# Patient Record
Sex: MALE | Race: BLACK OR AFRICAN AMERICAN | Employment: UNEMPLOYED | URBAN - METROPOLITAN AREA
[De-identification: names, ages, dates, MRNs, and addresses within clinical notes are randomized per-mention and may not be internally consistent; named-entity substitution may affect disease eponyms.]

---

## 2017-05-01 ENCOUNTER — APPOINTMENT (OUTPATIENT)
Dept: ULTRASOUND IMAGING | Age: 32
DRG: 683 | End: 2017-05-01
Attending: INTERNAL MEDICINE
Payer: MEDICARE

## 2017-05-01 ENCOUNTER — HOSPITAL ENCOUNTER (INPATIENT)
Age: 32
LOS: 4 days | Discharge: HOME | DRG: 683 | End: 2017-05-05
Attending: EMERGENCY MEDICINE | Admitting: INTERNAL MEDICINE
Payer: MEDICARE

## 2017-05-01 ENCOUNTER — APPOINTMENT (OUTPATIENT)
Dept: GENERAL RADIOLOGY | Age: 32
DRG: 683 | End: 2017-05-01
Attending: PHYSICIAN ASSISTANT
Payer: MEDICARE

## 2017-05-01 DIAGNOSIS — E66.01 MORBID OBESITY WITH BMI OF 40.0-44.9, ADULT (HCC): ICD-10-CM

## 2017-05-01 DIAGNOSIS — E10.22 TYPE 1 DIABETES MELLITUS WITH STAGE 3 CHRONIC KIDNEY DISEASE (HCC): ICD-10-CM

## 2017-05-01 DIAGNOSIS — R06.02 SOB (SHORTNESS OF BREATH): ICD-10-CM

## 2017-05-01 DIAGNOSIS — N18.30 TYPE 1 DIABETES MELLITUS WITH STAGE 3 CHRONIC KIDNEY DISEASE (HCC): ICD-10-CM

## 2017-05-01 DIAGNOSIS — N17.9 ACUTE KIDNEY INJURY (HCC): Primary | ICD-10-CM

## 2017-05-01 DIAGNOSIS — I10 ESSENTIAL HYPERTENSION: ICD-10-CM

## 2017-05-01 LAB
ALBUMIN SERPL BCP-MCNC: 1.7 G/DL (ref 3.5–5)
ALBUMIN/GLOB SERPL: 0.3 {RATIO} (ref 1.1–2.2)
ALP SERPL-CCNC: 50 U/L (ref 45–117)
ALT SERPL-CCNC: 14 U/L (ref 12–78)
AMORPH CRY URNS QL MICRO: ABNORMAL
ANION GAP BLD CALC-SCNC: 15 MMOL/L (ref 5–15)
APPEARANCE UR: ABNORMAL
AST SERPL W P-5'-P-CCNC: 18 U/L (ref 15–37)
BACTERIA URNS QL MICRO: NEGATIVE /HPF
BASOPHILS # BLD AUTO: 0 K/UL (ref 0–0.1)
BASOPHILS # BLD: 0 % (ref 0–1)
BILIRUB SERPL-MCNC: 0.2 MG/DL (ref 0.2–1)
BILIRUB UR QL: NEGATIVE
BNP SERPL-MCNC: ABNORMAL PG/ML (ref 0–125)
BUN SERPL-MCNC: 56 MG/DL (ref 6–20)
BUN/CREAT SERPL: 6 (ref 12–20)
CALCIUM SERPL-MCNC: 7.4 MG/DL (ref 8.5–10.1)
CHLORIDE SERPL-SCNC: 113 MMOL/L (ref 97–108)
CK MB CFR SERPL CALC: 0.6 % (ref 0–2.5)
CK MB SERPL-MCNC: 6.6 NG/ML (ref 5–25)
CK SERPL-CCNC: 1053 U/L (ref 39–308)
CO2 SERPL-SCNC: 19 MMOL/L (ref 21–32)
COLOR UR: ABNORMAL
CREAT SERPL-MCNC: 8.72 MG/DL (ref 0.7–1.3)
EOSINOPHIL # BLD: 0.3 K/UL (ref 0–0.4)
EOSINOPHIL NFR BLD: 2 % (ref 0–7)
EPITH CASTS URNS QL MICRO: ABNORMAL /LPF
ERYTHROCYTE [DISTWIDTH] IN BLOOD BY AUTOMATED COUNT: 14.4 % (ref 11.5–14.5)
GLOBULIN SER CALC-MCNC: 5.5 G/DL (ref 2–4)
GLUCOSE BLD STRIP.AUTO-MCNC: 120 MG/DL (ref 65–100)
GLUCOSE SERPL-MCNC: 121 MG/DL (ref 65–100)
GLUCOSE UR STRIP.AUTO-MCNC: 250 MG/DL
HCT VFR BLD AUTO: 22.7 % (ref 36.6–50.3)
HGB BLD-MCNC: 7.4 G/DL (ref 12.1–17)
HGB UR QL STRIP: ABNORMAL
KETONES UR QL STRIP.AUTO: NEGATIVE MG/DL
LEUKOCYTE ESTERASE UR QL STRIP.AUTO: NEGATIVE
LYMPHOCYTES # BLD AUTO: 13 % (ref 12–49)
LYMPHOCYTES # BLD: 1.6 K/UL (ref 0.8–3.5)
MCH RBC QN AUTO: 24.9 PG (ref 26–34)
MCHC RBC AUTO-ENTMCNC: 32.6 G/DL (ref 30–36.5)
MCV RBC AUTO: 76.4 FL (ref 80–99)
MONOCYTES # BLD: 0.8 K/UL (ref 0–1)
MONOCYTES NFR BLD AUTO: 7 % (ref 5–13)
NEUTS SEG # BLD: 9.8 K/UL (ref 1.8–8)
NEUTS SEG NFR BLD AUTO: 78 % (ref 32–75)
NITRITE UR QL STRIP.AUTO: NEGATIVE
PH UR STRIP: 5.5 [PH] (ref 5–8)
PLATELET # BLD AUTO: 292 K/UL (ref 150–400)
POTASSIUM SERPL-SCNC: 2.9 MMOL/L (ref 3.5–5.1)
PROT SERPL-MCNC: 7.2 G/DL (ref 6.4–8.2)
PROT UR STRIP-MCNC: 300 MG/DL
RBC # BLD AUTO: 2.97 M/UL (ref 4.1–5.7)
RBC #/AREA URNS HPF: ABNORMAL /HPF (ref 0–5)
SERVICE CMNT-IMP: ABNORMAL
SODIUM SERPL-SCNC: 147 MMOL/L (ref 136–145)
SP GR UR REFRACTOMETRY: 1.02 (ref 1–1.03)
TROPONIN I SERPL-MCNC: 0.13 NG/ML
TROPONIN I SERPL-MCNC: 0.14 NG/ML
UA: UC IF INDICATED,UAUC: ABNORMAL
UROBILINOGEN UR QL STRIP.AUTO: 0.2 EU/DL (ref 0.2–1)
WBC # BLD AUTO: 12.6 K/UL (ref 4.1–11.1)
WBC URNS QL MICRO: ABNORMAL /HPF (ref 0–4)

## 2017-05-01 PROCEDURE — 80053 COMPREHEN METABOLIC PANEL: CPT | Performed by: EMERGENCY MEDICINE

## 2017-05-01 PROCEDURE — 93005 ELECTROCARDIOGRAM TRACING: CPT

## 2017-05-01 PROCEDURE — 65270000029 HC RM PRIVATE

## 2017-05-01 PROCEDURE — 76770 US EXAM ABDO BACK WALL COMP: CPT

## 2017-05-01 PROCEDURE — 99284 EMERGENCY DEPT VISIT MOD MDM: CPT

## 2017-05-01 PROCEDURE — 93970 EXTREMITY STUDY: CPT

## 2017-05-01 PROCEDURE — 84484 ASSAY OF TROPONIN QUANT: CPT | Performed by: EMERGENCY MEDICINE

## 2017-05-01 PROCEDURE — 74011000250 HC RX REV CODE- 250: Performed by: INTERNAL MEDICINE

## 2017-05-01 PROCEDURE — 85025 COMPLETE CBC W/AUTO DIFF WBC: CPT | Performed by: EMERGENCY MEDICINE

## 2017-05-01 PROCEDURE — 74011250637 HC RX REV CODE- 250/637: Performed by: INTERNAL MEDICINE

## 2017-05-01 PROCEDURE — 71020 XR CHEST PA LAT: CPT

## 2017-05-01 PROCEDURE — 82962 GLUCOSE BLOOD TEST: CPT

## 2017-05-01 PROCEDURE — 82553 CREATINE MB FRACTION: CPT | Performed by: EMERGENCY MEDICINE

## 2017-05-01 PROCEDURE — 74011250636 HC RX REV CODE- 250/636: Performed by: INTERNAL MEDICINE

## 2017-05-01 PROCEDURE — 83880 ASSAY OF NATRIURETIC PEPTIDE: CPT

## 2017-05-01 PROCEDURE — 74011250637 HC RX REV CODE- 250/637: Performed by: PHYSICIAN ASSISTANT

## 2017-05-01 PROCEDURE — 81001 URINALYSIS AUTO W/SCOPE: CPT

## 2017-05-01 PROCEDURE — 36415 COLL VENOUS BLD VENIPUNCTURE: CPT | Performed by: EMERGENCY MEDICINE

## 2017-05-01 PROCEDURE — 65660000000 HC RM CCU STEPDOWN

## 2017-05-01 PROCEDURE — 82550 ASSAY OF CK (CPK): CPT | Performed by: EMERGENCY MEDICINE

## 2017-05-01 RX ORDER — SODIUM CHLORIDE 450 MG/100ML
50 INJECTION, SOLUTION INTRAVENOUS CONTINUOUS
Status: DISCONTINUED | OUTPATIENT
Start: 2017-05-01 | End: 2017-05-01

## 2017-05-01 RX ORDER — HEPARIN SODIUM 5000 [USP'U]/ML
5000 INJECTION, SOLUTION INTRAVENOUS; SUBCUTANEOUS EVERY 8 HOURS
Status: DISCONTINUED | OUTPATIENT
Start: 2017-05-01 | End: 2017-05-05 | Stop reason: HOSPADM

## 2017-05-01 RX ORDER — SODIUM BICARBONATE 650 MG/1
325 TABLET ORAL 2 TIMES DAILY
Status: DISCONTINUED | OUTPATIENT
Start: 2017-05-01 | End: 2017-05-02

## 2017-05-01 RX ORDER — DEXTROSE 50 % IN WATER (D50W) INTRAVENOUS SYRINGE
12.5-25 AS NEEDED
Status: DISCONTINUED | OUTPATIENT
Start: 2017-05-01 | End: 2017-05-05 | Stop reason: HOSPADM

## 2017-05-01 RX ORDER — LABETALOL HYDROCHLORIDE 5 MG/ML
10-20 INJECTION, SOLUTION INTRAVENOUS
Status: DISCONTINUED | OUTPATIENT
Start: 2017-05-01 | End: 2017-05-02

## 2017-05-01 RX ORDER — AMLODIPINE BESYLATE 5 MG/1
5 TABLET ORAL DAILY
Status: DISCONTINUED | OUTPATIENT
Start: 2017-05-01 | End: 2017-05-02

## 2017-05-01 RX ORDER — MAGNESIUM SULFATE 100 %
4 CRYSTALS MISCELLANEOUS AS NEEDED
Status: DISCONTINUED | OUTPATIENT
Start: 2017-05-01 | End: 2017-05-05 | Stop reason: HOSPADM

## 2017-05-01 RX ORDER — POTASSIUM CHLORIDE 1.5 G/1.77G
20 POWDER, FOR SOLUTION ORAL
Status: COMPLETED | OUTPATIENT
Start: 2017-05-01 | End: 2017-05-01

## 2017-05-01 RX ORDER — INSULIN LISPRO 100 [IU]/ML
INJECTION, SOLUTION INTRAVENOUS; SUBCUTANEOUS
Status: DISCONTINUED | OUTPATIENT
Start: 2017-05-01 | End: 2017-05-05 | Stop reason: HOSPADM

## 2017-05-01 RX ORDER — ACETAMINOPHEN 325 MG/1
650 TABLET ORAL
Status: DISCONTINUED | OUTPATIENT
Start: 2017-05-01 | End: 2017-05-05 | Stop reason: HOSPADM

## 2017-05-01 RX ORDER — INSULIN GLARGINE 100 [IU]/ML
30 INJECTION, SOLUTION SUBCUTANEOUS DAILY
Status: DISCONTINUED | OUTPATIENT
Start: 2017-05-02 | End: 2017-05-05

## 2017-05-01 RX ADMIN — SODIUM BICARBONATE 325 MG: 650 TABLET, ORALLY DISINTEGRATING ORAL at 21:11

## 2017-05-01 RX ADMIN — AMLODIPINE BESYLATE 5 MG: 5 TABLET ORAL at 20:11

## 2017-05-01 RX ADMIN — LABETALOL HYDROCHLORIDE 10 MG: 5 INJECTION, SOLUTION INTRAVENOUS at 20:11

## 2017-05-01 RX ADMIN — LABETALOL HYDROCHLORIDE 10 MG: 5 INJECTION, SOLUTION INTRAVENOUS at 21:12

## 2017-05-01 RX ADMIN — ACETAMINOPHEN 650 MG: 325 TABLET ORAL at 21:11

## 2017-05-01 RX ADMIN — HEPARIN SODIUM 5000 UNITS: 5000 INJECTION, SOLUTION INTRAVENOUS; SUBCUTANEOUS at 20:10

## 2017-05-01 RX ADMIN — POTASSIUM CHLORIDE 20 MEQ: 1.5 POWDER, FOR SOLUTION ORAL at 18:11

## 2017-05-01 NOTE — ED NOTES
Assumed care of patient from triage. Patient is A&Ox4, respirations even and unlabored. Pt. States he has had BLE edema x1 month and new onset SOB x1 week. Patient reports no chest pain, abdominal pain. Patient states he has had diarrhea intermittently for about 6 months and one episode of vomiting 3 days ago. Pt. Susy Lamb in low bed in POC, side rail x1, monitor x3, call light within reach.

## 2017-05-01 NOTE — ED PROVIDER NOTES
HPI Comments: Jeremiah Andres is a 27 y.o. male with a PMH of cataracts, DM, HLD, HTN, and obesity presenting ambulatory to the ED from home C/O bilateral lower extremity swelling for the past month and SOB x 1 day. Patient admits to PND, orthopnea and dyspnea on exertion. He admits to come CP with exertion but none at rest. He denies any recent wounds or rashes to his lower extremities. He denies history of heart failure, kidney problems, and taking Lasix/Bumex. Denies any change in urination, hematuria, dysuria, nausea, vomiting or diarrhea. No recent change in medications. Patient denies any other symptoms or complaints. PCP: Darian Strong III,     There are no other complaints, changes or physical findings at this time. The history is provided by the patient. No  was used. Past Medical History:   Diagnosis Date    Cataracts     Diabetes ([address]     Hypercholesterolemia     Hypertension     Obesity        Past Surgical History:   Procedure Laterality Date    HX AMPUTATION      Left great toe and L 5th toe         Family History:   Problem Relation Age of Onset    Diabetes Mother     Liver Disease Father     Kidney Disease Maternal Grandfather     Diabetes Maternal Grandfather     Hypertension Maternal Grandfather     Diabetes Paternal Uncle     Hypertension Paternal Uncle        Social History     Social History    Marital status: LEGALLY      Spouse name: N/A    Number of children: N/A    Years of education: N/A     Occupational History    Not on file. Social History Main Topics    Smoking status: Current Some Day Smoker     Packs/day: 0.25    Smokeless tobacco: Never Used    Alcohol use Yes      Comment: rarely    Drug use: No    Sexual activity: Not on file     Other Topics Concern    Not on file     Social History Narrative         ALLERGIES: Review of patient's allergies indicates no known allergies.     Review of Systems Constitutional: Negative for chills, diaphoresis and fever. HENT: Negative for rhinorrhea and sore throat. Eyes: Negative for pain. Respiratory: Positive for shortness of breath. Negative for cough, wheezing and stridor. Cardiovascular: Positive for chest pain (with exertion) and leg swelling (bilateral). Gastrointestinal: Negative for abdominal pain, diarrhea, nausea and vomiting. Genitourinary: Negative for difficulty urinating, dysuria, flank pain and hematuria. Musculoskeletal: Negative for back pain and neck stiffness. Skin: Negative for rash. Neurological: Negative for dizziness, seizures, syncope, weakness, light-headedness and headaches. Psychiatric/Behavioral: Negative for behavioral problems and confusion. Vitals:    05/01/17 1743 05/01/17 1800 05/01/17 2010 05/01/17 2101   BP: (!) 169/108 (!) 171/107 (!) 196/116 (!) 183/116   Pulse: 91 93 90 91   Resp: 18 22  20   Temp:    98.1 °F (36.7 °C)   SpO2: 97% 95%  97%   Weight:       Height:                Physical Exam   Constitutional: He is oriented to person, place, and time. He appears well-developed and well-nourished. No distress. 26 y/o AAM in no acute distress. Talking in full sentences, no accessory muscle use   HENT:   Head: Normocephalic and atraumatic. Right Ear: External ear normal.   Left Ear: External ear normal.   Nose: Nose normal.   Eyes: Conjunctivae and EOM are normal.   Neck: Normal range of motion. Neck supple. Cardiovascular: Normal rate, regular rhythm, normal heart sounds and intact distal pulses. No murmur heard. 3+ bilateral lower extremity edema below the knee. Right > Left. No appreciable erythema, or increased warmth. Left great toe and fifth toe amputation. Dry cracked skin. Pulmonary/Chest: Effort normal. No accessory muscle usage. No respiratory distress. He has decreased breath sounds in the left lower field. He has no wheezes. He has rales in the left lower field. Abdominal: Soft. Bowel sounds are normal. He exhibits no distension. There is no tenderness. There is no guarding and no CVA tenderness. Obese abdomen   Musculoskeletal: Normal range of motion. He exhibits no edema or tenderness. Neurological: He is alert and oriented to person, place, and time. Skin: Skin is warm and dry. No rash noted. He is not diaphoretic. Psychiatric: He has a normal mood and affect. His behavior is normal. Judgment normal.   Nursing note and vitals reviewed. MDM  Number of Diagnoses or Management Options  Acute kidney injury [facility]):   Essential hypertension:   Morbid obesity with BMI of 40.0-44.9, adult ([facility] [address]):   SOB (shortness of breath):   Type 1 diabetes mellitus with stage 3 chronic kidney disease [facility]):   Diagnosis management comments: DDx: congestive heart failure, acute kidney injury, electrolyte abnormality, malnutrition, dehydration, pleural effusion, pulmonary edema, ACS, pneumonia. Patient presents with bilateral lower extremity edema x 1 month and SOB with exertion x 1 week. Will assess with basic labs, xray, EKG. Plan for admission with DA. Amount and/or Complexity of Data Reviewed  Clinical lab tests: ordered and reviewed  Tests in the radiology section of CPT®: ordered and reviewed  Tests in the medicine section of CPT®: ordered and reviewed  Discuss the patient with other providers: yes (Hospitalist)    Critical Care  Total time providing critical care: 30-74 minutes    Patient Progress  Patient progress: stable    ED Course       Procedures    Chief Complaint   Patient presents with    Leg Swelling     x 1 month, progressively worse over the past several days       5:15 PM  The patients presenting problems have been discussed, and they are in agreement with the care plan formulated and outlined with them. I have encouraged them to ask questions as they arise throughout their visit.     CRITICAL CARE NOTE :    4:20 PM      IMPENDING DETERIORATION -Cardiovascular, Metabolic and Renal    ASSOCIATED RISK FACTORS - Hypoxia, Dysrhythmia, Metabolic changes and Dehydration    MANAGEMENT- Bedside Assessment and Supervision of Care    INTERPRETATION -  Xrays, ECG and Blood Pressure    INTERVENTIONS - hemodynamic mngmt and Metobolic interventions    CASE REVIEW - Hospitalist, Nursing and Family    TREATMENT RESPONSE -Stable    PERFORMED BY - Self and Mid-Level        NOTES   :      I have spent 50 minutes of critical care time involved in lab review, consultations with specialist, family decision- making, bedside attention and documentation. During this entire length of time I was immediately available to the patient .     Aung Thorpe MD                                                  MEDICATIONS GIVEN:  Medications   acetaminophen (TYLENOL) tablet 650 mg (650 mg Oral Given 5/1/17 2111)   heparin (porcine) injection 5,000 Units (5,000 Units SubCUTAneous Given 5/1/17 2010)   insulin lispro (HUMALOG) injection (0 Units SubCUTAneous Held 5/1/17 2200)   glucose chewable tablet 16 g (not administered)   dextrose (D50W) injection syrg 12.5-25 g (not administered)   glucagon (GLUCAGEN) injection 1 mg (not administered)   labetalol (NORMODYNE;TRANDATE) injection 10-20 mg (10 mg IntraVENous Given 5/1/17 2112)   prochlorperazine (COMPAZINE) with saline injection 5 mg (not administered)   insulin glargine (LANTUS) injection 30 Units (not administered)   amLODIPine (NORVASC) tablet 5 mg (5 mg Oral Given 5/1/17 2011)   sodium bicarbonate tablet 325 mg (325 mg Oral Given 5/1/17 2111)   potassium chloride (KLOR-CON) packet 20 mEq (20 mEq Oral Given 5/1/17 1811)       LABS REVIEWED:  Recent Results (from the past 24 hour(s))   CBC WITH AUTOMATED DIFF    Collection Time: 05/01/17  3:23 PM   Result Value Ref Range    WBC 12.6 (H) 4.1 - 11.1 K/uL    RBC 2.97 (L) 4.10 - 5.70 M/uL    HGB 7.4 (L) 12.1 - 17.0 g/dL    HCT 22.7 (L) 36.6 - 50.3 %    MCV 76.4 (L) 80.0 - 99.0 FL    MCH 24.9 (L) 26.0 - 34.0 PG    MCHC 32.6 30.0 - 36.5 g/dL    RDW 14.4 11.5 - 14.5 %    PLATELET 816 878 - 129 K/uL    NEUTROPHILS 78 (H) 32 - 75 %    LYMPHOCYTES 13 12 - 49 %    MONOCYTES 7 5 - 13 %    EOSINOPHILS 2 0 - 7 %    BASOPHILS 0 0 - 1 %    ABS. NEUTROPHILS 9.8 (H) 1.8 - 8.0 K/UL    ABS. LYMPHOCYTES 1.6 0.8 - 3.5 K/UL    ABS. MONOCYTES 0.8 0.0 - 1.0 K/UL    ABS. EOSINOPHILS 0.3 0.0 - 0.4 K/UL    ABS. BASOPHILS 0.0 0.0 - 0.1 K/UL   METABOLIC PANEL, COMPREHENSIVE    Collection Time: 05/01/17  3:23 PM   Result Value Ref Range    Sodium 147 (H) 136 - 145 mmol/L    Potassium 2.9 (L) 3.5 - 5.1 mmol/L    Chloride 113 (H) 97 - 108 mmol/L    CO2 19 (L) 21 - 32 mmol/L    Anion gap 15 5 - 15 mmol/L    Glucose 121 (H) 65 - 100 mg/dL    BUN 56 (H) 6 - 20 MG/DL    Creatinine 8.72 (H) 0.70 - 1.30 MG/DL    BUN/Creatinine ratio 6 (L) 12 - 20      GFR est AA 9 (L) >60 ml/min/1.73m2    GFR est non-AA 7 (L) >60 ml/min/1.73m2    Calcium 7.4 (L) 8.5 - 10.1 MG/DL    Bilirubin, total 0.2 0.2 - 1.0 MG/DL    ALT (SGPT) 14 12 - 78 U/L    AST (SGOT) 18 15 - 37 U/L    Alk. phosphatase 50 45 - 117 U/L    Protein, total 7.2 6.4 - 8.2 g/dL    Albumin 1.7 (L) 3.5 - 5.0 g/dL    Globulin 5.5 (H) 2.0 - 4.0 g/dL    A-G Ratio 0.3 (L) 1.1 - 2.2     CK W/ REFLX CKMB    Collection Time: 05/01/17  3:23 PM   Result Value Ref Range    CK 1053 (H) 39 - 308 U/L   TROPONIN I    Collection Time: 05/01/17  3:23 PM   Result Value Ref Range    Troponin-I, Qt. 0.14 (H) <0.05 ng/mL   PRO-BNP    Collection Time: 05/01/17  3:23 PM   Result Value Ref Range    NT pro-BNP >54228 (H) 0 - 125 PG/ML   CK-MB,QUANT.     Collection Time: 05/01/17  3:23 PM   Result Value Ref Range    CK - MB 6.6 (H) <3.6 NG/ML    CK-MB Index 0.6 0 - 2.5     EKG, 12 LEAD, INITIAL    Collection Time: 05/01/17  3:23 PM   Result Value Ref Range    Ventricular Rate 94 BPM    Atrial Rate 94 BPM    P-R Interval 192 ms    QRS Duration 108 ms    Q-T Interval 392 ms    QTC Calculation (Bezet) 490 ms    Calculated R Axis -177 degrees    Calculated T Axis 59 degrees    Diagnosis       Normal sinus rhythm  Right superior axis deviation  Prolonged QT  When compared with ECG of 10-DEC-2015 12:14,  Questionable change in QRS axis     TROPONIN I    Collection Time: 05/01/17  5:39 PM   Result Value Ref Range    Troponin-I, Qt. 0.13 (H) <0.05 ng/mL   URINALYSIS W/ REFLEX CULTURE    Collection Time: 05/01/17  5:39 PM   Result Value Ref Range    Color YELLOW/STRAW      Appearance CLOUDY (A) CLEAR      Specific gravity 1.018 1.003 - 1.030      pH (UA) 5.5 5.0 - 8.0      Protein 300 (A) NEG mg/dL    Glucose 250 (A) NEG mg/dL    Ketone NEGATIVE  NEG mg/dL    Bilirubin NEGATIVE  NEG      Blood LARGE (A) NEG      Urobilinogen 0.2 0.2 - 1.0 EU/dL    Nitrites NEGATIVE  NEG      Leukocyte Esterase NEGATIVE  NEG      WBC 0-4 0 - 4 /hpf    RBC 5-10 0 - 5 /hpf    Epithelial cells FEW FEW /lpf    Bacteria NEGATIVE  NEG /hpf    UA:UC IF INDICATED CULTURE NOT INDICATED BY UA RESULT CNI      Amorphous Crystals 1+ (A) NEG   GLUCOSE, POC    Collection Time: 05/01/17  9:05 PM   Result Value Ref Range    Glucose (POC) 120 (H) 65 - 100 mg/dL    Performed by Tangela Chong        VITAL SIGNS:  Patient Vitals for the past 12 hrs:   Temp Pulse Resp BP SpO2   05/01/17 2101 98.1 °F (36.7 °C) 91 20 (!) 183/116 97 %   05/01/17 2010 - 90 - (!) 196/116 -   05/01/17 1800 - 93 22 (!) 171/107 95 %   05/01/17 1743 - 91 18 (!) 169/108 97 %   05/01/17 1515 98.4 °F (36.9 °C) 97 18 (!) 178/94 96 %       RADIOLOGY RESULTS:  The following have been ordered and reviewed:  XR CHEST PA LAT   Final Result   EXAM: XR CHEST PA LAT     INDICATION: SOB. Leg swelling for one month, progressively worsening over the  past few days     COMPARISON: 12/10/2015.     FINDINGS: PA and lateral radiographs of the chest demonstrate clear lungs. The  cardiac and mediastinal contours and pulmonary vascularity are stable. There  are tiny bilateral pleural effusions.  There is central congestion without overt  CHF. The bones and soft tissues are within normal limits.      IMPRESSION  IMPRESSION: Central congestion without overt CHF. Small bilateral pleural  effusions. CONSULTATIONS:   CONSULT NOTE:   5:15 PM  Rosy Reed PA-C spoke with Dr. Jerome Medel,   Specialty: Hospitalist  Discussed pt's hx, disposition, and available diagnostic and imaging results. Reviewed care plans. Consultant agrees with plans as outlined. Consultant will come and evaluate patient with plans for admission. PROGRESS NOTES:  5:00 PM  Initial assessment of patient complete, and patient was given the opportunity to ask questions. Plan of care reviewed with patient and will update when results are available. ADMIT NOTE:  5:52 PM  The patient is being admitted to the hospital by Dr. Jerome Medel. The results of their tests and reasons for their admission have been discussed with the patient and/or available family. They convey agreement and understanding for the need to be admitted and for their admission diagnosis. DIAGNOSIS:    1. Acute kidney injury ([address]    2. SOB (shortness of breath)    3. Morbid obesity with BMI of 40.0-44.9, adult ([address]    4. Type 1 diabetes mellitus with stage 3 chronic kidney disease ([address]    5. Essential hypertension        PLAN:  1. Admit to Hospitalist    ED COURSE: The patients hospital course has been uncomplicated.

## 2017-05-01 NOTE — IP AVS SNAPSHOT
[address] [facility] 
[phone] Patient: Kaleb Underwood MRN: [id number] :1985 You are allergic to the following No active allergies Recent Documentation Height Weight BMI Smoking Status 1.88 m (!) 172 kg 48.69 kg/m2 Current Some Day Smoker Unresulted Labs Order Current Status CELIAC ANTIBODY PROFILE Preliminary result Emergency Contacts Name Discharge Info Relation Home Work Mobile Stacy Bello  Other Relative [6] [phone] Debra Jack  Other Relative [6] [phone] Eitan Polyak [5] [phone] Simon Marks N/A  AT THIS TIME [6] Spouse [3] [phone] About your hospitalization You were admitted on:  May 1, 2017 You last received care in the:  [facility] 3 NEUROSCIENCE TELEMETRY You were discharged on: May 5, 2017 Unit phone number:  [phone] Why you were hospitalized Your primary diagnosis was:  Not on File Your diagnoses also included:  Hussain (Acute Kidney Injury) (Hcc) Providers Seen During Your Hospitalizations Provider Role Specialty Primary office phone Arlene Ortez MD Attending Provider Emergency Medicine [phone] Enrico Zhou MD Attending Provider Internal Medicine [phone] Rex Jean Baptiste MD Attending Provider Internal Medicine [phone] Your Primary Care Physician (PCP) Primary Care Physician Office Phone Office Fax Orlandomian Hortonpalmira QUINTEROS [phone] Follow-up Information Follow up With Details Comments Contact Info Formerly named [facility] [address] [place] [address] Avenue 
[phone] Andry Rai MD In 2 weeks for your diarrhea when you have time [address] [place], [place] [address] [address] Gastrointestinal [address] [address] 
[phone] Joanna Sanford,  In 2 weeks for blood pressure recheck [place] [address] [facility] [address] [facility] 
[phone] Your Appointments Tuesday May 09, 2017 ARTERIO VENOUS FISTULA/GRAFT ARM with Yaniv Pacheco MD  
[facility] SURGERY (RI OR PRE ASSESSMENT) [address] [facility]  
[phone] Current Discharge Medication List  
  
CONTINUE these medications which have CHANGED Dose & Instructions Dispensing Information Comments Morning Noon Evening Bedtime  
 oxyCODONE-acetaminophen 5-325 mg per tablet Commonly known as:  PERCOCET What changed:  how much to take Your last dose was: Your next dose is:    
   
   
 Dose:  1 Tab Take 1 Tab by mouth every six (6) hours as needed. Max Daily Amount: 4 Tabs. Quantity:  20 Tab Refills:  0 CONTINUE these medications which have NOT CHANGED Dose & Instructions Dispensing Information Comments Morning Noon Evening Bedtime  
 amLODIPine 10 mg tablet Commonly known as:  Luis [place] Your last dose was: Your next dose is:    
   
   
 Dose:  10 mg Take 1 Tab by mouth daily. Indications: hypertension Quantity:  30 Tab Refills:  0  
     
   
   
   
  
 cloNIDine HCl 0.1 mg tablet Commonly known as:  CATAPRES Your last dose was: Your next dose is:    
   
   
 Dose:  0.1 mg Take 1 Tab by mouth three (3) times daily (with meals). Quantity:  90 Tab Refills:  0  
     
   
   
   
  
 hydrALAZINE 50 mg tablet Commonly known as:  APRESOLINE Your last dose was: Your next dose is:    
   
   
 Dose:  50 mg Take 1 Tab by mouth three (3) times daily. Indications: hypertension Quantity:  90 Tab Refills:  0  
     
   
   
   
  
 insulin lispro 100 unit/mL injection Commonly known as:  HUMALOG Your last dose was: Your next dose is:    
   
   
 Dose:  8 Units 8 Units by SubCUTAneous route Before breakfast, lunch, and dinner. Quantity:  1 Vial  
Refills:  1  
     
   
   
   
  
 insulin NPH/insulin regular 100 unit/mL (70-30) injection Commonly known as:  NOVOLIN 70/30, HUMULIN 70/30 Your last dose was: Your next dose is:    
   
   
 Dose:  44 Units 44 Units by SubCUTAneous route two (2) times a day. 70/25 is accepted Quantity:  10 mL Refills:  1  
     
   
   
   
  
 loperamide 2 mg capsule Commonly known as:  IMODIUM Your last dose was: Your next dose is:    
   
   
 Dose:  2 mg Take 2 mg by mouth as needed for Diarrhea. Refills:  0 STOP taking these medications   
 lisinopril 5 mg tablet Commonly known as:  PRINIVIL, ZESTRIL  
   
  
 omeprazole 40 mg capsule Commonly known as:  PRILOSEC  
   
  
 promethazine 25 mg tablet Commonly known as:  PHENERGAN Where to Get Your Medications Information on where to get these meds will be given to you by the nurse or doctor. ! Ask your nurse or doctor about these medications  
  amLODIPine 10 mg tablet  
 cloNIDine HCl 0.1 mg tablet  
 hydrALAZINE 50 mg tablet  
 oxyCODONE-acetaminophen 5-325 mg per tablet Discharge Instructions HOSPITALIST DISCHARGE INSTRUCTIONS 
 
NAME: Renae Ram :  1985 MRN:  [id number] Date/Time:  2017 9:50 AM 
 
ADMIT DATE: 2017 DISCHARGE DATE: 2017 Attending Physician: Ralph Palacios MD 
 
DISCHARGE DIAGNOSIS: 
End stage kidney disease Hypertension Insulin-dependent diabetes MEDICATIONS: 
See above · It is important that you take the medication exactly as they are prescribed. · Keep your medication in the bottles provided by the pharmacist and keep a list of the medication names, dosages, and times to be taken in your wallet. · Do not take other medications without consulting your doctor. Pain Management: per above medications What to do at [facility] Recommended diet:  Diabetic diet with kidney disease restrictions (see instructions below) Recommended activity: Activity as tolerated If you have questions regarding the hospital related prescriptions or hospital related issues please call [facility] at . You can always direct your questions to your primary care doctor if you are unable to reach your hospital physician; your PCP works as an extension of your hospital doctor just like your hospital doctor is an extension of your PCP for your time at [facility]. If you experience any of the following symptoms then please call your primary care physician or return to the emergency room if you cannot get hold of your doctor: 
Fever, chills, nausea, vomiting, diarrhea, change in mentation, falling, bleeding, shortness of breath Additional Instructions: 
 
 
Bring these papers with you to your follow up appointments. The papers will help your doctors be sure to continue the care plan from the hospital. 
 
 
 
 
 
 
Information obtained by : 
I understand that if any problems occur once I am at home I am to contact my physician. I understand and acknowledge receipt of the instructions indicated above. Physician's or R.N.'s Signature                                                                  Date/Time Patient or Representative Signature                                                          Date/Time Diet for End-Stage Renal Disease (Dialysis): Care Instructions Your Care Instructions You need to change your diet when you are on dialysis for end-stage renal disease (kidney failure). You will need more protein than you did before you started dialysis. You may need to limit salt and fluids. You also may need to limit minerals such as potassium and phosphorus. A diet for end-stage renal disease takes planning. A dietitian who specializes in kidney disease can help you plan meals that meet your needs. Your nutrition needs depend on the type of dialysis you get. Talk with your doctor or dietitian to make sure your diet is right for your condition. Do not change your diet without talking to your doctor or dietitian. Follow-up care is a key part of your treatment and safety. Be sure to make and go to all appointments, and call your doctor if you are having problems. It's also a good idea to know your test results and keep a list of the medicines you take. How can you care for yourself at home? · Work with your doctor or dietitian to create a food plan that guides your daily food choices. · Do not skip meals or go for many hours without eating. If you do not feel very hungry, try to eat 4 or 5 small meals instead of 1 or 2 big meals. · If you have a hard time eating enough, talk to your doctor or dietitian about ways you can add calories to your diet. · Do not take any vitamins or minerals, supplements, or herbal products without talking to your doctor first. 
· Check with your doctor about whether it is safe for you to drink alcohol. · Check with your doctor about how much fluid you can drink each day. Drinking a lot of fluid can cause you to gain too much water weight between dialysis sessions. To get the right amount of protein · Ask your doctor or dietitian how much protein you can have each day. You will probably need more protein while you are on dialysis than you did before you started dialysis. · Choose high-quality protein sources, such as lean meat, poultry, and fish. To limit salt · Read food labels on cans and food packages. The labels tell you how much sodium is in each serving. Make sure that you look at the serving size. If you eat more than the serving size, you will get more sodium than what is listed on the label. · Do not add salt to your food. Avoid foods that list salt, sodium, or monosodium glutamate (MSG) as an ingredient. · Buy foods that are labeled \"no salt added,\" \"sodium-free,\" or \"low-sodium. \" Foods labeled \"reduced-sodium\" and \"light sodium\" may still have too much sodium. · Limit processed foods, fast food, and restaurant foods. These types of food are very high in sodium. · Avoid salted pretzels, chips, popcorn, and other salted snacks. · Avoid smoked, cured, salted, and canned meat, fish, and poultry. This includes ham, ascencio, hot dogs, and luncheon meats. · You may use lemon, herbs, and spices to flavor your meals. To limit fluids · Know how much fluid you can drink. Every day fill a pitcher with that amount of water. If you drink another fluid (such as coffee) that day, pour an equal amount out of the pitcher. · Count foods that are liquid at room temperature, such as gelatin dessert and ice cream, as fluids. To limit potassium · Choose low-potassium fruits such as blueberries and raspberries. · Choose low-potassium vegetables such as cucumber and radishes. · Do not use a salt substitute or lite salt unless your doctor says it is okay. Most salt substitutes and lite salts are high in potassium. To limit phosphorus · Follow your food plan to know how much milk and milk products you can have. · Avoid nuts, peanut butter, seeds, lentils, beans, organ meats, and sardines. · Avoid cola drinks. · Avoid bran breads or bran cereals. · Take phosphate binders as directed, if prescribed by your doctor. Where can you learn more? Go to http://nakita.info/.  
Enter M796 in the search box to learn more about \"Diet for End-Stage Renal Disease (Dialysis): Care Instructions. \" Current as of: July 26, 2016 Content Version: 11.2 © 9481-2607 [employer]. Care instructions adapted under license by [employer] (which disclaims liability or warranty for this information). If you have questions about a medical condition or this instruction, always ask your healthcare professional. [address] any warranty or liability for your use of this information. Discharge Orders None [employer] Announcement We are excited to announce that we are making your provider's discharge notes available to you in [employer]. You will see these notes when they are completed and signed by the physician that discharged you from your recent hospital stay. If you have any questions or concerns about any information you see in [employer], please call the Health Information Department where you were seen or reach out to your Primary Care Provider for more information about your plan of care. Introducing [facility]! Sara Gonzalez introduces [employer] patient portal. Now you can access parts of your medical record, email your doctor's office, and request medication refills online. 1. In your internet browser, go to [email or web address] 2. Click on the First Time User? Click Here link in the Sign In box. You will see the New Member Sign Up page. 3. Enter your [employer] Access Code exactly as it appears below. You will not need to use this code after youve completed the sign-up process. If you do not sign up before the expiration date, you must request a new code. · [employer] Access Code: [email or web address] Expires: 7/30/2017  4:57 PM 
 
4. Enter the last four digits of your Social Security Number (xxxx) and Date of Birth (mm/dd/yyyy) as indicated and click Submit. You will be taken to the next sign-up page. 5. Create a [employer] ID.  This will be your [employer] login ID and cannot be changed, so think of one that is secure and easy to remember. 6. Create a [employer] password. You can change your password at any time. 7. Enter your Password Reset Question and Answer. This can be used at a later time if you forget your password. 8. Enter your e-mail address. You will receive e-mail notification when new information is available in [address] 9. Click Sign Up. You can now view and download portions of your medical record. 10. Click the Download Summary menu link to download a portable copy of your medical information. If you have questions, please visit the Frequently Asked Questions section of the [employer] website. Remember, [employer] is NOT to be used for urgent needs. For medical emergencies, dial 911. Now available from your iPhone and Android! General Information Please provide this summary of care documentation to your next provider. Patient Signature:  ____________________________________________________________ Date:  ____________________________________________________________  
  
Jovany Molina Provider Signature:  ____________________________________________________________ Date:  ____________________________________________________________

## 2017-05-01 NOTE — H&P
Hospitalist Admission Note    NAME: May Guerrero   :  1985   MRN:  [id number]     Date/Time:  2017 7:14 PM    Patient PCP: Carla Montoya III, DO  ________________________________________________________________________    My assessment of this patient's clinical condition and my plan of care is as follows. Assessment / Plan:    DA on CKD vs progression of underlying renal disease (HTN / DM)  -Check Renal US  -probably component of volume depletion (diarrhea) but will not hydrate because of his SOB/LITTLE and peripheral edema.   -consult Nephrology, likely will need dialysis    LITTLE without hypoxemia  -could just be from volume overload but will check leg dopplers for completeness sake. With no leg pain, symmetrical swelling and no CP, my suspicion for VTE disease is low. Not SOB at rest.  Not in distress.   -check Echocardiogram to assess LVEF and valves. Anemia  -due to renal disease but he is microcytic so will check iron studies    Rhabdomyolysis   -follow CK. Uncontrolled HTN  -start norvasc today. Labetalol IV prn. Troponin elevation  -mild elevation with flat trend. Likely just related to his DA  -telemetry monitoring    Intermittent diarrhea  -he claims that he has IBS and has been using imodium prn at home. No recent antibiotic exposure. Monitor and send stool culture if appropriate. DM2  -check A1c.    -decrease lantus to 30 units (half). Follow BS with SSI prn    Legally blind from complications of diabetes    Obesity    Code Status:  Full  Surrogate Decision Maker:   . DVT Prophylaxis:   Heparin  GI Prophylaxis: not indicated    Baseline:   . No children. Legally blind and disabled. Subjective:   CHIEF COMPLAINT:      HISTORY OF PRESENT ILLNESS:     Adell Homans is a 27 y.o.  male with a history of HTN, DM, and CKD who presents with worsening leg swelling and SOB.    He has had leg swelling for sometime now but worse this past month. He also endorses orthopnea, nocturnal dyspnea and worsening LITTLE. He denies CP, leg pain, fever, chills, or rash. He has had no follow up since 2015 and he has ran out of his BP medications due to lack of insurance. He is only on lantus and humalog but he does not check his BP or BS frequently. Today, he received notice that he was approved for medicaid so he decided to seek consultation today. ER evaluation is remarkable for Cr 8.7  Hg 7.4.  k 2.9 and elevated troponin. We were asked to admit for work up and evaluation of the above problems. Past Medical History:   Diagnosis Date    Cataracts     Diabetes ([facility]     Hypercholesterolemia     Hypertension     Obesity         Past Surgical History:   Procedure Laterality Date    HX AMPUTATION      Left great toe and L 5th toe       Social History   Substance Use Topics    Smoking status: Current Some Day Smoker     Packs/day: 0.25    Smokeless tobacco: Never Used    Alcohol use Yes      Comment: rarely        Family History   Problem Relation Age of Onset    Diabetes Mother     Liver Disease Father     Kidney Disease Maternal Grandfather     Diabetes Maternal Grandfather     Hypertension Maternal Grandfather     Diabetes Paternal Uncle     Hypertension Paternal Uncle      No Known Allergies     Prior to Admission medications    Medication Sig Start Date End Date Taking? Authorizing Provider   omeprazole (PRILOSEC) 40 mg capsule Take 1 Cap by mouth daily. 3/24/16   Munguia Dean III, DO   promethazine (PHENERGAN) 25 mg tablet Take 1 Tab by mouth every six (6) hours as needed for Nausea. 3/24/16   Juan J Burton III, DO   amLODIPine (NORVASC) 10 mg tablet Take 1 Tab by mouth daily. Indications: HYPERTENSION 12/29/15   Munguia Dean III, DO   lisinopril (PRINIVIL, ZESTRIL) 5 mg tablet Take 1 Tab by mouth daily.  Indications: HYPERTENSION 12/29/15   Too Zhou Josephine III, DO   hydrALAZINE (APRESOLINE) 50 mg tablet Take 1 Tab by mouth three (3) times daily. Indications: HYPERTENSION 12/29/15   Marquita Zamora III, DO   cloNIDine HCl (CATAPRES) 0.1 mg tablet Take 1 Tab by mouth three (3) times daily (with meals). 12/16/15   Jordan Al MD   oxyCODONE-acetaminophen (PERCOCET) 5-325 mg per tablet Take 2 Tabs by mouth every six (6) hours as needed. Max Daily Amount: 8 Tabs. 12/16/15   Jordan Al MD   insulin lispro (HUMALOG) 100 unit/mL injection 8 Units by SubCUTAneous route Before breakfast, lunch, and dinner. 12/16/15   Jordan Al MD   insulin NPH/insulin regular (NOVOLIN 70/30, HUMULIN 70/30) 100 unit/mL (70-30) injection 44 Units by SubCUTAneous route two (2) times a day. 70/25 is accepted 12/16/15   Jordan Al MD   loperamide (IMODIUM) 2 mg capsule Take 2 mg by mouth as needed for Diarrhea.     Historical Provider       REVIEW OF SYSTEMS:       Total of 12 systems reviewed as follows:       POSITIVE= underlined text  Negative = text not underlined  General:  fever, chills, sweats, generalized weakness, weight loss/gain,      loss of appetite   Eyes:    blurred vision, eye pain, loss of vision, double vision  ENT:    rhinorrhea, pharyngitis   Respiratory:   cough, sputum production, SOB, LITTLE, wheezing, pleuritic pain   Cardiology:   chest pain, palpitations, orthopnea, PND, edema, syncope   Gastrointestinal:  abdominal pain , N/V, diarrhea, dysphagia, constipation, bleeding   Genitourinary:  frequency, urgency, dysuria, hematuria, incontinence   Muskuloskeletal :  arthralgia, myalgia, back pain  Hematology:  easy bruising, nose or gum bleeding, lymphadenopathy   Dermatological: rash, ulceration, pruritis, color change / jaundice  Endocrine:   hot flashes or polydipsia   Neurological:  headache, dizziness, confusion, focal weakness, paresthesia,     Speech difficulties, memory loss, gait difficulty  Psychological: Feelings of anxiety, depression, agitation    Objective:   VITALS:    Visit Vitals    BP (!) 171/107 (BP 1 Location: Right arm, BP Patient Position: At rest)    Pulse 93    Temp 98.4 °F (36.9 °C)    Resp 22    Ht 6' 2\" (1.88 m)    Wt (!) 171.9 kg (378 lb 15.5 oz)    SpO2 95%    BMI 48.66 kg/m2       PHYSICAL EXAM:    General:    Alert, cooperative, no distress, appears stated age. HEENT: Atraumatic, anicteric sclerae, pink conjunctivae     No oral ulcers, mucosa moist, throat clear, dentition fair  Neck:  Supple, symmetrical,  thyroid: non tender  Lungs:   Clear to auscultation bilaterally. No Wheezing or Rhonchi. No rales. Chest wall:  No tenderness  No Accessory muscle use. Heart:   Regular  rhythm,  (+) bilateral LE edema  Abdomen:   Soft, non-tender. Not distended. Bowel sounds normal  Extremities: No cyanosis. No clubbing,      Skin turgor normal, Capillary refill normal, Radial dial pulse 2+  Skin:     Not pale. Not Jaundiced  No rashes   (+) left 1st and 2nd toe amputations   Psych:  Good insight. Not depressed. Not anxious or agitated. Neurologic: EOMs intact. No facial asymmetry. No aphasia or slurred speech. Symmetrical strength, Sensation grossly intact.  Alert and oriented X 4.     _______________________________________________________________________  Care Plan discussed with:    Comments   Patient x    Family  x  aunt   RN     Care Manager                    Consultant:      _______________________________________________________________________  Expected  Disposition:   Home with Family x   HH/PT/OT/RN    SNF/LTC    JAJA    ________________________________________________________________________  TOTAL TIME:  54   Minutes    Critical Care Provided     Minutes non procedure based      Comments    x Reviewed previous records   >50% of visit spent in counseling and coordination of care  Discussion with patient and/or family and questions answered       Given the patient's current clinical presentation, I have a high level of concern for decompensation if discharged from the ED. Complex decision making was performed which includes reviewing the patient's available past medical records, laboratory results, and Xray films. I have also directly communicated my plan and discussed this case with the involved ED physician.     ____________________________________________________________________  Zaki Plascencia MD    Procedures: see electronic medical records for all procedures/Xrays and details which were not copied into this note but were reviewed prior to creation of Plan. LAB DATA REVIEWED:    Recent Results (from the past 24 hour(s))   CBC WITH AUTOMATED DIFF    Collection Time: 05/01/17  3:23 PM   Result Value Ref Range    WBC 12.6 (H) 4.1 - 11.1 K/uL    RBC 2.97 (L) 4.10 - 5.70 M/uL    HGB 7.4 (L) 12.1 - 17.0 g/dL    HCT 22.7 (L) 36.6 - 50.3 %    MCV 76.4 (L) 80.0 - 99.0 FL    MCH 24.9 (L) 26.0 - 34.0 PG    MCHC 32.6 30.0 - 36.5 g/dL    RDW 14.4 11.5 - 14.5 %    PLATELET 839 634 - 529 K/uL    NEUTROPHILS 78 (H) 32 - 75 %    LYMPHOCYTES 13 12 - 49 %    MONOCYTES 7 5 - 13 %    EOSINOPHILS 2 0 - 7 %    BASOPHILS 0 0 - 1 %    ABS. NEUTROPHILS 9.8 (H) 1.8 - 8.0 K/UL    ABS. LYMPHOCYTES 1.6 0.8 - 3.5 K/UL    ABS. MONOCYTES 0.8 0.0 - 1.0 K/UL    ABS. EOSINOPHILS 0.3 0.0 - 0.4 K/UL    ABS. BASOPHILS 0.0 0.0 - 0.1 K/UL   METABOLIC PANEL, COMPREHENSIVE    Collection Time: 05/01/17  3:23 PM   Result Value Ref Range    Sodium 147 (H) 136 - 145 mmol/L    Potassium 2.9 (L) 3.5 - 5.1 mmol/L    Chloride 113 (H) 97 - 108 mmol/L    CO2 19 (L) 21 - 32 mmol/L    Anion gap 15 5 - 15 mmol/L    Glucose 121 (H) 65 - 100 mg/dL    BUN 56 (H) 6 - 20 MG/DL    Creatinine 8.72 (H) 0.70 - 1.30 MG/DL    BUN/Creatinine ratio 6 (L) 12 - 20      GFR est AA 9 (L) >60 ml/min/1.73m2    GFR est non-AA 7 (L) >60 ml/min/1.73m2    Calcium 7.4 (L) 8.5 - 10.1 MG/DL    Bilirubin, total 0.2 0.2 - 1.0 MG/DL    ALT (SGPT) 14 12 - 78 U/L    AST (SGOT) 18 15 - 37 U/L    Alk. phosphatase 50 45 - 117 U/L    Protein, total 7.2 6.4 - 8.2 g/dL    Albumin 1.7 (L) 3.5 - 5.0 g/dL    Globulin 5.5 (H) 2.0 - 4.0 g/dL    A-G Ratio 0.3 (L) 1.1 - 2.2     CK W/ REFLX CKMB    Collection Time: 05/01/17  3:23 PM   Result Value Ref Range    CK 1053 (H) 39 - 308 U/L   TROPONIN I    Collection Time: 05/01/17  3:23 PM   Result Value Ref Range    Troponin-I, Qt. 0.14 (H) <0.05 ng/mL   PRO-BNP    Collection Time: 05/01/17  3:23 PM   Result Value Ref Range    NT pro-BNP >81654 (H) 0 - 125 PG/ML   CK-MB,QUANT.     Collection Time: 05/01/17  3:23 PM   Result Value Ref Range    CK - MB 6.6 (H) <3.6 NG/ML    CK-MB Index 0.6 0 - 2.5     EKG, 12 LEAD, INITIAL    Collection Time: 05/01/17  3:23 PM   Result Value Ref Range    Ventricular Rate 94 BPM    Atrial Rate 94 BPM    P-R Interval 192 ms    QRS Duration 108 ms    Q-T Interval 392 ms    QTC Calculation (Bezet) 490 ms    Calculated R Axis -177 degrees    Calculated T Axis 59 degrees    Diagnosis       Normal sinus rhythm  Right superior axis deviation  Prolonged QT  When compared with ECG of 10-DEC-2015 12:14,  Questionable change in QRS axis     TROPONIN I    Collection Time: 05/01/17  5:39 PM   Result Value Ref Range    Troponin-I, Qt. 0.13 (H) <0.05 ng/mL   URINALYSIS W/ REFLEX CULTURE    Collection Time: 05/01/17  5:39 PM   Result Value Ref Range    Color YELLOW/STRAW      Appearance CLOUDY (A) CLEAR      Specific gravity 1.018 1.003 - 1.030      pH (UA) 5.5 5.0 - 8.0      Protein 300 (A) NEG mg/dL    Glucose 250 (A) NEG mg/dL    Ketone NEGATIVE  NEG mg/dL    Bilirubin NEGATIVE  NEG      Blood LARGE (A) NEG      Urobilinogen 0.2 0.2 - 1.0 EU/dL    Nitrites NEGATIVE  NEG      Leukocyte Esterase NEGATIVE  NEG      WBC 0-4 0 - 4 /hpf    RBC 5-10 0 - 5 /hpf    Epithelial cells FEW FEW /lpf    Bacteria NEGATIVE  NEG /hpf    UA:UC IF INDICATED CULTURE NOT INDICATED BY UA RESULT CNI      Amorphous Crystals 1+ (A) NEG

## 2017-05-01 NOTE — ED NOTES
Pt. Resting comfortably in bed, denies needs at this time. Female visitor at bedside. Bed locked and low, call bell in reach.

## 2017-05-02 ENCOUNTER — APPOINTMENT (OUTPATIENT)
Dept: INTERVENTIONAL RADIOLOGY/VASCULAR | Age: 32
DRG: 683 | End: 2017-05-02
Attending: INTERNAL MEDICINE
Payer: MEDICARE

## 2017-05-02 LAB
ANION GAP BLD CALC-SCNC: 14 MMOL/L (ref 5–15)
ATRIAL RATE: 94 BPM
BASOPHILS # BLD AUTO: 0 K/UL (ref 0–0.1)
BASOPHILS # BLD: 0 % (ref 0–1)
BUN SERPL-MCNC: 60 MG/DL (ref 6–20)
BUN/CREAT SERPL: 7 (ref 12–20)
CALCIUM SERPL-MCNC: 7.5 MG/DL (ref 8.5–10.1)
CALCULATED R AXIS, ECG10: -177 DEGREES
CALCULATED T AXIS, ECG11: 59 DEGREES
CHLORIDE SERPL-SCNC: 114 MMOL/L (ref 97–108)
CK SERPL-CCNC: 1027 U/L (ref 39–308)
CO2 SERPL-SCNC: 17 MMOL/L (ref 21–32)
CREAT SERPL-MCNC: 9.04 MG/DL (ref 0.7–1.3)
DIAGNOSIS, 93000: NORMAL
DIFFERENTIAL METHOD BLD: ABNORMAL
EOSINOPHIL # BLD: 0.2 K/UL (ref 0–0.4)
EOSINOPHIL NFR BLD: 2 % (ref 0–7)
ERYTHROCYTE [DISTWIDTH] IN BLOOD BY AUTOMATED COUNT: 14.9 % (ref 11.5–14.5)
EST. AVERAGE GLUCOSE BLD GHB EST-MCNC: 194 MG/DL
GLUCOSE BLD STRIP.AUTO-MCNC: 117 MG/DL (ref 65–100)
GLUCOSE BLD STRIP.AUTO-MCNC: 119 MG/DL (ref 65–100)
GLUCOSE BLD STRIP.AUTO-MCNC: 195 MG/DL (ref 65–100)
GLUCOSE SERPL-MCNC: 104 MG/DL (ref 65–100)
HBA1C MFR BLD: 8.4 % (ref 4.2–6.3)
HCT VFR BLD AUTO: 21.2 % (ref 36.6–50.3)
HGB BLD-MCNC: 6.8 G/DL (ref 12.1–17)
IRON SATN MFR SERPL: 24 % (ref 20–50)
IRON SERPL-MCNC: 33 UG/DL (ref 35–150)
LYMPHOCYTES # BLD AUTO: 22 % (ref 12–49)
LYMPHOCYTES # BLD: 2.7 K/UL (ref 0.8–3.5)
MAGNESIUM SERPL-MCNC: 1.9 MG/DL (ref 1.6–2.4)
MCH RBC QN AUTO: 25.1 PG (ref 26–34)
MCHC RBC AUTO-ENTMCNC: 32.1 G/DL (ref 30–36.5)
MCV RBC AUTO: 78.2 FL (ref 80–99)
MONOCYTES # BLD: 0.6 K/UL (ref 0–1)
MONOCYTES NFR BLD AUTO: 5 % (ref 5–13)
NEUTS SEG # BLD: 8.8 K/UL (ref 1.8–8)
NEUTS SEG NFR BLD AUTO: 71 % (ref 32–75)
P-R INTERVAL, ECG05: 192 MS
PHOSPHATE SERPL-MCNC: 7.2 MG/DL (ref 2.6–4.7)
PLATELET # BLD AUTO: 316 K/UL (ref 150–400)
POTASSIUM SERPL-SCNC: 3.1 MMOL/L (ref 3.5–5.1)
Q-T INTERVAL, ECG07: 392 MS
QRS DURATION, ECG06: 108 MS
QTC CALCULATION (BEZET), ECG08: 490 MS
RBC # BLD AUTO: 2.71 M/UL (ref 4.1–5.7)
RBC MORPH BLD: ABNORMAL
SERVICE CMNT-IMP: ABNORMAL
SODIUM SERPL-SCNC: 145 MMOL/L (ref 136–145)
TIBC SERPL-MCNC: 139 UG/DL (ref 250–450)
TROPONIN I SERPL-MCNC: 0.12 NG/ML
VENTRICULAR RATE, ECG03: 94 BPM
VIT B12 SERPL-MCNC: 477 PG/ML (ref 211–911)
WBC # BLD AUTO: 12.3 K/UL (ref 4.1–11.1)

## 2017-05-02 PROCEDURE — 87493 C DIFF AMPLIFIED PROBE: CPT | Performed by: INTERNAL MEDICINE

## 2017-05-02 PROCEDURE — 83540 ASSAY OF IRON: CPT | Performed by: INTERNAL MEDICINE

## 2017-05-02 PROCEDURE — 83735 ASSAY OF MAGNESIUM: CPT | Performed by: INTERNAL MEDICINE

## 2017-05-02 PROCEDURE — 86920 COMPATIBILITY TEST SPIN: CPT | Performed by: INTERNAL MEDICINE

## 2017-05-02 PROCEDURE — 82962 GLUCOSE BLOOD TEST: CPT

## 2017-05-02 PROCEDURE — 84100 ASSAY OF PHOSPHORUS: CPT | Performed by: INTERNAL MEDICINE

## 2017-05-02 PROCEDURE — 86900 BLOOD TYPING SEROLOGIC ABO: CPT | Performed by: INTERNAL MEDICINE

## 2017-05-02 PROCEDURE — P9016 RBC LEUKOCYTES REDUCED: HCPCS | Performed by: INTERNAL MEDICINE

## 2017-05-02 PROCEDURE — 82550 ASSAY OF CK (CPK): CPT | Performed by: INTERNAL MEDICINE

## 2017-05-02 PROCEDURE — 93306 TTE W/DOPPLER COMPLETE: CPT

## 2017-05-02 PROCEDURE — 84484 ASSAY OF TROPONIN QUANT: CPT | Performed by: INTERNAL MEDICINE

## 2017-05-02 PROCEDURE — 30233N1 TRANSFUSION OF NONAUTOLOGOUS RED BLOOD CELLS INTO PERIPHERAL VEIN, PERCUTANEOUS APPROACH: ICD-10-PCS | Performed by: INTERNAL MEDICINE

## 2017-05-02 PROCEDURE — 76937 US GUIDE VASCULAR ACCESS: CPT

## 2017-05-02 PROCEDURE — 74011000250 HC RX REV CODE- 250: Performed by: RADIOLOGY

## 2017-05-02 PROCEDURE — C1752 CATH,HEMODIALYSIS,SHORT-TERM: HCPCS

## 2017-05-02 PROCEDURE — 80048 BASIC METABOLIC PNL TOTAL CA: CPT | Performed by: INTERNAL MEDICINE

## 2017-05-02 PROCEDURE — 86803 HEPATITIS C AB TEST: CPT | Performed by: INTERNAL MEDICINE

## 2017-05-02 PROCEDURE — 74011250636 HC RX REV CODE- 250/636: Performed by: INTERNAL MEDICINE

## 2017-05-02 PROCEDURE — 02HV33Z INSERTION OF INFUSION DEVICE INTO SUPERIOR VENA CAVA, PERCUTANEOUS APPROACH: ICD-10-PCS | Performed by: RADIOLOGY

## 2017-05-02 PROCEDURE — 65270000029 HC RM PRIVATE

## 2017-05-02 PROCEDURE — 86038 ANTINUCLEAR ANTIBODIES: CPT | Performed by: INTERNAL MEDICINE

## 2017-05-02 PROCEDURE — 85025 COMPLETE CBC W/AUTO DIFF WBC: CPT | Performed by: INTERNAL MEDICINE

## 2017-05-02 PROCEDURE — C1892 INTRO/SHEATH,FIXED,PEEL-AWAY: HCPCS

## 2017-05-02 PROCEDURE — 74011636637 HC RX REV CODE- 636/637: Performed by: INTERNAL MEDICINE

## 2017-05-02 PROCEDURE — 83883 ASSAY NEPHELOMETRY NOT SPEC: CPT | Performed by: INTERNAL MEDICINE

## 2017-05-02 PROCEDURE — 74011250636 HC RX REV CODE- 250/636: Performed by: RADIOLOGY

## 2017-05-02 PROCEDURE — 77030018719 HC DRSG PTCH ANTIMIC J&J -A

## 2017-05-02 PROCEDURE — 90935 HEMODIALYSIS ONE EVALUATION: CPT

## 2017-05-02 PROCEDURE — 86706 HEP B SURFACE ANTIBODY: CPT | Performed by: INTERNAL MEDICINE

## 2017-05-02 PROCEDURE — 74011250637 HC RX REV CODE- 250/637: Performed by: INTERNAL MEDICINE

## 2017-05-02 PROCEDURE — 36415 COLL VENOUS BLD VENIPUNCTURE: CPT | Performed by: INTERNAL MEDICINE

## 2017-05-02 PROCEDURE — 87340 HEPATITIS B SURFACE AG IA: CPT | Performed by: INTERNAL MEDICINE

## 2017-05-02 PROCEDURE — 82607 VITAMIN B-12: CPT | Performed by: INTERNAL MEDICINE

## 2017-05-02 PROCEDURE — 65660000000 HC RM CCU STEPDOWN

## 2017-05-02 PROCEDURE — 5A1D60Z PERFORMANCE OF URINARY FILTRATION, MULTIPLE: ICD-10-PCS | Performed by: INTERNAL MEDICINE

## 2017-05-02 PROCEDURE — 84155 ASSAY OF PROTEIN SERUM: CPT | Performed by: INTERNAL MEDICINE

## 2017-05-02 PROCEDURE — 83036 HEMOGLOBIN GLYCOSYLATED A1C: CPT | Performed by: INTERNAL MEDICINE

## 2017-05-02 RX ORDER — OXYCODONE HYDROCHLORIDE 5 MG/1
5 TABLET ORAL
Status: DISCONTINUED | OUTPATIENT
Start: 2017-05-02 | End: 2017-05-05 | Stop reason: HOSPADM

## 2017-05-02 RX ORDER — SODIUM CHLORIDE 450 MG/100ML
50 INJECTION, SOLUTION INTRAVENOUS CONTINUOUS
Status: DISCONTINUED | OUTPATIENT
Start: 2017-05-02 | End: 2017-05-02

## 2017-05-02 RX ORDER — CLONIDINE HYDROCHLORIDE 0.1 MG/1
0.1 TABLET ORAL
Status: DISCONTINUED | OUTPATIENT
Start: 2017-05-02 | End: 2017-05-03

## 2017-05-02 RX ORDER — LIDOCAINE HYDROCHLORIDE 20 MG/ML
20 INJECTION, SOLUTION INFILTRATION; PERINEURAL ONCE
Status: COMPLETED | OUTPATIENT
Start: 2017-05-02 | End: 2017-05-02

## 2017-05-02 RX ORDER — AMLODIPINE BESYLATE 5 MG/1
10 TABLET ORAL DAILY
Status: DISCONTINUED | OUTPATIENT
Start: 2017-05-03 | End: 2017-05-05 | Stop reason: HOSPADM

## 2017-05-02 RX ORDER — HEPARIN 100 UNIT/ML
300 SYRINGE INTRAVENOUS ONCE
Status: COMPLETED | OUTPATIENT
Start: 2017-05-02 | End: 2017-05-02

## 2017-05-02 RX ORDER — POTASSIUM CHLORIDE 750 MG/1
30 TABLET, FILM COATED, EXTENDED RELEASE ORAL
Status: COMPLETED | OUTPATIENT
Start: 2017-05-02 | End: 2017-05-02

## 2017-05-02 RX ORDER — SODIUM CHLORIDE 0.9 % (FLUSH) 0.9 %
SYRINGE (ML) INJECTION
Status: COMPLETED
Start: 2017-05-02 | End: 2017-05-02

## 2017-05-02 RX ORDER — HYDRALAZINE HYDROCHLORIDE 50 MG/1
50 TABLET, FILM COATED ORAL 3 TIMES DAILY
Status: DISCONTINUED | OUTPATIENT
Start: 2017-05-02 | End: 2017-05-03

## 2017-05-02 RX ORDER — HEPARIN SODIUM 200 [USP'U]/100ML
400 INJECTION, SOLUTION INTRAVENOUS ONCE
Status: DISCONTINUED | OUTPATIENT
Start: 2017-05-02 | End: 2017-05-02

## 2017-05-02 RX ADMIN — SODIUM CHLORIDE, PRESERVATIVE FREE 300 UNITS: 5 INJECTION INTRAVENOUS at 11:59

## 2017-05-02 RX ADMIN — HEPARIN SODIUM 5000 UNITS: 5000 INJECTION, SOLUTION INTRAVENOUS; SUBCUTANEOUS at 04:42

## 2017-05-02 RX ADMIN — LABETALOL HYDROCHLORIDE 10 MG: 5 INJECTION, SOLUTION INTRAVENOUS at 14:57

## 2017-05-02 RX ADMIN — LIDOCAINE HYDROCHLORIDE 400 MG: 20 INJECTION, SOLUTION INFILTRATION; PERINEURAL at 11:59

## 2017-05-02 RX ADMIN — HYDRALAZINE HYDROCHLORIDE 50 MG: 50 TABLET, FILM COATED ORAL at 22:47

## 2017-05-02 RX ADMIN — CLONIDINE HYDROCHLORIDE 0.1 MG: 0.1 TABLET ORAL at 20:14

## 2017-05-02 RX ADMIN — AMLODIPINE BESYLATE 5 MG: 5 TABLET ORAL at 09:23

## 2017-05-02 RX ADMIN — OXYCODONE HYDROCHLORIDE 5 MG: 5 TABLET ORAL at 15:55

## 2017-05-02 RX ADMIN — Medication 10 ML: at 22:48

## 2017-05-02 RX ADMIN — POTASSIUM CHLORIDE 30 MEQ: 750 TABLET, FILM COATED, EXTENDED RELEASE ORAL at 09:23

## 2017-05-02 RX ADMIN — INSULIN GLARGINE 30 UNITS: 100 INJECTION, SOLUTION SUBCUTANEOUS at 09:23

## 2017-05-02 RX ADMIN — ACETAMINOPHEN 650 MG: 325 TABLET ORAL at 14:31

## 2017-05-02 RX ADMIN — OXYCODONE HYDROCHLORIDE 5 MG: 5 TABLET ORAL at 20:17

## 2017-05-02 NOTE — PROGRESS NOTES
TRANSFER - IN REPORT:    Verbal report received from [address] (name) on Tracey Mccann  being received from ER (unit) for routine progression of care      Report consisted of patients Situation, Background, Assessment and   Recommendations(SBAR). Information from the following report(s) SBAR, Kardex, Intake/Output and MAR was reviewed with the receiving nurse. Opportunity for questions and clarification was provided. Assessment completed upon patients arrival to unit and care assumed.

## 2017-05-02 NOTE — PROGRESS NOTES
* No surgery found *  Bedside and Verbal shift change report given to Sujey (oncoming nurse) by Luli Goldman (offgoing nurse). Report included the following information SBAR, Kardex, Recent Results and Cardiac Rhythm NSR. Zone Phone:   [phone]      Significant changes during shift:  BP elevated, PRN labetolol given per parameters. Tylenol given X1 for generalized aching pain mostly in legs. Patient Information    Lady Moe  27 y.o.  5/1/2017  4:41 PM by Matthieu Lombardi MD. Lady Moe was admitted from Home    Problem List    Patient Active Problem List    Diagnosis Date Noted    DA (acute kidney injury) ([facility] 05/01/2017    Morbid obesity with BMI of 40.0-44.9, adult ([facility] 12/29/2015    CKD (chronic kidney disease) stage 3, GFR 30-59 ml/min 12/29/2015    DM (diabetes mellitus) ([facility] 10/16/2009    HTN (hypertension) 10/16/2009    Hyperlipidemia 10/16/2009     Past Medical History:   Diagnosis Date    Cataracts     Diabetes ([facility]     Hypercholesterolemia     Hypertension     Obesity          Core Measures:    CVA: No No  CHF:No No  PNA:No No        Activity Status:    OOB to Chair Yes  Ambulated this shift Yes   Bed Rest No    Supplemental O2: (If Applicable)    NC No  NRB No  Venti-mask No  On  Liters/min      LINES AND DRAINS:    20G Right hand- SL    DVT prophylaxis:    DVT prophylaxis Med- Yes  DVT prophylaxis SCD or JUAN C- No     Wounds: (If Applicable)    Wounds- No    Location Left great and 5th toe amputated    Patient Safety:    Falls Score Total Score: 1  Safety Level_______  Bed Alarm On? No  Sitter? No    Plan for upcoming shift: nephrology consult.  Echocardiogram.         Discharge Plan: No     Active Consults:  IP CONSULT TO NEPHROLOGY

## 2017-05-02 NOTE — DIALYSIS
[facility]                         [phone]  Vitals Pre Post Assessment Pre Post   /106 201/125 LOC A&OX3 A&OX3   HR 87 88 Lungs CTA CTA   Temp 97.9 98.0 Cardiac S1S2 S1S2   Resp 18 18 Skin WARM/DRY WARM/DRY   Weight 171. 9KG 170.4KG Edema GENERALIZED GENERALIZED      Pain DENIES DENIES     Orders   Duration: Start: 8108 End: 1930 Total: 2HRS   Dialyzer: REVACLEAR   K Bath: 4   Ca Bath: 2.5   Na / Bicarb: 140/35   Target Fluid Removal: 1500 mL     Access   Type & Location: RIJ TEMP CVC   Comments:   +ASPIRATION/+FLUSH X 2 PORTS. DRESSING CHANGED DUE TO BEING LOOSE. BIOPATCH APPLIED. DRESSING DATED 5/2/17. ACCESS VISIBLE AND LINES SECURE. Labs   Hep B status / date: PENDING   Obtained/Reviewed  Critical Results Called REVIEWED  AWARE     Meds Given   Name Dose Route   NA                 Total Liters Process: 25.6 LITERS   Net Fluid Removed: 1500 mL      Comments   Time Out Done: 4890   Primary Nurse Rpt Pre: CULLEN BEGUM RN   Primary Nurse Rpt Post: CULLEN BEGUM RN   Pt Education: PROCEDURAL, BLOOD TRANSFUSION   Care Plan: 2ND HD SCHEDULED FOR TOMORROW   Tx Summary: PT TOLERATED TX WELL WITHOUT INCIDENT. PT HAD HIGH BLOOD PRESSURE DURING ENTIRE TREATMENT. PRIMARY NURSE WAS NOTIFIED AND CAME TO GIVE LABETALOL BUT IT WAS DISCONTINUED BY THE  PRIMARY NURSE AWARE OF BLOOD PRESSURE. PT DID RECEIVED ONE UNIT OF PRBC. ALL POSSIBLE BLOOD RINSED BACK TO PT. CATHETER FLUSHED WITH NS AND CAPPED. PT TRANSPORTED BACK TO ROOM IN STABLE CONDITION.

## 2017-05-02 NOTE — PROGRESS NOTES
Cm attempted to meet with pt to complete initial assessment. Pt is SERA receiving testing. CM contacted [phone] to schedule dialysis appointments. CM spoke with [facility] and provided pt information. CM was advised [facility] will pull information and submit for authorization. CM will receive return call with updates.      LEATHA Fagan, [address] [place] [address]   [phone]

## 2017-05-02 NOTE — INTERDISCIPLINARY ROUNDS
[facility] Tele[facility] Unit Interdisciplinary rounds were held today to discuss patient plan of care and outcomes. The interdisciplinary team collaborated to facilitate discharge planning needs. The following members were present; Nurse Manager, Job [phone], Pharmacist, Primary Nurse, , Physical Therapy,   Physician, and Case Management.      PLAN:  Jadon placement today

## 2017-05-02 NOTE — CONSULTS
Surgery Consult    Subjective:      Liudmila Lemons is a 27 y.o. male who presents for evaluation for dialysis access. The patient presenterd to the hospital in renal failure, likely progressive chronic disease. He had central catheter placed today and is to start dialysis today. I spoke with Dr Shady Ca regarding his situation. The patient is right handed. He has no history of pacemaker or defibrillator, no history of axillary node surgery. He has no arm symptoms. He has had some dyspnea. He denies anginal chest pain. There is no history of lung disease or heart disease. His diabetes has not been controlled.       Past Medical History:   Diagnosis Date    Cataracts     Diabetes ([address]     Hypercholesterolemia     Hypertension     Obesity      Past Surgical History:   Procedure Laterality Date    HX AMPUTATION      Left great toe and L 5th toe      Family History   Problem Relation Age of Onset    Diabetes Mother     Liver Disease Father     Kidney Disease Maternal Grandfather     Diabetes Maternal Grandfather     Hypertension Maternal Grandfather     Diabetes Paternal Uncle     Hypertension Paternal Uncle      Social History     Social History    Marital status: LEGALLY      Spouse name: N/A    Number of children: N/A    Years of education: N/A     Social History Main Topics    Smoking status: Current Some Day Smoker     Packs/day: 0.25    Smokeless tobacco: Never Used    Alcohol use Yes      Comment: rarely    Drug use: No    Sexual activity: Not Asked     Other Topics Concern    None     Social History Narrative      Current Facility-Administered Medications   Medication Dose Route Frequency Provider Last Rate Last Dose    acetaminophen (TYLENOL) tablet 650 mg  650 mg Oral Q4H PRN Kishan Fermin MD   650 mg at 05/01/17 2111    heparin (porcine) injection 5,000 Units  5,000 Units SubCUTAneous Q8H Kishan Fermin MD   Stopped at 05/02/17 1106    insulin lispro (HUMALOG) injection   SubCUTAneous AC&HS Chinyere Linda MD   Stopped at 17 2200    glucose chewable tablet 16 g  4 Tab Oral PRN Chinyere Linda MD        dextrose (D50W) injection syrg 12.5-25 g  12.5-25 g IntraVENous PRN Chinyere Linda MD        glucagon [facility]) injection 1 mg  1 mg IntraMUSCular PRN Chinyere Linda MD        labetalol (NORMODYNE;TRANDATE) injection 10-20 mg  10-20 mg IntraVENous Q4H PRN Chinyere Linda MD   10 mg at 17 2112    prochlorperazine (COMPAZINE) with saline injection 5 mg  5 mg IntraVENous Q6H PRN Chinyere Linda MD        insulin glargine (LANTUS) injection 30 Units  30 Units SubCUTAneous DAILY Chinyere Linda MD   30 Units at 17 8439    amLODIPine (NORVASC) tablet 5 mg  5 mg Oral DAILY Chinyere Linda MD   5 mg at 17 5491        No Known Allergies    Review of Systems:  Pertinent items are noted in the History of Present Illness. Objective:      Patient Vitals for the past 8 hrs:   BP Temp Pulse Resp SpO2   17 1148 - - 88 20 98 %   17 1132 (!) 168/99 97.9 °F (36.6 °C) 88 20 98 %   17 0815 (!) 175/109 97.8 °F (36.6 °C) 92 18 97 %       Temp (24hrs), Av °F (36.7 °C), Min:97.8 °F (36.6 °C), Max:98.4 °F (36.9 °C)      Physical Exam:  WD obese man,  NAD  HEAD/NECK: No jaundice, mass or thyromegaly. Right neck catheter in place. LUNGS: Clear bilaterally without wheeze, rhonchi or rales. Kecia Cordon HEART: Regular without murmur, gallop or rub. NEURO: Patient is alert and oriented x 3 and moves all extremities equally. Facial movement is symmetrical. Speech was normal.   EXT:  Palpable right and left radial and ulnar pulses. Arms are obese without palpable superficial veins.       Assessment:     Hospital Problems  Date Reviewed: 3/24/2016          Codes Class Noted POA    DA (acute kidney injury) [facility]) ICD-10-CM: N17.9  ICD-9-CM: 584.9  2017 Unknown              Plan:     I reviewed with the patient recommendation from Dr Frances Goodwin to make plans for long term hemodialysis access with creation of an arterio-venous fistula in one arm. Typical operative and post operative courses reviewed. He understands this will likely involve two staged operative procedures. I reviewed with the patient the purpose, alternatives, risks and benefits of creation of arterio-venous fistula. Risks include bleeding, infection, failure of fistula, injury to structures in arm such as vessels or nerves, ischemia of the hand, swelling of the arm, risks of anesthesia, etc.  The patient understands and wishes to proceed. I will arrange for US vein mapping in my office prior to surgery.     Signed By: Trice Ravi MD     May 2, 2017

## 2017-05-02 NOTE — ROUTINE PROCESS
Bedside and Verbal shift change report given to Judith Castaneda (oncoming nurse) by Trevor Ruby (offgoing nurse). Report included the following information SBAR, Kardex, Recent Results and Cardiac Rhythm NSR.     Zone Phone: [phone]        Significant changes during shift: Jadon placed, dialysis done, 1 unit blood transfused with dialysis            Patient Information     Cheko Calvert  31 y.o.  5/1/2017 4:41 PM by Nubia Galicia MD. Cheko Calvert was admitted from Home     Problem List          Patient Active Problem List     Diagnosis Date Noted    DA (acute kidney injury) ([facility] 05/01/2017    Morbid obesity with BMI of 40.0-44.9, adult ([facility] 12/29/2015    CKD (chronic kidney disease) stage 3, GFR 30-59 ml/min 12/29/2015    DM (diabetes mellitus) ([facility] 10/16/2009    HTN (hypertension) 10/16/2009    Hyperlipidemia 10/16/2009           Past Medical History:   Diagnosis Date    Cataracts      Diabetes ([facility]      Hypercholesterolemia      Hypertension      Obesity              Core Measures:     CVA: No No  CHF:No No  PNA:No No           Activity Status:     OOB to Chair Yes  Ambulated this shift Yes   Bed Rest No           LINES AND DRAINS:     Jadon R neck    DVT prophylaxis:     DVT prophylaxis Med- Yes  DVT prophylaxis SCD or JUAN C- No      Wounds: (If Applicable)     Wounds- No     Location Left great and 5th toe amputated     Patient Safety:     Falls Score Total Score: 1  Safety Level_______  Bed Alarm On? No  Sitter?  No     Plan for upcoming shift: send stool sample           Discharge Plan: No      Active Consults:  IP CONSULT TO NEPHROLOGY

## 2017-05-02 NOTE — ROUTINE PROCESS
TRANSFER - OUT REPORT:    Verbal report given to DIONICIO Norton(name) on Taya Quezada  being transferred to [facility](unit) for routine progression of care       Report consisted of patients Situation, Background, Assessment and   Recommendations(SBAR). Information from the following report(s) SBAR, ED Summary, Intake/Output, MAR and Recent Results was reviewed with the receiving nurse. Lines:   Peripheral IV 05/01/17 Right Hand (Active)   Site Assessment Clean, dry, & intact 5/1/2017  5:38 PM   Phlebitis Assessment 0 5/1/2017  5:38 PM   Infiltration Assessment 0 5/1/2017  5:38 PM   Dressing Status Clean, dry, & intact 5/1/2017  5:38 PM   Dressing Type Transparent 5/1/2017  5:38 PM   Hub Color/Line Status Flushed 5/1/2017  5:38 PM        Opportunity for questions and clarification was provided.       Patient transported with:   Monitor  Registered Nurse

## 2017-05-02 NOTE — PROGRESS NOTES
Hospitalist Progress Note    NAME: Kelly Morgan   :  1985   MRN:  [id number]         Assessment / Plan:  DA in setting of CKD3 with anemia of CKD: suspect this is progressive DM nephropathy and now CKD5. Pt with uremic sx, acidosis, and anasarca. - renal US with small cystic lesion in the right kidney and large kidneys bilaterally  - appreciate nephrology assistance, additional w/u sent  - [facility] catheter placement today for UF  - vascular surgery consult placed to consider long term access placement outpt  - transfusing 1u pRBCs with UF today. Iron level low at 33. Hypertension, benign/essential: uncontrolled  - restart home clonidine, hydralazine, norvasc  - holding home lisinopril for now  - prn nitrobid  Insulin-dependent DM2 with nephropathy/retinopathy:  - on 70/30 at home, con't lantus here  - lispro sliding scale  LITTLE without hypoxemia in setting of anasarca and elevated troponin:  Troponin flat, no e/o ischemia  - LE dopplers negative DVT  - echo today with EF 45-50%. There were no regional wall motion abnormalities. Wall thickness was mildly to moderately increased. Rhabdomyolysis: CK remains elevated >1000 this morning  - volume status control with UF  - serial CK  Intermittent diarrhea, ?claims that he has IBS and has been using imodium prn at home. Outpt GI referral for further w/u.   Morbid obesity     Code Status: Full  Surrogate Decision Maker:    DVT Prophylaxis: Heparin    Baseline: . No children. Legally blind and disabled. Subjective:     Chief Complaint / Reason for Physician Visit  Complains of pain at [facility] site. Discussed with RN events overnight.      Review of Systems:  Symptom Y/N Comments  Symptom Y/N Comments   Fever/Chills n   Chest Pain n    Poor Appetite n   Edema y    Cough n   Abdominal Pain n    Sputum n   Joint Pain     SOB/LITTLE y   Pruritis/Rash     Nausea/vomit    Tolerating PT/OT     Diarrhea    Tolerating Diet     Constipation Other       Could NOT obtain due to:      Objective:     VITALS:   Last 24hrs VS reviewed since prior progress note. Most recent are:  Patient Vitals for the past 24 hrs:   Temp Pulse Resp BP SpO2   05/02/17 1148 - 88 20 - 98 %   05/02/17 1132 97.9 °F (36.6 °C) 88 20 (!) 168/99 98 %   05/02/17 0815 97.8 °F (36.6 °C) 92 18 (!) 175/109 97 %   05/02/17 0441 98 °F (36.7 °C) 96 16 (!) 165/99 96 %   05/02/17 0040 - - - (!) 167/94 -   05/01/17 2330 98 °F (36.7 °C) 92 18 (!) 183/112 95 %   05/01/17 2101 98.1 °F (36.7 °C) 91 20 (!) 183/116 97 %   05/01/17 2010 - 90 - (!) 196/116 -   05/01/17 1800 - 93 22 (!) 171/107 95 %   05/01/17 1743 - 91 18 (!) 169/108 97 %   05/01/17 1515 98.4 °F (36.9 °C) 97 18 (!) 178/94 96 %       Intake/Output Summary (Last 24 hours) at 05/02/17 1321  Last data filed at 05/02/17 0755   Gross per 24 hour   Intake             1310 ml   Output              675 ml   Net              635 ml        PHYSICAL EXAM:  General: Obese. Alert, cooperative, no acute distress    EENT:  EOMI. Anicteric sclerae. MMM  Resp:  CTA bilaterally, no wheezing or rales. No accessory muscle use  CV:  Regular rhythm,  3+ edema  GI:  Soft, moderately distended, Non tender.  +Bowel sounds  Neurologic:  Alert and oriented X 3, normal speech,   Psych:   Fair insight. Not anxious nor agitated  Skin:  No rashes. No jaundice    Reviewed most current lab test results and cultures  YES  Reviewed most current radiology test results   YES  Review and summation of old records today    NO  Reviewed patient's current orders and MAR    YES  PMH/SH reviewed - no change compared to H&P  ________________________________________________________________________  Care Plan discussed with:    Comments   Patient x    Family      RN x    Care Manager x    Consultant  x nephrology                    x Multidiciplinary team rounds were held today with , nursing, pharmacist and clinical coordinator.   Patient's plan of care was discussed; medications were reviewed and discharge planning was addressed. ________________________________________________________________________  Total NON critical care TIME:  40 minutes    Total CRITICAL CARE TIME Spent:   Minutes non procedure based      Comments   >50% of visit spent in counseling and coordination of care x    ________________________________________________________________________  Francisco Espino MD     Procedures: see electronic medical records for all procedures/Xrays and details which were not copied into this note but were reviewed prior to creation of Plan. LABS:  I reviewed today's most current labs and imaging studies.   Pertinent labs include:  Recent Labs      05/02/17 0443  05/01/17   1523   WBC  12.3*  12.6*   HGB  6.8*  7.4*   HCT  21.2*  22.7*   PLT  316  292     Recent Labs      05/02/17 0443  05/01/17   1523   NA  145  147*   K  3.1*  2.9*   CL  114*  113*   CO2  17*  19*   GLU  104*  121*   BUN  60*  56*   CREA  9.04*  8.72*   CA  7.5*  7.4*   MG  1.9   --    PHOS  7.2*   --    ALB   --   1.7*   TBILI   --   0.2   SGOT   --   18   ALT   --   14       Signed: Francisco Espino MD

## 2017-05-02 NOTE — PROGRESS NOTES
Pt oriented to unit and room. Pt states he is in pain, will admin prn pain meds. Pt denies any n/v at this time. Instructed pt to stay in bed unless he calls for the aid of nursing staff prior to getting out of bed. Admission assessment completed, DIONICIO Terrell to complete admission database. Call bell within reach. Primary Nurse Luis Marie RN and DIONICIO Terrell performed a dual skin assessment on this patient No impairment noted  Ranjeet score is 20  L great and 2nd toe amput noted.

## 2017-05-02 NOTE — CONSULTS
Consultation Note    NAME: Taryn Rose   :  1985   MRN:  [id number]     Date/Time:  2017 8:51 AM    I have been asked to see this patient by Dr. Trinity Vick  for advice/opinion re: DA on CKD stage 3 vs progressive ckd. Assessment :    Plan:  DA on ckd stage 3 vs progressive ckd stage 5 now  Proteinuria since   Probable underlying DM Nephropathy  Anemia ([facility]gh 6.8)  Hypokalemia  Met acidosis  Volume overload  DM since   HTN  N/v  Dysgeusia   Creatinine up to 9 this am (uremic symptoms and volume overload). I suspect this is progressive DM nephropathy with CKD stage 5. Needs to initiate HD. Will rule out other potential etiology (JANEE, MM, Hep b/c). Large kidneys on GLO with no hydro     Iron sat ok. Check ferritin. 1 unit prbc's with hd today. Will initiate FARIDA once BP is better controlled. UF with HD today; 4 K bath with HD. Jadon line today. 2 hour \"gentle\" HD today - I suspect he has had this level of azotemia for quite a while and is at risk for dysequalibrium. HD # 2 tomorrow. Likely perm cath Thursday am.     He lives near [facility] (will start process to place there). Stop IVF's       Subjective:   CHIEF COMPLAINT:  Creatinine 9    HISTORY OF PRESENT ILLNESS:     Harvinder Minor is a 27 y.o.   male who has a history of DM (since ), Dm retinopathy and HTN. He has not been following with pcp recently b/c of no insurance. Now has medicare. He has never seen a nephrologist. He has had issues with n/v/d for a year. N/v worse lately. Some dysgeusia. Legs with progressive swelling for the past 2 months. Makes urine without difficulty. No hematuria or foamy urine. No nsaids. No orthostatic symptoms. Decreased appetite. His maternal GF was on HD. No kidney stones. No rash. We had a long talk about his poor kidney function (poor toxin removal and fluid removal). Likely etiology is DM - which means that there is nothing we can do at this point to improve function.  Will assess for other possible etiology. We had a long talk about the HD procedure itself. We discussed the need for a marvin and for a perm cath - pros and cons. We discussed the outpatient HD process. He is agreeable to proceeding. Past Medical History:   Diagnosis Date    Cataracts     Diabetes ([address]     Hypercholesterolemia     Hypertension     Obesity       Past Surgical History:   Procedure Laterality Date    HX AMPUTATION      Left great toe and L 5th toe     Social History   Substance Use Topics    Smoking status: Current Some Day Smoker     Packs/day: 0.25    Smokeless tobacco: Never Used    Alcohol use Yes      Comment: rarely      Family History   Problem Relation Age of Onset    Diabetes Mother     Liver Disease Father     Kidney Disease Maternal Grandfather     Diabetes Maternal Grandfather     Hypertension Maternal Grandfather     Diabetes Paternal Uncle     Hypertension Paternal Uncle       No Known Allergies   Prior to Admission medications    Medication Sig Start Date End Date Taking? Authorizing Provider   omeprazole (PRILOSEC) 40 mg capsule Take 1 Cap by mouth daily. 3/24/16   Gerardine Sammy III, DO   promethazine (PHENERGAN) 25 mg tablet Take 1 Tab by mouth every six (6) hours as needed for Nausea. 3/24/16   Juan J Burton III, DO   amLODIPine (NORVASC) 10 mg tablet Take 1 Tab by mouth daily. Indications: HYPERTENSION 12/29/15   Gerardine Sammy III, DO   lisinopril (PRINIVIL, ZESTRIL) 5 mg tablet Take 1 Tab by mouth daily. Indications: HYPERTENSION 12/29/15   Gerardine Sammy III, DO   hydrALAZINE (APRESOLINE) 50 mg tablet Take 1 Tab by mouth three (3) times daily. Indications: HYPERTENSION 12/29/15   Gerardine Sammy III, DO   cloNIDine HCl (CATAPRES) 0.1 mg tablet Take 1 Tab by mouth three (3) times daily (with meals). 12/16/15   Caden Humphrye MD   oxyCODONE-acetaminophen (PERCOCET) 5-325 mg per tablet Take 2 Tabs by mouth every six (6) hours as needed.  Max Daily Amount: 8 Tabs. 12/16/15   Steve Mcgarry MD   insulin lispro (HUMALOG) 100 unit/mL injection 8 Units by SubCUTAneous route Before breakfast, lunch, and dinner. 12/16/15   Steve Mcgarry MD   insulin NPH/insulin regular (NOVOLIN 70/30, HUMULIN 70/30) 100 unit/mL (70-30) injection 44 Units by SubCUTAneous route two (2) times a day. 70/25 is accepted 12/16/15   Steve Mcgarry MD   loperamide (IMODIUM) 2 mg capsule Take 2 mg by mouth as needed for Diarrhea.     Historical Provider     REVIEW OF SYSTEMS:     []  Unable to obtain reliable ROS due to  [] mental status  [] sedated   [] intubated   [x] Total of 12 systems reviewed as follows:  Constitutional: negative fever, negative chills, negative weight loss  Eyes:   negative visual changes  ENT:   negative sore throat, tongue or lip swelling  Respiratory:  negative cough, negative dyspnea  Cards:  negative for chest pain, palpitations,++ lower extremity edema  GI:   + nausea, vomiting, diarrhea,  No abdominal pain  :  negative for frequency, dysuria  Integument:  negative for rash and pruritus  Heme:  negative for easy bruising and gum/nose bleeding  Musculoskel: negative for myalgias,  back pain and muscle weakness  Neuro:  negative for headaches, dizziness, vertigo  Psych:  negative for feelings of anxiety, depression   Travel?: none    Objective:   VITALS:    Visit Vitals    BP (!) 175/109 (BP 1 Location: Right arm, BP Patient Position: Sitting)    Pulse 92    Temp 97.8 °F (36.6 °C)    Resp 18    Ht 6' 2\" (1.88 m)    Wt (!) 171.9 kg (378 lb 15.5 oz)    SpO2 97%    BMI 48.66 kg/m2     PHYSICAL EXAM:  Gen:  []  WD []  WN  [] cachectic []  thin [x]  obese []  disheveled             []  ill apearing  []   Critical  []   Chronic    [x]  No acute distress    HEENT:   [x] NC/AT/PERRLA/EOMI    [] pink conjunctivae      [] pale conjunctivae                  PERRL  [] yes  [] no      [] moist mucosa    [] dry mucosa    hearing intact to voice [x] yes  [] No                 NECK:   supple [x] yes  [] no        masses [] yes  [] No               thyroid  []  non tender  []  tender    RESP:   [x] CTA bilaterally/no wheezing/rhonchi/rales/crackles    [] rhonchi bilaterally - no dullness  [] wheezing   [] rhonchi   [] crackles     use of accessory muscles [] yes [x] no    CARD:   [x]  regular rate and rhythm/No murmurs/rubs/gallops    murmur  [] yes ()  [] no      Rubs  [] yes  [] no       Gallops [] yes  [] no    Rate []  regular  []  irregular        carotid bruits  [] Right  []  Left                 LE edema +++ [x] yes  [] no           JVP  []  yes   [x]  no    ABD:    [x] soft/non distended/non tender/+bowel sounds/no HSM    []  Rigid    tenderness [] yes [] no   Liver enlargement  []  yes []  no                Spleen enlargement  []  yes []  no     distended []  yes [] no     bowel sound  [] hypoactive   [] hyperactive    LYMPH:    Neck []  yes [x]  no       Axillae []  yes []  no    SKIN:   Rashes []  yes   [x]  no    Ulcers []  yes   []  no               [] tight to palpitation    skin turgor []  good  [] poor  [] decreased               Cyanosis/clubbing []  yes []  no    NEUR:   [x] cranial nerves II-XII grossly intact       [] Cranial nerves deficit                 []  facial droop    []  slurred speech   [] aphasic     [] Strength normal     []  weakness  []  LUE  []   RUE/ []  LLE  []   RLE    follows commands  [x]  yes []  no           PSYCH:   insight [] poor [x] good   Alert and Oriented to  [x] person  [x] place  [x]  time                    [] depressed [] anxious [] agitated  [] lethargic [] stuporous  [] sedated     LAB DATA REVIEWED:    Recent Labs      05/02/17   0443  05/01/17   1523   WBC  12.3*  12.6*   HGB  6.8*  7.4*   HCT  21.2*  22.7*   PLT  316  292     Recent Labs      05/02/17   0443  05/01/17   1523   NA  145  147*   K  3.1*  2.9*   CL  114*  113*   CO2  17*  19*   BUN  60*  56*   CREA  9.04*  8.72*   GLU  104*  121*   CA  7.5*  7.4*   MG 1.9   --    PHOS  7.2*   --      Recent Labs      05/01/17   1523   SGOT  18   ALT  14   AP  50   TBILI  0.2   ALB  1.7*   GLOB  5.5*     No results for input(s): INR, PTP, APTT in the last 72 hours. No lab exists for component: INREXT   Recent Labs      05/02/17   0443   FE  33*   TIBC  139*   PSAT  24      No results for input(s): PH, PCO2, PO2 in the last 72 hours. Recent Labs      05/02/17   0443  05/01/17   1523   CPK  1027*   --    CKMB   --   6.6*     Lab Results   Component Value Date/Time    Glucose (POC) 117 05/02/2017 06:30 AM    Glucose (POC) 120 05/01/2017 09:05 PM    Glucose (POC) 151 12/16/2015 11:04 AM    Glucose (POC) 170 12/16/2015 06:54 AM    Glucose (POC) 104 12/15/2015 09:20 PM       Procedures: see electronic medical records for all procedures/Xrays and details which were not copied into this note but were reviewed prior to creation of Plan.    ________________________________________________________________________       ___________________________________________________  Consulting Physician:  Yashira Quintero MD

## 2017-05-02 NOTE — CARDIO/PULMONARY
CP Rehab Note: chart review    Admit: leg swelling    Mhx: DM, HTN, hypercholesterolemia, LEGALLY BLIND,     Echo ordered. Current smoker. Patient having IR Jadon placed. CP Rehab will continue to follow.

## 2017-05-03 LAB
ALBUMIN SERPL ELPH-MCNC: 2.1 G/DL (ref 2.9–4.4)
ALBUMIN/GLOB SERPL: 0.5 {RATIO} (ref 0.7–1.7)
ALPHA1 GLOB SERPL ELPH-MCNC: 0.4 G/DL (ref 0–0.4)
ALPHA2 GLOB SERPL ELPH-MCNC: 1 G/DL (ref 0.4–1)
ANA SER QL: NEGATIVE
ANION GAP BLD CALC-SCNC: 12 MMOL/L (ref 5–15)
B-GLOBULIN SERPL ELPH-MCNC: 1.3 G/DL (ref 0.7–1.3)
BUN SERPL-MCNC: 51 MG/DL (ref 6–20)
BUN/CREAT SERPL: 7 (ref 12–20)
C DIFF TOX GENS STL QL NAA+PROBE: NEGATIVE
CALCIUM SERPL-MCNC: 7.3 MG/DL (ref 8.5–10.1)
CALCIUM SERPL-MCNC: 7.6 MG/DL (ref 8.5–10.1)
CHLORIDE SERPL-SCNC: 110 MMOL/L (ref 97–108)
CK SERPL-CCNC: 1306 U/L (ref 39–308)
CO2 SERPL-SCNC: 21 MMOL/L (ref 21–32)
CREAT SERPL-MCNC: 7.76 MG/DL (ref 0.7–1.3)
ERYTHROCYTE [DISTWIDTH] IN BLOOD BY AUTOMATED COUNT: 14.3 % (ref 11.5–14.5)
FERRITIN SERPL-MCNC: 512 NG/ML (ref 26–388)
GAMMA GLOB SERPL ELPH-MCNC: 1.5 G/DL (ref 0.4–1.8)
GLOBULIN SER CALC-MCNC: 4.1 G/DL (ref 2.2–3.9)
GLUCOSE BLD STRIP.AUTO-MCNC: 144 MG/DL (ref 65–100)
GLUCOSE BLD STRIP.AUTO-MCNC: 144 MG/DL (ref 65–100)
GLUCOSE BLD STRIP.AUTO-MCNC: 153 MG/DL (ref 65–100)
GLUCOSE BLD STRIP.AUTO-MCNC: 155 MG/DL (ref 65–100)
GLUCOSE SERPL-MCNC: 128 MG/DL (ref 65–100)
HBV SURFACE AB SER QL: NONREACTIVE
HBV SURFACE AB SER-ACNC: <3.1 MIU/ML
HBV SURFACE AG SER QL: 0.12 INDEX
HBV SURFACE AG SER QL: NEGATIVE
HCT VFR BLD AUTO: 23.6 % (ref 36.6–50.3)
HCV AB SERPL QL IA: NONREACTIVE
HCV COMMENT,HCGAC: NORMAL
HGB BLD-MCNC: 7.7 G/DL (ref 12.1–17)
KAPPA LC FREE SER-MCNC: 306.11 MG/L (ref 3.3–19.4)
KAPPA LC FREE/LAMBDA FREE SER: 2.01 {RATIO} (ref 0.26–1.65)
LAMBDA LC FREE SERPL-MCNC: 152.1 MG/L (ref 5.71–26.3)
M PROTEIN SERPL ELPH-MCNC: ABNORMAL G/DL
MCH RBC QN AUTO: 25 PG (ref 26–34)
MCHC RBC AUTO-ENTMCNC: 32.6 G/DL (ref 30–36.5)
MCV RBC AUTO: 76.6 FL (ref 80–99)
PHOSPHATE SERPL-MCNC: 5.6 MG/DL (ref 2.6–4.7)
PLATELET # BLD AUTO: 274 K/UL (ref 150–400)
POTASSIUM SERPL-SCNC: 3.1 MMOL/L (ref 3.5–5.1)
PROT SERPL-MCNC: 6.2 G/DL (ref 6–8.5)
PTH-INTACT SERPL-MCNC: 323 PG/ML (ref 14–72)
RBC # BLD AUTO: 3.08 M/UL (ref 4.1–5.7)
SEE BELOW:, 164879: NORMAL
SERVICE CMNT-IMP: ABNORMAL
SODIUM SERPL-SCNC: 143 MMOL/L (ref 136–145)
WBC # BLD AUTO: 12.4 K/UL (ref 4.1–11.1)

## 2017-05-03 PROCEDURE — 74011250636 HC RX REV CODE- 250/636: Performed by: INTERNAL MEDICINE

## 2017-05-03 PROCEDURE — 74011250637 HC RX REV CODE- 250/637: Performed by: INTERNAL MEDICINE

## 2017-05-03 PROCEDURE — 65270000029 HC RM PRIVATE

## 2017-05-03 PROCEDURE — 80048 BASIC METABOLIC PNL TOTAL CA: CPT | Performed by: INTERNAL MEDICINE

## 2017-05-03 PROCEDURE — 74011636637 HC RX REV CODE- 636/637: Performed by: INTERNAL MEDICINE

## 2017-05-03 PROCEDURE — 82728 ASSAY OF FERRITIN: CPT | Performed by: INTERNAL MEDICINE

## 2017-05-03 PROCEDURE — 90935 HEMODIALYSIS ONE EVALUATION: CPT

## 2017-05-03 PROCEDURE — 83970 ASSAY OF PARATHORMONE: CPT | Performed by: INTERNAL MEDICINE

## 2017-05-03 PROCEDURE — 82962 GLUCOSE BLOOD TEST: CPT

## 2017-05-03 PROCEDURE — 36415 COLL VENOUS BLD VENIPUNCTURE: CPT | Performed by: INTERNAL MEDICINE

## 2017-05-03 PROCEDURE — 82550 ASSAY OF CK (CPK): CPT | Performed by: INTERNAL MEDICINE

## 2017-05-03 PROCEDURE — 87045 FECES CULTURE AEROBIC BACT: CPT | Performed by: INTERNAL MEDICINE

## 2017-05-03 PROCEDURE — 85027 COMPLETE CBC AUTOMATED: CPT | Performed by: INTERNAL MEDICINE

## 2017-05-03 PROCEDURE — 84100 ASSAY OF PHOSPHORUS: CPT | Performed by: INTERNAL MEDICINE

## 2017-05-03 RX ORDER — SODIUM CHLORIDE 0.9 % (FLUSH) 0.9 %
5 SYRINGE (ML) INJECTION EVERY 8 HOURS
Status: DISCONTINUED | OUTPATIENT
Start: 2017-05-03 | End: 2017-05-05 | Stop reason: HOSPADM

## 2017-05-03 RX ORDER — METOPROLOL TARTRATE 25 MG/1
12.5 TABLET, FILM COATED ORAL 2 TIMES DAILY
Status: DISCONTINUED | OUTPATIENT
Start: 2017-05-03 | End: 2017-05-04

## 2017-05-03 RX ORDER — LOPERAMIDE HYDROCHLORIDE 2 MG/1
2 CAPSULE ORAL
Status: DISCONTINUED | OUTPATIENT
Start: 2017-05-03 | End: 2017-05-05 | Stop reason: HOSPADM

## 2017-05-03 RX ORDER — CLONIDINE HYDROCHLORIDE 0.1 MG/1
0.2 TABLET ORAL
Status: DISCONTINUED | OUTPATIENT
Start: 2017-05-03 | End: 2017-05-04

## 2017-05-03 RX ORDER — HYDRALAZINE HYDROCHLORIDE 50 MG/1
100 TABLET, FILM COATED ORAL 3 TIMES DAILY
Status: DISCONTINUED | OUTPATIENT
Start: 2017-05-03 | End: 2017-05-04

## 2017-05-03 RX ADMIN — OXYCODONE HYDROCHLORIDE 5 MG: 5 TABLET ORAL at 12:35

## 2017-05-03 RX ADMIN — HEPARIN SODIUM 5000 UNITS: 5000 INJECTION, SOLUTION INTRAVENOUS; SUBCUTANEOUS at 19:47

## 2017-05-03 RX ADMIN — LOPERAMIDE HYDROCHLORIDE 2 MG: 2 CAPSULE ORAL at 14:25

## 2017-05-03 RX ADMIN — HYDRALAZINE HYDROCHLORIDE 100 MG: 50 TABLET, FILM COATED ORAL at 16:02

## 2017-05-03 RX ADMIN — METOPROLOL TARTRATE 12.5 MG: 25 TABLET ORAL at 17:14

## 2017-05-03 RX ADMIN — CLONIDINE HYDROCHLORIDE 0.2 MG: 0.1 TABLET ORAL at 10:56

## 2017-05-03 RX ADMIN — CLONIDINE HYDROCHLORIDE 0.2 MG: 0.1 TABLET ORAL at 12:44

## 2017-05-03 RX ADMIN — OXYCODONE HYDROCHLORIDE 5 MG: 5 TABLET ORAL at 03:50

## 2017-05-03 RX ADMIN — HYDRALAZINE HYDROCHLORIDE 100 MG: 50 TABLET, FILM COATED ORAL at 10:56

## 2017-05-03 RX ADMIN — INSULIN GLARGINE 30 UNITS: 100 INJECTION, SOLUTION SUBCUTANEOUS at 08:53

## 2017-05-03 RX ADMIN — ERYTHROPOIETIN 10000 UNITS: 10000 INJECTION, SOLUTION INTRAVENOUS; SUBCUTANEOUS at 17:14

## 2017-05-03 RX ADMIN — OXYCODONE HYDROCHLORIDE 5 MG: 5 TABLET ORAL at 17:13

## 2017-05-03 RX ADMIN — Medication 5 ML: at 22:45

## 2017-05-03 RX ADMIN — AMLODIPINE BESYLATE 10 MG: 5 TABLET ORAL at 10:56

## 2017-05-03 RX ADMIN — NITROGLYCERIN 1 INCH: 20 OINTMENT TOPICAL at 03:51

## 2017-05-03 RX ADMIN — HYDRALAZINE HYDROCHLORIDE 100 MG: 50 TABLET, FILM COATED ORAL at 22:44

## 2017-05-03 RX ADMIN — METOPROLOL TARTRATE 12.5 MG: 25 TABLET ORAL at 10:57

## 2017-05-03 RX ADMIN — HEPARIN SODIUM 5000 UNITS: 5000 INJECTION, SOLUTION INTRAVENOUS; SUBCUTANEOUS at 03:41

## 2017-05-03 RX ADMIN — HEPARIN SODIUM 5000 UNITS: 5000 INJECTION, SOLUTION INTRAVENOUS; SUBCUTANEOUS at 12:20

## 2017-05-03 RX ADMIN — CLONIDINE HYDROCHLORIDE 0.2 MG: 0.1 TABLET ORAL at 16:02

## 2017-05-03 NOTE — PROGRESS NOTES
Hospitalist Progress Note    NAME: Josue Lopes   :  1985   MRN:  [id number]       Interim Hospital Summary: 27 y.o. male whom presented on 2017 with      Assessment / Plan:  DA in setting of CKD3 with anemia of CKD (now ESRD): suspect this is due to progressive DM nephropathy and now CKD5. Pt with uremic sx, acidosis, and anasarca. - renal US with small cystic lesion in the right kidney and large kidneys bilaterally  - appreciate nephrology assistance, additional w/u sent (including hep B/C) with results pending  - Jadon catheter placed , plan for permcath placement here  - vascular surgery consult appreciated, Pt will f/u outpt for long term access placement  - transfused 1u pRBCs with UF . Iron level low at 33. Hypertension, benign/essential: uncontrolled despite restarting meds. Aunt says he has been off medications for quite some time. - increase clonidine, hydralazine  - add metoprolol  - con't norvasc  - still holding home lisinopril for now  - prn nitrobid  Insulin-dependent DM2 with nephropathy/retinopathy:  - on 70/30 at home, con't lantus here  - lispro sliding scale  LITTLE without hypoxemia in setting of anasarca and elevated troponin: Troponin flat, no e/o ischemia  - LE dopplers negative DVT  - echo today with EF 45-50%. There were no regional wall motion abnormalities. Wall thickness was mildly to moderately increased. Rhabdomyolysis: CK remains elevated >1000 this morning  - volume status control with UF  - serial CK  Intermittent diarrhea, ?IBS, has never seen GI  - C Diff negative  - send stool culture, TTG, fecal WBC  - refer to GI for further w/u outpt on discharge  Morbid obesity      Code Status: Full  Surrogate Decision Maker:    DVT Prophylaxis: Heparin     Baseline: . No children. Legally blind and disabled. Subjective:     Chief Complaint / Reason for Physician Visit  \"I need another sandwich\". Discussed with RN events overnight. Review of Systems:  Symptom Y/N Comments  Symptom Y/N Comments   Fever/Chills n   Chest Pain n    Poor Appetite n   Edema y    Cough n   Abdominal Pain n    Sputum n   Joint Pain     SOB/LITTLE y   Pruritis/Rash     Nausea/vomit    Tolerating PT/OT     Diarrhea    Tolerating Diet     Constipation    Other       Could NOT obtain due to:      Objective:     VITALS:   Last 24hrs VS reviewed since prior progress note. Most recent are:  Patient Vitals for the past 24 hrs:   Temp Pulse Resp BP SpO2   05/03/17 0828 97.8 °F (36.6 °C) 92 18 (!) 168/101 95 %   05/03/17 0343 98.1 °F (36.7 °C) 93 16 (!) 184/103 -   05/03/17 0018 98.1 °F (36.7 °C) 91 18 (!) 190/110 95 %   05/02/17 2014 - - - 150/89 -   05/02/17 1930 - 87 18 (!) 189/116 100 %   05/02/17 1900 - 88 18 (!) 199/119 100 %   05/02/17 1830 97.9 °F (36.6 °C) 88 18 (!) 187/114 -   05/02/17 1815 - 87 18 (!) 183/113 -   05/02/17 1804 - 87 18 (!) 176/111 -   05/02/17 1745 - 87 18 (!) 197/116 -   05/02/17 1730 97.9 °F (36.6 °C) 86 18 (!) 177/102 100 %   05/02/17 1618 - - - (!) 155/98 -   05/02/17 1445 97.7 °F (36.5 °C) 92 18 (!) 180/110 94 %   05/02/17 1148 - 88 20 - 98 %   05/02/17 1132 97.9 °F (36.6 °C) 88 20 (!) 168/99 98 %       Intake/Output Summary (Last 24 hours) at 05/03/17 0939  Last data filed at 05/02/17 1930   Gross per 24 hour   Intake                0 ml   Output             1500 ml   Net            -1500 ml        PHYSICAL EXAM:  General: Obese. Alert, cooperative, no acute distress    EENT:  EOMI. Anicteric sclerae. MMM  Resp:  CTA bilaterally, no wheezing or rales. No accessory muscle use  CV:  Regular  rhythm,  3+ edema  GI:  Soft, moderately distended, Non tender.  +Bowel sounds  Neurologic:  Alert and oriented X 3, normal speech,   Psych:   Fair insight. Not anxious nor agitated  Skin:  No rashes.   No jaundice    Reviewed most current lab test results and cultures  YES  Reviewed most current radiology test results   YES  Review and summation of old records today    NO  Reviewed patient's current orders and MAR    YES  PMH/SH reviewed - no change compared to H&P  ________________________________________________________________________  Care Plan discussed with:    Comments   Patient x    Family  x Aunt, multiple   RN x    Care Manager x    Consultant  x nephrology                    x Multidiciplinary team rounds were held today with , nursing, pharmacist and clinical coordinator. Patient's plan of care was discussed; medications were reviewed and discharge planning was addressed. ________________________________________________________________________  Total NON critical care TIME:  30 Minutes    Total CRITICAL CARE TIME Spent:   Minutes non procedure based      Comments   >50% of visit spent in counseling and coordination of care x    ________________________________________________________________________  Yolanda Parker MD     Procedures: see electronic medical records for all procedures/Xrays and details which were not copied into this note but were reviewed prior to creation of Plan. LABS:  I reviewed today's most current labs and imaging studies.   Pertinent labs include:  Recent Labs      05/03/17   0356  05/02/17   0443  05/01/17   1523   WBC  12.4*  12.3*  12.6*   HGB  7.7*  6.8*  7.4*   HCT  23.6*  21.2*  22.7*   PLT  274  316  292     Recent Labs      05/03/17   0356  05/02/17   0443  05/01/17   1523   NA  143  145  147*   K  3.1*  3.1*  2.9*   CL  110*  114*  113*   CO2  21  17*  19*   GLU  128*  104*  121*   BUN  51*  60*  56*   CREA  7.76*  9.04*  8.72*   CA  7.3*  7.6*  7.5*  7.4*   MG   --   1.9   --    PHOS  5.6*  7.2*   --    ALB   --    --   1.7*   TBILI   --    --   0.2   SGOT   --    --   18   ALT   --    --   14       Signed: Yolanda Parker MD

## 2017-05-03 NOTE — ROUTINE PROCESS
Bedside and Verbal shift change report given to [address] (oncoming nurse) by William Sotelo (offgoing nurse). Report included the following information SBAR, Kardex, Recent Results and Cardiac Rhythm NSR.     Zone Phone:           Significant changes during shift: Dialysis today. Neck pain increased. Vomited immediately after dinner.           Patient Information      Kaleb Underwood  27 y.o.  5/1/2017 4:41 PM by Enrico Zhou MD. Kaleb Underwood was admitted from [facility]  Problem List      [address]   Patient Active Problem List      Diagnosis Date Noted    DA (acute kidney injury) ([facility] 05/01/2017    Morbid obesity with BMI of 40.0-44.9, adult ([facility] 12/29/2015    CKD (chronic kidney disease) stage 3, GFR 30-59 ml/min 12/29/2015    DM (diabetes mellitus) ([facility] 10/16/2009    HTN (hypertension) 10/16/2009    Hyperlipidemia 10/16/2009                  Past Medical History:   Diagnosis Date    Cataracts       Diabetes ([facility]       Hypercholesterolemia       Hypertension       Obesity                  Core Measures:      CVA: No No  CHF:No No  PNA:No No              Activity Status:      OOB to Chair Yes  Ambulated this shift Yes   Bed Rest No              LINES AND DRAINS:      Jadon CARDONA neck      DVT prophylaxis:      DVT prophylaxis Med- Yes  DVT prophylaxis SCD or JUAN C- No       Wounds: (If Applicable)      Wounds- No      Location Left great and 5th toe amputated      Patient Safety:      Falls Score Total Score: 1  Safety Level_______  Bed Alarm On? No  Sitter? No      Plan for upcoming shift: Monitor BP and pain.  Dialysis tomorrow.   Tay Mata          Discharge Plan: No       Active Consults:  IP CONSULT TO NEPHROLOGY                                                                       10

## 2017-05-03 NOTE — PROGRESS NOTES
Pt is a 28 y/o legally blind -American male admitted for DA. Pt lives alone in a one story home. Pt advises he resides on the third floor and takes the elevator to get to his apartment. Pt is independent with IADL's and ADL's. Pt has no previous HH, DME or SNF providers. Pt prefers [facility] and [facility] on [address]. Pt will be transported at discharge by aunt, Ms. Sharlene Lew ([phone]). CM met with pt to complete initial assessment. Pt is alert and oriented to person, place, time and situation. CM spoke with pt regarding application for Medicaid to assist with transportation to and from dialysis appointments. CM advised pt to apply at his local Department of . CM advised pt of locations for his identified county. CM will continue to follow and assist with discharge needs. Care Management Interventions  PCP Verified by CM: Yes (Dr. Morton Degree III )  Mode of Transport at Discharge:  Other (see comment) (private vehicle )  Transition of Care Consult (CM Consult): Discharge Planning (CM to assist as needed )  Discharge Durable Medical Equipment: No (pt advised he has no DME )  Health Maintenance Reviewed: Yes  Physical Therapy Consult: No  Occupational Therapy Consult: No  Speech Therapy Consult: No  Current Support Network: Lives Alone  Confirm Follow Up Transport: Family  Plan discussed with Pt/Family/Caregiver: Yes  Discharge Location  Discharge Placement: Home    LEATHA Browning, [address] [place] [address]   [phone]

## 2017-05-03 NOTE — PROGRESS NOTES
CM attempted to meet with pt to complete initial assessment. Pt is SERA at this time. CM will attempt later. CM contacted [facility] to receive update regarding scheduling for dialysis. Pt has received insurance approved and will need to schedule where a chair is available and need a discharge date to know when to start on pt. Note to attending: HEP panel needed on pt. Clarisa Ramesh [phone] with [facility] [address] [phone].      LEATHA Browning, [address] [place] [address]   [phone]

## 2017-05-03 NOTE — PROGRESS NOTES
CM was advised by Joaquin Farris that pt has been approved for dialysis and he needs to determine his chair time preferences. His options are MWF @ 12 pm or TRSa @ 6 am.     CM spoke with pt regarding his chair time preference. Pt chose MWF @12 pm.     ROXANE contacted Neelima with [facility] to advise of the preference.  ROXANE was advised pt will begin dialysis on Monday May 8, 2017 at 12:00 pm.     LEATHA Sears, [facility],[address]    [phone]

## 2017-05-03 NOTE — ROUTINE PROCESS
[]Hide copied text           Bedside and Verbal shift change report given to Camila Bee (oncoming nurse) by Shantal Vázquez (offgoing nurse). Report included the following information SBAR, Kardex, Recent Results and Cardiac Rhythm NSR.     Zone Phone: [phone]  Significant changes during shift:  /              Patient Information      Jacob Sibley  27 y.o.  5/1/2017 4:41 PM by Sweetie Espinosa MD. Jacob Sibley was admitted from [facility]  Problem List              Patient Active Problem List      Diagnosis Date Noted    DA (acute kidney injury) ([facility] 05/01/2017    Morbid obesity with BMI of 40.0-44.9, adult ([facility] 12/29/2015    CKD (chronic kidney disease) stage 3, GFR 30-59 ml/min 12/29/2015    DM (diabetes mellitus) ([facility] 10/16/2009    HTN (hypertension) 10/16/2009    Hyperlipidemia 10/16/2009               Past Medical History:   Diagnosis Date    Cataracts       Diabetes ([facility]       Hypercholesterolemia       Hypertension       Obesity                  Core Measures:      CVA: No No  CHF:No No  PNA:No No              Activity Status:      OOB to Chair Yes  Ambulated this shift Yes   Bed Rest No              LINES AND DRAINS:      Jadon R neck     DVT prophylaxis:      DVT prophylaxis Med- Yes  DVT prophylaxis SCD or JUAN C- No       Wounds: (If Applicable)      Wounds- No      Location Left great and 5th toe amputated      Patient Safety:      Falls Score Total Score: 1  Safety Level_______  Bed Alarm On? No  Sitter?  No      Plan for upcoming shift:  Monitor /103 given nitropaste 150/89              Discharge Plan: No       Active Consults:  IP CONSULT TO NEPHROLOGY                                                         10

## 2017-05-03 NOTE — DIALYSIS
[facility]                         [phone]  Vitals Pre Post Assessment Pre Post   /109 179/118 LOC A/OX3 A/OX3   HR 89 88 Lungs Clear  Clear    Temp 98 98.2 Cardiac SR SR   Resp 21 18 Skin Warm and dry Warm and dry   Weight 172kg -2.5kg Edema Generalized  Trace       Pain Denies  Denies      Orders   Duration: Start: 0915 End: 1145 Total: 2.5 hrs   Dialyzer: Revaclear   K Bath: 4   Ca Bath: 2.5   Na / Bicarb: 140/35   Target Fluid Removal: 2500 ml     Access   Type & Location: RIJ CVC   Comments:  + aspiration and flush, dressing dry and intact                               Labs   Hep B status / date: Pending on 5/2/17   Obtained/Reviewed  Critical Results Called Reviewed   NA     Meds Given   Name Dose Route   None                  Total Liters Process: 38.4 L   Net Fluid Removed: 2500 ml      Comments   Time Out Done: Yes    Primary Nurse Rpt Pre: Morgan Contreras RN   Primary Nurse Rpt Post: Morgan Contreras RN   Pt Education: Access care    Care Plan: Continue HD tx ordered by nephrologist   Tx Summary: Tolerated tx fair, BP kept high, MD and floor RN notified, BP meds given. All possible blood returned.

## 2017-05-03 NOTE — PROGRESS NOTES
NAME: Hang Prado        :  1985        MRN:  [id number]        Assessment :    Plan:  --Likely new ESRD  Proteinuria since   Probable underlying DM Nephropathy  Anemia (hgh 6.8)  Hypokalemia  Met acidosis  Volume overload  DM since   HTN  N/v  Dysgeusia --Initiated HD on 17; HD # 2 today. I suspect this is progressive DM nephropathy with CKD stage 5. Will rule out other potential etiology (pending JANEE, SPEP. Mildly elevated free light chain ratio of 2 - likely of no significance. Hep B/C neg     Large kidneys on GLO with no hydro      Iron adequate. S/p 1 unit prbc's. Will initiate FARIDA 10 k sc biw this afternoon. On 4 BP medications and UF with HD again today    4 K bath with HD.      Jadon line today. Perm cath Thursday am.      Arranging outpatient Hd at [facility] - he will get HD # 3 (with UF) tomorrow and will be ok to go until Monday from my standpoint.        Subjective:     Chief Complaint:  Denies current complaint. The patient was seen on dialysis at 11:38 AM .  BP is stable. Catheter is functioning well. We discussed all the above. Review of Systems:    Symptom Y/N Comments  Symptom Y/N Comments   Fever/Chills    Chest Pain     Poor Appetite    Edema     Cough    Abdominal Pain     Sputum    Joint Pain     SOB/LITTLE    Pruritis/Rash     Nausea/vomit    Tolerating PT/OT     Diarrhea    Tolerating Diet     Constipation    Other       Could not obtain due to:      Objective:     VITALS:   Last 24hrs VS reviewed since prior progress note.  Most recent are:  Visit Vitals    BP (!) 168/101    Pulse 92    Temp 97.8 °F (36.6 °C)    Resp 18    Ht 6' 2\" (1.88 m)    Wt (!) 172 kg (379 lb 3.1 oz)    SpO2 95%    BMI 48.69 kg/m2       Intake/Output Summary (Last 24 hours) at 17 0855  Last data filed at 17 1930   Gross per 24 hour   Intake                0 ml   Output 1500 ml   Net            -1500 ml      Telemetry Reviewed:     PHYSICAL EXAM:  General: WD, WN. Alert, cooperative, no acute distress  Resp:  CTA Bilaterally. No Wheezing/Rhonchi/Rales. No access. muscle use  CV:  Regular  rhythm,  No murmur (), No Rubs, No Gallops. ++ edema (a bit better)  GI:  Soft, Non distended, Non tender.  +Bowel sounds, no HSM      Lab Data Reviewed: (see below)    Medications Reviewed: (see below)    PMH/SH reviewed - no change compared to H&P  ________________________________________________________________________  Care Plan discussed with:  Patient y    Family      RN y    Care Manager                    Consultant:          Comments   >50% of visit spent in counseling and coordination of care       ________________________________________________________________________  Marek Lorenzo MD     Procedures: see electronic medical records for all procedures/Xrays and details which  were not copied into this note but were reviewed prior to creation of Plan. LABS:  Recent Labs      05/03/17   0356  05/02/17   0443   WBC  12.4*  12.3*   HGB  7.7*  6.8*   HCT  23.6*  21.2*   PLT  274  316     Recent Labs      05/03/17   0356  05/02/17   0443  05/01/17   1523   NA  143  145  147*   K  3.1*  3.1*  2.9*   CL  110*  114*  113*   CO2  21  17*  19*   BUN  51*  60*  56*   CREA  7.76*  9.04*  8.72*   GLU  128*  104*  121*   CA  7.3*  7.6*  7.5*  7.4*   MG   --   1.9   --    PHOS  5.6*  7.2*   --      Recent Labs      05/01/17   1523   SGOT  18   AP  50   TP  7.2   ALB  1.7*   GLOB  5.5*     No results for input(s): INR, PTP, APTT in the last 72 hours. No lab exists for component: INREXT   Recent Labs      05/03/17 0356  05/02/17   0443   FE   --   33*   TIBC   --   139*   PSAT   --   24   FERR  512*   --       No results found for: FOL, RBCF   No results for input(s): PH, PCO2, PO2 in the last 72 hours.   Recent Labs      05/03/17 0356  05/02/17 0443  05/01/17   1523   CPK  1306* 1027*   --    CKMB   --    --   6.6*     No components found for: [place]  Lab Results   Component Value Date/Time    Color YELLOW/STRAW 05/01/2017 05:39 PM    Appearance CLOUDY 05/01/2017 05:39 PM    Specific gravity 1.018 05/01/2017 05:39 PM    Specific gravity >1.030 10/03/2014 04:35 AM    pH (UA) 5.5 05/01/2017 05:39 PM    Protein 300 05/01/2017 05:39 PM    Glucose 250 05/01/2017 05:39 PM    Ketone NEGATIVE  05/01/2017 05:39 PM    Bilirubin NEGATIVE  05/01/2017 05:39 PM    Urobilinogen 0.2 05/01/2017 05:39 PM    Nitrites NEGATIVE  05/01/2017 05:39 PM    Leukocyte Esterase NEGATIVE  05/01/2017 05:39 PM    Epithelial cells FEW 05/01/2017 05:39 PM    Bacteria NEGATIVE  05/01/2017 05:39 PM    WBC 0-4 05/01/2017 05:39 PM    RBC 5-10 05/01/2017 05:39 PM       MEDICATIONS:  Current Facility-Administered Medications   Medication Dose Route Frequency    cloNIDine HCl (CATAPRES) tablet 0.2 mg  0.2 mg Oral TID WITH MEALS    hydrALAZINE (APRESOLINE) tablet 100 mg  100 mg Oral TID    metoprolol tartrate (LOPRESSOR) tablet 12.5 mg  12.5 mg Oral BID    oxyCODONE IR (ROXICODONE) tablet 5 mg  5 mg Oral Q4H PRN    amLODIPine (NORVASC) tablet 10 mg  10 mg Oral DAILY    nitroglycerin (NITROBID) 2 % ointment 1 Inch  1 Inch Topical Q6H PRN    acetaminophen (TYLENOL) tablet 650 mg  650 mg Oral Q4H PRN    heparin (porcine) injection 5,000 Units  5,000 Units SubCUTAneous Q8H    insulin lispro (HUMALOG) injection   SubCUTAneous AC&HS    glucose chewable tablet 16 g  4 Tab Oral PRN    dextrose (D50W) injection syrg 12.5-25 g  12.5-25 g IntraVENous PRN    glucagon (GLUCAGEN) injection 1 mg  1 mg IntraMUSCular PRN    prochlorperazine (COMPAZINE) with saline injection 5 mg  5 mg IntraVENous Q6H PRN    insulin glargine (LANTUS) injection 30 Units  30 Units SubCUTAneous DAILY

## 2017-05-04 ENCOUNTER — APPOINTMENT (OUTPATIENT)
Dept: INTERVENTIONAL RADIOLOGY/VASCULAR | Age: 32
DRG: 683 | End: 2017-05-04
Attending: INTERNAL MEDICINE
Payer: MEDICARE

## 2017-05-04 LAB
ANION GAP BLD CALC-SCNC: 9 MMOL/L (ref 5–15)
BUN SERPL-MCNC: 43 MG/DL (ref 6–20)
BUN/CREAT SERPL: 6 (ref 12–20)
CALCIUM SERPL-MCNC: 7.9 MG/DL (ref 8.5–10.1)
CHLORIDE SERPL-SCNC: 109 MMOL/L (ref 97–108)
CO2 SERPL-SCNC: 24 MMOL/L (ref 21–32)
CREAT SERPL-MCNC: 6.77 MG/DL (ref 0.7–1.3)
ERYTHROCYTE [DISTWIDTH] IN BLOOD BY AUTOMATED COUNT: 14.4 % (ref 11.5–14.5)
GLUCOSE BLD STRIP.AUTO-MCNC: 110 MG/DL (ref 65–100)
GLUCOSE BLD STRIP.AUTO-MCNC: 134 MG/DL (ref 65–100)
GLUCOSE BLD STRIP.AUTO-MCNC: 74 MG/DL (ref 65–100)
GLUCOSE BLD STRIP.AUTO-MCNC: 74 MG/DL (ref 65–100)
GLUCOSE BLD STRIP.AUTO-MCNC: 84 MG/DL (ref 65–100)
GLUCOSE SERPL-MCNC: 122 MG/DL (ref 65–100)
HCT VFR BLD AUTO: 21.7 % (ref 36.6–50.3)
HGB BLD-MCNC: 7 G/DL (ref 12.1–17)
MAGNESIUM SERPL-MCNC: 1.8 MG/DL (ref 1.6–2.4)
MCH RBC QN AUTO: 24.6 PG (ref 26–34)
MCHC RBC AUTO-ENTMCNC: 32.3 G/DL (ref 30–36.5)
MCV RBC AUTO: 76.4 FL (ref 80–99)
PHOSPHATE SERPL-MCNC: 5.1 MG/DL (ref 2.6–4.7)
PLATELET # BLD AUTO: 305 K/UL (ref 150–400)
POTASSIUM SERPL-SCNC: 3.1 MMOL/L (ref 3.5–5.1)
RBC # BLD AUTO: 2.84 M/UL (ref 4.1–5.7)
SERVICE CMNT-IMP: ABNORMAL
SERVICE CMNT-IMP: ABNORMAL
SERVICE CMNT-IMP: NORMAL
SODIUM SERPL-SCNC: 142 MMOL/L (ref 136–145)
WBC # BLD AUTO: 12.7 K/UL (ref 4.1–11.1)

## 2017-05-04 PROCEDURE — 77030031139 HC SUT VCRL2 J&J -A

## 2017-05-04 PROCEDURE — 02H633Z INSERTION OF INFUSION DEVICE INTO RIGHT ATRIUM, PERCUTANEOUS APPROACH: ICD-10-PCS | Performed by: RADIOLOGY

## 2017-05-04 PROCEDURE — 85027 COMPLETE CBC AUTOMATED: CPT | Performed by: INTERNAL MEDICINE

## 2017-05-04 PROCEDURE — 36415 COLL VENOUS BLD VENIPUNCTURE: CPT | Performed by: INTERNAL MEDICINE

## 2017-05-04 PROCEDURE — 74011250636 HC RX REV CODE- 250/636: Performed by: RADIOLOGY

## 2017-05-04 PROCEDURE — 90935 HEMODIALYSIS ONE EVALUATION: CPT

## 2017-05-04 PROCEDURE — 77002 NEEDLE LOCALIZATION BY XRAY: CPT

## 2017-05-04 PROCEDURE — 82962 GLUCOSE BLOOD TEST: CPT

## 2017-05-04 PROCEDURE — 74011250637 HC RX REV CODE- 250/637: Performed by: INTERNAL MEDICINE

## 2017-05-04 PROCEDURE — 74011000250 HC RX REV CODE- 250: Performed by: RADIOLOGY

## 2017-05-04 PROCEDURE — 74011636637 HC RX REV CODE- 636/637: Performed by: INTERNAL MEDICINE

## 2017-05-04 PROCEDURE — 77010033678 HC OXYGEN DAILY

## 2017-05-04 PROCEDURE — P9016 RBC LEUKOCYTES REDUCED: HCPCS | Performed by: INTERNAL MEDICINE

## 2017-05-04 PROCEDURE — 65270000029 HC RM PRIVATE

## 2017-05-04 PROCEDURE — 80048 BASIC METABOLIC PNL TOTAL CA: CPT | Performed by: INTERNAL MEDICINE

## 2017-05-04 PROCEDURE — C1750 CATH, HEMODIALYSIS,LONG-TERM: HCPCS

## 2017-05-04 PROCEDURE — 84100 ASSAY OF PHOSPHORUS: CPT | Performed by: INTERNAL MEDICINE

## 2017-05-04 PROCEDURE — 77030018719 HC DRSG PTCH ANTIMIC J&J -A

## 2017-05-04 PROCEDURE — 82784 ASSAY IGA/IGD/IGG/IGM EACH: CPT | Performed by: INTERNAL MEDICINE

## 2017-05-04 PROCEDURE — 74011250636 HC RX REV CODE- 250/636: Performed by: INTERNAL MEDICINE

## 2017-05-04 PROCEDURE — 83735 ASSAY OF MAGNESIUM: CPT | Performed by: INTERNAL MEDICINE

## 2017-05-04 RX ORDER — HEPARIN SODIUM 1000 [USP'U]/ML
10000 INJECTION, SOLUTION INTRAVENOUS; SUBCUTANEOUS
Status: COMPLETED | OUTPATIENT
Start: 2017-05-04 | End: 2017-05-04

## 2017-05-04 RX ORDER — LIDOCAINE HYDROCHLORIDE 20 MG/ML
10 INJECTION, SOLUTION INFILTRATION; PERINEURAL ONCE
Status: COMPLETED | OUTPATIENT
Start: 2017-05-04 | End: 2017-05-04

## 2017-05-04 RX ORDER — SODIUM CHLORIDE 0.9 % (FLUSH) 0.9 %
5 SYRINGE (ML) INJECTION EVERY 8 HOURS
Status: DISCONTINUED | OUTPATIENT
Start: 2017-05-04 | End: 2017-05-05 | Stop reason: HOSPADM

## 2017-05-04 RX ORDER — MIDAZOLAM HYDROCHLORIDE 1 MG/ML
5 INJECTION, SOLUTION INTRAMUSCULAR; INTRAVENOUS
Status: DISCONTINUED | OUTPATIENT
Start: 2017-05-04 | End: 2017-05-04

## 2017-05-04 RX ORDER — SODIUM CHLORIDE 9 MG/ML
25 INJECTION, SOLUTION INTRAVENOUS CONTINUOUS
Status: DISCONTINUED | OUTPATIENT
Start: 2017-05-04 | End: 2017-05-04

## 2017-05-04 RX ORDER — CLONIDINE HYDROCHLORIDE 0.1 MG/1
0.1 TABLET ORAL
Status: DISCONTINUED | OUTPATIENT
Start: 2017-05-04 | End: 2017-05-05 | Stop reason: HOSPADM

## 2017-05-04 RX ORDER — CEFAZOLIN SODIUM IN 0.9 % NACL 2 G/100 ML
2 PLASTIC BAG, INJECTION (ML) INTRAVENOUS ONCE
Status: COMPLETED | OUTPATIENT
Start: 2017-05-04 | End: 2017-05-04

## 2017-05-04 RX ORDER — FENTANYL CITRATE 50 UG/ML
100 INJECTION, SOLUTION INTRAMUSCULAR; INTRAVENOUS
Status: DISCONTINUED | OUTPATIENT
Start: 2017-05-04 | End: 2017-05-04

## 2017-05-04 RX ORDER — HEPARIN SODIUM 200 [USP'U]/100ML
600 INJECTION, SOLUTION INTRAVENOUS ONCE
Status: COMPLETED | OUTPATIENT
Start: 2017-05-04 | End: 2017-05-04

## 2017-05-04 RX ORDER — LISINOPRIL 5 MG/1
5 TABLET ORAL DAILY
Status: DISCONTINUED | OUTPATIENT
Start: 2017-05-05 | End: 2017-05-05

## 2017-05-04 RX ORDER — HYDRALAZINE HYDROCHLORIDE 50 MG/1
50 TABLET, FILM COATED ORAL 3 TIMES DAILY
Status: DISCONTINUED | OUTPATIENT
Start: 2017-05-04 | End: 2017-05-05 | Stop reason: HOSPADM

## 2017-05-04 RX ADMIN — LOPERAMIDE HYDROCHLORIDE 2 MG: 2 CAPSULE ORAL at 20:09

## 2017-05-04 RX ADMIN — LOPERAMIDE HYDROCHLORIDE 2 MG: 2 CAPSULE ORAL at 06:25

## 2017-05-04 RX ADMIN — AMLODIPINE BESYLATE 10 MG: 5 TABLET ORAL at 08:41

## 2017-05-04 RX ADMIN — INSULIN GLARGINE 30 UNITS: 100 INJECTION, SOLUTION SUBCUTANEOUS at 08:40

## 2017-05-04 RX ADMIN — Medication 5 ML: at 06:26

## 2017-05-04 RX ADMIN — CLONIDINE HYDROCHLORIDE 0.1 MG: 0.1 TABLET ORAL at 20:01

## 2017-05-04 RX ADMIN — FENTANYL CITRATE 25 MCG: 50 INJECTION, SOLUTION INTRAMUSCULAR; INTRAVENOUS at 15:19

## 2017-05-04 RX ADMIN — HEPARIN SODIUM 4000 UNITS: 1000 INJECTION, SOLUTION INTRAVENOUS; SUBCUTANEOUS at 15:15

## 2017-05-04 RX ADMIN — HEPARIN SODIUM 5000 UNITS: 5000 INJECTION, SOLUTION INTRAVENOUS; SUBCUTANEOUS at 02:43

## 2017-05-04 RX ADMIN — MIDAZOLAM HYDROCHLORIDE 1 MG: 1 INJECTION INTRAMUSCULAR; INTRAVENOUS at 15:15

## 2017-05-04 RX ADMIN — SODIUM CHLORIDE 25 ML/HR: 900 INJECTION, SOLUTION INTRAVENOUS at 14:52

## 2017-05-04 RX ADMIN — CLONIDINE HYDROCHLORIDE 0.2 MG: 0.1 TABLET ORAL at 08:40

## 2017-05-04 RX ADMIN — MIDAZOLAM HYDROCHLORIDE 2 MG: 1 INJECTION INTRAMUSCULAR; INTRAVENOUS at 15:08

## 2017-05-04 RX ADMIN — FENTANYL CITRATE 50 MCG: 50 INJECTION, SOLUTION INTRAMUSCULAR; INTRAVENOUS at 15:09

## 2017-05-04 RX ADMIN — HEPARIN SODIUM IN SODIUM CHLORIDE 200 UNITS: 200 INJECTION INTRAVENOUS at 15:15

## 2017-05-04 RX ADMIN — OXYCODONE HYDROCHLORIDE 5 MG: 5 TABLET ORAL at 08:40

## 2017-05-04 RX ADMIN — CEFAZOLIN 2 G: 10 INJECTION, POWDER, FOR SOLUTION INTRAVENOUS; PARENTERAL at 14:53

## 2017-05-04 RX ADMIN — METOPROLOL TARTRATE 12.5 MG: 25 TABLET ORAL at 08:41

## 2017-05-04 RX ADMIN — HEPARIN SODIUM 5000 UNITS: 5000 INJECTION, SOLUTION INTRAVENOUS; SUBCUTANEOUS at 20:01

## 2017-05-04 RX ADMIN — HYDRALAZINE HYDROCHLORIDE 50 MG: 50 TABLET, FILM COATED ORAL at 21:38

## 2017-05-04 RX ADMIN — LIDOCAINE HYDROCHLORIDE 200 MG: 20 INJECTION, SOLUTION INFILTRATION; PERINEURAL at 15:15

## 2017-05-04 RX ADMIN — Medication 5 ML: at 20:02

## 2017-05-04 RX ADMIN — OXYCODONE HYDROCHLORIDE 5 MG: 5 TABLET ORAL at 02:41

## 2017-05-04 RX ADMIN — HYDRALAZINE HYDROCHLORIDE 100 MG: 50 TABLET, FILM COATED ORAL at 08:40

## 2017-05-04 RX ADMIN — OXYCODONE HYDROCHLORIDE 5 MG: 5 TABLET ORAL at 20:09

## 2017-05-04 RX ADMIN — CLONIDINE HYDROCHLORIDE 0.1 MG: 0.1 TABLET ORAL at 11:29

## 2017-05-04 NOTE — PROGRESS NOTES
Problem: Falls - Risk of  Goal: *Absence of falls  Outcome: Progressing Towards Goal  Pt up to BR without assistance. Denies dizziness, lightheadedness. Encouraged pt to call prior to getting up. States he feels fine.

## 2017-05-04 NOTE — PROGRESS NOTES
Hospitalist Progress Note    NAME: Tyrel Carrillo   :  1985   MRN:  [id number]         Assessment / Plan:  Hypertension, benign/essential: Aunt says he has been off medications for quite some time. Improving control today. - con't clonidine, hydralazine and norvasc at current dose  - stop metoprolol and restart home lisinopril tomorrow AM  - prn nitrobid  Insulin-dependent DM2 with nephropathy/retinopathy:  - on 70/30 at home, con't lantus here  - lispro sliding scale  DA in setting of CKD3 with anemia of CKD (now ESRD): suspect this is due to progressive DM nephropathy and now CKD5. Pt with uremic sx, acidosis, and anasarca. - renal US with small cystic lesion in the right kidney and large kidneys bilaterally  - appreciate nephrology assistance, additional w/u sent (including hep B/C) with results pending  - Jadon catheter placed , plan for permcath placement today  - vascular surgery consult appreciated, Pt will f/u outpt for long term access placement  - Hg decreased again today, may need additional transfusion. Transfused 1u pRBCs with UF /. Iron level low at 33. LITTLE without hypoxemia in setting of anasarca and elevated troponin: Troponin flat, no e/o ischemia. Volume removal with HD ongoing.  - LE dopplers negative DVT  - echo  with EF 45-50%. There were no regional wall motion abnormalities. Wall thickness was mildly to moderately increased. Rhabdomyolysis: CK >1000   - volume status control with UF  - serial CK, have ordered for tomorrow AM  Intermittent diarrhea, ?IBS, has never seen GI  - C Diff and stool culture negative. TTG and fecal WBC pending.  - refer to GI for further w/u outpt on discharge  Morbid obesity      Code Status: Full  Surrogate Decision Maker:    DVT Prophylaxis: Heparin      Baseline: . No children. Legally blind and disabled. Subjective:     Chief Complaint / Reason for Physician Visit  \"Doing okay\".   Sleeping, denies pain currently but worried that Permcath placement will be painful. Discussed with RN events overnight. Review of Systems:  Symptom Y/N Comments  Symptom Y/N Comments   Fever/Chills n   Chest Pain n    Poor Appetite n   Edema n    Cough n   Abdominal Pain n    Sputum n   Joint Pain     SOB/LITTLE n   Pruritis/Rash     Nausea/vomit    Tolerating PT/OT     Diarrhea    Tolerating Diet     Constipation    Other       Could NOT obtain due to:      Objective:     VITALS:   Last 24hrs VS reviewed since prior progress note. Most recent are:  Patient Vitals for the past 24 hrs:   Temp Pulse Resp BP SpO2   05/04/17 0811 97.8 °F (36.6 °C) 76 18 113/75 98 %   05/04/17 0254 98 °F (36.7 °C) 78 18 (!) 152/95 97 %   05/03/17 2346 97.7 °F (36.5 °C) 77 18 147/89 98 %   05/03/17 1941 97.7 °F (36.5 °C) 72 18 139/86 96 %   05/03/17 1710 - - - 142/86 -   05/03/17 1538 97.9 °F (36.6 °C) 80 18 (!) 166/98 95 %   05/03/17 1226 98.5 °F (36.9 °C) 91 18 129/84 97 %   05/03/17 1145 98.2 °F (36.8 °C) 92 18 (!) 179/118 -   05/03/17 1115 - 92 - (!) 195/117 -   05/03/17 1045 - 88 - (!) 192/119 -       Intake/Output Summary (Last 24 hours) at 05/04/17 1023  Last data filed at 05/03/17 1145   Gross per 24 hour   Intake                0 ml   Output             2500 ml   Net            -2500 ml        PHYSICAL EXAM:  General: Obese. Alert, cooperative, no acute distress    EENT:  EOMI. Anicteric sclerae. MMM  Resp:  CTA bilaterally, no wheezing or rales. No accessory muscle use  CV:  Regular  rhythm,  2+ edema  GI:  Soft, moderately distended, Non tender.  +Bowel sounds  Neurologic:  Alert and oriented X 3, normal speech,   Psych:   Fair insight. Not anxious nor agitated  Skin:  No rashes.   No jaundice    Reviewed most current lab test results and cultures  YES  Reviewed most current radiology test results   YES  Review and summation of old records today    NO  Reviewed patient's current orders and MAR    YES  PMH/SH reviewed - no change compared to H&P  ________________________________________________________________________  Care Plan discussed with:    Comments   Patient x    Family      RN x    Care Manager x    Consultant  x nephrology                    x Multidiciplinary team rounds were held today with , nursing, pharmacist and clinical coordinator. Patient's plan of care was discussed; medications were reviewed and discharge planning was addressed. ________________________________________________________________________  Total NON critical care TIME:  25 Minutes    Total CRITICAL CARE TIME Spent:   Minutes non procedure based      Comments   >50% of visit spent in counseling and coordination of care x    ________________________________________________________________________  Gen Jack MD     Procedures: see electronic medical records for all procedures/Xrays and details which were not copied into this note but were reviewed prior to creation of Plan. LABS:  I reviewed today's most current labs and imaging studies.   Pertinent labs include:  Recent Labs      05/04/17 0252 05/03/17 0356  05/02/17   0443   WBC  12.7*  12.4*  12.3*   HGB  7.0*  7.7*  6.8*   HCT  21.7*  23.6*  21.2*   PLT  305  274  316     Recent Labs      05/04/17 0252  05/03/17   0356  05/02/17   0443  05/01/17   1523   NA  142  143  145  147*   K  3.1*  3.1*  3.1*  2.9*   CL  109*  110*  114*  113*   CO2  24  21  17*  19*   GLU  122*  128*  104*  121*   BUN  43*  51*  60*  56*   CREA  6.77*  7.76*  9.04*  8.72*   CA  7.9*  7.3*  7.6*  7.5*  7.4*   MG  1.8   --   1.9   --    PHOS  5.1*  5.6*  7.2*   --    ALB   --    --    --   1.7*   TBILI   --    --    --   0.2   SGOT   --    --    --   18   ALT   --    --    --   14       Signed: Gen Jack MD

## 2017-05-04 NOTE — ROUTINE PROCESS
1500- Pt in for permcath placement. Pt aware of risks and benefits and wishes to proceed. 1550- Permcath in.  Pt to dialysis per this nurse. Report at bedside to dialysis nurse Roann Kawasaki.

## 2017-05-04 NOTE — CARDIO/PULMONARY
CP Rehab Note: chart review     Admit: leg swelling     Mhx: DM, HTN, hypercholesterolemia, LEGALLY BLIND, EF 45-50% on 5/2/17.     Current smoker.     Permacath placement today.     CP Rehab will continue to follow.

## 2017-05-04 NOTE — PROGRESS NOTES
NAME: Liudmila Lemons        :  1985        MRN:  [id number]        Assessment :    Plan:  --new ESRD    Anemia   Hypokalemia  Met acidosis  Volume overload  DM since   HTN  N/v  Dysgeusia --Initiated HD on 17; HD # 3 today. Progressive DM nephropathy . neg JANEE, no mspike on SPEP. Mildly elevated free light chain ratio of 2 - likely of no significance. Hep B/C neg     Iron adequate. S/p 1 unit prbc's; another today. Started on FARIDA 10 k sc biw. On 4 BP medications and UF with HD again today; BP is much better - SBP now 113 (scale back on BP meds to facilitate UF) - decrease hydralazine to 50 and clonidine to 0.1     4 K bath with HD.      Perm cath today.      Outpatient Hd at [facility]; ok to go until Monday for next HD treatment.        Subjective:     Chief Complaint:  Denies current complaint. The patient was seen on dialysis at 3:56 PM .  BP is stable. Catheter is functioning well. We discussed all the above. Review of Systems:    Symptom Y/N Comments  Symptom Y/N Comments   Fever/Chills    Chest Pain     Poor Appetite    Edema     Cough    Abdominal Pain     Sputum    Joint Pain     SOB/LITTLE    Pruritis/Rash     Nausea/vomit    Tolerating PT/OT     Diarrhea    Tolerating Diet     Constipation    Other       Could not obtain due to:      Objective:     VITALS:   Last 24hrs VS reviewed since prior progress note.  Most recent are:  Visit Vitals    /75 (BP 1 Location: Right arm, BP Patient Position: Sitting)    Pulse 76    Temp 97.8 °F (36.6 °C)    Resp 18    Ht 6' 2\" (1.88 m)    Wt (!) 172 kg (379 lb 3.1 oz)    SpO2 98%    BMI 48.69 kg/m2       Intake/Output Summary (Last 24 hours) at 17 0915  Last data filed at 17 1145   Gross per 24 hour   Intake                0 ml   Output             2500 ml   Net            -2500 ml      Telemetry Reviewed:     PHYSICAL EXAM:  General: WD, WN. Alert, cooperative, no acute distress  Resp:  CTA Bilaterally. No Wheezing/Rhonchi/Rales. No access. muscle use  CV:  Regular  rhythm,  No murmur (), No Rubs, No Gallops. ++ edema (a bit better)  GI:  Soft, Non distended, Non tender.  +Bowel sounds, no HSM      Lab Data Reviewed: (see below)    Medications Reviewed: (see below)    PMH/SH reviewed - no change compared to H&P  ________________________________________________________________________  Care Plan discussed with:  Patient y    Family      RN y    Care Manager                    Consultant:          Comments   >50% of visit spent in counseling and coordination of care       ________________________________________________________________________  Odell Dorsey MD     Procedures: see electronic medical records for all procedures/Xrays and details which  were not copied into this note but were reviewed prior to creation of Plan. LABS:  Recent Labs      05/04/17   0252 05/03/17   0356   WBC  12.7*  12.4*   HGB  7.0*  7.7*   HCT  21.7*  23.6*   PLT  305  274     Recent Labs      05/04/17   0252  05/03/17   0356  05/02/17   0443   NA  142  143  145   K  3.1*  3.1*  3.1*   CL  109*  110*  114*   CO2  24  21  17*   BUN  43*  51*  60*   CREA  6.77*  7.76*  9.04*   GLU  122*  128*  104*   CA  7.9*  7.3*  7.6*  7.5*   MG  1.8   --   1.9   PHOS  5.1*  5.6*  7.2*     Recent Labs      05/02/17   0919  05/01/17   1523   SGOT   --   18   AP   --   50   TP  6.2  7.2   ALB   --   1.7*   GLOB   --   5.5*     No results for input(s): INR, PTP, APTT in the last 72 hours. No lab exists for component: Farrah Mckay   Recent Labs      05/03/17 0356 05/02/17 0443   FE   --   33*   TIBC   --   139*   PSAT   --   24   FERR  512*   --       No results found for: FOL, RBCF   No results for input(s): PH, PCO2, PO2 in the last 72 hours.   Recent Labs      05/03/17 0356 05/02/17 0443 05/01/17   1523   CPK  1306*  1027*   --    CKMB   --    -- 6.6*     No components found for: [place]  Lab Results   Component Value Date/Time    Color YELLOW/STRAW 05/01/2017 05:39 PM    Appearance CLOUDY 05/01/2017 05:39 PM    Specific gravity 1.018 05/01/2017 05:39 PM    Specific gravity >1.030 10/03/2014 04:35 AM    pH (UA) 5.5 05/01/2017 05:39 PM    Protein 300 05/01/2017 05:39 PM    Glucose 250 05/01/2017 05:39 PM    Ketone NEGATIVE  05/01/2017 05:39 PM    Bilirubin NEGATIVE  05/01/2017 05:39 PM    Urobilinogen 0.2 05/01/2017 05:39 PM    Nitrites NEGATIVE  05/01/2017 05:39 PM    Leukocyte Esterase NEGATIVE  05/01/2017 05:39 PM    Epithelial cells FEW 05/01/2017 05:39 PM    Bacteria NEGATIVE  05/01/2017 05:39 PM    WBC 0-4 05/01/2017 05:39 PM    RBC 5-10 05/01/2017 05:39 PM       MEDICATIONS:  Current Facility-Administered Medications   Medication Dose Route Frequency    cloNIDine HCl (CATAPRES) tablet 0.2 mg  0.2 mg Oral TID WITH MEALS    hydrALAZINE (APRESOLINE) tablet 100 mg  100 mg Oral TID    metoprolol tartrate (LOPRESSOR) tablet 12.5 mg  12.5 mg Oral BID    epoetin nata (EPOGEN;PROCRIT) injection 10,000 Units  10,000 Units SubCUTAneous EVERY WED & SAT    loperamide (IMODIUM) capsule 2 mg  2 mg Oral Q4H PRN    SALINE PERIPHERAL FLUSH Q8H soln 5 mL  5 mL InterCATHeter Q8H    oxyCODONE IR (ROXICODONE) tablet 5 mg  5 mg Oral Q4H PRN    amLODIPine (NORVASC) tablet 10 mg  10 mg Oral DAILY    nitroglycerin (NITROBID) 2 % ointment 1 Inch  1 Inch Topical Q6H PRN    acetaminophen (TYLENOL) tablet 650 mg  650 mg Oral Q4H PRN    heparin (porcine) injection 5,000 Units  5,000 Units SubCUTAneous Q8H    insulin lispro (HUMALOG) injection   SubCUTAneous AC&HS    glucose chewable tablet 16 g  4 Tab Oral PRN    dextrose (D50W) injection syrg 12.5-25 g  12.5-25 g IntraVENous PRN    glucagon (GLUCAGEN) injection 1 mg  1 mg IntraMUSCular PRN    prochlorperazine (COMPAZINE) with saline injection 5 mg  5 mg IntraVENous Q6H PRN    insulin glargine (LANTUS) injection 30 Units  30 Units SubCUTAneous DAILY

## 2017-05-04 NOTE — ROUTINE PROCESS
Bedside and Verbal shift change report given to Janet (oncoming nurse) by [address] Rodríguez (offgoing nurse). Report included the following information SBAR, Kardex, Recent Results and Cardiac Rhythm NSR.     Zone Phone: [phone]          Significant changes during shift: none              Patient Information      Anastacia Antony  27 y.o.  5/1/2017 4:41 PM by Suzanne Wells MD. Anastacia Antony was admitted from [facility]  Problem List      [address]   Patient Active Problem List      Diagnosis Date Noted    DA (acute kidney injury) ([facility] 05/01/2017    Morbid obesity with BMI of 40.0-44.9, adult ([facility] 12/29/2015    CKD (chronic kidney disease) stage 3, GFR 30-59 ml/min 12/29/2015    DM (diabetes mellitus) ([facility] 10/16/2009    HTN (hypertension) 10/16/2009    Hyperlipidemia 10/16/2009                  Past Medical History:   Diagnosis Date    Cataracts       Diabetes ([facility]       Hypercholesterolemia       Hypertension       Obesity                  Core Measures:      CVA: No No  CHF:No No  PNA:No No              Activity Status:      OOB to Chair Yes  Ambulated this shift Yes   Bed Rest No              LINES AND DRAINS:      Jadon R neck      DVT prophylaxis:      DVT prophylaxis Med- Yes  DVT prophylaxis SCD or JUAN C- No       Wounds: (If Applicable)      Wounds- No      Location Left great and 5th toe amputated      Patient Safety:      Falls Score Total Score: 1  Safety Level_______  Bed Alarm On? No  Sitter?  No      Plan for upcoming shift: Monitor BP, dialysis for 3rd day in a row              Discharge Plan: Yes Home when appropriate    Active Consults:  IP CONSULT TO NEPHROLOGY

## 2017-05-04 NOTE — PROCEDURES
[facility]  [phone]    Vitals   Pre   Post   Assessment   Pre   Post     Temp  Temp: 97.8 °F (36.6 °C) (05/04/17 1553)  98.3, oral LOC  A&Ox4, drowsy post catheter placement A&Ox4   HR   Pulse (Heart Rate): 74 (05/04/17 1553) 77 Lungs   Dim bases same   B/P   BP: (!) 187/101 (05/04/17 1553) 161/103 Cardiac   Regular S1 S2 same   Resp   Resp Rate: 18 (05/04/17 1553) 18 Skin   Flaky, some scabbing same   Pain level  Pain Intensity 1: 0 (05/04/17 1525) 0 Edema  3+ pitting BLE; generalized     same   Orders:    Duration:   Start:   7793 End:   1900 Total:   3hr   Dialyzer:   Dialyzer/Set Up Inspection: Revaclear (05/04/17 1553)   K Bath:   Dialysate K (mEq/L): 4 (05/04/17 1553)   Ca Bath:   Dialysate CA (mEq/L): 2.5 (05/04/17 1553)   Na/Bicarb:   Dialysate NA (mEq/L): 140 (05/04/17 1553)   Target Fluid Removal:   Goal/Amount of Fluid to Remove (mL): 3500 mL (05/04/17 1553)   Access     Type & Location:   [facility] permcath: ok to use order in at 1530. Dressing CDI. No s/s of infection. Aspiration/ flush well. Running at  well.     Labs     Obtained/Reviewed   Critical Results Called   Date when labs were drawn-  Hgb-    HGB   Date Value Ref Range Status   05/04/2017 7.0 (L) 12.1 - 17.0 g/dL Final     K-    Potassium   Date Value Ref Range Status   05/04/2017 3.1 (L) 3.5 - 5.1 mmol/L Final     Ca-   Calcium   Date Value Ref Range Status   05/04/2017 7.9 (L) 8.5 - 10.1 MG/DL Final     Bun-   BUN   Date Value Ref Range Status   05/04/2017 43 (H) 6 - 20 MG/DL Final     Creat-   Creatinine   Date Value Ref Range Status   05/04/2017 6.77 (H) 0.70 - 1.30 MG/DL Final        Medications/ Blood Products Given     Name   Dose   Route and Time     PRBC 1 unit IV at 1645             Blood Volume Processed (BVP):    66.2 Net Fluid   Removed:  3500ml   Comments   Time Out Done: 9240  Primary Nurse Rpt Pre: Rachell Hartman RN/ Holly Phillip RN  Primary Nurse Rpt Post: Rachell Hartman, RN  Pt Education: new permcath/ access infection control  Care Plan: possible D/C after tx this evening  Tx Summary:  Pt arrived to [place] unit after 2hr 15min wait for new permcath. Permcath ok to use & angio staff transported to HD suite to avoid further delay. Pt A&Ox4, but drowsy due to sedation for procedure. Pt signed consent for transfusion & gave verbal consent as well. 1553: Both lumens of permcath disinfected with Alcavis per policy. Each lumen aspirated for blood return and flushed with Normal Saline per policy. Dialysis initiated. VSS. Pt hypertensive (187/101). Dr Lauren Segal at bedside. Gave ok to run 3hr tx this evening. Orders changed in Machine. [id number]: Primary RN arrived with 1 unit PRBC for orders transfusion. Started PRBCs. 1700: 100ml NS flush for clot prevention. VSS with HTN. 1715: End PRBCs. No S/S of adverse reaction. 1730: 100ml NS flush for clot prevention. VSS with HTN.   1853: Tx ended. VSS. Pt still HTN. Central line catheter flushed with normal saline per policy. Ports disinfected with Alcavis per policy and lines disconnected and capped using aseptic technique. See LDA docflowsheet for dressing information. SBAR given to Caroline Turner RN including VS & procedure summary. Admiting Diagnosis: DA  Pt's previous clinic- [facility]  Consent signed - Informed Consent Verified: Yes (05/04/17 2893)  [facility] Consent - yes  Hepatitis Status- HBsAg Neg 5/2/17  Machine #- Machine Number: [id number] (05/04/17 1553)  Telemetry status- none  Pre-dialysis wt. - Pre-Dialysis Weight: 172 kg (379 lb 3.1 oz) (05/03/17 1606)

## 2017-05-05 ENCOUNTER — TELEPHONE (OUTPATIENT)
Dept: SURGERY | Age: 32
End: 2017-05-05

## 2017-05-05 VITALS
HEART RATE: 74 BPM | OXYGEN SATURATION: 99 % | DIASTOLIC BLOOD PRESSURE: 82 MMHG | TEMPERATURE: 98.9 F | WEIGHT: 315 LBS | SYSTOLIC BLOOD PRESSURE: 141 MMHG | RESPIRATION RATE: 18 BRPM | HEIGHT: 74 IN | BODY MASS INDEX: 40.43 KG/M2

## 2017-05-05 LAB
ABO + RH BLD: NORMAL
ANION GAP BLD CALC-SCNC: 10 MMOL/L (ref 5–15)
BACTERIA SPEC CULT: NORMAL
BLD PROD TYP BPU: NORMAL
BLD PROD TYP BPU: NORMAL
BLOOD GROUP ANTIBODIES SERPL: NORMAL
BPU ID: NORMAL
BPU ID: NORMAL
BUN SERPL-MCNC: 29 MG/DL (ref 6–20)
BUN/CREAT SERPL: 5 (ref 12–20)
C JEJUNI+C COLI AG STL QL: NEGATIVE
CALCIUM SERPL-MCNC: 7.8 MG/DL (ref 8.5–10.1)
CHLORIDE SERPL-SCNC: 106 MMOL/L (ref 97–108)
CK SERPL-CCNC: 809 U/L (ref 39–308)
CO2 SERPL-SCNC: 25 MMOL/L (ref 21–32)
CREAT SERPL-MCNC: 5.4 MG/DL (ref 0.7–1.3)
CROSSMATCH RESULT,%XM: NORMAL
CROSSMATCH RESULT,%XM: NORMAL
E COLI SXT1+2 STL IA: NEGATIVE
ERYTHROCYTE [DISTWIDTH] IN BLOOD BY AUTOMATED COUNT: 14.4 % (ref 11.5–14.5)
GLUCOSE BLD STRIP.AUTO-MCNC: 211 MG/DL (ref 65–100)
GLUCOSE SERPL-MCNC: 161 MG/DL (ref 65–100)
HCT VFR BLD AUTO: 25.1 % (ref 36.6–50.3)
HGB BLD-MCNC: 8.4 G/DL (ref 12.1–17)
MCH RBC QN AUTO: 26.2 PG (ref 26–34)
MCHC RBC AUTO-ENTMCNC: 33.5 G/DL (ref 30–36.5)
MCV RBC AUTO: 78.2 FL (ref 80–99)
PLATELET # BLD AUTO: 265 K/UL (ref 150–400)
POTASSIUM SERPL-SCNC: 3 MMOL/L (ref 3.5–5.1)
RBC # BLD AUTO: 3.21 M/UL (ref 4.1–5.7)
SERVICE CMNT-IMP: ABNORMAL
SERVICE CMNT-IMP: NORMAL
SODIUM SERPL-SCNC: 141 MMOL/L (ref 136–145)
SPECIMEN EXP DATE BLD: NORMAL
STATUS OF UNIT,%ST: NORMAL
STATUS OF UNIT,%ST: NORMAL
UNIT DIVISION, %UDIV: 0
UNIT DIVISION, %UDIV: 0
WBC # BLD AUTO: 10.3 K/UL (ref 4.1–11.1)

## 2017-05-05 PROCEDURE — 82962 GLUCOSE BLOOD TEST: CPT

## 2017-05-05 PROCEDURE — 80048 BASIC METABOLIC PNL TOTAL CA: CPT | Performed by: INTERNAL MEDICINE

## 2017-05-05 PROCEDURE — 36415 COLL VENOUS BLD VENIPUNCTURE: CPT | Performed by: INTERNAL MEDICINE

## 2017-05-05 PROCEDURE — 74011636637 HC RX REV CODE- 636/637: Performed by: INTERNAL MEDICINE

## 2017-05-05 PROCEDURE — 74011250637 HC RX REV CODE- 250/637: Performed by: INTERNAL MEDICINE

## 2017-05-05 PROCEDURE — 74011250636 HC RX REV CODE- 250/636: Performed by: INTERNAL MEDICINE

## 2017-05-05 PROCEDURE — 85027 COMPLETE CBC AUTOMATED: CPT | Performed by: INTERNAL MEDICINE

## 2017-05-05 PROCEDURE — 82550 ASSAY OF CK (CPK): CPT | Performed by: INTERNAL MEDICINE

## 2017-05-05 RX ORDER — HYDRALAZINE HYDROCHLORIDE 50 MG/1
50 TABLET, FILM COATED ORAL 3 TIMES DAILY
Qty: 90 TAB | Refills: 0 | Status: SHIPPED | OUTPATIENT
Start: 2017-05-05 | End: 2018-04-03

## 2017-05-05 RX ORDER — CLONIDINE HYDROCHLORIDE 0.1 MG/1
0.1 TABLET ORAL
Qty: 90 TAB | Refills: 0 | Status: SHIPPED | OUTPATIENT
Start: 2017-05-05 | End: 2018-04-03

## 2017-05-05 RX ORDER — OXYCODONE AND ACETAMINOPHEN 5; 325 MG/1; MG/1
1 TABLET ORAL
Qty: 20 TAB | Refills: 0 | Status: ON HOLD | OUTPATIENT
Start: 2017-05-05 | End: 2017-05-09

## 2017-05-05 RX ORDER — AMLODIPINE BESYLATE 10 MG/1
10 TABLET ORAL DAILY
Qty: 30 TAB | Refills: 0 | Status: SHIPPED | OUTPATIENT
Start: 2017-05-05 | End: 2018-04-03

## 2017-05-05 RX ORDER — POTASSIUM CHLORIDE 750 MG/1
20 TABLET, FILM COATED, EXTENDED RELEASE ORAL
Status: COMPLETED | OUTPATIENT
Start: 2017-05-05 | End: 2017-05-05

## 2017-05-05 RX ADMIN — AMLODIPINE BESYLATE 10 MG: 5 TABLET ORAL at 09:14

## 2017-05-05 RX ADMIN — OXYCODONE HYDROCHLORIDE 5 MG: 5 TABLET ORAL at 02:58

## 2017-05-05 RX ADMIN — OXYCODONE HYDROCHLORIDE 5 MG: 5 TABLET ORAL at 07:50

## 2017-05-05 RX ADMIN — POTASSIUM CHLORIDE 20 MEQ: 750 TABLET, FILM COATED, EXTENDED RELEASE ORAL at 09:14

## 2017-05-05 RX ADMIN — CLONIDINE HYDROCHLORIDE 0.1 MG: 0.1 TABLET ORAL at 09:15

## 2017-05-05 RX ADMIN — INSULIN HUMAN 30 UNITS: 100 INJECTION, SUSPENSION SUBCUTANEOUS at 09:14

## 2017-05-05 RX ADMIN — Medication 5 ML: at 05:23

## 2017-05-05 RX ADMIN — HEPARIN SODIUM 5000 UNITS: 5000 INJECTION, SOLUTION INTRAVENOUS; SUBCUTANEOUS at 02:58

## 2017-05-05 RX ADMIN — INSULIN LISPRO 2 UNITS: 100 INJECTION, SOLUTION INTRAVENOUS; SUBCUTANEOUS at 09:14

## 2017-05-05 RX ADMIN — HYDRALAZINE HYDROCHLORIDE 50 MG: 50 TABLET, FILM COATED ORAL at 09:14

## 2017-05-05 NOTE — PROGRESS NOTES
NAME: Diego Tavarez        :  1985        MRN:  [id number]        Assessment :    Plan:  --new ESRD    Anemia   Hypokalemia  Met acidosis  Volume overload  DM since   HTN  N/v  Dysgeusia --Initiated HD on 17; s/p HD # 3 yesterday. Progressive DM nephropathy . neg JANEE, no mspike on SPEP. Mildly elevated free light chain ratio of 2 - likely of no significance. Hep B/C neg     Iron adequate. S/p 2 unit prbc's. Started on FARIDA 10 k sc biw. On 4 BP medications and s/p UF with HD; BP is much better. We will continue to titrate BP medications as volume is improved. 4 K bath with HD.      Perm cath on .      Outpatient Hd at [facility]; ok to go until Monday for next HD treatment.        Subjective:     Chief Complaint:  Denies current complaint, not very talkative. Family in the room and much more talkative. Answered some questions about transplant, the chronic HD procedure/routine. Review of Systems:    Symptom Y/N Comments  Symptom Y/N Comments   Fever/Chills    Chest Pain     Poor Appetite    Edema     Cough    Abdominal Pain     Sputum    Joint Pain     SOB/LITTLE    Pruritis/Rash     Nausea/vomit    Tolerating PT/OT     Diarrhea    Tolerating Diet     Constipation    Other       Could not obtain due to:      Objective:     VITALS:   Last 24hrs VS reviewed since prior progress note. Most recent are:  Visit Vitals    /82 (BP 1 Location: Right arm, BP Patient Position: At rest)    Pulse 74    Temp 98.9 °F (37.2 °C)    Resp 18    Ht 6' 2\" (1.88 m)    Wt (!) 172 kg (379 lb 3.1 oz)    SpO2 99%    BMI 48.69 kg/m2       Intake/Output Summary (Last 24 hours) at 17 0658  Last data filed at 17 1900   Gross per 24 hour   Intake                0 ml   Output             3500 ml   Net            -3500 ml      Telemetry Reviewed:     PHYSICAL EXAM:  General: WD, WN.  Alert, cooperative, no acute distress  Resp:  CTA Bilaterally. No Wheezing/Rhonchi/Rales. No access. muscle use  CV:  Regular  rhythm,  No murmur (), No Rubs, No Gallops. ++ edema (a bit better)  GI:  Soft, Non distended, Non tender.  +Bowel sounds, no HSM      Lab Data Reviewed: (see below)    Medications Reviewed: (see below)    PMH/SH reviewed - no change compared to H&P  ________________________________________________________________________  Care Plan discussed with:  Patient y    Family      RN y    Care Manager                    Consultant:          Comments   >50% of visit spent in counseling and coordination of care       ________________________________________________________________________  Perez Butler MD     Procedures: see electronic medical records for all procedures/Xrays and details which  were not copied into this note but were reviewed prior to creation of Plan. LABS:  Recent Labs      05/05/17   0307 05/04/17   0252   WBC  10.3  12.7*   HGB  8.4*  7.0*   HCT  25.1*  21.7*   PLT  265  305     Recent Labs      05/05/17 0307 05/04/17   0252  05/03/17   0356   NA  141  142  143   K  3.0*  3.1*  3.1*   CL  106  109*  110*   CO2  25  24  21   BUN  29*  43*  51*   CREA  5.40*  6.77*  7.76*   GLU  161*  122*  128*   CA  7.8*  7.9*  7.3*  7.6*   MG   --   1.8   --    PHOS   --   5.1*  5.6*     Recent Labs      05/02/17   0919   TP  6.2     No results for input(s): INR, PTP, APTT in the last 72 hours. No lab exists for component: INREXT, INREXT   Recent Labs      05/03/17   0356   FERR  512*      No results found for: FOL, RBCF   No results for input(s): PH, PCO2, PO2 in the last 72 hours.   Recent Labs      05/05/17   0307  05/03/17   0356   CPK  809*  1306*     No components found for: [place]  Lab Results   Component Value Date/Time    Color YELLOW/STRAW 05/01/2017 05:39 PM    Appearance CLOUDY 05/01/2017 05:39 PM    Specific gravity 1.018 05/01/2017 05:39 PM    Specific gravity >1.030 10/03/2014 04:35 AM    pH (UA) 5.5 05/01/2017 05:39 PM    Protein 300 05/01/2017 05:39 PM    Glucose 250 05/01/2017 05:39 PM    Ketone NEGATIVE  05/01/2017 05:39 PM    Bilirubin NEGATIVE  05/01/2017 05:39 PM    Urobilinogen 0.2 05/01/2017 05:39 PM    Nitrites NEGATIVE  05/01/2017 05:39 PM    Leukocyte Esterase NEGATIVE  05/01/2017 05:39 PM    Epithelial cells FEW 05/01/2017 05:39 PM    Bacteria NEGATIVE  05/01/2017 05:39 PM    WBC 0-4 05/01/2017 05:39 PM    RBC 5-10 05/01/2017 05:39 PM       MEDICATIONS:  Current Facility-Administered Medications   Medication Dose Route Frequency    hydrALAZINE (APRESOLINE) tablet 50 mg  50 mg Oral TID    cloNIDine HCl (CATAPRES) tablet 0.1 mg  0.1 mg Oral TID WITH MEALS    lisinopril (PRINIVIL, ZESTRIL) tablet 5 mg  5 mg Oral DAILY    sodium chloride (NS) flush 5 mL  5 mL InterCATHeter Q8H    epoetin nata (EPOGEN;PROCRIT) injection 10,000 Units  10,000 Units SubCUTAneous EVERY WED & SAT    loperamide (IMODIUM) capsule 2 mg  2 mg Oral Q4H PRN    SALINE PERIPHERAL FLUSH Q8H soln 5 mL  5 mL InterCATHeter Q8H    oxyCODONE IR (ROXICODONE) tablet 5 mg  5 mg Oral Q4H PRN    amLODIPine (NORVASC) tablet 10 mg  10 mg Oral DAILY    nitroglycerin (NITROBID) 2 % ointment 1 Inch  1 Inch Topical Q6H PRN    acetaminophen (TYLENOL) tablet 650 mg  650 mg Oral Q4H PRN    heparin (porcine) injection 5,000 Units  5,000 Units SubCUTAneous Q8H    insulin lispro (HUMALOG) injection   SubCUTAneous AC&HS    glucose chewable tablet 16 g  4 Tab Oral PRN    dextrose (D50W) injection syrg 12.5-25 g  12.5-25 g IntraVENous PRN    glucagon (GLUCAGEN) injection 1 mg  1 mg IntraMUSCular PRN    prochlorperazine (COMPAZINE) with saline injection 5 mg  5 mg IntraVENous Q6H PRN    insulin glargine (LANTUS) injection 30 Units  30 Units SubCUTAneous DAILY

## 2017-05-05 NOTE — DISCHARGE SUMMARY
Hospitalist Discharge Summary     Patient ID:  Jeremiah Andres  [id number]  22 y.o.  1985    PCP on record: Ezra Moran III, DO    Admit date: 5/1/2017  Discharge date and time: 5/5/2017      DISCHARGE DIAGNOSIS:  Hypertension, benign/essential  Insulin-dependent DM2 with nephropathy/retinopathy  DA in setting of CKD3 with anemia of CKD (now ESRD)  LITTLE without hypoxemia in setting of anasarca and elevated troponin  Rhabdomyolysis  Intermittent diarrhea, possible IBS  Morbid obesity      CONSULTATIONS:  IP CONSULT TO NEPHROLOGY  IP CONSULT TO GENERAL SURGERY    Excerpted HPI from H&P of Ramonita Doll MD:  Renetta Del Rosario is a 27 y.o.  male with a history of HTN, DM, and CKD who presents with worsening leg swelling and SOB. He has had leg swelling for sometime now but worse this past month. He also endorses orthopnea, nocturnal dyspnea and worsening LITTLE. He denies CP, leg pain, fever, chills, or rash. He has had no follow up since 2015 and he has ran out of his BP medications due to lack of insurance. He is only on lantus and humalog but he does not check his BP or BS frequently. Today, he received notice that he was approved for medicaid so he decided to seek consultation today. ER evaluation is remarkable for Cr 8.7 Hg 7.4. k 2.9 and elevated troponin. We were asked to admit for work up and evaluation of the above problems. ______________________________________________________________________  DISCHARGE SUMMARY/HOSPITAL COURSE:  for full details see H&P, daily progress notes, labs, consult notes. Hospital course:  Hypertension, benign/essential: Aunt says he has been off medications for quite some time. Pt was well controlled her on the clonidine, hydralazine and norvasc that he was supposed to be taking. He was taken off home lisinopril. Insulin-dependent DM2 with nephropathy/retinopathy:  Resume home 70/30 at home.   DA in setting of CKD3 with anemia of CKD (now ESRD): suspect this is due to progressive DM nephropathy and now CKD5. Pt with uremic sx, acidosis, and anasarca on presentation. Pt had renal US with small cystic lesion in the right kidney and large kidneys bilaterally. Pt was seen in consultation by nephrology hep B/C were negative. Pt had permcath placed 5/4 and had dialysis without difficulty on that date. Pt was seen in consultation by vascular surgery while here and will f/u next week for long-term vascular access. Pt was transfused 2u pRBCs total while here for low Hg. LITTLE without hypoxemia in setting of anasarca and elevated troponin: Troponin flat, no e/o ischemia. Volume removal was accomplished with HD but will take some time (weeks) before he is euvolemic. This was discussed with Pt. He had LE dopplers negative DVT. He had an echo 5/2 with EF 45-50%. There were no regional wall motion abnormalities. Wall thickness was mildly to moderately increased. Rhabdomyolysis: CK >1000 on admission, declined to 800 on discharge. Itermittent diarrhea, ?IBS, has never seen GI. Pt had C Diff and stool culture negative. TTG IgA was negative. Morbid obesity    _______________________________________________________________________  Patient seen and examined by me on discharge day. Pertinent Findings:  Gen: obese, awake, appropriate, NAD  HEENT: cl magalis, no lesions  Chest: CTA bilaterally, no crackles or wheezes  Cv: RRR, no murmur, 2+ edema  Abd: soft, NT, moderately distended, BS+, no mass  Neuro: CN intact  _______________________________________________________________________  DISCHARGE MEDICATIONS:   Current Discharge Medication List      CONTINUE these medications which have CHANGED    Details   amLODIPine (NORVASC) 10 mg tablet Take 1 Tab by mouth daily. Indications: hypertension  Qty: 30 Tab, Refills: 0      cloNIDine HCl (CATAPRES) 0.1 mg tablet Take 1 Tab by mouth three (3) times daily (with meals).   Qty: 90 Tab, Refills: 0 hydrALAZINE (APRESOLINE) 50 mg tablet Take 1 Tab by mouth three (3) times daily. Indications: hypertension  Qty: 90 Tab, Refills: 0      oxyCODONE-acetaminophen (PERCOCET) 5-325 mg per tablet Take 1 Tab by mouth every six (6) hours as needed. Max Daily Amount: 4 Tabs. Qty: 20 Tab, Refills: 0         CONTINUE these medications which have NOT CHANGED    Details   insulin lispro (HUMALOG) 100 unit/mL injection 8 Units by SubCUTAneous route Before breakfast, lunch, and dinner. Qty: 1 Vial, Refills: 1      insulin NPH/insulin regular (NOVOLIN 70/30, HUMULIN 70/30) 100 unit/mL (70-30) injection 44 Units by SubCUTAneous route two (2) times a day. 70/25 is accepted  Qty: 10 mL, Refills: 1      loperamide (IMODIUM) 2 mg capsule Take 2 mg by mouth as needed for Diarrhea. STOP taking these medications       omeprazole (PRILOSEC) 40 mg capsule Comments:   Reason for Stopping:         promethazine (PHENERGAN) 25 mg tablet Comments:   Reason for Stopping:         lisinopril (PRINIVIL, ZESTRIL) 5 mg tablet Comments:   Reason for Stopping:               My Recommended Diet, Activity, Wound Care, and follow-up labs are listed in the patient's Discharge Insturctions which I have personally completed and reviewed.     _______________________________________________________________________  DISPOSITION:    Home with Family: x   Home with HH/PT/OT/RN:    SNF/LTC:    JAJA:    OTHER:        Condition at Discharge:  Stable  _______________________________________________________________________  Follow up with:   PCP : Venus Brenner III, DO  Follow-up Information     Follow up With Details Comments Contact Info    [address]    [place] [address] [address]    Braydon Adams MD In 2 weeks for your diarrhea when you have time [address] [facility], DO In 2 weeks for blood pressure recheck and routine hospital follow up [address]  [address] [facility]  [phone]                Total time in minutes spent coordinating this discharge (includes going over instructions, follow-up, prescriptions, and preparing report for sign off to her PCP) :  35 minutes    Signed:  Arturo Dick MD

## 2017-05-05 NOTE — PROGRESS NOTES
Discharge Note:     Pt will discharge home today with [facility] services for dialysis on MWF at noon. CM spoke with pt and referred to his local  department to apply for Medicaid benefits. CM advised pt to arrive to dialysis at 11:30 am for first chair seating. Pt will be transported by family at discharge. There were no additional discharge needs at this time. Care Management Interventions  PCP Verified by CM: Yes (Dr. Severiano Mariscal III )  Mode of Transport at Discharge:  Other (see comment) (private vehicle )  Transition of Care Consult (CM Consult): Discharge Planning (CM to assist as needed )  Discharge Durable Medical Equipment: No (pt advised he has no DME )  Health Maintenance Reviewed: Yes  Physical Therapy Consult: No  Occupational Therapy Consult: No  Speech Therapy Consult: No  Current Support Network: Lives Alone  Confirm Follow Up Transport: Family  Plan discussed with Pt/Family/Caregiver: Yes  Discharge Location  Discharge Placement: Home     LEATHA Browning, [address] [facility]   [phone]

## 2017-05-05 NOTE — PROGRESS NOTES
Surgery    Tentatively plan for surgery Tuesday for creation of AV fistula right or left arm. This can be done as outpatient if he is discharged before then.     Kyle Priest MD

## 2017-05-05 NOTE — TELEPHONE ENCOUNTER
Mr Brody Linker scheduled for surgery on Tuesday, 5/9/17. Instructions & directions given. Also verified pt's social security number. The correct social is on his Medicare card.

## 2017-05-05 NOTE — ROUTINE PROCESS
Bedside and Verbal shift change report given to Pancho Prieto RN (oncoming nurse) by Tara Valdez RN (offgoing nurse). Report included the following information SBAR, Kardex, Recent Results and Cardiac Rhythm NSR.     Zone Phone: [phone]          Significant changes during shift: none              Patient Information      Estefany Mitchell  27 y.o.  5/1/2017 4:41 PM by Rome Valencia MD. Estefany Mitchell was admitted from [facility]  Problem List      [address]   Patient Active Problem List      Diagnosis Date Noted    DA (acute kidney injury) ([facility] 05/01/2017    Morbid obesity with BMI of 40.0-44.9, adult ([facility] 12/29/2015    CKD (chronic kidney disease) stage 3, GFR 30-59 ml/min 12/29/2015    DM (diabetes mellitus) ([facility] 10/16/2009    HTN (hypertension) 10/16/2009    Hyperlipidemia 10/16/2009                  Past Medical History:   Diagnosis Date    Cataracts       Diabetes ([facility]       Hypercholesterolemia       Hypertension       Obesity                  Core Measures:      CVA: No No  CHF:No No  PNA:No No              Activity Status:      OOB to Chair Yes  Ambulated this shift Yes   Bed Rest No              LINES AND DRAINS:      Jadon R neck      DVT prophylaxis:      DVT prophylaxis Med- Yes  DVT prophylaxis SCD or JUAN C- No       Wounds: (If Applicable)      Wounds- No      Location Left great and 5th toe amputated      Patient Safety:      Falls Score Total Score: 1  Safety Level_______  Bed Alarm On? No  Sitter?  No      Plan for upcoming shift: Monitor BP, dialysis for 3rd day in a row              Discharge Plan: Yes Home when appropriate    Active Consults:  IP CONSULT TO NEPHROLOGY

## 2017-05-05 NOTE — DISCHARGE INSTRUCTIONS
HOSPITALIST DISCHARGE INSTRUCTIONS    NAME: Jonel Lobo   :  1985   MRN:  [id number]     Date/Time:  2017 9:50 AM    ADMIT DATE: 2017   DISCHARGE DATE: 2017     Attending Physician: Braulio Pereira MD    DISCHARGE DIAGNOSIS:  End stage kidney disease  Hypertension  Insulin-dependent diabetes      MEDICATIONS:  See above    · It is important that you take the medication exactly as they are prescribed. · Keep your medication in the bottles provided by the pharmacist and keep a list of the medication names, dosages, and times to be taken in your wallet. · Do not take other medications without consulting your doctor. Pain Management: per above medications    What to do at Home    Recommended diet:  Diabetic diet with kidney disease restrictions (see instructions below)    Recommended activity: Activity as tolerated    If you have questions regarding the hospital related prescriptions or hospital related issues please call [facility] at . You can always direct your questions to your primary care doctor if you are unable to reach your hospital physician; your PCP works as an extension of your hospital doctor just like your hospital doctor is an extension of your PCP for your time at [facility]. If you experience any of the following symptoms then please call your primary care physician or return to the emergency room if you cannot get hold of your doctor:  Fever, chills, nausea, vomiting, diarrhea, change in mentation, falling, bleeding, shortness of breath    Additional Instructions:      Bring these papers with you to your follow up appointments. The papers will help your doctors be sure to continue the care plan from the hospital.              Information obtained by :  I understand that if any problems occur once I am at home I am to contact my physician. I understand and acknowledge receipt of the instructions indicated above. [de-identified] or R.N.'s Signature                                                                  Date/Time                                                                                                                                              Patient or Representative Signature                                                          Date/Time       Diet for End-Stage Renal Disease (Dialysis): Care Instructions  Your Care Instructions  You need to change your diet when you are on dialysis for end-stage renal disease (kidney failure). You will need more protein than you did before you started dialysis. You may need to limit salt and fluids. You also may need to limit minerals such as potassium and phosphorus. A diet for end-stage renal disease takes planning. A dietitian who specializes in kidney disease can help you plan meals that meet your needs. Your nutrition needs depend on the type of dialysis you get. Talk with your doctor or dietitian to make sure your diet is right for your condition. Do not change your diet without talking to your doctor or dietitian. Follow-up care is a key part of your treatment and safety. Be sure to make and go to all appointments, and call your doctor if you are having problems. It's also a good idea to know your test results and keep a list of the medicines you take. How can you care for yourself at home? · Work with your doctor or dietitian to create a food plan that guides your daily food choices. · Do not skip meals or go for many hours without eating. If you do not feel very hungry, try to eat 4 or 5 small meals instead of 1 or 2 big meals. · If you have a hard time eating enough, talk to your doctor or dietitian about ways you can add calories to your diet.   · Do not take any vitamins or minerals, supplements, or herbal products without talking to your doctor first.  · Check with your doctor about whether it is safe for you to drink alcohol. · Check with your doctor about how much fluid you can drink each day. Drinking a lot of fluid can cause you to gain too much water weight between dialysis sessions. To get the right amount of protein  · Ask your doctor or dietitian how much protein you can have each day. You will probably need more protein while you are on dialysis than you did before you started dialysis. · Choose high-quality protein sources, such as lean meat, poultry, and fish. To limit salt  · Read food labels on cans and food packages. The labels tell you how much sodium is in each serving. Make sure that you look at the serving size. If you eat more than the serving size, you will get more sodium than what is listed on the label. · Do not add salt to your food. Avoid foods that list salt, sodium, or monosodium glutamate (MSG) as an ingredient. · Buy foods that are labeled \"no salt added,\" \"sodium-free,\" or \"low-sodium. \" Foods labeled \"reduced-sodium\" and \"light sodium\" may still have too much sodium. · Limit processed foods, fast food, and restaurant foods. These types of food are very high in sodium. · Avoid salted pretzels, chips, popcorn, and other salted snacks. · Avoid smoked, cured, salted, and canned meat, fish, and poultry. This includes ham, ascencio, hot dogs, and luncheon meats. · You may use lemon, herbs, and spices to flavor your meals. To limit fluids  · Know how much fluid you can drink. Every day fill a pitcher with that amount of water. If you drink another fluid (such as coffee) that day, pour an equal amount out of the pitcher. · Count foods that are liquid at room temperature, such as gelatin dessert and ice cream, as fluids. To limit potassium  · Choose low-potassium fruits such as blueberries and raspberries. · Choose low-potassium vegetables such as cucumber and radishes.   · Do not use a salt substitute or lite salt unless your doctor says it is okay. Most salt substitutes and lite salts are high in potassium. To limit phosphorus  · Follow your food plan to know how much milk and milk products you can have. · Avoid nuts, peanut butter, seeds, lentils, beans, organ meats, and sardines. · Avoid cola drinks. · Avoid bran breads or bran cereals. · Take phosphate binders as directed, if prescribed by your doctor. Where can you learn more? Go to http://braydon-chaitanya.info/. Enter A994 in the search box to learn more about \"Diet for End-Stage Renal Disease (Dialysis): Care Instructions. \"  Current as of: July 26, 2016  Content Version: 11.2  © 1342-9057 [employer]. Care instructions adapted under license by [employer] (which disclaims liability or warranty for this information). If you have questions about a medical condition or this instruction, always ask your healthcare professional. [place] [address] any warranty or liability for your use of this information.

## 2017-05-07 LAB
GLIADIN PEPTIDE IGA SER-ACNC: 5 UNITS (ref 0–19)
GLIADIN PEPTIDE IGG SER-ACNC: 2 UNITS (ref 0–19)
IGA SERPL-MCNC: 290 MG/DL (ref 90–386)
TTG IGA SER-ACNC: <2 U/ML (ref 0–3)
TTG IGG SER-ACNC: <2 U/ML (ref 0–5)

## 2017-05-08 NOTE — PERIOP NOTES
[facility]  Preoperative Instructions        Surgery Date 5/9/17          Time of Arrival 5:45am    1. On the day of your surgery, please report to the Surgical Services Registration Desk and sign in at your designated time. The Surgery Center is located to the right of the Emergency Room. 2. You must have someone with you to drive you home. You should not drive a car for 24 hours following surgery. Please make arrangements for a friend or family member to stay with you for the first 24 hours after your surgery. 3. Do not have anything to eat or drink (including water, gum, mints, coffee, juice) after midnight 5/8/17              . This may not apply to medications prescribed by your physician. Please note special instructions, if applicable. If you are currently taking Plavix, Coumadin, or other blood-thinning agents, contact your surgeon for instructions. 4. We recommend you do not drink any alcoholic beverages for 24 hours before and after your surgery. 5. Have a list of all current medications, including vitamins, herbal supplements and any other over the counter medications. Stop all Aspirin and non-steroidal anti-inflammatory drugs (I.e. Advil, Aleve), as directed by your surgeon's office. Stop all vitamins and herbal supplements seven days prior to your surgery. 6. Wear comfortable clothes. Wear glasses instead of contacts. Do not bring any money or jewelry. Please bring picture ID, insurance card, and any prearranged co-payment or hospital payment. Do not wear make-up, particularly mascara the morning of your surgery. Do not wear nail polish, particularly if you are having foot /hand surgery. Wear your hair loose or down, no ponytails, buns, jasper pins or clips. All body piercings must be removed.   Please shower with antibacterial soap for three consecutive days before and on the morning of surgery, but do not apply any lotions, powders or deodorants after the shower on the day of surgery. Please use a fresh towels after each shower. Please sleep in clean clothes and change bed linens the night before surgery. Please do not shave for 48 hours prior to surgery. Shaving of the face is acceptable. 7. You should understand that if you do not follow these instructions your surgery may be cancelled. If your physical condition changes (I.e. fever, cold or flu) please contact your surgeon as soon as possible. 8. It is important that you be on time. If a situation occurs where you may be late, please call [phone] (OR Holding Area). 9. If you have any questions and or problems, please call [phone] (Pre-admission Testing). 10. Your surgery time may be subject to change. You will receive a phone call the evening prior if your time changes. 11.  If having outpatient surgery, you must have someone to drive you here, stay with you during the duration of your stay, and to drive you home at time of discharge. Special Instructions:Holding phone # provided for any questions related to blood sugar. Pt advised to hold insulin am DOS. Pt blood sugars normally run \"low/normal\"    MEDICATIONS TO TAKE THE MORNING OF SURGERY WITH A SIP OF WATER:Amlodipine, Clonidine, Hydralazine, and may have Percocet if needed      I understand a pre-operative phone call will be made to verify my surgery time. In the event that I am not available, I give permission for a message to be left on my answering service and/or with another person?   Yes          ___________________      __________   _________    (Signature of Patient)             (Witness)                (Date and Time)

## 2017-05-09 ENCOUNTER — ANESTHESIA (OUTPATIENT)
Dept: SURGERY | Age: 32
End: 2017-05-09
Payer: MEDICARE

## 2017-05-09 ENCOUNTER — HOSPITAL ENCOUNTER (OUTPATIENT)
Age: 32
Setting detail: OBSERVATION
Discharge: HOME | End: 2017-05-10
Attending: SURGERY | Admitting: SURGERY
Payer: MEDICARE

## 2017-05-09 ENCOUNTER — PATIENT OUTREACH (OUTPATIENT)
Dept: INTERNAL MEDICINE CLINIC | Age: 32
End: 2017-05-09

## 2017-05-09 ENCOUNTER — ANESTHESIA EVENT (OUTPATIENT)
Dept: SURGERY | Age: 32
End: 2017-05-09
Payer: MEDICARE

## 2017-05-09 LAB
ANION GAP BLD CALC-SCNC: 20 MMOL/L (ref 5–15)
BUN BLD-MCNC: 31 MG/DL (ref 9–20)
CA-I BLD-MCNC: 1.08 MMOL/L (ref 1.12–1.32)
CHLORIDE BLD-SCNC: 103 MMOL/L (ref 98–107)
CO2 BLD-SCNC: 24 MMOL/L (ref 21–32)
CREAT BLD-MCNC: 6.1 MG/DL (ref 0.6–1.3)
GLUCOSE BLD STRIP.AUTO-MCNC: 82 MG/DL (ref 65–100)
GLUCOSE BLD STRIP.AUTO-MCNC: 83 MG/DL (ref 65–100)
GLUCOSE BLD STRIP.AUTO-MCNC: 98 MG/DL (ref 65–100)
GLUCOSE BLD-MCNC: 86 MG/DL (ref 65–100)
HCT VFR BLD CALC: 28 % (ref 36.6–50.3)
HGB BLD-MCNC: 9.5 GM/DL (ref 12.1–17)
POTASSIUM BLD-SCNC: 3.2 MMOL/L (ref 3.5–5.1)
SERVICE CMNT-IMP: ABNORMAL
SERVICE CMNT-IMP: NORMAL
SODIUM BLD-SCNC: 143 MMOL/L (ref 136–145)

## 2017-05-09 PROCEDURE — 77030002706 HC INSRT CLMP EDWD -A: Performed by: SURGERY

## 2017-05-09 PROCEDURE — 77030027138 HC INCENT SPIROMETER -A

## 2017-05-09 PROCEDURE — 82962 GLUCOSE BLOOD TEST: CPT

## 2017-05-09 PROCEDURE — 77030020255 HC SOL INJ LR 1000ML BG: Performed by: SURGERY

## 2017-05-09 PROCEDURE — 76210000002 HC OR PH I REC 3 TO 3.5 HR: Performed by: SURGERY

## 2017-05-09 PROCEDURE — 74011250637 HC RX REV CODE- 250/637: Performed by: SURGERY

## 2017-05-09 PROCEDURE — 77030011640 HC PAD GRND REM COVD -A: Performed by: SURGERY

## 2017-05-09 PROCEDURE — 74011250636 HC RX REV CODE- 250/636

## 2017-05-09 PROCEDURE — 77030013140 HC MSK NEB VYRM -A

## 2017-05-09 PROCEDURE — 74011000250 HC RX REV CODE- 250

## 2017-05-09 PROCEDURE — 77030002916 HC SUT ETHLN J&J -A: Performed by: SURGERY

## 2017-05-09 PROCEDURE — 77030032490 HC SLV COMPR SCD KNE COVD -B: Performed by: SURGERY

## 2017-05-09 PROCEDURE — 99218 HC RM OBSERVATION: CPT

## 2017-05-09 PROCEDURE — 77030002986 HC SUT PROL J&J -A: Performed by: SURGERY

## 2017-05-09 PROCEDURE — 74011250636 HC RX REV CODE- 250/636: Performed by: ANESTHESIOLOGY

## 2017-05-09 PROCEDURE — 77030013567 HC DRN WND RESERV BARD -A: Performed by: SURGERY

## 2017-05-09 PROCEDURE — 77030018836 HC SOL IRR NACL ICUM -A: Performed by: SURGERY

## 2017-05-09 PROCEDURE — 77030008467 HC STPLR SKN COVD -B: Performed by: SURGERY

## 2017-05-09 PROCEDURE — 77030020863 HC CLMP VASC BULDG SCAN -A: Performed by: SURGERY

## 2017-05-09 PROCEDURE — 77030026438 HC STYL ET INTUB CARD -A: Performed by: NURSE ANESTHETIST, CERTIFIED REGISTERED

## 2017-05-09 PROCEDURE — 77030002987 HC SUT PROL J&J -B: Performed by: SURGERY

## 2017-05-09 PROCEDURE — 77030020153 HC PRB DOPLR DISP MIZU -C: Performed by: SURGERY

## 2017-05-09 PROCEDURE — 77030008684 HC TU ET CUF COVD -B: Performed by: NURSE ANESTHETIST, CERTIFIED REGISTERED

## 2017-05-09 PROCEDURE — 76060000035 HC ANESTHESIA 2 TO 2.5 HR: Performed by: SURGERY

## 2017-05-09 PROCEDURE — 74011250636 HC RX REV CODE- 250/636: Performed by: SURGERY

## 2017-05-09 PROCEDURE — 80047 BASIC METABLC PNL IONIZED CA: CPT

## 2017-05-09 PROCEDURE — 74011000250 HC RX REV CODE- 250: Performed by: SURGERY

## 2017-05-09 PROCEDURE — 76010000131 HC OR TIME 2 TO 2.5 HR: Performed by: SURGERY

## 2017-05-09 PROCEDURE — 77030021668 HC NEB PREFIL KT VYRM -A

## 2017-05-09 PROCEDURE — 77030020782 HC GWN BAIR PAWS FLX 3M -B

## 2017-05-09 PROCEDURE — 77030002888 HC SUT CHRMC J&J -A: Performed by: SURGERY

## 2017-05-09 PROCEDURE — 77030002996 HC SUT SLK J&J -A: Performed by: SURGERY

## 2017-05-09 RX ORDER — PHENYLEPHRINE HCL IN 0.9% NACL 0.4MG/10ML
SYRINGE (ML) INTRAVENOUS AS NEEDED
Status: DISCONTINUED | OUTPATIENT
Start: 2017-05-09 | End: 2017-05-09 | Stop reason: HOSPADM

## 2017-05-09 RX ORDER — INSULIN LISPRO 100 [IU]/ML
INJECTION, SOLUTION INTRAVENOUS; SUBCUTANEOUS
Status: DISCONTINUED | OUTPATIENT
Start: 2017-05-09 | End: 2017-05-10 | Stop reason: HOSPADM

## 2017-05-09 RX ORDER — HYDROMORPHONE HYDROCHLORIDE 2 MG/ML
0.5 INJECTION, SOLUTION INTRAMUSCULAR; INTRAVENOUS; SUBCUTANEOUS
Status: DISCONTINUED | OUTPATIENT
Start: 2017-05-09 | End: 2017-05-10 | Stop reason: HOSPADM

## 2017-05-09 RX ORDER — OXYCODONE AND ACETAMINOPHEN 5; 325 MG/1; MG/1
1-2 TABLET ORAL
Status: DISCONTINUED | OUTPATIENT
Start: 2017-05-09 | End: 2017-05-10 | Stop reason: HOSPADM

## 2017-05-09 RX ORDER — SODIUM CHLORIDE 0.9 % (FLUSH) 0.9 %
5-10 SYRINGE (ML) INJECTION AS NEEDED
Status: DISCONTINUED | OUTPATIENT
Start: 2017-05-09 | End: 2017-05-10 | Stop reason: HOSPADM

## 2017-05-09 RX ORDER — FENTANYL CITRATE 50 UG/ML
INJECTION, SOLUTION INTRAMUSCULAR; INTRAVENOUS AS NEEDED
Status: DISCONTINUED | OUTPATIENT
Start: 2017-05-09 | End: 2017-05-09 | Stop reason: HOSPADM

## 2017-05-09 RX ORDER — HYDROMORPHONE HYDROCHLORIDE 1 MG/ML
.2-.5 INJECTION, SOLUTION INTRAMUSCULAR; INTRAVENOUS; SUBCUTANEOUS
Status: DISCONTINUED | OUTPATIENT
Start: 2017-05-09 | End: 2017-05-09 | Stop reason: HOSPADM

## 2017-05-09 RX ORDER — FENTANYL CITRATE 50 UG/ML
25 INJECTION, SOLUTION INTRAMUSCULAR; INTRAVENOUS
Status: DISCONTINUED | OUTPATIENT
Start: 2017-05-09 | End: 2017-05-09 | Stop reason: HOSPADM

## 2017-05-09 RX ORDER — FENTANYL CITRATE 50 UG/ML
50 INJECTION, SOLUTION INTRAMUSCULAR; INTRAVENOUS AS NEEDED
Status: DISCONTINUED | OUTPATIENT
Start: 2017-05-09 | End: 2017-05-09 | Stop reason: HOSPADM

## 2017-05-09 RX ORDER — GLYCOPYRROLATE 0.2 MG/ML
INJECTION INTRAMUSCULAR; INTRAVENOUS AS NEEDED
Status: DISCONTINUED | OUTPATIENT
Start: 2017-05-09 | End: 2017-05-09 | Stop reason: HOSPADM

## 2017-05-09 RX ORDER — LOPERAMIDE HYDROCHLORIDE 2 MG/1
2 CAPSULE ORAL AS NEEDED
Status: DISCONTINUED | OUTPATIENT
Start: 2017-05-09 | End: 2017-05-10 | Stop reason: HOSPADM

## 2017-05-09 RX ORDER — FLUMAZENIL 0.1 MG/ML
INJECTION INTRAVENOUS
Status: COMPLETED
Start: 2017-05-09 | End: 2017-05-09

## 2017-05-09 RX ORDER — AMLODIPINE BESYLATE 5 MG/1
10 TABLET ORAL DAILY
Status: DISCONTINUED | OUTPATIENT
Start: 2017-05-10 | End: 2017-05-10 | Stop reason: HOSPADM

## 2017-05-09 RX ORDER — ONDANSETRON 2 MG/ML
4 INJECTION INTRAMUSCULAR; INTRAVENOUS
Status: DISCONTINUED | OUTPATIENT
Start: 2017-05-09 | End: 2017-05-10 | Stop reason: HOSPADM

## 2017-05-09 RX ORDER — INSULIN LISPRO 100 [IU]/ML
8 INJECTION, SOLUTION INTRAVENOUS; SUBCUTANEOUS
Status: DISCONTINUED | OUTPATIENT
Start: 2017-05-10 | End: 2017-05-09

## 2017-05-09 RX ORDER — ONDANSETRON 2 MG/ML
INJECTION INTRAMUSCULAR; INTRAVENOUS AS NEEDED
Status: DISCONTINUED | OUTPATIENT
Start: 2017-05-09 | End: 2017-05-09 | Stop reason: HOSPADM

## 2017-05-09 RX ORDER — SUCCINYLCHOLINE CHLORIDE 20 MG/ML
INJECTION INTRAMUSCULAR; INTRAVENOUS AS NEEDED
Status: DISCONTINUED | OUTPATIENT
Start: 2017-05-09 | End: 2017-05-09 | Stop reason: HOSPADM

## 2017-05-09 RX ORDER — HYDROMORPHONE HYDROCHLORIDE 2 MG/ML
INJECTION, SOLUTION INTRAMUSCULAR; INTRAVENOUS; SUBCUTANEOUS AS NEEDED
Status: DISCONTINUED | OUTPATIENT
Start: 2017-05-09 | End: 2017-05-09 | Stop reason: HOSPADM

## 2017-05-09 RX ORDER — LIDOCAINE HYDROCHLORIDE 10 MG/ML
0.1 INJECTION, SOLUTION EPIDURAL; INFILTRATION; INTRACAUDAL; PERINEURAL AS NEEDED
Status: DISCONTINUED | OUTPATIENT
Start: 2017-05-09 | End: 2017-05-09 | Stop reason: HOSPADM

## 2017-05-09 RX ORDER — NALOXONE HYDROCHLORIDE 0.4 MG/ML
0.4 INJECTION, SOLUTION INTRAMUSCULAR; INTRAVENOUS; SUBCUTANEOUS
Status: DISCONTINUED | OUTPATIENT
Start: 2017-05-09 | End: 2017-05-10 | Stop reason: HOSPADM

## 2017-05-09 RX ORDER — IPRATROPIUM BROMIDE AND ALBUTEROL SULFATE 2.5; .5 MG/3ML; MG/3ML
SOLUTION RESPIRATORY (INHALATION)
Status: DISCONTINUED
Start: 2017-05-09 | End: 2017-05-09

## 2017-05-09 RX ORDER — HYDRALAZINE HYDROCHLORIDE 50 MG/1
50 TABLET, FILM COATED ORAL 3 TIMES DAILY
Status: DISCONTINUED | OUTPATIENT
Start: 2017-05-09 | End: 2017-05-10 | Stop reason: HOSPADM

## 2017-05-09 RX ORDER — SODIUM CHLORIDE 9 MG/ML
25 INJECTION, SOLUTION INTRAVENOUS CONTINUOUS
Status: DISCONTINUED | OUTPATIENT
Start: 2017-05-09 | End: 2017-05-09 | Stop reason: HOSPADM

## 2017-05-09 RX ORDER — MIDAZOLAM HYDROCHLORIDE 1 MG/ML
0.5 INJECTION, SOLUTION INTRAMUSCULAR; INTRAVENOUS
Status: DISCONTINUED | OUTPATIENT
Start: 2017-05-09 | End: 2017-05-09 | Stop reason: HOSPADM

## 2017-05-09 RX ORDER — INSULIN LISPRO 100 [IU]/ML
INJECTION, SOLUTION INTRAVENOUS; SUBCUTANEOUS
Status: ON HOLD | COMMUNITY
End: 2019-07-09

## 2017-05-09 RX ORDER — SODIUM CHLORIDE 9 MG/ML
50 INJECTION, SOLUTION INTRAVENOUS CONTINUOUS
Status: DISCONTINUED | OUTPATIENT
Start: 2017-05-09 | End: 2017-05-09

## 2017-05-09 RX ORDER — OXYCODONE AND ACETAMINOPHEN 5; 325 MG/1; MG/1
1-2 TABLET ORAL
Qty: 30 TAB | Refills: 0 | Status: SHIPPED | OUTPATIENT
Start: 2017-05-09 | End: 2017-12-06

## 2017-05-09 RX ORDER — SODIUM CHLORIDE 9 MG/ML
25 INJECTION, SOLUTION INTRAVENOUS CONTINUOUS
Status: DISCONTINUED | OUTPATIENT
Start: 2017-05-09 | End: 2017-05-09

## 2017-05-09 RX ORDER — ONDANSETRON 2 MG/ML
4 INJECTION INTRAMUSCULAR; INTRAVENOUS AS NEEDED
Status: DISCONTINUED | OUTPATIENT
Start: 2017-05-09 | End: 2017-05-09 | Stop reason: HOSPADM

## 2017-05-09 RX ORDER — SODIUM CHLORIDE 0.9 % (FLUSH) 0.9 %
5-10 SYRINGE (ML) INJECTION AS NEEDED
Status: DISCONTINUED | OUTPATIENT
Start: 2017-05-09 | End: 2017-05-09 | Stop reason: HOSPADM

## 2017-05-09 RX ORDER — PROPOFOL 10 MG/ML
INJECTION, EMULSION INTRAVENOUS AS NEEDED
Status: DISCONTINUED | OUTPATIENT
Start: 2017-05-09 | End: 2017-05-09 | Stop reason: HOSPADM

## 2017-05-09 RX ORDER — CEFAZOLIN SODIUM 1 G/3ML
3 INJECTION, POWDER, FOR SOLUTION INTRAMUSCULAR; INTRAVENOUS ONCE
Status: DISCONTINUED | OUTPATIENT
Start: 2017-05-09 | End: 2017-05-09 | Stop reason: SDUPTHER

## 2017-05-09 RX ORDER — CLONIDINE HYDROCHLORIDE 0.1 MG/1
0.1 TABLET ORAL
Status: DISCONTINUED | OUTPATIENT
Start: 2017-05-09 | End: 2017-05-10 | Stop reason: HOSPADM

## 2017-05-09 RX ORDER — ROCURONIUM BROMIDE 10 MG/ML
INJECTION, SOLUTION INTRAVENOUS AS NEEDED
Status: DISCONTINUED | OUTPATIENT
Start: 2017-05-09 | End: 2017-05-09 | Stop reason: HOSPADM

## 2017-05-09 RX ORDER — MIDAZOLAM HYDROCHLORIDE 1 MG/ML
INJECTION, SOLUTION INTRAMUSCULAR; INTRAVENOUS AS NEEDED
Status: DISCONTINUED | OUTPATIENT
Start: 2017-05-09 | End: 2017-05-09 | Stop reason: HOSPADM

## 2017-05-09 RX ORDER — NEOSTIGMINE METHYLSULFATE 1 MG/ML
INJECTION INTRAVENOUS AS NEEDED
Status: DISCONTINUED | OUTPATIENT
Start: 2017-05-09 | End: 2017-05-09 | Stop reason: HOSPADM

## 2017-05-09 RX ORDER — DEXTROSE 50 % IN WATER (D50W) INTRAVENOUS SYRINGE
12.5-25 AS NEEDED
Status: DISCONTINUED | OUTPATIENT
Start: 2017-05-09 | End: 2017-05-10 | Stop reason: HOSPADM

## 2017-05-09 RX ORDER — SODIUM CHLORIDE 0.9 % (FLUSH) 0.9 %
5-10 SYRINGE (ML) INJECTION EVERY 8 HOURS
Status: DISCONTINUED | OUTPATIENT
Start: 2017-05-09 | End: 2017-05-10 | Stop reason: HOSPADM

## 2017-05-09 RX ORDER — DIPHENHYDRAMINE HYDROCHLORIDE 50 MG/ML
12.5 INJECTION, SOLUTION INTRAMUSCULAR; INTRAVENOUS AS NEEDED
Status: DISCONTINUED | OUTPATIENT
Start: 2017-05-09 | End: 2017-05-09 | Stop reason: HOSPADM

## 2017-05-09 RX ORDER — MAGNESIUM SULFATE 100 %
4 CRYSTALS MISCELLANEOUS AS NEEDED
Status: DISCONTINUED | OUTPATIENT
Start: 2017-05-09 | End: 2017-05-10 | Stop reason: HOSPADM

## 2017-05-09 RX ORDER — HYDROMORPHONE HYDROCHLORIDE 1 MG/ML
0.5 INJECTION, SOLUTION INTRAMUSCULAR; INTRAVENOUS; SUBCUTANEOUS
Status: DISCONTINUED | OUTPATIENT
Start: 2017-05-09 | End: 2017-05-09 | Stop reason: SDUPTHER

## 2017-05-09 RX ORDER — DIPHENHYDRAMINE HYDROCHLORIDE 50 MG/ML
12.5 INJECTION, SOLUTION INTRAMUSCULAR; INTRAVENOUS
Status: DISCONTINUED | OUTPATIENT
Start: 2017-05-09 | End: 2017-05-10 | Stop reason: HOSPADM

## 2017-05-09 RX ADMIN — Medication 80 MCG: at 08:24

## 2017-05-09 RX ADMIN — MIDAZOLAM HYDROCHLORIDE 2 MG: 1 INJECTION, SOLUTION INTRAMUSCULAR; INTRAVENOUS at 07:42

## 2017-05-09 RX ADMIN — ONDANSETRON 4 MG: 2 INJECTION INTRAMUSCULAR; INTRAVENOUS at 09:27

## 2017-05-09 RX ADMIN — PROPOFOL 150 MG: 10 INJECTION, EMULSION INTRAVENOUS at 07:45

## 2017-05-09 RX ADMIN — HYDRALAZINE HYDROCHLORIDE 50 MG: 50 TABLET, FILM COATED ORAL at 22:57

## 2017-05-09 RX ADMIN — SUCCINYLCHOLINE CHLORIDE 120 MG: 20 INJECTION INTRAMUSCULAR; INTRAVENOUS at 07:45

## 2017-05-09 RX ADMIN — ROCURONIUM BROMIDE 5 MG: 10 INJECTION, SOLUTION INTRAVENOUS at 07:45

## 2017-05-09 RX ADMIN — OXYCODONE HYDROCHLORIDE AND ACETAMINOPHEN 1 TABLET: 5; 325 TABLET ORAL at 19:57

## 2017-05-09 RX ADMIN — ROCURONIUM BROMIDE 45 MG: 10 INJECTION, SOLUTION INTRAVENOUS at 08:00

## 2017-05-09 RX ADMIN — NEOSTIGMINE METHYLSULFATE 3 MG: 1 INJECTION INTRAVENOUS at 09:15

## 2017-05-09 RX ADMIN — OXYCODONE HYDROCHLORIDE AND ACETAMINOPHEN 1 TABLET: 5; 325 TABLET ORAL at 23:19

## 2017-05-09 RX ADMIN — GLYCOPYRROLATE 0.6 MG: 0.2 INJECTION INTRAMUSCULAR; INTRAVENOUS at 09:27

## 2017-05-09 RX ADMIN — MIDAZOLAM HYDROCHLORIDE 2 MG: 1 INJECTION, SOLUTION INTRAMUSCULAR; INTRAVENOUS at 07:44

## 2017-05-09 RX ADMIN — HYDROMORPHONE HYDROCHLORIDE 1 MG: 2 INJECTION, SOLUTION INTRAMUSCULAR; INTRAVENOUS; SUBCUTANEOUS at 08:17

## 2017-05-09 RX ADMIN — CEFAZOLIN 3 G: 1 INJECTION, POWDER, FOR SOLUTION INTRAMUSCULAR; INTRAVENOUS; PARENTERAL at 08:02

## 2017-05-09 RX ADMIN — SODIUM CHLORIDE 25 ML/HR: 900 INJECTION, SOLUTION INTRAVENOUS at 07:21

## 2017-05-09 RX ADMIN — FLUMAZENIL 0.5 MG: 0.1 INJECTION, SOLUTION INTRAVENOUS at 12:55

## 2017-05-09 RX ADMIN — CLONIDINE HYDROCHLORIDE 0.1 MG: 0.1 TABLET ORAL at 19:57

## 2017-05-09 RX ADMIN — Medication 80 MCG: at 08:28

## 2017-05-09 RX ADMIN — FENTANYL CITRATE 100 MCG: 50 INJECTION, SOLUTION INTRAMUSCULAR; INTRAVENOUS at 07:45

## 2017-05-09 NOTE — H&P
Surgery History and Physical    Subjective:      Tyrel Carrillo is a 27 y.o. male with ESRD recently hospitalized for poorly controlled DM, HTN and CKD 5. He stared dialysis last week with a central catheter. He now presents for longer term dialysis access. He presently denies anginal chest pain or dyspnea. Past Medical History:   Diagnosis Date    Cataracts     Chronic kidney disease     Diabetes ([address]     Hypercholesterolemia     Hypertension     Obesity      Past Surgical History:   Procedure Laterality Date    HX AMPUTATION      Left great toe and L 5th toe    VASCULAR SURGERY PROCEDURE UNLIST      R side chest      Family History   Problem Relation Age of Onset    Diabetes Mother     Liver Disease Father     Kidney Disease Maternal Grandfather     Diabetes Maternal Grandfather     Hypertension Maternal Grandfather     Diabetes Paternal Uncle     Hypertension Paternal Uncle      Social History   Substance Use Topics    Smoking status: Current Some Day Smoker     Packs/day: 0.25    Smokeless tobacco: Never Used    Alcohol use Yes      Comment: rarely      Prior to Admission medications    Medication Sig Start Date End Date Taking? Authorizing Provider   amLODIPine (NORVASC) 10 mg tablet Take 1 Tab by mouth daily. Indications: hypertension 5/5/17  Yes Billy Vines MD   cloNIDine HCl (CATAPRES) 0.1 mg tablet Take 1 Tab by mouth three (3) times daily (with meals). 5/5/17  Yes Billy Vines MD   hydrALAZINE (APRESOLINE) 50 mg tablet Take 1 Tab by mouth three (3) times daily. Indications: hypertension 5/5/17  Yes Billy Vines MD   oxyCODONE-acetaminophen (PERCOCET) 5-325 mg per tablet Take 1 Tab by mouth every six (6) hours as needed. Max Daily Amount: 4 Tabs. 5/5/17  Yes Billy Vines MD   insulin lispro (HUMALOG) 100 unit/mL injection 8 Units by SubCUTAneous route Before breakfast, lunch, and dinner.  12/16/15  Yes Jordan Al MD   insulin NPH/insulin regular (NOVOLIN 70/30, HUMULIN 70/30) 100 unit/mL (70-30) injection 44 Units by SubCUTAneous route two (2) times a day. 70/25 is accepted 12/16/15  Yes Evelin Reeves MD   loperamide (IMODIUM) 2 mg capsule Take 2 mg by mouth as needed for Diarrhea. Yes Historical Provider      No Known Allergies    Review of Systems:  Pertinent items are noted in the History of Present Illness. Objective:      Patient Vitals for the past 8 hrs:   BP Temp Pulse Resp SpO2 Height Weight   17 0633 134/70 98.8 °F (37.1 °C) 86 20 96 % - -   17 0630 - - - - - 6' 2\" (1.88 m) (!) 164.5 kg (362 lb 10.5 oz)       Temp (24hrs), Av.8 °F (37.1 °C), Min:98.8 °F (37.1 °C), Max:98.8 °F (37.1 °C)      Physical Exam:  WD obese man,  NAD  HEAD/NECK: No jaundice, mass or thyromegaly. Right neck central catheter. LUNGS: Clear bilaterally without wheeze, rhonchi or rales. Kecia Cordon HEART: Regular without murmur, gallop or rub. Abdomen:  Soft, non tender, no mass or hernia noted. NEURO: Patient is alert and oriented x 3 and moves all extremities equally. Facial movement is symmetrical. Speech was normal.  EXT:  Palpable left radial pulse. Ultrasound today reveal in left arm median antebrachial vein feedign into deep system only. Patent brachial vein and slightly smaller basilic vein. Assessment:     ESRD, DM, HTN. Plan:     Plan for creation of AV fistula left arm. I reviewed with the patient the purpose, alternatives, risks and benefits of creation of arterio-venous fistula. Risks include bleeding, infection, failure of fistula, injury to structures in arm such as vessels or nerves, ischemia of the hand, swelling of the arm, risks of anesthesia, etc.  The patient understands and wishes to proceed.     Signed By: Romana Schanz, MD     May 9, 2017

## 2017-05-09 NOTE — ANESTHESIA POSTPROCEDURE EVALUATION
Post-Anesthesia Evaluation and Assessment    Patient: Tracey Mccann MRN: [id number]  SSN: [id number]    YOB: 1985  Age: 27 y.o. Sex: male       Cardiovascular Function/Vital Signs  Visit Vitals    /69    Pulse 79    Temp 36.8 °C (98.2 °F)    Resp 16    Ht 6' 2\" (1.88 m)    Wt (!) 164.5 kg (362 lb 10.5 oz)    SpO2 90%    BMI 46.56 kg/m2       Patient is status post general anesthesia for Procedure(s):  CREATION ARTERIO VENOUS FISTULA  LEFT ARM . Nausea/Vomiting: None    Postoperative hydration reviewed and adequate. Pain:  Pain Scale 1: Numeric (0 - 10) (05/09/17 0955)  Pain Intensity 1: 0 (denies pain) (05/09/17 0955)   Managed    Neurological Status:   Neuro (WDL): Exceptions to [facility] (05/09/17 0735)  Neuro  Neurologic State: Drowsy; Eyes open to stimulus (05/09/17 0955)  Orientation Level: Oriented to person;Oriented to place;Oriented to situation (05/09/17 0955)  Cognition: Follows commands (05/09/17 0955)  Speech: Clear (05/09/17 0955)  LUE Motor Response: Purposeful (05/09/17 0955)  LLE Motor Response: Purposeful (05/09/17 0955)  RUE Motor Response: Purposeful (05/09/17 0955)  RLE Motor Response: Purposeful (05/09/17 0955)   At baseline    Mental Status and Level of Consciousness: Arousable    Pulmonary Status:   O2 Device: Nasal cannula (05/09/17 1040)   Adequate oxygenation and airway patent    Complications related to anesthesia: None    Post-anesthesia assessment completed.  No concerns    Signed By: Saad Caballero MD     May 9, 2017

## 2017-05-09 NOTE — ANESTHESIA PREPROCEDURE EVALUATION
Anesthetic History   No history of anesthetic complications            Review of Systems / Medical History  Patient summary reviewed, nursing notes reviewed and pertinent labs reviewed    Pulmonary          Smoker      Comments: Current Some Day Smoker   Neuro/Psych   Within defined limits           Cardiovascular    Hypertension          Hyperlipidemia    Exercise tolerance: <4 METS     GI/Hepatic/Renal         Renal disease: ESRD       Endo/Other    Diabetes    Morbid obesity and anemia     Other Findings            Physical Exam    Airway  Mallampati: II  TM Distance: > 6 cm  Neck ROM: normal range of motion   Mouth opening: Normal     Cardiovascular  Regular rate and rhythm,  S1 and S2 normal,  no murmur, click, rub, or gallop             Dental      Comments: Some broken teeth   Pulmonary  Breath sounds clear to auscultation               Abdominal  GI exam deferred       Other Findings            Anesthetic Plan    ASA: 3  Anesthesia type: general          Induction: Intravenous  Anesthetic plan and risks discussed with: Patient

## 2017-05-09 NOTE — DISCHARGE INSTRUCTIONS
Discharge Instructions Following AV Fistula Surgery        Keep dressing in place for two days. Keep incision dry for two days. No lifting over ten pounds with operated arm for two weeks. See Dr. Radha Reyes in the office in two weeks - call for appointment - [phone]. Take pain medications as prescribed as needed. Do not drive while taking narcotic pain medication. For any problems, call Dr. Radha Reyes at [phone] or [phone] (after hours)      CHRIS Welch MD

## 2017-05-09 NOTE — PERIOP NOTES
Updated aunt in [facility] waiting room. 36 Again updated aunt waiting room, aware pt needing extra time to wake up.

## 2017-05-09 NOTE — PERIOP NOTES
Verbal report rec'd from [address] [place] [address] RN Phase 2, waiting for  to finish present OR case & receive admission orders. Patient wearing Tshirt & jeans.

## 2017-05-09 NOTE — PERIOP NOTES
notified of variable sats on RA (80-95%), clear breathsounds,IS use 1500 x 5 without dyspnea. Duo Neb ordered.

## 2017-05-09 NOTE — BRIEF OP NOTE
BRIEF OPERATIVE NOTE    Date of Procedure: 5/9/2017   Preoperative Diagnosis: ESRD  Postoperative Diagnosis: ESRD    Procedure(s):  CREATION ARTERIO VENOUS FISTULA  LEFT ARM (proximal radial to median antebrachial)  Surgeon(s) and Role:     * Matthew Watts MD - Primary         Assistant Staff:       Surgical Staff:  Circ-1: Vimal Jacob RN  Scrub Tech-1: Art Panchal  Scrub Tech-2: Particia Greenville  Surg Asst-1: [facility] Staff: Carolyn Khanna RN  Event Time In   Incision Start 5251   Incision Close 0932     Anesthesia: General   Estimated Blood Loss: minimal  Specimens: * No specimens in log *   Findings: Palpable thrill in AV fistula and palpable pulse in radial artery at the wrist post op.    Complications: none  Implants: * No implants in log *

## 2017-05-09 NOTE — OP NOTES
Operative Report    CPT 09432      Creation of AV Fistula Left Arm  (proximal radial artery to median antebrachial vein with deep outflow)      Patient:  Pam Josee    Nephrologist - Dr Rekha Urena    Date of Surgery:  5/9/2017    Preoperative Diagnosis - ESRD    Postoperative Diagnosis - Same    Anesthesia - General    Surgeon - Sharmin Persaud    Assistant - staff      Operative Summary -     The patient was taken to the operating room and placed on the operating table in the supine position. The patient's left arm was  prepared and steriley draped. The location of the veins had been reconfirmed by ultrasound prior to surgery. An incision was made below the antecubital.  The median antebrachial vein was exposed and freed for about 4 cm. It had deep outflow predominantly. It was around 3.5 mm in diameter. The proximal radial artery was exposed  and surrounded by vessel loops. The vein was marked for orientation. The vein was ligated distally and divided and was gently distended with heparinized lactated Ringer's solution. The  artery was controlled proximally and distally with Adolm Fitch bulldog clamps. A small longitudinal arteriotomy was made. The end of the vein was spatulated and brought over to the artery. Using 6-O prolene an end vein to side artery anastomosis was made. Prior to completion, the artery was flushed proximally and distally. After completion, with initiation of flow, there was a thrill at the AV anastomosis and a palpable pulse in the radial artery at the wrist.  The wound was irrigated with antibiotic solution and closed with 3-0 chromic in the subcutaneous layer and with 4-0 nylon at the skin. A gauze dressing was applied. The patient tolerated the procedure well and was taken to the PACU in stable condition.      Specimen - none     Drain - none    Complications - none    Estimated Blood Loss - minimal    G Kyleigh Murrieta MD

## 2017-05-09 NOTE — PERIOP NOTES
Patient received to Phase 2 via stretcher @ 1320. Assisted to recliner with standby assistance & slightly unsteady gait. Aunt called to Phase 2 room & patient dressed with moderate assistance. Reconnected to monitors for routine vital signs to reveal O2 saturations @ 86%. Lung sounds clear to auscultation. Patient demonstrates deep breaths on command with saturations rising to 92% briefly. Also demonstrates effective Incentive spirometer use. Patient states that he feels that he is not safe to ambulate when he gets home. He does not feel SOB at rest but remains very drowsy & tired w/ sats dropping when he falls asleep. Dr.M. Pepper Champion called to room to evaluate patient. Dr. Sheridan Mosquera recommends that the patient be observed overnight with continuous pulse oximeter & to resume O2 via NC. Called place to Dr. Chen Yates, who is in the OR. He states that we should hold patient until he finishes his current case & he will reevaluate Mr. Tamiko Carias for overnight observation. Patient moved back to main PACU with report to primary nurse, Jaxon Sullivan RN @ 1440.

## 2017-05-09 NOTE — PROGRESS NOTES
Decision made to keep patient overnight as his O2 saturations are dropping to the low 80's when he is not constantly prompted to deep breathe.

## 2017-05-09 NOTE — PERIOP NOTES
notified of no change in O2 sat amounts after resp tx. Also informed of use of Versed 4mg during OR. IV Ramazicon ordered .

## 2017-05-09 NOTE — PROGRESS NOTES
[facility] Hospitalization           Referral from [place]. Patient admitted to [facility] on 5/1/17 and discharged on 5/5/17 with diagnosis of DA in setting of CKD3 with anemia of CKD (now ESRD), LITTLE without hypoxemia in setting of anasarca and elevated troponin, Rhabdomyolysis. - HD initiated on 5/2/17.       Significant Lab/Diagnostic Findings:  Lab Results  Component Value Date/Time   WBC 10.3 05/05/2017 03:07 AM   Hemoglobin (POC) 9.5 05/09/2017 07:22 AM   HGB 8.4 05/05/2017 03:07 AM   Hematocrit (POC) 28 05/09/2017 07:22 AM   HCT 25.1 05/05/2017 03:07 AM   PLATELET 507 55/56/4412 03:07 AM   MCV 78.2 05/05/2017 03:07 AM       Lab Results  Component Value Date/Time   GFR est AA 15 05/05/2017 03:07 AM   GFR est non-AA 12 05/05/2017 03:07 AM   Creatinine (POC) 6.1 05/09/2017 07:22 AM   Creatinine 5.40 05/05/2017 03:07 AM   BUN 29 05/05/2017 03:07 AM   BUN (POC) 31 05/09/2017 07:22 AM   Sodium (POC) 143 05/09/2017 07:22 AM   Sodium 141 05/05/2017 03:07 AM   Potassium 3.0 05/05/2017 03:07 AM   Potassium (POC) 3.2 05/09/2017 07:22 AM   Chloride (POC) 103 05/09/2017 07:22 AM   Chloride 106 05/05/2017 03:07 AM   CO2 25 05/05/2017 03:07 AM      Lab Results   Component Value Date/Time     05/05/2017 03:07 AM    CK - MB 6.6 05/01/2017 03:23 PM    CK-MB Index 0.6 05/01/2017 03:23 PM    Troponin-I, Qt. 0.12 05/02/2017 04:43 AM      Lab Results   Component Value Date/Time    NT pro-BNP >30573 05/01/2017 03:23 PM      Lab Results   Component Value Date/Time    Sodium 141 05/05/2017 03:07 AM    Potassium 3.0 05/05/2017 03:07 AM    Chloride 106 05/05/2017 03:07 AM    CO2 25 05/05/2017 03:07 AM    Anion gap 10 05/05/2017 03:07 AM    Glucose 161 05/05/2017 03:07 AM    BUN 29 05/05/2017 03:07 AM    Creatinine 5.40 05/05/2017 03:07 AM    BUN/Creatinine ratio 5 05/05/2017 03:07 AM    GFR est AA 15 05/05/2017 03:07 AM    GFR est non-AA 12 05/05/2017 03:07 AM    Calcium 7.8 05/05/2017 03:07 AM      Lab Results Component Value Date/Time    Sodium 141 05/05/2017 03:07 AM    Potassium 3.0 05/05/2017 03:07 AM    Chloride 106 05/05/2017 03:07 AM    CO2 25 05/05/2017 03:07 AM    Anion gap 10 05/05/2017 03:07 AM    Glucose 161 05/05/2017 03:07 AM    BUN 29 05/05/2017 03:07 AM    Creatinine 5.40 05/05/2017 03:07 AM    BUN/Creatinine ratio 5 05/05/2017 03:07 AM    GFR est AA 15 05/05/2017 03:07 AM    GFR est non-AA 12 05/05/2017 03:07 AM    Calcium 7.8 05/05/2017 03:07 AM    Bilirubin, total 0.2 05/01/2017 03:23 PM    ALT (SGPT) 14 05/01/2017 03:23 PM    AST (SGOT) 18 05/01/2017 03:23 PM    Alk. phosphatase 50 05/01/2017 03:23 PM    Protein, total 6.2 05/02/2017 09:19 AM    Albumin 1.7 05/01/2017 03:23 PM    Globulin 5.5 05/01/2017 03:23 PM    A-G Ratio 0.3 05/01/2017 03:23 PM      Lab Results   Component Value Date/Time    Hemoglobin A1c 8.4 05/02/2017 04:43 AM        Stool Culture/ C. Diff. Component      Latest Ref Rng & Units 5/3/2017           1:30 PM   Special Requests:       NO SPECIAL REQUESTS   Campylobacter antigen       NEGATIVE   Shiga toxin-producing E. coli Ag       NEGATIVE   Culture result:       NO ROUTINE ENTERIC PATHOGENS ISOLATED INCLUDING SALMONELLA, SHIGELLA, YERSINIA, VIBRIO OR SHIGA TOXIN PRODUCING E. COLI   C. difficile (DNA)      NEG        Component      Latest Ref Rng & Units 5/2/2017           8:33 PM   Special Requests:          Campylobacter antigen          Shiga toxin-producing E. coli Ag          Culture result:          C. difficile (DNA)      NEG   NEGATIVE                 RRAT score:   Low Risk            7       Total Score        3 Relationship with PCP    4 More than 1 Admission in calendar year        Criteria that do not apply:    Patient Living Status    Patient Length of Stay > 5    Patient Insurance is Medicare, Medicaid or Self Pay    Charlson Comorbidity Score                  Advance Medical Directive on file in EMR?  No.  Patient was evaluated by care management during hospitalization. Per notes, Discharge Disposition from [facility]: Home; HD at [facility] ([facility]) and referred to local DSS by Inpatient CM to apply for Medicaid Benefits. Recommended Post-Hospital Discharge follow-up (see below as listed on Hospital Discharge AVS/Instructions). Follow-up Information     Follow up With Details Comments Contact Info     [address]      [place] [address] [id number]  [phone]     Hardeep Nicolas MD In 2 weeks for your diarrhea when you have time [place] [address] [place] [address],  In 2 weeks for blood pressure recheck and routine hospital follow up [address] [place] [address]  [phone]                 Most recent HIPAA form in the patient's chart (dated 12/29/15) was reviewed; authorized individual(s) listed on HIPAA form include Richar Certain (may  meds, make or cancel appointments). NN follow-up   Patient with scheduled readmission to [facility] as of today, 5/9/17, for Creation of AV Fistula Left Arm (Dr. Brittany Jara); currently admitted. New medications at discharge include: N/A  Medication(s) changed at hospital discharge include: Percocet  Medication(s) discontinued at hospital discharge include: lisinopril, prilosec, phenergan         This note will not be viewable in [employer].

## 2017-05-09 NOTE — IP AVS SNAPSHOT
[address] [facility] 
[phone] Patient: May Guerrero MRN: [id number] :1985 You are allergic to the following No active allergies Recent Documentation Height Weight BMI Smoking Status 1.88 m (!) 164.5 kg 46.56 kg/m2 Current Some Day Smoker Emergency Contacts Name Discharge Info Relation Home Work Mobile Stacy Bello  Other Relative [6] [phone] Nitin Krishnan  Other Relative [6] [phone] About your hospitalization You were admitted on:  May 9, 2017 You last received care in the:  [facility] 2 GENERAL SURGERY You were discharged on:  May 10, 2017 Unit phone number:  [phone] Why you were hospitalized Your primary diagnosis was:  Not on File Your diagnoses also included:  Ckd (Chronic Kidney Disease) Stage V Requiring Chronic Dialysis (Hcc), Postoperative Hypoxia Providers Seen During Your Hospitalizations Provider Role Specialty Primary office phone Jerrica Auguste MD Attending Provider General Surgery [phone] Your Primary Care Physician (PCP) Primary Care Physician Office Phone Office Fax Ayad QUINTEROS [phone] Follow-up Information Follow up With Details Comments Contact Info [address] [place] [address] [address] [facility] 
[phone] Jerrica Auguste MD On 2017 APPOINTMENT TIME: 11:40AM: MEDICAL OFFICE [facility] [address] [facility] 3 [address] [facility] 
[phone] Your Appointments Thursday May 25, 2017 11:40 AM EDT  
POST OP with Jerrica Auguste MD  
[facility] Dr. Rito Winters [address], [address] [facility]  
[phone] Current Discharge Medication List  
  
CONTINUE these medications which have CHANGED Dose & Instructions Dispensing Information Comments Morning Noon Evening Bedtime  
 oxyCODONE-acetaminophen 5-325 mg per tablet Commonly known as:  PERCOCET What changed:  how much to take Your last dose was: Your next dose is:    
   
   
 Dose:  1-2 Tab Take 1-2 Tabs by mouth every six (6) hours as needed. Max Daily Amount: 8 Tabs. Quantity:  30 Tab Refills:  0 CONTINUE these medications which have NOT CHANGED Dose & Instructions Dispensing Information Comments Morning Noon Evening Bedtime  
 amLODIPine 10 mg tablet Commonly known as:  Mar Punter Your last dose was: Your next dose is:    
   
   
 Dose:  10 mg Take 1 Tab by mouth daily. Indications: hypertension Quantity:  30 Tab Refills:  0  
     
   
   
   
  
 cloNIDine HCl 0.1 mg tablet Commonly known as:  CATAPRES Your last dose was: Your next dose is:    
   
   
 Dose:  0.1 mg Take 1 Tab by mouth three (3) times daily (with meals). Quantity:  90 Tab Refills:  0 HumaLOG 100 unit/mL injection Generic drug:  insulin lispro Your last dose was: Your next dose is:    
   
   
 by SubCUTAneous route Before breakfast, lunch, and dinner. Per sliding scale Refills:  0  
     
   
   
   
  
 hydrALAZINE 50 mg tablet Commonly known as:  APRESOLINE Your last dose was: Your next dose is:    
   
   
 Dose:  50 mg Take 1 Tab by mouth three (3) times daily. Indications: hypertension Quantity:  90 Tab Refills:  0  
     
   
   
   
  
 insulin NPH/insulin regular 100 unit/mL (70-30) injection Commonly known as:  NOVOLIN 70/30, HUMULIN 70/30 Your last dose was: Your next dose is:    
   
   
 Dose:  44 Units 44 Units by SubCUTAneous route two (2) times a day. 70/25 is accepted Quantity:  10 mL Refills:  1  
     
   
   
   
  
 loperamide 2 mg capsule Commonly known as:  IMODIUM Your last dose was: Your next dose is:    
   
   
 Dose:  2 mg Take 2 mg by mouth as needed for Diarrhea. Refills:  0 Where to Get Your Medications Information on where to get these meds will be given to you by the nurse or doctor. ! Ask your nurse or doctor about these medications  
  oxyCODONE-acetaminophen 5-325 mg per tablet Discharge Instructions Discharge Instructions Following AV Fistula Surgery Keep dressing in place for two days. Keep incision dry for two days. No lifting over ten pounds with operated arm for two weeks. See Dr. Preston Diehl in the office in two weeks - call for appointment - [phone]. Take pain medications as prescribed as needed. Do not drive while taking narcotic pain medication. For any problems, call Dr. Preston Diehl at [phone] or [phone] (after hours) CHRIS Floyd MD 
 
 
 
 
 
Discharge Orders None Introducing [facility] SERVICES! Bharathi Angulo introduces [employer] patient portal. Now you can access parts of your medical record, email your doctor's office, and request medication refills online. 1. In your internet browser, go to [email or web address] 2. Click on the First Time User? Click Here link in the Sign In box. You will see the New Member Sign Up page. 3. Enter your [employer] Access Code exactly as it appears below. You will not need to use this code after youve completed the sign-up process. If you do not sign up before the expiration date, you must request a new code. · [employer] Access Code: [email or web address] Expires: 7/30/2017  4:57 PM 
 
4. Enter the last four digits of your Social Security Number (xxxx) and Date of Birth (mm/dd/yyyy) as indicated and click Submit. You will be taken to the next sign-up page. 5. Create a [employer] ID. This will be your [employer] login ID and cannot be changed, so think of one that is secure and easy to remember. 6. Create a [employer] password. You can change your password at any time. 7. Enter your Password Reset Question and Answer. This can be used at a later time if you forget your password. 8. Enter your e-mail address. You will receive e-mail notification when new information is available in [address] 9. Click Sign Up. You can now view and download portions of your medical record. 10. Click the Download Summary menu link to download a portable copy of your medical information. If you have questions, please visit the Frequently Asked Questions section of the [employer] website. Remember, [employer] is NOT to be used for urgent needs. For medical emergencies, dial 911. Now available from your iPhone and Android! General Information Please provide this summary of care documentation to your next provider. Patient Signature:  ____________________________________________________________ Date:  ____________________________________________________________  
  
Jairon Breath Provider Signature:  ____________________________________________________________ Date:  ____________________________________________________________

## 2017-05-09 NOTE — PROGRESS NOTES
Surgery    Patient with O2 saturations dropping below 90% on RA following surgery. Patient will be kept on O2, admitted for observation. For HD tomorrow.     Yeimi Kingsley MD

## 2017-05-10 VITALS
WEIGHT: 315 LBS | BODY MASS INDEX: 40.43 KG/M2 | OXYGEN SATURATION: 97 % | HEIGHT: 74 IN | RESPIRATION RATE: 16 BRPM | DIASTOLIC BLOOD PRESSURE: 84 MMHG | SYSTOLIC BLOOD PRESSURE: 148 MMHG | TEMPERATURE: 98 F | HEART RATE: 86 BPM

## 2017-05-10 PROBLEM — Z98.890 POSTOPERATIVE HYPOXIA: Status: ACTIVE | Noted: 2017-05-10

## 2017-05-10 PROBLEM — N18.6 CKD (CHRONIC KIDNEY DISEASE) STAGE V REQUIRING CHRONIC DIALYSIS (HCC): Status: ACTIVE | Noted: 2017-05-10

## 2017-05-10 PROBLEM — Z99.2 CKD (CHRONIC KIDNEY DISEASE) STAGE V REQUIRING CHRONIC DIALYSIS (HCC): Status: ACTIVE | Noted: 2017-05-10

## 2017-05-10 PROBLEM — R09.02 POSTOPERATIVE HYPOXIA: Status: ACTIVE | Noted: 2017-05-10

## 2017-05-10 LAB
ANION GAP BLD CALC-SCNC: 13 MMOL/L (ref 5–15)
BASOPHILS # BLD AUTO: 0 K/UL (ref 0–0.1)
BASOPHILS # BLD: 0 % (ref 0–1)
BUN SERPL-MCNC: 39 MG/DL (ref 6–20)
BUN/CREAT SERPL: 5 (ref 12–20)
CALCIUM SERPL-MCNC: 8.1 MG/DL (ref 8.5–10.1)
CHLORIDE SERPL-SCNC: 105 MMOL/L (ref 97–108)
CO2 SERPL-SCNC: 23 MMOL/L (ref 21–32)
CREAT SERPL-MCNC: 7.64 MG/DL (ref 0.7–1.3)
EOSINOPHIL # BLD: 0.4 K/UL (ref 0–0.4)
EOSINOPHIL NFR BLD: 3 % (ref 0–7)
ERYTHROCYTE [DISTWIDTH] IN BLOOD BY AUTOMATED COUNT: 14 % (ref 11.5–14.5)
GLUCOSE BLD STRIP.AUTO-MCNC: 116 MG/DL (ref 65–100)
GLUCOSE BLD STRIP.AUTO-MCNC: 88 MG/DL (ref 65–100)
GLUCOSE BLD STRIP.AUTO-MCNC: 92 MG/DL (ref 65–100)
GLUCOSE SERPL-MCNC: 110 MG/DL (ref 65–100)
HCT VFR BLD AUTO: 25.5 % (ref 36.6–50.3)
HGB BLD-MCNC: 8.3 G/DL (ref 12.1–17)
LYMPHOCYTES # BLD AUTO: 15 % (ref 12–49)
LYMPHOCYTES # BLD: 1.8 K/UL (ref 0.8–3.5)
MCH RBC QN AUTO: 25.8 PG (ref 26–34)
MCHC RBC AUTO-ENTMCNC: 32.5 G/DL (ref 30–36.5)
MCV RBC AUTO: 79.2 FL (ref 80–99)
MONOCYTES # BLD: 1.1 K/UL (ref 0–1)
MONOCYTES NFR BLD AUTO: 9 % (ref 5–13)
NEUTS SEG # BLD: 8.7 K/UL (ref 1.8–8)
NEUTS SEG NFR BLD AUTO: 73 % (ref 32–75)
PLATELET # BLD AUTO: 287 K/UL (ref 150–400)
POTASSIUM SERPL-SCNC: 3.3 MMOL/L (ref 3.5–5.1)
RBC # BLD AUTO: 3.22 M/UL (ref 4.1–5.7)
SERVICE CMNT-IMP: ABNORMAL
SERVICE CMNT-IMP: NORMAL
SERVICE CMNT-IMP: NORMAL
SODIUM SERPL-SCNC: 141 MMOL/L (ref 136–145)
WBC # BLD AUTO: 12 K/UL (ref 4.1–11.1)

## 2017-05-10 PROCEDURE — 74011250637 HC RX REV CODE- 250/637: Performed by: SURGERY

## 2017-05-10 PROCEDURE — 36415 COLL VENOUS BLD VENIPUNCTURE: CPT | Performed by: SURGERY

## 2017-05-10 PROCEDURE — 99218 HC RM OBSERVATION: CPT

## 2017-05-10 PROCEDURE — 85025 COMPLETE CBC W/AUTO DIFF WBC: CPT | Performed by: SURGERY

## 2017-05-10 PROCEDURE — 74011250636 HC RX REV CODE- 250/636: Performed by: INTERNAL MEDICINE

## 2017-05-10 PROCEDURE — 74011636637 HC RX REV CODE- 636/637: Performed by: SURGERY

## 2017-05-10 PROCEDURE — 82962 GLUCOSE BLOOD TEST: CPT

## 2017-05-10 PROCEDURE — 90935 HEMODIALYSIS ONE EVALUATION: CPT

## 2017-05-10 PROCEDURE — 80048 BASIC METABOLIC PNL TOTAL CA: CPT | Performed by: SURGERY

## 2017-05-10 RX ORDER — SODIUM CHLORIDE 9 MG/ML
100 INJECTION, SOLUTION INTRAVENOUS
Status: DISCONTINUED | OUTPATIENT
Start: 2017-05-10 | End: 2017-05-10 | Stop reason: HOSPADM

## 2017-05-10 RX ORDER — HEPARIN SODIUM 1000 [USP'U]/ML
1000 INJECTION, SOLUTION INTRAVENOUS; SUBCUTANEOUS
Status: DISCONTINUED | OUTPATIENT
Start: 2017-05-10 | End: 2017-05-10 | Stop reason: HOSPADM

## 2017-05-10 RX ORDER — ALBUMIN HUMAN 250 G/1000ML
12.5 SOLUTION INTRAVENOUS
Status: DISCONTINUED | OUTPATIENT
Start: 2017-05-10 | End: 2017-05-10 | Stop reason: HOSPADM

## 2017-05-10 RX ADMIN — HYDRALAZINE HYDROCHLORIDE 50 MG: 50 TABLET, FILM COATED ORAL at 09:14

## 2017-05-10 RX ADMIN — INSULIN LISPRO 25 UNITS: 100 INJECTION, SUSPENSION SUBCUTANEOUS at 09:15

## 2017-05-10 RX ADMIN — CLONIDINE HYDROCHLORIDE 0.1 MG: 0.1 TABLET ORAL at 11:48

## 2017-05-10 RX ADMIN — ERYTHROPOIETIN 10000 UNITS: 10000 INJECTION, SOLUTION INTRAVENOUS; SUBCUTANEOUS at 18:40

## 2017-05-10 RX ADMIN — OXYCODONE HYDROCHLORIDE AND ACETAMINOPHEN 1 TABLET: 5; 325 TABLET ORAL at 04:54

## 2017-05-10 RX ADMIN — HYDRALAZINE HYDROCHLORIDE 50 MG: 50 TABLET, FILM COATED ORAL at 18:29

## 2017-05-10 RX ADMIN — CLONIDINE HYDROCHLORIDE 0.1 MG: 0.1 TABLET ORAL at 18:29

## 2017-05-10 RX ADMIN — AMLODIPINE BESYLATE 10 MG: 5 TABLET ORAL at 09:14

## 2017-05-10 RX ADMIN — CLONIDINE HYDROCHLORIDE 0.1 MG: 0.1 TABLET ORAL at 09:14

## 2017-05-10 NOTE — PROGRESS NOTES
Received from Dialysis then reviewed discharge papers with the patient. Removed R hand IV, gauze saturated with copious amount of blood, put pressure until bleeding stopped. Discharged patient.

## 2017-05-10 NOTE — PROCEDURES
[facility]  [phone]    Vitals   Pre   Post   Assessment   Pre   Post     Temp  Temp: 98 °F (36.7 °C) (05/10/17 1438)  98.0, oral LOC  A&Ox4 A&Ox4   HR   Pulse (Heart Rate): 86 (05/10/17 1438) 86 Lungs   Dim bases clear   B/P   BP: (!) 144/96 (05/10/17 1438) 148/84 Cardiac   Regular S1 S2 same   Resp   Resp Rate: 16 (05/10/17 1438) 16 Skin   L 2 toes amputated; L arm dressing over new AVF creation is CDI same   Pain level  Pain Intensity 1: 0 (05/10/17 0037) 0 Edema  Generalized 2+   same   Orders:    Duration:   Start:    1438 End:    1745 Total:   3hr   Dialyzer:   Dialyzer/Set Up Inspection: Revaclear (05/10/17 1438)   K Bath:   Dialysate K (mEq/L): 4 (05/10/17 1438)   Ca Bath:   Dialysate CA (mEq/L): 2.5 (05/10/17 1438)   Na/Bicarb:   Dialysate NA (mEq/L): 140 (05/10/17 1438)   Target Fluid Removal:   Goal/Amount of Fluid to Remove (mL): 3000 mL (05/10/17 1438)   Access     Type & Location:   RIJ permcath: No S/s of infection. Dressing CDI. Both luemns flush/ aspirate well. Running well at . Occasional arterial pressure spikes.     Labs     Obtained/Reviewed   Critical Results Called   Date when labs were drawn-  Hgb-    HGB   Date Value Ref Range Status   05/10/2017 8.3 (L) 12.1 - 17.0 g/dL Final     K-    Potassium   Date Value Ref Range Status   05/10/2017 3.3 (L) 3.5 - 5.1 mmol/L Final     Ca-   Calcium   Date Value Ref Range Status   05/10/2017 8.1 (L) 8.5 - 10.1 MG/DL Final     Bun-   BUN   Date Value Ref Range Status   05/10/2017 39 (H) 6 - 20 MG/DL Final     Creat-   Creatinine   Date Value Ref Range Status   05/10/2017 7.64 (H) 0.70 - 1.30 MG/DL Final     Comment:     INVESTIGATED PER DELTA CHECK PROTOCOL        Medications/ Blood Products Given     Name   Dose   Route and Time     none                Blood Volume Processed (BVP):    60.4 Net Fluid   Removed:  3200ml   Comments   Time Out Done: 1430  Primary Nurse Rpt Pre: Shama Ascencio RN  Primary Nurse Rpt Post: Juan Manuel Pitts, RN  Pt Education: access care  Care Plan: D/c after tx  Tx Summary:  Pt arrived to HD unit A&Ox4. Pt was connected to BP & SpO2 monitoring. Pt signed both consent forms. 1438: Both lumens of permcath disinfected with Alcavis per policy. Each lumen aspirated for blood return and flushed with Normal Saline per policy. Dialysis initiated. VSS. Pt resting quietly. 1500: 100ml NS flush given for clot prevention. VSS.  1600: 100ml NS flush given for clot prevention. VSS. Pt resting quietly. 1630: 100ml NS flush given for clot prevention. VSS. 1745: Tx ended. VSS. Central line catheter flushed with normal saline per policy. Ports disinfected with Alcavis per policy and lines disconnected and capped using aseptic technique. See [facility] docflowsheet for dressing information. SBAR given to [facility], RN including VS & Procedure summary. Admiting Diagnosis: LUE AVF creation  Pt's previous clinic- [facility]  Consent signed - Informed Consent Verified: Yes (05/10/17 1438)  [facility] Consent - yes  Hepatitis Status- HBsAg Neg 5/2/17  Machine #- Machine Number: [id number] (05/10/17 1438)  Telemetry status- none  Pre-dialysis wt. -

## 2017-05-10 NOTE — PROGRESS NOTES
End of Shift Nursing Note    Bedside shift change report given to jason (oncoming nurse) by Felix Soliz (offgoing nurse). Report included the following information SBAR. Significant changes during shift:    none   Non-emergent issues for physician to address:   none     Vital Signs:    Temp: 98.5 °F (36.9 °C)     Pulse (Heart Rate): 92     BP: 133/81     Resp Rate: 16     O2 Sat (%): 92 %    Lines & Drains:     Urinary Catheter? No       NG tube [] in [] removed [x] not applicable   Drains [] in [] removed [x] not applicable     Skin Integrity:      Wounds: no   Dressings Present: yes    Wound Concerns: no      GI:    Current diet:  DIET RENAL WITH OPTIONS 80-80-80; Regular; Consistent Carb 1800kcal    Nausea: NO  Vomiting: NO  Bowel Sounds: YES  Flatus: YES  Respiratory:  Supplemental O2: No          Falls Score: 2  Mobility Score: 3  Bed Alarm On? No  Sitter?  No      Kate Herrera RN

## 2017-05-10 NOTE — CONSULTS
Patient name: Tyrel Carrillo MRN: [id number]      [facility]  Initial Consult Note  DOA:5/9/2017  DOS: 5/10/2017  Requested by: Dr. Germaine Chacko  Reason: Evaluation and management of ESRD  Source: pt/chart review    Assessment:  ESRD ([facility]; MWF)  HTN  DM-2  Hypokalemia  Edema  Anemia of ESRD  SHPT  S/p new L AV access on 5/9/17    Plan/Recommendations:  HD today per schedule. 3 hr Rx, use R TDC, 4K  Orders written and Erven Stands notified  UF of 3 Kg as tolerated  Dose of EPO with HD  D/C later today after HD    HPI: Tyrel Carrillo is a 27 y.o. male with PMH significant for new ESRD on HD, HTN, DM-2 admitted and underwent new L AVF by Dr. Germaine Chacko. Post op had some low glucose. Kept overnight. Nephrology consulted for ESRD. He is due for HD today. Denies n/v/cp or sob. PMH:    Past Medical History:   Diagnosis Date    Cataracts     Chronic kidney disease     Diabetes ([address]     Hypercholesterolemia     Hypertension     Obesity        PSH:  Past Surgical History:   Procedure Laterality Date    HX AMPUTATION      Left great toe and L 5th toe    VASCULAR SURGERY PROCEDURE UNLIST      R side chest       No Known Allergies    Prescriptions Prior to Admission   Medication Sig    insulin lispro (HUMALOG) 100 unit/mL injection by SubCUTAneous route Before breakfast, lunch, and dinner. Per sliding scale    amLODIPine (NORVASC) 10 mg tablet Take 1 Tab by mouth daily.  Indications: hypertension    cloNIDine HCl (CATAPRES) 0.1 mg tablet Take 1 Tab by mouth three (3) times daily (with meals).  hydrALAZINE (APRESOLINE) 50 mg tablet Take 1 Tab by mouth three (3) times daily. Indications: hypertension    insulin NPH/insulin regular (NOVOLIN 70/30, HUMULIN 70/30) 100 unit/mL (70-30) injection 44 Units by SubCUTAneous route two (2) times a day. 70/25 is accepted    loperamide (IMODIUM) 2 mg capsule Take 2 mg by mouth as needed for Diarrhea. Social history:  Social History     Social History    Marital status: LEGALLY      Spouse name: N/A    Number of children: N/A    Years of education: N/A     Occupational History    Not on file.      Social History Main Topics    Smoking status: Current Some Day Smoker     Packs/day: 0.25    Smokeless tobacco: Never Used    Alcohol use Yes      Comment: rarely    Drug use: No    Sexual activity: Not on file     Other Topics Concern    Not on file     Social History Narrative         Review of Systems: as noted in HPI;    Physical Exam:  Visit Vitals    /88 (BP 1 Location: Right arm, BP Patient Position: At rest)    Pulse 91    Temp 98.5 °F (36.9 °C)    Resp 18    Ht 6' 2\" (1.88 m)    Wt (!) 164.5 kg (362 lb 10.5 oz)    SpO2 91%    BMI 46.56 kg/m2       Young male, WB/WN in NAD  No icterus, pallor+  Clear  RRR, distant, no rub  R TDC intact  L AVF with bruit+  +edema  AOx3      Labs/Data:   Lab Results   Component Value Date/Time    WBC 12.0 05/10/2017 05:20 AM    Hemoglobin (POC) 9.5 05/09/2017 07:22 AM    HGB 8.3 05/10/2017 05:20 AM    Hematocrit (POC) 28 05/09/2017 07:22 AM    HCT 25.5 05/10/2017 05:20 AM    PLATELET 688 03/18/5809 05:20 AM    MCV 79.2 05/10/2017 05:20 AM       Lab Results   Component Value Date/Time    Sodium 141 05/10/2017 05:20 AM    Potassium 3.3 05/10/2017 05:20 AM    Chloride 105 05/10/2017 05:20 AM    CO2 23 05/10/2017 05:20 AM    Anion gap 13 05/10/2017 05:20 AM    Glucose 110 05/10/2017 05:20 AM    BUN 39 05/10/2017 05:20 AM    Creatinine 7.64 05/10/2017 05:20 AM    BUN/Creatinine ratio 5 05/10/2017 05:20 AM    GFR est AA 10 05/10/2017 05:20 AM    GFR est non-AA 8 05/10/2017 05:20 AM    Calcium 8.1 05/10/2017 05:20 AM         Intake/Output Summary (Last 24 hours) at 05/10/17 1014  Last data filed at 05/10/17 0439   Gross per 24 hour   Intake              740 ml   Output               75 ml   Net              665 ml       Wt Readings from Last 3 Encounters:   05/09/17 (!) 164.5 kg (362 lb 10.5 oz)   05/03/17 (!) 172 kg (379 lb 3.1 oz)   03/24/16 152.3 kg (335 lb 12.8 oz)       Patient seen and examined. Chart reviewed. Labs, data and other pertinent notes reviewed in last 24 hrs.     Discussed with pt/RN and HD RN    Signed by:  Liseth Ackerman MD  Nephrology and HTN

## 2017-05-10 NOTE — PROGRESS NOTES
CM completed d/c room assessment with pt. Pt was alert and oriented, upon CM room visit. Pt reported that he resides alone in his one story home. Pt reported that he is independent with ADLs/IADLs, and he does not drive. Pt reported that he is active with his PCP: last seen a year ago, and he uses the [facility]. Pt reported no DME, HH/SNF. Pt reported that he receives dialysis treatment provided by [facility]. Pt reported that his aunt provides transportation to and from treatment. Pt reported that he will have transportation home on today. Pt will receive dialysis at [facility] today, and will resume services at [facility] on Friday. CM contacted dialysis treatment center ([phone]) and was informed that pt will need to provide d/c summary to resume services on Friday. Pt has scheduled appointment for May 25, 2017 at 11:40AM in Med. Office Building [address] in [address] [place] [address]. Care Management Interventions  PCP Verified by CM: Yes  Mode of Transport at Discharge:  Other (see comment)  Transition of Care Consult (CM Consult): Discharge Planning  Discharge Durable Medical Equipment: No  Physical Therapy Consult: No  Occupational Therapy Consult: No  Speech Therapy Consult: No  Current Support Network: Lives Alone, Own Home  Confirm Follow Up Transport: Family  Plan discussed with Pt/Family/Caregiver: Yes  Discharge Location  Discharge Placement: [facility] [address], MSW   [phone]

## 2017-05-11 ENCOUNTER — PATIENT OUTREACH (OUTPATIENT)
Dept: INTERNAL MEDICINE CLINIC | Age: 32
End: 2017-05-11

## 2017-05-11 NOTE — PROGRESS NOTES
[facility] [facility] Hospitalization           Referral from [facility]. Patient with scheduled readmission to [facility] (Outpatient Surgery/ Observation) on 5/9/17 and discharged on 5/10/17 s/p CREATION ARTERIO VENOUS FISTULA LEFT ARM (Dr. Hernandez Michael). - Patient s/p permcath placed 5/4      Significant Lab/Diagnostic Findings:  Lab Results  Component Value Date/Time   WBC 12.0 05/10/2017 05:20 AM   Hemoglobin (POC) 9.5 05/09/2017 07:22 AM   HGB 8.3 05/10/2017 05:20 AM   Hematocrit (POC) 28 05/09/2017 07:22 AM   HCT 25.5 05/10/2017 05:20 AM   PLATELET 367 67/76/4614 05:20 AM   MCV 79.2 05/10/2017 05:20 AM       Lab Results   Component Value Date/Time    Sodium 141 05/10/2017 05:20 AM    Potassium 3.3 05/10/2017 05:20 AM    Chloride 105 05/10/2017 05:20 AM    CO2 23 05/10/2017 05:20 AM    Anion gap 13 05/10/2017 05:20 AM    Glucose 110 05/10/2017 05:20 AM    BUN 39 05/10/2017 05:20 AM    Creatinine 7.64 05/10/2017 05:20 AM    BUN/Creatinine ratio 5 05/10/2017 05:20 AM    GFR est AA 10 05/10/2017 05:20 AM    GFR est non-AA 8 05/10/2017 05:20 AM    Calcium 8.1 05/10/2017 05:20 AM                RRAT score:   Low Risk            4       Total Score        4 More than 1 Admission in calendar year        Criteria that do not apply:    Relationship with PCP    Patient Living Status    Patient Length of Stay > 5    Patient Insurance is Medicare, Medicaid or Self Pay    Charlson Comorbidity Score          Patient was evaluated by care management during hospitalization. Per notes, Discharge Disposition from [facility]: Home. Recommended Post-Hospital Discharge follow-up (see below as listed on Hospital Discharge AVS/Instructions).    Follow-up Information     Follow up With Details Comments [address] III, DO     [facility]  [address] [facility]  [address]     Augie Homans, MD On 5/25/2017 APPOINTMENT TIME: 11:40AM: MEDICAL OFFICE Mariama Roche [address] [facility]  [phone]             NN follow-up phone call  NN contacted the patient today to complete NN post-hospital discharge assessment. Two patient identifiers verified. NN introduced self to the patient, including NN role and purpose of NN follow-up phone call; patient verbalizes understanding. NN inquired about the patient's current condition and how the patient is feeling. Patient denies fever, chills, chest pain post-hospital discharge. Patient reports that he continues to experience dyspnea on exertion, however reports that this has improved since hospital admission/discharge; denies shortness of breath at rest.  Patient reports that he continues to have bilateral lower extremity swelling; denies increased swelling since hospital discharge. See details of Functional Assessment below as reported by the Patient. Living Situation/Support System: Lives Alone; is legally blind. Patient reports a good support system that consists of friends/family. ADLs: Independent with ADLs. Mobility: Raymond Amabile; reports history of three falls within the past 6 months. Transportation: Aunt provides patient with transportation; denies any concerns regarding transportation. Aunt provides patient with transportation to/from HD as well; reports that SW at HD center is in the process of arranging patient transportation to/from HD via medical van. Medication Management: Aunt assists patient with medication management; patient self administers insulin and states, \"I use the pen and I can count the units by the tics. \"  Financial Status: Patient has Medicare Part B; does not have prescription coverage/Medicare Part D. Patient states, \"I afford my medications, but then I'll be broke for the rest of the month because I'm on a fixed income. \" NN inquired about which prescriptions patient is unable to afford; patient reports that he is able to afford all of his medications and has all of his current medications; patient states, \"I know these blood pressure pills came up to $167.\" Patient fills his medications at [facility] pharmacy. NN provided patient with contact information for [address][phone]) and advised patient to contact to inquire about cost of medications; clonidine and hydralazine are on prescription program drug list.   Patient reports that he is in the process of applying for Medicaid. Barriers to care? Yes - Financial; prescription coverage.        - Patient inquiring about home health services for nursing, however reports that he is independent with ADLs. Patient reports that he is fearful of Permcath as he was informed that it could become infected. NN explained indication for home health services as well as patient need to qualify for home health with home bound status; patient verbalizes understanding. Advised patient to assess site for redness, swelling, drainage, pain as these are signs of infection and encouraged patient to avoid tub baths and ensure site dressing is clean, dry, intact; patient verbalizes understanding. Allergies Reviewed with patient. NN attempted to complete/update the patient's medication reconciliation, however he states that his Aunt manages his medications for him and he is unsure of medication name(s). New medications at discharge include: percocet  Medication(s) changed at hospital discharge include: n/a  Medication(s) discontinued at hospital discharge include: n/a  Patient able to fill/pickup new medications without difficulty: Yes. Any Questions/Concerns regarding new Medication(s) and/or Medication Change(s): verbalizes understanding of new medications at hospital discharge; reports medication compliance and denies any questions/concerns. - Self-Management of DM.  Patient has Accu-Chek talking glucometer; reports that he checks his blood glucose twice daily prior to breakfast and prior to dinner. NN encouraged the patient to check his blood glucose three times daily prior to insulin administration and explained risk(s) associated with insulin administration without blood glucose monitoring; patient states, \"I can't afford to check my blood sugar three times a day because those strips are like $95 (30-day supply). \" Patient reports that he has enough test strips to last through the remainder of this week. Patient verbalizes understanding of discharge instructions that were provided to him upon discharge from [facility]. Patient states that he has a follow-up appointment scheduled with Dr. Basilia Cleveland on 5/25/17. Patient has been scheduled for a TEJA appointment with Dr. Gonzalez Nixon; patient verbalizes understanding of appointment information. Opportunity for patient to ask NN questions was provided. NN contact information provided to patient and patient advised to contact NN as needed. PLAN    -Patient reports compliance with HD on MWF. -Patient to attend Transitions Of Care Appointment with Dr. Gonzalez Nixon on 5/18/17.  -Patient to attend follow-up appointment(s) with Surgeon(if applicable) and/or Speciality Provider(s): Dr. Lelia Todd for f/u on diarrhea - to be scheduled by the patient; Dr. Basilia Cleveland on 5/25/17.    -Patient to contact this NN and/or PCP office with any questions/concerns.  -Notified of availability of PCP on-call physician after-hours and on the weekends for non-emergent/non-life threatening medical questions/concerns; patient verbalizes understanding.  -Goals:  Goals        Post Hospitalization     Prevent complications post hospitalization. 5/11/17: NN initial TEJA call completed; TEJA [phone] scheduled; patient declined appt with PCP on today.  Supportive resources in place to maintain patient in the community (ie.  Home Health, DME equipment, refer to, medication assistant plan, etc.)            5/11/17: Patient reports financial hardship as he does not have prescription coverage/Medicare Part D. Patient states that he is able to afford his medications, however reports limited funds for the remainder of the month due to cost of medications; currently fills prescriptions at [facility] and NN provided patient with contact information for Jesus Strong so that patient can is able to do a cost comparison. Patient reports that he is in the process of applying for Medicaid. NN will route to Ambulatory  NN for additional patient assistance. Patient's currently scheduled future appointments are listed below. Future Appointments      Provider Nigel Rose   5/18/2017 10:30 AM [facility]   5/25/2017 11:40 AM Alejandro Conrad MD [facility] [address] [place] [address]                 Patient expressed no additional questions, concerns or needs for this NN at this time. Patient verbalized understanding of all information discussed. NN contact information provided and patient advised to contact NN as needed. NN will route this encounter to Dr. Rocco Santos for notification/review. NN will route this encounter to Ambulatory  NN for CCM due to patient need for financial assistance. This note will not be viewable in [address]

## 2017-05-22 ENCOUNTER — PATIENT OUTREACH (OUTPATIENT)
Dept: INTERNAL MEDICINE CLINIC | Age: 32
End: 2017-05-22

## 2017-05-22 NOTE — PROGRESS NOTES
Chart reviewed. Reached out to pt regarding - Was asked by RHEA Jeffers, to call pt about needs financial assistance/meds/dm testing supplies. Med rec done. Pt stated he does need help affording his meds. Per permission from pt, will refer pt to [employer]. Advised pt that Shekhar Kee at [phone] will be calling him. Left message with pt's name and number for Laura Hernandez who will be on vacation until May 30th. Shekhar Kee   Patient [address] [facility], [address] [place] [address]   [phone]  [phone] - fax   [email or web address] (link sends e-mail)     Pt stated his blood sugar was 128 this morning and that he takes 70/30 insulin 30 units in the AM and PM. Pt stated he takes Humalog 10 units with his meals. Pt takes imodium as needed for diarrhea and the last time he took it was two days ago. [facility] Hospitalization - Patient admitted to [address] [place] [address] on 5/1/17 and discharged on 5/5/17 with diagnosis of DA in setting of CKD3 with anemia of CKD (now ESRD), LITTLE without hypoxemia in setting of anasarca and elevated troponin, Rhabdomyolysis. Patient with scheduled readmission to [address] [place] [address] (Outpatient Surgery/ Observation) on 5/9/17 and discharged on 5/10/17 s/p CREATION ARTERIO VENOUS FISTULA LEFT ARM (Dr. Hernandez Micahel). - Patient has TEJA appointment with Dr. Paul De Paz on 6/5/17     Future appointments:    Pt has dialysis on MWF at [address] transports pt. Pt has applied or been approved for Medicaid. Advised pt that his transportation should be provided by Medicaid. 5/25/2017 11:40 AM Augie Homans, MD [facility]   6/5/2017 10:30 AM Carrie Barr        Will wait to hear back from Shekhar Kee. This note will not be viewable in [address]

## 2017-06-07 ENCOUNTER — OFFICE VISIT (OUTPATIENT)
Dept: SURGERY | Age: 32
End: 2017-06-07

## 2017-06-07 VITALS
DIASTOLIC BLOOD PRESSURE: 84 MMHG | HEART RATE: 100 BPM | BODY MASS INDEX: 40.43 KG/M2 | HEIGHT: 74 IN | OXYGEN SATURATION: 97 % | TEMPERATURE: 97.7 F | WEIGHT: 315 LBS | RESPIRATION RATE: 18 BRPM | SYSTOLIC BLOOD PRESSURE: 134 MMHG

## 2017-06-07 DIAGNOSIS — Z09 POSTOPERATIVE EXAMINATION: Primary | ICD-10-CM

## 2017-06-07 RX ORDER — LISINOPRIL 5 MG/1
TABLET ORAL DAILY
Status: ON HOLD | COMMUNITY
End: 2019-07-09 | Stop reason: SDUPTHER

## 2017-07-03 ENCOUNTER — PATIENT OUTREACH (OUTPATIENT)
Dept: INTERNAL MEDICINE CLINIC | Age: 32
End: 2017-07-03

## 2017-07-03 NOTE — PROGRESS NOTES
Chart reviewed. Reached out to pt regarding follow up on medication assistance. Left message for Angela Torres with [employer]. Spoke to pt who stated he is on the dialysis machine and is tied up. Pt stated he has not heard from Ms. Barrera. Pt stated that he \"kind of \" has his meds and asked that he be called back today after 4 PM.     NNTOCIP Post Hospitalization-    Patient with scheduled readmission to [address] [facility] (Outpatient Surgery/ Observation) on 5/9/17 and discharged on 5/10/17 s/p CREATION ARTERIO VENOUS FISTULA LEFT ARM (Dr. Noma Fleischer).       Patient admitted to [id number] [place] [address] on 5/1/17 and discharged on 5/5/17 with diagnosis of DA in setting of CKD3 with anemia of CKD (now ESRD), LITTLE without hypoxemia in setting of anasarca and elevated troponin, Rhabdomyolysis.       - HD initiated on 5/2/17.      - Patient attended [facility] appointment with Dr. Noma Fleischer on 6/7/17; was advised to follow-up in 4 weeks. To the best of this NN's knowledge, this patient had no additional ED visits or Hospital Admissions during 30 day TEJA period following admission to [id number] [place] [address]. TEJA period has ended. Post-Hospitalization Episode was transitioned to Health Promotion and Risk Prevention. Patient has NN contact information if any questions/concerns arise in the future. Future Appointments:  Future Appointments  Date Time Provider Nigel Rose   7/5/2017 9:00 AM Kimberly Zhu MD [address] Supportive resources in place to maintain patient in the community (ie. Home Health, DME equipment, refer to, medication assistant plan, etc.)            5/11/17: Patient reports financial hardship as he does not have prescription coverage/Medicare Part D.  Patient states that he is able to afford his medications, however reports limited funds for the remainder of the month due to cost of medications; currently fills prescriptions at [facility] and NN provided patient with contact information for Cristofer pablo that patient can is able to do a cost comparison. Patient reports that he is in the process of applying for Medicaid. NN will route to Ambulatory  NN for additional patient assistance. 7/3/17- left pt Chris Right with [employer] and pt message to call back regarding medication assistance. DW          This note will not be viewable in [address]

## 2017-07-03 NOTE — LETTER
7/5/2017 4:55 PM 
 
Mr. Fransisca Hand [address] [place] [id number] Dear Mr. Melgozasonido Velazco, 
 
I hope this letter finds you doing well. My name is Jim Mcgill. I am the registered nurse and  that works with your primary care doctor, Dr. Robinson Lebron. We appreciate the confidence you've shown by selecting us to provide your healthcare needs. I was asked to contact you about whether or not you may need assistance affording your medications. I have talked to you twice on the phone and you indicate you may need assistance. You gave me permission to contact Tita Olivas, a patient advocate with the [employer] program sponsored by the [employer] [address] [place] [address]. They have a program for medication assistance. She talked to you on the phone and mailed you an application last month. She has not received the application back. You may contact Shelton Jade with any questions at [phone]. If you prefer, you may contact me at [phone]. Sincerely, Myranda White RN, CCM, CDE

## 2017-07-05 ENCOUNTER — OFFICE VISIT (OUTPATIENT)
Dept: SURGERY | Age: 32
End: 2017-07-05

## 2017-07-05 VITALS
BODY MASS INDEX: 40.43 KG/M2 | DIASTOLIC BLOOD PRESSURE: 104 MMHG | TEMPERATURE: 98.5 F | HEART RATE: 99 BPM | OXYGEN SATURATION: 95 % | WEIGHT: 315 LBS | HEIGHT: 74 IN | SYSTOLIC BLOOD PRESSURE: 173 MMHG | RESPIRATION RATE: 20 BRPM

## 2017-07-05 DIAGNOSIS — Z09 POSTOPERATIVE EXAMINATION: Primary | ICD-10-CM

## 2017-07-05 NOTE — PROGRESS NOTES
The patient is status post creation AV fistula on left arm on 5/9/17. He has no complaints. He was presently dialyzed by way of a right sided central catheter. On examination there is a good thrill at the AV anastomosis. There is a palpable radial pulse distally. There is no arm edema. Duplex ultrasound shows outflow into cephalic vein is minimal.  The main outflow is by way of the brachial vein. Brachial vein just above the anastomosis is 5.8 mm diameter. Above that level, it is 6 mm diameter or larger. I would plan for creation of transposed arteriovenous fistula in the left upper arm which will be done under general anesthesia. Risks versus benefits were reviewed with the patient. Risks of surgery include bleeding, infection, failure of the fistula, ischemia of the hand, swelling of the arm, injury to vessels or nerves, risk of anesthesia, etc.  The patient understands and wishes to proceed. Final Diagnosis: End-stage renal disease, status post AV fistula.     MedDATA/soto     cc: Nena Jacobsen MD

## 2017-07-05 NOTE — MR AVS SNAPSHOT
Visit Information Date & Time Provider Department Dept. Phone Encounter #  
 7/5/2017  9:00 AM Tali Linda MD [facility]  [address] [phone] [id number] Upcoming Health Maintenance Date Due  
 EYE EXAM RETINAL OR DILATED Q1 7/22/1994 DTaP/Tdap/Td series (1 - Tdap) 7/22/2005 FOOT EXAM Q1 12/10/2016 LIPID PANEL Q1 12/11/2016 Pneumococcal 19-64 Highest Risk (2 of 3 - PCV13) 12/12/2016 MICROALBUMIN Q1 12/29/2016 INFLUENZA AGE 9 TO ADULT 8/1/2017 HEMOGLOBIN A1C Q6M 11/2/2017 Allergies as of 7/5/2017  Review Complete On: 7/5/2017 By: Jaime Ibarra LPN No Known Allergies Current Immunizations  Never Reviewed Name Date Influenza Vaccine (Quad) PF 12/14/2015 11:50 AM  
 Pneumococcal Polysaccharide (PPSV-23) 12/12/2015 10:01 AM  
  
 Not reviewed this visit Vitals BP Pulse Temp Resp Height(growth percentile) Weight(growth percentile) (!) 173/104 (BP 1 Location: Right arm, BP Patient Position: Sitting) 99 98.5 °F (36.9 °C) 20 6' 2\" (1.88 m) 348 lb (157.9 kg) SpO2 BMI Smoking Status 95% 44.68 kg/m2 Former Smoker BMI and BSA Data Body Mass Index Body Surface Area 44.68 kg/m 2 2.87 m 2 Preferred Pharm[facility]  [place] [phone] Your Updated Medication List  
  
   
This list is accurate as of: 7/5/17 11:01 AM.  Always use your most recent med list. amLODIPine 10 mg tablet Commonly known as:  Ravin Lizeth Take 1 Tab by mouth daily. Indications: hypertension  
  
 cloNIDine HCl 0.1 mg tablet Commonly known as:  CATAPRES Take 1 Tab by mouth three (3) times daily (with meals). HumaLOG 100 unit/mL injection Generic drug:  insulin lispro  
by SubCUTAneous route Before breakfast, lunch, and dinner. Per sliding scale hydrALAZINE 50 mg tablet Commonly known as:  APRESOLINE Take 1 Tab by mouth three (3) times daily. Indications: hypertension  
  
 insulin NPH/insulin regular 100 unit/mL (70-30) injection Commonly known as:  NOVOLIN 70/30, HUMULIN 70/30  
44 Units by SubCUTAneous route two (2) times a day. 70/25 is accepted  
  
 lisinopril 5 mg tablet Commonly known as:  Hodan Golder Take  by mouth daily. loperamide 2 mg capsule Commonly known as:  IMODIUM Take 2 mg by mouth as needed for Diarrhea. oxyCODONE-acetaminophen 5-325 mg per tablet Commonly known as:  PERCOCET Take 1-2 Tabs by mouth every six (6) hours as needed. Max Daily Amount: 8 Tabs. Introducing hospitals & HEALTH SERVICES! Tegan Sharma introduces [employer] patient portal. Now you can access parts of your medical record, email your doctor's office, and request medication refills online. 1. In your internet browser, go to [email or web address] 2. Click on the First Time User? Click Here link in the Sign In box. You will see the New Member Sign Up page. 3. Enter your [employer] Access Code exactly as it appears below. You will not need to use this code after youve completed the sign-up process. If you do not sign up before the expiration date, you must request a new code. · [employer] Access Code: [email or web address] Expires: 7/30/2017  4:57 PM 
 
4. Enter the last four digits of your Social Security Number (xxxx) and Date of Birth (mm/dd/yyyy) as indicated and click Submit. You will be taken to the next sign-up page. 5. Create a Stream [employer] ID. This will be your [employer] login ID and cannot be changed, so think of one that is secure and easy to remember. 6. Create a [employer] password. You can change your password at any time. 7. Enter your Password Reset Question and Answer. This can be used at a later time if you forget your password. 8. Enter your e-mail address.  You will receive e-mail notification when new information is available in [employer]. 9. Click Sign Up. You can now view and download portions of your medical record. 10. Click the Download Summary menu link to download a portable copy of your medical information. If you have questions, please visit the Frequently Asked Questions section of the [employer] website. Remember, [employer] is NOT to be used for urgent needs. For medical emergencies, dial 911. Now available from your iPhone and Android! Please provide this summary of care documentation to your next provider. Your primary care clinician is listed as Kenya Gambino If you have any questions after today's visit, please call [phone].

## 2017-07-05 NOTE — PROGRESS NOTES
Received phone call from An Freire with [employer] regarding medication assistance for pt. Madeline Ace stated she called pt about 3-4 weeks ago and pt seemed confused whether or not he needed medication assistance. Madeline Aec mailed pt the application and has not received it back. Unable to contact pt today. See contact section. Letter sent. Future Appointments:  No future appointments.      Last Appointment My Department:  Visit date not found

## 2017-07-19 NOTE — PERIOP NOTES
[facility]  Preoperative Instructions        Surgery Date 7/25/17          Time of Arrival 0900 contact # [phone]    1. On the day of your surgery, please report to the Surgical Services Registration Desk and sign in at your designated time. The Surgery Center is located to the right of the Emergency Room. 2. You must have someone with you to drive you home. You should not drive a car for 24 hours following surgery. Please make arrangements for a friend or family member to stay with you for the first 24 hours after your surgery. 3. Do not have anything to eat or drink (including water, gum, mints, coffee, juice) after midnight 7/24/17   . ? This may not apply to medications prescribed by your physician. ?(Please note below the special instructions with medications to take the morning of your procedure.)    4. We recommend you do not drink any alcoholic beverages for 24 hours before and after your surgery. 5. Stop all Aspirin, non-steroidal anti-inflammatory drugs (i.e. Advil, Aleve), vitamins, and supplements?as directed by your surgeon's office. ? **If you are currently taking Plavix, Coumadin, or other blood-thinning agents, contact your surgeon for instructions. **    6. Wear comfortable clothes. Wear glasses instead of contacts. Do not bring any money or jewelry. Please bring picture ID, insurance card, and any prearranged co-payment or hospital payment. Do not wear make-up, particularly mascara the morning of your surgery. Do not wear nail polish, particularly if you are having foot /hand surgery. Wear your hair loose or down, no ponytails, buns, ajsper pins or clips. All body piercings must be removed. Please shower with antibacterial soap for three consecutive days before and on the morning of surgery, but do not apply any lotions, powders or deodorants after the shower on the day of surgery. Please use a fresh towels after each shower.  Please sleep in clean clothes and change bed linens the night before surgery. Please do not shave for 48 hours prior to surgery. Shaving of the face is acceptable. 7. You should understand that if you do not follow these instructions your surgery may be cancelled. If your physical condition changes (I.e. fever, cold or flu) please contact your surgeon as soon as possible. 8. It is important that you be on time. If a situation occurs where you may be late, please call [phone] (OR Holding Area). 9. If you have any questions and or problems, please call [phone] (Pre-admission Testing). 10. Your surgery time may be subject to change. You will receive a phone call the evening prior if your time changes. 11.  If having outpatient surgery, you must have someone to drive you here, stay with you during the duration of your stay, and to drive you home at time of discharge. Special Instructions: none     MEDICATIONS TO TAKE THE MORNING OF SURGERY WITH A SIP OF WATER:amlodipine, hydralazine, clonidine      I understand a pre-operative phone call will be made to verify my surgery time. In the event that I am not available, I give permission for a message to be left on my answering service and/or with another person?   yes   ___________________      __________   _________    (Signature of Patient)             (Witness)                (Date and Time)

## 2017-07-25 ENCOUNTER — HOSPITAL ENCOUNTER (OUTPATIENT)
Age: 32
Setting detail: OBSERVATION
Discharge: HOME | End: 2017-07-27
Attending: SURGERY | Admitting: SURGERY
Payer: MEDICARE

## 2017-07-25 ENCOUNTER — ANESTHESIA (OUTPATIENT)
Dept: SURGERY | Age: 32
End: 2017-07-25
Payer: MEDICARE

## 2017-07-25 ENCOUNTER — ANESTHESIA EVENT (OUTPATIENT)
Dept: SURGERY | Age: 32
End: 2017-07-25
Payer: MEDICARE

## 2017-07-25 PROBLEM — N18.6 ESRD (END STAGE RENAL DISEASE) (HCC): Status: ACTIVE | Noted: 2017-07-25

## 2017-07-25 LAB
ANION GAP BLD CALC-SCNC: 15 MMOL/L (ref 5–15)
BUN BLD-MCNC: 38 MG/DL (ref 9–20)
CA-I BLD-MCNC: 1.13 MMOL/L (ref 1.12–1.32)
CHLORIDE BLD-SCNC: 101 MMOL/L (ref 98–107)
CO2 BLD-SCNC: 29 MMOL/L (ref 21–32)
CREAT BLD-MCNC: 7.3 MG/DL (ref 0.6–1.3)
GLUCOSE BLD STRIP.AUTO-MCNC: 194 MG/DL (ref 65–100)
GLUCOSE BLD STRIP.AUTO-MCNC: 243 MG/DL (ref 65–100)
GLUCOSE BLD-MCNC: 332 MG/DL (ref 65–100)
HCT VFR BLD CALC: 35 % (ref 36.6–50.3)
HGB BLD-MCNC: 11.9 GM/DL (ref 12.1–17)
POTASSIUM BLD-SCNC: 4.5 MMOL/L (ref 3.5–5.1)
SERVICE CMNT-IMP: ABNORMAL
SODIUM BLD-SCNC: 139 MMOL/L (ref 136–145)

## 2017-07-25 PROCEDURE — 74011250636 HC RX REV CODE- 250/636

## 2017-07-25 PROCEDURE — 77030013567 HC DRN WND RESERV BARD -A: Performed by: SURGERY

## 2017-07-25 PROCEDURE — 77030008467 HC STPLR SKN COVD -B: Performed by: SURGERY

## 2017-07-25 PROCEDURE — 76010000137 HC OR TIME 5 TO 5.5 HR: Performed by: SURGERY

## 2017-07-25 PROCEDURE — 77030010938 HC CLP LIG TELE -A: Performed by: SURGERY

## 2017-07-25 PROCEDURE — 74011000250 HC RX REV CODE- 250: Performed by: SURGERY

## 2017-07-25 PROCEDURE — 82962 GLUCOSE BLOOD TEST: CPT

## 2017-07-25 PROCEDURE — 76060000041 HC ANESTHESIA 5 TO 5.5 HR: Performed by: SURGERY

## 2017-07-25 PROCEDURE — 77030002986 HC SUT PROL J&J -A: Performed by: SURGERY

## 2017-07-25 PROCEDURE — 74011000258 HC RX REV CODE- 258: Performed by: SURGERY

## 2017-07-25 PROCEDURE — 99218 HC RM OBSERVATION: CPT

## 2017-07-25 PROCEDURE — 77030011640 HC PAD GRND REM COVD -A: Performed by: SURGERY

## 2017-07-25 PROCEDURE — 77030020153 HC PRB DOPLR DISP MIZU -C: Performed by: SURGERY

## 2017-07-25 PROCEDURE — 77030026438 HC STYL ET INTUB CARD -A: Performed by: ANESTHESIOLOGY

## 2017-07-25 PROCEDURE — 76210000016 HC OR PH I REC 1 TO 1.5 HR: Performed by: SURGERY

## 2017-07-25 PROCEDURE — 74011636637 HC RX REV CODE- 636/637: Performed by: SURGERY

## 2017-07-25 PROCEDURE — 74011000250 HC RX REV CODE- 250

## 2017-07-25 PROCEDURE — 77030002916 HC SUT ETHLN J&J -A: Performed by: SURGERY

## 2017-07-25 PROCEDURE — 74011250637 HC RX REV CODE- 250/637: Performed by: SURGERY

## 2017-07-25 PROCEDURE — 77030002888 HC SUT CHRMC J&J -A: Performed by: SURGERY

## 2017-07-25 PROCEDURE — 77030020255 HC SOL INJ LR 1000ML BG: Performed by: SURGERY

## 2017-07-25 PROCEDURE — 80047 BASIC METABLC PNL IONIZED CA: CPT

## 2017-07-25 PROCEDURE — 77030027138 HC INCENT SPIROMETER -A

## 2017-07-25 PROCEDURE — 77030002996 HC SUT SLK J&J -A: Performed by: SURGERY

## 2017-07-25 PROCEDURE — 77030032490 HC SLV COMPR SCD KNE COVD -B: Performed by: SURGERY

## 2017-07-25 PROCEDURE — 77030002987 HC SUT PROL J&J -B: Performed by: SURGERY

## 2017-07-25 PROCEDURE — 77030032435

## 2017-07-25 PROCEDURE — 74011250636 HC RX REV CODE- 250/636: Performed by: SURGERY

## 2017-07-25 PROCEDURE — 74011250636 HC RX REV CODE- 250/636: Performed by: ANESTHESIOLOGY

## 2017-07-25 PROCEDURE — 77030008684 HC TU ET CUF COVD -B: Performed by: ANESTHESIOLOGY

## 2017-07-25 PROCEDURE — 74011636637 HC RX REV CODE- 636/637: Performed by: ANESTHESIOLOGY

## 2017-07-25 PROCEDURE — 77030020782 HC GWN BAIR PAWS FLX 3M -B

## 2017-07-25 PROCEDURE — 77030005537 HC CATH URETH BARD -A: Performed by: SURGERY

## 2017-07-25 PROCEDURE — 77030011241 HC DRN WND FLL CARD -B: Performed by: SURGERY

## 2017-07-25 PROCEDURE — 77030019908 HC STETH ESOPH SIMS -A: Performed by: ANESTHESIOLOGY

## 2017-07-25 RX ORDER — SODIUM CHLORIDE 9 MG/ML
25 INJECTION, SOLUTION INTRAVENOUS CONTINUOUS
Status: DISCONTINUED | OUTPATIENT
Start: 2017-07-25 | End: 2017-07-25 | Stop reason: HOSPADM

## 2017-07-25 RX ORDER — LOPERAMIDE HYDROCHLORIDE 2 MG/1
2 CAPSULE ORAL AS NEEDED
Status: DISCONTINUED | OUTPATIENT
Start: 2017-07-25 | End: 2017-07-27 | Stop reason: HOSPADM

## 2017-07-25 RX ORDER — SODIUM CHLORIDE 0.9 % (FLUSH) 0.9 %
5-10 SYRINGE (ML) INJECTION EVERY 8 HOURS
Status: DISCONTINUED | OUTPATIENT
Start: 2017-07-25 | End: 2017-07-25 | Stop reason: HOSPADM

## 2017-07-25 RX ORDER — DEXTROSE MONOHYDRATE 100 MG/ML
125-250 INJECTION, SOLUTION INTRAVENOUS AS NEEDED
Status: DISCONTINUED | OUTPATIENT
Start: 2017-07-25 | End: 2017-07-27 | Stop reason: HOSPADM

## 2017-07-25 RX ORDER — OXYCODONE AND ACETAMINOPHEN 5; 325 MG/1; MG/1
1-2 TABLET ORAL
Status: DISCONTINUED | OUTPATIENT
Start: 2017-07-25 | End: 2017-07-27 | Stop reason: HOSPADM

## 2017-07-25 RX ORDER — LABETALOL HYDROCHLORIDE 5 MG/ML
INJECTION, SOLUTION INTRAVENOUS AS NEEDED
Status: DISCONTINUED | OUTPATIENT
Start: 2017-07-25 | End: 2017-07-25 | Stop reason: HOSPADM

## 2017-07-25 RX ORDER — INSULIN GLARGINE 100 [IU]/ML
44 INJECTION, SOLUTION SUBCUTANEOUS 2 TIMES DAILY
Status: DISCONTINUED | OUTPATIENT
Start: 2017-07-26 | End: 2017-07-27 | Stop reason: HOSPADM

## 2017-07-25 RX ORDER — SODIUM CHLORIDE 0.9 % (FLUSH) 0.9 %
5-10 SYRINGE (ML) INJECTION AS NEEDED
Status: DISCONTINUED | OUTPATIENT
Start: 2017-07-25 | End: 2017-07-27 | Stop reason: HOSPADM

## 2017-07-25 RX ORDER — ONDANSETRON 2 MG/ML
INJECTION INTRAMUSCULAR; INTRAVENOUS AS NEEDED
Status: DISCONTINUED | OUTPATIENT
Start: 2017-07-25 | End: 2017-07-25 | Stop reason: HOSPADM

## 2017-07-25 RX ORDER — MIDAZOLAM HYDROCHLORIDE 1 MG/ML
1 INJECTION, SOLUTION INTRAMUSCULAR; INTRAVENOUS AS NEEDED
Status: DISCONTINUED | OUTPATIENT
Start: 2017-07-25 | End: 2017-07-25 | Stop reason: HOSPADM

## 2017-07-25 RX ORDER — INSULIN LISPRO 100 [IU]/ML
INJECTION, SOLUTION INTRAVENOUS; SUBCUTANEOUS
Status: DISCONTINUED | OUTPATIENT
Start: 2017-07-25 | End: 2017-07-27 | Stop reason: HOSPADM

## 2017-07-25 RX ORDER — ACETAMINOPHEN 325 MG/1
650 TABLET ORAL ONCE
Status: DISCONTINUED | OUTPATIENT
Start: 2017-07-25 | End: 2017-07-25 | Stop reason: HOSPADM

## 2017-07-25 RX ORDER — DIPHENHYDRAMINE HYDROCHLORIDE 50 MG/ML
12.5 INJECTION, SOLUTION INTRAMUSCULAR; INTRAVENOUS AS NEEDED
Status: DISCONTINUED | OUTPATIENT
Start: 2017-07-25 | End: 2017-07-25

## 2017-07-25 RX ORDER — HYDROMORPHONE HYDROCHLORIDE 1 MG/ML
0.5 INJECTION, SOLUTION INTRAMUSCULAR; INTRAVENOUS; SUBCUTANEOUS
Status: DISCONTINUED | OUTPATIENT
Start: 2017-07-25 | End: 2017-07-27 | Stop reason: HOSPADM

## 2017-07-25 RX ORDER — DEXAMETHASONE SODIUM PHOSPHATE 4 MG/ML
INJECTION, SOLUTION INTRA-ARTICULAR; INTRALESIONAL; INTRAMUSCULAR; INTRAVENOUS; SOFT TISSUE AS NEEDED
Status: DISCONTINUED | OUTPATIENT
Start: 2017-07-25 | End: 2017-07-25 | Stop reason: HOSPADM

## 2017-07-25 RX ORDER — SODIUM CHLORIDE, SODIUM LACTATE, POTASSIUM CHLORIDE, CALCIUM CHLORIDE 600; 310; 30; 20 MG/100ML; MG/100ML; MG/100ML; MG/100ML
25 INJECTION, SOLUTION INTRAVENOUS CONTINUOUS
Status: DISCONTINUED | OUTPATIENT
Start: 2017-07-25 | End: 2017-07-25 | Stop reason: SDUPTHER

## 2017-07-25 RX ORDER — SODIUM CHLORIDE 0.9 % (FLUSH) 0.9 %
5-10 SYRINGE (ML) INJECTION EVERY 8 HOURS
Status: DISCONTINUED | OUTPATIENT
Start: 2017-07-25 | End: 2017-07-27 | Stop reason: HOSPADM

## 2017-07-25 RX ORDER — OXYCODONE HYDROCHLORIDE 5 MG/1
5 TABLET ORAL AS NEEDED
Status: DISCONTINUED | OUTPATIENT
Start: 2017-07-25 | End: 2017-07-25

## 2017-07-25 RX ORDER — ROCURONIUM BROMIDE 10 MG/ML
INJECTION, SOLUTION INTRAVENOUS AS NEEDED
Status: DISCONTINUED | OUTPATIENT
Start: 2017-07-25 | End: 2017-07-25 | Stop reason: HOSPADM

## 2017-07-25 RX ORDER — MIDAZOLAM HYDROCHLORIDE 1 MG/ML
INJECTION, SOLUTION INTRAMUSCULAR; INTRAVENOUS AS NEEDED
Status: DISCONTINUED | OUTPATIENT
Start: 2017-07-25 | End: 2017-07-25 | Stop reason: HOSPADM

## 2017-07-25 RX ORDER — MIDAZOLAM HYDROCHLORIDE 1 MG/ML
1 INJECTION, SOLUTION INTRAMUSCULAR; INTRAVENOUS AS NEEDED
Status: DISCONTINUED | OUTPATIENT
Start: 2017-07-25 | End: 2017-07-25 | Stop reason: SDUPTHER

## 2017-07-25 RX ORDER — MIDAZOLAM HYDROCHLORIDE 1 MG/ML
0.5 INJECTION, SOLUTION INTRAMUSCULAR; INTRAVENOUS
Status: DISCONTINUED | OUTPATIENT
Start: 2017-07-25 | End: 2017-07-25

## 2017-07-25 RX ORDER — HYDROMORPHONE HYDROCHLORIDE 2 MG/ML
INJECTION, SOLUTION INTRAMUSCULAR; INTRAVENOUS; SUBCUTANEOUS AS NEEDED
Status: DISCONTINUED | OUTPATIENT
Start: 2017-07-25 | End: 2017-07-25 | Stop reason: HOSPADM

## 2017-07-25 RX ORDER — FENTANYL CITRATE 50 UG/ML
25 INJECTION, SOLUTION INTRAMUSCULAR; INTRAVENOUS
Status: DISCONTINUED | OUTPATIENT
Start: 2017-07-25 | End: 2017-07-25

## 2017-07-25 RX ORDER — HYDROMORPHONE HYDROCHLORIDE 1 MG/ML
0.2 INJECTION, SOLUTION INTRAMUSCULAR; INTRAVENOUS; SUBCUTANEOUS
Status: DISCONTINUED | OUTPATIENT
Start: 2017-07-25 | End: 2017-07-25

## 2017-07-25 RX ORDER — DIPHENHYDRAMINE HYDROCHLORIDE 50 MG/ML
12.5 INJECTION, SOLUTION INTRAMUSCULAR; INTRAVENOUS
Status: DISCONTINUED | OUTPATIENT
Start: 2017-07-25 | End: 2017-07-27 | Stop reason: HOSPADM

## 2017-07-25 RX ORDER — SODIUM CHLORIDE, SODIUM LACTATE, POTASSIUM CHLORIDE, CALCIUM CHLORIDE 600; 310; 30; 20 MG/100ML; MG/100ML; MG/100ML; MG/100ML
125 INJECTION, SOLUTION INTRAVENOUS CONTINUOUS
Status: DISCONTINUED | OUTPATIENT
Start: 2017-07-25 | End: 2017-07-25

## 2017-07-25 RX ORDER — LIDOCAINE HYDROCHLORIDE 10 MG/ML
0.1 INJECTION, SOLUTION EPIDURAL; INFILTRATION; INTRACAUDAL; PERINEURAL AS NEEDED
Status: DISCONTINUED | OUTPATIENT
Start: 2017-07-25 | End: 2017-07-25 | Stop reason: HOSPADM

## 2017-07-25 RX ORDER — PROPOFOL 10 MG/ML
INJECTION, EMULSION INTRAVENOUS AS NEEDED
Status: DISCONTINUED | OUTPATIENT
Start: 2017-07-25 | End: 2017-07-25 | Stop reason: HOSPADM

## 2017-07-25 RX ORDER — INSULIN GLARGINE 100 [IU]/ML
40 INJECTION, SOLUTION SUBCUTANEOUS 2 TIMES DAILY
Status: ON HOLD | COMMUNITY
End: 2019-07-09 | Stop reason: SDUPTHER

## 2017-07-25 RX ORDER — MORPHINE SULFATE 10 MG/ML
2 INJECTION, SOLUTION INTRAMUSCULAR; INTRAVENOUS
Status: DISCONTINUED | OUTPATIENT
Start: 2017-07-25 | End: 2017-07-25

## 2017-07-25 RX ORDER — SODIUM CHLORIDE 9 MG/ML
25 INJECTION, SOLUTION INTRAVENOUS CONTINUOUS
Status: DISCONTINUED | OUTPATIENT
Start: 2017-07-25 | End: 2017-07-25

## 2017-07-25 RX ORDER — FENTANYL CITRATE 50 UG/ML
INJECTION, SOLUTION INTRAMUSCULAR; INTRAVENOUS AS NEEDED
Status: DISCONTINUED | OUTPATIENT
Start: 2017-07-25 | End: 2017-07-25 | Stop reason: HOSPADM

## 2017-07-25 RX ORDER — PHENYLEPHRINE HCL IN 0.9% NACL 0.4MG/10ML
SYRINGE (ML) INTRAVENOUS AS NEEDED
Status: DISCONTINUED | OUTPATIENT
Start: 2017-07-25 | End: 2017-07-25 | Stop reason: HOSPADM

## 2017-07-25 RX ORDER — SODIUM CHLORIDE 0.9 % (FLUSH) 0.9 %
5-10 SYRINGE (ML) INJECTION AS NEEDED
Status: DISCONTINUED | OUTPATIENT
Start: 2017-07-25 | End: 2017-07-25 | Stop reason: HOSPADM

## 2017-07-25 RX ORDER — SODIUM CHLORIDE 0.9 % (FLUSH) 0.9 %
5-10 SYRINGE (ML) INJECTION AS NEEDED
Status: DISCONTINUED | OUTPATIENT
Start: 2017-07-25 | End: 2017-07-25

## 2017-07-25 RX ORDER — LIDOCAINE HYDROCHLORIDE 20 MG/ML
INJECTION, SOLUTION EPIDURAL; INFILTRATION; INTRACAUDAL; PERINEURAL AS NEEDED
Status: DISCONTINUED | OUTPATIENT
Start: 2017-07-25 | End: 2017-07-25 | Stop reason: SDUPTHER

## 2017-07-25 RX ORDER — MAGNESIUM SULFATE 100 %
4 CRYSTALS MISCELLANEOUS AS NEEDED
Status: DISCONTINUED | OUTPATIENT
Start: 2017-07-25 | End: 2017-07-27 | Stop reason: HOSPADM

## 2017-07-25 RX ORDER — HYDRALAZINE HYDROCHLORIDE 20 MG/ML
8 INJECTION INTRAMUSCULAR; INTRAVENOUS ONCE
Status: COMPLETED | OUTPATIENT
Start: 2017-07-25 | End: 2017-07-25

## 2017-07-25 RX ORDER — INSULIN GLARGINE 100 [IU]/ML
25 INJECTION, SOLUTION SUBCUTANEOUS
Status: COMPLETED | OUTPATIENT
Start: 2017-07-25 | End: 2017-07-25

## 2017-07-25 RX ORDER — NALOXONE HYDROCHLORIDE 0.4 MG/ML
0.4 INJECTION, SOLUTION INTRAMUSCULAR; INTRAVENOUS; SUBCUTANEOUS
Status: DISCONTINUED | OUTPATIENT
Start: 2017-07-25 | End: 2017-07-27 | Stop reason: HOSPADM

## 2017-07-25 RX ORDER — ONDANSETRON 2 MG/ML
4 INJECTION INTRAMUSCULAR; INTRAVENOUS AS NEEDED
Status: DISCONTINUED | OUTPATIENT
Start: 2017-07-25 | End: 2017-07-25 | Stop reason: SDUPTHER

## 2017-07-25 RX ORDER — ONDANSETRON 2 MG/ML
4 INJECTION INTRAMUSCULAR; INTRAVENOUS
Status: DISCONTINUED | OUTPATIENT
Start: 2017-07-25 | End: 2017-07-27 | Stop reason: HOSPADM

## 2017-07-25 RX ORDER — FENTANYL CITRATE 50 UG/ML
50 INJECTION, SOLUTION INTRAMUSCULAR; INTRAVENOUS AS NEEDED
Status: DISCONTINUED | OUTPATIENT
Start: 2017-07-25 | End: 2017-07-25 | Stop reason: HOSPADM

## 2017-07-25 RX ORDER — HYDRALAZINE HYDROCHLORIDE 20 MG/ML
INJECTION INTRAMUSCULAR; INTRAVENOUS
Status: COMPLETED
Start: 2017-07-25 | End: 2017-07-25

## 2017-07-25 RX ADMIN — Medication 10 ML: at 17:46

## 2017-07-25 RX ADMIN — INSULIN HUMAN 10 UNITS: 100 INJECTION, SOLUTION PARENTERAL at 10:37

## 2017-07-25 RX ADMIN — HYDRALAZINE HYDROCHLORIDE 8 MG: 20 INJECTION INTRAMUSCULAR; INTRAVENOUS at 16:51

## 2017-07-25 RX ADMIN — HYDROMORPHONE HYDROCHLORIDE 0.5 MG: 1 INJECTION, SOLUTION INTRAMUSCULAR; INTRAVENOUS; SUBCUTANEOUS at 18:13

## 2017-07-25 RX ADMIN — ROCURONIUM BROMIDE 50 MG: 10 INJECTION, SOLUTION INTRAVENOUS at 11:16

## 2017-07-25 RX ADMIN — Medication 80 MCG: at 15:44

## 2017-07-25 RX ADMIN — OXYCODONE HYDROCHLORIDE AND ACETAMINOPHEN 2 TABLET: 5; 325 TABLET ORAL at 23:03

## 2017-07-25 RX ADMIN — LABETALOL HYDROCHLORIDE 10 MG: 5 INJECTION, SOLUTION INTRAVENOUS at 15:59

## 2017-07-25 RX ADMIN — PROPOFOL 200 MG: 10 INJECTION, EMULSION INTRAVENOUS at 11:16

## 2017-07-25 RX ADMIN — SODIUM CHLORIDE 25 ML/HR: 900 INJECTION, SOLUTION INTRAVENOUS at 10:28

## 2017-07-25 RX ADMIN — HYDROMORPHONE HYDROCHLORIDE 0.4 MG: 2 INJECTION, SOLUTION INTRAMUSCULAR; INTRAVENOUS; SUBCUTANEOUS at 16:03

## 2017-07-25 RX ADMIN — INSULIN GLARGINE 25 UNITS: 100 INJECTION, SOLUTION SUBCUTANEOUS at 21:11

## 2017-07-25 RX ADMIN — LABETALOL HYDROCHLORIDE 5 MG: 5 INJECTION, SOLUTION INTRAVENOUS at 16:11

## 2017-07-25 RX ADMIN — CEFAZOLIN 3 G: 1 INJECTION, POWDER, FOR SOLUTION INTRAMUSCULAR; INTRAVENOUS; PARENTERAL at 11:27

## 2017-07-25 RX ADMIN — DEXAMETHASONE SODIUM PHOSPHATE 8 MG: 4 INJECTION, SOLUTION INTRA-ARTICULAR; INTRALESIONAL; INTRAMUSCULAR; INTRAVENOUS; SOFT TISSUE at 11:52

## 2017-07-25 RX ADMIN — FENTANYL CITRATE 100 MCG: 50 INJECTION, SOLUTION INTRAMUSCULAR; INTRAVENOUS at 11:16

## 2017-07-25 RX ADMIN — LIDOCAINE HYDROCHLORIDE 100 MG: 20 INJECTION, SOLUTION EPIDURAL; INFILTRATION; INTRACAUDAL; PERINEURAL at 11:16

## 2017-07-25 RX ADMIN — MIDAZOLAM HYDROCHLORIDE 2 MG: 1 INJECTION, SOLUTION INTRAMUSCULAR; INTRAVENOUS at 11:03

## 2017-07-25 RX ADMIN — ONDANSETRON 4 MG: 2 INJECTION INTRAMUSCULAR; INTRAVENOUS at 12:31

## 2017-07-25 RX ADMIN — FENTANYL CITRATE 50 MCG: 50 INJECTION, SOLUTION INTRAMUSCULAR; INTRAVENOUS at 15:57

## 2017-07-25 RX ADMIN — Medication 40 MCG: at 11:31

## 2017-07-25 RX ADMIN — FENTANYL CITRATE 50 MCG: 50 INJECTION, SOLUTION INTRAMUSCULAR; INTRAVENOUS at 14:43

## 2017-07-25 RX ADMIN — INSULIN LISPRO 1 UNITS: 100 INJECTION, SOLUTION INTRAVENOUS; SUBCUTANEOUS at 21:16

## 2017-07-25 RX ADMIN — Medication 10 ML: at 21:11

## 2017-07-25 RX ADMIN — Medication 80 MCG: at 15:28

## 2017-07-25 NOTE — IP AVS SNAPSHOT
[address] [place] [facility]. 
[phone] Patient: Claudine Brooks MRN: [id number] :1985 Current Discharge Medication List  
  
CONTINUE these medications which have CHANGED Dose & Instructions Dispensing Information Comments Morning Noon Evening Bedtime  
 hydrALAZINE 50 mg tablet Commonly known as:  APRESOLINE What changed:  when to take this Your last dose was: Your next dose is:    
   
   
 Dose:  50 mg Take 1 Tab by mouth three (3) times daily. Indications: hypertension Quantity:  90 Tab Refills:  0  
     
   
   
   
  
 * oxyCODONE-acetaminophen 5-325 mg per tablet Commonly known as:  PERCOCET What changed:  Another medication with the same name was added. Make sure you understand how and when to take each. Your last dose was: Your next dose is:    
   
   
 Dose:  1-2 Tab Take 1-2 Tabs by mouth every six (6) hours as needed. Max Daily Amount: 8 Tabs. Quantity:  30 Tab Refills:  0  
     
   
   
   
  
 * oxyCODONE-acetaminophen 5-325 mg per tablet Commonly known as:  PERCOCET What changed: You were already taking a medication with the same name, and this prescription was added. Make sure you understand how and when to take each. Your last dose was: Your next dose is:    
   
   
 Dose:  1-2 Tab Take 1-2 Tabs by mouth every four (4) hours as needed for Pain. Max Daily Amount: 12 Tabs. Quantity:  40 Tab Refills:  0  
     
   
   
   
  
 * Notice: This list has 2 medication(s) that are the same as other medications prescribed for you. Read the directions carefully, and ask your doctor or other care provider to review them with you. CONTINUE these medications which have NOT CHANGED Dose & Instructions Dispensing Information Comments Morning Noon Evening Bedtime  
 amLODIPine 10 mg tablet Commonly known as:  [place] Cordial Your last dose was: Your next dose is:    
   
   
 Dose:  10 mg Take 1 Tab by mouth daily. Indications: hypertension Quantity:  30 Tab Refills:  0  
     
   
   
   
  
 cloNIDine HCl 0.1 mg tablet Commonly known as:  CATAPRES Your last dose was: Your next dose is:    
   
   
 Dose:  0.1 mg Take 1 Tab by mouth three (3) times daily (with meals). Quantity:  90 Tab Refills:  0 HumaLOG 100 unit/mL injection Generic drug:  insulin lispro Your last dose was: Your next dose is:    
   
   
 by SubCUTAneous route Before breakfast, lunch, and dinner. Per sliding scale Refills:  0  
     
   
   
   
  
 insulin NPH/insulin regular 100 unit/mL (70-30) injection Commonly known as:  NOVOLIN 70/30, HUMULIN 70/30 Your last dose was: Your next dose is:    
   
   
 Dose:  44 Units 44 Units by SubCUTAneous route two (2) times a day. 70/25 is accepted Quantity:  10 mL Refills:  1 LANTUS 100 unit/mL injection Generic drug:  insulin glargine Your last dose was: Your next dose is:    
   
   
 Dose:  44 Units 44 Units by SubCUTAneous route two (2) times a day. Indications: type 2 diabetes mellitus Refills:  0  
     
   
   
   
  
 lisinopril 5 mg tablet Commonly known as:  Lily Marianna Your last dose was: Your next dose is: Take  by mouth daily. Refills:  0  
     
   
   
   
  
 loperamide 2 mg capsule Commonly known as:  IMODIUM Your last dose was: Your next dose is:    
   
   
 Dose:  2 mg Take 2 mg by mouth as needed for Diarrhea. Refills:  0 Where to Get Your Medications Information on where to get these meds will be given to you by the nurse or doctor. ! Ask your nurse or doctor about these medications  
  oxyCODONE-acetaminophen 5-325 mg per tablet

## 2017-07-25 NOTE — PROGRESS NOTES
Bedside shift change report given to [address] [place] [address] (oncoming nurse) by [place] (offgoing nurse). Report included the following information SBAR, Kardex, Intake/Output, MAR and Recent Results. Zone Phone for oncoming shift:   [phone]    Shift Summary: Pt given PRN pain medicine. Pt admitted today. Dual skin assessment and admission database completed. LDAs             Venous Access Device palindrime dual lumen catheter LOT [id number] 05/04/17 Upper chest (subclavicular area, right (Active)   Central Line Being Utilized No 7/25/2017  5:44 PM   Criteria for Appropriate Use Dialysis/apheresis 7/25/2017  5:44 PM   Site Assessment Clean, dry, & intact 7/25/2017  5:44 PM   Date of Last Dressing Change 07/24/17 7/25/2017  5:44 PM   Dressing Status Clean, dry, & intact 7/25/2017  5:44 PM   Dressing Type 4 X 4;Tape 7/25/2017  5:44 PM      Peripheral IV 07/25/17 Right; Outer Wrist (Active)   Site Assessment Clean, dry, & intact 7/25/2017  5:44 PM   Phlebitis Assessment 0 7/25/2017  5:44 PM   Infiltration Assessment 0 7/25/2017  5:44 PM   Dressing Status Clean, dry, & intact 7/25/2017  5:44 PM   Dressing Type Transparent;Tape 7/25/2017  5:44 PM   Hub Color/Line Status Blue;Flushed;Capped 7/25/2017  5:44 PM   Alcohol Cap Used Yes 7/25/2017  9:54 AM        Hemodialysis Catheter 05/02/17 (Active)      Hubert-Sanchez Drain 07/25/17 Left Arm (Active)   Site Assessment Clean, dry, & intact 7/25/2017  4:17 PM   Dressing Status Clean, dry, & intact 7/25/2017  4:17 PM   Status Patent; Charged 7/25/2017  4:17 PM   Drainage Color Serosanguinous 7/25/2017  4:17 PM                  Intake & Output   Date 07/24/17 1900 - 07/25/17 0659(Not Admitted) 07/25/17 0700 - 07/26/17 0659   Shift 4145-8972 24 Hour Total 8266-7823 8771-7640 24 Hour Total   I  N  T  A  K  E   I.V.  (mL/kg/hr)   300  (0.2)  300      Volume (0.9% sodium chloride infusion)   300  300    Shift Total  (mL/kg)   300  (2)  300  (2)   O  U  T  P  U  T   Blood   200  200      Estimated Blood Loss   200  200    Shift Total  (mL/kg)   200  (1.3)  200  (1.3)   NET   100  100   Weight (kg) 154 154 152.7 152.7 152.7      Last Bowel Movement Last Bowel Movement Date: 07/24/17   Glucose Checks [] N/A  [] AC/HS  [] Q6  Concerns:   Nutrition Active Orders   Diet    DIET DIABETIC WITH OPTIONS Consistent Carb 1800kcal; Regular; 80-80-80       Consults []PT  []OT  []Speech  []Case Management   Cardiac Monitoring []N/A []Yes Expires:

## 2017-07-25 NOTE — IP AVS SNAPSHOT
[address] [place] 
[phone] Patient: Sherolyn Collet MRN: [id number] :1985 You are allergic to the following No active allergies Recent Documentation Height Weight BMI Smoking Status 1.88 m 151.5 kg 42.88 kg/m2 Former Smoker Emergency Contacts Name Discharge Info Relation Home Work Mobile Ace,(Aunt)Stacy  Other Relative [6] [phone] Roxana Mccracken  Other Relative [6] [phone] About your hospitalization You were admitted on:  2017 You last received care in the:  [facility] [address] RENAL MEDICINE You were discharged on:  2017 Unit phone number:  [phone] Why you were hospitalized Your primary diagnosis was:  Not on File Your diagnoses also included:  Esrd (End Stage Renal Disease) (Hcc) Providers Seen During Your Hospitalizations Provider Role Specialty Primary office phone Felipa Mcgill MD Attending Provider General Surgery [phone] Your Primary Care Physician (PCP) Primary Care Physician Office Phone Office Fax [facility] [phone] Follow-up Information Follow up With Details Comments Contact Info Felipa Mcgill MD Go on 2017 at 9:40am for hospital follow up, then go for dialysis after. [address] [facility] [address] [place] 
[phone] Narda Urbina III, DO On 2017 10:30am  hospital follow-up Tiffany Santos [address] [facility] [address] [place] 
[phone] Your Appointments 2017  9:40 AM EDT  
POST OP with Felipa Mcgill MD  
[facility] Dr. Rito Mcgraw ([facility]) [address] [place] [address], [address] [facility]  
[phone]  2017 10:30 AM EDT  
 HOSPITAL FOLLOW-UP with DO [facility] [address] [address] [place]  
[phone] Current Discharge Medication List  
  
CONTINUE these medications which have CHANGED Dose & Instructions Dispensing Information Comments Morning Noon Evening Bedtime  
 hydrALAZINE 50 mg tablet Commonly known as:  APRESOLINE What changed:  when to take this Your last dose was: Your next dose is:    
   
   
 Dose:  50 mg Take 1 Tab by mouth three (3) times daily. Indications: hypertension Quantity:  90 Tab Refills:  0  
     
   
   
   
  
 * oxyCODONE-acetaminophen 5-325 mg per tablet Commonly known as:  PERCOCET What changed:  Another medication with the same name was added. Make sure you understand how and when to take each. Your last dose was: Your next dose is:    
   
   
 Dose:  1-2 Tab Take 1-2 Tabs by mouth every six (6) hours as needed. Max Daily Amount: 8 Tabs. Quantity:  30 Tab Refills:  0  
     
   
   
   
  
 * oxyCODONE-acetaminophen 5-325 mg per tablet Commonly known as:  PERCOCET What changed: You were already taking a medication with the same name, and this prescription was added. Make sure you understand how and when to take each. Your last dose was: Your next dose is:    
   
   
 Dose:  1-2 Tab Take 1-2 Tabs by mouth every four (4) hours as needed for Pain. Max Daily Amount: 12 Tabs. Quantity:  40 Tab Refills:  0  
     
   
   
   
  
 * Notice: This list has 2 medication(s) that are the same as other medications prescribed for you. Read the directions carefully, and ask your doctor or other care provider to review them with you. CONTINUE these medications which have NOT CHANGED Dose & Instructions Dispensing Information Comments Morning Noon Evening Bedtime  
 amLODIPine 10 mg tablet Commonly known as:  Ravin Stanley Your last dose was: Your next dose is:    
   
   
 Dose:  10 mg Take 1 Tab by mouth daily. Indications: hypertension Quantity:  30 Tab Refills:  0  
     
   
   
   
  
 cloNIDine HCl 0.1 mg tablet Commonly known as:  CATAPRES Your last dose was: Your next dose is:    
   
   
 Dose:  0.1 mg Take 1 Tab by mouth three (3) times daily (with meals). Quantity:  90 Tab Refills:  0 HumaLOG 100 unit/mL injection Generic drug:  insulin lispro Your last dose was: Your next dose is:    
   
   
 by SubCUTAneous route Before breakfast, lunch, and dinner. Per sliding scale Refills:  0  
     
   
   
   
  
 insulin NPH/insulin regular 100 unit/mL (70-30) injection Commonly known as:  NOVOLIN 70/30, HUMULIN 70/30 Your last dose was: Your next dose is:    
   
   
 Dose:  44 Units 44 Units by SubCUTAneous route two (2) times a day. 70/25 is accepted Quantity:  10 mL Refills:  1 LANTUS 100 unit/mL injection Generic drug:  insulin glargine Your last dose was: Your next dose is:    
   
   
 Dose:  44 Units 44 Units by SubCUTAneous route two (2) times a day. Indications: type 2 diabetes mellitus Refills:  0  
     
   
   
   
  
 lisinopril 5 mg tablet Commonly known as:  Velton Ricks Your last dose was: Your next dose is: Take  by mouth daily. Refills:  0  
     
   
   
   
  
 loperamide 2 mg capsule Commonly known as:  IMODIUM Your last dose was: Your next dose is:    
   
   
 Dose:  2 mg Take 2 mg by mouth as needed for Diarrhea. Refills:  0 Where to Get Your Medications Information on where to get these meds will be given to you by the nurse or doctor. ! Ask your nurse or doctor about these medications oxyCODONE-acetaminophen 5-325 mg per tablet Discharge Instructions Discharge Instructions Following Surgery for Transposed AV Fistula Keep incision and dressing dry until first office visit. Keep present dressing in place if possible. Replace dressing if needed. No lifting over ten pounds with operated arm for two weeks. Empty EMILY drain 1-2 times per day and record output volume on paper (date, time, and amount). -Bring this record to office visit.   
         -After emptying drain, squeeze bulb and replace cap so that drain bulb has  suction. 
              -Bulb should look \"collapsed\" to indicate that it has suction. See Dr Deangelo Barrow in the office on Monday morning 7/31/2017  -  Call for appointment  -  [phone] Use pain medicines as needed as prescribed. Do not drive while taking narcotic pain medication Follow your doctor's instructions regarding when and if to take blood pressure medications. If you have any problems, call Dr. Deangelo Barrow at [phone] or [phone] (after hours) CHRIS Ruiz MD 
 
 
 
 
 
 
Discharge Orders None Introducing [facility]! Rosanne Kauffman introduces [employer] patient portal. Now you can access parts of your medical record, email your doctor's office, and request medication refills online. 1. In your internet browser, go to [email or web address] 2. Click on the First Time User? Click Here link in the Sign In box. You will see the New Member Sign Up page. 3. Enter your [employer] Access Code exactly as it appears below. You will not need to use this code after youve completed the sign-up process. If you do not sign up before the expiration date, you must request a new code. · [employer] Access Code: [email or web address] Expires: 7/30/2017  4:57 PM 
 
4.  Enter the last four digits of your Social Security Number (xxxx) and Date of Birth (mm/dd/yyyy) as indicated and click Submit. You will be taken to the next sign-up page. 5. Create a [employer] ID. This will be your [employer] login ID and cannot be changed, so think of one that is secure and easy to remember. 6. Create a [employer] password. You can change your password at any time. 7. Enter your Password Reset Question and Answer. This can be used at a later time if you forget your password. 8. Enter your e-mail address. You will receive e-mail notification when new information is available in [address] 9. Click Sign Up. You can now view and download portions of your medical record. 10. Click the Download Summary menu link to download a portable copy of your medical information. If you have questions, please visit the Frequently Asked Questions section of the [employer] website. Remember, [employer] is NOT to be used for urgent needs. For medical emergencies, dial 911. Now available from your iPhone and Android! General Information Please provide this summary of care documentation to your next provider. Patient Signature:  ____________________________________________________________ Date:  ____________________________________________________________  
  
[facility] Provider Signature:  ____________________________________________________________ Date:  ____________________________________________________________

## 2017-07-25 NOTE — PROGRESS NOTES
TRANSFER - IN REPORT:    Verbal report received from [employer] (name) on Sylviahill Worthy  being received from PACU (unit) for routine progression of care      Report consisted of patients Situation, Background, Assessment and   Recommendations(SBAR). Information from the following report(s) SBAR, Kardex, Intake/Output, MAR, Recent Results and Cardiac Rhythm . was reviewed with the receiving nurse. Opportunity for questions and clarification was provided. Assessment completed upon patients arrival to unit and care assumed. Primary Nurse Eric Garcia RN and Herve Marie RN performed a dual skin assessment on this patient Impairment noted- incision site to LUE related to AV fistula, L great and 5th toes amputated. Ranjeet score is 20.

## 2017-07-25 NOTE — PERIOP NOTES
TRANSFER - OUT REPORT:    Verbal report given to Fabricio Stevens RN(name) on Jorge Luis [facility]  being transferred to Renal 3255(unit) for routine post - op       Report consisted of patients Situation, Background, Assessment and   Recommendations(SBAR). Information from the following report(s) SBAR, Kardex, OR Summary, Intake/Output, MAR and Recent Results was reviewed with the receiving nurse. Opportunity for questions and clarification was provided.       Patient transported with:   O2 @ 3 liters  Tech

## 2017-07-25 NOTE — PERIOP NOTES
Spoke with Dr. Anisa Berg via telephone, patient hypertensive BP 200s/100s. Order received for Hydralazine 8 mg IV once.

## 2017-07-25 NOTE — OP NOTES
Operative Report    CPT       37692        Creation of Transposed AV Fistula Left Arm  (Brachio - Brachial)          Patient Name:  Esperanza Walker    7/25/2017     Nephrologist - Dr Nathanael Bernheim    Preoperative Diagnosis - ESRD    Postoperative Diagnosis - Same    Anesthesia - General    Surgeon - CHRIS Owens    Procedure - Creation of transposed AV fistula left arm    Operative Summary -     The patient was taken to the operating room and placed on the operating table in the supine position. The patient's left arm was  prepared and steriley draped. The location of the veins had been reconfirmed by ultrasound prior to surgery. There was an existing AV fistula in the arm between the proximal radial artery and the median antebrachial vein with outflow to th brachial vein. An incision was made exposing the vein just above the arterial anastomosis. The brachial vein was exposed with a long incision from the antecubital up to the level of the pectoralis muscle. The branches were ligated and divided. The vein was then ligated at the antecubital level. The median antebrachial vein was ligated just above the arterial anastomosis. The brachial vein was then gently distended with heparinized lactated Ringer's solution and marked for orientation. The brachial artery was exposed in the lower upper arm. The vein was brought down through a very superficial tunnel in the upper arm with a curved [place] tunneler. The tunneler sheath was withdrawn. The artery was controlled proximally and distally with Arthuro Call bulldog clamps. A short longitudinal arteriotomy was made. Using 6 - 0 prolene an anastomosis was made between the end of the vein and the side of the artery. Prior to completion, the artery was flushed proximally and distally.   After completion, with initiation of flow, there was a thrill at the AV anastomosis and a palpable pulse in the radial artery at the wrist.      The wound was irrigated with antibiotic solution. In the bed of the mobilized basilic vein, a 7 mm EMILY drain was placed and was brought out through a separate stab wound. The subcutaneous tissue was  closed with 3-0 chromic and the skin with surgical staples. A gauze dressing was applied with kerlix wrap. The patient tolerated the procedure well and was taken to the PACU in stable condition.      Specimen - none     Drain - Hubert - Sanchez 7 mm    Estimated Blood Loss - 50 ml    Complications - none        G Ferdie Buerger, MD

## 2017-07-25 NOTE — PERIOP NOTES
Handoff Report from Operating Room to PACU    Report received from Benito Castillo RN and BARB Miles CRNA regarding Marcela Kiser. Surgeon(s):  Xavier Peña MD  And Procedure(s) (LRB):  CREATION OF TRANSPOSED ARTERIO VENOUS FISTULA LEFT ARM (Left)  confirmed   with drains and dressings discussed. Anesthesia type, drugs, patient history, complications, estimated blood loss, vital signs, intake and output, and last pain medication, lines and temperature were reviewed.

## 2017-07-25 NOTE — H&P
Surgery History and Physical    Subjective:      Kimberly Lockhart is a 34 y.o. male with ESRD dialyzed via right central catheter. He had AV  Fistula created left arm and now presents for transposition. Nephrologist - Dr Adalberto Calderón    The patient denies angina or dyspnea. Past Medical History:   Diagnosis Date    Cataracts     Chronic kidney disease     Diabetes ([address]     Hypercholesterolemia     Hypertension     Obesity      Past Surgical History:   Procedure Laterality Date    HX AMPUTATION      Left great toe and L 5th toe    VASCULAR SURGERY PROCEDURE UNLIST      R side chest      Family History   Problem Relation Age of Onset    Diabetes Mother     Liver Disease Father     Kidney Disease Maternal Grandfather     Diabetes Maternal Grandfather     Hypertension Maternal Grandfather     Diabetes Paternal Uncle     Hypertension Paternal Uncle      Social History   Substance Use Topics    Smoking status: Former Smoker     Packs/day: 0.25    Smokeless tobacco: Never Used      Comment: quit 6 months ago    Alcohol use No      Comment: rarely      Prior to Admission medications    Medication Sig Start Date End Date Taking? Authorizing Provider   insulin glargine (LANTUS) 100 unit/mL injection 44 Units by SubCUTAneous route two (2) times a day. Indications: type 2 diabetes mellitus   Yes Historical Provider   insulin lispro (HUMALOG) 100 unit/mL injection by SubCUTAneous route Before breakfast, lunch, and dinner. Per sliding scale   Yes Historical Provider   loperamide (IMODIUM) 2 mg capsule Take 2 mg by mouth as needed for Diarrhea. Yes Historical Provider   lisinopril (PRINIVIL, ZESTRIL) 5 mg tablet Take  by mouth daily. Historical Provider   oxyCODONE-acetaminophen (PERCOCET) 5-325 mg per tablet Take 1-2 Tabs by mouth every six (6) hours as needed. Max Daily Amount: 8 Tabs. 5/9/17   Rubén Valente MD   amLODIPine (NORVASC) 10 mg tablet Take 1 Tab by mouth daily.  Indications: hypertension 17   Bhumi Stuart MD   cloNIDine HCl (CATAPRES) 0.1 mg tablet Take 1 Tab by mouth three (3) times daily (with meals). 17   Bhumi Stuart MD   hydrALAZINE (APRESOLINE) 50 mg tablet Take 1 Tab by mouth three (3) times daily. Indications: hypertension  Patient taking differently: Take 50 mg by mouth daily. Indications: hypertension 17   Bhumi Stuart MD   insulin NPH/insulin regular (NOVOLIN 70/30, HUMULIN 70/30) 100 unit/mL (70-30) injection 44 Units by SubCUTAneous route two (2) times a day. 70/25 is accepted 12/16/15   Raven Cadet MD      No Known Allergies    Review of Systems:  Pertinent items are noted in the History of Present Illness. Objective:        Temp (24hrs), Av.1 °F (36.7 °C), Min:98.1 °F (36.7 °C), Max:98.1 °F (36.7 °C)      Physical Exam:  WD man,  NAD  HEAD/NECK: No jaundice, mass or thyromegaly. Right centrla catheter. LUNGS: Clear bilaterally without wheeze, rhonchi or rales. Michaelyn Daily HEART: Regular without murmur, gallop or rub. Abdomen:  Soft, non tender, no mass or hernia noted. NEURO: Patient is alert and oriented x 3 and moves all extremities equally. Facial movement is symmetrical. Speech was normal.  EXT:   Palpable left radial pulse. Palpable thrill in AV fistula right arm. No arm edema. Assessment:     ESRD    Plan:     Creation of transposed AV fistula left arm.     Signed By: Matt Gibbons MD     2017

## 2017-07-25 NOTE — ANESTHESIA POSTPROCEDURE EVALUATION
Post-Anesthesia Evaluation and Assessment    Patient: Flores Gates MRN: [id number]  SSN: [id number]    YOB: 1985  Age: 34 y.o. Sex: male       Cardiovascular Function/Vital Signs  Visit Vitals    /74    Pulse 86    Temp 36.9 °C (98.5 °F)    Resp 19    Ht 6' 2\" (1.88 m)    Wt 152.7 kg (336 lb 10.3 oz)    SpO2 97%    BMI 43.22 kg/m2       Patient is status post general anesthesia for Procedure(s):  CREATION OF TRANSPOSED ARTERIO VENOUS FISTULA LEFT ARM. Nausea/Vomiting: None    Postoperative hydration reviewed and adequate. Pain:  Pain Scale 1: Numeric (0 - 10) (07/25/17 1715)  Pain Intensity 1: 0 (07/25/17 1715)   Managed    Neurological Status:   Neuro (WDL): Exceptions to WDL (07/25/17 1617)  Neuro  Neurologic State: Drowsy (07/25/17 1617)  Orientation Level: Oriented to person (07/25/17 1617)  Cognition: Decreased attention/concentration; Follows commands (07/25/17 1617)  Speech: Clear (07/25/17 1617)  LUE Motor Response: Weak (07/25/17 1617)  LLE Motor Response: Purposeful (07/25/17 1617)  RUE Motor Response: Purposeful (07/25/17 1617)  RLE Motor Response: Purposeful (07/25/17 1617)   At baseline    Mental Status and Level of Consciousness: Arousable    Pulmonary Status:   O2 Device: Nasal cannula (07/25/17 1715)   Adequate oxygenation and airway patent    Complications related to anesthesia: None    Post-anesthesia assessment completed.  No concerns    Signed By: Daphne Nolen MD     July 25, 2017

## 2017-07-25 NOTE — BRIEF OP NOTE
BRIEF OPERATIVE NOTE    Date of Procedure: 7/25/2017   Preoperative Diagnosis: ESRD  Postoperative Diagnosis: ESRD    Procedure(s):  CREATION OF TRANSPOSED ARTERIO VENOUS FISTULA LEFT ARM (brachio-brachial)  Surgeon(s) and Role:     * Augustus Williamson MD - Primary         Assistant Staff:       Surgical Staff:  Circ-1: Verta [place] Gardiner Passut  Circ-Relief: Lizzie Moncada RN  Scrub Tech-1: Alfonso Valentin  Scrub RN-Relief: Dylon Velasquez RN  Surg Asst-1: Jeanmarie William  Surg Asst-Relief: Stone Deng  Event Time In   Incision Start 1141   Incision Close 1600     Anesthesia: General   Estimated Blood Loss: 50 ml  Specimens: * No specimens in log *   Findings: Palpable thrill in AV fistula and palpable pulse in radial artery at the wrist post op.    Complications: none  Implants: * No implants in log *  Drain - EMILY

## 2017-07-26 LAB
ANION GAP BLD CALC-SCNC: 9 MMOL/L (ref 5–15)
BASOPHILS # BLD AUTO: 0 K/UL (ref 0–0.1)
BASOPHILS # BLD: 0 % (ref 0–1)
BUN SERPL-MCNC: 49 MG/DL (ref 6–20)
BUN/CREAT SERPL: 6 (ref 12–20)
CALCIUM SERPL-MCNC: 7.9 MG/DL (ref 8.5–10.1)
CHLORIDE SERPL-SCNC: 99 MMOL/L (ref 97–108)
CO2 SERPL-SCNC: 27 MMOL/L (ref 21–32)
CREAT SERPL-MCNC: 8.75 MG/DL (ref 0.7–1.3)
EOSINOPHIL # BLD: 0 K/UL (ref 0–0.4)
EOSINOPHIL NFR BLD: 0 % (ref 0–7)
ERYTHROCYTE [DISTWIDTH] IN BLOOD BY AUTOMATED COUNT: 15 % (ref 11.5–14.5)
GLUCOSE BLD STRIP.AUTO-MCNC: 140 MG/DL (ref 65–100)
GLUCOSE BLD STRIP.AUTO-MCNC: 189 MG/DL (ref 65–100)
GLUCOSE BLD STRIP.AUTO-MCNC: 245 MG/DL (ref 65–100)
GLUCOSE BLD STRIP.AUTO-MCNC: 268 MG/DL (ref 65–100)
GLUCOSE SERPL-MCNC: 238 MG/DL (ref 65–100)
HCT VFR BLD AUTO: 30.8 % (ref 36.6–50.3)
HGB BLD-MCNC: 9.8 G/DL (ref 12.1–17)
LYMPHOCYTES # BLD AUTO: 17 % (ref 12–49)
LYMPHOCYTES # BLD: 2.4 K/UL (ref 0.8–3.5)
MCH RBC QN AUTO: 24.6 PG (ref 26–34)
MCHC RBC AUTO-ENTMCNC: 31.8 G/DL (ref 30–36.5)
MCV RBC AUTO: 77.2 FL (ref 80–99)
MONOCYTES # BLD: 0.4 K/UL (ref 0–1)
MONOCYTES NFR BLD AUTO: 3 % (ref 5–13)
NEUTS SEG # BLD: 11.1 K/UL (ref 1.8–8)
NEUTS SEG NFR BLD AUTO: 80 % (ref 32–75)
PLATELET # BLD AUTO: 308 K/UL (ref 150–400)
POTASSIUM SERPL-SCNC: 4.5 MMOL/L (ref 3.5–5.1)
RBC # BLD AUTO: 3.99 M/UL (ref 4.1–5.7)
SERVICE CMNT-IMP: ABNORMAL
SODIUM SERPL-SCNC: 135 MMOL/L (ref 136–145)
WBC # BLD AUTO: 13.9 K/UL (ref 4.1–11.1)

## 2017-07-26 PROCEDURE — 80048 BASIC METABOLIC PNL TOTAL CA: CPT | Performed by: SURGERY

## 2017-07-26 PROCEDURE — 36415 COLL VENOUS BLD VENIPUNCTURE: CPT | Performed by: SURGERY

## 2017-07-26 PROCEDURE — 74011250637 HC RX REV CODE- 250/637: Performed by: SURGERY

## 2017-07-26 PROCEDURE — 90935 HEMODIALYSIS ONE EVALUATION: CPT

## 2017-07-26 PROCEDURE — 99218 HC RM OBSERVATION: CPT

## 2017-07-26 PROCEDURE — 85025 COMPLETE CBC W/AUTO DIFF WBC: CPT | Performed by: SURGERY

## 2017-07-26 PROCEDURE — 74011636637 HC RX REV CODE- 636/637: Performed by: SURGERY

## 2017-07-26 PROCEDURE — 82962 GLUCOSE BLOOD TEST: CPT

## 2017-07-26 RX ORDER — OXYCODONE AND ACETAMINOPHEN 5; 325 MG/1; MG/1
1-2 TABLET ORAL
Qty: 40 TAB | Refills: 0 | Status: SHIPPED | OUTPATIENT
Start: 2017-07-26 | End: 2017-07-31 | Stop reason: SDUPTHER

## 2017-07-26 RX ADMIN — LOPERAMIDE HYDROCHLORIDE 2 MG: 2 CAPSULE ORAL at 17:45

## 2017-07-26 RX ADMIN — INSULIN GLARGINE 44 UNITS: 100 INJECTION, SOLUTION SUBCUTANEOUS at 09:06

## 2017-07-26 RX ADMIN — Medication 10 ML: at 15:08

## 2017-07-26 RX ADMIN — Medication 10 ML: at 05:12

## 2017-07-26 RX ADMIN — INSULIN GLARGINE 44 UNITS: 100 INJECTION, SOLUTION SUBCUTANEOUS at 23:21

## 2017-07-26 RX ADMIN — OXYCODONE HYDROCHLORIDE AND ACETAMINOPHEN 2 TABLET: 5; 325 TABLET ORAL at 19:21

## 2017-07-26 RX ADMIN — OXYCODONE HYDROCHLORIDE AND ACETAMINOPHEN 2 TABLET: 5; 325 TABLET ORAL at 07:17

## 2017-07-26 RX ADMIN — INSULIN LISPRO 3 UNITS: 100 INJECTION, SOLUTION INTRAVENOUS; SUBCUTANEOUS at 12:31

## 2017-07-26 RX ADMIN — Medication 10 ML: at 23:22

## 2017-07-26 RX ADMIN — INSULIN LISPRO 2 UNITS: 100 INJECTION, SOLUTION INTRAVENOUS; SUBCUTANEOUS at 17:05

## 2017-07-26 NOTE — PROGRESS NOTES
Bedside and Verbal shift change report given to Мария Edmond (oncoming nurse) by Meagan Wilson RN (offgoing nurse). Report included the following information Kardex, MAR and Recent Results. Zone Phone for oncoming shift:       Shift Summary: Resting quietly at this time    LDAs             Venous Access Device palindrime dual lumen catheter LOT [id number] 05/04/17 Upper chest (subclavicular area, right (Active)   Central Line Being Utilized No 7/25/2017  5:44 PM   Criteria for Appropriate Use Dialysis/apheresis 7/25/2017  5:44 PM   Site Assessment Clean, dry, & intact 7/25/2017  5:44 PM   Date of Last Dressing Change 07/24/17 7/25/2017  5:44 PM   Dressing Status Clean, dry, & intact 7/25/2017  5:44 PM   Dressing Type 4 X 4;Tape 7/25/2017  5:44 PM      Peripheral IV 07/25/17 Right; Outer Wrist (Active)   Site Assessment Clean, dry, & intact 7/25/2017  8:26 PM   Phlebitis Assessment 0 7/25/2017  8:26 PM   Infiltration Assessment 0 7/25/2017  8:26 PM   Dressing Status Clean, dry, & intact 7/25/2017  8:26 PM   Dressing Type Transparent 7/25/2017  8:26 PM   Hub Color/Line Status Blue;Capped 7/25/2017  8:26 PM   Alcohol Cap Used Yes 7/25/2017  8:26 PM        Hemodialysis Catheter 05/02/17 (Active)      Hubert-Sanchez Drain 07/25/17 Left Arm (Active)   Site Assessment Clean, dry, & intact 7/25/2017  4:17 PM   Dressing Status Clean, dry, & intact 7/25/2017  4:17 PM   Status Patent; Charged 7/25/2017  4:17 PM   Drainage Color Serosanguinous 7/25/2017  4:17 PM                  Intake & Output   Date 07/24/17 1900 - 07/25/17 0659(Not Admitted) 07/25/17 0700 - 07/26/17 0659   Shift 1900-0659 24 Hour Total 2676-6178 0495-3954 24 Hour Total   I  N  T  A  K  E   I.V.  (mL/kg/hr)   300  (0.2)  300      Volume (0.9% sodium chloride infusion)   300  300    Shift Total  (mL/kg)   300  (2)  300  (2)   O  U  T  P  U  T   Blood   200  200      Estimated Blood Loss   200  200    Shift Total  (mL/kg)   200  (1.3)  200  (1.3)   NET   100  100   Weight (kg) 154 154 152.7 152.7 152.7      Last Bowel Movement Last Bowel Movement Date: 07/24/17   Glucose Checks [] N/A  [x] AC/HS  [] Q6  Concerns:   Nutrition Active Orders   Diet    DIET DIABETIC WITH OPTIONS Consistent Carb 1800kcal; Regular; 80-80-80       Consults []PT  []OT  []Speech  []Case Management   Cardiac Monitoring [x]N/A []Yes Expires:

## 2017-07-26 NOTE — CONSULTS
Renal -    ESRD MWF HD at [facility]  S/p access procedure    Plan: HD today ([facility] aware) and then home after.     An Boyd

## 2017-07-26 NOTE — PROGRESS NOTES
Surgery    Mild arm pain. Afebrile, VSS. Alert. Good bruit in AV fistula left arm, palpable radial pulse. For dialysis today, then discharge. Follow up in 4 days in the office. Keep EMILY in place.     Meri Fagan MD

## 2017-07-26 NOTE — DISCHARGE INSTRUCTIONS
Discharge Instructions Following Surgery for Transposed AV Fistula          Keep incision and dressing dry until first office visit. Keep present dressing in place if possible. Replace dressing if needed. No lifting over ten pounds with operated arm for two weeks. Empty EMILY drain 1-2 times per day and record output volume on paper (date, time, and amount). -Bring this record to office visit.             -After emptying drain, squeeze bulb and replace cap so that drain bulb has  suction.                -Bulb should look \"collapsed\" to indicate that it has suction. See Dr Danyell Hatch in the office on Monday morning 7/31/2017  -  Call for appointment  - [phone]    Use pain medicines as needed as prescribed. Do not drive while taking narcotic pain medication    Follow your doctor's instructions regarding when and if to take blood pressure medications. If you have any problems, call Dr. Danyell Hatch at [phone] or [phone] (after hours)      CHRIS Mon MD

## 2017-07-26 NOTE — PROGRESS NOTES
Per May 2017 CRM note:  Pt is a 26 y/o legally blind -American male. Pt lives alone in a one story home. Pt advises he resides on the third floor and takes the elevator to get to his apartment. Pt is independent with IADL's and ADL's. Pt has no previous HH, DME or SNF providers. [facility] and [facility] on [address].    Pt is observation s/p Left AVF. Pt lives [facility] in [place], has Medicare//[employer], independent with adls, morbid obesity, has a glucometer, PCP Dr Nasim Huynh, and is an [facility] pt. Will clarify NOK, code status, and d/c needs. Pt started HD 5/8/17 at [facility] on a MWF 12n schedule. Care Management Interventions  PCP Verified by CM:  Yes  Mode of Transport at Discharge: Self  MyChart Signup: No  Discharge Durable Medical Equipment: Yes  Health Maintenance Reviewed: Yes  Physical Therapy Consult: No  Occupational Therapy Consult: No  Speech Therapy Consult: No  Current Support Network: Lives Alone  Confirm Follow Up Transport: Family  Plan discussed with Pt/Family/Caregiver: Yes  Freedom of Choice Offered: Yes  Discharge Location  Discharge Placement: Home

## 2017-07-26 NOTE — PROGRESS NOTES
Bedside shift change report given to Hugh Pruitt RN (oncoming nurse) by Gen Casillas RN (offgoing nurse). Report included the following information SBAR and Kardex. Shift Summary: Patient alert and verbal. Up ad lianne. Medicated as needed for c/o left arm pain with relief. LDAs             Venous Access Device palindrime dual lumen catheter LOT [id number] 05/04/17 Upper chest (subclavicular area, right (Active)   Central Line Being Utilized No 7/26/2017  3:00 PM   Criteria for Appropriate Use Dialysis/apheresis 7/26/2017  3:00 PM   Site Assessment Clean, dry, & intact 7/26/2017  3:00 PM   Date of Last Dressing Change 07/24/17 7/26/2017 10:00 AM   Dressing Status Clean, dry, & intact 7/26/2017  3:00 PM   Dressing Type 4 X 4;Tape 7/26/2017  3:00 PM      Peripheral IV 07/25/17 Right; Outer Wrist (Active)   Site Assessment Clean, dry, & intact 7/26/2017  3:00 PM   Phlebitis Assessment 0 7/26/2017  3:00 PM   Infiltration Assessment 0 7/26/2017  3:00 PM   Dressing Status Clean, dry, & intact 7/26/2017  3:00 PM   Dressing Type Tape;Transparent 7/26/2017  3:00 PM   Hub Color/Line Status Blue;Capped 7/26/2017  3:00 PM   Alcohol Cap Used Yes 7/26/2017  3:00 PM        Hemodialysis Catheter 05/02/17 (Active)      Hubert-Sanchez Drain 07/25/17 Left Arm (Active)   Site Assessment Clean, dry, & intact 7/26/2017  3:00 PM   Dressing Status Clean, dry, & intact 7/26/2017  3:00 PM   Status Patent; Charged 7/26/2017  3:00 PM   Drainage Color Serosanguinous 7/26/2017  3:00 PM   Output (ml) 15 ml 7/26/2017  3:00 PM                  Intake & Output   Date 07/25/17 1900 - 07/26/17 0659 07/26/17 0700 - 07/27/17 0659   Shift 7511-7196 24 Hour Total 2733-1343 8597-6674 24 Hour Total   I  N  T  A  K  E   I.V.  (mL/kg/hr)  300         Volume (0.9% sodium chloride infusion)  300       Shift Total  (mL/kg)  300  (2)      O  U  T  P  U  T   Urine  (mL/kg/hr)   525  (0.3)  525      Urine Voided   525  525    Drains   40  40      Output (ml) (Hubert-Sanchez Drain 07/25/17 Left Arm)   40  40    Blood  200         Estimated Blood Loss  200       Shift Total  (mL/kg)  200  (1.3) 565  (3.7)  565  (3.7)   NET  100 -565  -565   Weight (kg) 152.7 152.7 151.5 151.5 151.5      Last Bowel Movement Last Bowel Movement Date: 07/24/17   Glucose Checks [] N/A  [x] AC/HS  [] Q6  Concerns:   Nutrition Active Orders   Diet    DIET DIABETIC WITH OPTIONS Consistent Carb 1800kcal; Regular; 80-80-80       Consults []PT  []OT  []Speech  [x]Case Management   Cardiac Monitoring [x]N/A []Yes Expires:

## 2017-07-27 VITALS
SYSTOLIC BLOOD PRESSURE: 138 MMHG | HEIGHT: 74 IN | HEART RATE: 89 BPM | TEMPERATURE: 98.5 F | WEIGHT: 315 LBS | OXYGEN SATURATION: 94 % | DIASTOLIC BLOOD PRESSURE: 83 MMHG | BODY MASS INDEX: 40.43 KG/M2 | RESPIRATION RATE: 20 BRPM

## 2017-07-27 LAB
GLUCOSE BLD STRIP.AUTO-MCNC: 111 MG/DL (ref 65–100)
GLUCOSE BLD STRIP.AUTO-MCNC: 79 MG/DL (ref 65–100)
SERVICE CMNT-IMP: ABNORMAL
SERVICE CMNT-IMP: NORMAL

## 2017-07-27 PROCEDURE — 74011250637 HC RX REV CODE- 250/637: Performed by: SURGERY

## 2017-07-27 PROCEDURE — 74011636637 HC RX REV CODE- 636/637: Performed by: SURGERY

## 2017-07-27 PROCEDURE — 82962 GLUCOSE BLOOD TEST: CPT

## 2017-07-27 PROCEDURE — 99218 HC RM OBSERVATION: CPT

## 2017-07-27 RX ADMIN — Medication 10 ML: at 05:51

## 2017-07-27 RX ADMIN — INSULIN GLARGINE 44 UNITS: 100 INJECTION, SOLUTION SUBCUTANEOUS at 08:18

## 2017-07-27 RX ADMIN — OXYCODONE HYDROCHLORIDE AND ACETAMINOPHEN 2 TABLET: 5; 325 TABLET ORAL at 07:27

## 2017-07-27 NOTE — DIABETES MGMT
DTC Progress Note    Recommendations/ Comments: If appropriate, please consider decreasing Lantus dose to 35 units two times a day. Pt had fasting BG of 79mg/dL this morning. Will continue to follow as needed. Chart reviewed on [facility] [place] for hypoglycemia. Patient is a 34 y.o. male with known history of Type 2 Diabetes on Lantus 44 units bid, Novolin 70/30 - 44 units bid, and Humalog ac tid at home. A1c:   Lab Results   Component Value Date/Time    Hemoglobin A1c 8.4 05/02/2017 04:43 AM    Hemoglobin A1c 15.6 12/11/2015 03:25 AM       Recent Glucose Results:   Lab Results   Component Value Date/Time    GLUCPOC 111 (H) 07/27/2017 09:19 AM    GLUCPOC 79 07/27/2017 08:08 AM    GLUCPOC 140 (H) 07/26/2017 11:20 PM        Lab Results   Component Value Date/Time    Creatinine 8.75 07/26/2017 04:45 AM     Estimated Creatinine Clearance: 18.7 mL/min (based on Cr of 8.75). Active Orders   Diet    DIET DIABETIC WITH OPTIONS Consistent Carb 1800kcal; Regular; 80-80-80        PO intake: No data found. Current hospital DM medication:   -Humalog high sensitivity correction  -Lantus 44 units bid    Will continue to follow as needed.     Thank you    Pantera Salcedo, [facility] [place][note 2][facility]  Office: [phone]

## 2017-07-27 NOTE — PROGRESS NOTES
Discharge instructions and prescriptions reviewed with patient and his aunt. They both verbalized an understanding. Patient's aunt was instructed on how to drain EMILY drain and record amount. Patient's aunt was able to return demonstration successfully. IV removed without incidence. Personal belongings packed and sent with patient. Patient will be transported to personal vehicle by volunteer.

## 2017-07-27 NOTE — PROCEDURES
[facility]  [phone]    Vitals   Pre   Post   Assessment   Pre   Post     Temp  Temp: 97.8 °F (36.6 °C) (07/26/17 1900)  98.4   LOC  Alert, oriented, legally blind No change   HR   Pulse (Heart Rate): 94 (07/26/17 2000) 88 Lungs   Diminished in bases  no change   B/P   BP: (!) 165/91 (07/26/17 2000) 127/52 Cardiac   HRR, 80-90  b/p trendind down during treatment systolic 40'W. Resp   Resp Rate: 20 (07/26/17 2000) 20 Skin   Intact, left arm drsg intact, small amt.  Sanguinous drainage in bulb.   no change   Pain level  Pain Intensity 1: 10 (07/26/17 1922) 5 Edema  generalized     decreased   Orders:    Duration:   Start:    1852 End:    2250 Total:   4 hrs   Dialyzer:   Dialyzer/Set Up Inspection: Revaclear (07/26/17 1900)   K Bath:   Dialysate K (mEq/L): 3 (07/26/17 1900)   Ca Bath:   Dialysate CA (mEq/L): 2.5 (07/26/17 1900)   Na/Bicarb:   Dialysate NA (mEq/L): 140 (07/26/17 1900)   Target Fluid Removal:   Goal/Amount of Fluid to Remove (mL): 2000 mL (07/26/17 1900)   Access     Type & Location:   r sub perm cath   Labs     Obtained/Reviewed   Critical Results Called   Date when labs were drawn-  Hgb-    HGB   Date Value Ref Range Status   07/26/2017 9.8 (L) 12.1 - 17.0 g/dL Final     K-    Potassium   Date Value Ref Range Status   07/26/2017 4.5 3.5 - 5.1 mmol/L Final     Ca-   Calcium   Date Value Ref Range Status   07/26/2017 7.9 (L) 8.5 - 10.1 MG/DL Final     Bun-   BUN   Date Value Ref Range Status   07/26/2017 49 (H) 6 - 20 MG/DL Final     Creat-   Creatinine   Date Value Ref Range Status   07/26/2017 8.75 (H) 0.70 - 1.30 MG/DL Final        Medications/ Blood Products Given     Name   Dose   Route and Time                     Blood Volume Processed (BVP):    80.7 Net Fluid   Removed:  880ml   Comments   Time Out Done: 0844  Primary Nurse Rpt Pre:Radha GREENBERG  Primary Nurse Rpt Post:Koffi RN  Pt Education:  Care Plan:  Tx Summary:1800: art and venous ports Jadon/pablo disinfected with Alcavis per policy. Each lumen aspirated for blood return and flushed with Normal Saline per policy. Dialysis initiated. 1830:c/o pain in l arm. S/p avf creation. States he has had pain since yesterday. When asked if pt. Asked his nurse for pain med he stated no. Primary  RN called and made aware.  1900: pain med given by primary RN. 2000: pt. Resting comfortably tolerating treatment. 2100: b/p trending down. Goal adjusted. No bolus given. 2200: measures effective. Pt. Remained asymptomatic. con't stable  2300: Central line catheter flushed with normal saline per policy. Ports disinfected with Alcavis per policy and lines disconnected and capped using aseptic technique. See LDA docflowsheet for dressing information. Admiting Diagnosis:fistula creation  Pt's previous clinic-[facility]   Consent signed - Informed Consent Verified: Yes (07/26/17 1900)  [place] Consent - yes  Hepatitis Status- 7/12/17 hep ag neg  Machine #- Machine Number: [id number] (07/26/17 1900)  Telemetry status-  Pre-dialysis wt. - Pre-Dialysis Weight: 151.5 kg (334 lb) (07/26/17 1900)

## 2017-07-27 NOTE — PROGRESS NOTES
Bedside and Verbal shift change report given to Ladan Pope (oncoming nurse) by Richar Weller RN (offgoing nurse). Report included the following information Kardex, MAR and Recent Results. Zone Phone for oncoming shift:       Shift Summary: Resting quietly at this time    LDAs             Venous Access Device palindrime dual lumen catheter LOT [id number] 05/04/17 Upper chest (subclavicular area, right (Active)   Central Line Being Utilized No 7/26/2017  3:00 PM   Criteria for Appropriate Use Dialysis/apheresis 7/26/2017  3:00 PM   Site Assessment Clean, dry, & intact 7/26/2017  3:00 PM   Date of Last Dressing Change 07/24/17 7/26/2017 10:00 AM   Dressing Status Clean, dry, & intact 7/26/2017  3:00 PM   Dressing Type 4 X 4;Tape 7/26/2017  3:00 PM      Peripheral IV 07/25/17 Right; Outer Wrist (Active)   Site Assessment Clean, dry, & intact 7/26/2017  3:00 PM   Phlebitis Assessment 0 7/26/2017  3:00 PM   Infiltration Assessment 0 7/26/2017  3:00 PM   Dressing Status Clean, dry, & intact 7/26/2017  3:00 PM   Dressing Type Tape;Transparent 7/26/2017  3:00 PM   Hub Color/Line Status Blue;Capped 7/26/2017  3:00 PM   Alcohol Cap Used Yes 7/26/2017  3:00 PM        Hemodialysis Catheter 05/02/17 (Active)      Hubert-Sanchez Drain 07/25/17 Left Arm (Active)   Site Assessment Clean, dry, & intact 7/26/2017  3:00 PM   Dressing Status Clean, dry, & intact 7/26/2017  3:00 PM   Status Patent; Charged 7/26/2017  3:00 PM   Drainage Color Serosanguinous 7/26/2017  3:00 PM   Output (ml) 15 ml 7/26/2017  3:00 PM                  Intake & Output   Date 07/25/17 1900 - 07/26/17 0659 07/26/17 0700 - 07/27/17 0659   Shift 5600-7638 24 Hour Total 4060-9229 7199-9365 24 Hour Total   I  N  T  A  K  E   I.V.  (mL/kg/hr)  300         Volume (0.9% sodium chloride infusion)  300       Shift Total  (mL/kg)  300  (2)      O  U  T  P  U  T   Urine  (mL/kg/hr)   525  (0.3)  525      Urine Voided   525  525    Drains   40  40      Output (ml) (Hubert-Sanchez Drain 07/25/17 Left Arm)   40  40    Blood  200         Estimated Blood Loss  200       Shift Total  (mL/kg)  200  (1.3) 565  (3.7)  565  (3.7)   NET  100 -565  -565   Weight (kg) 152.7 152.7 151.5 151.5 151.5      Last Bowel Movement Last Bowel Movement Date: 07/24/17   Glucose Checks [] N/A  [x] AC/HS  [] Q6  Concerns:   Nutrition Active Orders   Diet    DIET DIABETIC WITH OPTIONS Consistent Carb 1800kcal; Regular; 80-80-80       Consults []PT  []OT  []Speech  []Case Management   Cardiac Monitoring [x]N/A []Yes Expires:

## 2017-07-27 NOTE — PROGRESS NOTES
HYPOGLYCEMIC EPISODE DOCUMENTATION    Patient with hypoglycemic episode(s) at  0808 on 7-27-17. BG value(s) pre-treatment 79 mg/dl    Was patient symptomatic?  [] yes, [x] no  Patient was treated with the following rescue medications/treatments: [] D50                [] Glucose tablets                [] Glucagon                [x] 4oz juice                [] 6oz reg soda                [] 8oz low fat milk  BG value post-treatment:  111 mg/dl  Once BG treated and value greater than 80mg/dl, pt was provided with the following:  [] snack  [x] meal  Name of MD notified: Quin Anderson  The following orders were received: none

## 2017-07-31 ENCOUNTER — TELEPHONE (OUTPATIENT)
Dept: SURGERY | Age: 32
End: 2017-07-31

## 2017-07-31 ENCOUNTER — OFFICE VISIT (OUTPATIENT)
Dept: SURGERY | Age: 32
End: 2017-07-31

## 2017-07-31 VITALS
BODY MASS INDEX: 40.43 KG/M2 | WEIGHT: 315 LBS | SYSTOLIC BLOOD PRESSURE: 134 MMHG | HEIGHT: 74 IN | OXYGEN SATURATION: 96 % | DIASTOLIC BLOOD PRESSURE: 80 MMHG | HEART RATE: 98 BPM

## 2017-07-31 DIAGNOSIS — Z09 POSTOPERATIVE EXAMINATION: Primary | ICD-10-CM

## 2017-07-31 RX ORDER — OXYCODONE AND ACETAMINOPHEN 5; 325 MG/1; MG/1
1 TABLET ORAL
Qty: 30 TAB | Refills: 0 | Status: SHIPPED | OUTPATIENT
Start: 2017-07-31 | End: 2017-08-16 | Stop reason: SDUPTHER

## 2017-07-31 NOTE — MR AVS SNAPSHOT
Visit Information Date & Time Provider Department Dept. Phone Encounter #  
 7/31/2017  9:40 AM Gabe Ramsey MD [facility] [address] [phone] [id number] Follow-up Instructions Return in about 2 weeks (around 8/14/2017). Follow-up and Disposition History Your Appointments 8/7/2017 10:30 AM  
HOSPITAL FOLLOW-UP with Fior Freeman III, DO [facility] [address] [facility]) Appt Note: Hospital follow-up [facility] [address] [facility]  
[phone]  
  
   
 [facility] [address] [facility] [address] [facility]  
  
    
 8/16/2017  9:00 AM  
POST OP with Gabe Ramsey MD  
[facility] [address] ([address]) Appt Note: â¢PO TAVF LF ARM 7/25/17  
 [address] [place] [address] Drive, [address], [address] [address]  
[address], [address], [address] [facility] [address] [address] Upcoming Health Maintenance Date Due  
 EYE EXAM RETINAL OR DILATED Q1 7/22/1994 DTaP/Tdap/Td series (1 - Tdap) 7/22/2005 FOOT EXAM Q1 12/10/2016 LIPID PANEL Q1 12/11/2016 Pneumococcal 19-64 Highest Risk (2 of 3 - PCV13) 12/12/2016 MICROALBUMIN Q1 12/29/2016 INFLUENZA AGE 9 TO ADULT 8/1/2017 HEMOGLOBIN A1C Q6M 11/2/2017 Allergies as of 7/31/2017  Review Complete On: 7/31/2017 By: Gabe Ramsey MD  
 No Known Allergies Current Immunizations  Never Reviewed Name Date Influenza Vaccine (Quad) PF 12/14/2015 11:50 AM  
 Pneumococcal Polysaccharide (PPSV-23) 12/12/2015 10:01 AM  
  
 Not reviewed this visit You Were Diagnosed With   
  
 Codes Comments Postoperative examination    -  Primary ICD-10-CM: B57 ICD-9-CM: V67.00 Vitals  BP Pulse Height(growth percentile) Weight(growth percentile) SpO2 BMI  
 134/80 (BP 1 Location: Right arm, BP Patient Position: Sitting) 98 6' 2\" (1.88 m) 343 lb (155.6 kg) 96% 44.04 kg/m2 Smoking Status Former Smoker BMI and BSA Data Body Mass Index Body Surface Area 44.04 kg/m 2 2.85 m 2 Preferred Pharmacy Pharmacy Name Phone [facility] [address]  [place] [phone] Your Updated Medication List  
  
   
This list is accurate as of: 7/31/17 11:24 AM.  Always use your most recent med list. amLODIPine 10 mg tablet Commonly known as:  Delmy Pall Take 1 Tab by mouth daily. Indications: hypertension  
  
 cloNIDine HCl 0.1 mg tablet Commonly known as:  CATAPRES Take 1 Tab by mouth three (3) times daily (with meals). HumaLOG 100 unit/mL injection Generic drug:  insulin lispro  
by SubCUTAneous route Before breakfast, lunch, and dinner. Per sliding scale  
  
 hydrALAZINE 50 mg tablet Commonly known as:  APRESOLINE Take 1 Tab by mouth three (3) times daily. Indications: hypertension  
  
 insulin NPH/insulin regular 100 unit/mL (70-30) injection Commonly known as:  NOVOLIN 70/30, HUMULIN 70/30  
44 Units by SubCUTAneous route two (2) times a day. 70/25 is accepted LANTUS 100 unit/mL injection Generic drug:  insulin glargine 44 Units by SubCUTAneous route two (2) times a day. Indications: type 2 diabetes mellitus  
  
 lisinopril 5 mg tablet Commonly known as:  Helon Estrin Take  by mouth daily. loperamide 2 mg capsule Commonly known as:  IMODIUM Take 2 mg by mouth as needed for Diarrhea. * oxyCODONE-acetaminophen 5-325 mg per tablet Commonly known as:  PERCOCET Take 1-2 Tabs by mouth every six (6) hours as needed. Max Daily Amount: 8 Tabs. * oxyCODONE-acetaminophen 5-325 mg per tablet Commonly known as:  PERCOCET Take 1 Tab by mouth every four (4) hours as needed for Pain. Max Daily Amount: 6 Tabs. * Notice: This list has 2 medication(s) that are the same as other medications prescribed for you. Read the directions carefully, and ask your doctor or other care provider to review them with you. Prescriptions Printed Refills  
 oxyCODONE-acetaminophen (PERCOCET) 5-325 mg per tablet 0 Sig: Take 1 Tab by mouth every four (4) hours as needed for Pain. Max Daily Amount: 6 Tabs. Class: Print Route: Oral  
  
Follow-up Instructions Return in about 2 weeks (around 8/14/2017). Introducing [facility] SERVICES! Meagan Montejo introduces [employer] patient portal. Now you can access parts of your medical record, email your doctor's office, and request medication refills online. 1. In your internet browser, go to [email or web address] 2. Click on the First Time User? Click Here link in the Sign In box. You will see the New Member Sign Up page. 3. Enter your [employer] Access Code exactly as it appears below. You will not need to use this code after youve completed the sign-up process. If you do not sign up before the expiration date, you must request a new code. · [employer] Access Code: [email or web address] Expires: 10/29/2017  9:22 AM 
 
4. Enter the last four digits of your Social Security Number (xxxx) and Date of Birth (mm/dd/yyyy) as indicated and click Submit. You will be taken to the next sign-up page. 5. Create a [employer] ID. This will be your [employer] login ID and cannot be changed, so think of one that is secure and easy to remember. 6. Create a [employer] password. You can change your password at any time. 7. Enter your Password Reset Question and Answer. This can be used at a later time if you forget your password. 8. Enter your e-mail address. You will receive e-mail notification when new information is available in [address] 9. Click Sign Up. You can now view and download portions of your medical record. 10. Click the Download Summary menu link to download a portable copy of your medical information. If you have questions, please visit the Frequently Asked Questions section of the bCommunities website. Remember, [employer] is NOT to be used for urgent needs. For medical emergencies, dial 911. Now available from your iPhone and Android! Please provide this summary of care documentation to your next provider. Your primary care clinician is listed as Misbah Charles If you have any questions after today's visit, please call [phone].

## 2017-07-31 NOTE — PROGRESS NOTES
Surgery    The patient is s/p creation of transposed AV fistula left arm. He reports some pain at the site. On examination, there is a palpable thrill in the AV fistula. There is a palpable  radial pulse at the wrist.  Arm edema is minimal.    EMILY drain output has been small and EMILY drain was removed. Dry dressing applied. The patient is to remove the dressing in 24 hours. The patient will follow up in 2 weeks.     Олег Ramirez MD      CC: Dr Raina Smith

## 2017-08-16 ENCOUNTER — OFFICE VISIT (OUTPATIENT)
Dept: SURGERY | Age: 32
End: 2017-08-16

## 2017-08-16 VITALS
WEIGHT: 315 LBS | OXYGEN SATURATION: 95 % | SYSTOLIC BLOOD PRESSURE: 164 MMHG | BODY MASS INDEX: 40.43 KG/M2 | HEIGHT: 74 IN | DIASTOLIC BLOOD PRESSURE: 106 MMHG | HEART RATE: 95 BPM | RESPIRATION RATE: 24 BRPM

## 2017-08-16 DIAGNOSIS — Z09 POSTOPERATIVE EXAMINATION: Primary | ICD-10-CM

## 2017-08-16 RX ORDER — OXYCODONE AND ACETAMINOPHEN 5; 325 MG/1; MG/1
1 TABLET ORAL
Qty: 30 TAB | Refills: 0 | Status: SHIPPED | OUTPATIENT
Start: 2017-08-16 | End: 2017-08-23 | Stop reason: SDUPTHER

## 2017-08-16 NOTE — PROGRESS NOTES
Surgery    The patient is s/p creation of transposed AV fistula left arm. The patient reports pain at the operative site. On examination, there is a palpable thrill in the AV fistula. There is a palpable radial pulse at the wrist.  Arm edema is minimal.    Every other staple was removed. The patient will follow up in 1 week.     Melita Worley MD

## 2017-08-16 NOTE — MR AVS SNAPSHOT
Visit Information Date & Time Provider Department Dept. Phone Encounter #  
 8/16/2017  9:00 AM Klaudia Flores MD [facility]  [address] [phone] [id number] Your Appointments 10/6/2017  8:30 AM  
New Patient with MD Al[facility]ado [facility] [address] [facility]) Appt Note: Fran Shelton 07/31/2017  
 [address] [facility] [address] [address] [address] [facility] Upcoming Health Maintenance Date Due  
 EYE EXAM RETINAL OR DILATED Q1 7/22/1994 DTaP/Tdap/Td series (1 - Tdap) 7/22/2005 FOOT EXAM Q1 12/10/2016 LIPID PANEL Q1 12/11/2016 Pneumococcal 19-64 Highest Risk (2 of 3 - PCV13) 12/12/2016 MICROALBUMIN Q1 12/29/2016 INFLUENZA AGE 9 TO ADULT 8/1/2017 HEMOGLOBIN A1C Q6M 11/2/2017 Allergies as of 8/16/2017  Review Complete On: 8/16/2017 By: Nori Carbajal No Known Allergies Current Immunizations  Never Reviewed Name Date Influenza Vaccine (Quad) PF 12/14/2015 11:50 AM  
 Pneumococcal Polysaccharide (PPSV-23) 12/12/2015 10:01 AM  
  
 Not reviewed this visit Vitals BP Pulse Resp Height(growth percentile) Weight(growth percentile) SpO2  
 (!) 164/106 (BP 1 Location: Right arm, BP Patient Position: Sitting) 95 24 6' 2\" (1.88 m) 345 lb (156.5 kg) 95% BMI Smoking Status 44.3 kg/m2 Former Smoker BMI and BSA Data Body Mass Index Body Surface Area 44.3 kg/m 2 2.86 m 2 Preferred Pharmacy Pharmacy Name Phone [facility]ang [address]  [place] [phone] Your Updated Medication List  
  
   
This list is accurate as of: 8/16/17  9:40 AM.  Always use your most recent med list. amLODIPine 10 mg tablet Commonly known as:  Emmett Cordial Take 1 Tab by mouth daily. Indications: hypertension  
  
 cloNIDine HCl 0.1 mg tablet Commonly known as:  CATAPRES Take 1 Tab by mouth three (3) times daily (with meals). HumaLOG 100 unit/mL injection Generic drug:  insulin lispro  
by SubCUTAneous route Before breakfast, lunch, and dinner. Per sliding scale  
  
 hydrALAZINE 50 mg tablet Commonly known as:  APRESOLINE Take 1 Tab by mouth three (3) times daily. Indications: hypertension  
  
 insulin NPH/insulin regular 100 unit/mL (70-30) injection Commonly known as:  NOVOLIN 70/30, HUMULIN 70/30  
44 Units by SubCUTAneous route two (2) times a day. 70/25 is accepted LANTUS 100 unit/mL injection Generic drug:  insulin glargine 44 Units by SubCUTAneous route two (2) times a day. Indications: type 2 diabetes mellitus  
  
 lisinopril 5 mg tablet Commonly known as:  Vincenzo Handing Take  by mouth daily. loperamide 2 mg capsule Commonly known as:  IMODIUM Take 2 mg by mouth as needed for Diarrhea. * oxyCODONE-acetaminophen 5-325 mg per tablet Commonly known as:  PERCOCET Take 1-2 Tabs by mouth every six (6) hours as needed. Max Daily Amount: 8 Tabs. * oxyCODONE-acetaminophen 5-325 mg per tablet Commonly known as:  PERCOCET Take 1 Tab by mouth every four (4) hours as needed for Pain. Max Daily Amount: 6 Tabs. * Notice: This list has 2 medication(s) that are the same as other medications prescribed for you. Read the directions carefully, and ask your doctor or other care provider to review them with you. Prescriptions Printed Refills  
 oxyCODONE-acetaminophen (PERCOCET) 5-325 mg per tablet 0 Sig: Take 1 Tab by mouth every four (4) hours as needed for Pain. Max Daily Amount: 6 Tabs. Class: Print Route: Oral  
  
Introducing [facility] & HEALTH SERVICES!    
 Sarah Bangura introduces [employer] patient portal. Now you can access parts of your medical record, email your doctor's office, and request medication refills online. 1. In your internet browser, go to [email or web address] 2. Click on the First Time User? Click Here link in the Sign In box. You will see the New Member Sign Up page. 3. Enter your [employer] Access Code exactly as it appears below. You will not need to use this code after youve completed the sign-up process. If you do not sign up before the expiration date, you must request a new code. · [employer] Access Code: [email or web address] Expires: 10/29/2017  9:22 AM 
 
4. Enter the last four digits of your Social Security Number (xxxx) and Date of Birth (mm/dd/yyyy) as indicated and click Submit. You will be taken to the next sign-up page. 5. Create a [employer] ID. This will be your [employer] login ID and cannot be changed, so think of one that is secure and easy to remember. 6. Create a [employer] password. You can change your password at any time. 7. Enter your Password Reset Question and Answer. This can be used at a later time if you forget your password. 8. Enter your e-mail address. You will receive e-mail notification when new information is available in [address] 9. Click Sign Up. You can now view and download portions of your medical record. 10. Click the Download Summary menu link to download a portable copy of your medical information. If you have questions, please visit the Frequently Asked Questions section of the P[employer] website. Remember, [employer] is NOT to be used for urgent needs. For medical emergencies, dial 911. Now available from your iPhone and Android! Please provide this summary of care documentation to your next provider. Your primary care clinician is listed as Christiana Au If you have any questions after today's visit, please call [phone].

## 2017-08-23 ENCOUNTER — OFFICE VISIT (OUTPATIENT)
Dept: SURGERY | Age: 32
End: 2017-08-23

## 2017-08-23 VITALS
DIASTOLIC BLOOD PRESSURE: 66 MMHG | RESPIRATION RATE: 16 BRPM | OXYGEN SATURATION: 96 % | HEIGHT: 74 IN | HEART RATE: 99 BPM | WEIGHT: 315 LBS | BODY MASS INDEX: 40.43 KG/M2 | SYSTOLIC BLOOD PRESSURE: 94 MMHG

## 2017-08-23 DIAGNOSIS — Z09 POSTOPERATIVE EXAMINATION: Primary | ICD-10-CM

## 2017-08-23 RX ORDER — OXYCODONE AND ACETAMINOPHEN 5; 325 MG/1; MG/1
1 TABLET ORAL
Qty: 30 TAB | Refills: 0 | Status: SHIPPED | OUTPATIENT
Start: 2017-08-23 | End: 2017-12-06

## 2017-08-23 NOTE — MR AVS SNAPSHOT
Visit Information Date & Time Provider Department Dept. Phone Encounter #  
 8/23/2017 10:00 AM Ab Feliciano MD [facility] [place] [address] [id number] Your Appointments 10/6/2017  8:30 AM  
New Patient with MD Alvarado [facility] [address] [facility]) Appt Note: Hunter Mojica 07/31/2017  
 [address] [facility] [address] [address] [address] [facility] Upcoming Health Maintenance Date Due  
 EYE EXAM RETINAL OR DILATED Q1 7/22/1994 DTaP/Tdap/Td series (1 - Tdap) 7/22/2005 FOOT EXAM Q1 12/10/2016 LIPID PANEL Q1 12/11/2016 Pneumococcal 19-64 Highest Risk (2 of 3 - PCV13) 12/12/2016 MICROALBUMIN Q1 12/29/2016 INFLUENZA AGE 9 TO ADULT 8/1/2017 HEMOGLOBIN A1C Q6M 11/2/2017 Allergies as of 8/23/2017  Review Complete On: 8/23/2017 By: Clifford Saba No Known Allergies Current Immunizations  Never Reviewed Name Date Influenza Vaccine (Quad) PF 12/14/2015 11:50 AM  
 Pneumococcal Polysaccharide (PPSV-23) 12/12/2015 10:01 AM  
  
 Not reviewed this visit Vitals BP Pulse Resp Height(growth percentile) Weight(growth percentile) SpO2  
 94/66 (BP 1 Location: Right arm, BP Patient Position: Sitting) 99 16 6' 2\" (1.88 m) (!) 350 lb 6.4 oz (158.9 kg) 96% BMI Smoking Status 44.99 kg/m2 Former Smoker BMI and BSA Data Body Mass Index Body Surface Area 44.99 kg/m 2 2.88 m 2 Preferred Pharmacy Pharmacy Name Phone [facility] [address]  [place] [phone] Your Updated Medication List  
  
   
This list is accurate as of: 8/23/17 10:54 AM.  Always use your most recent med list. amLODIPine 10 mg tablet Commonly known as:  Pavan Reinoso Take 1 Tab by mouth daily. Indications: hypertension  
  
 cloNIDine HCl 0.1 mg tablet Commonly known as:  CATAPRES Take 1 Tab by mouth three (3) times daily (with meals). HumaLOG 100 unit/mL injection Generic drug:  insulin lispro  
by SubCUTAneous route Before breakfast, lunch, and dinner. Per sliding scale  
  
 hydrALAZINE 50 mg tablet Commonly known as:  APRESOLINE Take 1 Tab by mouth three (3) times daily. Indications: hypertension  
  
 insulin NPH/insulin regular 100 unit/mL (70-30) injection Commonly known as:  NOVOLIN 70/30, HUMULIN 70/30  
44 Units by SubCUTAneous route two (2) times a day. 70/25 is accepted LANTUS 100 unit/mL injection Generic drug:  insulin glargine 44 Units by SubCUTAneous route two (2) times a day. Indications: type 2 diabetes mellitus  
  
 lisinopril 5 mg tablet Commonly known as:  Vincenzo Handing Take  by mouth daily. loperamide 2 mg capsule Commonly known as:  IMODIUM Take 2 mg by mouth as needed for Diarrhea. * oxyCODONE-acetaminophen 5-325 mg per tablet Commonly known as:  PERCOCET Take 1-2 Tabs by mouth every six (6) hours as needed. Max Daily Amount: 8 Tabs. * oxyCODONE-acetaminophen 5-325 mg per tablet Commonly known as:  PERCOCET Take 1 Tab by mouth every four (4) hours as needed for Pain. Max Daily Amount: 6 Tabs. * Notice: This list has 2 medication(s) that are the same as other medications prescribed for you. Read the directions carefully, and ask your doctor or other care provider to review them with you. Prescriptions Printed Refills  
 oxyCODONE-acetaminophen (PERCOCET) 5-325 mg per tablet 0 Sig: Take 1 Tab by mouth every four (4) hours as needed for Pain. Max Daily Amount: 6 Tabs. Class: Print Route: Oral  
  
Introducing [facility] & HEALTH SERVICES!    
 Sarah Bangura introduces [employer] patient portal. Now you can access parts of your medical record, email your doctor's office, and request medication refills online. 1. In your internet browser, go to [email or web address] 2. Click on the First Time User? Click Here link in the Sign In box. You will see the New Member Sign Up page. 3. Enter your Pikimal Access Code exactly as it appears below. You will not need to use this code after youve completed the sign-up process. If you do not sign up before the expiration date, you must request a new code. · [employer] Access Code: [email or web address] Expires: 10/29/2017  9:22 AM 
 
4. Enter the last four digits of your Social Security Number (xxxx) and Date of Birth (mm/dd/yyyy) as indicated and click Submit. You will be taken to the next sign-up page. 5. Create a P[employer]imal ID. This will be your P[employer] login ID and cannot be changed, so think of one that is secure and easy to remember. 6. Create a [employer] password. You can change your password at any time. 7. Enter your Password Reset Question and Answer. This can be used at a later time if you forget your password. 8. Enter your e-mail address. You will receive e-mail notification when new information is available in [address] 9. Click Sign Up. You can now view and download portions of your medical record. 10. Click the Download Summary menu link to download a portable copy of your medical information. If you have questions, please visit the Frequently Asked Questions section of the Pikimal website. Remember, P[employer] is NOT to be used for urgent needs. For medical emergencies, dial 911. Now available from your iPhone and Android! Please provide this summary of care documentation to your next provider. Your primary care clinician is listed as Kadi Gramajo If you have any questions after today's visit, please call [phone].

## 2017-08-23 NOTE — PROGRESS NOTES
The patient is status post creation of transposed AV fistula left arm on 7/25/2017. The patient reports pain at the surgical site    On examination there is a good thrill present. The outflow vein is readily palpable. The hand is warm. The incision has some crusting and mild edema.     The last of the staples were removed. .    The operative site needs to heal a bit more before use. The patient will see me in follow up in two weeks.     Rx for Percocet 5 mg # 30.     Final Diagnosis: Status post creation transposed AV fistula, post-operative visit.

## 2017-09-07 ENCOUNTER — OFFICE VISIT (OUTPATIENT)
Dept: SURGERY | Age: 32
End: 2017-09-07

## 2017-09-07 VITALS
DIASTOLIC BLOOD PRESSURE: 98 MMHG | HEIGHT: 74 IN | BODY MASS INDEX: 40.43 KG/M2 | RESPIRATION RATE: 24 BRPM | HEART RATE: 95 BPM | WEIGHT: 315 LBS | OXYGEN SATURATION: 96 % | SYSTOLIC BLOOD PRESSURE: 153 MMHG

## 2017-09-07 DIAGNOSIS — Z09 POSTOPERATIVE EXAMINATION: Primary | ICD-10-CM

## 2017-09-07 NOTE — MR AVS SNAPSHOT
Visit Information Date & Time Provider Department Dept. Phone Encounter #  
 9/7/2017 11:40 AM Carleen Davila MD [facility]  [address] [phone] [id number] Your Appointments 10/6/2017  8:30 AM  
New Patient with MD Alvarado [facility]ount [facility] [address] [facility]) Appt Note: Kristen Retana 07/31/2017  
 [address] [facility] [address] [address] [address] [facility] Upcoming Health Maintenance Date Due  
 EYE EXAM RETINAL OR DILATED Q1 7/22/1994 DTaP/Tdap/Td series (1 - Tdap) 7/22/2005 FOOT EXAM Q1 12/10/2016 LIPID PANEL Q1 12/11/2016 Pneumococcal 19-64 Highest Risk (2 of 3 - PCV13) 12/12/2016 MICROALBUMIN Q1 12/29/2016 INFLUENZA AGE 9 TO ADULT 8/1/2017 HEMOGLOBIN A1C Q6M 11/2/2017 Allergies as of 9/7/2017  Review Complete On: 9/7/2017 By: Carleen Davila MD  
 No Known Allergies Current Immunizations  Never Reviewed Name Date Influenza Vaccine (Quad) PF 12/14/2015 11:50 AM  
 Pneumococcal Polysaccharide (PPSV-23) 12/12/2015 10:01 AM  
  
 Not reviewed this visit You Were Diagnosed With   
  
 Codes Comments Postoperative examination    -  Primary ICD-10-CM: J40 ICD-9-CM: V67.00 Vitals BP Pulse Resp Height(growth percentile) Weight(growth percentile) SpO2  
 (!) 153/98 (BP 1 Location: Right arm, BP Patient Position: Sitting) 95 24 6' 2\" (1.88 m) 345 lb 12.8 oz (156.9 kg) 96% BMI Smoking Status 44.4 kg/m2 Former Smoker BMI and BSA Data Body Mass Index Body Surface Area 44.4 kg/m 2 2.86 m 2 Preferred Pharmacy Pharmacy Name Phone Venessa Jang [address]  [place] [phone] Your Updated Medication List  
  
   
 This list is accurate as of: 9/7/17  2:16 PM.  Always use your most recent med list. amLODIPine 10 mg tablet Commonly known as:  Salazar Aba Take 1 Tab by mouth daily. Indications: hypertension  
  
 cloNIDine HCl 0.1 mg tablet Commonly known as:  CATAPRES Take 1 Tab by mouth three (3) times daily (with meals). HumaLOG 100 unit/mL injection Generic drug:  insulin lispro  
by SubCUTAneous route Before breakfast, lunch, and dinner. Per sliding scale  
  
 hydrALAZINE 50 mg tablet Commonly known as:  APRESOLINE Take 1 Tab by mouth three (3) times daily. Indications: hypertension  
  
 insulin NPH/insulin regular 100 unit/mL (70-30) injection Commonly known as:  NOVOLIN 70/30, HUMULIN 70/30  
44 Units by SubCUTAneous route two (2) times a day. 70/25 is accepted LANTUS 100 unit/mL injection Generic drug:  insulin glargine 44 Units by SubCUTAneous route two (2) times a day. Indications: type 2 diabetes mellitus  
  
 lisinopril 5 mg tablet Commonly known as:  Tildon Lynette Take  by mouth daily. loperamide 2 mg capsule Commonly known as:  IMODIUM Take 2 mg by mouth as needed for Diarrhea. * oxyCODONE-acetaminophen 5-325 mg per tablet Commonly known as:  PERCOCET Take 1-2 Tabs by mouth every six (6) hours as needed. Max Daily Amount: 8 Tabs. * oxyCODONE-acetaminophen 5-325 mg per tablet Commonly known as:  PERCOCET Take 1 Tab by mouth every four (4) hours as needed for Pain. Max Daily Amount: 6 Tabs. * Notice: This list has 2 medication(s) that are the same as other medications prescribed for you. Read the directions carefully, and ask your doctor or other care provider to review them with you. Introducing [facility] & HEALTH SERVICES! [facility] introduces [employer] patient portal. Now you can access parts of your medical record, email your doctor's office, and request medication refills online.    
 
1. In your internet browser, go to [email or web address] 2. Click on the First Time User? Click Here link in the Sign In box. You will see the New Member Sign Up page. 3. Enter your Context Relevant Access Code exactly as it appears below. You will not need to use this code after youve completed the sign-up process. If you do not sign up before the expiration date, you must request a new code. · Context [employer]vant Access Code: [email or web address] Expires: 10/29/2017  9:22 AM 
 
4. Enter the last four digits of your Social Security Number (xxxx) and Date of Birth (mm/dd/yyyy) as indicated and click Submit. You will be taken to the next sign-up page. 5. Create a Context Relevant ID. This will be your Context [employer]vant login ID and cannot be changed, so think of one that is secure and easy to remember. 6. Create a [employer]vant password. You can change your password at any time. 7. Enter your Password Reset Question and Answer. This can be used at a later time if you forget your password. 8. Enter your e-mail address. You will receive e-mail notification when new information is available in [address] 9. Click Sign Up. You can now view and download portions of your medical record. 10. Click the Download Summary menu link to download a portable copy of your medical information. If you have questions, please visit the Frequently Asked Questions section of the Context Relevant website. Remember, [employer]vant is NOT to be used for urgent needs. For medical emergencies, dial 911. Now available from your iPhone and Android! Please provide this summary of care documentation to your next provider. Your primary care clinician is listed as Cong March If you have any questions after today's visit, please call [phone].

## 2017-09-07 NOTE — PROGRESS NOTES
The patient is status post creation of transposed AV fistula left arm.     On examination there is a good thrill present. The outflow vein is readily palpable.  The hand is warm.     The last of the staples were removed.      The AV fistula can be used for dialysis starting on 9/11/2017.     The patient will see me in follow up prn     Final Diagnosis: Status post creation transposed AV fistula, post-operative visit.     CC:  Dr Ruthann Jay

## 2017-09-08 ENCOUNTER — TELEPHONE (OUTPATIENT)
Dept: SURGERY | Age: 32
End: 2017-09-08

## 2017-09-08 NOTE — TELEPHONE ENCOUNTER
Spoke with dialysis nurse. Per shimon Landis to use AV fistula left arm starting Monday, 9/11/17. Faxed 9/7/17 office note to [phone], confirmation received.

## 2017-11-29 ENCOUNTER — HOSPITAL ENCOUNTER (EMERGENCY)
Age: 32
Discharge: HOME | End: 2017-11-29
Attending: EMERGENCY MEDICINE | Admitting: EMERGENCY MEDICINE
Payer: MEDICARE

## 2017-11-29 VITALS
WEIGHT: 315 LBS | DIASTOLIC BLOOD PRESSURE: 93 MMHG | HEIGHT: 74 IN | SYSTOLIC BLOOD PRESSURE: 137 MMHG | RESPIRATION RATE: 18 BRPM | BODY MASS INDEX: 40.43 KG/M2 | OXYGEN SATURATION: 97 % | TEMPERATURE: 97.9 F

## 2017-11-29 DIAGNOSIS — N18.6 ESRD (END STAGE RENAL DISEASE) (HCC): Primary | ICD-10-CM

## 2017-11-29 DIAGNOSIS — R19.7 DIARRHEA, UNSPECIFIED TYPE: ICD-10-CM

## 2017-11-29 LAB
ALBUMIN SERPL-MCNC: 2.7 G/DL (ref 3.5–5)
ALBUMIN/GLOB SERPL: 0.4 {RATIO} (ref 1.1–2.2)
ALP SERPL-CCNC: 66 U/L (ref 45–117)
ALT SERPL-CCNC: 14 U/L (ref 12–78)
ANION GAP SERPL CALC-SCNC: 10 MMOL/L (ref 5–15)
AST SERPL-CCNC: 9 U/L (ref 15–37)
BASOPHILS # BLD: 0 K/UL (ref 0–0.1)
BASOPHILS NFR BLD: 0 % (ref 0–1)
BILIRUB SERPL-MCNC: 0.3 MG/DL (ref 0.2–1)
BUN SERPL-MCNC: 38 MG/DL (ref 6–20)
BUN/CREAT SERPL: 4 (ref 12–20)
C DIFF TOX GENS STL QL NAA+PROBE: NEGATIVE
CALCIUM SERPL-MCNC: 8.7 MG/DL (ref 8.5–10.1)
CHLORIDE SERPL-SCNC: 100 MMOL/L (ref 97–108)
CO2 SERPL-SCNC: 26 MMOL/L (ref 21–32)
CREAT SERPL-MCNC: 10.3 MG/DL (ref 0.7–1.3)
EOSINOPHIL # BLD: 0.3 K/UL (ref 0–0.4)
EOSINOPHIL NFR BLD: 3 % (ref 0–7)
ERYTHROCYTE [DISTWIDTH] IN BLOOD BY AUTOMATED COUNT: 13.6 % (ref 11.5–14.5)
GLOBULIN SER CALC-MCNC: 6.2 G/DL (ref 2–4)
GLUCOSE SERPL-MCNC: 178 MG/DL (ref 65–100)
HCT VFR BLD AUTO: 34.4 % (ref 36.6–50.3)
HGB BLD-MCNC: 11.4 G/DL (ref 12.1–17)
LIPASE SERPL-CCNC: 98 U/L (ref 73–393)
LYMPHOCYTES # BLD: 2.1 K/UL (ref 0.8–3.5)
LYMPHOCYTES NFR BLD: 20 % (ref 12–49)
MCH RBC QN AUTO: 26.9 PG (ref 26–34)
MCHC RBC AUTO-ENTMCNC: 33.1 G/DL (ref 30–36.5)
MCV RBC AUTO: 81.1 FL (ref 80–99)
MONOCYTES # BLD: 0.7 K/UL (ref 0–1)
MONOCYTES NFR BLD: 7 % (ref 5–13)
NEUTS SEG # BLD: 7.1 K/UL (ref 1.8–8)
NEUTS SEG NFR BLD: 70 % (ref 32–75)
PLATELET # BLD AUTO: 317 K/UL (ref 150–400)
POTASSIUM SERPL-SCNC: 3.8 MMOL/L (ref 3.5–5.1)
PROT SERPL-MCNC: 8.9 G/DL (ref 6.4–8.2)
RBC # BLD AUTO: 4.24 M/UL (ref 4.1–5.7)
SODIUM SERPL-SCNC: 136 MMOL/L (ref 136–145)
WBC # BLD AUTO: 10.2 K/UL (ref 4.1–11.1)

## 2017-11-29 PROCEDURE — 80053 COMPREHEN METABOLIC PANEL: CPT | Performed by: EMERGENCY MEDICINE

## 2017-11-29 PROCEDURE — 87493 C DIFF AMPLIFIED PROBE: CPT | Performed by: EMERGENCY MEDICINE

## 2017-11-29 PROCEDURE — 85025 COMPLETE CBC W/AUTO DIFF WBC: CPT | Performed by: EMERGENCY MEDICINE

## 2017-11-29 PROCEDURE — 36415 COLL VENOUS BLD VENIPUNCTURE: CPT | Performed by: EMERGENCY MEDICINE

## 2017-11-29 PROCEDURE — 83690 ASSAY OF LIPASE: CPT | Performed by: EMERGENCY MEDICINE

## 2017-11-29 PROCEDURE — 87045 FECES CULTURE AEROBIC BACT: CPT | Performed by: EMERGENCY MEDICINE

## 2017-11-29 PROCEDURE — 99284 EMERGENCY DEPT VISIT MOD MDM: CPT

## 2017-11-29 PROCEDURE — 74011250637 HC RX REV CODE- 250/637: Performed by: EMERGENCY MEDICINE

## 2017-11-29 PROCEDURE — 87177 OVA AND PARASITES SMEARS: CPT | Performed by: EMERGENCY MEDICINE

## 2017-11-29 RX ORDER — DICYCLOMINE HYDROCHLORIDE 10 MG/1
10 CAPSULE ORAL
Status: COMPLETED | OUTPATIENT
Start: 2017-11-29 | End: 2017-11-29

## 2017-11-29 RX ORDER — METRONIDAZOLE 500 MG/1
500 TABLET ORAL 2 TIMES DAILY
Qty: 20 TAB | Refills: 0 | Status: SHIPPED | OUTPATIENT
Start: 2017-11-29 | End: 2017-12-06

## 2017-11-29 RX ORDER — SODIUM CHLORIDE 0.9 % (FLUSH) 0.9 %
5-10 SYRINGE (ML) INJECTION AS NEEDED
Status: DISCONTINUED | OUTPATIENT
Start: 2017-11-29 | End: 2017-11-29 | Stop reason: HOSPADM

## 2017-11-29 RX ORDER — SODIUM CHLORIDE 0.9 % (FLUSH) 0.9 %
5-10 SYRINGE (ML) INJECTION EVERY 8 HOURS
Status: DISCONTINUED | OUTPATIENT
Start: 2017-11-29 | End: 2017-11-29 | Stop reason: HOSPADM

## 2017-11-29 RX ORDER — LOPERAMIDE HYDROCHLORIDE 2 MG/1
2 CAPSULE ORAL
Qty: 20 CAP | Refills: 0 | Status: ON HOLD | OUTPATIENT
Start: 2017-11-29 | End: 2017-12-09

## 2017-11-29 RX ADMIN — DICYCLOMINE HYDROCHLORIDE 10 MG: 10 CAPSULE ORAL at 12:23

## 2017-11-29 NOTE — CONSULTS
Renal -    ESRD on MWF HD at [address] to er with diarrhea and missed HD today. He looks clinically stable. Room air oxygen sat is 97%. Electrolytes and acid base are ok. He is stable for discharge from ER without HD today.  He can go to his outpatient unit tomorrow at 6:45 am.    Brittani Garcia

## 2017-11-29 NOTE — DISCHARGE INSTRUCTIONS
Diarrhea: Care Instructions  Your Care Instructions    Diarrhea is loose, watery stools (bowel movements). The exact cause is often hard to find. Sometimes diarrhea is your body's way of getting rid of what caused an upset stomach. Viruses, food poisoning, and many medicines can cause diarrhea. Some people get diarrhea in response to emotional stress, anxiety, or certain foods. Almost everyone has diarrhea now and then. It usually isn't serious, and your stools will return to normal soon. The important thing to do is replace the fluids you have lost, so you can prevent dehydration. The doctor has checked you carefully, but problems can develop later. If you notice any problems or new symptoms, get medical treatment right away. Follow-up care is a key part of your treatment and safety. Be sure to make and go to all appointments, and call your doctor if you are having problems. It's also a good idea to know your test results and keep a list of the medicines you take. How can you care for yourself at home? · Watch for signs of dehydration, which means your body has lost too much water. Dehydration is a serious condition and should be treated right away. Signs of dehydration are:  ¨ Increasing thirst and dry eyes and mouth. ¨ Feeling faint or lightheaded. ¨ Darker urine, and a smaller amount of urine than normal.  · To prevent dehydration, drink plenty of fluids, enough so that your urine is light yellow or clear like water. Choose water and other caffeine-free clear liquids until you feel better. If you have kidney, heart, or liver disease and have to limit fluids, talk with your doctor before you increase the amount of fluids you drink. · Begin eating small amounts of mild foods the next day, if you feel like it. ¨ Try yogurt that has live cultures of Lactobacillus. (Check the label.)  ¨ Avoid spicy foods, fruits, alcohol, and caffeine until 48 hours after all symptoms are gone.   ¨ Avoid chewing gum that contains sorbitol. ¨ Avoid dairy products (except for yogurt with Lactobacillus) while you have diarrhea and for 3 days after symptoms are gone. · The doctor may recommend that you take over-the-counter medicine, such as loperamide (Imodium), if you still have diarrhea after 6 hours. Read and follow all instructions on the label. Do not use this medicine if you have bloody diarrhea, a high fever, or other signs of serious illness. Call your doctor if you think you are having a problem with your medicine. When should you call for help? Call 911 anytime you think you may need emergency care. For example, call if:  ? · You passed out (lost consciousness). ? · Your stools are maroon or very bloody. ?Call your doctor now or seek immediate medical care if:  ? · You are dizzy or lightheaded, or you feel like you may faint. ? · Your stools are black and look like tar, or they have streaks of blood. ? · You have new or worse belly pain. ? · You have symptoms of dehydration, such as:  ¨ Dry eyes and a dry mouth. ¨ Passing only a little dark urine. ¨ Feeling thirstier than usual.   ? · You have a new or higher fever. ? Watch closely for changes in your health, and be sure to contact your doctor if:  ? · Your diarrhea is getting worse. ? · You see pus in the diarrhea. ? · You are not getting better after 2 days (48 hours). Where can you learn more? Go to http://braydon-chaitanya.info/. Enter O685 in the search box to learn more about \"Diarrhea: Care Instructions. \"  Current as of: March 20, 2017  Content Version: 11.4  © 7616-9113 [employer]. Care instructions adapted under license by [employer] (which disclaims liability or warranty for this information).  If you have questions about a medical condition or this instruction, always ask your healthcare professional. [address] any warranty or liability for your use of this information. MyChart Activation    Thank you for requesting access to [employer]. Please follow the instructions below to securely access and download your online medical record. [employer] allows you to send messages to your doctor, view your test results, renew your prescriptions, schedule appointments, and more. How Do I Sign Up? 1. In your internet browser, go to [email or web address]  2. Click on the First Time User? Click Here link in the Sign In box. You will be redirect to the New Member Sign Up page. 3. Enter your [employer] Access Code exactly as it appears below. You will not need to use this code after youve completed the sign-up process. If you do not sign up before the expiration date, you must request a new code. [employer] Access Code: [email or web address]  Expires: 2018  2:40 PM (This is the date your [employer] access code will )    4. Enter the last four digits of your Social Security Number (xxxx) and Date of Birth (mm/dd/yyyy) as indicated and click Submit. You will be taken to the next sign-up page. 5. Create a [employer] ID. This will be your [employer] login ID and cannot be changed, so think of one that is secure and easy to remember. 6. Create a [employer] password. You can change your password at any time. 7. Enter your Password Reset Question and Answer. This can be used at a later time if you forget your password. 8. Enter your e-mail address. You will receive e-mail notification when new information is available in [address]. 9. Click Sign Up. You can now view and download portions of your medical record. 10. Click the Download Summary menu link to download a portable copy of your medical information. Additional Information    If you have questions, please visit the Frequently Asked Questions section of the [employer] website at [email or web address] Remember, [employer] is NOT to be used for urgent needs. For medical emergencies, dial 911.

## 2017-11-29 NOTE — ED PROVIDER NOTES
EMERGENCY DEPARTMENT HISTORY AND PHYSICAL EXAM      Date: 11/29/2017  Patient Name: Yue Bellamy    History of Presenting Illness     Chief Complaint   Patient presents with    Diarrhea     Intermittent x last week Pt new dialysis pt (April 2017) Pt due for dialysis today        History Provided By: Patient    HPI: Yue Bellamy, 34 y.o. male with PMHx significant for IDDM, HTN, and CKD, presents via EMS to the ED with cc of intermittent episodes of diarrhea x 6 days. Pt also presents with intermittent episodes of vomiting and mild ABD pain. He states the last episode of vomiting was 07:00 AM. He notes his sugars have been normal. He states he gets dialysis M/W/F and notes he missed his appointment today. He denies any history of similar symptoms. He denies any alcohol or drug use. He denies any other complaints at this time. PCP: Bhaskar Walker DO   Specialist: Dr. Mario Hanson, Nephrologist     There are no other complaints, changes, or physical findings at this time. Current Facility-Administered Medications   Medication Dose Route Frequency Provider Last Rate Last Dose    sodium chloride (NS) flush 5-10 mL  5-10 mL IntraVENous Q8H Carloz Casillas MD        sodium chloride (NS) flush 5-10 mL  5-10 mL IntraVENous PRN Jeremias Pierre MD         Current Outpatient Prescriptions   Medication Sig Dispense Refill    loperamide (IMODIUM) 2 mg capsule Take 1 Cap by mouth four (4) times daily as needed for Diarrhea for up to 10 days. 20 Cap 0    metroNIDAZOLE (FLAGYL) 500 mg tablet Take 1 Tab by mouth two (2) times a day for 10 days. 20 Tab 0    oxyCODONE-acetaminophen (PERCOCET) 5-325 mg per tablet Take 1 Tab by mouth every four (4) hours as needed for Pain. Max Daily Amount: 6 Tabs. 30 Tab 0    insulin glargine (LANTUS) 100 unit/mL injection 44 Units by SubCUTAneous route two (2) times a day.  Indications: type 2 diabetes mellitus      lisinopril (PRINIVIL, ZESTRIL) 5 mg tablet Take  by mouth daily.  oxyCODONE-acetaminophen (PERCOCET) 5-325 mg per tablet Take 1-2 Tabs by mouth every six (6) hours as needed. Max Daily Amount: 8 Tabs. 30 Tab 0    insulin lispro (HUMALOG) 100 unit/mL injection by SubCUTAneous route Before breakfast, lunch, and dinner. Per sliding scale      amLODIPine (NORVASC) 10 mg tablet Take 1 Tab by mouth daily. Indications: hypertension 30 Tab 0    cloNIDine HCl (CATAPRES) 0.1 mg tablet Take 1 Tab by mouth three (3) times daily (with meals). 90 Tab 0    hydrALAZINE (APRESOLINE) 50 mg tablet Take 1 Tab by mouth three (3) times daily. Indications: hypertension (Patient taking differently: Take 50 mg by mouth daily. Indications: hypertension) 90 Tab 0    insulin NPH/insulin regular (NOVOLIN 70/30, HUMULIN 70/30) 100 unit/mL (70-30) injection 44 Units by SubCUTAneous route two (2) times a day.  70/25 is accepted 10 mL 1       Past History     Past Medical History:  Past Medical History:   Diagnosis Date    Cataracts     Chronic kidney disease     Diabetes ([address]     Hypercholesterolemia     Hypertension     Obesity        Past Surgical History:  Past Surgical History:   Procedure Laterality Date    HX AMPUTATION      Left great toe and L 5th toe    VASCULAR SURGERY PROCEDURE UNLIST      R side chest    VASCULAR SURGERY PROCEDURE UNLIST  07/25/2017    Creation transposed AV fistula right arm       Family History:  Family History   Problem Relation Age of Onset    Diabetes Mother     Liver Disease Father     Kidney Disease Maternal Grandfather     Diabetes Maternal Grandfather     Hypertension Maternal Grandfather     Diabetes Paternal Uncle     Hypertension Paternal Uncle        Social History:  Social History   Substance Use Topics    Smoking status: Former Smoker     Packs/day: 0.25    Smokeless tobacco: Never Used      Comment: quit 6 months ago    Alcohol use No      Comment: rarely       Allergies:  No Known Allergies      Review of Systems   Review of Systems   Constitutional: Negative. Negative for chills and fever. HENT: Negative. Negative for congestion, rhinorrhea and sinus pressure. Eyes: Negative. Negative for discharge and redness. Respiratory: Negative. Negative for chest tightness and shortness of breath. Cardiovascular: Negative. Negative for chest pain. Gastrointestinal: Positive for abdominal pain, diarrhea and vomiting. Negative for blood in stool. Endocrine: Negative. Genitourinary: Negative. Negative for flank pain and hematuria. Musculoskeletal: Negative. Negative for back pain. Skin: Negative. Negative for rash. Neurological: Negative. Negative for dizziness, seizures, weakness, numbness and headaches. Hematological: Negative. All other systems reviewed and are negative. Physical Exam   Physical Exam   Constitutional: He is oriented to person, place, and time. He appears well-developed and well-nourished. No distress. HENT:   Head: Normocephalic and atraumatic. Nose: Nose normal.   Mouth/Throat: No oropharyngeal exudate. Eyes: Conjunctivae and EOM are normal. Pupils are equal, round, and reactive to light. No scleral icterus. Neck: Normal range of motion. Neck supple. No JVD present. No thyromegaly present. Cardiovascular: Normal rate, regular rhythm, normal heart sounds, intact distal pulses and normal pulses. PMI is not displaced. Exam reveals no gallop and no friction rub. No murmur heard. Pulmonary/Chest: Effort normal and breath sounds normal. No stridor. No respiratory distress. He has no decreased breath sounds. He has no wheezes. He has no rhonchi. He has no rales. He exhibits no tenderness. Abdominal: Soft. Normal aorta and bowel sounds are normal. He exhibits no distension, no abdominal bruit and no mass. There is no hepatosplenomegaly. There is no tenderness.  There is no rigidity, no rebound, no guarding, no CVA tenderness, no tenderness at McBurney's point and negative Coffman's sign. No hernia. Neurological: He is alert and oriented to person, place, and time. He has normal reflexes. He displays no atrophy and no tremor. No cranial nerve deficit or sensory deficit. He exhibits normal muscle tone. He displays no seizure activity. Coordination normal. GCS eye subscore is 4. GCS verbal subscore is 5. GCS motor subscore is 6. Reflex Scores:       Patellar reflexes are 2+ on the right side and 2+ on the left side. Skin: Skin is warm. No rash noted. He is not diaphoretic. No erythema. Nursing note and vitals reviewed. Diagnostic Study Results     Labs -     Recent Results (from the past 12 hour(s))   CBC WITH AUTOMATED DIFF    Collection Time: 11/29/17 10:01 AM   Result Value Ref Range    WBC 10.2 4.1 - 11.1 K/uL    RBC 4.24 4.10 - 5.70 M/uL    HGB 11.4 (L) 12.1 - 17.0 g/dL    HCT 34.4 (L) 36.6 - 50.3 %    MCV 81.1 80.0 - 99.0 FL    MCH 26.9 26.0 - 34.0 PG    MCHC 33.1 30.0 - 36.5 g/dL    RDW 13.6 11.5 - 14.5 %    PLATELET 592 474 - 653 K/uL    NEUTROPHILS 70 32 - 75 %    LYMPHOCYTES 20 12 - 49 %    MONOCYTES 7 5 - 13 %    EOSINOPHILS 3 0 - 7 %    BASOPHILS 0 0 - 1 %    ABS. NEUTROPHILS 7.1 1.8 - 8.0 K/UL    ABS. LYMPHOCYTES 2.1 0.8 - 3.5 K/UL    ABS. MONOCYTES 0.7 0.0 - 1.0 K/UL    ABS. EOSINOPHILS 0.3 0.0 - 0.4 K/UL    ABS. BASOPHILS 0.0 0.0 - 0.1 K/UL   METABOLIC PANEL, COMPREHENSIVE    Collection Time: 11/29/17 10:01 AM   Result Value Ref Range    Sodium 136 136 - 145 mmol/L    Potassium 3.8 3.5 - 5.1 mmol/L    Chloride 100 97 - 108 mmol/L    CO2 26 21 - 32 mmol/L    Anion gap 10 5 - 15 mmol/L    Glucose 178 (H) 65 - 100 mg/dL    BUN 38 (H) 6 - 20 MG/DL    Creatinine 10.30 (H) 0.70 - 1.30 MG/DL    BUN/Creatinine ratio 4 (L) 12 - 20      GFR est AA 7 (L) >60 ml/min/1.73m2    GFR est non-AA 6 (L) >60 ml/min/1.73m2    Calcium 8.7 8.5 - 10.1 MG/DL    Bilirubin, total 0.3 0.2 - 1.0 MG/DL    ALT (SGPT) 14 12 - 78 U/L    AST (SGOT) 9 (L) 15 - 37 U/L    Alk.  phosphatase 66 45 - 117 U/L    Protein, total 8.9 (H) 6.4 - 8.2 g/dL    Albumin 2.7 (L) 3.5 - 5.0 g/dL    Globulin 6.2 (H) 2.0 - 4.0 g/dL    A-G Ratio 0.4 (L) 1.1 - 2.2     LIPASE    Collection Time: 11/29/17 10:01 AM   Result Value Ref Range    Lipase 98 73 - 393 U/L         Medical Decision Making   I am the first provider for this patient. I reviewed the vital signs, available nursing notes, past medical history, past surgical history, family history and social history. Vital Signs-Reviewed the patient's vital signs. Patient Vitals for the past 12 hrs:   Temp Resp BP SpO2   11/29/17 0941 97.9 °F (36.6 °C) 18 (!) 137/93 97 %       Records Reviewed: Nursing Notes and Old Medical Records    Provider Notes (Medical Decision Making):   DDx: Gastroenteritis, electrolyte abnormality, uremia, colitis, adverse drug reaction. Impression/Plan: Pt with IDDM on dialysis to ED with 6 days of intermittent watery loose stool. No recent Abx use. Has vague ABD pain. Will check labs and try to obtain stool for culture and C diff. ED Course:   Initial assessment performed. The patients presenting problems have been discussed, and they are in agreement with the care plan formulated and outlined with them. I have encouraged them to ask questions as they arise throughout their visit. Consult Note:  3:10 PM  Ramón Garcia MD spoke with Dr. Kenny Gomez  Specialty: Nephrology   Discussed pts hx, disposition, and available diagnostic and imaging results. Reviewed care plans. Consultant agrees with plans as outlined. States pt is stable for discharge. Disposition:  DISCHARGE NOTE  3:26 PM  The patient has been re-evaluated and is ready for discharge. Reviewed available results with patient. Counseled pt on diagnosis and care plan. Pt has expressed understanding, and all questions have been answered. Pt agrees with plan and agrees to follow up as recommended, or return to the ED if their symptoms worsen.  Discharge instructions have been provided and explained to the pt, along with reasons to return to the ED. PLAN:  1. Current Discharge Medication List      START taking these medications    Details   metroNIDAZOLE (FLAGYL) 500 mg tablet Take 1 Tab by mouth two (2) times a day for 10 days. Qty: 20 Tab, Refills: 0         CONTINUE these medications which have CHANGED    Details   loperamide (IMODIUM) 2 mg capsule Take 1 Cap by mouth four (4) times daily as needed for Diarrhea for up to 10 days. Qty: 20 Cap, Refills: 0           2. Follow-up Information     Follow up With Details Comments [address], [place] Call in 1 day  [address] MD [address]  [address] [facility] [address]  [facility]  [phone]      [facility] EMERGENCY DEPT  If symptoms worsen [address] [place] [address]  [phone]        Return to ED if worse     Diagnosis     Clinical Impression:   1. ESRD (end stage renal disease) ([facility] [address]    2. Diarrhea, unspecified type      Attestations: This note is prepared by Tariq Ugalde, acting as Scribe for Jeremias Pierre MD.    Jeremias Pierre MD: The scribe's documentation has been prepared under my direction and personally reviewed by me in its entirety. I confirm that the note above accurately reflects all work, treatment, procedures, and medical decision making performed by me.

## 2017-11-29 NOTE — PROGRESS NOTES
Pt is a 35 y/o [facility] American male who presented to the ED via EMS with c/o intermittent diarrhea x last week. CM consulted re: transportation. CM introduced self, role. Pt verbalized understanding. Pt verified demographic information on chart. Pt stated he does not have a ride home. Pt called his emergency contacts stating they are not available to pick him up. Pt denies having other friends or family to contact. This CM called both pt's emergency contacts re: transportation and was unable to leave voicemails for contacts due to voicemail boxes being full. CM called [employer] for pt to return to verified address. [employer] to arrive within 30 minutes. Cab voucher on bedside chart. Nursing staff aware. CM to continue to offer support, discharge planning as needed.      Chris Ruiz MSW  [facility]  [phone]

## 2017-11-29 NOTE — ED NOTES
Discharge instructions given to patient by Audrey Castaneda MD. Patient verbalized understanding of discharge instructions. Pt discharged without difficulty. Pt discharged in stable condition via ambulaion. Patient is obtaining a ride via [employer].

## 2017-11-29 NOTE — ED NOTES
Pt arrives from EMS with c/o intermittent diarrhea x last week. Pt new dialysis pt (April 2017). Pt due for dialysis today.

## 2017-12-01 LAB
BACTERIA SPEC CULT: NORMAL
C JEJUNI+C COLI AG STL QL: NEGATIVE
E COLI SXT1+2 STL IA: NEGATIVE
O+P SPEC MICRO: NORMAL
O+P STL CONC: NORMAL
SERVICE CMNT-IMP: NORMAL
SPECIMEN SOURCE: NORMAL

## 2017-12-06 ENCOUNTER — HOSPITAL ENCOUNTER (EMERGENCY)
Age: 32
Discharge: OTHER INSTITUTION | End: 2017-12-06
Attending: EMERGENCY MEDICINE
Payer: MEDICARE

## 2017-12-06 ENCOUNTER — APPOINTMENT (OUTPATIENT)
Dept: CT IMAGING | Age: 32
DRG: 391 | End: 2017-12-06
Attending: EMERGENCY MEDICINE
Payer: MEDICARE

## 2017-12-06 ENCOUNTER — HOSPITAL ENCOUNTER (INPATIENT)
Age: 32
LOS: 3 days | Discharge: HOME | DRG: 391 | End: 2017-12-09
Attending: EMERGENCY MEDICINE | Admitting: INTERNAL MEDICINE
Payer: MEDICARE

## 2017-12-06 VITALS
HEIGHT: 75 IN | OXYGEN SATURATION: 98 % | HEART RATE: 88 BPM | RESPIRATION RATE: 18 BRPM | BODY MASS INDEX: 39.17 KG/M2 | WEIGHT: 315 LBS | SYSTOLIC BLOOD PRESSURE: 178 MMHG | TEMPERATURE: 98.1 F | DIASTOLIC BLOOD PRESSURE: 114 MMHG

## 2017-12-06 DIAGNOSIS — R19.7 DIARRHEA, UNSPECIFIED TYPE: Primary | ICD-10-CM

## 2017-12-06 DIAGNOSIS — N18.6 ESRD (END STAGE RENAL DISEASE) (HCC): Primary | ICD-10-CM

## 2017-12-06 DIAGNOSIS — R19.7 DIARRHEA WITH DEHYDRATION: ICD-10-CM

## 2017-12-06 DIAGNOSIS — N18.6 ESRD (END STAGE RENAL DISEASE) (HCC): ICD-10-CM

## 2017-12-06 LAB
ALBUMIN SERPL-MCNC: 2.7 G/DL (ref 3.5–5)
ALBUMIN/GLOB SERPL: 0.4 {RATIO} (ref 1.1–2.2)
ALP SERPL-CCNC: 60 U/L (ref 45–117)
ALT SERPL-CCNC: 10 U/L (ref 12–78)
ANION GAP SERPL CALC-SCNC: 13 MMOL/L (ref 5–15)
AST SERPL-CCNC: 14 U/L (ref 15–37)
BASOPHILS # BLD: 0 K/UL (ref 0–0.1)
BASOPHILS NFR BLD: 0 % (ref 0–1)
BILIRUB SERPL-MCNC: 0.3 MG/DL (ref 0.2–1)
BUN SERPL-MCNC: 48 MG/DL (ref 6–20)
BUN/CREAT SERPL: 5 (ref 12–20)
C DIFF TOX GENS STL QL NAA+PROBE: NEGATIVE
CALCIUM SERPL-MCNC: 9.3 MG/DL (ref 8.5–10.1)
CHLORIDE SERPL-SCNC: 101 MMOL/L (ref 97–108)
CO2 SERPL-SCNC: 25 MMOL/L (ref 21–32)
CREAT SERPL-MCNC: 10.25 MG/DL (ref 0.7–1.3)
EOSINOPHIL # BLD: 0.2 K/UL (ref 0–0.4)
EOSINOPHIL NFR BLD: 2 % (ref 0–7)
ERYTHROCYTE [DISTWIDTH] IN BLOOD BY AUTOMATED COUNT: 13.3 % (ref 11.5–14.5)
GLOBULIN SER CALC-MCNC: 6.4 G/DL (ref 2–4)
GLUCOSE BLD STRIP.AUTO-MCNC: 100 MG/DL (ref 65–100)
GLUCOSE BLD STRIP.AUTO-MCNC: 115 MG/DL (ref 65–100)
GLUCOSE BLD STRIP.AUTO-MCNC: 74 MG/DL (ref 65–100)
GLUCOSE BLD STRIP.AUTO-MCNC: 76 MG/DL (ref 65–100)
GLUCOSE BLD STRIP.AUTO-MCNC: 79 MG/DL (ref 65–100)
GLUCOSE SERPL-MCNC: 112 MG/DL (ref 65–100)
HCT VFR BLD AUTO: 35.6 % (ref 36.6–50.3)
HEMOCCULT STL QL: NEGATIVE
HGB BLD-MCNC: 11.5 G/DL (ref 12.1–17)
LIPASE SERPL-CCNC: 152 U/L (ref 73–393)
LYMPHOCYTES # BLD: 1.9 K/UL (ref 0.8–3.5)
LYMPHOCYTES NFR BLD: 18 % (ref 12–49)
MCH RBC QN AUTO: 26.1 PG (ref 26–34)
MCHC RBC AUTO-ENTMCNC: 32.3 G/DL (ref 30–36.5)
MCV RBC AUTO: 80.7 FL (ref 80–99)
MONOCYTES # BLD: 0.9 K/UL (ref 0–1)
MONOCYTES NFR BLD: 9 % (ref 5–13)
NEUTS SEG # BLD: 7.3 K/UL (ref 1.8–8)
NEUTS SEG NFR BLD: 71 % (ref 32–75)
PLATELET # BLD AUTO: 346 K/UL (ref 150–400)
POTASSIUM SERPL-SCNC: 3.9 MMOL/L (ref 3.5–5.1)
PROT SERPL-MCNC: 9.1 G/DL (ref 6.4–8.2)
RBC # BLD AUTO: 4.41 M/UL (ref 4.1–5.7)
SERVICE CMNT-IMP: ABNORMAL
SERVICE CMNT-IMP: NORMAL
SODIUM SERPL-SCNC: 139 MMOL/L (ref 136–145)
WBC # BLD AUTO: 10.4 K/UL (ref 4.1–11.1)

## 2017-12-06 PROCEDURE — 74011250637 HC RX REV CODE- 250/637: Performed by: EMERGENCY MEDICINE

## 2017-12-06 PROCEDURE — 74011250637 HC RX REV CODE- 250/637: Performed by: INTERNAL MEDICINE

## 2017-12-06 PROCEDURE — 85025 COMPLETE CBC W/AUTO DIFF WBC: CPT | Performed by: PHYSICIAN ASSISTANT

## 2017-12-06 PROCEDURE — 87177 OVA AND PARASITES SMEARS: CPT | Performed by: PHYSICIAN ASSISTANT

## 2017-12-06 PROCEDURE — 74176 CT ABD & PELVIS W/O CONTRAST: CPT

## 2017-12-06 PROCEDURE — 74011250637 HC RX REV CODE- 250/637: Performed by: PHYSICIAN ASSISTANT

## 2017-12-06 PROCEDURE — 65270000015 HC RM PRIVATE ONCOLOGY

## 2017-12-06 PROCEDURE — 82962 GLUCOSE BLOOD TEST: CPT

## 2017-12-06 PROCEDURE — 82272 OCCULT BLD FECES 1-3 TESTS: CPT | Performed by: PHYSICIAN ASSISTANT

## 2017-12-06 PROCEDURE — 96361 HYDRATE IV INFUSION ADD-ON: CPT

## 2017-12-06 PROCEDURE — 99284 EMERGENCY DEPT VISIT MOD MDM: CPT

## 2017-12-06 PROCEDURE — 96374 THER/PROPH/DIAG INJ IV PUSH: CPT

## 2017-12-06 PROCEDURE — 80053 COMPREHEN METABOLIC PANEL: CPT | Performed by: PHYSICIAN ASSISTANT

## 2017-12-06 PROCEDURE — 74177 CT ABD & PELVIS W/CONTRAST: CPT

## 2017-12-06 PROCEDURE — 74011636320 HC RX REV CODE- 636/320: Performed by: EMERGENCY MEDICINE

## 2017-12-06 PROCEDURE — 87493 C DIFF AMPLIFIED PROBE: CPT | Performed by: PHYSICIAN ASSISTANT

## 2017-12-06 PROCEDURE — 74011250636 HC RX REV CODE- 250/636: Performed by: PHYSICIAN ASSISTANT

## 2017-12-06 PROCEDURE — 74011250636 HC RX REV CODE- 250/636: Performed by: INTERNAL MEDICINE

## 2017-12-06 PROCEDURE — 83690 ASSAY OF LIPASE: CPT | Performed by: PHYSICIAN ASSISTANT

## 2017-12-06 PROCEDURE — 74011636637 HC RX REV CODE- 636/637: Performed by: EMERGENCY MEDICINE

## 2017-12-06 PROCEDURE — 87045 FECES CULTURE AEROBIC BACT: CPT | Performed by: PHYSICIAN ASSISTANT

## 2017-12-06 PROCEDURE — 36415 COLL VENOUS BLD VENIPUNCTURE: CPT | Performed by: PHYSICIAN ASSISTANT

## 2017-12-06 PROCEDURE — 74011250636 HC RX REV CODE- 250/636: Performed by: EMERGENCY MEDICINE

## 2017-12-06 RX ORDER — HYDRALAZINE HYDROCHLORIDE 20 MG/ML
10 INJECTION INTRAMUSCULAR; INTRAVENOUS
Status: DISCONTINUED | OUTPATIENT
Start: 2017-12-06 | End: 2017-12-09 | Stop reason: HOSPADM

## 2017-12-06 RX ORDER — INSULIN GLARGINE 100 [IU]/ML
44 INJECTION, SOLUTION SUBCUTANEOUS 2 TIMES DAILY
Status: DISCONTINUED | OUTPATIENT
Start: 2017-12-06 | End: 2017-12-07

## 2017-12-06 RX ORDER — LISINOPRIL 5 MG/1
5 TABLET ORAL DAILY
Status: DISCONTINUED | OUTPATIENT
Start: 2017-12-07 | End: 2017-12-09 | Stop reason: HOSPADM

## 2017-12-06 RX ORDER — DEXTROSE 50 % IN WATER (D50W) INTRAVENOUS SYRINGE
12.5-25 AS NEEDED
Status: DISCONTINUED | OUTPATIENT
Start: 2017-12-06 | End: 2017-12-09 | Stop reason: HOSPADM

## 2017-12-06 RX ORDER — OXYCODONE AND ACETAMINOPHEN 5; 325 MG/1; MG/1
1 TABLET ORAL
Status: DISCONTINUED | OUTPATIENT
Start: 2017-12-06 | End: 2017-12-09 | Stop reason: HOSPADM

## 2017-12-06 RX ORDER — LOPERAMIDE HYDROCHLORIDE 2 MG/1
2 CAPSULE ORAL
Status: COMPLETED | OUTPATIENT
Start: 2017-12-06 | End: 2017-12-06

## 2017-12-06 RX ORDER — AMLODIPINE BESYLATE 5 MG/1
10 TABLET ORAL DAILY
Status: DISCONTINUED | OUTPATIENT
Start: 2017-12-07 | End: 2017-12-09 | Stop reason: HOSPADM

## 2017-12-06 RX ORDER — SODIUM CHLORIDE 9 MG/ML
50 INJECTION, SOLUTION INTRAVENOUS CONTINUOUS
Status: DISCONTINUED | OUTPATIENT
Start: 2017-12-06 | End: 2017-12-07

## 2017-12-06 RX ORDER — SODIUM CHLORIDE 9 MG/ML
50 INJECTION, SOLUTION INTRAVENOUS
Status: COMPLETED | OUTPATIENT
Start: 2017-12-06 | End: 2017-12-06

## 2017-12-06 RX ORDER — MAGNESIUM SULFATE 100 %
4 CRYSTALS MISCELLANEOUS AS NEEDED
Status: DISCONTINUED | OUTPATIENT
Start: 2017-12-06 | End: 2017-12-09 | Stop reason: HOSPADM

## 2017-12-06 RX ORDER — CLONIDINE HYDROCHLORIDE 0.1 MG/1
0.1 TABLET ORAL
Status: DISCONTINUED | OUTPATIENT
Start: 2017-12-07 | End: 2017-12-09 | Stop reason: HOSPADM

## 2017-12-06 RX ORDER — SODIUM CHLORIDE 0.9 % (FLUSH) 0.9 %
5-10 SYRINGE (ML) INJECTION AS NEEDED
Status: DISCONTINUED | OUTPATIENT
Start: 2017-12-06 | End: 2017-12-08

## 2017-12-06 RX ORDER — ONDANSETRON 2 MG/ML
4 INJECTION INTRAMUSCULAR; INTRAVENOUS
Status: COMPLETED | OUTPATIENT
Start: 2017-12-06 | End: 2017-12-06

## 2017-12-06 RX ORDER — SODIUM CHLORIDE 0.9 % (FLUSH) 0.9 %
5-10 SYRINGE (ML) INJECTION AS NEEDED
Status: DISCONTINUED | OUTPATIENT
Start: 2017-12-06 | End: 2017-12-06 | Stop reason: HOSPADM

## 2017-12-06 RX ORDER — SODIUM CHLORIDE 0.9 % (FLUSH) 0.9 %
10 SYRINGE (ML) INJECTION
Status: COMPLETED | OUTPATIENT
Start: 2017-12-06 | End: 2017-12-06

## 2017-12-06 RX ORDER — BISMUTH SUBSALICYLATE 262 MG/15ML
30 LIQUID ORAL
COMMUNITY
End: 2017-12-09

## 2017-12-06 RX ORDER — SODIUM CHLORIDE 0.9 % (FLUSH) 0.9 %
5-10 SYRINGE (ML) INJECTION EVERY 8 HOURS
Status: DISCONTINUED | OUTPATIENT
Start: 2017-12-06 | End: 2017-12-08

## 2017-12-06 RX ORDER — SODIUM CHLORIDE 0.9 % (FLUSH) 0.9 %
5-10 SYRINGE (ML) INJECTION EVERY 8 HOURS
Status: DISCONTINUED | OUTPATIENT
Start: 2017-12-06 | End: 2017-12-06 | Stop reason: HOSPADM

## 2017-12-06 RX ORDER — INSULIN GLARGINE 100 [IU]/ML
20 INJECTION, SOLUTION SUBCUTANEOUS ONCE
Status: COMPLETED | OUTPATIENT
Start: 2017-12-06 | End: 2017-12-06

## 2017-12-06 RX ORDER — ENOXAPARIN SODIUM 100 MG/ML
40 INJECTION SUBCUTANEOUS EVERY 24 HOURS
Status: DISCONTINUED | OUTPATIENT
Start: 2017-12-06 | End: 2017-12-07

## 2017-12-06 RX ORDER — INSULIN LISPRO 100 [IU]/ML
INJECTION, SOLUTION INTRAVENOUS; SUBCUTANEOUS
Status: DISCONTINUED | OUTPATIENT
Start: 2017-12-06 | End: 2017-12-09 | Stop reason: HOSPADM

## 2017-12-06 RX ORDER — HYDRALAZINE HYDROCHLORIDE 50 MG/1
50 TABLET, FILM COATED ORAL 3 TIMES DAILY
Status: DISCONTINUED | OUTPATIENT
Start: 2017-12-06 | End: 2017-12-09 | Stop reason: HOSPADM

## 2017-12-06 RX ADMIN — ONDANSETRON 4 MG: 2 INJECTION, SOLUTION INTRAMUSCULAR; INTRAVENOUS at 10:42

## 2017-12-06 RX ADMIN — ENOXAPARIN SODIUM 40 MG: 40 INJECTION SUBCUTANEOUS at 21:26

## 2017-12-06 RX ADMIN — INSULIN GLARGINE 20 UNITS: 100 INJECTION, SOLUTION SUBCUTANEOUS at 22:57

## 2017-12-06 RX ADMIN — LOPERAMIDE HYDROCHLORIDE 2 MG: 2 CAPSULE ORAL at 13:35

## 2017-12-06 RX ADMIN — SODIUM CHLORIDE 50 ML/HR: 900 INJECTION, SOLUTION INTRAVENOUS at 16:58

## 2017-12-06 RX ADMIN — IOPAMIDOL 100 ML: 755 INJECTION, SOLUTION INTRAVENOUS at 16:58

## 2017-12-06 RX ADMIN — OXYCODONE HYDROCHLORIDE AND ACETAMINOPHEN 1 TABLET: 5; 325 TABLET ORAL at 22:57

## 2017-12-06 RX ADMIN — HYDRALAZINE HYDROCHLORIDE 50 MG: 50 TABLET, FILM COATED ORAL at 21:26

## 2017-12-06 RX ADMIN — Medication 10 ML: at 16:58

## 2017-12-06 RX ADMIN — SODIUM CHLORIDE 1000 ML: 900 INJECTION, SOLUTION INTRAVENOUS at 10:42

## 2017-12-06 NOTE — ED NOTES
Spoke with CT concerning patient's IV access. CT tech states nurses at [facility] reported having issues with IV with possible infiltration. Patient taken to CT and CT tech reports no dye visible on scan, indicating possible infiltration with contrast. Primary nurse aware.

## 2017-12-06 NOTE — IP AVS SNAPSHOT
[address] [facility] 
[phone] Patient: Diana Albright MRN: [id number] :1985 You are allergic to the following No active allergies Recent Documentation Height Weight BMI Smoking Status 1.88 m 149.7 kg 42.37 kg/m2 Former Smoker Emergency Contacts  (Rel.) Home Phone Work Phone Mobile Phone Ace,(Aunt)Stacy (Other Relative) [phone] -- [phone] Akhil Fry (Other Relative) [phone] -- -- About your hospitalization You were admitted on:  2017 You last received care in the:  [address] You were discharged on:  2017 Why you were hospitalized Your primary diagnosis was:  Not on File Your diagnoses also included:  Esrd (End Stage Renal Disease) (Hcc), Dm (Diabetes Mellitus) (Hcc), Htn (Hypertension), Diarrhea Providers Seen During Your Hospitalization Provider Specialty Primary office phone Zhang Reno MD Emergency Medicine [phone] Chris Blount MD Internal Medicine [phone] Your Primary Care Physician (PCP) Primary Care Physician Office Phone Office Fax Bobie [address] [phone] Follow-up Information Follow up With Details Comments Contact Info Donna Sharpe MD Schedule an appointment as soon as possible for a visit in 1 week  [address] [place] [address] [address] [address] 
[phone] Yamilex Benedict DO   [address] [address] [facility] 
[phone] Appointments for Next 14 days 2017 11:10 AM  NEW PATIENT [facility] My Medications STOP taking these medications   
 bismuth subsalicylate 779 EV/86 mL suspension Commonly known as:  PEPTO-BISMOL TAKE these medications as instructed Instructions Each Dose to Equal  
 Morning Noon Evening Bedtime  
 amLODIPine 10 mg tablet Commonly known as:  Francis Oliver Your last dose was: Your next dose is: Take 1 Tab by mouth daily. Indications: hypertension 10 mg  
    
   
   
   
  
 cloNIDine HCl 0.1 mg tablet Commonly known as:  CATAPRES Your last dose was: Your next dose is: Take 1 Tab by mouth three (3) times daily (with meals). 0.1 mg  
    
   
   
   
  
 HumaLOG 100 unit/mL injection Generic drug:  insulin lispro Your last dose was: Your next dose is:    
   
   
 by SubCUTAneous route Before breakfast, lunch, and dinner. Per sliding scale  
     
   
   
   
  
 hydrALAZINE 50 mg tablet Commonly known as:  APRESOLINE Your last dose was: Your next dose is: Take 1 Tab by mouth three (3) times daily. Indications: hypertension 50 mg  
    
   
   
   
  
 LANTUS 100 unit/mL injection Generic drug:  insulin glargine Your last dose was: Your next dose is:    
   
   
 44 Units by SubCUTAneous route two (2) times a day. Indications: type 2 diabetes mellitus 44 Units  
    
   
   
   
  
 lisinopril 5 mg tablet Commonly known as:  Hailee Jorgensen Your last dose was: Your next dose is: Take  by mouth daily. loperamide 2 mg capsule Commonly known as:  IMODIUM Your last dose was: Your next dose is: Take 1 Cap by mouth four (4) times daily as needed for Diarrhea.  
 2 mg  
    
   
   
   
  
 omeprazole 40 mg capsule Commonly known as:  PriLOSEC Your last dose was: Your next dose is: Take 1 Cap by mouth daily. 40 mg Where to Get Your Medications Information on where to get these meds will be given to you by the nurse or doctor. ! Ask your nurse or doctor about these medications  
  loperamide 2 mg capsule  
 omeprazole 40 mg capsule Discharge Instructions HOSPITALIST DISCHARGE INSTRUCTIONS 
 
NAME: Gonzalo Marks :  1985 MRN:  [id number] Date/Time:  2017 12:10 PM 
 
ADMIT DATE: 2017 DISCHARGE DATE: 2017 DISCHARGE DIAGNOSIS: 
Persistent SubAcute Diarrhea POA (3 weeks PTA) ESRD 
HTN 
DM Legally Blind MEDICATIONS: 
· It is important that you take the medication exactly as they are prescribed. · Keep your medication in the bottles provided by the pharmacist and keep a list of the medication names, dosages, and times to be taken in your wallet. · Do not take other medications without consulting your doctor. Pain Management: per above medications What to do at [facility] Recommended diet:  Resume previous diet Recommended activity: Activity as tolerated If you have questions regarding the hospital related prescriptions or hospital related issues please call Cheyenne Anderson at . If you experience any of the following symptoms then please call your primary care physician or return to the emergency room if you cannot get hold of your doctor: 
Fever, chills, nausea, vomiting, diarrhea, change in mentation, falling, bleeding, shortness of breath Information obtained by : 
I understand that if any problems occur once I am at home I am to contact my physician. I understand and acknowledge receipt of the instructions indicated above. Physician's or R.N.'s Signature                                                                  Date/Time Patient or Representative Signature                                                          Date/Time Discharge Orders None [employer] Announcement We are excited to announce that we are making your provider's discharge notes available to you in [employer]. You will see these notes when they are completed and signed by the physician that discharged you from your recent hospital stay. If you have any questions or concerns about any information you see in [employer], please call the Health Information Department where you were seen or reach out to your Primary Care Provider for more information about your plan of care. Introducing [facility]! [facility] introduces [employer]iles patient portal. Now you can access parts of your medical record, email your doctor's office, and request medication refills online. 1. In your internet browser, go to [email or web address] 2. Click on the First Time User? Click Here link in the Sign In box. You will see the New Member Sign Up page. 3. Enter your [employer]iles Access Code exactly as it appears below. You will not need to use this code after youve completed the sign-up process. If you do not sign up before the expiration date, you must request a new code. · [employer] Access Code: [email or web address] Expires: 2/27/2018  2:40 PM 
 
4. Enter the last four digits of your Social Security Number (xxxx) and Date of Birth (mm/dd/yyyy) as indicated and click Submit. You will be taken to the next sign-up page. 5. Create a [employer]iles ID. This will be your Quintiles login ID and cannot be changed, so think of one that is secure and easy to remember. 6. Create a [employer] password. You can change your password at any time. 7. Enter your Password Reset Question and Answer. This can be used at a later time if you forget your password. 8. Enter your e-mail address. You will receive e-mail notification when new information is available in [address] 9. Click Sign Up. You can now view and download portions of your medical record. 10. Click the Download Summary menu link to download a portable copy of your medical information. If you have questions, please visit the Frequently Asked Questions section of the [employer] website. Remember, [employer] is NOT to be used for urgent needs. For medical emergencies, dial 911. Now available from your iPhone and Android! General Information Please provide this summary of care documentation to your next provider. Patient Signature:  ____________________________________________________________ Date:  ____________________________________________________________  
  
Michi Sport Provider Signature:  ____________________________________________________________ Date:  ____________________________________________________________

## 2017-12-06 NOTE — ED NOTES
Patient here with c/o nausea vomiting and diarrhea for a couple of weeks. Patient and mother state that he has been having ongoing problems, states he was seen in the hospital a week ago and tests were performed, however states that \"they didn't find nothing and didn't put me on any medicines. \"  Patient denies fevers. Patient reports hx of dialysis and diabetes. Patient reports stomach pains. Patient reports headaches likely due to dehydration. Patient produced very watery stool sample immediately on arrival.  Patient and family estimate \"gone to the bathroom about 40 times\". Emergency Department Nursing Plan of Care       The Nursing Plan of Care is developed from the Nursing assessment and Emergency Department Attending provider initial evaluation. The plan of care may be reviewed in the ED Provider note.     The Plan of Care was developed with the following considerations:   Patient / Family readiness to learn indicated by:verbalized understanding  Persons(s) to be included in education: patient  Barriers to Learning/Limitations:No    Signed     Xuan Post RN    12/6/2017   10:20 AM

## 2017-12-06 NOTE — CONSULTS
Renal-consult received. Dialyzes 4 hours MWF at [address] reviewed. He is esrd. OK for ct with contrast if needed. Planned HD. Gentle IVF.  Pt left 6 kg below TW at last outpatient Uche Dick MD

## 2017-12-06 NOTE — H&P
Hospitalist Admission Note    NAME: Sommer Mcdaniels   :  1985   MRN:  [id number]     Date/Time:  2017 5:25 PM    Patient PCP: Jose Luis Merlos III, DO  ________________________________________________________________________    My assessment of this patient's clinical condition and my plan of care is as follows. Assessment / Plan:    1) Diarrhea: No fever, no chills, no elevated WCC, going for 3 weeks, no sick contacts or travel hx, no change in meds or new meds. results from CT Abdomen  IMPRESSION: No Acute Disease. , will consult GI for further recs    2) ESRD: Dr Brooke Miles will follow, he is on MWF, no dialysis today, will be tomorrow AM    3) HTN: Continue current meds and adjust as needed, PRN hydralazine    4) DM: Continue lantus and RISS    5) Legally Blind      Code Status: full  Surrogate Decision Maker:    DVT Prophylaxis: lovenox  GI Prophylaxis: not indicated    Baseline:         Subjective:   CHIEF COMPLAINT: diarrhea    HISTORY OF PRESENT ILLNESS:     Jeniffer Dubon is a 34 y.o. PMH ESRD (MWF),DM,HTN, legally blind , who presents to the ED because a 2-3 week hx of diarrhea and nausea. He denies fevers, chills, no travel hx, no sick contacts, no new meds, no change in meds. We were asked to admit for work up and evaluation of the above problems.      Past Medical History:   Diagnosis Date    Cataracts     Chronic kidney disease     Diabetes ([address]     Hypercholesterolemia     Hypertension     Obesity         Past Surgical History:   Procedure Laterality Date    HX AMPUTATION      Left great toe and L 5th toe    VASCULAR SURGERY PROCEDURE UNLIST      R side chest    VASCULAR SURGERY PROCEDURE UNLIST  2017    Creation transposed AV fistula right arm       Social History   Substance Use Topics    Smoking status: Former Smoker     Packs/day: 0.25    Smokeless tobacco: Never Used      Comment: quit 6 months ago    Alcohol use No      Comment: rarely Family History   Problem Relation Age of Onset    Diabetes Mother     Liver Disease Father     Kidney Disease Maternal Grandfather     Diabetes Maternal Grandfather     Hypertension Maternal Grandfather     Diabetes Paternal Uncle     Hypertension Paternal Uncle      No Known Allergies     Prior to Admission medications    Medication Sig Start Date End Date Taking? Authorizing Provider   bismuth subsalicylate (PEPTO-BISMOL) 262 mg/15 mL suspension Take 30 mL by mouth every six (6) hours as needed for Indigestion. Yes Caesar Hammond MD   loperamide (IMODIUM) 2 mg capsule Take 1 Cap by mouth four (4) times daily as needed for Diarrhea for up to 10 days. 11/29/17 12/9/17 Yes Chantal Miranda MD   insulin glargine (LANTUS) 100 unit/mL injection 44 Units by SubCUTAneous route two (2) times a day. Indications: type 2 diabetes mellitus   Yes Historical Provider   insulin lispro (HUMALOG) 100 unit/mL injection by SubCUTAneous route Before breakfast, lunch, and dinner. Per sliding scale   Yes Historical Provider   lisinopril (PRINIVIL, ZESTRIL) 5 mg tablet Take  by mouth daily. Historical Provider   amLODIPine (NORVASC) 10 mg tablet Take 1 Tab by mouth daily. Indications: hypertension 5/5/17   Manoj Hatch MD   cloNIDine HCl (CATAPRES) 0.1 mg tablet Take 1 Tab by mouth three (3) times daily (with meals). 5/5/17   Manoj Hatch MD   hydrALAZINE (APRESOLINE) 50 mg tablet Take 1 Tab by mouth three (3) times daily. Indications: hypertension  Patient taking differently: Take 50 mg by mouth daily. Indications: hypertension 5/5/17   Manoj Hatch MD       REVIEW OF SYSTEMS:     I am not able to complete the review of systems because:    The patient is intubated and sedated    The patient has altered mental status due to his acute medical problems    The patient has baseline aphasia from prior stroke(s)    The patient has baseline dementia and is not reliable historian    The patient is in acute medical distress and unable to provide information           Total of 12 systems reviewed as follows:       POSITIVE= underlined text  Negative = text not underlined  General:  fever, chills, sweats, generalized weakness, weight loss/gain,      loss of appetite   Eyes:    blurred vision, eye pain, loss of vision, double vision  ENT:    rhinorrhea, pharyngitis   Respiratory:   cough, sputum production, SOB, LITTLE, wheezing, pleuritic pain   Cardiology:   chest pain, palpitations, orthopnea, PND, edema, syncope   Gastrointestinal:  abdominal pain , N/V, diarrhea, dysphagia, constipation, bleeding   Genitourinary:  frequency, urgency, dysuria, hematuria, incontinence   Muskuloskeletal :  arthralgia, myalgia, back pain  Hematology:  easy bruising, nose or gum bleeding, lymphadenopathy   Dermatological: rash, ulceration, pruritis, color change / jaundice  Endocrine:   hot flashes or polydipsia   Neurological:  headache, dizziness, confusion, focal weakness, paresthesia,     Speech difficulties, memory loss, gait difficulty  Psychological: Feelings of anxiety, depression, agitation    Objective:   VITALS:    Visit Vitals    BP (!) 207/136    Pulse 96    Temp 98.4 °F (36.9 °C)    Resp 16    Ht 6' 2\" (1.88 m)    Wt 149.7 kg (330 lb)    SpO2 99%    BMI 42.37 kg/m2       PHYSICAL EXAM:    General:    Alert, cooperative, no distress, appears stated age. HEENT: Atraumatic, anicteric sclerae, pink conjunctivae     No oral ulcers, mucosa moist, throat clear, dentition fair  Neck:  Supple, symmetrical,  thyroid: non tender  Lungs:   Clear to auscultation bilaterally. No Wheezing or Rhonchi. No rales. Chest wall:  No tenderness  No Accessory muscle use. Heart:   Regular  rhythm,  No  murmur   No edema  Abdomen:   Soft, non-tender. distended. Bowel sounds hyperactive   Extremities: No cyanosis. No clubbing,      Skin turgor normal, Capillary refill normal, Radial dial pulse 2+  Skin:     Not pale.   Not Jaundiced  No rashes   Psych:  Good insight. Not depressed. Not anxious or agitated. Neurologic: EOMs intact. No facial asymmetry. No aphasia or slurred speech. Symmetrical strength, Sensation grossly intact. Alert and oriented X 4.     _______________________________________________________________________  Care Plan discussed with:    Comments   Patient x    Family  x    RN x    Care Manager                    Consultant:      _______________________________________________________________________  Expected  Disposition:   Home with Family    HH/PT/OT/RN    SNF/LTC x   JAJA    ________________________________________________________________________  TOTAL TIME:  48 Minutes    Critical Care Provided     Minutes non procedure based      Comments     Reviewed previous records   >50% of visit spent in counseling and coordination of care  Discussion with patient and/or family and questions answered       ________________________________________________________________________  Signed: Ryan Sorenson MD    Procedures: see electronic medical records for all procedures/Xrays and details which were not copied into this note but were reviewed prior to creation of Plan.     LAB DATA REVIEWED:    Recent Results (from the past 24 hour(s))   GLUCOSE, POC    Collection Time: 12/06/17 10:23 AM   Result Value Ref Range    Glucose (POC) 100 65 - 100 mg/dL    [facility]    Collection Time: 12/06/17 10:35 AM   Result Value Ref Range    Occult blood, stool NEGATIVE  NEG     CULTURE, STOOL    Collection Time: 12/06/17 10:35 AM   Result Value Ref Range    Special Requests: NO SPECIAL REQUESTS      Campylobacter antigen NEGATIVE      Culture result: PENDING    METABOLIC PANEL, COMPREHENSIVE    Collection Time: 12/06/17 10:40 AM   Result Value Ref Range    Sodium 139 136 - 145 mmol/L    Potassium 3.9 3.5 - 5.1 mmol/L    Chloride 101 97 - 108 mmol/L    CO2 25 21 - 32 mmol/L    Anion gap 13 5 - 15 mmol/L    Glucose 112 (H) 65 - 100 mg/dL    BUN 48 (H) 6 - 20 MG/DL    Creatinine 10.25 (H) 0.70 - 1.30 MG/DL    BUN/Creatinine ratio 5 (L) 12 - 20      GFR est AA 7 (L) >60 ml/min/1.73m2    GFR est non-AA 6 (L) >60 ml/min/1.73m2    Calcium 9.3 8.5 - 10.1 MG/DL    Bilirubin, total 0.3 0.2 - 1.0 MG/DL    ALT (SGPT) 10 (L) 12 - 78 U/L    AST (SGOT) 14 (L) 15 - 37 U/L    Alk. phosphatase 60 45 - 117 U/L    Protein, total 9.1 (H) 6.4 - 8.2 g/dL    Albumin 2.7 (L) 3.5 - 5.0 g/dL    Globulin 6.4 (H) 2.0 - 4.0 g/dL    A-G Ratio 0.4 (L) 1.1 - 2.2     LIPASE    Collection Time: 12/06/17 10:40 AM   Result Value Ref Range    Lipase 152 73 - 393 U/L   CBC WITH AUTOMATED DIFF    Collection Time: 12/06/17 10:40 AM   Result Value Ref Range    WBC 10.4 4.1 - 11.1 K/uL    RBC 4.41 4.10 - 5.70 M/uL    HGB 11.5 (L) 12.1 - 17.0 g/dL    HCT 35.6 (L) 36.6 - 50.3 %    MCV 80.7 80.0 - 99.0 FL    MCH 26.1 26.0 - 34.0 PG    MCHC 32.3 30.0 - 36.5 g/dL    RDW 13.3 11.5 - 14.5 %    PLATELET 907 392 - 764 K/uL    NEUTROPHILS 71 32 - 75 %    LYMPHOCYTES 18 12 - 49 %    MONOCYTES 9 5 - 13 %    EOSINOPHILS 2 0 - 7 %    BASOPHILS 0 0 - 1 %    ABS. NEUTROPHILS 7.3 1.8 - 8.0 K/UL    ABS. LYMPHOCYTES 1.9 0.8 - 3.5 K/UL    ABS. MONOCYTES 0.9 0.0 - 1.0 K/UL    ABS. EOSINOPHILS 0.2 0.0 - 0.4 K/UL    ABS.  BASOPHILS 0.0 0.0 - 0.1 K/UL   C. DIFFICILE (DNA)    Collection Time: 12/06/17 10:59 AM   Result Value Ref Range    C. difficile (DNA) NEGATIVE  NEG

## 2017-12-06 NOTE — ED NOTES
TRANSFER - IN REPORT:    Verbal report received from [address] [place] [address],[address] (name) on Unruly Higgins  being received from (unit) for routine progression of care      Report consisted of patients Situation, Background, Assessment and   Recommendations(SBAR). Information from the following report(s) SBAR, Kardex, ED Summary and MAR was reviewed with the receiving nurse. Opportunity for questions and clarification was provided. Assessment completed upon patients arrival to unit and care assumed. Pt able to transport  Via stretcher with pt belongings.

## 2017-12-06 NOTE — ED NOTES
Pt is alert and oriented and ambulatory. Denies pain or SOB. Pt reports he cannot get urine specimen for us due to each time he goes to the restroom he has diarrhea. Pt reports he has not had much fluids in so he cannot pee. Pt vomited at 0800, has not ate anything today.     Pt reports nephrology MD is Von Fonseca or Miguel Hogan

## 2017-12-06 NOTE — ED PROVIDER NOTES
EMERGENCY DEPARTMENT HISTORY AND PHYSICAL EXAM      Date: 12/6/2017  Patient Name: Cristela Do    History of Presenting Illness     Diarrhea    History Provided By: Patient and chart review    HPI: Cristela Do, 34 y.o. male with PMHx significant for ESRD on HD MWF, CKD, HTN, DM, presents via EMS as a transfer to the ED with cc of diarrhea. Patient has had diarrhea ongoing for 1 week. C.diff testing last week in ED [facility] ED was negative. There was a conversation with patients Nephrologist Dr Shai Jade who wanted him transferred for admission for dialysis and GI consult. Patient reports diarrhea since 11/25/17. He states it is multiple times a day since onset and has become even more frequent in last few days. He also c/o occ. Vomiting twice today that looks like food and smells \"like rotten eggs\". He denies abd pain, blood in stool. States the diarrhea is sometimes water and sometimes loose brown stool, he denies pain with defecation. He is still tolerating fluids and food without difficulty. He denies medication changes or antibiotic use recently. He received the flu shot 9/2017. Denies sick contacts, recent travel, drinking well water or river water. He has been taking immodium with mild relief. He states this happened 2 years ago when he started HD but they never determined a cause. PCP: Adelaida Kent III, DO    There are no other complaints, changes, or physical findings at this time. Current Outpatient Prescriptions   Medication Sig Dispense Refill    bismuth subsalicylate (PEPTO-BISMOL) 262 mg/15 mL suspension Take 30 mL by mouth every six (6) hours as needed for Indigestion.  loperamide (IMODIUM) 2 mg capsule Take 1 Cap by mouth four (4) times daily as needed for Diarrhea for up to 10 days. 20 Cap 0    insulin glargine (LANTUS) 100 unit/mL injection 44 Units by SubCUTAneous route two (2) times a day.  Indications: type 2 diabetes mellitus      insulin lispro (HUMALOG) 100 unit/mL injection by SubCUTAneous route Before breakfast, lunch, and dinner. Per sliding scale      lisinopril (PRINIVIL, ZESTRIL) 5 mg tablet Take  by mouth daily.  amLODIPine (NORVASC) 10 mg tablet Take 1 Tab by mouth daily. Indications: hypertension 30 Tab 0    cloNIDine HCl (CATAPRES) 0.1 mg tablet Take 1 Tab by mouth three (3) times daily (with meals). 90 Tab 0    hydrALAZINE (APRESOLINE) 50 mg tablet Take 1 Tab by mouth three (3) times daily. Indications: hypertension (Patient taking differently: Take 50 mg by mouth daily. Indications: hypertension) 90 Tab 0       Past History     Past Medical History:  Past Medical History:   Diagnosis Date    Cataracts     Chronic kidney disease     Diabetes ([facility] [address]     Hypercholesterolemia     Hypertension     Obesity        Past Surgical History:  Past Surgical History:   Procedure Laterality Date    HX AMPUTATION      Left great toe and L 5th toe    VASCULAR SURGERY PROCEDURE UNLIST      R side chest    VASCULAR SURGERY PROCEDURE UNLIST  07/25/2017    Creation transposed AV fistula right arm       Family History:  Family History   Problem Relation Age of Onset    Diabetes Mother     Liver Disease Father     Kidney Disease Maternal Grandfather     Diabetes Maternal Grandfather     Hypertension Maternal Grandfather     Diabetes Paternal Uncle     Hypertension Paternal Uncle        Social History:  Social History   Substance Use Topics    Smoking status: Former Smoker     Packs/day: 0.25    Smokeless tobacco: Never Used      Comment: quit 6 months ago    Alcohol use No      Comment: rarely       Allergies:  No Known Allergies      Review of Systems   Review of Systems   Constitutional: Negative for chills and fever. HENT: Negative for congestion and rhinorrhea. Eyes: Negative for pain and visual disturbance. Respiratory: Negative for chest tightness, shortness of breath and wheezing. Cardiovascular: Negative for chest pain and leg swelling. Gastrointestinal: Positive for diarrhea and vomiting. Negative for abdominal pain, constipation and nausea. Endocrine: Negative for polydipsia and polyphagia. Genitourinary: Negative for difficulty urinating, dysuria, frequency and hematuria. Musculoskeletal: Negative for arthralgias and joint swelling. Skin: Negative for rash and wound. Neurological: Negative for dizziness, syncope, weakness and numbness. Psychiatric/Behavioral: Negative. Physical Exam   Physical Exam   Constitutional: He is oriented to person, place, and time. He appears well-developed and well-nourished. obese   HENT:   Head: Normocephalic and atraumatic. Mouth/Throat: Oropharynx is clear and moist.   Eyes: Conjunctivae are normal. Pupils are equal, round, and reactive to light. Neck: Normal range of motion. Neck supple. Cardiovascular: Normal rate, regular rhythm, normal heart sounds and intact distal pulses. Exam reveals no gallop and no friction rub. No murmur heard. Left arm AV fistula with audible bruit   Pulmonary/Chest: Effort normal and breath sounds normal. No respiratory distress. He has no wheezes. He has no rales. Abdominal: Soft. Bowel sounds are normal. He exhibits no distension. There is tenderness (generalized). There is no rebound. Musculoskeletal: Normal range of motion. He exhibits no edema or deformity. Neurological: He is alert and oriented to person, place, and time. Skin: Skin is warm and dry. He is not diaphoretic. Psychiatric: He has a normal mood and affect. His behavior is normal.   Nursing note and vitals reviewed.         Diagnostic Study Results     Labs -     Recent Results (from the past 12 hour(s))   GLUCOSE, POC    Collection Time: 12/06/17 10:23 AM   Result Value Ref Range    Glucose (POC) 100 65 - 100 mg/dL    Performed by [address] [place] [address], STOOL    Collection Time: 12/06/17 10:35 AM   Result Value Ref Range    Occult blood, stool NEGATIVE  NEG METABOLIC PANEL, COMPREHENSIVE    Collection Time: 12/06/17 10:40 AM   Result Value Ref Range    Sodium 139 136 - 145 mmol/L    Potassium 3.9 3.5 - 5.1 mmol/L    Chloride 101 97 - 108 mmol/L    CO2 25 21 - 32 mmol/L    Anion gap 13 5 - 15 mmol/L    Glucose 112 (H) 65 - 100 mg/dL    BUN 48 (H) 6 - 20 MG/DL    Creatinine 10.25 (H) 0.70 - 1.30 MG/DL    BUN/Creatinine ratio 5 (L) 12 - 20      GFR est AA 7 (L) >60 ml/min/1.73m2    GFR est non-AA 6 (L) >60 ml/min/1.73m2    Calcium 9.3 8.5 - 10.1 MG/DL    Bilirubin, total 0.3 0.2 - 1.0 MG/DL    ALT (SGPT) 10 (L) 12 - 78 U/L    AST (SGOT) 14 (L) 15 - 37 U/L    Alk. phosphatase 60 45 - 117 U/L    Protein, total 9.1 (H) 6.4 - 8.2 g/dL    Albumin 2.7 (L) 3.5 - 5.0 g/dL    Globulin 6.4 (H) 2.0 - 4.0 g/dL    A-G Ratio 0.4 (L) 1.1 - 2.2     LIPASE    Collection Time: 12/06/17 10:40 AM   Result Value Ref Range    Lipase 152 73 - 393 U/L   CBC WITH AUTOMATED DIFF    Collection Time: 12/06/17 10:40 AM   Result Value Ref Range    WBC 10.4 4.1 - 11.1 K/uL    RBC 4.41 4.10 - 5.70 M/uL    HGB 11.5 (L) 12.1 - 17.0 g/dL    HCT 35.6 (L) 36.6 - 50.3 %    MCV 80.7 80.0 - 99.0 FL    MCH 26.1 26.0 - 34.0 PG    MCHC 32.3 30.0 - 36.5 g/dL    RDW 13.3 11.5 - 14.5 %    PLATELET 756 610 - 008 K/uL    NEUTROPHILS 71 32 - 75 %    LYMPHOCYTES 18 12 - 49 %    MONOCYTES 9 5 - 13 %    EOSINOPHILS 2 0 - 7 %    BASOPHILS 0 0 - 1 %    ABS. NEUTROPHILS 7.3 1.8 - 8.0 K/UL    ABS. LYMPHOCYTES 1.9 0.8 - 3.5 K/UL    ABS. MONOCYTES 0.9 0.0 - 1.0 K/UL    ABS. EOSINOPHILS 0.2 0.0 - 0.4 K/UL    ABS. BASOPHILS 0.0 0.0 - 0.1 K/UL   C. DIFFICILE (DNA)    Collection Time: 12/06/17 10:59 AM   Result Value Ref Range    C. difficile (DNA) NEGATIVE  NEG         Radiologic Studies -   CT ABD PELV W CONT    (Results Pending)     CT Results  (Last 48 hours)    None        CXR Results  (Last 48 hours)    None            Medical Decision Making   I am the first provider for this patient.     I reviewed the vital signs, available nursing notes, past medical history, past surgical history, family history and social history. Vital Signs-Reviewed the patient's vital signs. Records Reviewed: Old Medical Records, Previous Radiology Studies and Previous Laboratory Studies    Provider Notes (Medical Decision Making):   Diarrhea, colitis, c.diff, gastroenteritis, IBD, IBS    Patient transferred from [facility] for further w/u of diarrhea. Stool studies and c.diff sent at OSH facility. He has generalized abd tenderness on exam no rigidity or guarding, will scan abdomen as well and admit for dialysis and GI w/u. Will also add UA as this was not obtained and patient still makes urine. He is currently HD stable, nontoxic labs stable no indication for urgent HD at this time    ED Course:   Initial assessment performed. The patients presenting problems have been discussed, and they are in agreement with the care plan formulated and outlined with them. I have encouraged them to ask questions as they arise throughout their visit. 4:13 PM  Spoke with Dr Jim Grossman who will admit patient and advised to f/u on CT abdomen and UA    4:13 PM  Spoke with office of Dr Maribel Williamson to have them notify that patient is here now and will be admitted and needs routine HD, Dr Tara Goldman to receive consult. 4:17 PM  I spoke with Dr Tara Goldman who is aware of consult  Disposition:  Admit        Diagnosis     Clinical Impression:   1. Diarrhea, unspecified type    2.  ESRD (end stage renal disease) ([facility] [address]

## 2017-12-06 NOTE — ED TRIAGE NOTES
Pt walked to bathroom escorted by RN. Pt reports liquid stool. Back to bed, placed on enteric precauttions.

## 2017-12-06 NOTE — ED PROVIDER NOTES
HPI Comments: Cait Peres is a 34 y.o. male w/ hx significant for DM, obeisty, ESRD w/ dialysis MWF (last dialysis 12/4/2017), HTN, hypercholesterolemia who presents ambulatory to the ED c/o n/v, generalized abd pain/cramping, and watery diarrhea x 2 weeks. Pt seen on 11/29/2017 for same complaints at [facility]. Basic labs, stool culture, C diff, and ova parasites negative. Pt endorses continued symptoms with 3-4 episodes of loose diarrhea every hour and 3-4 episodes of emesis every day. Last diarrhea episode in ED today; last emesis this AM. Pt endorses mild lightheadedness. Pt denies taking Flagyl Rx from 1 week ago ED visit (denies knowledge of Rx). Endorses taking Pepto bismol and imodium at 0600 today. Pt endorses similar symptoms 1 year ago with negative workup by GI specialist.    Pt specifically denies fever, chills, CP, sob, dizziness, headache, vision changes, melena, hematochezia, urinary symptoms, cough, congestion, leg swelling. PCP: Luis Bustamante III, DO    There are no other complaints, changes or physical findings at this time. Patient is a 34 y.o. male presenting with diarrhea. The history is provided by the patient. Diarrhea    This is a new problem. The current episode started more than 1 week ago. The problem occurs constantly. The problem has not changed since onset. The pain is associated with an unknown (no new food/ diet, medications, antibiotics or hospital stays, stress, abd surgeries, trauma, sick contacts.) factor. The pain is located in the generalized abdominal region. The quality of the pain is cramping. The pain is at a severity of 7/10. The pain is moderate. Associated symptoms include diarrhea, nausea and vomiting.  Pertinent negatives include no anorexia, no fever, no belching, no flatus, no hematochezia, no melena, no constipation, no dysuria, no frequency, no hematuria, no headaches, no arthralgias, no myalgias, no trauma, no chest pain, no testicular pain and no back pain. Nothing worsens the pain. The pain is relieved by nothing. His past medical history is significant for DM. His past medical history does not include PUD, GERD, pancreatitis or diverticulitis. Past Medical History:   Diagnosis Date    Cataracts     Chronic kidney disease     Diabetes ([address]     Hypercholesterolemia     Hypertension     Obesity        Past Surgical History:   Procedure Laterality Date    HX AMPUTATION      Left great toe and L 5th toe    VASCULAR SURGERY PROCEDURE UNLIST      R side chest    VASCULAR SURGERY PROCEDURE UNLIST  07/25/2017    Creation transposed AV fistula right arm         Family History:   Problem Relation Age of Onset    Diabetes Mother     Liver Disease Father     Kidney Disease Maternal Grandfather     Diabetes Maternal Grandfather     Hypertension Maternal Grandfather     Diabetes Paternal Uncle     Hypertension Paternal Uncle        Social History     Social History    Marital status: LEGALLY      Spouse name: N/A    Number of children: N/A    Years of education: N/A     Occupational History    Not on file. Social History Main Topics    Smoking status: Former Smoker     Packs/day: 0.25    Smokeless tobacco: Never Used      Comment: quit 6 months ago    Alcohol use No      Comment: rarely    Drug use: No    Sexual activity: Not on file     Other Topics Concern    Not on file     Social History Narrative         ALLERGIES: Review of patient's allergies indicates no known allergies. Review of Systems   Constitutional: Positive for fatigue. Negative for activity change, appetite change, chills, diaphoresis and fever. HENT: Negative. Negative for congestion, sinus pain and sore throat. Eyes: Negative. Negative for redness. Respiratory: Negative. Negative for cough, chest tightness and shortness of breath. Cardiovascular: Negative for chest pain, palpitations and leg swelling.    Gastrointestinal: Positive for abdominal pain, diarrhea, nausea and vomiting. Negative for abdominal distention, anorexia, blood in stool, constipation, flatus, hematochezia and melena. Endocrine: Negative. Genitourinary: Negative. Negative for dysuria, flank pain, frequency, hematuria and testicular pain. Musculoskeletal: Negative for arthralgias, back pain, joint swelling and myalgias. Skin: Negative. Negative for rash. Neurological: Positive for light-headedness. Negative for dizziness, syncope, weakness and headaches. Psychiatric/Behavioral: Negative. Vitals:    12/06/17 0958   BP: (!) 132/91   Pulse: 83   Resp: 18   Temp: 98.1 °F (36.7 °C)   SpO2: 96%   Weight: 151 kg (332 lb 14.3 oz)   Height: 6' 3\" (1.905 m)            Physical Exam   Constitutional: He is oriented to person, place, and time. He appears well-developed and well-nourished. No distress. Pleasant obese  male in NAD. HENT:   Head: Normocephalic and atraumatic. Right Ear: Hearing and external ear normal.   Left Ear: Hearing and external ear normal.   Nose: Nose normal.   Mouth/Throat: Uvula is midline. Mucous membranes are not pale and dry. No oropharyngeal exudate. Eyes: Conjunctivae and EOM are normal. Pupils are equal, round, and reactive to light. Neck: Normal range of motion. Cardiovascular: Normal rate, regular rhythm, normal heart sounds and intact distal pulses. Exam reveals no gallop and no friction rub. No murmur heard. Pulmonary/Chest: Effort normal and breath sounds normal. No accessory muscle usage. No respiratory distress. He has no wheezes. He has no rales. He exhibits no tenderness. Abdominal: Soft. Bowel sounds are normal. He exhibits no distension and no mass. There is no tenderness. There is no rebound and no guarding. No TTP, guarding, rebound, mass. Obese abd. Musculoskeletal: Normal range of motion. Neurological: He is alert and oriented to person, place, and time. No cranial nerve deficit. Skin: Skin is warm, dry and intact. He is not diaphoretic. No pallor. Psychiatric: He has a normal mood and affect. His speech is normal and behavior is normal. Judgment and thought content normal.   Nursing note and vitals reviewed.        MDM  Number of Diagnoses or Management Options  Diarrhea with dehydration:   ESRD (end stage renal disease) [facility]):   Diagnosis management comments: DDx: infectious diarrhea, acute viral illness/ gastroenteritis, chronic diarrhea, dehydration, electrolyte abnormality, kidney failure, hyperglycemia/ hypoglycemia, c diff (negative test 1 wk prior), IBS, IBD    LABORATORY TESTS:  Recent Results (from the past 12 hour(s))  -GLUCOSE, POC  Collection Time: 12/06/17 10:23 AM       Result                                            Value                         Ref Range                       Glucose (POC)                                     100                           65 - 100 mg/dL                  Performed by                                      [address] [place] [address], STOOL  Collection Time: 12/06/17 10:35 AM       Result                                            Value                         Ref Range                       Occult blood, stool                               NEGATIVE                      NEG                        -METABOLIC PANEL, COMPREHENSIVE  Collection Time: 12/06/17 10:40 AM       Result                                            Value                         Ref Range                       Sodium                                            139                           136 - 145 mmol/L                Potassium                                         3.9                           3.5 - 5.1 mmol/L                Chloride                                          101                           97 - 108 mmol/L                 CO2                                               25                            21 - 32 mmol/L                  Anion gap                                         13                            5 - 15 mmol/L                   Glucose                                           112 (H)                       65 - 100 mg/dL                  BUN                                               48 (H)                        6 - 20 MG/DL                    Creatinine                                        10.25 (H)                     0.70 - 1.30 MG/DL               BUN/Creatinine ratio                              5 (L)                         12 - 20                         GFR est AA                                        7 (L)                         >60 ml/min/1.73m2               GFR est non-AA                                    6 (L)                         >60 ml/min/1.73m2               Calcium                                           9.3                           8.5 - 10.1 MG/DL                Bilirubin, total                                  0.3                           0.2 - 1.0 MG/DL                 ALT (SGPT)                                        10 (L)                        12 - 78 U/L                     AST (SGOT)                                        14 (L)                        15 - 37 U/L                     Alk. phosphatase                                  60                            45 - 117 U/L                    Protein, total                                    9.1 (H)                       6.4 - 8.2 g/dL                  Albumin                                           2.7 (L)                       3.5 - 5.0 g/dL                  Globulin                                          6.4 (H)                       2.0 - 4.0 g/dL                  A-G Ratio                                         0.4 (L)                       1.1 - 2.2                  -LIPASE  Collection Time: 12/06/17 10:40 AM       Result                                            Value                         Ref Range                       Lipase                                            152                           73 - 393 U/L               -CBC WITH AUTOMATED DIFF  Collection Time: 12/06/17 10:40 AM       Result                                            Value                         Ref Range                       WBC                                               10.4                          4.1 - 11.1 K/uL                 RBC                                               4.41                          4.10 - 5.70 M/uL                HGB                                               11.5 (L)                      12.1 - 17.0 g/dL                HCT                                               35.6 (L)                      36.6 - 50.3 %                   MCV                                               80.7                          80.0 - 99.0 FL                  MCH                                               26.1                          26.0 - 34.0 PG                  MCHC                                              32.3                          30.0 - 36.5 g/dL                RDW                                               13.3                          11.5 - 14.5 %                   PLATELET                                          346                           150 - 400 K/uL                  NEUTROPHILS                                       71                            32 - 75 %                       LYMPHOCYTES                                       18                            12 - 49 %                       MONOCYTES                                         9                             5 - 13 %                        EOSINOPHILS                                       2                             0 - 7 %                         BASOPHILS                                         0                             0 - 1 %                         ABS.  NEUTROPHILS                                  7.3 1.8 - 8.0 K/UL                  ABS. LYMPHOCYTES                                  1.9                           0.8 - 3.5 K/UL                  ABS. MONOCYTES                                    0.9                           0.0 - 1.0 K/UL                  ABS. EOSINOPHILS                                  0.2                           0.0 - 0.4 K/UL                  ABS. BASOPHILS                                    0.0                           0.0 - 0.1 K/UL               IMAGING RESULTS:  No orders to display    MEDICATIONS GIVEN:  Medications  sodium chloride 0.9 % bolus infusion 1,000 mL (0 mL IntraVENous IV Completed 12/6/17 1205)  sodium chloride (NS) flush 5-10 mL (not administered)  sodium chloride (NS) flush 5-10 mL (not administered)  ondansetron (ZOFRAN) injection 4 mg (4 mg IntraVENous Given 12/6/17 1042)  loperamide (IMODIUM) capsule 2 mg (2 mg Oral Given 12/6/17 1335)    IMPRESSION:  ESRD (end stage renal disease) ([facility] [address]  (primary encounter diagnosis)  Diarrhea with dehydration    PLAN:  1. Current Discharge Medication List      2. Follow-up Information     None      Return to ED if worse                  Amount and/or Complexity of Data Reviewed  Clinical lab tests: ordered and reviewed  Tests in the medicine section of CPT®: ordered and reviewed  Review and summarize past medical records: yes  Discuss the patient with other providers: yes (I reviewed pt's hx, sxs, vitals, and PE w/ attending, Dr. Gavin Amaya. He is in agreement with the care plan.  )    Risk of Complications, Morbidity, and/or Mortality  Presenting problems: moderate  Diagnostic procedures: moderate  Management options: moderate    Patient Progress  Patient progress: stable    ED Course       Procedures    12:08 PM  D/c IV fluids after pt received approx 400ml. Pt resting comfortably in NAD w/ family at bedside.  Informed pt that he requires dialysis today and after consulting with attending, Dr. Gavin Amaya, pt should be transferred to another facility for dialysis and monitoring of dehydration d/t profuse diarrhea. 12:30 PM    I spoke with Dr. Orlin Thompson for [facility]. Discussed available diagnostic tests and clinical findings. He is in agreement with care plans as outlined. He recommends consulting pt's nephrologist/ on call for dialysis at pt's practice to see if based on his labs they believe pt can be managed outpatient or if the patient needs emergent dialysis, in which case the pt can be admitted to [facility]. Dr. Nava Diaz remembers seeing this pt for same complaints 1 week ago and is familiar with the pt. Dr. You Bourgeois, Nephrology, has spoken with pt in ED at [facility] 1 week ago. Adams Lew PA-C      12:54 PM    I spoke with Dr. Mario Hanson, Consult for Nephrology. Discussed available diagnostic tests and clinical findings. He is in agreement with care plans as outlined. He recommends that the pt be transferred to [facility] for dialysis as well as GI consultation. Adams Lew PA-C    TRANSFER NOTE:  1:19 PM  Pt is being transferred to Emergency Department at [facility], transfer accepted by Dr. Nava Diaz. The reasons for pt's transfer have been discussed with the pt and available family. They convey agreement and understanding for the need to be transferred as explained to them by myself.    2:31 PM  EMS Transport here for pt.

## 2017-12-06 NOTE — ED NOTES
Case discussed with Dr Guicho Whittington. Pt may need an abdominal CT. Will give water at present and hold on food. Pt updated. Water and warm blanket provided.

## 2017-12-06 NOTE — ED NOTES
TRANSFER - OUT REPORT:    Verbal report given to 613 Naya Fontaine RN(name) on Jose Fung  being transferred to [id number] [facility] ER (unit) for routine progression of care       Report consisted of patients Situation, Background, Assessment and   Recommendations(SBAR). Information from the following report(s) SBAR, Kardex, ED Summary, Intake/Output, MAR and Recent Results was reviewed with the receiving nurse. Lines:   Venous Access Device palindrime dual lumen catheter LOT [id number] 05/04/17 Upper chest (subclavicular area, right (Active)       Peripheral IV 12/06/17 Right Arm (Active)   Site Assessment Clean, dry, & intact 12/6/2017 10:49 AM   Phlebitis Assessment 0 12/6/2017 10:49 AM   Infiltration Assessment 0 12/6/2017 10:49 AM   Dressing Status Clean, dry, & intact 12/6/2017 10:49 AM   Hub Color/Line Status Blue 12/6/2017 10:49 AM   Alcohol Cap Used No 12/6/2017 10:49 AM        Opportunity for questions and clarification was provided.       Patient transported with:   EMS

## 2017-12-06 NOTE — ED NOTES
Provider at bedside discussing transfer to outside facility. Patient IV fluids stopped at this time. Patient given small cup of ice chips.

## 2017-12-07 LAB
ALBUMIN SERPL-MCNC: 2.5 G/DL (ref 3.5–5)
ALBUMIN/GLOB SERPL: 0.4 {RATIO} (ref 1.1–2.2)
ALP SERPL-CCNC: 54 U/L (ref 45–117)
ALT SERPL-CCNC: 10 U/L (ref 12–78)
ANION GAP SERPL CALC-SCNC: 12 MMOL/L (ref 5–15)
AST SERPL-CCNC: 10 U/L (ref 15–37)
BILIRUB SERPL-MCNC: 0.3 MG/DL (ref 0.2–1)
BUN SERPL-MCNC: 55 MG/DL (ref 6–20)
BUN/CREAT SERPL: 5 (ref 12–20)
CALCIUM SERPL-MCNC: 8.6 MG/DL (ref 8.5–10.1)
CHLORIDE SERPL-SCNC: 102 MMOL/L (ref 97–108)
CO2 SERPL-SCNC: 23 MMOL/L (ref 21–32)
CREAT SERPL-MCNC: 11.6 MG/DL (ref 0.7–1.3)
ERYTHROCYTE [DISTWIDTH] IN BLOOD BY AUTOMATED COUNT: 13.3 % (ref 11.5–14.5)
GLOBULIN SER CALC-MCNC: 5.6 G/DL (ref 2–4)
GLUCOSE BLD STRIP.AUTO-MCNC: 104 MG/DL (ref 65–100)
GLUCOSE BLD STRIP.AUTO-MCNC: 153 MG/DL (ref 65–100)
GLUCOSE BLD STRIP.AUTO-MCNC: 159 MG/DL (ref 65–100)
GLUCOSE BLD STRIP.AUTO-MCNC: 98 MG/DL (ref 65–100)
GLUCOSE SERPL-MCNC: 101 MG/DL (ref 65–100)
HCT VFR BLD AUTO: 30.4 % (ref 36.6–50.3)
HGB BLD-MCNC: 10.1 G/DL (ref 12.1–17)
MAGNESIUM SERPL-MCNC: 2.3 MG/DL (ref 1.6–2.4)
MCH RBC QN AUTO: 26.6 PG (ref 26–34)
MCHC RBC AUTO-ENTMCNC: 33.2 G/DL (ref 30–36.5)
MCV RBC AUTO: 80.2 FL (ref 80–99)
PHOSPHATE SERPL-MCNC: 7 MG/DL (ref 2.6–4.7)
PLATELET # BLD AUTO: 309 K/UL (ref 150–400)
POTASSIUM SERPL-SCNC: 4.2 MMOL/L (ref 3.5–5.1)
PROT SERPL-MCNC: 8.1 G/DL (ref 6.4–8.2)
RBC # BLD AUTO: 3.79 M/UL (ref 4.1–5.7)
SERVICE CMNT-IMP: ABNORMAL
SERVICE CMNT-IMP: NORMAL
SODIUM SERPL-SCNC: 137 MMOL/L (ref 136–145)
WBC # BLD AUTO: 9.3 K/UL (ref 4.1–11.1)

## 2017-12-07 PROCEDURE — 65270000015 HC RM PRIVATE ONCOLOGY

## 2017-12-07 PROCEDURE — 83735 ASSAY OF MAGNESIUM: CPT | Performed by: INTERNAL MEDICINE

## 2017-12-07 PROCEDURE — 74011636637 HC RX REV CODE- 636/637: Performed by: INTERNAL MEDICINE

## 2017-12-07 PROCEDURE — 84100 ASSAY OF PHOSPHORUS: CPT | Performed by: INTERNAL MEDICINE

## 2017-12-07 PROCEDURE — 80053 COMPREHEN METABOLIC PANEL: CPT | Performed by: INTERNAL MEDICINE

## 2017-12-07 PROCEDURE — 74011000250 HC RX REV CODE- 250: Performed by: INTERNAL MEDICINE

## 2017-12-07 PROCEDURE — 85027 COMPLETE CBC AUTOMATED: CPT | Performed by: INTERNAL MEDICINE

## 2017-12-07 PROCEDURE — 74011250637 HC RX REV CODE- 250/637: Performed by: INTERNAL MEDICINE

## 2017-12-07 PROCEDURE — 36415 COLL VENOUS BLD VENIPUNCTURE: CPT | Performed by: INTERNAL MEDICINE

## 2017-12-07 PROCEDURE — 82962 GLUCOSE BLOOD TEST: CPT

## 2017-12-07 RX ORDER — INSULIN GLARGINE 100 [IU]/ML
30 INJECTION, SOLUTION SUBCUTANEOUS 2 TIMES DAILY
Status: DISCONTINUED | OUTPATIENT
Start: 2017-12-07 | End: 2017-12-09 | Stop reason: HOSPADM

## 2017-12-07 RX ORDER — ONDANSETRON 2 MG/ML
4 INJECTION INTRAMUSCULAR; INTRAVENOUS
Status: DISCONTINUED | OUTPATIENT
Start: 2017-12-07 | End: 2017-12-09 | Stop reason: HOSPADM

## 2017-12-07 RX ORDER — CHOLESTYRAMINE 4 G/4.8G
4 POWDER, FOR SUSPENSION ORAL
Status: DISCONTINUED | OUTPATIENT
Start: 2017-12-07 | End: 2017-12-07

## 2017-12-07 RX ORDER — HEPARIN SODIUM 5000 [USP'U]/ML
5000 INJECTION, SOLUTION INTRAVENOUS; SUBCUTANEOUS EVERY 8 HOURS
Status: DISCONTINUED | OUTPATIENT
Start: 2017-12-08 | End: 2017-12-09 | Stop reason: HOSPADM

## 2017-12-07 RX ORDER — POLYETHYLENE GLYCOL 3350, SODIUM SULFATE ANHYDROUS, SODIUM BICARBONATE, SODIUM CHLORIDE, POTASSIUM CHLORIDE 236; 22.74; 6.74; 5.86; 2.97 G/4L; G/4L; G/4L; G/4L; G/4L
4000 POWDER, FOR SOLUTION ORAL ONCE
Status: COMPLETED | OUTPATIENT
Start: 2017-12-07 | End: 2017-12-07

## 2017-12-07 RX ADMIN — AMLODIPINE BESYLATE 10 MG: 5 TABLET ORAL at 14:35

## 2017-12-07 RX ADMIN — POLYETHYLENE GLYCOL-3350 AND ELECTROLYTES 4000 ML: 236; 6.74; 5.86; 2.97; 22.74 POWDER, FOR SOLUTION ORAL at 20:53

## 2017-12-07 RX ADMIN — CLONIDINE HYDROCHLORIDE 0.1 MG: 0.1 TABLET ORAL at 18:02

## 2017-12-07 RX ADMIN — INSULIN LISPRO 2 UNITS: 100 INJECTION, SOLUTION INTRAVENOUS; SUBCUTANEOUS at 18:00

## 2017-12-07 RX ADMIN — HYDRALAZINE HYDROCHLORIDE 50 MG: 50 TABLET, FILM COATED ORAL at 18:02

## 2017-12-07 RX ADMIN — INSULIN GLARGINE 30 UNITS: 100 INJECTION, SOLUTION SUBCUTANEOUS at 18:01

## 2017-12-07 RX ADMIN — Medication 10 ML: at 21:40

## 2017-12-07 RX ADMIN — HYDRALAZINE HYDROCHLORIDE 50 MG: 50 TABLET, FILM COATED ORAL at 21:39

## 2017-12-07 RX ADMIN — LISINOPRIL 5 MG: 5 TABLET ORAL at 14:35

## 2017-12-07 NOTE — CONSULTS
[facility] Consult Note        NAME: Mateo Parnell       :  1985       MRN:  [id number]     Date/Time: 2017    Risk of deterioration: low       Assessment:    Plan:  ESRD  Acute on chronic diarrhea  Obesity AVF  HTN  DM Seen on Dialysis, bp stable--HD MWF-will do dialysis today and sat and then get back on schedule next week. AVF working well  Pt has lost weight -spoke to dialysis unit ([facility]) and he is several KGs below his tw. He has missed treatments due to the diarrhea over the last few weeks. Work up underway  CT (-)                                                                                                                                                                                                                                                                                                                                                                                                                                                                                                                                                                                        Asked to see for esrd needs    Subjective:     Chief Complaint:  My diarrhea is a little better     Review of Systems: diarrhea x 2-3 weeks, no f/c/cp/sob subjective fever-intermittent abdominal pain-compliant with hd-but diarrhea has been preventing him from coming to all treatments--    Objective:     VITALS:   Last 24hrs VS reviewed since prior progress note.  Most recent are:  Visit Vitals    BP (!) 148/92    Pulse 90    Temp 98.9 °F (37.2 °C)    Resp 16    Ht 6' 2\" (1.88 m)    Wt 149.7 kg (330 lb)    SpO2 96%    BMI 42.37 kg/m2     SpO2 Readings from Last 6 Encounters:   17 96%   17 98%   17 97%   17 96%   17 96%   17 95%          Intake/Output Summary (Last 24 hours) at 17 1122  Last data filed at 17 0850   Gross per 24 hour   Intake                0 ml   Output 0 ml   Net                0 ml        PHYSICAL EXAM:    General   well developed, well nourished, appears stated age, in no acute distress  EENT  NCAT, EOMI  Respiratory   Clear anteriorly  Cardiology  RRR  Abdominal  Soft, nt  Extremities  (L) AVF with t/b              Lab Data Reviewed: (see below)    Medications Reviewed: (see below)    PMH/SH reviewed - no change compared to H&P  _______________________________________________________    Attending Physician: Erwin Quintanilla MD     ____________________________________________________  MEDICATIONS:  Current Facility-Administered Medications   Medication Dose Route Frequency    cholestyramine-aspartame (QUESTRAN LIGHT) packet 4 g  4 g Oral TID WITH MEALS    ondansetron (ZOFRAN) injection 4 mg  4 mg IntraVENous Q4H PRN    insulin glargine (LANTUS) injection 30 Units  30 Units SubCUTAneous BID    [START ON 12/8/2017] heparin (porcine) injection 5,000 Units  5,000 Units SubCUTAneous Q8H    amLODIPine (NORVASC) tablet 10 mg  10 mg Oral DAILY    cloNIDine HCl (CATAPRES) tablet 0.1 mg  0.1 mg Oral TID WITH MEALS    hydrALAZINE (APRESOLINE) tablet 50 mg  50 mg Oral TID    lisinopril (PRINIVIL, ZESTRIL) tablet 5 mg  5 mg Oral DAILY    sodium chloride (NS) flush 5-10 mL  5-10 mL IntraVENous Q8H    sodium chloride (NS) flush 5-10 mL  5-10 mL IntraVENous PRN    insulin lispro (HUMALOG) injection   SubCUTAneous AC&HS    glucose chewable tablet 16 g  4 Tab Oral PRN    dextrose (D50W) injection syrg 12.5-25 g  12.5-25 g IntraVENous PRN    glucagon (GLUCAGEN) injection 1 mg  1 mg IntraMUSCular PRN    hydrALAZINE (APRESOLINE) 20 mg/mL injection 10 mg  10 mg IntraVENous Q6H PRN    oxyCODONE-acetaminophen (PERCOCET) 5-325 mg per tablet 1 Tab  1 Tab Oral Q4H PRN        LABS:  Recent Labs      12/07/17   0851  12/06/17   1040   WBC  9.3  10.4   HGB  10.1*  11.5*   HCT  30.4*  35.6*   PLT  309  346     Recent Labs      12/07/17   0851  12/06/17   1040   NA 137  139   K  4.2  3.9   CL  102  101   CO2  23  25   BUN  55*  48*   CREA  11.60*  10.25*   GLU  101*  112*   CA  8.6  9.3   MG  2.3   --    PHOS  7.0*   --      Recent Labs      12/07/17   0851  12/06/17   1040   SGOT  10*  14*   ALT  10*  10*   AP  54  60   TBILI  0.3  0.3   TP  8.1  9.1*   ALB  2.5*  2.7*   GLOB  5.6*  6.4*   LPSE   --   152     No results for input(s): INR, PTP, APTT in the last 72 hours. No lab exists for component: INREXT   No results for input(s): FE, TIBC, PSAT, FERR in the last 72 hours. No results for input(s): PH, PCO2, PO2 in the last 72 hours. No results for input(s): CPK, CKNDX, TROIQ in the last 72 hours.     No lab exists for component: CPKMB  Lab Results   Component Value Date/Time    Glucose (POC) 98 12/07/2017 08:17 AM    Glucose (POC) 115 12/06/2017 10:09 PM    Glucose (POC) 79 12/06/2017 09:51 PM    Glucose (POC) 76 12/06/2017 09:35 PM    Glucose (POC) 74 12/06/2017 09:21 PM

## 2017-12-07 NOTE — PROGRESS NOTES
Oncology Nursing Communication Tool  3:49 PM  12/7/2017     Bedside shift change report given to Bg Blackmon RN (incoming nurse) by Claudia Holbrook RN (outgoing nurse) on Luisana Montanez. Report included the following information SBAR and Kardex. Significant changes during shift: dialysis      Issues for physician to address: none         Oncology Shift Note   Admission Date 12/6/2017   Admission Diagnosis Diarrhea  ESRD (end stage renal disease) ([facility] [address]  DM (diabetes mellitus) ([facility] [address]  HTN (hypertension)   Code Status Full Code   Consults IP CONSULT TO HOSPITALIST  IP CONSULT TO NEPHROLOGY  IP CONSULT TO NEPHROLOGY  IP CONSULT TO GASTROENTEROLOGY      Cardiac Monitoring [] Yes [] No      Purposeful Hourly Rounding [] Yes    Aida Score Total Score: 2   Aida score 3 or > [] Bed Alarm [] Avasys [] 1:1 sitter [] Patient refused (Place signed refusal form in chart)      Pain Managed [] Yes [] No    Key Pain Meds     The patient is on no pain meds. Influenza Vaccine Received Flu Vaccine for Current Season (usually Sept-March): Yes           Oxygen needs? [] Room air Oxygen @  []1L    []2L    []3L   []4L    []5L   []6L     Use home O2? [] Yes [] No  Perform O2 challenge test using  smartphrase (.Homeoxygen)      Last bowel movement Last Bowel Movement Date: 12/06/17      Urinary Catheter             LDAs             Venous Access Device palindrime dual lumen catheter LOT [id number] 05/04/17 Upper chest (subclavicular area, right (Active)                           Readmission Risk Assessment Tool Score Low Risk            12       Total Score        3 Has Seen PCP in Last 6 Months (Yes=3, No=0)    4 IP Visits Last 12 Months (1-3=4, 4=9, >4=11)    5 Pt. Coverage (Medicare=5 , Medicaid, or Self-Pay=4)        Criteria that do not apply:    . Living with Significant Other. Assisted Living. LTAC. SNF.  or   Rehab    Patient Length of Stay (>5 days = 3)    Charlson Comorbidity Score (Age + Comorbid Conditions)       Expected Length of Stay 3d 19h   Actual Length of Stay 1          Opportunity for questions and clarifications were given to the incoming nurse. Patient's bed is in low position, side rails x2, door open PRN, call bell within reach and patient not in distress.       Xochitl Domingo RN

## 2017-12-07 NOTE — PROCEDURES
[facility] Dialysis Team [facility] Acutes  [phone]    Vitals   Pre   Post   Assessment   Pre   Post     Temp  Temp: 98 °F (36.7 °C) (12/06/17 2302)  98.9 LOC  A&OX4 A&OX4   HR   Pulse (Heart Rate): 98 (12/06/17 2302) 93 Lungs   diminished  diminished   B/P   BP: 145/88 (12/06/17 2302) 183/103 Cardiac   HRR  HRR   Resp   Resp Rate: 18 (12/06/17 2302) 16 Skin   intact  intact   Pain level  Pain Intensity 1: 0 (12/06/17 2256) 0 Edema    none   none   Orders:    Duration:   Start:    6810 End:    1250 Total:   4 hours   Dialyzer:    Revaclear   K Bath:    3   Ca Bath:    2.5   Na/Bicarb:    140/35   Target Fluid Removal:    2000   Access     Type & Location:   PELON AVF, 15 gauge needles used. Post tx: Sites held 10 minutes, bleeding stopped. Labs     Obtained/Reviewed   Critical Results Called   Date when labs were drawn-  Hgb-    HGB   Date Value Ref Range Status   12/06/2017 11.5 (L) 12.1 - 17.0 g/dL Final     K-    Potassium   Date Value Ref Range Status   12/06/2017 3.9 3.5 - 5.1 mmol/L Final     Ca-   Calcium   Date Value Ref Range Status   12/06/2017 9.3 8.5 - 10.1 MG/DL Final     Bun-   BUN   Date Value Ref Range Status   12/06/2017 48 (H) 6 - 20 MG/DL Final     Creat-   Creatinine   Date Value Ref Range Status   12/06/2017 10.25 (H) 0.70 - 1.30 MG/DL Final        Medications/ Blood Products Given     Name   Dose   Route and Time     NONE                Blood Volume Processed (BVP):    97.7 liters Net Fluid   Removed:  2000 ml   Comments Patient tolerated tx well, no complaints during or after. Report called to Oncology unit nurse, Doreen GREENBERG, using SBAR.   Time Out Done: yes, 0850  Primary Nurse Rpt Pre: Mira Cavanaugh RN  Primary Nurse Rpt Post: Mira Cavanaugh RN  Pt Education: Renal diet  Care Plan: HD per MDs orders  Tx Summary: 4 hours on 3 K 2.5 Ca 2000 ml removed  Admiting Diagnosis:  Pt's previous [facility] end   Consent signed -  ys  Sylvain Consent - yes  Hepatitis Status- Antigen negative 12-8-17  Machine #-  [id number]  Telemetry status-no  Pre-dialysis wt. -

## 2017-12-07 NOTE — PROGRESS NOTES
Problem: Falls - Risk of  Goal: *Absence of Falls  Document Aida Fall Risk and appropriate interventions in the flowsheet.    Outcome: Progressing Towards Goal  Fall Risk Interventions:            Medication Interventions: Patient to call before getting OOB    Elimination Interventions: Call light in reach

## 2017-12-07 NOTE — PROGRESS NOTES
Pt arrived to the unit with infiltrated IV to the right hand. IV removed. Unit manager and two nursing supervisors tried to regain IV access without success. MD on call paged and made aware of the situation. Okay to leave IV out for the night. PICC team needs to be called in the morning to gain IV access on the patient.

## 2017-12-07 NOTE — PROGRESS NOTES
Oncology Nursing Communication Tool  7:40 AM  12/7/2017     Bedside shift change report given to Jelena Miranda RN (incoming nurse) by Pawan Villar RN (outgoing nurse) on Gema Connelly. Report included the following information SBAR, Kardex, Intake/Output, MAR and Accordion. Significant changes during shift: No IV access, PICC team to see pt, Dialysis today      Issues for physician to address: Oncology Shift Note   Admission Date 12/6/2017   Admission Diagnosis Diarrhea  ESRD (end stage renal disease) ([facility]  DM (diabetes mellitus) ([facility]  HTN (hypertension)   Code Status Full Code   Consults IP CONSULT TO HOSPITALIST  IP CONSULT TO NEPHROLOGY  IP CONSULT TO NEPHROLOGY  IP CONSULT TO GASTROENTEROLOGY      Cardiac Monitoring [] Yes [x] No      Purposeful Hourly Rounding [x] Yes    Aida Score Total Score: 2   Aida score 3 or > [x] Bed Alarm [] Avasys [] 1:1 sitter [] Patient refused (Place signed refusal form in chart)      Pain Managed [x] Yes [] No    Key Pain Meds     The patient is on no pain meds. Influenza Vaccine Received Flu Vaccine for Current Season (usually Sept-March): Yes           Oxygen needs? [x] Room air Oxygen @  []1L    []2L    []3L   []4L    []5L   []6L     Use home O2? [] Yes [] No  Perform O2 challenge test using  smartphrase (.Homeoxygen)      Last bowel movement Last Bowel Movement Date: 12/06/17      Urinary Catheter             LDAs             Venous Access Device palindrime dual lumen catheter LOT [id number] 05/04/17 Upper chest (subclavicular area, right (Active)                           Readmission Risk Assessment Tool Score Low Risk            12       Total Score        3 Has Seen PCP in Last 6 Months (Yes=3, No=0)    4 IP Visits Last 12 Months (1-3=4, 4=9, >4=11)    5 Pt. Coverage (Medicare=5 , Medicaid, or Self-Pay=4)        Criteria that do not apply:    . Living with Significant Other. Assisted Living. LTAC. SNF.  or   Rehab    Patient Length of Stay (>5 days = 3)    Charlson Comorbidity Score (Age + Comorbid Conditions)       Expected Length of Stay - - -   Actual Length of Stay 1          Opportunity for questions and clarifications were given to the incoming nurse. Patient's bed is in low position, side rails x2, door open PRN, call bell within reach and patient not in distress.       Yvonne Gordon RN

## 2017-12-07 NOTE — DIABETES MGMT
DTC Progress Note    Recommendations/ Comments: please consider the followin) consider titrating basal towards 80% home dose - 36 units bid. 2) change correctional insulin scale to high sensitivity with ESRD. 3) add DM restrictions to diet order. Chart reviewed on Meghann Herrera for hypoglycemia 17 POC - 2121hr 74 mg/dl. Patient is a 34 y.o. male with known DM complicated by ESRD w/ HD (on M,W,F) on Lantus 44 units bid and Lispro ac tid at home. A1c:   Lab Results   Component Value Date/Time    Hemoglobin A1c 8.4 2017 04:43 AM    Hemoglobin A1c 15.6 2015 03:25 AM       Recent Glucose Results:   Lab Results   Component Value Date/Time     (H) 2017 08:51 AM    GLUCPOC 104 (H) 2017 02:13 PM    GLUCPOC 98 2017 08:17 AM    GLUCPOC 115 (H) 2017 10:09 PM        Lab Results   Component Value Date/Time    Creatinine 11.60 2017 08:51 AM     Estimated Creatinine Clearance: 14 mL/min (based on Cr of 11.6). Active Orders   Diet    DIET RENAL Regular        PO intake: No data found. Current hospital DM medication: Lantus 30 units bid, Lispro correctional insulin - normal sensitivity. Will continue to follow as needed.     Thank you  Mumtaz Wlaler RD CDE

## 2017-12-07 NOTE — PROGRESS NOTES
Primary Nurse Belia Nielsen RN and Ld Kelly RN performed a dual skin assessment on this patient No impairment noted  Ranjeet score is 22

## 2017-12-07 NOTE — CONSULTS
GI Consultation Note Vioeltta Mobley)    NAME: Adam Spencer : 1985 MRN: [id number]   ATTG: Dr. Ulisses Fletcher PCP: Hitesh Skaggs DO  Date/Time:  2017 4:14 PM  Subjective:   REASON FOR CONSULT:        Rashida Sherman is a 34 y.o.  AA male who I was asked to see for diarrhea, worsening from chronic. He's been seen here several times with diarrhea, and all stool studies have been negative as well as celiac testing. He says this bout of diarrhea has been going on for 3 weeks or so. Continuous, innumerable BMs daily. Says he can't keep track of it all. Not seeing any blood. He does have abd pain, nausea, and vomiting, and prior gastric emptying study normal. PMH is notable for DM, poorly controlled historically and resultant toe amputation, CKD. No prior GI, no prior colonoscopy for EGD. Past Medical History:   Diagnosis Date    Cataracts     Chronic kidney disease     Diabetes ([facility]     Hypercholesterolemia     Hypertension     Obesity       Past Surgical History:   Procedure Laterality Date    HX AMPUTATION      Left great toe and L 5th toe    VASCULAR SURGERY PROCEDURE UNLIST      R side chest    VASCULAR SURGERY PROCEDURE UNLIST  2017    Creation transposed AV fistula right arm     Social History   Substance Use Topics    Smoking status: Former Smoker     Packs/day: 0.25    Smokeless tobacco: Never Used      Comment: quit 6 months ago    Alcohol use No      Comment: rarely      Family History   Problem Relation Age of Onset    Diabetes Mother     Liver Disease Father     Kidney Disease Maternal Grandfather     Diabetes Maternal Grandfather     Hypertension Maternal Grandfather     Diabetes Paternal Uncle     Hypertension Paternal Uncle       No Known Allergies   Home Medications:  Prior to Admission Medications   Prescriptions Last Dose Informant Patient Reported? Taking? amLODIPine (NORVASC) 10 mg tablet Not Taking at Unknown time  No No   Sig: Take 1 Tab by mouth daily. Indications: hypertension   bismuth subsalicylate (PEPTO-BISMOL) 262 mg/15 mL suspension 2017 at Unknown time  Yes Yes   Sig: Take 30 mL by mouth every six (6) hours as needed for Indigestion. cloNIDine HCl (CATAPRES) 0.1 mg tablet Not Taking at Unknown time  No No   Sig: Take 1 Tab by mouth three (3) times daily (with meals). hydrALAZINE (APRESOLINE) 50 mg tablet Not Taking at Unknown time  No No   Sig: Take 1 Tab by mouth three (3) times daily. Indications: hypertension   Patient taking differently: Take 50 mg by mouth daily. Indications: hypertension   insulin glargine (LANTUS) 100 unit/mL injection 2017 at Unknown time  Yes Yes   Si Units by SubCUTAneous route two (2) times a day. Indications: type 2 diabetes mellitus   insulin lispro (HUMALOG) 100 unit/mL injection 2017 at Unknown time  Yes Yes   Sig: by SubCUTAneous route Before breakfast, lunch, and dinner. Per sliding scale   lisinopril (PRINIVIL, ZESTRIL) 5 mg tablet Not Taking at Unknown time  Yes No   Sig: Take  by mouth daily. loperamide (IMODIUM) 2 mg capsule 2017 at Unknown time  No Yes   Sig: Take 1 Cap by mouth four (4) times daily as needed for Diarrhea for up to 10 days.       Facility-Administered Medications: None     Hospital medications:  Current Facility-Administered Medications   Medication Dose Route Frequency    cholestyramine-aspartame (QUESTRAN LIGHT) packet 4 g  4 g Oral TID WITH MEALS    ondansetron (ZOFRAN) injection 4 mg  4 mg IntraVENous Q4H PRN    insulin glargine (LANTUS) injection 30 Units  30 Units SubCUTAneous BID    [START ON 2017] heparin (porcine) injection 5,000 Units  5,000 Units SubCUTAneous Q8H    amLODIPine (NORVASC) tablet 10 mg  10 mg Oral DAILY    cloNIDine HCl (CATAPRES) tablet 0.1 mg  0.1 mg Oral TID WITH MEALS    hydrALAZINE (APRESOLINE) tablet 50 mg  50 mg Oral TID    lisinopril (PRINIVIL, ZESTRIL) tablet 5 mg  5 mg Oral DAILY    sodium chloride (NS) flush 5-10 mL  5-10 mL IntraVENous Q8H    sodium chloride (NS) flush 5-10 mL  5-10 mL IntraVENous PRN    insulin lispro (HUMALOG) injection   SubCUTAneous AC&HS    glucose chewable tablet 16 g  4 Tab Oral PRN    dextrose (D50W) injection syrg 12.5-25 g  12.5-25 g IntraVENous PRN    glucagon (GLUCAGEN) injection 1 mg  1 mg IntraMUSCular PRN    hydrALAZINE (APRESOLINE) 20 mg/mL injection 10 mg  10 mg IntraVENous Q6H PRN    oxyCODONE-acetaminophen (PERCOCET) 5-325 mg per tablet 1 Tab  1 Tab Oral Q4H PRN     REVIEW OF SYSTEMS:     []     Unable to obtain  ROS due to  []    mental status change  []    sedated   []    intubated   [x]    Total of 11 systems reviewed as follows:  Const:  negative fever, negative chills, negative weight loss  Eyes:   negative diplopia or visual changes, negative eye pain  ENT:   negative coryza, negative sore throat  Resp:   negative cough, hemoptysis, dyspnea  Cards:  negative for chest pain, palpitations, lower extremity edema  :  negative for frequency, dysuria and hematuria  Skin:   negative for rash and pruritus  Heme:  negative for easy bruising and gum/nose bleeding  MS:  negative for myalgias, arthralgias, back pain and muscle weakness  Neurolo:  negative for headaches, dizziness, vertigo, memory problems   Psych:  negative for feelings of anxiety, depression     Pertinent Positives include :    Objective:   VITALS:    Visit Vitals    BP (!) 163/107 (BP 1 Location: Right arm, BP Patient Position: At rest)    Pulse 96    Temp 99.2 °F (37.3 °C)    Resp 16    Ht 6' 2\" (1.88 m)    Wt 149.7 kg (330 lb)    SpO2 95%    BMI 42.37 kg/m2     Temp (24hrs), Av.7 °F (37.1 °C), Min:98 °F (36.7 °C), Max:99.2 °F (37.3 °C)    PHYSICAL EXAM:   General:    Alert, cooperative, no distress, appears stated age. Head:   Normocephalic, without obvious abnormality, atraumatic. Eyes:   Conjunctivae clear, anicteric sclerae.   Pupils are equal  Nose:  Nares normal. No drainage or sinus tenderness. Throat:    Lips, mucosa, and tongue normal.  No Thrush  Neck:  Supple, symmetrical,  no adenopathy, thyroid: non tender  Back:    Symmetric,  No CVA tenderness. Lungs:   CTA bilaterally. No wheezing/rhonchi/rales. Chest wall:  No tenderness or deformity. No Accessory muscle use. Heart:   Regular rate and rhythm,  no murmur, rub or gallop. Abdomen:   Soft, non-tender. Not distended. Bowel sounds normal. No masses  Extremities: Atraumatic, No cyanosis. No edema. No clubbing  Skin:     Texture, turgor normal. No rashes/lesions/jaundice  Lymph: Cervical, supraclavicular normal.  Psych:  Good insight. Not depressed. Not anxious or agitated. Neurologic: EOMs intact. No facial asymmetry. No aphasia or slurred speech. Normal   strength, A/O X 3.      LAB DATA REVIEWED:    Recent Results (from the past 48 hour(s))   GLUCOSE, POC    Collection Time: 12/06/17 10:23 AM   Result Value Ref Range    Glucose (POC) 100 65 - 100 mg/dL    Performed by [address] [place] [address], STOOL    Collection Time: 12/06/17 10:35 AM   Result Value Ref Range    Occult blood, stool NEGATIVE  NEG     CULTURE, STOOL    Collection Time: 12/06/17 10:35 AM   Result Value Ref Range    Special Requests: NO SPECIAL REQUESTS      Campylobacter antigen NEGATIVE      Culture result:        NO ROUTINE ENTERIC PATHOGENS ISOLATED INCLUDING SALMONELLA, SHIGELLA, YERSINIA, VIBRIO OR SHIGA TOXIN PRODUCING E. COLI SO FAR   METABOLIC PANEL, COMPREHENSIVE    Collection Time: 12/06/17 10:40 AM   Result Value Ref Range    Sodium 139 136 - 145 mmol/L    Potassium 3.9 3.5 - 5.1 mmol/L    Chloride 101 97 - 108 mmol/L    CO2 25 21 - 32 mmol/L    Anion gap 13 5 - 15 mmol/L    Glucose 112 (H) 65 - 100 mg/dL    BUN 48 (H) 6 - 20 MG/DL    Creatinine 10.25 (H) 0.70 - 1.30 MG/DL    BUN/Creatinine ratio 5 (L) 12 - 20      GFR est AA 7 (L) >60 ml/min/1.73m2    GFR est non-AA 6 (L) >60 ml/min/1.73m2    Calcium 9.3 8.5 - 10.1 MG/DL    Bilirubin, total 0.3 0.2 - 1.0 MG/DL    ALT (SGPT) 10 (L) 12 - 78 U/L    AST (SGOT) 14 (L) 15 - 37 U/L    Alk. phosphatase 60 45 - 117 U/L    Protein, total 9.1 (H) 6.4 - 8.2 g/dL    Albumin 2.7 (L) 3.5 - 5.0 g/dL    Globulin 6.4 (H) 2.0 - 4.0 g/dL    A-G Ratio 0.4 (L) 1.1 - 2.2     LIPASE    Collection Time: 12/06/17 10:40 AM   Result Value Ref Range    Lipase 152 73 - 393 U/L   CBC WITH AUTOMATED DIFF    Collection Time: 12/06/17 10:40 AM   Result Value Ref Range    WBC 10.4 4.1 - 11.1 K/uL    RBC 4.41 4.10 - 5.70 M/uL    HGB 11.5 (L) 12.1 - 17.0 g/dL    HCT 35.6 (L) 36.6 - 50.3 %    MCV 80.7 80.0 - 99.0 FL    MCH 26.1 26.0 - 34.0 PG    MCHC 32.3 30.0 - 36.5 g/dL    RDW 13.3 11.5 - 14.5 %    PLATELET 664 656 - 579 K/uL    NEUTROPHILS 71 32 - 75 %    LYMPHOCYTES 18 12 - 49 %    MONOCYTES 9 5 - 13 %    EOSINOPHILS 2 0 - 7 %    BASOPHILS 0 0 - 1 %    ABS. NEUTROPHILS 7.3 1.8 - 8.0 K/UL    ABS. LYMPHOCYTES 1.9 0.8 - 3.5 K/UL    ABS. MONOCYTES 0.9 0.0 - 1.0 K/UL    ABS. EOSINOPHILS 0.2 0.0 - 0.4 K/UL    ABS.  BASOPHILS 0.0 0.0 - 0.1 K/UL   C. DIFFICILE (DNA)    Collection Time: 12/06/17 10:59 AM   Result Value Ref Range    C. difficile (DNA) NEGATIVE  NEG     GLUCOSE, POC    Collection Time: 12/06/17  9:21 PM   Result Value Ref Range    Glucose (POC) 74 65 - 100 mg/dL    Performed by Natasha Ibrahim    GLUCOSE, POC    Collection Time: 12/06/17  9:35 PM   Result Value Ref Range    Glucose (POC) 76 65 - 100 mg/dL    Performed by Natasha Ibrahim    GLUCOSE, POC    Collection Time: 12/06/17  9:51 PM   Result Value Ref Range    Glucose (POC) 79 65 - 100 mg/dL    Performed by [address] Cast[address], POC    Collection Time: 12/06/17 10:09 PM   Result Value Ref Range    Glucose (POC) 115 (H) 65 - 100 mg/dL    Performed by Natasha NEWTON, POC    Collection Time: 12/07/17  8:17 AM   Result Value Ref Range    Glucose (POC) 98 65 - 100 mg/dL    Performed by Shaw Gastelum, COMPREHENSIVE    Collection Time: 12/07/17 8:51 AM   Result Value Ref Range    Sodium 137 136 - 145 mmol/L    Potassium 4.2 3.5 - 5.1 mmol/L    Chloride 102 97 - 108 mmol/L    CO2 23 21 - 32 mmol/L    Anion gap 12 5 - 15 mmol/L    Glucose 101 (H) 65 - 100 mg/dL    BUN 55 (H) 6 - 20 MG/DL    Creatinine 11.60 (H) 0.70 - 1.30 MG/DL    BUN/Creatinine ratio 5 (L) 12 - 20      GFR est AA 6 (L) >60 ml/min/1.73m2    GFR est non-AA 5 (L) >60 ml/min/1.73m2    Calcium 8.6 8.5 - 10.1 MG/DL    Bilirubin, total 0.3 0.2 - 1.0 MG/DL    ALT (SGPT) 10 (L) 12 - 78 U/L    AST (SGOT) 10 (L) 15 - 37 U/L    Alk. phosphatase 54 45 - 117 U/L    Protein, total 8.1 6.4 - 8.2 g/dL    Albumin 2.5 (L) 3.5 - 5.0 g/dL    Globulin 5.6 (H) 2.0 - 4.0 g/dL    A-G Ratio 0.4 (L) 1.1 - 2.2     MAGNESIUM    Collection Time: 12/07/17  8:51 AM   Result Value Ref Range    Magnesium 2.3 1.6 - 2.4 mg/dL   PHOSPHORUS    Collection Time: 12/07/17  8:51 AM   Result Value Ref Range    Phosphorus 7.0 (H) 2.6 - 4.7 MG/DL   CBC W/O DIFF    Collection Time: 12/07/17  8:51 AM   Result Value Ref Range    WBC 9.3 4.1 - 11.1 K/uL    RBC 3.79 (L) 4.10 - 5.70 M/uL    HGB 10.1 (L) 12.1 - 17.0 g/dL    HCT 30.4 (L) 36.6 - 50.3 %    MCV 80.2 80.0 - 99.0 FL    MCH 26.6 26.0 - 34.0 PG    MCHC 33.2 30.0 - 36.5 g/dL    RDW 13.3 11.5 - 14.5 %    PLATELET 352 614 - 970 K/uL   GLUCOSE, POC    Collection Time: 12/07/17  2:13 PM   Result Value Ref Range    Glucose (POC) 104 (H) 65 - 100 mg/dL    Performed by Laurel Wallace      IMAGING RESULTS:  CT A/P:  \"FINDINGS:   No urinary tract stones are seen. There is no hydroureteronephrosis. The kidneys  are normal in size. There is no perirenal fluid or ascites.      Liver shows no apparent significant finding without contrast. Pancreas, adrenal  glands, spleen and aorta show no significant enlargement. There is calcification  of medium-sized arteries. No focal inflammation is seen.  There is no free air or  substantial adenopathy.     The bladder is midline and the distal ureters are not dilated. There is no  apparent pelvic mass. The appendix is normal.     IMPRESSION  IMPRESSION: No Acute Disease\"    Assessment/Plan:      Active Problems:    DM (diabetes mellitus) ([facility] (10/16/2009)      HTN (hypertension) (10/16/2009)      ESRD (end stage renal disease) ([facility] (7/25/2017)      Diarrhea (12/6/2017)      Never seen GI, but he's had a previous work up with [facility] and his various inpatient stays. Diarrhea present for months (at least since May when inpatient), but feels its worse over the past few weeks. Stool testing has always been negative, celiac testing negative. Will need colonoscopy with biopsies.  Will plan for EGD with n/v/abd pain also.  ___________________________________________________  RECOMMENDATIONS:      -- clear liquid diet  -- bowel prep   -- NPO p MN  -- hold questran for now (D/C'd)  -- EGD and colonoscopy tomorrow    Discussed Code Status:    [x]    Full Code      []    DNR    ___________________________________________________  Care Plan discussed with:    [x]    Patient   []    Family   []    Nursing   []    Attending  Total Time :  45   minutes   ___________________________________________________  GI: Heather Spaulding MD

## 2017-12-07 NOTE — PROGRESS NOTES
Hospitalist Progress Note    NAME: Kamaljit Shepherd   :  1985   MRN:  [id number]       Assessment / Plan:  Persistent SubAcute Diarrhea POA (3 weeks PTA)  Associated Nausea and intermittent abdominal pain  Recent Stool for C.diff, Cultures and Ova/Parasites negative  Awaiting Gi consult  Start Questran for now  D/c IVF as ESRD and BP currently elevated  CT A/P negative for acute etiology    ESRD  Nephrology consult for ongoing HD    HTN  BP stable  Continue Clonidine, Norvasc, Hydralazine, Lisinopril  PRN IV Hydralazine     DM  Reduce lantus to 30 units BID  Continue SSI     Legally Blind      Code Status: Full     DVT Prophylaxis: SQ Heparin  GI Prophylaxis: not indicated          Subjective: Pt seen and examined at bedside. Continue to have diarrhea. NAD. Overnight events d/w RN   CHIEF COMPLAINT: f/u \"diarrhea\"       Review of Systems:  Symptom Y/N Comments  Symptom Y/N Comments   Fever/Chills n   Chest Pain n    Poor Appetite    Edema     Cough n   Abdominal Pain n    Sputum    Joint Pain     SOB/LITTLE n   Pruritis/Rash     Nausea/vomit y nausea  Tolerating PT/OT     Diarrhea    Tolerating Diet y    Constipation    Other       Could NOT obtain due to:      Objective:     VITALS:   Last 24hrs VS reviewed since prior progress note. Most recent are:  Patient Vitals for the past 24 hrs:   Temp Pulse Resp BP SpO2   17 2302 98 °F (36.7 °C) 98 18 145/88 96 %   17 2003 98.7 °F (37.1 °C) 96 18 (!) 144/97 95 %   17 1600 - - - (!) 209/135 98 %   17 1545 - - - (!) 207/136 99 %   17 1530 - - - (!) 157/117 98 %   17 1526 98.4 °F (36.9 °C) 96 16 (!) 138/91 -     No intake or output data in the 24 hours ending 17 0755     PHYSICAL EXAM:  General: WD, WN. Alert, cooperative, no acute distress    EENT:  EOMI. Anicteric sclerae. MMM  Resp:  CTA bilaterally, no wheezing or rales. No accessory muscle use  CV:  Regular  rhythm,  No edema  GI:  Soft, Non distended, Non tender.  +Bowel sounds  Neurologic:  Alert and oriented X 3, normal speech,   Psych:   Fair insight. Not anxious nor agitated  Skin:  No rashes. No jaundice    Reviewed most current lab test results and cultures  YES  Reviewed most current radiology test results   YES  Review and summation of old records today    NO  Reviewed patient's current orders and MAR    YES  PMH/SH reviewed - no change compared to H&P  ________________________________________________________________________  Care Plan discussed with:    Comments   Patient y    Family      RN y    Care Manager     Consultant                        Multidiciplinary team rounds were held today with , nursing, pharmacist and clinical coordinator. Patient's plan of care was discussed; medications were reviewed and discharge planning was addressed. ________________________________________________________________________  Total NON critical care TIME:  35  Minutes    Total CRITICAL CARE TIME Spent:   Minutes non procedure based      Comments   >50% of visit spent in counseling and coordination of care     ________________________________________________________________________  Sarthak Sotelo MD     Procedures: see electronic medical records for all procedures/Xrays and details which were not copied into this note but were reviewed prior to creation of Plan. LABS:  I reviewed today's most current labs and imaging studies.   Pertinent labs include:  Recent Labs      12/06/17   1040   WBC  10.4   HGB  11.5*   HCT  35.6*   PLT  346     Recent Labs      12/06/17   1040   NA  139   K  3.9   CL  101   CO2  25   GLU  112*   BUN  48*   CREA  10.25*   CA  9.3   ALB  2.7*   TBILI  0.3   SGOT  14*   ALT  10*       Signed: Sarthak Sotelo MD

## 2017-12-08 ENCOUNTER — ANESTHESIA (OUTPATIENT)
Dept: ENDOSCOPY | Age: 32
DRG: 391 | End: 2017-12-08
Payer: MEDICARE

## 2017-12-08 ENCOUNTER — ANESTHESIA EVENT (OUTPATIENT)
Dept: ENDOSCOPY | Age: 32
DRG: 391 | End: 2017-12-08
Payer: MEDICARE

## 2017-12-08 LAB
ALBUMIN SERPL-MCNC: 2.4 G/DL (ref 3.5–5)
ALBUMIN/GLOB SERPL: 0.4 {RATIO} (ref 1.1–2.2)
ALP SERPL-CCNC: 53 U/L (ref 45–117)
ALT SERPL-CCNC: 9 U/L (ref 12–78)
ANION GAP SERPL CALC-SCNC: 8 MMOL/L (ref 5–15)
AST SERPL-CCNC: 9 U/L (ref 15–37)
BACTERIA SPEC CULT: NORMAL
BILIRUB SERPL-MCNC: 0.3 MG/DL (ref 0.2–1)
BUN SERPL-MCNC: 31 MG/DL (ref 6–20)
BUN/CREAT SERPL: 4 (ref 12–20)
C JEJUNI+C COLI AG STL QL: NEGATIVE
CALCIUM SERPL-MCNC: 8.7 MG/DL (ref 8.5–10.1)
CHLORIDE SERPL-SCNC: 100 MMOL/L (ref 97–108)
CO2 SERPL-SCNC: 28 MMOL/L (ref 21–32)
CREAT SERPL-MCNC: 7.92 MG/DL (ref 0.7–1.3)
E COLI SXT1+2 STL IA: NEGATIVE
ERYTHROCYTE [DISTWIDTH] IN BLOOD BY AUTOMATED COUNT: 13.4 % (ref 11.5–14.5)
GLOBULIN SER CALC-MCNC: 5.8 G/DL (ref 2–4)
GLUCOSE BLD STRIP.AUTO-MCNC: 118 MG/DL (ref 65–100)
GLUCOSE BLD STRIP.AUTO-MCNC: 154 MG/DL (ref 65–100)
GLUCOSE BLD STRIP.AUTO-MCNC: 164 MG/DL (ref 65–100)
GLUCOSE BLD STRIP.AUTO-MCNC: 83 MG/DL (ref 65–100)
GLUCOSE SERPL-MCNC: 107 MG/DL (ref 65–100)
HBV SURFACE AG SER QL: <0.1 INDEX
HBV SURFACE AG SER QL: NEGATIVE
HCT VFR BLD AUTO: 30.7 % (ref 36.6–50.3)
HGB BLD-MCNC: 10.1 G/DL (ref 12.1–17)
MAGNESIUM SERPL-MCNC: 2.2 MG/DL (ref 1.6–2.4)
MCH RBC QN AUTO: 26.7 PG (ref 26–34)
MCHC RBC AUTO-ENTMCNC: 32.9 G/DL (ref 30–36.5)
MCV RBC AUTO: 81.2 FL (ref 80–99)
O+P SPEC MICRO: NORMAL
O+P STL CONC: NORMAL
PHOSPHATE SERPL-MCNC: 4.1 MG/DL (ref 2.6–4.7)
PLATELET # BLD AUTO: 295 K/UL (ref 150–400)
POTASSIUM SERPL-SCNC: 4.1 MMOL/L (ref 3.5–5.1)
PROT SERPL-MCNC: 8.2 G/DL (ref 6.4–8.2)
RBC # BLD AUTO: 3.78 M/UL (ref 4.1–5.7)
SERVICE CMNT-IMP: ABNORMAL
SERVICE CMNT-IMP: NORMAL
SERVICE CMNT-IMP: NORMAL
SODIUM SERPL-SCNC: 136 MMOL/L (ref 136–145)
SPECIMEN SOURCE: NORMAL
WBC # BLD AUTO: 8.8 K/UL (ref 4.1–11.1)

## 2017-12-08 PROCEDURE — 74011250637 HC RX REV CODE- 250/637: Performed by: INTERNAL MEDICINE

## 2017-12-08 PROCEDURE — 76060000031 HC ANESTHESIA FIRST 0.5 HR: Performed by: INTERNAL MEDICINE

## 2017-12-08 PROCEDURE — 84100 ASSAY OF PHOSPHORUS: CPT | Performed by: INTERNAL MEDICINE

## 2017-12-08 PROCEDURE — 87340 HEPATITIS B SURFACE AG IA: CPT | Performed by: INTERNAL MEDICINE

## 2017-12-08 PROCEDURE — 74011250636 HC RX REV CODE- 250/636

## 2017-12-08 PROCEDURE — 74011636637 HC RX REV CODE- 636/637: Performed by: INTERNAL MEDICINE

## 2017-12-08 PROCEDURE — 36415 COLL VENOUS BLD VENIPUNCTURE: CPT | Performed by: INTERNAL MEDICINE

## 2017-12-08 PROCEDURE — 0DB68ZX EXCISION OF STOMACH, VIA NATURAL OR ARTIFICIAL OPENING ENDOSCOPIC, DIAGNOSTIC: ICD-10-PCS | Performed by: INTERNAL MEDICINE

## 2017-12-08 PROCEDURE — 0DBE8ZX EXCISION OF LARGE INTESTINE, VIA NATURAL OR ARTIFICIAL OPENING ENDOSCOPIC, DIAGNOSTIC: ICD-10-PCS | Performed by: INTERNAL MEDICINE

## 2017-12-08 PROCEDURE — 82962 GLUCOSE BLOOD TEST: CPT

## 2017-12-08 PROCEDURE — 88305 TISSUE EXAM BY PATHOLOGIST: CPT | Performed by: INTERNAL MEDICINE

## 2017-12-08 PROCEDURE — 76040000019: Performed by: INTERNAL MEDICINE

## 2017-12-08 PROCEDURE — 85027 COMPLETE CBC AUTOMATED: CPT | Performed by: INTERNAL MEDICINE

## 2017-12-08 PROCEDURE — 0DB98ZX EXCISION OF DUODENUM, VIA NATURAL OR ARTIFICIAL OPENING ENDOSCOPIC, DIAGNOSTIC: ICD-10-PCS | Performed by: INTERNAL MEDICINE

## 2017-12-08 PROCEDURE — 74011250636 HC RX REV CODE- 250/636: Performed by: INTERNAL MEDICINE

## 2017-12-08 PROCEDURE — 0DBB8ZX EXCISION OF ILEUM, VIA NATURAL OR ARTIFICIAL OPENING ENDOSCOPIC, DIAGNOSTIC: ICD-10-PCS | Performed by: INTERNAL MEDICINE

## 2017-12-08 PROCEDURE — 0DB78ZX EXCISION OF STOMACH, PYLORUS, VIA NATURAL OR ARTIFICIAL OPENING ENDOSCOPIC, DIAGNOSTIC: ICD-10-PCS | Performed by: INTERNAL MEDICINE

## 2017-12-08 PROCEDURE — 83735 ASSAY OF MAGNESIUM: CPT | Performed by: INTERNAL MEDICINE

## 2017-12-08 PROCEDURE — 74011000250 HC RX REV CODE- 250

## 2017-12-08 PROCEDURE — 77030009426 HC FCPS BIOP ENDOSC BSC -B: Performed by: INTERNAL MEDICINE

## 2017-12-08 PROCEDURE — 65270000015 HC RM PRIVATE ONCOLOGY

## 2017-12-08 PROCEDURE — 77030019988 HC FCPS ENDOSC DISP BSC -B: Performed by: INTERNAL MEDICINE

## 2017-12-08 PROCEDURE — 80053 COMPREHEN METABOLIC PANEL: CPT | Performed by: INTERNAL MEDICINE

## 2017-12-08 RX ORDER — LIDOCAINE HYDROCHLORIDE 20 MG/ML
INJECTION, SOLUTION EPIDURAL; INFILTRATION; INTRACAUDAL; PERINEURAL AS NEEDED
Status: DISCONTINUED | OUTPATIENT
Start: 2017-12-08 | End: 2017-12-08 | Stop reason: HOSPADM

## 2017-12-08 RX ORDER — FLUMAZENIL 0.1 MG/ML
0.2 INJECTION INTRAVENOUS
Status: DISCONTINUED | OUTPATIENT
Start: 2017-12-08 | End: 2017-12-08 | Stop reason: HOSPADM

## 2017-12-08 RX ORDER — SODIUM CHLORIDE 0.9 % (FLUSH) 0.9 %
5-10 SYRINGE (ML) INJECTION AS NEEDED
Status: ACTIVE | OUTPATIENT
Start: 2017-12-08 | End: 2017-12-08

## 2017-12-08 RX ORDER — NALOXONE HYDROCHLORIDE 0.4 MG/ML
0.4 INJECTION, SOLUTION INTRAMUSCULAR; INTRAVENOUS; SUBCUTANEOUS
Status: DISCONTINUED | OUTPATIENT
Start: 2017-12-08 | End: 2017-12-08 | Stop reason: HOSPADM

## 2017-12-08 RX ORDER — PROPOFOL 10 MG/ML
INJECTION, EMULSION INTRAVENOUS AS NEEDED
Status: DISCONTINUED | OUTPATIENT
Start: 2017-12-08 | End: 2017-12-08 | Stop reason: HOSPADM

## 2017-12-08 RX ORDER — SODIUM CHLORIDE 0.9 % (FLUSH) 0.9 %
5-10 SYRINGE (ML) INJECTION EVERY 8 HOURS
Status: COMPLETED | OUTPATIENT
Start: 2017-12-08 | End: 2017-12-08

## 2017-12-08 RX ORDER — ATROPINE SULFATE 0.1 MG/ML
0.5 INJECTION INTRAVENOUS
Status: DISCONTINUED | OUTPATIENT
Start: 2017-12-08 | End: 2017-12-08 | Stop reason: HOSPADM

## 2017-12-08 RX ORDER — EPINEPHRINE 0.1 MG/ML
1 INJECTION INTRACARDIAC; INTRAVENOUS
Status: DISCONTINUED | OUTPATIENT
Start: 2017-12-08 | End: 2017-12-08 | Stop reason: HOSPADM

## 2017-12-08 RX ORDER — SODIUM CHLORIDE 0.9 % (FLUSH) 0.9 %
5-10 SYRINGE (ML) INJECTION EVERY 8 HOURS
Status: DISCONTINUED | OUTPATIENT
Start: 2017-12-08 | End: 2017-12-08

## 2017-12-08 RX ORDER — DEXTROMETHORPHAN/PSEUDOEPHED 2.5-7.5/.8
1.2 DROPS ORAL
Status: DISCONTINUED | OUTPATIENT
Start: 2017-12-08 | End: 2017-12-08 | Stop reason: HOSPADM

## 2017-12-08 RX ORDER — SODIUM CHLORIDE 9 MG/ML
25 INJECTION, SOLUTION INTRAVENOUS CONTINUOUS
Status: DISPENSED | OUTPATIENT
Start: 2017-12-08 | End: 2017-12-08

## 2017-12-08 RX ADMIN — PROPOFOL 100 MG: 10 INJECTION, EMULSION INTRAVENOUS at 12:12

## 2017-12-08 RX ADMIN — HEPARIN SODIUM 5000 UNITS: 5000 INJECTION, SOLUTION INTRAVENOUS; SUBCUTANEOUS at 21:22

## 2017-12-08 RX ADMIN — PROPOFOL 50 MG: 10 INJECTION, EMULSION INTRAVENOUS at 12:19

## 2017-12-08 RX ADMIN — PROPOFOL 50 MG: 10 INJECTION, EMULSION INTRAVENOUS at 12:24

## 2017-12-08 RX ADMIN — CLONIDINE HYDROCHLORIDE 0.1 MG: 0.1 TABLET ORAL at 18:10

## 2017-12-08 RX ADMIN — Medication 10 ML: at 15:30

## 2017-12-08 RX ADMIN — PROPOFOL 100 MG: 10 INJECTION, EMULSION INTRAVENOUS at 12:15

## 2017-12-08 RX ADMIN — INSULIN LISPRO 2 UNITS: 100 INJECTION, SOLUTION INTRAVENOUS; SUBCUTANEOUS at 18:09

## 2017-12-08 RX ADMIN — INSULIN GLARGINE 30 UNITS: 100 INJECTION, SOLUTION SUBCUTANEOUS at 18:09

## 2017-12-08 RX ADMIN — HYDRALAZINE HYDROCHLORIDE 50 MG: 50 TABLET, FILM COATED ORAL at 15:26

## 2017-12-08 RX ADMIN — LIDOCAINE HYDROCHLORIDE 100 MG: 20 INJECTION, SOLUTION EPIDURAL; INFILTRATION; INTRACAUDAL; PERINEURAL at 12:06

## 2017-12-08 RX ADMIN — HYDRALAZINE HYDROCHLORIDE 50 MG: 50 TABLET, FILM COATED ORAL at 21:23

## 2017-12-08 RX ADMIN — HEPARIN SODIUM 5000 UNITS: 5000 INJECTION, SOLUTION INTRAVENOUS; SUBCUTANEOUS at 15:26

## 2017-12-08 RX ADMIN — INSULIN GLARGINE 30 UNITS: 100 INJECTION, SOLUTION SUBCUTANEOUS at 09:23

## 2017-12-08 RX ADMIN — PROPOFOL 150 MG: 10 INJECTION, EMULSION INTRAVENOUS at 12:07

## 2017-12-08 RX ADMIN — SODIUM CHLORIDE: 900 INJECTION, SOLUTION INTRAVENOUS at 11:54

## 2017-12-08 NOTE — ANESTHESIA PREPROCEDURE EVALUATION
Anesthetic History   No history of anesthetic complications            Review of Systems / Medical History  Patient summary reviewed, nursing notes reviewed and pertinent labs reviewed    Pulmonary  Within defined limits                 Neuro/Psych   Within defined limits           Cardiovascular    Hypertension              Exercise tolerance: >4 METS     GI/Hepatic/Renal         Renal disease: ESRD and dialysis       Endo/Other    Diabetes    Morbid obesity     Other Findings            Physical Exam    Airway  Mallampati: II  TM Distance: 4 - 6 cm  Neck ROM: normal range of motion   Mouth opening: Normal     Cardiovascular  Regular rate and rhythm,  S1 and S2 normal,  no murmur, click, rub, or gallop             Dental  No notable dental hx       Pulmonary  Breath sounds clear to auscultation               Abdominal  GI exam deferred       Other Findings            Anesthetic Plan    ASA: 3  Anesthesia type: MAC            Anesthetic plan and risks discussed with: Patient      K and glu are WNL today

## 2017-12-08 NOTE — PROGRESS NOTES
Problem: Falls - Risk of  Goal: *Absence of Falls  Document Aida Fall Risk and appropriate interventions in the flowsheet.    Outcome: Progressing Towards Goal  Fall Risk Interventions:            Medication Interventions: Patient to call before getting OOB    Elimination Interventions: Call light in reach, Patient to call for help with toileting needs, Toileting schedule/hourly rounds

## 2017-12-08 NOTE — PROGRESS NOTES
.. TRANSFER - IN REPORT:    Verbal report received from DIONICIO Bowling on Juanis Rice  being received from Oncology for ordered procedure      Report consisted of patients Situation, Background, Assessment and   Recommendations(SBAR). Information from the following report(s) SBAR was reviewed with the receiving nurse. Opportunity for questions and clarification was provided. Assessment completed upon patients arrival to unit and care assumed.

## 2017-12-08 NOTE — PERIOP NOTES
TRANSFER - OUT REPORT:    Verbal report given to Mary GREENBERG(name) on Yue Mini  being transferred to  1140(unit) for ordered procedure       Report consisted of patients Situation, Background, Assessment and   Recommendations(SBAR). Information from the following report(s) SBAR was reviewed with the receiving nurse. Lines:   Venous Access Device palindrime dual lumen catheter LOT [id number] 05/04/17 Upper chest (subclavicular area, right (Active)       Peripheral IV 12/07/17 Right Forearm (Active)   Site Assessment Clean, dry, & intact 12/8/2017 12:11 AM   Phlebitis Assessment 0 12/8/2017 12:11 AM   Infiltration Assessment 0 12/8/2017 12:11 AM   Dressing Status Clean, dry, & intact 12/8/2017 12:11 AM   Dressing Type Transparent;Tape 12/8/2017 12:11 AM   Hub Color/Line Status Pink;Capped;Flushed;Patent 12/8/2017 12:11 AM   Action Taken Open ports on tubing capped 12/8/2017 12:11 AM   Alcohol Cap Used Yes 12/8/2017 12:11 AM        Opportunity for questions and clarification was provided.       Patient transported with:

## 2017-12-08 NOTE — PROGRESS NOTES
Oncology Nursing Communication Tool  6:53 PM  12/8/2017     Bedside shift change report given to [facility], RN (incoming nurse) by Dilia Erwin RN (outgoing nurse) on [facility]. Report included the following information SBAR and Kardex. Significant changes during shift: colonoscopy and EGD today. Issues for physician to address: discharge? Oncology Shift Note   Admission Date 12/6/2017   Admission Diagnosis Diarrhea, unspecified type [R19.7]  Non-intractable vomiting with nausea, unspecified vomiting type [R11.2]   Code Status Full Code   Consults IP CONSULT TO HOSPITALIST  IP CONSULT TO NEPHROLOGY  IP CONSULT TO NEPHROLOGY  IP CONSULT TO GASTROENTEROLOGY      Cardiac Monitoring [] Yes [] No      Purposeful Hourly Rounding [] Yes    Aida Score Total Score: 1   Aida score 3 or > [] Bed Alarm [] Avasys [] 1:1 sitter [] Patient refused (Place signed refusal form in chart)      Pain Managed [] Yes [] No    Key Pain Meds     The patient is on no pain meds. Influenza Vaccine Received Flu Vaccine for Current Season (usually Sept-March): Yes           Oxygen needs?  [] Room air Oxygen @  []1L    []2L    []3L   []4L    []5L   []6L     Use home O2? [] Yes [] No  Perform O2 challenge test using  smartphrase (.Homeoxygen)      Last bowel movement Last Bowel Movement Date: 12/08/17      Urinary Catheter             LDAs             Venous Access Device palindrime dual lumen catheter LOT [id number] 05/04/17 Upper chest (subclavicular area, right (Active)      Peripheral IV 12/07/17 Right Forearm (Active)   Site Assessment Clean, dry, & intact 12/8/2017  8:00 AM   Phlebitis Assessment 0 12/8/2017  8:00 AM   Infiltration Assessment 0 12/8/2017  8:00 AM   Dressing Status Clean, dry, & intact 12/8/2017  8:00 AM   Dressing Type Transparent 12/8/2017  8:00 AM   Hub Color/Line Status Pink;Capped 12/8/2017  8:00 AM   Action Taken Open ports on tubing capped 12/8/2017 12:11 AM Alcohol Cap Used Yes 12/8/2017 12:11 AM                         Readmission Risk Assessment Tool Score Low Risk            12       Total Score        3 Has Seen PCP in Last 6 Months (Yes=3, No=0)    4 IP Visits Last 12 Months (1-3=4, 4=9, >4=11)    5 Pt. Coverage (Medicare=5 , Medicaid, or Self-Pay=4)        Criteria that do not apply:    . Living with Significant Other. Assisted Living. LTAC. SNF. or   Rehab    Patient Length of Stay (>5 days = 3)    Charlson Comorbidity Score (Age + Comorbid Conditions)       Expected Length of Stay 3d 19h   Actual Length of Stay 2          Opportunity for questions and clarifications were given to the incoming nurse. Patient's bed is in low position, side rails x2, door open PRN, call bell within reach and patient not in distress.       Dilia Erwin RN

## 2017-12-08 NOTE — PROGRESS NOTES
Problem: Falls - Risk of  Goal: *Absence of Falls  Document Aida Fall Risk and appropriate interventions in the flowsheet.    Outcome: Progressing Towards Goal  Fall Risk Interventions:            Medication Interventions: Patient to call before getting OOB    Elimination Interventions: Call light in reach, Patient to call for help with toileting needs

## 2017-12-08 NOTE — ANESTHESIA POSTPROCEDURE EVALUATION
Post-Anesthesia Evaluation and Assessment    Patient: Ryley Maldonado MRN: [id number]  SSN: [id number]    YOB: 1985  Age: 34 y.o. Sex: male       Cardiovascular Function/Vital Signs  Visit Vitals    /68    Pulse 80    Temp 36.9 °C (98.4 °F)    Resp 17    Ht 6' 2\" (1.88 m)    Wt 149.7 kg (330 lb)    SpO2 97%    BMI 42.37 kg/m2       Patient is status post MAC anesthesia for Procedure(s):  ESOPHAGOGASTRODUODENOSCOPY (EGD)  COLONOSCOPY  ESOPHAGOGASTRODUODENAL (EGD) BIOPSY  COLON BIOPSY. Nausea/Vomiting: None    Postoperative hydration reviewed and adequate. Pain:  Pain Scale 1: Visual (12/08/17 1230)  Pain Intensity 1: 0 (12/08/17 1230)   Managed    Neurological Status: At baseline    Mental Status and Level of Consciousness: Arousable    Pulmonary Status:   O2 Device: Room air (12/08/17 1230)   Adequate oxygenation and airway patent    Complications related to anesthesia: None    Post-anesthesia assessment completed.  No concerns    Signed By: Irvin Brewer MD     December 8, 2017

## 2017-12-08 NOTE — INTERDISCIPLINARY ROUNDS
Oncology Interdisciplinary rounds were held today to discuss patient plan of care and outcomes. The following members were present: Nursing and Case Management.     ALOS: 2  DRG GLOS: 3.8    Plan of Care Discharge Disposition   EGD  Colonoscopy   Dialysis MWF, received Dialysis yesterday Home   Need Aid

## 2017-12-08 NOTE — PERIOP NOTES
Anesthesia reports 450mg Propofol, 100mg Lidocaine and 100mL NS given during procedure. Received report from anesthesia staff on vital signs and status of patient.

## 2017-12-08 NOTE — PROGRESS NOTES
35 yo male admitted to [facility] w/ symptoms of diarrhea x 3 wks. GI consulted for evaluation. Patient also has Hx of ESRD followed by Jose Luis Ramirez. He is on a MWF dialysis schedule at [facility] - [phone]. Patient also has Hx of HTN, DM and is Legally Blind. Full Code    Patient states he is able to move about his apartment, but needs assistance with meal prep and \"cleaning\". CM met with him and his aunt who is assisting with his care as needed. There is also a \"cousin\" that fixes meals and cleans as much as possible. His mother (wheelchair bound) lives in his same apartment building, but she cannot be relied on to help patient. Per patient and family, SW at [facility] is also assisting with his Medicaid application and home services. He states he needs \"training\" due to his visual disability and may need personal care if his cousin is not available to assist him. CM spoke to  at dialysis center who is planning to resubmit his Medicaid as he was only approved for assistance with Medicare Part B payment. He does not qualify to receive transportation or personal care. NORMA at dialysis will continue to investigate. Patient's Aunt Cinthia Wang - [phone] - acts as patient's representative and assists with his Medicaid applications. She will contact SW assigned through [address] [place] [address] to see if she can get more services. Family will also investigate Meals on Wheels and private pay personal care. Resources provided to aunt. Care Management Interventions  PCP Verified by CM: Yes Belia August III, )  Transition of Care Consult (CM Consult):  Other (Initial CM Assessment)  MyChart Signup: No  Discharge Durable Medical Equipment: No (Patient denies any DME, HH or SNF)  Physical Therapy Consult: No  Occupational Therapy Consult: No  Speech Therapy Consult: No  Current Support Network: Lives Alone, Family Lives [place] (Lives alone in a senior living apartment due to his  disability (vision loss and kidney failure))  Confirm Follow Up Transport: Other (see comment) (Patient uses [facility] for MD appt transport - his Medicaid does not cover [employer] transport)  Plan discussed with Pt/Family/Caregiver: Yes  Discharge Location  Discharge Placement: Home    Amairani Moreno RN, BSN, [address] [place] [address] Oncology  [phone]

## 2017-12-08 NOTE — PROCEDURES
NAME:  Sommer Mcdaniels   :   1985   MRN:   [id number]     Date/Time:  2017 12:03 PM    Colonoscopy Operative Report    Procedure Type:  Colonoscopy with biopsy     Indications: diarrhea  Pre-operative Diagnosis: see indication above  Post-operative Diagnosis:  See findings below  :  Rusty Redmond MD  Referring Provider: -Jose Luis Merlos III, DO    Exam:  Airway: clear, no airway problems anticipated  Heart: RRR, without gallops or rubs  Lungs: clear bilaterally without wheezes, crackles, or rhonchi  Abdomen: soft, nontender, nondistended, bowel sounds present  Mental Status: awake, alert and oriented to person, place and time    Sedation:  MAC anesthesia Propofol  Procedure Details:  After informed consent was obtained with all risks and benefits of procedure explained and preoperative exam completed, the patient was taken to the endoscopy suite and placed in the left lateral decubitus position. Upon sequential sedation as per above, a digital rectal exam was performed demonstrating internal and external hemorrhoids. The Olympus videocolonoscope  was inserted in the rectum and carefully advanced to the terminal ileum. The quality of preparation was fair. The colonoscope was slowly withdrawn with careful evaluation between folds. Retroflexion in the rectum was completed demonstrating internal and external hemorrhoids. Findings:   ANUS: Anal exam reveals no masses but hemorrhoids, sphincter tone is normal.   RECTUM: Rectal exam reveals no masses but hemorrhoids. SIGMOID COLON: The mucosa is normal with good vascular pattern and without ulcers, diverticula, and polyps. DESCENDING COLON: The mucosa is normal with good vascular pattern and without ulcers, diverticula, and polyps. TRANSVERSE COLON: The mucosa is normal with good vascular pattern and without ulcers, diverticula, and polyps.    ASCENDING COLON: The mucosa is normal with good vascular pattern and without ulcers, diverticula, and polyps. CECUM: The appendiceal orifice appears normal. The ileocecal valve appears normal.   TERMINAL ILEUM: The terminal ileum was entered and normal, biopsied. Specimen Removed:  TI, random colon  Complications:  None. EBL:     None.     Impression:    -- internal and external hemorrhoids  -- poor bowel prep, with opaque liquid coating mucosa; this is interesting given his reported history of intractable diarrhea  -- normal appearing TI and colonic mucosa, biopsied    Recommendations:   -- await pathology  -- resume diet  -- okay for D/C home from GI perspective, with outpatient follow up for results    Discharge Disposition: back to wards      Gunnar Davila MD

## 2017-12-08 NOTE — PROGRESS NOTES
Marcia Paddy  1985  [id number]    Situation:  Verbal report received from: Blanca Jack rn  Procedure: Procedure(s):  ESOPHAGOGASTRODUODENOSCOPY (EGD)  COLONOSCOPY  ESOPHAGOGASTRODUODENAL (EGD) BIOPSY  COLON BIOPSY    Background:    Preoperative diagnosis: Diarrhea, unspecified type [R19.7]  Non-intractable vomiting with nausea, unspecified vomiting type [R11.2]  Postoperative diagnosis: EGD_Gastritis  Colon-Diarrhea, hemorrhoids    :  Dr. Willy Gomez  Assistant(s): Endoscopy Technician-1: Magda Cope  Endoscopy RN-1: Amada Barone RN    Specimens:   ID Type Source Tests Collected by Time Destination   1 : Duodenal esophagus biopsy Preservative   Lanice Halsted, MD 12/8/2017 1208 Pathology   2 : Gastric biopsy Preservative   Lanice Halsted, MD 12/8/2017 1208 Pathology   3 : Terminal ileaum biopsy Preservative   Lanice Halsted, MD 12/8/2017 1224 Pathology   4 : Random colon biopsy Preservative   Lanice Halsted, MD 12/8/2017 1225 Pathology     H. Pylori  no    Assessment:  Intra-procedure medications     Anesthesia gave intra-procedure sedation and medications, see anesthesia flow sheet yes    Intravenous fluids: NS@ KVO     Vital signs stable  yes    Abdominal assessment: round and soft  yes    Recommendation:  Discharge patient per MD order yes.   Return to floor yes  Permission to share finding with family or friend yes and n/a

## 2017-12-08 NOTE — PROGRESS NOTES
Hospitalist Progress Note    NAME: Cait Peres   :  1985   MRN:  [id number]       Assessment / Plan:  Persistent SubAcute Diarrhea POA (3 weeks PTA)  Associated Nausea and intermittent abdominal pain  Recent Stool for C.diff, Cultures and Ova/Parasites negative  Plan for EGD and Colonoscopy today  Questran seemed to be held for colonoscopy and EGD  CT A/P negative for acute etiology    ESRD  Nephrology consult for ongoing HD    HTN  BP stable  Continue Clonidine, Norvasc, Hydralazine, Lisinopril  PRN IV Hydralazine     DM  Reduce lantus to 30 units BID  Continue SSI     Legally Blind      Code Status: Full     DVT Prophylaxis: SQ Heparin  GI Prophylaxis: not indicated          Subjective: Pt seen and examined at bedside. Continue to have diarrhea. NAD. Overnight events d/w RN   CHIEF COMPLAINT: f/u \"diarrhea\"       Review of Systems:  Symptom Y/N Comments  Symptom Y/N Comments   Fever/Chills n   Chest Pain n    Poor Appetite    Edema     Cough n   Abdominal Pain n    Sputum    Joint Pain     SOB/LITTLE n   Pruritis/Rash     Nausea/vomit y nausea  Tolerating PT/OT     Diarrhea    Tolerating Diet y    Constipation    Other       Could NOT obtain due to:      Objective:     VITALS:   Last 24hrs VS reviewed since prior progress note.  Most recent are:  Patient Vitals for the past 24 hrs:   Temp Pulse Resp BP SpO2   17 1053 - 91 16 126/80 95 %   17 0810 98.4 °F (36.9 °C) 88 16 116/68 90 %   17 0011 98.6 °F (37 °C) 93 16 101/62 95 %   17 2017 98.5 °F (36.9 °C) 91 16 136/85 95 %   17 1718 97.9 °F (36.6 °C) 99 18 148/82 91 %   17 1430 99.2 °F (37.3 °C) 96 16 (!) 163/107 95 %   17 1249 98.9 °F (37.2 °C) 93 16 (!) 183/103 -   17 1216 - 96 16 (!) 162/95 -   17 1145 - 94 16 (!) 177/98 -       Intake/Output Summary (Last 24 hours) at 17 1116  Last data filed at 17 0626   Gross per 24 hour   Intake                0 ml   Output             2000 ml   Net -2000 ml        PHYSICAL EXAM:  General: WD, WN. Alert, cooperative, no acute distress    EENT:  EOMI. Anicteric sclerae. MMM  Resp:  CTA bilaterally, no wheezing or rales. No accessory muscle use  CV:  Regular  rhythm,  No edema  GI:  Soft, Non distended, Non tender.  +Bowel sounds  Neurologic:  Alert and oriented X 3, normal speech,   Psych:   Fair insight. Not anxious nor agitated  Skin:  No rashes. No jaundice    Reviewed most current lab test results and cultures  YES  Reviewed most current radiology test results   YES  Review and summation of old records today    NO  Reviewed patient's current orders and MAR    YES  PMH/SH reviewed - no change compared to H&P  ________________________________________________________________________  Care Plan discussed with:    Comments   Patient y    Family      RN y    Care Manager     Consultant                        Multidiciplinary team rounds were held today with , nursing, pharmacist and clinical coordinator. Patient's plan of care was discussed; medications were reviewed and discharge planning was addressed. ________________________________________________________________________  Total NON critical care TIME:  25  Minutes    Total CRITICAL CARE TIME Spent:   Minutes non procedure based      Comments   >50% of visit spent in counseling and coordination of care     ________________________________________________________________________  Page Marinelli MD     Procedures: see electronic medical records for all procedures/Xrays and details which were not copied into this note but were reviewed prior to creation of Plan. LABS:  I reviewed today's most current labs and imaging studies.   Pertinent labs include:  Recent Labs      12/08/17   0519  12/07/17   0851  12/06/17   1040   WBC  8.8  9.3  10.4   HGB  10.1*  10.1*  11.5*   HCT  30.7*  30.4*  35.6*   PLT  295  309  346     Recent Labs      12/08/17   0519  12/07/17   0851  12/06/17 1040   NA  136  137  139   K  4.1  4.2  3.9   CL  100  102  101   CO2  28  23  25   GLU  107*  101*  112*   BUN  31*  55*  48*   CREA  7.92*  11.60*  10.25*   CA  8.7  8.6  9.3   MG  2.2  2.3   --    PHOS  4.1  7.0*   --    ALB  2.4*  2.5*  2.7*   TBILI  0.3  0.3  0.3   SGOT  9*  10*  14*   ALT  9*  10*  10*       Signed: Arcelia Rodrigez MD

## 2017-12-08 NOTE — PROCEDURES
NAME:  Juanis Rice   :   1985   MRN:   [id number]     Date/Time:  2017 12:02 PM    Esophagogastroduodenoscopy (EGD) Procedure Note    Procedure: Esophagogastroduodenoscopy with biopsy    Indication: nausea, vomiting  Pre-operative Diagnosis: see indication above  Post-operative Diagnosis: see findings below  :  Mc Mcmillan MD  Referring Provider:   -Caryn Farrow III,     Exam:  Airway: clear, no airway problems anticipated  Heart: RRR, without gallops or rubs  Lungs: clear bilaterally without wheezes, crackles, or rhonchi  Abdomen: soft, nontender, nondistended, bowel sounds present  Mental Status: awake, alert and oriented to person, place and time     Anethesia/Sedation:  MAC anesthesia Propofol  Procedure Details   After informed consent was obtained for the procedure, with all risks and benefits of procedure explained the patient was taken to the endoscopy suite and placed in the left lateral decubitus position. Following sequential administration of sedation as per above, the [id number] gastroscope was inserted into the mouth and advanced under direct vision to second portion of the duodenum. A careful inspection was made as the gastroscope was withdrawn, including a retroflexed view of the proximal stomach; findings and interventions are described below. Findings:   OROPHARYNX: Cords and pyriform recesses normal.   ESOPHAGUS: The esophagus is normal. The proximal, mid, and distal portions are normal. The Z-Line is intact. STOMACH: The fundus on antegrade and retroflex views is normal. The body, antrum, and pylorus with patchy erythema, biopsied. Some yellow fluid retained in stomach, suctioned. DUODENUM: The bulb and second portions are normal. Biopsied given history of diarrhea. Therapies:   biopsy of stomach antrum, body; biopsy duodenum    Specimens: gastric, duodenum    EBL:  None.          Complications:   None; patient tolerated the procedure well.            Impression:    -- gastritis, mild, biopsied  -- some bilious fluid present in stomach, suctioned  -- normal duodenum, biopsied for possible etiology for diarrhea    Recommendations:  -- await pathology  -- proceed to colonoscopy    Discharge disposition:  Back to wards    Gunnar Davila MD

## 2017-12-08 NOTE — H&P
Date of Surgery Update:  Kamaljit Shepherd was seen and examined. History and physical has been reviewed. The patient has been examined.  There have been no significant clinical changes since the completion of the originally dated History and Physical.    Signed By: Ja Yeung MD     December 8, 2017 12:02 PM

## 2017-12-09 VITALS
OXYGEN SATURATION: 95 % | HEIGHT: 74 IN | RESPIRATION RATE: 16 BRPM | HEART RATE: 89 BPM | WEIGHT: 315 LBS | DIASTOLIC BLOOD PRESSURE: 84 MMHG | BODY MASS INDEX: 40.43 KG/M2 | TEMPERATURE: 98.8 F | SYSTOLIC BLOOD PRESSURE: 156 MMHG

## 2017-12-09 LAB
ANION GAP SERPL CALC-SCNC: 8 MMOL/L (ref 5–15)
BUN SERPL-MCNC: 39 MG/DL (ref 6–20)
BUN/CREAT SERPL: 4 (ref 12–20)
CALCIUM SERPL-MCNC: 8.2 MG/DL (ref 8.5–10.1)
CHLORIDE SERPL-SCNC: 100 MMOL/L (ref 97–108)
CO2 SERPL-SCNC: 28 MMOL/L (ref 21–32)
CREAT SERPL-MCNC: 10 MG/DL (ref 0.7–1.3)
ERYTHROCYTE [DISTWIDTH] IN BLOOD BY AUTOMATED COUNT: 13.4 % (ref 11.5–14.5)
GLUCOSE BLD STRIP.AUTO-MCNC: 143 MG/DL (ref 65–100)
GLUCOSE SERPL-MCNC: 174 MG/DL (ref 65–100)
HCT VFR BLD AUTO: 29.5 % (ref 36.6–50.3)
HGB BLD-MCNC: 9.5 G/DL (ref 12.1–17)
MAGNESIUM SERPL-MCNC: 2.4 MG/DL (ref 1.6–2.4)
MCH RBC QN AUTO: 25.6 PG (ref 26–34)
MCHC RBC AUTO-ENTMCNC: 32.2 G/DL (ref 30–36.5)
MCV RBC AUTO: 79.5 FL (ref 80–99)
PHOSPHATE SERPL-MCNC: 5.6 MG/DL (ref 2.6–4.7)
PLATELET # BLD AUTO: 291 K/UL (ref 150–400)
POTASSIUM SERPL-SCNC: 4 MMOL/L (ref 3.5–5.1)
RBC # BLD AUTO: 3.71 M/UL (ref 4.1–5.7)
SERVICE CMNT-IMP: ABNORMAL
SODIUM SERPL-SCNC: 136 MMOL/L (ref 136–145)
WBC # BLD AUTO: 9 K/UL (ref 4.1–11.1)

## 2017-12-09 PROCEDURE — 90935 HEMODIALYSIS ONE EVALUATION: CPT

## 2017-12-09 PROCEDURE — 74011250637 HC RX REV CODE- 250/637: Performed by: INTERNAL MEDICINE

## 2017-12-09 PROCEDURE — 74011250636 HC RX REV CODE- 250/636: Performed by: INTERNAL MEDICINE

## 2017-12-09 PROCEDURE — 84100 ASSAY OF PHOSPHORUS: CPT | Performed by: INTERNAL MEDICINE

## 2017-12-09 PROCEDURE — 36415 COLL VENOUS BLD VENIPUNCTURE: CPT | Performed by: INTERNAL MEDICINE

## 2017-12-09 PROCEDURE — 83735 ASSAY OF MAGNESIUM: CPT | Performed by: INTERNAL MEDICINE

## 2017-12-09 PROCEDURE — 80048 BASIC METABOLIC PNL TOTAL CA: CPT | Performed by: INTERNAL MEDICINE

## 2017-12-09 PROCEDURE — 5A1D70Z PERFORMANCE OF URINARY FILTRATION, INTERMITTENT, LESS THAN 6 HOURS PER DAY: ICD-10-PCS | Performed by: INTERNAL MEDICINE

## 2017-12-09 PROCEDURE — 85027 COMPLETE CBC AUTOMATED: CPT | Performed by: INTERNAL MEDICINE

## 2017-12-09 PROCEDURE — 82962 GLUCOSE BLOOD TEST: CPT

## 2017-12-09 RX ORDER — OMEPRAZOLE 40 MG/1
40 CAPSULE, DELAYED RELEASE ORAL DAILY
Qty: 30 CAP | Refills: 0 | Status: SHIPPED | OUTPATIENT
Start: 2017-12-09 | End: 2018-04-03

## 2017-12-09 RX ORDER — LOPERAMIDE HYDROCHLORIDE 2 MG/1
2 CAPSULE ORAL
Qty: 60 CAP | Refills: 0 | Status: SHIPPED | OUTPATIENT
Start: 2017-12-09 | End: 2020-01-01

## 2017-12-09 RX ADMIN — CLONIDINE HYDROCHLORIDE 0.1 MG: 0.1 TABLET ORAL at 13:51

## 2017-12-09 RX ADMIN — HEPARIN SODIUM 5000 UNITS: 5000 INJECTION, SOLUTION INTRAVENOUS; SUBCUTANEOUS at 13:51

## 2017-12-09 RX ADMIN — HEPARIN SODIUM 5000 UNITS: 5000 INJECTION, SOLUTION INTRAVENOUS; SUBCUTANEOUS at 06:08

## 2017-12-09 NOTE — PROCEDURES
[facility] Dialysis Team [facility]  [phone]    Vitals   Pre   Post   Assessment   Pre   Post     Temp  Temp: 98.8 °F (37.1 °C) (12/09/17 0805)  98.8 LOC  A&OX4 A&OX4   HR   90 89 Lungs   clear  clear   B/P   180/97 156/84 Cardiac   HRR  HRR   Resp   `16 16 Skin   intact  intact   Pain level  0                             0 Edema      none none   Orders:    Duration:   Start:    0805 End:    1205 Total:   4 hours   Dialyzer:   Dialyzer/Set Up Inspection: Revaclear (12/09/17 0805)   K Bath:   Dialysate K (mEq/L): 4 (12/09/17 0805)   Ca Bath:   Dialysate CA (mEq/L): 2.5 (12/09/17 0805)   Na/Bicarb:   Dialysate NA (mEq/L): 140 (12/09/17 0805)   Target Fluid Removal:   Goal/Amount of Fluid to Remove (mL): 3000 mL (12/09/17 0805)   Access     Type & Location:   PELON AVF, 15 gauge needles used. Post tx: sites held 10 minutes, bleeding stopped. Labs     Obtained/Reviewed   Critical Results Called   Date when labs were drawn-  Hgb-    HGB   Date Value Ref Range Status   12/09/2017 9.5 (L) 12.1 - 17.0 g/dL Final     K-    Potassium   Date Value Ref Range Status   12/09/2017 4.0 3.5 - 5.1 mmol/L Final     Ca-   Calcium   Date Value Ref Range Status   12/09/2017 8.2 (L) 8.5 - 10.1 MG/DL Final     Bun-   BUN   Date Value Ref Range Status   12/09/2017 39 (H) 6 - 20 MG/DL Final     Creat-   Creatinine   Date Value Ref Range Status   12/09/2017 10.00 (H) 0.70 - 1.30 MG/DL Final        Medications/ Blood Products Given     Name   Dose   Route and Time     none                Blood Volume Processed (BVP):    92.8 liters Net Fluid   Removed:  3000 ml   Comments Patient tolerated tx well, no complaints during or after. Report called to Oncology unit nurse, Jm Henry, using SBAR.   Time Out Done: yes, 0805  Primary Nurse Rpt Pre: Sharonda Stovall RN  Primary Nurse Rpt Damian Ramsey RN  Pt Education: Renal Diet  Care Plan: HD as ordered  Tx Summary: 4 hours on 4 K 2.5 Ca removed 3000 ml  Admiting Diagnosis:  Pt's previous clinic-[facility]  Consent signed -  yes  [facility] Consent - yes  Hepatitis Status- antigen negative 12-8-17  Machine #- Machine Number: [id number] (12/09/17 0805)  Telemetry status-no  Pre-dialysis wt. -

## 2017-12-09 NOTE — PROGRESS NOTES
NAME: Francisco Javier Fong        :  1985        MRN:  [id number]        Assessment :    [facility]  [facility]  Acute on chronic diarrhea  Obesity AVF  HTN  DM  Anemia --Seen on HD today. Pull fluid as tolerated. Start EPO. Will see again Monday. Please call if any questions. Subjective:     Chief Complaint:  \" I feel OK. \"  No N/V. No dyspnea. No pain. Review of Systems:    Symptom Y/N Comments  Symptom Y/N Comments   Fever/Chills    Chest Pain     Poor Appetite    Edema     Cough    Abdominal Pain     Sputum    Joint Pain     SOB/LITTLE    Pruritis/Rash     Nausea/vomit    Tolerating PT/OT     Diarrhea    Tolerating Diet     Constipation    Other       Could not obtain due to:      Objective:     VITALS:   Last 24hrs VS reviewed since prior progress note.  Most recent are:  Visit Vitals    BP (!) 163/94    Pulse 91    Temp 98.8 °F (37.1 °C)    Resp 16    Ht 6' 2\" (1.88 m)    Wt 149.7 kg (330 lb)    SpO2 95%    BMI 42.37 kg/m2       Intake/Output Summary (Last 24 hours) at 17 7409  Last data filed at 17 0805   Gross per 24 hour   Intake               10 ml   Output                0 ml   Net               10 ml      Telemetry Reviewed:     PHYSICAL EXAM:  General: NAD  No edema      Lab Data Reviewed: (see below)    Medications Reviewed: (see below)    PMH/SH reviewed - no change compared to H&P  ________________________________________________________________________  Care Plan discussed with:  Patient     Family      RN     Care Manager                    Consultant:          Comments   >50% of visit spent in counseling and coordination of care       ________________________________________________________________________  Kristan Sam MD     Procedures: see electronic medical records for all procedures/Xrays and details which  were not copied into this note but were reviewed prior to creation of Plan. LABS:  Recent Labs      12/09/17   0243  12/08/17   0519   WBC  9.0  8.8   HGB  9.5*  10.1*   HCT  29.5*  30.7*   PLT  291  295     Recent Labs      12/09/17   0243  12/08/17   0519  12/07/17   0851   NA  136  136  137   K  4.0  4.1  4.2   CL  100  100  102   CO2  28  28  23   BUN  39*  31*  55*   CREA  10.00*  7.92*  11.60*   GLU  174*  107*  101*   CA  8.2*  8.7  8.6   MG  2.4  2.2  2.3   PHOS  5.6*  4.1  7.0*     Recent Labs      12/08/17   0519  12/07/17   0851  12/06/17   1040   SGOT  9*  10*  14*   AP  53  54  60   TP  8.2  8.1  9.1*   ALB  2.4*  2.5*  2.7*   GLOB  5.8*  5.6*  6.4*   LPSE   --    --   152     No results for input(s): INR, PTP, APTT in the last 72 hours. No lab exists for component: INREXT   No results for input(s): FE, TIBC, PSAT, FERR in the last 72 hours. No results found for: FOL, RBCF   No results for input(s): PH, PCO2, PO2 in the last 72 hours. No results for input(s): CPK, CKMB in the last 72 hours.     No lab exists for component: TROPONINI  No components found for: [place]  Lab Results   Component Value Date/Time    Color YELLOW/STRAW 05/01/2017 05:39 PM    Appearance CLOUDY 05/01/2017 05:39 PM    Specific gravity 1.018 05/01/2017 05:39 PM    Specific gravity >1.030 10/03/2014 04:35 AM    pH (UA) 5.5 05/01/2017 05:39 PM    Protein 300 05/01/2017 05:39 PM    Glucose 250 05/01/2017 05:39 PM    Ketone NEGATIVE  05/01/2017 05:39 PM    Bilirubin NEGATIVE  05/01/2017 05:39 PM    Urobilinogen 0.2 05/01/2017 05:39 PM    Nitrites NEGATIVE  05/01/2017 05:39 PM    Leukocyte Esterase NEGATIVE  05/01/2017 05:39 PM    Epithelial cells FEW 05/01/2017 05:39 PM    Bacteria NEGATIVE  05/01/2017 05:39 PM    WBC 0-4 05/01/2017 05:39 PM    RBC 5-10 05/01/2017 05:39 PM       MEDICATIONS:  Current Facility-Administered Medications   Medication Dose Route Frequency    ondansetron (ZOFRAN) injection 4 mg  4 mg IntraVENous Q4H PRN    insulin glargine (LANTUS) injection 30 Units  30 Units SubCUTAneous BID    heparin (porcine) injection 5,000 Units  5,000 Units SubCUTAneous Q8H    amLODIPine (NORVASC) tablet 10 mg  10 mg Oral DAILY    cloNIDine HCl (CATAPRES) tablet 0.1 mg  0.1 mg Oral TID WITH MEALS    hydrALAZINE (APRESOLINE) tablet 50 mg  50 mg Oral TID    lisinopril (PRINIVIL, ZESTRIL) tablet 5 mg  5 mg Oral DAILY    insulin lispro (HUMALOG) injection   SubCUTAneous AC&HS    glucose chewable tablet 16 g  4 Tab Oral PRN    dextrose (D50W) injection syrg 12.5-25 g  12.5-25 g IntraVENous PRN    glucagon (GLUCAGEN) injection 1 mg  1 mg IntraMUSCular PRN    hydrALAZINE (APRESOLINE) 20 mg/mL injection 10 mg  10 mg IntraVENous Q6H PRN    oxyCODONE-acetaminophen (PERCOCET) 5-325 mg per tablet 1 Tab  1 Tab Oral Q4H PRN

## 2017-12-09 NOTE — DISCHARGE SUMMARY
Hospitalist Discharge Summary     Patient ID:  Sukh Miranda  [id number]  75 y.o.  1985    PCP on record: Jenifer Ram III, DO    Admit date: 12/6/2017  Discharge date and time: 12/9/2017      DISCHARGE DIAGNOSIS:  Persistent SubAcute Diarrhea POA (3 weeks PTA)  ESRD  HTN  DM  Legally Blind    CONSULTATIONS:  IP CONSULT TO HOSPITALIST  IP CONSULT TO NEPHROLOGY  IP CONSULT TO NEPHROLOGY  IP CONSULT TO GASTROENTEROLOGY    Excerpted HPI from H&P of Talia De Leon MD:  Unruly Duque is a 34 y.o. PMH ESRD (MWF),DM,HTN, legally blind , who presents to the ED because a 2-3 week hx of diarrhea and nausea. He denies fevers, chills, no travel hx, no sick contacts, no new meds, no change in meds.      We were asked to admit for work up and evaluation of the above problems. ______________________________________________________________________  DISCHARGE SUMMARY/HOSPITAL COURSE:  for full details see H&P, daily progress notes, labs, consult notes. Persistent SubAcute Diarrhea POA (3 weeks PTA)  Associated Nausea and intermittent abdominal pain  Symptoms currently resolved but did receive golitely so could be temporary  Colonoscopy negative  EGD with gastritis  PPIs  Recommend PRN imodium for diarrhea  Ok from GI perspective to be discharge  Biopsies pending, GI will follow as an OP     ESRD  Nephrology was involved in patient's care     HTN  BP stable  Continue Clonidine, Norvasc, Hydralazine, Lisinopril  PRN IV Hydralazine      DM  Continue lantus      Legally Blind      _______________________________________________________________________  Patient seen and examined by me on discharge day. Pertinent Findings:  Gen:    Not in distress  Chest: Clear lungs  CVS:   Regular rhythm.   No edema  Abd:  Soft, not distended, not tender  Neuro:  Alert  _______________________________________________________________________  DISCHARGE MEDICATIONS:   Current Discharge Medication List START taking these medications    Details   omeprazole (PRILOSEC) 40 mg capsule Take 1 Cap by mouth daily. Qty: 30 Cap, Refills: 0         CONTINUE these medications which have CHANGED    Details   loperamide (IMODIUM) 2 mg capsule Take 1 Cap by mouth four (4) times daily as needed for Diarrhea. Qty: 60 Cap, Refills: 0         CONTINUE these medications which have NOT CHANGED    Details   insulin glargine (LANTUS) 100 unit/mL injection 44 Units by SubCUTAneous route two (2) times a day. Indications: type 2 diabetes mellitus      insulin lispro (HUMALOG) 100 unit/mL injection by SubCUTAneous route Before breakfast, lunch, and dinner. Per sliding scale      lisinopril (PRINIVIL, ZESTRIL) 5 mg tablet Take  by mouth daily. amLODIPine (NORVASC) 10 mg tablet Take 1 Tab by mouth daily. Indications: hypertension  Qty: 30 Tab, Refills: 0      cloNIDine HCl (CATAPRES) 0.1 mg tablet Take 1 Tab by mouth three (3) times daily (with meals). Qty: 90 Tab, Refills: 0      hydrALAZINE (APRESOLINE) 50 mg tablet Take 1 Tab by mouth three (3) times daily. Indications: hypertension  Qty: 90 Tab, Refills: 0         STOP taking these medications       bismuth subsalicylate (PEPTO-BISMOL) 262 mg/15 mL suspension Comments:   Reason for Stopping:               My Recommended Diet, Activity, Wound Care, and follow-up labs are listed in the patient's Discharge Insturctions which I have personally completed and reviewed.     ______________________________________________________________________    Risk of deterioration: Low    Condition at Discharge:  Stable  ______________________________________________________________________    Disposition  Home with family, no needs  ______________________________________________________________________    Care Plan discussed with:   Patient, RN, Consultant    _____________________________________________________________________    Code Status: Full Code  ______________________________________________________________________      Follow up with:   PCP : Macarena Hanson III, DO  Follow-up Information     Follow up With Details Comments Contact Info    Neal Miles MD Schedule an appointment as soon as possible for a visit in 1 week  [address]  [phone]                Total time in minutes spent coordinating this discharge (includes going over instructions, follow-up, prescriptions, and preparing report for sign off to her PCP) :  35 minutes    Signed:  Rc Irwin MD

## 2017-12-09 NOTE — PROGRESS NOTES
I have reviewed discharge instructions with the patient and aunt. The patient and Aunt verbalized understanding. Discharge medications reviewed with patient and Aunt and appropriate educational materials and side effects teaching were provided. Follow-up appointments reviewed with patient. Opportunity for questions or concerns given. Venous access removed without difficulty, pt tolerated well. Patients belongings gathered and sent with patient. Patient is discharged home with Aunt via wheelchair.

## 2018-04-03 ENCOUNTER — OFFICE VISIT (OUTPATIENT)
Dept: ENDOCRINOLOGY | Age: 33
End: 2018-04-03

## 2018-04-03 VITALS
BODY MASS INDEX: 40.43 KG/M2 | HEIGHT: 74 IN | WEIGHT: 315 LBS | DIASTOLIC BLOOD PRESSURE: 89 MMHG | SYSTOLIC BLOOD PRESSURE: 136 MMHG | HEART RATE: 92 BPM

## 2018-04-03 DIAGNOSIS — Z79.4 TYPE 2 DIABETES MELLITUS WITH CHRONIC KIDNEY DISEASE ON CHRONIC DIALYSIS, WITH LONG-TERM CURRENT USE OF INSULIN (HCC): Primary | ICD-10-CM

## 2018-04-03 DIAGNOSIS — N18.6 TYPE 2 DIABETES MELLITUS WITH CHRONIC KIDNEY DISEASE ON CHRONIC DIALYSIS, WITH LONG-TERM CURRENT USE OF INSULIN (HCC): Primary | ICD-10-CM

## 2018-04-03 DIAGNOSIS — I10 ESSENTIAL HYPERTENSION WITH GOAL BLOOD PRESSURE LESS THAN 130/80: ICD-10-CM

## 2018-04-03 DIAGNOSIS — E11.22 TYPE 2 DIABETES MELLITUS WITH CHRONIC KIDNEY DISEASE ON CHRONIC DIALYSIS, WITH LONG-TERM CURRENT USE OF INSULIN (HCC): Primary | ICD-10-CM

## 2018-04-03 DIAGNOSIS — Z99.2 TYPE 2 DIABETES MELLITUS WITH CHRONIC KIDNEY DISEASE ON CHRONIC DIALYSIS, WITH LONG-TERM CURRENT USE OF INSULIN (HCC): Primary | ICD-10-CM

## 2018-04-03 LAB — HBA1C MFR BLD HPLC: 9.3 %

## 2018-04-03 NOTE — MR AVS SNAPSHOT
[address] [facility] [address] [id number] [phone] Patient: Fransisca Hand MRN: [id number] :1985 Visit Information Date & Time Provider Department Dept. Phone Encounter #  
 4/3/2018  1:30 PM Bebe Hussein, [address] [place] [address] Diabetes and Endocrinology [id number] Follow-up Instructions Return in about 3 months (around 7/3/2018). Upcoming Health Maintenance Date Due  
 EYE EXAM RETINAL OR DILATED Q1 1994 DTaP/Tdap/Td series (1 - Tdap) 2005 FOOT EXAM Q1 12/10/2016 LIPID PANEL Q1 2016 Pneumococcal 19-64 Highest Risk (2 of 3 - PCV13) 2016 MICROALBUMIN Q1 2016 Influenza Age 5 to Adult 2017 HEMOGLOBIN A1C Q6M 2017 MEDICARE YEARLY EXAM 3/14/2018 Allergies as of 4/3/2018  Review Complete On: 4/3/2018 By: Bebe Hussein MD  
 No Known Allergies Current Immunizations  Reviewed on 2017 Name Date Influenza Vaccine (Quad) PF 2015 11:50 AM  
 Pneumococcal Polysaccharide (PPSV-23) 2015 10:01 AM  
  
 Not reviewed this visit You Were Diagnosed With   
  
 Codes Comments Type 1 diabetes mellitus with chronic kidney disease [facility]    -  Primary ICD-10-CM: E10.22, N18.6, Z99.2 ICD-9-CM: 250.41, 585.9, V45.11 Essential hypertension with goal blood pressure less than 130/80     ICD-10-CM: I10 
ICD-9-CM: 401.9 Vitals BP Pulse Height(growth percentile) Weight(growth percentile) BMI Smoking Status 136/89 (BP 1 Location: Right arm, BP Patient Position: Sitting) 92 6' 2\" (1.88 m) 342 lb 6.4 oz (155.3 kg) 43.96 kg/m2 Former Smoker Vitals History BMI and BSA Data Body Mass Index Body Surface Area 43.96 kg/m 2 2.85 m 2 Preferred Pharmacy Pharmacy Name Phone  [phone] [facility] [address] [phone] Your Updated Medication List  
  
   
This list is accurate as of 4/3/18  2:17 PM.  Always use your most recent med list. amLODIPine 10 mg tablet Commonly known as:  Orval Mar Take 1 Tab by mouth daily. Indications: hypertension  
  
 cloNIDine HCl 0.1 mg tablet Commonly known as:  CATAPRES Take 1 Tab by mouth three (3) times daily (with meals). dulaglutide 0.75 mg/0.5 mL sub-q pen Commonly known as:  TRULICITY  
0.5 mL by SubCUTAneous route every seven (7) days. HumaLOG U-100 Insulin 100 unit/mL injection Generic drug:  insulin lispro  
by SubCUTAneous route Before breakfast, lunch, and dinner. Per sliding scale  
  
 hydrALAZINE 50 mg tablet Commonly known as:  APRESOLINE Take 1 Tab by mouth three (3) times daily. Indications: hypertension LANTUS U-100 INSULIN 100 unit/mL injection Generic drug:  insulin glargine 40 Units by SubCUTAneous route two (2) times a day. Indications: type 2 diabetes mellitus  
  
 lisinopril 5 mg tablet Commonly known as:  Vincenzo Handing Take  by mouth daily. loperamide 2 mg capsule Commonly known as:  IMODIUM Take 1 Cap by mouth four (4) times daily as needed for Diarrhea. omeprazole 40 mg capsule Commonly known as:  PriLOSEC Take 1 Cap by mouth daily. Prescriptions Sent to Pharmacy Refills  
 dulaglutide (TRULICITY) 5.96 GM/8.0 mL sub-q pen 3 Si.5 mL by SubCUTAneous route every seven (7) days. Class: Normal  
 Pharmacy: [facility] [address] Celine Rubinstein [address] [place] [address] #: [phone] Route: SubCUTAneous We Performed the Following AMB POC HEMOGLOBIN A1C [57866 CPT(R)] HM DIABETES FOOT EXAM [HM7 Custom] LIPID PANEL [87405 CPT(R)] METABOLIC PANEL, COMPREHENSIVE [79458 CPT(R)] MICROALBUMIN, UR, RAND W/ MICROALB/CREAT RATIO [id number] CPT(R)] Follow-up Instructions Return in about 3 months (around 7/3/2018). Patient Instructions - Lantus 40 units twice daily 
- Start Trulicity 7.85 once each week, See included instructions with the medicine - Reduce Humalog, even stop humalog if able to get sugars under control without it. Please note our new policy, you must arrive to the clinic 15 minutes before your appointment time to allow enough time for proper check-in, adequate time to spend with your doctor, and also to respect the appointment time of the next patient. Not arriving 15 minutes in advance may result in having your appointment rescheduled for the next available day/time. 
---------------------------------------------------------------------------------------------------------------------- Below you will find a glucose log sheet which you can use to record your blood sugars. Without checking and recording what your home glucose levels are, it will be difficult to make any changes to your medication dose, even when significant changes may be needed. Please feel free to use the log below to record your home glucose levels. At the very least, I would like for you to login the entire 2-3 weeks just before your visit so we can make your visit much more productive and beneficial to you. GLUCOSE LOG SHEET: 
 
Date Breakfast Lunch Dinner Bedtime Comments ? GLUCOSE LOG SHEET: 
 
Date Breakfast Lunch Dinner Bedtime Comments ? GLUCOSE LOG SHEET: 
 
 Date Breakfast Lunch Dinner Bedtime Comments ? Introducing hospitals & HEALTH SERVICES! Steph Kumar introduces [employer] patient portal. Now you can access parts of your medical record, email your doctor's office, and request medication refills online. 1. In your internet browser, go to [email or web address] 2. Click on the First Time User? Click Here link in the Sign In box. You will see the New Member Sign Up page. 3. Enter your [employer] Access Code exactly as it appears below. You will not need to use this code after youve completed the sign-up process. If you do not sign up before the expiration date, you must request a new code. · [employer] Access Code: [email or web address] Expires: 5/30/2018  3:41 PM 
 
4. Enter the last four digits of your Social Security Number (xxxx) and Date of Birth (mm/dd/yyyy) as indicated and click Submit. You will be taken to the next sign-up page. 5. Create a [employer] ID. This will be your [employer] login ID and cannot be changed, so think of one that is secure and easy to remember. 6. Create a [employer] password. You can change your password at any time. 7. Enter your Password Reset Question and Answer. This can be used at a later time if you forget your password. 8. Enter your e-mail address. You will receive e-mail notification when new information is available in [address] 9. Click Sign Up. You can now view and download portions of your medical record. 10. Click the Download Summary menu link to download a portable copy of your medical information. If you have questions, please visit the Frequently Asked Questions section of the [employer] website.  Remember, [employer] is NOT to be used for urgent needs. For medical emergencies, dial 911. Now available from your iPhone and Android! Please provide this summary of care documentation to your next provider. Your primary care clinician is listed as Virginie Braun If you have any questions after today's visit, please call [phone].

## 2018-04-03 NOTE — PATIENT INSTRUCTIONS
- Lantus 40 units twice daily  - Start Trulicity 9.89 once each week, See included instructions with the medicine       - Reduce Humalog, even stop humalog if able to get sugars under control without it. Please note our new policy, you must arrive to the clinic 15 minutes before your appointment time to allow enough time for proper check-in, adequate time to spend with your doctor, and also to respect the appointment time of the next patient. Not arriving 15 minutes in advance may result in having your appointment rescheduled for the next available day/time.  ----------------------------------------------------------------------------------------------------------------------    Below you will find a glucose log sheet which you can use to record your blood sugars. Without checking and recording what your home glucose levels are, it will be difficult to make any changes to your medication dose, even when significant changes may be needed. Please feel free to use the log below to record your home glucose levels. At the very least, I would like for you to login the entire 2-3 weeks just before your visit so we can make your visit much more productive and beneficial to you. GLUCOSE LOG SHEET:    Date Breakfast Lunch Dinner Bedtime Comments ? GLUCOSE LOG SHEET:    Date Breakfast Lunch Dinner Bedtime Comments ? GLUCOSE LOG SHEET:    Date Breakfast Lunch Dinner Bedtime Comments ?

## 2018-04-03 NOTE — PROGRESS NOTES
CONSULTATION REQUESTED BY: Amanda Sutherland III, DO     REASON FOR CONSULT:  Uncontrolled type 2 diabetes    CHIEF COMPLAINT: Blood glucose is high    HISTORY OF PRESENT ILLNESS:   Tita Melendez is a 34 y.o. male with a PMHx as noted below who was referred to our endocrinology clinic for evaluation of uncontrolled type 2 diabetes.       Diabetes History:  Diabetes was diagnosed in [address] History of diabetes is positive in \"everybody\"  Last A1c prior to initial visit was > 8%, today is 9.3% in the clinic      Complications: ESRD on HD x1 year now, amputation of 1st 2 digits L foot, mostly blind both eyes    Current Home Regimen:  - Lantus 40 units twice daily  - Humalog sliding scale, 9-15 units, scale not present    Review of home glucose:       Not checking his sugars regularly, last checked once last week,    Goes by \"the way I feel\"    Review of most recent diabetes-related labs:  Lab Results   Component Value Date    HBA1C 8.4 (H) 05/02/2017    HBA1C 15.6 (H) 12/11/2015    CHOL 206 (H) 12/11/2015    LDLC 151.2 (H) 12/11/2015    GFRAA 7 (L) 12/09/2017    GFRNA 6 (L) 12/09/2017    MCACR 2940.1 (H) 12/29/2015     Lab Key:  [id number] = IA-2 pancreatic islet cell autoantibody  GADLT = BARBRA-65 autoantibody   MCACR = Urine Microalbumin  INSUL = Insulin level  CPEPL = C-peptide level    PAST MEDICAL/SURGICAL HISTORY:   Past Medical History:   Diagnosis Date    Cataracts     Chronic kidney disease     Diabetes ([facility]     Hypercholesterolemia     Hypertension     Obesity      Past Surgical History:   Procedure Laterality Date    COLONOSCOPY N/A 12/8/2017    COL[facility] performed [facility] at [facility]  12/8/2017         HX AMPUTATION      Left great toe and L 5th toe    UPPER GI ENDOSCOPY,BIOPSY  12/8/2017         VASCULAR SURGERY PROCEDURE UNLIST      R side chest    VASCULAR SURGERY PROCEDURE UNLIST  07/25/2017    Creation transposed AV fistula right arm ALLERGIES:   No Known Allergies    MEDICATIONS ON ADMISSION:     Current Outpatient Prescriptions:     insulin glargine (LANTUS) 100 unit/mL injection, 40 Units by SubCUTAneous route two (2) times a day. Indications: type 2 diabetes mellitus, Disp: , Rfl:     lisinopril (PRINIVIL, ZESTRIL) 5 mg tablet, Take  by mouth daily. , Disp: , Rfl:     insulin lispro (HUMALOG) 100 unit/mL injection, by SubCUTAneous route Before breakfast, lunch, and dinner. Per sliding scale, Disp: , Rfl:     loperamide (IMODIUM) 2 mg capsule, Take 1 Cap by mouth four (4) times daily as needed for Diarrhea., Disp: 60 Cap, Rfl: 0    omeprazole (PRILOSEC) 40 mg capsule, Take 1 Cap by mouth daily. , Disp: 30 Cap, Rfl: 0    amLODIPine (NORVASC) 10 mg tablet, Take 1 Tab by mouth daily. Indications: hypertension, Disp: 30 Tab, Rfl: 0    cloNIDine HCl (CATAPRES) 0.1 mg tablet, Take 1 Tab by mouth three (3) times daily (with meals). , Disp: 90 Tab, Rfl: 0    hydrALAZINE (APRESOLINE) 50 mg tablet, Take 1 Tab by mouth three (3) times daily. Indications: hypertension (Patient taking differently: Take 50 mg by mouth daily. Indications: hypertension), Disp: 80 Tab, Rfl: 0    SOCIAL HISTORY:   Social History     Social History    Marital status: LEGALLY      Spouse name: N/A    Number of children: N/A    Years of education: N/A     Occupational History    Not on file.      Social History Main Topics    Smoking status: Former Smoker     Packs/day: 0.25    Smokeless tobacco: Never Used      Comment: quit 6 months ago    Alcohol use No      Comment: rarely    Drug use: No    Sexual activity: Not on file     Other Topics Concern    Not on file     Social History Narrative       FAMILY HISTORY:  Family History   Problem Relation Age of Onset    Diabetes Mother     Liver Disease Father     Kidney Disease Maternal Grandfather     Diabetes Maternal Grandfather     Hypertension Maternal Grandfather     Diabetes Paternal Inell Shaver Hypertension Paternal Uncle        REVIEW OF SYSTEMS: Complete ROS assessed and noted for that which is described above, all else are negative. Eyes: blind  ENT: normal  CVS: normal  Resp: normal  GI: normal  : normal  GYN: normal  Endocrine: normal  Integument: normal  Musculoskeletal: amputations of 1st and 2nd digits of L foot  Neuro: normal  Psych: normal      PHYSICAL EXAMINATION:    VITAL SIGNS:  Visit Vitals    /89 (BP 1 Location: Right arm, BP Patient Position: Sitting)    Pulse 92    Ht 6' 2\" (1.88 m)    Wt 342 lb 6.4 oz (155.3 kg)    BMI 43.96 kg/m2       GENERAL: NCAT, Sitting comfortably, NAD  EYES: EOMI, non-icteric, no proptosis  Ear/Nose/Throat: NCAT, no inflammation, no masses  LYMPH NODES: No LAD  CARDIOVASCULAR: S1 S2, RRR, No murmur, 2+ radial pulses  RESPIRATORY: CTA b/l, no wheeze/rales  GASTROINTESTINAL: NT, ND  MUSCULOSKELETAL: Normal ROM, no atrophy  SKIN: warm, no edema/rash/ or other skin changes  NEUROLOGIC: 5/5 power all extremities, no tremor, AAOx3  PSYCHIATRIC: Normal affect, Normal insight and judgement         Diabetic foot exam performed by Demetrius Arita MD     Measurement  Response Nurse Comment Physician Comment   Monofilament  R - Absent  L - Absent     Pulse DP R - 1+ (normal)  L - 1+ (normal)     Vibration R - Absent    L - Absent     Structural deformity R - None  L - Yes - Amputated 1st and 2nd digits     Skin Integrity / Deformity R - Yes - Dry, thick nails, callus x2  L - Yes - Dry, thick nails        Reviewed by:             REVIEW OF LABORATORY AND RADIOLOGY DATA:   Labs and documentation have been reviewed as described above. ASSESSMENT AND PLAN:   Marty France is a 34 y.o. male with a PMHx as noted above who was referred to our endocrinology clinic for evaluation of uncontrolled type 2 diabetes.      Problems:  Type 2 diabetes Uncontrolled  Hypertension    We had the pleasure of reviewing together the basics of diabetes including basic pathophysiology and diabetes care. We further discussed the importance of checking home glucose regularly and taking all of their scheduled medications in order to have the best possible outcome. I was able to answer any questions they had in clinic today and they are invited to reach me if they have any further questions. Based upon our discussion together today we have decided to make the following changes:    *Patient with complication of ESRD on HD, amputations, significant blindness b/l, and with ongoing poor DM control (a1c today is 9.3%). In his particular case we noted that he is not checking his sugars but maybe once per week (not safe on TID short acting insulin) and is not always using the humalog in general. In this case, a trial of a GLP-1 agonist may be a good consideration. He denies hx of pancreatitis or thyroid issues. He would like to give it a try. Today we discussed the diabetes risk curve and the relationship between the HbA1c level and risks of sudden cardiovascular events. Additionally we discussed the importance of adherence to medication regimens to avoid the complications that result from uncontrolled diabetes. Patient is advised to contact us if they have any barriers to achieving good diabetes self management, so that we can assist them the best that we can. PLAN  Type 2 Diabetes  Medications:  - Lantus 40 units twice daily, advised how to reduce based on AM sugars (needs to check)  - Start Trulicity 2.08 once each week       - Reduce Humalog, even stop humalog if able to get sugars under control without it  Advised to check glucose at least q AM  Provided with glucose log sheets for later review. Labs: HbA1c        ,GFR           ,FLP         ,Urine Microalbumin  Feet: Examination performed today. Encouraged regular \"self-checks\" at home. Eyes: Advised updated eye exam report faxed to our office for review.   Kidney: ESRD on HD    HTN: BP stable on lisinopril 5mg, (prev on aml/clonidine/hydralazine but off since starting HD)  HLD: Fasting lipids to be obtained/reviewed. Not on meds now. RTC: I would like to see them back in 3 months.   >60 minutes spent together with patient today of which >50% of this time was spent in counseling and coordination of care. Aaliyah Kaye.  [facility] [facility]

## 2018-04-04 LAB
ALBUMIN SERPL-MCNC: 3.6 G/DL (ref 3.5–5.5)
ALBUMIN/CREAT UR: 8465.6 MG/G CREAT (ref 0–30)
ALBUMIN/GLOB SERPL: 0.9 {RATIO} (ref 1.2–2.2)
ALP SERPL-CCNC: 55 IU/L (ref 39–117)
ALT SERPL-CCNC: 11 IU/L (ref 0–44)
AST SERPL-CCNC: 15 IU/L (ref 0–40)
BILIRUB SERPL-MCNC: 0.2 MG/DL (ref 0–1.2)
BUN SERPL-MCNC: 30 MG/DL (ref 6–20)
BUN/CREAT SERPL: 4 (ref 9–20)
CALCIUM SERPL-MCNC: 9.1 MG/DL (ref 8.7–10.2)
CHLORIDE SERPL-SCNC: 96 MMOL/L (ref 96–106)
CHOLEST SERPL-MCNC: 224 MG/DL (ref 100–199)
CO2 SERPL-SCNC: 24 MMOL/L (ref 18–29)
CREAT SERPL-MCNC: 7.49 MG/DL (ref 0.76–1.27)
CREAT UR-MCNC: 88.9 MG/DL
GFR SERPLBLD CREATININE-BSD FMLA CKD-EPI: 10 ML/MIN/1.73
GFR SERPLBLD CREATININE-BSD FMLA CKD-EPI: 9 ML/MIN/1.73
GLOBULIN SER CALC-MCNC: 4 G/DL (ref 1.5–4.5)
GLUCOSE SERPL-MCNC: 196 MG/DL (ref 65–99)
HDLC SERPL-MCNC: 30 MG/DL
INTERPRETATION, 910389: NORMAL
INTERPRETATION: NORMAL
LDLC SERPL CALC-MCNC: 175 MG/DL (ref 0–99)
MICROALBUMIN UR-MCNC: 7525.9 UG/ML
PDF IMAGE, 910387: NORMAL
POTASSIUM SERPL-SCNC: 4.7 MMOL/L (ref 3.5–5.2)
PROT SERPL-MCNC: 7.6 G/DL (ref 6–8.5)
SODIUM SERPL-SCNC: 140 MMOL/L (ref 134–144)
TRIGL SERPL-MCNC: 94 MG/DL (ref 0–149)
VLDLC SERPL CALC-MCNC: 19 MG/DL (ref 5–40)

## 2018-04-06 ENCOUNTER — TELEPHONE (OUTPATIENT)
Dept: ENDOCRINOLOGY | Age: 33
End: 2018-04-06

## 2018-04-06 NOTE — TELEPHONE ENCOUNTER
I called Mr. Pachecohele Saidaconsuelo and relayed the message from Dr. Alfreda Kelly. He seemed to understand the information and said he would talk to his doctor.   Teetee Victoria

## 2018-04-06 NOTE — PROGRESS NOTES
Patients cholesterol is very high. Total cholesterol 224,   LDL (bad) cholesterol is 175, ideally less than 100. He had been off statin. If he would like to discuss restarting the statin with his doctor who discontinued it or his nephrologist, that would be ok. But he will need to consider getting back on a treatment potentially. Andra López.  [address] Endocrinology  [address] [place] [address]

## 2018-04-06 NOTE — TELEPHONE ENCOUNTER
----- Message from Ernesto Rosales MD sent at 4/5/2018 11:34 PM EDT -----  Patients cholesterol is very high. Total cholesterol 224,   LDL (bad) cholesterol is 175, ideally less than 100. He had been off statin. If he would like to discuss restarting the statin with his doctor who discontinued it or his nephrologist, that would be ok. But he will need to consider getting back on a treatment potentially. Ruth Neumann.  [address] [facility] Endocrinology  [address] [place] [address]

## 2018-05-01 ENCOUNTER — PATIENT OUTREACH (OUTPATIENT)
Dept: INTERNAL MEDICINE CLINIC | Age: 33
End: 2018-05-01

## 2018-05-01 NOTE — PROGRESS NOTES
Chart reviewed. Reached out to pt regarding medication assistance. Pt stated he has all his meds and is taking them. Pt stated he sees Dr. Natalie Miranda for diabetes. Med rec done. Pt stated he only takes Trulicity and Lantus, imodium if has diarrhea. Pt stopped Humalog because he was advised to do so. Pt stated all his blood sugars are below 200. Advised pt to check with Dr. Natalie Miranda about taking Humalog as his goal is blood sugars below 150. Pt also stated he stopped the lisinopril because his blood pressure wouldn't come back up after HD on MWF. Advised pt to discuss with Dr. Grant Hartley at his appt on 5/22/18 so it can be taken off of his med list. Understanding and agreement was verbalized of all above discussion/education. Patient has graduated from the Disease Specific Care Management  program with this diabetes educator on 5/2/18 for medication assistance. Patient's symptoms are stable at this time. Patient/family has the ability to self-manage. Care management goals have been completed at this time. No further nurse navigator follow up scheduled. Goals Addressed             Most Recent     COMPLETED: Supportive resources in place to maintain patient in the community (ie. Home Health, DME equipment, refer to, medication assistant plan, etc.)   On track (5/1/2018)             5/11/17: Patient reports financial hardship as he does not have prescription coverage/Medicare Part D. Patient states that he is able to afford his medications, however reports limited funds for the remainder of the month due to cost of medications; currently fills prescriptions at [facility] and NN provided patient with contact information for [address] [place] [address] so that patient can is able to do a cost comparison. Patient reports that he is in the process of applying for Medicaid. NN will route to Ambulatory  NN for additional patient assistance.     7/3/17- left pt Hossein Eric with [employer] and pt message to call back regarding medication assistance. KATHLEEN    5/2/18-pt stated he has all his meds and is taking them; pt has f/u appts already made for Dr. Josiah Mora and Dr. Benjaman Kocher. KATHLEEN            Pt has nurse navigator's contact information for any further questions, concerns, or needs. Patients upcoming visits:  Future Appointments  Date Time Provider Nigel Rose   5/22/2018 11:45 AM [facility]   7/17/2018 12:10 PM Dorene Lo MD RDE JENNA [facility]      Will reopen episode on an as needed basis.

## 2018-09-04 ENCOUNTER — TELEPHONE (OUTPATIENT)
Dept: INTERNAL MEDICINE CLINIC | Age: 33
End: 2018-09-04

## 2018-09-04 NOTE — TELEPHONE ENCOUNTER
Pt is requesting a sooner appt for a physical for his Cataract Surgery that's scheduled on 09/18/18. Best contact [phone].              Copy/paste Diony Almonte         Ms Jeffery Finnegan states pt's surgery is scheduled for 9-18-18 and he has dialysis on M-W-F.  U would like this done on a Tuesday please. Call pt to schedule and then call Ms. Enrike Benedict at #[phone] and give her the time and date.   You may leave a vm

## 2018-09-04 NOTE — TELEPHONE ENCOUNTER
Spoke with patient after 2 patient identifiers being note and advised of appt on Friday, September 14, 2018 01:30 PM, patient accepted. Mrs. Colby Chen has been left a VM detailing appt date and time. Patient expressed understanding and has no further questions at this time.

## 2018-09-14 ENCOUNTER — HOSPITAL ENCOUNTER (OUTPATIENT)
Dept: LAB | Age: 33
Discharge: HOME | End: 2018-09-14
Payer: MEDICARE

## 2018-09-14 ENCOUNTER — OFFICE VISIT (OUTPATIENT)
Dept: INTERNAL MEDICINE CLINIC | Age: 33
End: 2018-09-14

## 2018-09-14 VITALS
DIASTOLIC BLOOD PRESSURE: 127 MMHG | SYSTOLIC BLOOD PRESSURE: 194 MMHG | RESPIRATION RATE: 16 BRPM | TEMPERATURE: 98.1 F | OXYGEN SATURATION: 97 % | BODY MASS INDEX: 40.43 KG/M2 | HEART RATE: 92 BPM | WEIGHT: 315 LBS | HEIGHT: 74 IN

## 2018-09-14 DIAGNOSIS — E11.65 UNCONTROLLED TYPE 2 DIABETES MELLITUS WITH HYPERGLYCEMIA, WITH LONG-TERM CURRENT USE OF INSULIN (HCC): Chronic | ICD-10-CM

## 2018-09-14 DIAGNOSIS — E78.5 HYPERLIPIDEMIA ASSOCIATED WITH TYPE 2 DIABETES MELLITUS (HCC): Chronic | ICD-10-CM

## 2018-09-14 DIAGNOSIS — I10 ESSENTIAL HYPERTENSION: ICD-10-CM

## 2018-09-14 DIAGNOSIS — Z23 ENCOUNTER FOR IMMUNIZATION: ICD-10-CM

## 2018-09-14 DIAGNOSIS — N18.6 ESRD (END STAGE RENAL DISEASE) ON DIALYSIS (HCC): Chronic | ICD-10-CM

## 2018-09-14 DIAGNOSIS — E11.69 HYPERLIPIDEMIA ASSOCIATED WITH TYPE 2 DIABETES MELLITUS (HCC): Chronic | ICD-10-CM

## 2018-09-14 DIAGNOSIS — Z01.818 PREOP TESTING: Primary | ICD-10-CM

## 2018-09-14 DIAGNOSIS — E66.01 MORBID OBESITY WITH BODY MASS INDEX (BMI) OF 40.0 TO 49.9 (HCC): Chronic | ICD-10-CM

## 2018-09-14 DIAGNOSIS — Z79.4 UNCONTROLLED TYPE 2 DIABETES MELLITUS WITH PROLIFERATIVE RETINOPATHY OF BOTH EYES, WITH LONG-TERM CURRENT USE OF INSULIN, MACULAR EDEMA PRESENCE UNSPECIFIED, UNSPECIFIED PROLIFERATIVE RETINOPATHY TYPE: Chronic | ICD-10-CM

## 2018-09-14 DIAGNOSIS — Z99.2 ESRD (END STAGE RENAL DISEASE) ON DIALYSIS (HCC): Chronic | ICD-10-CM

## 2018-09-14 DIAGNOSIS — E11.65 UNCONTROLLED TYPE 2 DIABETES MELLITUS WITH PROLIFERATIVE RETINOPATHY OF BOTH EYES, WITH LONG-TERM CURRENT USE OF INSULIN, MACULAR EDEMA PRESENCE UNSPECIFIED, UNSPECIFIED PROLIFERATIVE RETINOPATHY TYPE: Chronic | ICD-10-CM

## 2018-09-14 DIAGNOSIS — Z00.00 INITIAL MEDICARE ANNUAL WELLNESS VISIT: ICD-10-CM

## 2018-09-14 DIAGNOSIS — Z79.4 UNCONTROLLED TYPE 2 DIABETES MELLITUS WITH HYPERGLYCEMIA, WITH LONG-TERM CURRENT USE OF INSULIN (HCC): Chronic | ICD-10-CM

## 2018-09-14 DIAGNOSIS — E11.3593 UNCONTROLLED TYPE 2 DIABETES MELLITUS WITH PROLIFERATIVE RETINOPATHY OF BOTH EYES, WITH LONG-TERM CURRENT USE OF INSULIN, MACULAR EDEMA PRESENCE UNSPECIFIED, UNSPECIFIED PROLIFERATIVE RETINOPATHY TYPE: Chronic | ICD-10-CM

## 2018-09-14 PROBLEM — N17.9 AKI (ACUTE KIDNEY INJURY) (HCC): Status: RESOLVED | Noted: 2017-05-01 | Resolved: 2018-09-14

## 2018-09-14 PROCEDURE — 85025 COMPLETE CBC W/AUTO DIFF WBC: CPT

## 2018-09-14 PROCEDURE — 80053 COMPREHEN METABOLIC PANEL: CPT

## 2018-09-14 PROCEDURE — 36415 COLL VENOUS BLD VENIPUNCTURE: CPT

## 2018-09-14 RX ORDER — ATORVASTATIN CALCIUM 40 MG/1
40 TABLET, FILM COATED ORAL DAILY
Qty: 90 TAB | Refills: 3 | Status: SHIPPED | OUTPATIENT
Start: 2018-09-14 | End: 2020-01-06

## 2018-09-14 NOTE — PROGRESS NOTES
Sukh Miranda is a 28 y.o. male who presents for evaluation of preop evaluation for eye surgery for cataracts. Aunt with him today. Pt last seen by me march 24, 2016. He moved to [place] for a while. He has no complaints. States stopped taking bp meds b/c his pressure would always drop during HD. No hx of cad, denies any symptoms of chest pain, short of breath. ROS: 
Constitutional: negative for fevers, chills, anorexia and weight loss Eyes:   negative for visual disturbance and irritation ENT:   negative for tinnitus,sore throat,nasal congestion,ear pain,hoarseness Respiratory:  negative for cough, hemoptysis, dyspnea,wheezing CV:   negative for chest pain, palpitations, lower extremity edema GI:   negative for nausea, vomiting, diarrhea, abdominal pain,melena Genitourinary: negative for frequency, dysuria and hematuria Musculoskel: negative for myalgias, arthralgias, back pain, muscle weakness, joint pain Neurological:  negative for headaches, dizziness, focal weakness, numbness Psychiatric:     Negative for depression or anxiety Past Medical History:  
Diagnosis Date  Cataracts  Chronic kidney disease  Diabetes ([facility] [address]  Hypercholesterolemia  Hypertension  Kidney failure  Obesity Past Surgical History:  
Procedure Laterality Date  COLONOSCOPY N/A 12/8/2017 COLONOSCOPY performed by Hannah Casanova MD at [facility] ENDOSCOPY  COLONOSCOPY,DIAGNOSTIC  12/8/2017  HX AMPUTATION Left great toe and L 5th toe  UPPER GI ENDOSCOPY,BIOPSY  12/8/2017  VASCULAR SURGERY PROCEDURE UNLIST    
 R side chest  
 VASCULAR SURGERY PROCEDURE UNLIST  07/25/2017 Creation transposed AV fistula right arm Family History Problem Relation Age of Onset  Diabetes Mother  Liver Disease Father  Kidney Disease Maternal Grandfather  Diabetes Maternal Grandfather  Hypertension Maternal Grandfather  Diabetes Paternal Uncle  Hypertension Paternal Uncle Social History Social History  Marital status: LEGALLY  Spouse name: N/A  
 Number of children: N/A  
 Years of education: N/A Occupational History  Not on file. Social History Main Topics  Smoking status: Former Smoker Packs/day: 0.25  Smokeless tobacco: Never Used Comment: quit 6 months ago  Alcohol use No  
   Comment: rarely  Drug use: No  
 Sexual activity: Yes  
  Partners: Female Other Topics Concern  Not on file Social History Narrative Visit Vitals  BP (!) 194/127 (BP 1 Location: Right arm, BP Patient Position: Sitting)  Pulse 92  Temp 98.1 °F (36.7 °C) (Oral)  Resp 16  
 Ht 6' 2\" (1.88 m)  Wt (!) 354 lb (160.6 kg)  SpO2 97%  BMI 45.45 kg/m2 Physical Examination:  
General - Well appearing male HEENT - PERRL, TM no erythema/opacification, normal nasal turbinates, no oropharyngeal erythema or exudate, MMM Neck - supple, no bruits, no thyroidomegaly, no lymphadenopathy Pulm - clear to auscultation bilaterally Cardio - RRR, normal S1 S2, no murmur Abd - soft, nontender, no masses, no HSM Extrem - no edema, +2 distal pulses Neuro-  No focal deficits, CN intact Assessment/Plan: 1.  preop evaluation for bilateral cataract sc--check cbc, cmp.  ecg looked ok, nsr with [facility]. May proceed with sx as planned. 2.  Labile htn--typically drops considerably when he gets HD. Had been on lisinopril before 3. Uncontrolled dm, type 2--on lantus and trulicity now. Follows with dr Jay fontana 4.   hyperlipids--last , rx sent in for lipitor 5. Anemia of chronic kidney ds--check cbc 6.  esrd--does HD on mwf, but did not go today so he could come to this appt, he will have HD tomorrow 7. Morbid obesity 
 
prevnar 13 given today. He will get flu shot at HD in a few weeks. rtc 6 months Octavia Gains III, DO 
 
 
 
 
 
 
 
 This is an Initial Medicare Annual Wellness Exam (AWV) (Performed 12 months after IPPE or effective date of Medicare Part B enrollment, Once in a lifetime) I have reviewed the patient's medical history in detail and updated the computerized patient record. History Past Medical History:  
Diagnosis Date  Cataracts  Chronic kidney disease  Diabetes ([address]  Hypercholesterolemia  Hypertension  Kidney failure  Obesity Past Surgical History:  
Procedure Laterality Date  COLONOSCOPY N/A 12/8/2017 COLONOSCOPY performed by [facility]ilva [facility] at [facility] ENDOSCOPY  COLONOSCOPY,DIAGNOSTIC  12/8/2017  HX AMPUTATION Left great toe and L 5th toe  UPPER GI ENDOSCOPY,BIOPSY  12/8/2017  VASCULAR SURGERY PROCEDURE UNLIST    
 R side chest  
 VASCULAR SURGERY PROCEDURE UNLIST  07/25/2017 Creation transposed AV fistula right arm Current Outpatient Prescriptions Medication Sig Dispense Refill  dulaglutide (TRULICITY) 5.60 JI/7.0 mL sub-q pen 0.5 mL by SubCUTAneous route every seven (7) days. 12 Pen 3  
 loperamide (IMODIUM) 2 mg capsule Take 1 Cap by mouth four (4) times daily as needed for Diarrhea. 60 Cap 0  
 insulin glargine (LANTUS) 100 unit/mL injection 40 Units by SubCUTAneous route two (2) times a day. Indications: type 2 diabetes mellitus  lisinopril (PRINIVIL, ZESTRIL) 5 mg tablet Take  by mouth daily.  insulin lispro (HUMALOG) 100 unit/mL injection by SubCUTAneous route Before breakfast, lunch, and dinner. Per sliding scale No Known Allergies Family History Problem Relation Age of Onset  Diabetes Mother  Liver Disease Father  Kidney Disease Maternal Grandfather  Diabetes Maternal Grandfather  Hypertension Maternal Grandfather  Diabetes Paternal Uncle  Hypertension Paternal Uncle Social History Substance Use Topics  Smoking status: Former Smoker   Packs/day: 0.25  
  Smokeless tobacco: Never Used Comment: quit 6 months ago  Alcohol use No  
   Comment: rarely Patient Active Problem List  
Diagnosis Code  DM (diabetes mellitus) ([facility]) E11.9  
 HTN (hypertension) I10  
 Hyperlipidemia E78.5  Morbid obesity with BMI of 40.0-44.9, adult ([facility]) E66.01, Z68.41  
 CKD (chronic kidney disease) stage V requiring chronic dialysis ([facility]) N18.6, Z99.2  Postoperative hypoxia R09.02, Z98.890  
 ESRD (end stage renal disease) ([facility]) N18.6  Diarrhea R19.7 Depression Risk Factor Screening: PHQ over the last two weeks 9/14/2018 Little interest or pleasure in doing things Not at all Feeling down, depressed, irritable, or hopeless Not at all Total Score PHQ 2 0 Alcohol Risk Factor Screening: You do not drink alcohol or very rarely. Functional Ability and Level of Safety:  
 
Hearing Loss Hearing is good. Activities of Daily Living The home contains: no safety equipment. Patient needs help with:  transportation, managing medications and managing money Fall Risk No flowsheet data found. Abuse Screen Patient is not abused. Lives alone Cognitive Screening Evaluation of Cognitive Function: 
Has your family/caregiver stated any concerns about your memory: no 
Normal 
 
Patient Care Team  
Patient Care Team: 
Priyank Cadet III, DO as PCP - General (Internal Medicine) Shereen Koo RN as Ambulatory Care Navigator Nishi Nicole MD as Surgeon (General Surgery) Assessment/Plan Education and counseling provided: 
Are appropriate based on today's review and evaluation Pneumococcal Vaccine Diagnoses and all orders for this visit: 1. Preop testing -     AMB POC EKG ROUTINE W/ 12 LEADS, INTER & REP 2. Initial Medicare annual wellness visit Health Maintenance Due Topic Date Due  
 EYE EXAM RETINAL OR DILATED Q1  07/22/1994  
 DTaP/Tdap/Td series (1 - Tdap) 07/22/2005  Pneumococcal 19-64 Highest Risk (2 of 3 - PCV13) 12/12/2016  MEDICARE YEARLY EXAM  03/14/2018  Influenza Age 5 to Adult  08/01/2018  HEMOGLOBIN A1C Q6M  10/03/2018

## 2018-09-14 NOTE — PROGRESS NOTES
Reviewed record in preparation for visit and have obtained necessary documentation. Identified pt with two pt identifiers(name and ). Chief Complaint Patient presents with  Pre-op Exam  
  left eye surgery on 18 Health Maintenance Due Topic Date Due  Eye Exam  1994  
 DTaP/Tdap/Td  (1 - Tdap) 2005  Pneumococcal Vaccine (2 of 3 - PCV13) 2016 [address] [place] [address] Annual Well Visit  2018  Flu Vaccine  2018  Hemoglobin A1C    10/03/2018 Mr. Ankita Ospina has a reminder for a \"due or due soon\" health maintenance. I have asked that he discuss this further with his primary care provider for follow-up on this health maintenance. Coordination of Care Questionnaire: 
:  
 
1) Have you been to an emergency room, urgent care clinic since your last visit? no  
Hospitalized since your last visit? no          
 
2) Have you seen or consulted any other health care providers outside of [address] [place] [address] since your last visit? no  (Include any pap smears or colon screenings in this section.) 3) In the event something were to happen to you and you were unable to speak on your behalf, do you have an Advance Directive/ Living Will in place stating your wishes? NO Do you have an Advance Directive on file? no 
 
4) Are you interested in receiving information on Advance Directives? NO Patient is accompanied by self I have received verbal consent from Cait Peres to discuss any/all medical information while they are present in the room.

## 2018-09-14 NOTE — MR AVS SNAPSHOT
[address] [facility] 
[phone] Patient: Alexander Conner MRN: [id number] :1985 Visit Information Date & Time Provider Department Dept. Phone Encounter #  
 2018  1:30 PM Kimber Greene, [address] [place] [address] [facility] [phone] Follow-up Instructions Return in about 6 months (around 3/14/2019). Upcoming Health Maintenance Date Due  
 EYE EXAM RETINAL OR DILATED Q1 1994 DTaP/Tdap/Td series (1 - Tdap) 2005 Pneumococcal 19-64 Highest Risk (2 of 3 - PCV13) 2016 MEDICARE YEARLY EXAM 3/14/2018 Influenza Age 5 to Adult 2018 HEMOGLOBIN A1C Q6M 10/3/2018 FOOT EXAM Q1 4/3/2019 MICROALBUMIN Q1 4/3/2019 LIPID PANEL Q1 4/3/2019 Allergies as of 2018  Review Complete On: 2018 By: Eyal Larson III, DO No Known Allergies Current Immunizations  Reviewed on 2018 Name Date Influenza Vaccine (Quad) PF 2015 11:50 AM  
 Pneumococcal Polysaccharide (PPSV-23) 2015 10:01 AM  
  
 Reviewed by Eyal Larson III, DO on 2018 at  2:03 PM  
You Were Diagnosed With   
  
 Codes Comments Preop testing    -  Primary ICD-10-CM: C64.142 ICD-9-CM: V72.84 Initial Medicare annual wellness visit     ICD-10-CM: Z00.00 ICD-9-CM: V70.0 ESRD (end stage renal disease) [facility] dialysis ([facility]     ICD-10-CM: N18.6, Z99.2 ICD-9-CM: 585.6, V45.11 Essential hypertension     ICD-10-CM: I10 
ICD-9-CM: 401.9 Hyperlipidemia associated with type 2 diabetes mellitus ([address]     ICD-10-CM: E11.69, E78.5 ICD-9-CM: 250.80, 272.4 Morbid obesity with body mass index (BMI) of 40.0 to 49.9 (HCC)     ICD-10-CM: E66.01 
ICD-9-CM: 278.01  Uncontrolled type 2 diabetes mellitus with proliferative retinopathy of both eyes, with long-term current use of insulin, macular edema presence unspecified, unspecified proliferative retinopat* [facility])     ICD-10-CM: O03.3340, Z79.4, E11.65 ICD-9-CM: 250.52, 362.02, V58.67 Uncontrolled type 2 diabetes mellitus with hyperglycemia, with long-term current use of insulin ([facility])     ICD-10-CM: E11.65, Z79.4 ICD-9-CM: 250.02, V58.67 Vitals BP Pulse Temp Resp Height(growth percentile) Weight(growth percentile) (!) 194/127 (BP 1 Location: Right arm, BP Patient Position: Sitting) 92 98.1 °F (36.7 °C) (Oral) 16 6' 2\" (1.88 m) (!) 354 lb (160.6 kg) SpO2 BMI Smoking Status 97% 45.45 kg/m2 Former Smoker Vitals History BMI and BSA Data Body Mass Index Body Surface Area 45.45 kg/m 2 2.9 m 2 Preferred Pharmacy Pharmacy Name Phone [facility] [address] [facility] [address] [place] [address]  [facility] [phone] Your Updated Medication List  
  
   
This list is accurate as of 9/14/18  2:14 PM.  Always use your most recent med list.  
  
  
  
  
 atorvastatin 40 mg tablet Commonly known as:  LIPITOR Take 1 Tab by mouth daily. dulaglutide 0.75 mg/0.5 mL sub-q pen Commonly known as:  TRULICITY  
0.5 mL by SubCUTAneous route every seven (7) days. HumaLOG U-100 Insulin 100 unit/mL injection Generic drug:  insulin lispro  
by SubCUTAneous route Before breakfast, lunch, and dinner. Per sliding scale LANTUS U-100 INSULIN 100 unit/mL injection Generic drug:  insulin glargine 40 Units by SubCUTAneous route two (2) times a day. Indications: type 2 diabetes mellitus  
  
 lisinopril 5 mg tablet Commonly known as:  Donnald Code Take  by mouth daily. loperamide 2 mg capsule Commonly known as:  IMODIUM Take 1 Cap by mouth four (4) times daily as needed for Diarrhea. Prescriptions Sent to Pharmacy Refills  
 atorvastatin (LIPITOR) 40 mg tablet 3 Sig: Take 1 Tab by mouth daily.   
 Class: Normal  
 Pharmacy: [facility] [place] [address] #: [phone] Route: Oral  
  
We Performed the Following AMB POC EKG ROUTINE W/ 12 LEADS, INTER & REP [29459 CPT(R)] CBC WITH AUTOMATED DIFF [40833 CPT(R)] METABOLIC PANEL, COMPREHENSIVE [63330 CPT(R)] Follow-up Instructions Return in about 6 months (around 3/14/2019). Patient Instructions Medicare Wellness Visit, Male The best way to live healthy is to have a lifestyle where you eat a well-balanced diet, exercise regularly, limit alcohol use, and quit all forms of tobacco/nicotine, if applicable. Regular preventive services are another way to keep healthy. Preventive services (vaccines, screening tests, monitoring & exams) can help personalize your care plan, which helps you manage your own care. Screening tests can find health problems at the earliest stages, when they are easiest to treat. [facility]  follows the current, evidence-based guidelines published by the [facility] Sergio Charles ([facility]) when recommending preventive services for our patients. Because we follow these guidelines, sometimes recommendations change over time as research supports it. (For example, a prostate screening blood test is no longer routinely recommended for men with no symptoms.) Of course, you and your doctor may decide to screen more often for some diseases, based on your risk and co-morbidities (chronic disease you are already diagnosed with). Preventive services for you include: - Medicare offers their members a free annual wellness visit, which is time for you and your primary care provider to discuss and plan for your preventive service needs. Take advantage of this benefit every year! 
-All adults over age 72 should receive the recommended pneumonia vaccines. Current USPSTF guidelines recommend a series of two vaccines for the best pneumonia protection.  
-All adults should have a flu vaccine yearly and an ECG. All adults age 61 and older should receive a shingles vaccine once in their lifetime.   
-All adults age 38-68 who are overweight should have a diabetes screening test once every three years.  
-Other screening tests & preventive services for persons with diabetes include: an eye exam to screen for diabetic retinopathy, a kidney function test, a foot exam, and stricter control over your cholesterol.  
-Cardiovascular screening for adults with routine risk involves an electrocardiogram (ECG) at intervals determined by the provider.  
-Colorectal cancer screening should be done for adults age 54-65 with no increased risk factors for colorectal cancer. There are a number of acceptable methods of screening for this type of cancer. Each test has its own benefits and drawbacks. Discuss with your provider what is most appropriate for you during your annual wellness visit. The different tests include: colonoscopy (considered the best screening method), a fecal occult blood test, a fecal DNA test, and sigmoidoscopy. 
-All adults born between [facility] should be screened once for Hepatitis C. 
-An Abdominal Aortic Aneurysm (AAA) Screening is recommended for men age 73-68 who has ever smoked in their lifetime. Here is a list of your current Health Maintenance items (your personalized list of preventive services) with a due date: 
Health Maintenance Due Topic Date Due  Eye Exam  07/22/1994  
 DTaP/Tdap/Td  (1 - Tdap) 07/22/2005  Pneumococcal Vaccine (2 of 3 - PCV13) 12/12/2016 [facility] Annual Well Visit  03/14/2018  Flu Vaccine  08/01/2018  Hemoglobin A1C    10/03/2018 Let us know who your eye doctor is so we can get your records from them. Keep working on stopping smoking. Introducing [facility]! [employer] introduces [employer] patient portal. Now you can access parts of your medical record, email your doctor's office, and request medication refills online. 1. In your internet browser, go to [email or web address] 2. Click on the First Time User? Click Here link in the Sign In box. You will see the New Member Sign Up page. 3. Enter your [employer] Access Code exactly as it appears below. You will not need to use this code after youve completed the sign-up process. If you do not sign up before the expiration date, you must request a new code. · [employer] Access Code: [email or web address] Expires: 12/13/2018  2:14 PM 
 
4. Enter the last four digits of your Social Security Number (xxxx) and Date of Birth (mm/dd/yyyy) as indicated and click Submit. You will be taken to the next sign-up page. 5. Create a [employer] ID. This will be your [employer] login ID and cannot be changed, so think of one that is secure and easy to remember. 6. Create a [employer] password. You can change your password at any time. 7. Enter your Password Reset Question and Answer. This can be used at a later time if you forget your password. 8. Enter your e-mail address. You will receive e-mail notification when new information is available in [address] 9. Click Sign Up. You can now view and download portions of your medical record. 10. Click the Download Summary menu link to download a portable copy of your medical information. If you have questions, please visit the Frequently Asked Questions section of the [employer] website. Remember, [employer] is NOT to be used for urgent needs. For medical emergencies, dial 911. Now available from your iPhone and Android! Please provide this summary of care documentation to your next provider. Your primary care clinician is listed as Zain Mae If you have any questions after today's visit, please call [phone].

## 2018-09-14 NOTE — PATIENT INSTRUCTIONS
Medicare Wellness Visit, Male The best way to live healthy is to have a lifestyle where you eat a well-balanced diet, exercise regularly, limit alcohol use, and quit all forms of tobacco/nicotine, if applicable. Regular preventive services are another way to keep healthy. Preventive services (vaccines, screening tests, monitoring & exams) can help personalize your care plan, which helps you manage your own care. Screening tests can find health problems at the earliest stages, when they are easiest to treat. [address] [facility] follows the current, evidence-based guidelines published by the [facility] Sergio Charles ([facility]) when recommending preventive services for our patients. Because we follow these guidelines, sometimes recommendations change over time as research supports it. (For example, a prostate screening blood test is no longer routinely recommended for men with no symptoms.) Of course, you and your doctor may decide to screen more often for some diseases, based on your risk and co-morbidities (chronic disease you are already diagnosed with). Preventive services for you include: - Medicare offers their members a free annual wellness visit, which is time for you and your primary care provider to discuss and plan for your preventive service needs. Take advantage of this benefit every year! 
-All adults over age 72 should receive the recommended pneumonia vaccines. Current USPSTF guidelines recommend a series of two vaccines for the best pneumonia protection.  
-All adults should have a flu vaccine yearly and an ECG.  All adults age 61 and older should receive a shingles vaccine once in their lifetime.   
-All adults age 38-68 who are overweight should have a diabetes screening test once every three years.  
-Other screening tests & preventive services for persons with diabetes include: an eye exam to screen for diabetic retinopathy, a kidney function test, a foot exam, and stricter control over your cholesterol.  
-Cardiovascular screening for adults with routine risk involves an electrocardiogram (ECG) at intervals determined by the provider.  
-Colorectal cancer screening should be done for adults age 54-65 with no increased risk factors for colorectal cancer. There are a number of acceptable methods of screening for this type of cancer. Each test has its own benefits and drawbacks. Discuss with your provider what is most appropriate for you during your annual wellness visit. The different tests include: colonoscopy (considered the best screening method), a fecal occult blood test, a fecal DNA test, and sigmoidoscopy. 
-All adults born between [facility] should be screened once for Hepatitis C. 
-An Abdominal Aortic Aneurysm (AAA) Screening is recommended for men age 73-68 who has ever smoked in their lifetime. Here is a list of your current Health Maintenance items (your personalized list of preventive services) with a due date: 
Health Maintenance Due Topic Date Due  Eye Exam  07/22/1994  
 DTaP/Tdap/Td  (1 - Tdap) 07/22/2005  Pneumococcal Vaccine (2 of 3 - PCV13) 12/12/2016 [address] [place] [address] Annual Well Visit  03/14/2018  Flu Vaccine  08/01/2018  Hemoglobin A1C    10/03/2018 Let us know who your eye doctor is so we can get your records from them. Keep working on stopping smoking.

## 2018-09-15 LAB
ALBUMIN SERPL-MCNC: 3.7 G/DL (ref 3.5–5.5)
ALBUMIN/GLOB SERPL: 0.9 {RATIO} (ref 1.2–2.2)
ALP SERPL-CCNC: 58 IU/L (ref 39–117)
ALT SERPL-CCNC: 10 IU/L (ref 0–44)
AST SERPL-CCNC: 13 IU/L (ref 0–40)
BASOPHILS # BLD AUTO: 0 X10E3/UL (ref 0–0.2)
BASOPHILS NFR BLD AUTO: 0 %
BILIRUB SERPL-MCNC: 0.3 MG/DL (ref 0–1.2)
BUN SERPL-MCNC: 40 MG/DL (ref 6–20)
BUN/CREAT SERPL: 4 (ref 9–20)
CALCIUM SERPL-MCNC: 8.6 MG/DL (ref 8.7–10.2)
CHLORIDE SERPL-SCNC: 98 MMOL/L (ref 96–106)
CO2 SERPL-SCNC: 24 MMOL/L (ref 20–29)
CREAT SERPL-MCNC: 10.23 MG/DL (ref 0.76–1.27)
EOSINOPHIL # BLD AUTO: 0.2 X10E3/UL (ref 0–0.4)
EOSINOPHIL NFR BLD AUTO: 2 %
ERYTHROCYTE [DISTWIDTH] IN BLOOD BY AUTOMATED COUNT: 13.8 % (ref 12.3–15.4)
GLOBULIN SER CALC-MCNC: 4.1 G/DL (ref 1.5–4.5)
GLUCOSE SERPL-MCNC: 114 MG/DL (ref 65–99)
HCT VFR BLD AUTO: 33.1 % (ref 37.5–51)
HGB BLD-MCNC: 11 G/DL (ref 13–17.7)
IMM GRANULOCYTES # BLD: 0 X10E3/UL (ref 0–0.1)
IMM GRANULOCYTES NFR BLD: 0 %
LYMPHOCYTES # BLD AUTO: 2.4 X10E3/UL (ref 0.7–3.1)
LYMPHOCYTES NFR BLD AUTO: 24 %
MCH RBC QN AUTO: 26 PG (ref 26.6–33)
MCHC RBC AUTO-ENTMCNC: 33.2 G/DL (ref 31.5–35.7)
MCV RBC AUTO: 78 FL (ref 79–97)
MONOCYTES # BLD AUTO: 0.7 X10E3/UL (ref 0.1–0.9)
MONOCYTES NFR BLD AUTO: 7 %
NEUTROPHILS # BLD AUTO: 6.6 X10E3/UL (ref 1.4–7)
NEUTROPHILS NFR BLD AUTO: 67 %
PLATELET # BLD AUTO: 252 X10E3/UL (ref 150–379)
POTASSIUM SERPL-SCNC: 3.9 MMOL/L (ref 3.5–5.2)
PROT SERPL-MCNC: 7.8 G/DL (ref 6–8.5)
RBC # BLD AUTO: 4.23 X10E6/UL (ref 4.14–5.8)
SODIUM SERPL-SCNC: 139 MMOL/L (ref 134–144)
WBC # BLD AUTO: 9.9 X10E3/UL (ref 3.4–10.8)

## 2019-06-28 ENCOUNTER — APPOINTMENT (OUTPATIENT)
Dept: GENERAL RADIOLOGY | Age: 34
DRG: 617 | End: 2019-06-28
Attending: PHYSICIAN ASSISTANT
Payer: MEDICARE

## 2019-06-28 ENCOUNTER — HOSPITAL ENCOUNTER (INPATIENT)
Age: 34
LOS: 10 days | Discharge: HOME | DRG: 617 | End: 2019-07-09
Attending: EMERGENCY MEDICINE | Admitting: FAMILY MEDICINE
Payer: MEDICARE

## 2019-06-28 DIAGNOSIS — M86.171 ACUTE OSTEOMYELITIS OF TOE OF RIGHT FOOT (HCC): ICD-10-CM

## 2019-06-28 DIAGNOSIS — M86.9 OSTEOMYELITIS OF FIFTH TOE OF RIGHT FOOT (HCC): Primary | ICD-10-CM

## 2019-06-28 LAB
BASOPHILS # BLD: 0 K/UL (ref 0–0.1)
BASOPHILS NFR BLD: 0 % (ref 0–1)
COMMENT, HOLDF: NORMAL
DIFFERENTIAL METHOD BLD: NORMAL
EOSINOPHIL # BLD: 0.2 K/UL (ref 0–0.4)
EOSINOPHIL NFR BLD: 2 % (ref 0–7)
ERYTHROCYTE [DISTWIDTH] IN BLOOD BY AUTOMATED COUNT: 14.1 % (ref 11.5–14.5)
HCT VFR BLD AUTO: 39.7 % (ref 36.6–50.3)
HGB BLD-MCNC: 12.6 G/DL (ref 12.1–17)
IMM GRANULOCYTES # BLD AUTO: 0 K/UL (ref 0–0.04)
IMM GRANULOCYTES NFR BLD AUTO: 0 % (ref 0–0.5)
LYMPHOCYTES # BLD: 2 K/UL (ref 0.8–3.5)
LYMPHOCYTES NFR BLD: 25 % (ref 12–49)
MCH RBC QN AUTO: 26.4 PG (ref 26–34)
MCHC RBC AUTO-ENTMCNC: 31.7 G/DL (ref 30–36.5)
MCV RBC AUTO: 83.2 FL (ref 80–99)
MONOCYTES # BLD: 0.6 K/UL (ref 0–1)
MONOCYTES NFR BLD: 8 % (ref 5–13)
NEUTS SEG # BLD: 5.3 K/UL (ref 1.8–8)
NEUTS SEG NFR BLD: 65 % (ref 32–75)
NRBC # BLD: 0 K/UL (ref 0–0.01)
NRBC BLD-RTO: 0 PER 100 WBC
PLATELET # BLD AUTO: 243 K/UL (ref 150–400)
PMV BLD AUTO: 9.9 FL (ref 8.9–12.9)
RBC # BLD AUTO: 4.77 M/UL (ref 4.1–5.7)
SAMPLES BEING HELD,HOLD: NORMAL
WBC # BLD AUTO: 8.1 K/UL (ref 4.1–11.1)

## 2019-06-28 PROCEDURE — 87040 BLOOD CULTURE FOR BACTERIA: CPT

## 2019-06-28 PROCEDURE — 86140 C-REACTIVE PROTEIN: CPT

## 2019-06-28 PROCEDURE — 83605 ASSAY OF LACTIC ACID: CPT

## 2019-06-28 PROCEDURE — 80053 COMPREHEN METABOLIC PANEL: CPT

## 2019-06-28 PROCEDURE — 36415 COLL VENOUS BLD VENIPUNCTURE: CPT

## 2019-06-28 PROCEDURE — 99283 EMERGENCY DEPT VISIT LOW MDM: CPT

## 2019-06-28 PROCEDURE — 85652 RBC SED RATE AUTOMATED: CPT

## 2019-06-28 PROCEDURE — 73630 X-RAY EXAM OF FOOT: CPT

## 2019-06-28 PROCEDURE — 85025 COMPLETE CBC W/AUTO DIFF WBC: CPT

## 2019-06-28 RX ORDER — SODIUM CHLORIDE 0.9 % (FLUSH) 0.9 %
5-10 SYRINGE (ML) INJECTION AS NEEDED
Status: DISCONTINUED | OUTPATIENT
Start: 2019-06-28 | End: 2019-07-09 | Stop reason: HOSPADM

## 2019-06-29 ENCOUNTER — APPOINTMENT (OUTPATIENT)
Dept: MRI IMAGING | Age: 34
DRG: 617 | End: 2019-06-29
Attending: PODIATRIST
Payer: MEDICARE

## 2019-06-29 ENCOUNTER — APPOINTMENT (OUTPATIENT)
Dept: GENERAL RADIOLOGY | Age: 34
DRG: 617 | End: 2019-06-29
Attending: FAMILY MEDICINE
Payer: MEDICARE

## 2019-06-29 PROBLEM — M86.171 ACUTE OSTEOMYELITIS OF TOE OF RIGHT FOOT (HCC): Status: ACTIVE | Noted: 2019-06-29

## 2019-06-29 PROBLEM — I10 UNCONTROLLED HYPERTENSION: Status: ACTIVE | Noted: 2019-06-29

## 2019-06-29 LAB
ALBUMIN SERPL-MCNC: 3.3 G/DL (ref 3.5–5)
ALBUMIN/GLOB SERPL: 0.6 {RATIO} (ref 1.1–2.2)
ALP SERPL-CCNC: 65 U/L (ref 45–117)
ALT SERPL-CCNC: 14 U/L (ref 12–78)
ANION GAP SERPL CALC-SCNC: 11 MMOL/L (ref 5–15)
ANION GAP SERPL CALC-SCNC: 7 MMOL/L (ref 5–15)
AST SERPL-CCNC: 11 U/L (ref 15–37)
BASOPHILS # BLD: 0 K/UL (ref 0–0.1)
BASOPHILS NFR BLD: 0 % (ref 0–1)
BILIRUB SERPL-MCNC: 0.3 MG/DL (ref 0.2–1)
BUN SERPL-MCNC: 19 MG/DL (ref 6–20)
BUN SERPL-MCNC: 20 MG/DL (ref 6–20)
BUN/CREAT SERPL: 3 (ref 12–20)
BUN/CREAT SERPL: 3 (ref 12–20)
CALCIUM SERPL-MCNC: 8.4 MG/DL (ref 8.5–10.1)
CALCIUM SERPL-MCNC: 9 MG/DL (ref 8.5–10.1)
CHLORIDE SERPL-SCNC: 94 MMOL/L (ref 97–108)
CHLORIDE SERPL-SCNC: 97 MMOL/L (ref 97–108)
CO2 SERPL-SCNC: 29 MMOL/L (ref 21–32)
CO2 SERPL-SCNC: 33 MMOL/L (ref 21–32)
CREAT SERPL-MCNC: 6.66 MG/DL (ref 0.7–1.3)
CREAT SERPL-MCNC: 7.28 MG/DL (ref 0.7–1.3)
CRP SERPL-MCNC: 4.18 MG/DL (ref 0–0.6)
DIFFERENTIAL METHOD BLD: ABNORMAL
EOSINOPHIL # BLD: 0.2 K/UL (ref 0–0.4)
EOSINOPHIL NFR BLD: 2 % (ref 0–7)
ERYTHROCYTE [DISTWIDTH] IN BLOOD BY AUTOMATED COUNT: 14.5 % (ref 11.5–14.5)
ERYTHROCYTE [SEDIMENTATION RATE] IN BLOOD: 63 MM/HR (ref 0–15)
EST. AVERAGE GLUCOSE BLD GHB EST-MCNC: 278 MG/DL
GLOBULIN SER CALC-MCNC: 5.6 G/DL (ref 2–4)
GLUCOSE BLD STRIP.AUTO-MCNC: 147 MG/DL (ref 65–100)
GLUCOSE BLD STRIP.AUTO-MCNC: 196 MG/DL (ref 65–100)
GLUCOSE BLD STRIP.AUTO-MCNC: 218 MG/DL (ref 65–100)
GLUCOSE BLD STRIP.AUTO-MCNC: 241 MG/DL (ref 65–100)
GLUCOSE SERPL-MCNC: 260 MG/DL (ref 65–100)
GLUCOSE SERPL-MCNC: 383 MG/DL (ref 65–100)
HBA1C MFR BLD: 11.3 % (ref 4.2–6.3)
HCT VFR BLD AUTO: 36 % (ref 36.6–50.3)
HGB BLD-MCNC: 11.2 G/DL (ref 12.1–17)
IMM GRANULOCYTES # BLD AUTO: 0 K/UL (ref 0–0.04)
IMM GRANULOCYTES NFR BLD AUTO: 0 % (ref 0–0.5)
LACTATE SERPL-SCNC: 1.6 MMOL/L (ref 0.4–2)
LYMPHOCYTES # BLD: 2.2 K/UL (ref 0.8–3.5)
LYMPHOCYTES NFR BLD: 25 % (ref 12–49)
MCH RBC QN AUTO: 26.2 PG (ref 26–34)
MCHC RBC AUTO-ENTMCNC: 31.1 G/DL (ref 30–36.5)
MCV RBC AUTO: 84.1 FL (ref 80–99)
MONOCYTES # BLD: 0.8 K/UL (ref 0–1)
MONOCYTES NFR BLD: 9 % (ref 5–13)
NEUTS SEG # BLD: 5.5 K/UL (ref 1.8–8)
NEUTS SEG NFR BLD: 64 % (ref 32–75)
NRBC # BLD: 0 K/UL (ref 0–0.01)
NRBC BLD-RTO: 0 PER 100 WBC
PLATELET # BLD AUTO: 219 K/UL (ref 150–400)
PMV BLD AUTO: 10.1 FL (ref 8.9–12.9)
POTASSIUM SERPL-SCNC: 3.2 MMOL/L (ref 3.5–5.1)
POTASSIUM SERPL-SCNC: 3.4 MMOL/L (ref 3.5–5.1)
PROT SERPL-MCNC: 8.9 G/DL (ref 6.4–8.2)
RBC # BLD AUTO: 4.28 M/UL (ref 4.1–5.7)
SERVICE CMNT-IMP: ABNORMAL
SODIUM SERPL-SCNC: 134 MMOL/L (ref 136–145)
SODIUM SERPL-SCNC: 137 MMOL/L (ref 136–145)
WBC # BLD AUTO: 8.6 K/UL (ref 4.1–11.1)

## 2019-06-29 PROCEDURE — 74011250636 HC RX REV CODE- 250/636: Performed by: PHYSICIAN ASSISTANT

## 2019-06-29 PROCEDURE — 96365 THER/PROPH/DIAG IV INF INIT: CPT

## 2019-06-29 PROCEDURE — 74011250636 HC RX REV CODE- 250/636: Performed by: NURSE PRACTITIONER

## 2019-06-29 PROCEDURE — 74011636637 HC RX REV CODE- 636/637: Performed by: FAMILY MEDICINE

## 2019-06-29 PROCEDURE — 74011250637 HC RX REV CODE- 250/637: Performed by: FAMILY MEDICINE

## 2019-06-29 PROCEDURE — 74011250636 HC RX REV CODE- 250/636: Performed by: INTERNAL MEDICINE

## 2019-06-29 PROCEDURE — 83036 HEMOGLOBIN GLYCOSYLATED A1C: CPT

## 2019-06-29 PROCEDURE — 74011250636 HC RX REV CODE- 250/636: Performed by: FAMILY MEDICINE

## 2019-06-29 PROCEDURE — 71045 X-RAY EXAM CHEST 1 VIEW: CPT

## 2019-06-29 PROCEDURE — 36415 COLL VENOUS BLD VENIPUNCTURE: CPT

## 2019-06-29 PROCEDURE — 65270000029 HC RM PRIVATE

## 2019-06-29 PROCEDURE — 82962 GLUCOSE BLOOD TEST: CPT

## 2019-06-29 PROCEDURE — 85025 COMPLETE CBC W/AUTO DIFF WBC: CPT

## 2019-06-29 PROCEDURE — 80048 BASIC METABOLIC PNL TOTAL CA: CPT

## 2019-06-29 PROCEDURE — 73718 MRI LOWER EXTREMITY W/O DYE: CPT

## 2019-06-29 PROCEDURE — 74011000258 HC RX REV CODE- 258: Performed by: INTERNAL MEDICINE

## 2019-06-29 PROCEDURE — 74011250637 HC RX REV CODE- 250/637: Performed by: PHYSICIAN ASSISTANT

## 2019-06-29 RX ORDER — LABETALOL HCL 20 MG/4 ML
20 SYRINGE (ML) INTRAVENOUS ONCE
Status: COMPLETED | OUTPATIENT
Start: 2019-06-29 | End: 2019-06-29

## 2019-06-29 RX ORDER — CLONIDINE HYDROCHLORIDE 0.1 MG/1
TABLET ORAL
Status: DISPENSED
Start: 2019-06-29 | End: 2019-06-29

## 2019-06-29 RX ORDER — SODIUM CHLORIDE 0.9 % (FLUSH) 0.9 %
5-40 SYRINGE (ML) INJECTION AS NEEDED
Status: DISCONTINUED | OUTPATIENT
Start: 2019-06-29 | End: 2019-07-09 | Stop reason: HOSPADM

## 2019-06-29 RX ORDER — MAGNESIUM SULFATE 100 %
4 CRYSTALS MISCELLANEOUS AS NEEDED
Status: DISCONTINUED | OUTPATIENT
Start: 2019-06-29 | End: 2019-07-09 | Stop reason: HOSPADM

## 2019-06-29 RX ORDER — ATORVASTATIN CALCIUM 40 MG/1
40 TABLET, FILM COATED ORAL DAILY
Status: DISCONTINUED | OUTPATIENT
Start: 2019-06-29 | End: 2019-07-09 | Stop reason: HOSPADM

## 2019-06-29 RX ORDER — VANCOMYCIN 2 GRAM/500 ML IN 0.9 % SODIUM CHLORIDE INTRAVENOUS
2000 ONCE
Status: COMPLETED | OUTPATIENT
Start: 2019-06-29 | End: 2019-06-29

## 2019-06-29 RX ORDER — LISINOPRIL 5 MG/1
5 TABLET ORAL DAILY
Status: DISCONTINUED | OUTPATIENT
Start: 2019-06-29 | End: 2019-07-06

## 2019-06-29 RX ORDER — ACETAMINOPHEN 325 MG/1
650 TABLET ORAL
Status: DISCONTINUED | OUTPATIENT
Start: 2019-06-29 | End: 2019-07-09 | Stop reason: HOSPADM

## 2019-06-29 RX ORDER — INSULIN LISPRO 100 [IU]/ML
INJECTION, SOLUTION INTRAVENOUS; SUBCUTANEOUS
Status: DISCONTINUED | OUTPATIENT
Start: 2019-06-29 | End: 2019-07-09 | Stop reason: HOSPADM

## 2019-06-29 RX ORDER — HEPARIN SODIUM 5000 [USP'U]/ML
5000 INJECTION, SOLUTION INTRAVENOUS; SUBCUTANEOUS EVERY 8 HOURS
Status: DISCONTINUED | OUTPATIENT
Start: 2019-06-29 | End: 2019-07-09 | Stop reason: HOSPADM

## 2019-06-29 RX ORDER — DEXTROSE 50 % IN WATER (D50W) INTRAVENOUS SYRINGE
25-50 AS NEEDED
Status: DISCONTINUED | OUTPATIENT
Start: 2019-06-29 | End: 2019-07-09 | Stop reason: HOSPADM

## 2019-06-29 RX ORDER — SODIUM CHLORIDE 0.9 % (FLUSH) 0.9 %
5-40 SYRINGE (ML) INJECTION EVERY 8 HOURS
Status: DISCONTINUED | OUTPATIENT
Start: 2019-06-29 | End: 2019-07-09 | Stop reason: HOSPADM

## 2019-06-29 RX ORDER — CLONIDINE HYDROCHLORIDE 0.1 MG/1
0.1 TABLET ORAL
Status: COMPLETED | OUTPATIENT
Start: 2019-06-29 | End: 2019-06-29

## 2019-06-29 RX ORDER — HYDRALAZINE HYDROCHLORIDE 20 MG/ML
10 INJECTION INTRAMUSCULAR; INTRAVENOUS
Status: DISCONTINUED | OUTPATIENT
Start: 2019-06-29 | End: 2019-07-09 | Stop reason: HOSPADM

## 2019-06-29 RX ORDER — INSULIN GLARGINE 100 [IU]/ML
40 INJECTION, SOLUTION SUBCUTANEOUS 2 TIMES DAILY
Status: DISCONTINUED | OUTPATIENT
Start: 2019-06-29 | End: 2019-06-30

## 2019-06-29 RX ADMIN — HEPARIN SODIUM 5000 UNITS: 5000 INJECTION INTRAVENOUS; SUBCUTANEOUS at 11:37

## 2019-06-29 RX ADMIN — VANCOMYCIN HYDROCHLORIDE 2000 MG: 10 INJECTION, POWDER, LYOPHILIZED, FOR SOLUTION INTRAVENOUS at 01:00

## 2019-06-29 RX ADMIN — LISINOPRIL 5 MG: 5 TABLET ORAL at 10:00

## 2019-06-29 RX ADMIN — HYDRALAZINE HYDROCHLORIDE 10 MG: 20 INJECTION INTRAMUSCULAR; INTRAVENOUS at 22:55

## 2019-06-29 RX ADMIN — CLONIDINE HYDROCHLORIDE 0.1 MG: 0.1 TABLET ORAL at 02:15

## 2019-06-29 RX ADMIN — Medication 10 ML: at 22:55

## 2019-06-29 RX ADMIN — ACETAMINOPHEN 650 MG: 325 TABLET ORAL at 19:34

## 2019-06-29 RX ADMIN — INSULIN LISPRO 4 UNITS: 100 INJECTION, SOLUTION INTRAVENOUS; SUBCUTANEOUS at 07:39

## 2019-06-29 RX ADMIN — PIPERACILLIN SODIUM,TAZOBACTAM SODIUM 3.38 G: 3; .375 INJECTION, POWDER, FOR SOLUTION INTRAVENOUS at 14:54

## 2019-06-29 RX ADMIN — ATORVASTATIN CALCIUM 40 MG: 40 TABLET, FILM COATED ORAL at 10:00

## 2019-06-29 RX ADMIN — LABETALOL 20 MG/4 ML (5 MG/ML) INTRAVENOUS SYRINGE 20 MG: at 04:04

## 2019-06-29 RX ADMIN — INSULIN GLARGINE 40 UNITS: 100 INJECTION, SOLUTION SUBCUTANEOUS at 16:53

## 2019-06-29 RX ADMIN — Medication 10 ML: at 04:05

## 2019-06-29 RX ADMIN — ACETAMINOPHEN 650 MG: 325 TABLET ORAL at 10:04

## 2019-06-29 RX ADMIN — INSULIN LISPRO 4 UNITS: 100 INJECTION, SOLUTION INTRAVENOUS; SUBCUTANEOUS at 11:36

## 2019-06-29 RX ADMIN — INSULIN GLARGINE 40 UNITS: 100 INJECTION, SOLUTION SUBCUTANEOUS at 10:00

## 2019-06-29 RX ADMIN — ACETAMINOPHEN 650 MG: 325 TABLET ORAL at 03:11

## 2019-06-29 RX ADMIN — Medication 10 ML: at 13:46

## 2019-06-29 RX ADMIN — HEPARIN SODIUM 5000 UNITS: 5000 INJECTION INTRAVENOUS; SUBCUTANEOUS at 19:30

## 2019-06-29 RX ADMIN — INSULIN LISPRO 3 UNITS: 100 INJECTION, SOLUTION INTRAVENOUS; SUBCUTANEOUS at 16:51

## 2019-06-29 NOTE — CONSULTS
[facility]  Note NAME: Janine Cassette :  1985 MRN:  [id number] Date/Time: 2019 Risk of deterioration: low Assessment:    Plan: 
ESRD-[place] MWF Osteo HTN Pt had full hd treatment on Friday Dialyzes thru a LUE avf Melody Benavidez) Podiatry/ID evals ongoing No acute need for HD today. Will see again on Monday, call if problems arise on  Asked to see for ESRD needs. Pt admitted with likely osteo (MRI P)-has a hx of such. Podiatry to see as well. Dialyzes mwf at [facility] thru an avf Subjective: Chief Complaint:  I'm ok Review of Systems: no n/v/cps/ob Objective: VITALS:  
Last 24hrs VS reviewed since prior progress note. Most recent are: 
Visit Vitals BP (!) 167/105 (BP 1 Location: Right arm, BP Patient Position: At rest) Pulse 83 Temp 98 °F (36.7 °C) Resp 17 Ht 6' 2\" (1.88 m) Wt (!) 161.4 kg (355 lb 13.2 oz) SpO2 92% BMI 45.68 kg/m² SpO2 Readings from Last 6 Encounters:  
19 92% 18 97% 17 95% 17 98% 17 97% 17 96% No intake or output data in the 24 hours ending 19 1538 PHYSICAL EXAM: 
 
General   well developed, well nourished, appears stated age, in no acute distress Respiratory   Clear To Auscultation bilaterally Cardiology  Regular Rate and Rythmn Abdominal  Soft, non-tender, non-distended Extremities  No clubbing, cyanosis, (RLE) bandaged, (LUE) avf with t/b Lab Data Reviewed: (see below) Medications Reviewed: (see below) PMH/SH reviewed - no change compared to H&P 
___________________________________________________ 
 
___________________________________________________ Attending Physician: Izabella Walker MD  
 
____________________________________________________ MEDICATIONS: 
Current Facility-Administered Medications Medication Dose Route Frequency  atorvastatin (LIPITOR) tablet 40 mg  40 mg Oral DAILY  insulin glargine (LANTUS) injection 40 Units  40 Units SubCUTAneous BID  lisinopril (PRINIVIL, ZESTRIL) tablet 5 mg  5 mg Oral DAILY  sodium chloride (NS) flush 5-40 mL  5-40 mL IntraVENous Q8H  
 sodium chloride (NS) flush 5-40 mL  5-40 mL IntraVENous PRN  
 heparin (porcine) injection 5,000 Units  5,000 Units SubCUTAneous Q8H  Vancomycin Pharmacy Dosing   Other Rx Dosing/Monitoring  acetaminophen (TYLENOL) tablet 650 mg  650 mg Oral Q6H PRN  
 glucose chewable tablet 16 g  4 Tab Oral PRN  
 dextrose (D50W) injection syrg 12.5-25 g  25-50 mL IntraVENous PRN  
 glucagon (GLUCAGEN) injection 1 mg  1 mg IntraMUSCular PRN  
 insulin lispro (HUMALOG) injection   SubCUTAneous AC&HS  piperacillin-tazobactam (ZOSYN) 3.375 g in 0.9% sodium chloride (MBP/ADV) 100 mL  3.375 g IntraVENous Q12H  
 sodium chloride (NS) flush 5-10 mL  5-10 mL IntraVENous PRN  
  
 
LABS: 
Recent Labs  
  06/29/19 
0615 06/28/19 
2324 WBC 8.6 8.1 HGB 11.2* 12.6 HCT 36.0* 39.7  243 Recent Labs  
  06/29/19 
0615 06/28/19 
2324  134* K 3.2* 3.4*  
CL 97 94* CO2 29 33* BUN 20 19 CREA 7.28* 6.66* * 383* CA 8.4* 9.0 Recent Labs  
  06/28/19 
2324 SGOT 11* ALT 14 AP 65 TBILI 0.3 TP 8.9* ALB 3.3*  
GLOB 5.6* No results for input(s): INR, PTP, APTT in the last 72 hours. No lab exists for component: INREXT No results for input(s): FE, TIBC, PSAT, FERR in the last 72 hours. No results for input(s): PH, PCO2, PO2 in the last 72 hours. No results for input(s): CPK, CKNDX, TROIQ in the last 72 hours. No lab exists for component: CPKMB Lab Results Component Value Date/Time  Glucose (POC) 241 (H) 06/29/2019 11:12 AM  
 Glucose (POC) 218 (H) 06/29/2019 07:36 AM  
 Glucose (POC) 143 (H) 12/09/2017 07:17 AM  
 Glucose (POC) 164 (H) 12/08/2017 09:23 PM  
 Glucose (POC) 154 (H) 12/08/2017 04:59 PM

## 2019-06-29 NOTE — ED TRIAGE NOTES
Pt reports \"I have a spot on my toe, my pinky toe\" +diabetic. Pt reports drainage and odor to right pinky toe for 3-4 days. Dialysis MWF.

## 2019-06-29 NOTE — CONSULTS
Infectious Disease Consult Today's Date: 6/29/2019 Admit Date: 6/28/2019 Impression: · Diabetic foot infection 5th toe region right foot · Probable osteo · History of same a couple of years ago Plan: · IV antibiotic therapy · Follow up MRI 
· OR prn Anti-infectives: · Vancomycin · Add Merrem or similar Subjective:  
Date of Consultation:  June 29, 2019 Referring Physician: Dr Ivis Reeves 
 
Patient is a 28 y.o. male with longstanding diabetes--ESRD, retinopathy, etc. who has a history of surgery on the left foot for ulcer/osteo. He is admitted with complaints of right foot pain with a wound on the fifth toe that has been present for a number of weeks. He noted a foul odor from the foot over the last 4-5 days, also. He denies fevers, chills, etc. He states that his sugars have been 'good.' Patient Active Problem List  
Diagnosis Code  
 HTN (hypertension) I10  
 Postoperative hypoxia R09.02, Z98.890  
 Diarrhea R19.7  ESRD (end stage renal disease) on dialysis ([facility]) N18.6, Z99.2  Hyperlipidemia associated with type 2 diabetes mellitus ([facility]) E11.69, E78.5  Morbid obesity with body mass index (BMI) of 40.0 to 49.9 ([facility]) E66.01  
 Uncontrolled type 2 diabetes mellitus with proliferative retinopathy, with long-term current use of insulin ([address] E11.3599, E11.65, Z79.4  
 Uncontrolled type 2 diabetes mellitus with hyperglycemia, with long-term current use of insulin ([facility]) E11.65, Z79.4  
 Uncontrolled hypertension I10  
 Acute osteomyelitis of toe of right foot ([address] M86.171 Past Medical History:  
Diagnosis Date  Cataracts  Chronic kidney disease  Diabetes ([address]  Hypercholesterolemia  Hypertension  Kidney failure  Obesity Family History Problem Relation Age of Onset  Diabetes Mother  Liver Disease Father  Kidney Disease Maternal Grandfather  Diabetes Maternal Grandfather  Hypertension Maternal Grandfather  Diabetes Paternal Uncle  Hypertension Paternal Uncle Social History Tobacco Use  Smoking status: Former Smoker Packs/day: 0.25  Smokeless tobacco: Never Used  Tobacco comment: quit 6 months ago Substance Use Topics  Alcohol use: No  
  Comment: rarely Past Surgical History:  
Procedure Laterality Date  COLONOSCOPY N/A 12/8/2017 COLONOSCOPY performed by Laura Casanova MD at \[facility]\"" ENDOSCOPY  COLONOSCOPY,DIAGNOSTIC  12/8/2017  HX AMPUTATION Left great toe and L 5th toe  UPPER GI ENDOSCOPY,BIOPSY  12/8/2017  VASCULAR SURGERY PROCEDURE UNLIST    
 R side chest  
 VASCULAR SURGERY PROCEDURE UNLIST  07/25/2017 Creation transposed AV fistula right arm Prior to Admission medications Medication Sig Start Date End Date Taking? Authorizing Provider  
atorvastatin (LIPITOR) 40 mg tablet Take 1 Tab by mouth daily. 9/14/18   Juan J Burton III, DO  
dulaglutide (TRULICITY) 8.14 YP/3.4 mL sub-q pen 0.5 mL by SubCUTAneous route every seven (7) days. 4/3/18   Erven Mcburney, MD  
loperamide (IMODIUM) 2 mg capsule Take 1 Cap by mouth four (4) times daily as needed for Diarrhea. 12/9/17   Hal Strickland MD  
insulin glargine (LANTUS) 100 unit/mL injection 40 Units by SubCUTAneous route two (2) times a day. Indications: type 2 diabetes mellitus    Provider, Historical  
lisinopril (PRINIVIL, ZESTRIL) 5 mg tablet Take  by mouth daily. Provider, Historical  
insulin lispro (HUMALOG) 100 unit/mL injection by SubCUTAneous route Before breakfast, lunch, and dinner. Per sliding scale    Provider, Historical  
 
 
No Known Allergies Review of Systems:  A comprehensive review of systems was negative. Objective:  
 
Visit Vitals BP (!) 167/100 (BP 1 Location: Right arm, BP Patient Position: At rest) Pulse 83 Temp 98.3 °F (36.8 °C) Resp 17 Ht 6' 2\" (1.88 m) Wt (!) 161.4 kg (355 lb 13.2 oz) SpO2 92% BMI 45.68 kg/m² Temp (24hrs), Av.5 °F (36.9 °C), Min:98.3 °F (36.8 °C), Max:98.6 °F (37 °C) Lines:  Hemodialysis Catheter:    and Peripheral IV:    
 
Physical Exam:  Lungs:  clear to auscultation bilaterally Heart:  regular rate and rhythm Abdomen:  soft, non-tender. Bowel sounds normal. No masses,  no organomegaly Skin:  no rash or abnormalities Right 5th toe with wet gangrene. No cellulitis, but foul odor present. Data Review: CBC: 
Recent Labs  
  19 
0615 19 
2324 WBC 8.6 8.1 GRANS 64 65 MONOS 9 8  
EOS 2 2 ANEU 5.5 5.3 ABL 2.2 2.0 HGB 11.2* 12.6 HCT 36.0* 39.7  243 BMP: 
Recent Labs  
  19 
0615 19 
2324 CREA 7.28* 6.66* BUN 20 19  134* K 3.2* 3.4*  
CL 97 94* CO2 29 33* AGAP 11 7 * 383* LFTS: 
Recent Labs  
  19 
2324 TBILI 0.3 ALT 14 SGOT 11* AP 65  
TP 8.9* ALB 3.3* Microbiology:  
 
All Micro Results Procedure Component Value Units Date/Time CULTURE, MRSA [[id number]] Collected:  19 0408 Order Status:  Completed Specimen:  Nares Updated:  19 4082 CULTURE, BLOOD [[id number]] Collected:  19 2310 Order Status:  Completed Specimen:  Blood Updated:  19 CULTURE, BLOOD [[id number]] Collected:  19 Order Status:  Completed Specimen:  Blood Updated:  19 URINE CULTURE HOLD SAMPLE [[id number]] Order Status:  Sent Specimen:  Urine Imaging:  
Pending Signed By: Jelena Sterling MD   
 2019

## 2019-06-29 NOTE — ED PROVIDER NOTES
29 yo AAM with medical hx remarkable for DM, CKD Dialysis MWF, hypertension and obesity presenting with complaint of wound to the rt 5th toe noticed a few days ago. Noticed some malodorous drainage today. No injury. 8/10 pain. No fever, chills, HA, CP, SOB, abdominal pain, nausea, vomiting, diarrhea or urinary complaint. Past Medical History:  
Diagnosis Date  Cataracts  Chronic kidney disease  Diabetes ([address]  Hypercholesterolemia  Hypertension  Kidney failure  Obesity Past Surgical History:  
Procedure Laterality Date  COLONOSCOPY N/A 12/8/2017 COLONOSCOPY performed by Nakia Castro MD at [facility] ENDOSCOPY  COLONOSCOPY,DIAGNOSTIC  12/8/2017  HX AMPUTATION Left great toe and L 5th toe  UPPER GI ENDOSCOPY,BIOPSY  12/8/2017  VASCULAR SURGERY PROCEDURE UNLIST    
 R side chest  
 VASCULAR SURGERY PROCEDURE UNLIST  07/25/2017 Creation transposed AV fistula right arm Family History:  
Problem Relation Age of Onset  Diabetes Mother  Liver Disease Father  Kidney Disease Maternal Grandfather  Diabetes Maternal Grandfather  Hypertension Maternal Grandfather  Diabetes Paternal Uncle  Hypertension Paternal Uncle Social History Socioeconomic History  Marital status: LEGALLY  Spouse name: Not on file  Number of children: Not on file  Years of education: Not on file  Highest education level: Not on file Occupational History  Not on file Social Needs  Financial resource strain: Not on file  Food insecurity:  
  Worry: Not on file Inability: Not on file  Transportation needs:  
  Medical: Not on file Non-medical: Not on file Tobacco Use  Smoking status: Former Smoker Packs/day: 0.25  Smokeless tobacco: Never Used  Tobacco comment: quit 6 months ago Substance and Sexual Activity  Alcohol use: No  
  Comment: rarely  Drug use:  No  
  Sexual activity: Yes  
  Partners: Female Lifestyle  Physical activity:  
  Days per week: Not on file Minutes per session: Not on file  Stress: Not on file Relationships  Social connections:  
  Talks on phone: Not on file Gets together: Not on file Attends Religion service: Not on file Active member of club or organization: Not on file Attends meetings of clubs or organizations: Not on file Relationship status: Not on file  Intimate partner violence:  
  Fear of current or ex partner: Not on file Emotionally abused: Not on file Physically abused: Not on file Forced sexual activity: Not on file Other Topics Concern  Not on file Social History Narrative  Not on file ALLERGIES: Patient has no known allergies. Review of Systems Constitutional: Negative. Negative for chills and fever. HENT: Negative for congestion, rhinorrhea and sore throat. Eyes: Negative. Negative for itching. Respiratory: Negative for cough and shortness of breath. Cardiovascular: Negative for chest pain. Gastrointestinal: Negative for abdominal pain, constipation, diarrhea and vomiting. Genitourinary: Negative for difficulty urinating, dysuria, frequency and urgency. Musculoskeletal: Positive for arthralgias. Negative for joint swelling. Skin: Positive for wound. Neurological: Negative for headaches. Psychiatric/Behavioral: Negative for decreased concentration. All other systems reviewed and are negative. Vitals:  
 06/28/19 2151 06/28/19 2256 BP:  (!) 156/95 Pulse: 91 92 Resp:  16 Temp:  98.6 °F (37 °C) SpO2: 97% 93% Height:  6' 2\" (1.88 m) Physical Exam  
Constitutional: He is oriented to person, place, and time. He appears well-developed and well-nourished. No distress. Pleasant AAM in NAd HENT:  
Head: Normocephalic and atraumatic.   
Right Ear: External ear normal.  
Left Ear: External ear normal.  
 Nose: Nose normal.  
Mouth/Throat: Oropharynx is clear and moist. No oropharyngeal exudate. Eyes: Right eye exhibits no discharge. Left eye exhibits no discharge. Cardiovascular: Normal rate, regular rhythm and normal heart sounds. Pulmonary/Chest: Effort normal and breath sounds normal. He has no wheezes. He has no rales. Abdominal: Soft. Bowel sounds are normal. He exhibits no distension. There is no tenderness. There is no guarding. Musculoskeletal: Normal range of motion. Neurological: He is alert and oriented to person, place, and time. Skin: He is not diaphoretic. See attached images Nursing note and vitals reviewed. MDM Number of Diagnoses or Management Options Diagnosis management comments: 29 yo AAM with complaint of wound to rt 5th toe. Concern for osteo vs diabetic wound. Plan CBC 
CMP Blood culture Lactic ESR 
CRP 
xr rt 5th toe 
abx Admission. Frank Fan18 [facility] Procedures Progress note Conferred with Dr. August Amanda via [facility] Text, will eval patient for admission. Darinel Fan Discussed case with attending Physician Nava Aguilera with care in ED and plan for admission.  Darinel Fan

## 2019-06-29 NOTE — H&P
[address]  HISTORY AND PHYSICAL Name:  Carey Payan 
MR#:  [id number] :  1985 ACCOUNT #:  [de-identified] ADMIT DATE:  2019 CHIEF COMPLAINT:  The patient does not provide. HISTORY OF PRESENT ILLNESS:  A 27-year-old  male with past medical history of type 2 diabetes mellitus, hypertension, hyperlipidemia, end-stage renal disease on hemodialysis, obesity, cataracts, who presented to the emergency department from home with initial chief complaint of wound to the right fifth toe. The patient at this time is a very limited historian, providing very short answers to questions. Majority of history had been obtained from review of ED and electronic medical records. Per their collective reports, the patient had a wound to his right fifth toe for a few days. Reportedly had malodorous drainage from the wound. He reportedly initially complained of pain at 8/10, was severe and constant without specific alleviating factors. There are no reports of fever or chills. On arrival to the Emergency Department, initially recorded vital signs, blood pressure 156/95, heart rate of 91, respiratory rate 16, O2 saturation 97% on room air. Workup included right foot x-ray which showed no signs of acute osseous or articular abnormality with osteopenia, evidence of peripheral arterial disease and cortical defect at the distal aspect of the proximal phalanx of the first digit, erosive change at the base of the proximal phalanx of the fourth digit associated with soft tissue defect. There is concern for osteomyelitis. Per the ED, the patient had blood cultures drawn and was started on vancomycin for IV antibiotic therapy. On current bedside evaluation, the patient admits to some pain, but does not characterize the pain and providing us further details in regard to severity.   He does not complain of any other symptoms, such as lightheadedness, new-onset focal weakness, new-onset numbness, paresthesias, slurred speech, facial droop, headache, neck pain, back pain, chest pain, shortness of breath, cough, congestion, abdominal pain, nausea, vomiting, diarrhea, melena, dysuria, hematuria, calf pain, swelling, fever, chills, or rash. PAST MEDICAL HISTORY: 
1. Infection to the left first toe, status post amputation. 2.  Cataract. 3.  End-stage renal disease, hemodialysis Mondays, Wednesdays, and Fridays. 4.  Type 2 diabetes mellitus. 5.  Hypertension. 6.  Hypercholesterolemia. 7.  Obesity. PAST SURGICAL HISTORY: 
1. Colonoscopy, 12/08/2017. 
2.  Endoscopy. 3.  Amputation of left great toe and left fifth toe. 4.  Upper GI endoscopy and biopsy, 12/08/2017. 
5.  Creation of AV fistula right arm, 07/25/2017. CURRENT MEDICATIONS:  Medication list reviewed and noted on chart records. ALLERGIES:  NO KNOWN DRUG ALLERGIES. SOCIAL HISTORY:  Former smoker of cigarettes, reportedly quit. Positive for occasional alcohol in the past.  No reports of illicit drugs. FAMILY HISTORY:  Liver disease, father. Diabetes, maternal grandfather, mother, maternal uncle. Hypertension, maternal grandfather. Paget disease, maternal grandfather. REVIEW OF SYSTEMS:  Pertinent positives as noted in the HPI, otherwise negative. PHYSICAL EXAMINATION: 
VITAL SIGNS:  Temperature 98.6 degrees Fahrenheit, blood pressure 159/83, heart rate of 87, respiratory rate of 16, O2 saturation 92% on room air. Recorded weight 335 pounds (151.4 kg), recorded height of 6 feet 2 inches tall, BMI 45.6. GENERAL:  Morbidly obese male, in no acute respiratory distress. PSYCHIATRIC:  The patient is awake, alert, and oriented x3, but often distracted staring off into blank space. NEUROLOGIC:  GCS of 14 (E4, V4, M6), spontaneous opening, occasional inappropriate verbal response and follows some commands.   Moves extremities x4. Sensation grossly decreased on plantar surface of both feet. No slurred speech or facial droop. HEENT:  Normocephalic, atraumatic. PERRLA, EOMs intact. Sclerae are anicteric. Conjunctivae are clear. Nares are patent. Oropharynx is clear. Tongue is midline, nonedematous. NECK:  Supple without lymphadenopathy, JVD, carotid bruits, or thyromegaly. LYMPHATICS:  Negative for cervical or supraclavicular adenopathy. RESPIRATORY:  Lungs clear to auscultation bilaterally. CVS:  Heart, regular rate and rhythm. Normal S1, S2, without murmurs, rubs, or gallops. GI:  Abdomen is soft, nontender, nondistended. Normoactive bowel sounds. No rebound, guarding, or rigidity. No auscultated abdominal bruits. No palpable abdominal mass. BACK:  No CVA tenderness. No step-off deformity. MUSCULOSKELETAL:  Mild tenderness to palpation of the toes of the right foot. Negative for calf tenderness. VASCULAR:  2+ radial and 1+ dorsalis pedis pulse with cyanosis of the fifth toe of the right foot. There is open foul smelling wound on the fifth toe of the right foot. There is a callus with black eschar on the first toe of the right foot. SKIN:  Exam as noted above on the right. Warm and dry. LABORATORY DATA:  Reviewed as follows. Sodium 134, potassium 3.4, chloride 94, CO2 of 33, BUN of 19, creatinine of 6.66, glucose 383, anion gap of 7, calcium of 9.3, GFR of 12, total bilirubin 0.3, total protein 8.9, albumin 3.3, ALT of 14, AST of 11, alkaline phosphatase 55. Lactic acid 1.6. CRP of 4.18. WBC 8.6, hemoglobin 11.2, hematocrit 36.0, platelets of 697, neutrophils 54%. Right foot, 3-view x-ray was done. Results are reviewed. IMPRESSION AND PLAN: 
1. Osteomyelitis - involving toes of the right foot.   Of note, the radiologist report has not mentioned the fifth toe of the right foot, which is the most concerning at least on physical exam.  Continue the patient on vancomycin. Consider for MRI of the right foot to further evaluate. Follow up with podiatrist and infectious disease specialist to further evaluate. 2.  Uncontrolled type 2 diabetes mellitus and hyperglycemia. Placed on Humalog insulin correction coverage schedule, Accu-Chek, and check hemoglobin A1c level. 3.  Open wounds of the right foot. Consult with wound care team.  Order dressing changes. 4.  End-stage renal disease, on hemodialysis. Consult with Nephrology Service for further evaluation, hemodialysis treatments Mondays, Wednesdays, and Fridays. 5.  Right foot pain. Provide supportive care with pain management. 6.  Morbid obesity. We would advise eventual weight loss, heart healthy diet, and lifestyle modification. 7.  Hypertension, uncontrolled. The patient's blood pressure has been elevated as high as 190/112. Administered labetalol 20 mg IV stat dose prior to evaluation; it is better now with control of blood pressure. 8.  Venous thromboembolism prophylaxis. Order heparin 5000 units subcutaneous q.8 hours. Hyacinth Quiroga MD 
 
 
[id number] 
D:  06/29/2019 6:47 
T:  06/29/2019 8:31 
JOB #:  [id number]

## 2019-06-29 NOTE — ROUTINE PROCESS
TRANSFER - OUT REPORT: 
 
Verbal report given to DIONICIO Ramirez(name) on Mehta Cassette  being transferred to 6E(unit) for routine progression of care Report consisted of patients Situation, Background, Assessment and  
Recommendations(SBAR). Information from the following report(s) SBAR, ED Summary, [facility] and Recent Results was reviewed with the receiving nurse. Lines:  
Venous Access Device palindrime dual lumen catheter LOT [id number] 05/04/17 Upper chest (subclavicular area, right (Active) Peripheral IV 06/28/19 Right Forearm (Active) Site Assessment Clean, dry, & intact 6/28/2019 11:34 PM  
Phlebitis Assessment 0 6/28/2019 11:34 PM  
Infiltration Assessment 0 6/28/2019 11:34 PM  
Dressing Status Clean, dry, & intact 6/28/2019 11:34 PM  
Dressing Type Transparent 6/28/2019 11:34 PM  
  
 
Opportunity for questions and clarification was provided. Patient transported with: 
 Oohly Truong Ma RN

## 2019-06-29 NOTE — PROGRESS NOTES
Pharmacist Note - Vancomycin Dosing (Hemodialysis Patient) Consult provided for this 28 y.o. male for indication of osteo toes. This patient is also on the following antibiotic regimen(s):  none Lab Results Component Value Date/Time Creatinine 6.66 (H) 2019 11:24 PM  
 Creatinine (POC) 7.3 (H) 2017 10:12 AM  
 WBC 8.1 2019 11:24 PM  
 BUN 19 2019 11:24 PM  
 BUN (POC) 38 (H) 2017 10:12 AM  
Temp (24hrs), Av.6 °F (37 °C), Min:98.5 °F (36.9 °C), Max:98.6 °F (37 °C) Estimated CrCl:  ESRD on HD (Monday/Wednesday/Friday)-PTA Cultures: blood For this HD patient, will initiate therapy with a loading dose of Vancomycin 2000 mg, to be followed with a dose of 750 mg after each HD session. Dose will be adjusted to maintain a pre-HD trough of approximately 20 - 25 mcg/mL for therapeutic goal of 15 - 20 mcg/mL as approximately 35% of drug will be removed by HD filtration. Pharmacy to follow patient daily and order levels / make dose adjustments as appropriate.

## 2019-06-29 NOTE — PROGRESS NOTES
TRANSFER - IN REPORT: 
 
Verbal report received from Marielena Andino RN(name) on Derrell Yang  being received from ED(unit) for routine progression of care Report consisted of patients Situation, Background, Assessment and  
Recommendations(SBAR). Information from the following report(s) SBAR, Kardex, ED Summary, Intake/Output, MAR and Recent Results was reviewed with the receiving nurse. Opportunity for questions and clarification was provided. Assessment completed upon patients arrival to unit and care assumed.

## 2019-06-29 NOTE — CONSULTS
[address] Name:  Brenda Kwon 
MR#:  [id number] :  1985 ACCOUNT #:  [de-identified] DATE OF SERVICE:  2019 I was asked to consult this patient for diabetic infection, possible osteomyelitis of his foot. Upon coming to visit the patient, he has just been taken down to get an MRI. I will return later this evening or early morning to evaluate the patient. VALENCIA [id number] 
D:  2019 13:50 T:  2019 14:38 JOB #:  [id number]

## 2019-06-29 NOTE — ED NOTES
Pt states he only produces urine 2 times a day as a dialysis patient. Unable to obtain urine sample at this time.

## 2019-06-29 NOTE — PROGRESS NOTES
06/29/19 1947 Vital Signs BP (!) 187/116 MAP (Calculated) 140 Hospitalist Alla baker paged with elevated BP, awaiting call back

## 2019-06-30 LAB
BACTERIA SPEC CULT: NORMAL
BACTERIA SPEC CULT: NORMAL
GLUCOSE BLD STRIP.AUTO-MCNC: 106 MG/DL (ref 65–100)
GLUCOSE BLD STRIP.AUTO-MCNC: 137 MG/DL (ref 65–100)
GLUCOSE BLD STRIP.AUTO-MCNC: 139 MG/DL (ref 65–100)
GLUCOSE BLD STRIP.AUTO-MCNC: 147 MG/DL (ref 65–100)
GLUCOSE BLD STRIP.AUTO-MCNC: 155 MG/DL (ref 65–100)
GLUCOSE BLD STRIP.AUTO-MCNC: 207 MG/DL (ref 65–100)
GLUCOSE BLD STRIP.AUTO-MCNC: 74 MG/DL (ref 65–100)
SERVICE CMNT-IMP: ABNORMAL
SERVICE CMNT-IMP: NORMAL
SERVICE CMNT-IMP: NORMAL

## 2019-06-30 PROCEDURE — 74011000258 HC RX REV CODE- 258: Performed by: INTERNAL MEDICINE

## 2019-06-30 PROCEDURE — 74011250636 HC RX REV CODE- 250/636: Performed by: FAMILY MEDICINE

## 2019-06-30 PROCEDURE — 74011250636 HC RX REV CODE- 250/636: Performed by: INTERNAL MEDICINE

## 2019-06-30 PROCEDURE — 74011250636 HC RX REV CODE- 250/636: Performed by: NURSE PRACTITIONER

## 2019-06-30 PROCEDURE — 74011636637 HC RX REV CODE- 636/637: Performed by: FAMILY MEDICINE

## 2019-06-30 PROCEDURE — 74011250637 HC RX REV CODE- 250/637: Performed by: NURSE PRACTITIONER

## 2019-06-30 PROCEDURE — 94762 N-INVAS EAR/PLS OXIMTRY CONT: CPT

## 2019-06-30 PROCEDURE — 77010033678 HC OXYGEN DAILY

## 2019-06-30 PROCEDURE — 82962 GLUCOSE BLOOD TEST: CPT

## 2019-06-30 PROCEDURE — 74011250637 HC RX REV CODE- 250/637: Performed by: FAMILY MEDICINE

## 2019-06-30 PROCEDURE — 74011250637 HC RX REV CODE- 250/637: Performed by: INTERNAL MEDICINE

## 2019-06-30 PROCEDURE — 65270000029 HC RM PRIVATE

## 2019-06-30 RX ORDER — CLONIDINE HYDROCHLORIDE 0.1 MG/1
0.1 TABLET ORAL
Status: COMPLETED | OUTPATIENT
Start: 2019-06-30 | End: 2019-06-30

## 2019-06-30 RX ORDER — OXYCODONE AND ACETAMINOPHEN 5; 325 MG/1; MG/1
1-2 TABLET ORAL
Status: DISCONTINUED | OUTPATIENT
Start: 2019-06-30 | End: 2019-07-09 | Stop reason: HOSPADM

## 2019-06-30 RX ADMIN — Medication 16 G: at 02:52

## 2019-06-30 RX ADMIN — HEPARIN SODIUM 5000 UNITS: 5000 INJECTION INTRAVENOUS; SUBCUTANEOUS at 11:44

## 2019-06-30 RX ADMIN — OXYCODONE AND ACETAMINOPHEN 2 TABLET: 5; 325 TABLET ORAL at 21:57

## 2019-06-30 RX ADMIN — INSULIN LISPRO 3 UNITS: 100 INJECTION, SOLUTION INTRAVENOUS; SUBCUTANEOUS at 16:58

## 2019-06-30 RX ADMIN — CLONIDINE HYDROCHLORIDE 0.1 MG: 0.1 TABLET ORAL at 00:57

## 2019-06-30 RX ADMIN — Medication 10 ML: at 03:31

## 2019-06-30 RX ADMIN — INSULIN LISPRO 3 UNITS: 100 INJECTION, SOLUTION INTRAVENOUS; SUBCUTANEOUS at 07:11

## 2019-06-30 RX ADMIN — Medication 10 ML: at 21:21

## 2019-06-30 RX ADMIN — Medication 10 ML: at 13:23

## 2019-06-30 RX ADMIN — LISINOPRIL 5 MG: 5 TABLET ORAL at 08:36

## 2019-06-30 RX ADMIN — PIPERACILLIN SODIUM,TAZOBACTAM SODIUM 3.38 G: 3; .375 INJECTION, POWDER, FOR SOLUTION INTRAVENOUS at 15:12

## 2019-06-30 RX ADMIN — HEPARIN SODIUM 5000 UNITS: 5000 INJECTION INTRAVENOUS; SUBCUTANEOUS at 19:03

## 2019-06-30 RX ADMIN — PIPERACILLIN SODIUM,TAZOBACTAM SODIUM 3.38 G: 3; .375 INJECTION, POWDER, FOR SOLUTION INTRAVENOUS at 02:52

## 2019-06-30 RX ADMIN — OXYCODONE AND ACETAMINOPHEN 1 TABLET: 5; 325 TABLET ORAL at 15:12

## 2019-06-30 RX ADMIN — OXYCODONE AND ACETAMINOPHEN 1 TABLET: 5; 325 TABLET ORAL at 10:32

## 2019-06-30 RX ADMIN — HEPARIN SODIUM 5000 UNITS: 5000 INJECTION INTRAVENOUS; SUBCUTANEOUS at 03:30

## 2019-06-30 RX ADMIN — Medication 10 ML: at 09:49

## 2019-06-30 RX ADMIN — ATORVASTATIN CALCIUM 40 MG: 40 TABLET, FILM COATED ORAL at 08:36

## 2019-06-30 RX ADMIN — INSULIN LISPRO 2 UNITS: 100 INJECTION, SOLUTION INTRAVENOUS; SUBCUTANEOUS at 22:45

## 2019-06-30 RX ADMIN — HYDRALAZINE HYDROCHLORIDE 10 MG: 20 INJECTION INTRAMUSCULAR; INTRAVENOUS at 09:48

## 2019-06-30 NOTE — PROGRESS NOTES
Hospitalist Progress Note Sayda Newton MD 
Answering service: [phone] OR [phone] from in house phone Cell:   
  
Date of Service:  2019 NAME:  Lorie Leong :  1985 MRN:  [id number] Admission Summary:  
 60-year-old [facility] American male with past medical history of type 2 diabetes mellitus, hypertension, hyperlipidemia, end-stage renal disease on hemodialysis, obesity, cataracts, who presented to the emergency department from home with initial chief complaint of wound to the right fifth toe. The patient at this time is a very limited historian, providing very short answers to questions. Majority of history had been obtained from review of ED and electronic medical records. Per their collective reports, the patient had a wound to his right fifth toe for a few days. Reportedly had malodorous drainage from the wound. He reportedly initially complained of pain at 8/10, was severe and constant without specific alleviating factors. There are no reports of fever or chills. On arrival to the Emergency Department, initially recorded vital signs, blood pressure 156/95, heart rate of 91, respiratory rate 16, O2 saturation 97% on room air. Workup including right foot x-ray reviewed, which showed no signs of acute osseous or articular abnormality with  osteopenia, evidence of peripheral arterial disease and cortical defect at the distal aspect of the proximal phalanx of the first digit, erosive change at the base of the proximal phalanx of the fourth digit associated with soft tissue defect. There is concern for osteomyelitis. Per the ED, the patient had blood cultures drawn and was started on vancomycin for IV antibiotic therapy. On current bedside evaluation, the patient admits to some pain, but does not characterize the pain and providing us further details in regard to severity.   He does not complain of any other symptoms, such as lightheadedness, new-onset focal weakness, new-onset numbness, paresthesias, slurred speech, facial droop, headache, neck pain, back pain, chest pain, shortness of breath, cough, congestion, abdominal pain, nausea, vomiting, diarrhea, melena, dysuria, hematuria, calf pain, swelling, fever, chills, or rash. Interval history / Subjective:  
   
 
Patient seen and examined bedside He has high BP in 200s and also complaining of pain in the foot Waiting for podiatry to see Next HD in am  
 
 
BP fell after hydralazine iv Will avoid hydralazine and treat pain with percocet instead Assessment & Plan:  
 
. Osteomyelitis POA  
- involving toes of the right foot. MRI shows post amputation right fifth ray at the mid fifth metatarsal. 
There is edema in the proximal right fifth metatarsal may represent Osteomyelitis. Ryan Patel On iv antibiotics vanc and zosyn ID on board Podiatry to see today Uncontrolled type 2 diabetes mellitus and hyperglycemia. Placed on Humalog insulin correction coverage schedule, Accu-Chek, and check hemoglobin A1c level. I stopped lantus given hypoglycemia in the hospital on 6/29 Improving , restarted lantus for 7/1 Open wounds of the right foot. Consult with wound care team.  
 Order dressing changes. .  End-stage renal disease, on hemodialysis. hemodialysis treatments Mondays, Wednesdays, and Fridays. Nephrology on board Morbid obesity. We would advise eventual weight loss Body mass index is 45.31 kg/m². Hypertension, uncontrolled. prn hydralazine iv Treat pain with percocet as it could be reason for his HTN first !!! On lisinopril Hypokalemia Management by nephro Code status: full DVT prophylaxis: none Care Plan discussed with: Patient/Family Disposition: TBD Hospital Problems  Date Reviewed: 6/29/2019 Codes Class Noted POA  Uncontrolled hypertension ICD-10-CM: I10 
 ICD-9-CM: 401.9  6/29/2019 Unknown * (Principal) Acute osteomyelitis of toe of right foot ([facility] [address] ICD-10-CM: M86.171 ICD-9-CM: 730.07  6/29/2019 Unknown Review of Systems: A comprehensive review of systems was negative except for that written in the HPI. Vital Signs:  
 Last 24hrs VS reviewed since prior progress note. Most recent are: 
Visit Vitals /83 (BP 1 Location: Right arm, BP Patient Position: At rest) Pulse 84 Temp 97.9 °F (36.6 °C) Resp 18 Ht 6' 2\" (1.88 m) Wt (!) 160.1 kg (352 lb 14.4 oz) SpO2 93% BMI 45.31 kg/m² No intake or output data in the 24 hours ending 06/30/19 1200 Physical Examination:  
 
 
     
Constitutional:  No acute distress, cooperative, pleasant   
ENT:  Oral mucous moist, oropharynx benign. Neck supple, Resp:  CTA bilaterally. No wheezing/rhonchi/rales. No accessory muscle use CV:  Regular rhythm, normal rate, no murmurs, gallops, rubs GI:  Soft, non distended, non tender. normoactive bowel sounds, no hepatosplenomegaly Musculoskeletal:   painfull and swollen foot righht Neurologic:  Moves all extremities. AAOx3, CN II-XII reviewed Data Review:  
 Review and/or order of clinical lab test 
 
 
Labs:  
 
Recent Labs  
  06/29/19 
0615 06/28/19 
2324 WBC 8.6 8.1 HGB 11.2* 12.6 HCT 36.0* 39.7  243 Recent Labs  
  06/29/19 
0615 06/28/19 
2324  134* K 3.2* 3.4*  
CL 97 94* CO2 29 33* BUN 20 19 CREA 7.28* 6.66* * 383* CA 8.4* 9.0 Recent Labs  
  06/28/19 
2324 SGOT 11* ALT 14 AP 65 TBILI 0.3 TP 8.9* ALB 3.3*  
GLOB 5.6* No results for input(s): INR, PTP, APTT in the last 72 hours. No lab exists for component: INREXT No results for input(s): FE, TIBC, PSAT, FERR in the last 72 hours. No results found for: FOL, RBCF No results for input(s): PH, PCO2, PO2 in the last 72 hours. No results for input(s): CPK, CKNDX, TROIQ in the last 72 hours. No lab exists for component: CPKMB Lab Results Component Value Date/Time Cholesterol, total 224 (H) 04/03/2018 02:19 PM  
 HDL Cholesterol 30 (L) 04/03/2018 02:19 PM  
 LDL, calculated 175 (H) 04/03/2018 02:19 PM  
 Triglyceride 94 04/03/2018 02:19 PM  
 CHOL/HDL Ratio 5.9 (H) 12/11/2015 03:25 AM  
 
Lab Results Component Value Date/Time Glucose (POC) 139 (H) 06/30/2019 11:30 AM  
 Glucose (POC) 155 (H) 06/30/2019 07:05 AM  
 Glucose (POC) 137 (H) 06/30/2019 06:20 AM  
 Glucose (POC) 106 (H) 06/30/2019 03:10 AM  
 Glucose (POC) 74 06/30/2019 02:37 AM  
 
Lab Results Component Value Date/Time Color YELLOW/STRAW 05/01/2017 05:39 PM  
 Appearance CLOUDY (A) 05/01/2017 05:39 PM  
 Specific gravity 1.018 05/01/2017 05:39 PM  
 Specific gravity >1.030 (H) 10/03/2014 04:35 AM  
 pH (UA) 5.5 05/01/2017 05:39 PM  
 Protein 300 (A) 05/01/2017 05:39 PM  
 Glucose 250 (A) 05/01/2017 05:39 PM  
 Ketone NEGATIVE  05/01/2017 05:39 PM  
 Bilirubin NEGATIVE  05/01/2017 05:39 PM  
 Urobilinogen 0.2 05/01/2017 05:39 PM  
 Nitrites NEGATIVE  05/01/2017 05:39 PM  
 Leukocyte Esterase NEGATIVE  05/01/2017 05:39 PM  
 Epithelial cells FEW 05/01/2017 05:39 PM  
 Bacteria NEGATIVE  05/01/2017 05:39 PM  
 WBC 0-4 05/01/2017 05:39 PM  
 RBC 5-10 05/01/2017 05:39 PM  
 
 
 
Medications Reviewed:  
 
Current Facility-Administered Medications Medication Dose Route Frequency  oxyCODONE-acetaminophen (PERCOCET) 5-325 mg per tablet 1-2 Tab  1-2 Tab Oral Q4H PRN  
 atorvastatin (LIPITOR) tablet 40 mg  40 mg Oral DAILY  lisinopril (PRINIVIL, ZESTRIL) tablet 5 mg  5 mg Oral DAILY  sodium chloride (NS) flush 5-40 mL  5-40 mL IntraVENous Q8H  
 sodium chloride (NS) flush 5-40 mL  5-40 mL IntraVENous PRN  
 heparin (porcine) injection 5,000 Units  5,000 Units SubCUTAneous Q8H  Vancomycin Pharmacy Dosing   Other Rx Dosing/Monitoring  acetaminophen (TYLENOL) tablet 650 mg  650 mg Oral Q6H PRN  
 glucose chewable tablet 16 g  4 Tab Oral PRN  
 dextrose (D50W) injection syrg 12.5-25 g  25-50 mL IntraVENous PRN  
 glucagon (GLUCAGEN) injection 1 mg  1 mg IntraMUSCular PRN  
 insulin lispro (HUMALOG) injection   SubCUTAneous AC&HS  piperacillin-tazobactam (ZOSYN) 3.375 g in 0.9% sodium chloride (MBP/ADV) 100 mL  3.375 g IntraVENous Q12H  hydrALAZINE (APRESOLINE) 20 mg/mL injection 10 mg  10 mg IntraVENous Q6H PRN  
 sodium chloride (NS) flush 5-10 mL  5-10 mL IntraVENous PRN  
 
______________________________________________________________________ EXPECTED LENGTH OF STAY: - - - 
ACTUAL LENGTH OF STAY:          1 Asher Olmedo MD

## 2019-06-30 NOTE — PROGRESS NOTES
ID Progress Note 2019 Subjective: No complaints except foot pain Objective: Antibiotics: 1. Vancomycin 2. Zosyn Vitals:  
Visit Vitals /72 (BP 1 Location: Right arm, BP Patient Position: At rest) Pulse 82 Temp 97.5 °F (36.4 °C) Resp 18 Ht 6' 2\" (1.88 m) Wt (!) 160.1 kg (352 lb 14.4 oz) SpO2 93% BMI 45.31 kg/m² Tmax:  Temp (24hrs), Av °F (36.7 °C), Min:97.5 °F (36.4 °C), Max:98.8 °F (37.1 °C) Exam:  Lungs:  clear to auscultation bilaterally Heart:  Regular Abdomen:  soft, non-tender. Bowel sounds normal. No masses,  no organomegaly Foot about the same Labs:     
Recent Labs  
  19 
0615 19 
2324 WBC 8.6 8.1 HGB 11.2* 12.6  243 BUN 20 19 CREA 7.28* 6.66* SGOT  --  11* AP  --  65  
TBILI  --  0.3 Cultures: No results found for: SDES Lab Results Component Value Date/Time Culture result: MRSA NOT PRESENT 2019 04:08 AM  
 Culture result:  2019 04:08 AM  
      Screening of patient nares for MRSA is for surveillance purposes and, if positive, to facilitate isolation considerations in high risk settings. It is not intended for automatic decolonization interventions per se as regimens are not sufficiently effective to warrant routine use. Culture result: NO GROWTH 1 DAY 2019 11:24 PM  
 
 
Radiology:  
 
Line/Insert Date:        
 
Assessment: 1. Diabetic foot wound with underlying osteo 2. Neuropathy 3. ESRD Objective: 1. Continue current therapy 2. Follow up cultures 3. Podiatry following 4. OR prn Blanca Harmon MD

## 2019-06-30 NOTE — PROGRESS NOTES
Foot and Ankle specialist Progress Note Admit Date: 6/28/2019 Hospital day 2 Subjective:  
 
Patient was admitted one day ago with a foul smelling wound 5th right toe of unknown duration. Previous well haled amputations 1st and 5th left digits Current Facility-Administered Medications Medication Dose Route Frequency  oxyCODONE-acetaminophen (PERCOCET) 5-325 mg per tablet 1-2 Tab  1-2 Tab Oral Q4H PRN  
 atorvastatin (LIPITOR) tablet 40 mg  40 mg Oral DAILY  lisinopril (PRINIVIL, ZESTRIL) tablet 5 mg  5 mg Oral DAILY  sodium chloride (NS) flush 5-40 mL  5-40 mL IntraVENous Q8H  
 sodium chloride (NS) flush 5-40 mL  5-40 mL IntraVENous PRN  
 heparin (porcine) injection 5,000 Units  5,000 Units SubCUTAneous Q8H  Vancomycin Pharmacy Dosing   Other Rx Dosing/Monitoring  acetaminophen (TYLENOL) tablet 650 mg  650 mg Oral Q6H PRN  
 glucose chewable tablet 16 g  4 Tab Oral PRN  
 dextrose (D50W) injection syrg 12.5-25 g  25-50 mL IntraVENous PRN  
 glucagon (GLUCAGEN) injection 1 mg  1 mg IntraMUSCular PRN  
 insulin lispro (HUMALOG) injection   SubCUTAneous AC&HS  piperacillin-tazobactam (ZOSYN) 3.375 g in 0.9% sodium chloride (MBP/ADV) 100 mL  3.375 g IntraVENous Q12H  hydrALAZINE (APRESOLINE) 20 mg/mL injection 10 mg  10 mg IntraVENous Q6H PRN  
 sodium chloride (NS) flush 5-10 mL  5-10 mL IntraVENous PRN Objective:  
 
Patient Vitals for the past 8 hrs: 
 BP Temp Pulse Resp SpO2  
06/30/19 1428 125/72 97.5 °F (36.4 °C) 82 18 93 % No intake/output data recorded. 06/29 0701 - 06/30 1900 In: 480 [P.O.:480] Out: - Physical Exam:LOWER LEGS Nonpalpable pedal pulses Diminished epicritic sensation Good muscle strength Previous well healed amputation 5th left digit and 1st left toe 
malodorous 5th right toe that has an open necrotic wound PIPJ Dried scabbed wound plantar  1st right toe MRI: prelim report shows probable osteomyelitis 5th right toe and met head All labs reviewed Data Review Recent Results (from the past 24 hour(s)) GLUCOSE, POC Collection Time: 06/29/19  9:56 PM  
Result Value Ref Range Glucose (POC) 147 (H) 65 - 100 mg/dL Performed by [employer], POC Collection Time: 06/30/19  2:37 AM  
Result Value Ref Range Glucose (POC) 74 65 - 100 mg/dL Performed by [employer] GLUCOSE, POC Collection Time: 06/30/19  3:10 AM  
Result Value Ref Range Glucose (POC) 106 (H) 65 - 100 mg/dL Performed by [employer] GLUCOSE, POC Collection Time: 06/30/19  6:20 AM  
Result Value Ref Range Glucose (POC) 137 (H) 65 - 100 mg/dL Performed by [employer], POC Collection Time: 06/30/19  7:05 AM  
Result Value Ref Range Glucose (POC) 155 (H) 65 - 100 mg/dL Performed by [employer] GLUCOSE, POC Collection Time: 06/30/19 11:30 AM  
Result Value Ref Range Glucose (POC) 139 (H) 65 - 100 mg/dL Performed by Doug Bejarano GLUCOSE, POC Collection Time: 06/30/19  4:35 PM  
Result Value Ref Range Glucose (POC) 147 (H) 65 - 100 mg/dL Performed by Batsheva Mirza Assessment:  
 
Principal Problem: 
  Acute osteomyelitis of toe of right foot ([address] (6/29/2019) Active Problems: 
  Uncontrolled hypertension (6/29/2019) Plan: NOÉ's ordered Will further discuss MRI report with radiologist  
Barbie Mas on board to manage antibiotic therapy Discussed probable amputation of the 5th right toe and partial metatarsal pending NOÉ's with vascular intervention if needed. Discussed with patient and Discussed plan with patient and family member; all questions

## 2019-06-30 NOTE — PROGRESS NOTES
Writer In with pt for b/p recheck. B/p 170/102 P-119,manually 145/92 P-111. Pt also reporting \"I think my blood sugar low\". POC BS 74. PRN protocol initiated. @ 0313 BS now 106. Nothing further needed at this time. Pt resting quietly, no s/sy of distress noted, hob @ 40 degree, bed locked and lowered in lowest position, bed side table and call light in reach.

## 2019-06-30 NOTE — PROGRESS NOTES
06/30/19 4299 Vital Signs Temp 97.8 °F (36.6 °C) Temp Source Oral  
Pulse (Heart Rate) 85 Heart Rate Source Monitor Resp Rate 18  
O2 Sat (%) 93 % Level of Consciousness Alert  
BP (!) 213/109 
(tried to take again, pt refused) MAP (Calculated) 144 BP 1 Method Automatic  
BP 1 Location Right arm BP Patient Position At rest  
MEWS Score 3 Spoke with Dr Kevin Alejo about the patient's high blood pressure. Will give IV hydralazine PRN. Also informed Dr Kevin Alejo that the patient is having uncontrolled pain.

## 2019-06-30 NOTE — PROGRESS NOTES
06/29/19 2326 Vital Signs Pulse (Heart Rate) 89 Heart Rate Source Monitor BP (!) 163/103 MAP (Calculated) 123 BP 1 Method Automatic  
BP 1 Location Right arm BP Patient Position At rest  
Hospitalist made aware of BP post administration of Hydralazine. Will continue to monitor and recheck BP in 30 minutes.

## 2019-07-01 ENCOUNTER — APPOINTMENT (OUTPATIENT)
Dept: VASCULAR SURGERY | Age: 34
DRG: 617 | End: 2019-07-01
Attending: PODIATRIST
Payer: MEDICARE

## 2019-07-01 LAB
ALBUMIN SERPL-MCNC: 2.7 G/DL (ref 3.5–5)
ALBUMIN/GLOB SERPL: 0.5 {RATIO} (ref 1.1–2.2)
ALP SERPL-CCNC: 53 U/L (ref 45–117)
ALT SERPL-CCNC: 11 U/L (ref 12–78)
ANION GAP SERPL CALC-SCNC: 9 MMOL/L (ref 5–15)
AST SERPL-CCNC: 14 U/L (ref 15–37)
BILIRUB SERPL-MCNC: 0.3 MG/DL (ref 0.2–1)
BUN SERPL-MCNC: 43 MG/DL (ref 6–20)
BUN/CREAT SERPL: 4 (ref 12–20)
CALCIUM SERPL-MCNC: 8.7 MG/DL (ref 8.5–10.1)
CHLORIDE SERPL-SCNC: 94 MMOL/L (ref 97–108)
CO2 SERPL-SCNC: 30 MMOL/L (ref 21–32)
CREAT SERPL-MCNC: 11.5 MG/DL (ref 0.7–1.3)
ERYTHROCYTE [DISTWIDTH] IN BLOOD BY AUTOMATED COUNT: 14.6 % (ref 11.5–14.5)
GLOBULIN SER CALC-MCNC: 5.2 G/DL (ref 2–4)
GLUCOSE BLD STRIP.AUTO-MCNC: 101 MG/DL (ref 65–100)
GLUCOSE BLD STRIP.AUTO-MCNC: 131 MG/DL (ref 65–100)
GLUCOSE BLD STRIP.AUTO-MCNC: 133 MG/DL (ref 65–100)
GLUCOSE BLD STRIP.AUTO-MCNC: 146 MG/DL (ref 65–100)
GLUCOSE SERPL-MCNC: 152 MG/DL (ref 65–100)
HBV SURFACE AB SER QL: NONREACTIVE
HBV SURFACE AB SER-ACNC: 3.21 MIU/ML
HBV SURFACE AG SER QL: <0.1 INDEX
HBV SURFACE AG SER QL: NEGATIVE
HCT VFR BLD AUTO: 34.9 % (ref 36.6–50.3)
HGB BLD-MCNC: 11 G/DL (ref 12.1–17)
MCH RBC QN AUTO: 26.4 PG (ref 26–34)
MCHC RBC AUTO-ENTMCNC: 31.5 G/DL (ref 30–36.5)
MCV RBC AUTO: 83.9 FL (ref 80–99)
NRBC # BLD: 0 K/UL (ref 0–0.01)
NRBC BLD-RTO: 0 PER 100 WBC
PLATELET # BLD AUTO: 227 K/UL (ref 150–400)
PMV BLD AUTO: 9.7 FL (ref 8.9–12.9)
POTASSIUM SERPL-SCNC: 3.6 MMOL/L (ref 3.5–5.1)
PROT SERPL-MCNC: 7.9 G/DL (ref 6.4–8.2)
RBC # BLD AUTO: 4.16 M/UL (ref 4.1–5.7)
RIGHT ARM BP: 181 MMHG
RIGHT TBI: 0.73
RIGHT TOE PRESSURE: 133 MMHG
SERVICE CMNT-IMP: ABNORMAL
SODIUM SERPL-SCNC: 133 MMOL/L (ref 136–145)
WBC # BLD AUTO: 8.8 K/UL (ref 4.1–11.1)

## 2019-07-01 PROCEDURE — 36415 COLL VENOUS BLD VENIPUNCTURE: CPT

## 2019-07-01 PROCEDURE — 74011250637 HC RX REV CODE- 250/637: Performed by: INTERNAL MEDICINE

## 2019-07-01 PROCEDURE — 90935 HEMODIALYSIS ONE EVALUATION: CPT

## 2019-07-01 PROCEDURE — 85027 COMPLETE CBC AUTOMATED: CPT

## 2019-07-01 PROCEDURE — 74011250636 HC RX REV CODE- 250/636: Performed by: FAMILY MEDICINE

## 2019-07-01 PROCEDURE — 80053 COMPREHEN METABOLIC PANEL: CPT

## 2019-07-01 PROCEDURE — 93922 UPR/L XTREMITY ART 2 LEVELS: CPT

## 2019-07-01 PROCEDURE — 74011250637 HC RX REV CODE- 250/637: Performed by: FAMILY MEDICINE

## 2019-07-01 PROCEDURE — 94760 N-INVAS EAR/PLS OXIMETRY 1: CPT

## 2019-07-01 PROCEDURE — 86706 HEP B SURFACE ANTIBODY: CPT

## 2019-07-01 PROCEDURE — 74011000258 HC RX REV CODE- 258: Performed by: INTERNAL MEDICINE

## 2019-07-01 PROCEDURE — 74011250636 HC RX REV CODE- 250/636: Performed by: INTERNAL MEDICINE

## 2019-07-01 PROCEDURE — 65270000029 HC RM PRIVATE

## 2019-07-01 PROCEDURE — 5A1D70Z PERFORMANCE OF URINARY FILTRATION, INTERMITTENT, LESS THAN 6 HOURS PER DAY: ICD-10-PCS | Performed by: HOSPITALIST

## 2019-07-01 PROCEDURE — 82962 GLUCOSE BLOOD TEST: CPT

## 2019-07-01 PROCEDURE — 74011636637 HC RX REV CODE- 636/637: Performed by: INTERNAL MEDICINE

## 2019-07-01 PROCEDURE — 74011636637 HC RX REV CODE- 636/637: Performed by: FAMILY MEDICINE

## 2019-07-01 PROCEDURE — 87340 HEPATITIS B SURFACE AG IA: CPT

## 2019-07-01 RX ORDER — INSULIN GLARGINE 100 [IU]/ML
20 INJECTION, SOLUTION SUBCUTANEOUS DAILY
Status: DISCONTINUED | OUTPATIENT
Start: 2019-07-02 | End: 2019-07-04

## 2019-07-01 RX ORDER — INSULIN GLARGINE 100 [IU]/ML
40 INJECTION, SOLUTION SUBCUTANEOUS DAILY
Status: DISCONTINUED | OUTPATIENT
Start: 2019-07-01 | End: 2019-07-01

## 2019-07-01 RX ADMIN — OXYCODONE AND ACETAMINOPHEN 2 TABLET: 5; 325 TABLET ORAL at 23:14

## 2019-07-01 RX ADMIN — LISINOPRIL 5 MG: 5 TABLET ORAL at 09:13

## 2019-07-01 RX ADMIN — INSULIN GLARGINE 40 UNITS: 100 INJECTION, SOLUTION SUBCUTANEOUS at 09:14

## 2019-07-01 RX ADMIN — Medication 10 ML: at 22:23

## 2019-07-01 RX ADMIN — OXYCODONE AND ACETAMINOPHEN 2 TABLET: 5; 325 TABLET ORAL at 03:37

## 2019-07-01 RX ADMIN — PIPERACILLIN SODIUM,TAZOBACTAM SODIUM 3.38 G: 3; .375 INJECTION, POWDER, FOR SOLUTION INTRAVENOUS at 03:05

## 2019-07-01 RX ADMIN — Medication 10 ML: at 06:00

## 2019-07-01 RX ADMIN — HEPARIN SODIUM 5000 UNITS: 5000 INJECTION INTRAVENOUS; SUBCUTANEOUS at 03:26

## 2019-07-01 RX ADMIN — HEPARIN SODIUM 5000 UNITS: 5000 INJECTION INTRAVENOUS; SUBCUTANEOUS at 11:36

## 2019-07-01 RX ADMIN — HEPARIN SODIUM 5000 UNITS: 5000 INJECTION INTRAVENOUS; SUBCUTANEOUS at 22:16

## 2019-07-01 RX ADMIN — VANCOMYCIN HYDROCHLORIDE 1000 MG: 1 INJECTION, POWDER, LYOPHILIZED, FOR SOLUTION INTRAVENOUS at 22:16

## 2019-07-01 RX ADMIN — INSULIN LISPRO 3 UNITS: 100 INJECTION, SOLUTION INTRAVENOUS; SUBCUTANEOUS at 11:37

## 2019-07-01 RX ADMIN — ATORVASTATIN CALCIUM 40 MG: 40 TABLET, FILM COATED ORAL at 09:14

## 2019-07-01 NOTE — WOUND CARE
Wound Consult: new patient consult for probable osteomyelitis right 5th toe; podiatry has seen and managing - vascular studies ordered; infectious disease following; note may need amputation of digit. Patient denies any other wound/skin issues; he is up ad lianne; on Versacare bed with accumax mattress to accommodate height and weight appropriately. Please re-consult if any WOC needs arise.  
Raheem Bernal RN,CWCN

## 2019-07-01 NOTE — PROGRESS NOTES
Hospitalist Progress Note James Winter NP Answering service: [phone] OR [phone] from in house phone Cell: [phone] Date of Service:  2019 NAME:  Derrell Yang :  1985 MRN:  [id number] Admission Summary:  
 58-year-old [facility] American male with past medical history of type 2 diabetes mellitus, hypertension, hyperlipidemia, end-stage renal disease on hemodialysis, obesity, cataracts, who presented to the emergency department from home with initial chief complaint of wound to the right fifth toe. The patient at this time is a very limited historian, providing very short answers to questions. Majority of history had been obtained from review of ED and electronic medical records. Per their collective reports, the patient had a wound to his right fifth toe for a few days. Reportedly had malodorous drainage from the wound. He reportedly initially complained of pain at 8/10, was severe and constant without specific alleviating factors. There are no reports of fever or chills. On arrival to the Emergency Department, initially recorded vital signs, blood pressure 156/95, heart rate of 91, respiratory rate 16, O2 saturation 97% on room air. Workup including right foot x-ray reviewed, which showed no signs of acute osseous or articular abnormality with  osteopenia, evidence of peripheral arterial disease and cortical defect at the distal aspect of the proximal phalanx of the first digit, erosive change at the base of the proximal phalanx of the fourth digit associated with soft tissue defect. There is concern for osteomyelitis. Per the ED, the patient had blood cultures drawn and was started on vancomycin for IV antibiotic therapy.   On current bedside evaluation, the patient admits to some pain, but does not characterize the pain and providing us further details in regard to severity. He does not complain of any other symptoms, such as lightheadedness, new-onset focal weakness, new-onset numbness, paresthesias, slurred speech, facial droop, headache, neck pain, back pain, chest pain, shortness of breath, cough, congestion, abdominal pain, nausea, vomiting, diarrhea, melena, dysuria, hematuria, calf pain, swelling, fever, chills, or rash. Interval history / Subjective:  
   
 
Patient seen and examined bedside , he continues to have elevated BPs 180's - 200's. Awaiting dialysis. Plan for surgery tomorrow with 5th toe and partial metatarsal amputation. Patient says toe hurts at times. No other acute complaints. Assessment & Plan:  
 
Osteomyelitis of right foot : POA  
- involving toes of the right foot. MRI shows post amputation right fifth ray at the mid fifth metatarsal. 
There is edema in the proximal right fifth metatarsal may represent Osteomyelitis. Dalia Trinidad On iv antibiotics vanc and zosyn ID on board Podiatry plans for surgical amputation tomorrow Uncontrolled type 2 diabetes mellitus and hyperglycemia. Placed on Humalog insulin correction coverage schedule, Accu-Chek, and check hemoglobin A1c level. Lantus stopped given hypoglycemia in the hospital on 6/29 Improving , restarted lantus for 7/1 but only daily, at home takes BID Continue to monitor, DTC following, thankyou Open wounds of the right foot. Consult with wound care team.  
 Order dressing changes. End-stage renal disease, on hemodialysis. hemodialysis treatments Mondays, Wednesdays, and Fridays. Nephrology on board HD today Morbid obesity. We would advise eventual weight loss when appropriate Body mass index is 45.76 kg/m². Hypertension, uncontrolled. Treat pain with percocet as it could be reason for his HTN On lisinopril HD Hypokalemia Management by nephro Code status: full DVT prophylaxis: none Care Plan discussed with: Patient/Family Disposition: [facility] Hospital Problems  Date Reviewed: 6/29/2019 Codes Class Noted POA Uncontrolled hypertension ICD-10-CM: I10 
ICD-9-CM: 401.9  6/29/2019 Unknown * (Principal) Acute osteomyelitis of toe of right foot ([address] ICD-10-CM: M86.171 ICD-9-CM: 730.07  6/29/2019 Unknown Review of Systems: A comprehensive review of systems was negative except for that written in the HPI. Vital Signs:  
 Last 24hrs VS reviewed since prior progress note. Most recent are: 
Visit Vitals BP (!) 183/111 Pulse 84 Temp 97.7 °F (36.5 °C) (Oral) Resp 20 Ht 6' 2\" (1.88 m) Wt (!) 356 lb 6.4 oz (161.7 kg) SpO2 96% BMI 45.76 kg/m² Intake/Output Summary (Last 24 hours) at 7/1/2019 1734 Last data filed at 7/1/2019 1545 Gross per 24 hour Intake  Output 0 ml Net 0 ml Physical Examination:  
 
 
     
Constitutional:  No acute distress, cooperative, pleasant   
ENT:  Oral mucous moist, oropharynx benign. Neck supple, Resp:  CTA bilaterally. No wheezing/rhonchi/rales. No accessory muscle use CV:  Regular rhythm, normal rate, no murmurs, gallops, rubs GI:  Soft, non distended, non tender. normoactive bowel sounds, no hepatosplenomegaly Musculoskeletal:   painfull and swollen foot righht Neurologic:  Moves all extremities. AAOx3 Data Review:  
 Review and/or order of clinical lab test 
 
 
Labs:  
 
Recent Labs  
  07/01/19 0341 06/29/19 0615 WBC 8.8 8.6 HGB 11.0* 11.2* HCT 34.9* 36.0*  
 219 Recent Labs  
  07/01/19 0341 06/29/19 
0615 06/28/19 
2324 * 137 134* K 3.6 3.2* 3.4*  
CL 94* 97 94* CO2 30 29 33* BUN 43* 20 19 CREA 11.50* 7.28* 6.66* * 260* 383* CA 8.7 8.4* 9.0 Recent Labs  
  07/01/19 0341 06/28/19 
2324 SGOT 14* 11* ALT 11* 14  
AP 53 65 TBILI 0.3 0.3 TP 7.9 8.9* ALB 2.7* 3.3*  
GLOB 5.2* 5.6*  
 
 No results for input(s): INR, PTP, APTT in the last 72 hours. No lab exists for component: INREXT, INREXT No results for input(s): FE, TIBC, PSAT, FERR in the last 72 hours. No results found for: FOL, RBCF No results for input(s): PH, PCO2, PO2 in the last 72 hours. No results for input(s): CPK, CKNDX, TROIQ in the last 72 hours. No lab exists for component: CPKMB Lab Results Component Value Date/Time Cholesterol, total 224 (H) 04/03/2018 02:19 PM  
 HDL Cholesterol 30 (L) 04/03/2018 02:19 PM  
 LDL, calculated 175 (H) 04/03/2018 02:19 PM  
 Triglyceride 94 04/03/2018 02:19 PM  
 CHOL/HDL Ratio 5.9 (H) 12/11/2015 03:25 AM  
 
Lab Results Component Value Date/Time Glucose (POC) 131 (H) 07/01/2019 04:22 PM  
 Glucose (POC) 146 (H) 07/01/2019 11:07 AM  
 Glucose (POC) 133 (H) 07/01/2019 06:11 AM  
 Glucose (POC) 207 (H) 06/30/2019 09:20 PM  
 Glucose (POC) 147 (H) 06/30/2019 04:35 PM  
 
Lab Results Component Value Date/Time Color YELLOW/STRAW 05/01/2017 05:39 PM  
 Appearance CLOUDY (A) 05/01/2017 05:39 PM  
 Specific gravity 1.018 05/01/2017 05:39 PM  
 Specific gravity >1.030 (H) 10/03/2014 04:35 AM  
 pH (UA) 5.5 05/01/2017 05:39 PM  
 Protein 300 (A) 05/01/2017 05:39 PM  
 Glucose 250 (A) 05/01/2017 05:39 PM  
 Ketone NEGATIVE  05/01/2017 05:39 PM  
 Bilirubin NEGATIVE  05/01/2017 05:39 PM  
 Urobilinogen 0.2 05/01/2017 05:39 PM  
 Nitrites NEGATIVE  05/01/2017 05:39 PM  
 Leukocyte Esterase NEGATIVE  05/01/2017 05:39 PM  
 Epithelial cells FEW 05/01/2017 05:39 PM  
 Bacteria NEGATIVE  05/01/2017 05:39 PM  
 WBC 0-4 05/01/2017 05:39 PM  
 RBC 5-10 05/01/2017 05:39 PM  
 
 
 
Medications Reviewed:  
 
Current Facility-Administered Medications Medication Dose Route Frequency  insulin glargine (LANTUS) injection 40 Units  40 Units SubCUTAneous DAILY  oxyCODONE-acetaminophen (PERCOCET) 5-325 mg per tablet 1-2 Tab  1-2 Tab Oral Q4H PRN  
  atorvastatin (LIPITOR) tablet 40 mg  40 mg Oral DAILY  lisinopril (PRINIVIL, ZESTRIL) tablet 5 mg  5 mg Oral DAILY  sodium chloride (NS) flush 5-40 mL  5-40 mL IntraVENous Q8H  
 sodium chloride (NS) flush 5-40 mL  5-40 mL IntraVENous PRN  
 heparin (porcine) injection 5,000 Units  5,000 Units SubCUTAneous Q8H  Vancomycin Pharmacy Dosing   Other Rx Dosing/Monitoring  acetaminophen (TYLENOL) tablet 650 mg  650 mg Oral Q6H PRN  
 glucose chewable tablet 16 g  4 Tab Oral PRN  
 dextrose (D50W) injection syrg 12.5-25 g  25-50 mL IntraVENous PRN  
 glucagon (GLUCAGEN) injection 1 mg  1 mg IntraMUSCular PRN  
 insulin lispro (HUMALOG) injection   SubCUTAneous AC&HS  piperacillin-tazobactam (ZOSYN) 3.375 g in 0.9% sodium chloride (MBP/ADV) 100 mL  3.375 g IntraVENous Q12H  hydrALAZINE (APRESOLINE) 20 mg/mL injection 10 mg  10 mg IntraVENous Q6H PRN  
 sodium chloride (NS) flush 5-10 mL  5-10 mL IntraVENous PRN  
 
______________________________________________________________________ EXPECTED LENGTH OF STAY: 6d 2h 
ACTUAL LENGTH OF STAY:          2 Early Abigail, NP

## 2019-07-01 NOTE — ADVANCED PRACTICE NURSE
Noted patient will have surgery tomorrow with Dr. Tonie Rodríguez at 5 pm. He is ordered to be NPO after midnight tonight. ON account of being NPO all day tomorrow, will decrease Lantus dose to 20 units for the AM from 40 units. Denette Burkitt, ACNP Adult Hospitalist Department

## 2019-07-01 NOTE — PROGRESS NOTES
ID Progress Note 2019 Subjective: No complaints except foot pain--for OR tomorrow Objective: Antibiotics: 1. Vancomycin 2. Zosyn Vitals:  
Visit Vitals BP (!) 201/108 (BP 1 Location: Right arm, BP Patient Position: At rest) Comment: RN aware Pulse 84 Temp 98.4 °F (36.9 °C) Resp 20 Ht 6' 2\" (1.88 m) Wt (!) 161.7 kg (356 lb 6.4 oz) SpO2 94% BMI 45.76 kg/m² Tmax:  Temp (24hrs), Av.2 °F (36.8 °C), Min:97.8 °F (36.6 °C), Max:98.5 °F (36.9 °C) Exam:  Lungs:  clear to auscultation bilaterally Heart:  Regular Abdomen:  soft, non-tender. Bowel sounds normal. No masses,  no organomegaly Foot about the same Labs:     
Recent Labs  
  19 
0341 19 
0615 19 
2324 WBC 8.8 8.6 8.1 HGB 11.0* 11.2* 12.6  219 243 BUN 43* 20 19 CREA 11.50* 7.28* 6.66* SGOT 14*  --  11* AP 53  --  65  
TBILI 0.3  --  0.3 Cultures: No results found for: [facility] Lab Results Component Value Date/Time Culture result: MRSA NOT PRESENT 2019 04:08 AM  
 Culture result:  2019 04:08 AM  
      Screening of patient nares for MRSA is for surveillance purposes and, if positive, to facilitate isolation considerations in high risk settings. It is not intended for automatic decolonization interventions per se as regimens are not sufficiently effective to warrant routine use. Culture result: NO GROWTH 2 DAYS 2019 11:24 PM  
 
 
Radiology:  
 
Line/Insert Date:        
 
Assessment: 1. Diabetic foot wound with underlying osteo 2. Neuropathy 3. ESRD Objective: 1. Continue current therapy 2. Follow up cultures 3. Podiatry following 4. OR prn Evelin Paniagua MD

## 2019-07-01 NOTE — PROGRESS NOTES
NAME: Rodger Diaz :  1985 MRN:  [id number] Assessment :    Plan: 
--ESRD-[place] MWF Osteo HTN Anemia 
 --Plan for HD today. Holding FARIDA Subjective: Chief Complaint:  \" When is my dialysis today? \"  No pain. No N/V. No dyspnea. Review of Systems: 
 
Symptom Y/N Comments  Symptom Y/N Comments Fever/Chills    Chest Pain Poor Appetite    Edema Cough    Abdominal Pain Sputum    Joint Pain SOB/LITTLE    Pruritis/Rash Nausea/vomit    Tolerating PT/OT Diarrhea    Tolerating Diet Constipation    Other Could not obtain due to:   
 
Objective: VITALS:  
Last 24hrs VS reviewed since prior progress note. Most recent are: 
Visit Vitals BP (!) 168/99 (BP 1 Location: Right arm, BP Patient Position: At rest) Pulse 85 Temp 98.5 °F (36.9 °C) Resp 20 Ht 6' 2\" (1.88 m) Wt (!) 161.7 kg (356 lb 6.4 oz) SpO2 94% BMI 45.76 kg/m² Intake/Output Summary (Last 24 hours) at 2019 1117 Last data filed at 2019 1235 Gross per 24 hour Intake 240 ml Output  Net 240 ml Telemetry Reviewed: PHYSICAL EXAM: 
General: NAd No edema Lab Data Reviewed: (see below) Medications Reviewed: (see below) PMH/SH reviewed - no change compared to H&P 
________________________________________________________________________ Care Plan discussed with: 
Patient Family RN Care Manager Consultant:     
 
  Comments >50% of visit spent in counseling and coordination of care    
 
________________________________________________________________________ Anne Parra MD  
 
Procedures: see electronic medical records for all procedures/Xrays and details which 
were not copied into this note but were reviewed prior to creation of Plan. LABS: 
Recent Labs  
  19 
0341 19 
0696 WBC 8.8 8.6 HGB 11.0* 11.2* HCT 34.9* 36.0*  
 219 Recent Labs  
  07/01/19 
0341 06/29/19 0615 06/28/19 
2324 * 137 134* K 3.6 3.2* 3.4*  
CL 94* 97 94* CO2 30 29 33* BUN 43* 20 19 CREA 11.50* 7.28* 6.66* * 260* 383* CA 8.7 8.4* 9.0 Recent Labs  
  07/01/19 0341 06/28/19 
2324 SGOT 14* 11* AP 53 65  
TP 7.9 8.9* ALB 2.7* 3.3*  
GLOB 5.2* 5.6* No results for input(s): INR, PTP, APTT in the last 72 hours. No lab exists for component: INREXT No results for input(s): FE, TIBC, PSAT, FERR in the last 72 hours. No results found for: FOL, RBCF No results for input(s): PH, PCO2, PO2 in the last 72 hours. No results for input(s): CPK, CKMB in the last 72 hours. No lab exists for component: TROPONINI No components found for: [place] Lab Results Component Value Date/Time Color YELLOW/STRAW 05/01/2017 05:39 PM  
 Appearance CLOUDY (A) 05/01/2017 05:39 PM  
 Specific gravity 1.018 05/01/2017 05:39 PM  
 Specific gravity >1.030 (H) 10/03/2014 04:35 AM  
 pH (UA) 5.5 05/01/2017 05:39 PM  
 Protein 300 (A) 05/01/2017 05:39 PM  
 Glucose 250 (A) 05/01/2017 05:39 PM  
 Ketone NEGATIVE  05/01/2017 05:39 PM  
 Bilirubin NEGATIVE  05/01/2017 05:39 PM  
 Urobilinogen 0.2 05/01/2017 05:39 PM  
 Nitrites NEGATIVE  05/01/2017 05:39 PM  
 Leukocyte Esterase NEGATIVE  05/01/2017 05:39 PM  
 Epithelial cells FEW 05/01/2017 05:39 PM  
 Bacteria NEGATIVE  05/01/2017 05:39 PM  
 WBC 0-4 05/01/2017 05:39 PM  
 RBC 5-10 05/01/2017 05:39 PM  
 
 
MEDICATIONS: 
Current Facility-Administered Medications Medication Dose Route Frequency  insulin glargine (LANTUS) injection 40 Units  40 Units SubCUTAneous DAILY  oxyCODONE-acetaminophen (PERCOCET) 5-325 mg per tablet 1-2 Tab  1-2 Tab Oral Q4H PRN  
 atorvastatin (LIPITOR) tablet 40 mg  40 mg Oral DAILY  lisinopril (PRINIVIL, ZESTRIL) tablet 5 mg  5 mg Oral DAILY  sodium chloride (NS) flush 5-40 mL  5-40 mL IntraVENous Q8H  
 sodium chloride (NS) flush 5-40 mL  5-40 mL IntraVENous PRN  
 heparin (porcine) injection 5,000 Units  5,000 Units SubCUTAneous Q8H  Vancomycin Pharmacy Dosing   Other Rx Dosing/Monitoring  acetaminophen (TYLENOL) tablet 650 mg  650 mg Oral Q6H PRN  
 glucose chewable tablet 16 g  4 Tab Oral PRN  
 dextrose (D50W) injection syrg 12.5-25 g  25-50 mL IntraVENous PRN  
 glucagon (GLUCAGEN) injection 1 mg  1 mg IntraMUSCular PRN  
 insulin lispro (HUMALOG) injection   SubCUTAneous AC&HS  piperacillin-tazobactam (ZOSYN) 3.375 g in 0.9% sodium chloride (MBP/ADV) 100 mL  3.375 g IntraVENous Q12H  hydrALAZINE (APRESOLINE) 20 mg/mL injection 10 mg  10 mg IntraVENous Q6H PRN  
 sodium chloride (NS) flush 5-10 mL  5-10 mL IntraVENous PRN

## 2019-07-01 NOTE — PROCEDURES
[facility] Dialysis Team [place]  [phone] Vitals   Pre   Post   Assessment   Pre   Post    
Temp  Temp: 97.7 °F (36.5 °C) (07/01/19 1545)  98.3 LOC  A&Ox4 A&Ox4 HR   Pulse (Heart Rate): 84 (07/01/19 1545) 90 Lungs   Clear   Clear B/P   BP: (!) 183/111 (07/01/19 1545) 141/90 Cardiac   S1 S2 Auscultated. RRR. No Ectopy. S1 S2 Auscultated. RRR. No Ectopy. Resp   Resp Rate: 20 (07/01/19 1545) 16 Skin   R Diabetic foot ulcer ASHISH. R Diabetic foot ulcer ASHISH. Pain level  Pain Intensity 1: 10 (06/30/19 2127) 0 Edema  None None Orders: Duration:   Start:    1545 End:    1945 Total:   4hr  
Dialyzer:   Dialyzer/Set Up Inspection: Chris Johnson (07/01/19 1545) K Bath:   Dialysate K (mEq/L): 3 (07/01/19 1545) Ca Bath:   Dialysate CA (mEq/L): 2.5 (07/01/19 1545) Na/Bicarb:   Dialysate NA (mEq/L): 140 (07/01/19 1545) Target Fluid Removal:   Goal/Amount of Fluid to Remove (mL): 3000 mL (07/01/19 1545) Access Type & Location:   PELON AVF: skin CDI. No s/s of infection. +B/T. +Flush/Asp. No issues with cannulation or hemostasis. Running well at . Labs Obtained/Reviewed Critical Results Called   Date when labs were drawn- 
Hgb-   
HGB Date Value Ref Range Status 07/01/2019 11.0 (L) 12.1 - 17.0 g/dL Final  
 
K-   
Potassium Date Value Ref Range Status 07/01/2019 3.6 3.5 - 5.1 mmol/L Final  
 
Ca-  
Calcium Date Value Ref Range Status 07/01/2019 8.7 8.5 - 10.1 MG/DL Final  
 
Bun-  
BUN Date Value Ref Range Status 07/01/2019 43 (H) 6 - 20 MG/DL Final  
 
Creat-  
Creatinine Date Value Ref Range Status 07/01/2019 11.50 (H) 0.70 - 1.30 MG/DL Final  
  Comment:  
  INVESTIGATED PER DELTA CHECK PROTOCOL Medications/ Blood Products Given Name   Dose   Route and Time Blood Volume Processed (BVP):    84.4L Net Fluid Removed:  3000mL Comments Time Out Done: 4910 Primary Nurse Rpt Pre: Silvia Bullock, RN 
 Primary Nurse Rpt Post: Satinder Kwan RN  
Pt Education:  Procedural, S/s to report during tx, treatment rational, treatment risks. Care Plan: OR Tomorrow Tx Summary:   
1500: Arrived to patient room and assessed per protocol. Consent signed & on file. SBAR received from Primary RN. 1545: Pt cannulated with 82V needles per policy & without issue. Labs drawn per order. VSS. Dialysis Tx initiated. 1700: Pt resting quietly. 1945: Tx ended. VSS. All possible blood returned to patient. Hemostasis achieved without issue. Bed locked and in the lowest position, call bell and belongings in reach. SBAR given to Primary, RN. Patient is stable at time of my departure. All Dialysis related medications have been reviewed. HD lines, pt access and face visible at all times throughout treatment. Admiting Diagnosis: Osteomyelitis Pt's previous clinic- [facility]  
Consent signed - Informed Consent Verified: Yes (07/01/19 1545) Sylvain Consent - Signed/ Filed Hepatitis Status- Negative Ag Susceptible Ab 7/1/19 Machine #- Machine Number: [id number] (07/01/19 1542) Telemetry status- Not monitored Pre-dialysis wt. -

## 2019-07-01 NOTE — PROGRESS NOTES
BP is 201/108. Patient refused hydralazine. Patient stated that \"after dialysis my BP will drop. I do not want to drop my BP\". Patient is about to receive dialysis.

## 2019-07-01 NOTE — PROGRESS NOTES
Foot and Ankle specialist Progress Note Admit Date: 6/28/2019 Hospital day 3 Subjective:  
 
Patient was admitted thru the ER  with a foul smelling wound 5th right toe of unknown duration. Previous well haled amputations 1st and 5th left digits Current Facility-Administered Medications Medication Dose Route Frequency  insulin glargine (LANTUS) injection 40 Units  40 Units SubCUTAneous DAILY  oxyCODONE-acetaminophen (PERCOCET) 5-325 mg per tablet 1-2 Tab  1-2 Tab Oral Q4H PRN  
 atorvastatin (LIPITOR) tablet 40 mg  40 mg Oral DAILY  lisinopril (PRINIVIL, ZESTRIL) tablet 5 mg  5 mg Oral DAILY  sodium chloride (NS) flush 5-40 mL  5-40 mL IntraVENous Q8H  
 sodium chloride (NS) flush 5-40 mL  5-40 mL IntraVENous PRN  
 heparin (porcine) injection 5,000 Units  5,000 Units SubCUTAneous Q8H  Vancomycin Pharmacy Dosing   Other Rx Dosing/Monitoring  acetaminophen (TYLENOL) tablet 650 mg  650 mg Oral Q6H PRN  
 glucose chewable tablet 16 g  4 Tab Oral PRN  
 dextrose (D50W) injection syrg 12.5-25 g  25-50 mL IntraVENous PRN  
 glucagon (GLUCAGEN) injection 1 mg  1 mg IntraMUSCular PRN  
 insulin lispro (HUMALOG) injection   SubCUTAneous AC&HS  piperacillin-tazobactam (ZOSYN) 3.375 g in 0.9% sodium chloride (MBP/ADV) 100 mL  3.375 g IntraVENous Q12H  hydrALAZINE (APRESOLINE) 20 mg/mL injection 10 mg  10 mg IntraVENous Q6H PRN  
 sodium chloride (NS) flush 5-10 mL  5-10 mL IntraVENous PRN Objective:  
 
Patient Vitals for the past 8 hrs: 
 BP Temp Pulse Resp SpO2  
07/01/19 1410 (!) 201/108 98.4 °F (36.9 °C) 84 20 94 % 07/01/19 0902 (!) 168/99 98.5 °F (36.9 °C) 85 20 94 % No intake/output data recorded. 06/29 1901 - 07/01 0700 In: 480 [P.O.:480] Out: - Physical Exam:LOWER LEGS Nonpalpable pedal pulses Diminished epicritic sensation Good muscle strength Previous well healed amputation 5th left digit and 1st left toe malodorous 5th right toe that has an open necrotic wound PIPJ Dried scabbed wound plantar  1st right toe MRI:  shows probable osteomyelitis 5th right toe and met head NOÉ's : adequate arterial flow lower legs WBC's 8.8 
 
afebrile All labs reviewed Data Review Recent Results (from the past 24 hour(s)) GLUCOSE, POC Collection Time: 06/30/19  4:35 PM  
Result Value Ref Range Glucose (POC) 147 (H) 65 - 100 mg/dL Performed by Nolan Bosworth GLUCOSE, POC Collection Time: 06/30/19  9:20 PM  
Result Value Ref Range Glucose (POC) 207 (H) 65 - 100 mg/dL Performed by Blas Dunne CBC W/O DIFF Collection Time: 07/01/19  3:41 AM  
Result Value Ref Range WBC 8.8 4.1 - 11.1 K/uL  
 RBC 4.16 4.10 - 5.70 M/uL  
 HGB 11.0 (L) 12.1 - 17.0 g/dL HCT 34.9 (L) 36.6 - 50.3 % MCV 83.9 80.0 - 99.0 FL  
 MCH 26.4 26.0 - 34.0 PG  
 MCHC 31.5 30.0 - 36.5 g/dL  
 RDW 14.6 (H) 11.5 - 14.5 % PLATELET 790 271 - 191 K/uL MPV 9.7 8.9 - 12.9 FL  
 NRBC 0.0 0  WBC ABSOLUTE NRBC 0.00 0.00 - 0.01 K/uL METABOLIC PANEL, COMPREHENSIVE Collection Time: 07/01/19  3:41 AM  
Result Value Ref Range Sodium 133 (L) 136 - 145 mmol/L Potassium 3.6 3.5 - 5.1 mmol/L Chloride 94 (L) 97 - 108 mmol/L  
 CO2 30 21 - 32 mmol/L Anion gap 9 5 - 15 mmol/L Glucose 152 (H) 65 - 100 mg/dL BUN 43 (H) 6 - 20 MG/DL Creatinine 11.50 (H) 0.70 - 1.30 MG/DL  
 BUN/Creatinine ratio 4 (L) 12 - 20 GFR est AA 6 (L) >60 ml/min/1.73m2 GFR est non-AA 5 (L) >60 ml/min/1.73m2 Calcium 8.7 8.5 - 10.1 MG/DL Bilirubin, total 0.3 0.2 - 1.0 MG/DL  
 ALT (SGPT) 11 (L) 12 - 78 U/L  
 AST (SGOT) 14 (L) 15 - 37 U/L Alk. phosphatase 53 45 - 117 U/L Protein, total 7.9 6.4 - 8.2 g/dL Albumin 2.7 (L) 3.5 - 5.0 g/dL Globulin 5.2 (H) 2.0 - 4.0 g/dL A-G Ratio 0.5 (L) 1.1 - 2.2 GLUCOSE, POC Collection Time: 07/01/19  6:11 AM  
Result Value Ref Range Glucose (POC) 133 (H) 65 - 100 mg/dL Performed by Roya Saunders GLUCOSE, POC Collection Time: 07/01/19 11:07 AM  
Result Value Ref Range Glucose (POC) 146 (H) 65 - 100 mg/dL Performed by Stewart Pelayo ANKLE BRACHIAL INDEX Collection Time: 07/01/19 12:05 PM  
Result Value Ref Range Right toe pressure 133 mmHg Right arm  mmHg Right TBI 0.73 Assessment:  
 
Principal Problem: 
  Acute osteomyelitis of toe of right foot ([address] (6/29/2019) Active Problems: 
  Uncontrolled hypertension (6/29/2019) Plan:  
 
Infectious Diease on board to manage antibiotic therapy Discussed  amputation of the 5th right toe and partial metatarsal. 
Discussed risks, benefits, expected outcome with the patient. All questions answered.  
Surgery scheduled for tomorrow, July 2, 2019 at [facility]

## 2019-07-02 ENCOUNTER — ANESTHESIA EVENT (OUTPATIENT)
Dept: SURGERY | Age: 34
DRG: 617 | End: 2019-07-02
Payer: MEDICARE

## 2019-07-02 ENCOUNTER — ANESTHESIA (OUTPATIENT)
Dept: SURGERY | Age: 34
DRG: 617 | End: 2019-07-02
Payer: MEDICARE

## 2019-07-02 LAB
ANION GAP SERPL CALC-SCNC: 6 MMOL/L (ref 5–15)
BASOPHILS # BLD: 0 K/UL (ref 0–0.1)
BASOPHILS NFR BLD: 0 % (ref 0–1)
BUN SERPL-MCNC: 27 MG/DL (ref 6–20)
BUN/CREAT SERPL: 3 (ref 12–20)
CALCIUM SERPL-MCNC: 8.9 MG/DL (ref 8.5–10.1)
CHLORIDE SERPL-SCNC: 99 MMOL/L (ref 97–108)
CO2 SERPL-SCNC: 29 MMOL/L (ref 21–32)
CREAT SERPL-MCNC: 8.67 MG/DL (ref 0.7–1.3)
DIFFERENTIAL METHOD BLD: ABNORMAL
EOSINOPHIL # BLD: 0.2 K/UL (ref 0–0.4)
EOSINOPHIL NFR BLD: 3 % (ref 0–7)
ERYTHROCYTE [DISTWIDTH] IN BLOOD BY AUTOMATED COUNT: 14.6 % (ref 11.5–14.5)
GLUCOSE BLD STRIP.AUTO-MCNC: 109 MG/DL (ref 65–100)
GLUCOSE BLD STRIP.AUTO-MCNC: 132 MG/DL (ref 65–100)
GLUCOSE BLD STRIP.AUTO-MCNC: 197 MG/DL (ref 65–100)
GLUCOSE BLD STRIP.AUTO-MCNC: 90 MG/DL (ref 65–100)
GLUCOSE BLD STRIP.AUTO-MCNC: 91 MG/DL (ref 65–100)
GLUCOSE SERPL-MCNC: 254 MG/DL (ref 65–100)
HCT VFR BLD AUTO: 35.9 % (ref 36.6–50.3)
HGB BLD-MCNC: 11.1 G/DL (ref 12.1–17)
IMM GRANULOCYTES # BLD AUTO: 0 K/UL (ref 0–0.04)
IMM GRANULOCYTES NFR BLD AUTO: 0 % (ref 0–0.5)
LYMPHOCYTES # BLD: 1.9 K/UL (ref 0.8–3.5)
LYMPHOCYTES NFR BLD: 25 % (ref 12–49)
MCH RBC QN AUTO: 25.9 PG (ref 26–34)
MCHC RBC AUTO-ENTMCNC: 30.9 G/DL (ref 30–36.5)
MCV RBC AUTO: 83.9 FL (ref 80–99)
MONOCYTES # BLD: 0.7 K/UL (ref 0–1)
MONOCYTES NFR BLD: 9 % (ref 5–13)
NEUTS SEG # BLD: 4.8 K/UL (ref 1.8–8)
NEUTS SEG NFR BLD: 63 % (ref 32–75)
NRBC # BLD: 0 K/UL (ref 0–0.01)
NRBC BLD-RTO: 0 PER 100 WBC
PLATELET # BLD AUTO: 230 K/UL (ref 150–400)
PMV BLD AUTO: 9.6 FL (ref 8.9–12.9)
POTASSIUM SERPL-SCNC: 3.6 MMOL/L (ref 3.5–5.1)
RBC # BLD AUTO: 4.28 M/UL (ref 4.1–5.7)
SERVICE CMNT-IMP: ABNORMAL
SERVICE CMNT-IMP: NORMAL
SERVICE CMNT-IMP: NORMAL
SODIUM SERPL-SCNC: 134 MMOL/L (ref 136–145)
WBC # BLD AUTO: 7.6 K/UL (ref 4.1–11.1)

## 2019-07-02 PROCEDURE — 77030026438 HC STYL ET INTUB CARD -A: Performed by: ANESTHESIOLOGY

## 2019-07-02 PROCEDURE — 80048 BASIC METABOLIC PNL TOTAL CA: CPT

## 2019-07-02 PROCEDURE — 36415 COLL VENOUS BLD VENIPUNCTURE: CPT

## 2019-07-02 PROCEDURE — 82962 GLUCOSE BLOOD TEST: CPT

## 2019-07-02 PROCEDURE — 74011250636 HC RX REV CODE- 250/636: Performed by: FAMILY MEDICINE

## 2019-07-02 PROCEDURE — 76010000149 HC OR TIME 1 TO 1.5 HR: Performed by: PODIATRIST

## 2019-07-02 PROCEDURE — 87075 CULTR BACTERIA EXCEPT BLOOD: CPT

## 2019-07-02 PROCEDURE — 74011000258 HC RX REV CODE- 258: Performed by: INTERNAL MEDICINE

## 2019-07-02 PROCEDURE — 87186 SC STD MICRODIL/AGAR DIL: CPT

## 2019-07-02 PROCEDURE — 88311 DECALCIFY TISSUE: CPT

## 2019-07-02 PROCEDURE — 74011636637 HC RX REV CODE- 636/637: Performed by: FAMILY MEDICINE

## 2019-07-02 PROCEDURE — 74011250637 HC RX REV CODE- 250/637: Performed by: INTERNAL MEDICINE

## 2019-07-02 PROCEDURE — 74011250637 HC RX REV CODE- 250/637: Performed by: NURSE PRACTITIONER

## 2019-07-02 PROCEDURE — 77030018836 HC SOL IRR NACL ICUM -A: Performed by: PODIATRIST

## 2019-07-02 PROCEDURE — 76060000033 HC ANESTHESIA 1 TO 1.5 HR: Performed by: PODIATRIST

## 2019-07-02 PROCEDURE — 87205 SMEAR GRAM STAIN: CPT

## 2019-07-02 PROCEDURE — 87077 CULTURE AEROBIC IDENTIFY: CPT

## 2019-07-02 PROCEDURE — 74011250636 HC RX REV CODE- 250/636

## 2019-07-02 PROCEDURE — 74011000272 HC RX REV CODE- 272: Performed by: PODIATRIST

## 2019-07-02 PROCEDURE — 88305 TISSUE EXAM BY PATHOLOGIST: CPT

## 2019-07-02 PROCEDURE — 77030008684 HC TU ET CUF COVD -B: Performed by: ANESTHESIOLOGY

## 2019-07-02 PROCEDURE — 0Y6X0Z0 DETACHMENT AT RIGHT 5TH TOE, COMPLETE, OPEN APPROACH: ICD-10-PCS | Performed by: PODIATRIST

## 2019-07-02 PROCEDURE — 77030011640 HC PAD GRND REM COVD -A: Performed by: PODIATRIST

## 2019-07-02 PROCEDURE — 74011250637 HC RX REV CODE- 250/637: Performed by: FAMILY MEDICINE

## 2019-07-02 PROCEDURE — 74011000250 HC RX REV CODE- 250

## 2019-07-02 PROCEDURE — 0QTN0ZZ RESECTION OF RIGHT METATARSAL, OPEN APPROACH: ICD-10-PCS | Performed by: PODIATRIST

## 2019-07-02 PROCEDURE — 74011250636 HC RX REV CODE- 250/636: Performed by: INTERNAL MEDICINE

## 2019-07-02 PROCEDURE — 77030002916 HC SUT ETHLN J&J -A: Performed by: PODIATRIST

## 2019-07-02 PROCEDURE — 76210000063 HC OR PH I REC FIRST 0.5 HR: Performed by: PODIATRIST

## 2019-07-02 PROCEDURE — 77030014008 HC SPNG HEMSTAT J&J -C: Performed by: PODIATRIST

## 2019-07-02 PROCEDURE — 65270000029 HC RM PRIVATE

## 2019-07-02 PROCEDURE — 85025 COMPLETE CBC W/AUTO DIFF WBC: CPT

## 2019-07-02 PROCEDURE — 87181 SC STD AGAR DILUTION PER AGT: CPT

## 2019-07-02 PROCEDURE — 74011250636 HC RX REV CODE- 250/636: Performed by: NURSE PRACTITIONER

## 2019-07-02 PROCEDURE — 74011000250 HC RX REV CODE- 250: Performed by: PODIATRIST

## 2019-07-02 RX ORDER — LIDOCAINE HYDROCHLORIDE 20 MG/ML
INJECTION, SOLUTION EPIDURAL; INFILTRATION; INTRACAUDAL; PERINEURAL AS NEEDED
Status: DISCONTINUED | OUTPATIENT
Start: 2019-07-02 | End: 2019-07-02 | Stop reason: HOSPADM

## 2019-07-02 RX ORDER — SODIUM CHLORIDE, SODIUM LACTATE, POTASSIUM CHLORIDE, CALCIUM CHLORIDE 600; 310; 30; 20 MG/100ML; MG/100ML; MG/100ML; MG/100ML
125 INJECTION, SOLUTION INTRAVENOUS CONTINUOUS
Status: DISCONTINUED | OUTPATIENT
Start: 2019-07-02 | End: 2019-07-02 | Stop reason: HOSPADM

## 2019-07-02 RX ORDER — ACETAMINOPHEN 325 MG/1
650 TABLET ORAL ONCE
Status: DISCONTINUED | OUTPATIENT
Start: 2019-07-02 | End: 2019-07-02 | Stop reason: HOSPADM

## 2019-07-02 RX ORDER — SODIUM CHLORIDE 0.9 % (FLUSH) 0.9 %
5-40 SYRINGE (ML) INJECTION AS NEEDED
Status: DISCONTINUED | OUTPATIENT
Start: 2019-07-02 | End: 2019-07-02 | Stop reason: HOSPADM

## 2019-07-02 RX ORDER — ONDANSETRON 2 MG/ML
4 INJECTION INTRAMUSCULAR; INTRAVENOUS AS NEEDED
Status: DISCONTINUED | OUTPATIENT
Start: 2019-07-02 | End: 2019-07-02 | Stop reason: HOSPADM

## 2019-07-02 RX ORDER — BUPIVACAINE HYDROCHLORIDE AND EPINEPHRINE 5; 5 MG/ML; UG/ML
INJECTION, SOLUTION EPIDURAL; INTRACAUDAL; PERINEURAL AS NEEDED
Status: DISCONTINUED | OUTPATIENT
Start: 2019-07-02 | End: 2019-07-02 | Stop reason: HOSPADM

## 2019-07-02 RX ORDER — LIDOCAINE HYDROCHLORIDE 10 MG/ML
0.5 INJECTION, SOLUTION EPIDURAL; INFILTRATION; INTRACAUDAL; PERINEURAL AS NEEDED
Status: DISCONTINUED | OUTPATIENT
Start: 2019-07-02 | End: 2019-07-02 | Stop reason: HOSPADM

## 2019-07-02 RX ORDER — PHENYLEPHRINE HCL IN 0.9% NACL 0.4MG/10ML
SYRINGE (ML) INTRAVENOUS AS NEEDED
Status: DISCONTINUED | OUTPATIENT
Start: 2019-07-02 | End: 2019-07-02 | Stop reason: HOSPADM

## 2019-07-02 RX ORDER — FENTANYL CITRATE 50 UG/ML
50 INJECTION, SOLUTION INTRAMUSCULAR; INTRAVENOUS AS NEEDED
Status: DISCONTINUED | OUTPATIENT
Start: 2019-07-02 | End: 2019-07-02 | Stop reason: HOSPADM

## 2019-07-02 RX ORDER — SODIUM CHLORIDE, SODIUM LACTATE, POTASSIUM CHLORIDE, CALCIUM CHLORIDE 600; 310; 30; 20 MG/100ML; MG/100ML; MG/100ML; MG/100ML
INJECTION, SOLUTION INTRAVENOUS
Status: DISCONTINUED | OUTPATIENT
Start: 2019-07-02 | End: 2019-07-02 | Stop reason: HOSPADM

## 2019-07-02 RX ORDER — MIDAZOLAM HYDROCHLORIDE 1 MG/ML
INJECTION, SOLUTION INTRAMUSCULAR; INTRAVENOUS AS NEEDED
Status: DISCONTINUED | OUTPATIENT
Start: 2019-07-02 | End: 2019-07-02 | Stop reason: HOSPADM

## 2019-07-02 RX ORDER — MIDAZOLAM HYDROCHLORIDE 1 MG/ML
1 INJECTION, SOLUTION INTRAMUSCULAR; INTRAVENOUS AS NEEDED
Status: DISCONTINUED | OUTPATIENT
Start: 2019-07-02 | End: 2019-07-02 | Stop reason: HOSPADM

## 2019-07-02 RX ORDER — LOPERAMIDE HYDROCHLORIDE 2 MG/1
4 CAPSULE ORAL ONCE
Status: COMPLETED | OUTPATIENT
Start: 2019-07-02 | End: 2019-07-02

## 2019-07-02 RX ORDER — ONDANSETRON 2 MG/ML
INJECTION INTRAMUSCULAR; INTRAVENOUS AS NEEDED
Status: DISCONTINUED | OUTPATIENT
Start: 2019-07-02 | End: 2019-07-02 | Stop reason: HOSPADM

## 2019-07-02 RX ORDER — DEXTROSE, SODIUM CHLORIDE, SODIUM LACTATE, POTASSIUM CHLORIDE, AND CALCIUM CHLORIDE 5; .6; .31; .03; .02 G/100ML; G/100ML; G/100ML; G/100ML; G/100ML
125 INJECTION, SOLUTION INTRAVENOUS CONTINUOUS
Status: DISCONTINUED | OUTPATIENT
Start: 2019-07-02 | End: 2019-07-02 | Stop reason: HOSPADM

## 2019-07-02 RX ORDER — DIPHENHYDRAMINE HYDROCHLORIDE 50 MG/ML
12.5 INJECTION, SOLUTION INTRAMUSCULAR; INTRAVENOUS AS NEEDED
Status: DISCONTINUED | OUTPATIENT
Start: 2019-07-02 | End: 2019-07-02 | Stop reason: HOSPADM

## 2019-07-02 RX ORDER — SODIUM CHLORIDE 0.9 % (FLUSH) 0.9 %
5-40 SYRINGE (ML) INJECTION EVERY 8 HOURS
Status: DISCONTINUED | OUTPATIENT
Start: 2019-07-02 | End: 2019-07-02 | Stop reason: HOSPADM

## 2019-07-02 RX ORDER — ROCURONIUM BROMIDE 10 MG/ML
INJECTION, SOLUTION INTRAVENOUS AS NEEDED
Status: DISCONTINUED | OUTPATIENT
Start: 2019-07-02 | End: 2019-07-02 | Stop reason: HOSPADM

## 2019-07-02 RX ORDER — FENTANYL CITRATE 50 UG/ML
INJECTION, SOLUTION INTRAMUSCULAR; INTRAVENOUS AS NEEDED
Status: DISCONTINUED | OUTPATIENT
Start: 2019-07-02 | End: 2019-07-02 | Stop reason: HOSPADM

## 2019-07-02 RX ORDER — MORPHINE SULFATE 10 MG/ML
2 INJECTION, SOLUTION INTRAMUSCULAR; INTRAVENOUS
Status: DISCONTINUED | OUTPATIENT
Start: 2019-07-02 | End: 2019-07-02 | Stop reason: HOSPADM

## 2019-07-02 RX ORDER — MIDAZOLAM HYDROCHLORIDE 1 MG/ML
1 INJECTION, SOLUTION INTRAMUSCULAR; INTRAVENOUS
Status: DISCONTINUED | OUTPATIENT
Start: 2019-07-02 | End: 2019-07-02 | Stop reason: HOSPADM

## 2019-07-02 RX ORDER — SUCCINYLCHOLINE CHLORIDE 20 MG/ML
INJECTION INTRAMUSCULAR; INTRAVENOUS AS NEEDED
Status: DISCONTINUED | OUTPATIENT
Start: 2019-07-02 | End: 2019-07-02 | Stop reason: HOSPADM

## 2019-07-02 RX ORDER — PROPOFOL 10 MG/ML
INJECTION, EMULSION INTRAVENOUS AS NEEDED
Status: DISCONTINUED | OUTPATIENT
Start: 2019-07-02 | End: 2019-07-02 | Stop reason: HOSPADM

## 2019-07-02 RX ORDER — FENTANYL CITRATE 50 UG/ML
25 INJECTION, SOLUTION INTRAMUSCULAR; INTRAVENOUS
Status: DISCONTINUED | OUTPATIENT
Start: 2019-07-02 | End: 2019-07-02 | Stop reason: HOSPADM

## 2019-07-02 RX ORDER — OXYCODONE HYDROCHLORIDE 5 MG/1
5 TABLET ORAL AS NEEDED
Status: DISCONTINUED | OUTPATIENT
Start: 2019-07-02 | End: 2019-07-02 | Stop reason: HOSPADM

## 2019-07-02 RX ADMIN — OXYCODONE AND ACETAMINOPHEN 1 TABLET: 5; 325 TABLET ORAL at 09:23

## 2019-07-02 RX ADMIN — MIDAZOLAM HYDROCHLORIDE 2 MG: 1 INJECTION, SOLUTION INTRAMUSCULAR; INTRAVENOUS at 17:40

## 2019-07-02 RX ADMIN — Medication 10 ML: at 21:38

## 2019-07-02 RX ADMIN — PIPERACILLIN SODIUM,TAZOBACTAM SODIUM 3.38 G: 3; .375 INJECTION, POWDER, FOR SOLUTION INTRAVENOUS at 03:37

## 2019-07-02 RX ADMIN — HEPARIN SODIUM 5000 UNITS: 5000 INJECTION INTRAVENOUS; SUBCUTANEOUS at 21:36

## 2019-07-02 RX ADMIN — ONDANSETRON 4 MG: 2 INJECTION INTRAMUSCULAR; INTRAVENOUS at 18:00

## 2019-07-02 RX ADMIN — FENTANYL CITRATE 100 MCG: 50 INJECTION, SOLUTION INTRAMUSCULAR; INTRAVENOUS at 17:48

## 2019-07-02 RX ADMIN — LOPERAMIDE HYDROCHLORIDE 4 MG: 2 CAPSULE ORAL at 21:37

## 2019-07-02 RX ADMIN — Medication 80 MCG: at 18:09

## 2019-07-02 RX ADMIN — LIDOCAINE HYDROCHLORIDE 100 MG: 20 INJECTION, SOLUTION EPIDURAL; INFILTRATION; INTRACAUDAL; PERINEURAL at 17:48

## 2019-07-02 RX ADMIN — Medication 120 MCG: at 18:05

## 2019-07-02 RX ADMIN — INSULIN LISPRO 3 UNITS: 100 INJECTION, SOLUTION INTRAVENOUS; SUBCUTANEOUS at 07:30

## 2019-07-02 RX ADMIN — HYDRALAZINE HYDROCHLORIDE 10 MG: 20 INJECTION INTRAMUSCULAR; INTRAVENOUS at 09:22

## 2019-07-02 RX ADMIN — Medication 10 ML: at 07:29

## 2019-07-02 RX ADMIN — PIPERACILLIN SODIUM,TAZOBACTAM SODIUM 3.38 G: 3; .375 INJECTION, POWDER, FOR SOLUTION INTRAVENOUS at 14:47

## 2019-07-02 RX ADMIN — LISINOPRIL 5 MG: 5 TABLET ORAL at 09:21

## 2019-07-02 RX ADMIN — Medication 80 MCG: at 18:23

## 2019-07-02 RX ADMIN — HEPARIN SODIUM 5000 UNITS: 5000 INJECTION INTRAVENOUS; SUBCUTANEOUS at 03:37

## 2019-07-02 RX ADMIN — Medication 120 MCG: at 18:19

## 2019-07-02 RX ADMIN — PROPOFOL 300 MG: 10 INJECTION, EMULSION INTRAVENOUS at 17:48

## 2019-07-02 RX ADMIN — Medication 10 ML: at 13:25

## 2019-07-02 RX ADMIN — ATORVASTATIN CALCIUM 40 MG: 40 TABLET, FILM COATED ORAL at 09:21

## 2019-07-02 RX ADMIN — SODIUM CHLORIDE, SODIUM LACTATE, POTASSIUM CHLORIDE, CALCIUM CHLORIDE: 600; 310; 30; 20 INJECTION, SOLUTION INTRAVENOUS at 17:30

## 2019-07-02 RX ADMIN — ROCURONIUM BROMIDE 5 MG: 10 INJECTION, SOLUTION INTRAVENOUS at 17:48

## 2019-07-02 RX ADMIN — SUCCINYLCHOLINE CHLORIDE 200 MG: 20 INJECTION INTRAMUSCULAR; INTRAVENOUS at 17:48

## 2019-07-02 RX ADMIN — Medication 120 MCG: at 18:26

## 2019-07-02 RX ADMIN — Medication 80 MCG: at 18:15

## 2019-07-02 NOTE — PROGRESS NOTES
Foot and Ankle specialist Progress Note Admit Date: 6/28/2019 Hospital day 4 Subjective:  
 
Patient was admitted thru the ER with a foul smelling wound 5th right toe of unknown duration. Previous well haled amputations 1st and 5th left digits Current Facility-Administered Medications Medication Dose Route Frequency  insulin glargine (LANTUS) injection 20 Units  20 Units SubCUTAneous DAILY  oxyCODONE-acetaminophen (PERCOCET) 5-325 mg per tablet 1-2 Tab  1-2 Tab Oral Q4H PRN  
 atorvastatin (LIPITOR) tablet 40 mg  40 mg Oral DAILY  lisinopril (PRINIVIL, ZESTRIL) tablet 5 mg  5 mg Oral DAILY  sodium chloride (NS) flush 5-40 mL  5-40 mL IntraVENous Q8H  
 sodium chloride (NS) flush 5-40 mL  5-40 mL IntraVENous PRN  
 heparin (porcine) injection 5,000 Units  5,000 Units SubCUTAneous Q8H  Vancomycin Pharmacy Dosing   Other Rx Dosing/Monitoring  acetaminophen (TYLENOL) tablet 650 mg  650 mg Oral Q6H PRN  
 glucose chewable tablet 16 g  4 Tab Oral PRN  
 dextrose (D50W) injection syrg 12.5-25 g  25-50 mL IntraVENous PRN  
 glucagon (GLUCAGEN) injection 1 mg  1 mg IntraMUSCular PRN  
 insulin lispro (HUMALOG) injection   SubCUTAneous AC&HS  piperacillin-tazobactam (ZOSYN) 3.375 g in 0.9% sodium chloride (MBP/ADV) 100 mL  3.375 g IntraVENous Q12H  hydrALAZINE (APRESOLINE) 20 mg/mL injection 10 mg  10 mg IntraVENous Q6H PRN  
 sodium chloride (NS) flush 5-10 mL  5-10 mL IntraVENous PRN Objective:  
 
Patient Vitals for the past 8 hrs: 
 BP Temp Pulse Resp SpO2  
07/02/19 1539 (!) 165/103 98.3 °F (36.8 °C) 88 16 93 % 07/02/19 1017 (!) 161/103      
07/02/19 0941 148/65  [place] Coca No intake/output data recorded. 06/30 1901 - 07/02 0700 In: -  
Out: 3000 Physical Exam:LOWER LEGS Nonpalpable pedal pulses Diminished epicritic sensation Good muscle strength Previous well healed amputation 5th left digit and 1st left toe malodorous 5th right toe that has an open necrotic wound PIPJ Dried scabbed wound plantar  1st right toe MRI:  shows probable osteomyelitis 5th right toe and met head WBC's 7.6 All labs reviewed Data Review Recent Results (from the past 24 hour(s)) GLUCOSE, POC Collection Time: 07/01/19  9:23 PM  
Result Value Ref Range Glucose (POC) 101 (H) 65 - 100 mg/dL Performed by Suri Pitts CBC WITH AUTOMATED DIFF Collection Time: 07/02/19  3:40 AM  
Result Value Ref Range WBC 7.6 4.1 - 11.1 K/uL  
 RBC 4.28 4.10 - 5.70 M/uL  
 HGB 11.1 (L) 12.1 - 17.0 g/dL HCT 35.9 (L) 36.6 - 50.3 % MCV 83.9 80.0 - 99.0 FL  
 MCH 25.9 (L) 26.0 - 34.0 PG  
 MCHC 30.9 30.0 - 36.5 g/dL  
 RDW 14.6 (H) 11.5 - 14.5 % PLATELET 409 194 - 998 K/uL MPV 9.6 8.9 - 12.9 FL  
 NRBC 0.0 0  WBC ABSOLUTE NRBC 0.00 0.00 - 0.01 K/uL NEUTROPHILS 63 32 - 75 % LYMPHOCYTES 25 12 - 49 % MONOCYTES 9 5 - 13 % EOSINOPHILS 3 0 - 7 % BASOPHILS 0 0 - 1 % IMMATURE GRANULOCYTES 0 0.0 - 0.5 % ABS. NEUTROPHILS 4.8 1.8 - 8.0 K/UL  
 ABS. LYMPHOCYTES 1.9 0.8 - 3.5 K/UL  
 ABS. MONOCYTES 0.7 0.0 - 1.0 K/UL  
 ABS. EOSINOPHILS 0.2 0.0 - 0.4 K/UL  
 ABS. BASOPHILS 0.0 0.0 - 0.1 K/UL  
 ABS. IMM. GRANS. 0.0 0.00 - 0.04 K/UL  
 DF AUTOMATED METABOLIC PANEL, BASIC Collection Time: 07/02/19  3:40 AM  
Result Value Ref Range Sodium 134 (L) 136 - 145 mmol/L Potassium 3.6 3.5 - 5.1 mmol/L Chloride 99 97 - 108 mmol/L  
 CO2 29 21 - 32 mmol/L Anion gap 6 5 - 15 mmol/L Glucose 254 (H) 65 - 100 mg/dL BUN 27 (H) 6 - 20 MG/DL Creatinine 8.67 (H) 0.70 - 1.30 MG/DL  
 BUN/Creatinine ratio 3 (L) 12 - 20 GFR est AA 9 (L) >60 ml/min/1.73m2 GFR est non-AA 7 (L) >60 ml/min/1.73m2 Calcium 8.9 8.5 - 10.1 MG/DL  
GLUCOSE, POC Collection Time: 07/02/19  6:35 AM  
Result Value Ref Range Glucose (POC) 197 (H) 65 - 100 mg/dL Performed by [facility] GLUCOSE, POC Collection Time: 07/02/19 11:55 AM  
Result Value Ref Range Glucose (POC) 109 (H) 65 - 100 mg/dL Performed by Abraham Tenorio (PCT) GLUCOSE, POC Collection Time: 07/02/19  4:14 PM  
Result Value Ref Range Glucose (POC) 90 65 - 100 mg/dL Performed by [employer] Assessment:  
 
Principal Problem: 
  Acute osteomyelitis of toe of right foot ([address] (6/29/2019) Active Problems: 
  Uncontrolled hypertension (6/29/2019) Plan:  
Scheduled for amputation of 5th right toe and metatarsal head with insertion of antibiotic beads. Discuss risk, benefits, expected outcome as well as post op course. Patient had no questions at this time

## 2019-07-02 NOTE — ANESTHESIA POSTPROCEDURE EVALUATION
Procedure(s): 
AMPUTATION TOE/ METATARSAL HEAD RIGHT FOOT. general 
 
Anesthesia Post Evaluation Patient location during evaluation: PACU Patient participation: complete - patient participated Level of consciousness: awake and alert Pain management: adequate Airway patency: patent Anesthetic complications: no 
Cardiovascular status: acceptable Respiratory status: acceptable Hydration status: acceptable Comments: I have seen and evaluated the patient and is ready for discharge. Jennifer Arthur MD 
 
Post anesthesia nausea and vomiting:  none Vitals Value Taken Time /89 7/2/2019  6:54 PM  
Temp 36.7 °C (98.1 °F) 7/2/2019  6:54 PM  
Pulse 90 7/2/2019  6:54 PM  
Resp 24 7/2/2019  6:54 PM  
SpO2 96 % 7/2/2019  6:54 PM

## 2019-07-02 NOTE — PROGRESS NOTES
Hospitalist Progress Note NAME: Aria Watt :  1985 MRN:  [id number] Assessment / Plan: 
Osteomyelitis of right foot : POA  
- involving toes of the right foot. MRI shows post amputation right fifth ray at the mid fifth metatarsal. 
There is edema in the proximal right fifth metatarsal may represent Osteomyelitis. Amaya [place] On iv antibiotics vanc and zosyn ID on board Plan for surgical amputation today by podiatry 
  
  
 Uncontrolled type 2 diabetes mellitus and hyperglycemia.   
Placed on Humalog insulin correction coverage schedule, Accu-Chek, and check hemoglobin A1c level. Cont lantus 20 units daily until postop. Hold on adjustents until after or. 
  
Open wounds of the right foot.   
For surgery today. 
  
  
End-stage renal disease, on hemodialysis. hemodialysis treatments , , and . Nephrology on board HD today  
  
  
  
Morbid obesity.   
We would advise eventual weight loss when appropriate Body mass index is 45.76 kg/m². 
  
  
 Hypertension, uncontrolled. Cont lisinopril and hydralazine prn. 
  
  
Hypokalemia Management by nephro 40 or above Morbid obesity / Body mass index is 44.05 kg/m². Code status: Full Prophylaxis: scd Recommended Disposition: Home w/Family Subjective: Chief Complaint / Reason for Physician Visit \"no reports of pain fever or chills\". Discussed with RN events overnight. Review of Systems: 
Symptom Y/N Comments  Symptom Y/N Comments Fever/Chills    Chest Pain Poor Appetite    Edema Cough    Abdominal Pain Sputum    Joint Pain SOB/LITTLE    Pruritis/Rash Nausea/vomit    Tolerating PT/OT Diarrhea    Tolerating Diet Constipation    Other Could NOT obtain due to:   
 
Objective: VITALS:  
Last 24hrs VS reviewed since prior progress note. Most recent are: 
Patient Vitals for the past 24 hrs: 
 Temp Pulse Resp BP SpO2 07/02/19 1539 98.3 °F (36.8 °C) 88 16 (!) 165/103 93 % 07/02/19 1017    (!) 161/103   
07/02/19 0941    148/65   
07/02/19 0906 98.2 °F (36.8 °C) 83 16 (!) 157/113 96 % 07/02/19 0219 98.4 °F (36.9 °C) 88 16 142/83 97 % 07/01/19 2110 98.5 °F (36.9 °C) 97 18 (!) 155/105 95 % 07/01/19 1945 98.3 °F (36.8 °C) 85 16 141/90 95 % 07/01/19 1930  85 16 (!) 144/99 95 % 07/01/19 1915  84 16 146/89 95 % 07/01/19 1900  84 16 139/90 95 % 07/01/19 1845  84 12 (!) 144/91 93 % 07/01/19 1830  80 16 139/88 96 % 07/01/19 1815  84 16 115/80 93 % 07/01/19 1800  82 16 141/87 92 % 07/01/19 1745  84 16 141/88 94 % 07/01/19 1730  83 16 (!) 158/102 94 % 07/01/19 1715  82 12 (!) 155/106 94 % 07/01/19 1700  84 16 133/90 95 % 07/01/19 1645  84 16 (!) 149/102 93 % Intake/Output Summary (Last 24 hours) at 7/2/2019 1638 Last data filed at 7/2/2019 5344 Gross per 24 hour Intake 0 ml Output 3000 ml Net -3000 ml PHYSICAL EXAM: 
General: WD, WN. Alert, cooperative, no acute distress   
EENT:  EOMI. Anicteric sclerae. MMM Resp:  CTA bilaterally, no wheezing or rales. No accessory muscle use CV:  Regular  rhythm,  No edema GI:  Soft, Non distended, Non tender.  +Bowel sounds Neurologic:  Alert and oriented X 3, normal speech, Psych:   Good insight. Not anxious nor agitated Skin:  Right foot 5th digit with dried purulence,  nontender Reviewed most current lab test results and cultures  YES Reviewed most current radiology test results   YES Review and summation of old records today    NO Reviewed patient's current orders and MAR    YES 
PMH/ reviewed - no change compared to H&P 
________________________________________________________________________ Care Plan discussed with: 
  Comments Patient Family RN Care Manager Consultant                   Multidiciplinary team rounds were held today with case manager, nursing, pharmacist and clinical coordinator. Patient's plan of care was discussed; medications were reviewed and discharge planning was addressed. ________________________________________________________________________ Total NON critical care TIME:  20   Minutes Total CRITICAL CARE TIME Spent:   Minutes non procedure based Comments >50% of visit spent in counseling and coordination of care    
________________________________________________________________________ Woodfin Felty, DO  
 
Procedures: see electronic medical records for all procedures/Xrays and details which were not copied into this note but were reviewed prior to creation of Plan. LABS: 
I reviewed today's most current labs and imaging studies. Pertinent labs include: 
Recent Labs  
  07/02/19 0340 07/01/19 0341 WBC 7.6 8.8 HGB 11.1* 11.0*  
HCT 35.9* 34.9*  
 227 Recent Labs  
  07/02/19 0340 07/01/19 0341 * 133* K 3.6 3.6 CL 99 94* CO2 29 30  
* 152* BUN 27* 43* CREA 8.67* 11.50* CA 8.9 8.7 ALB  --  2.7* TBILI  --  0.3 SGOT  --  14* ALT  --  11* Signed: Woodfin Felty, DO

## 2019-07-02 NOTE — ANESTHESIA PREPROCEDURE EVALUATION
Anesthetic History No history of anesthetic complications Review of Systems / Medical History Patient summary reviewed, nursing notes reviewed and pertinent labs reviewed Pulmonary Within defined limits Neuro/Psych Within defined limits Cardiovascular Hypertension Exercise tolerance: >4 METS 
  
GI/Hepatic/Renal 
  
 
 
Renal disease: ESRD and dialysis Endo/Other Diabetes Morbid obesity Other Findings Physical Exam 
 
Airway Mallampati: II 
TM Distance: 4 - 6 cm Neck ROM: normal range of motion Mouth opening: Normal 
 
 Cardiovascular Regular rate and rhythm,  S1 and S2 normal,  no murmur, click, rub, or gallop Dental 
No notable dental hx Pulmonary Breath sounds clear to auscultation Abdominal 
GI exam deferred Other Findings Anesthetic Plan ASA: 3 Anesthesia type: general 
 
 
 
 
Induction: Intravenous Anesthetic plan and risks discussed with: Patient K and glu are WNL today

## 2019-07-02 NOTE — PROGRESS NOTES
Spiritual Care Assessment/Progress Note [address] 
 
 
NAME: Sarah Garibay      MRN: [id number] AGE: 28 y.o. SEX: male Sikhism Affiliation: No preference Language: English  
 
7/2/2019     Total Time (in minutes): 5 Spiritual Assessment begun in [facility] through conversation with: 
  
    [x]Patient        [] Family    [] Friend(s) Reason for Consult: Initial visit Spiritual beliefs: (Please include comment if needed) 
   [] Identifies with a celeste tradition:     
   [] Supported by a celeste community:        
   [] Claims no spiritual orientation:       
   [] Seeking spiritual identity:            
   [] Adheres to an individual form of spirituality:       
   [x] Not able to assess:                   
 
    
Identified resources for coping:  
   [] Prayer                           
   [] Music                  [] Guided Imagery 
   [] Family/friends                 [] Pet visits [] Devotional reading                         [x] Unknown 
   [] Other:                                          
 
 
Interventions offered during this visit: (See comments for more details) Patient Interventions: Affirmation of emotions/emotional suffering, Normalization of emotional/spiritual concerns Plan of Care: 
 
 [] Support spiritual and/or cultural needs  
 [] Support AMD and/or advance care planning process    
 [] Support grieving process 
 [] Coordinate Rites and/or Rituals  
 [] Coordination with community clergy [] No spiritual needs identified at this time 
 [] Detailed Plan of Care below (See Comments)  [] Make referral to Music Therapy 
[] Make referral to Pet Therapy    
[] Make referral to Addiction services 
[] Make referral to [facility] 
[] Make referral to Spiritual Care Partner 
[] No future visits requested       
[x] Follow up visits as needed Comments: 
Charted on behalf of Jacquelyn Bañuelos:  for initial visit. Pt was on phone. Let him know of  support and availability.  follow up as needed. NATHALIE SolizDiv, MACE 
 [phone])

## 2019-07-02 NOTE — BRIEF OP NOTE
BRIEF OPERATIVE NOTE Date of Procedure: 7/2/2019 Preoperative Diagnosis: RIGHT FIFTH TOE OSTEOMYOLITIS Postoperative Diagnosis: RIGHT FIFTH TOE OSTEOMYOLITIS Procedure(s): 
AMPUTATION FIFTH RIGHT TOE AND PARTIAL FIFTH RIGHT METATARSAL Surgeon(s) and Role: Lamberto Eddy DPM - Primary Surgical Assistant: NONE Surgical Staff: 
Circ-1: Sophia Gloria, RN; Napoleon Diaz RN Event Time In Time Out Incision Start 07/02/2019 1804 Incision Close 07/02/2019 1828 Anesthesia: General  
Estimated Blood Loss: MIN Specimens:  
ID Type Source Tests Collected by Time Destination 1 : FIFTH TOE RIGHT [address] [place], [place] [address]quelynn Gill, [place] 7/2/2019 1837 Pathology 2 : [facility] HEAD RIGHT FOOT Fresh Foot, Right  [facility] 7/2/2019 1837 Pathology 1 :  RIGHT FOOT WOUND Wound Foot, Right ANAEROBIC/AEROBIC/GRAM STAIN [facility] 7/2/2019 1827 Microbiology Findings: ULCERATED 5TH RIGHT TOE Complications: NONE Implants: * No implants in log *

## 2019-07-02 NOTE — ROUTINE PROCESS
Patient: Ruben Brock MRN: [id number]  SSN: [id number] YOB: 1985  Age: 28 y.o. Sex: male Patient is status post Procedure(s): 
AMPUTATION TOE/ METATARSAL HEAD RIGHT FOOT. Surgeon(s) and Role: Stone Wells DPM - Primary Local/Dose/Irrigation:SEE MAR Venous Access Device palindrime dual lumen catheter LOT [id number] 05/04/17 Upper chest (subclavicular area, right (Active) Peripheral IV 06/29/19 Posterior;Right Forearm (Active) Site Assessment Clean, dry, & intact 7/2/2019  4:31 PM  
Phlebitis Assessment 0 7/2/2019  4:31 PM  
Infiltration Assessment 0 7/2/2019  4:31 PM  
Dressing Status Clean, dry, & intact 7/2/2019  4:31 PM  
Dressing Type Transparent;Tape 7/2/2019  4:31 PM  
Hub Color/Line Status Pink;Capped 7/2/2019  4:31 PM  
Action Taken Dressing reinforced 7/2/2019  4:31 PM  
Alcohol Cap Used Yes 7/2/2019  4:31 PM  
                 
 
 
 
Dressing/Packing:  Wound Foot Right-Dressing Type: 4 x 4;Elastic bandage;Gauze wrap (maggie); Oil emulsion (07/02/19 1700) Splint/Cast:  ] Other:

## 2019-07-02 NOTE — PROGRESS NOTES
NAME: Art Resendiz :  1985 MRN:  [id number] Assessment :    Plan: 
--[facility]-[place] MWF Osteo HTN Anemia 
 --No acute need for HD today. Plan HD in AM. Holding FARIDA Subjective: Chief Complaint:  \" I have to go to surgery later today. \"  No pain. No N/V. No dyspnea. Review of Systems: 
 
Symptom Y/N Comments  Symptom Y/N Comments Fever/Chills    Chest Pain Poor Appetite    Edema Cough    Abdominal Pain Sputum    Joint Pain SOB/LITTLE    Pruritis/Rash Nausea/vomit    Tolerating PT/OT Diarrhea    Tolerating Diet Constipation    Other Could not obtain due to:   
 
Objective: VITALS:  
Last 24hrs VS reviewed since prior progress note. Most recent are: 
Visit Vitals BP (!) 161/103 Pulse 83 Temp 98.2 °F (36.8 °C) Resp 16 Ht 6' 2\" (1.88 m) Wt 155.6 kg (343 lb 1.6 oz) SpO2 96% BMI 44.05 kg/m² Intake/Output Summary (Last 24 hours) at 2019 1132 Last data filed at 2019 2373 Gross per 24 hour Intake 0 ml Output 3000 ml Net -3000 ml Telemetry Reviewed: PHYSICAL EXAM: 
General: NAd No edema Lab Data Reviewed: (see below) Medications Reviewed: (see below) PMH/SH reviewed - no change compared to H&P 
________________________________________________________________________ Care Plan discussed with: 
Patient Family RN Care Manager Consultant:     
 
  Comments >50% of visit spent in counseling and coordination of care    
 
________________________________________________________________________ Giuliana Saunders MD  
 
Procedures: see electronic medical records for all procedures/Xrays and details which 
were not copied into this note but were reviewed prior to creation of Plan. LABS: 
Recent Labs  
  19 
0340 19 
0341 WBC 7.6 8.8 HGB 11.1* 11.0*  
HCT 35.9* 34.9*  
 227 Recent Labs  
  07/02/19 
0340 07/01/19 
0341 * 133* K 3.6 3.6 CL 99 94* CO2 29 30 BUN 27* 43* CREA 8.67* 11.50* * 152* CA 8.9 8.7 Recent Labs  
  07/01/19 
0341 SGOT 14* AP 53  
TP 7.9 ALB 2.7*  
GLOB 5.2* No results for input(s): INR, PTP, APTT in the last 72 hours. No lab exists for component: INREXT, INREXT No results for input(s): FE, TIBC, PSAT, FERR in the last 72 hours. No results found for: FOL, RBCF No results for input(s): PH, PCO2, PO2 in the last 72 hours. No results for input(s): CPK, CKMB in the last 72 hours. No lab exists for component: TROPONINI No components found for: [place] Lab Results Component Value Date/Time Color YELLOW/STRAW 05/01/2017 05:39 PM  
 Appearance CLOUDY (A) 05/01/2017 05:39 PM  
 Specific gravity 1.018 05/01/2017 05:39 PM  
 Specific gravity >1.030 (H) 10/03/2014 04:35 AM  
 pH (UA) 5.5 05/01/2017 05:39 PM  
 Protein 300 (A) 05/01/2017 05:39 PM  
 Glucose 250 (A) 05/01/2017 05:39 PM  
 Ketone NEGATIVE  05/01/2017 05:39 PM  
 Bilirubin NEGATIVE  05/01/2017 05:39 PM  
 Urobilinogen 0.2 05/01/2017 05:39 PM  
 Nitrites NEGATIVE  05/01/2017 05:39 PM  
 Leukocyte Esterase NEGATIVE  05/01/2017 05:39 PM  
 Epithelial cells FEW 05/01/2017 05:39 PM  
 Bacteria NEGATIVE  05/01/2017 05:39 PM  
 WBC 0-4 05/01/2017 05:39 PM  
 RBC 5-10 05/01/2017 05:39 PM  
 
 
MEDICATIONS: 
Current Facility-Administered Medications Medication Dose Route Frequency  insulin glargine (LANTUS) injection 20 Units  20 Units SubCUTAneous DAILY  oxyCODONE-acetaminophen (PERCOCET) 5-325 mg per tablet 1-2 Tab  1-2 Tab Oral Q4H PRN  
 atorvastatin (LIPITOR) tablet 40 mg  40 mg Oral DAILY  lisinopril (PRINIVIL, ZESTRIL) tablet 5 mg  5 mg Oral DAILY  sodium chloride (NS) flush 5-40 mL  5-40 mL IntraVENous Q8H  
 sodium chloride (NS) flush 5-40 mL  5-40 mL IntraVENous PRN  
  heparin (porcine) injection 5,000 Units  5,000 Units SubCUTAneous Q8H  Vancomycin Pharmacy Dosing   Other Rx Dosing/Monitoring  acetaminophen (TYLENOL) tablet 650 mg  650 mg Oral Q6H PRN  
 glucose chewable tablet 16 g  4 Tab Oral PRN  
 dextrose (D50W) injection syrg 12.5-25 g  25-50 mL IntraVENous PRN  
 glucagon (GLUCAGEN) injection 1 mg  1 mg IntraMUSCular PRN  
 insulin lispro (HUMALOG) injection   SubCUTAneous AC&HS  piperacillin-tazobactam (ZOSYN) 3.375 g in 0.9% sodium chloride (MBP/ADV) 100 mL  3.375 g IntraVENous Q12H  hydrALAZINE (APRESOLINE) 20 mg/mL injection 10 mg  10 mg IntraVENous Q6H PRN  
 sodium chloride (NS) flush 5-10 mL  5-10 mL IntraVENous PRN

## 2019-07-02 NOTE — PROGRESS NOTES
ID Progress Note 2019 Subjective: No complaints except foot pain--for OR later today Objective: Antibiotics: 1. Vancomycin 2. Zosyn Vitals:  
Visit Vitals BP (!) 161/103 Pulse 83 Temp 98.2 °F (36.8 °C) Resp 16 Ht 6' 2\" (1.88 m) Wt 155.6 kg (343 lb 1.6 oz) SpO2 96% BMI 44.05 kg/m² Tmax:  Temp (24hrs), Av.2 °F (36.8 °C), Min:97.7 °F (36.5 °C), Max:98.5 °F (36.9 °C) Exam:  Lungs:  clear to auscultation bilaterally Heart:  Regular Abdomen:  soft, non-tender. Bowel sounds normal. No masses,  no organomegaly Foot about the same Labs:     
Recent Labs  
  19 
0340 19 
0341 WBC 7.6 8.8 HGB 11.1* 11.0*  
 227 BUN 27* 43* CREA 8.67* 11.50* SGOT  --  14* AP  --  53 TBILI  --  0.3 Cultures: No results found for: [facility] Lab Results Component Value Date/Time Culture result: MRSA NOT PRESENT 2019 04:08 AM  
 Culture result:  2019 04:08 AM  
      Screening of patient nares for MRSA is for surveillance purposes and, if positive, to facilitate isolation considerations in high risk settings. It is not intended for automatic decolonization interventions per se as regimens are not sufficiently effective to warrant routine use. Culture result: NO GROWTH 3 DAYS 2019 11:24 PM  
 
 
Radiology:  
 
Line/Insert Date:        
 
Assessment: 1. Diabetic foot wound with underlying osteo 2. Neuropathy 3. ESRD Objective: 1. Continue current therapy 2. Follow up cultures 3. Podiatry following Shane Benito MD

## 2019-07-02 NOTE — PROGRESS NOTES
Reason for Admission:   Patient complained about wound on right foot fifth toe. CM met with the patient to discuss discharge plans. Patient was alert and oriented. Patient lives alone is independent with ADL's and IDL's. Patient uses room air no home oxygen. Patient advised he is  from his spouse and family and friends assist him with transportation. The patient stated he primarily uses [employer] for medical appointments and [employer] and [employer] for other transportation needs. Patient advised he is not sure who will pick him up at discharge. Patient confirmed PCP Dr. Reinaldo Rojo and uses [facility]. Patient advised he never had home health but he is agreeable if appropriate. Patient is HD patient MWF at [facility]. RRAT Score:      5 Plan for utilizing home health:      TBD Current Advanced Directive/Advance Care Plan: 
 not on file. Care Management Interventions PCP Verified by CM: Yes Mode of Transport at Discharge: Other (see comment)(TBD) Transition of Care Consult (CM Consult): (TBD) Current Support Network: Lives Alone Confirm Follow Up Transport: Other (see comment)([employer] [facility]) Discharge Location Discharge Placement: Home Transition of Care Plan: 1. Operation-Osteomyelitis, fifth right toe.-Patient had surgery 7/2 
2. Care Management needs 3. Arrange transportation when appropriate CM will follow ANTONIO Arreaga/CRISELDA

## 2019-07-03 LAB
ANION GAP SERPL CALC-SCNC: 10 MMOL/L (ref 5–15)
BUN SERPL-MCNC: 32 MG/DL (ref 6–20)
BUN/CREAT SERPL: 3 (ref 12–20)
CALCIUM SERPL-MCNC: 8.6 MG/DL (ref 8.5–10.1)
CHLORIDE SERPL-SCNC: 100 MMOL/L (ref 97–108)
CO2 SERPL-SCNC: 26 MMOL/L (ref 21–32)
CREAT SERPL-MCNC: 10.8 MG/DL (ref 0.7–1.3)
GLUCOSE BLD STRIP.AUTO-MCNC: 115 MG/DL (ref 65–100)
GLUCOSE BLD STRIP.AUTO-MCNC: 138 MG/DL (ref 65–100)
GLUCOSE BLD STRIP.AUTO-MCNC: 150 MG/DL (ref 65–100)
GLUCOSE BLD STRIP.AUTO-MCNC: 174 MG/DL (ref 65–100)
GLUCOSE SERPL-MCNC: 134 MG/DL (ref 65–100)
POTASSIUM SERPL-SCNC: 3.8 MMOL/L (ref 3.5–5.1)
SERVICE CMNT-IMP: ABNORMAL
SODIUM SERPL-SCNC: 136 MMOL/L (ref 136–145)

## 2019-07-03 PROCEDURE — 74011000258 HC RX REV CODE- 258: Performed by: INTERNAL MEDICINE

## 2019-07-03 PROCEDURE — 74011636637 HC RX REV CODE- 636/637: Performed by: NURSE PRACTITIONER

## 2019-07-03 PROCEDURE — 82962 GLUCOSE BLOOD TEST: CPT

## 2019-07-03 PROCEDURE — 74011636637 HC RX REV CODE- 636/637: Performed by: FAMILY MEDICINE

## 2019-07-03 PROCEDURE — 74011250636 HC RX REV CODE- 250/636: Performed by: INTERNAL MEDICINE

## 2019-07-03 PROCEDURE — 74011250637 HC RX REV CODE- 250/637: Performed by: FAMILY MEDICINE

## 2019-07-03 PROCEDURE — 36415 COLL VENOUS BLD VENIPUNCTURE: CPT

## 2019-07-03 PROCEDURE — 65270000029 HC RM PRIVATE

## 2019-07-03 PROCEDURE — 80048 BASIC METABOLIC PNL TOTAL CA: CPT

## 2019-07-03 PROCEDURE — 74011250636 HC RX REV CODE- 250/636: Performed by: FAMILY MEDICINE

## 2019-07-03 PROCEDURE — 74011250637 HC RX REV CODE- 250/637: Performed by: INTERNAL MEDICINE

## 2019-07-03 PROCEDURE — 90935 HEMODIALYSIS ONE EVALUATION: CPT

## 2019-07-03 RX ADMIN — HEPARIN SODIUM 5000 UNITS: 5000 INJECTION INTRAVENOUS; SUBCUTANEOUS at 11:54

## 2019-07-03 RX ADMIN — HEPARIN SODIUM 5000 UNITS: 5000 INJECTION INTRAVENOUS; SUBCUTANEOUS at 20:34

## 2019-07-03 RX ADMIN — PIPERACILLIN SODIUM,TAZOBACTAM SODIUM 3.38 G: 3; .375 INJECTION, POWDER, FOR SOLUTION INTRAVENOUS at 17:06

## 2019-07-03 RX ADMIN — INSULIN LISPRO 3 UNITS: 100 INJECTION, SOLUTION INTRAVENOUS; SUBCUTANEOUS at 11:53

## 2019-07-03 RX ADMIN — OXYCODONE AND ACETAMINOPHEN 2 TABLET: 5; 325 TABLET ORAL at 05:48

## 2019-07-03 RX ADMIN — PIPERACILLIN SODIUM,TAZOBACTAM SODIUM 3.38 G: 3; .375 INJECTION, POWDER, FOR SOLUTION INTRAVENOUS at 03:00

## 2019-07-03 RX ADMIN — ATORVASTATIN CALCIUM 40 MG: 40 TABLET, FILM COATED ORAL at 08:41

## 2019-07-03 RX ADMIN — VANCOMYCIN HYDROCHLORIDE 1000 MG: 1 INJECTION, POWDER, LYOPHILIZED, FOR SOLUTION INTRAVENOUS at 18:17

## 2019-07-03 RX ADMIN — HEPARIN SODIUM 5000 UNITS: 5000 INJECTION INTRAVENOUS; SUBCUTANEOUS at 03:30

## 2019-07-03 RX ADMIN — Medication 10 ML: at 05:48

## 2019-07-03 RX ADMIN — Medication 10 ML: at 22:00

## 2019-07-03 RX ADMIN — Medication 10 ML: at 17:07

## 2019-07-03 RX ADMIN — OXYCODONE AND ACETAMINOPHEN 2 TABLET: 5; 325 TABLET ORAL at 17:06

## 2019-07-03 RX ADMIN — INSULIN GLARGINE 20 UNITS: 100 INJECTION, SOLUTION SUBCUTANEOUS at 08:41

## 2019-07-03 NOTE — DIALYSIS
HD TRANSFER - OUT REPORT: 
 
Verbal report given to Wilfredo Arriaza RN on Jorge Luis Conner  for routine progression of care Report consisted of patient's Situation, Background, Assessment and  
Recommendations(SBAR). Information from the following report(s) Procedure Summary was reviewed with the receiving nurse. Method:  $$ Method: Hemodialysis (07/03/19 1251) Fluid Removed  NET Fluid Removed (mL): 3000 ml (07/03/19 1651) Patient response to treatment:  Stable End Time  Hemodialysis End Time: 8468 (07/03/19 1651) If not documented, dialysis nurse to update post-dialysis row in HD/Filtration flowsheet Medications /Volume expansion agents or Fluid boluses administered during treatment? no 
 
Post-dialysis medication administration due?  no 
Remind nurse to administer post-HD medication upon return to unit. Fistula hemostasis? yes Opportunity for questions and clarification was provided.

## 2019-07-03 NOTE — PROGRESS NOTES
Hospitalist Progress Note NAME: Michelle Whittington :  1985 MRN:  [id number] Assessment / Plan: 
Osteomyelitis of right foot : POA  
- involving toes of the right foot. MRI shows post amputation right fifth ray at the mid fifth metatarsal. 
There is edema in the proximal right fifth metatarsal may represent Osteomyelitis. Silver Forester On iv antibiotics vanc and zosyn ID on board Pt is sp amputation 5th metatarsal head with bone biopsy and culture pending. 
  
  
 Uncontrolled type 2 diabetes mellitus and hyperglycemia.   
Placed on Humalog insulin correction coverage schedule, Accu-Chek, and check hemoglobin A1c level. Cont lantus 20 units daily and adjust as needed 
  
 
End-stage renal disease, on hemodialysis.   hemodialysis treatments , , and . Nephrology on board  
HD today  
  
  
  
Morbid obesity.   
We would advise eventual weight loss when appropriate  
Body mass index is 45.76 kg/m². 
  
  
 Hypertension, uncontrolled. Cont lisinopril and hydralazine prn. 
  
  
Hypokalemia Management by nephro  
  
  
40 or above Morbid obesity / Body mass index is 44.05 kg/m². 
  
Code status: Full Prophylaxis: scd Recommended Disposition: Home w/Family Subjective: Chief Complaint / Reason for Physician Visit \"no reports of pain or chills\". Discussed with RN events overnight. Review of Systems: 
Symptom Y/N Comments  Symptom Y/N Comments Fever/Chills    Chest Pain Poor Appetite    Edema Cough    Abdominal Pain Sputum    Joint Pain SOB/LITTLE    Pruritis/Rash Nausea/vomit    Tolerating PT/OT Diarrhea    Tolerating Diet Constipation    Other Could NOT obtain due to:   
 
Objective: VITALS:  
Last 24hrs VS reviewed since prior progress note. Most recent are: 
Patient Vitals for the past 24 hrs: 
 Temp Pulse Resp BP SpO2  
19 1340  79 14 (!) 136/91   
19 1326  82 16 (!) 150/99  07/03/19 1311  82 14 (!) 157/98   
07/03/19 1251 97.6 °F (36.4 °C) 75 16 (!) 167/107 94 % 07/03/19 0753 97.5 °F (36.4 °C) 74 20 123/74 96 % 07/03/19 0332 98.4 °F (36.9 °C) 89  142/79 92 % 07/03/19 0021 98.7 °F (37.1 °C) 90 20 (!) 147/96 92 % 07/02/19 2149  82  131/79   
07/02/19 2131 98.2 °F (36.8 °C)  19 (!) 161/93 93 % 07/02/19 2100 97.3 °F (36.3 °C) 86 18 118/73 91 % 07/02/19 1928 98.1 °F (36.7 °C) 86 17 118/71 98 % 07/02/19 1900  89 17 131/54 96 % 07/02/19 1855  87 12 134/57 93 % 07/02/19 1854 98.1 °F (36.7 °C) 90 24 129/89 96 % 07/02/19 1539 98.3 °F (36.8 °C) 88 16 (!) 165/103 93 % No intake or output data in the 24 hours ending 07/03/19 1345 PHYSICAL EXAM: 
General: WD, WN. Alert, cooperative, no acute distress   
EENT:  EOMI. Anicteric sclerae. MMM Resp:  CTA bilaterally, no wheezing or rales. No accessory muscle use CV:  Regular  rhythm,  No edema GI:  Soft, Non distended, Non tender.  +Bowel sounds Neurologic:  Alert and oriented X 3, normal speech, Psych:   Good insight. Not anxious nor agitated Skin:  Right foot cleanly dressed Reviewed most current lab test results and cultures  YES Reviewed most current radiology test results   YES Review and summation of old records today    NO Reviewed patient's current orders and MAR    YES 
PMH/SH reviewed - no change compared to H&P 
________________________________________________________________________ Care Plan discussed with: 
  Comments Patient Family RN Care Manager Consultant Multidiciplinary team rounds were held today with , nursing, pharmacist and clinical coordinator. Patient's plan of care was discussed; medications were reviewed and discharge planning was addressed. ________________________________________________________________________ Total NON critical care TIME:  20   Minutes Total CRITICAL CARE TIME Spent:   Minutes non procedure based Comments >50% of visit spent in counseling and coordination of care    
________________________________________________________________________ Woodfin Felty, DO  
 
Procedures: see electronic medical records for all procedures/Xrays and details which were not copied into this note but were reviewed prior to creation of Plan. LABS: 
I reviewed today's most current labs and imaging studies. Pertinent labs include: 
Recent Labs  
  07/02/19 0340 07/01/19 0341 WBC 7.6 8.8 HGB 11.1* 11.0*  
HCT 35.9* 34.9*  
 227 Recent Labs  
  07/03/19 
0345 07/02/19 0340 07/01/19 0341  134* 133* K 3.8 3.6 3.6  99 94* CO2 26 29 30 * 254* 152* BUN 32* 27* 43* CREA 10.80* 8.67* 11.50* CA 8.6 8.9 8.7 ALB  --   --  2.7* TBILI  --   --  0.3 SGOT  --   --  14* ALT  --   --  11* Signed: Woodfin Felty, DO

## 2019-07-03 NOTE — PROGRESS NOTES
ID Progress Note 7/3/2019 Subjective: No complaints except foot pain--for OR later today Objective: Antibiotics: 1. Vancomycin 2. Zosyn Vitals:  
Visit Vitals /84 Pulse 87 Temp 97.6 °F (36.4 °C) (Axillary) Resp 16 Ht 6' 2\" (1.88 m) Wt 152.4 kg (336 lb) SpO2 94% BMI 43.14 kg/m² Tmax:  Temp (24hrs), Av °F (36.7 °C), Min:97.3 °F (36.3 °C), Max:98.7 °F (37.1 °C) Exam:  Lungs:  clear to auscultation bilaterally Heart:  Regular Abdomen:  soft, non-tender. Bowel sounds normal. No masses,  no organomegaly Foot about the same Labs:     
Recent Labs  
  19 
0345 19 
0340 19 
0341 WBC  --  7.6 8.8 HGB  --  11.1* 11.0*  
PLT  --  230 227 BUN 32* 27* 43* CREA 10.80* 8.67* 11.50* SGOT  --   --  14* AP  --   --  53 TBILI  --   --  0.3 Cultures: No results found for: [facility] Lab Results Component Value Date/Time Culture result: NO GROWTH AFTER 15 HOURS 2019 06:27 PM  
 Culture result: (A) 2019 06:27 PM  
  CHECKING FOR POSSIBLE GRAM NEGATIVE RODS SEEN ON GRAM STAIN OF THE THIO BROTH Culture result: MRSA NOT PRESENT 2019 04:08 AM  
 Culture result:  2019 04:08 AM  
      Screening of patient nares for MRSA is for surveillance purposes and, if positive, to facilitate isolation considerations in high risk settings. It is not intended for automatic decolonization interventions per se as regimens are not sufficiently effective to warrant routine use. Radiology:  
 
Line/Insert Date:        
 
Assessment: 1. Diabetic foot wound with underlying osteo 2. Neuropathy 3. ESRD Objective: 1. Continue current therapy 2. Follow up cultures 3. Pathology will determine the course of therapy Faustina Gabriel MD

## 2019-07-03 NOTE — PROGRESS NOTES
Problem: Diabetes Self-Management Goal: *Monitoring blood glucose, interpreting and using results Description Identify recommended blood glucose targets  and personal targets.  
Outcome: Progressing Towards Goal

## 2019-07-03 NOTE — DIALYSIS
TRANSFER - IN REPORT: 
 
Verbal report received from Tino Bhagat RN on Pradeep Medina   for ordered procedure Report consisted of patients Situation, Background, Assessment and  
Recommendations(SBAR). Information from the following report(s) SBAR was reviewed with the receiving nurse. Opportunity for questions and clarification was provided. Assessment completed upon patients arrival to unit and care assumed.

## 2019-07-03 NOTE — OP NOTES
[address]  
OPERATIVE REPORT Name:  Carey Payan 
MR#:  [id number] :  1985 ACCOUNT #:  [de-identified] DATE OF SERVICE:  2019 PREOPERATIVE DIAGNOSIS:  Osteomyelitis, fifth right toe. POSTOPERATIVE DIAGNOSIS:  Osteomyelitis, fifth right toe. PROCEDURE PERFORMED:  Amputation of fifth right toe and partial fifth metatarsal. 
 
SURGEON:  Castro Munguia DPM 
 
ASSISTANT:  none ANESTHESIA:  General. 
 
COMPLICATIONS:  None. SPECIMENS REMOVED:  Wound cultures and fifth digit with partial metatarsal. 
 
IMPLANTS:  none ESTIMATED BLOOD LOSS:  Approximately 5 mL. INDICATIONS:  This 50-year-old black male presented through the ER 4 days ago with a malodorous wound to the fifth right toe. The patient has had previous digital amputation and his past medical history includes diabetes with neuropathy. History and physical had been completed. The patient agrees for surgery. Physical examination revealed palpable pedal pulses and fairly good muscle strength in the lower extremities bilaterally; previous well-healed amputations of first and fifth left digits. On the right foot, he has a dry blister, plantar first right toe. No underlying opening in the integument. The fifth right toe is hyperpigmented and has a wound that encompasses the entire dorsum and lateral aspect of the toe. X-rays taken on 2019 showed erosive changes at the base of the proximal phalanx concerning for possible osteomyelitis and that was followed up with an MRI on 2019 which showed fifth toe ulceration on right foot with osteomyelitis of the fifth digit and possible metatarsal head. DESCRIPTION OF PROCEDURE:  The patient was brought to the operating room and placed on the operating table in supine position. General anesthesia was achieved. A successful time-out was completed.   A 0.05% Marcaine with epinephrine was infused around the base of the fifth toe and just proximal to the mid shaft of the fifth metatarsal.  Next, the foot was now prepped and draped in the usual sterile manner. Using a #10 blade, the digit was disarticulated at the MPJ. There was no remaining necrotic tissue. No purulence. Good bleeding base. Using a sagittal saw, the distal one-third of the metatarsal was resected. Aerobic as well as anaerobic wound cultures were taken and the wound was flushed with copious amount of sterile saline with bacitracin dissolved in it. Surgicel was placed in the wound. Deep fascial structures were reapproximated using 4-0 Vicryl simple interrupted sutures and all skin edges were loosely coapted using 3-0 and 2-0 nylon. The wound was dressed with Adaptic, 4x4s, Kerlix, and an outer Ace. The patient appeared to tolerate anesthesia and procedure well and was transported from the operating room to recovery with all vital signs stable. This patient will remain hospitalized until final results of the wound cultures and bone pathology results. Antibiotic therapy to be managed by Infectious Disease. VALENCIA Ceballos/[email or web address] 
D:  07/02/2019 18:54 
T:  07/03/2019 3:57 JOB #:  [id number]

## 2019-07-03 NOTE — PROGRESS NOTES
NAME: Theodora Neff :  1985 MRN:  [id number] Assessment :    Plan: 
--[facility]-[place] MWF Osteo HTN Anemia 
 --Hd today. Pull fluid as tolerated. Holding FARIDA Subjective: Chief Complaint:  \" When am I going to get dialysis? .\"  No pain. No N/V. No dyspnea. Review of Systems: 
 
Symptom Y/N Comments  Symptom Y/N Comments Fever/Chills    Chest Pain Poor Appetite    Edema Cough    Abdominal Pain Sputum    Joint Pain SOB/LITTLE    Pruritis/Rash Nausea/vomit    Tolerating PT/OT Diarrhea    Tolerating Diet Constipation    Other Could not obtain due to:   
 
Objective: VITALS:  
Last 24hrs VS reviewed since prior progress note. Most recent are: 
Visit Vitals /74 (BP 1 Location: Right arm, BP Patient Position: At rest) Pulse 74 Temp 97.5 °F (36.4 °C) Resp 20 Ht 6' 2\" (1.88 m) Wt 152.4 kg (336 lb) SpO2 96% BMI 43.14 kg/m² No intake or output data in the 24 hours ending 19 1136 Telemetry Reviewed: PHYSICAL EXAM: 
General: NAd No edema Lab Data Reviewed: (see below) Medications Reviewed: (see below) PMH/SH reviewed - no change compared to H&P 
________________________________________________________________________ Care Plan discussed with: 
Patient Family RN Care Manager Consultant:     
 
  Comments >50% of visit spent in counseling and coordination of care    
 
________________________________________________________________________ Johnson Singh MD  
 
Procedures: see electronic medical records for all procedures/Xrays and details which 
were not copied into this note but were reviewed prior to creation of Plan. LABS: 
Recent Labs  
  19 
0340 19 
0341 WBC 7.6 8.8 HGB 11.1* 11.0*  
HCT 35.9* 34.9*  
 227 Recent Labs  
  19 0345 07/02/19 
0340 07/01/19 
9090  134* 133* K 3.8 3.6 3.6  99 94* CO2 26 29 30 BUN 32* 27* 43* CREA 10.80* 8.67* 11.50* * 254* 152* CA 8.6 8.9 8.7 Recent Labs  
  07/01/19 
0341 SGOT 14* AP 53  
TP 7.9 ALB 2.7*  
GLOB 5.2* No results for input(s): INR, PTP, APTT in the last 72 hours. No lab exists for component: INREXT, INREXT No results for input(s): FE, TIBC, PSAT, FERR in the last 72 hours. No results found for: FOL, RBCF No results for input(s): PH, PCO2, PO2 in the last 72 hours. No results for input(s): CPK, CKMB in the last 72 hours. No lab exists for component: TROPONINI No components found for: [place] Lab Results Component Value Date/Time Color YELLOW/STRAW 05/01/2017 05:39 PM  
 Appearance CLOUDY (A) 05/01/2017 05:39 PM  
 Specific gravity 1.018 05/01/2017 05:39 PM  
 Specific gravity >1.030 (H) 10/03/2014 04:35 AM  
 pH (UA) 5.5 05/01/2017 05:39 PM  
 Protein 300 (A) 05/01/2017 05:39 PM  
 Glucose 250 (A) 05/01/2017 05:39 PM  
 Ketone NEGATIVE  05/01/2017 05:39 PM  
 Bilirubin NEGATIVE  05/01/2017 05:39 PM  
 Urobilinogen 0.2 05/01/2017 05:39 PM  
 Nitrites NEGATIVE  05/01/2017 05:39 PM  
 Leukocyte Esterase NEGATIVE  05/01/2017 05:39 PM  
 Epithelial cells FEW 05/01/2017 05:39 PM  
 Bacteria NEGATIVE  05/01/2017 05:39 PM  
 WBC 0-4 05/01/2017 05:39 PM  
 RBC 5-10 05/01/2017 05:39 PM  
 
 
MEDICATIONS: 
Current Facility-Administered Medications Medication Dose Route Frequency  insulin glargine (LANTUS) injection 20 Units  20 Units SubCUTAneous DAILY  oxyCODONE-acetaminophen (PERCOCET) 5-325 mg per tablet 1-2 Tab  1-2 Tab Oral Q4H PRN  
 atorvastatin (LIPITOR) tablet 40 mg  40 mg Oral DAILY  lisinopril (PRINIVIL, ZESTRIL) tablet 5 mg  5 mg Oral DAILY  sodium chloride (NS) flush 5-40 mL  5-40 mL IntraVENous Q8H  
 sodium chloride (NS) flush 5-40 mL  5-40 mL IntraVENous PRN  
  heparin (porcine) injection 5,000 Units  5,000 Units SubCUTAneous Q8H  Vancomycin Pharmacy Dosing   Other Rx Dosing/Monitoring  acetaminophen (TYLENOL) tablet 650 mg  650 mg Oral Q6H PRN  
 glucose chewable tablet 16 g  4 Tab Oral PRN  
 dextrose (D50W) injection syrg 12.5-25 g  25-50 mL IntraVENous PRN  
 glucagon (GLUCAGEN) injection 1 mg  1 mg IntraMUSCular PRN  
 insulin lispro (HUMALOG) injection   SubCUTAneous AC&HS  piperacillin-tazobactam (ZOSYN) 3.375 g in 0.9% sodium chloride (MBP/ADV) 100 mL  3.375 g IntraVENous Q12H  hydrALAZINE (APRESOLINE) 20 mg/mL injection 10 mg  10 mg IntraVENous Q6H PRN  
 sodium chloride (NS) flush 5-10 mL  5-10 mL IntraVENous PRN

## 2019-07-03 NOTE — DIALYSIS
[facility] Dialysis Team [place]  [phone] Vitals   Pre   Post   Assessment   Pre   Post    
Temp  Temp: 97.6 °F (36.4 °C) (07/03/19 1251)  97.6 LOC  AOX4 AOX4 HR   Pulse (Heart Rate): 75 (07/03/19 1251) 87 Lungs   Room air, Clear/diminished  Room air, clear/diminished B/P   BP: (!) 167/107 (07/03/19 1251) 123/72 Cardiac   S1S2  S1S2 Resp   Resp Rate: 16 (07/03/19 1251) 14 Skin   Warm/dry  Warm/dry Pain level  Pain Intensity 1: 9 (07/03/19 0841) 10/10 nurse medicated at end of tx Edema  RLE trace RLE trace edema Orders: Duration:   Start:   1251 End:    1651 Total:   4hr  
Dialyzer:   Dialyzer/Set Up Inspection: Keven Caruso (07/03/19 1251) K Bath:   Dialysate K (mEq/L): 3 (07/03/19 1251) Ca Bath:   Dialysate CA (mEq/L): 2.5 (07/03/19 1251) Na/Bicarb:   Dialysate NA (mEq/L): 140 (07/03/19 1251) Target Fluid Removal:   Goal/Amount of Fluid to Remove (mL): 3000 mL (07/03/19 1251) Pull as tolerated per MD note Access Type & Location:   PELON AVF, +bruit/thrill, no s/sx infection. Accessed and disinfected per policy, accessed with two 15G needles, aspirated and flushed without  Difficulty. Post tx: Lines flushed, decannulated, hemostasis achieved <20 minutes; +bruit/thrill, dressing d/i Labs Obtained/Reviewed Critical Results Called   Date when labs were drawn- 
Hgb-   
HGB Date Value Ref Range Status 07/02/2019 11.1 (L) 12.1 - 17.0 g/dL Final  
 
K-   
Potassium Date Value Ref Range Status 07/03/2019 3.8 3.5 - 5.1 mmol/L Final  
 
Ca-  
Calcium Date Value Ref Range Status 07/03/2019 8.6 8.5 - 10.1 MG/DL Final  
 
Bun-  
BUN Date Value Ref Range Status 07/03/2019 32 (H) 6 - 20 MG/DL Final  
 
Creat-  
Creatinine Date Value Ref Range Status 07/03/2019 10.80 (H) 0.70 - 1.30 MG/DL Final  
 
  
Medications/ Blood Products Given Name   Dose   Route and Time No ordered medications Blood Volume Processed (BVP):    82 Net Fluid Removed:  3,000ml Comments Time Out Done: 5232 Primary Nurse Rpt Pre:Darcy El RN 
Primary Nurse Rpt Post:Darcy El RN 
Pt Education:Procedural 
Care Plan:Continue current HD plan of care Tx Summary:Pt introduction, time out and assessment. Hospital consent obtained. Early tray ordered for patient; however, he was dissatisfied with meal and insisted on a different lunch as well as eat time (total 30 minute delay). HD start time: 1251  All dialysis related medications have been reviewed. HD end time: 1651. Pt tolerated tx well, all possible blood returned, hemostasis achieved <20 minutes. Report to primary RN. Bed in lowest position, call bell within reach. Admiting Diagnosis: Osteomylitis r 5th toe, s/p amputation 07/02/19. Pt's previous clinic-[facility] Consent signed - Informed Consent Verified: Yes (07/03/19 1251) [place] Consent - Verified/obtained Hepatitis Status- Negative Ag 07/01/19 Machine #- Machine Number: [id number] (07/03/19 1251) Telemetry status-Remote Pre-dialysis wt. -  n/a

## 2019-07-04 LAB
BACTERIA SPEC CULT: NORMAL
GLUCOSE BLD STRIP.AUTO-MCNC: 154 MG/DL (ref 65–100)
GLUCOSE BLD STRIP.AUTO-MCNC: 207 MG/DL (ref 65–100)
GLUCOSE BLD STRIP.AUTO-MCNC: 228 MG/DL (ref 65–100)
GLUCOSE BLD STRIP.AUTO-MCNC: 95 MG/DL (ref 65–100)
SERVICE CMNT-IMP: ABNORMAL
SERVICE CMNT-IMP: NORMAL

## 2019-07-04 PROCEDURE — 74011250636 HC RX REV CODE- 250/636: Performed by: INTERNAL MEDICINE

## 2019-07-04 PROCEDURE — 74011000258 HC RX REV CODE- 258: Performed by: INTERNAL MEDICINE

## 2019-07-04 PROCEDURE — 74011250637 HC RX REV CODE- 250/637: Performed by: FAMILY MEDICINE

## 2019-07-04 PROCEDURE — 74011250637 HC RX REV CODE- 250/637: Performed by: INTERNAL MEDICINE

## 2019-07-04 PROCEDURE — 74011636637 HC RX REV CODE- 636/637: Performed by: FAMILY MEDICINE

## 2019-07-04 PROCEDURE — 65270000029 HC RM PRIVATE

## 2019-07-04 PROCEDURE — 74011636637 HC RX REV CODE- 636/637: Performed by: NURSE PRACTITIONER

## 2019-07-04 PROCEDURE — 74011250636 HC RX REV CODE- 250/636: Performed by: FAMILY MEDICINE

## 2019-07-04 PROCEDURE — 82962 GLUCOSE BLOOD TEST: CPT

## 2019-07-04 RX ORDER — INSULIN GLARGINE 100 [IU]/ML
24 INJECTION, SOLUTION SUBCUTANEOUS DAILY
Status: DISCONTINUED | OUTPATIENT
Start: 2019-07-05 | End: 2019-07-05

## 2019-07-04 RX ADMIN — OXYCODONE AND ACETAMINOPHEN 2 TABLET: 5; 325 TABLET ORAL at 16:22

## 2019-07-04 RX ADMIN — PIPERACILLIN SODIUM,TAZOBACTAM SODIUM 3.38 G: 3; .375 INJECTION, POWDER, FOR SOLUTION INTRAVENOUS at 15:59

## 2019-07-04 RX ADMIN — HEPARIN SODIUM 5000 UNITS: 5000 INJECTION INTRAVENOUS; SUBCUTANEOUS at 11:54

## 2019-07-04 RX ADMIN — INSULIN LISPRO 4 UNITS: 100 INJECTION, SOLUTION INTRAVENOUS; SUBCUTANEOUS at 11:54

## 2019-07-04 RX ADMIN — Medication 10 ML: at 22:24

## 2019-07-04 RX ADMIN — Medication 10 ML: at 06:00

## 2019-07-04 RX ADMIN — LISINOPRIL 5 MG: 5 TABLET ORAL at 08:19

## 2019-07-04 RX ADMIN — ATORVASTATIN CALCIUM 40 MG: 40 TABLET, FILM COATED ORAL at 08:19

## 2019-07-04 RX ADMIN — INSULIN GLARGINE 20 UNITS: 100 INJECTION, SOLUTION SUBCUTANEOUS at 09:12

## 2019-07-04 RX ADMIN — INSULIN LISPRO 4 UNITS: 100 INJECTION, SOLUTION INTRAVENOUS; SUBCUTANEOUS at 06:49

## 2019-07-04 RX ADMIN — OXYCODONE AND ACETAMINOPHEN 2 TABLET: 5; 325 TABLET ORAL at 12:25

## 2019-07-04 RX ADMIN — Medication 10 ML: at 15:59

## 2019-07-04 RX ADMIN — HEPARIN SODIUM 5000 UNITS: 5000 INJECTION INTRAVENOUS; SUBCUTANEOUS at 03:30

## 2019-07-04 RX ADMIN — OXYCODONE AND ACETAMINOPHEN 2 TABLET: 5; 325 TABLET ORAL at 07:10

## 2019-07-04 RX ADMIN — HEPARIN SODIUM 5000 UNITS: 5000 INJECTION INTRAVENOUS; SUBCUTANEOUS at 19:03

## 2019-07-04 RX ADMIN — PIPERACILLIN SODIUM,TAZOBACTAM SODIUM 3.38 G: 3; .375 INJECTION, POWDER, FOR SOLUTION INTRAVENOUS at 03:30

## 2019-07-04 NOTE — PROGRESS NOTES
Foot and Ankle specialist Progress Note Admit Date: 6/28/2019 Hospital day 6 Subjective:  
2 days post op amputation 5th right toe and partial metatarsal.  Patient resting comfortably Patient was admitted thru the ER with a foul smelling wound 5th right toe of unknown duration. Previous well haled amputations 1st and 5th left digits Current Facility-Administered Medications Medication Dose Route Frequency  insulin glargine (LANTUS) injection 20 Units  20 Units SubCUTAneous DAILY  oxyCODONE-acetaminophen (PERCOCET) 5-325 mg per tablet 1-2 Tab  1-2 Tab Oral Q4H PRN  
 atorvastatin (LIPITOR) tablet 40 mg  40 mg Oral DAILY  lisinopril (PRINIVIL, ZESTRIL) tablet 5 mg  5 mg Oral DAILY  sodium chloride (NS) flush 5-40 mL  5-40 mL IntraVENous Q8H  
 sodium chloride (NS) flush 5-40 mL  5-40 mL IntraVENous PRN  
 heparin (porcine) injection 5,000 Units  5,000 Units SubCUTAneous Q8H  Vancomycin Pharmacy Dosing   Other Rx Dosing/Monitoring  acetaminophen (TYLENOL) tablet 650 mg  650 mg Oral Q6H PRN  
 glucose chewable tablet 16 g  4 Tab Oral PRN  
 dextrose (D50W) injection syrg 12.5-25 g  25-50 mL IntraVENous PRN  
 glucagon (GLUCAGEN) injection 1 mg  1 mg IntraMUSCular PRN  
 insulin lispro (HUMALOG) injection   SubCUTAneous AC&HS  piperacillin-tazobactam (ZOSYN) 3.375 g in 0.9% sodium chloride (MBP/ADV) 100 mL  3.375 g IntraVENous Q12H  hydrALAZINE (APRESOLINE) 20 mg/mL injection 10 mg  10 mg IntraVENous Q6H PRN  
 sodium chloride (NS) flush 5-10 mL  5-10 mL IntraVENous PRN Objective:  
 
Patient Vitals for the past 8 hrs: 
 BP Temp Pulse Resp SpO2 Weight 07/04/19 0827 148/87 98.5 °F (36.9 °C) 92 18 92 %   
07/04/19 0613      153.1 kg (337 lb 9.6 oz) 07/04/19 0324 156/90 98.9 °F (37.2 °C) 88 16 96 %  No intake/output data recorded. 07/02 1901 - 07/04 0700 In: -  
Out: 3000 Physical Exam:LOWER LEGS Nonpalpable pedal pulses Diminished epicritic sensation Good muscle strength  
 sutures intact lateral right foot with amputated 5th right toe; no warmth nor redness. MRI:  shows probable osteomyelitis 5th right toe and met head All labs reviewed Data Review Recent Results (from the past 24 hour(s)) GLUCOSE, POC Collection Time: 07/03/19 11:33 AM  
Result Value Ref Range Glucose (POC) 150 (H) 65 - 100 mg/dL Performed by Jarret Miguel, POC Collection Time: 07/03/19  4:34 PM  
Result Value Ref Range Glucose (POC) 115 (H) 65 - 100 mg/dL Performed by [employer] GLUCOSE, POC Collection Time: 07/03/19  9:15 PM  
Result Value Ref Range Glucose (POC) 174 (H) 65 - 100 mg/dL Performed by Mark Cordova GLUCOSE, POC Collection Time: 07/04/19  6:06 AM  
Result Value Ref Range Glucose (POC) 207 (H) 65 - 100 mg/dL Performed by Mark Cordova Assessment:  
 
Principal Problem: 
  Acute osteomyelitis of toe of right foot ([address] (6/29/2019) Active Problems: 
  Uncontrolled hypertension (6/29/2019) Plan:  
Dressing change performed today. Discharge to be planned pending pathology report with Dr. Tu Peñaloza, Infectious Diease, managing at home antibiotics.

## 2019-07-04 NOTE — PROGRESS NOTES
Hospitalist Progress Note Jyoti Montero NP Answering service: [phone] OR [phone] from in house phone Cell: [phone] Date of Service:  2019 NAME:  Opal Howell :  1985 MRN:  [id number] Admission Summary:  
33-yom AA with pmh of DM2, HTN, hyperlipidemia, ESRD on HD, obesity, cataracts, who presented to the ED from home with cc of wound to the right 5th toe. Pt at this time is a very limited historian, providing very short answers to questions.  Majority of history had been obtained from review of ED and electronic medical records.  Per their collective reports, the patient had a wound to his right fifth toe for a few days.  Reportedly had malodorous drainage from the wound.  He reportedly initially complained of pain at 8/10, was severe and constant without specific alleviating factors. Rt foot x-ray reviewed, which showed no signs of acute osseous or articular abnormality with  osteopenia, evidence of peripheral arterial disease and cortical defect at the distal aspect of the proximal phalanx of the first digit, erosive change at the base of the proximal phalanx of the fourth digit associated with soft tissue defect. Noah Osier is concern for osteomyelitis. Interval history / Subjective:  
  Pt resting in bed. No acute complaints. Waiting surigical bx for discharge plan. Assessment & Plan:  
 
Osteomyelitis of right foot : POA  
- involving toes of the right foot. -MRI shows post amputation right fifth ray at the mid fifth metatarsal. There is edema in the proximal right fifth metatarsal may represent Osteomyelitis. 
-On IV antibiotics vanc and zosyn  
-ID on board  
-Pt is sp amputation 5th metatarsal head with bone biopsy and culture pending. Culture with no growth 
  
Uncontrolled type 2 diabetes mellitus and hyperglycemia.   
Placed on Humalog insulin correction coverage schedule, Accu-Chek, a1c 11.3 Increase Lantus to 24 U (on 40 U at home). Sugars  
  
End-stage renal disease, on hemodialysis. -HD Mondays, Wednesdays, and Fridays. 
-Nephrology on board  
-HD tomorrow 
  
Morbid obesity.   
We would advise eventual weight loss when appropriate  
Body mass index is 43.35 kg/m². 
 
 Hypertension, uncontrolled. Cont lisinopril and hydralazine prn. 
  
Hypokalemia Management by nephro  
 
Code status: Full DVT prophylaxis: Heparin Care Plan discussed with: Patient/Family and Nurse Disposition: Home w/Family and TBD Hospital Problems  Date Reviewed: 7/3/2019 Codes Class Noted POA Uncontrolled hypertension ICD-10-CM: I10 
ICD-9-CM: 401.9  6/29/2019 Unknown * (Principal) Acute osteomyelitis of toe of right foot ([facility] [address] ICD-10-CM: M86.171 ICD-9-CM: 730.07  6/29/2019 Unknown Review of Systems: A comprehensive review of systems was negative except for that written in the HPI. Vital Signs:  
 Last 24hrs VS reviewed since prior progress note. Most recent are: 
Visit Vitals /71 Pulse 84 Temp 98.5 °F (36.9 °C) Resp 18 Ht 6' 2\" (1.88 m) Wt 153.1 kg (337 lb 9.6 oz) SpO2 95% BMI 43.35 kg/m² Intake/Output Summary (Last 24 hours) at 7/4/2019 1612 Last data filed at 7/3/2019 1651 Gross per 24 hour Intake  Output 3000 ml Net -3000 ml Physical Examination:  
 
 
     
Constitutional:  No acute distress, cooperative, pleasant   
ENT:  Oral mucous moist, oropharynx benign. Neck supple, Resp:  CTA bilaterally. No wheezing/rhonchi/rales. No accessory muscle use CV:  Regular rhythm, normal rate, no murmurs, gallops, rubs GI:  Soft, non distended, non tender. normoactive bowel sounds, Musculoskeletal:  No edema, warm, 2+ pulses throughout Neurologic:  Moves all extremities. AAOx3, Skin:  right foot dsg c/d/i with ace wrap Data Review:  
 Review and/or order of clinical lab test 
 Review and/or order of tests in the radiology section of CPT Review and/or order of tests in the medicine section of CPT Labs:  
 
Recent Labs  
  07/02/19 
0340 WBC 7.6 HGB 11.1*  
HCT 35.9*  
 Recent Labs  
  07/03/19 
0345 07/02/19 
0340  134* K 3.8 3.6  99 CO2 26 29 BUN 32* 27* CREA 10.80* 8.67* * 254* CA 8.6 8.9 No results for input(s): SGOT, GPT, ALT, AP, TBIL, TBILI, TP, ALB, GLOB, GGT, AML, LPSE in the last 72 hours. No lab exists for component: AMYP, HLPSE No results for input(s): INR, PTP, APTT in the last 72 hours. No lab exists for component: INREXT No results for input(s): FE, TIBC, PSAT, FERR in the last 72 hours. No results found for: FOL, RBCF No results for input(s): PH, PCO2, PO2 in the last 72 hours. No results for input(s): CPK, CKNDX, TROIQ in the last 72 hours. No lab exists for component: CPKMB Lab Results Component Value Date/Time Cholesterol, total 224 (H) 04/03/2018 02:19 PM  
 HDL Cholesterol 30 (L) 04/03/2018 02:19 PM  
 LDL, calculated 175 (H) 04/03/2018 02:19 PM  
 Triglyceride 94 04/03/2018 02:19 PM  
 CHOL/HDL Ratio 5.9 (H) 12/11/2015 03:25 AM  
 
Lab Results Component Value Date/Time Glucose (POC) 95 07/04/2019 04:00 PM  
 Glucose (POC) 228 (H) 07/04/2019 11:39 AM  
 Glucose (POC) 207 (H) 07/04/2019 06:06 AM  
 Glucose (POC) 174 (H) 07/03/2019 09:15 PM  
 Glucose (POC) 115 (H) 07/03/2019 04:34 PM  
 
Lab Results Component Value Date/Time  Color YELLOW/STRAW 05/01/2017 05:39 PM  
 Appearance CLOUDY (A) 05/01/2017 05:39 PM  
 Specific gravity 1.018 05/01/2017 05:39 PM  
 Specific gravity >1.030 (H) 10/03/2014 04:35 AM  
 pH (UA) 5.5 05/01/2017 05:39 PM  
 Protein 300 (A) 05/01/2017 05:39 PM  
 Glucose 250 (A) 05/01/2017 05:39 PM  
 Ketone NEGATIVE  05/01/2017 05:39 PM  
 Bilirubin NEGATIVE  05/01/2017 05:39 PM  
 Urobilinogen 0.2 05/01/2017 05:39 PM  
 Nitrites NEGATIVE  05/01/2017 05:39 PM  
 Leukocyte Esterase NEGATIVE  05/01/2017 05:39 PM  
 Epithelial cells FEW 05/01/2017 05:39 PM  
 Bacteria NEGATIVE  05/01/2017 05:39 PM  
 WBC 0-4 05/01/2017 05:39 PM  
 RBC 5-10 05/01/2017 05:39 PM  
 
 
 
Medications Reviewed:  
 
Current Facility-Administered Medications Medication Dose Route Frequency  [START ON 7/5/2019] Vancomycin, Trough - Please draw IMMEDIATELY PRIOR to Hemodialysis on Friday, 7/05. Thanks! Other ONCE  
 insulin glargine (LANTUS) injection 20 Units  20 Units SubCUTAneous DAILY  oxyCODONE-acetaminophen (PERCOCET) 5-325 mg per tablet 1-2 Tab  1-2 Tab Oral Q4H PRN  
 atorvastatin (LIPITOR) tablet 40 mg  40 mg Oral DAILY  lisinopril (PRINIVIL, ZESTRIL) tablet 5 mg  5 mg Oral DAILY  sodium chloride (NS) flush 5-40 mL  5-40 mL IntraVENous Q8H  
 sodium chloride (NS) flush 5-40 mL  5-40 mL IntraVENous PRN  
 heparin (porcine) injection 5,000 Units  5,000 Units SubCUTAneous Q8H  Vancomycin Pharmacy Dosing   Other Rx Dosing/Monitoring  acetaminophen (TYLENOL) tablet 650 mg  650 mg Oral Q6H PRN  
 glucose chewable tablet 16 g  4 Tab Oral PRN  
 dextrose (D50W) injection syrg 12.5-25 g  25-50 mL IntraVENous PRN  
 glucagon (GLUCAGEN) injection 1 mg  1 mg IntraMUSCular PRN  
 insulin lispro (HUMALOG) injection   SubCUTAneous AC&HS  piperacillin-tazobactam (ZOSYN) 3.375 g in 0.9% sodium chloride (MBP/ADV) 100 mL  3.375 g IntraVENous Q12H  hydrALAZINE (APRESOLINE) 20 mg/mL injection 10 mg  10 mg IntraVENous Q6H PRN  
 sodium chloride (NS) flush 5-10 mL  5-10 mL IntraVENous PRN  
 
______________________________________________________________________ EXPECTED LENGTH OF STAY: 6d 19h ACTUAL LENGTH OF STAY:          [address] [facility],

## 2019-07-04 NOTE — PROGRESS NOTES
NAME: Oscar Means :  1985 MRN:  [id number] Assessment :    Plan: 
--ESRD-[place] MWF Osteo HTN Anemia 
 -No acute need for HD today. Plan HD in AM. Holding FARIDA Subjective: Chief Complaint:  \" I'm waiting on one more test.. \"  No pain. No N/V. No dyspnea. Review of Systems: 
 
Symptom Y/N Comments  Symptom Y/N Comments Fever/Chills    Chest Pain Poor Appetite    Edema Cough    Abdominal Pain Sputum    Joint Pain SOB/LITTLE    Pruritis/Rash Nausea/vomit    Tolerating PT/OT Diarrhea    Tolerating Diet Constipation    Other Could not obtain due to:   
 
Objective: VITALS:  
Last 24hrs VS reviewed since prior progress note. Most recent are: 
Visit Vitals /87 Pulse 92 Temp 98.5 °F (36.9 °C) Resp 18 Ht 6' 2\" (1.88 m) Wt 153.1 kg (337 lb 9.6 oz) SpO2 92% BMI 43.35 kg/m² Intake/Output Summary (Last 24 hours) at 2019 1125 Last data filed at 7/3/2019 1651 Gross per 24 hour Intake  Output 3000 ml Net -3000 ml Telemetry Reviewed: PHYSICAL EXAM: 
General: NAd No edema Lab Data Reviewed: (see below) Medications Reviewed: (see below) PMH/SH reviewed - no change compared to H&P 
________________________________________________________________________ Care Plan discussed with: 
Patient Family RN Care Manager Consultant:     
 
  Comments >50% of visit spent in counseling and coordination of care    
 
________________________________________________________________________ Raymundo Carty MD  
 
Procedures: see electronic medical records for all procedures/Xrays and details which 
were not copied into this note but were reviewed prior to creation of Plan. LABS: 
Recent Labs  
  19 
0340 WBC 7.6 HGB 11.1*  
HCT 35.9*  
 Recent Labs  
  07/03/19 
0345 07/02/19 
0340  134* K 3.8 3.6  99 CO2 26 29 BUN 32* 27* CREA 10.80* 8.67* * 254* CA 8.6 8.9 No results for input(s): SGOT, GPT, AP, TBIL, TP, ALB, GLOB, GGT, AML, LPSE in the last 72 hours. No lab exists for component: AMYP, HLPSE No results for input(s): INR, PTP, APTT in the last 72 hours. No lab exists for component: INREXT, INREXT No results for input(s): FE, TIBC, PSAT, FERR in the last 72 hours. No results found for: FOL, RBCF No results for input(s): PH, PCO2, PO2 in the last 72 hours. No results for input(s): CPK, CKMB in the last 72 hours. No lab exists for component: TROPONINI No components found for: [place] Lab Results Component Value Date/Time Color YELLOW/STRAW 05/01/2017 05:39 PM  
 Appearance CLOUDY (A) 05/01/2017 05:39 PM  
 Specific gravity 1.018 05/01/2017 05:39 PM  
 Specific gravity >1.030 (H) 10/03/2014 04:35 AM  
 pH (UA) 5.5 05/01/2017 05:39 PM  
 Protein 300 (A) 05/01/2017 05:39 PM  
 Glucose 250 (A) 05/01/2017 05:39 PM  
 Ketone NEGATIVE  05/01/2017 05:39 PM  
 Bilirubin NEGATIVE  05/01/2017 05:39 PM  
 Urobilinogen 0.2 05/01/2017 05:39 PM  
 Nitrites NEGATIVE  05/01/2017 05:39 PM  
 Leukocyte Esterase NEGATIVE  05/01/2017 05:39 PM  
 Epithelial cells FEW 05/01/2017 05:39 PM  
 Bacteria NEGATIVE  05/01/2017 05:39 PM  
 WBC 0-4 05/01/2017 05:39 PM  
 RBC 5-10 05/01/2017 05:39 PM  
 
 
MEDICATIONS: 
Current Facility-Administered Medications Medication Dose Route Frequency  [START ON 7/5/2019] Vancomycin, Trough - Please draw IMMEDIATELY PRIOR to Hemodialysis on Friday, 7/05. Thanks! Other ONCE  
 insulin glargine (LANTUS) injection 20 Units  20 Units SubCUTAneous DAILY  oxyCODONE-acetaminophen (PERCOCET) 5-325 mg per tablet 1-2 Tab  1-2 Tab Oral Q4H PRN  
 atorvastatin (LIPITOR) tablet 40 mg  40 mg Oral DAILY  lisinopril (PRINIVIL, ZESTRIL) tablet 5 mg  5 mg Oral DAILY  sodium chloride (NS) flush 5-40 mL  5-40 mL IntraVENous Q8H  
 sodium chloride (NS) flush 5-40 mL  5-40 mL IntraVENous PRN  
 heparin (porcine) injection 5,000 Units  5,000 Units SubCUTAneous Q8H  Vancomycin Pharmacy Dosing   Other Rx Dosing/Monitoring  acetaminophen (TYLENOL) tablet 650 mg  650 mg Oral Q6H PRN  
 glucose chewable tablet 16 g  4 Tab Oral PRN  
 dextrose (D50W) injection syrg 12.5-25 g  25-50 mL IntraVENous PRN  
 glucagon (GLUCAGEN) injection 1 mg  1 mg IntraMUSCular PRN  
 insulin lispro (HUMALOG) injection   SubCUTAneous AC&HS  piperacillin-tazobactam (ZOSYN) 3.375 g in 0.9% sodium chloride (MBP/ADV) 100 mL  3.375 g IntraVENous Q12H  hydrALAZINE (APRESOLINE) 20 mg/mL injection 10 mg  10 mg IntraVENous Q6H PRN  
 sodium chloride (NS) flush 5-10 mL  5-10 mL IntraVENous PRN

## 2019-07-04 NOTE — PROGRESS NOTES
ID Progress Note 2019 Subjective: No current complaints Objective: Antibiotics: 1. Vancomycin 2. Zosyn Vitals:  
Visit Vitals /87 Pulse 92 Temp 98.5 °F (36.9 °C) Resp 18 Ht 6' 2\" (1.88 m) Wt 153.1 kg (337 lb 9.6 oz) SpO2 92% BMI 43.35 kg/m² Tmax:  Temp (24hrs), Av.2 °F (36.8 °C), Min:97.6 °F (36.4 °C), Max:98.9 °F (37.2 °C) Exam:  Foot is dressed Labs:     
Recent Labs  
  19 
0345 19 
0340 WBC  --  7.6 HGB  --  11.1*  
PLT  --  230 BUN 32* 27* CREA 10.80* 8.67* Cultures: No results found for: [facility] Lab Results Component Value Date/Time Culture result: NO GROWTH AFTER 15 HOURS 2019 06:27 PM  
 Culture result: (A) 2019 06:27 PM  
  CHECKING FOR POSSIBLE GRAM NEGATIVE RODS SEEN ON GRAM STAIN OF THE THIO BROTH Culture result: MRSA NOT PRESENT 2019 04:08 AM  
 Culture result:  2019 04:08 AM  
      Screening of patient nares for MRSA is for surveillance purposes and, if positive, to facilitate isolation considerations in high risk settings. It is not intended for automatic decolonization interventions per se as regimens are not sufficiently effective to warrant routine use. Radiology:  
 
Line/Insert Date:        
 
Assessment: 1. Diabetic foot wound with underlying osteo 2. Neuropathy 3. ESRD Objective: 1. Continue current therapy 2. Follow up cultures 3. Pathology will determine the course of therapy Keith Swann MD

## 2019-07-05 ENCOUNTER — HOME HEALTH ADMISSION (OUTPATIENT)
Dept: HOME HEALTH SERVICES | Facility: HOME HEALTH | Age: 34
End: 2019-07-05
Payer: MEDICARE

## 2019-07-05 LAB
GLUCOSE BLD STRIP.AUTO-MCNC: 107 MG/DL (ref 65–100)
GLUCOSE BLD STRIP.AUTO-MCNC: 123 MG/DL (ref 65–100)
GLUCOSE BLD STRIP.AUTO-MCNC: 156 MG/DL (ref 65–100)
GLUCOSE BLD STRIP.AUTO-MCNC: 167 MG/DL (ref 65–100)
SERVICE CMNT-IMP: ABNORMAL
VANCOMYCIN TROUGH SERPL-MCNC: 25.7 UG/ML (ref 5–10)

## 2019-07-05 PROCEDURE — 90935 HEMODIALYSIS ONE EVALUATION: CPT

## 2019-07-05 PROCEDURE — 74011250636 HC RX REV CODE- 250/636: Performed by: INTERNAL MEDICINE

## 2019-07-05 PROCEDURE — 82962 GLUCOSE BLOOD TEST: CPT

## 2019-07-05 PROCEDURE — 36415 COLL VENOUS BLD VENIPUNCTURE: CPT

## 2019-07-05 PROCEDURE — 74011636637 HC RX REV CODE- 636/637: Performed by: NURSE PRACTITIONER

## 2019-07-05 PROCEDURE — 74011000258 HC RX REV CODE- 258: Performed by: INTERNAL MEDICINE

## 2019-07-05 PROCEDURE — 74011250636 HC RX REV CODE- 250/636: Performed by: FAMILY MEDICINE

## 2019-07-05 PROCEDURE — 74011250637 HC RX REV CODE- 250/637: Performed by: FAMILY MEDICINE

## 2019-07-05 PROCEDURE — 74011636637 HC RX REV CODE- 636/637: Performed by: FAMILY MEDICINE

## 2019-07-05 PROCEDURE — 65270000029 HC RM PRIVATE

## 2019-07-05 PROCEDURE — 74011250637 HC RX REV CODE- 250/637: Performed by: INTERNAL MEDICINE

## 2019-07-05 PROCEDURE — 80202 ASSAY OF VANCOMYCIN: CPT

## 2019-07-05 RX ORDER — INSULIN GLARGINE 100 [IU]/ML
28 INJECTION, SOLUTION SUBCUTANEOUS DAILY
Status: DISCONTINUED | OUTPATIENT
Start: 2019-07-06 | End: 2019-07-09 | Stop reason: HOSPADM

## 2019-07-05 RX ADMIN — Medication 10 ML: at 19:05

## 2019-07-05 RX ADMIN — OXYCODONE AND ACETAMINOPHEN 1 TABLET: 5; 325 TABLET ORAL at 21:28

## 2019-07-05 RX ADMIN — Medication 10 ML: at 21:14

## 2019-07-05 RX ADMIN — VANCOMYCIN HYDROCHLORIDE 750 MG: 750 INJECTION, POWDER, LYOPHILIZED, FOR SOLUTION INTRAVENOUS at 20:08

## 2019-07-05 RX ADMIN — INSULIN LISPRO 3 UNITS: 100 INJECTION, SOLUTION INTRAVENOUS; SUBCUTANEOUS at 11:48

## 2019-07-05 RX ADMIN — PIPERACILLIN SODIUM,TAZOBACTAM SODIUM 3.38 G: 3; .375 INJECTION, POWDER, FOR SOLUTION INTRAVENOUS at 19:05

## 2019-07-05 RX ADMIN — HEPARIN SODIUM 5000 UNITS: 5000 INJECTION INTRAVENOUS; SUBCUTANEOUS at 19:05

## 2019-07-05 RX ADMIN — OXYCODONE AND ACETAMINOPHEN 1 TABLET: 5; 325 TABLET ORAL at 10:12

## 2019-07-05 RX ADMIN — INSULIN GLARGINE 24 UNITS: 100 INJECTION, SOLUTION SUBCUTANEOUS at 10:13

## 2019-07-05 RX ADMIN — HEPARIN SODIUM 5000 UNITS: 5000 INJECTION INTRAVENOUS; SUBCUTANEOUS at 11:49

## 2019-07-05 RX ADMIN — PIPERACILLIN SODIUM,TAZOBACTAM SODIUM 3.38 G: 3; .375 INJECTION, POWDER, FOR SOLUTION INTRAVENOUS at 02:49

## 2019-07-05 RX ADMIN — HEPARIN SODIUM 5000 UNITS: 5000 INJECTION INTRAVENOUS; SUBCUTANEOUS at 05:00

## 2019-07-05 RX ADMIN — INSULIN LISPRO 3 UNITS: 100 INJECTION, SOLUTION INTRAVENOUS; SUBCUTANEOUS at 08:02

## 2019-07-05 NOTE — PROGRESS NOTES
Hospitalist Progress Note Nikole Cotton NP Answering service: [phone] OR [phone] from in house phone Cell: [phone] Date of Service:  2019 NAME:  Derrell Yang :  1985 MRN:  [id number] Admission Summary:  
33-yom AA with pmh of DM2, HTN, hyperlipidemia, ESRD on HD, obesity, cataracts, who presented to the ED from home with cc of wound to the right 5th toe. Pt at this time is a very limited historian, providing very short answers to questions.  Majority of history had been obtained from review of ED and electronic medical records.  Per their collective reports, the patient had a wound to his right fifth toe for a few days.  Reportedly had malodorous drainage from the wound.  He reportedly initially complained of pain at 8/10, was severe and constant without specific alleviating factors. Rt foot x-ray reviewed, which showed no signs of acute osseous or articular abnormality with  osteopenia, evidence of peripheral arterial disease and cortical defect at the distal aspect of the proximal phalanx of the first digit, erosive change at the base of the proximal phalanx of the fourth digit associated with soft tissue defect. concepcion Tank is concern for osteomyelitis. Interval history / Subjective:  
  Pt resting in bed. Notes last BM was today but was loose. No abdominal pain, chest pain or shortness of breath. Discussed adding probiotic. Plan for HD today. Waiting bone bx results for discharge plan. Assessment & Plan:  
 
Osteomyelitis of right foot : POA  
- involving toes of the right foot. -MRI shows post amputation right fifth ray at the mid fifth metatarsal. There is edema in the proximal right fifth metatarsal may represent Osteomyelitis. 
-On IV antibiotics vanc and zosyn  
-ID on board  
-Pt is sp amputation 5th metatarsal head with bone biopsy pending. -Culture growing ENTEROBACTER CLOACAE ISOLATED FROM THIO BROTH ONLY  CARBAPENEM SENSITIVITIES TO FOLLOW  
-add probiotic today for loose stools 
  
Uncontrolled type 2 diabetes mellitus and hyperglycemia.   
Placed on Humalog insulin correction coverage schedule, Accu-Chek, a1c 11.3 Increase Lantus to 28 U (on 40 U at home). Sugars 156-167 
  
End-stage renal disease, on hemodialysis. -HD Mondays, Wednesdays, and Fridays. 
-Nephrology on board  
-HD today 
  
Morbid obesity.   
We would advise eventual weight loss when appropriate  
Body mass index is 43.37 kg/m². 
 
 Hypertension, uncontrolled. Cont lisinopril and hydralazine prn. 
  
Hypokalemia Management by nephro  
 
Code status: Full DVT prophylaxis: Heparin Care Plan discussed with: Patient/Family and Nurse Disposition: Home w/Family and TBD Hospital Problems  Date Reviewed: 7/3/2019 Codes Class Noted POA Uncontrolled hypertension ICD-10-CM: I10 
ICD-9-CM: 401.9  6/29/2019 Unknown * (Principal) Acute osteomyelitis of toe of right foot ([facility] [address] ICD-10-CM: M86.171 ICD-9-CM: 730.07  6/29/2019 Unknown Review of Systems: A comprehensive review of systems was negative except for that written in the HPI. Vital Signs:  
 Last 24hrs VS reviewed since prior progress note. Most recent are: 
Visit Vitals /81 (BP 1 Location: Right arm, BP Patient Position: At rest) Pulse 90 Temp 98.4 °F (36.9 °C) Resp 16 Ht 6' 2\" (1.88 m) Wt 153.2 kg (337 lb 12.8 oz) SpO2 93% BMI 43.37 kg/m² No intake or output data in the 24 hours ending 07/05/19 1147 Physical Examination:  
 
 
     
Constitutional:  No acute distress, cooperative, pleasant   
ENT:  Oral mucous moist, oropharynx benign. Neck supple, Resp:  CTA bilaterally. No wheezing/rhonchi/rales. No accessory muscle use CV:  Regular rhythm, normal rate, no murmurs, gallops, rubs. RUE AV fistula GI:  Soft, non distended, non tender. normoactive bowel sounds, Musculoskeletal:  No edema, warm, 2+ pulses throughout Neurologic:  Moves all extremities. AAOx3, Skin:  right foot dsg c/d/i with ace wrap Data Review:  
 Review and/or order of clinical lab test 
Review and/or order of tests in the medicine section of [facility] Labs:  
 
No results for input(s): WBC, HGB, HCT, PLT, HGBEXT, HCTEXT, PLTEXT, HGBEXT, HCTEXT, PLTEXT in the last 72 hours. Recent Labs  
  07/03/19 
0345   
K 3.8  CO2 26 BUN 32* CREA 10.80* * CA 8.6 No results for input(s): SGOT, GPT, ALT, AP, TBIL, TBILI, TP, ALB, GLOB, GGT, AML, LPSE in the last 72 hours. No lab exists for component: AMYP, HLPSE No results for input(s): INR, PTP, APTT in the last 72 hours. No lab exists for component: INREXT, INREXT No results for input(s): FE, TIBC, PSAT, FERR in the last 72 hours. No results found for: FOL, RBCF No results for input(s): PH, PCO2, PO2 in the last 72 hours. No results for input(s): CPK, CKNDX, TROIQ in the last 72 hours. No lab exists for component: CPKMB Lab Results Component Value Date/Time Cholesterol, total 224 (H) 04/03/2018 02:19 PM  
 HDL Cholesterol 30 (L) 04/03/2018 02:19 PM  
 LDL, calculated 175 (H) 04/03/2018 02:19 PM  
 Triglyceride 94 04/03/2018 02:19 PM  
 CHOL/HDL Ratio 5.9 (H) 12/11/2015 03:25 AM  
 
Lab Results Component Value Date/Time Glucose (POC) 167 (H) 07/05/2019 11:20 AM  
 Glucose (POC) 156 (H) 07/05/2019 06:48 AM  
 Glucose (POC) 154 (H) 07/04/2019 09:17 PM  
 Glucose (POC) 95 07/04/2019 04:00 PM  
 Glucose (POC) 228 (H) 07/04/2019 11:39 AM  
 
Lab Results Component Value Date/Time  Color YELLOW/STRAW 05/01/2017 05:39 PM  
 Appearance CLOUDY (A) 05/01/2017 05:39 PM  
 Specific gravity 1.018 05/01/2017 05:39 PM  
 Specific gravity >1.030 (H) 10/03/2014 04:35 AM  
 pH (UA) 5.5 05/01/2017 05:39 PM  
 Protein 300 (A) 05/01/2017 05:39 PM  
 Glucose 250 (A) 05/01/2017 05:39 PM  
 Ketone NEGATIVE  05/01/2017 05:39 PM  
 Bilirubin NEGATIVE  05/01/2017 05:39 PM  
 Urobilinogen 0.2 05/01/2017 05:39 PM  
 Nitrites NEGATIVE  05/01/2017 05:39 PM  
 Leukocyte Esterase NEGATIVE  05/01/2017 05:39 PM  
 Epithelial cells FEW 05/01/2017 05:39 PM  
 Bacteria NEGATIVE  05/01/2017 05:39 PM  
 WBC 0-4 05/01/2017 05:39 PM  
 RBC 5-10 05/01/2017 05:39 PM  
 
 
 
Medications Reviewed:  
 
Current Facility-Administered Medications Medication Dose Route Frequency  [START ON 7/6/2019] lactobac ac& pc-s.therm-b.anim (USMAN Q/RISAQUAD)  1 Cap Oral DAILY  insulin glargine (LANTUS) injection 24 Units  24 Units SubCUTAneous DAILY  oxyCODONE-acetaminophen (PERCOCET) 5-325 mg per tablet 1-2 Tab  1-2 Tab Oral Q4H PRN  
 atorvastatin (LIPITOR) tablet 40 mg  40 mg Oral DAILY  lisinopril (PRINIVIL, ZESTRIL) tablet 5 mg  5 mg Oral DAILY  sodium chloride (NS) flush 5-40 mL  5-40 mL IntraVENous Q8H  
 sodium chloride (NS) flush 5-40 mL  5-40 mL IntraVENous PRN  
 heparin (porcine) injection 5,000 Units  5,000 Units SubCUTAneous Q8H  Vancomycin Pharmacy Dosing   Other Rx Dosing/Monitoring  acetaminophen (TYLENOL) tablet 650 mg  650 mg Oral Q6H PRN  
 glucose chewable tablet 16 g  4 Tab Oral PRN  
 dextrose (D50W) injection syrg 12.5-25 g  25-50 mL IntraVENous PRN  
 glucagon (GLUCAGEN) injection 1 mg  1 mg IntraMUSCular PRN  
 insulin lispro (HUMALOG) injection   SubCUTAneous AC&HS  piperacillin-tazobactam (ZOSYN) 3.375 g in 0.9% sodium chloride (MBP/ADV) 100 mL  3.375 g IntraVENous Q12H  hydrALAZINE (APRESOLINE) 20 mg/mL injection 10 mg  10 mg IntraVENous Q6H PRN  
 sodium chloride (NS) flush 5-10 mL  5-10 mL IntraVENous PRN  
 
______________________________________________________________________ EXPECTED LENGTH OF STAY: 6d 19h ACTUAL LENGTH OF STAY:          [place], NP

## 2019-07-05 NOTE — PROGRESS NOTES
NAME: Kelsey Garcia :  1985 MRN:  [id number] Assessment :    Plan: 
--ESRD-[place] MWF Osteo HTN Anemia 
 -Hd today. Holding FARIDA Subjective: Chief Complaint:  \" I'm fine. \"  No pain. No N/V. No dyspnea. Review of Systems: 
 
Symptom Y/N Comments  Symptom Y/N Comments Fever/Chills    Chest Pain Poor Appetite    Edema Cough    Abdominal Pain Sputum    Joint Pain SOB/LITTLE    Pruritis/Rash Nausea/vomit    Tolerating PT/OT Diarrhea    Tolerating Diet Constipation    Other Could not obtain due to:   
 
Objective: VITALS:  
Last 24hrs VS reviewed since prior progress note. Most recent are: 
Visit Vitals /81 (BP 1 Location: Right arm, BP Patient Position: At rest) Pulse 90 Temp 98.4 °F (36.9 °C) Resp 16 Ht 6' 2\" (1.88 m) Wt 153.2 kg (337 lb 12.8 oz) SpO2 93% BMI 43.37 kg/m² No intake or output data in the 24 hours ending 19 1141 Telemetry Reviewed: PHYSICAL EXAM: 
General: NAd No edema Lab Data Reviewed: (see below) Medications Reviewed: (see below) PMH/SH reviewed - no change compared to H&P 
________________________________________________________________________ Care Plan discussed with: 
Patient Family RN Care Manager Consultant:     
 
  Comments >50% of visit spent in counseling and coordination of care    
 
________________________________________________________________________ Jose Le MD  
 
Procedures: see electronic medical records for all procedures/Xrays and details which 
were not copied into this note but were reviewed prior to creation of Plan. LABS: 
No results for input(s): WBC, HGB, HCT, PLT, HGBEXT, HCTEXT, PLTEXT, HGBEXT, HCTEXT, PLTEXT in the last 72 hours. Recent Labs  
  19 
0345   
K 3.8  CO2 26  
 BUN 32* CREA 10.80* * CA 8.6 No results for input(s): SGOT, GPT, AP, TBIL, TP, ALB, GLOB, GGT, AML, LPSE in the last 72 hours. No lab exists for component: AMYP, HLPSE No results for input(s): INR, PTP, APTT in the last 72 hours. No lab exists for component: INREXT, INREXT No results for input(s): FE, TIBC, PSAT, FERR in the last 72 hours. No results found for: FOL, RBCF No results for input(s): PH, PCO2, PO2 in the last 72 hours. No results for input(s): CPK, CKMB in the last 72 hours. No lab exists for component: TROPONINI No components found for: [place] Lab Results Component Value Date/Time Color YELLOW/STRAW 05/01/2017 05:39 PM  
 Appearance CLOUDY (A) 05/01/2017 05:39 PM  
 Specific gravity 1.018 05/01/2017 05:39 PM  
 Specific gravity >1.030 (H) 10/03/2014 04:35 AM  
 pH (UA) 5.5 05/01/2017 05:39 PM  
 Protein 300 (A) 05/01/2017 05:39 PM  
 Glucose 250 (A) 05/01/2017 05:39 PM  
 Ketone NEGATIVE  05/01/2017 05:39 PM  
 Bilirubin NEGATIVE  05/01/2017 05:39 PM  
 Urobilinogen 0.2 05/01/2017 05:39 PM  
 Nitrites NEGATIVE  05/01/2017 05:39 PM  
 Leukocyte Esterase NEGATIVE  05/01/2017 05:39 PM  
 Epithelial cells FEW 05/01/2017 05:39 PM  
 Bacteria NEGATIVE  05/01/2017 05:39 PM  
 WBC 0-4 05/01/2017 05:39 PM  
 RBC 5-10 05/01/2017 05:39 PM  
 
 
MEDICATIONS: 
Current Facility-Administered Medications Medication Dose Route Frequency  insulin glargine (LANTUS) injection 24 Units  24 Units SubCUTAneous DAILY  oxyCODONE-acetaminophen (PERCOCET) 5-325 mg per tablet 1-2 Tab  1-2 Tab Oral Q4H PRN  
 atorvastatin (LIPITOR) tablet 40 mg  40 mg Oral DAILY  lisinopril (PRINIVIL, ZESTRIL) tablet 5 mg  5 mg Oral DAILY  sodium chloride (NS) flush 5-40 mL  5-40 mL IntraVENous Q8H  
 sodium chloride (NS) flush 5-40 mL  5-40 mL IntraVENous PRN  
 heparin (porcine) injection 5,000 Units  5,000 Units SubCUTAneous Q8H  Vancomycin Pharmacy Dosing   Other Rx Dosing/Monitoring  acetaminophen (TYLENOL) tablet 650 mg  650 mg Oral Q6H PRN  
 glucose chewable tablet 16 g  4 Tab Oral PRN  
 dextrose (D50W) injection syrg 12.5-25 g  25-50 mL IntraVENous PRN  
 glucagon (GLUCAGEN) injection 1 mg  1 mg IntraMUSCular PRN  
 insulin lispro (HUMALOG) injection   SubCUTAneous AC&HS  piperacillin-tazobactam (ZOSYN) 3.375 g in 0.9% sodium chloride (MBP/ADV) 100 mL  3.375 g IntraVENous Q12H  hydrALAZINE (APRESOLINE) 20 mg/mL injection 10 mg  10 mg IntraVENous Q6H PRN  
 sodium chloride (NS) flush 5-10 mL  5-10 mL IntraVENous PRN

## 2019-07-05 NOTE — PROGRESS NOTES
Day #7 of Vancomycin Indication:  diabetic foot infection of the 5th right toe w/osteo 
-Previous amputation sites of 1st and 5th left digits are well healed - 7/3 going for Amputation of fifth right toe and partial fifth metatarsal 
 
Current regimen:  1000 mg IV post-HD Abx regimen:  vancomycin, zosyn ID Following ?: YES Inpatient dialysis schedule: Monday/Wednesday/Friday Recent Labs  
  19 
0345 CREA 10.80* BUN 32* Est CrCl: ESRD on HD MWF Temp (24hrs), Av.5 °F (36.9 °C), Min:98.3 °F (36.8 °C), Max:98.6 °F (37 °C) Cultures:  
 blood x2 - NGTD - prelim  nares - MRSA not present - Final 
 R foot wound - Possible GNR - prelim Goal trough = 20 - 25 mcg/mL for therapeutic goal of 15 - 20 mcg/mL (assuming ~35% removal by dialysis) Date                Dialysis (Yes/No)       Pre-HD Level              Dose   No   --   2000mg (LD) 
  No   --   -- 
  Y ([id number])  --   1000 mg (2216) 
  N   --   -- 
  Y                        --   1000 mg (1817)   N   --   --- 
  Y   25.7   Pending HD Reminder staff message entered (if needed): No 
 
Plan: Change to 750 mg IV post-HD

## 2019-07-05 NOTE — PROGRESS NOTES
ID Progress Note 2019 Subjective: No current complaints Objective: Antibiotics: 1. Vancomycin 2. Zosyn Vitals:  
Visit Vitals /62 Pulse 84 Temp 98.1 °F (36.7 °C) Resp 16 Ht 6' 2\" (1.88 m) Wt 153.2 kg (337 lb 12.8 oz) SpO2 93% BMI 43.37 kg/m² Tmax:  Temp (24hrs), Av.4 °F (36.9 °C), Min:98.1 °F (36.7 °C), Max:98.6 °F (37 °C) Exam:  Foot is dressed Labs:     
Recent Labs  
  19 
0345 BUN 32* CREA 10.80* Cultures: No results found for: SDES Lab Results Component Value Date/Time Culture result: NO ANAEROBES ISOLATED 2019 06:27 PM  
 Culture result: NO GROWTH ON SOLID MEDIA 2 DAYS 2019 06:27 PM  
 Culture result: (A) 2019 06:27 PM  
  ENTEROBACTER CLOACAE ISOLATED FROM THIO BROTH ONLY  CARBAPENEM SENSITIVITIES TO FOLLOW Radiology:  
 
Line/Insert Date:        
 
Assessment: 1. Diabetic foot wound with underlying osteo--enterobacter from cultures so far 2. Neuropathy 3. ESRD Objective: 1. Continue current therapy 2. Pathology will determine the course of therapy 3. Group will see over the weekend if asked Maury Monge MD

## 2019-07-05 NOTE — PROCEDURES
[facility] Team [place]  [phone] Vitals   Pre   Post   Assessment   Pre   Post    
Temp  Temp: 98.1 °F (36.7 °C) (07/05/19 1329)  97.6 LOC  AOX3 Same HR   Pulse (Heart Rate): 84 (07/05/19 1329) 92 Lungs   Clear to diminished Same B/P   BP: 182/62 (07/05/19 1329) 142/87 Cardiac   Regular Same Resp   Resp Rate: 16 (07/05/19 1329) 14 Skin   Amputated Right 5th toe Same Pain level  Pain Intensity 1: 3 (07/05/19 0928) 0 Edema   
generalized Same Orders: Duration:   Start:    1329 End:    1731 Total:   4 hrs Dialyzer:   Dialyzer/Set Up Inspection: Omer Santana (07/05/19 1329) K Bath:   Dialysate K (mEq/L): 3 (07/05/19 1329) Ca Bath:   Dialysate CA (mEq/L): 2.5 (07/05/19 1329) Na/Bicarb:   Dialysate NA (mEq/L): 140 (07/03/19 1251) Target Fluid Removal:   Goal/Amount of Fluid to Remove (mL): 3000 mL (07/05/19 1329) Access Type & Location:   PELON AVF assessed and prepped with alcohol. Cannulated with a 15 g needle with no issues. No S/S of infection. Aspirated and flushed with no issues. Labs Obtained/Reviewed Critical Results Called   Date when labs were drawn- 
Hgb-   
HGB Date Value Ref Range Status 07/02/2019 11.1 (L) 12.1 - 17.0 g/dL Final  
 
K-   
Potassium Date Value Ref Range Status 07/03/2019 3.8 3.5 - 5.1 mmol/L Final  
 
Ca-  
Calcium Date Value Ref Range Status 07/03/2019 8.6 8.5 - 10.1 MG/DL Final  
 
Bun-  
BUN Date Value Ref Range Status 07/03/2019 32 (H) 6 - 20 MG/DL Final  
 
Creat-  
Creatinine Date Value Ref Range Status 07/03/2019 10.80 (H) 0.70 - 1.30 MG/DL Final  
 
  
Medications/ Blood Products Given Name   Dose   Route and Time None Blood Volume Processed (BVP):    86.8L Net Fluid Removed:  3000 ml Comments Time Out Done: 1329 Primary Nurse Rpt Pre: Braydon Grossman RN 
Primary Nurse Rpt PostJacandy Hannah RN 
Pt Education: Access care Care Plan: Continuous Tx Summary: 1200 Report received from primary nurse. 1300 Pt arrived suite. Assessment completed. PELNO AVF assessed and prepped with alcohol. Cannulated with a 15 g needle with no issues. No S/S of infection. Aspirated and flushed with no issues. 1315 Vascular access visible. Line connections intact. Pt resting. 1330 Vascular access visible. Line connections intact. Pt resting. 1345 Vascular access visible. Line connections intact. Pt resting. 1400 Vascular access visible. Line connections intact. Pt resting. 1415 Vascular access visible. Line connections intact. Pt resting. 1430 Vascular access visible. Line connections intact. Pt resting. 1445 Vascular access visible. Line connections intact. Pt resting. 1500 Vascular access visible. Line connections intact. Pt resting. 1515 Vascular access visible. Line connections intact. Pt resting. 1530 Vascular access visible. Line connections intact. Pt resting. 1545 Vascular access visible. Line connections intact. Pt resting. 1600 Vascular access visible. Line connections intact. Pt resting. 1615 Vascular access visible. Line connections intact. Pt resting. 1630 Vascular access visible. Line connections intact. Pt resting. 1645 Vascular access visible. Line connections intact. Pt resting. 1700 Vascular access visible. Line connections intact. Pt resting. 1715 Vascular access visible. Line connections intact. Pt resting. 1730 Vascular access visible. Line connections intact. Pt resting. 1745 Tx ended with all possible blood returned from circuit. Hemostasis achieved. Site secured with gauze and tape. Report given to primary nurse and pt transported to room Admiting Diagnosis: Osteomyelitis of 5th toe Pt's previous clinic-  
Consent signed - Informed Consent Verified: Yes (07/05/19 1329) [place] Consent - Yes on file Hepatitis Status- Ag neg 7/1/19 Ab Susceptible 7/1/19 Machine #- Machine Number: [id number] (07/05/19 1329) Telemetry status- No tele leads Pre-dialysis wt. - Pre-Dialysis Weight: 153.2 kg (337 lb 11.9 oz) (07/05/19 1329)

## 2019-07-05 NOTE — PROGRESS NOTES
CM noted consult for home health for wound and dressing care. CM met with the patient to discuss home health option. Per [facility] patient. Patient is agreeable with [address] as primary and [facility] as secondary option. CM sent referral to [address] for wound and dressing care. Transition of Care Plan: 1. Home Health for wound and dressing care-Referral sent to [facility]-awaiting a response. 2. IV or PO antibiotics at discharge? 3. Arrange transportation when appropriate. CM will follow ANTONIO Lozano/CRISELDA

## 2019-07-06 LAB
BACTERIA SPEC CULT: ABNORMAL
BACTERIA SPEC CULT: ABNORMAL
GLUCOSE BLD STRIP.AUTO-MCNC: 119 MG/DL (ref 65–100)
GLUCOSE BLD STRIP.AUTO-MCNC: 138 MG/DL (ref 65–100)
GLUCOSE BLD STRIP.AUTO-MCNC: 154 MG/DL (ref 65–100)
GLUCOSE BLD STRIP.AUTO-MCNC: 186 MG/DL (ref 65–100)
GRAM STN SPEC: ABNORMAL
GRAM STN SPEC: ABNORMAL
SERVICE CMNT-IMP: ABNORMAL

## 2019-07-06 PROCEDURE — 74011250637 HC RX REV CODE- 250/637: Performed by: NURSE PRACTITIONER

## 2019-07-06 PROCEDURE — 94760 N-INVAS EAR/PLS OXIMETRY 1: CPT

## 2019-07-06 PROCEDURE — 74011250637 HC RX REV CODE- 250/637: Performed by: INTERNAL MEDICINE

## 2019-07-06 PROCEDURE — 74011250637 HC RX REV CODE- 250/637: Performed by: FAMILY MEDICINE

## 2019-07-06 PROCEDURE — 65270000029 HC RM PRIVATE

## 2019-07-06 PROCEDURE — 74011000258 HC RX REV CODE- 258: Performed by: INTERNAL MEDICINE

## 2019-07-06 PROCEDURE — 74011636637 HC RX REV CODE- 636/637: Performed by: FAMILY MEDICINE

## 2019-07-06 PROCEDURE — 74011250636 HC RX REV CODE- 250/636: Performed by: FAMILY MEDICINE

## 2019-07-06 PROCEDURE — 74011250636 HC RX REV CODE- 250/636: Performed by: INTERNAL MEDICINE

## 2019-07-06 PROCEDURE — 74011636637 HC RX REV CODE- 636/637: Performed by: NURSE PRACTITIONER

## 2019-07-06 PROCEDURE — 82962 GLUCOSE BLOOD TEST: CPT

## 2019-07-06 RX ORDER — LISINOPRIL 10 MG/1
10 TABLET ORAL DAILY
Status: DISCONTINUED | OUTPATIENT
Start: 2019-07-06 | End: 2019-07-07

## 2019-07-06 RX ADMIN — ATORVASTATIN CALCIUM 40 MG: 40 TABLET, FILM COATED ORAL at 08:41

## 2019-07-06 RX ADMIN — HEPARIN SODIUM 5000 UNITS: 5000 INJECTION INTRAVENOUS; SUBCUTANEOUS at 11:34

## 2019-07-06 RX ADMIN — HEPARIN SODIUM 5000 UNITS: 5000 INJECTION INTRAVENOUS; SUBCUTANEOUS at 19:13

## 2019-07-06 RX ADMIN — Medication 10 ML: at 13:53

## 2019-07-06 RX ADMIN — LISINOPRIL 10 MG: 10 TABLET ORAL at 08:41

## 2019-07-06 RX ADMIN — PIPERACILLIN SODIUM,TAZOBACTAM SODIUM 3.38 G: 3; .375 INJECTION, POWDER, FOR SOLUTION INTRAVENOUS at 18:18

## 2019-07-06 RX ADMIN — OXYCODONE AND ACETAMINOPHEN 2 TABLET: 5; 325 TABLET ORAL at 18:32

## 2019-07-06 RX ADMIN — INSULIN GLARGINE 28 UNITS: 100 INJECTION, SOLUTION SUBCUTANEOUS at 08:40

## 2019-07-06 RX ADMIN — INSULIN LISPRO 3 UNITS: 100 INJECTION, SOLUTION INTRAVENOUS; SUBCUTANEOUS at 11:34

## 2019-07-06 RX ADMIN — PIPERACILLIN SODIUM,TAZOBACTAM SODIUM 3.38 G: 3; .375 INJECTION, POWDER, FOR SOLUTION INTRAVENOUS at 06:30

## 2019-07-06 RX ADMIN — OXYCODONE AND ACETAMINOPHEN 2 TABLET: 5; 325 TABLET ORAL at 09:53

## 2019-07-06 RX ADMIN — Medication 10 ML: at 05:56

## 2019-07-06 RX ADMIN — Medication 1 CAPSULE: at 08:41

## 2019-07-06 RX ADMIN — HEPARIN SODIUM 5000 UNITS: 5000 INJECTION INTRAVENOUS; SUBCUTANEOUS at 03:03

## 2019-07-06 RX ADMIN — OXYCODONE AND ACETAMINOPHEN 2 TABLET: 5; 325 TABLET ORAL at 22:17

## 2019-07-06 NOTE — PROGRESS NOTES
Day #8 of Vancomycin Indication:  diabetic foot infection of the 5th right toe w/osteo Current regimen:  1000 mg IV post-HD Abx regimen:  vancomycin, zosyn ID Following ?: YES Inpatient dialysis schedule: Monday/Wednesday/Friday No results for input(s): WBC, CREA, BUN in the last 72 hours. Est CrCl: ESRD on HD MWF Temp (24hrs), Av.7 °F (37.1 °C), Min:98.1 °F (36.7 °C), Max:99 °F (37.2 °C) Cultures:  
 blood x2 - NG x5 days, final 
 nares - MRSA not present - Final 
 R foot wound - Enterobacter cloacae (R) only to cefuroxime Goal trough = 20 - 25 mcg/mL for therapeutic goal of 15 - 20 mcg/mL (assuming ~35% removal by dialysis) Date                Dialysis (Yes/No)       Pre-HD Level              Dose   No   --   2000mg (LD) 
  No   --   -- 
  Y ([id number])  --   1000 mg (2216) 
  N   --   -- 
  Y                        --   1000 mg (1817)   N   --   --- 
  Y   25.7   750mg   No   --   -- Reminder staff message entered (if needed): No 
 
Plan: Hold further dosing until next HD slated for Lynne Townsend

## 2019-07-06 NOTE — PROGRESS NOTES
CM noted [facility] will accept the patient. Patient has been placed in pending status with [facility] until ready for discharge. CM will monitor. ANTONIO Gilmore/CRISELDA

## 2019-07-06 NOTE — PROGRESS NOTES
Hospitalist Progress Note Felicitas Mitchell NP Answering service: [phone] OR [phone] from in house phone Cell: [phone] Date of Service:  2019 NAME:  Oscar Means :  1985 MRN:  [id number] Admission Summary:  
33-yom AA with pmh of DM2, HTN, hyperlipidemia, ESRD on HD, obesity, cataracts, who presented to the ED from home with cc of wound to the right 5th toe. Pt at this time is a very limited historian, providing very short answers to questions.  Majority of history had been obtained from review of ED and electronic medical records.  Per their collective reports, the patient had a wound to his right fifth toe for a few days.  Reportedly had malodorous drainage from the wound.  He reportedly initially complained of pain at 8/10, was severe and constant without specific alleviating factors. Rt foot x-ray reviewed, which showed no signs of acute osseous or articular abnormality with  osteopenia, evidence of peripheral arterial disease and cortical defect at the distal aspect of the proximal phalanx of the first digit, erosive change at the base of the proximal phalanx of the fourth digit associated with soft tissue defect. Derrell Smithdemetrio is concern for osteomyelitis. Interval history / Subjective:  
Patient sitting in chair. No acute issues over night. Still with loose stools, states they are not green or foul smelling. Waiting bone bx results for discharge plan. Assessment & Plan:  
 
Osteomyelitis of right foot : POA  
- involving toes of the right foot.   
-MRI shows post amputation right fifth ray at the mid fifth metatarsal. There is edema in the proximal right fifth metatarsal may represent Osteomyelitis. 
-On IV antibiotics vanc and zosyn  
-ID on board  
-Pt is sp amputation 5th metatarsal head with bone biopsy pending.  
-Culture growing ENTEROBACTER CLOACAE ISOLATED FROM THIO BROTH ONLY  CARBAPENEM SENSITIVITIES TO FOLLOW  
-added probiotic for loose stools 
  
Uncontrolled type 2 diabetes mellitus and hyperglycemia.   
Placed on Humalog insulin correction coverage schedule, Accu-Chek, a1c 11.3 
7/5 Increase Lantus to 28 U (on 40 U at home).   
End-stage renal disease, on hemodialysis. -HD Mondays, Wednesdays, and Fridays. 
-Nephrology on board  
-HD last yesterday 
  
Morbid obesity.   
We would advise eventual weight loss when appropriate  
Body mass index is 43.1 kg/m². 
 
 Hypertension, uncontrolled. Cont lisinopril and hydralazine prn. 
  
Hypokalemia Management by nephro  
 
Code status: Full DVT prophylaxis: Heparin Care Plan discussed with: Patient/Family and Nurse Disposition: Home w/Family and TBD Hospital Problems  Date Reviewed: 7/3/2019 Codes Class Noted POA Uncontrolled hypertension ICD-10-CM: I10 
ICD-9-CM: 401.9  6/29/2019 Unknown * (Principal) Acute osteomyelitis of toe of right foot ([facility] [address] ICD-10-CM: M86.171 ICD-9-CM: 730.07  6/29/2019 Unknown Review of Systems: A comprehensive review of systems was negative except for that written in the HPI. Vital Signs:  
 Last 24hrs VS reviewed since prior progress note. Most recent are: 
Visit Vitals /77 (BP 1 Location: Right arm, BP Patient Position: Sitting) Pulse 90 Temp 98.5 °F (36.9 °C) Resp 17 Ht 6' 2\" (1.88 m) Wt 152.3 kg (335 lb 11.2 oz) SpO2 99% BMI 43.10 kg/m² Intake/Output Summary (Last 24 hours) at 7/6/2019 1053 Last data filed at 7/5/2019 1731 Gross per 24 hour Intake  Output 3000 ml Net -3000 ml Physical Examination:  
 
 
     
Constitutional:  No acute distress, cooperative, pleasant, sitting in chair   
ENT:  Oral mucous moist, oropharynx benign. Neck supple, Resp:  CTA bilaterally. No wheezing/rhonchi/rales. No accessory muscle use CV:  Regular rhythm, normal rate, no murmurs, gallops, rubs. RUE AV fistula GI:  Soft, non distended, non tender. normoactive bowel sounds, Musculoskeletal:  No edema, warm, 2+ pulses throughout Neurologic:  Moves all extremities. AAOx3, Skin:  right foot dsg c/d/i with ace wrap Data Review:  
 Review and/or order of clinical lab test 
Review and/or order of tests in the medicine section of [facility] Labs:  
 
No results for input(s): WBC, HGB, HCT, PLT, HGBEXT, HCTEXT, PLTEXT, HGBEXT, HCTEXT, PLTEXT in the last 72 hours. No results for input(s): NA, K, CL, CO2, BUN, CREA, GLU, CA, MG, PHOS, URICA in the last 72 hours. No results for input(s): SGOT, GPT, ALT, AP, TBIL, TBILI, TP, ALB, GLOB, GGT, AML, LPSE in the last 72 hours. No lab exists for component: AMYP, HLPSE No results for input(s): INR, PTP, APTT in the last 72 hours. No lab exists for component: INREXT, INREXT No results for input(s): FE, TIBC, PSAT, FERR in the last 72 hours. No results found for: FOL, RBCF No results for input(s): PH, PCO2, PO2 in the last 72 hours. No results for input(s): CPK, CKNDX, TROIQ in the last 72 hours. No lab exists for component: CPKMB Lab Results Component Value Date/Time Cholesterol, total 224 (H) 04/03/2018 02:19 PM  
 HDL Cholesterol 30 (L) 04/03/2018 02:19 PM  
 LDL, calculated 175 (H) 04/03/2018 02:19 PM  
 Triglyceride 94 04/03/2018 02:19 PM  
 CHOL/HDL Ratio 5.9 (H) 12/11/2015 03:25 AM  
 
Lab Results Component Value Date/Time Glucose (POC) 119 (H) 07/06/2019 06:17 AM  
 Glucose (POC) 123 (H) 07/05/2019 09:12 PM  
 Glucose (POC) 107 (H) 07/05/2019 06:13 PM  
 Glucose (POC) 167 (H) 07/05/2019 11:20 AM  
 Glucose (POC) 156 (H) 07/05/2019 06:48 AM  
 
Lab Results Component Value Date/Time  Color YELLOW/STRAW 05/01/2017 05:39 PM  
 Appearance CLOUDY (A) 05/01/2017 05:39 PM  
 Specific gravity 1.018 05/01/2017 05:39 PM  
 Specific gravity >1.030 (H) 10/03/2014 04:35 AM  
 pH (UA) 5.5 05/01/2017 05:39 PM  
 Protein 300 (A) 05/01/2017 05:39 PM  
 Glucose 250 (A) 05/01/2017 05:39 PM  
 Ketone NEGATIVE  05/01/2017 05:39 PM  
 Bilirubin NEGATIVE  05/01/2017 05:39 PM  
 Urobilinogen 0.2 05/01/2017 05:39 PM  
 Nitrites NEGATIVE  05/01/2017 05:39 PM  
 Leukocyte Esterase NEGATIVE  05/01/2017 05:39 PM  
 Epithelial cells FEW 05/01/2017 05:39 PM  
 Bacteria NEGATIVE  05/01/2017 05:39 PM  
 WBC 0-4 05/01/2017 05:39 PM  
 RBC 5-10 05/01/2017 05:39 PM  
 
 
 
Medications Reviewed:  
 
Current Facility-Administered Medications Medication Dose Route Frequency  lisinopril (PRINIVIL, ZESTRIL) tablet 10 mg  10 mg Oral DAILY  lactobac ac& pc-s.therm-b.anim (USMAN Q/RISAQUAD)  1 Cap Oral DAILY  insulin glargine (LANTUS) injection 28 Units  28 Units SubCUTAneous DAILY  oxyCODONE-acetaminophen (PERCOCET) 5-325 mg per tablet 1-2 Tab  1-2 Tab Oral Q4H PRN  
 atorvastatin (LIPITOR) tablet 40 mg  40 mg Oral DAILY  sodium chloride (NS) flush 5-40 mL  5-40 mL IntraVENous Q8H  
 sodium chloride (NS) flush 5-40 mL  5-40 mL IntraVENous PRN  
 heparin (porcine) injection 5,000 Units  5,000 Units SubCUTAneous Q8H  Vancomycin Pharmacy Dosing   Other Rx Dosing/Monitoring  acetaminophen (TYLENOL) tablet 650 mg  650 mg Oral Q6H PRN  
 glucose chewable tablet 16 g  4 Tab Oral PRN  
 dextrose (D50W) injection syrg 12.5-25 g  25-50 mL IntraVENous PRN  
 glucagon (GLUCAGEN) injection 1 mg  1 mg IntraMUSCular PRN  
 insulin lispro (HUMALOG) injection   SubCUTAneous AC&HS  piperacillin-tazobactam (ZOSYN) 3.375 g in 0.9% sodium chloride (MBP/ADV) 100 mL  3.375 g IntraVENous Q12H  hydrALAZINE (APRESOLINE) 20 mg/mL injection 10 mg  10 mg IntraVENous Q6H PRN  
 sodium chloride (NS) flush 5-10 mL  5-10 mL IntraVENous PRN  
 
______________________________________________________________________ EXPECTED LENGTH OF STAY: 6d 19h ACTUAL LENGTH OF STAY:          189 Tucker Ramirez NP

## 2019-07-07 LAB
GLUCOSE BLD STRIP.AUTO-MCNC: 126 MG/DL (ref 65–100)
GLUCOSE BLD STRIP.AUTO-MCNC: 126 MG/DL (ref 65–100)
GLUCOSE BLD STRIP.AUTO-MCNC: 135 MG/DL (ref 65–100)
GLUCOSE BLD STRIP.AUTO-MCNC: 156 MG/DL (ref 65–100)
SERVICE CMNT-IMP: ABNORMAL

## 2019-07-07 PROCEDURE — 74011250636 HC RX REV CODE- 250/636: Performed by: FAMILY MEDICINE

## 2019-07-07 PROCEDURE — 74011250637 HC RX REV CODE- 250/637: Performed by: FAMILY MEDICINE

## 2019-07-07 PROCEDURE — 74011636637 HC RX REV CODE- 636/637: Performed by: NURSE PRACTITIONER

## 2019-07-07 PROCEDURE — 82962 GLUCOSE BLOOD TEST: CPT

## 2019-07-07 PROCEDURE — 74011250636 HC RX REV CODE- 250/636: Performed by: INTERNAL MEDICINE

## 2019-07-07 PROCEDURE — 65270000029 HC RM PRIVATE

## 2019-07-07 PROCEDURE — 74011000258 HC RX REV CODE- 258: Performed by: INTERNAL MEDICINE

## 2019-07-07 PROCEDURE — 74011250637 HC RX REV CODE- 250/637: Performed by: INTERNAL MEDICINE

## 2019-07-07 PROCEDURE — 74011636637 HC RX REV CODE- 636/637: Performed by: FAMILY MEDICINE

## 2019-07-07 PROCEDURE — 74011250637 HC RX REV CODE- 250/637: Performed by: NURSE PRACTITIONER

## 2019-07-07 RX ORDER — LISINOPRIL 20 MG/1
20 TABLET ORAL DAILY
Status: DISCONTINUED | OUTPATIENT
Start: 2019-07-07 | End: 2019-07-09 | Stop reason: HOSPADM

## 2019-07-07 RX ADMIN — OXYCODONE AND ACETAMINOPHEN 2 TABLET: 5; 325 TABLET ORAL at 03:38

## 2019-07-07 RX ADMIN — LISINOPRIL 20 MG: 20 TABLET ORAL at 08:38

## 2019-07-07 RX ADMIN — OXYCODONE AND ACETAMINOPHEN 2 TABLET: 5; 325 TABLET ORAL at 23:27

## 2019-07-07 RX ADMIN — INSULIN LISPRO 3 UNITS: 100 INJECTION, SOLUTION INTRAVENOUS; SUBCUTANEOUS at 11:58

## 2019-07-07 RX ADMIN — Medication 10 ML: at 21:42

## 2019-07-07 RX ADMIN — PIPERACILLIN SODIUM,TAZOBACTAM SODIUM 3.38 G: 3; .375 INJECTION, POWDER, FOR SOLUTION INTRAVENOUS at 19:15

## 2019-07-07 RX ADMIN — HEPARIN SODIUM 5000 UNITS: 5000 INJECTION INTRAVENOUS; SUBCUTANEOUS at 19:15

## 2019-07-07 RX ADMIN — HEPARIN SODIUM 5000 UNITS: 5000 INJECTION INTRAVENOUS; SUBCUTANEOUS at 11:58

## 2019-07-07 RX ADMIN — Medication 10 ML: at 14:13

## 2019-07-07 RX ADMIN — ATORVASTATIN CALCIUM 40 MG: 40 TABLET, FILM COATED ORAL at 08:38

## 2019-07-07 RX ADMIN — OXYCODONE AND ACETAMINOPHEN 2 TABLET: 5; 325 TABLET ORAL at 16:03

## 2019-07-07 RX ADMIN — Medication 1 CAPSULE: at 08:38

## 2019-07-07 RX ADMIN — INSULIN GLARGINE 28 UNITS: 100 INJECTION, SOLUTION SUBCUTANEOUS at 08:38

## 2019-07-07 RX ADMIN — PIPERACILLIN SODIUM,TAZOBACTAM SODIUM 3.38 G: 3; .375 INJECTION, POWDER, FOR SOLUTION INTRAVENOUS at 06:43

## 2019-07-07 RX ADMIN — HEPARIN SODIUM 5000 UNITS: 5000 INJECTION INTRAVENOUS; SUBCUTANEOUS at 03:38

## 2019-07-07 RX ADMIN — OXYCODONE AND ACETAMINOPHEN 2 TABLET: 5; 325 TABLET ORAL at 08:44

## 2019-07-07 NOTE — PROGRESS NOTES
Foot and Ankle specialist Progress Note Admit Date: 6/28/2019 Hospital day 6 Subjective:  
Post  op amputation 5th right toe and partial metatarsal.  Patient resting comfortably Patient was admitted thru the ER with a foul smelling wound 5th right toe of unknown duration. Previous well haled amputations 1st and 5th left digits Current Facility-Administered Medications Medication Dose Route Frequency  lisinopril (PRINIVIL, ZESTRIL) tablet 20 mg  20 mg Oral DAILY  lactobac ac& pc-s.therm-b.anim (USMAN Q/RISAQUAD)  1 Cap Oral DAILY  insulin glargine (LANTUS) injection 28 Units  28 Units SubCUTAneous DAILY  oxyCODONE-acetaminophen (PERCOCET) 5-325 mg per tablet 1-2 Tab  1-2 Tab Oral Q4H PRN  
 atorvastatin (LIPITOR) tablet 40 mg  40 mg Oral DAILY  sodium chloride (NS) flush 5-40 mL  5-40 mL IntraVENous Q8H  
 sodium chloride (NS) flush 5-40 mL  5-40 mL IntraVENous PRN  
 heparin (porcine) injection 5,000 Units  5,000 Units SubCUTAneous Q8H  Vancomycin Pharmacy Dosing   Other Rx Dosing/Monitoring  acetaminophen (TYLENOL) tablet 650 mg  650 mg Oral Q6H PRN  
 glucose chewable tablet 16 g  4 Tab Oral PRN  
 dextrose (D50W) injection syrg 12.5-25 g  25-50 mL IntraVENous PRN  
 glucagon (GLUCAGEN) injection 1 mg  1 mg IntraMUSCular PRN  
 insulin lispro (HUMALOG) injection   SubCUTAneous AC&HS  piperacillin-tazobactam (ZOSYN) 3.375 g in 0.9% sodium chloride (MBP/ADV) 100 mL  3.375 g IntraVENous Q12H  hydrALAZINE (APRESOLINE) 20 mg/mL injection 10 mg  10 mg IntraVENous Q6H PRN  
 sodium chloride (NS) flush 5-10 mL  5-10 mL IntraVENous PRN Objective:  
 
Patient Vitals for the past 8 hrs: 
 BP Temp Pulse Resp SpO2  
07/07/19 1433 118/70 98.3 °F (36.8 °C) 82 18 94 % No intake/output data recorded. No intake/output data recorded. Physical Exam:LOWER LEGS Nonpalpable pedal pulses Diminished epicritic sensation Good muscle strength sutures intact lateral right foot with amputated 5th right toe; no warmth nor redness. MRI:  shows probable osteomyelitis 5th right toe and met head All labs reviewed Data Review Recent Results (from the past 24 hour(s)) GLUCOSE, POC Collection Time: 07/06/19 10:03 PM  
Result Value Ref Range Glucose (POC) 154 (H) 65 - 100 mg/dL Performed by Marcell Infogram GLUCOSE, POC Collection Time: 07/07/19  6:33 AM  
Result Value Ref Range Glucose (POC) 126 (H) 65 - 100 mg/dL Performed by Marcell Darby GLUCOSE, POC Collection Time: 07/07/19 11:18 AM  
Result Value Ref Range Glucose (POC) 156 (H) 65 - 100 mg/dL Performed by Joana Cobb GLUCOSE, POC Collection Time: 07/07/19  4:30 PM  
Result Value Ref Range Glucose (POC) 126 (H) 65 - 100 mg/dL Performed by Meryl Lux Assessment:  
 
Principal Problem: 
  Acute osteomyelitis of toe of right foot ([address] (6/29/2019) Active Problems: 
  Uncontrolled hypertension (6/29/2019) Plan:  
Pathology report from  7/3/19 not yet finalized Discharge to be planned pending pathology report with Dr. Maddy Palomino, Infectious Diease, managing at home antibiotics.

## 2019-07-07 NOTE — PROGRESS NOTES
Hospitalist Progress Note Skip Hays NP Answering service: [phone] OR [phone] from in house phone Cell: [phone] Date of Service:  2019 NAME:  Kiesha Mason :  1985 MRN:  [id number] Admission Summary:  
33-yom AA with pmh of DM2, HTN, hyperlipidemia, ESRD on HD, obesity, cataracts, who presented to the ED from home with cc of wound to the right 5th toe. Pt at this time is a very limited historian, providing very short answers to questions.  Majority of history had been obtained from review of ED and electronic medical records.  Per their collective reports, the patient had a wound to his right fifth toe for a few days.  Reportedly had malodorous drainage from the wound.  He reportedly initially complained of pain at 8/10, was severe and constant without specific alleviating factors. Rt foot x-ray reviewed, which showed no signs of acute osseous or articular abnormality with  osteopenia, evidence of peripheral arterial disease and cortical defect at the distal aspect of the proximal phalanx of the first digit, erosive change at the base of the proximal phalanx of the fourth digit associated with soft tissue defect. Laurens David is concern for osteomyelitis. Interval history / Subjective:  
Patient resting in bed. Stools better today. No issues overnight. BP has been elevated, increased BP meds. Denies any pain, chest pain or shortness of breath Waiting bone bx results for discharge plan. Assessment & Plan:  
 
Osteomyelitis of right foot : POA  
- involving toes of the right foot. -MRI shows post amputation right fifth ray at the mid fifth metatarsal. There is edema in the proximal right fifth metatarsal may represent Osteomyelitis. 
-On IV antibiotics vanc and zosyn  
-ID on board  
-Pt is sp amputation 5th metatarsal head with bone biopsy pending. -Culture growing ENTEROBACTER CLOACAE ISOLATED FROM THIO BROTH ONLY  CARBAPENEM SENSITIVITIES TO FOLLOW  
-added probiotic for loose stools 
  
Uncontrolled type 2 diabetes mellitus and hyperglycemia.   
Placed on Humalog insulin correction coverage schedule, Accu-Chek, a1c 11.3 
7/5 Increase Lantus to 28 U (on 40 U at home). Sugars 126-156 
  
End-stage renal disease, on hemodialysis. -HD Mondays, Wednesdays, and Fridays. 
-Nephrology on board  
-HD last friday 
  
Morbid obesity.   
We would advise eventual weight loss when appropriate  
Body mass index is 43.32 kg/m². 
 
 Hypertension, uncontrolled. 
-has been elevated, increased Lisinopril to 20mg 
-prn Hydralazine 
  
Hypokalemia Management by nephro  
 
Code status: Full DVT prophylaxis: Heparin Care Plan discussed with: Patient/Family and Nurse Disposition: Home w/Family and TBD hopefully home in 1-2 days pending bone bx results Hospital Problems  Date Reviewed: 7/3/2019 Codes Class Noted POA Uncontrolled hypertension ICD-10-CM: I10 
ICD-9-CM: 401.9  6/29/2019 Unknown * (Principal) [facility] ICD-10-CM: M86.171 ICD-9-CM: 730.07  6/29/2019 Unknown Review of Systems: A comprehensive review of systems was negative except for that written in the HPI. Vital Signs:  
 Last 24hrs VS reviewed since prior progress note. Most recent are: 
Visit Vitals /86 Pulse 80 Temp 98.6 °F (37 °C) Resp 18 Ht 6' 2\" (1.88 m) Wt 153 kg (337 lb 6.4 oz) SpO2 93% BMI 43.32 kg/m² No intake or output data in the 24 hours ending 07/07/19 1131 Physical Examination:  
 
 
     
Constitutional:  No acute distress, cooperative, pleasant, sitting in chair   
ENT:  Oral mucous moist, oropharynx benign. Neck supple, Resp:  CTA bilaterally. No wheezing/rhonchi/rales. No accessory muscle use CV:  Regular rhythm, normal rate, no murmurs, gallops, rubs. RUE AV fistula GI:  Soft, non distended, non tender. normoactive bowel sounds, Musculoskeletal:  No edema, warm, 2+ pulses throughout Neurologic:  Moves all extremities. AAOx3, Skin:  right foot dsg c/d/i with ace wrap Data Review:  
 Review and/or order of clinical lab test 
Review and/or order of tests in the medicine section of [facility] Labs:  
 
No results for input(s): WBC, HGB, HCT, PLT, HGBEXT, HCTEXT, PLTEXT, HGBEXT, HCTEXT, PLTEXT in the last 72 hours. No results for input(s): NA, K, CL, CO2, BUN, CREA, GLU, CA, MG, PHOS, URICA in the last 72 hours. No results for input(s): SGOT, GPT, ALT, AP, TBIL, TBILI, TP, ALB, GLOB, GGT, AML, LPSE in the last 72 hours. No lab exists for component: AMYP, HLPSE No results for input(s): INR, PTP, APTT in the last 72 hours. No lab exists for component: INREXT, INREXT No results for input(s): FE, TIBC, PSAT, FERR in the last 72 hours. No results found for: FOL, RBCF No results for input(s): PH, PCO2, PO2 in the last 72 hours. No results for input(s): CPK, CKNDX, TROIQ in the last 72 hours. No lab exists for component: CPKMB Lab Results Component Value Date/Time Cholesterol, total 224 (H) 04/03/2018 02:19 PM  
 HDL Cholesterol 30 (L) 04/03/2018 02:19 PM  
 LDL, calculated 175 (H) 04/03/2018 02:19 PM  
 Triglyceride 94 04/03/2018 02:19 PM  
 CHOL/HDL Ratio 5.9 (H) 12/11/2015 03:25 AM  
 
Lab Results Component Value Date/Time Glucose (POC) 156 (H) 07/07/2019 11:18 AM  
 Glucose (POC) 126 (H) 07/07/2019 06:33 AM  
 Glucose (POC) 154 (H) 07/06/2019 10:03 PM  
 Glucose (POC) 138 (H) 07/06/2019 04:19 PM  
 Glucose (POC) 186 (H) 07/06/2019 11:16 AM  
 
Lab Results Component Value Date/Time  Color YELLOW/STRAW 05/01/2017 05:39 PM  
 Appearance CLOUDY (A) 05/01/2017 05:39 PM  
 Specific gravity 1.018 05/01/2017 05:39 PM  
 Specific gravity >1.030 (H) 10/03/2014 04:35 AM  
 pH (UA) 5.5 05/01/2017 05:39 PM  
 Protein 300 (A) 05/01/2017 05:39 PM  
 Glucose 250 (A) 05/01/2017 05:39 PM  
 Ketone NEGATIVE  05/01/2017 05:39 PM  
 Bilirubin NEGATIVE  05/01/2017 05:39 PM  
 Urobilinogen 0.2 05/01/2017 05:39 PM  
 Nitrites NEGATIVE  05/01/2017 05:39 PM  
 Leukocyte Esterase NEGATIVE  05/01/2017 05:39 PM  
 Epithelial cells FEW 05/01/2017 05:39 PM  
 Bacteria NEGATIVE  05/01/2017 05:39 PM  
 WBC 0-4 05/01/2017 05:39 PM  
 RBC 5-10 05/01/2017 05:39 PM  
 
 
 
Medications Reviewed:  
 
Current Facility-Administered Medications Medication Dose Route Frequency  lisinopril (PRINIVIL, ZESTRIL) tablet 20 mg  20 mg Oral DAILY  lactobac ac& pc-s.therm-b.anim (USMAN Q/RISAQUAD)  1 Cap Oral DAILY  insulin glargine (LANTUS) injection 28 Units  28 Units SubCUTAneous DAILY  oxyCODONE-acetaminophen (PERCOCET) 5-325 mg per tablet 1-2 Tab  1-2 Tab Oral Q4H PRN  
 atorvastatin (LIPITOR) tablet 40 mg  40 mg Oral DAILY  sodium chloride (NS) flush 5-40 mL  5-40 mL IntraVENous Q8H  
 sodium chloride (NS) flush 5-40 mL  5-40 mL IntraVENous PRN  
 heparin (porcine) injection 5,000 Units  5,000 Units SubCUTAneous Q8H  Vancomycin Pharmacy Dosing   Other Rx Dosing/Monitoring  acetaminophen (TYLENOL) tablet 650 mg  650 mg Oral Q6H PRN  
 glucose chewable tablet 16 g  4 Tab Oral PRN  
 dextrose (D50W) injection syrg 12.5-25 g  25-50 mL IntraVENous PRN  
 glucagon (GLUCAGEN) injection 1 mg  1 mg IntraMUSCular PRN  
 insulin lispro (HUMALOG) injection   SubCUTAneous AC&HS  piperacillin-tazobactam (ZOSYN) 3.375 g in 0.9% sodium chloride (MBP/ADV) 100 mL  3.375 g IntraVENous Q12H  hydrALAZINE (APRESOLINE) 20 mg/mL injection 10 mg  10 mg IntraVENous Q6H PRN  
 sodium chloride (NS) flush 5-10 mL  5-10 mL IntraVENous PRN  
 
______________________________________________________________________ EXPECTED LENGTH OF STAY: 6d 19h ACTUAL LENGTH OF STAY:          [address], NP

## 2019-07-07 NOTE — PROGRESS NOTES
NAME: Tracey Rao :  1985 MRN:  [id number] Assessment :    Plan: 
--[facility]SC[facility] MWF Osteo R foot HTN Anemia DM-2, uncontrolled 
 -HD tomm per schedule IV ABx per ID Ct current meds Check labs with HD tomm- as no recent labs Holding FARIDA-hgb above goal  
 
 
Subjective: Chief Complaint:  Feels fine. Watching TV. No acute c/o Review of Systems: as above Could not obtain due to:   
 
Objective: VITALS:  
Last 24hrs VS reviewed since prior progress note. Most recent are: 
Visit Vitals /86 Pulse 80 Temp 98.6 °F (37 °C) Resp 18 Ht 6' 2\" (1.88 m) Wt 153 kg (337 lb 6.4 oz) SpO2 93% BMI 43.32 kg/m² No intake or output data in the 24 hours ending 19 1408 Telemetry Reviewed: PHYSICAL EXAM: 
General: NAD Clear RRR No edema on L. R foot bandaged Alert awake Lab Data Reviewed: (see below) Medications Reviewed: (see below) PMH/SH reviewed - no change compared to H&P 
________________________________________________________________________ Care Plan discussed with: 
Patient y Family RN Care Manager Consultant:     
 
  Comments >50% of visit spent in counseling and coordination of care    
 
________________________________________________________________________ Belem Valente MD  
 
Procedures: see electronic medical records for all procedures/Xrays and details which 
were not copied into this note but were reviewed prior to creation of Plan. LABS: 
 
MEDICATIONS: 
Current Facility-Administered Medications Medication Dose Route Frequency  lisinopril (PRINIVIL, ZESTRIL) tablet 20 mg  20 mg Oral DAILY  lactobac ac& pc-s.therm-b.anim (USMAN Q/RISAQUAD)  1 Cap Oral DAILY  insulin glargine (LANTUS) injection 28 Units  28 Units SubCUTAneous DAILY  oxyCODONE-acetaminophen (PERCOCET) 5-325 mg per tablet 1-2 Tab  1-2 Tab Oral Q4H PRN  
 atorvastatin (LIPITOR) tablet 40 mg  40 mg Oral DAILY  sodium chloride (NS) flush 5-40 mL  5-40 mL IntraVENous Q8H  
 sodium chloride (NS) flush 5-40 mL  5-40 mL IntraVENous PRN  
 heparin (porcine) injection 5,000 Units  5,000 Units SubCUTAneous Q8H  Vancomycin Pharmacy Dosing   Other Rx Dosing/Monitoring  acetaminophen (TYLENOL) tablet 650 mg  650 mg Oral Q6H PRN  
 glucose chewable tablet 16 g  4 Tab Oral PRN  
 dextrose (D50W) injection syrg 12.5-25 g  25-50 mL IntraVENous PRN  
 glucagon (GLUCAGEN) injection 1 mg  1 mg IntraMUSCular PRN  
 insulin lispro (HUMALOG) injection   SubCUTAneous AC&HS  piperacillin-tazobactam (ZOSYN) 3.375 g in 0.9% sodium chloride (MBP/ADV) 100 mL  3.375 g IntraVENous Q12H  hydrALAZINE (APRESOLINE) 20 mg/mL injection 10 mg  10 mg IntraVENous Q6H PRN  
 sodium chloride (NS) flush 5-10 mL  5-10 mL IntraVENous PRN

## 2019-07-07 NOTE — PROGRESS NOTES
Problem: Diabetes Self-Management Goal: *Disease process and treatment process Description Define diabetes and identify own type of diabetes; list 3 options for treating diabetes. Outcome: Progressing Towards Goal 
  
Problem: Falls - Risk of 
Goal: *Absence of Falls Description Document Shilpi Romero Fall Risk and appropriate interventions in the flowsheet. Outcome: Progressing Towards Goal 
  
Problem: Pressure Injury - Risk of 
Goal: *Prevention of pressure injury Description Document Ranjeet Scale and appropriate interventions in the flowsheet.  
Outcome: Progressing Towards Goal

## 2019-07-07 NOTE — PROGRESS NOTES
Day #9 of Vancomycin Indication:  diabetic foot infection of the 5th right toe w/osteo Current regimen:  1000 mg IV post-HD Abx regimen:  vancomycin, zosyn ID Following ?: YES Inpatient dialysis schedule: Monday/Wednesday/Friday No results for input(s): WBC, CREA, BUN in the last 72 hours. Est CrCl: ESRD on HD MWF Temp (24hrs), Av.3 °F (36.8 °C), Min:98 °F (36.7 °C), Max:98.6 °F (37 °C) Cultures:  
 blood x2 - NG x5 days, final 
 nares - MRSA not present - Final 
 R foot wound - Enterobacter cloacae from Thio broth (R) only to cefuroxime Goal trough = 20 - 25 mcg/mL for therapeutic goal of 15 - 20 mcg/mL (assuming ~35% removal by dialysis) Date                Dialysis (Yes/No)       Pre-HD Level              Dose   No   --   2000mg (LD) 
  No   --   -- 
  Y (8140-0347)  --   1000 mg (2216) 
  N   --   -- 
  Y                        --   1000 mg (1817)   N   --   --- 
  Y   25.7   750mg   No   --   -- 
  No   --   -- Reminder staff message entered (if needed): No 
 
Plan: Hold further dosing until next HD slated for Mon. Will follow ID plan and get a pre-HD level this week. Damian Morales, [address]

## 2019-07-08 LAB
ANION GAP SERPL CALC-SCNC: 12 MMOL/L (ref 5–15)
BASOPHILS # BLD: 0.1 K/UL (ref 0–0.1)
BASOPHILS NFR BLD: 1 % (ref 0–1)
BUN SERPL-MCNC: 54 MG/DL (ref 6–20)
BUN/CREAT SERPL: 4 (ref 12–20)
CALCIUM SERPL-MCNC: 8.9 MG/DL (ref 8.5–10.1)
CHLORIDE SERPL-SCNC: 100 MMOL/L (ref 97–108)
CO2 SERPL-SCNC: 25 MMOL/L (ref 21–32)
CREAT SERPL-MCNC: 12.8 MG/DL (ref 0.7–1.3)
DIFFERENTIAL METHOD BLD: ABNORMAL
EOSINOPHIL # BLD: 0.4 K/UL (ref 0–0.4)
EOSINOPHIL NFR BLD: 5 % (ref 0–7)
ERYTHROCYTE [DISTWIDTH] IN BLOOD BY AUTOMATED COUNT: 14.1 % (ref 11.5–14.5)
GLUCOSE BLD STRIP.AUTO-MCNC: 107 MG/DL (ref 65–100)
GLUCOSE BLD STRIP.AUTO-MCNC: 120 MG/DL (ref 65–100)
GLUCOSE BLD STRIP.AUTO-MCNC: 165 MG/DL (ref 65–100)
GLUCOSE SERPL-MCNC: 168 MG/DL (ref 65–100)
HCT VFR BLD AUTO: 35.8 % (ref 36.6–50.3)
HGB BLD-MCNC: 11.3 G/DL (ref 12.1–17)
IMM GRANULOCYTES # BLD AUTO: 0 K/UL (ref 0–0.04)
IMM GRANULOCYTES NFR BLD AUTO: 0 % (ref 0–0.5)
LYMPHOCYTES # BLD: 1.9 K/UL (ref 0.8–3.5)
LYMPHOCYTES NFR BLD: 22 % (ref 12–49)
MCH RBC QN AUTO: 26.2 PG (ref 26–34)
MCHC RBC AUTO-ENTMCNC: 31.6 G/DL (ref 30–36.5)
MCV RBC AUTO: 82.9 FL (ref 80–99)
MONOCYTES # BLD: 0.8 K/UL (ref 0–1)
MONOCYTES NFR BLD: 9 % (ref 5–13)
NEUTS SEG # BLD: 5.5 K/UL (ref 1.8–8)
NEUTS SEG NFR BLD: 63 % (ref 32–75)
NRBC # BLD: 0 K/UL (ref 0–0.01)
NRBC BLD-RTO: 0 PER 100 WBC
PHOSPHATE SERPL-MCNC: 8.2 MG/DL (ref 2.6–4.7)
PLATELET # BLD AUTO: 277 K/UL (ref 150–400)
PMV BLD AUTO: 9.2 FL (ref 8.9–12.9)
POTASSIUM SERPL-SCNC: 4.3 MMOL/L (ref 3.5–5.1)
RBC # BLD AUTO: 4.32 M/UL (ref 4.1–5.7)
SERVICE CMNT-IMP: ABNORMAL
SODIUM SERPL-SCNC: 137 MMOL/L (ref 136–145)
WBC # BLD AUTO: 8.7 K/UL (ref 4.1–11.1)

## 2019-07-08 PROCEDURE — 36415 COLL VENOUS BLD VENIPUNCTURE: CPT

## 2019-07-08 PROCEDURE — 74011250636 HC RX REV CODE- 250/636: Performed by: FAMILY MEDICINE

## 2019-07-08 PROCEDURE — 85025 COMPLETE CBC W/AUTO DIFF WBC: CPT

## 2019-07-08 PROCEDURE — 84100 ASSAY OF PHOSPHORUS: CPT

## 2019-07-08 PROCEDURE — 90935 HEMODIALYSIS ONE EVALUATION: CPT

## 2019-07-08 PROCEDURE — 74011250637 HC RX REV CODE- 250/637: Performed by: INTERNAL MEDICINE

## 2019-07-08 PROCEDURE — 74011250636 HC RX REV CODE- 250/636: Performed by: INTERNAL MEDICINE

## 2019-07-08 PROCEDURE — 74011250637 HC RX REV CODE- 250/637: Performed by: FAMILY MEDICINE

## 2019-07-08 PROCEDURE — 74011000258 HC RX REV CODE- 258: Performed by: INTERNAL MEDICINE

## 2019-07-08 PROCEDURE — 74011636637 HC RX REV CODE- 636/637: Performed by: NURSE PRACTITIONER

## 2019-07-08 PROCEDURE — 80048 BASIC METABOLIC PNL TOTAL CA: CPT

## 2019-07-08 PROCEDURE — 82962 GLUCOSE BLOOD TEST: CPT

## 2019-07-08 PROCEDURE — 65270000029 HC RM PRIVATE

## 2019-07-08 PROCEDURE — 74011250637 HC RX REV CODE- 250/637: Performed by: NURSE PRACTITIONER

## 2019-07-08 RX ADMIN — PIPERACILLIN SODIUM,TAZOBACTAM SODIUM 3.38 G: 3; .375 INJECTION, POWDER, FOR SOLUTION INTRAVENOUS at 06:01

## 2019-07-08 RX ADMIN — Medication 10 ML: at 22:31

## 2019-07-08 RX ADMIN — Medication 10 ML: at 13:00

## 2019-07-08 RX ADMIN — PIPERACILLIN SODIUM,TAZOBACTAM SODIUM 3.38 G: 3; .375 INJECTION, POWDER, FOR SOLUTION INTRAVENOUS at 22:29

## 2019-07-08 RX ADMIN — Medication 1 CAPSULE: at 09:25

## 2019-07-08 RX ADMIN — HEPARIN SODIUM 5000 UNITS: 5000 INJECTION INTRAVENOUS; SUBCUTANEOUS at 04:52

## 2019-07-08 RX ADMIN — ATORVASTATIN CALCIUM 40 MG: 40 TABLET, FILM COATED ORAL at 09:25

## 2019-07-08 RX ADMIN — OXYCODONE AND ACETAMINOPHEN 2 TABLET: 5; 325 TABLET ORAL at 10:29

## 2019-07-08 RX ADMIN — Medication 10 ML: at 06:01

## 2019-07-08 RX ADMIN — HEPARIN SODIUM 5000 UNITS: 5000 INJECTION INTRAVENOUS; SUBCUTANEOUS at 12:57

## 2019-07-08 RX ADMIN — INSULIN GLARGINE 28 UNITS: 100 INJECTION, SOLUTION SUBCUTANEOUS at 09:24

## 2019-07-08 RX ADMIN — HEPARIN SODIUM 5000 UNITS: 5000 INJECTION INTRAVENOUS; SUBCUTANEOUS at 19:00

## 2019-07-08 RX ADMIN — VANCOMYCIN HYDROCHLORIDE 750 MG: 750 INJECTION, POWDER, LYOPHILIZED, FOR SOLUTION INTRAVENOUS at 18:58

## 2019-07-08 RX ADMIN — OXYCODONE AND ACETAMINOPHEN 1 TABLET: 5; 325 TABLET ORAL at 22:30

## 2019-07-08 NOTE — DIALYSIS
TRANSFER - IN REPORT: 
 
Verbal report received from Galen Ram RN  on Aria Watt  being received from [facility] for dialysis Report consisted of patients Situation, Background, Assessment and  
Recommendations(SBAR). Information from the following report(s) SBAR was reviewed with the receiving nurse. Opportunity for questions and clarification was provided. Assessment completed upon patients arrival to unit and care assumed.

## 2019-07-08 NOTE — PROCEDURES
[facility] Dialysis Team [place]  [phone] Vitals   Pre   Post   Assessment   Pre   Post    
Temp  Temp: 98.2 °F (36.8 °C) (07/08/19 1402)  98.1 LOC  AOX4 Same HR   Pulse (Heart Rate): 87 (07/08/19 1402) 93 Lungs   Clear Same B/P   BP: (!) 189/111 (07/08/19 1402) 132/94 Cardiac   Regular Same Resp   Resp Rate: 16 (07/08/19 1402) 14 Skin   Amputated R big toe Same Pain level  Pain Intensity 1: 10 (07/08/19 1013) 0 Edema  none Same Orders: Duration:   Start:   1402 End:   1603 Total:   4 hrs Dialyzer:   Dialyzer/Set Up Inspection: Janki Matias (07/08/19 1402) K Bath:   Dialysate K (mEq/L): 3 (07/08/19 1402) Ca Bath:   Dialysate CA (mEq/L): 2.5 (07/08/19 1402) Na/Bicarb:   Dialysate NA (mEq/L): 140 (07/08/19 1402) Target Fluid Removal:   Goal/Amount of Fluid to Remove (mL): 3000 mL (07/08/19 1402) Access Type & Location:   PELON AVF assessed and prepped with alcohol. B+T present. No S/S of infection noted. Cannulated with a 15 g needle. Aspirated and flushed well. Labs Obtained/Reviewed Critical Results Called   Date when labs were drawn- 
Hgb-   
HGB Date Value Ref Range Status 07/08/2019 11.3 (L) 12.1 - 17.0 g/dL Final  
 
K-   
Potassium Date Value Ref Range Status 07/08/2019 4.3 3.5 - 5.1 mmol/L Final  
 
Ca-  
Calcium Date Value Ref Range Status 07/08/2019 8.9 8.5 - 10.1 MG/DL Final  
 
Bun-  
BUN Date Value Ref Range Status 07/08/2019 54 (H) 6 - 20 MG/DL Final  
 
Creat-  
Creatinine Date Value Ref Range Status 07/08/2019 12.80 (H) 0.70 - 1.30 MG/DL Final  
 
  
Medications/ Blood Products Given Name   Dose   Route and Time None Blood Volume Processed (BVP):    93L Net Fluid Removed:  3000 ml Comments Time Out Done: 1350 Primary Nurse Rpt Pre: Marquis Elaine GREENBERG 
Primary Nurse Rpt Post: Marquis Elaine GREENBERG Pt Education: Access care Care Plan: Continuous. Tx Summary: 1350 Pt arrived dialysis suite. Assessment completed. PELON AVF assessed and prepped with alcohol. B+T present. No S/S of infection noted. Cannulated with a 15 g needle. Aspirated and flushed well.  
1402 Tx started with pt hypertensive. 1415 Vascular access visible. Line connections intact. Pt resting 1430 Vascular access visible. Line connections intact. Pt resting 1445 Vascular access visible. Line connections intact. Pt resting 1500 Vascular access visible. Line connections intact. Pt resting 1515 Vascular access visible. Line connections intact. Pt resting 1530 Vascular access visible. Line connections intact. Pt resting 1545 Vascular access visible. Line connections intact. Pt resting 
1600 Vascular access visible. Line connections intact. Pt resting 1615 Vascular access visible. Line connections intact. Pt resting 1630 Vascular access visible. Line connections intact. Pt resting 1645 Vascular access visible. Line connections intact. Pt resting 1700 Vascular access visible. Line connections intact. Pt resting 1715 Vascular access visible. Line connections intact. Pt resting 1730 Vascular access visible. Line connections intact. Pt resting 1745 Vascular access visible. Line connections intact. Pt resting 1800 Vascular access visible. Line connections intact. Pt resting 1805 Tx ended with all possible blood returned from  [facility] [place]. Hemostasis achieved. Report called to primary nurse and pt returned to room. Admiting Diagnosis: Osteomyelitis Pt's previous clinic- [facility] 
Consent signed - Informed Consent Verified: Yes (07/08/19 1402) Sylvain Consent - Yes on file Hepatitis Status- Ag neg 7/1/19 Machine #- Machine Number: [id number] (07/08/19 1402) Telemetry status- No tele Pre-dialysis wt. - Pre-Dialysis Weight: 159.5 kg (351 lb 10.1 oz) (07/08/19 1402)

## 2019-07-08 NOTE — PROGRESS NOTES
TEJA 
1. Accepted with [address] for wound and dressing care. 2. Discharge pending pathology reports 3. Surgical shoe needed per standard shoe doesn't fit. 4. Arrange transportation when appropriate. CM will follow Ravin Thomas, MHA/CRM

## 2019-07-08 NOTE — PROGRESS NOTES
NAME: Genie Senior :  1985 MRN:  [id number] Assessment :    Plan: 
--[facility] MWF Osteo R foot HTN Anemia DM-2, uncontrolled 
 -HD today IV ABx per ID Ct current meds Holding FARIDA-hgb above goal  
 
 
Subjective:  
resting Objective: VITALS:  
Last 24hrs VS reviewed since prior progress note. Most recent are: 
Visit Vitals BP (!) 178/106 (BP 1 Location: Right arm, BP Patient Position: At rest) Pulse 87 Temp 98.4 °F (36.9 °C) Resp 17 Ht 6' 2\" (1.88 m) Wt (!) 159.5 kg (351 lb 11.2 oz) SpO2 95% BMI 45.16 kg/m² No intake or output data in the 24 hours ending 19 0914 Telemetry Reviewed: PHYSICAL EXAM: 
General: resting 
   
  
 
________________________________________________________________________ Cyndi Finley MD  
 
Procedures: see electronic medical records for all procedures/Xrays and details which 
were not copied into this note but were reviewed prior to creation of Plan.    
 
LABS: 
 
MEDICATIONS:

## 2019-07-08 NOTE — PROGRESS NOTES
ID Progress Note 2019 Subjective: No current complaints Objective: Antibiotics: 1. Vancomycin 2. Zosyn Vitals:  
Visit Vitals /86 Pulse 86 Temp 98.2 °F (36.8 °C) Resp 16 Ht 6' 2\" (1.88 m) Wt (!) 159.5 kg (351 lb 11.2 oz) SpO2 95% BMI 45.16 kg/m² Tmax:  Temp (24hrs), Av.4 °F (36.9 °C), Min:98.2 °F (36.8 °C), Max:98.6 °F (37 °C) Exam:  Foot is dressed Labs:     
Recent Labs  
  19 
0101 WBC 8.7 HGB 11.3*  
 BUN 54* CREA 12.80* Cultures: No results found for: HARPREET Lab Results Component Value Date/Time Culture result: NO ANAEROBES ISOLATED 2019 06:27 PM  
 Culture result: NO GROWTH ON SOLID MEDIA 2 DAYS 2019 06:27 PM  
 Culture result: ENTEROBACTER CLOACAE ISOLATED FROM THIO BROTH ONLY (A) 2019 06:27 PM  
 
 
Radiology:  
 
Line/Insert Date:        
 
Assessment: 1. Diabetic foot wound with underlying osteo--enterobacter from cultures so far--pathology demonstrated clear bony margins 2. Neuropathy 3. ESRD Objective: 1. Continue current therapy 2. Can go home on oral cipro 500 mg every day for 7 more doses--needs to take after dialysis on dialysis days Jin Bennett MD

## 2019-07-08 NOTE — PROGRESS NOTES
Problem: Falls - Risk of 
Goal: *Absence of Falls Description Document Justyna Cousin Fall Risk and appropriate interventions in the flowsheet. Outcome: Progressing Towards Goal 
  
Problem: Pressure Injury - Risk of 
Goal: *Prevention of pressure injury Description Document Ranjeet Scale and appropriate interventions in the flowsheet.  
Outcome: Progressing Towards Goal

## 2019-07-08 NOTE — PROGRESS NOTES
NUTRITION COMPLETE ASSESSMENT 
 
RECOMMENDATIONS:  
1. Increase kcal for diet - Consisent Carb/2 Gm Na . Add Phos restriction. (Phos 8.2) 2. Add Nepro Once per day and Benito to help meet protein needs 3. Monitor POC - awaiting ID for antibiotic regimen. 4. Consider adding PhosLo if not contraindicated. Interventions/Plan:  
Food/Nutrient Delivery:  General/healthful diet Commercial supplement Nutrition Education:    Renal diet foods Assessment:  
Reason for Assessment:  
[] Provider Consult 
[]BPA/MST Referral  
[x]LOS []Reassessment  
[]NPO/Clear Liquid []At Nutrition Risk []Other Diet:  CCD/2 gm Supplements: Nepro and Benito Nutritionally Significant Medications: [x] Reviewed & Includes: FloraQ, insulin, lipitor,  
Meal Intake: No data found. Pre-Hospitalization: 
 good Current Hospitalization:  
Fluid Restriction:  none currently. Appetite:  good PO Ability:  independent Average po intake:(P) % Average supplements intake:    
  
Subjective: 
Pt out of room on multiple attempts to visit. Objective: 
28 yr old male admitted for toe/foot wound. PMhx: ESRD on HD, HTN, DM A1C 11.3. Increased kcal allotment 2/2 large stature and increased protein needs for HD and foot wound. , 134, 254 mg/dL. Phos 8.2. Added Low Phos to diet. No food allergies reported. Last BM 7/5. UBW ~350 lbs. No weight changes recently per chart. Left diet education on bedside table for renal diet grocery suggestions. Document PO intakes and ONS acceptance. Estimated Nutrition Needs:  
Kcals/day: 2887 Kcals/day(BMR(2605x1. 3)-500) Protein: 145 g(-180g/day(20-25%kcal)) Fluid:   2000 mL or per Renal MD 
Based On: Debora 1898 Weight Used: Actual wt(159.5kg) Pt expected to meet estimated nutrient needs:  []   Yes     [x]  No [] Unable to predict at this time Nutrition Diagnosis:  
1.  Increased nutrient needs related to increased protein needs as evidenced by HD, toe wound, large stature Goals: Pt will consume >75% of meals and ONS for added protein within 5-7 days Monitoring & Evaluation: - Total energy intake - Weight/weight change -   
 
Previous Nutrition Goals Met:   N/A Previous Recommendations:    N/A Education & Discharge Needs: 
 [] None Identified 
 [x] Identified and addressed  
 [] Participated in care plan, discharge planning, and/or interdisciplinary rounds Cultural, Amish and ethnic food preferences identified:  none Skin Integrity: []Intact  [x]Other : toe osteo Edema: [x]None []Other Last BM: 7/5 Food Allergies: []None []Other Diet Restrictions: Anthropometrics: Wt Readings from Last 30 Encounters:  
07/08/19 (!) 159.5 kg (351 lb 11.2 oz)  
06/29/19 (!) 161.4 kg (355 lb 13.2 oz) 09/14/18 (!) 160.6 kg (354 lb) 04/03/18 155.3 kg (342 lb 6.4 oz) 12/06/17 149.7 kg (330 lb) 12/06/17 151 kg (332 lb 14.3 oz)  
11/29/17 149.4 kg (329 lb 5.9 oz) 09/07/17 156.9 kg (345 lb 12.8 oz) 08/23/17 (!) 158.9 kg (350 lb 6.4 oz) 08/16/17 156.5 kg (345 lb)  
07/31/17 155.6 kg (343 lb)  
07/26/17 151.5 kg (334 lb) 07/05/17 157.9 kg (348 lb) 06/07/17 153.8 kg (339 lb) 05/09/17 (!) 164.5 kg (362 lb 10.5 oz) 05/03/17 (!) 172 kg (379 lb 3.1 oz) 03/24/16 152.3 kg (335 lb 12.8 oz) 03/02/16 152.9 kg (337 lb) 12/29/15 (!) 158.8 kg (350 lb 3.2 oz) 12/10/15 131.5 kg (290 lb) 10/03/14 124.7 kg (275 lb) 07/02/13 129.2 kg (284 lb 13.4 oz)  
11/28/12 133.9 kg (295 lb 3.1 oz) Weight Loss Metrics 7/8/2019 6/28/2019 9/14/2018 4/3/2018 12/6/2017 12/6/2017 11/29/2017 Today's Wt 351 lb 11.2 oz - 354 lb 342 lb 6.4 oz 330 lb 332 lb 14.3 oz 329 lb 5.9 oz  
BMI - 45.16 kg/m2 45.45 kg/m2 43.96 kg/m2 42.37 kg/m2 41.61 kg/m2 42.29 kg/m2 Weight Source: Bed Height: 6' 2\" (188 cm), Body mass index is 45.16 kg/m². Labs:   
Lab Results Component Value Date/Time Sodium 137 07/08/2019 01:01 AM  
 Potassium 4.3 07/08/2019 01:01 AM  
 Chloride 100 07/08/2019 01:01 AM  
 CO2 25 07/08/2019 01:01 AM  
 Glucose 168 (H) 07/08/2019 01:01 AM  
 BUN 54 (H) 07/08/2019 01:01 AM  
 Creatinine 12.80 (H) 07/08/2019 01:01 AM  
 Calcium 8.9 07/08/2019 01:01 AM  
 Magnesium 2.4 12/09/2017 02:43 AM  
 Phosphorus 8.2 (H) 07/08/2019 01:01 AM  
 Albumin 2.7 (L) 07/01/2019 03:41 AM  
 
Lab Results Component Value Date/Time Hemoglobin A1c 11.3 (H) 06/29/2019 06:15 AM  
 Hemoglobin A1c (POC) 9.3 04/03/2018 01:30 PM  
 
 
Lamine Faust RD Pager: [phone]

## 2019-07-08 NOTE — PROCEDURES
HD TRANSFER - OUT REPORT: 
 
Verbal report given to Bari Wall RN on Samuella Holiday being transferred to [address] for continuity of care Report consisted of patient's Situation, Background, Assessment and  
Recommendations(SBAR). Information from the following report(s) SBAR was reviewed with the receiving nurse. Method:  $$ Method: Hemodialysis (07/08/19 1402) Fluid Removed  NET Fluid Removed (mL): 3000 ml (07/08/19 1807) Patient response to treatment: Well End Time  Hemodialysis End Time: 1153 (07/08/19 1807) If not documented, dialysis nurse to update post-dialysis row in HD/Filtration flowsheet Medications /Volume expansion agents or Fluid boluses administered during treatment? No 
 
Post-dialysis medication administration due?  yes Remind nurse to administer post-HD medication upon return to unit. Fistula hemostasis? Yes 
 
Line heparinization? NA Lines:  PELON AVF Opportunity for questions and clarification was provided.    
 
Patient transported with: Bed

## 2019-07-08 NOTE — PROGRESS NOTES
Hospitalist Progress Note Theodore Cantrell MD 
Answering service: [phone] OR [phone] from in house phone Cell: [phone] Date of Service:  2019 NAME:  Micheal Bolivar :  1985 MRN:  [id number] Admission Summary:  
33-yom AA with pmh of DM2, HTN, hyperlipidemia, ESRD on HD, obesity, cataracts, who presented to the ED from home with cc of wound to the right 5th toe. Pt at this time is a very limited historian, providing very short answers to questions.  Majority of history had been obtained from review of ED and electronic medical records.  Per their collective reports, the patient had a wound to his right fifth toe for a few days.  Reportedly had malodorous drainage from the wound.  He reportedly initially complained of pain at 8/10, was severe and constant without specific alleviating factors. Rt foot x-ray reviewed, which showed no signs of acute osseous or articular abnormality with  osteopenia, evidence of peripheral arterial disease and cortical defect at the distal aspect of the proximal phalanx of the first digit, erosive change at the base of the proximal phalanx of the fourth digit associated with soft tissue defect. Omero Pastel is concern for osteomyelitis. Interval history / Subjective:  
Patient was seen and examined this afternoon. He was doing his HD. Has no new complaints. Pathology report back, Assessment & Plan: # Osteomyelitis of right foot( POA) s/p 5th right toe and partial metatarsal amputation 7/ POD # 6  
- involving toes of the right foot. - MRI shows post amputation right fifth ray at the mid fifth metatarsal. There is edema in the proximal right fifth metatarsal may represent Osteomyelitis. - culture grew enterobacter Cloacae - pathology with viable margin - On IV antibiotics vanc and zosyn - present, can be discharge on oral cipro - ID on board  
 
  
 # Uncontrolled DM w/hyperglycemia: A1c 11.3 
- on Lantus, SSI, accucheck and diabetic diet  
  
# End-stage renal disease, on hemodialysis. - HD on MWF 
- Nephrology on board  
  
# Morbid obesity.   
- We would advise eventual weight loss when appropriate  
Body mass index is 45.16 kg/m². # Hypertension, uncontrolled. - has been elevated, increased Lisinopril to 20mg 
- prn Hydralazine 
  
# Hypokalemia Management by nephro  
 
Code status: Full DVT prophylaxis: Heparin Care Plan discussed with: Patient/Family and Nurse Disposition: Discharge home tomorrow Hospital Problems  Date Reviewed: 7/3/2019 Codes Class Noted POA Uncontrolled hypertension ICD-10-CM: I10 
ICD-9-CM: 401.9  6/29/2019 Unknown * (Principal) Acute osteomyelitis of toe of right foot ([facility] [address] ICD-10-CM: M86.171 ICD-9-CM: 730.07  6/29/2019 Unknown Review of Systems: A comprehensive review of systems was negative except for that written in the HPI. Vital Signs:  
 Last 24hrs VS reviewed since prior progress note. Most recent are: 
Visit Vitals /86 Pulse 86 Temp 98.2 °F (36.8 °C) Resp 16 Ht 6' 2\" (1.88 m) Wt (!) 159.5 kg (351 lb 11.2 oz) SpO2 95% BMI 45.16 kg/m² No intake or output data in the 24 hours ending 07/08/19 1657 Physical Examination:  
 
 
     
Constitutional:  No acute distress, cooperative, pleasant, sitting in chair   
ENT:  Oral mucous moist, oropharynx benign. Neck supple, Resp:  CTA bilaterally. No wheezing/rhonchi/rales. No accessory muscle use CV:  Regular rhythm, normal rate, no murmurs, gallops, rubs. RUE AV fistula GI:  Soft, non distended, non tender. normoactive bowel sounds, Musculoskeletal:  No edema, warm, 2+ pulses throughout Neurologic:  Moves all extremities. AAOx3, Skin:  right foot dsg c/d/i with ace wrap Data Review:  
I personally reviewed labs and imaging Labs:  
 
Recent Labs  
  07/08/19 
0101 WBC 8.7 HGB 11.3* HCT 35.8*  Recent Labs  
  07/08/19 
0101   
K 4.3  CO2 25 BUN 54* CREA 12.80* * CA 8.9 PHOS 8.2* No results for input(s): SGOT, GPT, ALT, AP, TBIL, TBILI, TP, ALB, GLOB, GGT, AML, LPSE in the last 72 hours. No lab exists for component: AMYP, HLPSE No results for input(s): INR, PTP, APTT in the last 72 hours. No lab exists for component: INREXT, INREXT No results for input(s): FE, TIBC, PSAT, FERR in the last 72 hours. No results found for: FOL, RBCF No results for input(s): PH, PCO2, PO2 in the last 72 hours. No results for input(s): CPK, CKNDX, TROIQ in the last 72 hours. No lab exists for component: CPKMB Lab Results Component Value Date/Time Cholesterol, total 224 (H) 04/03/2018 02:19 PM  
 HDL Cholesterol 30 (L) 04/03/2018 02:19 PM  
 LDL, calculated 175 (H) 04/03/2018 02:19 PM  
 Triglyceride 94 04/03/2018 02:19 PM  
 CHOL/HDL Ratio 5.9 (H) 12/11/2015 03:25 AM  
 
Lab Results Component Value Date/Time Glucose (POC) 107 (H) 07/08/2019 11:10 AM  
 Glucose (POC) 120 (H) 07/08/2019 06:44 AM  
 Glucose (POC) 135 (H) 07/07/2019 09:11 PM  
 Glucose (POC) 126 (H) 07/07/2019 04:30 PM  
 Glucose (POC) 156 (H) 07/07/2019 11:18 AM  
 
Lab Results Component Value Date/Time  Color YELLOW/STRAW 05/01/2017 05:39 PM  
 Appearance CLOUDY (A) 05/01/2017 05:39 PM  
 Specific gravity 1.018 05/01/2017 05:39 PM  
 Specific gravity >1.030 (H) 10/03/2014 04:35 AM  
 pH (UA) 5.5 05/01/2017 05:39 PM  
 Protein 300 (A) 05/01/2017 05:39 PM  
 Glucose 250 (A) 05/01/2017 05:39 PM  
 Ketone NEGATIVE  05/01/2017 05:39 PM  
 Bilirubin NEGATIVE  05/01/2017 05:39 PM  
 Urobilinogen 0.2 05/01/2017 05:39 PM  
 Nitrites NEGATIVE  05/01/2017 05:39 PM  
 Leukocyte Esterase NEGATIVE  05/01/2017 05:39 PM  
 Epithelial cells FEW 05/01/2017 05:39 PM  
 Bacteria NEGATIVE  05/01/2017 05:39 PM  
 WBC 0-4 05/01/2017 05:39 PM  
 RBC 5-10 05/01/2017 05:39 PM  
 
 
 
Medications Reviewed:  
 
Current Facility-Administered Medications Medication Dose Route Frequency  lisinopril (PRINIVIL, ZESTRIL) tablet 20 mg  20 mg Oral DAILY  lactobac ac& pc-s.therm-b.anim (USMAN Q/RISAQUAD)  1 Cap Oral DAILY  insulin glargine (LANTUS) injection 28 Units  28 Units SubCUTAneous DAILY  oxyCODONE-acetaminophen (PERCOCET) 5-325 mg per tablet 1-2 Tab  1-2 Tab Oral Q4H PRN  
 atorvastatin (LIPITOR) tablet 40 mg  40 mg Oral DAILY  sodium chloride (NS) flush 5-40 mL  5-40 mL IntraVENous Q8H  
 sodium chloride (NS) flush 5-40 mL  5-40 mL IntraVENous PRN  
 heparin (porcine) injection 5,000 Units  5,000 Units SubCUTAneous Q8H  Vancomycin Pharmacy Dosing   Other Rx Dosing/Monitoring  acetaminophen (TYLENOL) tablet 650 mg  650 mg Oral Q6H PRN  
 glucose chewable tablet 16 g  4 Tab Oral PRN  
 dextrose (D50W) injection syrg 12.5-25 g  25-50 mL IntraVENous PRN  
 glucagon (GLUCAGEN) injection 1 mg  1 mg IntraMUSCular PRN  
 insulin lispro (HUMALOG) injection   SubCUTAneous AC&HS  piperacillin-tazobactam (ZOSYN) 3.375 g in 0.9% sodium chloride (MBP/ADV) 100 mL  3.375 g IntraVENous Q12H  hydrALAZINE (APRESOLINE) 20 mg/mL injection 10 mg  10 mg IntraVENous Q6H PRN  
 sodium chloride (NS) flush 5-10 mL  5-10 mL IntraVENous PRN  
 
______________________________________________________________________ EXPECTED LENGTH OF STAY: 6d 19h ACTUAL LENGTH OF STAY:          9 Tonia Sahni MD

## 2019-07-08 NOTE — PROGRESS NOTES
Paged Podiatry regarding the patient's surgical boot. Left 2 voicemail today. Did not received any call back.

## 2019-07-09 VITALS
SYSTOLIC BLOOD PRESSURE: 175 MMHG | BODY MASS INDEX: 40.43 KG/M2 | DIASTOLIC BLOOD PRESSURE: 98 MMHG | WEIGHT: 315 LBS | HEART RATE: 93 BPM | HEIGHT: 74 IN | RESPIRATION RATE: 18 BRPM | OXYGEN SATURATION: 96 % | TEMPERATURE: 98.6 F

## 2019-07-09 LAB
GLUCOSE BLD STRIP.AUTO-MCNC: 144 MG/DL (ref 65–100)
SERVICE CMNT-IMP: ABNORMAL

## 2019-07-09 PROCEDURE — 74011250636 HC RX REV CODE- 250/636: Performed by: NURSE PRACTITIONER

## 2019-07-09 PROCEDURE — 74011636637 HC RX REV CODE- 636/637: Performed by: NURSE PRACTITIONER

## 2019-07-09 PROCEDURE — 74011636637 HC RX REV CODE- 636/637: Performed by: FAMILY MEDICINE

## 2019-07-09 PROCEDURE — 74011250636 HC RX REV CODE- 250/636: Performed by: FAMILY MEDICINE

## 2019-07-09 PROCEDURE — 82962 GLUCOSE BLOOD TEST: CPT

## 2019-07-09 RX ORDER — LISINOPRIL 10 MG/1
10 TABLET ORAL
Qty: 15 TAB | Refills: 0 | Status: ON HOLD | OUTPATIENT
Start: 2019-07-09 | End: 2019-10-09 | Stop reason: DRUGHIGH

## 2019-07-09 RX ORDER — INSULIN GLARGINE 100 [IU]/ML
40 INJECTION, SOLUTION SUBCUTANEOUS DAILY
Qty: 1 VIAL | Refills: 0 | Status: SHIPPED
Start: 2019-07-09 | End: 2019-07-09 | Stop reason: SDUPTHER

## 2019-07-09 RX ORDER — OXYCODONE HYDROCHLORIDE 5 MG/1
5 TABLET ORAL
Qty: 15 TAB | Refills: 0 | Status: SHIPPED | OUTPATIENT
Start: 2019-07-09 | End: 2019-07-14

## 2019-07-09 RX ORDER — CIPROFLOXACIN 500 MG/1
500 TABLET ORAL EVERY EVENING
Qty: 7 TAB | Refills: 0 | Status: SHIPPED | OUTPATIENT
Start: 2019-07-09 | End: 2019-10-14

## 2019-07-09 RX ORDER — INSULIN GLARGINE 100 [IU]/ML
40 INJECTION, SOLUTION SUBCUTANEOUS DAILY
Qty: 1 VIAL | Refills: 0 | Status: ON HOLD | OUTPATIENT
Start: 2019-07-10 | End: 2020-01-01 | Stop reason: SDUPTHER

## 2019-07-09 RX ADMIN — INSULIN LISPRO 3 UNITS: 100 INJECTION, SOLUTION INTRAVENOUS; SUBCUTANEOUS at 07:07

## 2019-07-09 RX ADMIN — HEPARIN SODIUM 5000 UNITS: 5000 INJECTION INTRAVENOUS; SUBCUTANEOUS at 03:44

## 2019-07-09 RX ADMIN — Medication 10 ML: at 02:51

## 2019-07-09 RX ADMIN — INSULIN GLARGINE 28 UNITS: 100 INJECTION, SOLUTION SUBCUTANEOUS at 10:22

## 2019-07-09 RX ADMIN — HYDRALAZINE HYDROCHLORIDE 10 MG: 20 INJECTION INTRAMUSCULAR; INTRAVENOUS at 02:50

## 2019-07-09 NOTE — PROGRESS NOTES
Spoke with patient's nurse this am and referred her to nurs misc orders that were placed  7/4/19; indicating for the nursing staff to check with the OR to obtain and extra large  extra large surgical shoe. Bone pathology from right foot showed clean rested margins. This patient can be discharged pending hospitalist and infectious disease clearance. Orders left 7/4/19 for case management to arrange home health and for patient to follow in my office 1-2 weeks upon discharge

## 2019-07-09 NOTE — PROGRESS NOTES
Went over discharge paperwork with pt. And sent order for volunteer to bring pt down to discharge area. Monserrat Munguia is not working

## 2019-07-09 NOTE — PROGRESS NOTES
Spiritual Care Partner Volunteer visited patient in room 617 on 7.09/19. Documented by: : Rev. Sonal Blake. Viviana Phillips; [facility], to contact [phone] Jul[address] Ruggiero call: [phone]

## 2019-07-10 ENCOUNTER — PATIENT OUTREACH (OUTPATIENT)
Dept: INTERNAL MEDICINE CLINIC | Age: 34
End: 2019-07-10

## 2019-07-10 NOTE — PROGRESS NOTES
Hospital Discharge Follow-Up Date/Time:  7/10/2019 2:59 PM 
 
Patient was admitted to [facility] on 6/28/19 and discharged on 7/9/19 for Osteomyelitis of toe Right foot s/p 5th right toe & partial metatarsal amputation 7/2. The physician discharge summary was not available at the time of outreach. Patient was contacted within 1 business days of discharge. Top Challenges reviewed with the provider Osteomyelitis of right foot s/p 5th right toe and partial metatarsal amputation   
On IV antibiotics vanc and zosyn 6/20- 7/8/2019 - discharged on cipro for 7 days. A1C 11.3 HD- M-W-F Home Health for wound care Unable to reach patient for [employer] call. No f/u appt scheduled. No ACP on file. Lives alone. Uses public transportation. Recently  from wife. Method of communication with provider :chart routing Inpatient RRAT score: n/a (no RRAT score on file at this time) Was this a readmission? no  
Patient stated reason for the readmission: n/a Disease Specific:   N/A Home Health orders at discharge: SN [facility] Home Health company: [address] Date of initial visit: 7/11/19 Durable Medical Equipment ordered/company: Surgical Shoe Durable Medical Equipment received: Received prior to discharge from hospital  
 
Barriers to care? ineffective coping, lack of knowledge about disease, support system, transportation, utilization of services Advance Care Planning:  
Does patient have an Advance Directive: not Medication(s):  
New Medications at Discharge: ciprofloxacin HCl 500 mg tablet (CIPRO) 
oxyCODONE IR 5 mg Changed Medications at Discharge: insulin glargine 100 unit/mL injection (LANTUS U-100 INSULIN) lisinopril 10 mg tablet (Michela Rapp) Discontinued Medications at Discharge: n/a Current Outpatient Medications Medication Sig  ciprofloxacin HCl (CIPRO) 500 mg tablet Take 1 Tab by mouth every evening.  lisinopril (PRINIVIL, ZESTRIL) 10 mg tablet Take 1 Tab by mouth every Sunday, Tuesday, Thursday. Check BP on the days without dialysis and if greater than 150 mm hg -take lisinopril 10 mg  
 oxyCODONE IR (ROXICODONE) 5 mg immediate release tablet Take 1 Tab by mouth every six (6) hours as needed for Pain for up to 5 days. Max Daily Amount: 20 mg.  
 insulin glargine (LANTUS U-100 INSULIN) 100 unit/mL injection 40 Units by SubCUTAneous route daily. Indications: type 2 diabetes mellitus  atorvastatin (LIPITOR) 40 mg tablet Take 1 Tab by mouth daily.  dulaglutide (TRULICITY) 2.45 SM/2.6 mL sub-q pen 0.5 mL by SubCUTAneous route every seven (7) days.  loperamide (IMODIUM) 2 mg capsule Take 1 Cap by mouth four (4) times daily as needed for Diarrhea. No current facility-administered medications for this visit. There are no discontinued medications. [facility] follow up appointment(s):  
Future Appointments Date Time Provider Nigel Rose 7/11/2019 To Be Determined Barbra Duncan [place]  
  
07/11/19 Unable to reach patient for [employer] call. Get in touch letter mailed to patients home address. NN spoke to [employer] to confirm that patient had orders for SN and OT. Asked staff to let patient know that NN was attempting to reach patient. Patient currently does not have TEJA appt scheduled. Voice messages left for patient. NN to attempt to reach patient in 2 weeks.  AR

## 2019-07-10 NOTE — LETTER
[address] [facility] [phone] My name is Jaylen Suarez. I am the care manager on Dr. Araceli Rg team. I call patients who were recently discharged from the hospital or emergency department. We are committed to providing you excellent care. I have been unable to reach you on. Please contact me at [phone] regarding your recent hospital visit. We appreciate the confidence youve shown by selecting us to provide your healthcare needs and look forward to hearing from you soon. I will also update your contact information at the time of your call. Sincerely, Jaylen Suarez, RN, MSN, CRRN, CNL

## 2019-07-11 ENCOUNTER — HOME CARE VISIT (OUTPATIENT)
Dept: SCHEDULING | Facility: HOME HEALTH | Age: 34
End: 2019-07-11
Payer: MEDICARE

## 2019-07-11 PROCEDURE — 3331090002 HH PPS REVENUE DEBIT

## 2019-07-11 PROCEDURE — 3331090001 HH PPS REVENUE CREDIT

## 2019-07-11 PROCEDURE — G0299 HHS/HOSPICE OF RN EA 15 MIN: HCPCS

## 2019-07-11 PROCEDURE — 400013 HH SOC

## 2019-07-12 PROCEDURE — 3331090001 HH PPS REVENUE CREDIT

## 2019-07-12 PROCEDURE — 3331090002 HH PPS REVENUE DEBIT

## 2019-07-12 NOTE — DISCHARGE SUMMARY
Discharge Summary PATIENT ID: Pepito Hurst MRN: [id number] YOB: 1985 DATE OF ADMISSION: 6/28/2019 10:51 PM   
DATE OF DISCHARGE: 7/9/2019 PRIMARY CARE PROVIDER: Constance Contreras DO  
 
ATTENDING PHYSICIAN: Rogelio Morrison MD 
 
DISCHARGING PROVIDER: Mia Luis MD   
To contact this individual call [phone] and ask the  to page. If unavailable ask to be transferred the Adult Hospitalist Department. CONSULTATIONS: IP CONSULT TO NEPHROLOGY 
IP CONSULT TO INFECTIOUS DISEASES 
IP CONSULT TO NEPHROLOGY PROCEDURES/SURGERIES: Procedure(s): 
AMPUTATION TOE/ METATARSAL HEAD RIGHT FOOT 
 
ADMITTING DIAGNOSES & HOSPITAL COURSE:  
33-yom AA with pmh of DM2, HTN, hyperlipidemia, ESRD on HD, obesity, cataracts, who presented to the ED from home with cc of wound to the right 5th toe. Patient had a wound to his right fifth toe for a few days.  Reportedly had malodorous drainage from the wound.  He reportedly initially complained of pain at 8/10, was severe and constant without specific alleviating factors. Was admitted for further eval. 
 
# Osteomyelitis of right foot( POA) s/p 5th right toe and partial metatarsal amputation 7/2 POD # 6  
- MRI shows post amputation right fifth ray at the mid fifth metatarsal. There is edema in the proximal right fifth metatarsal may represent Osteomyelitis. - culture grew enterobacter Cloacae - pathology with viable margin - On IV antibiotics vanc and zosyn 6/20- 7/8/2019 
-As the path report showed clear margins - he  was discharged onn oral ciprofloxacin for 7 days . - ID and podiatrist following who cleared patient for discharge -he received shoe at discharge was instructed to follow up with . Home health was arranged at discharge. 
  
  
# Uncontrolled DM w/hyperglycemia: A1c 11.3 
- on Lantus, SSI, accucheck and diabetic diet  
  
# End-stage renal disease, on hemodialysis.  
- HD on MWF 
 - Nephrology on board  
  
# Morbid obesity.   
- We would advise eventual weight loss when appropriate  
Body mass index is 45.16 kg/m². 
  
# Hypertension - Patient said he was not taking antihypertensives at home due to hypotension at dialysis. Patient had elevated BP while in the hospital so he was instructed to check BP at home and was given a prescription for lisinopril. 
  
# Hypokalemia -resolved 
  
 
DISCHARGE DIAGNOSES / PLAN:   
 
# Osteomyelitis of right foot( POA) s/p 5th right toe and partial metatarsal amputation 7/2 # Uncontrolled DM w/hyperglycemia: A1c 11.3 # End-stage renal disease, on hemodialysis. # Morbid obesity.   
 Hypertension -stable # Hypokalemia -resolved 
 
  PENDING TEST RESULTS:  
At the time of discharge the following test results are still pending: none Xr Foot Rt Min 3 V Result Date: 6/28/2019 EXAM: XR FOOT RT MIN 3 V Clinical: Diabetic foot INDICATION: diabetic, ulcer. COMPARISON: None. FINDINGS: Three views of the right foot demonstrate no fracture or other acute osseous or articular abnormality. Mild osteopenia. Peripheral arterial disease. Cortical defect at the distal aspect of the proximal phalanx of the first digit. Erosive change at the base of the proximal phalanx of the fourth digit which may be associated with the soft tissue defect. Rudolph Salazar IMPRESSION: Cortical defect along the inferior margin of the distal aspect of the first proximal phalanx, there is similar cortical irregularity in the proximal aspect of the distal phalanx. Imaging findings are concerning for osteomyelitis at the fifth digit. Erosive change at the base of the proximal phalanx of the second digit which is also concerning for possible osteomyelitis. Mri Foot Rt Wo Cont Result Date: 7/1/2019 *PRELIMINARY REPORT* Patient is status post amputation right fifth ray at the mid fifth metatarsal. There is edema in the proximal right fifth metatarsal may represent osteomyelitis. . There is soft tissue edema. There is also absence of the right first proximal phalanx. Preliminary report was provided by Dr. Benitez Son, the on-call radiologist, at 2:30 Final report to follow. *END PRELIMINARY REPORT* *FINAL REPORT BELOWEXAM:  MRI FOOT RT WO CONT INDICATION: Right foot ulceration at the fifth toe. COMPARISON: Right foot views on 6/28/2019 TECHNIQUE: Axial, coronal, and sagittal MRI of the right forefoot in the T1, T2, and inversion-recovery pulse sequences with and without fat saturation performed on the 3 Rosaura magnet. CONTRAST: None. FINDINGS: Bone marrow: There is bone marrow edema in the fifth digit phalanges. Subtle indistinct cortex in the lateral aspect of the fifth digit phalanges. Remaining bone marrow signal is within normal limits. Joint fluid: None. Tendons: Intact. Muscles: Moderate atrophy. Mild diffuse edema. Neurovascular bundles: No evidence of neuroma. Articular cartilage: Mild first MTP and MTS joint osteoarthritis. No septic arthritis. Soft tissue mass: Skin ulceration is in the lateral aspect of the fifth toe. There is underlying cellulitis. No evidence of drainable abscess. No mass. IMPRESSION: 1. Fifth toe skin ulceration with underlying cellulitis. No abscess. 2. Osteomyelitis of the fifth digit phalanges. 3. Please note that the preliminary report may have been an interpretation of the 2016 left foot. I discussed the osteomyelitis with DIONICIO Sutherland at 8627 hours on 7/1/2019. Xr Chest [facility] Result Date: 6/29/2019 EXAM:  XR CHEST PORT INDICATION:  hypoxia COMPARISON:  5/1/2017 FINDINGS: A portable AP radiograph of the chest was obtained at 734 hours. . The lungs are clear. There is slight eventration right hemidiaphragm. Heart size is borderline enlarged. The bones and soft tissues are grossly within normal limits. IMPRESSION: Lungs are clear of an acute process. FOLLOW UP APPOINTMENTS:   
Follow-up Information Follow up With Specialties Details Why Contact Cali Sofia,  Internal Medicine In 1 week  [address] [facility] 
[phone] Jennifer Perez DPM Podiatry In 10 days  [address] [place] 
[phone] [address]   [address] [place] [address] 
[phone] ADDITIONAL CARE RECOMMENDATIONS:  
-follow up with  in 1-2 weeks 
-Take ciprofloxacin for 7 days in the evening (after dialysis). -Check BP every day at home, on the days where you do not have dialysis -If Bp is greater than 150 mm hg -take 10 mg lisinopril. 
-Check Blood sugars three times daily and if less than 80 or greater than 200,call your PCP for further insulin dose adjustments. 
-Take roxicodone for severe pain. You can take tylenol or ibuprofen also for pain. 
-based on the blood sugars in the hospital we prescribed 40 U lantus once a day in the morning at discharge - dose may need to be adjusted based on the blood sugars at home. DIET: Renal Diet ACTIVITY: Activity as tolerated WOUND CARE: wound care at home with home health EQUIPMENT needed: shoe DISCHARGE MEDICATIONS: 
Discharge Medication List as of 7/9/2019 10:29 AM  
  
START taking these medications Details  
ciprofloxacin HCl (CIPRO) 500 mg tablet Take 1 Tab by mouth every evening., Print, Disp-7 Tab, R-0  
  
oxyCODONE IR (ROXICODONE) 5 mg immediate release tablet Take 1 Tab by mouth every six (6) hours as needed for Pain for up to 5 days. Max Daily Amount: 20 mg., Print, Disp-15 Tab, R-0  
  
  
CONTINUE these medications which have CHANGED Details  
lisinopril (PRINIVIL, ZESTRIL) 10 mg tablet Take 1 Tab by mouth every Sunday, Tuesday, Thursday.  Check BP on the days without dialysis and if greater than 150 mm hg -take lisinopril 10 mg, Print, Disp-15 Tab, R-0  
  
 insulin glargine (LANTUS U-100 INSULIN) 100 unit/mL injection 40 Units by SubCUTAneous route daily. Indications: type 2 diabetes mellitus, Normal, Disp-1 Vial, R-0  
  
  
CONTINUE these medications which have NOT CHANGED Details  
atorvastatin (LIPITOR) 40 mg tablet Take 1 Tab by mouth daily. , Normal, Disp-90 Tab, R-3  
  
dulaglutide (TRULICITY) 7.75 SW/3.0 mL sub-q pen 0.5 mL by SubCUTAneous route every seven (7) days. , Normal, Disp-12 Pen, R-3  
  
loperamide (IMODIUM) 2 mg capsule Take 1 Cap by mouth four (4) times daily as needed for Diarrhea., Print, Disp-60 Cap, R-0  
  
  
 
 
 
NOTIFY YOUR PHYSICIAN FOR ANY OF THE FOLLOWING:  
Fever over 101 degrees for 24 hours. Chest pain, shortness of breath, fever, chills, nausea, vomiting, diarrhea, change in mentation, falling, weakness, bleeding. Severe pain or pain not relieved by medications. Or, any other signs or symptoms that you may have questions about. DISPOSITION: 
X  Home With: 
 OT  PT  HH  RN  
  
 Long term SNF/Inpatient Rehab Independent/assisted living Hospice Other:  
 
 
PATIENT CONDITION AT DISCHARGE:  
 
Functional status Poor Deconditioned X Independent Cognition X  Lucid Forgetful Dementia Catheters/lines (plus indication) [place] PICC   
 PEG   
X None Code status X Full code DNR   
 
PHYSICAL EXAMINATION AT DISCHARGE: 
Gen:no acute distress Resp: CTA b/l 
Cardiac: S1,S2 wnl Abd: soft and non tender Musck: dry dressing on the rt foot Neuro: alert and oriented X 3,was able to move all the extremities. CHRONIC MEDICAL DIAGNOSES: 
Problem List as of 7/9/2019 Date Reviewed: 7/3/2019 Codes Class Noted - Resolved Uncontrolled hypertension ICD-10-CM: I10 
ICD-9-CM: 401.9  6/29/2019 - Present * (Principal) Acute [facility]omyel[facility] ICD-10-CM: M86.171 ICD-9-CM: 730.07  6/29/2019 - Present ESRD (end stage renal disease) on dialysis (HCC) (Chronic) ICD-10-CM: N18.6, Z99.2 ICD-9-CM: 585.6, V45.11  9/14/2018 - Present Hyperlipidemia associated with type 2 diabetes mellitus (HCC) (Chronic) ICD-10-CM: E11.69, E78.5 ICD-9-CM: 250.80, 272.4  9/14/2018 - Present Morbid obesity with body mass index (BMI) of 40.0 to 49.9 (HCC) (Chronic) ICD-10-CM: E66.01 
ICD-9-CM: 278.01  9/14/2018 - Present Uncontrolled type 2 diabetes mellitus with proliferative retinopathy, with long-term current use of insulin (HCC) (Chronic) ICD-10-CM: Q65.6480, E11.65, Z79.4 ICD-9-CM: 250.52, 362.02, V58.67  9/14/2018 - Present Uncontrolled type 2 diabetes mellitus with hyperglycemia, with long-term current use of insulin (HCC) (Chronic) ICD-10-CM: E11.65, Z79.4 ICD-9-CM: 250.02, V58.67  9/14/2018 - Present Diarrhea ICD-10-CM: R19.7 ICD-9-CM: 787.91  12/6/2017 - Present Postoperative hypoxia ICD-10-CM: R09.02, V14.324 ICD-9-CM: 799.02  5/10/2017 - Present HTN (hypertension) ICD-10-CM: I10 
ICD-9-CM: 401.9  10/16/2009 - Present RESOLVED: ESRD (end stage renal disease) ([facility] ICD-10-CM: N18.6 ICD-9-CM: 585.6  7/25/2017 - 9/14/2018 RESOLVED: CKD (chronic kidney disease) stage V requiring chronic dialysis (HCC) ICD-10-CM: N18.6, Z99.2 ICD-9-CM: 585.6, V45.11  5/10/2017 - 9/14/2018 RESOLVED: DA (acute kidney injury) ([facility] ICD-10-CM: N17.9 ICD-9-CM: 584.9  5/1/2017 - 9/14/2018 RESOLVED: Morbid obesity with BMI of 40.0-44.9, adult [facility]) ICD-10-CM: E66.01, Z68.41 
ICD-9-CM: 278.01, V85.41  12/29/2015 - 9/14/2018 RESOLVED: Osteomyelitis ([facility] ICD-10-CM: M86.9 ICD-9-CM: 730.20  12/10/2015 - 12/16/2015 RESOLVED: DM (diabetes mellitus) ([facility] ICD-10-CM: E11.9 ICD-9-CM: 250.00  10/16/2009 - 9/14/2018 RESOLVED: Hyperlipidemia ICD-10-CM: E78.5 ICD-9-CM: 272.4  10/16/2009 - 9/14/2018 Greater than 30 minutes were spent with the patient on counseling and coordination of care Signed: Analilia Priest MD 
7/12/2019 
6:55 AM

## 2019-07-12 NOTE — DISCHARGE INSTRUCTIONS
Discharge Instructions       PATIENT ID: Tee Gregorio  MRN: [id number]   YOB: 1985    DATE OF ADMISSION: 6/28/2019 10:51 PM    DATE OF DISCHARGE: 7/9/2019    PRIMARY CARE PROVIDER: Bhargavi Lombardi DO     ATTENDING PHYSICIAN: Albania Reaves MD  DISCHARGING PROVIDER: Vianca Owen MD    To contact this individual call [phone] and ask the  to page. If unavailable ask to be transferred the Adult Hospitalist Department. DISCHARGE DIAGNOSES - Osteomyelitis of right foot( POA) s/p 5th right toe and partial metatarsal amputation 7/2    CONSULTATIONS: IP CONSULT TO NEPHROLOGY  IP CONSULT TO PODIATRY  IP CONSULT TO INFECTIOUS DISEASES  IP CONSULT TO NEPHROLOGY    PROCEDURES/SURGERIES: Procedure(s):  AMPUTATION TOE/ METATARSAL HEAD RIGHT FOOT    PENDING TEST RESULTS:   At the time of discharge the following test results are still pending: none      FOLLOW UP APPOINTMENTS:   Follow-up Information     Follow up With Specialties Details Why Contact Info    Bhargavi Lombardi DO Internal Medicine In 1 week  [facility] [address]  [facility].  [phone]      Otilia Dasilva DPM Podiatry In 10 days  [address]  [phone]             ADDITIONAL CARE RECOMMENDATIONS:   -follow up with  in 1-2 weeks  -Take ciprofloxacin for 7 days in the evening (after dialysis). -Check BP every day at home, on the days where you do not have dialysis -If Bp is greater than 150 mm hg -take 10 mg lisinopril.  -Check Blood sugars three times daily and if less than 80 or greater than 200,call your PCP for further insulin dose adjustments.  -Take roxicodone for severe pain. You can take tylenol or ibuprofen also for pain.  -based on the blood sugars in the hospital we prescribed 40 U lantus once a day in the morning at discharge - dose may need to be adjusted based on the blood sugars at home.     DIET: Renal Diet      ACTIVITY: Activity as tolerated    WOUND CARE: wound care at home with home health    EQUIPMENT needed: shoe      DISCHARGE MEDICATIONS:   See Medication Reconciliation Form    · It is important that you take the medication exactly as they are prescribed. · Keep your medication in the bottles provided by the pharmacist and keep a list of the medication names, dosages, and times to be taken in your wallet. · Do not take other medications without consulting your doctor. NOTIFY YOUR PHYSICIAN FOR ANY OF THE FOLLOWING:   Fever over 101 degrees for 24 hours. Chest pain, shortness of breath, fever, chills, nausea, vomiting, diarrhea, change in mentation, falling, weakness, bleeding. Severe pain or pain not relieved by medications. Or, any other signs or symptoms that you may have questions about.       DISPOSITION:  X  Home With:   OT  PT  ROMAN  RN       SNF/Inpatient Rehab/LTAC    Independent/assisted living    Hospice    Other:         Signed:   Radha Faulkner MD  7/9/2019  9:37 AM

## 2019-07-13 PROCEDURE — 3331090002 HH PPS REVENUE DEBIT

## 2019-07-13 PROCEDURE — 3331090001 HH PPS REVENUE CREDIT

## 2019-07-14 PROCEDURE — 3331090001 HH PPS REVENUE CREDIT

## 2019-07-14 PROCEDURE — 3331090002 HH PPS REVENUE DEBIT

## 2019-07-15 ENCOUNTER — HOME CARE VISIT (OUTPATIENT)
Dept: HOME HEALTH SERVICES | Facility: HOME HEALTH | Age: 34
End: 2019-07-15
Payer: MEDICARE

## 2019-07-15 VITALS
HEART RATE: 86 BPM | WEIGHT: 315 LBS | SYSTOLIC BLOOD PRESSURE: 112 MMHG | DIASTOLIC BLOOD PRESSURE: 68 MMHG | HEIGHT: 74 IN | OXYGEN SATURATION: 96 % | RESPIRATION RATE: 18 BRPM | TEMPERATURE: 98 F | BODY MASS INDEX: 40.43 KG/M2

## 2019-07-15 PROCEDURE — 3331090001 HH PPS REVENUE CREDIT

## 2019-07-15 PROCEDURE — 3331090002 HH PPS REVENUE DEBIT

## 2019-07-15 PROCEDURE — A6449 LT COMPRES BAND >=3" <5"/YD: HCPCS

## 2019-07-15 PROCEDURE — A6223 GAUZE >16<=48 NO W/SAL W/O B: HCPCS

## 2019-07-15 PROCEDURE — A6446 CONFORM BAND S W>=3" <5"/YD: HCPCS

## 2019-07-16 ENCOUNTER — HOME CARE VISIT (OUTPATIENT)
Dept: SCHEDULING | Facility: HOME HEALTH | Age: 34
End: 2019-07-16
Payer: MEDICARE

## 2019-07-16 VITALS
SYSTOLIC BLOOD PRESSURE: 130 MMHG | TEMPERATURE: 98.1 F | DIASTOLIC BLOOD PRESSURE: 80 MMHG | OXYGEN SATURATION: 99 % | HEART RATE: 90 BPM | RESPIRATION RATE: 16 BRPM

## 2019-07-16 VITALS
HEART RATE: 65 BPM | SYSTOLIC BLOOD PRESSURE: 139 MMHG | DIASTOLIC BLOOD PRESSURE: 70 MMHG | OXYGEN SATURATION: 95 % | RESPIRATION RATE: 17 BRPM

## 2019-07-16 PROCEDURE — 3331090002 HH PPS REVENUE DEBIT

## 2019-07-16 PROCEDURE — G0299 HHS/HOSPICE OF RN EA 15 MIN: HCPCS

## 2019-07-16 PROCEDURE — 3331090001 HH PPS REVENUE CREDIT

## 2019-07-16 PROCEDURE — G0152 HHCP-SERV OF OT,EA 15 MIN: HCPCS

## 2019-07-17 PROCEDURE — 3331090002 HH PPS REVENUE DEBIT

## 2019-07-17 PROCEDURE — 3331090001 HH PPS REVENUE CREDIT

## 2019-07-18 ENCOUNTER — HOME CARE VISIT (OUTPATIENT)
Dept: SCHEDULING | Facility: HOME HEALTH | Age: 34
End: 2019-07-18
Payer: MEDICARE

## 2019-07-18 PROCEDURE — 3331090002 HH PPS REVENUE DEBIT

## 2019-07-18 PROCEDURE — G0299 HHS/HOSPICE OF RN EA 15 MIN: HCPCS

## 2019-07-18 PROCEDURE — 3331090001 HH PPS REVENUE CREDIT

## 2019-07-19 PROCEDURE — 3331090001 HH PPS REVENUE CREDIT

## 2019-07-19 PROCEDURE — 3331090002 HH PPS REVENUE DEBIT

## 2019-07-20 PROCEDURE — 3331090001 HH PPS REVENUE CREDIT

## 2019-07-20 PROCEDURE — 3331090002 HH PPS REVENUE DEBIT

## 2019-07-21 VITALS
TEMPERATURE: 98.3 F | SYSTOLIC BLOOD PRESSURE: 136 MMHG | RESPIRATION RATE: 16 BRPM | HEART RATE: 80 BPM | OXYGEN SATURATION: 96 % | DIASTOLIC BLOOD PRESSURE: 70 MMHG

## 2019-07-21 PROCEDURE — 3331090002 HH PPS REVENUE DEBIT

## 2019-07-21 PROCEDURE — 3331090001 HH PPS REVENUE CREDIT

## 2019-07-22 PROCEDURE — 3331090002 HH PPS REVENUE DEBIT

## 2019-07-22 PROCEDURE — 3331090001 HH PPS REVENUE CREDIT

## 2019-07-23 ENCOUNTER — HOME CARE VISIT (OUTPATIENT)
Dept: SCHEDULING | Facility: HOME HEALTH | Age: 34
End: 2019-07-23
Payer: MEDICARE

## 2019-07-23 VITALS
OXYGEN SATURATION: 97 % | SYSTOLIC BLOOD PRESSURE: 138 MMHG | DIASTOLIC BLOOD PRESSURE: 85 MMHG | RESPIRATION RATE: 16 BRPM | HEART RATE: 93 BPM

## 2019-07-23 PROCEDURE — 3331090001 HH PPS REVENUE CREDIT

## 2019-07-23 PROCEDURE — G0152 HHCP-SERV OF OT,EA 15 MIN: HCPCS

## 2019-07-23 PROCEDURE — 3331090002 HH PPS REVENUE DEBIT

## 2019-07-23 PROCEDURE — G0300 HHS/HOSPICE OF LPN EA 15 MIN: HCPCS

## 2019-07-24 PROCEDURE — 3331090001 HH PPS REVENUE CREDIT

## 2019-07-24 PROCEDURE — 3331090002 HH PPS REVENUE DEBIT

## 2019-07-25 ENCOUNTER — HOME CARE VISIT (OUTPATIENT)
Dept: SCHEDULING | Facility: HOME HEALTH | Age: 34
End: 2019-07-25
Payer: MEDICARE

## 2019-07-25 PROCEDURE — G0299 HHS/HOSPICE OF RN EA 15 MIN: HCPCS

## 2019-07-25 PROCEDURE — 3331090001 HH PPS REVENUE CREDIT

## 2019-07-25 PROCEDURE — 3331090002 HH PPS REVENUE DEBIT

## 2019-07-26 PROCEDURE — 3331090001 HH PPS REVENUE CREDIT

## 2019-07-26 PROCEDURE — 3331090002 HH PPS REVENUE DEBIT

## 2019-07-27 PROCEDURE — 3331090001 HH PPS REVENUE CREDIT

## 2019-07-27 PROCEDURE — 3331090002 HH PPS REVENUE DEBIT

## 2019-07-28 VITALS
SYSTOLIC BLOOD PRESSURE: 140 MMHG | HEART RATE: 80 BPM | RESPIRATION RATE: 16 BRPM | DIASTOLIC BLOOD PRESSURE: 80 MMHG | TEMPERATURE: 98 F | OXYGEN SATURATION: 97 %

## 2019-07-28 PROCEDURE — 3331090002 HH PPS REVENUE DEBIT

## 2019-07-28 PROCEDURE — 3331090001 HH PPS REVENUE CREDIT

## 2019-07-29 VITALS
TEMPERATURE: 97.9 F | HEART RATE: 79 BPM | DIASTOLIC BLOOD PRESSURE: 90 MMHG | OXYGEN SATURATION: 95 % | SYSTOLIC BLOOD PRESSURE: 146 MMHG

## 2019-07-29 PROCEDURE — 3331090001 HH PPS REVENUE CREDIT

## 2019-07-29 PROCEDURE — 3331090002 HH PPS REVENUE DEBIT

## 2019-07-30 ENCOUNTER — HOME CARE VISIT (OUTPATIENT)
Dept: SCHEDULING | Facility: HOME HEALTH | Age: 34
End: 2019-07-30
Payer: MEDICARE

## 2019-07-30 PROCEDURE — 3331090002 HH PPS REVENUE DEBIT

## 2019-07-30 PROCEDURE — 3331090001 HH PPS REVENUE CREDIT

## 2019-07-30 PROCEDURE — G0300 HHS/HOSPICE OF LPN EA 15 MIN: HCPCS

## 2019-07-31 PROCEDURE — 3331090002 HH PPS REVENUE DEBIT

## 2019-07-31 PROCEDURE — 3331090001 HH PPS REVENUE CREDIT

## 2019-08-01 ENCOUNTER — HOME CARE VISIT (OUTPATIENT)
Dept: SCHEDULING | Facility: HOME HEALTH | Age: 34
End: 2019-08-01
Payer: MEDICARE

## 2019-08-01 VITALS
DIASTOLIC BLOOD PRESSURE: 90 MMHG | OXYGEN SATURATION: 96 % | TEMPERATURE: 97.8 F | SYSTOLIC BLOOD PRESSURE: 188 MMHG | HEART RATE: 90 BPM

## 2019-08-01 PROCEDURE — 3331090002 HH PPS REVENUE DEBIT

## 2019-08-01 PROCEDURE — G0300 HHS/HOSPICE OF LPN EA 15 MIN: HCPCS

## 2019-08-01 PROCEDURE — 3331090001 HH PPS REVENUE CREDIT

## 2019-08-02 ENCOUNTER — PATIENT OUTREACH (OUTPATIENT)
Dept: INTERNAL MEDICINE CLINIC | Age: 34
End: 2019-08-02

## 2019-08-02 PROCEDURE — 3331090001 HH PPS REVENUE CREDIT

## 2019-08-02 PROCEDURE — 3331090002 HH PPS REVENUE DEBIT

## 2019-08-02 NOTE — PROGRESS NOTES
08/02/19 Unable to reach patient for [place] call. Per chart review patient continues to be followed by [facility]. Voice messages left for patient. NN to f/u 2 weeks.  AR

## 2019-08-03 PROCEDURE — 3331090002 HH PPS REVENUE DEBIT

## 2019-08-03 PROCEDURE — 3331090001 HH PPS REVENUE CREDIT

## 2019-08-04 PROCEDURE — 3331090002 HH PPS REVENUE DEBIT

## 2019-08-04 PROCEDURE — 3331090001 HH PPS REVENUE CREDIT

## 2019-08-05 VITALS
SYSTOLIC BLOOD PRESSURE: 166 MMHG | DIASTOLIC BLOOD PRESSURE: 90 MMHG | HEART RATE: 76 BPM | TEMPERATURE: 97.3 F | OXYGEN SATURATION: 96 %

## 2019-08-05 PROCEDURE — 3331090002 HH PPS REVENUE DEBIT

## 2019-08-05 PROCEDURE — 3331090001 HH PPS REVENUE CREDIT

## 2019-08-06 ENCOUNTER — HOME CARE VISIT (OUTPATIENT)
Dept: SCHEDULING | Facility: HOME HEALTH | Age: 34
End: 2019-08-06
Payer: MEDICARE

## 2019-08-06 PROCEDURE — G0300 HHS/HOSPICE OF LPN EA 15 MIN: HCPCS

## 2019-08-06 PROCEDURE — 3331090001 HH PPS REVENUE CREDIT

## 2019-08-06 PROCEDURE — 3331090002 HH PPS REVENUE DEBIT

## 2019-08-07 VITALS — HEART RATE: 89 BPM | TEMPERATURE: 97.6 F | OXYGEN SATURATION: 95 %

## 2019-08-07 PROCEDURE — 3331090002 HH PPS REVENUE DEBIT

## 2019-08-07 PROCEDURE — 3331090001 HH PPS REVENUE CREDIT

## 2019-08-08 ENCOUNTER — HOME CARE VISIT (OUTPATIENT)
Dept: SCHEDULING | Facility: HOME HEALTH | Age: 34
End: 2019-08-08
Payer: MEDICARE

## 2019-08-08 PROCEDURE — 3331090001 HH PPS REVENUE CREDIT

## 2019-08-08 PROCEDURE — G0300 HHS/HOSPICE OF LPN EA 15 MIN: HCPCS

## 2019-08-08 PROCEDURE — 3331090002 HH PPS REVENUE DEBIT

## 2019-08-09 PROCEDURE — 3331090001 HH PPS REVENUE CREDIT

## 2019-08-09 PROCEDURE — 3331090002 HH PPS REVENUE DEBIT

## 2019-08-10 PROCEDURE — 3331090001 HH PPS REVENUE CREDIT

## 2019-08-10 PROCEDURE — 3331090002 HH PPS REVENUE DEBIT

## 2019-08-11 PROCEDURE — 3331090001 HH PPS REVENUE CREDIT

## 2019-08-11 PROCEDURE — 3331090002 HH PPS REVENUE DEBIT

## 2019-08-12 ENCOUNTER — PATIENT OUTREACH (OUTPATIENT)
Dept: INTERNAL MEDICINE CLINIC | Age: 34
End: 2019-08-12

## 2019-08-12 PROCEDURE — 3331090002 HH PPS REVENUE DEBIT

## 2019-08-12 PROCEDURE — 3331090001 HH PPS REVENUE CREDIT

## 2019-08-12 NOTE — PROGRESS NOTES
08/12/19 Due to the inability to communicate with the patient episode resolved at this time. No further follow up scheduled by this NN. To this NN knowledge patient has not had any unplanned utilization at this time.  AR

## 2019-08-13 ENCOUNTER — HOME CARE VISIT (OUTPATIENT)
Dept: SCHEDULING | Facility: HOME HEALTH | Age: 34
End: 2019-08-13
Payer: MEDICARE

## 2019-08-13 PROCEDURE — 3331090001 HH PPS REVENUE CREDIT

## 2019-08-13 PROCEDURE — G0299 HHS/HOSPICE OF RN EA 15 MIN: HCPCS

## 2019-08-13 PROCEDURE — 3331090002 HH PPS REVENUE DEBIT

## 2019-08-14 PROCEDURE — 3331090001 HH PPS REVENUE CREDIT

## 2019-08-14 PROCEDURE — 3331090002 HH PPS REVENUE DEBIT

## 2019-08-15 ENCOUNTER — HOME CARE VISIT (OUTPATIENT)
Dept: SCHEDULING | Facility: HOME HEALTH | Age: 34
End: 2019-08-15

## 2019-08-15 PROCEDURE — 3331090002 HH PPS REVENUE DEBIT

## 2019-08-15 PROCEDURE — 3331090001 HH PPS REVENUE CREDIT

## 2019-08-16 ENCOUNTER — HOME CARE VISIT (OUTPATIENT)
Dept: SCHEDULING | Facility: HOME HEALTH | Age: 34
End: 2019-08-16
Payer: MEDICARE

## 2019-08-16 PROCEDURE — 3331090002 HH PPS REVENUE DEBIT

## 2019-08-16 PROCEDURE — G0299 HHS/HOSPICE OF RN EA 15 MIN: HCPCS

## 2019-08-16 PROCEDURE — 3331090003 HH PPS REVENUE ADJ

## 2019-08-16 PROCEDURE — 3331090001 HH PPS REVENUE CREDIT

## 2019-08-17 PROCEDURE — 3331090001 HH PPS REVENUE CREDIT

## 2019-08-17 PROCEDURE — 3331090002 HH PPS REVENUE DEBIT

## 2019-08-18 PROCEDURE — 3331090002 HH PPS REVENUE DEBIT

## 2019-08-18 PROCEDURE — 3331090001 HH PPS REVENUE CREDIT

## 2019-08-19 VITALS
TEMPERATURE: 97.3 F | OXYGEN SATURATION: 98 % | HEART RATE: 96 BPM | SYSTOLIC BLOOD PRESSURE: 182 MMHG | DIASTOLIC BLOOD PRESSURE: 100 MMHG

## 2019-08-19 VITALS
SYSTOLIC BLOOD PRESSURE: 136 MMHG | HEART RATE: 84 BPM | RESPIRATION RATE: 16 BRPM | TEMPERATURE: 98 F | OXYGEN SATURATION: 96 % | DIASTOLIC BLOOD PRESSURE: 80 MMHG

## 2019-08-19 VITALS
DIASTOLIC BLOOD PRESSURE: 80 MMHG | OXYGEN SATURATION: 97 % | RESPIRATION RATE: 16 BRPM | TEMPERATURE: 98 F | SYSTOLIC BLOOD PRESSURE: 130 MMHG | HEART RATE: 80 BPM

## 2019-08-19 PROCEDURE — 3331090002 HH PPS REVENUE DEBIT

## 2019-08-19 PROCEDURE — 3331090001 HH PPS REVENUE CREDIT

## 2019-10-08 ENCOUNTER — HOSPITAL ENCOUNTER (INPATIENT)
Age: 34
LOS: 6 days | Discharge: HOME | DRG: 617 | End: 2019-10-14
Attending: EMERGENCY MEDICINE | Admitting: INTERNAL MEDICINE
Payer: MEDICARE

## 2019-10-08 ENCOUNTER — APPOINTMENT (OUTPATIENT)
Dept: GENERAL RADIOLOGY | Age: 34
DRG: 617 | End: 2019-10-08
Payer: MEDICARE

## 2019-10-08 ENCOUNTER — APPOINTMENT (OUTPATIENT)
Dept: MRI IMAGING | Age: 34
DRG: 617 | End: 2019-10-08
Attending: INTERNAL MEDICINE
Payer: MEDICARE

## 2019-10-08 ENCOUNTER — APPOINTMENT (OUTPATIENT)
Dept: GENERAL RADIOLOGY | Age: 34
DRG: 617 | End: 2019-10-08
Attending: EMERGENCY MEDICINE
Payer: MEDICARE

## 2019-10-08 DIAGNOSIS — M86.9 OSTEOMYELITIS OF RIGHT FOOT, UNSPECIFIED TYPE (HCC): Primary | ICD-10-CM

## 2019-10-08 LAB
ALBUMIN SERPL-MCNC: 3.3 G/DL (ref 3.5–5)
ALBUMIN/GLOB SERPL: 0.5 {RATIO} (ref 1.1–2.2)
ALP SERPL-CCNC: 51 U/L (ref 45–117)
ALT SERPL-CCNC: 14 U/L (ref 12–78)
ANION GAP SERPL CALC-SCNC: 3 MMOL/L (ref 5–15)
AST SERPL-CCNC: 19 U/L (ref 15–37)
BASOPHILS # BLD: 0 K/UL (ref 0–0.1)
BASOPHILS NFR BLD: 0 % (ref 0–1)
BILIRUB SERPL-MCNC: 0.5 MG/DL (ref 0.2–1)
BUN SERPL-MCNC: 39 MG/DL (ref 6–20)
BUN/CREAT SERPL: 4 (ref 12–20)
CALCIUM SERPL-MCNC: 9.2 MG/DL (ref 8.5–10.1)
CHLORIDE SERPL-SCNC: 97 MMOL/L (ref 97–108)
CO2 SERPL-SCNC: 38 MMOL/L (ref 21–32)
CREAT SERPL-MCNC: 10.3 MG/DL (ref 0.7–1.3)
DIFFERENTIAL METHOD BLD: ABNORMAL
EOSINOPHIL # BLD: 0.4 K/UL (ref 0–0.4)
EOSINOPHIL NFR BLD: 4 % (ref 0–7)
ERYTHROCYTE [DISTWIDTH] IN BLOOD BY AUTOMATED COUNT: 15.5 % (ref 11.5–14.5)
GLOBULIN SER CALC-MCNC: 6.1 G/DL (ref 2–4)
GLUCOSE BLD STRIP.AUTO-MCNC: 178 MG/DL (ref 65–100)
GLUCOSE SERPL-MCNC: 177 MG/DL (ref 65–100)
HCT VFR BLD AUTO: 39.9 % (ref 36.6–50.3)
HGB BLD-MCNC: 12.5 G/DL (ref 12.1–17)
IMM GRANULOCYTES # BLD AUTO: 0 K/UL (ref 0–0.04)
IMM GRANULOCYTES NFR BLD AUTO: 0 % (ref 0–0.5)
LYMPHOCYTES # BLD: 2.8 K/UL (ref 0.8–3.5)
LYMPHOCYTES NFR BLD: 28 % (ref 12–49)
MCH RBC QN AUTO: 27 PG (ref 26–34)
MCHC RBC AUTO-ENTMCNC: 31.3 G/DL (ref 30–36.5)
MCV RBC AUTO: 86.2 FL (ref 80–99)
MONOCYTES # BLD: 0.9 K/UL (ref 0–1)
MONOCYTES NFR BLD: 9 % (ref 5–13)
NEUTS SEG # BLD: 5.9 K/UL (ref 1.8–8)
NEUTS SEG NFR BLD: 59 % (ref 32–75)
NRBC # BLD: 0 K/UL (ref 0–0.01)
NRBC BLD-RTO: 0 PER 100 WBC
PLATELET # BLD AUTO: 261 K/UL (ref 150–400)
PMV BLD AUTO: 9.8 FL (ref 8.9–12.9)
POTASSIUM SERPL-SCNC: 4.9 MMOL/L (ref 3.5–5.1)
PROT SERPL-MCNC: 9.4 G/DL (ref 6.4–8.2)
RBC # BLD AUTO: 4.63 M/UL (ref 4.1–5.7)
SERVICE CMNT-IMP: ABNORMAL
SODIUM SERPL-SCNC: 138 MMOL/L (ref 136–145)
WBC # BLD AUTO: 10 K/UL (ref 4.1–11.1)

## 2019-10-08 PROCEDURE — 74011250636 HC RX REV CODE- 250/636: Performed by: EMERGENCY MEDICINE

## 2019-10-08 PROCEDURE — 74011250637 HC RX REV CODE- 250/637: Performed by: INTERNAL MEDICINE

## 2019-10-08 PROCEDURE — 85025 COMPLETE CBC W/AUTO DIFF WBC: CPT

## 2019-10-08 PROCEDURE — 74011000250 HC RX REV CODE- 250: Performed by: INTERNAL MEDICINE

## 2019-10-08 PROCEDURE — 96375 TX/PRO/DX INJ NEW DRUG ADDON: CPT

## 2019-10-08 PROCEDURE — 96374 THER/PROPH/DIAG INJ IV PUSH: CPT

## 2019-10-08 PROCEDURE — 73630 X-RAY EXAM OF FOOT: CPT

## 2019-10-08 PROCEDURE — 74011250636 HC RX REV CODE- 250/636: Performed by: INTERNAL MEDICINE

## 2019-10-08 PROCEDURE — 82962 GLUCOSE BLOOD TEST: CPT

## 2019-10-08 PROCEDURE — 80053 COMPREHEN METABOLIC PANEL: CPT

## 2019-10-08 PROCEDURE — 99284 EMERGENCY DEPT VISIT MOD MDM: CPT

## 2019-10-08 PROCEDURE — 36415 COLL VENOUS BLD VENIPUNCTURE: CPT

## 2019-10-08 PROCEDURE — 65660000000 HC RM CCU STEPDOWN

## 2019-10-08 PROCEDURE — 74011000258 HC RX REV CODE- 258: Performed by: INTERNAL MEDICINE

## 2019-10-08 PROCEDURE — 71046 X-RAY EXAM CHEST 2 VIEWS: CPT

## 2019-10-08 PROCEDURE — 73718 MRI LOWER EXTREMITY W/O DYE: CPT

## 2019-10-08 RX ORDER — MORPHINE SULFATE 2 MG/ML
4 INJECTION, SOLUTION INTRAMUSCULAR; INTRAVENOUS
Status: DISCONTINUED | OUTPATIENT
Start: 2019-10-08 | End: 2019-10-08

## 2019-10-08 RX ORDER — LOPERAMIDE HYDROCHLORIDE 2 MG/1
2 CAPSULE ORAL
Status: DISCONTINUED | OUTPATIENT
Start: 2019-10-08 | End: 2019-10-14 | Stop reason: HOSPADM

## 2019-10-08 RX ORDER — ATORVASTATIN CALCIUM 40 MG/1
40 TABLET, FILM COATED ORAL DAILY
Status: DISCONTINUED | OUTPATIENT
Start: 2019-10-09 | End: 2019-10-14 | Stop reason: HOSPADM

## 2019-10-08 RX ORDER — VANCOMYCIN 2 GRAM/500 ML IN 0.9 % SODIUM CHLORIDE INTRAVENOUS
2 ONCE
Status: COMPLETED | OUTPATIENT
Start: 2019-10-08 | End: 2019-10-08

## 2019-10-08 RX ORDER — MAGNESIUM SULFATE 100 %
4 CRYSTALS MISCELLANEOUS AS NEEDED
Status: DISCONTINUED | OUTPATIENT
Start: 2019-10-08 | End: 2019-10-14 | Stop reason: HOSPADM

## 2019-10-08 RX ORDER — DEXTROSE 50 % IN WATER (D50W) INTRAVENOUS SYRINGE
12.5-25 AS NEEDED
Status: DISCONTINUED | OUTPATIENT
Start: 2019-10-08 | End: 2019-10-14 | Stop reason: HOSPADM

## 2019-10-08 RX ORDER — MORPHINE SULFATE 2 MG/ML
2 INJECTION, SOLUTION INTRAMUSCULAR; INTRAVENOUS
Status: DISCONTINUED | OUTPATIENT
Start: 2019-10-08 | End: 2019-10-14 | Stop reason: HOSPADM

## 2019-10-08 RX ORDER — METOPROLOL TARTRATE 5 MG/5ML
5 INJECTION INTRAVENOUS
Status: DISCONTINUED | OUTPATIENT
Start: 2019-10-08 | End: 2019-10-14 | Stop reason: HOSPADM

## 2019-10-08 RX ORDER — GUAIFENESIN 100 MG/5ML
400 SOLUTION ORAL 3 TIMES DAILY
Status: DISCONTINUED | OUTPATIENT
Start: 2019-10-08 | End: 2019-10-14 | Stop reason: HOSPADM

## 2019-10-08 RX ORDER — ACETAMINOPHEN 325 MG/1
650 TABLET ORAL
Status: DISCONTINUED | OUTPATIENT
Start: 2019-10-08 | End: 2019-10-14 | Stop reason: HOSPADM

## 2019-10-08 RX ORDER — INSULIN LISPRO 100 [IU]/ML
INJECTION, SOLUTION INTRAVENOUS; SUBCUTANEOUS
Status: DISCONTINUED | OUTPATIENT
Start: 2019-10-08 | End: 2019-10-14 | Stop reason: HOSPADM

## 2019-10-08 RX ORDER — ONDANSETRON 2 MG/ML
4 INJECTION INTRAMUSCULAR; INTRAVENOUS
Status: DISCONTINUED | OUTPATIENT
Start: 2019-10-08 | End: 2019-10-14 | Stop reason: HOSPADM

## 2019-10-08 RX ORDER — INSULIN GLARGINE 100 [IU]/ML
40 INJECTION, SOLUTION SUBCUTANEOUS DAILY
Status: DISCONTINUED | OUTPATIENT
Start: 2019-10-09 | End: 2019-10-14 | Stop reason: HOSPADM

## 2019-10-08 RX ORDER — ONDANSETRON 2 MG/ML
4 INJECTION INTRAMUSCULAR; INTRAVENOUS
Status: COMPLETED | OUTPATIENT
Start: 2019-10-08 | End: 2019-10-08

## 2019-10-08 RX ORDER — LISINOPRIL 10 MG/1
10 TABLET ORAL
Status: DISCONTINUED | OUTPATIENT
Start: 2019-10-08 | End: 2019-10-09

## 2019-10-08 RX ORDER — MORPHINE SULFATE 2 MG/ML
6 INJECTION, SOLUTION INTRAMUSCULAR; INTRAVENOUS
Status: COMPLETED | OUTPATIENT
Start: 2019-10-08 | End: 2019-10-08

## 2019-10-08 RX ADMIN — GUAIFENESIN 400 MG: 200 SOLUTION ORAL at 22:42

## 2019-10-08 RX ADMIN — VANCOMYCIN HYDROCHLORIDE 2000 MG: 10 INJECTION, POWDER, LYOPHILIZED, FOR SOLUTION INTRAVENOUS at 19:11

## 2019-10-08 RX ADMIN — MORPHINE SULFATE 6 MG: 2 INJECTION, SOLUTION INTRAMUSCULAR; INTRAVENOUS at 15:33

## 2019-10-08 RX ADMIN — ONDANSETRON 4 MG: 2 INJECTION INTRAMUSCULAR; INTRAVENOUS at 15:33

## 2019-10-08 RX ADMIN — MORPHINE SULFATE 2 MG: 2 INJECTION, SOLUTION INTRAMUSCULAR; INTRAVENOUS at 23:02

## 2019-10-08 RX ADMIN — LISINOPRIL 10 MG: 5 TABLET ORAL at 19:11

## 2019-10-08 RX ADMIN — METOPROLOL TARTRATE 5 MG: 5 INJECTION INTRAVENOUS at 22:50

## 2019-10-08 RX ADMIN — CEFEPIME HYDROCHLORIDE 1 G: 1 INJECTION, POWDER, FOR SOLUTION INTRAMUSCULAR; INTRAVENOUS at 22:42

## 2019-10-08 NOTE — H&P
Hospitalist Admission Note NAME: Toña Braxton :  1985 MRN:  [id number] Date/Time:  10/8/2019 6:07 PM 
 
Patient PCP: Ning Woodward, DO 
______________________________________________________________________ Given the patient's current clinical presentation, I have a high level of concern for decompensation if discharged from the emergency department. Complex decision making was performed, which includes reviewing the patient's available past medical records, laboratory results, and x-ray films. My assessment of this patient's clinical condition and my plan of care is as follows. Assessment / Plan: 
OM right 4th Toe: bone seems exposed, check MRI, get cultures, start Cefepime and Vancomycin, get Podiatry evaluation. ESRD on HD MWF: get Nephrology eval for HD on schedule. HTN: c/w ACE, use metoprolol prn. DM type 2 with hyperglycemia: c/w Lantus, place SSI, check HbA1C. Hyperlipidemia: c/w statin URI: c/w supportive treatment. Code Status: Full Code Surrogate Decision Maker: aunana Alonzo [id number] DVT Prophylaxis: SCD 
GI Prophylaxis: not indicated Baseline: fairly independent, full ADL Subjective: CHIEF COMPLAINT: \"My right foot hurts and is draining\" HISTORY OF PRESENT ILLNESS:    
Shannan Milan is a 27 y.o.  male  with PMHx significant for diabetes, end-stage renal disease, osteomyelitis, presents to the ED with cc of redness and ulceration to the fourth digit of the right foot over the last 3 to 4 days. Additionally, patient notes a yellowish discharge from the wound as well. He also reports he has had sinus congestion, sneezing, productive cough, and fevers and chills over the last 1 week. Today he is also started losing his voice. He is concerned because he had a recent case of osteomyelitis several months back which required the irritation of his fifth digit of the right foot.    [address] [place] [address] with his podiatrist.  He has no other associated symptoms. No other exacerbating or ameliorating factors. AT this time patient is lying in bed c/o pain and drainage over the dorsum of his right 4th toe, denies fever, no chest pain, no SOB, no N/V no diarrhea, no urinary symptoms, no other associated symptoms. We were asked to admit for work up and evaluation of the above problems. Past Medical History:  
Diagnosis Date  Cataracts  Chronic kidney disease  Diabetes ([address]  Hypercholesterolemia  Hypertension  Kidney failure  Obesity Past Surgical History:  
Procedure Laterality Date  COLONOSCOPY N/A 12/8/2017 COLONOSCOPY performed by Malick Banuelos MD at [facility] ENDOSCOPY  COLONOSCOPY,DIAGNOSTIC  12/8/2017  HX AMPUTATION Left great toe and L 5th toe  UPPER GI ENDOSCOPY,BIOPSY  12/8/2017  VASCULAR SURGERY PROCEDURE UNLIST    
 R side chest  
 VASCULAR SURGERY PROCEDURE UNLIST  07/25/2017 Creation transposed AV fistula right arm Social History Tobacco Use  Smoking status: Former Smoker Packs/day: 0.25  Smokeless tobacco: Never Used  Tobacco comment: quit 6 months ago Substance Use Topics  Alcohol use: No  
  Comment: rarely Family History Problem Relation Age of Onset  Diabetes Mother  Liver Disease Father  Kidney Disease Maternal Grandfather  Diabetes Maternal Grandfather  Hypertension Maternal Grandfather  Diabetes Paternal Uncle  Hypertension Paternal Uncle No Known Allergies Prior to Admission medications Medication Sig Start Date End Date Taking? Authorizing Provider  
ciprofloxacin HCl (CIPRO) 500 mg tablet Take 1 Tab by mouth every evening. 7/9/19   Hao Ramirez MD  
lisinopril (PRINIVIL, ZESTRIL) 10 mg tablet Take 1 Tab by mouth every Sunday, Tuesday, Thursday.  Check BP on the days without dialysis and if greater than 150 mm hg -take lisinopril 10 mg 7/9/19   Isidra Mahmood MD  
insulin glargine (LANTUS U-100 INSULIN) 100 unit/mL injection 40 Units by SubCUTAneous route daily. Indications: type 2 diabetes mellitus 7/10/19   Isidra Mahmood MD  
atorvastatin (LIPITOR) 40 mg tablet Take 1 Tab by mouth daily. 9/14/18   Juan J Burton III, DO  
dulaglutide (TRULICITY) 7.33 IW/9.8 mL sub-q pen 0.5 mL by SubCUTAneous route every seven (7) days. 4/3/18   Qing Lopez MD  
loperamide (IMODIUM) 2 mg capsule Take 1 Cap by mouth four (4) times daily as needed for Diarrhea. 12/9/17   Jeremie Torres MD  
 
 
REVIEW OF SYSTEMS:    
I am not able to complete the review of systems because: The patient is intubated and sedated The patient has altered mental status due to his acute medical problems The patient has baseline aphasia from prior stroke(s) The patient has baseline dementia and is not reliable historian The patient is in acute medical distress and unable to provide information Total of 12 systems reviewed as follows:   
   POSITIVE= underlined text  Negative = text not underlined General:  fever, chills, sweats, generalized weakness, weight loss/gain,  
   loss of appetite Eyes:    blurred vision, eye pain, loss of vision, double vision ENT:    rhinorrhea, pharyngitis Respiratory:   cough, sputum production, SOB, LITTLE, wheezing, pleuritic pain  
Cardiology:   chest pain, palpitations, orthopnea, PND, edema, syncope Gastrointestinal:  abdominal pain , N/V, diarrhea, dysphagia, constipation, bleeding Genitourinary:  frequency, urgency, dysuria, hematuria, incontinence Muskuloskeletal :  arthralgia, myalgia, back pain Hematology:  easy bruising, nose or gum bleeding, lymphadenopathy Dermatological: rash, ulceration, pruritis, color change / jaundice Endocrine:   hot flashes or polydipsia Neurological:  headache, dizziness, confusion, focal weakness, paresthesia, 
 Speech difficulties, memory loss, gait difficulty Psychological: Feelings of anxiety, depression, agitation Objective: VITALS:   
Visit Vitals /82 (BP 1 Location: Right arm, BP Patient Position: At rest) Pulse 81 Temp 98.5 °F (36.9 °C) Resp 18 Ht 6' 2\" (1.88 m) Wt 154.5 kg (340 lb 9.8 oz) SpO2 99% BMI 43.73 kg/m² PHYSICAL EXAM: 
 
General:    Alert, cooperative, no distress, appears stated age. HEENT: Atraumatic, anicteric sclerae, pink conjunctivae No oral ulcers, mucosa moist, throat clear, dentition fair Neck:  Supple, symmetrical,  thyroid: non tender Lungs:   Coarse BS Chest wall:  No tenderness  No Accessory muscle use. Heart:   Regular  rhythm,  No  murmur   No edema Abdomen:   Soft, non-tender. Not distended. Bowel sounds normal 
Extremities: No cyanosis. No clubbing,   
  Skin turgor normal, Capillary refill normal, Radial  pulse 2+ Skin:     Not pale. Not Jaundiced +  rashes , draining wound over dorsum of right 4th toe Psych:  Good insight. Not depressed. Not anxious or agitated. Neurologic: EOMs intact. No facial asymmetry. No aphasia or slurred speech. Symmetrical strength, Sensation grossly intact. Alert and oriented X 4.  
 
_______________________________________________________________________ Care Plan discussed with: 
  Comments Patient y Family RN y   
Care Manager Consultant:     
_______________________________________________________________________ Expected  Disposition:  
Home with Family y HH/PT/OT/RN   
SNF/LTC   
JAJA   
________________________________________________________________________ TOTAL TIME:  60 Minutes Critical Care Provided     Minutes non procedure based Comments  
 y Reviewed previous records  
>50% of visit spent in counseling and coordination of care y Discussion with patient and/or family and questions answered ________________________________________________________________________ Signed: Sudha Dupree MD 
 
Procedures: see electronic medical records for all procedures/Xrays and details which were not copied into this note but were reviewed prior to creation of Plan. LAB DATA REVIEWED:   
Recent Results (from the past 24 hour(s)) CBC WITH AUTOMATED DIFF Collection Time: 10/08/19 12:53 PM  
Result Value Ref Range WBC 10.0 4.1 - 11.1 K/uL  
 RBC 4.63 4.10 - 5.70 M/uL  
 HGB 12.5 12.1 - 17.0 g/dL HCT 39.9 36.6 - 50.3 % MCV 86.2 80.0 - 99.0 FL  
 MCH 27.0 26.0 - 34.0 PG  
 MCHC 31.3 30.0 - 36.5 g/dL  
 RDW 15.5 (H) 11.5 - 14.5 % PLATELET 253 458 - 347 K/uL MPV 9.8 8.9 - 12.9 FL  
 NRBC 0.0 0  WBC ABSOLUTE NRBC 0.00 0.00 - 0.01 K/uL NEUTROPHILS 59 32 - 75 % LYMPHOCYTES 28 12 - 49 % MONOCYTES 9 5 - 13 % EOSINOPHILS 4 0 - 7 % BASOPHILS 0 0 - 1 % IMMATURE GRANULOCYTES 0 0.0 - 0.5 % ABS. NEUTROPHILS 5.9 1.8 - 8.0 K/UL  
 ABS. LYMPHOCYTES 2.8 0.8 - 3.5 K/UL  
 ABS. MONOCYTES 0.9 0.0 - 1.0 K/UL  
 ABS. EOSINOPHILS 0.4 0.0 - 0.4 K/UL  
 ABS. BASOPHILS 0.0 0.0 - 0.1 K/UL  
 ABS. IMM. GRANS. 0.0 0.00 - 0.04 K/UL  
 DF AUTOMATED METABOLIC PANEL, COMPREHENSIVE Collection Time: 10/08/19 12:53 PM  
Result Value Ref Range Sodium 138 136 - 145 mmol/L Potassium 4.9 3.5 - 5.1 mmol/L Chloride 97 97 - 108 mmol/L  
 CO2 38 (H) 21 - 32 mmol/L Anion gap 3 (L) 5 - 15 mmol/L Glucose 177 (H) 65 - 100 mg/dL BUN 39 (H) 6 - 20 MG/DL Creatinine 10.30 (H) 0.70 - 1.30 MG/DL  
 BUN/Creatinine ratio 4 (L) 12 - 20 GFR est AA 7 (L) >60 ml/min/1.73m2 GFR est non-AA 6 (L) >60 ml/min/1.73m2 Calcium 9.2 8.5 - 10.1 MG/DL Bilirubin, total 0.5 0.2 - 1.0 MG/DL  
 ALT (SGPT) 14 12 - 78 U/L  
 AST (SGOT) 19 15 - 37 U/L Alk. phosphatase 51 45 - 117 U/L Protein, total 9.4 (H) 6.4 - 8.2 g/dL Albumin 3.3 (L) 3.5 - 5.0 g/dL Globulin 6.1 (H) 2.0 - 4.0 g/dL A-G Ratio 0.5 (L) 1.1 - 2.2

## 2019-10-08 NOTE — PROGRESS NOTES
-Please complete MRI History and Safety Screening Form for this patient using KARDEX only under Orders Requiring a Screening Form: 
 

## 2019-10-08 NOTE — ED NOTES
Verbal shift change report given to DIONICIO Palacio  (oncoming nurse) by this RN (offgoing nurse). Report included the following information SBAR.

## 2019-10-08 NOTE — ED PROVIDER NOTES
EMERGENCY DEPARTMENT HISTORY AND PHYSICAL EXAM 
 
 
Date: 10/8/2019 Patient Name: Peña Giordano History of Presenting Illness Chief Complaint Patient presents with  
 Skin Problem c/o redness and increased pain to right foot; hx of right 5th toe amputation in July  Cough  
  x two weeks with nasal congestion and sore throat History Provided By: Patient HPI: Peña Giordano, 27 y.o. male with PMHx significant for diabetes, end-stage renal disease, osteomyelitis, presents to the ED with cc of redness and ulceration to the fourth digit of the right foot over the last 3 to 4 days. Additionally, patient notes a yellowish discharge from the wound as well. He also reports he has had sinus congestion, sneezing, productive cough, and fevers and chills over the last 1 week. Today he is also started losing his voice. He is concerned because he had a recent case of osteomyelitis several months back which required the irritation of his fifth digit of the right foot. Dr. Brody Joe with his podiatrist.  He has no other associated symptoms. No other exacerbating or ameliorating factors. There are no other complaints, changes, or physical findings at this time. PCP: Lizabeth Given, DO No current facility-administered medications on file prior to encounter. Current Outpatient Medications on File Prior to Encounter Medication Sig Dispense Refill  ciprofloxacin HCl (CIPRO) 500 mg tablet Take 1 Tab by mouth every evening. 7 Tab 0  
 lisinopril (PRINIVIL, ZESTRIL) 10 mg tablet Take 1 Tab by mouth every Sunday, Tuesday, Thursday. Check BP on the days without dialysis and if greater than 150 mm hg -take lisinopril 10 mg 15 Tab 0  
 insulin glargine (LANTUS U-100 INSULIN) 100 unit/mL injection 40 Units by SubCUTAneous route daily. Indications: type 2 diabetes mellitus 1 Vial 0  
 atorvastatin (LIPITOR) 40 mg tablet Take 1 Tab by mouth daily.  90 Tab 3  
  dulaglutide (TRULICITY) 9.60 AR/9.7 mL sub-q pen 0.5 mL by SubCUTAneous route every seven (7) days. 12 Pen 3  
 loperamide (IMODIUM) 2 mg capsule Take 1 Cap by mouth four (4) times daily as needed for Diarrhea. 60 Cap 0 Past History Past Medical History: 
Past Medical History:  
Diagnosis Date  Cataracts  Chronic kidney disease  Diabetes ([address]  Hypercholesterolemia  Hypertension  Kidney failure  Obesity Past Surgical History: 
Past Surgical History:  
Procedure Laterality Date  COLONOSCOPY N/A 12/8/2017 COLONOSCOPY performed by Christopher Yin MD at \[facility]"" ENDOSCOPY  COLONOSCOPY,DIAGNOSTIC  12/8/2017  HX AMPUTATION Left great toe and L 5th toe  UPPER GI ENDOSCOPY,BIOPSY  12/8/2017  VASCULAR SURGERY PROCEDURE UNLIST    
 R side chest  
 VASCULAR SURGERY PROCEDURE UNLIST  07/25/2017 Creation transposed AV fistula right arm Family History: 
Family History Problem Relation Age of Onset  Diabetes Mother  Liver Disease Father  Kidney Disease Maternal Grandfather  Diabetes Maternal Grandfather  Hypertension Maternal Grandfather  Diabetes Paternal Uncle  Hypertension Paternal Uncle Social History: 
Social History Tobacco Use  Smoking status: Former Smoker Packs/day: 0.25  Smokeless tobacco: Never Used  Tobacco comment: quit 6 months ago Substance Use Topics  Alcohol use: No  
  Comment: rarely  Drug use: No  
 
 
Allergies: 
No Known Allergies Review of Systems Review of Systems Constitutional: Negative for chills, diaphoresis, fatigue and fever. HENT: Positive for rhinorrhea, sinus pressure and sore throat. Negative for ear pain. Eyes: Negative for pain and redness. Respiratory: Positive for cough and shortness of breath. Cardiovascular: Negative for chest pain and leg swelling. Gastrointestinal: Negative for abdominal pain, diarrhea, nausea and vomiting. Endocrine: Negative for cold intolerance and heat intolerance. Genitourinary: Negative for flank pain and hematuria. Musculoskeletal: Positive for joint swelling. Negative for back pain and neck stiffness. Skin: Negative for rash and wound. Neurological: Negative for dizziness, syncope and headaches. All other systems reviewed and are negative. Physical Exam  
Physical Exam  
Constitutional: He is oriented to person, place, and time. He appears well-developed and well-nourished. HENT:  
Head: Normocephalic and atraumatic. Mouth/Throat: Oropharynx is clear and moist. No oropharyngeal exudate. Eyes: Pupils are equal, round, and reactive to light. Conjunctivae and EOM are normal.  
Neck: Normal range of motion. Moderate swelling to the nasal turbinates bilaterally. Mild erythema to the posterior pharynx. Cardiovascular: Normal rate and regular rhythm. No murmur heard. Pulmonary/Chest: Effort normal and breath sounds normal. No respiratory distress. He has no wheezes. Abdominal: Soft. Bowel sounds are normal. He exhibits no distension. There is no tenderness. Musculoskeletal: Normal range of motion. He exhibits no edema or deformity. Right foot: Patient has healed site of irritation to the fifth digit. There is no erythema or swelling at that site. The fourth digit has a 1.5 cm ulcerative lesion over the dorsal aspect of the fourth digit. It appears to extend down to the extensor tendons of the fourth digit. There is no obvious exposed bone at this time. Neurological: He is alert and oriented to person, place, and time. Coordination normal.  
Skin: Skin is warm and dry. No rash noted. Psychiatric: He has a normal mood and affect. His behavior is normal.  
Nursing note and vitals reviewed. Diagnostic Study Results Labs - Recent Results (from the past 24 hour(s)) CBC WITH AUTOMATED DIFF Collection Time: 10/08/19 12:53 PM  
Result Value Ref Range WBC 10.0 4.1 - 11.1 K/uL  
 RBC 4.63 4.10 - 5.70 M/uL  
 HGB 12.5 12.1 - 17.0 g/dL HCT 39.9 36.6 - 50.3 % MCV 86.2 80.0 - 99.0 FL  
 MCH 27.0 26.0 - 34.0 PG  
 MCHC 31.3 30.0 - 36.5 g/dL  
 RDW 15.5 (H) 11.5 - 14.5 % PLATELET 813 457 - 115 K/uL MPV 9.8 8.9 - 12.9 FL  
 NRBC 0.0 0  WBC ABSOLUTE NRBC 0.00 0.00 - 0.01 K/uL NEUTROPHILS 59 32 - 75 % LYMPHOCYTES 28 12 - 49 % MONOCYTES 9 5 - 13 % EOSINOPHILS 4 0 - 7 % BASOPHILS 0 0 - 1 % IMMATURE GRANULOCYTES 0 0.0 - 0.5 % ABS. NEUTROPHILS 5.9 1.8 - 8.0 K/UL  
 ABS. LYMPHOCYTES 2.8 0.8 - 3.5 K/UL  
 ABS. MONOCYTES 0.9 0.0 - 1.0 K/UL  
 ABS. EOSINOPHILS 0.4 0.0 - 0.4 K/UL  
 ABS. BASOPHILS 0.0 0.0 - 0.1 K/UL  
 ABS. IMM. GRANS. 0.0 0.00 - 0.04 K/UL  
 DF AUTOMATED METABOLIC PANEL, COMPREHENSIVE Collection Time: 10/08/19 12:53 PM  
Result Value Ref Range Sodium 138 136 - 145 mmol/L Potassium 4.9 3.5 - 5.1 mmol/L Chloride 97 97 - 108 mmol/L  
 CO2 38 (H) 21 - 32 mmol/L Anion gap 3 (L) 5 - 15 mmol/L Glucose 177 (H) 65 - 100 mg/dL BUN 39 (H) 6 - 20 MG/DL Creatinine 10.30 (H) 0.70 - 1.30 MG/DL  
 BUN/Creatinine ratio 4 (L) 12 - 20 GFR est AA 7 (L) >60 ml/min/1.73m2 GFR est non-AA 6 (L) >60 ml/min/1.73m2 Calcium 9.2 8.5 - 10.1 MG/DL Bilirubin, total 0.5 0.2 - 1.0 MG/DL  
 ALT (SGPT) 14 12 - 78 U/L  
 AST (SGOT) 19 15 - 37 U/L Alk. phosphatase 51 45 - 117 U/L Protein, total 9.4 (H) 6.4 - 8.2 g/dL Albumin 3.3 (L) 3.5 - 5.0 g/dL Globulin 6.1 (H) 2.0 - 4.0 g/dL A-G Ratio 0.5 (L) 1.1 - 2.2 Radiologic Studies -  
XR FOOT RT MIN 3 V Final Result IMPRESSION: Expected postsurgical changes of interval fifth toe and distal fifth  
metatarsal amputation. No acute osseous abnormality. XR CHEST PA LAT Final Result IMPRESSION: No acute process CT Results  (Last 48 hours) None CXR Results  (Last 48 hours) 10/08/19 1410  XR CHEST PA LAT Final result Impression:  IMPRESSION: No acute process Narrative:  INDICATION:  cough COMPARISON: June 29 FINDINGS: PA and lateral views of the chest demonstrate a stable  
cardiomediastinal silhouette and clear lungs bilaterally. The visualized osseous  
structures are unremarkable. Medical Decision Making I am the first provider for this patient. I reviewed the vital signs, available nursing notes, past medical history, past surgical history, family history and social history. Vital Signs-Reviewed the patient's vital signs. Patient Vitals for the past 24 hrs: 
 Temp Pulse Resp BP SpO2  
10/08/19 1622  81 18 133/82 99 % 10/08/19 1530  84 17 (!) 158/95 95 % 10/08/19 1242 98.5 °F (36.9 °C) 94 18 159/90 98 % Records Reviewed: Nursing Notes and Old Medical Records Differential Diagnosis: 
 
Osteomyelitis versus cellulitis versus sinusitis versus pneumonia Provider Notes (Medical Decision Making):  
Patient with symptoms consistent with early acute osteomyelitis. Will order MRI of the foot and ordered broad-spectrum antibiotics. Dr. Lindsey Bain will see patient first thing in the morning for further evaluation. ED Course:  
 
Initial assessment performed. The patients presenting problems have been discussed, and they are in agreement with the care plan formulated and outlined with them. I have encouraged them to ask questions as they arise throughout their visit. Critical Care Time:  
 
None Disposition: 
Admit Note: 
5:19 PM 
Pt is being admitted by hospitalist. The results of their tests and reason(s) for their admission have been discussed with pt and/or available family. They convey agreement and understanding for the need to be admitted and for admission diagnosis. PLAN: 
1. Current Discharge Medication List  
  
 
2. Follow-up Information None Return to ED if worse Diagnosis Clinical Impression: 1. Osteomyelitis of right foot, unspecified type ([facility] [address]

## 2019-10-08 NOTE — ED NOTES
Rt foot wood cleansed, and clean dressing applied. Instructed pt and family on how to care for the dressing-both verbalize understanding. Bandage supplies given to the pt to take home.

## 2019-10-09 LAB
ALBUMIN SERPL-MCNC: 2.9 G/DL (ref 3.5–5)
ALBUMIN/GLOB SERPL: 0.6 {RATIO} (ref 1.1–2.2)
ALP SERPL-CCNC: 45 U/L (ref 45–117)
ALT SERPL-CCNC: 12 U/L (ref 12–78)
ANION GAP SERPL CALC-SCNC: 6 MMOL/L (ref 5–15)
AST SERPL-CCNC: 11 U/L (ref 15–37)
BASOPHILS # BLD: 0 K/UL (ref 0–0.1)
BASOPHILS NFR BLD: 0 % (ref 0–1)
BILIRUB SERPL-MCNC: 0.4 MG/DL (ref 0.2–1)
BUN SERPL-MCNC: 48 MG/DL (ref 6–20)
BUN/CREAT SERPL: 4 (ref 12–20)
CALCIUM SERPL-MCNC: 8.4 MG/DL (ref 8.5–10.1)
CHLORIDE SERPL-SCNC: 97 MMOL/L (ref 97–108)
CO2 SERPL-SCNC: 34 MMOL/L (ref 21–32)
COMMENT, HOLDF: NORMAL
CREAT SERPL-MCNC: 11.2 MG/DL (ref 0.7–1.3)
DIFFERENTIAL METHOD BLD: ABNORMAL
EOSINOPHIL # BLD: 0.3 K/UL (ref 0–0.4)
EOSINOPHIL NFR BLD: 4 % (ref 0–7)
ERYTHROCYTE [DISTWIDTH] IN BLOOD BY AUTOMATED COUNT: 15.4 % (ref 11.5–14.5)
EST. AVERAGE GLUCOSE BLD GHB EST-MCNC: 183 MG/DL
GLOBULIN SER CALC-MCNC: 5.2 G/DL (ref 2–4)
GLUCOSE BLD STRIP.AUTO-MCNC: 114 MG/DL (ref 65–100)
GLUCOSE BLD STRIP.AUTO-MCNC: 117 MG/DL (ref 65–100)
GLUCOSE BLD STRIP.AUTO-MCNC: 177 MG/DL (ref 65–100)
GLUCOSE SERPL-MCNC: 132 MG/DL (ref 65–100)
HBA1C MFR BLD: 8 % (ref 4.2–6.3)
HCT VFR BLD AUTO: 36.5 % (ref 36.6–50.3)
HGB BLD-MCNC: 11.2 G/DL (ref 12.1–17)
IMM GRANULOCYTES # BLD AUTO: 0 K/UL (ref 0–0.04)
IMM GRANULOCYTES NFR BLD AUTO: 0 % (ref 0–0.5)
LYMPHOCYTES # BLD: 1.9 K/UL (ref 0.8–3.5)
LYMPHOCYTES NFR BLD: 22 % (ref 12–49)
MAGNESIUM SERPL-MCNC: 2.4 MG/DL (ref 1.6–2.4)
MCH RBC QN AUTO: 26.5 PG (ref 26–34)
MCHC RBC AUTO-ENTMCNC: 30.7 G/DL (ref 30–36.5)
MCV RBC AUTO: 86.3 FL (ref 80–99)
MONOCYTES # BLD: 0.9 K/UL (ref 0–1)
MONOCYTES NFR BLD: 10 % (ref 5–13)
NEUTS SEG # BLD: 5.5 K/UL (ref 1.8–8)
NEUTS SEG NFR BLD: 64 % (ref 32–75)
NRBC # BLD: 0 K/UL (ref 0–0.01)
NRBC BLD-RTO: 0 PER 100 WBC
PHOSPHATE SERPL-MCNC: 8 MG/DL (ref 2.6–4.7)
PLATELET # BLD AUTO: 230 K/UL (ref 150–400)
PMV BLD AUTO: 10 FL (ref 8.9–12.9)
POTASSIUM SERPL-SCNC: 5 MMOL/L (ref 3.5–5.1)
PROT SERPL-MCNC: 8.1 G/DL (ref 6.4–8.2)
RBC # BLD AUTO: 4.23 M/UL (ref 4.1–5.7)
SAMPLES BEING HELD,HOLD: NORMAL
SERVICE CMNT-IMP: ABNORMAL
SODIUM SERPL-SCNC: 137 MMOL/L (ref 136–145)
TSH SERPL DL<=0.05 MIU/L-ACNC: 4.26 UIU/ML (ref 0.36–3.74)
WBC # BLD AUTO: 8.7 K/UL (ref 4.1–11.1)

## 2019-10-09 PROCEDURE — 85025 COMPLETE CBC W/AUTO DIFF WBC: CPT

## 2019-10-09 PROCEDURE — 74011000258 HC RX REV CODE- 258: Performed by: INTERNAL MEDICINE

## 2019-10-09 PROCEDURE — 74011250637 HC RX REV CODE- 250/637: Performed by: INTERNAL MEDICINE

## 2019-10-09 PROCEDURE — 83036 HEMOGLOBIN GLYCOSYLATED A1C: CPT

## 2019-10-09 PROCEDURE — 90935 HEMODIALYSIS ONE EVALUATION: CPT

## 2019-10-09 PROCEDURE — 84443 ASSAY THYROID STIM HORMONE: CPT

## 2019-10-09 PROCEDURE — 5A1D70Z PERFORMANCE OF URINARY FILTRATION, INTERMITTENT, LESS THAN 6 HOURS PER DAY: ICD-10-PCS | Performed by: INTERNAL MEDICINE

## 2019-10-09 PROCEDURE — 82962 GLUCOSE BLOOD TEST: CPT

## 2019-10-09 PROCEDURE — 74011250636 HC RX REV CODE- 250/636

## 2019-10-09 PROCEDURE — 87040 BLOOD CULTURE FOR BACTERIA: CPT

## 2019-10-09 PROCEDURE — 84100 ASSAY OF PHOSPHORUS: CPT

## 2019-10-09 PROCEDURE — 36415 COLL VENOUS BLD VENIPUNCTURE: CPT

## 2019-10-09 PROCEDURE — 74011250636 HC RX REV CODE- 250/636: Performed by: INTERNAL MEDICINE

## 2019-10-09 PROCEDURE — 74011636637 HC RX REV CODE- 636/637: Performed by: INTERNAL MEDICINE

## 2019-10-09 PROCEDURE — 65660000000 HC RM CCU STEPDOWN

## 2019-10-09 PROCEDURE — 83735 ASSAY OF MAGNESIUM: CPT

## 2019-10-09 PROCEDURE — 80053 COMPREHEN METABOLIC PANEL: CPT

## 2019-10-09 RX ORDER — LISINOPRIL 10 MG/1
10 TABLET ORAL
Status: DISCONTINUED | OUTPATIENT
Start: 2019-10-10 | End: 2019-10-14 | Stop reason: HOSPADM

## 2019-10-09 RX ORDER — LISINOPRIL 10 MG/1
10 TABLET ORAL
COMMUNITY
End: 2020-01-01

## 2019-10-09 RX ORDER — HEPARIN 100 UNIT/ML
SYRINGE INTRAVENOUS
Status: COMPLETED
Start: 2019-10-09 | End: 2019-10-09

## 2019-10-09 RX ADMIN — MORPHINE SULFATE 2 MG: 2 INJECTION, SOLUTION INTRAMUSCULAR; INTRAVENOUS at 07:56

## 2019-10-09 RX ADMIN — CEFEPIME HYDROCHLORIDE 1 G: 1 INJECTION, POWDER, FOR SOLUTION INTRAMUSCULAR; INTRAVENOUS at 20:43

## 2019-10-09 RX ADMIN — MORPHINE SULFATE 2 MG: 2 INJECTION, SOLUTION INTRAMUSCULAR; INTRAVENOUS at 14:49

## 2019-10-09 RX ADMIN — INSULIN GLARGINE 40 UNITS: 100 INJECTION, SOLUTION SUBCUTANEOUS at 08:36

## 2019-10-09 RX ADMIN — MORPHINE SULFATE 2 MG: 2 INJECTION, SOLUTION INTRAMUSCULAR; INTRAVENOUS at 02:50

## 2019-10-09 RX ADMIN — MORPHINE SULFATE 2 MG: 2 INJECTION, SOLUTION INTRAMUSCULAR; INTRAVENOUS at 20:43

## 2019-10-09 RX ADMIN — GUAIFENESIN 400 MG: 200 SOLUTION ORAL at 08:37

## 2019-10-09 RX ADMIN — VANCOMYCIN HYDROCHLORIDE 750 MG: 750 INJECTION, POWDER, LYOPHILIZED, FOR SOLUTION INTRAVENOUS at 13:45

## 2019-10-09 RX ADMIN — GUAIFENESIN 400 MG: 200 SOLUTION ORAL at 18:00

## 2019-10-09 RX ADMIN — GUAIFENESIN 400 MG: 200 SOLUTION ORAL at 21:20

## 2019-10-09 RX ADMIN — SODIUM CHLORIDE, PRESERVATIVE FREE 500 UNITS: 5 INJECTION INTRAVENOUS at 14:00

## 2019-10-09 NOTE — PROGRESS NOTES
Problem: Falls - Risk of 
Goal: *Absence of Falls Description Document Charles  Fall Risk and appropriate interventions in the flowsheet. Outcome: Progressing Towards Goal 
Note:  
Fall Risk Interventions: 
  
 
  
 
Medication Interventions: Evaluate medications/consider consulting pharmacy, Patient to call before getting OOB, Teach patient to arise slowly Problem: Patient Education: Go to Patient Education Activity Goal: Patient/Family Education Outcome: Progressing Towards Goal

## 2019-10-09 NOTE — PROGRESS NOTES
TRANSFER - IN REPORT: 
 
Verbal report received from DIONICIO Bowling on Doug Carlos  being received from Ortho for ordered procedure Report consisted of patients Situation, Background, Assessment and  
Recommendations(SBAR). Information from the following report(s) SBAR and Kardex was reviewed with the receiving nurse. Opportunity for questions and clarification was provided. Assessment completed upon patients arrival to unit and care assumed.

## 2019-10-09 NOTE — PROGRESS NOTES
Orthopedic End of Shift Note Bedside and verbal shift change report given to Chyna Wynne RN (oncoming nurse) by Mitali Lo RN (offgoing nurse). Report included the following information SBAR, Kardex, Procedure Summary, Intake/Output, Cardiac Rhythm Sinus rhythm and Quality Measures. POD# Significant issues during shift: Patient admitted from podiatrist with ulcer and possible osteomyelitis to right 4th toe. Pain medications administered per [facility] per request.  Blood cultures pending. Telemetry in place. Issues for Physician to address: Patient dialyzes MWF Activity This Shift 
(check all that apply) [] chair 
[] dangle 
 [] bathroom 
[] bedside commode [] hallway 
[] bedrest  
Nausea/Vomiting [] yes [x] no    
Voiding Status [] void [x] [place] [] I&O Cath Bowel Movements [] yes [x] no Foot Pumps or SCD [] yes [x] no Ice Pack [] yes    [x] no Incentive Spirometer [] yes [] no Volume:     
Telemetry Monitoring   [x] yes [] no Rhythm:  
Supplemental O2 [] yes [x] no Sat off O2:  96 %

## 2019-10-09 NOTE — PROCEDURES
HD TRANSFER - OUT REPORT: 
 
Verbal report given to CHRISTIAN Cid RN on Pastor Encinas being transferred to [facility] for routine progression of care Report consisted of patient's Situation, Background, Assessment and  
Recommendations(SBAR). Information from the following report(s) SBAR was reviewed with the receiving nurse. Method:  $$ Method: Hemodialysis (10/09/19 0855) Fluid Removed  NET Fluid Removed (mL): 3000 ml (10/09/19 1300) Patient response to treatment:  Unchanged End Time  Hemodialysis End Time: 1300 (10/09/19 1300) If not documented, dialysis nurse to update post-dialysis row in HD/Filtration flowsheet Medications /Volume expansion agents or Fluid boluses administered during treatment? no 
 
Post-dialysis medication administration due?  no 
Remind nurse to administer post-HD medication upon return to unit. Fistula hemostasis? yes Line heparinization? no 
 
Lines:  
 
Opportunity for questions and clarification was provided. Patient transported with: Monitor

## 2019-10-09 NOTE — CONSULTS
[facility] [employer] Consult Note NAME: Ammon Boyle :  1985 MRN:  [id number] Date/Time: 10/9/2019 Risk of deterioration: medium Assessment:    Plan: 
ESRD-MWF, [facility] HTN Osteo, toe/foot Seen on hd today, bp stable Pt dialyzes 4-4.15 depending Agree with antibiotics Podiatry to see Vascular surgeon is Dr. Petey Enamorado Subjective: Chief Complaint:  I'm ok Review of Systems: no n/v/co/sob  (+) foot/toe pain/discoloratoin  (-) f/c/cp Objective: VITALS:  
Last 24hrs VS reviewed since prior progress note. Most recent are: 
Visit Vitals BP 98/51 Pulse 74 Temp 98.2 °F (36.8 °C) Resp 18 Ht 6' 2\" (1.88 m) Wt 154.5 kg (340 lb 9.8 oz) SpO2 96% BMI 43.73 kg/m² SpO2 Readings from Last 6 Encounters:  
10/09/19 96% 19 96% 19 97% 19 98% 19 95% 19 96% No intake or output data in the 24 hours ending 10/09/19 1150 Telemetry Reviewed PHYSICAL EXAM: 
 
General   well developed, well nourished, appears stated age, in no acute distress Respiratory   Clear To Auscultation bilaterally Cardiology  Regular Rate and Rythmn Extremities  No clubbing, cyanosis. (L) uE access t/b Lab Data Reviewed: (see below) Medications Reviewed: (see below) PMH/SH reviewed - no change compared to H&P 
____________________________________________________ Attending Physician: Saul Urbina MD  
 
____________________________________________________ MEDICATIONS: 
Current Facility-Administered Medications Medication Dose Route Frequency  acetaminophen (TYLENOL) tablet 650 mg  650 mg Oral Q6H PRN  
 ondansetron (ZOFRAN) injection 4 mg  4 mg IntraVENous Q4H PRN  
 atorvastatin (LIPITOR) tablet 40 mg  40 mg Oral DAILY  insulin glargine (LANTUS) injection 40 Units  40 Units SubCUTAneous DAILY  lisinopril (PRINIVIL, ZESTRIL) tablet 10 mg  10 mg Oral Q ,  &   
  loperamide (IMODIUM) capsule 2 mg  2 mg Oral QID PRN  
 insulin lispro (HUMALOG) injection   SubCUTAneous AC&HS  
 glucose chewable tablet 16 g  4 Tab Oral PRN  
 dextrose (D50W) injection syrg 12.5-25 g  12.5-25 g IntraVENous PRN  
 glucagon (GLUCAGEN) injection 1 mg  1 mg IntraMUSCular PRN  
 metoprolol (LOPRESSOR) injection 5 mg  5 mg IntraVENous Q6H PRN  
 morphine injection 2 mg  2 mg IntraVENous Q4H PRN  
 guaiFENesin (ROBITUSSIN) 100 mg/5 mL oral liquid 400 mg  400 mg Oral TID  cefepime (MAXIPIME) 1 g in 0.9% sodium chloride (MBP/ADV) 50 mL  1 g IntraVENous Q24H  
 vancomycin (VANCOCIN) 750 mg in 0.9% sodium chloride (MBP/ADV) 250 mL  750 mg IntraVENous PRN  Vancomycin Redosing Reminder Note  1 Each Other DAILY  
  
 
LABS: 
Recent Labs 10/09/19 
0307 10/08/19 
1253 WBC 8.7 10.0 HGB 11.2* 12.5 HCT 36.5* 39.9  261 Recent Labs 10/09/19 
0307 10/08/19 
1253  138  
K 5.0 4.9 CL 97 97 CO2 34* 38* BUN 48* 39* CREA 11.20* 10.30* * 177* CA 8.4* 9.2 MG 2.4  --   
PHOS 8.0*  --   
 
Recent Labs 10/09/19 
0307 10/08/19 
1253 SGOT 11* 19 ALT 12 14 AP 45 51 TBILI 0.4 0.5 TP 8.1 9.4* ALB 2.9* 3.3*  
GLOB 5.2* 6.1* No results for input(s): INR, PTP, APTT, INREXT in the last 72 hours. No results for input(s): FE, TIBC, PSAT, FERR in the last 72 hours. No results for input(s): PH, PCO2, PO2 in the last 72 hours. No results for input(s): CPK, CKNDX, TROIQ in the last 72 hours. No lab exists for component: CPKMB Lab Results Component Value Date/Time  Glucose (POC) 177 (H) 10/09/2019 07:25 AM  
 Glucose (POC) 178 (H) 10/08/2019 09:10 PM  
 Glucose (POC) 144 (H) 07/09/2019 06:26 AM  
 Glucose (POC) 165 (H) 07/08/2019 09:39 PM  
 Glucose (POC) 107 (H) 07/08/2019 11:10 AM

## 2019-10-09 NOTE — PROGRESS NOTES
Problem: Falls - Risk of 
Goal: *Absence of Falls Description Document Stephanie Kingstree Fall Risk and appropriate interventions in the flowsheet. Outcome: Progressing Towards Goal 
Note:  
Fall Risk Interventions: 
  
 
  
 
Medication Interventions: Evaluate medications/consider consulting pharmacy, Patient to call before getting OOB, Teach patient to arise slowly Problem: Patient Education: Go to Patient Education Activity Goal: Patient/Family Education Outcome: Progressing Towards Goal 
  
Problem: Pain Goal: *Control of Pain Outcome: Progressing Towards Goal

## 2019-10-09 NOTE — PROCEDURES
[facility] Dialysis Team [place]  [phone] Vitals   Pre   Post   Assessment   Pre   Post    
Temp  Temp: 98.2 °F (36.8 °C) (10/09/19 0855)  98.2 LOC  A&Ox4 HR   Pulse (Heart Rate): 79 (10/09/19 0855) 74 Lungs   Clear alfredo. B/P   BP: 155/78(Pre VS) (10/09/19 0855) 109/63 Cardiac   Samantha sinus Resp   Resp Rate: 18 (10/09/19 0855) 18 Skin   Warm and dry Pain level  Pain Intensity 1: 9 (10/09/19 0756) 0 Edema  Pedal alfredo Orders: Duration:   Start:    0900 End:    1300 Total:   4hrs Dialyzer:   Dialyzer/Set Up Inspection: Sunny Koo (10/09/19 1043) K Bath:      
Ca Bath:      
Na/Bicarb: Target Fluid Removal:   Goal/Amount of Fluid to Remove (mL): 3000 mL (10/09/19 0855) Access Type & Location:    PELON-AVF: +T/B, no redness or swelling, Cannulated with 15g needles x2, +flashes/aspir/NS flushes Labs Obtained/Reviewed Critical Results Called   Date when labs were drawn- 
Hgb-   
HGB Date Value Ref Range Status 10/09/2019 11.2 (L) 12.1 - 17.0 g/dL Final  
 
K-   
Potassium Date Value Ref Range Status 10/09/2019 5.0 3.5 - 5.1 mmol/L Final  
 
Ca-  
Calcium Date Value Ref Range Status 10/09/2019 8.4 (L) 8.5 - 10.1 MG/DL Final  
 
Bun-  
BUN Date Value Ref Range Status 10/09/2019 48 (H) 6 - 20 MG/DL Final  
 
Creat-  
Creatinine Date Value Ref Range Status 10/09/2019 11.20 (H) 0.70 - 1.30 MG/DL Final  
 
  
Medications/ Blood Products Given Name   Dose   Route and Time Blood Volume Processed (BVP):    92.3L Net Fluid Removed:  3000ml Comments Time Out Done: yes Primary Nurse Rpt Pre:CHRISTIAN Dugan RN Primary Nurse Rpt Post:CHRISTIAN Dugan RN 
Pt Education:procedure Care Plan:to continue Hd Tx Summary: 
Pt arrived to HD suite A&Ox4. Consent signed & on file. SBAR received from Primary RN. 
0324: Pt cannulated with 88B needles per policy & without issue. Labs drawn per request/ order. VSS. Dialysis Tx initiated. 1000: Pt resting quietly. 1300: Tx ended. VSS. All possible blood returned to patient. Hemostasis achieved without issue. Bed locked and in the lowest position, call bell and belongings in reach. SBAR given to Primary, RN. Patient is stable at time of their/ my departure. All Dialysis related medications have been reviewed. Admiting Diagnosis: 
Pt's previous clinic-[facility] Consent signed -  obtained [facility] Consent - signed Hepatitis Status- Neg 10/2/19 Machine #- Machine Number: [id number] (10/09/19 2393) Telemetry status-monitored Pre-dialysis wt. -  n/a

## 2019-10-09 NOTE — PROGRESS NOTES
Foot and Ankle surgeryProgress Note Admit Date: 10/8/2019 Hospital day 1 Subjective:  
 
Patient was admitted one day ago with a painful sore 4th right toe of unknown duration Current Facility-Administered Medications Medication Dose Route Frequency  [START ON 10/10/2019] lisinopril (PRINIVIL, ZESTRIL) tablet 10 mg  10 mg Oral EVERY TUES,THUR,SAT,SUN  
 acetaminophen (TYLENOL) tablet 650 mg  650 mg Oral Q6H PRN  
 ondansetron (ZOFRAN) injection 4 mg  4 mg IntraVENous Q4H PRN  
 atorvastatin (LIPITOR) tablet 40 mg  40 mg Oral DAILY  insulin glargine (LANTUS) injection 40 Units  40 Units SubCUTAneous DAILY  loperamide (IMODIUM) capsule 2 mg  2 mg Oral QID PRN  
 insulin lispro (HUMALOG) injection   SubCUTAneous AC&HS  
 glucose chewable tablet 16 g  4 Tab Oral PRN  
 dextrose (D50W) injection syrg 12.5-25 g  12.5-25 g IntraVENous PRN  
 glucagon (GLUCAGEN) injection 1 mg  1 mg IntraMUSCular PRN  
 metoprolol (LOPRESSOR) injection 5 mg  5 mg IntraVENous Q6H PRN  
 morphine injection 2 mg  2 mg IntraVENous Q4H PRN  
 guaiFENesin (ROBITUSSIN) 100 mg/5 mL oral liquid 400 mg  400 mg Oral TID  cefepime (MAXIPIME) 1 g in 0.9% sodium chloride (MBP/ADV) 50 mL  1 g IntraVENous Q24H  
 vancomycin (VANCOCIN) 750 mg in 0.9% sodium chloride (MBP/ADV) 250 mL  750 mg IntraVENous PRN  Vancomycin Redosing Reminder Note  1 Each Other DAILY Objective:  
 
Patient Vitals for the past 8 hrs: 
 BP Temp Pulse Resp SpO2 Weight 10/09/19 1344 118/71 98.4 °F (36.9 °C) 79 16 94 % 155.9 kg (343 lb 11.2 oz) 10/09/19 1315 109/63 98.2 °F (36.8 °C) 73 18    
10/09/19 1300 112/60  74     
10/09/19 1245 104/60  73     
10/09/19 1230 109/55  73     
10/09/19 1215 106/54  73     
10/09/19 1201 101/54  75     
10/09/19 1145 96/60  75     
10/09/19 1130 110/52  73     
10/09/19 1115 103/55  73     
10/09/19 1100 107/60  74     
10/09/19 1045 92/48  74    10/09/19 1030 Emily  
10/09/19 1015 105/53  [address] [place] [address] [address]  
10/09/19 1000 97/45  74     
10/09/19 0945 96/54  75     
10/09/19 0930 112/57  77     
 
10/09 0701 - 10/09 1900 In: -  
Out: 3000 No intake/output data recorded. Physical Exam: LOWER RIGHT EXTREMITY Barley palpable pedal pulses with diminished epicritic sensation Good muscle strength Previous well healed amputation 5th right toe 4th right toe with open wound and exposed bone at the pipj There is no wound 5th metatarsal ; this area of the fifth met is not clinically infected MRI shows osteomyelitis phalanges 4th right toe. Bone resorption of the residual portion of the 5th metatarsal secondary to previous surgery Afebrile WBC's 8.7 All labs reviewed Data Review Recent Results (from the past 24 hour(s)) GLUCOSE, POC Collection Time: 10/08/19  9:10 PM  
Result Value Ref Range Glucose (POC) 178 (H) 65 - 100 mg/dL Performed by Shane Peña CBC WITH AUTOMATED DIFF Collection Time: 10/09/19  3:07 AM  
Result Value Ref Range WBC 8.7 4.1 - 11.1 K/uL  
 RBC 4.23 4. 10 - 5.70 M/uL  
 HGB 11.2 (L) 12.1 - 17.0 g/dL HCT 36.5 (L) 36.6 - 50.3 % MCV 86.3 80.0 - 99.0 FL  
 MCH 26.5 26.0 - 34.0 PG  
 MCHC 30.7 30.0 - 36.5 g/dL  
 RDW 15.4 (H) 11.5 - 14.5 % PLATELET 197 977 - 550 K/uL MPV 10.0 8.9 - 12.9 FL  
 NRBC 0.0 0  WBC ABSOLUTE NRBC 0.00 0.00 - 0.01 K/uL NEUTROPHILS 64 32 - 75 % LYMPHOCYTES 22 12 - 49 % MONOCYTES 10 5 - 13 % EOSINOPHILS 4 0 - 7 % BASOPHILS 0 0 - 1 % IMMATURE GRANULOCYTES 0 0.0 - 0.5 % ABS. NEUTROPHILS 5.5 1.8 - 8.0 K/UL  
 ABS. LYMPHOCYTES 1.9 0.8 - 3.5 K/UL  
 ABS. MONOCYTES 0.9 0.0 - 1.0 K/UL  
 ABS. EOSINOPHILS 0.3 0.0 - 0.4 K/UL  
 ABS. BASOPHILS 0.0 0.0 - 0.1 K/UL  
 ABS. IMM. GRANS. 0.0 0.00 - 0.04 K/UL  
 DF AUTOMATED HEMOGLOBIN A1C WITH EAG Collection Time: 10/09/19  3:07 AM  
Result Value Ref Range Hemoglobin A1c 8.0 (H) 4.2 - 6.3 % Est. average glucose 183 mg/dL MAGNESIUM Collection Time: 10/09/19  3:07 AM  
Result Value Ref Range Magnesium 2.4 1.6 - 2.4 mg/dL METABOLIC PANEL, COMPREHENSIVE Collection Time: 10/09/19  3:07 AM  
Result Value Ref Range Sodium 137 136 - 145 mmol/L Potassium 5.0 3.5 - 5.1 mmol/L Chloride 97 97 - 108 mmol/L  
 CO2 34 (H) 21 - 32 mmol/L Anion gap 6 5 - 15 mmol/L Glucose 132 (H) 65 - 100 mg/dL BUN 48 (H) 6 - 20 MG/DL Creatinine 11.20 (H) 0.70 - 1.30 MG/DL  
 BUN/Creatinine ratio 4 (L) 12 - 20 GFR est AA 6 (L) >60 ml/min/1.73m2 GFR est non-AA 5 (L) >60 ml/min/1.73m2 Calcium 8.4 (L) 8.5 - 10.1 MG/DL Bilirubin, total 0.4 0.2 - 1.0 MG/DL  
 ALT (SGPT) 12 12 - 78 U/L  
 AST (SGOT) 11 (L) 15 - 37 U/L Alk. phosphatase 45 45 - 117 U/L Protein, total 8.1 6.4 - 8.2 g/dL Albumin 2.9 (L) 3.5 - 5.0 g/dL Globulin 5.2 (H) 2.0 - 4.0 g/dL A-G Ratio 0.6 (L) 1.1 - 2.2 PHOSPHORUS Collection Time: 10/09/19  3:07 AM  
Result Value Ref Range Phosphorus 8.0 (H) 2.6 - 4.7 MG/DL  
TSH 3RD GENERATION Collection Time: 10/09/19  3:07 AM  
Result Value Ref Range TSH 4.26 (H) 0.36 - 3.74 uIU/mL SAMPLES BEING HELD Collection Time: 10/09/19  3:07 AM  
Result Value Ref Range SAMPLES BEING HELD SST   
 COMMENT Add-on orders for these samples will be processed based on acceptable specimen integrity and analyte stability, which may vary by analyte. CULTURE, BLOOD, PAIRED Collection Time: 10/09/19  5:44 AM  
Result Value Ref Range Special Requests: NO SPECIAL REQUESTS Culture result: NO GROWTH AFTER 1 HOUR    
GLUCOSE, POC Collection Time: 10/09/19  7:25 AM  
Result Value Ref Range Glucose (POC) 177 (H) 65 - 100 mg/dL Performed by Julio Torres ([facility]) GLUCOSE, POC Collection Time: 10/09/19  3:48 PM  
Result Value Ref Range Glucose (POC) 117 (H) 65 - 100 mg/dL Performed by Richar Lowe Assessment:  
 
Active Problems: 
  HTN (hypertension) (10/16/2009) ESRD (end stage renal disease) on dialysis ([facility] (9/14/2018) Uncontrolled type 2 diabetes mellitus with hyperglycemia, with long-term current use of insulin ([facility] (9/14/2018) Osteomyelitis ([facility] (10/8/2019) Plan: To be scheduled for amputation 4th right toe with in next 2-3 days. Discussed procedure ,risks,  Benefits and expected outcome with patient . All questions answered

## 2019-10-09 NOTE — WOUND CARE
Wound care consulted and Dr Sarthak Lockhart with Podiatry consulted for right foot wound with osteo to 4th and 5th toes/metatarsal. 
 
Dr Sarthak Lockhart in her note states that she will plan surgery in the next 2-3 days for amputation of 4th right toe. Will defer to Dr Sarthak Lockhart after surgery for [facility] orders in the mean time staff to daily paint wound with betadine swabs and cover with a dry dressing.  
 
Holly Jalloh RN, [employer]

## 2019-10-09 NOTE — PROGRESS NOTES
Pharmacy Automatic Renal Dosing Protocol - Antimicrobials Indication for Antimicrobials: Osteomyelitis R foot 4th digit Current Regimen of Each Antimicrobial: 
Cefepime 1 g IV q24h (Start Date 10/8 Day 1) Vancomycin - pharmacy consult Previous Antimicrobial Therapy: 
 
Vancomycin Trough Goal Level: 15-20 -600 Date Dose & Interval Measured (mcg/mL) Extrapolated (mcg/mL) Significant Cultures:  
10/8 blood cx pending Radiology / Imaging results: (X-ray, CT scan or MRI): Foot XR: Expected postsurgical changes of interval fifth toe and distal fifth metatarsal amputation. No acute osseous abnormality. Paralysis, amputations, malnutrition: 5th toe amputation Labs: 
Recent Labs 10/08/19 
1253 CREA 10.30* BUN 39* WBC 10.0 Temp (24hrs), Av.5 °F (36.9 °C), Min:98.5 °F (36.9 °C), Max:98.5 °F (36.9 °C) Creatinine Clearance (mL/min) or Dialysis: HD (outpatient HD MWF) Impression/Plan:  
Continue Cefepime to 1gm IV q24h Vancomycin 2000mg IV loading dose then 750mg IV QHD (HD orders pending) Will need to order Vancomycin level before 3rd Vancomycin maint dose Antimicrobial stop date - pending Pharmacy will follow daily and adjust medications as appropriate for renal function and/or serum levels.  
 
Thank you, 
Vicenta Walton, [facility]

## 2019-10-10 LAB
GLUCOSE BLD STRIP.AUTO-MCNC: 114 MG/DL (ref 65–100)
GLUCOSE BLD STRIP.AUTO-MCNC: 122 MG/DL (ref 65–100)
GLUCOSE BLD STRIP.AUTO-MCNC: 148 MG/DL (ref 65–100)
GLUCOSE BLD STRIP.AUTO-MCNC: 86 MG/DL (ref 65–100)
SERVICE CMNT-IMP: ABNORMAL
SERVICE CMNT-IMP: NORMAL

## 2019-10-10 PROCEDURE — 74011250637 HC RX REV CODE- 250/637: Performed by: INTERNAL MEDICINE

## 2019-10-10 PROCEDURE — 51798 US URINE CAPACITY MEASURE: CPT

## 2019-10-10 PROCEDURE — 74011000258 HC RX REV CODE- 258: Performed by: INTERNAL MEDICINE

## 2019-10-10 PROCEDURE — 82962 GLUCOSE BLOOD TEST: CPT

## 2019-10-10 PROCEDURE — 65660000000 HC RM CCU STEPDOWN

## 2019-10-10 PROCEDURE — 74011250636 HC RX REV CODE- 250/636: Performed by: INTERNAL MEDICINE

## 2019-10-10 PROCEDURE — 74011636637 HC RX REV CODE- 636/637: Performed by: INTERNAL MEDICINE

## 2019-10-10 RX ADMIN — GUAIFENESIN 400 MG: 200 SOLUTION ORAL at 09:11

## 2019-10-10 RX ADMIN — GUAIFENESIN 400 MG: 200 SOLUTION ORAL at 21:33

## 2019-10-10 RX ADMIN — CEFEPIME HYDROCHLORIDE 1 G: 1 INJECTION, POWDER, FOR SOLUTION INTRAMUSCULAR; INTRAVENOUS at 20:53

## 2019-10-10 RX ADMIN — INSULIN GLARGINE 40 UNITS: 100 INJECTION, SOLUTION SUBCUTANEOUS at 09:16

## 2019-10-10 RX ADMIN — MORPHINE SULFATE 2 MG: 2 INJECTION, SOLUTION INTRAMUSCULAR; INTRAVENOUS at 17:43

## 2019-10-10 RX ADMIN — INSULIN LISPRO 2 UNITS: 100 INJECTION, SOLUTION INTRAVENOUS; SUBCUTANEOUS at 12:11

## 2019-10-10 RX ADMIN — GUAIFENESIN 400 MG: 200 SOLUTION ORAL at 15:25

## 2019-10-10 RX ADMIN — MORPHINE SULFATE 2 MG: 2 INJECTION, SOLUTION INTRAMUSCULAR; INTRAVENOUS at 04:40

## 2019-10-10 RX ADMIN — ATORVASTATIN CALCIUM 40 MG: 40 TABLET, FILM COATED ORAL at 09:11

## 2019-10-10 RX ADMIN — LISINOPRIL 10 MG: 10 TABLET ORAL at 09:11

## 2019-10-10 RX ADMIN — MORPHINE SULFATE 2 MG: 2 INJECTION, SOLUTION INTRAMUSCULAR; INTRAVENOUS at 09:11

## 2019-10-10 NOTE — PROGRESS NOTES
Problem: Falls - Risk of 
Goal: *Absence of Falls Description Document Bernard Riggs Fall Risk and appropriate interventions in the flowsheet. Outcome: Progressing Towards Goal 
Note:  
Fall Risk Interventions: 
Mobility Interventions: Assess mobility with egress test 
 
  
 
Medication Interventions: Evaluate medications/consider consulting pharmacy Elimination Interventions: Call light in reach

## 2019-10-10 NOTE — PROGRESS NOTES
0768 - Patient can go on renal diet per Dr. Mallorie Angulo. Surgery today at 4:30pm under local anesthesia. 1045 - Consent form signed and placed on chart. PM and AM CHG baths were given.

## 2019-10-10 NOTE — PROGRESS NOTES
Hospitalist Progress Note NAME: Carlos Felipe :  1985 MRN:  [id number] Interim Hospital Summary: 27 y.o. male whom presented on 10/8/2019 with Assessment / Plan: 
 
Right 4th toe diabetic ulcer with OM 
-MRI right foot: Osteomyelitis of the fourth proximal and middle phalanges. Age indeterminate osteomyelitis of the fifth metatarsal amputation stump. No drainable abscess 
-continue empiric IV cefepime and vanco.  Follow up on cultures 
-Appreciate Dr Pat Reed help. For amputation of 4th toe later today under local.  
 
DM2 
-continue lantus and SSI. Holding trulicity. BG good HTN 
-continue lisinopril ESRD 
-HD per renal 
 
 
 
 
40 or above Morbid obesity / Body mass index is 44.13 kg/m². Code status: Full Prophylaxis: SCD's Recommended Disposition:  PT, OT, RN Subjective: Chief Complaint / Reason for Physician Visit Follow up of right 4th toe OM, HTN, DM, ESRD Chart reviewed in detail. Discussed with RN events overnight. Review of Systems: 
Symptom Y/N Comments  Symptom Y/N Comments Fever/Chills n   Chest Pain Poor Appetite    Edema Cough    Abdominal Pain Sputum    Joint Pain SOB/LITTLE n   Pruritis/Rash Nausea/vomit n   Tolerating PT/OT Diarrhea    Tolerating Diet Constipation    Other Could NOT obtain due to:   
 
PO intake:  
Patient Vitals for the past 72 hrs: 
 % Diet Eaten 10/10/19 1011 100 % Objective: VITALS:  
Last 24hrs VS reviewed since prior progress note. Most recent are: 
Patient Vitals for the past 24 hrs: 
 Temp Pulse Resp BP SpO2  
10/10/19 1158 97.7 °F (36.5 °C) 77 18 149/82 94 % 10/10/19 0902 98.3 °F (36.8 °C) 83 18 159/89 96 % 10/10/19 0352 98.7 °F (37.1 °C) 89 16 122/69 92 % 10/09/19 2357 98.6 °F (37 °C) 89 16 142/78 95 % 10/09/19 1919 98.4 °F (36.9 °C) 81 16 133/68 93 % 10/09/19 1344 98.4 °F (36.9 °C) 79 16 118/71 94 % 10/09/19 1315 98.2 °F (36.8 °C) 73 18 109/63   
10/09/19 1300  74  112/60  Intake/Output Summary (Last 24 hours) at 10/10/2019 1255 Last data filed at 10/9/2019 1300 Gross per 24 hour Intake  Output 3000 ml Net -3000 ml PHYSICAL EXAM: 
General: WD, WN. Alert, cooperative, no acute distress   
EENT:  EOMI. Anicteric sclerae. MMM Resp:  CTA bilaterally, no wheezing or rales. No accessory muscle use CV:  Regular  rhythm,  No edema GI:  Soft, Non distended, Non tender.  +Bowel sounds Neurologic:  Alert and oriented X 3, normal speech, Psych:   Good insight. Not anxious nor agitated Skin:  (+) right 4th toe open wound with swelling Reviewed most current lab test results and cultures  YES Reviewed most current radiology test results   YES Review and summation of old records today    NO Reviewed patient's current orders and MAR    YES 
PMH/SH reviewed - no change compared to H&P 
________________________________________________________________________ Care Plan discussed with: 
  Comments Patient x Family RN x Care Manager Consultant Multidiciplinary team rounds were held today with , nursing, pharmacist and clinical coordinator. Patient's plan of care was discussed; medications were reviewed and discharge planning was addressed. ________________________________________________________________________ Total NON critical care TIME:   25  Minutes Total CRITICAL CARE TIME Spent:   Minutes non procedure based Comments >50% of visit spent in counseling and coordination of care x This includes time during multidisciplinary rounds if indicated above  
________________________________________________________________________ Kenneth Franz MD  
 
Procedures: see electronic medical records for all procedures/Xrays and details which were not copied into this note but were reviewed prior to creation of Plan.    
 
LABS: 
 I reviewed today's most current labs and imaging studies. Pertinent labs include: 
Recent Labs 10/09/19 
0307 10/08/19 
1253 WBC 8.7 10.0 HGB 11.2* 12.5 HCT 36.5* 39.9  261 Recent Labs 10/09/19 
0307 10/08/19 
1253  138  
K 5.0 4.9 CL 97 97 CO2 34* 38* * 177* BUN 48* 39* CREA 11.20* 10.30* CA 8.4* 9.2 MG 2.4  --   
PHOS 8.0*  --   
ALB 2.9* 3.3* TBILI 0.4 0.5 SGOT 11* 19 ALT 12 14

## 2019-10-10 NOTE — PROGRESS NOTES
Bedside shift change report given to Catarino Stuart (oncoming nurse) by Darryl Hendrickson (offgoing nurse). Report included the following information SBAR, Kardex, Procedure Summary, Intake/Output, MAR, Accordion, Recent Results and Med Rec Status.

## 2019-10-10 NOTE — PROGRESS NOTES
Hospitalist Progress Note NAME: Audra White :  1985 MRN:  [id number] Interim Hospital Summary: 27 y.o. male whom presented on 10/8/2019 with Assessment / Plan: 
 
Right 4th toe diabetic ulcer with OM 
-MRI right foot: Osteomyelitis of the fourth proximal and middle phalanges. Age indeterminate osteomyelitis of the fifth metatarsal amputation stump. No drainable abscess 
-continue empiric IV cefepime and vanco.  Follow up on cultures 
-Podiatry consult DM2 
-continue lantus and SSI. Holding trulicity HTN 
-continue lisinopril ESRD 
-HD per renal 
 
 
 
 
40 or above Morbid obesity / Body mass index is 44.13 kg/m². Code status: Full Prophylaxis: SCD's Recommended Disposition:  PT, OT, RN Subjective: Chief Complaint / Reason for Physician Visit Follow up of right 4th toe OM, HTN, DM, ESRD Chart reviewed in detail. Discussed with RN events overnight. Review of Systems: 
Symptom Y/N Comments  Symptom Y/N Comments Fever/Chills n   Chest Pain Poor Appetite    Edema Cough    Abdominal Pain Sputum    Joint Pain SOB/LITTLE n   Pruritis/Rash Nausea/vomit n   Tolerating PT/OT Diarrhea    Tolerating Diet Constipation    Other Could NOT obtain due to:   
 
PO intake: No data found. Objective: VITALS:  
Last 24hrs VS reviewed since prior progress note. Most recent are: 
Patient Vitals for the past 24 hrs: 
 Temp Pulse Resp BP SpO2  
10/09/19 1919 98.4 °F (36.9 °C) 81 16 133/68 93 % 10/09/19 1344 98.4 °F (36.9 °C) 79 16 118/71 94 % 10/09/19 1315 98.2 °F (36.8 °C) 73 18 109/63   
10/09/19 1300  74  112/60   
10/09/19 1245  73  104/60   
10/09/19 1230  73  109/55   
10/09/19 1215  73  106/54   
10/09/19 1201  75  101/54   
10/09/19 1145  75  96/60   
10/09/19 1130  73  110/52   
10/09/19 1115  73  103/55   
10/09/19 1100  74  107/60  10/09/19 1045  74  92/48   
10/09/19 1030  74  98/51   
10/09/19 1015  75  105/53   
10/09/19 1000  74  97/45   
10/09/19 0945  75  96/54   
10/09/19 0930  77  112/57   
10/09/19 0915  77  117/57   
10/09/19 0900  77  135/80   
10/09/19 0855 98.2 °F (36.8 °C) 79 18 155/78   
10/09/19 0744 98 °F (36.7 °C) 79 16  96 % 10/09/19 0345 98.4 °F (36.9 °C) 85 18  96 % 10/08/19 2250 98.2 °F (36.8 °C) 83 18 (!) 162/92 95 % Intake/Output Summary (Last 24 hours) at 10/9/2019 2017 Last data filed at 10/9/2019 1300 Gross per 24 hour Intake  Output 3000 ml Net -3000 ml PHYSICAL EXAM: 
General: WD, WN. Alert, cooperative, no acute distress   
EENT:  EOMI. Anicteric sclerae. MMM Resp:  CTA bilaterally, no wheezing or rales. No accessory muscle use CV:  Regular  rhythm,  No edema GI:  Soft, Non distended, Non tender.  +Bowel sounds Neurologic:  Alert and oriented X 3, normal speech, Psych:   Good insight. Not anxious nor agitated Skin:  (+) right 4th toe open wound with swelling Reviewed most current lab test results and cultures  YES Reviewed most current radiology test results   YES Review and summation of old records today    NO Reviewed patient's current orders and MAR    YES 
PMH/ reviewed - no change compared to H&P 
________________________________________________________________________ Care Plan discussed with: 
  Comments Patient x Family RN x Care Manager Consultant Multidiciplinary team rounds were held today with , nursing, pharmacist and clinical coordinator. Patient's plan of care was discussed; medications were reviewed and discharge planning was addressed. ________________________________________________________________________ Total NON critical care TIME:   25  Minutes Total CRITICAL CARE TIME Spent:   Minutes non procedure based Comments >50% of visit spent in counseling and coordination of care x This includes time during multidisciplinary rounds if indicated above  
________________________________________________________________________ Nandini Wells MD  
 
Procedures: see electronic medical records for all procedures/Xrays and details which were not copied into this note but were reviewed prior to creation of Plan. LABS: 
I reviewed today's most current labs and imaging studies. Pertinent labs include: 
Recent Labs 10/09/19 
0307 10/08/19 
1253 WBC 8.7 10.0 HGB 11.2* 12.5 HCT 36.5* 39.9  261 Recent Labs 10/09/19 
0307 10/08/19 
1253  138  
K 5.0 4.9 CL 97 97 CO2 34* 38* * 177* BUN 48* 39* CREA 11.20* 10.30* CA 8.4* 9.2 MG 2.4  --   
PHOS 8.0*  --   
ALB 2.9* 3.3* TBILI 0.4 0.5 SGOT 11* 19 ALT 12 14

## 2019-10-10 NOTE — PROGRESS NOTES
1700 - Received call from Dr. Meera Stern stating that patient's surgery will be rescheduled for 1630 on 10/11/2019 due to a 4 hour delay in the OR. Patient made aware. No need to be NPO at midnight per Dr. Meera Stern since surgery will be done under local anesthesia. 1930 - Bedside shift change report given to [facility], RN (oncoming nurse) by Jeanice Epley, RN (offgoing nurse). Report included the following information SBAR, Kardex, Procedure Summary, Intake/Output, MAR, Accordion, Recent Results, Med Rec Status and Cardiac Rhythm NSR.

## 2019-10-11 LAB
ALBUMIN SERPL-MCNC: 2.7 G/DL (ref 3.5–5)
ALBUMIN/GLOB SERPL: 0.5 {RATIO} (ref 1.1–2.2)
ALP SERPL-CCNC: 45 U/L (ref 45–117)
ALT SERPL-CCNC: 12 U/L (ref 12–78)
ANION GAP SERPL CALC-SCNC: 6 MMOL/L (ref 5–15)
AST SERPL-CCNC: 14 U/L (ref 15–37)
BILIRUB SERPL-MCNC: 0.8 MG/DL (ref 0.2–1)
BUN SERPL-MCNC: 52 MG/DL (ref 6–20)
BUN/CREAT SERPL: 4 (ref 12–20)
CALCIUM SERPL-MCNC: 8.3 MG/DL (ref 8.5–10.1)
CHLORIDE SERPL-SCNC: 97 MMOL/L (ref 97–108)
CO2 SERPL-SCNC: 30 MMOL/L (ref 21–32)
CREAT SERPL-MCNC: 12 MG/DL (ref 0.7–1.3)
DATE LAST DOSE: ABNORMAL
ERYTHROCYTE [DISTWIDTH] IN BLOOD BY AUTOMATED COUNT: 15.2 % (ref 11.5–14.5)
GLOBULIN SER CALC-MCNC: 5.2 G/DL (ref 2–4)
GLUCOSE BLD STRIP.AUTO-MCNC: 113 MG/DL (ref 65–100)
GLUCOSE BLD STRIP.AUTO-MCNC: 115 MG/DL (ref 65–100)
GLUCOSE BLD STRIP.AUTO-MCNC: 80 MG/DL (ref 65–100)
GLUCOSE BLD STRIP.AUTO-MCNC: 83 MG/DL (ref 65–100)
GLUCOSE SERPL-MCNC: 113 MG/DL (ref 65–100)
HCT VFR BLD AUTO: 34.6 % (ref 36.6–50.3)
HGB BLD-MCNC: 10.7 G/DL (ref 12.1–17)
MCH RBC QN AUTO: 26.5 PG (ref 26–34)
MCHC RBC AUTO-ENTMCNC: 30.9 G/DL (ref 30–36.5)
MCV RBC AUTO: 85.6 FL (ref 80–99)
NRBC # BLD: 0 K/UL (ref 0–0.01)
NRBC BLD-RTO: 0 PER 100 WBC
PLATELET # BLD AUTO: 202 K/UL (ref 150–400)
PMV BLD AUTO: 9.9 FL (ref 8.9–12.9)
POTASSIUM SERPL-SCNC: 5.1 MMOL/L (ref 3.5–5.1)
PROT SERPL-MCNC: 7.9 G/DL (ref 6.4–8.2)
RBC # BLD AUTO: 4.04 M/UL (ref 4.1–5.7)
REPORTED DOSE,DOSE: ABNORMAL UNITS
REPORTED DOSE/TIME,TMG: ABNORMAL
SERVICE CMNT-IMP: ABNORMAL
SERVICE CMNT-IMP: ABNORMAL
SERVICE CMNT-IMP: NORMAL
SERVICE CMNT-IMP: NORMAL
SODIUM SERPL-SCNC: 133 MMOL/L (ref 136–145)
VANCOMYCIN TROUGH SERPL-MCNC: 20.1 UG/ML (ref 5–10)
WBC # BLD AUTO: 7.7 K/UL (ref 4.1–11.1)

## 2019-10-11 PROCEDURE — 74011250636 HC RX REV CODE- 250/636: Performed by: INTERNAL MEDICINE

## 2019-10-11 PROCEDURE — 36415 COLL VENOUS BLD VENIPUNCTURE: CPT

## 2019-10-11 PROCEDURE — 80202 ASSAY OF VANCOMYCIN: CPT

## 2019-10-11 PROCEDURE — 77030018836 HC SOL IRR NACL ICUM -A: Performed by: PODIATRIST

## 2019-10-11 PROCEDURE — 77030019895 HC PCKNG STRP IODO -A: Performed by: PODIATRIST

## 2019-10-11 PROCEDURE — 77030040922 HC BLNKT HYPOTHRM STRY -A

## 2019-10-11 PROCEDURE — 87077 CULTURE AEROBIC IDENTIFY: CPT

## 2019-10-11 PROCEDURE — 87075 CULTR BACTERIA EXCEPT BLOOD: CPT

## 2019-10-11 PROCEDURE — 76010000138 HC OR TIME 0.5 TO 1 HR: Performed by: PODIATRIST

## 2019-10-11 PROCEDURE — 74011000250 HC RX REV CODE- 250: Performed by: PODIATRIST

## 2019-10-11 PROCEDURE — 87186 SC STD MICRODIL/AGAR DIL: CPT

## 2019-10-11 PROCEDURE — 74011250637 HC RX REV CODE- 250/637: Performed by: INTERNAL MEDICINE

## 2019-10-11 PROCEDURE — 80053 COMPREHEN METABOLIC PANEL: CPT

## 2019-10-11 PROCEDURE — 77030011640 HC PAD GRND REM COVD -A: Performed by: PODIATRIST

## 2019-10-11 PROCEDURE — 76210000063 HC OR PH I REC FIRST 0.5 HR: Performed by: PODIATRIST

## 2019-10-11 PROCEDURE — 74011000258 HC RX REV CODE- 258: Performed by: INTERNAL MEDICINE

## 2019-10-11 PROCEDURE — 88305 TISSUE EXAM BY PATHOLOGIST: CPT

## 2019-10-11 PROCEDURE — 77030002916 HC SUT ETHLN J&J -A: Performed by: PODIATRIST

## 2019-10-11 PROCEDURE — 88311 DECALCIFY TISSUE: CPT

## 2019-10-11 PROCEDURE — 82962 GLUCOSE BLOOD TEST: CPT

## 2019-10-11 PROCEDURE — 0Y6V0Z0 DETACHMENT AT RIGHT 4TH TOE, COMPLETE, OPEN APPROACH: ICD-10-PCS | Performed by: PODIATRIST

## 2019-10-11 PROCEDURE — 90935 HEMODIALYSIS ONE EVALUATION: CPT

## 2019-10-11 PROCEDURE — 85027 COMPLETE CBC AUTOMATED: CPT

## 2019-10-11 PROCEDURE — 87070 CULTURE OTHR SPECIMN AEROBIC: CPT

## 2019-10-11 PROCEDURE — 65270000029 HC RM PRIVATE

## 2019-10-11 RX ORDER — BUPIVACAINE HYDROCHLORIDE AND EPINEPHRINE 5; 5 MG/ML; UG/ML
INJECTION, SOLUTION EPIDURAL; INTRACAUDAL; PERINEURAL AS NEEDED
Status: DISCONTINUED | OUTPATIENT
Start: 2019-10-11 | End: 2019-10-11 | Stop reason: HOSPADM

## 2019-10-11 RX ORDER — DOXYCYCLINE 100 MG/1
100 TABLET ORAL 2 TIMES DAILY
Qty: 20 TAB | Refills: 1 | Status: SHIPPED | OUTPATIENT
Start: 2019-10-11 | End: 2019-10-14 | Stop reason: SDUPTHER

## 2019-10-11 RX ORDER — HYDROCODONE BITARTRATE AND ACETAMINOPHEN 7.5; 325 MG/1; MG/1
1 TABLET ORAL
Qty: 25 TAB | Refills: 0 | Status: SHIPPED | OUTPATIENT
Start: 2019-10-11 | End: 2019-10-14

## 2019-10-11 RX ADMIN — GUAIFENESIN 400 MG: 200 SOLUTION ORAL at 09:17

## 2019-10-11 RX ADMIN — MORPHINE SULFATE 2 MG: 2 INJECTION, SOLUTION INTRAMUSCULAR; INTRAVENOUS at 18:12

## 2019-10-11 RX ADMIN — GUAIFENESIN 400 MG: 200 SOLUTION ORAL at 22:18

## 2019-10-11 RX ADMIN — CEFEPIME HYDROCHLORIDE 1 G: 1 INJECTION, POWDER, FOR SOLUTION INTRAMUSCULAR; INTRAVENOUS at 20:32

## 2019-10-11 RX ADMIN — MORPHINE SULFATE 2 MG: 2 INJECTION, SOLUTION INTRAMUSCULAR; INTRAVENOUS at 22:31

## 2019-10-11 RX ADMIN — MORPHINE SULFATE 2 MG: 2 INJECTION, SOLUTION INTRAMUSCULAR; INTRAVENOUS at 00:52

## 2019-10-11 RX ADMIN — GUAIFENESIN 400 MG: 200 SOLUTION ORAL at 15:35

## 2019-10-11 RX ADMIN — ATORVASTATIN CALCIUM 40 MG: 40 TABLET, FILM COATED ORAL at 15:46

## 2019-10-11 RX ADMIN — VANCOMYCIN HYDROCHLORIDE 750 MG: 750 INJECTION, POWDER, LYOPHILIZED, FOR SOLUTION INTRAVENOUS at 09:09

## 2019-10-11 NOTE — BRIEF OP NOTE
BRIEF OPERATIVE NOTE Date of Procedure: 10/11/2019 Preoperative Diagnosis: osteomyelitis 4th right toe Postoperative Diagnosis: osteomyelitis 4th right toe Procedure(s): 
AMPUTATION RIGHT FOURTH TOE Surgeon(s) and Role: Hayes Sanchez DPM - Primary Surgical Assistant: none Surgical Staff: 
Circ-1: Trae Perkins RN Local Nurse Monitor: Mylene Boo RN Scrub Tech-1: Pari Sauceda Event Time In Time Out Incision Start 1400 Incision Close 1420 Anesthesia: Local  
Estimated Blood Loss:none Specimens:  
ID Type Source Tests Collected by Time Destination 1 : 1. right fourth toe Preservative Toe  Jacinda Workman [facility] 10/11/2019 1406 Pathology 1 : right fourth toe wound Tissue Toe CULTURE, ANAEROBIC, CULTURE, TISSUE W Michelle Jayden [place], [place] 10/11/2019 1406 Microbiology Findings: infected toe Complications: none Implants: * No implants in log *

## 2019-10-11 NOTE — PROGRESS NOTES
Bedside shift change report given to Robert (oncoming nurse) by Leola Mccormack (offgoing nurse). Report included the following information SBAR, Kardex, Intake/Output, MAR, Accordion, Recent Results, Med Rec Status and Cardiac Rhythm nsr.

## 2019-10-11 NOTE — PROGRESS NOTES
Foot and Ankle surgeryProgress Note Admit Date: 10/8/2019 Hospital day 3 Subjective:  
 
Patient was admitted three days ago with a painful sore 4th right toe of unknown duration. MRI indicate osteomyelitis 4th right toe Current Facility-Administered Medications Medication Dose Route Frequency  lisinopril (PRINIVIL, ZESTRIL) tablet 10 mg  10 mg Oral EVERY TUES,THUR,SAT,SUN  
 acetaminophen (TYLENOL) tablet 650 mg  650 mg Oral Q6H PRN  
 ondansetron (ZOFRAN) injection 4 mg  4 mg IntraVENous Q4H PRN  
 atorvastatin (LIPITOR) tablet 40 mg  40 mg Oral DAILY  insulin glargine (LANTUS) injection 40 Units  40 Units SubCUTAneous DAILY  loperamide (IMODIUM) capsule 2 mg  2 mg Oral QID PRN  
 insulin lispro (HUMALOG) injection   SubCUTAneous AC&HS  
 glucose chewable tablet 16 g  4 Tab Oral PRN  
 dextrose (D50W) injection syrg 12.5-25 g  12.5-25 g IntraVENous PRN  
 glucagon (GLUCAGEN) injection 1 mg  1 mg IntraMUSCular PRN  
 metoprolol (LOPRESSOR) injection 5 mg  5 mg IntraVENous Q6H PRN  
 morphine injection 2 mg  2 mg IntraVENous Q4H PRN  
 guaiFENesin (ROBITUSSIN) 100 mg/5 mL oral liquid 400 mg  400 mg Oral TID  cefepime (MAXIPIME) 1 g in 0.9% sodium chloride (MBP/ADV) 50 mL  1 g IntraVENous Q24H  
 vancomycin (VANCOCIN) 750 mg in 0.9% sodium chloride (MBP/ADV) 250 mL  750 mg IntraVENous PRN  Vancomycin Redosing Reminder Note  1 Each Other DAILY Objective:  
 
Patient Vitals for the past 8 hrs: 
 BP Temp Temp src Pulse Resp SpO2  
10/11/19 1259 144/90 98.8 °F (37.1 °C)  86 19 97 % 10/11/19 1220 143/90   87 16   
10/11/19 1215 138/84   82 18   
10/11/19 1200 (!) 152/94   82 16   
10/11/19 1145 (!) 150/91   79 18   
10/11/19 1130 132/86   80 16   
10/11/19 1115 149/84   78 18   
10/11/19 1100 135/82   78 16   
10/11/19 1045 123/73   77 18   
10/11/19 1030 119/75   77 16   
10/11/19 1015 117/75   75 18   
10/11/19 1000 143/78   77 16  10/11/19 0945 124/73   76 18   
10/11/19 0930 123/80   78 16   
10/11/19 0915 131/77   79 18   
10/11/19 0900 122/82   77 16   
10/11/19 0845 134/89   81 16   
10/11/19 0830 124/75   80 16   
10/11/19 0815 146/85   81 18   
10/11/19 0800 (!) 150/91   82 18   
10/11/19 0755 138/88 97 °F (36.1 °C) Oral 85 18   
 
10/11 0701 - 10/11 1900 In: -  
Out: 3000 No intake/output data recorded. Physical Exam: LOWER RIGHT EXTREMITY Barley palpable pedal pulses with diminished epicritic sensation Good muscle strength Previous well healed amputation 5th right toe 4th right toe with open wound and exposed bone at the pipj There is no wound 5th metatarsal ; this area of the fifth met is not clinically infected MRI shows osteomyelitis phalanges 4th right toe. Bone resorption of the residual portion of the 5th metatarsal secondary to previous surgery Afebrile WBC's 7.7 All labs reviewed Data Review Recent Results (from the past 24 hour(s)) GLUCOSE, POC Collection Time: 10/10/19  3:56 PM  
Result Value Ref Range Glucose (POC) 86 65 - 100 mg/dL Performed by Rachel Browne (PCT) GLUCOSE, POC Collection Time: 10/10/19  9:17 PM  
Result Value Ref Range Glucose (POC) 122 (H) 65 - 100 mg/dL Performed by Jerri Holder Seat Collection Time: 10/11/19  4:47 AM  
Result Value Ref Range Vancomycin,trough 20.1 (HH) 5.0 - 10.0 ug/mL Reported dose date: NOT PROVIDED Reported dose time: NOT PROVIDED Reported dose: 750MG UNITS METABOLIC PANEL, COMPREHENSIVE Collection Time: 10/11/19  4:47 AM  
Result Value Ref Range Sodium 133 (L) 136 - 145 mmol/L Potassium 5.1 3.5 - 5.1 mmol/L Chloride 97 97 - 108 mmol/L  
 CO2 30 21 - 32 mmol/L Anion gap 6 5 - 15 mmol/L Glucose 113 (H) 65 - 100 mg/dL BUN 52 (H) 6 - 20 MG/DL  Creatinine 12.00 (H) 0.70 - 1.30 MG/DL  
 BUN/Creatinine ratio 4 (L) 12 - 20    
 GFR est AA 6 (L) >60 ml/min/1.73m2 GFR est non-AA 5 (L) >60 ml/min/1.73m2 Calcium 8.3 (L) 8.5 - 10.1 MG/DL Bilirubin, total 0.8 0.2 - 1.0 MG/DL  
 ALT (SGPT) 12 12 - 78 U/L  
 AST (SGOT) 14 (L) 15 - 37 U/L Alk. phosphatase 45 45 - 117 U/L Protein, total 7.9 6.4 - 8.2 g/dL Albumin 2.7 (L) 3.5 - 5.0 g/dL Globulin 5.2 (H) 2.0 - 4.0 g/dL A-G Ratio 0.5 (L) 1.1 - 2.2    
CBC W/O DIFF Collection Time: 10/11/19  4:47 AM  
Result Value Ref Range WBC 7.7 4.1 - 11.1 K/uL  
 RBC 4.04 (L) 4.10 - 5.70 M/uL  
 HGB 10.7 (L) 12.1 - 17.0 g/dL HCT 34.6 (L) 36.6 - 50.3 % MCV 85.6 80.0 - 99.0 FL  
 MCH 26.5 26.0 - 34.0 PG  
 MCHC 30.9 30.0 - 36.5 g/dL  
 RDW 15.2 (H) 11.5 - 14.5 % PLATELET 241 712 - 676 K/uL MPV 9.9 8.9 - 12.9 FL  
 NRBC 0.0 0  WBC ABSOLUTE NRBC 0.00 0.00 - 0.01 K/uL GLUCOSE, POC Collection Time: 10/11/19  1:12 PM  
Result Value Ref Range Glucose (POC) 80 65 - 100 mg/dL Performed by Trinity Nichols Assessment:  
 
Active Problems: 
  HTN (hypertension) (10/16/2009) ESRD (end stage renal disease) on dialysis ([facility] (9/14/2018) Uncontrolled type 2 diabetes mellitus with hyperglycemia, with long-term current use of insulin ([facility] (9/14/2018) [facility] (10/8/2019) Plan:  
Scheduled today amputation 4th right toe with in next 2-3 days. Discussed procedure ,risks,  Benefits and expected outcome with patient . All questions answered

## 2019-10-11 NOTE — PROGRESS NOTES
Bedside shift change report given to DIONICIO Goncalves (oncoming nurse) by Darlean Hodgkins, RN (offgoing nurse). Report included the following information SBAR, Kardex, OR Summary, Procedure Summary, Intake/Output, MAR, Recent Results and Cardiac Rhythm NSR.

## 2019-10-11 NOTE — PROGRESS NOTES
Problem: Falls - Risk of 
Goal: *Absence of Falls Description Document Rosario Eli Fall Risk and appropriate interventions in the flowsheet. Outcome: Progressing Towards Goal 
Note:  
Fall Risk Interventions: 
Mobility Interventions: Patient to call before getting OOB Medication Interventions: Assess postural VS orthostatic hypotension Elimination Interventions: Call light in reach

## 2019-10-11 NOTE — PROGRESS NOTES
Reason for Admission:   Osteomyelitis RRAT Score:          9 Plan for utilizing home health: Yes Current Advanced Directive/Advance Care Plan: Full code Transition of Care Plan:         Home with [facility] Care Management Interventions PCP Verified by CM: Yes Mode of Transport at Discharge: Other (see comment)(Uses Lyft,uber or caravan- pt said his medicaid wont cover tranportation.) Transition of Care Consult (CM Consult): Home Health [address]: Yes Discharge Durable Medical Equipment: No(Doesn't use- pt requested for cane or walker) Current Support Network: Lives Alone(In an appartment has elevator acess) Confirm Follow Up Transport: Other (see comment)(Caravan,lyft or uber) Plan discussed with Pt/Family/Caregiver: Yes Discharge Location Discharge Placement: Home with home health CM met with the patient to discuss discharge plans. Patient was alert and oriented. Patient lives alone is independent with ADL's and IDL's. Patient uses room air no home oxygen. Patient advised he is  from his spouse and family and friends assist him with transportation. The patient stated he primarily uses [facility] for medical appointments and [employer] and Lyft for other transportation needs which is very costly and he dont have much money to pay his transportation needs. Patient advised he is not sure who will pick him up at discharge. Cm provided DMAS contact informations to pt and asked him request for a MCO to manage his medicaid,which help him to obtain medicaid transportation. Patient confirmed [facility] Dr. Bobby [facility]. Patient is HD patient MWF at [facility]. Pt said he is legally blind and he has blurried vision. Pt said he is also an insuline dependent ,some time he miss meds because he couldn't afford meds. he requested any assistance for medication Pt is waiting for surgery. Floor cm will assist member with discharge needs and [facility] services after PT complete disposition assessment and reccomendation. Renata Howard MSW 
ED Case Manager Ext [phone]

## 2019-10-11 NOTE — PROGRESS NOTES
Hospitalist Progress Note NAME: Doug Gonzalez :  1985 MRN:  [id number] Interim Hospital Summary: 27 y.o. male whom presented on 10/8/2019 with Assessment / Plan: 
 
Right 4th toe diabetic ulcer with OM 
-MRI right foot: Osteomyelitis of the fourth proximal and middle phalanges. Age indeterminate osteomyelitis of the fifth metatarsal amputation stump. No drainable abscess 
-continue empiric IV cefepime and vanco.  Follow up on cultures 
-Appreciate Dr Luis Eduardo Mcrae help. For amputation of 4th toe later today DM2 
-continue lantus and SSI. Holding trulicity. BG good HTN 
-continue lisinopril ESRD 
-HD per renal 
 
 
 
 
40 or above Morbid obesity / Body mass index is 44.13 kg/m². Code status: Full Prophylaxis: SCD's Recommended Disposition:  PT, OT, RN Subjective: Chief Complaint / Reason for Physician Visit Follow up of right 4th toe OM, HTN, DM, ESRD Chart reviewed in detail. Discussed with RN events overnight. Having dialysis today Review of Systems: 
Symptom Y/N Comments  Symptom Y/N Comments Fever/Chills n   Chest Pain Poor Appetite    Edema Cough    Abdominal Pain Sputum    Joint Pain SOB/LITTLE n   Pruritis/Rash Nausea/vomit n   Tolerating PT/OT Diarrhea    Tolerating Diet Constipation    Other Could NOT obtain due to:   
 
PO intake:  
Patient Vitals for the past 72 hrs: 
 % Diet Eaten 10/10/19 1011 100 % Objective: VITALS:  
Last 24hrs VS reviewed since prior progress note. Most recent are: 
Patient Vitals for the past 24 hrs: 
 Temp Pulse Resp BP SpO2  
10/11/19 1000  (P) 77 (P) 16 (P) 143/78   
10/11/19 0945  (P) 76 (P) 18 (P) 124/73   
10/11/19 0930  78 16 123/80   
10/11/19 0915  79 18 131/77   
10/11/19 0900  77 16 122/82   
10/11/19 0845  81 16 134/89   
10/11/19 0830  80 16 124/75   
10/11/19 0815  81 18 146/85  10/11/19 0800  82 18 (!) 150/91   
10/11/19 0755 97 °F (36.1 °C) 85 18 138/88   
10/11/19 0332 98.4 °F (36.9 °C) 83 18 136/71 93 % 10/10/19 2354 98 °F (36.7 °C) 86 18 151/82 95 % 10/10/19 1932 97.8 °F (36.6 °C) 85 18 148/80 94 % 10/10/19 1527 98.5 °F (36.9 °C) 83 18 149/77 93 % No intake or output data in the 24 hours ending 10/11/19 1250 PHYSICAL EXAM: 
General: WD, WN. Alert, cooperative, no acute distress   
EENT:  EOMI. Anicteric sclerae. MMM Resp:  CTA bilaterally, no wheezing or rales. No accessory muscle use CV:  Regular  rhythm,  No edema GI:  Soft, Non distended, Non tender.  +Bowel sounds Neurologic:  Alert and oriented X 3, normal speech, Psych:   Good insight. Not anxious nor agitated Skin:  (+) right 4th toe open wound with swelling Reviewed most current lab test results and cultures  YES Reviewed most current radiology test results   YES Review and summation of old records today    NO Reviewed patient's current orders and MAR    YES 
PMH/SH reviewed - no change compared to H&P 
________________________________________________________________________ Care Plan discussed with: 
  Comments Patient x Family RN x Care Manager Consultant Multidiciplinary team rounds were held today with , nursing, pharmacist and clinical coordinator. Patient's plan of care was discussed; medications were reviewed and discharge planning was addressed. ________________________________________________________________________ Total NON critical care TIME:   25  Minutes Total CRITICAL CARE TIME Spent:   Minutes non procedure based Comments >50% of visit spent in counseling and coordination of care x This includes time during multidisciplinary rounds if indicated above  
________________________________________________________________________ Manuela Vo MD  
 
 Procedures: see electronic medical records for all procedures/Xrays and details which were not copied into this note but were reviewed prior to creation of Plan. LABS: 
I reviewed today's most current labs and imaging studies. Pertinent labs include: 
Recent Labs 10/11/19 
0447 10/09/19 
0307 10/08/19 
1253 WBC 7.7 8.7 10.0 HGB 10.7* 11.2* 12.5 HCT 34.6* 36.5* 39.9  230 261 Recent Labs 10/11/19 
0447 10/09/19 
0307 10/08/19 
1253 * 137 138  
K 5.1 5.0 4.9 CL 97 97 97 CO2 30 34* 38* * 132* 177* BUN 52* 48* 39* CREA 12.00* 11.20* 10.30* CA 8.3* 8.4* 9.2 MG  --  2.4  --   
PHOS  --  8.0*  --   
ALB 2.7* 2.9* 3.3* TBILI 0.8 0.4 0.5 SGOT 14* 11* 19 ALT 12 12 14

## 2019-10-11 NOTE — PROGRESS NOTES
Post op amputation 4th right toe. Tolerated procedure well. I feel this is a surgical cure and recommend discharge pending medial clearance. Patient to follow in my office within 3-5 days of discharge

## 2019-10-11 NOTE — PROGRESS NOTES
[facility] Progress Note NAME: Wiliam Quintero :  1985 MRN:  [id number] Date/Time: 10/11/2019 Risk of deterioration: medium Assessment:    Plan: 
ESRD-MWF, [place] HTN Osteo, toe/foot Seen on hd today, bp stable For toe amp today Agree with antibiotics Vascular surgeon is Dr. Moy Apo Will FU Monday, call if problems arise over weekend Subjective: Chief Complaint:  I'm ok Review of Systems: no n/v/co/sob  (+) foot/toe pain/discoloratoin  (-) f/c/cp Objective: VITALS:  
Last 24hrs VS reviewed since prior progress note. Most recent are: 
Visit Vitals /80 Pulse 78 Temp 97 °F (36.1 °C) (Oral) Resp 16 Ht 6' 2\" (1.88 m) Wt 155.9 kg (343 lb 11.2 oz) SpO2 93% BMI 44.13 kg/m² SpO2 Readings from Last 6 Encounters:  
10/11/19 93% 19 96% 19 97% 19 98% 19 95% 19 96% No intake or output data in the 24 hours ending 10/11/19 0954 Telemetry Reviewed PHYSICAL EXAM: 
 
General   well developed, well nourished, appears stated age, in no acute distress Respiratory   Clear To Auscultation bilaterally Cardiology  Regular Rate and Rythmn Extremities  No clubbing, cyanosis. (L) uE access t/b Lab Data Reviewed: (see below) Medications Reviewed: (see below) PMH/SH reviewed - no change compared to H&P 
____________________________________________________ Attending Physician: Angelia Ardon MD  
 
____________________________________________________ MEDICATIONS: 
Current Facility-Administered Medications Medication Dose Route Frequency  lisinopril (PRINIVIL, ZESTRIL) tablet 10 mg  10 mg Oral EVERY TUES,THUR,SAT,SUN  
 acetaminophen (TYLENOL) tablet 650 mg  650 mg Oral Q6H PRN  
 ondansetron (ZOFRAN) injection 4 mg  4 mg IntraVENous Q4H PRN  
 atorvastatin (LIPITOR) tablet 40 mg  40 mg Oral DAILY  insulin glargine (LANTUS) injection 40 Units  40 Units SubCUTAneous DAILY  loperamide (IMODIUM) capsule 2 mg  2 mg Oral QID PRN  
 insulin lispro (HUMALOG) injection   SubCUTAneous AC&HS  
 glucose chewable tablet 16 g  4 Tab Oral PRN  
 dextrose (D50W) injection syrg 12.5-25 g  12.5-25 g IntraVENous PRN  
 glucagon (GLUCAGEN) injection 1 mg  1 mg IntraMUSCular PRN  
 metoprolol (LOPRESSOR) injection 5 mg  5 mg IntraVENous Q6H PRN  
 morphine injection 2 mg  2 mg IntraVENous Q4H PRN  
 guaiFENesin (ROBITUSSIN) 100 mg/5 mL oral liquid 400 mg  400 mg Oral TID  cefepime (MAXIPIME) 1 g in 0.9% sodium chloride (MBP/ADV) 50 mL  1 g IntraVENous Q24H  
 vancomycin (VANCOCIN) 750 mg in 0.9% sodium chloride (MBP/ADV) 250 mL  750 mg IntraVENous PRN  Vancomycin Redosing Reminder Note  1 Each Other DAILY  
  
 
LABS: 
Recent Labs 10/11/19 
[phone] 10/09/19 
8520 WBC 7.7 8.7 HGB 10.7* 11.2* HCT 34.6* 36.5*  
 230 Recent Labs 10/11/19 
0447 10/09/19 
0307 10/08/19 
1253 * 137 138  
K 5.1 5.0 4.9 CL 97 97 97 CO2 30 34* 38* BUN 52* 48* 39* CREA 12.00* 11.20* 10.30* * 132* 177* CA 8.3* 8.4* 9.2 MG  --  2.4  --   
PHOS  --  8.0*  --   
 
Recent Labs 10/11/19 
0447 10/09/19 
0307 10/08/19 
1253 SGOT 14* 11* 19 ALT 12 12 14 AP 45 45 51 TBILI 0.8 0.4 0.5 TP 7.9 8.1 9.4* ALB 2.7* 2.9* 3.3*  
GLOB 5.2* 5.2* 6.1* No results for input(s): INR, PTP, APTT, INREXT, INREXT in the last 72 hours. No results for input(s): FE, TIBC, PSAT, FERR in the last 72 hours. No results for input(s): PH, PCO2, PO2 in the last 72 hours. No results for input(s): CPK, CKNDX, TROIQ in the last 72 hours. No lab exists for component: CPKMB Lab Results Component Value Date/Time  Glucose (POC) 122 (H) 10/10/2019 09:17 PM  
 Glucose (POC) 86 10/10/2019 03:56 PM  
 Glucose (POC) 148 (H) 10/10/2019 12:00 PM  
 Glucose (POC) 114 (H) 10/10/2019 07:43 AM  
 Glucose (POC) 114 (H) 10/09/2019 09:07 PM

## 2019-10-11 NOTE — PERIOP NOTES
1140:  TRANSFER - IN REPORT: 
 
Verbal report received from Alena Jim Masters [employer] on Skye Britton  being received from [phone] for ordered procedure Report consisted of patients Situation, Background, Assessment and  
Recommendations(SBAR). Information from the following report(s) SBAR, Kardex, Intake/Output, MAR, Recent Results and Cardiac Rhythm NSR was reviewed with the receiving nurse. Opportunity for questions and clarification was provided. 1142:  Phoned Best Clemens in dialysis unit and requested that she call us in preop when he is finished dialyzing. We will get the patient directly from the dialysis unit. 1232:  Updated report obtained from dialysis nurse 1437:  Report phoned to Alena Jim Masters [employer] Patient to be transferred back to room 3230

## 2019-10-11 NOTE — PERIOP NOTES
Patient admitted to pre op holding area to be recovered post local procedure. Patient alert and oriented x4. No medication was given in OR. Patient has bulky dressing to right foot C/D/I, patient able to wiggle toes. Report given to floor by Mable Bowers RN. 
 
1787 - patient out of pre op area to transfer back to inpatient room.

## 2019-10-11 NOTE — DIALYSIS
[facility] Dialysis Team [place]  [phone] Vitals   Pre   Post   Assessment   Pre   Post    
Temp  Temp: 97 °F (36.1 °C) (10/11/19 0755)  97.6 LOC  A&O x 3 A&O x 3 HR   Pulse (Heart Rate): 81 (10/11/19 0845) 87 Lungs   clear  clear B/P   BP: 134/89 (10/11/19 0845) 138/84 Cardiac   regular  regular Resp   Resp Rate: 16 (10/11/19 0845) 16 Skin   warm  warm Pain level  Pain Intensity 1: 5 (10/11/19 0152) No pain Edema   
generalized 
 generalized Orders: Duration:   Start:    0800 End:    1215 Total:   4 hr 15 min Dialyzer:   Dialyzer/Set Up Inspection: Darrel([id number]) (10/11/19 0755) K Bath:   Dialysate K (mEq/L): 2 (10/11/19 0755) Ca Bath:   Dialysate CA (mEq/L): 2.5 (10/11/19 0755) Na/Bicarb:   Dialysate NA (mEq/L): 140 (10/11/19 0755) Target Fluid Removal:   Goal/Amount of Fluid to Remove (mL): 3000 mL (10/11/19 0755) Access Type & Location:   PELON AVF B&T positive Labs Obtained/Reviewed Critical Results Called   Date when labs were drawn- 
Hgb-   
HGB Date Value Ref Range Status 10/11/2019 10.7 (L) 12.1 - 17.0 g/dL Final  
 
K-   
Potassium Date Value Ref Range Status 10/11/2019 5.1 3.5 - 5.1 mmol/L Final  
 
Ca-  
Calcium Date Value Ref Range Status 10/11/2019 8.3 (L) 8.5 - 10.1 MG/DL Final  
 
Bun-  
BUN Date Value Ref Range Status 10/11/2019 52 (H) 6 - 20 MG/DL Final  
 
Creat-  
Creatinine Date Value Ref Range Status 10/11/2019 12.00 (H) 0.70 - 1.30 MG/DL Final  
 
  
Medications/ Blood Products Given Name   Dose   Route and Time Vancomicin Blood Volume Processed (BVP):    92 L Net Fluid Removed:  3000 ml Comments Time Out Done: yes, 7946 Primary Nurse Rpt Pre:Eliot Da Silva RN 
Primary Nurse Rpt Post: Roxana Fowler RN Pt Education:yes, r/t dialysis process Care Plan:continue HD as ordered Tx Summary:tolerated tx well, at end, all blood in circuit returned with 300 ml NS, PELON AVF patent, B&T positive, bleeding stopped atb both sites, pressure dressing in place, no S/S of bleeding, transported to OR for scheduled surgery. Admiting Diagnosis:renal failure Pt's previous clinic- 
Consent signed - Informed Consent Verified: Yes (10/11/19 8780) [place] Consent - yes Hepatitis Status- negative Machine #- Machine Number: [id number] (10/11/19 9151) Telemetry status-remote Pre-dialysis wt. - Pre-Dialysis Weight: 150 kg (330 lb 11 oz) (10/11/19 9058)

## 2019-10-11 NOTE — PROGRESS NOTES
Plan of Care 1) Home with f.u and family assistance 3:38 PM- CM received phone call from RN regarding discharge order. Discharge order placed for tomorrow from Dr. Bert Starks pending medical clearance. At this time there are no CM needs identified; pt will make his own follow up appt with Dr. Bert Starks. No further CM needs identified. Pt is cleared to discharge from CM perspective. Erik Schwab, MSW, [facility] [phone]

## 2019-10-12 LAB
GLUCOSE BLD STRIP.AUTO-MCNC: 128 MG/DL (ref 65–100)
GLUCOSE BLD STRIP.AUTO-MCNC: 135 MG/DL (ref 65–100)
GLUCOSE BLD STRIP.AUTO-MCNC: 139 MG/DL (ref 65–100)
GLUCOSE BLD STRIP.AUTO-MCNC: 152 MG/DL (ref 65–100)
SERVICE CMNT-IMP: ABNORMAL

## 2019-10-12 PROCEDURE — 65270000029 HC RM PRIVATE

## 2019-10-12 PROCEDURE — 74011636637 HC RX REV CODE- 636/637: Performed by: INTERNAL MEDICINE

## 2019-10-12 PROCEDURE — 94760 N-INVAS EAR/PLS OXIMETRY 1: CPT

## 2019-10-12 PROCEDURE — 82962 GLUCOSE BLOOD TEST: CPT

## 2019-10-12 PROCEDURE — 77030018836 HC SOL IRR NACL ICUM -A

## 2019-10-12 PROCEDURE — 74011250637 HC RX REV CODE- 250/637: Performed by: INTERNAL MEDICINE

## 2019-10-12 PROCEDURE — 74011250636 HC RX REV CODE- 250/636: Performed by: INTERNAL MEDICINE

## 2019-10-12 PROCEDURE — 74011000258 HC RX REV CODE- 258: Performed by: INTERNAL MEDICINE

## 2019-10-12 RX ORDER — GUAIFENESIN/DEXTROMETHORPHAN 100-10MG/5
10 SYRUP ORAL
Status: DISCONTINUED | OUTPATIENT
Start: 2019-10-12 | End: 2019-10-14 | Stop reason: HOSPADM

## 2019-10-12 RX ORDER — BENZONATATE 100 MG/1
100 CAPSULE ORAL
Status: DISCONTINUED | OUTPATIENT
Start: 2019-10-12 | End: 2019-10-14 | Stop reason: HOSPADM

## 2019-10-12 RX ADMIN — GUAIFENESIN 400 MG: 200 SOLUTION ORAL at 18:06

## 2019-10-12 RX ADMIN — INSULIN GLARGINE 40 UNITS: 100 INJECTION, SOLUTION SUBCUTANEOUS at 10:03

## 2019-10-12 RX ADMIN — LISINOPRIL 10 MG: 10 TABLET ORAL at 10:03

## 2019-10-12 RX ADMIN — GUAIFENESIN 400 MG: 200 SOLUTION ORAL at 22:16

## 2019-10-12 RX ADMIN — MORPHINE SULFATE 2 MG: 2 INJECTION, SOLUTION INTRAMUSCULAR; INTRAVENOUS at 21:11

## 2019-10-12 RX ADMIN — CEFEPIME HYDROCHLORIDE 1 G: 1 INJECTION, POWDER, FOR SOLUTION INTRAMUSCULAR; INTRAVENOUS at 20:27

## 2019-10-12 RX ADMIN — ATORVASTATIN CALCIUM 40 MG: 40 TABLET, FILM COATED ORAL at 10:03

## 2019-10-12 RX ADMIN — MORPHINE SULFATE 2 MG: 2 INJECTION, SOLUTION INTRAMUSCULAR; INTRAVENOUS at 17:07

## 2019-10-12 RX ADMIN — BENZONATATE 100 MG: 100 CAPSULE ORAL at 01:38

## 2019-10-12 RX ADMIN — MORPHINE SULFATE 2 MG: 2 INJECTION, SOLUTION INTRAMUSCULAR; INTRAVENOUS at 02:37

## 2019-10-12 RX ADMIN — GUAIFENESIN 400 MG: 200 SOLUTION ORAL at 10:03

## 2019-10-12 NOTE — PROGRESS NOTES
Orthopedic End of Shift Note Bedside shift change report given to DIONICIO Goncalves (oncoming nurse) by Janae South RN (offgoing nurse). Report included the following information SBAR, Kardex, OR Summary, Procedure Summary, Intake/Output, MAR, Accordion, Recent Results, Med Rec Status and Cardiac Rhythm NSR. POD# 1 Significant issues during shift: none Issues for Physician to address: none Activity This Shift 
(check all that apply) [x] chair 
[] dangle [x] bathroom 
[] bedside commode [] hallway 
[] bedrest  
Nausea/Vomiting [] yes [x] no    
Voiding Status [] void [] [place] [] I&O Cath Bowel Movements [] yes [x] no Foot Pumps or SCD [] yes [] no Ice Pack [] yes    [x] no Incentive Spirometer [] yes [x] no Volume:     
Telemetry Monitoring   [x] yes [] no Rhythm:NSR Supplemental O2 [] yes [x] no Sat off O2:   95%

## 2019-10-12 NOTE — PROGRESS NOTES
Discharge for patient cancelled by Dr. Robert Howard because we are awaiting cultures and patient is on IV antibiotics.

## 2019-10-12 NOTE — OP NOTES
[facility] 
OPERATIVE REPORT Name:  Destinee Espinoza 
MR#:  [id number] :  1985 ACCOUNT #:  [de-identified] DATE OF SERVICE:  10/11/2019 PREOPERATIVE DIAGNOSIS:  Osteomyelitis, fourth right toe. POSTOPERATIVE DIAGNOSIS:  Osteomyelitis, fourth right toe. PROCEDURE PERFORMED:  Amputation of fourth right toe. SURGEON:  Audra Nuno DPM. ASSISTANT:  none ANESTHESIA:  Local. 
 
COMPLICATIONS:  none SPECIMENS REMOVED:  Fourth toe and wound cultures. IMPLANTS:  none ESTIMATED BLOOD LOSS:  Minimal. 
 
INDICATIONS:  This 28-year-old white male who presented to the hospital three days ago with an infected fourth right toe, and MRI indicated osteomyelitis of the fourth right toe. At this time, surgical intervention has been opted as a treatment choice by the patient. Medical history and physical has been performed. The patient needs a surgery. Physical exam reveal palpable pedal pulses, fairly good muscle strength in lower extremities bilateral.  Diminished epicritic sensation. Previous amputation of the fifth right toe, fourth right toe is flexed, has an ulceration at the PIPJ with exposed bone. An MRI indicated osteomyelitis of the middle phalanx and distal proximal phalanx of the fourth right toe. ABIs showed adequate arterial flow in the lower extremities. DESCRIPTION OF PROCEDURE:  The patient was brought into the operating room, placed on the operating table in supine position. Anesthesia was achieved to the fourth right toe using 0.5% Marcaine with epinephrine. The right foot is now prepped and draped in usual sterile manner. A successful time-out was completed. Next, using a #15 blade, the fourth right toe was disarticulated at the MPJ. There was no purulence remaining. No necrotic tissue. Healthy bleeding.   It appears that all of the infected tissue was contained within the distal aspect of the proximal phalanx. Aerobic as well as anaerobic wound cultures were taken. The wound was now flushed with copious amounts of sterile saline with bacitracin dissolved in it. All skin edges are now reapproximated using 4-0 nylon with horizontal mattress sutures, Betadine soaked Adaptic, followed by a sterile compressive dressings now placed on the right foot. I do feel that this is a surgical cure and the patient can be discharged on Monday with a followup visit in my office within the next 4-5 days. VALENCIA [email or web address] 
D:  10/11/2019 14:41 T:  10/11/2019 23:07 JOB #:  [id number]

## 2019-10-12 NOTE — PROGRESS NOTES
TEJA Plan: 
 
* Home with f/u appts & family assistance 
 
> Pt reported he will make his own f/u appts 
> Pt requesting transportation assistance at d/c; CM will set up Round Trip transportation as needed CM reviewed pt's chart before moving forward with d/c planning. CM noted pt's d/c; CM received called from pt's nurse inquiring about transportation assistance. CM conducted room visit to make contact with pt, discuss Round Trip services, and verify he had access to a house key; pt confirmed he has key to his home present. CM will arrange Round Trip transportation as needed. CM consulted with pt's nurse and is waiting to hear back from her regarding appropriate transportation time as pt still needs dressing change & morning medication. CM will continue to follow patient for discharge planning needs and arrange for services as deemed necessary. Adeola Reilly, MSW Care Manager, [address] [facility] 
[phone]

## 2019-10-12 NOTE — PROGRESS NOTES
Bedside and Verbal shift change report given to Robert (oncoming nurse) by Nga Blandon (offgoing nurse). Report included the following information SBAR, Kardex, Intake/Output, MAR, Med Rec Status and Cardiac Rhythm NSR.

## 2019-10-13 LAB
GLUCOSE BLD STRIP.AUTO-MCNC: 115 MG/DL (ref 65–100)
GLUCOSE BLD STRIP.AUTO-MCNC: 120 MG/DL (ref 65–100)
GLUCOSE BLD STRIP.AUTO-MCNC: 129 MG/DL (ref 65–100)
GLUCOSE BLD STRIP.AUTO-MCNC: 133 MG/DL (ref 65–100)
SERVICE CMNT-IMP: ABNORMAL

## 2019-10-13 PROCEDURE — 74011000258 HC RX REV CODE- 258: Performed by: INTERNAL MEDICINE

## 2019-10-13 PROCEDURE — 74011250637 HC RX REV CODE- 250/637: Performed by: INTERNAL MEDICINE

## 2019-10-13 PROCEDURE — 74011000250 HC RX REV CODE- 250: Performed by: INTERNAL MEDICINE

## 2019-10-13 PROCEDURE — 65270000029 HC RM PRIVATE

## 2019-10-13 PROCEDURE — 74011250636 HC RX REV CODE- 250/636: Performed by: INTERNAL MEDICINE

## 2019-10-13 PROCEDURE — 82962 GLUCOSE BLOOD TEST: CPT

## 2019-10-13 PROCEDURE — 74011636637 HC RX REV CODE- 636/637: Performed by: INTERNAL MEDICINE

## 2019-10-13 RX ORDER — CLONIDINE HYDROCHLORIDE 0.1 MG/1
0.3 TABLET ORAL
Status: COMPLETED | OUTPATIENT
Start: 2019-10-13 | End: 2019-10-13

## 2019-10-13 RX ORDER — AMOXICILLIN 250 MG
1 CAPSULE ORAL
Status: DISCONTINUED | OUTPATIENT
Start: 2019-10-13 | End: 2019-10-14 | Stop reason: HOSPADM

## 2019-10-13 RX ADMIN — ONDANSETRON 4 MG: 2 INJECTION INTRAMUSCULAR; INTRAVENOUS at 22:16

## 2019-10-13 RX ADMIN — BENZONATATE 100 MG: 100 CAPSULE ORAL at 06:31

## 2019-10-13 RX ADMIN — INSULIN GLARGINE 40 UNITS: 100 INJECTION, SOLUTION SUBCUTANEOUS at 08:35

## 2019-10-13 RX ADMIN — METOPROLOL TARTRATE 5 MG: 5 INJECTION INTRAVENOUS at 22:16

## 2019-10-13 RX ADMIN — CEFEPIME HYDROCHLORIDE 1 G: 1 INJECTION, POWDER, FOR SOLUTION INTRAMUSCULAR; INTRAVENOUS at 20:36

## 2019-10-13 RX ADMIN — GUAIFENESIN 400 MG: 200 SOLUTION ORAL at 15:39

## 2019-10-13 RX ADMIN — NITROGLYCERIN 1 INCH: 20 OINTMENT TOPICAL at 23:43

## 2019-10-13 RX ADMIN — MORPHINE SULFATE 2 MG: 2 INJECTION, SOLUTION INTRAMUSCULAR; INTRAVENOUS at 17:53

## 2019-10-13 RX ADMIN — MORPHINE SULFATE 2 MG: 2 INJECTION, SOLUTION INTRAMUSCULAR; INTRAVENOUS at 13:23

## 2019-10-13 RX ADMIN — ATORVASTATIN CALCIUM 40 MG: 40 TABLET, FILM COATED ORAL at 08:35

## 2019-10-13 RX ADMIN — ONDANSETRON 4 MG: 2 INJECTION INTRAMUSCULAR; INTRAVENOUS at 18:20

## 2019-10-13 RX ADMIN — METOPROLOL TARTRATE 5 MG: 5 INJECTION INTRAVENOUS at 15:52

## 2019-10-13 RX ADMIN — CLONIDINE HYDROCHLORIDE 0.3 MG: 0.1 TABLET ORAL at 22:42

## 2019-10-13 RX ADMIN — GUAIFENESIN 400 MG: 200 SOLUTION ORAL at 22:17

## 2019-10-13 RX ADMIN — MORPHINE SULFATE 2 MG: 2 INJECTION, SOLUTION INTRAMUSCULAR; INTRAVENOUS at 01:41

## 2019-10-13 RX ADMIN — LISINOPRIL 10 MG: 10 TABLET ORAL at 08:44

## 2019-10-13 RX ADMIN — GUAIFENESIN 400 MG: 200 SOLUTION ORAL at 08:34

## 2019-10-13 RX ADMIN — ONDANSETRON 4 MG: 2 INJECTION INTRAMUSCULAR; INTRAVENOUS at 12:30

## 2019-10-13 RX ADMIN — MORPHINE SULFATE 2 MG: 2 INJECTION, SOLUTION INTRAMUSCULAR; INTRAVENOUS at 07:02

## 2019-10-13 NOTE — PROGRESS NOTES
Bedside and Verbal shift change report given to Neelima (oncoming nurse) by Sherly Sosa (offgoing nurse). Report included the following information SBAR, Kardex, Intake/Output, MAR, Med Rec Status and Cardiac Rhythm NSR.

## 2019-10-13 NOTE — PROGRESS NOTES
Hospitalist Progress Note NAME: Ammon Boyle :  1985 MRN:  [id number] Interim Hospital Summary: 27 y.o. male whom presented on 10/8/2019 with Assessment / Plan: 
Right 4th toe diabetic ulcer with OM 
-MRI right foot: Osteomyelitis of the fourth proximal and middle phalanges. Age indeterminate osteomyelitis of the fifth metatarsal amputation stump. No drainable abscess 
-continue empiric IV cefepime and vanco.  Wound cx pending. Path pending 
-S/p amputation on 10/10 by podiatry 
-final abx choice and duration will be dependent of cx results and also path DM2 
-continue lantus and SSI. Holding trulicity. BG is 135 now. HTN 
-continue lisinopril ESRD 
-HD per renal. Renal following. 40 or above Morbid obesity / Body mass index is 44.13 kg/m². Code status: Full Prophylaxis: SCD's Recommended Disposition:  PT, OT, RN Subjective: Chief Complaint / Reason for Physician Visit Foot pain is controlled. No fever or chills No diarrhea PO intake:  
Patient Vitals for the past 72 hrs: 
 % Diet Eaten 10/10/19 1011 100 % Objective: VITALS:  
Last 24hrs VS reviewed since prior progress note. Most recent are: 
Patient Vitals for the past 24 hrs: 
 Temp Pulse Resp BP SpO2  
10/12/19 2048 (P) 98.4 °F (36.9 °C) (P) 88 (P) 18 (P) 157/78 (P) 95 % 10/12/19 1527 98.3 °F (36.8 °C) 81 16 120/74 95 % 10/12/19 1222 98.2 °F (36.8 °C) 85 16 (!) 175/115 91 % 10/12/19 0815 98.1 °F (36.7 °C) 81 16 158/89 94 % 10/12/19 0316 98.5 °F (36.9 °C) 86 18 124/82 94 % 10/11/19 2338 98.4 °F (36.9 °C) 93 18 144/89 100 % No intake or output data in the 24 hours ending 10/12/19 2125 PHYSICAL EXAM: 
General: WD, WN. Alert, cooperative, no acute distress   
EENT:  EOMI. Anicteric sclerae. MMM Resp:  CTA bilaterally, no wheezing or rales. No accessory muscle use CV:  Regular  rhythm,  No edema GI:  Soft, Non distended, Non tender.  +Bowel sounds Neurologic:  Alert and oriented X 3, normal speech, Psych:   Good insight. Not anxious nor agitated Skin:  (+) right 4th toe open wound with swelling Reviewed most current lab test results and cultures  YES Reviewed most current radiology test results   YES Review and summation of old records today    NO Reviewed patient's current orders and MAR    YES 
PMH/SH reviewed - no change compared to H&P 
________________________________________________________________________ Care Plan discussed with: 
  Comments Patient x Family RN x Care Manager Consultant Multidiciplinary team rounds were held today with , nursing, pharmacist and clinical coordinator. Patient's plan of care was discussed; medications were reviewed and discharge planning was addressed. ________________________________________________________________________ Total NON critical care TIME:   25  Minutes Total CRITICAL CARE TIME Spent:   Minutes non procedure based Comments >50% of visit spent in counseling and coordination of care x This includes time during multidisciplinary rounds if indicated above  
________________________________________________________________________ Sherrie Cohn MD  
 
Procedures: see electronic medical records for all procedures/Xrays and details which were not copied into this note but were reviewed prior to creation of Plan. LABS: 
I reviewed today's most current labs and imaging studies. Pertinent labs include: 
Recent Labs 10/11/19 
0447 WBC 7.7 HGB 10.7* HCT 34.6*  Recent Labs 10/11/19 
0447 *  
K 5.1 CL 97  
CO2 30 * BUN 52* CREA 12.00* CA 8.3* ALB 2.7* TBILI 0.8 SGOT 14* ALT 12

## 2019-10-13 NOTE — PROGRESS NOTES
1830 - Wound care performed on Right foot. 1930 - Bedside shift change report given to DIONICIO Goncalves (oncoming nurse) by Jeanice Epley, RN (offgoing nurse). Report included the following information SBAR, Kardex, Procedure Summary, Intake/Output, MAR, Accordion, Recent Results, Med Rec Status and Cardiac Rhythm NSR.

## 2019-10-13 NOTE — PROGRESS NOTES
Hospitalist Progress Note NAME: Peña Giordano :  1985 MRN:  [id number] Interim Hospital Summary: 27 y.o. male whom presented on 10/8/2019 with Assessment / Plan: 
Right 4th toe diabetic ulcer with OM 
-MRI right foot: Osteomyelitis of the fourth proximal and middle phalanges. Age indeterminate osteomyelitis of the fifth metatarsal amputation stump. No drainable abscess 
-continue empiric IV cefepime and vanco.  Wound cx pending. Path pending 
-S/p amputation on 10/10 by podiatry 
-final abx choice and duration will be dependent of cx results and also path 
-will adjust abx in am based on path and final cx results DM2 
-continue lantus 40 units and SSI. Holding trulicity. BG is 135 now. HTN 
-continue lisinopril ESRD 
-HD per renal. Renal following. Dyslipidemia 
-cont statin 40 or above Morbid obesity / Body mass index is 44.13 kg/m². Code status: Full Prophylaxis: Heparin Recommended Disposition:  PT, OT, RN Subjective: Chief Complaint / Reason for Physician Visit Foot pain is under control today. No fever or chills. No diarrhea. PO intake:  
No data found. Objective: VITALS:  
Last 24hrs VS reviewed since prior progress note. Most recent are: 
Patient Vitals for the past 24 hrs: 
 Temp Pulse Resp BP SpO2  
10/13/19 1543 98 °F (36.7 °C) 85 18 (!) 183/107 93 % 10/13/19 1231 98.4 °F (36.9 °C) 82 18 143/85 98 % 10/13/19 0756 98.2 °F (36.8 °C) 82 18 147/83 93 % 10/13/19 0315 98.5 °F (36.9 °C) 89 18 (!) 157/97 96 % 10/13/19 0142  83  129/87   
10/12/19 2345 98.9 °F (37.2 °C) 91 16 (!) 183/101 97 % 10/12/19 2048 98.4 °F (36.9 °C) 88 18 157/78 95 % No intake or output data in the 24 hours ending 10/13/19 1705 PHYSICAL EXAM: 
General: WD, WN. Alert, cooperative, no acute distress   
EENT:  EOMI. Anicteric sclerae. MMM Resp: CTA bilaterally, no wheezing or rales. No accessory muscle use CV:  Regular  rhythm,  No edema GI:  Soft, Non distended, Non tender.  +Bowel sounds Neurologic:  Alert and oriented X 3, normal speech, Psych:   Good insight. Not anxious nor agitated Skin:  Rt toe dressing + Reviewed most current lab test results and cultures  YES Reviewed most current radiology test results   YES Review and summation of old records today    NO Reviewed patient's current orders and MAR    YES 
PMH/SH reviewed - no change compared to H&P 
________________________________________________________________________ Care Plan discussed with: 
  Comments Patient x Family RN x Care Manager Consultant Multidiciplinary team rounds were held today with , nursing, pharmacist and clinical coordinator. Patient's plan of care was discussed; medications were reviewed and discharge planning was addressed. ________________________________________________________________________ Total NON critical care TIME:   25  Minutes Total CRITICAL CARE TIME Spent:   Minutes non procedure based Comments >50% of visit spent in counseling and coordination of care x This includes time during multidisciplinary rounds if indicated above  
________________________________________________________________________ Brady Hahn MD  
 
Procedures: see electronic medical records for all procedures/Xrays and details which were not copied into this note but were reviewed prior to creation of Plan. LABS: 
I reviewed today's most current labs and imaging studies. Pertinent labs include: 
Recent Labs 10/11/19 
0447 WBC 7.7 HGB 10.7* HCT 34.6*  Recent Labs 10/11/19 
0447 *  
K 5.1 CL 97  
CO2 30 * BUN 52* CREA 12.00* CA 8.3* ALB 2.7* TBILI 0.8 SGOT 14* ALT 12

## 2019-10-14 VITALS
SYSTOLIC BLOOD PRESSURE: 132 MMHG | DIASTOLIC BLOOD PRESSURE: 76 MMHG | TEMPERATURE: 98.2 F | RESPIRATION RATE: 16 BRPM | HEIGHT: 74 IN | BODY MASS INDEX: 40.43 KG/M2 | OXYGEN SATURATION: 95 % | HEART RATE: 80 BPM | WEIGHT: 315 LBS

## 2019-10-14 LAB
BACTERIA SPEC CULT: NORMAL
GLUCOSE BLD STRIP.AUTO-MCNC: 72 MG/DL (ref 65–100)
GLUCOSE BLD STRIP.AUTO-MCNC: 78 MG/DL (ref 65–100)
GLUCOSE BLD STRIP.AUTO-MCNC: 97 MG/DL (ref 65–100)
SERVICE CMNT-IMP: NORMAL

## 2019-10-14 PROCEDURE — 90935 HEMODIALYSIS ONE EVALUATION: CPT

## 2019-10-14 PROCEDURE — 74011000250 HC RX REV CODE- 250: Performed by: INTERNAL MEDICINE

## 2019-10-14 PROCEDURE — 82962 GLUCOSE BLOOD TEST: CPT

## 2019-10-14 RX ORDER — DOXYCYCLINE 100 MG/1
100 TABLET ORAL 2 TIMES DAILY
Qty: 20 TAB | Refills: 0 | Status: SHIPPED | OUTPATIENT
Start: 2019-10-14 | End: 2019-10-24

## 2019-10-14 RX ORDER — DOXYCYCLINE 100 MG/1
100 TABLET ORAL 2 TIMES DAILY
Qty: 20 TAB | Refills: 0 | Status: SHIPPED | OUTPATIENT
Start: 2019-10-14 | End: 2019-10-14 | Stop reason: SDUPTHER

## 2019-10-14 RX ADMIN — METOPROLOL TARTRATE 5 MG: 5 INJECTION INTRAVENOUS at 08:17

## 2019-10-14 NOTE — PROGRESS NOTES
8:30 PM  
Patient's /102, patient denies chest pain or headache and is resting comfortably, PRN Q 6 hours metoprolol for SBP >160 was last given at 4 pm will recheck BP at 10pm and give metoprolol as needed 10:25 PM 
Patient /130. Telehospitalist consulted after metoprolol given. Orders received for Nitrobid and clonidine, will continue to monitor 10:42 PM  
Clonidine given per orders. /115 
 
11:45 PM 
Nitrobid given due to /101. Patient educated on medication and side effect. Will continue to monitor BP 
 
1:27 AM 
Patient /88. Resting quietly with no complaints

## 2019-10-14 NOTE — PROGRESS NOTES
8:30 PM  
Patient's /102, patient denies chest pain or headache and is resting comfortably, PRN Q 6 hours metoprolol for SBP >160 was last given at 4 pm will recheck BP at 10pm and give metoprolol as needed

## 2019-10-14 NOTE — PROGRESS NOTES
[facility] Progress Note NAME: Yuval Siegel :  1985 MRN:  [id number] Date/Time: 10/14/2019 Risk of deterioration: medium Assessment:    Plan: 
ESRD-MWF, [place] HTN Osteo, toe/foot Seen on HD at 9:25AM.  Tolerating well. S/P toe amp Vascular surgeon is Dr. Ray Likes Subjective: Chief Complaint:  \"I feel OK. No foot pain. No dyspnea. Objective: VITALS:  
Last 24hrs VS reviewed since prior progress note. Most recent are: 
Visit Vitals /82 Pulse 74 Temp 97.8 °F (36.6 °C) Resp 16 Ht 6' 2\" (1.88 m) Wt 155.9 kg (343 lb 11.2 oz) SpO2 (!) 72% BMI 44.13 kg/m² SpO2 Readings from Last 6 Encounters:  
10/14/19 (!) 72% 19 96% 19 97% 19 98% 19 95% 19 96% No intake or output data in the 24 hours ending 10/14/19 3628 Telemetry Reviewed PHYSICAL EXAM: 
 
General   well developed, well nourished, appears stated age, in no acute distress Respiratory   Clear To Auscultation bilaterally Cardiology  Regular Rate and Rythmn Extremities  No clubbing, cyanosis. (L) uE access t/b Lab Data Reviewed: (see below) Medications Reviewed: (see below) PMH/SH reviewed - no change compared to H&P 
____________________________________________________ Attending Physician: Gabriele Brown MD  
 
____________________________________________________ MEDICATIONS: 
Current Facility-Administered Medications Medication Dose Route Frequency  senna-docusate (PERICOLACE) 8.6-50 mg per tablet 1 Tab  1 Tab Oral BID PRN  
 nitroglycerin (NITROBID) 2 % ointment 1 Inch  1 Inch Topical Q6H PRN  
 guaiFENesin-dextromethorphan (ROBITUSSIN DM) 100-10 mg/5 mL syrup 10 mL  10 mL Oral Q6H PRN  
 benzonatate (TESSALON) capsule 100 mg  100 mg Oral TID PRN  
 lisinopril (PRINIVIL, ZESTRIL) tablet 10 mg  10 mg Oral EVERY TUES,TH,SAT,SUN  
  acetaminophen (TYLENOL) tablet 650 mg  650 mg Oral Q6H PRN  
 ondansetron (ZOFRAN) injection 4 mg  4 mg IntraVENous Q4H PRN  
 atorvastatin (LIPITOR) tablet 40 mg  40 mg Oral DAILY  insulin glargine (LANTUS) injection 40 Units  40 Units SubCUTAneous DAILY  loperamide (IMODIUM) capsule 2 mg  2 mg Oral QID PRN  
 insulin lispro (HUMALOG) injection   SubCUTAneous AC&HS  
 glucose chewable tablet 16 g  4 Tab Oral PRN  
 dextrose (D50W) injection syrg 12.5-25 g  12.5-25 g IntraVENous PRN  
 glucagon (GLUCAGEN) injection 1 mg  1 mg IntraMUSCular PRN  
 metoprolol (LOPRESSOR) injection 5 mg  5 mg IntraVENous Q6H PRN  
 morphine injection 2 mg  2 mg IntraVENous Q4H PRN  
 guaiFENesin (ROBITUSSIN) 100 mg/5 mL oral liquid 400 mg  400 mg Oral TID  cefepime (MAXIPIME) 1 g in 0.9% sodium chloride (MBP/ADV) 50 mL  1 g IntraVENous Q24H  
 vancomycin (VANCOCIN) 750 mg in 0.9% sodium chloride (MBP/ADV) 250 mL  750 mg IntraVENous PRN  Vancomycin Redosing Reminder Note  1 Each Other DAILY  
  
 
LABS: 
No results for input(s): WBC, HGB, HCT, PLT, HGBEXT, HCTEXT, PLTEXT, HGBEXT, HCTEXT, PLTEXT in the last 72 hours. No results for input(s): NA, K, CL, CO2, BUN, CREA, GLU, CA, MG, PHOS, URICA, PTH, XPTHCT, PTHN, XPTHNT in the last 72 hours. No lab exists for component: IPTH No results for input(s): SGOT, GPT, ALT, AP, TBIL, TBILI, TP, ALB, GLOB, GGT, AML, LPSE in the last 72 hours. No lab exists for component: AMYP, HLPSE No results for input(s): INR, PTP, APTT, INREXT, INREXT in the last 72 hours. No results for input(s): FE, TIBC, PSAT, FERR in the last 72 hours. No results for input(s): PH, PCO2, PO2 in the last 72 hours. No results for input(s): CPK, CKNDX, TROIQ in the last 72 hours. No lab exists for component: CPKMB Lab Results Component Value Date/Time  Glucose (POC) 97 10/14/2019 08:31 AM  
 Glucose (POC) 78 10/14/2019 08:14 AM  
 Glucose (POC) 72 10/14/2019 07:55 AM  
 Glucose (POC) 120 (H) 10/13/2019 09:28 PM  
 Glucose (POC) 129 (H) 10/13/2019 03:47 PM

## 2019-10-14 NOTE — PROCEDURES
[facility] Dialysis Team [place]  [phone] Vitals   Pre   Post   Assessment   Pre   Post    
Temp  Temp: 97.8 °F (36.6 °C) (10/14/19 0843)  98.0 LOC  A&OX4 A&OX4 HR   Pulse (Heart Rate): 74 (10/14/19 0915) 74 Lungs   CTA  CTA  
B/P   BP: 132/82 (10/14/19 0915) 150/69 Cardiac   HRR  HRR Resp   Resp Rate: 16 (10/14/19 0915) 16 Skin   W/D/ dressing to RLE  W/D/ dressing RLE Pain level  Pain Intensity 1: 0 (10/14/19 0730) 0 Edema  +2 
 
 +1/2 Orders: Duration:   Start:    08:43 End:    13:24 Total:   4.25 Dialyzer:   Dialyzer/Set Up Inspection: Constantino Mixon (10/14/19 8365) K Bath:   Dialysate K (mEq/L): 2 (10/14/19 0843) Ca Bath:   Dialysate CA (mEq/L): 2.5 (10/14/19 0843) Na/Bicarb:   Dialysate NA (mEq/L): 140 (10/14/19 0843) Target Fluid Removal:   Goal/Amount of Fluid to Remove (mL): 3000 mL (10/14/19 0843) Access Type & Location:   Left AV FIstual   
Labs Obtained/Reviewed Critical Results Called   Date when labs were drawn- 
Hgb-   
HGB Date Value Ref Range Status 10/11/2019 10.7 (L) 12.1 - 17.0 g/dL Final  
 
K-   
Potassium Date Value Ref Range Status 10/11/2019 5.1 3.5 - 5.1 mmol/L Final  
 
Ca-  
Calcium Date Value Ref Range Status 10/11/2019 8.3 (L) 8.5 - 10.1 MG/DL Final  
 
Bun-  
BUN Date Value Ref Range Status 10/11/2019 52 (H) 6 - 20 MG/DL Final  
 
Creat-  
Creatinine Date Value Ref Range Status 10/11/2019 12.00 (H) 0.70 - 1.30 MG/DL Final  
 
  
Medications/ Blood Products Given Name   Dose   Route and Time Blood Volume Processed (BVP):     Net Fluid Removed:    
Comments Time Out Done:  Yes 8:40 Primary Nurse Rpt Pre: Moose Jaffe RN 
Primary Nurse Rpt Post: Bebe Chau RN 
Pt Education: procedural 
Care Plan: continue current plan of care Tx Summary: received pt in suite. Time out and assessment performed and documented.  Left arm fistula cleaned and accessed with 2 15 gauge needles without difficulty. Treatment [id number]. Dr. Yael Marks in the suite at 09:27, increased UF goal from 3000 to 3500. 12:15 pt clotted off, re-strung machine and patient resumed treatment. Treatment finished. All possible blood returned to patient. Needles removed. Pressure held till hemostasis achieved. Admiting Diagnosis: right foot 5th toe infection Pt's previous clinic- [place] 
Consent signed - Informed Consent Verified: Yes (10/14/19 7172) Syl[place] Consent -  yes Hepatitis Status- negative/susceptible Machine #- Machine Number: Nicole Zafar (10/14/19 2174) Telemetry status- remote Pre-dialysis wt. - Pre-Dialysis Weight: 150 kg (330 lb 11 oz) (10/11/19 7009)

## 2019-10-14 NOTE — DISCHARGE INSTRUCTIONS
Patient Discharge Instructions    Bhanu Denton / [id number] : 1985    Admitted 10/8/2019 Discharged: 10/14/2019         DISCHARGE DIAGNOSIS:   Right 4th toe diabetic ulcer with OM  DM2  HTN  ESRD  Dyslipidemia       Take Home Medications     {Medication reconciliation information is now added to the patient's AVS automatically when it is printed. There is no need to use this SmartLink in discharge instructions. Highlight this text and delete it to clear this message}      General drug facts     If you have a very bad allergy, wear an allergy ID at all times. It is important that you take the medication exactly as they are prescribed. Keep your medication in the bottles provided by the pharmacist.  Keep a list of all your drugs (prescription, natural products, vitamins, OTC) with you. Give this list to your doctor. Do not take other medications without consulting your doctor. Do not share your drugs with others and do not take anyone else's drugs. Keep all drugs out of the reach of children and pets. Most drugs may be thrown away in household trash after mixing with coffee grounds or hilaria litter and sealing in a plastic bag. Keep a list Call your doctor for help with any side effects. If in the [place] you may also call the FDA at 1-562-QHS-0263    Talk with the doctor before starting any new drug, including OTC, natural products, or vitamins. What to do at Home    1. Recommended diet: Cardiac    2. Recommended activity: Avoid weight bearing on rt leg until your see Dr Sarthak Lockhart and wear Surgical shoe. 3. If you experience any of the following symptoms then please call your primary care physician or return to the emergency room if you cannot get hold of your doctor:    4. Wound Care: Do not open bandage until you see Dr Sarthak Lockhart on Wednesday. 5. Lab work: Cbc, Cmp and Mg in 1 week     6. Bring these papers with you to your follow up appointments.  The papers will help your doctors be sure to continue the care plan from the hospital.      Follow-up with:   PCP: Rasheeda Mcleod DO  Follow-up Information     Follow up With Specialties Details Why Bismark Cai DO Internal Medicine Go on 11/11/2019 For hospital follow up appointment at 10:30AM.  The nurse will contact you with earlier appointment  [address] [facility] [facility] [address]  [phone]      Layne Nava, [facility] Schedule an appointment as soon as possible for a visit on 10/23/2019 This is your follow up appt from surgery. [address] [facility]  [phone]             Please call for your own appointment        Information obtained by :  I understand that if any problems occur once I am at home I am to contact my physician. I understand and acknowledge receipt of the instructions indicated above.                                                                                                                                            Physician's or R.N.'s Signature                                                                  Date/Time                                                                                                                                              Patient or Representative Signature                                                          Date/Time

## 2019-10-14 NOTE — PROGRESS NOTES
HD TRANSFER - OUT REPORT: 
 
Verbal report given to Bebe Chau RN on Jina Gar being transferred to [facility] routine progression of care Report consisted of patient's Situation, Background, Assessment and  
Recommendations(SBAR). Information from the following report(s) SBAR, Kardex and Recent Results was reviewed with the receiving nurse. Method:  $$ Method: Hemodialysis (10/14/19 0845) Fluid Removed  NET Fluid Removed (mL): 3000 ml (10/11/19 1215) Patient response to treatment:  Improved End Time  Hemodialysis End Time: 7821 (10/14/19 1324) If not documented, dialysis nurse to update post-dialysis row in HD/Filtration flowsheet Medications /Volume expansion agents or Fluid boluses administered during treatment? no 
 
Post-dialysis medication administration due?  no 
Remind nurse to administer post-HD medication upon return to unit. Fistula hemostasis? yes Opportunity for questions and clarification was provided. Patient transported with: [employer]

## 2019-10-14 NOTE — PROGRESS NOTES
TRANSFER - IN REPORT: 
 
Verbal report received from [address] [place] [address] Felipe  being received from [facility] for ordered procedure Report consisted of patients Situation, Background, Assessment and  
Recommendations(SBAR). Information from the following report(s) SBAR, Kardex and Recent Results was reviewed with the receiving nurse. Opportunity for questions and clarification was provided. Assessment completed upon patients arrival to unit and care assumed.

## 2019-10-14 NOTE — PROGRESS NOTES
Bedside and Verbal shift change report given to Anna (oncoming nurse) by Samina Jason (offgoing nurse). Report included the following information SBAR, Kardex, Intake/Output, MAR, Recent Results, Med Rec Status and Cardiac Rhythm NSR.

## 2019-10-14 NOTE — PROGRESS NOTES
Plan of Care 1) Home with family assistance 2) Follow up with Dr. Dario Yadav outpatient 4-5 days 8:31 AM- CM noted discharge order, CM spoke with attending who states pt will be on PO ABX. No HH RN needed this time, pt to follow up with Dr. Dario Yadav outpatient in 4-5 days. CM contacting office for a follow up appt. 8:36 AM- Pt follow up appt with Dr. Dario Yadav is Wednesday at 2:00 PM. CM on hold with PCP to arrange follow up appt. 8:40 AM- CM spoke with Bud Arias at pt PCP office she will contact pt for a sooner appt as one was already made on 11/11/19. Pt is cleared to discharge from CM perspective, RN informed. 8:57 AM- RN informed CM that pt is at dialysis and will be there this morning. CM spoke with pt regarding transportation; pt states he needs a ride. CM inquired about family, pt states his family is out of town at this time. Per CM note yesterday, pt does have a key to enter. CM inquired about transportation to follow up appts; pt states he is unsure. CM contacted fellow CM to assist with Medicaid services. CM noted consult for surgical shoe, CM does not arrange that; CM informed RN.  
 
9:30 AM- CM met with pt in dialysis. Pt states he lives alone and does not drive. Pt states he has no family locally to help at time of discharge and to follow up appts. Pt does have the number provided by previous CM for assistance with getting transportation to dialysis. Pt states he needs CM to arrange a ride at time of discharge and to any follow up appts. Pt gave CM permission to have medications faxed to [facility] and delivered at bed side. CM workng on this. 3:54 PM- CM spoke with pharmacist at [facility] who states pt's insurance is not active and pt needs assistance paying for them. CM informed by RN that pt spoke with the [facility] pharmacy and he paid for them himself and they were delivered at bedside. Pt will need ride. CM working on arranging. 4:51 PM- CM spoke with RN at pt dialysis as pt SW is not there today. RN states pt is very compliant and uses the [place] [employer] to come ($3 a trip). CM contacted [employer] who states pt is not active with their [employer] benefits. Pt will need to call and re-set up for transportation to be arranged to appts. CM spoke with CM supervisors and paid for a ride home for pt, waiting on response from [address]. Pt will need to contact Dr. Sridevi Winkler for a follow up appt as CM is unable to schedule. 4:59 PM- Transportation arranged for 5:30 PM. AVS updated with follow up appt's and information for pt to contact to get Medicaid transportation arranged. Pt is cleared to discharge from CM perspective. Pt will be getting a text message from the [facility] [employer] and must be downstairs within 5 minutes of the text. Care Management Interventions PCP Verified by CM: Yes Mode of Transport at Discharge: Other (see comment)(Uses Lyft,uber or caravan- pt said his medicaid wont cover tranportation.) Transition of Care Consult (CM Consult): Home Health [address]: Yes Discharge Durable Medical Equipment: No(Doesn't use- pt requested for cane or walker) Current Support Network: Lives Alone(In an appartment has elevator acess) Confirm Follow Up Transport: Other (see comment)(Caravan,lyft or uber) Plan discussed with Pt/Family/Caregiver: Yes Discharge Location Discharge Placement: Home with family assistance LEATHA Purcell, CM [facility] [phone]

## 2019-10-14 NOTE — DISCHARGE SUMMARY
Hospitalist Discharge Summary Patient ID: 
Niurka Mathews [id number] 
89 y.o. 
1985 
10/8/2019 PCP on record: Kayleigh Quigley DO Admit date: 10/8/2019 Discharge date and time: 10/14/2019 DISCHARGE DIAGNOSIS: 
 
Right 4th toe diabetic ulcer with OM 
DM2 HTN 
ESRD Dyslipidemia CONSULTATIONS: 
IP CONSULT TO PODIATRY IP CONSULT TO NEPHROLOGY Excerpted HPI from H&P of Wandy Crooks MD: Neel Mccoy is a 27 y.o.  male  with PMHx significant for diabetes, end-stage renal disease, osteomyelitis, presents to the ED with cc of redness and ulceration to the fourth digit of the right foot over the last 3 to 4 days.  Additionally, patient notes a yellowish discharge from the wound as well. [facility] also reports he has had sinus congestion, sneezing, productive cough, and fevers and chills over the last 1 week.  Today he is also started losing his voice. [facility] is concerned because he had a recent case of osteomyelitis several months back which required the irritation of his fifth digit of the right foot.  Dr. Sera Curiel with his podiatrist. [facility] has no other associated symptoms.  No other exacerbating or ameliorating factors. AT this time patient is lying in bed c/o pain and drainage over the dorsum of his right 4th toe, denies fever, no chest pain, no SOB, no N/V no diarrhea, no urinary symptoms, no other associated symptoms. ______________________________________________________________________ DISCHARGE SUMMARY/HOSPITAL COURSE:  for full details see H&P, daily progress notes, labs, consult notes.   
 
Patient was admitted to hospital and diagnosed to have right fourth toe diabetic ulcer with osteomyelitis.  MRI confirmed osteomyelitis.  Podiatry consultation was placed for further evaluation. Christine Cardoso was started on break antibiotics of vancomycin and cefepime.  Patient underwent amputation on 10/10/2019 by podiatry.  Cultures were sent.  Cultures grew possible Staphylococcus aureus from thio broth.  I discussed the results with Dr. Skye Fong who recommended to discharge the patient on doxycycline and recommended outpatient follow-up.  Patient's rest of the medications including diabetes hypertension remained stable during hospitalization.  Nephrology was consulted for hemodialysis needs and patient underwent hemodialysis per renal.  He was noted to have mild hyperkalemia with potassium ranging anywhere between 4.9-5.1 and I discussed with nephrologist on call Dr. bhandari regarding his lisinopril and he recommended to continue the lisinopril as it is for now and they would address potassium during his hemodialysis. Today patient is seen and examined, his vital signs are stable, his lab work is at baseline and he was cleared by nephrology and also podiatry to be discharged for outpatient follow-up. 
 
 
 
_______________________________________________________________________ Patient seen and examined by me on discharge day. Pertinent Findings: 
Gen:    Not in distress Chest: Clear lungs CVS:   Regular rhythm. No edema Abd:  Soft, not distended, not tender Neuro:  Alert, awake 
_______________________________________________________________________ DISCHARGE MEDICATIONS:  
Current Discharge Medication List  
  
START taking these medications Details  
doxycycline (ADOXA) 100 mg tablet Take 1 Tab by mouth two (2) times a day for 10 days. Qty: 20 Tab, Refills: 0 HYDROcodone-acetaminophen (NORCO) 7.5-325 mg per tablet Take 1 Tab by mouth every four (4) hours as needed for Pain for up to 3 days. Max Daily Amount: 6 Tabs. Qty: 25 Tab, Refills: 0 Associated Diagnoses: Osteomyelitis of right foot, unspecified type ([address] CONTINUE these medications which have NOT CHANGED  Details  
lisinopril (PRINIVIL, ZESTRIL) 10 mg tablet Take 10 mg by mouth every Tuesday, Thursday, Saturday & Sunday. Non-HD days  
  
insulin glargine (LANTUS U-100 INSULIN) 100 unit/mL injection 40 Units by SubCUTAneous route daily. Indications: type 2 diabetes mellitus Qty: 1 Vial, Refills: 0  
  
atorvastatin (LIPITOR) 40 mg tablet Take 1 Tab by mouth daily. Qty: 90 Tab, Refills: 3  
  
dulaglutide (TRULICITY) 1.23 ZJ/7.6 mL sub-q pen 0.5 mL by SubCUTAneous route every seven (7) days. Qty: 12 Pen, Refills: 3  
  
loperamide (IMODIUM) 2 mg capsule Take 1 Cap by mouth four (4) times daily as needed for Diarrhea. Qty: 60 Cap, Refills: 0 STOP taking these medications  
  
 ciprofloxacin HCl (CIPRO) 500 mg tablet Comments:  
Reason for Stopping:   
   
  
 
 
 
Patient Follow Up Instructions: 1. Recommended diet: Cardiac 2. Recommended activity: Avoid weight bearing on rt leg until your see Dr Danette Berman and wear Surgical shoe. 3. If you experience any of the following symptoms then please call your primary care physician or return to the emergency room if you cannot get hold of your doctor: 
 
4. Wound Care: Do not open bandage until you see Dr Danette Berman on Wednesday. 5. Lab work: Cbc, Cmp and Mg in 1 week Follow-up Information Follow up With Specialties Details Why Contact Cali Stinson DO Internal Medicine Go on 11/11/2019 For hospital follow up appointment at 10:30AM.  The nurse will contact you with earlier appointment  Tiffany Santos [address] [facility] [address] [place] [address] 
[phone] Dione Nj DPM Podiatry Schedule an appointment as soon as possible for a visit on 10/23/2019 This is your follow up appt from surgery. [facility] [address] [facility] 
[phone] 
  
  
 
________________________________________________________________ Risk of deterioration: High 
 
Condition at Discharge:  Stable 
__________________________________________________________________ Disposition Home with family, no needs 
 
____________________________________________________________________ Code Status: Full Code 
___________________________________________________________________ Total time in minutes spent coordinating this discharge (includes going over instructions, follow-up, prescriptions, and preparing report for sign off to her PCP) :  35  minutes Signed: 
Antoine Deluna MD

## 2019-10-15 ENCOUNTER — PATIENT OUTREACH (OUTPATIENT)
Dept: INTERNAL MEDICINE CLINIC | Age: 34
End: 2019-10-15

## 2019-10-15 LAB
BACTERIA SPEC CULT: ABNORMAL
BACTERIA SPEC CULT: NORMAL
GRAM STN SPEC: NORMAL
GRAM STN SPEC: NORMAL
SERVICE CMNT-IMP: ABNORMAL
SERVICE CMNT-IMP: NORMAL

## 2019-10-16 NOTE — PROGRESS NOTES
Hospital Discharge Follow-Up Date/Time:  10/16/2019 10:18 AM 
 
Patient was admitted to [facility] on 10/8/19 and discharged on 10/14/19 for osteomyelitis. The physician discharge summary was available at the time of outreach. Patient was contacted within 1 business days of discharge. Top Challenges reviewed with the provider Osteomyelitis Unable to reach patient for [facility] call D/c on PO doxy - culture positive for possible staphylococcus. Lives alone, uses public transportation. RLE NWT until patient sees Dr. Bert Starks Advance Care Planning:  
Does patient have an Advance Directive:  not on file Method of communication with provider :chart routing Inpatient RRAT score: n/a Was this a readmission? no  
Patient stated reason for the readmission: n/a Disease Specific:   N/A Patients top risk factors for readmission:  ineffective coping, lack of knowledge about disease, support system, transportation, utilization of services Home Health orders at discharge: none [address]: n/a Date of initial visit: n/a Durable Medical Equipment ordered at discharge: none - per chart review patient to receive walking boot- unable to determine based on chart review if patient received Durable Medical Equipment Company:n/a 
Durable Medical Equipment received: n/a Medication(s):  
New Medications at Discharge:  
doxycycline (ADOXA) 100 mg tablet Take 1 Tab by mouth two (2) times a day for 10 days. Qty: 20 Tab, Refills: 0  
   
HYDROcodone-acetaminophen (NORCO) 7.5-325 mg per tablet Take 1 Tab by mouth every four (4) hours as needed for Pain for up to 3 days. Max Daily Amount: 6 Tabs. Qty: 25 Tab, Refills: 0  
  Associated Diagnoses: Osteomyelitis of right foot, unspecified type ([facility]  
   
 
 
Changed Medications at Discharge: n/a Discontinued Medications at Discharge:  
ciprofloxacin HCl (CIPRO) 500 mg tablet Comments:  
Reason for Stopping:    
     
 
 
 Current Outpatient Medications Medication Sig  
 doxycycline (ADOXA) 100 mg tablet Take 1 Tab by mouth two (2) times a day for 10 days.  lisinopril (PRINIVIL, ZESTRIL) 10 mg tablet Take 10 mg by mouth every Tuesday, Thursday, Saturday & Sunday. Non-HD days  insulin glargine (LANTUS U-100 INSULIN) 100 unit/mL injection 40 Units by SubCUTAneous route daily. Indications: type 2 diabetes mellitus  atorvastatin (LIPITOR) 40 mg tablet Take 1 Tab by mouth daily.  dulaglutide (TRULICITY) 0.20 NC/1.6 mL sub-q pen 0.5 mL by SubCUTAneous route every seven (7) days.  loperamide (IMODIUM) 2 mg capsule Take 1 Cap by mouth four (4) times daily as needed for Diarrhea. No current facility-administered medications for this visit. There are no discontinued medications. [facility] follow up appointment(s):  
Future Appointments Date Time Provider Nigel Rose 11/11/2019 10:30 AM Josephine, [address] [place] [address] Non-[facility] follow up appointment(s): unable to reach patient to assess 10/18/19 Unable to reach patient for [facility] call. Patient no showed this his TEJA appt. NN to attempt to reach the patient in 5 business days.  AR

## 2019-10-24 ENCOUNTER — PATIENT OUTREACH (OUTPATIENT)
Dept: INTERNAL MEDICINE CLINIC | Age: 34
End: 2019-10-24

## 2019-10-24 NOTE — PROGRESS NOTES
10/24/19 Unable to reach patient for [facility] call. Episode resolved at this time, no further outreach by this CTN.  AR

## 2020-01-01 ENCOUNTER — APPOINTMENT (OUTPATIENT)
Dept: GENERAL RADIOLOGY | Age: 35
End: 2020-01-01
Attending: EMERGENCY MEDICINE
Payer: MEDICARE

## 2020-01-01 ENCOUNTER — ANESTHESIA EVENT (OUTPATIENT)
Dept: SURGERY | Age: 35
DRG: 474 | End: 2020-01-01
Payer: MEDICARE

## 2020-01-01 ENCOUNTER — ANESTHESIA (OUTPATIENT)
Dept: SURGERY | Age: 35
DRG: 246 | End: 2020-01-01
Payer: MEDICARE

## 2020-01-01 ENCOUNTER — APPOINTMENT (OUTPATIENT)
Dept: GENERAL RADIOLOGY | Age: 35
DRG: 177 | End: 2020-01-01
Attending: NURSE PRACTITIONER
Payer: MEDICARE

## 2020-01-01 ENCOUNTER — ANESTHESIA (OUTPATIENT)
Dept: SURGERY | Age: 35
DRG: 474 | End: 2020-01-01
Payer: MEDICARE

## 2020-01-01 ENCOUNTER — HOSPITAL ENCOUNTER (OUTPATIENT)
Age: 35
Setting detail: OUTPATIENT SURGERY
Discharge: HOME | End: 2020-08-07
Attending: PODIATRIST | Admitting: PODIATRIST
Payer: MEDICARE

## 2020-01-01 ENCOUNTER — HOSPITAL ENCOUNTER (INPATIENT)
Age: 35
LOS: 12 days | DRG: 177 | End: 2021-01-07
Attending: EMERGENCY MEDICINE | Admitting: INTERNAL MEDICINE
Payer: MEDICARE

## 2020-01-01 ENCOUNTER — PATIENT OUTREACH (OUTPATIENT)
Dept: CASE MANAGEMENT | Age: 35
End: 2020-01-01

## 2020-01-01 ENCOUNTER — APPOINTMENT (OUTPATIENT)
Dept: ULTRASOUND IMAGING | Age: 35
DRG: 617 | End: 2020-01-01
Attending: INTERNAL MEDICINE
Payer: MEDICARE

## 2020-01-01 ENCOUNTER — APPOINTMENT (OUTPATIENT)
Dept: NON INVASIVE DIAGNOSTICS | Age: 35
DRG: 474 | End: 2020-01-01
Attending: NURSE PRACTITIONER
Payer: MEDICARE

## 2020-01-01 ENCOUNTER — OFFICE VISIT (OUTPATIENT)
Dept: CARDIOLOGY CLINIC | Age: 35
End: 2020-01-01
Payer: MEDICARE

## 2020-01-01 ENCOUNTER — APPOINTMENT (OUTPATIENT)
Dept: GENERAL RADIOLOGY | Age: 35
DRG: 177 | End: 2020-01-01
Attending: EMERGENCY MEDICINE
Payer: MEDICARE

## 2020-01-01 ENCOUNTER — APPOINTMENT (OUTPATIENT)
Dept: MRI IMAGING | Age: 35
DRG: 474 | End: 2020-01-01
Attending: NURSE PRACTITIONER
Payer: MEDICARE

## 2020-01-01 ENCOUNTER — APPOINTMENT (OUTPATIENT)
Dept: GENERAL RADIOLOGY | Age: 35
DRG: 474 | End: 2020-01-01
Attending: SURGERY
Payer: MEDICARE

## 2020-01-01 ENCOUNTER — APPOINTMENT (OUTPATIENT)
Dept: MRI IMAGING | Age: 35
DRG: 617 | End: 2020-01-01
Attending: INTERNAL MEDICINE
Payer: MEDICARE

## 2020-01-01 ENCOUNTER — ANESTHESIA (OUTPATIENT)
Dept: CARDIAC CATH/INVASIVE PROCEDURES | Age: 35
DRG: 474 | End: 2020-01-01
Payer: MEDICARE

## 2020-01-01 ENCOUNTER — ANESTHESIA EVENT (OUTPATIENT)
Dept: CARDIAC CATH/INVASIVE PROCEDURES | Age: 35
DRG: 474 | End: 2020-01-01
Payer: MEDICARE

## 2020-01-01 ENCOUNTER — APPOINTMENT (OUTPATIENT)
Dept: CARDIOLOGY CLINIC | Age: 35
End: 2020-01-01

## 2020-01-01 ENCOUNTER — APPOINTMENT (OUTPATIENT)
Dept: NON INVASIVE DIAGNOSTICS | Age: 35
DRG: 474 | End: 2020-01-01
Attending: INTERNAL MEDICINE
Payer: MEDICARE

## 2020-01-01 ENCOUNTER — APPOINTMENT (OUTPATIENT)
Dept: GENERAL RADIOLOGY | Age: 35
DRG: 474 | End: 2020-01-01
Attending: INTERNAL MEDICINE
Payer: MEDICARE

## 2020-01-01 ENCOUNTER — HOSPITAL ENCOUNTER (INPATIENT)
Age: 35
LOS: 27 days | Discharge: NURSING, REHAB OR LONG-TERM CARE | DRG: 474 | End: 2020-09-08
Attending: EMERGENCY MEDICINE | Admitting: HOSPITALIST
Payer: MEDICARE

## 2020-01-01 ENCOUNTER — TELEPHONE (OUTPATIENT)
Dept: CARDIOLOGY CLINIC | Age: 35
End: 2020-01-01

## 2020-01-01 ENCOUNTER — TELEPHONE (OUTPATIENT)
Dept: FAMILY MEDICINE CLINIC | Age: 35
End: 2020-01-01

## 2020-01-01 ENCOUNTER — APPOINTMENT (OUTPATIENT)
Dept: GENERAL RADIOLOGY | Age: 35
DRG: 474 | End: 2020-01-01
Attending: HOSPITALIST
Payer: MEDICARE

## 2020-01-01 ENCOUNTER — ANESTHESIA EVENT (OUTPATIENT)
Dept: SURGERY | Age: 35
End: 2020-01-01
Payer: MEDICARE

## 2020-01-01 ENCOUNTER — ANESTHESIA (OUTPATIENT)
Dept: SURGERY | Age: 35
End: 2020-01-01
Payer: MEDICARE

## 2020-01-01 ENCOUNTER — ANCILLARY PROCEDURE (OUTPATIENT)
Dept: CARDIOLOGY CLINIC | Age: 35
End: 2020-01-01
Payer: MEDICARE

## 2020-01-01 ENCOUNTER — HOME HEALTH ADMISSION (OUTPATIENT)
Dept: HOME HEALTH SERVICES | Facility: HOME HEALTH | Age: 35
End: 2020-01-01

## 2020-01-01 ENCOUNTER — HOSPITAL ENCOUNTER (INPATIENT)
Age: 35
LOS: 19 days | Discharge: NURSING, REHAB OR LONG-TERM CARE | DRG: 246 | End: 2020-10-28
Attending: SURGERY | Admitting: SURGERY
Payer: MEDICARE

## 2020-01-01 ENCOUNTER — HOSPITAL ENCOUNTER (EMERGENCY)
Age: 35
Discharge: HOME | End: 2020-08-02
Attending: EMERGENCY MEDICINE | Admitting: EMERGENCY MEDICINE
Payer: MEDICARE

## 2020-01-01 ENCOUNTER — ANESTHESIA EVENT (OUTPATIENT)
Dept: SURGERY | Age: 35
DRG: 246 | End: 2020-01-01
Payer: MEDICARE

## 2020-01-01 ENCOUNTER — VIRTUAL VISIT (OUTPATIENT)
Dept: INTERNAL MEDICINE CLINIC | Age: 35
End: 2020-01-01
Payer: MEDICARE

## 2020-01-01 ENCOUNTER — APPOINTMENT (OUTPATIENT)
Dept: VASCULAR SURGERY | Age: 35
DRG: 617 | End: 2020-01-01
Attending: PODIATRIST
Payer: MEDICARE

## 2020-01-01 ENCOUNTER — HOSPITAL ENCOUNTER (OUTPATIENT)
Age: 35
Discharge: ANOTHER HOSPITAL | End: 2020-10-09
Attending: SURGERY | Admitting: SURGERY

## 2020-01-01 VITALS
SYSTOLIC BLOOD PRESSURE: 177 MMHG | TEMPERATURE: 98.5 F | HEIGHT: 74 IN | WEIGHT: 315 LBS | BODY MASS INDEX: 40.43 KG/M2 | RESPIRATION RATE: 18 BRPM | HEART RATE: 90 BPM | OXYGEN SATURATION: 94 % | DIASTOLIC BLOOD PRESSURE: 94 MMHG

## 2020-01-01 VITALS
HEART RATE: 92 BPM | RESPIRATION RATE: 18 BRPM | BODY MASS INDEX: 40.04 KG/M2 | HEIGHT: 74 IN | WEIGHT: 311.95 LBS | DIASTOLIC BLOOD PRESSURE: 89 MMHG | SYSTOLIC BLOOD PRESSURE: 158 MMHG | OXYGEN SATURATION: 94 % | TEMPERATURE: 98 F

## 2020-01-01 VITALS
BODY MASS INDEX: 40.43 KG/M2 | OXYGEN SATURATION: 97 % | HEIGHT: 74 IN | HEART RATE: 97 BPM | WEIGHT: 315 LBS | SYSTOLIC BLOOD PRESSURE: 112 MMHG | DIASTOLIC BLOOD PRESSURE: 70 MMHG | RESPIRATION RATE: 18 BRPM

## 2020-01-01 VITALS
SYSTOLIC BLOOD PRESSURE: 130 MMHG | HEIGHT: 74 IN | BODY MASS INDEX: 40.43 KG/M2 | HEART RATE: 75 BPM | RESPIRATION RATE: 18 BRPM | OXYGEN SATURATION: 98 % | DIASTOLIC BLOOD PRESSURE: 90 MMHG | WEIGHT: 315 LBS

## 2020-01-01 VITALS
BODY MASS INDEX: 40.43 KG/M2 | WEIGHT: 315 LBS | RESPIRATION RATE: 16 BRPM | DIASTOLIC BLOOD PRESSURE: 59 MMHG | HEART RATE: 83 BPM | OXYGEN SATURATION: 97 % | TEMPERATURE: 98.4 F | SYSTOLIC BLOOD PRESSURE: 107 MMHG | HEIGHT: 74 IN

## 2020-01-01 VITALS
OXYGEN SATURATION: 93 % | HEIGHT: 74 IN | SYSTOLIC BLOOD PRESSURE: 187 MMHG | RESPIRATION RATE: 15 BRPM | TEMPERATURE: 97.8 F | WEIGHT: 315 LBS | DIASTOLIC BLOOD PRESSURE: 110 MMHG | BODY MASS INDEX: 40.43 KG/M2 | HEART RATE: 86 BPM

## 2020-01-01 VITALS
BODY MASS INDEX: 40.43 KG/M2 | HEIGHT: 74 IN | RESPIRATION RATE: 18 BRPM | OXYGEN SATURATION: 98 % | SYSTOLIC BLOOD PRESSURE: 134 MMHG | WEIGHT: 315 LBS | TEMPERATURE: 98 F | HEART RATE: 90 BPM | DIASTOLIC BLOOD PRESSURE: 55 MMHG

## 2020-01-01 VITALS — BODY MASS INDEX: 39.91 KG/M2 | HEIGHT: 74 IN | WEIGHT: 311 LBS

## 2020-01-01 VITALS
DIASTOLIC BLOOD PRESSURE: 90 MMHG | BODY MASS INDEX: 40.43 KG/M2 | WEIGHT: 315 LBS | HEIGHT: 74 IN | SYSTOLIC BLOOD PRESSURE: 132 MMHG

## 2020-01-01 DIAGNOSIS — E11.65 UNCONTROLLED TYPE 2 DIABETES MELLITUS WITH HYPERGLYCEMIA, WITH LONG-TERM CURRENT USE OF INSULIN (HCC): ICD-10-CM

## 2020-01-01 DIAGNOSIS — J96.01 ACUTE RESPIRATORY FAILURE WITH HYPOXIA AND HYPERCAPNIA (HCC): ICD-10-CM

## 2020-01-01 DIAGNOSIS — M86.9 OSTEOMYELITIS OF SECOND TOE OF RIGHT FOOT (HCC): ICD-10-CM

## 2020-01-01 DIAGNOSIS — M86.9 OSTEOMYELITIS OF SECOND TOE OF RIGHT FOOT (HCC): Primary | ICD-10-CM

## 2020-01-01 DIAGNOSIS — I25.10 CORONARY ARTERY DISEASE INVOLVING NATIVE CORONARY ARTERY, ANGINA PRESENCE UNSPECIFIED, UNSPECIFIED WHETHER NATIVE OR TRANSPLANTED HEART: ICD-10-CM

## 2020-01-01 DIAGNOSIS — A49.8 PSEUDOMONAS INFECTION: ICD-10-CM

## 2020-01-01 DIAGNOSIS — Z00.00 MEDICARE ANNUAL WELLNESS VISIT, SUBSEQUENT: Primary | ICD-10-CM

## 2020-01-01 DIAGNOSIS — R65.20 SEVERE SEPSIS (HCC): ICD-10-CM

## 2020-01-01 DIAGNOSIS — Z79.4 UNCONTROLLED TYPE 2 DIABETES MELLITUS WITH HYPERGLYCEMIA, WITH LONG-TERM CURRENT USE OF INSULIN (HCC): ICD-10-CM

## 2020-01-01 DIAGNOSIS — Z79.4 UNCONTROLLED TYPE 2 DIABETES MELLITUS WITH HYPERGLYCEMIA, WITH LONG-TERM CURRENT USE OF INSULIN (HCC): Chronic | ICD-10-CM

## 2020-01-01 DIAGNOSIS — R65.20 SEPSIS DUE TO PSEUDOMONAS SPECIES WITH ACUTE RENAL FAILURE WITHOUT SEPTIC SHOCK, UNSPECIFIED ACUTE RENAL FAILURE TYPE (HCC): ICD-10-CM

## 2020-01-01 DIAGNOSIS — R94.39 ABNORMAL STRESS TEST: ICD-10-CM

## 2020-01-01 DIAGNOSIS — D72.825 BANDEMIA: ICD-10-CM

## 2020-01-01 DIAGNOSIS — M86.671 OTHER CHRONIC OSTEOMYELITIS OF RIGHT FOOT (HCC): ICD-10-CM

## 2020-01-01 DIAGNOSIS — I25.118 CORONARY ARTERY DISEASE OF NATIVE ARTERY OF NATIVE HEART WITH STABLE ANGINA PECTORIS (HCC): ICD-10-CM

## 2020-01-01 DIAGNOSIS — I48.3 TYPICAL ATRIAL FLUTTER (HCC): ICD-10-CM

## 2020-01-01 DIAGNOSIS — I48.92 ATRIAL FLUTTER, UNSPECIFIED TYPE (HCC): Primary | ICD-10-CM

## 2020-01-01 DIAGNOSIS — N18.6 ESRD (END STAGE RENAL DISEASE) ON DIALYSIS (HCC): ICD-10-CM

## 2020-01-01 DIAGNOSIS — E78.5 HYPERLIPIDEMIA ASSOCIATED WITH TYPE 2 DIABETES MELLITUS (HCC): ICD-10-CM

## 2020-01-01 DIAGNOSIS — Z98.890 POSTOPERATIVE HYPOXIA: ICD-10-CM

## 2020-01-01 DIAGNOSIS — N18.6 ESRD (END STAGE RENAL DISEASE) ON DIALYSIS (HCC): Chronic | ICD-10-CM

## 2020-01-01 DIAGNOSIS — I48.92 ATRIAL FLUTTER, UNSPECIFIED TYPE (HCC): ICD-10-CM

## 2020-01-01 DIAGNOSIS — E11.69 HYPERLIPIDEMIA ASSOCIATED WITH TYPE 2 DIABETES MELLITUS (HCC): ICD-10-CM

## 2020-01-01 DIAGNOSIS — A41.9 SEVERE SEPSIS (HCC): ICD-10-CM

## 2020-01-01 DIAGNOSIS — I10 UNCONTROLLED HYPERTENSION: ICD-10-CM

## 2020-01-01 DIAGNOSIS — I10 ESSENTIAL HYPERTENSION: ICD-10-CM

## 2020-01-01 DIAGNOSIS — I25.10 ASHD (ARTERIOSCLEROTIC HEART DISEASE): Primary | ICD-10-CM

## 2020-01-01 DIAGNOSIS — R07.9 CHEST PAIN, UNSPECIFIED TYPE: ICD-10-CM

## 2020-01-01 DIAGNOSIS — Z89.511 S/P BILATERAL BKA (BELOW KNEE AMPUTATION) (HCC): Primary | ICD-10-CM

## 2020-01-01 DIAGNOSIS — M86.171 ACUTE OSTEOMYELITIS OF TOE OF RIGHT FOOT (HCC): ICD-10-CM

## 2020-01-01 DIAGNOSIS — M86.171 OSTEOMYELITIS OF FOOT, RIGHT, ACUTE (HCC): ICD-10-CM

## 2020-01-01 DIAGNOSIS — Z89.512 S/P BILATERAL BKA (BELOW KNEE AMPUTATION) (HCC): Primary | ICD-10-CM

## 2020-01-01 DIAGNOSIS — N17.9 SEPSIS DUE TO PSEUDOMONAS SPECIES WITH ACUTE RENAL FAILURE WITHOUT SEPTIC SHOCK, UNSPECIFIED ACUTE RENAL FAILURE TYPE (HCC): ICD-10-CM

## 2020-01-01 DIAGNOSIS — E11.65 POORLY CONTROLLED DIABETES MELLITUS (HCC): ICD-10-CM

## 2020-01-01 DIAGNOSIS — A49.8 INFECTION CAUSED BY ENTEROBACTER CLOACAE: ICD-10-CM

## 2020-01-01 DIAGNOSIS — L03.90 WOUND CELLULITIS: ICD-10-CM

## 2020-01-01 DIAGNOSIS — N18.6 ESRD (END STAGE RENAL DISEASE) (HCC): ICD-10-CM

## 2020-01-01 DIAGNOSIS — Z99.2 ESRD (END STAGE RENAL DISEASE) ON DIALYSIS (HCC): Chronic | ICD-10-CM

## 2020-01-01 DIAGNOSIS — S92.401A CLOSED FRACTURE OF PHALANX OF RIGHT GREAT TOE, PHYSEAL INVOLVEMENT UNSPECIFIED, UNSPECIFIED PHALANX, INITIAL ENCOUNTER: Primary | ICD-10-CM

## 2020-01-01 DIAGNOSIS — L03.115 CELLULITIS OF RIGHT FOOT: ICD-10-CM

## 2020-01-01 DIAGNOSIS — T81.49XA CELLULITIS, WOUND, POST-OPERATIVE: Primary | ICD-10-CM

## 2020-01-01 DIAGNOSIS — E11.65 UNCONTROLLED TYPE 2 DIABETES MELLITUS WITH HYPERGLYCEMIA, WITH LONG-TERM CURRENT USE OF INSULIN (HCC): Chronic | ICD-10-CM

## 2020-01-01 DIAGNOSIS — R09.02 POSTOPERATIVE HYPOXIA: ICD-10-CM

## 2020-01-01 DIAGNOSIS — A41.52 SEPSIS DUE TO PSEUDOMONAS SPECIES WITH ACUTE RENAL FAILURE WITHOUT SEPTIC SHOCK, UNSPECIFIED ACUTE RENAL FAILURE TYPE (HCC): ICD-10-CM

## 2020-01-01 DIAGNOSIS — N18.6 ESRD (END STAGE RENAL DISEASE) (HCC): Chronic | ICD-10-CM

## 2020-01-01 DIAGNOSIS — Z99.2 ESRD (END STAGE RENAL DISEASE) ON DIALYSIS (HCC): ICD-10-CM

## 2020-01-01 DIAGNOSIS — A09 DIARRHEA OF INFECTIOUS ORIGIN: ICD-10-CM

## 2020-01-01 DIAGNOSIS — J96.02 ACUTE RESPIRATORY FAILURE WITH HYPOXIA AND HYPERCAPNIA (HCC): ICD-10-CM

## 2020-01-01 DIAGNOSIS — E66.01 MORBID OBESITY WITH BODY MASS INDEX (BMI) OF 40.0 TO 49.9 (HCC): Chronic | ICD-10-CM

## 2020-01-01 DIAGNOSIS — N52.9 ERECTILE DYSFUNCTION, UNSPECIFIED ERECTILE DYSFUNCTION TYPE: ICD-10-CM

## 2020-01-01 DIAGNOSIS — A49.8 KLEBSIELLA INFECTION: ICD-10-CM

## 2020-01-01 LAB
ADMINISTERED INITIALS, ADMINIT: NORMAL
ALBUMIN SERPL-MCNC: 1.6 G/DL (ref 3.5–5)
ALBUMIN SERPL-MCNC: 1.7 G/DL (ref 3.5–5)
ALBUMIN SERPL-MCNC: 2.1 G/DL (ref 3.5–5)
ALBUMIN SERPL-MCNC: 2.5 G/DL (ref 3.5–5)
ALBUMIN SERPL-MCNC: 2.6 G/DL (ref 3.5–5)
ALBUMIN SERPL-MCNC: 2.7 G/DL (ref 3.5–5)
ALBUMIN SERPL-MCNC: 2.8 G/DL (ref 3.5–5)
ALBUMIN SERPL-MCNC: 3.1 G/DL (ref 3.5–5)
ALBUMIN SERPL-MCNC: 3.2 G/DL (ref 3.5–5)
ALBUMIN SERPL-MCNC: 3.6 G/DL (ref 3.5–5)
ALBUMIN/GLOB SERPL: 0.2 {RATIO} (ref 1.1–2.2)
ALBUMIN/GLOB SERPL: 0.2 {RATIO} (ref 1.1–2.2)
ALBUMIN/GLOB SERPL: 0.4 {RATIO} (ref 1.1–2.2)
ALBUMIN/GLOB SERPL: 0.5 {RATIO} (ref 1.1–2.2)
ALBUMIN/GLOB SERPL: 0.5 {RATIO} (ref 1.1–2.2)
ALBUMIN/GLOB SERPL: 0.6 {RATIO} (ref 1.1–2.2)
ALBUMIN/GLOB SERPL: 0.7 {RATIO} (ref 1.1–2.2)
ALP SERPL-CCNC: 104 U/L (ref 45–117)
ALP SERPL-CCNC: 34 U/L (ref 45–117)
ALP SERPL-CCNC: 36 U/L (ref 45–117)
ALP SERPL-CCNC: 40 U/L (ref 45–117)
ALP SERPL-CCNC: 40 U/L (ref 45–117)
ALP SERPL-CCNC: 75 U/L (ref 45–117)
ALP SERPL-CCNC: 79 U/L (ref 45–117)
ALP SERPL-CCNC: 81 U/L (ref 45–117)
ALP SERPL-CCNC: 82 U/L (ref 45–117)
ALP SERPL-CCNC: 83 U/L (ref 45–117)
ALP SERPL-CCNC: 86 U/L (ref 45–117)
ALP SERPL-CCNC: 87 U/L (ref 45–117)
ALP SERPL-CCNC: 89 U/L (ref 45–117)
ALT SERPL-CCNC: 13 U/L (ref 12–78)
ALT SERPL-CCNC: 14 U/L (ref 12–78)
ALT SERPL-CCNC: 26 U/L (ref 12–78)
ALT SERPL-CCNC: 32 U/L (ref 12–78)
ALT SERPL-CCNC: 45 U/L (ref 12–78)
ALT SERPL-CCNC: 48 U/L (ref 12–78)
ALT SERPL-CCNC: 50 U/L (ref 12–78)
ALT SERPL-CCNC: 51 U/L (ref 12–78)
ALT SERPL-CCNC: 64 U/L (ref 12–78)
ALT SERPL-CCNC: 66 U/L (ref 12–78)
ALT SERPL-CCNC: 66 U/L (ref 12–78)
ANION GAP BLD CALC-SCNC: 18 MMOL/L (ref 10–20)
ANION GAP BLD CALC-SCNC: 18 MMOL/L (ref 10–20)
ANION GAP SERPL CALC-SCNC: 10 MMOL/L (ref 5–15)
ANION GAP SERPL CALC-SCNC: 11 MMOL/L (ref 5–15)
ANION GAP SERPL CALC-SCNC: 12 MMOL/L (ref 5–15)
ANION GAP SERPL CALC-SCNC: 12 MMOL/L (ref 5–15)
ANION GAP SERPL CALC-SCNC: 13 MMOL/L (ref 5–15)
ANION GAP SERPL CALC-SCNC: 14 MMOL/L (ref 5–15)
ANION GAP SERPL CALC-SCNC: 14 MMOL/L (ref 5–15)
ANION GAP SERPL CALC-SCNC: 15 MMOL/L (ref 5–15)
ANION GAP SERPL CALC-SCNC: 4 MMOL/L (ref 5–15)
ANION GAP SERPL CALC-SCNC: 5 MMOL/L (ref 5–15)
ANION GAP SERPL CALC-SCNC: 5 MMOL/L (ref 5–15)
ANION GAP SERPL CALC-SCNC: 6 MMOL/L (ref 5–15)
ANION GAP SERPL CALC-SCNC: 7 MMOL/L (ref 5–15)
ANION GAP SERPL CALC-SCNC: 7 MMOL/L (ref 5–15)
ANION GAP SERPL CALC-SCNC: 8 MMOL/L (ref 5–15)
ANION GAP SERPL CALC-SCNC: 9 MMOL/L (ref 5–15)
APTT PPP: 28.8 SEC (ref 22.1–32)
APTT PPP: 36.7 SEC (ref 22.1–32)
APTT PPP: 37.3 SEC (ref 22.1–32)
APTT PPP: 55.1 SEC (ref 22.1–32)
ARTERIAL PATENCY WRIST A: YES
AST SERPL-CCNC: 14 U/L (ref 15–37)
AST SERPL-CCNC: 14 U/L (ref 15–37)
AST SERPL-CCNC: 15 U/L (ref 15–37)
AST SERPL-CCNC: 17 U/L (ref 15–37)
AST SERPL-CCNC: 30 U/L (ref 15–37)
AST SERPL-CCNC: 41 U/L (ref 15–37)
AST SERPL-CCNC: 43 U/L (ref 15–37)
AST SERPL-CCNC: 43 U/L (ref 15–37)
AST SERPL-CCNC: 48 U/L (ref 15–37)
AST SERPL-CCNC: 54 U/L (ref 15–37)
AST SERPL-CCNC: 60 U/L (ref 15–37)
AST SERPL-CCNC: 61 U/L (ref 15–37)
AST SERPL-CCNC: 71 U/L (ref 15–37)
ATRIAL RATE: 283 BPM
ATRIAL RATE: 308 BPM
ATRIAL RATE: 72 BPM
ATRIAL RATE: 81 BPM
ATRIAL RATE: 84 BPM
ATRIAL RATE: 84 BPM
BACTERIA SPEC CULT: ABNORMAL
BACTERIA SPEC CULT: NORMAL
BASE DEFICIT BLD-SCNC: 2 MMOL/L
BASOPHILS # BLD: 0 K/UL (ref 0–0.1)
BASOPHILS # BLD: 0.1 K/UL (ref 0–0.1)
BASOPHILS # BLD: 0.2 K/UL (ref 0–0.1)
BASOPHILS # BLD: 0.2 K/UL (ref 0–0.1)
BASOPHILS NFR BLD: 0 % (ref 0–1)
BASOPHILS NFR BLD: 1 % (ref 0–1)
BDY SITE: ABNORMAL
BILIRUB SERPL-MCNC: 0.3 MG/DL (ref 0.2–1)
BILIRUB SERPL-MCNC: 0.3 MG/DL (ref 0.2–1)
BILIRUB SERPL-MCNC: 0.5 MG/DL (ref 0.2–1)
BILIRUB SERPL-MCNC: 0.6 MG/DL (ref 0.2–1)
BILIRUB SERPL-MCNC: 0.7 MG/DL (ref 0.2–1)
BILIRUB SERPL-MCNC: 0.8 MG/DL (ref 0.2–1)
BILIRUB SERPL-MCNC: 1.2 MG/DL (ref 0.2–1)
BILIRUB SERPL-MCNC: 1.9 MG/DL (ref 0.2–1)
BILIRUB SERPL-MCNC: 2 MG/DL (ref 0.2–1)
BILIRUB SERPL-MCNC: 2.3 MG/DL (ref 0.2–1)
BUN BLD-MCNC: 25 MG/DL (ref 9–20)
BUN BLD-MCNC: 54 MG/DL (ref 9–20)
BUN SERPL-MCNC: 26 MG/DL (ref 6–20)
BUN SERPL-MCNC: 27 MG/DL (ref 6–20)
BUN SERPL-MCNC: 32 MG/DL (ref 6–20)
BUN SERPL-MCNC: 33 MG/DL (ref 6–20)
BUN SERPL-MCNC: 34 MG/DL (ref 6–20)
BUN SERPL-MCNC: 35 MG/DL (ref 6–20)
BUN SERPL-MCNC: 37 MG/DL (ref 6–20)
BUN SERPL-MCNC: 39 MG/DL (ref 6–20)
BUN SERPL-MCNC: 40 MG/DL (ref 6–20)
BUN SERPL-MCNC: 42 MG/DL (ref 6–20)
BUN SERPL-MCNC: 43 MG/DL (ref 6–20)
BUN SERPL-MCNC: 44 MG/DL (ref 6–20)
BUN SERPL-MCNC: 46 MG/DL (ref 6–20)
BUN SERPL-MCNC: 47 MG/DL (ref 6–20)
BUN SERPL-MCNC: 50 MG/DL (ref 6–20)
BUN SERPL-MCNC: 50 MG/DL (ref 6–20)
BUN SERPL-MCNC: 52 MG/DL (ref 6–20)
BUN SERPL-MCNC: 52 MG/DL (ref 6–20)
BUN SERPL-MCNC: 54 MG/DL (ref 6–20)
BUN SERPL-MCNC: 54 MG/DL (ref 6–20)
BUN SERPL-MCNC: 58 MG/DL (ref 6–20)
BUN SERPL-MCNC: 59 MG/DL (ref 6–20)
BUN SERPL-MCNC: 62 MG/DL (ref 6–20)
BUN SERPL-MCNC: 63 MG/DL (ref 6–20)
BUN SERPL-MCNC: 64 MG/DL (ref 6–20)
BUN SERPL-MCNC: 65 MG/DL (ref 6–20)
BUN SERPL-MCNC: 68 MG/DL (ref 6–20)
BUN SERPL-MCNC: 75 MG/DL (ref 6–20)
BUN SERPL-MCNC: 76 MG/DL (ref 6–20)
BUN SERPL-MCNC: 93 MG/DL (ref 6–20)
BUN SERPL-MCNC: 97 MG/DL (ref 6–20)
BUN/CREAT SERPL: 3 (ref 12–20)
BUN/CREAT SERPL: 4 (ref 12–20)
BUN/CREAT SERPL: 5 (ref 12–20)
BUN/CREAT SERPL: 6 (ref 12–20)
BUN/CREAT SERPL: 7 (ref 12–20)
BUN/CREAT SERPL: 7 (ref 12–20)
BUN/CREAT SERPL: 8 (ref 12–20)
CA-I BLD-MCNC: 1.06 MMOL/L (ref 1.12–1.32)
CA-I BLD-MCNC: 1.09 MMOL/L (ref 1.12–1.32)
CA-I BLD-SCNC: 0.99 MMOL/L (ref 1.12–1.32)
CALCIUM SERPL-MCNC: 7.4 MG/DL (ref 8.5–10.1)
CALCIUM SERPL-MCNC: 7.8 MG/DL (ref 8.5–10.1)
CALCIUM SERPL-MCNC: 7.9 MG/DL (ref 8.5–10.1)
CALCIUM SERPL-MCNC: 8 MG/DL (ref 8.5–10.1)
CALCIUM SERPL-MCNC: 8.1 MG/DL (ref 8.5–10.1)
CALCIUM SERPL-MCNC: 8.1 MG/DL (ref 8.5–10.1)
CALCIUM SERPL-MCNC: 8.3 MG/DL (ref 8.5–10.1)
CALCIUM SERPL-MCNC: 8.5 MG/DL (ref 8.5–10.1)
CALCIUM SERPL-MCNC: 8.5 MG/DL (ref 8.5–10.1)
CALCIUM SERPL-MCNC: 8.6 MG/DL (ref 8.5–10.1)
CALCIUM SERPL-MCNC: 8.7 MG/DL (ref 8.5–10.1)
CALCIUM SERPL-MCNC: 8.8 MG/DL (ref 8.5–10.1)
CALCIUM SERPL-MCNC: 8.8 MG/DL (ref 8.5–10.1)
CALCIUM SERPL-MCNC: 8.9 MG/DL (ref 8.5–10.1)
CALCIUM SERPL-MCNC: 8.9 MG/DL (ref 8.5–10.1)
CALCIUM SERPL-MCNC: 9 MG/DL (ref 8.5–10.1)
CALCIUM SERPL-MCNC: 9.1 MG/DL (ref 8.5–10.1)
CALCIUM SERPL-MCNC: 9.2 MG/DL (ref 8.5–10.1)
CALCIUM SERPL-MCNC: 9.2 MG/DL (ref 8.5–10.1)
CALCIUM SERPL-MCNC: 9.3 MG/DL (ref 8.5–10.1)
CALCIUM SERPL-MCNC: 9.4 MG/DL (ref 8.5–10.1)
CALCIUM SERPL-MCNC: 9.4 MG/DL (ref 8.5–10.1)
CALCIUM SERPL-MCNC: 9.6 MG/DL (ref 8.5–10.1)
CALCIUM SERPL-MCNC: 9.6 MG/DL (ref 8.5–10.1)
CALCIUM SERPL-MCNC: 9.9 MG/DL (ref 8.5–10.1)
CALCULATED P AXIS, ECG09: -106 DEGREES
CALCULATED P AXIS, ECG09: -14 DEGREES
CALCULATED P AXIS, ECG09: -81 DEGREES
CALCULATED P AXIS, ECG09: 41 DEGREES
CALCULATED P AXIS, ECG09: 41 DEGREES
CALCULATED P AXIS, ECG09: 46 DEGREES
CALCULATED R AXIS, ECG10: -28 DEGREES
CALCULATED R AXIS, ECG10: -29 DEGREES
CALCULATED R AXIS, ECG10: -4 DEGREES
CALCULATED R AXIS, ECG10: -7 DEGREES
CALCULATED R AXIS, ECG10: 9 DEGREES
CALCULATED R AXIS, ECG10: 9 DEGREES
CALCULATED T AXIS, ECG11: 102 DEGREES
CALCULATED T AXIS, ECG11: 105 DEGREES
CALCULATED T AXIS, ECG11: 105 DEGREES
CALCULATED T AXIS, ECG11: 107 DEGREES
CALCULATED T AXIS, ECG11: 126 DEGREES
CALCULATED T AXIS, ECG11: 85 DEGREES
CHLORIDE BLD-SCNC: 95 MMOL/L (ref 98–107)
CHLORIDE BLD-SCNC: 95 MMOL/L (ref 98–107)
CHLORIDE SERPL-SCNC: 100 MMOL/L (ref 97–108)
CHLORIDE SERPL-SCNC: 100 MMOL/L (ref 97–108)
CHLORIDE SERPL-SCNC: 101 MMOL/L (ref 97–108)
CHLORIDE SERPL-SCNC: 103 MMOL/L (ref 97–108)
CHLORIDE SERPL-SCNC: 106 MMOL/L (ref 97–108)
CHLORIDE SERPL-SCNC: 90 MMOL/L (ref 97–108)
CHLORIDE SERPL-SCNC: 90 MMOL/L (ref 97–108)
CHLORIDE SERPL-SCNC: 91 MMOL/L (ref 97–108)
CHLORIDE SERPL-SCNC: 92 MMOL/L (ref 97–108)
CHLORIDE SERPL-SCNC: 93 MMOL/L (ref 97–108)
CHLORIDE SERPL-SCNC: 93 MMOL/L (ref 97–108)
CHLORIDE SERPL-SCNC: 94 MMOL/L (ref 97–108)
CHLORIDE SERPL-SCNC: 95 MMOL/L (ref 97–108)
CHLORIDE SERPL-SCNC: 95 MMOL/L (ref 97–108)
CHLORIDE SERPL-SCNC: 96 MMOL/L (ref 97–108)
CHLORIDE SERPL-SCNC: 97 MMOL/L (ref 97–108)
CHLORIDE SERPL-SCNC: 98 MMOL/L (ref 97–108)
CHLORIDE SERPL-SCNC: 98 MMOL/L (ref 97–108)
CHLORIDE SERPL-SCNC: 99 MMOL/L (ref 97–108)
CHOLEST SERPL-MCNC: 133 MG/DL
CK MB CFR SERPL CALC: 2.3 % (ref 0–2.5)
CK MB SERPL-MCNC: 3 NG/ML (ref 5–25)
CK SERPL-CCNC: 132 U/L (ref 39–308)
CO2 BLD-SCNC: 24 MMOL/L (ref 21–32)
CO2 BLD-SCNC: 27 MMOL/L (ref 21–32)
CO2 SERPL-SCNC: 23 MMOL/L (ref 21–32)
CO2 SERPL-SCNC: 23 MMOL/L (ref 21–32)
CO2 SERPL-SCNC: 24 MMOL/L (ref 21–32)
CO2 SERPL-SCNC: 25 MMOL/L (ref 21–32)
CO2 SERPL-SCNC: 26 MMOL/L (ref 21–32)
CO2 SERPL-SCNC: 27 MMOL/L (ref 21–32)
CO2 SERPL-SCNC: 28 MMOL/L (ref 21–32)
CO2 SERPL-SCNC: 29 MMOL/L (ref 21–32)
CO2 SERPL-SCNC: 30 MMOL/L (ref 21–32)
CO2 SERPL-SCNC: 31 MMOL/L (ref 21–32)
CO2 SERPL-SCNC: 32 MMOL/L (ref 21–32)
CO2 SERPL-SCNC: 33 MMOL/L (ref 21–32)
COMMENT, HOLDF: NORMAL
COVID-19 RAPID TEST, COVR: DETECTED
COVID-19 RAPID TEST, COVR: NOT DETECTED
COVID-19, XGCOVT: NOT DETECTED
CREAT BLD-MCNC: 12.8 MG/DL (ref 0.6–1.3)
CREAT BLD-MCNC: 8 MG/DL (ref 0.6–1.3)
CREAT SERPL-MCNC: 10.2 MG/DL (ref 0.7–1.3)
CREAT SERPL-MCNC: 10.6 MG/DL (ref 0.7–1.3)
CREAT SERPL-MCNC: 10.7 MG/DL (ref 0.7–1.3)
CREAT SERPL-MCNC: 10.8 MG/DL (ref 0.7–1.3)
CREAT SERPL-MCNC: 10.9 MG/DL (ref 0.7–1.3)
CREAT SERPL-MCNC: 11 MG/DL (ref 0.7–1.3)
CREAT SERPL-MCNC: 11.2 MG/DL (ref 0.7–1.3)
CREAT SERPL-MCNC: 11.3 MG/DL (ref 0.7–1.3)
CREAT SERPL-MCNC: 11.3 MG/DL (ref 0.7–1.3)
CREAT SERPL-MCNC: 11.4 MG/DL (ref 0.7–1.3)
CREAT SERPL-MCNC: 11.5 MG/DL (ref 0.7–1.3)
CREAT SERPL-MCNC: 11.6 MG/DL (ref 0.7–1.3)
CREAT SERPL-MCNC: 12.5 MG/DL (ref 0.7–1.3)
CREAT SERPL-MCNC: 12.6 MG/DL (ref 0.7–1.3)
CREAT SERPL-MCNC: 12.6 MG/DL (ref 0.7–1.3)
CREAT SERPL-MCNC: 13 MG/DL (ref 0.7–1.3)
CREAT SERPL-MCNC: 13.6 MG/DL (ref 0.7–1.3)
CREAT SERPL-MCNC: 13.9 MG/DL (ref 0.7–1.3)
CREAT SERPL-MCNC: 14.8 MG/DL (ref 0.7–1.3)
CREAT SERPL-MCNC: 15.4 MG/DL (ref 0.7–1.3)
CREAT SERPL-MCNC: 15.6 MG/DL (ref 0.7–1.3)
CREAT SERPL-MCNC: 4.9 MG/DL (ref 0.7–1.3)
CREAT SERPL-MCNC: 7.06 MG/DL (ref 0.7–1.3)
CREAT SERPL-MCNC: 7.45 MG/DL (ref 0.7–1.3)
CREAT SERPL-MCNC: 7.71 MG/DL (ref 0.7–1.3)
CREAT SERPL-MCNC: 8.07 MG/DL (ref 0.7–1.3)
CREAT SERPL-MCNC: 8.2 MG/DL (ref 0.7–1.3)
CREAT SERPL-MCNC: 8.49 MG/DL (ref 0.7–1.3)
CREAT SERPL-MCNC: 8.75 MG/DL (ref 0.7–1.3)
CREAT SERPL-MCNC: 8.96 MG/DL (ref 0.7–1.3)
CREAT SERPL-MCNC: 9.18 MG/DL (ref 0.7–1.3)
CREAT SERPL-MCNC: 9.22 MG/DL (ref 0.7–1.3)
CREAT SERPL-MCNC: 9.25 MG/DL (ref 0.7–1.3)
CREAT SERPL-MCNC: 9.82 MG/DL (ref 0.7–1.3)
CRP SERPL HS-MCNC: >9.5 MG/L
CRP SERPL-MCNC: 33 MG/DL (ref 0–0.6)
CRP SERPL-MCNC: 4.39 MG/DL (ref 0–0.6)
D DIMER PPP FEU-MCNC: 0.54 MG/L FEU (ref 0–0.65)
D DIMER PPP FEU-MCNC: 0.81 MG/L FEU (ref 0–0.65)
D DIMER PPP FEU-MCNC: 1.15 MG/L FEU (ref 0–0.65)
D DIMER PPP FEU-MCNC: 1.4 MG/L FEU (ref 0–0.65)
D50 ADMINISTERED, D50ADM: 0 ML
D50 ORDER, D50ORD: 0 ML
DATE LAST DOSE: ABNORMAL
DIAGNOSIS, 93000: NORMAL
DIFFERENTIAL METHOD BLD: ABNORMAL
ECHO AO ROOT DIAM: 3.49 CM
ECHO AV AREA PEAK VELOCITY: 3.48 CM2
ECHO AV AREA PEAK VELOCITY: 3.48 CM2
ECHO AV AREA VTI: 3.78 CM2
ECHO AV AREA/BSA VTI: 1.5 CM2/M2
ECHO AV MEAN GRADIENT: 3.53 MMHG
ECHO AV PEAK GRADIENT: 7.26 MMHG
ECHO AV PEAK VELOCITY: 134.73 CM/S
ECHO AV VTI: 18.84 CM
ECHO LA MAJOR AXIS: 4.54 CM
ECHO LA MINOR AXIS: 1.75 CM
ECHO LV INTERNAL DIMENSION DIASTOLIC: 4.74 CM (ref 4.2–5.9)
ECHO LV INTERNAL DIMENSION SYSTOLIC: 4.4 CM
ECHO LV IVSD: 1.57 CM (ref 0.6–1)
ECHO LV MASS 2D: 364.1 G (ref 88–224)
ECHO LV MASS INDEX 2D: 140 G/M2 (ref 49–115)
ECHO LV POSTERIOR WALL DIASTOLIC: 1.85 CM (ref 0.6–1)
ECHO LVOT CARDIAC OUTPUT: 6.42 LITER/MINUTE
ECHO LVOT DIAM: 2.29 CM
ECHO LVOT PEAK GRADIENT: 5.18 MMHG
ECHO LVOT PEAK GRADIENT: 5.18 MMHG
ECHO LVOT PEAK VELOCITY: 113.82 CM/S
ECHO LVOT PEAK VELOCITY: 113.82 CM/S
ECHO LVOT SV: 71.2 ML
ECHO LVOT VTI: 17.31 CM
ECHO MV A VELOCITY: 96.71 CM/S
ECHO MV AREA PHT: 11.73 CM2
ECHO MV AREA VTI: 3.7 CM2
ECHO MV E DECELERATION TIME (DT): 0.05 S
ECHO MV E VELOCITY: 94.81 CM/S
ECHO MV E/A RATIO: 0.98
ECHO MV MAX VELOCITY: 112.8 CM/S
ECHO MV MEAN GRADIENT: 2.54 MMHG
ECHO MV PEAK GRADIENT: 5.09 MMHG
ECHO MV PRESSURE HALF TIME (PHT): 0.02 S
ECHO MV VTI: 19.26 CM
ECHO PV MAX VELOCITY: 135.9 CM/S
ECHO PV MAX VELOCITY: 145.93 CM/S
ECHO PV MEAN GRADIENT: 3.35 MMHG
ECHO PV PEAK INSTANTANEOUS GRADIENT SYSTOLIC: 7.39 MMHG
ECHO PV PEAK INSTANTANEOUS GRADIENT SYSTOLIC: 8.52 MMHG
ECHO PV VTI: 21.11 CM
EOSINOPHIL # BLD: 0 K/UL (ref 0–0.4)
EOSINOPHIL # BLD: 0.1 K/UL (ref 0–0.4)
EOSINOPHIL # BLD: 0.2 K/UL (ref 0–0.4)
EOSINOPHIL # BLD: 0.3 K/UL (ref 0–0.4)
EOSINOPHIL # BLD: 0.3 K/UL (ref 0–0.4)
EOSINOPHIL # BLD: 0.4 K/UL (ref 0–0.4)
EOSINOPHIL NFR BLD: 0 % (ref 0–7)
EOSINOPHIL NFR BLD: 1 % (ref 0–7)
EOSINOPHIL NFR BLD: 2 % (ref 0–7)
EOSINOPHIL NFR BLD: 2 % (ref 0–7)
EOSINOPHIL NFR BLD: 3 % (ref 0–7)
ERYTHROCYTE [DISTWIDTH] IN BLOOD BY AUTOMATED COUNT: 14.5 % (ref 11.5–14.5)
ERYTHROCYTE [DISTWIDTH] IN BLOOD BY AUTOMATED COUNT: 14.6 % (ref 11.5–14.5)
ERYTHROCYTE [DISTWIDTH] IN BLOOD BY AUTOMATED COUNT: 14.6 % (ref 11.5–14.5)
ERYTHROCYTE [DISTWIDTH] IN BLOOD BY AUTOMATED COUNT: 14.7 % (ref 11.5–14.5)
ERYTHROCYTE [DISTWIDTH] IN BLOOD BY AUTOMATED COUNT: 14.9 % (ref 11.5–14.5)
ERYTHROCYTE [DISTWIDTH] IN BLOOD BY AUTOMATED COUNT: 15 % (ref 11.5–14.5)
ERYTHROCYTE [DISTWIDTH] IN BLOOD BY AUTOMATED COUNT: 15.1 % (ref 11.5–14.5)
ERYTHROCYTE [DISTWIDTH] IN BLOOD BY AUTOMATED COUNT: 15.2 % (ref 11.5–14.5)
ERYTHROCYTE [DISTWIDTH] IN BLOOD BY AUTOMATED COUNT: 15.3 % (ref 11.5–14.5)
ERYTHROCYTE [DISTWIDTH] IN BLOOD BY AUTOMATED COUNT: 15.6 % (ref 11.5–14.5)
ERYTHROCYTE [DISTWIDTH] IN BLOOD BY AUTOMATED COUNT: 15.7 % (ref 11.5–14.5)
ERYTHROCYTE [DISTWIDTH] IN BLOOD BY AUTOMATED COUNT: 15.8 % (ref 11.5–14.5)
ERYTHROCYTE [DISTWIDTH] IN BLOOD BY AUTOMATED COUNT: 15.9 % (ref 11.5–14.5)
ERYTHROCYTE [DISTWIDTH] IN BLOOD BY AUTOMATED COUNT: 16.1 % (ref 11.5–14.5)
ERYTHROCYTE [DISTWIDTH] IN BLOOD BY AUTOMATED COUNT: 16.3 % (ref 11.5–14.5)
ERYTHROCYTE [DISTWIDTH] IN BLOOD BY AUTOMATED COUNT: 16.4 % (ref 11.5–14.5)
ERYTHROCYTE [DISTWIDTH] IN BLOOD BY AUTOMATED COUNT: 16.5 % (ref 11.5–14.5)
ERYTHROCYTE [DISTWIDTH] IN BLOOD BY AUTOMATED COUNT: 16.7 % (ref 11.5–14.5)
ERYTHROCYTE [DISTWIDTH] IN BLOOD BY AUTOMATED COUNT: 16.9 % (ref 11.5–14.5)
ERYTHROCYTE [DISTWIDTH] IN BLOOD BY AUTOMATED COUNT: 17.1 % (ref 11.5–14.5)
ERYTHROCYTE [DISTWIDTH] IN BLOOD BY AUTOMATED COUNT: 17.1 % (ref 11.5–14.5)
ERYTHROCYTE [DISTWIDTH] IN BLOOD BY AUTOMATED COUNT: 17.7 % (ref 11.5–14.5)
ERYTHROCYTE [DISTWIDTH] IN BLOOD BY AUTOMATED COUNT: 18.1 % (ref 11.5–14.5)
ERYTHROCYTE [DISTWIDTH] IN BLOOD BY AUTOMATED COUNT: 18.1 % (ref 11.5–14.5)
ERYTHROCYTE [SEDIMENTATION RATE] IN BLOOD: 109 MM/HR (ref 0–15)
ERYTHROCYTE [SEDIMENTATION RATE] IN BLOOD: 116 MM/HR (ref 0–15)
EST. AVERAGE GLUCOSE BLD GHB EST-MCNC: 166 MG/DL
EST. AVERAGE GLUCOSE BLD GHB EST-MCNC: 223 MG/DL
FERRITIN SERPL-MCNC: 480 NG/ML (ref 26–388)
FERRITIN SERPL-MCNC: 560 NG/ML (ref 26–388)
FERRITIN SERPL-MCNC: 796 NG/ML (ref 26–388)
FIBRINOGEN PPP-MCNC: 630 MG/DL (ref 200–475)
FIBRINOGEN PPP-MCNC: >800 MG/DL (ref 200–475)
GAS FLOW.O2 O2 DELIVERY SYS: ABNORMAL L/MIN
GLOBULIN SER CALC-MCNC: 5 G/DL (ref 2–4)
GLOBULIN SER CALC-MCNC: 5 G/DL (ref 2–4)
GLOBULIN SER CALC-MCNC: 5.2 G/DL (ref 2–4)
GLOBULIN SER CALC-MCNC: 5.3 G/DL (ref 2–4)
GLOBULIN SER CALC-MCNC: 5.5 G/DL (ref 2–4)
GLOBULIN SER CALC-MCNC: 5.6 G/DL (ref 2–4)
GLOBULIN SER CALC-MCNC: 5.6 G/DL (ref 2–4)
GLOBULIN SER CALC-MCNC: 5.7 G/DL (ref 2–4)
GLOBULIN SER CALC-MCNC: 5.9 G/DL (ref 2–4)
GLOBULIN SER CALC-MCNC: 6 G/DL (ref 2–4)
GLOBULIN SER CALC-MCNC: 6.7 G/DL (ref 2–4)
GLOBULIN SER CALC-MCNC: 7.3 G/DL (ref 2–4)
GLOBULIN SER CALC-MCNC: 8.1 G/DL (ref 2–4)
GLSCOM COMMENTS: NORMAL
GLUCOSE BLD STRIP.AUTO-MCNC: 100 MG/DL (ref 65–100)
GLUCOSE BLD STRIP.AUTO-MCNC: 100 MG/DL (ref 65–100)
GLUCOSE BLD STRIP.AUTO-MCNC: 103 MG/DL (ref 65–100)
GLUCOSE BLD STRIP.AUTO-MCNC: 104 MG/DL (ref 65–100)
GLUCOSE BLD STRIP.AUTO-MCNC: 106 MG/DL (ref 65–100)
GLUCOSE BLD STRIP.AUTO-MCNC: 106 MG/DL (ref 65–100)
GLUCOSE BLD STRIP.AUTO-MCNC: 107 MG/DL (ref 65–100)
GLUCOSE BLD STRIP.AUTO-MCNC: 108 MG/DL (ref 65–100)
GLUCOSE BLD STRIP.AUTO-MCNC: 109 MG/DL (ref 65–100)
GLUCOSE BLD STRIP.AUTO-MCNC: 109 MG/DL (ref 65–100)
GLUCOSE BLD STRIP.AUTO-MCNC: 110 MG/DL (ref 65–100)
GLUCOSE BLD STRIP.AUTO-MCNC: 111 MG/DL (ref 65–100)
GLUCOSE BLD STRIP.AUTO-MCNC: 111 MG/DL (ref 65–100)
GLUCOSE BLD STRIP.AUTO-MCNC: 112 MG/DL (ref 65–100)
GLUCOSE BLD STRIP.AUTO-MCNC: 112 MG/DL (ref 65–100)
GLUCOSE BLD STRIP.AUTO-MCNC: 113 MG/DL (ref 65–100)
GLUCOSE BLD STRIP.AUTO-MCNC: 114 MG/DL (ref 65–100)
GLUCOSE BLD STRIP.AUTO-MCNC: 117 MG/DL (ref 65–100)
GLUCOSE BLD STRIP.AUTO-MCNC: 117 MG/DL (ref 65–100)
GLUCOSE BLD STRIP.AUTO-MCNC: 119 MG/DL (ref 65–100)
GLUCOSE BLD STRIP.AUTO-MCNC: 120 MG/DL (ref 65–100)
GLUCOSE BLD STRIP.AUTO-MCNC: 121 MG/DL (ref 65–100)
GLUCOSE BLD STRIP.AUTO-MCNC: 122 MG/DL (ref 65–100)
GLUCOSE BLD STRIP.AUTO-MCNC: 122 MG/DL (ref 65–100)
GLUCOSE BLD STRIP.AUTO-MCNC: 123 MG/DL (ref 65–100)
GLUCOSE BLD STRIP.AUTO-MCNC: 124 MG/DL (ref 65–100)
GLUCOSE BLD STRIP.AUTO-MCNC: 125 MG/DL (ref 65–100)
GLUCOSE BLD STRIP.AUTO-MCNC: 126 MG/DL (ref 65–100)
GLUCOSE BLD STRIP.AUTO-MCNC: 126 MG/DL (ref 65–100)
GLUCOSE BLD STRIP.AUTO-MCNC: 127 MG/DL (ref 65–100)
GLUCOSE BLD STRIP.AUTO-MCNC: 128 MG/DL (ref 65–100)
GLUCOSE BLD STRIP.AUTO-MCNC: 128 MG/DL (ref 65–100)
GLUCOSE BLD STRIP.AUTO-MCNC: 129 MG/DL (ref 65–100)
GLUCOSE BLD STRIP.AUTO-MCNC: 129 MG/DL (ref 65–100)
GLUCOSE BLD STRIP.AUTO-MCNC: 130 MG/DL (ref 65–100)
GLUCOSE BLD STRIP.AUTO-MCNC: 130 MG/DL (ref 65–100)
GLUCOSE BLD STRIP.AUTO-MCNC: 133 MG/DL (ref 65–100)
GLUCOSE BLD STRIP.AUTO-MCNC: 134 MG/DL (ref 65–100)
GLUCOSE BLD STRIP.AUTO-MCNC: 135 MG/DL (ref 65–100)
GLUCOSE BLD STRIP.AUTO-MCNC: 136 MG/DL (ref 65–100)
GLUCOSE BLD STRIP.AUTO-MCNC: 136 MG/DL (ref 65–100)
GLUCOSE BLD STRIP.AUTO-MCNC: 137 MG/DL (ref 65–100)
GLUCOSE BLD STRIP.AUTO-MCNC: 138 MG/DL (ref 65–100)
GLUCOSE BLD STRIP.AUTO-MCNC: 139 MG/DL (ref 65–100)
GLUCOSE BLD STRIP.AUTO-MCNC: 139 MG/DL (ref 65–100)
GLUCOSE BLD STRIP.AUTO-MCNC: 141 MG/DL (ref 65–100)
GLUCOSE BLD STRIP.AUTO-MCNC: 141 MG/DL (ref 65–100)
GLUCOSE BLD STRIP.AUTO-MCNC: 142 MG/DL (ref 65–100)
GLUCOSE BLD STRIP.AUTO-MCNC: 142 MG/DL (ref 65–100)
GLUCOSE BLD STRIP.AUTO-MCNC: 143 MG/DL (ref 65–100)
GLUCOSE BLD STRIP.AUTO-MCNC: 145 MG/DL (ref 65–100)
GLUCOSE BLD STRIP.AUTO-MCNC: 146 MG/DL (ref 65–100)
GLUCOSE BLD STRIP.AUTO-MCNC: 149 MG/DL (ref 65–100)
GLUCOSE BLD STRIP.AUTO-MCNC: 150 MG/DL (ref 65–100)
GLUCOSE BLD STRIP.AUTO-MCNC: 150 MG/DL (ref 65–100)
GLUCOSE BLD STRIP.AUTO-MCNC: 151 MG/DL (ref 65–100)
GLUCOSE BLD STRIP.AUTO-MCNC: 152 MG/DL (ref 65–100)
GLUCOSE BLD STRIP.AUTO-MCNC: 154 MG/DL (ref 65–100)
GLUCOSE BLD STRIP.AUTO-MCNC: 156 MG/DL (ref 65–100)
GLUCOSE BLD STRIP.AUTO-MCNC: 157 MG/DL (ref 65–100)
GLUCOSE BLD STRIP.AUTO-MCNC: 158 MG/DL (ref 65–100)
GLUCOSE BLD STRIP.AUTO-MCNC: 158 MG/DL (ref 65–100)
GLUCOSE BLD STRIP.AUTO-MCNC: 160 MG/DL (ref 65–100)
GLUCOSE BLD STRIP.AUTO-MCNC: 162 MG/DL (ref 65–100)
GLUCOSE BLD STRIP.AUTO-MCNC: 163 MG/DL (ref 65–100)
GLUCOSE BLD STRIP.AUTO-MCNC: 163 MG/DL (ref 65–100)
GLUCOSE BLD STRIP.AUTO-MCNC: 164 MG/DL (ref 65–100)
GLUCOSE BLD STRIP.AUTO-MCNC: 166 MG/DL (ref 65–100)
GLUCOSE BLD STRIP.AUTO-MCNC: 167 MG/DL (ref 65–100)
GLUCOSE BLD STRIP.AUTO-MCNC: 168 MG/DL (ref 65–100)
GLUCOSE BLD STRIP.AUTO-MCNC: 168 MG/DL (ref 65–100)
GLUCOSE BLD STRIP.AUTO-MCNC: 171 MG/DL (ref 65–100)
GLUCOSE BLD STRIP.AUTO-MCNC: 171 MG/DL (ref 65–100)
GLUCOSE BLD STRIP.AUTO-MCNC: 172 MG/DL (ref 65–100)
GLUCOSE BLD STRIP.AUTO-MCNC: 172 MG/DL (ref 65–100)
GLUCOSE BLD STRIP.AUTO-MCNC: 173 MG/DL (ref 65–100)
GLUCOSE BLD STRIP.AUTO-MCNC: 174 MG/DL (ref 65–100)
GLUCOSE BLD STRIP.AUTO-MCNC: 176 MG/DL (ref 65–100)
GLUCOSE BLD STRIP.AUTO-MCNC: 177 MG/DL (ref 65–100)
GLUCOSE BLD STRIP.AUTO-MCNC: 178 MG/DL (ref 65–100)
GLUCOSE BLD STRIP.AUTO-MCNC: 179 MG/DL (ref 65–100)
GLUCOSE BLD STRIP.AUTO-MCNC: 180 MG/DL (ref 65–100)
GLUCOSE BLD STRIP.AUTO-MCNC: 182 MG/DL (ref 65–100)
GLUCOSE BLD STRIP.AUTO-MCNC: 183 MG/DL (ref 65–100)
GLUCOSE BLD STRIP.AUTO-MCNC: 184 MG/DL (ref 65–100)
GLUCOSE BLD STRIP.AUTO-MCNC: 186 MG/DL (ref 65–100)
GLUCOSE BLD STRIP.AUTO-MCNC: 186 MG/DL (ref 65–100)
GLUCOSE BLD STRIP.AUTO-MCNC: 187 MG/DL (ref 65–100)
GLUCOSE BLD STRIP.AUTO-MCNC: 188 MG/DL (ref 65–100)
GLUCOSE BLD STRIP.AUTO-MCNC: 188 MG/DL (ref 65–100)
GLUCOSE BLD STRIP.AUTO-MCNC: 189 MG/DL (ref 65–100)
GLUCOSE BLD STRIP.AUTO-MCNC: 190 MG/DL (ref 65–100)
GLUCOSE BLD STRIP.AUTO-MCNC: 190 MG/DL (ref 65–100)
GLUCOSE BLD STRIP.AUTO-MCNC: 192 MG/DL (ref 65–100)
GLUCOSE BLD STRIP.AUTO-MCNC: 192 MG/DL (ref 65–100)
GLUCOSE BLD STRIP.AUTO-MCNC: 193 MG/DL (ref 65–100)
GLUCOSE BLD STRIP.AUTO-MCNC: 195 MG/DL (ref 65–100)
GLUCOSE BLD STRIP.AUTO-MCNC: 195 MG/DL (ref 65–100)
GLUCOSE BLD STRIP.AUTO-MCNC: 196 MG/DL (ref 65–100)
GLUCOSE BLD STRIP.AUTO-MCNC: 197 MG/DL (ref 65–100)
GLUCOSE BLD STRIP.AUTO-MCNC: 198 MG/DL (ref 65–100)
GLUCOSE BLD STRIP.AUTO-MCNC: 201 MG/DL (ref 65–100)
GLUCOSE BLD STRIP.AUTO-MCNC: 203 MG/DL (ref 65–100)
GLUCOSE BLD STRIP.AUTO-MCNC: 203 MG/DL (ref 65–100)
GLUCOSE BLD STRIP.AUTO-MCNC: 204 MG/DL (ref 65–100)
GLUCOSE BLD STRIP.AUTO-MCNC: 205 MG/DL (ref 65–100)
GLUCOSE BLD STRIP.AUTO-MCNC: 206 MG/DL (ref 65–100)
GLUCOSE BLD STRIP.AUTO-MCNC: 206 MG/DL (ref 65–100)
GLUCOSE BLD STRIP.AUTO-MCNC: 207 MG/DL (ref 65–100)
GLUCOSE BLD STRIP.AUTO-MCNC: 209 MG/DL (ref 65–100)
GLUCOSE BLD STRIP.AUTO-MCNC: 210 MG/DL (ref 65–100)
GLUCOSE BLD STRIP.AUTO-MCNC: 211 MG/DL (ref 65–100)
GLUCOSE BLD STRIP.AUTO-MCNC: 211 MG/DL (ref 65–100)
GLUCOSE BLD STRIP.AUTO-MCNC: 212 MG/DL (ref 65–100)
GLUCOSE BLD STRIP.AUTO-MCNC: 212 MG/DL (ref 65–100)
GLUCOSE BLD STRIP.AUTO-MCNC: 213 MG/DL (ref 65–100)
GLUCOSE BLD STRIP.AUTO-MCNC: 214 MG/DL (ref 65–100)
GLUCOSE BLD STRIP.AUTO-MCNC: 216 MG/DL (ref 65–100)
GLUCOSE BLD STRIP.AUTO-MCNC: 216 MG/DL (ref 65–100)
GLUCOSE BLD STRIP.AUTO-MCNC: 220 MG/DL (ref 65–100)
GLUCOSE BLD STRIP.AUTO-MCNC: 220 MG/DL (ref 65–100)
GLUCOSE BLD STRIP.AUTO-MCNC: 221 MG/DL (ref 65–100)
GLUCOSE BLD STRIP.AUTO-MCNC: 222 MG/DL (ref 65–100)
GLUCOSE BLD STRIP.AUTO-MCNC: 226 MG/DL (ref 65–100)
GLUCOSE BLD STRIP.AUTO-MCNC: 227 MG/DL (ref 65–100)
GLUCOSE BLD STRIP.AUTO-MCNC: 227 MG/DL (ref 65–100)
GLUCOSE BLD STRIP.AUTO-MCNC: 230 MG/DL (ref 65–100)
GLUCOSE BLD STRIP.AUTO-MCNC: 231 MG/DL (ref 65–100)
GLUCOSE BLD STRIP.AUTO-MCNC: 234 MG/DL (ref 65–100)
GLUCOSE BLD STRIP.AUTO-MCNC: 235 MG/DL (ref 65–100)
GLUCOSE BLD STRIP.AUTO-MCNC: 239 MG/DL (ref 65–100)
GLUCOSE BLD STRIP.AUTO-MCNC: 239 MG/DL (ref 65–100)
GLUCOSE BLD STRIP.AUTO-MCNC: 240 MG/DL (ref 65–100)
GLUCOSE BLD STRIP.AUTO-MCNC: 241 MG/DL (ref 65–100)
GLUCOSE BLD STRIP.AUTO-MCNC: 244 MG/DL (ref 65–100)
GLUCOSE BLD STRIP.AUTO-MCNC: 245 MG/DL (ref 65–100)
GLUCOSE BLD STRIP.AUTO-MCNC: 246 MG/DL (ref 65–100)
GLUCOSE BLD STRIP.AUTO-MCNC: 247 MG/DL (ref 65–100)
GLUCOSE BLD STRIP.AUTO-MCNC: 250 MG/DL (ref 65–100)
GLUCOSE BLD STRIP.AUTO-MCNC: 251 MG/DL (ref 65–100)
GLUCOSE BLD STRIP.AUTO-MCNC: 253 MG/DL (ref 65–100)
GLUCOSE BLD STRIP.AUTO-MCNC: 255 MG/DL (ref 65–100)
GLUCOSE BLD STRIP.AUTO-MCNC: 256 MG/DL (ref 65–100)
GLUCOSE BLD STRIP.AUTO-MCNC: 259 MG/DL (ref 65–100)
GLUCOSE BLD STRIP.AUTO-MCNC: 264 MG/DL (ref 65–100)
GLUCOSE BLD STRIP.AUTO-MCNC: 265 MG/DL (ref 65–100)
GLUCOSE BLD STRIP.AUTO-MCNC: 268 MG/DL (ref 65–100)
GLUCOSE BLD STRIP.AUTO-MCNC: 269 MG/DL (ref 65–100)
GLUCOSE BLD STRIP.AUTO-MCNC: 269 MG/DL (ref 65–100)
GLUCOSE BLD STRIP.AUTO-MCNC: 271 MG/DL (ref 65–100)
GLUCOSE BLD STRIP.AUTO-MCNC: 275 MG/DL (ref 65–100)
GLUCOSE BLD STRIP.AUTO-MCNC: 282 MG/DL (ref 65–100)
GLUCOSE BLD STRIP.AUTO-MCNC: 289 MG/DL (ref 65–100)
GLUCOSE BLD STRIP.AUTO-MCNC: 289 MG/DL (ref 65–100)
GLUCOSE BLD STRIP.AUTO-MCNC: 291 MG/DL (ref 65–100)
GLUCOSE BLD STRIP.AUTO-MCNC: 299 MG/DL (ref 65–100)
GLUCOSE BLD STRIP.AUTO-MCNC: 304 MG/DL (ref 65–100)
GLUCOSE BLD STRIP.AUTO-MCNC: 306 MG/DL (ref 65–100)
GLUCOSE BLD STRIP.AUTO-MCNC: 314 MG/DL (ref 65–100)
GLUCOSE BLD STRIP.AUTO-MCNC: 320 MG/DL (ref 65–100)
GLUCOSE BLD STRIP.AUTO-MCNC: 323 MG/DL (ref 65–100)
GLUCOSE BLD STRIP.AUTO-MCNC: 326 MG/DL (ref 65–100)
GLUCOSE BLD STRIP.AUTO-MCNC: 330 MG/DL (ref 65–100)
GLUCOSE BLD STRIP.AUTO-MCNC: 333 MG/DL (ref 65–100)
GLUCOSE BLD STRIP.AUTO-MCNC: 333 MG/DL (ref 65–100)
GLUCOSE BLD STRIP.AUTO-MCNC: 339 MG/DL (ref 65–100)
GLUCOSE BLD STRIP.AUTO-MCNC: 342 MG/DL (ref 65–100)
GLUCOSE BLD STRIP.AUTO-MCNC: 352 MG/DL (ref 65–100)
GLUCOSE BLD STRIP.AUTO-MCNC: 364 MG/DL (ref 65–100)
GLUCOSE BLD STRIP.AUTO-MCNC: 366 MG/DL (ref 65–100)
GLUCOSE BLD STRIP.AUTO-MCNC: 368 MG/DL (ref 65–100)
GLUCOSE BLD STRIP.AUTO-MCNC: 370 MG/DL (ref 65–100)
GLUCOSE BLD STRIP.AUTO-MCNC: 379 MG/DL (ref 65–100)
GLUCOSE BLD STRIP.AUTO-MCNC: 383 MG/DL (ref 65–100)
GLUCOSE BLD STRIP.AUTO-MCNC: 394 MG/DL (ref 65–100)
GLUCOSE BLD STRIP.AUTO-MCNC: 395 MG/DL (ref 65–100)
GLUCOSE BLD STRIP.AUTO-MCNC: 410 MG/DL (ref 65–100)
GLUCOSE BLD STRIP.AUTO-MCNC: 413 MG/DL (ref 65–100)
GLUCOSE BLD STRIP.AUTO-MCNC: 425 MG/DL (ref 65–100)
GLUCOSE BLD STRIP.AUTO-MCNC: 435 MG/DL (ref 65–100)
GLUCOSE BLD STRIP.AUTO-MCNC: 474 MG/DL (ref 65–100)
GLUCOSE BLD STRIP.AUTO-MCNC: 495 MG/DL (ref 65–100)
GLUCOSE BLD STRIP.AUTO-MCNC: 55 MG/DL (ref 65–100)
GLUCOSE BLD STRIP.AUTO-MCNC: 58 MG/DL (ref 65–100)
GLUCOSE BLD STRIP.AUTO-MCNC: 60 MG/DL (ref 65–100)
GLUCOSE BLD STRIP.AUTO-MCNC: 62 MG/DL (ref 65–100)
GLUCOSE BLD STRIP.AUTO-MCNC: 63 MG/DL (ref 65–100)
GLUCOSE BLD STRIP.AUTO-MCNC: 64 MG/DL (ref 65–100)
GLUCOSE BLD STRIP.AUTO-MCNC: 66 MG/DL (ref 65–100)
GLUCOSE BLD STRIP.AUTO-MCNC: 67 MG/DL (ref 65–100)
GLUCOSE BLD STRIP.AUTO-MCNC: 70 MG/DL (ref 65–100)
GLUCOSE BLD STRIP.AUTO-MCNC: 72 MG/DL (ref 65–100)
GLUCOSE BLD STRIP.AUTO-MCNC: 72 MG/DL (ref 65–100)
GLUCOSE BLD STRIP.AUTO-MCNC: 73 MG/DL (ref 65–100)
GLUCOSE BLD STRIP.AUTO-MCNC: 73 MG/DL (ref 65–100)
GLUCOSE BLD STRIP.AUTO-MCNC: 74 MG/DL (ref 65–100)
GLUCOSE BLD STRIP.AUTO-MCNC: 74 MG/DL (ref 65–100)
GLUCOSE BLD STRIP.AUTO-MCNC: 75 MG/DL (ref 65–100)
GLUCOSE BLD STRIP.AUTO-MCNC: 78 MG/DL (ref 65–100)
GLUCOSE BLD STRIP.AUTO-MCNC: 78 MG/DL (ref 65–100)
GLUCOSE BLD STRIP.AUTO-MCNC: 79 MG/DL (ref 65–100)
GLUCOSE BLD STRIP.AUTO-MCNC: 80 MG/DL (ref 65–100)
GLUCOSE BLD STRIP.AUTO-MCNC: 81 MG/DL (ref 65–100)
GLUCOSE BLD STRIP.AUTO-MCNC: 81 MG/DL (ref 65–100)
GLUCOSE BLD STRIP.AUTO-MCNC: 83 MG/DL (ref 65–100)
GLUCOSE BLD STRIP.AUTO-MCNC: 84 MG/DL (ref 65–100)
GLUCOSE BLD STRIP.AUTO-MCNC: 85 MG/DL (ref 65–100)
GLUCOSE BLD STRIP.AUTO-MCNC: 87 MG/DL (ref 65–100)
GLUCOSE BLD STRIP.AUTO-MCNC: 88 MG/DL (ref 65–100)
GLUCOSE BLD STRIP.AUTO-MCNC: 89 MG/DL (ref 65–100)
GLUCOSE BLD STRIP.AUTO-MCNC: 89 MG/DL (ref 65–100)
GLUCOSE BLD STRIP.AUTO-MCNC: 90 MG/DL (ref 65–100)
GLUCOSE BLD STRIP.AUTO-MCNC: 90 MG/DL (ref 65–100)
GLUCOSE BLD STRIP.AUTO-MCNC: 91 MG/DL (ref 65–100)
GLUCOSE BLD STRIP.AUTO-MCNC: 91 MG/DL (ref 65–100)
GLUCOSE BLD STRIP.AUTO-MCNC: 93 MG/DL (ref 65–100)
GLUCOSE BLD STRIP.AUTO-MCNC: 93 MG/DL (ref 65–100)
GLUCOSE BLD STRIP.AUTO-MCNC: 94 MG/DL (ref 65–100)
GLUCOSE BLD STRIP.AUTO-MCNC: 94 MG/DL (ref 65–100)
GLUCOSE BLD STRIP.AUTO-MCNC: 95 MG/DL (ref 65–100)
GLUCOSE BLD STRIP.AUTO-MCNC: 96 MG/DL (ref 65–100)
GLUCOSE BLD STRIP.AUTO-MCNC: 96 MG/DL (ref 65–100)
GLUCOSE BLD STRIP.AUTO-MCNC: 97 MG/DL (ref 65–100)
GLUCOSE BLD STRIP.AUTO-MCNC: 99 MG/DL (ref 65–100)
GLUCOSE BLD-MCNC: 147 MG/DL (ref 65–100)
GLUCOSE BLD-MCNC: 213 MG/DL (ref 65–100)
GLUCOSE SERPL-MCNC: 100 MG/DL (ref 65–100)
GLUCOSE SERPL-MCNC: 102 MG/DL (ref 65–100)
GLUCOSE SERPL-MCNC: 104 MG/DL (ref 65–100)
GLUCOSE SERPL-MCNC: 117 MG/DL (ref 65–100)
GLUCOSE SERPL-MCNC: 120 MG/DL (ref 65–100)
GLUCOSE SERPL-MCNC: 120 MG/DL (ref 65–100)
GLUCOSE SERPL-MCNC: 124 MG/DL (ref 65–100)
GLUCOSE SERPL-MCNC: 152 MG/DL (ref 65–100)
GLUCOSE SERPL-MCNC: 157 MG/DL (ref 65–100)
GLUCOSE SERPL-MCNC: 157 MG/DL (ref 65–100)
GLUCOSE SERPL-MCNC: 159 MG/DL (ref 65–100)
GLUCOSE SERPL-MCNC: 159 MG/DL (ref 65–100)
GLUCOSE SERPL-MCNC: 163 MG/DL (ref 65–100)
GLUCOSE SERPL-MCNC: 164 MG/DL (ref 65–100)
GLUCOSE SERPL-MCNC: 165 MG/DL (ref 65–100)
GLUCOSE SERPL-MCNC: 192 MG/DL (ref 65–100)
GLUCOSE SERPL-MCNC: 198 MG/DL (ref 65–100)
GLUCOSE SERPL-MCNC: 211 MG/DL (ref 65–100)
GLUCOSE SERPL-MCNC: 217 MG/DL (ref 65–100)
GLUCOSE SERPL-MCNC: 227 MG/DL (ref 65–100)
GLUCOSE SERPL-MCNC: 231 MG/DL (ref 65–100)
GLUCOSE SERPL-MCNC: 247 MG/DL (ref 65–100)
GLUCOSE SERPL-MCNC: 248 MG/DL (ref 65–100)
GLUCOSE SERPL-MCNC: 251 MG/DL (ref 65–100)
GLUCOSE SERPL-MCNC: 257 MG/DL (ref 65–100)
GLUCOSE SERPL-MCNC: 262 MG/DL (ref 65–100)
GLUCOSE SERPL-MCNC: 263 MG/DL (ref 65–100)
GLUCOSE SERPL-MCNC: 366 MG/DL (ref 65–100)
GLUCOSE SERPL-MCNC: 417 MG/DL (ref 65–100)
GLUCOSE SERPL-MCNC: 68 MG/DL (ref 65–100)
GLUCOSE SERPL-MCNC: 77 MG/DL (ref 65–100)
GLUCOSE SERPL-MCNC: 80 MG/DL (ref 65–100)
GLUCOSE SERPL-MCNC: 85 MG/DL (ref 65–100)
GLUCOSE SERPL-MCNC: 86 MG/DL (ref 65–100)
GLUCOSE SERPL-MCNC: 86 MG/DL (ref 65–100)
GLUCOSE SERPL-MCNC: 89 MG/DL (ref 65–100)
GLUCOSE SERPL-MCNC: 90 MG/DL (ref 65–100)
GLUCOSE SERPL-MCNC: 92 MG/DL (ref 65–100)
GLUCOSE SERPL-MCNC: 97 MG/DL (ref 65–100)
GLUCOSE, GLC: 118 MG/DL
GLUCOSE, GLC: 120 MG/DL
GLUCOSE, GLC: 121 MG/DL
GLUCOSE, GLC: 133 MG/DL
GLUCOSE, GLC: 149 MG/DL
GLUCOSE, GLC: 154 MG/DL
GLUCOSE, GLC: 172 MG/DL
GLUCOSE, GLC: 188 MG/DL
GLUCOSE, GLC: 190 MG/DL
GLUCOSE, GLC: 192 MG/DL
GLUCOSE, GLC: 206 MG/DL
GLUCOSE, GLC: 211 MG/DL
GLUCOSE, GLC: 226 MG/DL
GLUCOSE, GLC: 241 MG/DL
GLUCOSE, GLC: 245 MG/DL
GLUCOSE, GLC: 251 MG/DL
GLUCOSE, GLC: 275 MG/DL
GLUCOSE, GLC: 289 MG/DL
GLUCOSE, GLC: 323 MG/DL
GLUCOSE, GLC: 333 MG/DL
GLUCOSE, GLC: 370 MG/DL
GLUCOSE, GLC: 394 MG/DL
GLUCOSE, GLC: 413 MG/DL
GRAM STN SPEC: ABNORMAL
HBA1C MFR BLD: 7.4 % (ref 4–5.6)
HBA1C MFR BLD: 9.4 % (ref 4–5.6)
HBV SURFACE AB SER QL: NONREACTIVE
HBV SURFACE AB SER-ACNC: <3.1 MIU/ML
HBV SURFACE AG SER QL: <0.1 INDEX
HBV SURFACE AG SER QL: NEGATIVE
HCO3 BLD-SCNC: 22.5 MMOL/L (ref 22–26)
HCT VFR BLD AUTO: 24.4 % (ref 36.6–50.3)
HCT VFR BLD AUTO: 24.5 % (ref 36.6–50.3)
HCT VFR BLD AUTO: 26.1 % (ref 36.6–50.3)
HCT VFR BLD AUTO: 26.2 % (ref 36.6–50.3)
HCT VFR BLD AUTO: 26.8 % (ref 36.6–50.3)
HCT VFR BLD AUTO: 28.5 % (ref 36.6–50.3)
HCT VFR BLD AUTO: 28.7 % (ref 36.6–50.3)
HCT VFR BLD AUTO: 29.3 % (ref 36.6–50.3)
HCT VFR BLD AUTO: 30.2 % (ref 36.6–50.3)
HCT VFR BLD AUTO: 30.4 % (ref 36.6–50.3)
HCT VFR BLD AUTO: 31.2 % (ref 36.6–50.3)
HCT VFR BLD AUTO: 31.4 % (ref 36.6–50.3)
HCT VFR BLD AUTO: 31.8 % (ref 36.6–50.3)
HCT VFR BLD AUTO: 32.1 % (ref 36.6–50.3)
HCT VFR BLD AUTO: 32.1 % (ref 36.6–50.3)
HCT VFR BLD AUTO: 32.3 % (ref 36.6–50.3)
HCT VFR BLD AUTO: 32.3 % (ref 36.6–50.3)
HCT VFR BLD AUTO: 32.4 % (ref 36.6–50.3)
HCT VFR BLD AUTO: 32.5 % (ref 36.6–50.3)
HCT VFR BLD AUTO: 32.7 % (ref 36.6–50.3)
HCT VFR BLD AUTO: 32.8 % (ref 36.6–50.3)
HCT VFR BLD AUTO: 32.9 % (ref 36.6–50.3)
HCT VFR BLD AUTO: 33.1 % (ref 36.6–50.3)
HCT VFR BLD AUTO: 33.1 % (ref 36.6–50.3)
HCT VFR BLD AUTO: 33.6 % (ref 36.6–50.3)
HCT VFR BLD AUTO: 33.6 % (ref 36.6–50.3)
HCT VFR BLD AUTO: 33.8 % (ref 36.6–50.3)
HCT VFR BLD AUTO: 34 % (ref 36.6–50.3)
HCT VFR BLD AUTO: 34.2 % (ref 36.6–50.3)
HCT VFR BLD AUTO: 34.4 % (ref 36.6–50.3)
HCT VFR BLD AUTO: 34.8 % (ref 36.6–50.3)
HCT VFR BLD AUTO: 35 % (ref 36.6–50.3)
HCT VFR BLD AUTO: 35.6 % (ref 36.6–50.3)
HCT VFR BLD AUTO: 36.1 % (ref 36.6–50.3)
HCT VFR BLD AUTO: 36.2 % (ref 36.6–50.3)
HCT VFR BLD AUTO: 40.7 % (ref 36.6–50.3)
HCT VFR BLD CALC: 33 % (ref 36.6–50.3)
HCT VFR BLD CALC: 36 % (ref 36.6–50.3)
HDLC SERPL-MCNC: 18 MG/DL
HDLC SERPL: 7.4 {RATIO} (ref 0–5)
HEALTH STATUS, XMCV2T: ABNORMAL
HEALTH STATUS, XMCV2T: NORMAL
HGB BLD-MCNC: 10 G/DL (ref 12.1–17)
HGB BLD-MCNC: 10.2 G/DL (ref 12.1–17)
HGB BLD-MCNC: 10.3 G/DL (ref 12.1–17)
HGB BLD-MCNC: 10.5 G/DL (ref 12.1–17)
HGB BLD-MCNC: 10.5 G/DL (ref 12.1–17)
HGB BLD-MCNC: 10.6 G/DL (ref 12.1–17)
HGB BLD-MCNC: 10.6 G/DL (ref 12.1–17)
HGB BLD-MCNC: 10.7 G/DL (ref 12.1–17)
HGB BLD-MCNC: 10.8 G/DL (ref 12.1–17)
HGB BLD-MCNC: 10.9 G/DL (ref 12.1–17)
HGB BLD-MCNC: 11 G/DL (ref 12.1–17)
HGB BLD-MCNC: 11.6 G/DL (ref 12.1–17)
HGB BLD-MCNC: 11.6 G/DL (ref 12.1–17)
HGB BLD-MCNC: 13.3 G/DL (ref 12.1–17)
HGB BLD-MCNC: 7.4 G/DL (ref 12.1–17)
HGB BLD-MCNC: 7.4 G/DL (ref 12.1–17)
HGB BLD-MCNC: 7.9 G/DL (ref 12.1–17)
HGB BLD-MCNC: 7.9 G/DL (ref 12.1–17)
HGB BLD-MCNC: 8.2 G/DL (ref 12.1–17)
HGB BLD-MCNC: 8.2 G/DL (ref 12.1–17)
HGB BLD-MCNC: 8.3 G/DL (ref 12.1–17)
HGB BLD-MCNC: 8.6 G/DL (ref 12.1–17)
HGB BLD-MCNC: 8.8 G/DL (ref 12.1–17)
HGB BLD-MCNC: 9.1 G/DL (ref 12.1–17)
HGB BLD-MCNC: 9.7 G/DL (ref 12.1–17)
HGB BLD-MCNC: 9.9 G/DL (ref 12.1–17)
HGB BLD-MCNC: 9.9 G/DL (ref 12.1–17)
HIGH TARGET, HITG: 180 MG/DL
HIGH TARGET, HITG: 180 MG/DL
HIGH TARGET, HITG: 250 MG/DL
IMM GRANULOCYTES # BLD AUTO: 0 K/UL (ref 0–0.04)
IMM GRANULOCYTES # BLD AUTO: 0.1 K/UL (ref 0–0.04)
IMM GRANULOCYTES # BLD AUTO: 0.2 K/UL (ref 0–0.04)
IMM GRANULOCYTES # BLD AUTO: 0.2 K/UL (ref 0–0.04)
IMM GRANULOCYTES # BLD AUTO: 0.3 K/UL (ref 0–0.04)
IMM GRANULOCYTES # BLD AUTO: 0.4 K/UL (ref 0–0.04)
IMM GRANULOCYTES # BLD AUTO: 1.1 K/UL (ref 0–0.04)
IMM GRANULOCYTES NFR BLD AUTO: 0 % (ref 0–0.5)
IMM GRANULOCYTES NFR BLD AUTO: 1 % (ref 0–0.5)
IMM GRANULOCYTES NFR BLD AUTO: 3 % (ref 0–0.5)
IMM GRANULOCYTES NFR BLD AUTO: 3 % (ref 0–0.5)
IMM GRANULOCYTES NFR BLD AUTO: 6 % (ref 0–0.5)
INR PPP: 1.2 (ref 0.9–1.1)
INSULIN ADMINSTERED, INSADM: 0.7 UNITS/HOUR
INSULIN ADMINSTERED, INSADM: 0.9 UNITS/HOUR
INSULIN ADMINSTERED, INSADM: 1 UNITS/HOUR
INSULIN ADMINSTERED, INSADM: 1 UNITS/HOUR
INSULIN ADMINSTERED, INSADM: 1.7 UNITS/HOUR
INSULIN ADMINSTERED, INSADM: 1.7 UNITS/HOUR
INSULIN ADMINSTERED, INSADM: 10 UNITS/HOUR
INSULIN ADMINSTERED, INSADM: 10.6 UNITS/HOUR
INSULIN ADMINSTERED, INSADM: 12.9 UNITS/HOUR
INSULIN ADMINSTERED, INSADM: 13.2 UNITS/HOUR
INSULIN ADMINSTERED, INSADM: 13.4 UNITS/HOUR
INSULIN ADMINSTERED, INSADM: 2.3 UNITS/HOUR
INSULIN ADMINSTERED, INSADM: 2.3 UNITS/HOUR
INSULIN ADMINSTERED, INSADM: 2.4 UNITS/HOUR
INSULIN ADMINSTERED, INSADM: 3.3 UNITS/HOUR
INSULIN ADMINSTERED, INSADM: 3.3 UNITS/HOUR
INSULIN ADMINSTERED, INSADM: 3.4 UNITS/HOUR
INSULIN ADMINSTERED, INSADM: 3.5 UNITS/HOUR
INSULIN ADMINSTERED, INSADM: 5.5 UNITS/HOUR
INSULIN ADMINSTERED, INSADM: 6.2 UNITS/HOUR
INSULIN ADMINSTERED, INSADM: 6.9 UNITS/HOUR
INSULIN ADMINSTERED, INSADM: 8.8 UNITS/HOUR
INSULIN ADMINSTERED, INSADM: 9.1 UNITS/HOUR
INSULIN ORDER, INSORD: 0.7 UNITS/HOUR
INSULIN ORDER, INSORD: 0.9 UNITS/HOUR
INSULIN ORDER, INSORD: 1 UNITS/HOUR
INSULIN ORDER, INSORD: 1 UNITS/HOUR
INSULIN ORDER, INSORD: 1.7 UNITS/HOUR
INSULIN ORDER, INSORD: 1.7 UNITS/HOUR
INSULIN ORDER, INSORD: 10 UNITS/HOUR
INSULIN ORDER, INSORD: 10.6 UNITS/HOUR
INSULIN ORDER, INSORD: 12.9 UNITS/HOUR
INSULIN ORDER, INSORD: 13.2 UNITS/HOUR
INSULIN ORDER, INSORD: 13.4 UNITS/HOUR
INSULIN ORDER, INSORD: 2.3 UNITS/HOUR
INSULIN ORDER, INSORD: 2.3 UNITS/HOUR
INSULIN ORDER, INSORD: 2.4 UNITS/HOUR
INSULIN ORDER, INSORD: 3.3 UNITS/HOUR
INSULIN ORDER, INSORD: 3.3 UNITS/HOUR
INSULIN ORDER, INSORD: 3.4 UNITS/HOUR
INSULIN ORDER, INSORD: 3.5 UNITS/HOUR
INSULIN ORDER, INSORD: 5.5 UNITS/HOUR
INSULIN ORDER, INSORD: 6.2 UNITS/HOUR
INSULIN ORDER, INSORD: 6.9 UNITS/HOUR
INSULIN ORDER, INSORD: 8.8 UNITS/HOUR
INSULIN ORDER, INSORD: 9.1 UNITS/HOUR
LACTATE BLD-SCNC: 0.55 MMOL/L (ref 0.4–2)
LACTATE BLD-SCNC: 0.84 MMOL/L (ref 0.4–2)
LACTATE BLD-SCNC: 0.88 MMOL/L (ref 0.4–2)
LACTATE SERPL-SCNC: 1.3 MMOL/L (ref 0.4–2)
LDH SERPL L TO P-CCNC: 281 U/L (ref 85–241)
LDLC SERPL CALC-MCNC: 83 MG/DL (ref 0–100)
LEFT ABI: 0
LEFT ANTERIOR TIBIAL: 0 MMHG
LEFT ARM BP: 0 MMHG
LEFT POSTERIOR TIBIAL: 0 MMHG
LIPID PROFILE,FLP: ABNORMAL
LOW TARGET, LOT: 140 MG/DL
LOW TARGET, LOT: 140 MG/DL
LOW TARGET, LOT: 150 MG/DL
LVOT MG: 3.08 MMHG
LYMPHOCYTES # BLD: 1.1 K/UL (ref 0.8–3.5)
LYMPHOCYTES # BLD: 1.2 K/UL (ref 0.8–3.5)
LYMPHOCYTES # BLD: 1.3 K/UL (ref 0.8–3.5)
LYMPHOCYTES # BLD: 1.3 K/UL (ref 0.8–3.5)
LYMPHOCYTES # BLD: 1.7 K/UL (ref 0.8–3.5)
LYMPHOCYTES # BLD: 1.8 K/UL (ref 0.8–3.5)
LYMPHOCYTES # BLD: 2 K/UL (ref 0.8–3.5)
LYMPHOCYTES # BLD: 2 K/UL (ref 0.8–3.5)
LYMPHOCYTES # BLD: 2.1 K/UL (ref 0.8–3.5)
LYMPHOCYTES # BLD: 2.2 K/UL (ref 0.8–3.5)
LYMPHOCYTES # BLD: 2.2 K/UL (ref 0.8–3.5)
LYMPHOCYTES NFR BLD: 10 % (ref 12–49)
LYMPHOCYTES NFR BLD: 12 % (ref 12–49)
LYMPHOCYTES NFR BLD: 12 % (ref 12–49)
LYMPHOCYTES NFR BLD: 19 % (ref 12–49)
LYMPHOCYTES NFR BLD: 22 % (ref 12–49)
LYMPHOCYTES NFR BLD: 22 % (ref 12–49)
LYMPHOCYTES NFR BLD: 24 % (ref 12–49)
LYMPHOCYTES NFR BLD: 6 % (ref 12–49)
LYMPHOCYTES NFR BLD: 7 % (ref 12–49)
MAGNESIUM SERPL-MCNC: 2.2 MG/DL (ref 1.6–2.4)
MAGNESIUM SERPL-MCNC: 2.6 MG/DL (ref 1.6–2.4)
MCH RBC QN AUTO: 24 PG (ref 26–34)
MCH RBC QN AUTO: 24.1 PG (ref 26–34)
MCH RBC QN AUTO: 24.5 PG (ref 26–34)
MCH RBC QN AUTO: 24.6 PG (ref 26–34)
MCH RBC QN AUTO: 24.6 PG (ref 26–34)
MCH RBC QN AUTO: 24.9 PG (ref 26–34)
MCH RBC QN AUTO: 25.1 PG (ref 26–34)
MCH RBC QN AUTO: 25.2 PG (ref 26–34)
MCH RBC QN AUTO: 25.3 PG (ref 26–34)
MCH RBC QN AUTO: 25.4 PG (ref 26–34)
MCH RBC QN AUTO: 25.5 PG (ref 26–34)
MCH RBC QN AUTO: 25.6 PG (ref 26–34)
MCH RBC QN AUTO: 25.6 PG (ref 26–34)
MCH RBC QN AUTO: 25.7 PG (ref 26–34)
MCH RBC QN AUTO: 25.8 PG (ref 26–34)
MCH RBC QN AUTO: 25.8 PG (ref 26–34)
MCH RBC QN AUTO: 25.9 PG (ref 26–34)
MCH RBC QN AUTO: 26 PG (ref 26–34)
MCH RBC QN AUTO: 26.1 PG (ref 26–34)
MCH RBC QN AUTO: 26.1 PG (ref 26–34)
MCH RBC QN AUTO: 26.3 PG (ref 26–34)
MCH RBC QN AUTO: 26.5 PG (ref 26–34)
MCH RBC QN AUTO: 26.7 PG (ref 26–34)
MCHC RBC AUTO-ENTMCNC: 30.2 G/DL (ref 30–36.5)
MCHC RBC AUTO-ENTMCNC: 30.3 G/DL (ref 30–36.5)
MCHC RBC AUTO-ENTMCNC: 30.6 G/DL (ref 30–36.5)
MCHC RBC AUTO-ENTMCNC: 30.7 G/DL (ref 30–36.5)
MCHC RBC AUTO-ENTMCNC: 30.7 G/DL (ref 30–36.5)
MCHC RBC AUTO-ENTMCNC: 30.8 G/DL (ref 30–36.5)
MCHC RBC AUTO-ENTMCNC: 30.9 G/DL (ref 30–36.5)
MCHC RBC AUTO-ENTMCNC: 30.9 G/DL (ref 30–36.5)
MCHC RBC AUTO-ENTMCNC: 31 G/DL (ref 30–36.5)
MCHC RBC AUTO-ENTMCNC: 31 G/DL (ref 30–36.5)
MCHC RBC AUTO-ENTMCNC: 31.1 G/DL (ref 30–36.5)
MCHC RBC AUTO-ENTMCNC: 31.2 G/DL (ref 30–36.5)
MCHC RBC AUTO-ENTMCNC: 31.2 G/DL (ref 30–36.5)
MCHC RBC AUTO-ENTMCNC: 31.4 G/DL (ref 30–36.5)
MCHC RBC AUTO-ENTMCNC: 31.4 G/DL (ref 30–36.5)
MCHC RBC AUTO-ENTMCNC: 31.5 G/DL (ref 30–36.5)
MCHC RBC AUTO-ENTMCNC: 31.6 G/DL (ref 30–36.5)
MCHC RBC AUTO-ENTMCNC: 31.7 G/DL (ref 30–36.5)
MCHC RBC AUTO-ENTMCNC: 31.8 G/DL (ref 30–36.5)
MCHC RBC AUTO-ENTMCNC: 31.9 G/DL (ref 30–36.5)
MCHC RBC AUTO-ENTMCNC: 31.9 G/DL (ref 30–36.5)
MCHC RBC AUTO-ENTMCNC: 32 G/DL (ref 30–36.5)
MCHC RBC AUTO-ENTMCNC: 32.1 G/DL (ref 30–36.5)
MCHC RBC AUTO-ENTMCNC: 32.1 G/DL (ref 30–36.5)
MCHC RBC AUTO-ENTMCNC: 32.4 G/DL (ref 30–36.5)
MCHC RBC AUTO-ENTMCNC: 32.7 G/DL (ref 30–36.5)
MCHC RBC AUTO-ENTMCNC: 32.7 G/DL (ref 30–36.5)
MCV RBC AUTO: 75.4 FL (ref 80–99)
MCV RBC AUTO: 75.7 FL (ref 80–99)
MCV RBC AUTO: 76.5 FL (ref 80–99)
MCV RBC AUTO: 77.8 FL (ref 80–99)
MCV RBC AUTO: 77.8 FL (ref 80–99)
MCV RBC AUTO: 79.8 FL (ref 80–99)
MCV RBC AUTO: 80.5 FL (ref 80–99)
MCV RBC AUTO: 80.6 FL (ref 80–99)
MCV RBC AUTO: 80.8 FL (ref 80–99)
MCV RBC AUTO: 81 FL (ref 80–99)
MCV RBC AUTO: 81.1 FL (ref 80–99)
MCV RBC AUTO: 81.2 FL (ref 80–99)
MCV RBC AUTO: 81.4 FL (ref 80–99)
MCV RBC AUTO: 81.6 FL (ref 80–99)
MCV RBC AUTO: 81.8 FL (ref 80–99)
MCV RBC AUTO: 81.9 FL (ref 80–99)
MCV RBC AUTO: 82 FL (ref 80–99)
MCV RBC AUTO: 82.2 FL (ref 80–99)
MCV RBC AUTO: 82.4 FL (ref 80–99)
MCV RBC AUTO: 82.5 FL (ref 80–99)
MCV RBC AUTO: 82.7 FL (ref 80–99)
MCV RBC AUTO: 82.8 FL (ref 80–99)
MCV RBC AUTO: 82.9 FL (ref 80–99)
MCV RBC AUTO: 83 FL (ref 80–99)
MCV RBC AUTO: 83.1 FL (ref 80–99)
MCV RBC AUTO: 83.2 FL (ref 80–99)
MCV RBC AUTO: 83.2 FL (ref 80–99)
MCV RBC AUTO: 83.5 FL (ref 80–99)
MCV RBC AUTO: 83.5 FL (ref 80–99)
MCV RBC AUTO: 83.7 FL (ref 80–99)
MCV RBC AUTO: 83.8 FL (ref 80–99)
MCV RBC AUTO: 83.9 FL (ref 80–99)
MCV RBC AUTO: 83.9 FL (ref 80–99)
MCV RBC AUTO: 84 FL (ref 80–99)
MINUTES UNTIL NEXT BG, NBG: 120 MIN
MINUTES UNTIL NEXT BG, NBG: 120 MIN
MINUTES UNTIL NEXT BG, NBG: 60 MIN
MONOCYTES # BLD: 0.7 K/UL (ref 0–1)
MONOCYTES # BLD: 0.8 K/UL (ref 0–1)
MONOCYTES # BLD: 1 K/UL (ref 0–1)
MONOCYTES # BLD: 1.2 K/UL (ref 0–1)
MONOCYTES # BLD: 1.3 K/UL (ref 0–1)
MONOCYTES # BLD: 1.4 K/UL (ref 0–1)
MONOCYTES # BLD: 1.5 K/UL (ref 0–1)
MONOCYTES # BLD: 1.8 K/UL (ref 0–1)
MONOCYTES # BLD: 2.1 K/UL (ref 0–1)
MONOCYTES # BLD: 2.3 K/UL (ref 0–1)
MONOCYTES NFR BLD: 10 % (ref 5–13)
MONOCYTES NFR BLD: 11 % (ref 5–13)
MONOCYTES NFR BLD: 11 % (ref 5–13)
MONOCYTES NFR BLD: 6 % (ref 5–13)
MONOCYTES NFR BLD: 8 % (ref 5–13)
MONOCYTES NFR BLD: 9 % (ref 5–13)
MULTIPLIER, MUL: 0.01
MULTIPLIER, MUL: 0.02
MULTIPLIER, MUL: 0.03
MULTIPLIER, MUL: 0.04
MULTIPLIER, MUL: 0.04
MULTIPLIER, MUL: 0.05
MULTIPLIER, MUL: 0.05
MULTIPLIER, MUL: 0.06
NEUTS BAND NFR BLD MANUAL: 2 %
NEUTS BAND NFR BLD MANUAL: 2 %
NEUTS BAND NFR BLD MANUAL: 3 %
NEUTS SEG # BLD: 10.7 K/UL (ref 1.8–8)
NEUTS SEG # BLD: 12.6 K/UL (ref 1.8–8)
NEUTS SEG # BLD: 12.6 K/UL (ref 1.8–8)
NEUTS SEG # BLD: 14.6 K/UL (ref 1.8–8)
NEUTS SEG # BLD: 14.8 K/UL (ref 1.8–8)
NEUTS SEG # BLD: 15.1 K/UL (ref 1.8–8)
NEUTS SEG # BLD: 16.5 K/UL (ref 1.8–8)
NEUTS SEG # BLD: 16.6 K/UL (ref 1.8–8)
NEUTS SEG # BLD: 18.6 K/UL (ref 1.8–8)
NEUTS SEG # BLD: 5.1 K/UL (ref 1.8–8)
NEUTS SEG # BLD: 5.6 K/UL (ref 1.8–8)
NEUTS SEG # BLD: 6.7 K/UL (ref 1.8–8)
NEUTS SEG # BLD: 7.6 K/UL (ref 1.8–8)
NEUTS SEG NFR BLD: 64 % (ref 32–75)
NEUTS SEG NFR BLD: 66 % (ref 32–75)
NEUTS SEG NFR BLD: 67 % (ref 32–75)
NEUTS SEG NFR BLD: 68 % (ref 32–75)
NEUTS SEG NFR BLD: 74 % (ref 32–75)
NEUTS SEG NFR BLD: 76 % (ref 32–75)
NEUTS SEG NFR BLD: 78 % (ref 32–75)
NEUTS SEG NFR BLD: 78 % (ref 32–75)
NEUTS SEG NFR BLD: 79 % (ref 32–75)
NEUTS SEG NFR BLD: 81 % (ref 32–75)
NEUTS SEG NFR BLD: 83 % (ref 32–75)
NEUTS SEG NFR BLD: 83 % (ref 32–75)
NEUTS SEG NFR BLD: 86 % (ref 32–75)
NRBC # BLD: 0 K/UL (ref 0–0.01)
NRBC BLD-RTO: 0 PER 100 WBC
O2/TOTAL GAS SETTING VFR VENT: 50 %
ORDER INITIALS, ORDINIT: NORMAL
P-R INTERVAL, ECG05: 184 MS
P-R INTERVAL, ECG05: 186 MS
P-R INTERVAL, ECG05: 204 MS
P-R INTERVAL, ECG05: 204 MS
PCO2 BLD: 35.2 MMHG (ref 35–45)
PH BLD: 7.41 [PH] (ref 7.35–7.45)
PHOSPHATE SERPL-MCNC: 4.8 MG/DL (ref 2.6–4.7)
PHOSPHATE SERPL-MCNC: 5.4 MG/DL (ref 2.6–4.7)
PHOSPHATE SERPL-MCNC: 5.6 MG/DL (ref 2.6–4.7)
PHOSPHATE SERPL-MCNC: 7.1 MG/DL (ref 2.6–4.7)
PHOSPHATE SERPL-MCNC: 8.6 MG/DL (ref 2.6–4.7)
PLATELET # BLD AUTO: 148 K/UL (ref 150–400)
PLATELET # BLD AUTO: 173 K/UL (ref 150–400)
PLATELET # BLD AUTO: 184 K/UL (ref 150–400)
PLATELET # BLD AUTO: 186 K/UL (ref 150–400)
PLATELET # BLD AUTO: 200 K/UL (ref 150–400)
PLATELET # BLD AUTO: 223 K/UL (ref 150–400)
PLATELET # BLD AUTO: 227 K/UL (ref 150–400)
PLATELET # BLD AUTO: 233 K/UL (ref 150–400)
PLATELET # BLD AUTO: 249 K/UL (ref 150–400)
PLATELET # BLD AUTO: 272 K/UL (ref 150–400)
PLATELET # BLD AUTO: 278 K/UL (ref 150–400)
PLATELET # BLD AUTO: 279 K/UL (ref 150–400)
PLATELET # BLD AUTO: 279 K/UL (ref 150–400)
PLATELET # BLD AUTO: 280 K/UL (ref 150–400)
PLATELET # BLD AUTO: 292 K/UL (ref 150–400)
PLATELET # BLD AUTO: 299 K/UL (ref 150–400)
PLATELET # BLD AUTO: 302 K/UL (ref 150–400)
PLATELET # BLD AUTO: 303 K/UL (ref 150–400)
PLATELET # BLD AUTO: 307 K/UL (ref 150–400)
PLATELET # BLD AUTO: 308 K/UL (ref 150–400)
PLATELET # BLD AUTO: 322 K/UL (ref 150–400)
PLATELET # BLD AUTO: 345 K/UL (ref 150–400)
PLATELET # BLD AUTO: 347 K/UL (ref 150–400)
PLATELET # BLD AUTO: 351 K/UL (ref 150–400)
PLATELET # BLD AUTO: 364 K/UL (ref 150–400)
PLATELET # BLD AUTO: 384 K/UL (ref 150–400)
PLATELET # BLD AUTO: 387 K/UL (ref 150–400)
PLATELET # BLD AUTO: 391 K/UL (ref 150–400)
PLATELET # BLD AUTO: 416 K/UL (ref 150–400)
PLATELET # BLD AUTO: 432 K/UL (ref 150–400)
PLATELET # BLD AUTO: 438 K/UL (ref 150–400)
PLATELET # BLD AUTO: 442 K/UL (ref 150–400)
PLATELET # BLD AUTO: 446 K/UL (ref 150–400)
PLATELET # BLD AUTO: 448 K/UL (ref 150–400)
PLATELET # BLD AUTO: 449 K/UL (ref 150–400)
PLATELET # BLD AUTO: 461 K/UL (ref 150–400)
PLATELET # BLD AUTO: 467 K/UL (ref 150–400)
PLATELET # BLD AUTO: 476 K/UL (ref 150–400)
PLATELET COMMENTS,PCOM: ABNORMAL
PLATELET COMMENTS,PCOM: ABNORMAL
PMV BLD AUTO: 10 FL (ref 8.9–12.9)
PMV BLD AUTO: 10 FL (ref 8.9–12.9)
PMV BLD AUTO: 10.1 FL (ref 8.9–12.9)
PMV BLD AUTO: 10.2 FL (ref 8.9–12.9)
PMV BLD AUTO: 10.3 FL (ref 8.9–12.9)
PMV BLD AUTO: 10.3 FL (ref 8.9–12.9)
PMV BLD AUTO: 10.8 FL (ref 8.9–12.9)
PMV BLD AUTO: 10.9 FL (ref 8.9–12.9)
PMV BLD AUTO: 11.3 FL (ref 8.9–12.9)
PMV BLD AUTO: 8.8 FL (ref 8.9–12.9)
PMV BLD AUTO: 8.9 FL (ref 8.9–12.9)
PMV BLD AUTO: 8.9 FL (ref 8.9–12.9)
PMV BLD AUTO: 9.1 FL (ref 8.9–12.9)
PMV BLD AUTO: 9.2 FL (ref 8.9–12.9)
PMV BLD AUTO: 9.2 FL (ref 8.9–12.9)
PMV BLD AUTO: 9.3 FL (ref 8.9–12.9)
PMV BLD AUTO: 9.4 FL (ref 8.9–12.9)
PMV BLD AUTO: 9.5 FL (ref 8.9–12.9)
PMV BLD AUTO: 9.6 FL (ref 8.9–12.9)
PMV BLD AUTO: 9.7 FL (ref 8.9–12.9)
PMV BLD AUTO: 9.8 FL (ref 8.9–12.9)
PMV BLD AUTO: 9.8 FL (ref 8.9–12.9)
PMV BLD AUTO: 9.9 FL (ref 8.9–12.9)
PMV BLD AUTO: 9.9 FL (ref 8.9–12.9)
PO2 BLD: 57 MMHG (ref 80–100)
POTASSIUM BLD-SCNC: 4.3 MMOL/L (ref 3.5–5.1)
POTASSIUM BLD-SCNC: 5 MMOL/L (ref 3.5–5.1)
POTASSIUM SERPL-SCNC: 3 MMOL/L (ref 3.5–5.1)
POTASSIUM SERPL-SCNC: 3.7 MMOL/L (ref 3.5–5.1)
POTASSIUM SERPL-SCNC: 3.8 MMOL/L (ref 3.5–5.1)
POTASSIUM SERPL-SCNC: 3.9 MMOL/L (ref 3.5–5.1)
POTASSIUM SERPL-SCNC: 3.9 MMOL/L (ref 3.5–5.1)
POTASSIUM SERPL-SCNC: 4.1 MMOL/L (ref 3.5–5.1)
POTASSIUM SERPL-SCNC: 4.2 MMOL/L (ref 3.5–5.1)
POTASSIUM SERPL-SCNC: 4.3 MMOL/L (ref 3.5–5.1)
POTASSIUM SERPL-SCNC: 4.3 MMOL/L (ref 3.5–5.1)
POTASSIUM SERPL-SCNC: 4.4 MMOL/L (ref 3.5–5.1)
POTASSIUM SERPL-SCNC: 4.5 MMOL/L (ref 3.5–5.1)
POTASSIUM SERPL-SCNC: 4.6 MMOL/L (ref 3.5–5.1)
POTASSIUM SERPL-SCNC: 4.6 MMOL/L (ref 3.5–5.1)
POTASSIUM SERPL-SCNC: 4.7 MMOL/L (ref 3.5–5.1)
POTASSIUM SERPL-SCNC: 4.8 MMOL/L (ref 3.5–5.1)
POTASSIUM SERPL-SCNC: 4.9 MMOL/L (ref 3.5–5.1)
POTASSIUM SERPL-SCNC: 4.9 MMOL/L (ref 3.5–5.1)
POTASSIUM SERPL-SCNC: 5 MMOL/L (ref 3.5–5.1)
POTASSIUM SERPL-SCNC: 5 MMOL/L (ref 3.5–5.1)
POTASSIUM SERPL-SCNC: 5.1 MMOL/L (ref 3.5–5.1)
POTASSIUM SERPL-SCNC: 5.2 MMOL/L (ref 3.5–5.1)
POTASSIUM SERPL-SCNC: 5.2 MMOL/L (ref 3.5–5.1)
POTASSIUM SERPL-SCNC: 5.3 MMOL/L (ref 3.5–5.1)
POTASSIUM SERPL-SCNC: 6.6 MMOL/L (ref 3.5–5.1)
PROCALCITONIN SERPL-MCNC: 10.02 NG/ML
PROT SERPL-MCNC: 7.8 G/DL (ref 6.4–8.2)
PROT SERPL-MCNC: 7.9 G/DL (ref 6.4–8.2)
PROT SERPL-MCNC: 8.1 G/DL (ref 6.4–8.2)
PROT SERPL-MCNC: 8.1 G/DL (ref 6.4–8.2)
PROT SERPL-MCNC: 8.2 G/DL (ref 6.4–8.2)
PROT SERPL-MCNC: 8.4 G/DL (ref 6.4–8.2)
PROT SERPL-MCNC: 8.4 G/DL (ref 6.4–8.2)
PROT SERPL-MCNC: 8.9 G/DL (ref 6.4–8.2)
PROT SERPL-MCNC: 9.1 G/DL (ref 6.4–8.2)
PROT SERPL-MCNC: 9.2 G/DL (ref 6.4–8.2)
PROT SERPL-MCNC: 9.8 G/DL (ref 6.4–8.2)
PROTHROMBIN TIME: 12.4 SEC (ref 9–11.1)
Q-T INTERVAL, ECG07: 388 MS
Q-T INTERVAL, ECG07: 400 MS
Q-T INTERVAL, ECG07: 400 MS
Q-T INTERVAL, ECG07: 414 MS
Q-T INTERVAL, ECG07: 426 MS
Q-T INTERVAL, ECG07: 442 MS
QRS DURATION, ECG06: 116 MS
QRS DURATION, ECG06: 116 MS
QRS DURATION, ECG06: 118 MS
QRS DURATION, ECG06: 122 MS
QRS DURATION, ECG06: 126 MS
QRS DURATION, ECG06: 96 MS
QTC CALCULATION (BEZET), ECG08: 450 MS
QTC CALCULATION (BEZET), ECG08: 472 MS
QTC CALCULATION (BEZET), ECG08: 472 MS
QTC CALCULATION (BEZET), ECG08: 480 MS
QTC CALCULATION (BEZET), ECG08: 482 MS
QTC CALCULATION (BEZET), ECG08: 483 MS
RBC # BLD AUTO: 2.94 M/UL (ref 4.1–5.7)
RBC # BLD AUTO: 2.97 M/UL (ref 4.1–5.7)
RBC # BLD AUTO: 3.15 M/UL (ref 4.1–5.7)
RBC # BLD AUTO: 3.15 M/UL (ref 4.1–5.7)
RBC # BLD AUTO: 3.21 M/UL (ref 4.1–5.7)
RBC # BLD AUTO: 3.22 M/UL (ref 4.1–5.7)
RBC # BLD AUTO: 3.31 M/UL (ref 4.1–5.7)
RBC # BLD AUTO: 3.4 M/UL (ref 4.1–5.7)
RBC # BLD AUTO: 3.48 M/UL (ref 4.1–5.7)
RBC # BLD AUTO: 3.51 M/UL (ref 4.1–5.7)
RBC # BLD AUTO: 3.71 M/UL (ref 4.1–5.7)
RBC # BLD AUTO: 3.82 M/UL (ref 4.1–5.7)
RBC # BLD AUTO: 3.86 M/UL (ref 4.1–5.7)
RBC # BLD AUTO: 3.89 M/UL (ref 4.1–5.7)
RBC # BLD AUTO: 3.91 M/UL (ref 4.1–5.7)
RBC # BLD AUTO: 3.91 M/UL (ref 4.1–5.7)
RBC # BLD AUTO: 3.92 M/UL (ref 4.1–5.7)
RBC # BLD AUTO: 3.96 M/UL (ref 4.1–5.7)
RBC # BLD AUTO: 4 M/UL (ref 4.1–5.7)
RBC # BLD AUTO: 4.01 M/UL (ref 4.1–5.7)
RBC # BLD AUTO: 4.03 M/UL (ref 4.1–5.7)
RBC # BLD AUTO: 4.04 M/UL (ref 4.1–5.7)
RBC # BLD AUTO: 4.06 M/UL (ref 4.1–5.7)
RBC # BLD AUTO: 4.06 M/UL (ref 4.1–5.7)
RBC # BLD AUTO: 4.08 M/UL (ref 4.1–5.7)
RBC # BLD AUTO: 4.1 M/UL (ref 4.1–5.7)
RBC # BLD AUTO: 4.11 M/UL (ref 4.1–5.7)
RBC # BLD AUTO: 4.11 M/UL (ref 4.1–5.7)
RBC # BLD AUTO: 4.15 M/UL (ref 4.1–5.7)
RBC # BLD AUTO: 4.17 M/UL (ref 4.1–5.7)
RBC # BLD AUTO: 4.17 M/UL (ref 4.1–5.7)
RBC # BLD AUTO: 4.2 M/UL (ref 4.1–5.7)
RBC # BLD AUTO: 4.24 M/UL (ref 4.1–5.7)
RBC # BLD AUTO: 4.29 M/UL (ref 4.1–5.7)
RBC # BLD AUTO: 4.46 M/UL (ref 4.1–5.7)
RBC # BLD AUTO: 4.5 M/UL (ref 4.1–5.7)
RBC # BLD AUTO: 4.72 M/UL (ref 4.1–5.7)
RBC # BLD AUTO: 4.99 M/UL (ref 4.1–5.7)
RBC MORPH BLD: ABNORMAL
REPORTED DOSE,DOSE: ABNORMAL UNITS
REPORTED DOSE/TIME,TMG: ABNORMAL
RIGHT ABI: 0
RIGHT ANTERIOR TIBIAL: 0 MMHG
RIGHT ARM BP: 126 MMHG
RIGHT POSTERIOR TIBIAL: 0 MMHG
RIGHT TBI: 0.41
RIGHT TOE PRESSURE: 52 MMHG
SAMPLES BEING HELD,HOLD: NORMAL
SAO2 % BLD: 90 % (ref 92–97)
SARS-COV-2, COV2: NOT DETECTED
SARS-COV-2, COV2NT: NOT DETECTED
SERVICE CMNT-IMP: ABNORMAL
SERVICE CMNT-IMP: NORMAL
SODIUM BLD-SCNC: 130 MMOL/L (ref 136–145)
SODIUM BLD-SCNC: 135 MMOL/L (ref 136–145)
SODIUM SERPL-SCNC: 126 MMOL/L (ref 136–145)
SODIUM SERPL-SCNC: 126 MMOL/L (ref 136–145)
SODIUM SERPL-SCNC: 127 MMOL/L (ref 136–145)
SODIUM SERPL-SCNC: 128 MMOL/L (ref 136–145)
SODIUM SERPL-SCNC: 128 MMOL/L (ref 136–145)
SODIUM SERPL-SCNC: 129 MMOL/L (ref 136–145)
SODIUM SERPL-SCNC: 130 MMOL/L (ref 136–145)
SODIUM SERPL-SCNC: 131 MMOL/L (ref 136–145)
SODIUM SERPL-SCNC: 132 MMOL/L (ref 136–145)
SODIUM SERPL-SCNC: 133 MMOL/L (ref 136–145)
SODIUM SERPL-SCNC: 135 MMOL/L (ref 136–145)
SODIUM SERPL-SCNC: 136 MMOL/L (ref 136–145)
SODIUM SERPL-SCNC: 137 MMOL/L (ref 136–145)
SODIUM SERPL-SCNC: 138 MMOL/L (ref 136–145)
SODIUM SERPL-SCNC: 139 MMOL/L (ref 136–145)
SOURCE, COVRS: ABNORMAL
SOURCE, COVRS: NORMAL
SPECIMEN SOURCE, FCOV2M: ABNORMAL
SPECIMEN SOURCE, FCOV2M: NORMAL
SPECIMEN TYPE, XMCV1T: ABNORMAL
SPECIMEN TYPE, XMCV1T: NORMAL
SPECIMEN TYPE: ABNORMAL
STRESS BASELINE DIAS BP: 90 MMHG
STRESS BASELINE HR: 88 BPM
STRESS BASELINE SYS BP: 132 MMHG
STRESS PEAK DIAS BP: 98 MMHG
STRESS PEAK SYS BP: 138 MMHG
STRESS PERCENT HR ACHIEVED: 53 %
STRESS POST PEAK HR: 97 BPM
STRESS RATE PRESSURE PRODUCT: NORMAL BPM*MMHG
STRESS ST DEPRESSION: 0 MM
STRESS ST ELEVATION: 0 MM
STRESS TARGET HR: 184 BPM
THERAPEUTIC RANGE,PTTT: ABNORMAL SECS (ref 58–77)
THERAPEUTIC RANGE,PTTT: NORMAL SECS (ref 58–77)
TOTAL RESP. RATE, ITRR: 26
TRIGL SERPL-MCNC: 160 MG/DL (ref ?–150)
TROPONIN I SERPL-MCNC: <0.05 NG/ML
TSH SERPL DL<=0.05 MIU/L-ACNC: 1.32 UIU/ML (ref 0.36–3.74)
VANCOMYCIN SERPL-MCNC: 15.7 UG/ML
VANCOMYCIN SERPL-MCNC: 17.8 UG/ML
VANCOMYCIN SERPL-MCNC: 18.4 UG/ML
VANCOMYCIN SERPL-MCNC: 27 UG/ML
VANCOMYCIN SERPL-MCNC: 27 UG/ML
VANCOMYCIN TROUGH SERPL-MCNC: 31.2 UG/ML (ref 5–10)
VENTRICULAR RATE, ECG03: 72 BPM
VENTRICULAR RATE, ECG03: 77 BPM
VENTRICULAR RATE, ECG03: 81 BPM
VENTRICULAR RATE, ECG03: 81 BPM
VENTRICULAR RATE, ECG03: 84 BPM
VENTRICULAR RATE, ECG03: 84 BPM
VLDLC SERPL CALC-MCNC: 32 MG/DL
WBC # BLD AUTO: 10 K/UL (ref 4.1–11.1)
WBC # BLD AUTO: 10.8 K/UL (ref 4.1–11.1)
WBC # BLD AUTO: 11 K/UL (ref 4.1–11.1)
WBC # BLD AUTO: 11.5 K/UL (ref 4.1–11.1)
WBC # BLD AUTO: 11.7 K/UL (ref 4.1–11.1)
WBC # BLD AUTO: 12.3 K/UL (ref 4.1–11.1)
WBC # BLD AUTO: 12.6 K/UL (ref 4.1–11.1)
WBC # BLD AUTO: 13.7 K/UL (ref 4.1–11.1)
WBC # BLD AUTO: 14.3 K/UL (ref 4.1–11.1)
WBC # BLD AUTO: 15.1 K/UL (ref 4.1–11.1)
WBC # BLD AUTO: 15.5 K/UL (ref 4.1–11.1)
WBC # BLD AUTO: 16 K/UL (ref 4.1–11.1)
WBC # BLD AUTO: 17.2 K/UL (ref 4.1–11.1)
WBC # BLD AUTO: 17.9 K/UL (ref 4.1–11.1)
WBC # BLD AUTO: 18.9 K/UL (ref 4.1–11.1)
WBC # BLD AUTO: 19.1 K/UL (ref 4.1–11.1)
WBC # BLD AUTO: 19.7 K/UL (ref 4.1–11.1)
WBC # BLD AUTO: 21 K/UL (ref 4.1–11.1)
WBC # BLD AUTO: 21.2 K/UL (ref 4.1–11.1)
WBC # BLD AUTO: 23.5 K/UL (ref 4.1–11.1)
WBC # BLD AUTO: 3.4 K/UL (ref 4.1–11.1)
WBC # BLD AUTO: 4.5 K/UL (ref 4.1–11.1)
WBC # BLD AUTO: 5.3 K/UL (ref 4.1–11.1)
WBC # BLD AUTO: 6 K/UL (ref 4.1–11.1)
WBC # BLD AUTO: 6.1 K/UL (ref 4.1–11.1)
WBC # BLD AUTO: 6.8 K/UL (ref 4.1–11.1)
WBC # BLD AUTO: 7.5 K/UL (ref 4.1–11.1)
WBC # BLD AUTO: 7.6 K/UL (ref 4.1–11.1)
WBC # BLD AUTO: 8 K/UL (ref 4.1–11.1)
WBC # BLD AUTO: 8.2 K/UL (ref 4.1–11.1)
WBC # BLD AUTO: 8.3 K/UL (ref 4.1–11.1)
WBC # BLD AUTO: 8.3 K/UL (ref 4.1–11.1)
WBC # BLD AUTO: 8.4 K/UL (ref 4.1–11.1)
WBC # BLD AUTO: 8.7 K/UL (ref 4.1–11.1)
WBC # BLD AUTO: 8.9 K/UL (ref 4.1–11.1)
WBC # BLD AUTO: 9.1 K/UL (ref 4.1–11.1)
WBC # BLD AUTO: 9.1 K/UL (ref 4.1–11.1)
WBC # BLD AUTO: 9.2 K/UL (ref 4.1–11.1)
WBC MORPH BLD: ABNORMAL

## 2020-01-01 PROCEDURE — 82962 GLUCOSE BLOOD TEST: CPT

## 2020-01-01 PROCEDURE — 94760 N-INVAS EAR/PLS OXIMETRY 1: CPT

## 2020-01-01 PROCEDURE — 74011000250 HC RX REV CODE- 250: Performed by: NURSE ANESTHETIST, CERTIFIED REGISTERED

## 2020-01-01 PROCEDURE — 74011250637 HC RX REV CODE- 250/637: Performed by: INTERNAL MEDICINE

## 2020-01-01 PROCEDURE — 027236Z DILATION OF CORONARY ARTERY, THREE ARTERIES WITH THREE DRUG-ELUTING INTRALUMINAL DEVICES, PERCUTANEOUS APPROACH: ICD-10-PCS | Performed by: INTERNAL MEDICINE

## 2020-01-01 PROCEDURE — 90935 HEMODIALYSIS ONE EVALUATION: CPT

## 2020-01-01 PROCEDURE — 97530 THERAPEUTIC ACTIVITIES: CPT | Performed by: PHYSICAL THERAPIST

## 2020-01-01 PROCEDURE — 36415 COLL VENOUS BLD VENIPUNCTURE: CPT

## 2020-01-01 PROCEDURE — 97110 THERAPEUTIC EXERCISES: CPT

## 2020-01-01 PROCEDURE — 74011636637 HC RX REV CODE- 636/637: Performed by: INTERNAL MEDICINE

## 2020-01-01 PROCEDURE — 99284 EMERGENCY DEPT VISIT MOD MDM: CPT

## 2020-01-01 PROCEDURE — 74011250636 HC RX REV CODE- 250/636: Performed by: SURGERY

## 2020-01-01 PROCEDURE — 74011250636 HC RX REV CODE- 250/636: Performed by: INTERNAL MEDICINE

## 2020-01-01 PROCEDURE — 80053 COMPREHEN METABOLIC PANEL: CPT

## 2020-01-01 PROCEDURE — 77010033678 HC OXYGEN DAILY

## 2020-01-01 PROCEDURE — 74011636637 HC RX REV CODE- 636/637: Performed by: SURGERY

## 2020-01-01 PROCEDURE — 99232 SBSQ HOSP IP/OBS MODERATE 35: CPT | Performed by: INTERNAL MEDICINE

## 2020-01-01 PROCEDURE — 97530 THERAPEUTIC ACTIVITIES: CPT

## 2020-01-01 PROCEDURE — 76210000006 HC OR PH I REC 0.5 TO 1 HR: Performed by: PODIATRIST

## 2020-01-01 PROCEDURE — 65660000001 HC RM ICU INTERMED STEPDOWN

## 2020-01-01 PROCEDURE — 74011250637 HC RX REV CODE- 250/637: Performed by: SURGERY

## 2020-01-01 PROCEDURE — 83605 ASSAY OF LACTIC ACID: CPT

## 2020-01-01 PROCEDURE — 74011000258 HC RX REV CODE- 258: Performed by: SURGERY

## 2020-01-01 PROCEDURE — 85027 COMPLETE CBC AUTOMATED: CPT

## 2020-01-01 PROCEDURE — 88311 DECALCIFY TISSUE: CPT

## 2020-01-01 PROCEDURE — 84100 ASSAY OF PHOSPHORUS: CPT

## 2020-01-01 PROCEDURE — 99223 1ST HOSP IP/OBS HIGH 75: CPT | Performed by: INTERNAL MEDICINE

## 2020-01-01 PROCEDURE — 74011000258 HC RX REV CODE- 258: Performed by: HOSPITALIST

## 2020-01-01 PROCEDURE — 77030010221 HC SPLNT WR POS TELE -B: Performed by: INTERNAL MEDICINE

## 2020-01-01 PROCEDURE — 84484 ASSAY OF TROPONIN QUANT: CPT

## 2020-01-01 PROCEDURE — 74011250636 HC RX REV CODE- 250/636: Performed by: NURSE PRACTITIONER

## 2020-01-01 PROCEDURE — 74011000258 HC RX REV CODE- 258: Performed by: INTERNAL MEDICINE

## 2020-01-01 PROCEDURE — 74011250636 HC RX REV CODE- 250/636

## 2020-01-01 PROCEDURE — 65660000000 HC RM CCU STEPDOWN

## 2020-01-01 PROCEDURE — 74011250636 HC RX REV CODE- 250/636: Performed by: NURSE ANESTHETIST, CERTIFIED REGISTERED

## 2020-01-01 PROCEDURE — 97165 OT EVAL LOW COMPLEX 30 MIN: CPT

## 2020-01-01 PROCEDURE — 76210000016 HC OR PH I REC 1 TO 1.5 HR: Performed by: SURGERY

## 2020-01-01 PROCEDURE — 74011250636 HC RX REV CODE- 250/636: Performed by: HOSPITALIST

## 2020-01-01 PROCEDURE — 97168 OT RE-EVAL EST PLAN CARE: CPT

## 2020-01-01 PROCEDURE — 74011636637 HC RX REV CODE- 636/637: Performed by: HOSPITALIST

## 2020-01-01 PROCEDURE — 65270000029 HC RM PRIVATE

## 2020-01-01 PROCEDURE — 77030006835 HC BLD SAW SAG STRY -B: Performed by: SURGERY

## 2020-01-01 PROCEDURE — 87186 SC STD MICRODIL/AGAR DIL: CPT

## 2020-01-01 PROCEDURE — 87205 SMEAR GRAM STAIN: CPT

## 2020-01-01 PROCEDURE — 74011000258 HC RX REV CODE- 258: Performed by: NURSE PRACTITIONER

## 2020-01-01 PROCEDURE — 77030008463 HC STPLR SKN PROX J&J -B: Performed by: SURGERY

## 2020-01-01 PROCEDURE — 74011636637 HC RX REV CODE- 636/637: Performed by: NURSE PRACTITIONER

## 2020-01-01 PROCEDURE — 93016 CV STRESS TEST SUPVJ ONLY: CPT | Performed by: INTERNAL MEDICINE

## 2020-01-01 PROCEDURE — B41F1ZZ FLUOROSCOPY OF RIGHT LOWER EXTREMITY ARTERIES USING LOW OSMOLAR CONTRAST: ICD-10-PCS | Performed by: SURGERY

## 2020-01-01 PROCEDURE — 74011250636 HC RX REV CODE- 250/636: Performed by: EMERGENCY MEDICINE

## 2020-01-01 PROCEDURE — 92928 PRQ TCAT PLMT NTRAC ST 1 LES: CPT | Performed by: INTERNAL MEDICINE

## 2020-01-01 PROCEDURE — 82728 ASSAY OF FERRITIN: CPT

## 2020-01-01 PROCEDURE — 5A1D70Z PERFORMANCE OF URINARY FILTRATION, INTERMITTENT, LESS THAN 6 HOURS PER DAY: ICD-10-PCS | Performed by: SURGERY

## 2020-01-01 PROCEDURE — 99152 MOD SED SAME PHYS/QHP 5/>YRS: CPT | Performed by: INTERNAL MEDICINE

## 2020-01-01 PROCEDURE — 74011250637 HC RX REV CODE- 250/637: Performed by: NURSE PRACTITIONER

## 2020-01-01 PROCEDURE — 93005 ELECTROCARDIOGRAM TRACING: CPT

## 2020-01-01 PROCEDURE — 93005 ELECTROCARDIOGRAM TRACING: CPT | Performed by: INTERNAL MEDICINE

## 2020-01-01 PROCEDURE — 77030008684 HC TU ET CUF COVD -B: Performed by: NURSE ANESTHETIST, CERTIFIED REGISTERED

## 2020-01-01 PROCEDURE — 78452 HT MUSCLE IMAGE SPECT MULT: CPT

## 2020-01-01 PROCEDURE — 85379 FIBRIN DEGRADATION QUANT: CPT

## 2020-01-01 PROCEDURE — 87070 CULTURE OTHR SPECIMN AEROBIC: CPT

## 2020-01-01 PROCEDURE — 87635 SARS-COV-2 COVID-19 AMP PRB: CPT

## 2020-01-01 PROCEDURE — 77030019569 HC BND COMPR RAD TERU -B: Performed by: INTERNAL MEDICINE

## 2020-01-01 PROCEDURE — C1894 INTRO/SHEATH, NON-LASER: HCPCS | Performed by: SURGERY

## 2020-01-01 PROCEDURE — 93458 L HRT ARTERY/VENTRICLE ANGIO: CPT | Performed by: INTERNAL MEDICINE

## 2020-01-01 PROCEDURE — 74011250637 HC RX REV CODE- 250/637: Performed by: HOSPITALIST

## 2020-01-01 PROCEDURE — 77030038692 HC WND DEB SYS IRMX -B: Performed by: SURGERY

## 2020-01-01 PROCEDURE — 93621 COMP EP EVL L PAC&REC C SINS: CPT | Performed by: INTERNAL MEDICINE

## 2020-01-01 PROCEDURE — 77030020263 HC SOL INJ SOD CL0.9% LFCR 1000ML: Performed by: SURGERY

## 2020-01-01 PROCEDURE — 0Y6T0Z0 DETACHMENT AT RIGHT 3RD TOE, COMPLETE, OPEN APPROACH: ICD-10-PCS | Performed by: PODIATRIST

## 2020-01-01 PROCEDURE — 77030020256 HC SOL INJ NACL 0.9%  500ML: Performed by: SURGERY

## 2020-01-01 PROCEDURE — B2111ZZ FLUOROSCOPY OF MULTIPLE CORONARY ARTERIES USING LOW OSMOLAR CONTRAST: ICD-10-PCS | Performed by: INTERNAL MEDICINE

## 2020-01-01 PROCEDURE — 74011250636 HC RX REV CODE- 250/636: Performed by: ANESTHESIOLOGY

## 2020-01-01 PROCEDURE — 99285 EMERGENCY DEPT VISIT HI MDM: CPT

## 2020-01-01 PROCEDURE — L1830 KO IMMOB CANVAS LONG PRE OTS: HCPCS | Performed by: SURGERY

## 2020-01-01 PROCEDURE — 83735 ASSAY OF MAGNESIUM: CPT

## 2020-01-01 PROCEDURE — 77030031139 HC SUT VCRL2 J&J -A: Performed by: SURGERY

## 2020-01-01 PROCEDURE — 77030027107 HC PTCH EXT REF CARTO3 J&J -F: Performed by: INTERNAL MEDICINE

## 2020-01-01 PROCEDURE — 74011000250 HC RX REV CODE- 250: Performed by: INTERNAL MEDICINE

## 2020-01-01 PROCEDURE — C1887 CATHETER, GUIDING: HCPCS | Performed by: INTERNAL MEDICINE

## 2020-01-01 PROCEDURE — 1111F DSCHRG MED/CURRENT MED MERGE: CPT | Performed by: INTERNAL MEDICINE

## 2020-01-01 PROCEDURE — 85025 COMPLETE CBC W/AUTO DIFF WBC: CPT

## 2020-01-01 PROCEDURE — P9045 ALBUMIN (HUMAN), 5%, 250 ML: HCPCS

## 2020-01-01 PROCEDURE — 76210000006 HC OR PH I REC 0.5 TO 1 HR: Performed by: SURGERY

## 2020-01-01 PROCEDURE — 71045 X-RAY EXAM CHEST 1 VIEW: CPT

## 2020-01-01 PROCEDURE — B44FZZ3 ULTRASONOGRAPHY OF RIGHT LOWER EXTREMITY ARTERIES, INTRAVASCULAR: ICD-10-PCS | Performed by: SURGERY

## 2020-01-01 PROCEDURE — 80048 BASIC METABOLIC PNL TOTAL CA: CPT

## 2020-01-01 PROCEDURE — 77030018390 HC SPNG HEMSTAT2 J&J -B: Performed by: SURGERY

## 2020-01-01 PROCEDURE — 4A0234Z MEASUREMENT OF CARDIAC ELECTRICAL ACTIVITY, PERCUTANEOUS APPROACH: ICD-10-PCS | Performed by: INTERNAL MEDICINE

## 2020-01-01 PROCEDURE — 3046F HEMOGLOBIN A1C LEVEL >9.0%: CPT | Performed by: INTERNAL MEDICINE

## 2020-01-01 PROCEDURE — 02583ZZ DESTRUCTION OF CONDUCTION MECHANISM, PERCUTANEOUS APPROACH: ICD-10-PCS | Performed by: INTERNAL MEDICINE

## 2020-01-01 PROCEDURE — G8427 DOCREV CUR MEDS BY ELIG CLIN: HCPCS | Performed by: INTERNAL MEDICINE

## 2020-01-01 PROCEDURE — 77030027138 HC INCENT SPIROMETER -A

## 2020-01-01 PROCEDURE — 87040 BLOOD CULTURE FOR BACTERIA: CPT

## 2020-01-01 PROCEDURE — G0439 PPPS, SUBSEQ VISIT: HCPCS | Performed by: INTERNAL MEDICINE

## 2020-01-01 PROCEDURE — 97535 SELF CARE MNGMENT TRAINING: CPT

## 2020-01-01 PROCEDURE — 77030040922 HC BLNKT HYPOTHRM STRY -A

## 2020-01-01 PROCEDURE — 76060000034 HC ANESTHESIA 1.5 TO 2 HR: Performed by: SURGERY

## 2020-01-01 PROCEDURE — 85730 THROMBOPLASTIN TIME PARTIAL: CPT

## 2020-01-01 PROCEDURE — 76010000138 HC OR TIME 0.5 TO 1 HR: Performed by: PODIATRIST

## 2020-01-01 PROCEDURE — 77030039461 HC BLD SURG BN MSNX -E: Performed by: PODIATRIST

## 2020-01-01 PROCEDURE — G9231 DOC ESRD DIA TRANS PREG: HCPCS | Performed by: INTERNAL MEDICINE

## 2020-01-01 PROCEDURE — 74011000636 HC RX REV CODE- 636: Performed by: SURGERY

## 2020-01-01 PROCEDURE — C1730 CATH, EP, 19 OR FEW ELECT: HCPCS | Performed by: INTERNAL MEDICINE

## 2020-01-01 PROCEDURE — 77030040361 HC SLV COMPR DVT MDII -B

## 2020-01-01 PROCEDURE — B2151ZZ FLUOROSCOPY OF LEFT HEART USING LOW OSMOLAR CONTRAST: ICD-10-PCS | Performed by: INTERNAL MEDICINE

## 2020-01-01 PROCEDURE — 74011000250 HC RX REV CODE- 250: Performed by: SURGERY

## 2020-01-01 PROCEDURE — G8510 SCR DEP NEG, NO PLAN REQD: HCPCS | Performed by: INTERNAL MEDICINE

## 2020-01-01 PROCEDURE — 96365 THER/PROPH/DIAG IV INF INIT: CPT

## 2020-01-01 PROCEDURE — 77030002916 HC SUT ETHLN J&J -A: Performed by: PODIATRIST

## 2020-01-01 PROCEDURE — 74011250636 HC RX REV CODE- 250/636: Performed by: PODIATRIST

## 2020-01-01 PROCEDURE — 74011250637 HC RX REV CODE- 250/637: Performed by: EMERGENCY MEDICINE

## 2020-01-01 PROCEDURE — 99214 OFFICE O/P EST MOD 30 MIN: CPT | Performed by: INTERNAL MEDICINE

## 2020-01-01 PROCEDURE — 80202 ASSAY OF VANCOMYCIN: CPT

## 2020-01-01 PROCEDURE — 82550 ASSAY OF CK (CPK): CPT

## 2020-01-01 PROCEDURE — 85652 RBC SED RATE AUTOMATED: CPT

## 2020-01-01 PROCEDURE — 93010 ELECTROCARDIOGRAM REPORT: CPT | Performed by: INTERNAL MEDICINE

## 2020-01-01 PROCEDURE — 88307 TISSUE EXAM BY PATHOLOGIST: CPT

## 2020-01-01 PROCEDURE — 77030039825 HC MSK NSL PAP SUPERNO2VA VYRM -B: Performed by: ANESTHESIOLOGY

## 2020-01-01 PROCEDURE — 2709999900 HC NON-CHARGEABLE SUPPLY

## 2020-01-01 PROCEDURE — 84145 PROCALCITONIN (PCT): CPT

## 2020-01-01 PROCEDURE — 77030000032 HC CUF TRNQT ZIMM -B: Performed by: PODIATRIST

## 2020-01-01 PROCEDURE — 77030010880 HC CBL EP SUPRME STJU -C: Performed by: INTERNAL MEDICINE

## 2020-01-01 PROCEDURE — 86140 C-REACTIVE PROTEIN: CPT

## 2020-01-01 PROCEDURE — 87077 CULTURE AEROBIC IDENTIFY: CPT

## 2020-01-01 PROCEDURE — 76060000032 HC ANESTHESIA 0.5 TO 1 HR: Performed by: INTERNAL MEDICINE

## 2020-01-01 PROCEDURE — 77030011640 HC PAD GRND REM COVD -A: Performed by: PODIATRIST

## 2020-01-01 PROCEDURE — C1769 GUIDE WIRE: HCPCS | Performed by: INTERNAL MEDICINE

## 2020-01-01 PROCEDURE — 96375 TX/PRO/DX INJ NEW DRUG ADDON: CPT

## 2020-01-01 PROCEDURE — 73718 MRI LOWER EXTREMITY W/O DYE: CPT

## 2020-01-01 PROCEDURE — 97110 THERAPEUTIC EXERCISES: CPT | Performed by: OCCUPATIONAL THERAPIST

## 2020-01-01 PROCEDURE — C1894 INTRO/SHEATH, NON-LASER: HCPCS | Performed by: INTERNAL MEDICINE

## 2020-01-01 PROCEDURE — 80061 LIPID PANEL: CPT

## 2020-01-01 PROCEDURE — 83036 HEMOGLOBIN GLYCOSYLATED A1C: CPT

## 2020-01-01 PROCEDURE — 77030002996 HC SUT SLK J&J -A: Performed by: SURGERY

## 2020-01-01 PROCEDURE — 97535 SELF CARE MNGMENT TRAINING: CPT | Performed by: OCCUPATIONAL THERAPIST

## 2020-01-01 PROCEDURE — 77030004549 HC CATH ANGI DX PRF MRTM -A: Performed by: INTERNAL MEDICINE

## 2020-01-01 PROCEDURE — 77030019908 HC STETH ESOPH SIMS -A: Performed by: NURSE ANESTHETIST, CERTIFIED REGISTERED

## 2020-01-01 PROCEDURE — G8417 CALC BMI ABV UP PARAM F/U: HCPCS | Performed by: INTERNAL MEDICINE

## 2020-01-01 PROCEDURE — 74011000250 HC RX REV CODE- 250: Performed by: HOSPITALIST

## 2020-01-01 PROCEDURE — C1769 GUIDE WIRE: HCPCS | Performed by: SURGERY

## 2020-01-01 PROCEDURE — 74011000258 HC RX REV CODE- 258: Performed by: NURSE ANESTHETIST, CERTIFIED REGISTERED

## 2020-01-01 PROCEDURE — 77030018836 HC SOL IRR NACL ICUM -A: Performed by: PODIATRIST

## 2020-01-01 PROCEDURE — 77030025162 HC DRSG VAC ASST 1 KCON -B

## 2020-01-01 PROCEDURE — P9047 ALBUMIN (HUMAN), 25%, 50ML: HCPCS | Performed by: SURGERY

## 2020-01-01 PROCEDURE — 88305 TISSUE EXAM BY PATHOLOGIST: CPT

## 2020-01-01 PROCEDURE — 5A1D70Z PERFORMANCE OF URINARY FILTRATION, INTERMITTENT, LESS THAN 6 HOURS PER DAY: ICD-10-PCS | Performed by: INTERNAL MEDICINE

## 2020-01-01 PROCEDURE — 77030014008 HC SPNG HEMSTAT J&J -C: Performed by: PODIATRIST

## 2020-01-01 PROCEDURE — 0HBKXZZ EXCISION OF RIGHT LOWER LEG SKIN, EXTERNAL APPROACH: ICD-10-PCS | Performed by: SURGERY

## 2020-01-01 PROCEDURE — 97530 THERAPEUTIC ACTIVITIES: CPT | Performed by: OCCUPATIONAL THERAPIST

## 2020-01-01 PROCEDURE — 76060000033 HC ANESTHESIA 1 TO 1.5 HR: Performed by: PODIATRIST

## 2020-01-01 PROCEDURE — 2022F DILAT RTA XM EVC RTNOPTHY: CPT | Performed by: INTERNAL MEDICINE

## 2020-01-01 PROCEDURE — 77030018836 HC SOL IRR NACL ICUM -A

## 2020-01-01 PROCEDURE — 73552 X-RAY EXAM OF FEMUR 2/>: CPT

## 2020-01-01 PROCEDURE — 0Y6H0Z1 DETACHMENT AT RIGHT LOWER LEG, HIGH, OPEN APPROACH: ICD-10-PCS | Performed by: SURGERY

## 2020-01-01 PROCEDURE — 77030011640 HC PAD GRND REM COVD -A: Performed by: SURGERY

## 2020-01-01 PROCEDURE — 74011636637 HC RX REV CODE- 636/637: Performed by: EMERGENCY MEDICINE

## 2020-01-01 PROCEDURE — 4A023FZ MEASUREMENT OF CARDIAC RHYTHM, PERCUTANEOUS APPROACH: ICD-10-PCS | Performed by: INTERNAL MEDICINE

## 2020-01-01 PROCEDURE — 74011000250 HC RX REV CODE- 250: Performed by: NURSE PRACTITIONER

## 2020-01-01 PROCEDURE — 99233 SBSQ HOSP IP/OBS HIGH 50: CPT | Performed by: INTERNAL MEDICINE

## 2020-01-01 PROCEDURE — C1874 STENT, COATED/COV W/DEL SYS: HCPCS | Performed by: INTERNAL MEDICINE

## 2020-01-01 PROCEDURE — 90000 NO LOS: CPT | Performed by: NURSE PRACTITIONER

## 2020-01-01 PROCEDURE — 86706 HEP B SURFACE ANTIBODY: CPT

## 2020-01-01 PROCEDURE — 74011000250 HC RX REV CODE- 250: Performed by: ANESTHESIOLOGY

## 2020-01-01 PROCEDURE — 87340 HEPATITIS B SURFACE AG IA: CPT

## 2020-01-01 PROCEDURE — 99153 MOD SED SAME PHYS/QHP EA: CPT | Performed by: INTERNAL MEDICINE

## 2020-01-01 PROCEDURE — 0HDKXZZ EXTRACTION OF RIGHT LOWER LEG SKIN, EXTERNAL APPROACH: ICD-10-PCS | Performed by: SURGERY

## 2020-01-01 PROCEDURE — 3051F HG A1C>EQUAL 7.0%<8.0%: CPT | Performed by: INTERNAL MEDICINE

## 2020-01-01 PROCEDURE — 76010000162 HC OR TIME 1.5 TO 2 HR INTENSV-TIER 1: Performed by: SURGERY

## 2020-01-01 PROCEDURE — C1887 CATHETER, GUIDING: HCPCS | Performed by: SURGERY

## 2020-01-01 PROCEDURE — 77030029099 HC BN WAX SSPC -A: Performed by: SURGERY

## 2020-01-01 PROCEDURE — C1725 CATH, TRANSLUMIN NON-LASER: HCPCS | Performed by: SURGERY

## 2020-01-01 PROCEDURE — 76210000021 HC REC RM PH II 0.5 TO 1 HR: Performed by: PODIATRIST

## 2020-01-01 PROCEDURE — 77030019934 HC DRSG VAC ASST KCON -B

## 2020-01-01 PROCEDURE — 93018 CV STRESS TEST I&R ONLY: CPT | Performed by: INTERNAL MEDICINE

## 2020-01-01 PROCEDURE — 97605 NEG PRS WND THER DME<=50SQCM: CPT

## 2020-01-01 PROCEDURE — 93306 TTE W/DOPPLER COMPLETE: CPT

## 2020-01-01 PROCEDURE — A9502 TC99M TETROFOSMIN: HCPCS

## 2020-01-01 PROCEDURE — 87075 CULTR BACTERIA EXCEPT BLOOD: CPT

## 2020-01-01 PROCEDURE — 93454 CORONARY ARTERY ANGIO S&I: CPT | Performed by: INTERNAL MEDICINE

## 2020-01-01 PROCEDURE — 80047 BASIC METABLC PNL IONIZED CA: CPT

## 2020-01-01 PROCEDURE — 93970 EXTREMITY STUDY: CPT

## 2020-01-01 PROCEDURE — C1760 CLOSURE DEV, VASC: HCPCS | Performed by: INTERNAL MEDICINE

## 2020-01-01 PROCEDURE — 77030013708 HC HNDPC SUC IRR PULS STRY –B: Performed by: PODIATRIST

## 2020-01-01 PROCEDURE — 77030029065 HC DRSG HEMO QCLOT ZMED -B: Performed by: INTERNAL MEDICINE

## 2020-01-01 PROCEDURE — 77030015766: Performed by: INTERNAL MEDICINE

## 2020-01-01 PROCEDURE — 02K83ZZ MAP CONDUCTION MECHANISM, PERCUTANEOUS APPROACH: ICD-10-PCS | Performed by: INTERNAL MEDICINE

## 2020-01-01 PROCEDURE — 77030004515 HC CATH ANGI DX AVU ANGI -C: Performed by: SURGERY

## 2020-01-01 PROCEDURE — 99221 1ST HOSP IP/OBS SF/LOW 40: CPT | Performed by: THORACIC SURGERY (CARDIOTHORACIC VASCULAR SURGERY)

## 2020-01-01 PROCEDURE — 86141 C-REACTIVE PROTEIN HS: CPT

## 2020-01-01 PROCEDURE — 74011000250 HC RX REV CODE- 250: Performed by: EMERGENCY MEDICINE

## 2020-01-01 PROCEDURE — 97116 GAIT TRAINING THERAPY: CPT

## 2020-01-01 PROCEDURE — 93922 UPR/L XTREMITY ART 2 LEVELS: CPT

## 2020-01-01 PROCEDURE — 77030000032 HC CUF TRNQT ZIMM -B: Performed by: SURGERY

## 2020-01-01 PROCEDURE — 74011000636 HC RX REV CODE- 636: Performed by: INTERNAL MEDICINE

## 2020-01-01 PROCEDURE — 97162 PT EVAL MOD COMPLEX 30 MIN: CPT

## 2020-01-01 PROCEDURE — 74011000272 HC RX REV CODE- 272: Performed by: PODIATRIST

## 2020-01-01 PROCEDURE — G0463 HOSPITAL OUTPT CLINIC VISIT: HCPCS | Performed by: INTERNAL MEDICINE

## 2020-01-01 PROCEDURE — 97164 PT RE-EVAL EST PLAN CARE: CPT | Performed by: PHYSICAL THERAPIST

## 2020-01-01 PROCEDURE — 76060000034 HC ANESTHESIA 1.5 TO 2 HR: Performed by: PODIATRIST

## 2020-01-01 PROCEDURE — 97161 PT EVAL LOW COMPLEX 20 MIN: CPT

## 2020-01-01 PROCEDURE — G8432 DEP SCR NOT DOC, RNG: HCPCS | Performed by: INTERNAL MEDICINE

## 2020-01-01 PROCEDURE — 74011000250 HC RX REV CODE- 250: Performed by: PODIATRIST

## 2020-01-01 PROCEDURE — 93017 CV STRESS TEST TRACING ONLY: CPT

## 2020-01-01 PROCEDURE — 77030018729 HC ELECTRD DEFIB PAD CARD -B: Performed by: INTERNAL MEDICINE

## 2020-01-01 PROCEDURE — 77030026438 HC STYL ET INTUB CARD -A: Performed by: NURSE ANESTHETIST, CERTIFIED REGISTERED

## 2020-01-01 PROCEDURE — 74011000258 HC RX REV CODE- 258: Performed by: EMERGENCY MEDICINE

## 2020-01-01 PROCEDURE — C1760 CLOSURE DEV, VASC: HCPCS | Performed by: SURGERY

## 2020-01-01 PROCEDURE — 77030008543 HC TBNG MON PRSS MRTM -A: Performed by: INTERNAL MEDICINE

## 2020-01-01 PROCEDURE — 77030012468 HC VLV BLEEDBK CNTRL ABBT -B: Performed by: INTERNAL MEDICINE

## 2020-01-01 PROCEDURE — 77030002986 HC SUT PROL J&J -A: Performed by: SURGERY

## 2020-01-01 PROCEDURE — 84443 ASSAY THYROID STIM HORMONE: CPT

## 2020-01-01 PROCEDURE — 93613 INTRACARDIAC EPHYS 3D MAPG: CPT

## 2020-01-01 PROCEDURE — 77030006788 HC BLD SAW OSC STRY -B: Performed by: PODIATRIST

## 2020-01-01 PROCEDURE — C1732 CATH, EP, DIAG/ABL, 3D/VECT: HCPCS | Performed by: INTERNAL MEDICINE

## 2020-01-01 PROCEDURE — 85384 FIBRINOGEN ACTIVITY: CPT

## 2020-01-01 PROCEDURE — 36600 WITHDRAWAL OF ARTERIAL BLOOD: CPT

## 2020-01-01 PROCEDURE — 78452 HT MUSCLE IMAGE SPECT MULT: CPT | Performed by: INTERNAL MEDICINE

## 2020-01-01 PROCEDURE — 85610 PROTHROMBIN TIME: CPT

## 2020-01-01 PROCEDURE — 76010000149 HC OR TIME 1 TO 1.5 HR: Performed by: PODIATRIST

## 2020-01-01 PROCEDURE — 77030013058 HC DEV INFL ANGIO BSC -B: Performed by: SURGERY

## 2020-01-01 PROCEDURE — 76010000153 HC OR TIME 1.5 TO 2 HR: Performed by: PODIATRIST

## 2020-01-01 PROCEDURE — 51798 US URINE CAPACITY MEASURE: CPT

## 2020-01-01 PROCEDURE — 74011250637 HC RX REV CODE- 250/637: Performed by: ANESTHESIOLOGY

## 2020-01-01 PROCEDURE — 77030028837 HC SYR ANGI PWR INJ COEU -A: Performed by: INTERNAL MEDICINE

## 2020-01-01 PROCEDURE — 83615 LACTATE (LD) (LDH) ENZYME: CPT

## 2020-01-01 PROCEDURE — 97165 OT EVAL LOW COMPLEX 30 MIN: CPT | Performed by: OCCUPATIONAL THERAPIST

## 2020-01-01 PROCEDURE — 82803 BLOOD GASES ANY COMBINATION: CPT

## 2020-01-01 PROCEDURE — 76010000153 HC OR TIME 1.5 TO 2 HR: Performed by: SURGERY

## 2020-01-01 PROCEDURE — 4A023N7 MEASUREMENT OF CARDIAC SAMPLING AND PRESSURE, LEFT HEART, PERCUTANEOUS APPROACH: ICD-10-PCS | Performed by: INTERNAL MEDICINE

## 2020-01-01 PROCEDURE — 77030019895 HC PCKNG STRP IODO -A: Performed by: PODIATRIST

## 2020-01-01 PROCEDURE — 76000 FLUOROSCOPY <1 HR PHYS/QHP: CPT

## 2020-01-01 PROCEDURE — 99213 OFFICE O/P EST LOW 20 MIN: CPT | Performed by: INTERNAL MEDICINE

## 2020-01-01 PROCEDURE — 77030004561 HC CATH ANGI DX COBRA ANGI -B: Performed by: SURGERY

## 2020-01-01 PROCEDURE — 0QBN0ZZ EXCISION OF RIGHT METATARSAL, OPEN APPROACH: ICD-10-PCS | Performed by: PODIATRIST

## 2020-01-01 PROCEDURE — 92929 HC PLC DE STNT +/-PTA MAJOR COR VESL/BRNCH  ADD RC: CPT | Performed by: INTERNAL MEDICINE

## 2020-01-01 PROCEDURE — 2709999900 HC NON-CHARGEABLE SUPPLY: Performed by: SURGERY

## 2020-01-01 PROCEDURE — 77030019698 HC SYR ANGI MDLON MRTM -A: Performed by: INTERNAL MEDICINE

## 2020-01-01 PROCEDURE — 77030019697 HC SYR ANGI INFL MRTM -B: Performed by: INTERNAL MEDICINE

## 2020-01-01 PROCEDURE — C1725 CATH, TRANSLUMIN NON-LASER: HCPCS | Performed by: INTERNAL MEDICINE

## 2020-01-01 PROCEDURE — 74011250637 HC RX REV CODE- 250/637: Performed by: PODIATRIST

## 2020-01-01 PROCEDURE — C1892 INTRO/SHEATH,FIXED,PEEL-AWAY: HCPCS | Performed by: SURGERY

## 2020-01-01 PROCEDURE — 93653 COMPRE EP EVAL TX SVT: CPT | Performed by: INTERNAL MEDICINE

## 2020-01-01 PROCEDURE — 73660 X-RAY EXAM OF TOE(S): CPT

## 2020-01-01 PROCEDURE — 047P3ZZ DILATION OF RIGHT ANTERIOR TIBIAL ARTERY, PERCUTANEOUS APPROACH: ICD-10-PCS | Performed by: SURGERY

## 2020-01-01 PROCEDURE — 77030003703 HC NDL VASC ACC COOK -A: Performed by: SURGERY

## 2020-01-01 DEVICE — STENT RONYX20026UX RESOLUTE ONYX 2.00X26
Type: IMPLANTABLE DEVICE | Status: FUNCTIONAL
Brand: RESOLUTE ONYX™

## 2020-01-01 DEVICE — XIENCE SIERRA™ EVEROLIMUS ELUTING CORONARY STENT SYSTEM 2.75 MM X 38 MM / RAPID-EXCHANGE
Type: IMPLANTABLE DEVICE | Status: FUNCTIONAL
Brand: XIENCE SIERRA™

## 2020-01-01 DEVICE — XIENCE SIERRA™ EVEROLIMUS ELUTING CORONARY STENT SYSTEM 3.25 MM X 33 MM / RAPID-EXCHANGE
Type: IMPLANTABLE DEVICE | Status: FUNCTIONAL
Brand: XIENCE SIERRA™

## 2020-01-01 DEVICE — XIENCE SIERRA™ EVEROLIMUS ELUTING CORONARY STENT SYSTEM 3.00 MM X 33 MM / RAPID-EXCHANGE
Type: IMPLANTABLE DEVICE | Status: FUNCTIONAL
Brand: XIENCE SIERRA™

## 2020-01-01 RX ORDER — FENTANYL CITRATE 50 UG/ML
INJECTION, SOLUTION INTRAMUSCULAR; INTRAVENOUS AS NEEDED
Status: DISCONTINUED | OUTPATIENT
Start: 2020-01-01 | End: 2020-01-01 | Stop reason: HOSPADM

## 2020-01-01 RX ORDER — OXYCODONE HYDROCHLORIDE 5 MG/1
5-10 TABLET ORAL
Qty: 30 TAB | Refills: 0 | Status: SHIPPED
Start: 2020-01-01 | End: 2020-01-01 | Stop reason: SDUPTHER

## 2020-01-01 RX ORDER — DEXTROSE 50 % IN WATER (D50W) INTRAVENOUS SYRINGE
12.5-25 AS NEEDED
Status: DISCONTINUED | OUTPATIENT
Start: 2020-01-01 | End: 2020-01-01 | Stop reason: HOSPADM

## 2020-01-01 RX ORDER — LISINOPRIL 5 MG/1
10 TABLET ORAL DAILY
Status: DISCONTINUED | OUTPATIENT
Start: 2020-01-01 | End: 2020-01-01

## 2020-01-01 RX ORDER — HYDROMORPHONE HYDROCHLORIDE 1 MG/ML
0.2 INJECTION, SOLUTION INTRAMUSCULAR; INTRAVENOUS; SUBCUTANEOUS
Status: DISCONTINUED | OUTPATIENT
Start: 2020-01-01 | End: 2020-01-01 | Stop reason: HOSPADM

## 2020-01-01 RX ORDER — GLYCOPYRROLATE 0.2 MG/ML
INJECTION INTRAMUSCULAR; INTRAVENOUS AS NEEDED
Status: DISCONTINUED | OUTPATIENT
Start: 2020-01-01 | End: 2020-01-01 | Stop reason: HOSPADM

## 2020-01-01 RX ORDER — GABAPENTIN 300 MG/1
300 CAPSULE ORAL
Status: DISCONTINUED | OUTPATIENT
Start: 2020-01-01 | End: 2021-01-01 | Stop reason: HOSPADM

## 2020-01-01 RX ORDER — MIDAZOLAM HYDROCHLORIDE 1 MG/ML
INJECTION, SOLUTION INTRAMUSCULAR; INTRAVENOUS AS NEEDED
Status: DISCONTINUED | OUTPATIENT
Start: 2020-01-01 | End: 2020-01-01 | Stop reason: HOSPADM

## 2020-01-01 RX ORDER — HEPARIN SODIUM 200 [USP'U]/100ML
INJECTION, SOLUTION INTRAVENOUS
Status: COMPLETED | OUTPATIENT
Start: 2020-01-01 | End: 2020-01-01

## 2020-01-01 RX ORDER — AMLODIPINE BESYLATE 5 MG/1
10 TABLET ORAL ONCE
Status: DISCONTINUED | OUTPATIENT
Start: 2020-01-01 | End: 2020-01-01

## 2020-01-01 RX ORDER — AMLODIPINE BESYLATE 5 MG/1
10 TABLET ORAL DAILY
Status: DISCONTINUED | OUTPATIENT
Start: 2020-01-01 | End: 2021-01-01 | Stop reason: HOSPADM

## 2020-01-01 RX ORDER — METOPROLOL TARTRATE 75 MG/1
100 TABLET, FILM COATED ORAL 2 TIMES DAILY
Qty: 60 TAB | Refills: 2 | Status: SHIPPED | OUTPATIENT
Start: 2020-01-01

## 2020-01-01 RX ORDER — ATORVASTATIN CALCIUM 20 MG/1
20 TABLET, FILM COATED ORAL
Status: DISCONTINUED | OUTPATIENT
Start: 2020-01-01 | End: 2020-01-01 | Stop reason: HOSPADM

## 2020-01-01 RX ORDER — HEPARIN SODIUM 10000 [USP'U]/100ML
7-25 INJECTION, SOLUTION INTRAVENOUS
Status: DISPENSED | OUTPATIENT
Start: 2020-01-01 | End: 2020-01-01

## 2020-01-01 RX ORDER — ATORVASTATIN CALCIUM 20 MG/1
20 TABLET, FILM COATED ORAL DAILY
COMMUNITY

## 2020-01-01 RX ORDER — SODIUM CHLORIDE 0.9 % (FLUSH) 0.9 %
5-40 SYRINGE (ML) INJECTION AS NEEDED
Status: DISCONTINUED | OUTPATIENT
Start: 2020-01-01 | End: 2020-01-01 | Stop reason: HOSPADM

## 2020-01-01 RX ORDER — ONDANSETRON 2 MG/ML
4 INJECTION INTRAMUSCULAR; INTRAVENOUS
Status: DISCONTINUED | OUTPATIENT
Start: 2020-01-01 | End: 2020-01-01 | Stop reason: HOSPADM

## 2020-01-01 RX ORDER — ROCURONIUM BROMIDE 10 MG/ML
INJECTION, SOLUTION INTRAVENOUS AS NEEDED
Status: DISCONTINUED | OUTPATIENT
Start: 2020-01-01 | End: 2020-01-01 | Stop reason: HOSPADM

## 2020-01-01 RX ORDER — DIPHENHYDRAMINE HYDROCHLORIDE 50 MG/ML
12.5 INJECTION, SOLUTION INTRAMUSCULAR; INTRAVENOUS AS NEEDED
Status: DISCONTINUED | OUTPATIENT
Start: 2020-01-01 | End: 2020-01-01 | Stop reason: HOSPADM

## 2020-01-01 RX ORDER — INSULIN GLARGINE 100 [IU]/ML
20 INJECTION, SOLUTION SUBCUTANEOUS DAILY
Status: DISCONTINUED | OUTPATIENT
Start: 2020-01-01 | End: 2020-01-01

## 2020-01-01 RX ORDER — HEPARIN SODIUM 5000 [USP'U]/ML
5000 INJECTION, SOLUTION INTRAVENOUS; SUBCUTANEOUS EVERY 12 HOURS
Status: COMPLETED | OUTPATIENT
Start: 2020-01-01 | End: 2020-01-01

## 2020-01-01 RX ORDER — METOPROLOL TARTRATE 50 MG/1
50 TABLET ORAL 2 TIMES DAILY
Status: DISCONTINUED | OUTPATIENT
Start: 2020-01-01 | End: 2020-01-01

## 2020-01-01 RX ORDER — INSULIN LISPRO 100 [IU]/ML
15 INJECTION, SOLUTION INTRAVENOUS; SUBCUTANEOUS ONCE
Status: COMPLETED | OUTPATIENT
Start: 2020-01-01 | End: 2020-01-01

## 2020-01-01 RX ORDER — AMLODIPINE BESYLATE 10 MG/1
10 TABLET ORAL DAILY
Qty: 30 TAB | Refills: 3 | Status: SHIPPED | OUTPATIENT
Start: 2020-01-01

## 2020-01-01 RX ORDER — OXYCODONE AND ACETAMINOPHEN 5; 325 MG/1; MG/1
1 TABLET ORAL
Qty: 30 TAB | Refills: 0 | Status: SHIPPED | OUTPATIENT
Start: 2020-01-01 | End: 2020-01-01

## 2020-01-01 RX ORDER — AMLODIPINE BESYLATE 5 MG/1
5 TABLET ORAL ONCE
Status: ACTIVE | OUTPATIENT
Start: 2020-01-01 | End: 2020-01-01

## 2020-01-01 RX ORDER — BUPIVACAINE HYDROCHLORIDE AND EPINEPHRINE 2.5; 5 MG/ML; UG/ML
INJECTION, SOLUTION EPIDURAL; INFILTRATION; INTRACAUDAL; PERINEURAL AS NEEDED
Status: DISCONTINUED | OUTPATIENT
Start: 2020-01-01 | End: 2020-01-01 | Stop reason: HOSPADM

## 2020-01-01 RX ORDER — ALBUMIN HUMAN 50 G/1000ML
SOLUTION INTRAVENOUS
Status: DISPENSED
Start: 2020-01-01 | End: 2020-01-01

## 2020-01-01 RX ORDER — PREDNISONE 20 MG/1
TABLET ORAL
Qty: 30 TAB | Refills: 0 | Status: SHIPPED
Start: 2020-01-01 | End: 2020-01-01 | Stop reason: CLARIF

## 2020-01-01 RX ORDER — BUPIVACAINE HYDROCHLORIDE AND EPINEPHRINE 5; 5 MG/ML; UG/ML
INJECTION, SOLUTION PERINEURAL AS NEEDED
Status: DISCONTINUED | OUTPATIENT
Start: 2020-01-01 | End: 2020-01-01 | Stop reason: HOSPADM

## 2020-01-01 RX ORDER — LISINOPRIL 5 MG/1
5 TABLET ORAL DAILY
Status: DISCONTINUED | OUTPATIENT
Start: 2020-01-01 | End: 2020-01-01

## 2020-01-01 RX ORDER — HEPARIN SODIUM 1000 [USP'U]/ML
INJECTION, SOLUTION INTRAVENOUS; SUBCUTANEOUS AS NEEDED
Status: DISCONTINUED | OUTPATIENT
Start: 2020-01-01 | End: 2020-01-01 | Stop reason: HOSPADM

## 2020-01-01 RX ORDER — SODIUM CHLORIDE 0.9 % (FLUSH) 0.9 %
5-40 SYRINGE (ML) INJECTION EVERY 8 HOURS
Status: DISCONTINUED | OUTPATIENT
Start: 2020-01-01 | End: 2020-01-01 | Stop reason: SDUPTHER

## 2020-01-01 RX ORDER — SODIUM CHLORIDE 0.9 % (FLUSH) 0.9 %
5-40 SYRINGE (ML) INJECTION AS NEEDED
Status: DISCONTINUED | OUTPATIENT
Start: 2020-01-01 | End: 2020-01-01 | Stop reason: SDUPTHER

## 2020-01-01 RX ORDER — AMMONIUM LACTATE 12 G/100G
LOTION TOPICAL 2 TIMES DAILY
Status: DISCONTINUED | OUTPATIENT
Start: 2020-01-01 | End: 2020-01-01 | Stop reason: HOSPADM

## 2020-01-01 RX ORDER — PROPOFOL 10 MG/ML
INJECTION, EMULSION INTRAVENOUS AS NEEDED
Status: DISCONTINUED | OUTPATIENT
Start: 2020-01-01 | End: 2020-01-01 | Stop reason: HOSPADM

## 2020-01-01 RX ORDER — LOPERAMIDE HYDROCHLORIDE 2 MG/1
2 CAPSULE ORAL
Status: DISCONTINUED | OUTPATIENT
Start: 2020-01-01 | End: 2020-01-01 | Stop reason: HOSPADM

## 2020-01-01 RX ORDER — ASPIRIN 81 MG/1
81 TABLET ORAL DAILY
Qty: 30 TAB | Refills: 11 | Status: SHIPPED | OUTPATIENT
Start: 2020-01-01 | End: 2020-01-01 | Stop reason: SDUPTHER

## 2020-01-01 RX ORDER — HEPARIN SODIUM 1000 [USP'U]/ML
1000 INJECTION, SOLUTION INTRAVENOUS; SUBCUTANEOUS ONCE
Status: COMPLETED | OUTPATIENT
Start: 2020-01-01 | End: 2020-01-01

## 2020-01-01 RX ORDER — SODIUM CHLORIDE 0.9 % (FLUSH) 0.9 %
5-40 SYRINGE (ML) INJECTION EVERY 8 HOURS
Status: DISCONTINUED | OUTPATIENT
Start: 2020-01-01 | End: 2020-01-01

## 2020-01-01 RX ORDER — HEPARIN SODIUM 5000 [USP'U]/ML
5000 INJECTION, SOLUTION INTRAVENOUS; SUBCUTANEOUS EVERY 8 HOURS
Status: DISCONTINUED | OUTPATIENT
Start: 2020-01-01 | End: 2020-01-01

## 2020-01-01 RX ORDER — ONDANSETRON 2 MG/ML
4 INJECTION INTRAMUSCULAR; INTRAVENOUS AS NEEDED
Status: DISCONTINUED | OUTPATIENT
Start: 2020-01-01 | End: 2020-01-01 | Stop reason: HOSPADM

## 2020-01-01 RX ORDER — LABETALOL HYDROCHLORIDE 5 MG/ML
10 INJECTION, SOLUTION INTRAVENOUS
Status: DISCONTINUED | OUTPATIENT
Start: 2020-01-01 | End: 2020-01-01

## 2020-01-01 RX ORDER — SODIUM CHLORIDE 0.9 % (FLUSH) 0.9 %
5-40 SYRINGE (ML) INJECTION AS NEEDED
Status: DISCONTINUED | OUTPATIENT
Start: 2020-01-01 | End: 2020-01-01

## 2020-01-01 RX ORDER — AMLODIPINE BESYLATE 5 MG/1
10 TABLET ORAL DAILY
Status: DISCONTINUED | OUTPATIENT
Start: 2020-01-01 | End: 2020-01-01 | Stop reason: HOSPADM

## 2020-01-01 RX ORDER — CLINDAMYCIN HYDROCHLORIDE 300 MG/1
300 CAPSULE ORAL 4 TIMES DAILY
Qty: 40 CAP | Refills: 0 | Status: SHIPPED | OUTPATIENT
Start: 2020-01-01 | End: 2020-01-01 | Stop reason: ALTCHOICE

## 2020-01-01 RX ORDER — DEXTROSE 50 % IN WATER (D50W) INTRAVENOUS SYRINGE
25 ONCE
Status: COMPLETED | OUTPATIENT
Start: 2020-01-01 | End: 2020-01-01

## 2020-01-01 RX ORDER — HEPARIN SODIUM 5000 [USP'U]/ML
2000 INJECTION, SOLUTION INTRAVENOUS; SUBCUTANEOUS AS NEEDED
Status: DISCONTINUED | OUTPATIENT
Start: 2020-01-01 | End: 2020-01-01

## 2020-01-01 RX ORDER — MAGNESIUM SULFATE 100 %
4 CRYSTALS MISCELLANEOUS AS NEEDED
Status: DISCONTINUED | OUTPATIENT
Start: 2020-01-01 | End: 2021-01-01 | Stop reason: HOSPADM

## 2020-01-01 RX ORDER — OXYCODONE HYDROCHLORIDE 5 MG/1
5 TABLET ORAL
Qty: 30 TAB | Refills: 0 | Status: SHIPPED | OUTPATIENT
Start: 2020-01-01 | End: 2020-01-01

## 2020-01-01 RX ORDER — SODIUM CHLORIDE 0.9 % (FLUSH) 0.9 %
5-40 SYRINGE (ML) INJECTION EVERY 8 HOURS
Status: DISCONTINUED | OUTPATIENT
Start: 2020-01-01 | End: 2020-01-01 | Stop reason: HOSPADM

## 2020-01-01 RX ORDER — SODIUM POLYSTYRENE SULFONATE 15 G/60ML
30 SUSPENSION ORAL; RECTAL
Status: COMPLETED | OUTPATIENT
Start: 2020-01-01 | End: 2020-01-01

## 2020-01-01 RX ORDER — OXYCODONE HYDROCHLORIDE 5 MG/1
5 TABLET ORAL
Status: DISCONTINUED | OUTPATIENT
Start: 2020-01-01 | End: 2020-01-01

## 2020-01-01 RX ORDER — INSULIN GLARGINE 100 [IU]/ML
12 INJECTION, SOLUTION SUBCUTANEOUS DAILY
Status: DISCONTINUED | OUTPATIENT
Start: 2020-01-01 | End: 2020-01-01 | Stop reason: HOSPADM

## 2020-01-01 RX ORDER — PROPOFOL 10 MG/ML
INJECTION, EMULSION INTRAVENOUS
Status: DISCONTINUED | OUTPATIENT
Start: 2020-01-01 | End: 2020-01-01 | Stop reason: HOSPADM

## 2020-01-01 RX ORDER — OXYCODONE HYDROCHLORIDE 5 MG/1
5 TABLET ORAL
Status: DISCONTINUED | OUTPATIENT
Start: 2020-01-01 | End: 2020-01-01 | Stop reason: HOSPADM

## 2020-01-01 RX ORDER — IPRATROPIUM BROMIDE AND ALBUTEROL SULFATE 2.5; .5 MG/3ML; MG/3ML
3 SOLUTION RESPIRATORY (INHALATION)
Status: DISCONTINUED | OUTPATIENT
Start: 2020-01-01 | End: 2020-01-01 | Stop reason: HOSPADM

## 2020-01-01 RX ORDER — INSULIN GLARGINE 100 [IU]/ML
10 INJECTION, SOLUTION SUBCUTANEOUS DAILY
Status: DISCONTINUED | OUTPATIENT
Start: 2020-01-01 | End: 2020-01-01

## 2020-01-01 RX ORDER — HYDRALAZINE HYDROCHLORIDE 20 MG/ML
10 INJECTION INTRAMUSCULAR; INTRAVENOUS
Status: DISCONTINUED | OUTPATIENT
Start: 2020-01-01 | End: 2020-01-01 | Stop reason: HOSPADM

## 2020-01-01 RX ORDER — ASPIRIN 81 MG/1
81 TABLET ORAL DAILY
Status: DISCONTINUED | OUTPATIENT
Start: 2020-01-01 | End: 2021-01-01 | Stop reason: HOSPADM

## 2020-01-01 RX ORDER — INSULIN LISPRO 100 [IU]/ML
INJECTION, SOLUTION INTRAVENOUS; SUBCUTANEOUS
Status: DISCONTINUED | OUTPATIENT
Start: 2020-01-01 | End: 2020-01-01

## 2020-01-01 RX ORDER — POLYETHYLENE GLYCOL 3350 17 G/17G
17 POWDER, FOR SOLUTION ORAL
Qty: 1 PACKET | Refills: 0 | Status: ON HOLD
Start: 2020-01-01 | End: 2020-01-01

## 2020-01-01 RX ORDER — INSULIN LISPRO 100 [IU]/ML
10 INJECTION, SOLUTION INTRAVENOUS; SUBCUTANEOUS ONCE
Status: COMPLETED | OUTPATIENT
Start: 2020-01-01 | End: 2020-01-01

## 2020-01-01 RX ORDER — DOXYCYCLINE 100 MG/1
100 CAPSULE ORAL 2 TIMES DAILY
Qty: 20 CAP | Refills: 1 | Status: SHIPPED | OUTPATIENT
Start: 2020-01-01 | End: 2020-01-01 | Stop reason: ALTCHOICE

## 2020-01-01 RX ORDER — VANCOMYCIN HYDROCHLORIDE
1250 ONCE
Status: COMPLETED | OUTPATIENT
Start: 2020-01-01 | End: 2020-01-01

## 2020-01-01 RX ORDER — LIDOCAINE HYDROCHLORIDE 10 MG/ML
0.1 INJECTION, SOLUTION EPIDURAL; INFILTRATION; INTRACAUDAL; PERINEURAL AS NEEDED
Status: DISCONTINUED | OUTPATIENT
Start: 2020-01-01 | End: 2020-01-01 | Stop reason: HOSPADM

## 2020-01-01 RX ORDER — GABAPENTIN 100 MG/1
100 CAPSULE ORAL
Qty: 30 CAP | Refills: 0 | Status: SHIPPED | OUTPATIENT
Start: 2020-01-01 | End: 2020-01-01 | Stop reason: CLARIF

## 2020-01-01 RX ORDER — ACETAMINOPHEN 325 MG/1
650 TABLET ORAL ONCE
Status: DISCONTINUED | OUTPATIENT
Start: 2020-01-01 | End: 2020-01-01 | Stop reason: HOSPADM

## 2020-01-01 RX ORDER — PHENYLEPHRINE 10 MG/250 ML(40 MCG/ML)IN 0.9 % SOD.CHLORIDE INTRAVENOUS
Status: DISCONTINUED | OUTPATIENT
Start: 2020-01-01 | End: 2020-01-01 | Stop reason: HOSPADM

## 2020-01-01 RX ORDER — ONDANSETRON 2 MG/ML
INJECTION INTRAMUSCULAR; INTRAVENOUS AS NEEDED
Status: DISCONTINUED | OUTPATIENT
Start: 2020-01-01 | End: 2020-01-01 | Stop reason: HOSPADM

## 2020-01-01 RX ORDER — SODIUM CHLORIDE 9 MG/ML
50 INJECTION, SOLUTION INTRAVENOUS CONTINUOUS
Status: DISCONTINUED | OUTPATIENT
Start: 2020-01-01 | End: 2020-01-01

## 2020-01-01 RX ORDER — POLYETHYLENE GLYCOL 3350 17 G/17G
17 POWDER, FOR SOLUTION ORAL DAILY PRN
Status: DISCONTINUED | OUTPATIENT
Start: 2020-01-01 | End: 2021-01-01 | Stop reason: HOSPADM

## 2020-01-01 RX ORDER — FENTANYL CITRATE 50 UG/ML
25 INJECTION, SOLUTION INTRAMUSCULAR; INTRAVENOUS
Status: DISCONTINUED | OUTPATIENT
Start: 2020-01-01 | End: 2020-01-01 | Stop reason: HOSPADM

## 2020-01-01 RX ORDER — PHENYLEPHRINE HCL IN 0.9% NACL 0.4MG/10ML
SYRINGE (ML) INTRAVENOUS AS NEEDED
Status: DISCONTINUED | OUTPATIENT
Start: 2020-01-01 | End: 2020-01-01 | Stop reason: HOSPADM

## 2020-01-01 RX ORDER — ACETAMINOPHEN 650 MG/1
650 SUPPOSITORY RECTAL
Status: DISCONTINUED | OUTPATIENT
Start: 2020-01-01 | End: 2021-01-01 | Stop reason: HOSPADM

## 2020-01-01 RX ORDER — VERAPAMIL HYDROCHLORIDE 2.5 MG/ML
INJECTION, SOLUTION INTRAVENOUS AS NEEDED
Status: DISCONTINUED | OUTPATIENT
Start: 2020-01-01 | End: 2020-01-01 | Stop reason: HOSPADM

## 2020-01-01 RX ORDER — SODIUM POLYSTYRENE SULFONATE 4.1 MEQ/G
15 POWDER, FOR SUSPENSION ORAL; RECTAL
Status: DISCONTINUED | OUTPATIENT
Start: 2020-01-01 | End: 2020-01-01

## 2020-01-01 RX ORDER — SEVELAMER CARBONATE 800 MG/1
800 TABLET, FILM COATED ORAL
Status: DISCONTINUED | OUTPATIENT
Start: 2020-01-01 | End: 2021-01-01 | Stop reason: HOSPADM

## 2020-01-01 RX ORDER — GABAPENTIN 100 MG/1
100 CAPSULE ORAL
Status: DISCONTINUED | OUTPATIENT
Start: 2020-01-01 | End: 2020-01-01 | Stop reason: HOSPADM

## 2020-01-01 RX ORDER — INSULIN GLARGINE 100 [IU]/ML
22 INJECTION, SOLUTION SUBCUTANEOUS
Status: DISCONTINUED | OUTPATIENT
Start: 2020-01-01 | End: 2020-01-01

## 2020-01-01 RX ORDER — INSULIN LISPRO 100 [IU]/ML
INJECTION, SOLUTION INTRAVENOUS; SUBCUTANEOUS
Status: DISCONTINUED | OUTPATIENT
Start: 2020-01-01 | End: 2020-01-01 | Stop reason: HOSPADM

## 2020-01-01 RX ORDER — LISINOPRIL 20 MG/1
20 TABLET ORAL DAILY
Qty: 30 TAB | Refills: 0 | Status: SHIPPED
Start: 2020-01-01 | End: 2020-01-01 | Stop reason: CLARIF

## 2020-01-01 RX ORDER — INSULIN GLARGINE 100 [IU]/ML
10 INJECTION, SOLUTION SUBCUTANEOUS
Status: DISCONTINUED | OUTPATIENT
Start: 2020-01-01 | End: 2020-01-01 | Stop reason: HOSPADM

## 2020-01-01 RX ORDER — BUPIVACAINE HYDROCHLORIDE AND EPINEPHRINE 5; 5 MG/ML; UG/ML
INJECTION, SOLUTION EPIDURAL; INTRACAUDAL; PERINEURAL AS NEEDED
Status: DISCONTINUED | OUTPATIENT
Start: 2020-01-01 | End: 2020-01-01 | Stop reason: HOSPADM

## 2020-01-01 RX ORDER — INSULIN GLARGINE 100 [IU]/ML
20 INJECTION, SOLUTION SUBCUTANEOUS DAILY
Status: ON HOLD | COMMUNITY
End: 2020-01-01 | Stop reason: SDUPTHER

## 2020-01-01 RX ORDER — CLOPIDOGREL BISULFATE 75 MG/1
75 TABLET ORAL DAILY
Status: DISCONTINUED | OUTPATIENT
Start: 2020-01-01 | End: 2021-01-01 | Stop reason: HOSPADM

## 2020-01-01 RX ORDER — LIDOCAINE HYDROCHLORIDE 10 MG/ML
INJECTION INFILTRATION; PERINEURAL AS NEEDED
Status: DISCONTINUED | OUTPATIENT
Start: 2020-01-01 | End: 2020-01-01 | Stop reason: HOSPADM

## 2020-01-01 RX ORDER — ACETAMINOPHEN 500 MG
1000 TABLET ORAL EVERY 8 HOURS
Status: DISCONTINUED | OUTPATIENT
Start: 2020-01-01 | End: 2020-01-01 | Stop reason: HOSPADM

## 2020-01-01 RX ORDER — INSULIN GLARGINE 100 [IU]/ML
7 INJECTION, SOLUTION SUBCUTANEOUS ONCE
Status: COMPLETED | OUTPATIENT
Start: 2020-01-01 | End: 2020-01-01

## 2020-01-01 RX ORDER — INSULIN LISPRO 100 [IU]/ML
9 INJECTION, SOLUTION INTRAVENOUS; SUBCUTANEOUS ONCE
Status: COMPLETED | OUTPATIENT
Start: 2020-01-01 | End: 2020-01-01

## 2020-01-01 RX ORDER — INSULIN LISPRO 100 [IU]/ML
4 INJECTION, SOLUTION INTRAVENOUS; SUBCUTANEOUS
Status: DISCONTINUED | OUTPATIENT
Start: 2020-01-01 | End: 2021-01-01 | Stop reason: HOSPADM

## 2020-01-01 RX ORDER — AMLODIPINE BESYLATE 5 MG/1
5 TABLET ORAL DAILY
Status: DISCONTINUED | OUTPATIENT
Start: 2020-01-01 | End: 2020-01-01

## 2020-01-01 RX ORDER — INSULIN GLARGINE 100 [IU]/ML
20 INJECTION, SOLUTION SUBCUTANEOUS
Qty: 1 VIAL | Refills: 0 | Status: ON HOLD
Start: 2020-01-01 | End: 2020-01-01 | Stop reason: SDUPTHER

## 2020-01-01 RX ORDER — INSULIN LISPRO 100 [IU]/ML
5 INJECTION, SOLUTION INTRAVENOUS; SUBCUTANEOUS ONCE
Status: COMPLETED | OUTPATIENT
Start: 2020-01-01 | End: 2020-01-01

## 2020-01-01 RX ORDER — MAGNESIUM SULFATE 100 %
4 CRYSTALS MISCELLANEOUS AS NEEDED
Status: DISCONTINUED | OUTPATIENT
Start: 2020-01-01 | End: 2020-01-01 | Stop reason: HOSPADM

## 2020-01-01 RX ORDER — MAGNESIUM SULFATE 100 %
4 CRYSTALS MISCELLANEOUS AS NEEDED
Status: DISCONTINUED | OUTPATIENT
Start: 2020-01-01 | End: 2020-01-01 | Stop reason: SDUPTHER

## 2020-01-01 RX ORDER — AZITHROMYCIN 250 MG/1
500 TABLET, FILM COATED ORAL
Status: COMPLETED | OUTPATIENT
Start: 2020-01-01 | End: 2020-01-01

## 2020-01-01 RX ORDER — METOPROLOL TARTRATE 75 MG/1
75 TABLET, FILM COATED ORAL 2 TIMES DAILY
Qty: 60 TAB | Refills: 11 | Status: SHIPPED | OUTPATIENT
Start: 2020-01-01 | End: 2020-01-01 | Stop reason: SDUPTHER

## 2020-01-01 RX ORDER — INSULIN GLARGINE 100 [IU]/ML
10 INJECTION, SOLUTION SUBCUTANEOUS
Qty: 1 VIAL | Refills: 0 | Status: SHIPPED
Start: 2020-01-01

## 2020-01-01 RX ORDER — SODIUM CHLORIDE, SODIUM LACTATE, POTASSIUM CHLORIDE, CALCIUM CHLORIDE 600; 310; 30; 20 MG/100ML; MG/100ML; MG/100ML; MG/100ML
25 INJECTION, SOLUTION INTRAVENOUS CONTINUOUS
Status: DISCONTINUED | OUTPATIENT
Start: 2020-01-01 | End: 2020-01-01 | Stop reason: HOSPADM

## 2020-01-01 RX ORDER — SODIUM CHLORIDE 9 MG/ML
INJECTION, SOLUTION INTRAVENOUS
Status: DISCONTINUED | OUTPATIENT
Start: 2020-01-01 | End: 2020-01-01 | Stop reason: HOSPADM

## 2020-01-01 RX ORDER — KETAMINE HYDROCHLORIDE 100 MG/ML
INJECTION, SOLUTION INTRAMUSCULAR; INTRAVENOUS AS NEEDED
Status: DISCONTINUED | OUTPATIENT
Start: 2020-01-01 | End: 2020-01-01 | Stop reason: HOSPADM

## 2020-01-01 RX ORDER — SODIUM CHLORIDE 9 MG/ML
25 INJECTION, SOLUTION INTRAVENOUS CONTINUOUS
Status: DISCONTINUED | OUTPATIENT
Start: 2020-01-01 | End: 2020-01-01

## 2020-01-01 RX ORDER — OXYCODONE HYDROCHLORIDE 5 MG/1
5 TABLET ORAL
COMMUNITY

## 2020-01-01 RX ORDER — SODIUM BICARBONATE 1 MEQ/ML
50 SYRINGE (ML) INTRAVENOUS ONCE
Status: COMPLETED | OUTPATIENT
Start: 2020-01-01 | End: 2020-01-01

## 2020-01-01 RX ORDER — CLOPIDOGREL 300 MG/1
600 TABLET, FILM COATED ORAL ONCE
Status: COMPLETED | OUTPATIENT
Start: 2020-01-01 | End: 2020-01-01

## 2020-01-01 RX ORDER — VANCOMYCIN/0.9 % SOD CHLORIDE 1.5G/250ML
1500 PLASTIC BAG, INJECTION (ML) INTRAVENOUS ONCE
Status: DISCONTINUED | OUTPATIENT
Start: 2020-01-01 | End: 2020-01-01

## 2020-01-01 RX ORDER — ACETAMINOPHEN 325 MG/1
650 TABLET ORAL
Status: DISCONTINUED | OUTPATIENT
Start: 2020-01-01 | End: 2021-01-01 | Stop reason: HOSPADM

## 2020-01-01 RX ORDER — MORPHINE SULFATE 2 MG/ML
4 INJECTION, SOLUTION INTRAMUSCULAR; INTRAVENOUS
Status: COMPLETED | OUTPATIENT
Start: 2020-01-01 | End: 2020-01-01

## 2020-01-01 RX ORDER — INSULIN GLARGINE 100 [IU]/ML
20 INJECTION, SOLUTION SUBCUTANEOUS
Status: COMPLETED | OUTPATIENT
Start: 2020-01-01 | End: 2020-01-01

## 2020-01-01 RX ORDER — INSULIN GLARGINE 100 [IU]/ML
20 INJECTION, SOLUTION SUBCUTANEOUS
Status: DISCONTINUED | OUTPATIENT
Start: 2020-01-01 | End: 2020-01-01

## 2020-01-01 RX ORDER — PREDNISONE 20 MG/1
60 TABLET ORAL
Status: DISCONTINUED | OUTPATIENT
Start: 2020-01-01 | End: 2020-01-01

## 2020-01-01 RX ORDER — HEPARIN SODIUM 5000 [USP'U]/ML
5000 INJECTION, SOLUTION INTRAVENOUS; SUBCUTANEOUS EVERY 12 HOURS
Status: DISCONTINUED | OUTPATIENT
Start: 2020-01-01 | End: 2020-01-01

## 2020-01-01 RX ORDER — VANCOMYCIN 2 GRAM/500 ML IN 0.9 % SODIUM CHLORIDE INTRAVENOUS
2000 ONCE
Status: COMPLETED | OUTPATIENT
Start: 2020-01-01 | End: 2020-01-01

## 2020-01-01 RX ORDER — ACETAMINOPHEN 325 MG/1
650 TABLET ORAL
Status: DISCONTINUED | OUTPATIENT
Start: 2020-01-01 | End: 2020-01-01

## 2020-01-01 RX ORDER — POTASSIUM CHLORIDE 20 MEQ/1
20 TABLET, EXTENDED RELEASE ORAL
Status: COMPLETED | OUTPATIENT
Start: 2020-01-01 | End: 2020-01-01

## 2020-01-01 RX ORDER — OXYCODONE HYDROCHLORIDE 5 MG/1
5 TABLET ORAL
Status: DISCONTINUED | OUTPATIENT
Start: 2020-01-01 | End: 2021-01-01 | Stop reason: HOSPADM

## 2020-01-01 RX ORDER — CLINDAMYCIN PHOSPHATE 600 MG/50ML
600 INJECTION INTRAVENOUS
Status: COMPLETED | OUTPATIENT
Start: 2020-01-01 | End: 2020-01-01

## 2020-01-01 RX ORDER — METOPROLOL TARTRATE 25 MG/1
12.5 TABLET, FILM COATED ORAL EVERY 12 HOURS
Status: DISCONTINUED | OUTPATIENT
Start: 2020-01-01 | End: 2020-01-01

## 2020-01-01 RX ORDER — SEVELAMER HYDROCHLORIDE 800 MG/1
800 TABLET, FILM COATED ORAL
COMMUNITY

## 2020-01-01 RX ORDER — ASPIRIN 81 MG/1
81 TABLET ORAL DAILY
Status: DISCONTINUED | OUTPATIENT
Start: 2020-01-01 | End: 2020-01-01 | Stop reason: HOSPADM

## 2020-01-01 RX ORDER — SEVELAMER CARBONATE 800 MG/1
800 TABLET, FILM COATED ORAL
Status: DISCONTINUED | OUTPATIENT
Start: 2020-01-01 | End: 2020-01-01 | Stop reason: HOSPADM

## 2020-01-01 RX ORDER — ATORVASTATIN CALCIUM 20 MG/1
20 TABLET, FILM COATED ORAL
Qty: 30 TAB | Refills: 11 | Status: ON HOLD
Start: 2020-01-01 | End: 2020-01-01

## 2020-01-01 RX ORDER — INSULIN GLARGINE 100 [IU]/ML
12 INJECTION, SOLUTION SUBCUTANEOUS DAILY
Qty: 1 VIAL | Refills: 0 | Status: SHIPPED | OUTPATIENT
Start: 2020-01-01 | End: 2020-01-01 | Stop reason: DRUGHIGH

## 2020-01-01 RX ORDER — SEVELAMER CARBONATE 800 MG/1
800 TABLET, FILM COATED ORAL
COMMUNITY
Start: 2020-01-01 | End: 2020-01-01 | Stop reason: SDUPTHER

## 2020-01-01 RX ORDER — INSULIN GLARGINE 100 [IU]/ML
30 INJECTION, SOLUTION SUBCUTANEOUS
Status: DISCONTINUED | OUTPATIENT
Start: 2020-01-01 | End: 2020-01-01

## 2020-01-01 RX ORDER — LIDOCAINE HYDROCHLORIDE 10 MG/ML
INJECTION, SOLUTION EPIDURAL; INFILTRATION; INTRACAUDAL; PERINEURAL AS NEEDED
Status: DISCONTINUED | OUTPATIENT
Start: 2020-01-01 | End: 2020-01-01 | Stop reason: HOSPADM

## 2020-01-01 RX ORDER — GABAPENTIN 300 MG/1
300 CAPSULE ORAL
Qty: 30 CAP | Refills: 0 | Status: SHIPPED | OUTPATIENT
Start: 2020-01-01

## 2020-01-01 RX ORDER — SODIUM CHLORIDE 9 MG/ML
INJECTION, SOLUTION INTRAVENOUS
Status: COMPLETED | OUTPATIENT
Start: 2020-01-01 | End: 2020-01-01

## 2020-01-01 RX ORDER — CLINDAMYCIN PHOSPHATE 900 MG/50ML
INJECTION INTRAVENOUS
Status: DISCONTINUED
Start: 2020-01-01 | End: 2020-01-01 | Stop reason: HOSPADM

## 2020-01-01 RX ORDER — AMLODIPINE BESYLATE 10 MG/1
1 TABLET ORAL
COMMUNITY
End: 2020-01-01 | Stop reason: CLARIF

## 2020-01-01 RX ORDER — METRONIDAZOLE 500 MG/100ML
500 INJECTION, SOLUTION INTRAVENOUS EVERY 12 HOURS
Status: DISCONTINUED | OUTPATIENT
Start: 2020-01-01 | End: 2020-01-01

## 2020-01-01 RX ORDER — METOPROLOL TARTRATE 5 MG/5ML
2.5 INJECTION INTRAVENOUS ONCE
Status: COMPLETED | OUTPATIENT
Start: 2020-01-01 | End: 2020-01-01

## 2020-01-01 RX ORDER — ALBUMIN HUMAN 250 G/1000ML
25 SOLUTION INTRAVENOUS AS NEEDED
Status: DISCONTINUED | OUTPATIENT
Start: 2020-01-01 | End: 2020-01-01 | Stop reason: HOSPADM

## 2020-01-01 RX ORDER — ACETAMINOPHEN 650 MG/1
650 SUPPOSITORY RECTAL
Status: DISCONTINUED | OUTPATIENT
Start: 2020-01-01 | End: 2020-01-01

## 2020-01-01 RX ORDER — DEXAMETHASONE 4 MG/1
6 TABLET ORAL DAILY
Status: DISCONTINUED | OUTPATIENT
Start: 2020-01-01 | End: 2021-01-01

## 2020-01-01 RX ORDER — LISINOPRIL 10 MG/1
10 TABLET ORAL DAILY
Status: DISCONTINUED | OUTPATIENT
Start: 2020-01-01 | End: 2020-01-01 | Stop reason: HOSPADM

## 2020-01-01 RX ORDER — ONDANSETRON 2 MG/ML
4 INJECTION INTRAMUSCULAR; INTRAVENOUS
Status: DISCONTINUED | OUTPATIENT
Start: 2020-01-01 | End: 2021-01-01 | Stop reason: HOSPADM

## 2020-01-01 RX ORDER — PREDNISONE 20 MG/1
20 TABLET ORAL
Status: COMPLETED | OUTPATIENT
Start: 2020-01-01 | End: 2020-01-01

## 2020-01-01 RX ORDER — FENTANYL CITRATE 50 UG/ML
50 INJECTION, SOLUTION INTRAMUSCULAR; INTRAVENOUS AS NEEDED
Status: DISCONTINUED | OUTPATIENT
Start: 2020-01-01 | End: 2020-01-01 | Stop reason: HOSPADM

## 2020-01-01 RX ORDER — MIDODRINE HYDROCHLORIDE 5 MG/1
5 TABLET ORAL
Status: DISCONTINUED | OUTPATIENT
Start: 2020-01-01 | End: 2020-01-01

## 2020-01-01 RX ORDER — INSULIN GLARGINE 100 [IU]/ML
10 INJECTION, SOLUTION SUBCUTANEOUS
Status: DISCONTINUED | OUTPATIENT
Start: 2020-01-01 | End: 2020-01-01

## 2020-01-01 RX ORDER — GABAPENTIN 100 MG/1
100 CAPSULE ORAL
Qty: 30 CAP | Refills: 0 | Status: SHIPPED | OUTPATIENT
Start: 2020-01-01 | End: 2020-01-01

## 2020-01-01 RX ORDER — CLOPIDOGREL BISULFATE 75 MG/1
75 TABLET ORAL DAILY
Status: DISCONTINUED | OUTPATIENT
Start: 2020-01-01 | End: 2020-01-01 | Stop reason: HOSPADM

## 2020-01-01 RX ORDER — SUCCINYLCHOLINE CHLORIDE 20 MG/ML
INJECTION INTRAMUSCULAR; INTRAVENOUS AS NEEDED
Status: DISCONTINUED | OUTPATIENT
Start: 2020-01-01 | End: 2020-01-01 | Stop reason: HOSPADM

## 2020-01-01 RX ORDER — SODIUM CHLORIDE 0.9 % (FLUSH) 0.9 %
5-40 SYRINGE (ML) INJECTION AS NEEDED
Status: DISCONTINUED | OUTPATIENT
Start: 2020-01-01 | End: 2021-01-01 | Stop reason: HOSPADM

## 2020-01-01 RX ORDER — MIDAZOLAM HYDROCHLORIDE 1 MG/ML
1 INJECTION, SOLUTION INTRAMUSCULAR; INTRAVENOUS AS NEEDED
Status: DISCONTINUED | OUTPATIENT
Start: 2020-01-01 | End: 2020-01-01 | Stop reason: HOSPADM

## 2020-01-01 RX ORDER — SODIUM CHLORIDE 9 MG/ML
25 INJECTION, SOLUTION INTRAVENOUS CONTINUOUS
Status: DISCONTINUED | OUTPATIENT
Start: 2020-01-01 | End: 2020-01-01 | Stop reason: HOSPADM

## 2020-01-01 RX ORDER — PROMETHAZINE HYDROCHLORIDE 25 MG/1
12.5 TABLET ORAL
Status: DISCONTINUED | OUTPATIENT
Start: 2020-01-01 | End: 2021-01-01 | Stop reason: HOSPADM

## 2020-01-01 RX ORDER — ATORVASTATIN CALCIUM 20 MG/1
20 TABLET, FILM COATED ORAL
Status: DISCONTINUED | OUTPATIENT
Start: 2020-01-01 | End: 2021-01-01 | Stop reason: HOSPADM

## 2020-01-01 RX ORDER — ACETAMINOPHEN 325 MG/1
650 TABLET ORAL
Status: DISCONTINUED | OUTPATIENT
Start: 2020-01-01 | End: 2020-01-01 | Stop reason: HOSPADM

## 2020-01-01 RX ORDER — LISINOPRIL 20 MG/1
20 TABLET ORAL DAILY
Status: DISCONTINUED | OUTPATIENT
Start: 2020-01-01 | End: 2020-01-01 | Stop reason: HOSPADM

## 2020-01-01 RX ORDER — SODIUM CHLORIDE, SODIUM LACTATE, POTASSIUM CHLORIDE, CALCIUM CHLORIDE 600; 310; 30; 20 MG/100ML; MG/100ML; MG/100ML; MG/100ML
50 INJECTION, SOLUTION INTRAVENOUS CONTINUOUS
Status: DISCONTINUED | OUTPATIENT
Start: 2020-01-01 | End: 2020-01-01 | Stop reason: HOSPADM

## 2020-01-01 RX ORDER — HYDROMORPHONE HYDROCHLORIDE 1 MG/ML
1 INJECTION, SOLUTION INTRAMUSCULAR; INTRAVENOUS; SUBCUTANEOUS
Status: DISCONTINUED | OUTPATIENT
Start: 2020-01-01 | End: 2020-01-01 | Stop reason: HOSPADM

## 2020-01-01 RX ORDER — OXYCODONE AND ACETAMINOPHEN 5; 325 MG/1; MG/1
1 TABLET ORAL
Status: DISCONTINUED | OUTPATIENT
Start: 2020-01-01 | End: 2020-01-01

## 2020-01-01 RX ORDER — GABAPENTIN 300 MG/1
300 CAPSULE ORAL
Qty: 30 CAP | Refills: 0 | Status: SHIPPED
Start: 2020-01-01 | End: 2020-01-01 | Stop reason: SDUPTHER

## 2020-01-01 RX ORDER — MAGNESIUM SULFATE 100 %
4 CRYSTALS MISCELLANEOUS AS NEEDED
Status: DISCONTINUED | OUTPATIENT
Start: 2020-01-01 | End: 2020-01-01

## 2020-01-01 RX ORDER — LIDOCAINE HYDROCHLORIDE 20 MG/ML
INJECTION, SOLUTION INFILTRATION; PERINEURAL AS NEEDED
Status: DISCONTINUED | OUTPATIENT
Start: 2020-01-01 | End: 2020-01-01 | Stop reason: HOSPADM

## 2020-01-01 RX ORDER — SODIUM CHLORIDE, SODIUM LACTATE, POTASSIUM CHLORIDE, CALCIUM CHLORIDE 600; 310; 30; 20 MG/100ML; MG/100ML; MG/100ML; MG/100ML
25 INJECTION, SOLUTION INTRAVENOUS CONTINUOUS
Status: DISCONTINUED | OUTPATIENT
Start: 2020-01-01 | End: 2020-01-01

## 2020-01-01 RX ORDER — SODIUM CHLORIDE 9 MG/ML
25 INJECTION, SOLUTION INTRAVENOUS CONTINUOUS
Status: CANCELLED | OUTPATIENT
Start: 2020-01-01

## 2020-01-01 RX ORDER — AMLODIPINE BESYLATE 5 MG/1
10 TABLET ORAL DAILY
Status: DISCONTINUED | OUTPATIENT
Start: 2020-01-01 | End: 2020-01-01

## 2020-01-01 RX ORDER — LIDOCAINE HYDROCHLORIDE 20 MG/ML
INJECTION, SOLUTION EPIDURAL; INFILTRATION; INTRACAUDAL; PERINEURAL AS NEEDED
Status: DISCONTINUED | OUTPATIENT
Start: 2020-01-01 | End: 2020-01-01 | Stop reason: HOSPADM

## 2020-01-01 RX ORDER — INSULIN LISPRO 100 [IU]/ML
6 INJECTION, SOLUTION INTRAVENOUS; SUBCUTANEOUS
Status: DISCONTINUED | OUTPATIENT
Start: 2020-01-01 | End: 2020-01-01 | Stop reason: HOSPADM

## 2020-01-01 RX ORDER — HYDROMORPHONE HYDROCHLORIDE 1 MG/ML
1 INJECTION, SOLUTION INTRAMUSCULAR; INTRAVENOUS; SUBCUTANEOUS
Status: DISCONTINUED | OUTPATIENT
Start: 2020-01-01 | End: 2020-01-01

## 2020-01-01 RX ORDER — SODIUM CHLORIDE 0.9 % (FLUSH) 0.9 %
5-40 SYRINGE (ML) INJECTION EVERY 8 HOURS
Status: DISCONTINUED | OUTPATIENT
Start: 2020-01-01 | End: 2021-01-01 | Stop reason: HOSPADM

## 2020-01-01 RX ORDER — LISINOPRIL 5 MG/1
10 TABLET ORAL ONCE
Status: ACTIVE | OUTPATIENT
Start: 2020-01-01 | End: 2020-01-01

## 2020-01-01 RX ORDER — PROMETHAZINE HYDROCHLORIDE 25 MG/1
12.5 TABLET ORAL
Status: DISCONTINUED | OUTPATIENT
Start: 2020-01-01 | End: 2020-01-01

## 2020-01-01 RX ORDER — INSULIN GLARGINE 100 [IU]/ML
40 INJECTION, SOLUTION SUBCUTANEOUS DAILY
Status: DISCONTINUED | OUTPATIENT
Start: 2020-01-01 | End: 2020-01-01

## 2020-01-01 RX ORDER — NEOSTIGMINE METHYLSULFATE 1 MG/ML
INJECTION, SOLUTION INTRAVENOUS AS NEEDED
Status: DISCONTINUED | OUTPATIENT
Start: 2020-01-01 | End: 2020-01-01 | Stop reason: HOSPADM

## 2020-01-01 RX ORDER — VANCOMYCIN/0.9 % SOD CHLORIDE 1.5G/250ML
1500 PLASTIC BAG, INJECTION (ML) INTRAVENOUS ONCE
Status: CANCELLED | OUTPATIENT
Start: 2020-01-01 | End: 2020-01-01

## 2020-01-01 RX ORDER — PREDNISONE 5 MG/1
10 TABLET ORAL
Status: DISCONTINUED | OUTPATIENT
Start: 2020-01-01 | End: 2020-01-01 | Stop reason: HOSPADM

## 2020-01-01 RX ORDER — AZITHROMYCIN 250 MG/1
500 TABLET, FILM COATED ORAL DAILY
Status: COMPLETED | OUTPATIENT
Start: 2020-01-01 | End: 2020-01-01

## 2020-01-01 RX ORDER — DEXTROSE 50 % IN WATER (D50W) INTRAVENOUS SYRINGE
12.5-25 AS NEEDED
Status: DISCONTINUED | OUTPATIENT
Start: 2020-01-01 | End: 2021-01-01 | Stop reason: HOSPADM

## 2020-01-01 RX ORDER — CLOPIDOGREL BISULFATE 75 MG/1
75 TABLET ORAL DAILY
Qty: 30 TAB | Refills: 11 | Status: SHIPPED
Start: 2020-01-01

## 2020-01-01 RX ORDER — DOXYCYCLINE 100 MG/1
100 CAPSULE ORAL 2 TIMES DAILY
COMMUNITY
End: 2020-01-01

## 2020-01-01 RX ORDER — PREDNISONE 20 MG/1
40 TABLET ORAL
Status: COMPLETED | OUTPATIENT
Start: 2020-01-01 | End: 2020-01-01

## 2020-01-01 RX ORDER — DEXTROSE 50 % IN WATER (D50W) INTRAVENOUS SYRINGE
25-50 AS NEEDED
Status: DISCONTINUED | OUTPATIENT
Start: 2020-01-01 | End: 2020-01-01 | Stop reason: SDUPTHER

## 2020-01-01 RX ORDER — DEXAMETHASONE 4 MG/1
6 TABLET ORAL DAILY
Status: DISCONTINUED | OUTPATIENT
Start: 2020-01-01 | End: 2020-01-01

## 2020-01-01 RX ORDER — INSULIN GLARGINE 100 [IU]/ML
12 INJECTION, SOLUTION SUBCUTANEOUS
Status: DISCONTINUED | OUTPATIENT
Start: 2020-01-01 | End: 2020-01-01

## 2020-01-01 RX ORDER — INSULIN LISPRO 100 [IU]/ML
INJECTION, SOLUTION INTRAVENOUS; SUBCUTANEOUS
Status: DISCONTINUED | OUTPATIENT
Start: 2020-01-01 | End: 2021-01-01 | Stop reason: HOSPADM

## 2020-01-01 RX ORDER — SODIUM CHLORIDE 0.9 % (FLUSH) 0.9 %
SYRINGE (ML) INJECTION
Status: COMPLETED
Start: 2020-01-01 | End: 2020-01-01

## 2020-01-01 RX ORDER — HEPARIN SODIUM 5000 [USP'U]/ML
4000 INJECTION, SOLUTION INTRAVENOUS; SUBCUTANEOUS AS NEEDED
Status: DISCONTINUED | OUTPATIENT
Start: 2020-01-01 | End: 2020-01-01

## 2020-01-01 RX ORDER — DEXTROSE MONOHYDRATE 50 MG/ML
50 INJECTION, SOLUTION INTRAVENOUS CONTINUOUS
Status: DISCONTINUED | OUTPATIENT
Start: 2020-01-01 | End: 2020-01-01

## 2020-01-01 RX ORDER — ACETAMINOPHEN 650 MG/1
650 SUPPOSITORY RECTAL
Status: DISCONTINUED | OUTPATIENT
Start: 2020-01-01 | End: 2020-01-01 | Stop reason: HOSPADM

## 2020-01-01 RX ORDER — DEXTROSE 50 % IN WATER (D50W) INTRAVENOUS SYRINGE
12.5-25 AS NEEDED
Status: DISCONTINUED | OUTPATIENT
Start: 2020-01-01 | End: 2020-01-01

## 2020-01-01 RX ORDER — ZINC SULFATE 50(220)MG
220 CAPSULE ORAL DAILY
Status: DISCONTINUED | OUTPATIENT
Start: 2020-01-01 | End: 2021-01-01 | Stop reason: HOSPADM

## 2020-01-01 RX ORDER — PROTAMINE SULFATE 10 MG/ML
INJECTION, SOLUTION INTRAVENOUS AS NEEDED
Status: DISCONTINUED | OUTPATIENT
Start: 2020-01-01 | End: 2020-01-01 | Stop reason: HOSPADM

## 2020-01-01 RX ORDER — ACETAMINOPHEN 325 MG/1
975 TABLET ORAL ONCE
Status: COMPLETED | OUTPATIENT
Start: 2020-01-01 | End: 2020-01-01

## 2020-01-01 RX ORDER — INSULIN GLARGINE 100 [IU]/ML
INJECTION, SOLUTION SUBCUTANEOUS
Status: DISPENSED
Start: 2020-01-01 | End: 2020-01-01

## 2020-01-01 RX ORDER — SODIUM POLYSTYRENE SULFONATE 15 G/60ML
15 SUSPENSION ORAL; RECTAL
Status: COMPLETED | OUTPATIENT
Start: 2020-01-01 | End: 2020-01-01

## 2020-01-01 RX ORDER — METOPROLOL TARTRATE 75 MG/1
75 TABLET, FILM COATED ORAL 2 TIMES DAILY
Qty: 60 TAB | Refills: 11 | Status: ON HOLD
Start: 2020-01-01 | End: 2020-01-01 | Stop reason: SDUPTHER

## 2020-01-01 RX ORDER — DEXTROSE 50 % IN WATER (D50W) INTRAVENOUS SYRINGE
25
Status: COMPLETED | OUTPATIENT
Start: 2020-01-01 | End: 2020-01-01

## 2020-01-01 RX ORDER — METOPROLOL TARTRATE 25 MG/1
25 TABLET, FILM COATED ORAL EVERY 12 HOURS
Status: DISCONTINUED | OUTPATIENT
Start: 2020-01-01 | End: 2020-01-01

## 2020-01-01 RX ORDER — HYDRALAZINE HYDROCHLORIDE 20 MG/ML
20 INJECTION INTRAMUSCULAR; INTRAVENOUS
Status: DISCONTINUED | OUTPATIENT
Start: 2020-01-01 | End: 2020-01-01 | Stop reason: HOSPADM

## 2020-01-01 RX ORDER — POLYETHYLENE GLYCOL 3350 17 G/17G
17 POWDER, FOR SOLUTION ORAL DAILY PRN
Status: DISCONTINUED | OUTPATIENT
Start: 2020-01-01 | End: 2020-01-01

## 2020-01-01 RX ORDER — HYDRALAZINE HYDROCHLORIDE 20 MG/ML
20 INJECTION INTRAMUSCULAR; INTRAVENOUS
Status: DISCONTINUED | OUTPATIENT
Start: 2020-01-01 | End: 2021-01-01 | Stop reason: HOSPADM

## 2020-01-01 RX ORDER — CLOPIDOGREL BISULFATE 75 MG/1
75 TABLET ORAL DAILY
Qty: 30 TAB | Refills: 11 | Status: SHIPPED | OUTPATIENT
Start: 2020-01-01 | End: 2020-01-01 | Stop reason: SDUPTHER

## 2020-01-01 RX ORDER — METOPROLOL TARTRATE 50 MG/1
100 TABLET ORAL 2 TIMES DAILY
Status: DISCONTINUED | OUTPATIENT
Start: 2020-01-01 | End: 2021-01-01 | Stop reason: HOSPADM

## 2020-01-01 RX ORDER — INSULIN GLARGINE 100 [IU]/ML
40 INJECTION, SOLUTION SUBCUTANEOUS DAILY
Status: DISCONTINUED | OUTPATIENT
Start: 2020-01-01 | End: 2020-01-01 | Stop reason: HOSPADM

## 2020-01-01 RX ORDER — INSULIN GLARGINE 100 [IU]/ML
25 INJECTION, SOLUTION SUBCUTANEOUS
Status: DISCONTINUED | OUTPATIENT
Start: 2020-01-01 | End: 2020-01-01

## 2020-01-01 RX ORDER — HEPARIN SODIUM 5000 [USP'U]/ML
4000 INJECTION, SOLUTION INTRAVENOUS; SUBCUTANEOUS ONCE
Status: COMPLETED | OUTPATIENT
Start: 2020-01-01 | End: 2020-01-01

## 2020-01-01 RX ORDER — POLYETHYLENE GLYCOL 3350 17 G/17G
17 POWDER, FOR SOLUTION ORAL DAILY PRN
Status: DISCONTINUED | OUTPATIENT
Start: 2020-01-01 | End: 2020-01-01 | Stop reason: HOSPADM

## 2020-01-01 RX ORDER — ALBUMIN HUMAN 50 G/1000ML
SOLUTION INTRAVENOUS
Status: COMPLETED
Start: 2020-01-01 | End: 2020-01-01

## 2020-01-01 RX ORDER — INSULIN GLARGINE 100 [IU]/ML
15 INJECTION, SOLUTION SUBCUTANEOUS
Status: DISCONTINUED | OUTPATIENT
Start: 2020-01-01 | End: 2021-01-01

## 2020-01-01 RX ORDER — OXYCODONE HYDROCHLORIDE 5 MG/1
5 CAPSULE ORAL
Status: ON HOLD | COMMUNITY
End: 2020-01-01

## 2020-01-01 RX ORDER — MUPIROCIN 20 MG/G
OINTMENT TOPICAL 2 TIMES DAILY
Status: DISCONTINUED | OUTPATIENT
Start: 2020-01-01 | End: 2020-01-01 | Stop reason: HOSPADM

## 2020-01-01 RX ORDER — ALBUMIN HUMAN 50 G/1000ML
25 SOLUTION INTRAVENOUS
Status: COMPLETED | OUTPATIENT
Start: 2020-01-01 | End: 2020-01-01

## 2020-01-01 RX ORDER — OXYCODONE HYDROCHLORIDE 5 MG/1
5-10 TABLET ORAL
Status: DISCONTINUED | OUTPATIENT
Start: 2020-01-01 | End: 2020-01-01 | Stop reason: HOSPADM

## 2020-01-01 RX ORDER — HEPARIN SODIUM 5000 [USP'U]/ML
7500 INJECTION, SOLUTION INTRAVENOUS; SUBCUTANEOUS EVERY 8 HOURS
Status: DISCONTINUED | OUTPATIENT
Start: 2020-01-01 | End: 2021-01-01 | Stop reason: HOSPADM

## 2020-01-01 RX ORDER — PREDNISONE 20 MG/1
40 TABLET ORAL
Status: DISCONTINUED | OUTPATIENT
Start: 2020-01-01 | End: 2020-01-01 | Stop reason: SDUPTHER

## 2020-01-01 RX ORDER — ALBUMIN HUMAN 250 G/1000ML
12.5 SOLUTION INTRAVENOUS ONCE
Status: DISCONTINUED | OUTPATIENT
Start: 2020-01-01 | End: 2020-01-01

## 2020-01-01 RX ORDER — ONDANSETRON 2 MG/ML
4 INJECTION INTRAMUSCULAR; INTRAVENOUS
Status: COMPLETED | OUTPATIENT
Start: 2020-01-01 | End: 2020-01-01

## 2020-01-01 RX ORDER — METOPROLOL TARTRATE 25 MG/1
75 TABLET, FILM COATED ORAL 2 TIMES DAILY
Status: DISCONTINUED | OUTPATIENT
Start: 2020-01-01 | End: 2020-01-01

## 2020-01-01 RX ORDER — OXYCODONE HYDROCHLORIDE 5 MG/1
5 TABLET ORAL
Status: ON HOLD | COMMUNITY
End: 2020-01-01 | Stop reason: SDUPTHER

## 2020-01-01 RX ORDER — POLYETHYLENE GLYCOL 3350, SODIUM SULFATE ANHYDROUS, SODIUM BICARBONATE, SODIUM CHLORIDE, POTASSIUM CHLORIDE 236; 22.74; 6.74; 5.86; 2.97 G/4L; G/4L; G/4L; G/4L; G/4L
POWDER, FOR SOLUTION ORAL
COMMUNITY
End: 2020-01-01

## 2020-01-01 RX ORDER — INSULIN GLARGINE 100 [IU]/ML
30 INJECTION, SOLUTION SUBCUTANEOUS DAILY
Status: DISCONTINUED | OUTPATIENT
Start: 2020-01-01 | End: 2020-01-01

## 2020-01-01 RX ORDER — GABAPENTIN 300 MG/1
300 CAPSULE ORAL
Status: ON HOLD | COMMUNITY
Start: 2020-01-01 | End: 2020-01-01 | Stop reason: SDUPTHER

## 2020-01-01 RX ORDER — ASCORBIC ACID 500 MG
1000 TABLET ORAL DAILY
Status: DISCONTINUED | OUTPATIENT
Start: 2020-01-01 | End: 2021-01-01 | Stop reason: HOSPADM

## 2020-01-01 RX ORDER — ATORVASTATIN CALCIUM 20 MG/1
20 TABLET, FILM COATED ORAL
Qty: 30 TAB | Refills: 11 | Status: SHIPPED | OUTPATIENT
Start: 2020-01-01 | End: 2020-01-01 | Stop reason: SDUPTHER

## 2020-01-01 RX ORDER — ASPIRIN 81 MG/1
81 TABLET ORAL DAILY
Qty: 30 TAB | Refills: 11 | Status: SHIPPED
Start: 2020-01-01

## 2020-01-01 RX ADMIN — HEPARIN SODIUM 7500 UNITS: 5000 INJECTION INTRAVENOUS; SUBCUTANEOUS at 14:33

## 2020-01-01 RX ADMIN — LOPERAMIDE HYDROCHLORIDE 2 MG: 2 CAPSULE ORAL at 15:44

## 2020-01-01 RX ADMIN — HUMAN INSULIN 30 UNITS: 100 INJECTION, SUSPENSION SUBCUTANEOUS at 11:59

## 2020-01-01 RX ADMIN — METOPROLOL TARTRATE 75 MG: 25 TABLET, FILM COATED ORAL at 09:10

## 2020-01-01 RX ADMIN — Medication 10 ML: at 17:53

## 2020-01-01 RX ADMIN — Medication 1 CAPSULE: at 11:07

## 2020-01-01 RX ADMIN — Medication 10 ML: at 16:59

## 2020-01-01 RX ADMIN — OXYCODONE 5 MG: 5 TABLET ORAL at 12:58

## 2020-01-01 RX ADMIN — ASPIRIN 81 MG: 81 TABLET, COATED ORAL at 08:56

## 2020-01-01 RX ADMIN — INSULIN GLARGINE 30 UNITS: 100 INJECTION, SOLUTION SUBCUTANEOUS at 22:32

## 2020-01-01 RX ADMIN — ATORVASTATIN CALCIUM 20 MG: 20 TABLET, FILM COATED ORAL at 22:31

## 2020-01-01 RX ADMIN — DIAPER RASH SKIN PROTECTENT: at 21:43

## 2020-01-01 RX ADMIN — EPOETIN ALFA-EPBX 10000 UNITS: 10000 INJECTION, SOLUTION INTRAVENOUS; SUBCUTANEOUS at 22:31

## 2020-01-01 RX ADMIN — OXYCODONE 5 MG: 5 TABLET ORAL at 05:46

## 2020-01-01 RX ADMIN — Medication 1 CAPSULE: at 09:01

## 2020-01-01 RX ADMIN — Medication 10 ML: at 13:47

## 2020-01-01 RX ADMIN — Medication 1 CAPSULE: at 08:45

## 2020-01-01 RX ADMIN — INSULIN GLARGINE 10 UNITS: 100 INJECTION, SOLUTION SUBCUTANEOUS at 21:10

## 2020-01-01 RX ADMIN — MIDODRINE HYDROCHLORIDE 5 MG: 5 TABLET ORAL at 09:34

## 2020-01-01 RX ADMIN — HYDROMORPHONE HYDROCHLORIDE 1 MG: 1 INJECTION, SOLUTION INTRAMUSCULAR; INTRAVENOUS; SUBCUTANEOUS at 18:03

## 2020-01-01 RX ADMIN — OXYCODONE HYDROCHLORIDE AND ACETAMINOPHEN 1 TABLET: 5; 325 TABLET ORAL at 15:44

## 2020-01-01 RX ADMIN — HYDRALAZINE HYDROCHLORIDE 20 MG: 20 INJECTION, SOLUTION INTRAMUSCULAR; INTRAVENOUS at 16:14

## 2020-01-01 RX ADMIN — ZINC SULFATE 220 MG (50 MG) CAPSULE 220 MG: CAPSULE at 08:02

## 2020-01-01 RX ADMIN — MIDODRINE HYDROCHLORIDE 5 MG: 5 TABLET ORAL at 17:29

## 2020-01-01 RX ADMIN — PIPERACILLIN AND TAZOBACTAM 3.38 G: 3; .375 INJECTION, POWDER, LYOPHILIZED, FOR SOLUTION INTRAVENOUS at 00:20

## 2020-01-01 RX ADMIN — LISINOPRIL 5 MG: 5 TABLET ORAL at 08:55

## 2020-01-01 RX ADMIN — OXYCODONE HYDROCHLORIDE AND ACETAMINOPHEN 1 TABLET: 5; 325 TABLET ORAL at 09:20

## 2020-01-01 RX ADMIN — PHENYLEPHRINE HYDROCHLORIDE 10 MCG/MIN: 10 INJECTION INTRAVENOUS at 14:08

## 2020-01-01 RX ADMIN — SEVELAMER CARBONATE 800 MG: 800 TABLET, FILM COATED ORAL at 11:12

## 2020-01-01 RX ADMIN — LABETALOL HYDROCHLORIDE 10 MG: 5 INJECTION INTRAVENOUS at 18:11

## 2020-01-01 RX ADMIN — LISINOPRIL 20 MG: 20 TABLET ORAL at 09:45

## 2020-01-01 RX ADMIN — PIPERACILLIN AND TAZOBACTAM 3.38 G: 3; .375 INJECTION, POWDER, LYOPHILIZED, FOR SOLUTION INTRAVENOUS at 17:51

## 2020-01-01 RX ADMIN — HEPARIN SODIUM 7500 UNITS: 5000 INJECTION INTRAVENOUS; SUBCUTANEOUS at 00:45

## 2020-01-01 RX ADMIN — ONDANSETRON HYDROCHLORIDE 4 MG: 2 INJECTION, SOLUTION INTRAMUSCULAR; INTRAVENOUS at 18:07

## 2020-01-01 RX ADMIN — Medication 5 ML: at 13:45

## 2020-01-01 RX ADMIN — INSULIN GLARGINE 20 UNITS: 100 INJECTION, SOLUTION SUBCUTANEOUS at 22:23

## 2020-01-01 RX ADMIN — PREDNISONE 40 MG: 20 TABLET ORAL at 08:30

## 2020-01-01 RX ADMIN — OXYCODONE 5 MG: 5 TABLET ORAL at 00:37

## 2020-01-01 RX ADMIN — INSULIN LISPRO 5 UNITS: 100 INJECTION, SOLUTION INTRAVENOUS; SUBCUTANEOUS at 16:47

## 2020-01-01 RX ADMIN — SEVELAMER CARBONATE 800 MG: 800 TABLET, FILM COATED ORAL at 11:49

## 2020-01-01 RX ADMIN — PROPOFOL 10 MG: 10 INJECTION, EMULSION INTRAVENOUS at 06:38

## 2020-01-01 RX ADMIN — Medication 10 ML: at 22:09

## 2020-01-01 RX ADMIN — HUMAN INSULIN 40 UNITS: 100 INJECTION, SUSPENSION SUBCUTANEOUS at 08:31

## 2020-01-01 RX ADMIN — INSULIN GLARGINE 10 UNITS: 100 INJECTION, SOLUTION SUBCUTANEOUS at 21:26

## 2020-01-01 RX ADMIN — LISINOPRIL 10 MG: 10 TABLET ORAL at 09:43

## 2020-01-01 RX ADMIN — Medication 10 ML: at 16:54

## 2020-01-01 RX ADMIN — INSULIN LISPRO 3 UNITS: 100 INJECTION, SOLUTION INTRAVENOUS; SUBCUTANEOUS at 18:11

## 2020-01-01 RX ADMIN — OXYCODONE HYDROCHLORIDE AND ACETAMINOPHEN 1 TABLET: 5; 325 TABLET ORAL at 10:30

## 2020-01-01 RX ADMIN — Medication: at 21:00

## 2020-01-01 RX ADMIN — ACETAMINOPHEN 1000 MG: 500 TABLET ORAL at 06:20

## 2020-01-01 RX ADMIN — LOPERAMIDE HYDROCHLORIDE 2 MG: 2 CAPSULE ORAL at 05:30

## 2020-01-01 RX ADMIN — INSULIN GLARGINE 20 UNITS: 100 INJECTION, SOLUTION SUBCUTANEOUS at 22:29

## 2020-01-01 RX ADMIN — ASPIRIN 81 MG: 81 TABLET, COATED ORAL at 13:13

## 2020-01-01 RX ADMIN — HEPARIN SODIUM 7500 UNITS: 5000 INJECTION INTRAVENOUS; SUBCUTANEOUS at 12:51

## 2020-01-01 RX ADMIN — Medication 1 CAPSULE: at 15:07

## 2020-01-01 RX ADMIN — HEPARIN SODIUM 5000 UNITS: 5000 INJECTION INTRAVENOUS; SUBCUTANEOUS at 23:06

## 2020-01-01 RX ADMIN — OXYCODONE HYDROCHLORIDE AND ACETAMINOPHEN 1 TABLET: 5; 325 TABLET ORAL at 18:59

## 2020-01-01 RX ADMIN — Medication 10 ML: at 19:45

## 2020-01-01 RX ADMIN — HEPARIN SODIUM 7500 UNITS: 5000 INJECTION INTRAVENOUS; SUBCUTANEOUS at 05:44

## 2020-01-01 RX ADMIN — LOPERAMIDE HYDROCHLORIDE 2 MG: 2 CAPSULE ORAL at 22:54

## 2020-01-01 RX ADMIN — METOPROLOL TARTRATE 100 MG: 50 TABLET, FILM COATED ORAL at 09:08

## 2020-01-01 RX ADMIN — GABAPENTIN 100 MG: 100 CAPSULE ORAL at 21:53

## 2020-01-01 RX ADMIN — Medication 10 ML: at 18:00

## 2020-01-01 RX ADMIN — OXYCODONE 5 MG: 5 TABLET ORAL at 18:49

## 2020-01-01 RX ADMIN — HEPARIN SODIUM 5000 UNITS: 5000 INJECTION INTRAVENOUS; SUBCUTANEOUS at 01:00

## 2020-01-01 RX ADMIN — OXYCODONE HYDROCHLORIDE AND ACETAMINOPHEN 1 TABLET: 5; 325 TABLET ORAL at 21:30

## 2020-01-01 RX ADMIN — SEVELAMER CARBONATE 800 MG: 800 TABLET, FILM COATED ORAL at 17:15

## 2020-01-01 RX ADMIN — Medication 10 ML: at 05:23

## 2020-01-01 RX ADMIN — SEVELAMER CARBONATE 800 MG: 800 TABLET, FILM COATED ORAL at 17:24

## 2020-01-01 RX ADMIN — SODIUM CHLORIDE 250 ML: 900 INJECTION, SOLUTION INTRAVENOUS at 13:46

## 2020-01-01 RX ADMIN — AMLODIPINE BESYLATE 10 MG: 5 TABLET ORAL at 13:07

## 2020-01-01 RX ADMIN — Medication 3 MG: at 13:50

## 2020-01-01 RX ADMIN — ATORVASTATIN CALCIUM 20 MG: 20 TABLET, FILM COATED ORAL at 22:33

## 2020-01-01 RX ADMIN — MIDAZOLAM HYDROCHLORIDE 2 MG: 2 INJECTION, SOLUTION INTRAMUSCULAR; INTRAVENOUS at 06:09

## 2020-01-01 RX ADMIN — INSULIN GLARGINE 10 UNITS: 100 INJECTION, SOLUTION SUBCUTANEOUS at 22:15

## 2020-01-01 RX ADMIN — INSULIN LISPRO 6 UNITS: 100 INJECTION, SOLUTION INTRAVENOUS; SUBCUTANEOUS at 08:14

## 2020-01-01 RX ADMIN — Medication 10 ML: at 21:06

## 2020-01-01 RX ADMIN — Medication 1 CAPSULE: at 08:40

## 2020-01-01 RX ADMIN — DIAPER RASH SKIN PROTECTENT: at 10:16

## 2020-01-01 RX ADMIN — Medication 10 ML: at 22:25

## 2020-01-01 RX ADMIN — OXYCODONE HYDROCHLORIDE AND ACETAMINOPHEN 1 TABLET: 5; 325 TABLET ORAL at 04:44

## 2020-01-01 RX ADMIN — ATORVASTATIN CALCIUM 20 MG: 20 TABLET, FILM COATED ORAL at 21:03

## 2020-01-01 RX ADMIN — HYDROMORPHONE HYDROCHLORIDE 1 MG: 1 INJECTION, SOLUTION INTRAMUSCULAR; INTRAVENOUS; SUBCUTANEOUS at 02:18

## 2020-01-01 RX ADMIN — Medication 1 CAPSULE: at 09:03

## 2020-01-01 RX ADMIN — PIPERACILLIN AND TAZOBACTAM 3.38 G: 3; .375 INJECTION, POWDER, LYOPHILIZED, FOR SOLUTION INTRAVENOUS at 17:30

## 2020-01-01 RX ADMIN — PIPERACILLIN AND TAZOBACTAM 3.38 G: 3; .375 INJECTION, POWDER, LYOPHILIZED, FOR SOLUTION INTRAVENOUS at 06:01

## 2020-01-01 RX ADMIN — CLOPIDOGREL BISULFATE 75 MG: 75 TABLET ORAL at 08:25

## 2020-01-01 RX ADMIN — PROPOFOL 10 MG: 10 INJECTION, EMULSION INTRAVENOUS at 07:00

## 2020-01-01 RX ADMIN — Medication 10 ML: at 14:00

## 2020-01-01 RX ADMIN — ROCURONIUM BROMIDE 5 MG: 10 INJECTION INTRAVENOUS at 14:56

## 2020-01-01 RX ADMIN — HYDROMORPHONE HYDROCHLORIDE 1 MG: 1 INJECTION, SOLUTION INTRAMUSCULAR; INTRAVENOUS; SUBCUTANEOUS at 17:41

## 2020-01-01 RX ADMIN — PROPOFOL 10 MG: 10 INJECTION, EMULSION INTRAVENOUS at 06:42

## 2020-01-01 RX ADMIN — LOPERAMIDE HYDROCHLORIDE 2 MG: 2 CAPSULE ORAL at 01:00

## 2020-01-01 RX ADMIN — INSULIN LISPRO 5 UNITS: 100 INJECTION, SOLUTION INTRAVENOUS; SUBCUTANEOUS at 12:38

## 2020-01-01 RX ADMIN — Medication 10 ML: at 22:02

## 2020-01-01 RX ADMIN — ACETAMINOPHEN 1000 MG: 500 TABLET ORAL at 20:45

## 2020-01-01 RX ADMIN — DIAPER RASH SKIN PROTECTENT: at 09:52

## 2020-01-01 RX ADMIN — Medication 1 CAPSULE: at 10:13

## 2020-01-01 RX ADMIN — DIAPER RASH SKIN PROTECTENT: at 17:03

## 2020-01-01 RX ADMIN — AMLODIPINE BESYLATE 10 MG: 5 TABLET ORAL at 13:14

## 2020-01-01 RX ADMIN — HEPARIN SODIUM 7500 UNITS: 5000 INJECTION INTRAVENOUS; SUBCUTANEOUS at 22:46

## 2020-01-01 RX ADMIN — OXYCODONE HYDROCHLORIDE AND ACETAMINOPHEN 1 TABLET: 5; 325 TABLET ORAL at 03:54

## 2020-01-01 RX ADMIN — INSULIN LISPRO 2 UNITS: 100 INJECTION, SOLUTION INTRAVENOUS; SUBCUTANEOUS at 17:14

## 2020-01-01 RX ADMIN — Medication 10 ML: at 17:35

## 2020-01-01 RX ADMIN — ATORVASTATIN CALCIUM 20 MG: 20 TABLET, FILM COATED ORAL at 22:55

## 2020-01-01 RX ADMIN — Medication 10 ML: at 22:22

## 2020-01-01 RX ADMIN — SEVELAMER CARBONATE 800 MG: 800 TABLET, FILM COATED ORAL at 16:43

## 2020-01-01 RX ADMIN — INSULIN GLARGINE 20 UNITS: 100 INJECTION, SOLUTION SUBCUTANEOUS at 08:58

## 2020-01-01 RX ADMIN — OXYCODONE HYDROCHLORIDE AND ACETAMINOPHEN 1 TABLET: 5; 325 TABLET ORAL at 23:12

## 2020-01-01 RX ADMIN — LISINOPRIL 20 MG: 20 TABLET ORAL at 13:33

## 2020-01-01 RX ADMIN — FENTANYL CITRATE 25 MCG: 50 INJECTION, SOLUTION INTRAMUSCULAR; INTRAVENOUS at 15:36

## 2020-01-01 RX ADMIN — INSULIN LISPRO 2 UNITS: 100 INJECTION, SOLUTION INTRAVENOUS; SUBCUTANEOUS at 17:20

## 2020-01-01 RX ADMIN — ATORVASTATIN CALCIUM 20 MG: 20 TABLET, FILM COATED ORAL at 22:02

## 2020-01-01 RX ADMIN — Medication 10 ML: at 18:24

## 2020-01-01 RX ADMIN — HEPARIN SODIUM 5000 UNITS: 5000 INJECTION INTRAVENOUS; SUBCUTANEOUS at 17:49

## 2020-01-01 RX ADMIN — Medication 10 ML: at 13:28

## 2020-01-01 RX ADMIN — ASPIRIN 81 MG: 81 TABLET, COATED ORAL at 09:27

## 2020-01-01 RX ADMIN — MIDODRINE HYDROCHLORIDE 5 MG: 5 TABLET ORAL at 17:23

## 2020-01-01 RX ADMIN — ACETAMINOPHEN 1000 MG: 500 TABLET ORAL at 05:59

## 2020-01-01 RX ADMIN — ASPIRIN 81 MG: 81 TABLET, COATED ORAL at 12:59

## 2020-01-01 RX ADMIN — HEPARIN SODIUM 7500 UNITS: 5000 INJECTION INTRAVENOUS; SUBCUTANEOUS at 05:53

## 2020-01-01 RX ADMIN — MIDODRINE HYDROCHLORIDE 5 MG: 5 TABLET ORAL at 16:30

## 2020-01-01 RX ADMIN — ZINC SULFATE 220 MG (50 MG) CAPSULE 220 MG: CAPSULE at 08:25

## 2020-01-01 RX ADMIN — MORPHINE SULFATE 4 MG: 2 INJECTION, SOLUTION INTRAMUSCULAR; INTRAVENOUS at 12:37

## 2020-01-01 RX ADMIN — METOPROLOL TARTRATE 75 MG: 25 TABLET, FILM COATED ORAL at 08:02

## 2020-01-01 RX ADMIN — SEVELAMER CARBONATE 800 MG: 800 TABLET, FILM COATED ORAL at 16:58

## 2020-01-01 RX ADMIN — OXYCODONE 10 MG: 5 TABLET ORAL at 20:47

## 2020-01-01 RX ADMIN — INSULIN LISPRO 2 UNITS: 100 INJECTION, SOLUTION INTRAVENOUS; SUBCUTANEOUS at 16:40

## 2020-01-01 RX ADMIN — PROPOFOL 10 MG: 10 INJECTION, EMULSION INTRAVENOUS at 06:43

## 2020-01-01 RX ADMIN — Medication 1 CAPSULE: at 13:06

## 2020-01-01 RX ADMIN — APIXABAN 5 MG: 5 TABLET, FILM COATED ORAL at 08:37

## 2020-01-01 RX ADMIN — Medication 120 MCG: at 13:04

## 2020-01-01 RX ADMIN — PROPOFOL 10 MG: 10 INJECTION, EMULSION INTRAVENOUS at 06:44

## 2020-01-01 RX ADMIN — MIDODRINE HYDROCHLORIDE 5 MG: 5 TABLET ORAL at 09:12

## 2020-01-01 RX ADMIN — GABAPENTIN 300 MG: 300 CAPSULE ORAL at 21:03

## 2020-01-01 RX ADMIN — OXYCODONE HYDROCHLORIDE AND ACETAMINOPHEN 1 TABLET: 5; 325 TABLET ORAL at 04:17

## 2020-01-01 RX ADMIN — ATORVASTATIN CALCIUM 20 MG: 20 TABLET, FILM COATED ORAL at 21:53

## 2020-01-01 RX ADMIN — OXYCODONE HYDROCHLORIDE AND ACETAMINOPHEN 1 TABLET: 5; 325 TABLET ORAL at 17:15

## 2020-01-01 RX ADMIN — INSULIN LISPRO 6 UNITS: 100 INJECTION, SOLUTION INTRAVENOUS; SUBCUTANEOUS at 06:00

## 2020-01-01 RX ADMIN — INSULIN LISPRO 5 UNITS: 100 INJECTION, SOLUTION INTRAVENOUS; SUBCUTANEOUS at 17:32

## 2020-01-01 RX ADMIN — GLYCOPYRROLATE 0.6 MG: 0.2 INJECTION, SOLUTION INTRAMUSCULAR; INTRAVENOUS at 13:50

## 2020-01-01 RX ADMIN — HYDROMORPHONE HYDROCHLORIDE 1 MG: 1 INJECTION, SOLUTION INTRAMUSCULAR; INTRAVENOUS; SUBCUTANEOUS at 04:16

## 2020-01-01 RX ADMIN — LOPERAMIDE HYDROCHLORIDE 2 MG: 2 CAPSULE ORAL at 08:30

## 2020-01-01 RX ADMIN — DIAPER RASH SKIN PROTECTENT: at 22:00

## 2020-01-01 RX ADMIN — HEPARIN SODIUM 1000 UNITS: 1000 INJECTION, SOLUTION INTRAVENOUS; SUBCUTANEOUS at 09:36

## 2020-01-01 RX ADMIN — INSULIN GLARGINE 12 UNITS: 100 INJECTION, SOLUTION SUBCUTANEOUS at 09:42

## 2020-01-01 RX ADMIN — ATORVASTATIN CALCIUM 20 MG: 20 TABLET, FILM COATED ORAL at 21:58

## 2020-01-01 RX ADMIN — PREDNISONE 60 MG: 20 TABLET ORAL at 08:38

## 2020-01-01 RX ADMIN — HEPARIN SODIUM 7500 UNITS: 5000 INJECTION INTRAVENOUS; SUBCUTANEOUS at 13:34

## 2020-01-01 RX ADMIN — MIDODRINE HYDROCHLORIDE 5 MG: 5 TABLET ORAL at 13:12

## 2020-01-01 RX ADMIN — HEPARIN SODIUM 7500 UNITS: 5000 INJECTION INTRAVENOUS; SUBCUTANEOUS at 05:00

## 2020-01-01 RX ADMIN — HEPARIN SODIUM 5000 UNITS: 5000 INJECTION INTRAVENOUS; SUBCUTANEOUS at 21:20

## 2020-01-01 RX ADMIN — CEFEPIME HYDROCHLORIDE 1 G: 1 INJECTION, POWDER, FOR SOLUTION INTRAMUSCULAR; INTRAVENOUS at 11:27

## 2020-01-01 RX ADMIN — HEPARIN SODIUM 7500 UNITS: 5000 INJECTION INTRAVENOUS; SUBCUTANEOUS at 22:14

## 2020-01-01 RX ADMIN — LOPERAMIDE HYDROCHLORIDE 2 MG: 2 CAPSULE ORAL at 22:39

## 2020-01-01 RX ADMIN — PROPOFOL 200 MG: 10 INJECTION, EMULSION INTRAVENOUS at 14:56

## 2020-01-01 RX ADMIN — HEPARIN SODIUM 8000 UNITS: 1000 INJECTION, SOLUTION INTRAVENOUS; SUBCUTANEOUS at 17:33

## 2020-01-01 RX ADMIN — AMLODIPINE BESYLATE 10 MG: 5 TABLET ORAL at 08:45

## 2020-01-01 RX ADMIN — LIDOCAINE HYDROCHLORIDE 40 MG: 20 INJECTION, SOLUTION INTRAVENOUS at 16:16

## 2020-01-01 RX ADMIN — INSULIN GLARGINE 20 UNITS: 100 INJECTION, SOLUTION SUBCUTANEOUS at 21:15

## 2020-01-01 RX ADMIN — LOPERAMIDE HYDROCHLORIDE 2 MG: 2 CAPSULE ORAL at 18:19

## 2020-01-01 RX ADMIN — PROPOFOL 10 MG: 10 INJECTION, EMULSION INTRAVENOUS at 06:37

## 2020-01-01 RX ADMIN — INSULIN LISPRO 2 UNITS: 100 INJECTION, SOLUTION INTRAVENOUS; SUBCUTANEOUS at 09:18

## 2020-01-01 RX ADMIN — SODIUM CHLORIDE 25 ML/HR: 900 INJECTION, SOLUTION INTRAVENOUS at 15:51

## 2020-01-01 RX ADMIN — SODIUM CHLORIDE: 900 INJECTION, SOLUTION INTRAVENOUS at 11:08

## 2020-01-01 RX ADMIN — OXYCODONE 10 MG: 5 TABLET ORAL at 15:38

## 2020-01-01 RX ADMIN — Medication 10 ML: at 23:47

## 2020-01-01 RX ADMIN — Medication 10 ML: at 06:05

## 2020-01-01 RX ADMIN — INSULIN LISPRO 3 UNITS: 100 INJECTION, SOLUTION INTRAVENOUS; SUBCUTANEOUS at 08:46

## 2020-01-01 RX ADMIN — LISINOPRIL 10 MG: 10 TABLET ORAL at 09:42

## 2020-01-01 RX ADMIN — PIPERACILLIN AND TAZOBACTAM 3.38 G: 3; .375 INJECTION, POWDER, LYOPHILIZED, FOR SOLUTION INTRAVENOUS at 05:20

## 2020-01-01 RX ADMIN — DIAPER RASH SKIN PROTECTENT: at 08:42

## 2020-01-01 RX ADMIN — SEVELAMER CARBONATE 800 MG: 800 TABLET, FILM COATED ORAL at 17:54

## 2020-01-01 RX ADMIN — SODIUM CHLORIDE 2.5 MG/HR: 900 INJECTION, SOLUTION INTRAVENOUS at 05:34

## 2020-01-01 RX ADMIN — ACETAMINOPHEN 650 MG: 325 TABLET ORAL at 22:39

## 2020-01-01 RX ADMIN — Medication 10 ML: at 05:21

## 2020-01-01 RX ADMIN — Medication 3 AMPULE: at 15:49

## 2020-01-01 RX ADMIN — OXYCODONE HYDROCHLORIDE AND ACETAMINOPHEN 1 TABLET: 5; 325 TABLET ORAL at 00:16

## 2020-01-01 RX ADMIN — SEVELAMER CARBONATE 800 MG: 800 TABLET, FILM COATED ORAL at 08:13

## 2020-01-01 RX ADMIN — METOPROLOL TARTRATE 75 MG: 25 TABLET, FILM COATED ORAL at 17:33

## 2020-01-01 RX ADMIN — SODIUM CHLORIDE: 900 INJECTION, SOLUTION INTRAVENOUS at 05:41

## 2020-01-01 RX ADMIN — INSULIN GLARGINE 40 UNITS: 100 INJECTION, SOLUTION SUBCUTANEOUS at 09:22

## 2020-01-01 RX ADMIN — INSULIN GLARGINE 40 UNITS: 100 INJECTION, SOLUTION SUBCUTANEOUS at 09:45

## 2020-01-01 RX ADMIN — OXYCODONE HYDROCHLORIDE AND ACETAMINOPHEN 1 TABLET: 5; 325 TABLET ORAL at 13:15

## 2020-01-01 RX ADMIN — DEXAMETHASONE 6 MG: 4 TABLET ORAL at 08:01

## 2020-01-01 RX ADMIN — OXYCODONE HYDROCHLORIDE AND ACETAMINOPHEN 1 TABLET: 5; 325 TABLET ORAL at 06:00

## 2020-01-01 RX ADMIN — ACETAMINOPHEN 1000 MG: 500 TABLET ORAL at 22:06

## 2020-01-01 RX ADMIN — METOPROLOL TARTRATE 75 MG: 25 TABLET, FILM COATED ORAL at 09:33

## 2020-01-01 RX ADMIN — INSULIN GLARGINE 22 UNITS: 100 INJECTION, SOLUTION SUBCUTANEOUS at 00:16

## 2020-01-01 RX ADMIN — Medication 10 ML: at 00:46

## 2020-01-01 RX ADMIN — SEVELAMER CARBONATE 800 MG: 800 TABLET, FILM COATED ORAL at 09:10

## 2020-01-01 RX ADMIN — Medication 1 CAPSULE: at 17:13

## 2020-01-01 RX ADMIN — HYDROMORPHONE HYDROCHLORIDE 1 MG: 1 INJECTION, SOLUTION INTRAMUSCULAR; INTRAVENOUS; SUBCUTANEOUS at 15:42

## 2020-01-01 RX ADMIN — ACETAMINOPHEN 1000 MG: 500 TABLET ORAL at 13:58

## 2020-01-01 RX ADMIN — SEVELAMER CARBONATE 800 MG: 800 TABLET, FILM COATED ORAL at 09:42

## 2020-01-01 RX ADMIN — METOPROLOL TARTRATE 75 MG: 25 TABLET, FILM COATED ORAL at 17:05

## 2020-01-01 RX ADMIN — HEPARIN SODIUM 5000 UNITS: 5000 INJECTION INTRAVENOUS; SUBCUTANEOUS at 05:20

## 2020-01-01 RX ADMIN — OXYCODONE HYDROCHLORIDE AND ACETAMINOPHEN 1 TABLET: 5; 325 TABLET ORAL at 21:14

## 2020-01-01 RX ADMIN — PROPOFOL 10 MG: 10 INJECTION, EMULSION INTRAVENOUS at 07:15

## 2020-01-01 RX ADMIN — Medication 10 ML: at 23:55

## 2020-01-01 RX ADMIN — INSULIN GLARGINE 15 UNITS: 100 INJECTION, SOLUTION SUBCUTANEOUS at 22:46

## 2020-01-01 RX ADMIN — SEVELAMER CARBONATE 800 MG: 800 TABLET, FILM COATED ORAL at 09:33

## 2020-01-01 RX ADMIN — Medication 10 ML: at 22:29

## 2020-01-01 RX ADMIN — CLOPIDOGREL BISULFATE 75 MG: 75 TABLET ORAL at 09:09

## 2020-01-01 RX ADMIN — PROPOFOL 10 MG: 10 INJECTION, EMULSION INTRAVENOUS at 06:31

## 2020-01-01 RX ADMIN — ATORVASTATIN CALCIUM 20 MG: 20 TABLET, FILM COATED ORAL at 21:10

## 2020-01-01 RX ADMIN — INSULIN LISPRO 5 UNITS: 100 INJECTION, SOLUTION INTRAVENOUS; SUBCUTANEOUS at 00:43

## 2020-01-01 RX ADMIN — Medication 10 ML: at 05:33

## 2020-01-01 RX ADMIN — HYDROMORPHONE HYDROCHLORIDE 1 MG: 1 INJECTION, SOLUTION INTRAMUSCULAR; INTRAVENOUS; SUBCUTANEOUS at 13:13

## 2020-01-01 RX ADMIN — Medication 10 ML: at 05:15

## 2020-01-01 RX ADMIN — SEVELAMER CARBONATE 800 MG: 800 TABLET, FILM COATED ORAL at 17:11

## 2020-01-01 RX ADMIN — INSULIN GLARGINE 30 UNITS: 100 INJECTION, SOLUTION SUBCUTANEOUS at 00:13

## 2020-01-01 RX ADMIN — INSULIN LISPRO 6 UNITS: 100 INJECTION, SOLUTION INTRAVENOUS; SUBCUTANEOUS at 17:52

## 2020-01-01 RX ADMIN — Medication: at 09:44

## 2020-01-01 RX ADMIN — SEVELAMER CARBONATE 800 MG: 800 TABLET, FILM COATED ORAL at 12:46

## 2020-01-01 RX ADMIN — PIPERACILLIN AND TAZOBACTAM 3.38 G: 3; .375 INJECTION, POWDER, LYOPHILIZED, FOR SOLUTION INTRAVENOUS at 13:56

## 2020-01-01 RX ADMIN — HEPARIN SODIUM 5000 UNITS: 5000 INJECTION INTRAVENOUS; SUBCUTANEOUS at 06:33

## 2020-01-01 RX ADMIN — HEPARIN SODIUM 7500 UNITS: 5000 INJECTION INTRAVENOUS; SUBCUTANEOUS at 07:26

## 2020-01-01 RX ADMIN — INSULIN LISPRO 3 UNITS: 100 INJECTION, SOLUTION INTRAVENOUS; SUBCUTANEOUS at 22:31

## 2020-01-01 RX ADMIN — OXYCODONE HYDROCHLORIDE AND ACETAMINOPHEN 1 TABLET: 5; 325 TABLET ORAL at 20:33

## 2020-01-01 RX ADMIN — HEPARIN SODIUM 7500 UNITS: 5000 INJECTION INTRAVENOUS; SUBCUTANEOUS at 14:39

## 2020-01-01 RX ADMIN — PROPOFOL 10 MG: 10 INJECTION, EMULSION INTRAVENOUS at 06:53

## 2020-01-01 RX ADMIN — ALBUMIN (HUMAN) 25 G: 12.5 INJECTION, SOLUTION INTRAVENOUS at 14:45

## 2020-01-01 RX ADMIN — Medication 10 ML: at 14:34

## 2020-01-01 RX ADMIN — INSULIN GLARGINE 20 UNITS: 100 INJECTION, SOLUTION SUBCUTANEOUS at 14:45

## 2020-01-01 RX ADMIN — INSULIN LISPRO 7 UNITS: 100 INJECTION, SOLUTION INTRAVENOUS; SUBCUTANEOUS at 12:59

## 2020-01-01 RX ADMIN — PROPOFOL 10 MG: 10 INJECTION, EMULSION INTRAVENOUS at 06:33

## 2020-01-01 RX ADMIN — OXYCODONE HYDROCHLORIDE AND ACETAMINOPHEN 1 TABLET: 5; 325 TABLET ORAL at 00:46

## 2020-01-01 RX ADMIN — ONDANSETRON HYDROCHLORIDE 4 MG: 2 INJECTION, SOLUTION INTRAMUSCULAR; INTRAVENOUS at 12:44

## 2020-01-01 RX ADMIN — HEPARIN SODIUM 5000 UNITS: 5000 INJECTION INTRAVENOUS; SUBCUTANEOUS at 22:38

## 2020-01-01 RX ADMIN — Medication 10 ML: at 05:20

## 2020-01-01 RX ADMIN — LABETALOL HYDROCHLORIDE 10 MG: 5 INJECTION INTRAVENOUS at 21:21

## 2020-01-01 RX ADMIN — HUMAN INSULIN 10 UNITS: 100 INJECTION, SOLUTION SUBCUTANEOUS at 16:16

## 2020-01-01 RX ADMIN — AMLODIPINE BESYLATE 10 MG: 5 TABLET ORAL at 08:37

## 2020-01-01 RX ADMIN — HEPARIN SODIUM 7500 UNITS: 5000 INJECTION INTRAVENOUS; SUBCUTANEOUS at 21:07

## 2020-01-01 RX ADMIN — HYDROMORPHONE HYDROCHLORIDE 1 MG: 1 INJECTION, SOLUTION INTRAMUSCULAR; INTRAVENOUS; SUBCUTANEOUS at 03:34

## 2020-01-01 RX ADMIN — POTASSIUM CHLORIDE 20 MEQ: 20 TABLET, EXTENDED RELEASE ORAL at 05:59

## 2020-01-01 RX ADMIN — Medication 10 ML: at 13:15

## 2020-01-01 RX ADMIN — PIPERACILLIN AND TAZOBACTAM 3.38 G: 3; .375 INJECTION, POWDER, LYOPHILIZED, FOR SOLUTION INTRAVENOUS at 05:49

## 2020-01-01 RX ADMIN — VANCOMYCIN HYDROCHLORIDE 500 MG: 500 INJECTION, POWDER, LYOPHILIZED, FOR SOLUTION INTRAVENOUS at 09:21

## 2020-01-01 RX ADMIN — ATORVASTATIN CALCIUM 20 MG: 20 TABLET, FILM COATED ORAL at 21:52

## 2020-01-01 RX ADMIN — MIDODRINE HYDROCHLORIDE 5 MG: 5 TABLET ORAL at 14:02

## 2020-01-01 RX ADMIN — HUMAN INSULIN 40 UNITS: 100 INJECTION, SUSPENSION SUBCUTANEOUS at 13:45

## 2020-01-01 RX ADMIN — HYDRALAZINE HYDROCHLORIDE 10 MG: 20 INJECTION INTRAMUSCULAR; INTRAVENOUS at 05:54

## 2020-01-01 RX ADMIN — DIAPER RASH SKIN PROTECTENT: at 23:29

## 2020-01-01 RX ADMIN — INSULIN LISPRO 3 UNITS: 100 INJECTION, SOLUTION INTRAVENOUS; SUBCUTANEOUS at 12:24

## 2020-01-01 RX ADMIN — HEPARIN SODIUM 5000 UNITS: 5000 INJECTION INTRAVENOUS; SUBCUTANEOUS at 08:34

## 2020-01-01 RX ADMIN — INSULIN LISPRO 3 UNITS: 100 INJECTION, SOLUTION INTRAVENOUS; SUBCUTANEOUS at 12:59

## 2020-01-01 RX ADMIN — PROPOFOL 10 MG: 10 INJECTION, EMULSION INTRAVENOUS at 06:50

## 2020-01-01 RX ADMIN — HEPARIN SODIUM 5000 UNITS: 5000 INJECTION INTRAVENOUS; SUBCUTANEOUS at 19:50

## 2020-01-01 RX ADMIN — Medication 10 ML: at 05:56

## 2020-01-01 RX ADMIN — FENTANYL CITRATE 25 MCG: 50 INJECTION, SOLUTION INTRAMUSCULAR; INTRAVENOUS at 15:16

## 2020-01-01 RX ADMIN — INSULIN GLARGINE 20 UNITS: 100 INJECTION, SOLUTION SUBCUTANEOUS at 09:01

## 2020-01-01 RX ADMIN — Medication 10 ML: at 06:01

## 2020-01-01 RX ADMIN — METRONIDAZOLE 500 MG: 500 INJECTION, SOLUTION INTRAVENOUS at 22:05

## 2020-01-01 RX ADMIN — OXYCODONE HYDROCHLORIDE 5 MG: 5 TABLET ORAL at 07:58

## 2020-01-01 RX ADMIN — Medication 10 ML: at 22:13

## 2020-01-01 RX ADMIN — DEXTROSE MONOHYDRATE 12.5 G: 25 INJECTION, SOLUTION INTRAVENOUS at 16:36

## 2020-01-01 RX ADMIN — AMLODIPINE BESYLATE 5 MG: 5 TABLET ORAL at 14:39

## 2020-01-01 RX ADMIN — HEPARIN SODIUM 7500 UNITS: 5000 INJECTION INTRAVENOUS; SUBCUTANEOUS at 05:28

## 2020-01-01 RX ADMIN — HEPARIN SODIUM 7500 UNITS: 5000 INJECTION INTRAVENOUS; SUBCUTANEOUS at 05:33

## 2020-01-01 RX ADMIN — OXYCODONE 10 MG: 5 TABLET ORAL at 08:30

## 2020-01-01 RX ADMIN — Medication 10 ML: at 21:54

## 2020-01-01 RX ADMIN — INSULIN LISPRO 2 UNITS: 100 INJECTION, SOLUTION INTRAVENOUS; SUBCUTANEOUS at 21:00

## 2020-01-01 RX ADMIN — INSULIN GLARGINE 20 UNITS: 100 INJECTION, SOLUTION SUBCUTANEOUS at 21:23

## 2020-01-01 RX ADMIN — Medication 10 ML: at 21:22

## 2020-01-01 RX ADMIN — OXYCODONE 10 MG: 5 TABLET ORAL at 14:49

## 2020-01-01 RX ADMIN — PROPOFOL 75 MCG/KG/MIN: 10 INJECTION, EMULSION INTRAVENOUS at 13:45

## 2020-01-01 RX ADMIN — OXYCODONE 10 MG: 5 TABLET ORAL at 16:02

## 2020-01-01 RX ADMIN — REGADENOSON 0.4 MG: 0.08 INJECTION, SOLUTION INTRAVENOUS at 10:43

## 2020-01-01 RX ADMIN — ACETAMINOPHEN 1000 MG: 500 TABLET ORAL at 13:44

## 2020-01-01 RX ADMIN — SODIUM POLYSTYRENE SULFONATE 30 G: 15 SUSPENSION ORAL; RECTAL at 13:33

## 2020-01-01 RX ADMIN — OXYCODONE 10 MG: 5 TABLET ORAL at 21:42

## 2020-01-01 RX ADMIN — DIAPER RASH SKIN PROTECTENT: at 20:49

## 2020-01-01 RX ADMIN — SEVELAMER CARBONATE 800 MG: 800 TABLET, FILM COATED ORAL at 13:11

## 2020-01-01 RX ADMIN — HYDRALAZINE HYDROCHLORIDE 10 MG: 20 INJECTION INTRAMUSCULAR; INTRAVENOUS at 06:54

## 2020-01-01 RX ADMIN — OXYCODONE 5 MG: 5 TABLET ORAL at 13:45

## 2020-01-01 RX ADMIN — Medication 10 ML: at 06:35

## 2020-01-01 RX ADMIN — Medication 10 ML: at 22:05

## 2020-01-01 RX ADMIN — INSULIN LISPRO 2 UNITS: 100 INJECTION, SOLUTION INTRAVENOUS; SUBCUTANEOUS at 16:02

## 2020-01-01 RX ADMIN — LOPERAMIDE HYDROCHLORIDE 2 MG: 2 CAPSULE ORAL at 20:55

## 2020-01-01 RX ADMIN — ASPIRIN 81 MG: 81 TABLET, COATED ORAL at 13:14

## 2020-01-01 RX ADMIN — INSULIN GLARGINE 22 UNITS: 100 INJECTION, SOLUTION SUBCUTANEOUS at 00:20

## 2020-01-01 RX ADMIN — AMLODIPINE BESYLATE 10 MG: 5 TABLET ORAL at 13:30

## 2020-01-01 RX ADMIN — MIDODRINE HYDROCHLORIDE 5 MG: 5 TABLET ORAL at 12:47

## 2020-01-01 RX ADMIN — SEVELAMER CARBONATE 800 MG: 800 TABLET, FILM COATED ORAL at 11:57

## 2020-01-01 RX ADMIN — GABAPENTIN 100 MG: 100 CAPSULE ORAL at 22:06

## 2020-01-01 RX ADMIN — Medication 10 ML: at 19:15

## 2020-01-01 RX ADMIN — INSULIN LISPRO 3 UNITS: 100 INJECTION, SOLUTION INTRAVENOUS; SUBCUTANEOUS at 13:10

## 2020-01-01 RX ADMIN — OXYCODONE HYDROCHLORIDE AND ACETAMINOPHEN 1 TABLET: 5; 325 TABLET ORAL at 16:40

## 2020-01-01 RX ADMIN — METOPROLOL TARTRATE 100 MG: 50 TABLET, FILM COATED ORAL at 17:00

## 2020-01-01 RX ADMIN — DIAPER RASH SKIN PROTECTENT: at 10:21

## 2020-01-01 RX ADMIN — Medication 10 ML: at 03:17

## 2020-01-01 RX ADMIN — CLOPIDOGREL BISULFATE 75 MG: 75 TABLET ORAL at 08:00

## 2020-01-01 RX ADMIN — ACETAMINOPHEN 1000 MG: 500 TABLET ORAL at 12:11

## 2020-01-01 RX ADMIN — HEPARIN SODIUM 7500 UNITS: 5000 INJECTION INTRAVENOUS; SUBCUTANEOUS at 20:22

## 2020-01-01 RX ADMIN — HEPARIN SODIUM 5000 UNITS: 5000 INJECTION INTRAVENOUS; SUBCUTANEOUS at 14:47

## 2020-01-01 RX ADMIN — HEPARIN SODIUM 5000 UNITS: 5000 INJECTION INTRAVENOUS; SUBCUTANEOUS at 05:32

## 2020-01-01 RX ADMIN — CLOPIDOGREL BISULFATE 75 MG: 75 TABLET ORAL at 13:13

## 2020-01-01 RX ADMIN — INSULIN LISPRO 3 UNITS: 100 INJECTION, SOLUTION INTRAVENOUS; SUBCUTANEOUS at 08:36

## 2020-01-01 RX ADMIN — HYDROMORPHONE HYDROCHLORIDE 1 MG: 1 INJECTION, SOLUTION INTRAMUSCULAR; INTRAVENOUS; SUBCUTANEOUS at 17:43

## 2020-01-01 RX ADMIN — HYDROMORPHONE HYDROCHLORIDE 1 MG: 1 INJECTION, SOLUTION INTRAMUSCULAR; INTRAVENOUS; SUBCUTANEOUS at 22:08

## 2020-01-01 RX ADMIN — GABAPENTIN 100 MG: 100 CAPSULE ORAL at 23:07

## 2020-01-01 RX ADMIN — INSULIN LISPRO 2 UNITS: 100 INJECTION, SOLUTION INTRAVENOUS; SUBCUTANEOUS at 13:09

## 2020-01-01 RX ADMIN — OXYCODONE 5 MG: 5 TABLET ORAL at 22:33

## 2020-01-01 RX ADMIN — PIPERACILLIN AND TAZOBACTAM 3.38 G: 3; .375 INJECTION, POWDER, LYOPHILIZED, FOR SOLUTION INTRAVENOUS at 05:59

## 2020-01-01 RX ADMIN — EPOETIN ALFA-EPBX 10000 UNITS: 10000 INJECTION, SOLUTION INTRAVENOUS; SUBCUTANEOUS at 17:21

## 2020-01-01 RX ADMIN — GABAPENTIN 100 MG: 100 CAPSULE ORAL at 21:43

## 2020-01-01 RX ADMIN — ASPIRIN 81 MG: 81 TABLET, COATED ORAL at 08:00

## 2020-01-01 RX ADMIN — HEPARIN SODIUM 5000 UNITS: 5000 INJECTION INTRAVENOUS; SUBCUTANEOUS at 23:42

## 2020-01-01 RX ADMIN — HYDRALAZINE HYDROCHLORIDE 10 MG: 20 INJECTION INTRAMUSCULAR; INTRAVENOUS at 15:59

## 2020-01-01 RX ADMIN — SODIUM CHLORIDE: 900 INJECTION, SOLUTION INTRAVENOUS at 16:32

## 2020-01-01 RX ADMIN — PROPOFOL 10 MG: 10 INJECTION, EMULSION INTRAVENOUS at 06:20

## 2020-01-01 RX ADMIN — PROPOFOL 10 MG: 10 INJECTION, EMULSION INTRAVENOUS at 06:18

## 2020-01-01 RX ADMIN — MUPIROCIN: 20 OINTMENT TOPICAL at 20:24

## 2020-01-01 RX ADMIN — LISINOPRIL 10 MG: 10 TABLET ORAL at 12:32

## 2020-01-01 RX ADMIN — CLOPIDOGREL BISULFATE 75 MG: 75 TABLET ORAL at 09:27

## 2020-01-01 RX ADMIN — Medication: at 20:53

## 2020-01-01 RX ADMIN — Medication 10 ML: at 17:50

## 2020-01-01 RX ADMIN — SEVELAMER CARBONATE 800 MG: 800 TABLET, FILM COATED ORAL at 18:24

## 2020-01-01 RX ADMIN — PROPOFOL 10 MG: 10 INJECTION, EMULSION INTRAVENOUS at 06:49

## 2020-01-01 RX ADMIN — HEPARIN SODIUM 7500 UNITS: 5000 INJECTION INTRAVENOUS; SUBCUTANEOUS at 05:45

## 2020-01-01 RX ADMIN — HEPARIN SODIUM 5000 UNITS: 5000 INJECTION INTRAVENOUS; SUBCUTANEOUS at 22:24

## 2020-01-01 RX ADMIN — FENTANYL CITRATE 25 MCG: 50 INJECTION, SOLUTION INTRAMUSCULAR; INTRAVENOUS at 16:19

## 2020-01-01 RX ADMIN — ACETAMINOPHEN 1000 MG: 500 TABLET ORAL at 20:48

## 2020-01-01 RX ADMIN — PROPOFOL 10 MG: 10 INJECTION, EMULSION INTRAVENOUS at 06:52

## 2020-01-01 RX ADMIN — INSULIN LISPRO 4 UNITS: 100 INJECTION, SOLUTION INTRAVENOUS; SUBCUTANEOUS at 08:31

## 2020-01-01 RX ADMIN — PIPERACILLIN AND TAZOBACTAM 3.38 G: 3; .375 INJECTION, POWDER, LYOPHILIZED, FOR SOLUTION INTRAVENOUS at 23:04

## 2020-01-01 RX ADMIN — ATORVASTATIN CALCIUM 20 MG: 20 TABLET, FILM COATED ORAL at 22:15

## 2020-01-01 RX ADMIN — PIPERACILLIN AND TAZOBACTAM 3.38 G: 3; .375 INJECTION, POWDER, LYOPHILIZED, FOR SOLUTION INTRAVENOUS at 17:28

## 2020-01-01 RX ADMIN — OXYCODONE HYDROCHLORIDE AND ACETAMINOPHEN 1 TABLET: 5; 325 TABLET ORAL at 01:58

## 2020-01-01 RX ADMIN — SEVELAMER CARBONATE 800 MG: 800 TABLET, FILM COATED ORAL at 08:54

## 2020-01-01 RX ADMIN — INSULIN LISPRO 3 UNITS: 100 INJECTION, SOLUTION INTRAVENOUS; SUBCUTANEOUS at 08:58

## 2020-01-01 RX ADMIN — OXYCODONE HYDROCHLORIDE AND ACETAMINOPHEN 1 TABLET: 5; 325 TABLET ORAL at 13:00

## 2020-01-01 RX ADMIN — INSULIN LISPRO 3 UNITS: 100 INJECTION, SOLUTION INTRAVENOUS; SUBCUTANEOUS at 21:36

## 2020-01-01 RX ADMIN — PIPERACILLIN AND TAZOBACTAM 3.38 G: 3; .375 INJECTION, POWDER, LYOPHILIZED, FOR SOLUTION INTRAVENOUS at 13:14

## 2020-01-01 RX ADMIN — INSULIN GLARGINE 15 UNITS: 100 INJECTION, SOLUTION SUBCUTANEOUS at 22:05

## 2020-01-01 RX ADMIN — LIDOCAINE HYDROCHLORIDE 60 MG: 20 INJECTION, SOLUTION EPIDURAL; INFILTRATION; INTRACAUDAL; PERINEURAL at 16:48

## 2020-01-01 RX ADMIN — METOPROLOL TARTRATE 50 MG: 50 TABLET, FILM COATED ORAL at 17:24

## 2020-01-01 RX ADMIN — OXYCODONE HYDROCHLORIDE AND ACETAMINOPHEN 1 TABLET: 5; 325 TABLET ORAL at 00:33

## 2020-01-01 RX ADMIN — INSULIN LISPRO 2 UNITS: 100 INJECTION, SOLUTION INTRAVENOUS; SUBCUTANEOUS at 17:30

## 2020-01-01 RX ADMIN — DIAPER RASH SKIN PROTECTENT: at 09:00

## 2020-01-01 RX ADMIN — Medication 1 CAPSULE: at 09:55

## 2020-01-01 RX ADMIN — INSULIN LISPRO 2 UNITS: 100 INJECTION, SOLUTION INTRAVENOUS; SUBCUTANEOUS at 17:54

## 2020-01-01 RX ADMIN — HYDRALAZINE HYDROCHLORIDE 20 MG: 20 INJECTION, SOLUTION INTRAMUSCULAR; INTRAVENOUS at 03:44

## 2020-01-01 RX ADMIN — OXYCODONE HYDROCHLORIDE AND ACETAMINOPHEN 1 TABLET: 5; 325 TABLET ORAL at 14:16

## 2020-01-01 RX ADMIN — LISINOPRIL 10 MG: 10 TABLET ORAL at 09:50

## 2020-01-01 RX ADMIN — OXYCODONE 10 MG: 5 TABLET ORAL at 04:05

## 2020-01-01 RX ADMIN — HYDROMORPHONE HYDROCHLORIDE 1 MG: 1 INJECTION, SOLUTION INTRAMUSCULAR; INTRAVENOUS; SUBCUTANEOUS at 21:27

## 2020-01-01 RX ADMIN — ACETAMINOPHEN 1000 MG: 500 TABLET ORAL at 22:28

## 2020-01-01 RX ADMIN — OXYCODONE 5 MG: 5 TABLET ORAL at 16:42

## 2020-01-01 RX ADMIN — HEPARIN SODIUM 5000 UNITS: 5000 INJECTION INTRAVENOUS; SUBCUTANEOUS at 21:31

## 2020-01-01 RX ADMIN — OXYCODONE HYDROCHLORIDE AND ACETAMINOPHEN 1 TABLET: 5; 325 TABLET ORAL at 14:50

## 2020-01-01 RX ADMIN — HYDROMORPHONE HYDROCHLORIDE 1 MG: 1 INJECTION, SOLUTION INTRAMUSCULAR; INTRAVENOUS; SUBCUTANEOUS at 00:24

## 2020-01-01 RX ADMIN — METRONIDAZOLE 500 MG: 500 INJECTION, SOLUTION INTRAVENOUS at 08:00

## 2020-01-01 RX ADMIN — ACETAMINOPHEN 975 MG: 325 TABLET ORAL at 14:47

## 2020-01-01 RX ADMIN — CLOPIDOGREL BISULFATE 75 MG: 75 TABLET ORAL at 08:56

## 2020-01-01 RX ADMIN — INSULIN GLARGINE 10 UNITS: 100 INJECTION, SOLUTION SUBCUTANEOUS at 09:57

## 2020-01-01 RX ADMIN — INSULIN LISPRO 4 UNITS: 100 INJECTION, SOLUTION INTRAVENOUS; SUBCUTANEOUS at 21:22

## 2020-01-01 RX ADMIN — ATORVASTATIN CALCIUM 20 MG: 20 TABLET, FILM COATED ORAL at 22:04

## 2020-01-01 RX ADMIN — GABAPENTIN 100 MG: 100 CAPSULE ORAL at 22:29

## 2020-01-01 RX ADMIN — INSULIN LISPRO 6 UNITS: 100 INJECTION, SOLUTION INTRAVENOUS; SUBCUTANEOUS at 09:07

## 2020-01-01 RX ADMIN — INSULIN LISPRO 5 UNITS: 100 INJECTION, SOLUTION INTRAVENOUS; SUBCUTANEOUS at 09:55

## 2020-01-01 RX ADMIN — METOPROLOL TARTRATE 75 MG: 25 TABLET, FILM COATED ORAL at 08:56

## 2020-01-01 RX ADMIN — SEVELAMER CARBONATE 800 MG: 800 TABLET, FILM COATED ORAL at 16:15

## 2020-01-01 RX ADMIN — OXYCODONE 10 MG: 5 TABLET ORAL at 22:53

## 2020-01-01 RX ADMIN — SEVELAMER CARBONATE 800 MG: 800 TABLET, FILM COATED ORAL at 13:29

## 2020-01-01 RX ADMIN — HEPARIN SODIUM 5000 UNITS: 5000 INJECTION INTRAVENOUS; SUBCUTANEOUS at 15:38

## 2020-01-01 RX ADMIN — VANCOMYCIN HYDROCHLORIDE 1250 MG: 10 INJECTION, POWDER, LYOPHILIZED, FOR SOLUTION INTRAVENOUS at 21:21

## 2020-01-01 RX ADMIN — ZINC SULFATE 220 MG (50 MG) CAPSULE 220 MG: CAPSULE at 13:14

## 2020-01-01 RX ADMIN — Medication 1 AMPULE: at 21:00

## 2020-01-01 RX ADMIN — Medication 10 ML: at 22:48

## 2020-01-01 RX ADMIN — ASPIRIN 81 MG: 81 TABLET, COATED ORAL at 08:40

## 2020-01-01 RX ADMIN — ASPIRIN 81 MG: 81 TABLET, COATED ORAL at 09:09

## 2020-01-01 RX ADMIN — INSULIN LISPRO 4 UNITS: 100 INJECTION, SOLUTION INTRAVENOUS; SUBCUTANEOUS at 17:42

## 2020-01-01 RX ADMIN — Medication 10 ML: at 18:13

## 2020-01-01 RX ADMIN — HEPARIN SODIUM 5000 UNITS: 5000 INJECTION INTRAVENOUS; SUBCUTANEOUS at 06:00

## 2020-01-01 RX ADMIN — HYDROMORPHONE HYDROCHLORIDE 1 MG: 1 INJECTION, SOLUTION INTRAMUSCULAR; INTRAVENOUS; SUBCUTANEOUS at 18:19

## 2020-01-01 RX ADMIN — DEXTROSE MONOHYDRATE 125 ML: 10 INJECTION, SOLUTION INTRAVENOUS at 11:00

## 2020-01-01 RX ADMIN — HEPARIN SODIUM 5000 UNITS: 5000 INJECTION INTRAVENOUS; SUBCUTANEOUS at 22:20

## 2020-01-01 RX ADMIN — CEFAZOLIN 3 G: 10 INJECTION, POWDER, FOR SOLUTION INTRAVENOUS; PARENTERAL at 15:11

## 2020-01-01 RX ADMIN — HEPARIN SODIUM 7500 UNITS: 5000 INJECTION INTRAVENOUS; SUBCUTANEOUS at 12:45

## 2020-01-01 RX ADMIN — Medication 80 MCG: at 12:53

## 2020-01-01 RX ADMIN — Medication 10 ML: at 17:15

## 2020-01-01 RX ADMIN — HEPARIN SODIUM 5000 UNITS: 5000 INJECTION INTRAVENOUS; SUBCUTANEOUS at 23:05

## 2020-01-01 RX ADMIN — Medication 10 ML: at 04:45

## 2020-01-01 RX ADMIN — ATORVASTATIN CALCIUM 20 MG: 20 TABLET, FILM COATED ORAL at 21:14

## 2020-01-01 RX ADMIN — HEPARIN SODIUM 7500 UNITS: 5000 INJECTION INTRAVENOUS; SUBCUTANEOUS at 14:40

## 2020-01-01 RX ADMIN — LOPERAMIDE HYDROCHLORIDE 2 MG: 2 CAPSULE ORAL at 14:00

## 2020-01-01 RX ADMIN — DIAPER RASH SKIN PROTECTENT: at 18:00

## 2020-01-01 RX ADMIN — Medication 10 ML: at 15:40

## 2020-01-01 RX ADMIN — Medication 10 ML: at 14:07

## 2020-01-01 RX ADMIN — PHENYLEPHRINE HYDROCHLORIDE 100 MCG/MIN: 10 INJECTION INTRAMUSCULAR; INTRAVENOUS; SUBCUTANEOUS at 13:04

## 2020-01-01 RX ADMIN — PIPERACILLIN AND TAZOBACTAM 3.38 G: 3; .375 INJECTION, POWDER, LYOPHILIZED, FOR SOLUTION INTRAVENOUS at 09:30

## 2020-01-01 RX ADMIN — HYDROMORPHONE HYDROCHLORIDE 1 MG: 1 INJECTION, SOLUTION INTRAMUSCULAR; INTRAVENOUS; SUBCUTANEOUS at 01:37

## 2020-01-01 RX ADMIN — OXYCODONE 10 MG: 5 TABLET ORAL at 15:08

## 2020-01-01 RX ADMIN — OXYCODONE HYDROCHLORIDE AND ACETAMINOPHEN 1 TABLET: 5; 325 TABLET ORAL at 12:50

## 2020-01-01 RX ADMIN — HYDROMORPHONE HYDROCHLORIDE 1 MG: 1 INJECTION, SOLUTION INTRAMUSCULAR; INTRAVENOUS; SUBCUTANEOUS at 20:18

## 2020-01-01 RX ADMIN — Medication: at 17:02

## 2020-01-01 RX ADMIN — ACETAMINOPHEN 1000 MG: 500 TABLET ORAL at 13:56

## 2020-01-01 RX ADMIN — OXYCODONE 5 MG: 5 TABLET ORAL at 09:37

## 2020-01-01 RX ADMIN — INSULIN GLARGINE 10 UNITS: 100 INJECTION, SOLUTION SUBCUTANEOUS at 22:55

## 2020-01-01 RX ADMIN — DIAPER RASH SKIN PROTECTENT: at 12:16

## 2020-01-01 RX ADMIN — HEPARIN SODIUM 7500 UNITS: 5000 INJECTION INTRAVENOUS; SUBCUTANEOUS at 22:16

## 2020-01-01 RX ADMIN — HEPARIN SODIUM 5000 UNITS: 5000 INJECTION INTRAVENOUS; SUBCUTANEOUS at 23:01

## 2020-01-01 RX ADMIN — PIPERACILLIN AND TAZOBACTAM 3.38 G: 3; .375 INJECTION, POWDER, LYOPHILIZED, FOR SOLUTION INTRAVENOUS at 09:56

## 2020-01-01 RX ADMIN — AMLODIPINE BESYLATE 10 MG: 5 TABLET ORAL at 08:13

## 2020-01-01 RX ADMIN — INSULIN LISPRO 6 UNITS: 100 INJECTION, SOLUTION INTRAVENOUS; SUBCUTANEOUS at 13:46

## 2020-01-01 RX ADMIN — SEVELAMER CARBONATE 800 MG: 800 TABLET, FILM COATED ORAL at 17:38

## 2020-01-01 RX ADMIN — ACETAMINOPHEN 1000 MG: 500 TABLET ORAL at 05:53

## 2020-01-01 RX ADMIN — HEPARIN SODIUM 7500 UNITS: 5000 INJECTION INTRAVENOUS; SUBCUTANEOUS at 22:31

## 2020-01-01 RX ADMIN — GLYCOPYRROLATE 0.2 MG: 0.2 INJECTION, SOLUTION INTRAMUSCULAR; INTRAVENOUS at 13:43

## 2020-01-01 RX ADMIN — PIPERACILLIN AND TAZOBACTAM 3.38 G: 3; .375 INJECTION, POWDER, LYOPHILIZED, FOR SOLUTION INTRAVENOUS at 21:22

## 2020-01-01 RX ADMIN — OXYCODONE 5 MG: 5 TABLET ORAL at 06:33

## 2020-01-01 RX ADMIN — LOPERAMIDE HYDROCHLORIDE 2 MG: 2 CAPSULE ORAL at 11:07

## 2020-01-01 RX ADMIN — MIDODRINE HYDROCHLORIDE 5 MG: 5 TABLET ORAL at 12:02

## 2020-01-01 RX ADMIN — PREDNISONE 40 MG: 20 TABLET ORAL at 08:13

## 2020-01-01 RX ADMIN — OXYCODONE 10 MG: 5 TABLET ORAL at 20:55

## 2020-01-01 RX ADMIN — OXYCODONE HYDROCHLORIDE AND ACETAMINOPHEN 1 TABLET: 5; 325 TABLET ORAL at 09:42

## 2020-01-01 RX ADMIN — SODIUM CHLORIDE: 9 INJECTION, SOLUTION INTRAVENOUS at 12:21

## 2020-01-01 RX ADMIN — INSULIN LISPRO 7 UNITS: 100 INJECTION, SOLUTION INTRAVENOUS; SUBCUTANEOUS at 11:56

## 2020-01-01 RX ADMIN — VANCOMYCIN HYDROCHLORIDE 2000 MG: 10 INJECTION, POWDER, LYOPHILIZED, FOR SOLUTION INTRAVENOUS at 12:44

## 2020-01-01 RX ADMIN — INSULIN LISPRO 9 UNITS: 100 INJECTION, SOLUTION INTRAVENOUS; SUBCUTANEOUS at 22:04

## 2020-01-01 RX ADMIN — HYDROMORPHONE HYDROCHLORIDE 1 MG: 1 INJECTION, SOLUTION INTRAMUSCULAR; INTRAVENOUS; SUBCUTANEOUS at 11:41

## 2020-01-01 RX ADMIN — Medication 4 TABLET: at 13:14

## 2020-01-01 RX ADMIN — HYDROMORPHONE HYDROCHLORIDE 1 MG: 1 INJECTION, SOLUTION INTRAMUSCULAR; INTRAVENOUS; SUBCUTANEOUS at 21:24

## 2020-01-01 RX ADMIN — FENTANYL CITRATE 25 MCG: 50 INJECTION, SOLUTION INTRAMUSCULAR; INTRAVENOUS at 12:19

## 2020-01-01 RX ADMIN — INSULIN GLARGINE 10 UNITS: 100 INJECTION, SOLUTION SUBCUTANEOUS at 21:48

## 2020-01-01 RX ADMIN — HYDRALAZINE HYDROCHLORIDE 10 MG: 20 INJECTION INTRAMUSCULAR; INTRAVENOUS at 00:48

## 2020-01-01 RX ADMIN — SEVELAMER CARBONATE 800 MG: 800 TABLET, FILM COATED ORAL at 12:59

## 2020-01-01 RX ADMIN — DEXAMETHASONE 6 MG: 4 TABLET ORAL at 14:38

## 2020-01-01 RX ADMIN — OXYCODONE HYDROCHLORIDE AND ACETAMINOPHEN 1 TABLET: 5; 325 TABLET ORAL at 13:09

## 2020-01-01 RX ADMIN — HYDROMORPHONE HYDROCHLORIDE 1 MG: 1 INJECTION, SOLUTION INTRAMUSCULAR; INTRAVENOUS; SUBCUTANEOUS at 09:12

## 2020-01-01 RX ADMIN — PROPOFOL 10 MG: 10 INJECTION, EMULSION INTRAVENOUS at 06:58

## 2020-01-01 RX ADMIN — KETAMINE HYDROCHLORIDE 10 MG: 100 INJECTION, SOLUTION, CONCENTRATE INTRAMUSCULAR; INTRAVENOUS at 13:55

## 2020-01-01 RX ADMIN — GABAPENTIN 100 MG: 100 CAPSULE ORAL at 21:23

## 2020-01-01 RX ADMIN — Medication 10 ML: at 12:24

## 2020-01-01 RX ADMIN — DIAPER RASH SKIN PROTECTENT: at 08:32

## 2020-01-01 RX ADMIN — ASPIRIN 81 MG: 81 TABLET, COATED ORAL at 09:10

## 2020-01-01 RX ADMIN — DIAPER RASH SKIN PROTECTENT: at 09:05

## 2020-01-01 RX ADMIN — OXYCODONE 10 MG: 5 TABLET ORAL at 15:58

## 2020-01-01 RX ADMIN — LISINOPRIL 10 MG: 10 TABLET ORAL at 09:01

## 2020-01-01 RX ADMIN — METOPROLOL TARTRATE 75 MG: 25 TABLET, FILM COATED ORAL at 17:11

## 2020-01-01 RX ADMIN — Medication 10 ML: at 18:38

## 2020-01-01 RX ADMIN — OXYCODONE HYDROCHLORIDE AND ACETAMINOPHEN 1 TABLET: 5; 325 TABLET ORAL at 14:07

## 2020-01-01 RX ADMIN — PIPERACILLIN AND TAZOBACTAM 3.38 G: 3; .375 INJECTION, POWDER, LYOPHILIZED, FOR SOLUTION INTRAVENOUS at 20:44

## 2020-01-01 RX ADMIN — FENTANYL CITRATE 25 MCG: 50 INJECTION, SOLUTION INTRAMUSCULAR; INTRAVENOUS at 13:36

## 2020-01-01 RX ADMIN — DIAPER RASH SKIN PROTECTENT: at 08:16

## 2020-01-01 RX ADMIN — METOPROLOL TARTRATE 25 MG: 25 TABLET, FILM COATED ORAL at 09:12

## 2020-01-01 RX ADMIN — OXYCODONE 5 MG: 5 TABLET ORAL at 08:54

## 2020-01-01 RX ADMIN — HYDROMORPHONE HYDROCHLORIDE 1 MG: 1 INJECTION, SOLUTION INTRAMUSCULAR; INTRAVENOUS; SUBCUTANEOUS at 12:03

## 2020-01-01 RX ADMIN — OXYCODONE 10 MG: 5 TABLET ORAL at 23:54

## 2020-01-01 RX ADMIN — PROPOFOL 10 MG: 10 INJECTION, EMULSION INTRAVENOUS at 06:24

## 2020-01-01 RX ADMIN — SODIUM CHLORIDE: 9 INJECTION, SOLUTION INTRAVENOUS at 13:38

## 2020-01-01 RX ADMIN — HEPARIN SODIUM 5000 UNITS: 5000 INJECTION INTRAVENOUS; SUBCUTANEOUS at 21:49

## 2020-01-01 RX ADMIN — Medication 10 ML: at 23:29

## 2020-01-01 RX ADMIN — HYDROMORPHONE HYDROCHLORIDE 1 MG: 1 INJECTION, SOLUTION INTRAMUSCULAR; INTRAVENOUS; SUBCUTANEOUS at 01:30

## 2020-01-01 RX ADMIN — OXYCODONE HYDROCHLORIDE AND ACETAMINOPHEN 1 TABLET: 5; 325 TABLET ORAL at 16:44

## 2020-01-01 RX ADMIN — INSULIN LISPRO 6 UNITS: 100 INJECTION, SOLUTION INTRAVENOUS; SUBCUTANEOUS at 16:46

## 2020-01-01 RX ADMIN — CEFTRIAXONE 1 G: 1 INJECTION, POWDER, FOR SOLUTION INTRAMUSCULAR; INTRAVENOUS at 14:33

## 2020-01-01 RX ADMIN — INSULIN LISPRO 2 UNITS: 100 INJECTION, SOLUTION INTRAVENOUS; SUBCUTANEOUS at 16:43

## 2020-01-01 RX ADMIN — OXYCODONE 5 MG: 5 TABLET ORAL at 17:52

## 2020-01-01 RX ADMIN — EPOETIN ALFA-EPBX 10000 UNITS: 10000 INJECTION, SOLUTION INTRAVENOUS; SUBCUTANEOUS at 21:32

## 2020-01-01 RX ADMIN — Medication 40 MCG: at 17:19

## 2020-01-01 RX ADMIN — OXYCODONE 5 MG: 5 TABLET ORAL at 17:15

## 2020-01-01 RX ADMIN — INSULIN GLARGINE 12 UNITS: 100 INJECTION, SOLUTION SUBCUTANEOUS at 09:19

## 2020-01-01 RX ADMIN — EPOETIN ALFA-EPBX 10000 UNITS: 10000 INJECTION, SOLUTION INTRAVENOUS; SUBCUTANEOUS at 22:53

## 2020-01-01 RX ADMIN — Medication 10 ML: at 23:46

## 2020-01-01 RX ADMIN — CEFEPIME HYDROCHLORIDE 1 G: 1 INJECTION, POWDER, FOR SOLUTION INTRAMUSCULAR; INTRAVENOUS at 13:08

## 2020-01-01 RX ADMIN — PROPOFOL 250 MG: 10 INJECTION, EMULSION INTRAVENOUS at 12:27

## 2020-01-01 RX ADMIN — PIPERACILLIN AND TAZOBACTAM 3.38 G: 3; .375 INJECTION, POWDER, LYOPHILIZED, FOR SOLUTION INTRAVENOUS at 12:45

## 2020-01-01 RX ADMIN — LOPERAMIDE HYDROCHLORIDE 2 MG: 2 CAPSULE ORAL at 15:43

## 2020-01-01 RX ADMIN — FENTANYL CITRATE 25 MCG: 50 INJECTION, SOLUTION INTRAMUSCULAR; INTRAVENOUS at 16:57

## 2020-01-01 RX ADMIN — OXYCODONE HYDROCHLORIDE AND ACETAMINOPHEN 1 TABLET: 5; 325 TABLET ORAL at 18:32

## 2020-01-01 RX ADMIN — Medication 10 ML: at 21:57

## 2020-01-01 RX ADMIN — LOPERAMIDE HYDROCHLORIDE 2 MG: 2 CAPSULE ORAL at 21:23

## 2020-01-01 RX ADMIN — MIDODRINE HYDROCHLORIDE 5 MG: 5 TABLET ORAL at 18:21

## 2020-01-01 RX ADMIN — INSULIN GLARGINE 20 UNITS: 100 INJECTION, SOLUTION SUBCUTANEOUS at 22:56

## 2020-01-01 RX ADMIN — LABETALOL HYDROCHLORIDE 10 MG: 5 INJECTION INTRAVENOUS at 07:27

## 2020-01-01 RX ADMIN — INSULIN LISPRO 2 UNITS: 100 INJECTION, SOLUTION INTRAVENOUS; SUBCUTANEOUS at 22:14

## 2020-01-01 RX ADMIN — INSULIN LISPRO 2 UNITS: 100 INJECTION, SOLUTION INTRAVENOUS; SUBCUTANEOUS at 14:11

## 2020-01-01 RX ADMIN — ATORVASTATIN CALCIUM 20 MG: 20 TABLET, FILM COATED ORAL at 21:21

## 2020-01-01 RX ADMIN — INSULIN GLARGINE 20 UNITS: 100 INJECTION, SOLUTION SUBCUTANEOUS at 21:51

## 2020-01-01 RX ADMIN — PIPERACILLIN AND TAZOBACTAM 3.38 G: 3; .375 INJECTION, POWDER, LYOPHILIZED, FOR SOLUTION INTRAVENOUS at 12:42

## 2020-01-01 RX ADMIN — OXYCODONE 5 MG: 5 TABLET ORAL at 21:54

## 2020-01-01 RX ADMIN — HEPARIN SODIUM 7500 UNITS: 5000 INJECTION INTRAVENOUS; SUBCUTANEOUS at 21:21

## 2020-01-01 RX ADMIN — METOPROLOL TARTRATE 25 MG: 25 TABLET, FILM COATED ORAL at 22:31

## 2020-01-01 RX ADMIN — Medication 1 CAPSULE: at 13:14

## 2020-01-01 RX ADMIN — INSULIN LISPRO 3 UNITS: 100 INJECTION, SOLUTION INTRAVENOUS; SUBCUTANEOUS at 17:52

## 2020-01-01 RX ADMIN — INSULIN GLARGINE 25 UNITS: 100 INJECTION, SOLUTION SUBCUTANEOUS at 22:40

## 2020-01-01 RX ADMIN — SEVELAMER CARBONATE 800 MG: 800 TABLET, FILM COATED ORAL at 08:56

## 2020-01-01 RX ADMIN — TETROFOSMIN 24.3 MILLICURIE: 1.38 INJECTION, POWDER, LYOPHILIZED, FOR SOLUTION INTRAVENOUS at 09:22

## 2020-01-01 RX ADMIN — INSULIN LISPRO 3 UNITS: 100 INJECTION, SOLUTION INTRAVENOUS; SUBCUTANEOUS at 17:20

## 2020-01-01 RX ADMIN — ATORVASTATIN CALCIUM 20 MG: 20 TABLET, FILM COATED ORAL at 22:24

## 2020-01-01 RX ADMIN — Medication 10 ML: at 16:38

## 2020-01-01 RX ADMIN — SEVELAMER CARBONATE 800 MG: 800 TABLET, FILM COATED ORAL at 08:23

## 2020-01-01 RX ADMIN — DIAPER RASH SKIN PROTECTENT: at 20:00

## 2020-01-01 RX ADMIN — CLOPIDOGREL BISULFATE 75 MG: 75 TABLET ORAL at 12:59

## 2020-01-01 RX ADMIN — METOPROLOL TARTRATE 12.5 MG: 25 TABLET, FILM COATED ORAL at 19:09

## 2020-01-01 RX ADMIN — ATORVASTATIN CALCIUM 20 MG: 20 TABLET, FILM COATED ORAL at 21:54

## 2020-01-01 RX ADMIN — Medication 1 CAPSULE: at 08:26

## 2020-01-01 RX ADMIN — SEVELAMER CARBONATE 800 MG: 800 TABLET, FILM COATED ORAL at 17:52

## 2020-01-01 RX ADMIN — INSULIN LISPRO 2 UNITS: 100 INJECTION, SOLUTION INTRAVENOUS; SUBCUTANEOUS at 17:29

## 2020-01-01 RX ADMIN — SODIUM CHLORIDE 1 UNITS/HR: 9 INJECTION, SOLUTION INTRAVENOUS at 23:07

## 2020-01-01 RX ADMIN — MIDODRINE HYDROCHLORIDE 5 MG: 5 TABLET ORAL at 15:08

## 2020-01-01 RX ADMIN — PIPERACILLIN AND TAZOBACTAM 3.38 G: 3; .375 INJECTION, POWDER, LYOPHILIZED, FOR SOLUTION INTRAVENOUS at 09:13

## 2020-01-01 RX ADMIN — LOPERAMIDE HYDROCHLORIDE 2 MG: 2 CAPSULE ORAL at 22:32

## 2020-01-01 RX ADMIN — METOPROLOL TARTRATE 25 MG: 25 TABLET, FILM COATED ORAL at 23:48

## 2020-01-01 RX ADMIN — PIPERACILLIN AND TAZOBACTAM 3.38 G: 3; .375 INJECTION, POWDER, LYOPHILIZED, FOR SOLUTION INTRAVENOUS at 17:16

## 2020-01-01 RX ADMIN — CLOPIDOGREL BISULFATE 75 MG: 75 TABLET ORAL at 09:10

## 2020-01-01 RX ADMIN — OXYCODONE HYDROCHLORIDE AND ACETAMINOPHEN 1 TABLET: 5; 325 TABLET ORAL at 09:07

## 2020-01-01 RX ADMIN — Medication 10 ML: at 21:21

## 2020-01-01 RX ADMIN — HYDROMORPHONE HYDROCHLORIDE 1 MG: 1 INJECTION, SOLUTION INTRAMUSCULAR; INTRAVENOUS; SUBCUTANEOUS at 17:53

## 2020-01-01 RX ADMIN — FENTANYL CITRATE 25 MCG: 50 INJECTION, SOLUTION INTRAMUSCULAR; INTRAVENOUS at 17:01

## 2020-01-01 RX ADMIN — FENTANYL CITRATE 25 MCG: 50 INJECTION, SOLUTION INTRAMUSCULAR; INTRAVENOUS at 11:29

## 2020-01-01 RX ADMIN — INSULIN LISPRO 2 UNITS: 100 INJECTION, SOLUTION INTRAVENOUS; SUBCUTANEOUS at 09:01

## 2020-01-01 RX ADMIN — MIDODRINE HYDROCHLORIDE 5 MG: 5 TABLET ORAL at 16:37

## 2020-01-01 RX ADMIN — INSULIN LISPRO 3 UNITS: 100 INJECTION, SOLUTION INTRAVENOUS; SUBCUTANEOUS at 13:00

## 2020-01-01 RX ADMIN — Medication 10 ML: at 14:49

## 2020-01-01 RX ADMIN — Medication 10 ML: at 05:09

## 2020-01-01 RX ADMIN — AZITHROMYCIN 500 MG: 250 TABLET, FILM COATED ORAL at 08:44

## 2020-01-01 RX ADMIN — OXYCODONE 5 MG: 5 TABLET ORAL at 00:14

## 2020-01-01 RX ADMIN — HYDROMORPHONE HYDROCHLORIDE 1 MG: 1 INJECTION, SOLUTION INTRAMUSCULAR; INTRAVENOUS; SUBCUTANEOUS at 13:05

## 2020-01-01 RX ADMIN — Medication 10 ML: at 16:26

## 2020-01-01 RX ADMIN — Medication 10 ML: at 21:01

## 2020-01-01 RX ADMIN — FENTANYL CITRATE 25 MCG: 50 INJECTION, SOLUTION INTRAMUSCULAR; INTRAVENOUS at 12:11

## 2020-01-01 RX ADMIN — Medication 1 AMPULE: at 12:11

## 2020-01-01 RX ADMIN — INSULIN GLARGINE 10 UNITS: 100 INJECTION, SOLUTION SUBCUTANEOUS at 22:32

## 2020-01-01 RX ADMIN — PROPOFOL 10 MG: 10 INJECTION, EMULSION INTRAVENOUS at 07:02

## 2020-01-01 RX ADMIN — LISINOPRIL 10 MG: 10 TABLET ORAL at 09:20

## 2020-01-01 RX ADMIN — LISINOPRIL 5 MG: 5 TABLET ORAL at 08:30

## 2020-01-01 RX ADMIN — ALBUMIN (HUMAN) 12.5 G: 0.25 INJECTION, SOLUTION INTRAVENOUS at 11:00

## 2020-01-01 RX ADMIN — OXYCODONE 5 MG: 5 TABLET ORAL at 06:36

## 2020-01-01 RX ADMIN — AMLODIPINE BESYLATE 10 MG: 5 TABLET ORAL at 08:26

## 2020-01-01 RX ADMIN — INSULIN LISPRO 2 UNITS: 100 INJECTION, SOLUTION INTRAVENOUS; SUBCUTANEOUS at 09:03

## 2020-01-01 RX ADMIN — DEXAMETHASONE 6 MG: 4 TABLET ORAL at 08:26

## 2020-01-01 RX ADMIN — OXYCODONE HYDROCHLORIDE AND ACETAMINOPHEN 1 TABLET: 5; 325 TABLET ORAL at 12:11

## 2020-01-01 RX ADMIN — AZITHROMYCIN 500 MG: 250 TABLET, FILM COATED ORAL at 08:26

## 2020-01-01 RX ADMIN — INSULIN LISPRO 3 UNITS: 100 INJECTION, SOLUTION INTRAVENOUS; SUBCUTANEOUS at 12:01

## 2020-01-01 RX ADMIN — HYDROMORPHONE HYDROCHLORIDE 1 MG: 1 INJECTION, SOLUTION INTRAMUSCULAR; INTRAVENOUS; SUBCUTANEOUS at 22:15

## 2020-01-01 RX ADMIN — LOPERAMIDE HYDROCHLORIDE 2 MG: 2 CAPSULE ORAL at 21:26

## 2020-01-01 RX ADMIN — DEXTROSE MONOHYDRATE 50 ML/HR: 5 INJECTION, SOLUTION INTRAVENOUS at 15:19

## 2020-01-01 RX ADMIN — LIDOCAINE HYDROCHLORIDE 60 MG: 20 INJECTION, SOLUTION INFILTRATION; PERINEURAL at 06:12

## 2020-01-01 RX ADMIN — METOPROLOL TARTRATE 50 MG: 50 TABLET, FILM COATED ORAL at 08:23

## 2020-01-01 RX ADMIN — DIAPER RASH SKIN PROTECTENT: at 21:52

## 2020-01-01 RX ADMIN — MIDAZOLAM HYDROCHLORIDE 2 MG: 1 INJECTION, SOLUTION INTRAMUSCULAR; INTRAVENOUS at 14:49

## 2020-01-01 RX ADMIN — Medication 1 CAPSULE: at 08:14

## 2020-01-01 RX ADMIN — ASPIRIN 81 MG: 81 TABLET, COATED ORAL at 08:13

## 2020-01-01 RX ADMIN — METOPROLOL TARTRATE 75 MG: 25 TABLET, FILM COATED ORAL at 17:45

## 2020-01-01 RX ADMIN — INSULIN LISPRO 3 UNITS: 100 INJECTION, SOLUTION INTRAVENOUS; SUBCUTANEOUS at 11:42

## 2020-01-01 RX ADMIN — PIPERACILLIN AND TAZOBACTAM 3.38 G: 3; .375 INJECTION, POWDER, LYOPHILIZED, FOR SOLUTION INTRAVENOUS at 13:23

## 2020-01-01 RX ADMIN — ASPIRIN 81 MG: 81 TABLET, COATED ORAL at 08:26

## 2020-01-01 RX ADMIN — LISINOPRIL 20 MG: 20 TABLET ORAL at 08:13

## 2020-01-01 RX ADMIN — ASPIRIN 81 MG: 81 TABLET, COATED ORAL at 13:49

## 2020-01-01 RX ADMIN — Medication 5 ML: at 14:00

## 2020-01-01 RX ADMIN — DIAPER RASH SKIN PROTECTENT: at 16:41

## 2020-01-01 RX ADMIN — PIPERACILLIN AND TAZOBACTAM 3.38 G: 3; .375 INJECTION, POWDER, LYOPHILIZED, FOR SOLUTION INTRAVENOUS at 16:33

## 2020-01-01 RX ADMIN — INSULIN LISPRO 2 UNITS: 100 INJECTION, SOLUTION INTRAVENOUS; SUBCUTANEOUS at 17:34

## 2020-01-01 RX ADMIN — SEVELAMER CARBONATE 800 MG: 800 TABLET, FILM COATED ORAL at 17:34

## 2020-01-01 RX ADMIN — LOPERAMIDE HYDROCHLORIDE 2 MG: 2 CAPSULE ORAL at 13:15

## 2020-01-01 RX ADMIN — CLOPIDOGREL BISULFATE 75 MG: 75 TABLET ORAL at 13:49

## 2020-01-01 RX ADMIN — OXYCODONE HYDROCHLORIDE AND ACETAMINOPHEN 1 TABLET: 5; 325 TABLET ORAL at 11:27

## 2020-01-01 RX ADMIN — CEFTRIAXONE 1 G: 1 INJECTION, POWDER, FOR SOLUTION INTRAMUSCULAR; INTRAVENOUS at 14:57

## 2020-01-01 RX ADMIN — OXYCODONE 10 MG: 5 TABLET ORAL at 23:27

## 2020-01-01 RX ADMIN — ACETAMINOPHEN 1000 MG: 500 TABLET ORAL at 21:43

## 2020-01-01 RX ADMIN — INSULIN LISPRO 2 UNITS: 100 INJECTION, SOLUTION INTRAVENOUS; SUBCUTANEOUS at 11:30

## 2020-01-01 RX ADMIN — PIPERACILLIN AND TAZOBACTAM 3.38 G: 3; .375 INJECTION, POWDER, LYOPHILIZED, FOR SOLUTION INTRAVENOUS at 17:24

## 2020-01-01 RX ADMIN — PROPOFOL 10 MG: 10 INJECTION, EMULSION INTRAVENOUS at 06:36

## 2020-01-01 RX ADMIN — HYDROMORPHONE HYDROCHLORIDE 1 MG: 1 INJECTION, SOLUTION INTRAMUSCULAR; INTRAVENOUS; SUBCUTANEOUS at 03:17

## 2020-01-01 RX ADMIN — INSULIN LISPRO 2 UNITS: 100 INJECTION, SOLUTION INTRAVENOUS; SUBCUTANEOUS at 12:21

## 2020-01-01 RX ADMIN — METRONIDAZOLE 500 MG: 500 INJECTION, SOLUTION INTRAVENOUS at 08:41

## 2020-01-01 RX ADMIN — LISINOPRIL 20 MG: 20 TABLET ORAL at 09:19

## 2020-01-01 RX ADMIN — CALCIUM GLUCONATE 1 G: 98 INJECTION, SOLUTION INTRAVENOUS at 16:16

## 2020-01-01 RX ADMIN — INSULIN LISPRO 2 UNITS: 100 INJECTION, SOLUTION INTRAVENOUS; SUBCUTANEOUS at 12:03

## 2020-01-01 RX ADMIN — HYDROMORPHONE HYDROCHLORIDE 1 MG: 1 INJECTION, SOLUTION INTRAMUSCULAR; INTRAVENOUS; SUBCUTANEOUS at 17:59

## 2020-01-01 RX ADMIN — HYDROMORPHONE HYDROCHLORIDE 1 MG: 1 INJECTION, SOLUTION INTRAMUSCULAR; INTRAVENOUS; SUBCUTANEOUS at 08:25

## 2020-01-01 RX ADMIN — Medication 1 CAPSULE: at 09:34

## 2020-01-01 RX ADMIN — INSULIN LISPRO 6 UNITS: 100 INJECTION, SOLUTION INTRAVENOUS; SUBCUTANEOUS at 18:26

## 2020-01-01 RX ADMIN — Medication 1 CAPSULE: at 12:30

## 2020-01-01 RX ADMIN — SEVELAMER CARBONATE 800 MG: 800 TABLET, FILM COATED ORAL at 08:00

## 2020-01-01 RX ADMIN — PROPOFOL 10 MG: 10 INJECTION, EMULSION INTRAVENOUS at 06:55

## 2020-01-01 RX ADMIN — INSULIN GLARGINE 20 UNITS: 100 INJECTION, SOLUTION SUBCUTANEOUS at 11:26

## 2020-01-01 RX ADMIN — DEXAMETHASONE 6 MG: 4 TABLET ORAL at 17:39

## 2020-01-01 RX ADMIN — INSULIN GLARGINE 20 UNITS: 100 INJECTION, SOLUTION SUBCUTANEOUS at 08:24

## 2020-01-01 RX ADMIN — LABETALOL HYDROCHLORIDE 10 MG: 5 INJECTION INTRAVENOUS at 22:13

## 2020-01-01 RX ADMIN — INSULIN LISPRO 3 UNITS: 100 INJECTION, SOLUTION INTRAVENOUS; SUBCUTANEOUS at 09:32

## 2020-01-01 RX ADMIN — METOPROLOL TARTRATE 75 MG: 25 TABLET, FILM COATED ORAL at 09:26

## 2020-01-01 RX ADMIN — PROPOFOL 10 MG: 10 INJECTION, EMULSION INTRAVENOUS at 07:09

## 2020-01-01 RX ADMIN — PROPOFOL 10 MG: 10 INJECTION, EMULSION INTRAVENOUS at 06:34

## 2020-01-01 RX ADMIN — EPOETIN ALFA-EPBX 10000 UNITS: 10000 INJECTION, SOLUTION INTRAVENOUS; SUBCUTANEOUS at 22:28

## 2020-01-01 RX ADMIN — ACETAMINOPHEN 1000 MG: 500 TABLET ORAL at 13:45

## 2020-01-01 RX ADMIN — AMLODIPINE BESYLATE 10 MG: 5 TABLET ORAL at 08:58

## 2020-01-01 RX ADMIN — HYDRALAZINE HYDROCHLORIDE 20 MG: 20 INJECTION INTRAMUSCULAR; INTRAVENOUS at 20:37

## 2020-01-01 RX ADMIN — INSULIN LISPRO 2 UNITS: 100 INJECTION, SOLUTION INTRAVENOUS; SUBCUTANEOUS at 12:11

## 2020-01-01 RX ADMIN — PROPOFOL 10 MG: 10 INJECTION, EMULSION INTRAVENOUS at 06:13

## 2020-01-01 RX ADMIN — HYDROMORPHONE HYDROCHLORIDE 1 MG: 1 INJECTION, SOLUTION INTRAMUSCULAR; INTRAVENOUS; SUBCUTANEOUS at 13:48

## 2020-01-01 RX ADMIN — HYDRALAZINE HYDROCHLORIDE 20 MG: 20 INJECTION, SOLUTION INTRAMUSCULAR; INTRAVENOUS at 02:54

## 2020-01-01 RX ADMIN — HYDROMORPHONE HYDROCHLORIDE 1 MG: 1 INJECTION, SOLUTION INTRAMUSCULAR; INTRAVENOUS; SUBCUTANEOUS at 10:35

## 2020-01-01 RX ADMIN — ACETAMINOPHEN 1000 MG: 500 TABLET ORAL at 21:53

## 2020-01-01 RX ADMIN — HEPARIN SODIUM 5000 UNITS: 5000 INJECTION INTRAVENOUS; SUBCUTANEOUS at 06:30

## 2020-01-01 RX ADMIN — HYDROMORPHONE HYDROCHLORIDE 1 MG: 1 INJECTION, SOLUTION INTRAMUSCULAR; INTRAVENOUS; SUBCUTANEOUS at 22:11

## 2020-01-01 RX ADMIN — Medication 10 ML: at 21:14

## 2020-01-01 RX ADMIN — INSULIN GLARGINE 10 UNITS: 100 INJECTION, SOLUTION SUBCUTANEOUS at 21:52

## 2020-01-01 RX ADMIN — HEPARIN SODIUM 7500 UNITS: 5000 INJECTION INTRAVENOUS; SUBCUTANEOUS at 04:27

## 2020-01-01 RX ADMIN — Medication 10 ML: at 06:00

## 2020-01-01 RX ADMIN — METOPROLOL TARTRATE 100 MG: 50 TABLET, FILM COATED ORAL at 08:45

## 2020-01-01 RX ADMIN — HUMAN INSULIN 7 UNITS: 100 INJECTION, SUSPENSION SUBCUTANEOUS at 16:38

## 2020-01-01 RX ADMIN — INSULIN LISPRO 2 UNITS: 100 INJECTION, SOLUTION INTRAVENOUS; SUBCUTANEOUS at 17:05

## 2020-01-01 RX ADMIN — Medication 5 ML: at 06:00

## 2020-01-01 RX ADMIN — PREDNISONE 60 MG: 20 TABLET ORAL at 15:07

## 2020-01-01 RX ADMIN — SEVELAMER CARBONATE 800 MG: 800 TABLET, FILM COATED ORAL at 08:25

## 2020-01-01 RX ADMIN — Medication 10 ML: at 05:04

## 2020-01-01 RX ADMIN — SEVELAMER CARBONATE 800 MG: 800 TABLET, FILM COATED ORAL at 12:58

## 2020-01-01 RX ADMIN — HEPARIN SODIUM 5000 UNITS: 5000 INJECTION INTRAVENOUS; SUBCUTANEOUS at 13:09

## 2020-01-01 RX ADMIN — DIAPER RASH SKIN PROTECTENT: at 09:39

## 2020-01-01 RX ADMIN — HUMAN INSULIN 10 UNITS: 100 INJECTION, SUSPENSION SUBCUTANEOUS at 13:58

## 2020-01-01 RX ADMIN — MIDODRINE HYDROCHLORIDE 5 MG: 5 TABLET ORAL at 09:56

## 2020-01-01 RX ADMIN — AZITHROMYCIN 500 MG: 250 TABLET, FILM COATED ORAL at 09:11

## 2020-01-01 RX ADMIN — OXYCODONE 10 MG: 5 TABLET ORAL at 17:13

## 2020-01-01 RX ADMIN — INSULIN LISPRO 9 UNITS: 100 INJECTION, SOLUTION INTRAVENOUS; SUBCUTANEOUS at 00:27

## 2020-01-01 RX ADMIN — SEVELAMER CARBONATE 800 MG: 800 TABLET, FILM COATED ORAL at 18:12

## 2020-01-01 RX ADMIN — INSULIN LISPRO 12 UNITS: 100 INJECTION, SOLUTION INTRAVENOUS; SUBCUTANEOUS at 08:32

## 2020-01-01 RX ADMIN — HYDROMORPHONE HYDROCHLORIDE 1 MG: 1 INJECTION, SOLUTION INTRAMUSCULAR; INTRAVENOUS; SUBCUTANEOUS at 05:58

## 2020-01-01 RX ADMIN — HYDROMORPHONE HYDROCHLORIDE 1 MG: 1 INJECTION, SOLUTION INTRAMUSCULAR; INTRAVENOUS; SUBCUTANEOUS at 07:48

## 2020-01-01 RX ADMIN — ATORVASTATIN CALCIUM 20 MG: 20 TABLET, FILM COATED ORAL at 22:14

## 2020-01-01 RX ADMIN — ACETAMINOPHEN 1000 MG: 500 TABLET ORAL at 22:29

## 2020-01-01 RX ADMIN — GLYCOPYRROLATE 0.2 MG: 0.2 INJECTION, SOLUTION INTRAMUSCULAR; INTRAVENOUS at 12:35

## 2020-01-01 RX ADMIN — Medication 1 CAPSULE: at 09:31

## 2020-01-01 RX ADMIN — ACETAMINOPHEN 1000 MG: 500 TABLET ORAL at 13:16

## 2020-01-01 RX ADMIN — PREDNISONE 20 MG: 20 TABLET ORAL at 09:44

## 2020-01-01 RX ADMIN — MIDAZOLAM HYDROCHLORIDE 2 MG: 1 INJECTION, SOLUTION INTRAMUSCULAR; INTRAVENOUS at 16:39

## 2020-01-01 RX ADMIN — HEPARIN SODIUM 5000 UNITS: 5000 INJECTION INTRAVENOUS; SUBCUTANEOUS at 13:27

## 2020-01-01 RX ADMIN — Medication 10 ML: at 09:38

## 2020-01-01 RX ADMIN — PIPERACILLIN AND TAZOBACTAM 3.38 G: 3; .375 INJECTION, POWDER, LYOPHILIZED, FOR SOLUTION INTRAVENOUS at 16:40

## 2020-01-01 RX ADMIN — INSULIN LISPRO 3 UNITS: 100 INJECTION, SOLUTION INTRAVENOUS; SUBCUTANEOUS at 16:34

## 2020-01-01 RX ADMIN — LISINOPRIL 20 MG: 20 TABLET ORAL at 08:37

## 2020-01-01 RX ADMIN — FENTANYL CITRATE 25 MCG: 50 INJECTION, SOLUTION INTRAMUSCULAR; INTRAVENOUS at 15:57

## 2020-01-01 RX ADMIN — DIAPER RASH SKIN PROTECTENT: at 18:19

## 2020-01-01 RX ADMIN — GABAPENTIN 100 MG: 100 CAPSULE ORAL at 21:00

## 2020-01-01 RX ADMIN — LOPERAMIDE HYDROCHLORIDE 2 MG: 2 CAPSULE ORAL at 04:49

## 2020-01-01 RX ADMIN — INSULIN GLARGINE 20 UNITS: 100 INJECTION, SOLUTION SUBCUTANEOUS at 09:50

## 2020-01-01 RX ADMIN — CLOPIDOGREL BISULFATE 75 MG: 75 TABLET ORAL at 08:13

## 2020-01-01 RX ADMIN — ATORVASTATIN CALCIUM 20 MG: 20 TABLET, FILM COATED ORAL at 21:00

## 2020-01-01 RX ADMIN — PROPOFOL 10 MG: 10 INJECTION, EMULSION INTRAVENOUS at 07:06

## 2020-01-01 RX ADMIN — INSULIN LISPRO 6 UNITS: 100 INJECTION, SOLUTION INTRAVENOUS; SUBCUTANEOUS at 17:14

## 2020-01-01 RX ADMIN — HEPARIN SODIUM 7500 UNITS: 5000 INJECTION INTRAVENOUS; SUBCUTANEOUS at 21:26

## 2020-01-01 RX ADMIN — INSULIN LISPRO 6 UNITS: 100 INJECTION, SOLUTION INTRAVENOUS; SUBCUTANEOUS at 09:20

## 2020-01-01 RX ADMIN — INSULIN GLARGINE 40 UNITS: 100 INJECTION, SOLUTION SUBCUTANEOUS at 13:09

## 2020-01-01 RX ADMIN — Medication 10 ML: at 11:49

## 2020-01-01 RX ADMIN — ACETAMINOPHEN 1000 MG: 500 TABLET ORAL at 17:55

## 2020-01-01 RX ADMIN — SODIUM CHLORIDE 10 UNITS/HR: 9 INJECTION, SOLUTION INTRAVENOUS at 15:11

## 2020-01-01 RX ADMIN — AMLODIPINE BESYLATE 5 MG: 5 TABLET ORAL at 08:54

## 2020-01-01 RX ADMIN — HEPARIN SODIUM 5000 UNITS: 5000 INJECTION INTRAVENOUS; SUBCUTANEOUS at 16:42

## 2020-01-01 RX ADMIN — HYDROMORPHONE HYDROCHLORIDE 1 MG: 1 INJECTION, SOLUTION INTRAMUSCULAR; INTRAVENOUS; SUBCUTANEOUS at 17:49

## 2020-01-01 RX ADMIN — INSULIN LISPRO 3 UNITS: 100 INJECTION, SOLUTION INTRAVENOUS; SUBCUTANEOUS at 11:50

## 2020-01-01 RX ADMIN — AMLODIPINE BESYLATE 10 MG: 5 TABLET ORAL at 09:09

## 2020-01-01 RX ADMIN — INSULIN GLARGINE 12 UNITS: 100 INJECTION, SOLUTION SUBCUTANEOUS at 09:41

## 2020-01-01 RX ADMIN — EPOETIN ALFA-EPBX 10000 UNITS: 10000 INJECTION, SOLUTION INTRAVENOUS; SUBCUTANEOUS at 23:47

## 2020-01-01 RX ADMIN — ACETAMINOPHEN 1000 MG: 500 TABLET ORAL at 21:30

## 2020-01-01 RX ADMIN — ASPIRIN 81 MG: 81 TABLET, COATED ORAL at 13:19

## 2020-01-01 RX ADMIN — EPOETIN ALFA-EPBX 10000 UNITS: 10000 INJECTION, SOLUTION INTRAVENOUS; SUBCUTANEOUS at 21:10

## 2020-01-01 RX ADMIN — Medication 10 ML: at 22:32

## 2020-01-01 RX ADMIN — INSULIN LISPRO 4 UNITS: 100 INJECTION, SOLUTION INTRAVENOUS; SUBCUTANEOUS at 12:59

## 2020-01-01 RX ADMIN — OXYCODONE HYDROCHLORIDE AND ACETAMINOPHEN 1 TABLET: 5; 325 TABLET ORAL at 21:26

## 2020-01-01 RX ADMIN — ASPIRIN 81 MG: 81 TABLET, COATED ORAL at 10:27

## 2020-01-01 RX ADMIN — SEVELAMER CARBONATE 800 MG: 800 TABLET, FILM COATED ORAL at 08:44

## 2020-01-01 RX ADMIN — APIXABAN 5 MG: 5 TABLET, FILM COATED ORAL at 09:45

## 2020-01-01 RX ADMIN — HYDROMORPHONE HYDROCHLORIDE 1 MG: 1 INJECTION, SOLUTION INTRAMUSCULAR; INTRAVENOUS; SUBCUTANEOUS at 11:06

## 2020-01-01 RX ADMIN — PIPERACILLIN AND TAZOBACTAM 3.38 G: 3; .375 INJECTION, POWDER, LYOPHILIZED, FOR SOLUTION INTRAVENOUS at 00:50

## 2020-01-01 RX ADMIN — Medication 10 ML: at 14:11

## 2020-01-01 RX ADMIN — APIXABAN 5 MG: 5 TABLET, FILM COATED ORAL at 19:44

## 2020-01-01 RX ADMIN — HEPARIN SODIUM 7500 UNITS: 5000 INJECTION INTRAVENOUS; SUBCUTANEOUS at 21:03

## 2020-01-01 RX ADMIN — GABAPENTIN 300 MG: 300 CAPSULE ORAL at 21:58

## 2020-01-01 RX ADMIN — PREDNISONE 40 MG: 20 TABLET ORAL at 10:13

## 2020-01-01 RX ADMIN — HEPARIN SODIUM 7500 UNITS: 5000 INJECTION INTRAVENOUS; SUBCUTANEOUS at 22:29

## 2020-01-01 RX ADMIN — LOPERAMIDE HYDROCHLORIDE 2 MG: 2 CAPSULE ORAL at 16:33

## 2020-01-01 RX ADMIN — ACETAMINOPHEN 1000 MG: 500 TABLET ORAL at 09:46

## 2020-01-01 RX ADMIN — PROPOFOL 10 MG: 10 INJECTION, EMULSION INTRAVENOUS at 06:26

## 2020-01-01 RX ADMIN — PROPOFOL 10 MG: 10 INJECTION, EMULSION INTRAVENOUS at 06:41

## 2020-01-01 RX ADMIN — GABAPENTIN 100 MG: 100 CAPSULE ORAL at 20:45

## 2020-01-01 RX ADMIN — HEPARIN SODIUM 4000 UNITS: 5000 INJECTION INTRAVENOUS; SUBCUTANEOUS at 00:59

## 2020-01-01 RX ADMIN — Medication 3 AMPULE: at 14:25

## 2020-01-01 RX ADMIN — INSULIN GLARGINE 12 UNITS: 100 INJECTION, SOLUTION SUBCUTANEOUS at 21:21

## 2020-01-01 RX ADMIN — INSULIN LISPRO 7 UNITS: 100 INJECTION, SOLUTION INTRAVENOUS; SUBCUTANEOUS at 17:45

## 2020-01-01 RX ADMIN — HEPARIN SODIUM 5000 UNITS: 5000 INJECTION INTRAVENOUS; SUBCUTANEOUS at 08:15

## 2020-01-01 RX ADMIN — OXYCODONE 5 MG: 5 TABLET ORAL at 13:24

## 2020-01-01 RX ADMIN — METOPROLOL TARTRATE 75 MG: 25 TABLET, FILM COATED ORAL at 17:43

## 2020-01-01 RX ADMIN — INSULIN LISPRO 2 UNITS: 100 INJECTION, SOLUTION INTRAVENOUS; SUBCUTANEOUS at 22:40

## 2020-01-01 RX ADMIN — HYDRALAZINE HYDROCHLORIDE 20 MG: 20 INJECTION INTRAMUSCULAR; INTRAVENOUS at 23:47

## 2020-01-01 RX ADMIN — INSULIN LISPRO 3 UNITS: 100 INJECTION, SOLUTION INTRAVENOUS; SUBCUTANEOUS at 09:12

## 2020-01-01 RX ADMIN — INSULIN LISPRO 3 UNITS: 100 INJECTION, SOLUTION INTRAVENOUS; SUBCUTANEOUS at 09:45

## 2020-01-01 RX ADMIN — INSULIN LISPRO 3 UNITS: 100 INJECTION, SOLUTION INTRAVENOUS; SUBCUTANEOUS at 22:55

## 2020-01-01 RX ADMIN — ACETAMINOPHEN 1000 MG: 500 TABLET ORAL at 05:30

## 2020-01-01 RX ADMIN — LOPERAMIDE HYDROCHLORIDE 2 MG: 2 CAPSULE ORAL at 14:49

## 2020-01-01 RX ADMIN — Medication 10 ML: at 15:33

## 2020-01-01 RX ADMIN — OXYCODONE HYDROCHLORIDE AND ACETAMINOPHEN 1000 MG: 500 TABLET ORAL at 08:02

## 2020-01-01 RX ADMIN — SODIUM BICARBONATE 50 MEQ: 84 INJECTION INTRAVENOUS at 16:16

## 2020-01-01 RX ADMIN — OXYCODONE 5 MG: 5 TABLET ORAL at 00:50

## 2020-01-01 RX ADMIN — ACETAMINOPHEN 650 MG: 325 TABLET ORAL at 14:57

## 2020-01-01 RX ADMIN — HEPARIN SODIUM 5000 UNITS: 5000 INJECTION INTRAVENOUS; SUBCUTANEOUS at 23:26

## 2020-01-01 RX ADMIN — Medication 10 ML: at 21:23

## 2020-01-01 RX ADMIN — OXYCODONE 5 MG: 5 TABLET ORAL at 17:38

## 2020-01-01 RX ADMIN — HYDROMORPHONE HYDROCHLORIDE 1 MG: 1 INJECTION, SOLUTION INTRAMUSCULAR; INTRAVENOUS; SUBCUTANEOUS at 11:13

## 2020-01-01 RX ADMIN — PIPERACILLIN AND TAZOBACTAM 3.38 G: 3; .375 INJECTION, POWDER, LYOPHILIZED, FOR SOLUTION INTRAVENOUS at 13:02

## 2020-01-01 RX ADMIN — Medication 1 CAPSULE: at 10:00

## 2020-01-01 RX ADMIN — INSULIN LISPRO 3 UNITS: 100 INJECTION, SOLUTION INTRAVENOUS; SUBCUTANEOUS at 12:03

## 2020-01-01 RX ADMIN — PIPERACILLIN AND TAZOBACTAM 3.38 G: 3; .375 INJECTION, POWDER, LYOPHILIZED, FOR SOLUTION INTRAVENOUS at 20:53

## 2020-01-01 RX ADMIN — OXYCODONE 10 MG: 5 TABLET ORAL at 02:51

## 2020-01-01 RX ADMIN — LABETALOL HYDROCHLORIDE 10 MG: 5 INJECTION INTRAVENOUS at 23:44

## 2020-01-01 RX ADMIN — Medication 10 ML: at 13:06

## 2020-01-01 RX ADMIN — OXYCODONE HYDROCHLORIDE AND ACETAMINOPHEN 1 TABLET: 5; 325 TABLET ORAL at 00:29

## 2020-01-01 RX ADMIN — Medication 10 ML: at 17:47

## 2020-01-01 RX ADMIN — SEVELAMER CARBONATE 800 MG: 800 TABLET, FILM COATED ORAL at 08:02

## 2020-01-01 RX ADMIN — CEFEPIME HYDROCHLORIDE 1 G: 1 INJECTION, POWDER, FOR SOLUTION INTRAMUSCULAR; INTRAVENOUS at 11:51

## 2020-01-01 RX ADMIN — HEPARIN SODIUM 7500 UNITS: 5000 INJECTION INTRAVENOUS; SUBCUTANEOUS at 20:53

## 2020-01-01 RX ADMIN — OXYCODONE 10 MG: 5 TABLET ORAL at 09:53

## 2020-01-01 RX ADMIN — HYDROMORPHONE HYDROCHLORIDE 1 MG: 1 INJECTION, SOLUTION INTRAMUSCULAR; INTRAVENOUS; SUBCUTANEOUS at 16:56

## 2020-01-01 RX ADMIN — HYDROMORPHONE HYDROCHLORIDE 1 MG: 1 INJECTION, SOLUTION INTRAMUSCULAR; INTRAVENOUS; SUBCUTANEOUS at 10:28

## 2020-01-01 RX ADMIN — LISINOPRIL 10 MG: 10 TABLET ORAL at 09:00

## 2020-01-01 RX ADMIN — FENTANYL CITRATE 25 MCG: 50 INJECTION, SOLUTION INTRAMUSCULAR; INTRAVENOUS at 17:39

## 2020-01-01 RX ADMIN — ASPIRIN 81 MG: 81 TABLET, COATED ORAL at 09:12

## 2020-01-01 RX ADMIN — HYDRALAZINE HYDROCHLORIDE 10 MG: 20 INJECTION INTRAMUSCULAR; INTRAVENOUS at 04:00

## 2020-01-01 RX ADMIN — INSULIN LISPRO 2 UNITS: 100 INJECTION, SOLUTION INTRAVENOUS; SUBCUTANEOUS at 22:29

## 2020-01-01 RX ADMIN — MUPIROCIN: 20 OINTMENT TOPICAL at 09:44

## 2020-01-01 RX ADMIN — ROCURONIUM BROMIDE 20 MG: 10 INJECTION INTRAVENOUS at 12:27

## 2020-01-01 RX ADMIN — ZINC SULFATE 220 MG (50 MG) CAPSULE 220 MG: CAPSULE at 08:44

## 2020-01-01 RX ADMIN — Medication 10 ML: at 23:12

## 2020-01-01 RX ADMIN — INSULIN LISPRO 6 UNITS: 100 INJECTION, SOLUTION INTRAVENOUS; SUBCUTANEOUS at 12:21

## 2020-01-01 RX ADMIN — INSULIN LISPRO 2 UNITS: 100 INJECTION, SOLUTION INTRAVENOUS; SUBCUTANEOUS at 12:45

## 2020-01-01 RX ADMIN — DIAPER RASH SKIN PROTECTENT: at 17:15

## 2020-01-01 RX ADMIN — GABAPENTIN 300 MG: 300 CAPSULE ORAL at 22:14

## 2020-01-01 RX ADMIN — CLOPIDOGREL BISULFATE 75 MG: 75 TABLET ORAL at 13:11

## 2020-01-01 RX ADMIN — METOPROLOL TARTRATE 75 MG: 25 TABLET, FILM COATED ORAL at 17:10

## 2020-01-01 RX ADMIN — HYDROMORPHONE HYDROCHLORIDE 1 MG: 1 INJECTION, SOLUTION INTRAMUSCULAR; INTRAVENOUS; SUBCUTANEOUS at 16:43

## 2020-01-01 RX ADMIN — HYDROMORPHONE HYDROCHLORIDE 1 MG: 1 INJECTION, SOLUTION INTRAMUSCULAR; INTRAVENOUS; SUBCUTANEOUS at 18:31

## 2020-01-01 RX ADMIN — OXYCODONE HYDROCHLORIDE AND ACETAMINOPHEN 1 TABLET: 5; 325 TABLET ORAL at 05:14

## 2020-01-01 RX ADMIN — HEPARIN SODIUM 5000 UNITS: 5000 INJECTION INTRAVENOUS; SUBCUTANEOUS at 09:02

## 2020-01-01 RX ADMIN — INSULIN GLARGINE 40 UNITS: 100 INJECTION, SOLUTION SUBCUTANEOUS at 08:37

## 2020-01-01 RX ADMIN — INSULIN LISPRO 2 UNITS: 100 INJECTION, SOLUTION INTRAVENOUS; SUBCUTANEOUS at 08:00

## 2020-01-01 RX ADMIN — MIDODRINE HYDROCHLORIDE 5 MG: 5 TABLET ORAL at 17:44

## 2020-01-01 RX ADMIN — METOPROLOL TARTRATE 75 MG: 25 TABLET, FILM COATED ORAL at 17:39

## 2020-01-01 RX ADMIN — OXYCODONE 10 MG: 5 TABLET ORAL at 19:40

## 2020-01-01 RX ADMIN — DIAPER RASH SKIN PROTECTENT: at 23:56

## 2020-01-01 RX ADMIN — Medication 1 CAPSULE: at 08:00

## 2020-01-01 RX ADMIN — LOPERAMIDE HYDROCHLORIDE 2 MG: 2 CAPSULE ORAL at 03:48

## 2020-01-01 RX ADMIN — GABAPENTIN 100 MG: 100 CAPSULE ORAL at 20:48

## 2020-01-01 RX ADMIN — HEPARIN SODIUM 5000 UNITS: 5000 INJECTION INTRAVENOUS; SUBCUTANEOUS at 13:56

## 2020-01-01 RX ADMIN — Medication 10 ML: at 16:22

## 2020-01-01 RX ADMIN — OXYCODONE HYDROCHLORIDE AND ACETAMINOPHEN 1 TABLET: 5; 325 TABLET ORAL at 18:54

## 2020-01-01 RX ADMIN — SEVELAMER CARBONATE 800 MG: 800 TABLET, FILM COATED ORAL at 13:14

## 2020-01-01 RX ADMIN — PREDNISONE 20 MG: 20 TABLET ORAL at 17:13

## 2020-01-01 RX ADMIN — SEVELAMER CARBONATE 800 MG: 800 TABLET, FILM COATED ORAL at 11:41

## 2020-01-01 RX ADMIN — PROPOFOL 10 MG: 10 INJECTION, EMULSION INTRAVENOUS at 06:47

## 2020-01-01 RX ADMIN — INSULIN LISPRO 2 UNITS: 100 INJECTION, SOLUTION INTRAVENOUS; SUBCUTANEOUS at 17:15

## 2020-01-01 RX ADMIN — HEPARIN SODIUM 7500 UNITS: 5000 INJECTION INTRAVENOUS; SUBCUTANEOUS at 05:38

## 2020-01-01 RX ADMIN — VANCOMYCIN HYDROCHLORIDE 1250 MG: 10 INJECTION, POWDER, LYOPHILIZED, FOR SOLUTION INTRAVENOUS at 16:03

## 2020-01-01 RX ADMIN — HYDROMORPHONE HYDROCHLORIDE 1 MG: 1 INJECTION, SOLUTION INTRAMUSCULAR; INTRAVENOUS; SUBCUTANEOUS at 02:45

## 2020-01-01 RX ADMIN — OXYCODONE HYDROCHLORIDE AND ACETAMINOPHEN 1 TABLET: 5; 325 TABLET ORAL at 06:47

## 2020-01-01 RX ADMIN — PROPOFOL 10 MG: 10 INJECTION, EMULSION INTRAVENOUS at 07:11

## 2020-01-01 RX ADMIN — METOPROLOL TARTRATE 100 MG: 50 TABLET, FILM COATED ORAL at 13:14

## 2020-01-01 RX ADMIN — PROPOFOL 10 MG: 10 INJECTION, EMULSION INTRAVENOUS at 06:28

## 2020-01-01 RX ADMIN — INSULIN LISPRO 3 UNITS: 100 INJECTION, SOLUTION INTRAVENOUS; SUBCUTANEOUS at 09:20

## 2020-01-01 RX ADMIN — PROPOFOL 10 MG: 10 INJECTION, EMULSION INTRAVENOUS at 06:14

## 2020-01-01 RX ADMIN — OXYCODONE HYDROCHLORIDE AND ACETAMINOPHEN 1000 MG: 500 TABLET ORAL at 08:26

## 2020-01-01 RX ADMIN — ACETAMINOPHEN 1000 MG: 500 TABLET ORAL at 23:27

## 2020-01-01 RX ADMIN — ACETAMINOPHEN 650 MG: 325 TABLET ORAL at 09:40

## 2020-01-01 RX ADMIN — PREDNISONE 40 MG: 20 TABLET ORAL at 13:44

## 2020-01-01 RX ADMIN — PROPOFOL 10 MG: 10 INJECTION, EMULSION INTRAVENOUS at 06:30

## 2020-01-01 RX ADMIN — OXYCODONE HYDROCHLORIDE AND ACETAMINOPHEN 1 TABLET: 5; 325 TABLET ORAL at 22:39

## 2020-01-01 RX ADMIN — GABAPENTIN 100 MG: 100 CAPSULE ORAL at 21:50

## 2020-01-01 RX ADMIN — ACETAMINOPHEN 1000 MG: 500 TABLET ORAL at 15:08

## 2020-01-01 RX ADMIN — AMLODIPINE BESYLATE 10 MG: 5 TABLET ORAL at 09:00

## 2020-01-01 RX ADMIN — CLOPIDOGREL BISULFATE 75 MG: 75 TABLET ORAL at 08:45

## 2020-01-01 RX ADMIN — SEVELAMER CARBONATE 800 MG: 800 TABLET, FILM COATED ORAL at 09:09

## 2020-01-01 RX ADMIN — PROPOFOL 10 MG: 10 INJECTION, EMULSION INTRAVENOUS at 06:35

## 2020-01-01 RX ADMIN — PREDNISONE 40 MG: 20 TABLET ORAL at 08:55

## 2020-01-01 RX ADMIN — HYDRALAZINE HYDROCHLORIDE 20 MG: 20 INJECTION INTRAMUSCULAR; INTRAVENOUS at 15:37

## 2020-01-01 RX ADMIN — Medication 10 ML: at 22:30

## 2020-01-01 RX ADMIN — DEXTROSE MONOHYDRATE 25 G: 25 INJECTION, SOLUTION INTRAVENOUS at 16:16

## 2020-01-01 RX ADMIN — HEPARIN SODIUM 5000 UNITS: 5000 INJECTION INTRAVENOUS; SUBCUTANEOUS at 09:55

## 2020-01-01 RX ADMIN — HEPARIN SODIUM 5000 UNITS: 5000 INJECTION INTRAVENOUS; SUBCUTANEOUS at 07:59

## 2020-01-01 RX ADMIN — HEPARIN SODIUM 5000 UNITS: 5000 INJECTION INTRAVENOUS; SUBCUTANEOUS at 15:08

## 2020-01-01 RX ADMIN — CEFTRIAXONE 1 G: 1 INJECTION, POWDER, FOR SOLUTION INTRAMUSCULAR; INTRAVENOUS at 15:40

## 2020-01-01 RX ADMIN — INSULIN GLARGINE 20 UNITS: 100 INJECTION, SOLUTION SUBCUTANEOUS at 22:05

## 2020-01-01 RX ADMIN — OXYCODONE HYDROCHLORIDE AND ACETAMINOPHEN 1 TABLET: 5; 325 TABLET ORAL at 15:34

## 2020-01-01 RX ADMIN — OXYCODONE HYDROCHLORIDE AND ACETAMINOPHEN 1 TABLET: 5; 325 TABLET ORAL at 12:33

## 2020-01-01 RX ADMIN — OXYCODONE 10 MG: 5 TABLET ORAL at 07:53

## 2020-01-01 RX ADMIN — SODIUM POLYSTYRENE SULFONATE 15 G: 15 SUSPENSION ORAL; RECTAL at 12:12

## 2020-01-01 RX ADMIN — CLOPIDOGREL BISULFATE 75 MG: 75 TABLET ORAL at 09:42

## 2020-01-01 RX ADMIN — ACETAMINOPHEN 1000 MG: 500 TABLET ORAL at 15:42

## 2020-01-01 RX ADMIN — Medication 3 AMPULE: at 11:07

## 2020-01-01 RX ADMIN — HYDROMORPHONE HYDROCHLORIDE 1 MG: 1 INJECTION, SOLUTION INTRAMUSCULAR; INTRAVENOUS; SUBCUTANEOUS at 22:06

## 2020-01-01 RX ADMIN — METOPROLOL TARTRATE 75 MG: 25 TABLET, FILM COATED ORAL at 17:19

## 2020-01-01 RX ADMIN — LABETALOL HYDROCHLORIDE 10 MG: 5 INJECTION INTRAVENOUS at 12:14

## 2020-01-01 RX ADMIN — OXYCODONE HYDROCHLORIDE AND ACETAMINOPHEN 1000 MG: 500 TABLET ORAL at 13:14

## 2020-01-01 RX ADMIN — INSULIN LISPRO 2 UNITS: 100 INJECTION, SOLUTION INTRAVENOUS; SUBCUTANEOUS at 13:25

## 2020-01-01 RX ADMIN — OXYCODONE HYDROCHLORIDE AND ACETAMINOPHEN 1 TABLET: 5; 325 TABLET ORAL at 22:29

## 2020-01-01 RX ADMIN — OXYCODONE 10 MG: 5 TABLET ORAL at 01:00

## 2020-01-01 RX ADMIN — OXYCODONE HYDROCHLORIDE AND ACETAMINOPHEN 1 TABLET: 5; 325 TABLET ORAL at 13:32

## 2020-01-01 RX ADMIN — INSULIN LISPRO 2 UNITS: 100 INJECTION, SOLUTION INTRAVENOUS; SUBCUTANEOUS at 22:06

## 2020-01-01 RX ADMIN — HEPARIN SODIUM 7500 UNITS: 5000 INJECTION INTRAVENOUS; SUBCUTANEOUS at 13:00

## 2020-01-01 RX ADMIN — LOPERAMIDE HYDROCHLORIDE 2 MG: 2 CAPSULE ORAL at 09:01

## 2020-01-01 RX ADMIN — Medication: at 09:20

## 2020-01-01 RX ADMIN — SEVELAMER CARBONATE 800 MG: 800 TABLET, FILM COATED ORAL at 10:27

## 2020-01-01 RX ADMIN — CLOPIDOGREL BISULFATE 75 MG: 75 TABLET ORAL at 13:17

## 2020-01-01 RX ADMIN — HEPARIN SODIUM 7500 UNITS: 5000 INJECTION INTRAVENOUS; SUBCUTANEOUS at 12:42

## 2020-01-01 RX ADMIN — LISINOPRIL 10 MG: 10 TABLET ORAL at 11:34

## 2020-01-01 RX ADMIN — LIDOCAINE HYDROCHLORIDE 60 MG: 20 INJECTION, SOLUTION INTRAVENOUS at 13:45

## 2020-01-01 RX ADMIN — INSULIN LISPRO 3 UNITS: 100 INJECTION, SOLUTION INTRAVENOUS; SUBCUTANEOUS at 17:14

## 2020-01-01 RX ADMIN — HEPARIN SODIUM 7.2 UNITS/KG/HR: 10000 INJECTION, SOLUTION INTRAVENOUS at 18:01

## 2020-01-01 RX ADMIN — HEPARIN SODIUM 5000 UNITS: 5000 INJECTION INTRAVENOUS; SUBCUTANEOUS at 23:53

## 2020-01-01 RX ADMIN — EPOETIN ALFA-EPBX 10000 UNITS: 10000 INJECTION, SOLUTION INTRAVENOUS; SUBCUTANEOUS at 21:54

## 2020-01-01 RX ADMIN — METOPROLOL TARTRATE 25 MG: 25 TABLET, FILM COATED ORAL at 08:54

## 2020-01-01 RX ADMIN — LOPERAMIDE HYDROCHLORIDE 2 MG: 2 CAPSULE ORAL at 13:02

## 2020-01-01 RX ADMIN — ASPIRIN 81 MG: 81 TABLET, COATED ORAL at 08:02

## 2020-01-01 RX ADMIN — AMLODIPINE BESYLATE 10 MG: 5 TABLET ORAL at 12:33

## 2020-01-01 RX ADMIN — ASPIRIN 81 MG: 81 TABLET, COATED ORAL at 12:57

## 2020-01-01 RX ADMIN — HYDROMORPHONE HYDROCHLORIDE 1 MG: 1 INJECTION, SOLUTION INTRAMUSCULAR; INTRAVENOUS; SUBCUTANEOUS at 22:26

## 2020-01-01 RX ADMIN — METOPROLOL TARTRATE 50 MG: 50 TABLET, FILM COATED ORAL at 18:24

## 2020-01-01 RX ADMIN — ASPIRIN 81 MG: 81 TABLET, COATED ORAL at 08:44

## 2020-01-01 RX ADMIN — Medication 10 ML: at 13:56

## 2020-01-01 RX ADMIN — SODIUM CHLORIDE 25 ML/HR: 900 INJECTION, SOLUTION INTRAVENOUS at 14:25

## 2020-01-01 RX ADMIN — VANCOMYCIN HYDROCHLORIDE 1000 MG: 1 INJECTION, POWDER, LYOPHILIZED, FOR SOLUTION INTRAVENOUS at 14:54

## 2020-01-01 RX ADMIN — AMLODIPINE BESYLATE 10 MG: 5 TABLET ORAL at 09:19

## 2020-01-01 RX ADMIN — INSULIN GLARGINE 12 UNITS: 100 INJECTION, SOLUTION SUBCUTANEOUS at 21:03

## 2020-01-01 RX ADMIN — LOPERAMIDE HYDROCHLORIDE 2 MG: 2 CAPSULE ORAL at 05:25

## 2020-01-01 RX ADMIN — CEFTRIAXONE 1 G: 1 INJECTION, POWDER, FOR SOLUTION INTRAMUSCULAR; INTRAVENOUS at 16:53

## 2020-01-01 RX ADMIN — LOPERAMIDE HYDROCHLORIDE 2 MG: 2 CAPSULE ORAL at 23:26

## 2020-01-01 RX ADMIN — OXYCODONE 10 MG: 5 TABLET ORAL at 22:29

## 2020-01-01 RX ADMIN — Medication 10 ML: at 04:44

## 2020-01-01 RX ADMIN — PIPERACILLIN AND TAZOBACTAM 3.38 G: 3; .375 INJECTION, POWDER, LYOPHILIZED, FOR SOLUTION INTRAVENOUS at 09:22

## 2020-01-01 RX ADMIN — Medication 16 G: at 18:30

## 2020-01-01 RX ADMIN — PROPOFOL 30 MG: 10 INJECTION, EMULSION INTRAVENOUS at 16:16

## 2020-01-01 RX ADMIN — INSULIN GLARGINE 22 UNITS: 100 INJECTION, SOLUTION SUBCUTANEOUS at 22:32

## 2020-01-01 RX ADMIN — Medication 10 ML: at 05:36

## 2020-01-01 RX ADMIN — INSULIN LISPRO 2 UNITS: 100 INJECTION, SOLUTION INTRAVENOUS; SUBCUTANEOUS at 12:15

## 2020-01-01 RX ADMIN — GABAPENTIN 100 MG: 100 CAPSULE ORAL at 23:55

## 2020-01-01 RX ADMIN — CLOPIDOGREL BISULFATE 75 MG: 75 TABLET ORAL at 10:27

## 2020-01-01 RX ADMIN — PROPOFOL 10 MG: 10 INJECTION, EMULSION INTRAVENOUS at 06:32

## 2020-01-01 RX ADMIN — PROPOFOL 10 MG: 10 INJECTION, EMULSION INTRAVENOUS at 06:45

## 2020-01-01 RX ADMIN — Medication 1 CAPSULE: at 09:19

## 2020-01-01 RX ADMIN — CEFTRIAXONE 1 G: 1 INJECTION, POWDER, FOR SOLUTION INTRAMUSCULAR; INTRAVENOUS at 13:35

## 2020-01-01 RX ADMIN — HYDROMORPHONE HYDROCHLORIDE 1 MG: 1 INJECTION, SOLUTION INTRAMUSCULAR; INTRAVENOUS; SUBCUTANEOUS at 00:31

## 2020-01-01 RX ADMIN — INSULIN GLARGINE 40 UNITS: 100 INJECTION, SOLUTION SUBCUTANEOUS at 08:33

## 2020-01-01 RX ADMIN — DEXTROSE MONOHYDRATE 12.5 G: 25 INJECTION, SOLUTION INTRAVENOUS at 15:29

## 2020-01-01 RX ADMIN — METOPROLOL TARTRATE 75 MG: 25 TABLET, FILM COATED ORAL at 17:13

## 2020-01-01 RX ADMIN — OXYCODONE HYDROCHLORIDE AND ACETAMINOPHEN 1000 MG: 500 TABLET ORAL at 08:44

## 2020-01-01 RX ADMIN — ALBUMIN (HUMAN) 25 G: 0.25 INJECTION, SOLUTION INTRAVENOUS at 11:03

## 2020-01-01 RX ADMIN — PROPOFOL 10 MG: 10 INJECTION, EMULSION INTRAVENOUS at 06:27

## 2020-01-01 RX ADMIN — HYDROMORPHONE HYDROCHLORIDE 1 MG: 1 INJECTION, SOLUTION INTRAMUSCULAR; INTRAVENOUS; SUBCUTANEOUS at 17:13

## 2020-01-01 RX ADMIN — Medication 10 ML: at 05:54

## 2020-01-01 RX ADMIN — SEVELAMER CARBONATE 800 MG: 800 TABLET, FILM COATED ORAL at 13:02

## 2020-01-01 RX ADMIN — ALBUMIN (HUMAN) 25 G: 0.25 INJECTION, SOLUTION INTRAVENOUS at 09:29

## 2020-01-01 RX ADMIN — CEFEPIME HYDROCHLORIDE 1 G: 1 INJECTION, POWDER, FOR SOLUTION INTRAMUSCULAR; INTRAVENOUS at 12:00

## 2020-01-01 RX ADMIN — ACETAMINOPHEN 1000 MG: 500 TABLET ORAL at 05:19

## 2020-01-01 RX ADMIN — Medication 10 ML: at 23:28

## 2020-01-01 RX ADMIN — CEFEPIME HYDROCHLORIDE 1 G: 1 INJECTION, POWDER, FOR SOLUTION INTRAMUSCULAR; INTRAVENOUS at 17:00

## 2020-01-01 RX ADMIN — DIAPER RASH SKIN PROTECTENT: at 09:08

## 2020-01-01 RX ADMIN — LIDOCAINE HYDROCHLORIDE 60 MG: 20 INJECTION, SOLUTION INTRAVENOUS at 14:56

## 2020-01-01 RX ADMIN — PIPERACILLIN AND TAZOBACTAM 3.38 G: 3; .375 INJECTION, POWDER, LYOPHILIZED, FOR SOLUTION INTRAVENOUS at 22:28

## 2020-01-01 RX ADMIN — LABETALOL HYDROCHLORIDE 10 MG: 5 INJECTION INTRAVENOUS at 00:47

## 2020-01-01 RX ADMIN — LOPERAMIDE HYDROCHLORIDE 2 MG: 2 CAPSULE ORAL at 13:09

## 2020-01-01 RX ADMIN — PROPOFOL 20 MG: 10 INJECTION, EMULSION INTRAVENOUS at 13:55

## 2020-01-01 RX ADMIN — HYDROMORPHONE HYDROCHLORIDE 1 MG: 1 INJECTION, SOLUTION INTRAMUSCULAR; INTRAVENOUS; SUBCUTANEOUS at 22:51

## 2020-01-01 RX ADMIN — GABAPENTIN 300 MG: 300 CAPSULE ORAL at 22:45

## 2020-01-01 RX ADMIN — INSULIN LISPRO 4 UNITS: 100 INJECTION, SOLUTION INTRAVENOUS; SUBCUTANEOUS at 11:57

## 2020-01-01 RX ADMIN — OXYCODONE HYDROCHLORIDE AND ACETAMINOPHEN 1 TABLET: 5; 325 TABLET ORAL at 19:54

## 2020-01-01 RX ADMIN — HYDRALAZINE HYDROCHLORIDE 10 MG: 20 INJECTION INTRAMUSCULAR; INTRAVENOUS at 18:30

## 2020-01-01 RX ADMIN — HYDROMORPHONE HYDROCHLORIDE 1 MG: 1 INJECTION, SOLUTION INTRAMUSCULAR; INTRAVENOUS; SUBCUTANEOUS at 18:12

## 2020-01-01 RX ADMIN — INSULIN LISPRO 2 UNITS: 100 INJECTION, SOLUTION INTRAVENOUS; SUBCUTANEOUS at 15:09

## 2020-01-01 RX ADMIN — HEPARIN SODIUM 7500 UNITS: 5000 INJECTION INTRAVENOUS; SUBCUTANEOUS at 16:56

## 2020-01-01 RX ADMIN — OXYCODONE HYDROCHLORIDE AND ACETAMINOPHEN 1 TABLET: 5; 325 TABLET ORAL at 22:27

## 2020-01-01 RX ADMIN — Medication 10 ML: at 16:43

## 2020-01-01 RX ADMIN — ATORVASTATIN CALCIUM 20 MG: 20 TABLET, FILM COATED ORAL at 22:46

## 2020-01-01 RX ADMIN — INSULIN LISPRO 2 UNITS: 100 INJECTION, SOLUTION INTRAVENOUS; SUBCUTANEOUS at 12:22

## 2020-01-01 RX ADMIN — APIXABAN 5 MG: 5 TABLET, FILM COATED ORAL at 09:07

## 2020-01-01 RX ADMIN — OXYCODONE 5 MG: 5 TABLET ORAL at 14:44

## 2020-01-01 RX ADMIN — DIAPER RASH SKIN PROTECTENT: at 22:29

## 2020-01-01 RX ADMIN — DIAPER RASH SKIN PROTECTENT: at 21:56

## 2020-01-01 RX ADMIN — ACETAMINOPHEN 650 MG: 325 TABLET ORAL at 21:13

## 2020-01-01 RX ADMIN — CLOPIDOGREL BISULFATE 75 MG: 75 TABLET ORAL at 12:57

## 2020-01-01 RX ADMIN — HEPARIN SODIUM 5000 UNITS: 5000 INJECTION INTRAVENOUS; SUBCUTANEOUS at 09:42

## 2020-01-01 RX ADMIN — MIDODRINE HYDROCHLORIDE 5 MG: 5 TABLET ORAL at 06:09

## 2020-01-01 RX ADMIN — PIPERACILLIN AND TAZOBACTAM 3.38 G: 3; .375 INJECTION, POWDER, LYOPHILIZED, FOR SOLUTION INTRAVENOUS at 21:07

## 2020-01-01 RX ADMIN — SEVELAMER CARBONATE 800 MG: 800 TABLET, FILM COATED ORAL at 17:08

## 2020-01-01 RX ADMIN — FENTANYL CITRATE 25 MCG: 50 INJECTION, SOLUTION INTRAMUSCULAR; INTRAVENOUS at 16:17

## 2020-01-01 RX ADMIN — OXYCODONE HYDROCHLORIDE AND ACETAMINOPHEN 1 TABLET: 5; 325 TABLET ORAL at 00:55

## 2020-01-01 RX ADMIN — OXYCODONE 10 MG: 5 TABLET ORAL at 20:45

## 2020-01-01 RX ADMIN — ASPIRIN 81 MG: 81 TABLET, COATED ORAL at 13:11

## 2020-01-01 RX ADMIN — INSULIN LISPRO 2 UNITS: 100 INJECTION, SOLUTION INTRAVENOUS; SUBCUTANEOUS at 00:20

## 2020-01-01 RX ADMIN — OXYCODONE 5 MG: 5 TABLET ORAL at 13:03

## 2020-01-01 RX ADMIN — SEVELAMER CARBONATE 800 MG: 800 TABLET, FILM COATED ORAL at 17:33

## 2020-01-01 RX ADMIN — ATORVASTATIN CALCIUM 20 MG: 20 TABLET, FILM COATED ORAL at 22:03

## 2020-01-01 RX ADMIN — CLOPIDOGREL BISULFATE 75 MG: 75 TABLET ORAL at 09:33

## 2020-01-01 RX ADMIN — OXYCODONE HYDROCHLORIDE AND ACETAMINOPHEN 1 TABLET: 5; 325 TABLET ORAL at 09:06

## 2020-01-01 RX ADMIN — PIPERACILLIN AND TAZOBACTAM 3.38 G: 3; .375 INJECTION, POWDER, LYOPHILIZED, FOR SOLUTION INTRAVENOUS at 12:49

## 2020-01-01 RX ADMIN — LOPERAMIDE HYDROCHLORIDE 2 MG: 2 CAPSULE ORAL at 13:45

## 2020-01-01 RX ADMIN — FENTANYL CITRATE 100 MCG: 50 INJECTION, SOLUTION INTRAMUSCULAR; INTRAVENOUS at 12:27

## 2020-01-01 RX ADMIN — MIDODRINE HYDROCHLORIDE 5 MG: 5 TABLET ORAL at 16:20

## 2020-01-01 RX ADMIN — AMLODIPINE BESYLATE 10 MG: 5 TABLET ORAL at 09:53

## 2020-01-01 RX ADMIN — OXYCODONE HYDROCHLORIDE AND ACETAMINOPHEN 1 TABLET: 5; 325 TABLET ORAL at 08:24

## 2020-01-01 RX ADMIN — FENTANYL CITRATE 50 MCG: 50 INJECTION, SOLUTION INTRAMUSCULAR; INTRAVENOUS at 14:56

## 2020-01-01 RX ADMIN — INSULIN LISPRO 2 UNITS: 100 INJECTION, SOLUTION INTRAVENOUS; SUBCUTANEOUS at 13:46

## 2020-01-01 RX ADMIN — HEPARIN SODIUM 5000 UNITS: 5000 INJECTION INTRAVENOUS; SUBCUTANEOUS at 18:21

## 2020-01-01 RX ADMIN — ACETAMINOPHEN 1000 MG: 500 TABLET ORAL at 14:08

## 2020-01-01 RX ADMIN — Medication 1 AMPULE: at 21:02

## 2020-01-01 RX ADMIN — INSULIN GLARGINE 40 UNITS: 100 INJECTION, SOLUTION SUBCUTANEOUS at 09:06

## 2020-01-01 RX ADMIN — ASPIRIN 81 MG: 81 TABLET, COATED ORAL at 09:33

## 2020-01-01 RX ADMIN — HYDROMORPHONE HYDROCHLORIDE 1 MG: 1 INJECTION, SOLUTION INTRAMUSCULAR; INTRAVENOUS; SUBCUTANEOUS at 06:16

## 2020-01-01 RX ADMIN — VANCOMYCIN HYDROCHLORIDE 2000 MG: 10 INJECTION, POWDER, LYOPHILIZED, FOR SOLUTION INTRAVENOUS at 16:36

## 2020-01-01 RX ADMIN — ASPIRIN 81 MG: 81 TABLET, COATED ORAL at 08:54

## 2020-01-01 RX ADMIN — PIPERACILLIN AND TAZOBACTAM 3.38 G: 3; .375 INJECTION, POWDER, LYOPHILIZED, FOR SOLUTION INTRAVENOUS at 00:33

## 2020-01-01 RX ADMIN — ACETAMINOPHEN 650 MG: 325 TABLET ORAL at 09:50

## 2020-01-01 RX ADMIN — INSULIN LISPRO 2 UNITS: 100 INJECTION, SOLUTION INTRAVENOUS; SUBCUTANEOUS at 17:10

## 2020-01-01 RX ADMIN — OXYCODONE 10 MG: 5 TABLET ORAL at 05:53

## 2020-01-01 RX ADMIN — SEVELAMER CARBONATE 800 MG: 800 TABLET, FILM COATED ORAL at 13:20

## 2020-01-01 RX ADMIN — APIXABAN 5 MG: 5 TABLET, FILM COATED ORAL at 23:27

## 2020-01-01 RX ADMIN — EPOETIN ALFA-EPBX 10000 UNITS: 10000 INJECTION, SOLUTION INTRAVENOUS; SUBCUTANEOUS at 22:29

## 2020-01-01 RX ADMIN — PIPERACILLIN AND TAZOBACTAM 3.38 G: 3; .375 INJECTION, POWDER, LYOPHILIZED, FOR SOLUTION INTRAVENOUS at 00:40

## 2020-01-01 RX ADMIN — Medication 16 G: at 07:31

## 2020-01-01 RX ADMIN — LOPERAMIDE HYDROCHLORIDE 2 MG: 2 CAPSULE ORAL at 00:50

## 2020-01-01 RX ADMIN — PIPERACILLIN AND TAZOBACTAM 3.38 G: 3; .375 INJECTION, POWDER, LYOPHILIZED, FOR SOLUTION INTRAVENOUS at 08:26

## 2020-01-01 RX ADMIN — INSULIN GLARGINE 20 UNITS: 100 INJECTION, SOLUTION SUBCUTANEOUS at 09:12

## 2020-01-01 RX ADMIN — OXYCODONE 5 MG: 5 TABLET ORAL at 15:55

## 2020-01-01 RX ADMIN — INSULIN LISPRO 3 UNITS: 100 INJECTION, SOLUTION INTRAVENOUS; SUBCUTANEOUS at 16:51

## 2020-01-01 RX ADMIN — INSULIN LISPRO 4 UNITS: 100 INJECTION, SOLUTION INTRAVENOUS; SUBCUTANEOUS at 22:00

## 2020-01-01 RX ADMIN — METOPROLOL TARTRATE 75 MG: 25 TABLET, FILM COATED ORAL at 12:57

## 2020-01-01 RX ADMIN — INSULIN LISPRO 10 UNITS: 100 INJECTION, SOLUTION INTRAVENOUS; SUBCUTANEOUS at 00:13

## 2020-01-01 RX ADMIN — LOPERAMIDE HYDROCHLORIDE 2 MG: 2 CAPSULE ORAL at 14:58

## 2020-01-01 RX ADMIN — HEPARIN SODIUM 5000 UNITS: 5000 INJECTION INTRAVENOUS; SUBCUTANEOUS at 15:33

## 2020-01-01 RX ADMIN — INSULIN LISPRO 4 UNITS: 100 INJECTION, SOLUTION INTRAVENOUS; SUBCUTANEOUS at 08:45

## 2020-01-01 RX ADMIN — CLOPIDOGREL BISULFATE 75 MG: 75 TABLET ORAL at 13:20

## 2020-01-01 RX ADMIN — OXYCODONE 10 MG: 5 TABLET ORAL at 23:06

## 2020-01-01 RX ADMIN — SODIUM CHLORIDE 25 ML/HR: 900 INJECTION, SOLUTION INTRAVENOUS at 00:30

## 2020-01-01 RX ADMIN — ALBUMIN HUMAN 25 G: 50 SOLUTION INTRAVENOUS at 14:45

## 2020-01-01 RX ADMIN — OXYCODONE HYDROCHLORIDE AND ACETAMINOPHEN 1 TABLET: 5; 325 TABLET ORAL at 23:09

## 2020-01-01 RX ADMIN — INSULIN LISPRO 4 UNITS: 100 INJECTION, SOLUTION INTRAVENOUS; SUBCUTANEOUS at 09:06

## 2020-01-01 RX ADMIN — HEPARIN SODIUM 5000 UNITS: 5000 INJECTION INTRAVENOUS; SUBCUTANEOUS at 02:08

## 2020-01-01 RX ADMIN — FENTANYL CITRATE 25 MCG: 50 INJECTION, SOLUTION INTRAMUSCULAR; INTRAVENOUS at 17:37

## 2020-01-01 RX ADMIN — INSULIN LISPRO 4 UNITS: 100 INJECTION, SOLUTION INTRAVENOUS; SUBCUTANEOUS at 17:33

## 2020-01-01 RX ADMIN — PROPOFOL 10 MG: 10 INJECTION, EMULSION INTRAVENOUS at 06:46

## 2020-01-01 RX ADMIN — HEPARIN SODIUM 5000 UNITS: 5000 INJECTION INTRAVENOUS; SUBCUTANEOUS at 16:46

## 2020-01-01 RX ADMIN — SEVELAMER CARBONATE 800 MG: 800 TABLET, FILM COATED ORAL at 17:43

## 2020-01-01 RX ADMIN — ACETAMINOPHEN 1000 MG: 500 TABLET ORAL at 21:51

## 2020-01-01 RX ADMIN — EPOETIN ALFA-EPBX 10000 UNITS: 10000 INJECTION, SOLUTION INTRAVENOUS; SUBCUTANEOUS at 00:00

## 2020-01-01 RX ADMIN — INSULIN GLARGINE 20 UNITS: 100 INJECTION, SOLUTION SUBCUTANEOUS at 12:39

## 2020-01-01 RX ADMIN — OXYCODONE 10 MG: 5 TABLET ORAL at 02:38

## 2020-01-01 RX ADMIN — SUCCINYLCHOLINE CHLORIDE 140 MG: 20 INJECTION, SOLUTION INTRAMUSCULAR; INTRAVENOUS at 14:56

## 2020-01-01 RX ADMIN — PIPERACILLIN AND TAZOBACTAM 3.38 G: 3; .375 INJECTION, POWDER, LYOPHILIZED, FOR SOLUTION INTRAVENOUS at 09:34

## 2020-01-01 RX ADMIN — MIDAZOLAM 2 MG: 1 INJECTION INTRAMUSCULAR; INTRAVENOUS at 13:42

## 2020-01-01 RX ADMIN — INSULIN LISPRO 3 UNITS: 100 INJECTION, SOLUTION INTRAVENOUS; SUBCUTANEOUS at 11:12

## 2020-01-01 RX ADMIN — ACETAMINOPHEN 1000 MG: 500 TABLET ORAL at 14:06

## 2020-01-01 RX ADMIN — METOPROLOL TARTRATE 25 MG: 25 TABLET, FILM COATED ORAL at 08:40

## 2020-01-01 RX ADMIN — MIDAZOLAM HYDROCHLORIDE 2 MG: 1 INJECTION, SOLUTION INTRAMUSCULAR; INTRAVENOUS at 11:08

## 2020-01-01 RX ADMIN — ACETAMINOPHEN 1000 MG: 500 TABLET ORAL at 07:53

## 2020-01-01 RX ADMIN — DEXTROSE MONOHYDRATE 12.5 G: 25 INJECTION, SOLUTION INTRAVENOUS at 08:34

## 2020-01-01 RX ADMIN — LOPERAMIDE HYDROCHLORIDE 2 MG: 2 CAPSULE ORAL at 13:33

## 2020-01-01 RX ADMIN — VANCOMYCIN HYDROCHLORIDE 1000 MG: 1 INJECTION, POWDER, LYOPHILIZED, FOR SOLUTION INTRAVENOUS at 01:38

## 2020-01-01 RX ADMIN — HEPARIN SODIUM 5000 UNITS: 5000 INJECTION INTRAVENOUS; SUBCUTANEOUS at 08:54

## 2020-01-01 RX ADMIN — Medication 10 ML: at 05:44

## 2020-01-01 RX ADMIN — PROPOFOL 10 MG: 10 INJECTION, EMULSION INTRAVENOUS at 06:39

## 2020-01-01 RX ADMIN — Medication: at 09:42

## 2020-01-01 RX ADMIN — METOPROLOL TARTRATE 75 MG: 25 TABLET, FILM COATED ORAL at 08:14

## 2020-01-01 RX ADMIN — Medication 1 CAPSULE: at 12:45

## 2020-01-01 RX ADMIN — INSULIN GLARGINE 20 UNITS: 100 INJECTION, SOLUTION SUBCUTANEOUS at 22:00

## 2020-01-01 RX ADMIN — ACETAMINOPHEN 1000 MG: 500 TABLET ORAL at 21:00

## 2020-01-01 RX ADMIN — FENTANYL CITRATE 25 MCG: 50 INJECTION, SOLUTION INTRAMUSCULAR; INTRAVENOUS at 13:55

## 2020-01-01 RX ADMIN — DIAPER RASH SKIN PROTECTENT: at 22:10

## 2020-01-01 RX ADMIN — SEVELAMER CARBONATE 800 MG: 800 TABLET, FILM COATED ORAL at 17:31

## 2020-01-01 RX ADMIN — SODIUM CHLORIDE, SODIUM LACTATE, POTASSIUM CHLORIDE, AND CALCIUM CHLORIDE 25 ML/HR: 600; 310; 30; 20 INJECTION, SOLUTION INTRAVENOUS at 18:50

## 2020-01-01 RX ADMIN — MIDODRINE HYDROCHLORIDE 5 MG: 5 TABLET ORAL at 09:03

## 2020-01-01 RX ADMIN — HEPARIN SODIUM 7500 UNITS: 5000 INJECTION INTRAVENOUS; SUBCUTANEOUS at 04:49

## 2020-01-01 RX ADMIN — HEPARIN SODIUM 7500 UNITS: 5000 INJECTION INTRAVENOUS; SUBCUTANEOUS at 13:15

## 2020-01-01 RX ADMIN — LOPERAMIDE HYDROCHLORIDE 2 MG: 2 CAPSULE ORAL at 10:00

## 2020-01-01 RX ADMIN — INSULIN LISPRO 6 UNITS: 100 INJECTION, SOLUTION INTRAVENOUS; SUBCUTANEOUS at 09:46

## 2020-01-01 RX ADMIN — INSULIN GLARGINE 20 UNITS: 100 INJECTION, SOLUTION SUBCUTANEOUS at 08:36

## 2020-01-01 RX ADMIN — SEVELAMER CARBONATE 800 MG: 800 TABLET, FILM COATED ORAL at 12:57

## 2020-01-01 RX ADMIN — INSULIN LISPRO 6 UNITS: 100 INJECTION, SOLUTION INTRAVENOUS; SUBCUTANEOUS at 16:43

## 2020-01-01 RX ADMIN — INSULIN LISPRO 6 UNITS: 100 INJECTION, SOLUTION INTRAVENOUS; SUBCUTANEOUS at 18:19

## 2020-01-01 RX ADMIN — INSULIN LISPRO 3 UNITS: 100 INJECTION, SOLUTION INTRAVENOUS; SUBCUTANEOUS at 08:24

## 2020-01-01 RX ADMIN — HYDROMORPHONE HYDROCHLORIDE 1 MG: 1 INJECTION, SOLUTION INTRAMUSCULAR; INTRAVENOUS; SUBCUTANEOUS at 20:43

## 2020-01-01 RX ADMIN — HEPARIN SODIUM 5000 UNITS: 5000 INJECTION INTRAVENOUS; SUBCUTANEOUS at 16:30

## 2020-01-01 RX ADMIN — PIPERACILLIN AND TAZOBACTAM 3.38 G: 3; .375 INJECTION, POWDER, LYOPHILIZED, FOR SOLUTION INTRAVENOUS at 11:58

## 2020-01-01 RX ADMIN — INSULIN LISPRO 2 UNITS: 100 INJECTION, SOLUTION INTRAVENOUS; SUBCUTANEOUS at 09:15

## 2020-01-01 RX ADMIN — INSULIN LISPRO 4 UNITS: 100 INJECTION, SOLUTION INTRAVENOUS; SUBCUTANEOUS at 13:12

## 2020-01-01 RX ADMIN — LABETALOL HYDROCHLORIDE 10 MG: 5 INJECTION INTRAVENOUS at 06:52

## 2020-01-01 RX ADMIN — FENTANYL CITRATE 25 MCG: 50 INJECTION, SOLUTION INTRAMUSCULAR; INTRAVENOUS at 12:00

## 2020-01-01 RX ADMIN — INSULIN GLARGINE 20 UNITS: 100 INJECTION, SOLUTION SUBCUTANEOUS at 09:40

## 2020-01-01 RX ADMIN — OXYCODONE 5 MG: 5 TABLET ORAL at 13:11

## 2020-01-01 RX ADMIN — ONDANSETRON 4 MG: 2 INJECTION INTRAMUSCULAR; INTRAVENOUS at 09:21

## 2020-01-01 RX ADMIN — METOPROLOL TARTRATE 2.5 MG: 5 INJECTION INTRAVENOUS at 05:59

## 2020-01-01 RX ADMIN — MIDODRINE HYDROCHLORIDE 5 MG: 5 TABLET ORAL at 09:31

## 2020-01-01 RX ADMIN — ASPIRIN 81 MG: 81 TABLET, COATED ORAL at 08:22

## 2020-01-01 RX ADMIN — HEPARIN SODIUM 7500 UNITS: 5000 INJECTION INTRAVENOUS; SUBCUTANEOUS at 13:02

## 2020-01-01 RX ADMIN — DIAPER RASH SKIN PROTECTENT: at 18:23

## 2020-01-01 RX ADMIN — INSULIN GLARGINE 20 UNITS: 100 INJECTION, SOLUTION SUBCUTANEOUS at 09:00

## 2020-01-01 RX ADMIN — SEVELAMER CARBONATE 800 MG: 800 TABLET, FILM COATED ORAL at 17:13

## 2020-01-01 RX ADMIN — GABAPENTIN 300 MG: 300 CAPSULE ORAL at 21:21

## 2020-01-01 RX ADMIN — METOPROLOL TARTRATE 75 MG: 25 TABLET, FILM COATED ORAL at 17:34

## 2020-01-01 RX ADMIN — PROPOFOL 10 MG: 10 INJECTION, EMULSION INTRAVENOUS at 06:22

## 2020-01-01 RX ADMIN — ACETAMINOPHEN 1000 MG: 500 TABLET ORAL at 22:53

## 2020-01-01 RX ADMIN — Medication 5 ML: at 06:03

## 2020-01-01 RX ADMIN — ONDANSETRON 4 MG: 2 INJECTION INTRAMUSCULAR; INTRAVENOUS at 01:50

## 2020-01-01 RX ADMIN — HUMAN INSULIN 10 UNITS: 100 INJECTION, SUSPENSION SUBCUTANEOUS at 12:57

## 2020-01-01 RX ADMIN — Medication 10 ML: at 18:11

## 2020-01-01 RX ADMIN — LIDOCAINE HYDROCHLORIDE 60 MG: 20 INJECTION, SOLUTION EPIDURAL; INFILTRATION; INTRACAUDAL; PERINEURAL at 12:27

## 2020-01-01 RX ADMIN — PIPERACILLIN AND TAZOBACTAM 3.38 G: 3; .375 INJECTION, POWDER, LYOPHILIZED, FOR SOLUTION INTRAVENOUS at 23:13

## 2020-01-01 RX ADMIN — PROPOFOL 10 MG: 10 INJECTION, EMULSION INTRAVENOUS at 07:13

## 2020-01-01 RX ADMIN — ACETAMINOPHEN 1000 MG: 500 TABLET ORAL at 17:16

## 2020-01-01 RX ADMIN — DEXAMETHASONE 6 MG: 4 TABLET ORAL at 08:45

## 2020-01-01 RX ADMIN — AMLODIPINE BESYLATE 10 MG: 5 TABLET ORAL at 12:32

## 2020-01-01 RX ADMIN — HEPARIN SODIUM 7500 UNITS: 5000 INJECTION INTRAVENOUS; SUBCUTANEOUS at 13:24

## 2020-01-01 RX ADMIN — LISINOPRIL 10 MG: 10 TABLET ORAL at 12:45

## 2020-01-01 RX ADMIN — LOPERAMIDE HYDROCHLORIDE 2 MG: 2 CAPSULE ORAL at 21:43

## 2020-01-01 RX ADMIN — PROPOFOL 75 MCG/KG/MIN: 10 INJECTION, EMULSION INTRAVENOUS at 16:48

## 2020-01-01 RX ADMIN — DIAPER RASH SKIN PROTECTENT: at 23:06

## 2020-01-01 RX ADMIN — CEFEPIME HYDROCHLORIDE 1 G: 1 INJECTION, POWDER, FOR SOLUTION INTRAMUSCULAR; INTRAVENOUS at 11:12

## 2020-01-01 RX ADMIN — Medication 1 CAPSULE: at 09:42

## 2020-01-01 RX ADMIN — Medication 5 ML: at 22:21

## 2020-01-01 RX ADMIN — OXYCODONE HYDROCHLORIDE AND ACETAMINOPHEN 1 TABLET: 5; 325 TABLET ORAL at 04:49

## 2020-01-01 RX ADMIN — PIPERACILLIN AND TAZOBACTAM 3.38 G: 3; .375 INJECTION, POWDER, LYOPHILIZED, FOR SOLUTION INTRAVENOUS at 05:32

## 2020-01-01 RX ADMIN — LISINOPRIL 10 MG: 10 TABLET ORAL at 09:06

## 2020-01-01 RX ADMIN — HYDRALAZINE HYDROCHLORIDE 20 MG: 20 INJECTION, SOLUTION INTRAMUSCULAR; INTRAVENOUS at 06:51

## 2020-01-01 RX ADMIN — OXYCODONE HYDROCHLORIDE AND ACETAMINOPHEN 1000 MG: 500 TABLET ORAL at 09:10

## 2020-01-01 RX ADMIN — PROPOFOL 10 MG: 10 INJECTION, EMULSION INTRAVENOUS at 06:15

## 2020-01-01 RX ADMIN — HEPARIN SODIUM 5000 UNITS: 5000 INJECTION INTRAVENOUS; SUBCUTANEOUS at 18:08

## 2020-01-01 RX ADMIN — SEVELAMER CARBONATE 800 MG: 800 TABLET, FILM COATED ORAL at 11:47

## 2020-01-01 RX ADMIN — Medication 1 CAPSULE: at 08:37

## 2020-01-01 RX ADMIN — AMLODIPINE BESYLATE 5 MG: 5 TABLET ORAL at 10:02

## 2020-01-01 RX ADMIN — CEFEPIME HYDROCHLORIDE 1 G: 1 INJECTION, POWDER, FOR SOLUTION INTRAMUSCULAR; INTRAVENOUS at 18:21

## 2020-01-01 RX ADMIN — Medication 10 ML: at 13:16

## 2020-01-01 RX ADMIN — HEPARIN SODIUM 7500 UNITS: 5000 INJECTION INTRAVENOUS; SUBCUTANEOUS at 06:01

## 2020-01-01 RX ADMIN — ATORVASTATIN CALCIUM 20 MG: 20 TABLET, FILM COATED ORAL at 22:25

## 2020-01-01 RX ADMIN — METOPROLOL TARTRATE 75 MG: 25 TABLET, FILM COATED ORAL at 18:43

## 2020-01-01 RX ADMIN — Medication 1 CAPSULE: at 09:13

## 2020-01-01 RX ADMIN — ACETAMINOPHEN 1000 MG: 500 TABLET ORAL at 06:00

## 2020-01-01 RX ADMIN — INSULIN LISPRO 4 UNITS: 100 INJECTION, SOLUTION INTRAVENOUS; SUBCUTANEOUS at 09:15

## 2020-01-01 RX ADMIN — PROPOFOL 75 MCG/KG/MIN: 10 INJECTION, EMULSION INTRAVENOUS at 11:12

## 2020-01-01 RX ADMIN — INSULIN LISPRO 5 UNITS: 100 INJECTION, SOLUTION INTRAVENOUS; SUBCUTANEOUS at 09:07

## 2020-01-01 RX ADMIN — PROPOFOL 10 MG: 10 INJECTION, EMULSION INTRAVENOUS at 06:40

## 2020-01-01 RX ADMIN — INSULIN LISPRO 5 UNITS: 100 INJECTION, SOLUTION INTRAVENOUS; SUBCUTANEOUS at 12:47

## 2020-01-01 RX ADMIN — INSULIN LISPRO 4 UNITS: 100 INJECTION, SOLUTION INTRAVENOUS; SUBCUTANEOUS at 21:49

## 2020-01-01 RX ADMIN — HYDROMORPHONE HYDROCHLORIDE 1 MG: 1 INJECTION, SOLUTION INTRAMUSCULAR; INTRAVENOUS; SUBCUTANEOUS at 20:44

## 2020-01-01 RX ADMIN — Medication 10 ML: at 05:38

## 2020-01-01 RX ADMIN — Medication 1 CAPSULE: at 18:21

## 2020-01-01 RX ADMIN — FENTANYL CITRATE 100 MCG: 50 INJECTION, SOLUTION INTRAMUSCULAR; INTRAVENOUS at 06:09

## 2020-01-01 RX ADMIN — HEPARIN SODIUM 5000 UNITS: 5000 INJECTION INTRAVENOUS; SUBCUTANEOUS at 06:05

## 2020-01-01 RX ADMIN — PIPERACILLIN AND TAZOBACTAM 3.38 G: 3; .375 INJECTION, POWDER, LYOPHILIZED, FOR SOLUTION INTRAVENOUS at 20:50

## 2020-01-01 RX ADMIN — INSULIN LISPRO 6 UNITS: 100 INJECTION, SOLUTION INTRAVENOUS; SUBCUTANEOUS at 12:15

## 2020-01-01 RX ADMIN — HYDRALAZINE HYDROCHLORIDE 10 MG: 20 INJECTION INTRAMUSCULAR; INTRAVENOUS at 08:50

## 2020-01-01 RX ADMIN — HYDROMORPHONE HYDROCHLORIDE 1 MG: 1 INJECTION, SOLUTION INTRAMUSCULAR; INTRAVENOUS; SUBCUTANEOUS at 06:08

## 2020-01-01 RX ADMIN — INSULIN GLARGINE 20 UNITS: 100 INJECTION, SOLUTION SUBCUTANEOUS at 13:29

## 2020-01-01 RX ADMIN — OXYCODONE 5 MG: 5 TABLET ORAL at 03:52

## 2020-01-01 RX ADMIN — Medication 1 CAPSULE: at 09:20

## 2020-01-01 RX ADMIN — AMLODIPINE BESYLATE 10 MG: 5 TABLET ORAL at 13:27

## 2020-01-01 RX ADMIN — HYDROMORPHONE HYDROCHLORIDE 1 MG: 1 INJECTION, SOLUTION INTRAMUSCULAR; INTRAVENOUS; SUBCUTANEOUS at 21:50

## 2020-01-01 RX ADMIN — HYDROMORPHONE HYDROCHLORIDE 1 MG: 1 INJECTION, SOLUTION INTRAMUSCULAR; INTRAVENOUS; SUBCUTANEOUS at 02:00

## 2020-01-01 RX ADMIN — INSULIN GLARGINE 20 UNITS: 100 INJECTION, SOLUTION SUBCUTANEOUS at 13:57

## 2020-01-01 RX ADMIN — OXYCODONE HYDROCHLORIDE AND ACETAMINOPHEN 1 TABLET: 5; 325 TABLET ORAL at 04:55

## 2020-01-01 RX ADMIN — PROTAMINE SULFATE 30 MG: 10 INJECTION, SOLUTION INTRAVENOUS at 18:07

## 2020-01-01 RX ADMIN — PREDNISONE 20 MG: 20 TABLET ORAL at 09:08

## 2020-01-01 RX ADMIN — HEPARIN SODIUM 5000 UNITS: 5000 INJECTION INTRAVENOUS; SUBCUTANEOUS at 21:22

## 2020-01-01 RX ADMIN — HYDROMORPHONE HYDROCHLORIDE 1 MG: 1 INJECTION, SOLUTION INTRAMUSCULAR; INTRAVENOUS; SUBCUTANEOUS at 00:08

## 2020-01-01 RX ADMIN — HYDROMORPHONE HYDROCHLORIDE 1 MG: 1 INJECTION, SOLUTION INTRAMUSCULAR; INTRAVENOUS; SUBCUTANEOUS at 04:43

## 2020-01-01 RX ADMIN — INSULIN GLARGINE 10 UNITS: 100 INJECTION, SOLUTION SUBCUTANEOUS at 09:05

## 2020-01-01 RX ADMIN — INSULIN GLARGINE 10 UNITS: 100 INJECTION, SOLUTION SUBCUTANEOUS at 22:02

## 2020-01-01 RX ADMIN — OXYCODONE HYDROCHLORIDE AND ACETAMINOPHEN 1 TABLET: 5; 325 TABLET ORAL at 08:58

## 2020-01-01 RX ADMIN — HEPARIN SODIUM 5000 UNITS: 5000 INJECTION INTRAVENOUS; SUBCUTANEOUS at 21:13

## 2020-01-01 RX ADMIN — INSULIN LISPRO 5 UNITS: 100 INJECTION, SOLUTION INTRAVENOUS; SUBCUTANEOUS at 16:52

## 2020-01-01 RX ADMIN — ACETAMINOPHEN 1000 MG: 500 TABLET ORAL at 14:00

## 2020-01-01 RX ADMIN — ONDANSETRON HYDROCHLORIDE 4 MG: 2 INJECTION, SOLUTION INTRAMUSCULAR; INTRAVENOUS at 15:50

## 2020-01-01 RX ADMIN — HYDROMORPHONE HYDROCHLORIDE 1 MG: 1 INJECTION, SOLUTION INTRAMUSCULAR; INTRAVENOUS; SUBCUTANEOUS at 19:15

## 2020-01-01 RX ADMIN — INSULIN LISPRO 4 UNITS: 100 INJECTION, SOLUTION INTRAVENOUS; SUBCUTANEOUS at 17:15

## 2020-01-01 RX ADMIN — Medication 10 ML: at 00:47

## 2020-01-01 RX ADMIN — ONDANSETRON 4 MG: 2 INJECTION INTRAMUSCULAR; INTRAVENOUS at 12:37

## 2020-01-01 RX ADMIN — Medication 10 ML: at 21:04

## 2020-01-01 RX ADMIN — PROPOFOL 30 MG: 10 INJECTION, EMULSION INTRAVENOUS at 13:45

## 2020-01-01 RX ADMIN — Medication 1 CAPSULE: at 09:10

## 2020-01-01 RX ADMIN — INSULIN LISPRO 2 UNITS: 100 INJECTION, SOLUTION INTRAVENOUS; SUBCUTANEOUS at 17:41

## 2020-01-01 RX ADMIN — LISINOPRIL 10 MG: 10 TABLET ORAL at 10:00

## 2020-01-01 RX ADMIN — OXYCODONE HYDROCHLORIDE AND ACETAMINOPHEN 1 TABLET: 5; 325 TABLET ORAL at 21:02

## 2020-01-01 RX ADMIN — ZINC SULFATE 220 MG (50 MG) CAPSULE 220 MG: CAPSULE at 09:10

## 2020-01-01 RX ADMIN — DEXAMETHASONE 6 MG: 4 TABLET ORAL at 09:08

## 2020-01-01 RX ADMIN — OXYCODONE 10 MG: 5 TABLET ORAL at 05:30

## 2020-01-01 RX ADMIN — DEXTROSE MONOHYDRATE 125 ML: 10 INJECTION, SOLUTION INTRAVENOUS at 08:00

## 2020-01-01 RX ADMIN — Medication 10 ML: at 05:53

## 2020-01-01 RX ADMIN — INSULIN LISPRO 2 UNITS: 100 INJECTION, SOLUTION INTRAVENOUS; SUBCUTANEOUS at 22:37

## 2020-01-01 RX ADMIN — Medication 10 ML: at 11:17

## 2020-01-01 RX ADMIN — Medication 1 CAPSULE: at 08:30

## 2020-01-01 RX ADMIN — Medication 1 CAPSULE: at 08:38

## 2020-01-01 RX ADMIN — PROPOFOL 10 MG: 10 INJECTION, EMULSION INTRAVENOUS at 06:16

## 2020-01-01 RX ADMIN — CLOPIDOGREL BISULFATE 75 MG: 75 TABLET ORAL at 08:02

## 2020-01-01 RX ADMIN — SODIUM CHLORIDE 6.2 UNITS/HR: 9 INJECTION, SOLUTION INTRAVENOUS at 06:46

## 2020-01-01 RX ADMIN — Medication 1 CAPSULE: at 09:06

## 2020-01-01 RX ADMIN — GABAPENTIN 100 MG: 100 CAPSULE ORAL at 22:28

## 2020-01-01 RX ADMIN — APIXABAN 5 MG: 5 TABLET, FILM COATED ORAL at 22:29

## 2020-01-01 RX ADMIN — METOPROLOL TARTRATE 75 MG: 25 TABLET, FILM COATED ORAL at 08:26

## 2020-01-01 RX ADMIN — ACETAMINOPHEN 1000 MG: 500 TABLET ORAL at 23:54

## 2020-01-01 RX ADMIN — ATORVASTATIN CALCIUM 20 MG: 20 TABLET, FILM COATED ORAL at 21:25

## 2020-01-01 RX ADMIN — OXYCODONE HYDROCHLORIDE AND ACETAMINOPHEN 1 TABLET: 5; 325 TABLET ORAL at 21:22

## 2020-01-01 RX ADMIN — MIDODRINE HYDROCHLORIDE 5 MG: 5 TABLET ORAL at 12:11

## 2020-01-01 RX ADMIN — METOPROLOL TARTRATE 100 MG: 50 TABLET, FILM COATED ORAL at 17:33

## 2020-01-01 RX ADMIN — METRONIDAZOLE 500 MG: 500 INJECTION, SOLUTION INTRAVENOUS at 00:16

## 2020-01-01 RX ADMIN — Medication 10 ML: at 10:38

## 2020-01-01 RX ADMIN — ACETAMINOPHEN 1000 MG: 500 TABLET ORAL at 06:09

## 2020-01-01 RX ADMIN — KETAMINE HYDROCHLORIDE 20 MG: 100 INJECTION, SOLUTION, CONCENTRATE INTRAMUSCULAR; INTRAVENOUS at 13:45

## 2020-01-01 RX ADMIN — INSULIN LISPRO 15 UNITS: 100 INJECTION, SOLUTION INTRAVENOUS; SUBCUTANEOUS at 12:29

## 2020-01-01 RX ADMIN — ATORVASTATIN CALCIUM 20 MG: 20 TABLET, FILM COATED ORAL at 20:44

## 2020-01-01 RX ADMIN — OXYCODONE 5 MG: 5 TABLET ORAL at 23:48

## 2020-01-01 RX ADMIN — HYDRALAZINE HYDROCHLORIDE 20 MG: 20 INJECTION, SOLUTION INTRAMUSCULAR; INTRAVENOUS at 15:47

## 2020-01-01 RX ADMIN — Medication 120 MCG: at 12:35

## 2020-01-01 RX ADMIN — OXYCODONE HYDROCHLORIDE AND ACETAMINOPHEN 1 TABLET: 5; 325 TABLET ORAL at 09:43

## 2020-01-01 RX ADMIN — INSULIN LISPRO 15 UNITS: 100 INJECTION, SOLUTION INTRAVENOUS; SUBCUTANEOUS at 17:34

## 2020-01-01 RX ADMIN — GABAPENTIN 100 MG: 100 CAPSULE ORAL at 22:54

## 2020-01-01 RX ADMIN — INSULIN LISPRO 6 UNITS: 100 INJECTION, SOLUTION INTRAVENOUS; SUBCUTANEOUS at 08:37

## 2020-01-01 RX ADMIN — DIAPER RASH SKIN PROTECTENT: at 18:20

## 2020-01-01 RX ADMIN — AMLODIPINE BESYLATE 5 MG: 5 TABLET ORAL at 08:30

## 2020-01-01 RX ADMIN — HYDROMORPHONE HYDROCHLORIDE 1 MG: 1 INJECTION, SOLUTION INTRAMUSCULAR; INTRAVENOUS; SUBCUTANEOUS at 17:50

## 2020-01-01 RX ADMIN — HEPARIN SODIUM 7500 UNITS: 5000 INJECTION INTRAVENOUS; SUBCUTANEOUS at 05:23

## 2020-01-01 RX ADMIN — SEVELAMER CARBONATE 800 MG: 800 TABLET, FILM COATED ORAL at 12:08

## 2020-01-01 RX ADMIN — HYDROMORPHONE HYDROCHLORIDE 1 MG: 1 INJECTION, SOLUTION INTRAMUSCULAR; INTRAVENOUS; SUBCUTANEOUS at 09:22

## 2020-01-01 RX ADMIN — INSULIN GLARGINE 20 UNITS: 100 INJECTION, SOLUTION SUBCUTANEOUS at 13:07

## 2020-01-01 RX ADMIN — HEPARIN SODIUM 7500 UNITS: 5000 INJECTION INTRAVENOUS; SUBCUTANEOUS at 22:01

## 2020-01-01 RX ADMIN — Medication 10 ML: at 19:10

## 2020-01-01 RX ADMIN — INSULIN LISPRO 7 UNITS: 100 INJECTION, SOLUTION INTRAVENOUS; SUBCUTANEOUS at 18:19

## 2020-01-01 RX ADMIN — Medication 10 ML: at 21:00

## 2020-01-01 RX ADMIN — Medication 10 ML: at 22:06

## 2020-01-01 RX ADMIN — ATORVASTATIN CALCIUM 20 MG: 20 TABLET, FILM COATED ORAL at 23:48

## 2020-01-01 RX ADMIN — HEPARIN SODIUM 5000 UNITS: 5000 INJECTION INTRAVENOUS; SUBCUTANEOUS at 16:34

## 2020-01-01 RX ADMIN — SEVELAMER CARBONATE 800 MG: 800 TABLET, FILM COATED ORAL at 16:37

## 2020-01-01 RX ADMIN — HYDROMORPHONE HYDROCHLORIDE 1 MG: 1 INJECTION, SOLUTION INTRAMUSCULAR; INTRAVENOUS; SUBCUTANEOUS at 07:16

## 2020-01-01 RX ADMIN — INSULIN GLARGINE 7 UNITS: 100 INJECTION, SOLUTION SUBCUTANEOUS at 01:11

## 2020-01-01 RX ADMIN — INSULIN LISPRO 2 UNITS: 100 INJECTION, SOLUTION INTRAVENOUS; SUBCUTANEOUS at 13:13

## 2020-01-01 RX ADMIN — INSULIN LISPRO 4 UNITS: 100 INJECTION, SOLUTION INTRAVENOUS; SUBCUTANEOUS at 21:03

## 2020-01-01 RX ADMIN — INSULIN LISPRO 8 UNITS: 100 INJECTION, SOLUTION INTRAVENOUS; SUBCUTANEOUS at 17:37

## 2020-01-01 RX ADMIN — HEPARIN SODIUM 5000 UNITS: 5000 INJECTION INTRAVENOUS; SUBCUTANEOUS at 06:09

## 2020-01-01 RX ADMIN — INSULIN GLARGINE 30 UNITS: 100 INJECTION, SOLUTION SUBCUTANEOUS at 09:00

## 2020-01-01 RX ADMIN — DIAPER RASH SKIN PROTECTENT: at 18:26

## 2020-01-01 RX ADMIN — METOPROLOL TARTRATE 75 MG: 25 TABLET, FILM COATED ORAL at 17:54

## 2020-01-01 RX ADMIN — Medication 10 ML: at 15:00

## 2020-01-01 RX ADMIN — SEVELAMER CARBONATE 800 MG: 800 TABLET, FILM COATED ORAL at 17:21

## 2020-01-01 RX ADMIN — ACETAMINOPHEN 1000 MG: 500 TABLET ORAL at 14:46

## 2020-01-01 RX ADMIN — LOPERAMIDE HYDROCHLORIDE 2 MG: 2 CAPSULE ORAL at 16:02

## 2020-01-01 RX ADMIN — Medication 80 MCG: at 12:58

## 2020-01-01 RX ADMIN — Medication 10 ML: at 05:47

## 2020-01-01 RX ADMIN — DEXTROSE MONOHYDRATE 25 G: 25 INJECTION, SOLUTION INTRAVENOUS at 09:26

## 2020-01-01 RX ADMIN — LOPERAMIDE HYDROCHLORIDE 2 MG: 2 CAPSULE ORAL at 12:50

## 2020-01-01 RX ADMIN — HEPARIN SODIUM 5000 UNITS: 5000 INJECTION INTRAVENOUS; SUBCUTANEOUS at 23:27

## 2020-01-01 RX ADMIN — Medication 1 CAPSULE: at 08:35

## 2020-01-01 RX ADMIN — INSULIN LISPRO 5 UNITS: 100 INJECTION, SOLUTION INTRAVENOUS; SUBCUTANEOUS at 17:51

## 2020-01-01 RX ADMIN — ACETAMINOPHEN 1000 MG: 500 TABLET ORAL at 23:05

## 2020-01-01 RX ADMIN — FENTANYL CITRATE 50 MCG: 50 INJECTION, SOLUTION INTRAMUSCULAR; INTRAVENOUS at 13:29

## 2020-01-01 RX ADMIN — CLINDAMYCIN IN 5 PERCENT DEXTROSE 600 MG: 12 INJECTION, SOLUTION INTRAVENOUS at 07:23

## 2020-01-01 RX ADMIN — Medication 10 ML: at 06:09

## 2020-01-01 RX ADMIN — HYDROMORPHONE HYDROCHLORIDE 1 MG: 1 INJECTION, SOLUTION INTRAMUSCULAR; INTRAVENOUS; SUBCUTANEOUS at 22:14

## 2020-01-01 RX ADMIN — CLOPIDOGREL BISULFATE 600 MG: 300 TABLET, FILM COATED ORAL at 09:30

## 2020-01-01 RX ADMIN — OXYCODONE 10 MG: 5 TABLET ORAL at 23:26

## 2020-01-01 RX ADMIN — INSULIN LISPRO 7 UNITS: 100 INJECTION, SOLUTION INTRAVENOUS; SUBCUTANEOUS at 17:44

## 2020-01-01 RX ADMIN — Medication 10 ML: at 06:44

## 2020-01-01 RX ADMIN — INSULIN LISPRO 2 UNITS: 100 INJECTION, SOLUTION INTRAVENOUS; SUBCUTANEOUS at 08:40

## 2020-01-01 RX ADMIN — SODIUM CHLORIDE 40 MCG/MIN: 900 INJECTION, SOLUTION INTRAVENOUS at 15:10

## 2020-01-01 RX ADMIN — OXYCODONE HYDROCHLORIDE AND ACETAMINOPHEN 1 TABLET: 5; 325 TABLET ORAL at 19:02

## 2020-01-01 RX ADMIN — VANCOMYCIN HYDROCHLORIDE 1250 MG: 10 INJECTION, POWDER, LYOPHILIZED, FOR SOLUTION INTRAVENOUS at 14:36

## 2020-01-01 RX ADMIN — HEPARIN SODIUM 5000 UNITS: 5000 INJECTION INTRAVENOUS; SUBCUTANEOUS at 05:06

## 2020-01-01 RX ADMIN — LOPERAMIDE HYDROCHLORIDE 2 MG: 2 CAPSULE ORAL at 10:44

## 2020-01-01 RX ADMIN — HEPARIN SODIUM 5000 UNITS: 5000 INJECTION INTRAVENOUS; SUBCUTANEOUS at 00:24

## 2020-01-01 RX ADMIN — INSULIN GLARGINE 20 UNITS: 100 INJECTION, SOLUTION SUBCUTANEOUS at 08:40

## 2020-01-01 RX ADMIN — LISINOPRIL 10 MG: 10 TABLET ORAL at 13:27

## 2020-01-01 RX ADMIN — INSULIN LISPRO 2 UNITS: 100 INJECTION, SOLUTION INTRAVENOUS; SUBCUTANEOUS at 16:59

## 2020-01-01 RX ADMIN — INSULIN LISPRO 2 UNITS: 100 INJECTION, SOLUTION INTRAVENOUS; SUBCUTANEOUS at 18:17

## 2020-01-01 RX ADMIN — PROPOFOL 10 MG: 10 INJECTION, EMULSION INTRAVENOUS at 06:48

## 2020-01-01 RX ADMIN — Medication 10 ML: at 13:14

## 2020-01-01 RX ADMIN — AMLODIPINE BESYLATE 10 MG: 5 TABLET ORAL at 09:45

## 2020-01-01 RX ADMIN — PIPERACILLIN AND TAZOBACTAM 3.38 G: 3; .375 INJECTION, POWDER, LYOPHILIZED, FOR SOLUTION INTRAVENOUS at 00:55

## 2020-01-01 RX ADMIN — INSULIN LISPRO 3 UNITS: 100 INJECTION, SOLUTION INTRAVENOUS; SUBCUTANEOUS at 17:45

## 2020-01-01 RX ADMIN — ACETAMINOPHEN 1000 MG: 500 TABLET ORAL at 05:38

## 2020-01-01 RX ADMIN — INSULIN GLARGINE 10 UNITS: 100 INJECTION, SOLUTION SUBCUTANEOUS at 22:24

## 2020-01-01 RX ADMIN — PIPERACILLIN AND TAZOBACTAM 3.38 G: 3; .375 INJECTION, POWDER, LYOPHILIZED, FOR SOLUTION INTRAVENOUS at 04:54

## 2020-01-01 RX ADMIN — SEVELAMER CARBONATE 800 MG: 800 TABLET, FILM COATED ORAL at 12:24

## 2020-01-01 RX ADMIN — Medication 1 CAPSULE: at 09:46

## 2020-01-01 RX ADMIN — DIAPER RASH SKIN PROTECTENT: at 21:00

## 2020-01-01 RX ADMIN — HEPARIN SODIUM 5000 UNITS: 5000 INJECTION INTRAVENOUS; SUBCUTANEOUS at 08:31

## 2020-01-01 RX ADMIN — INSULIN LISPRO 3 UNITS: 100 INJECTION, SOLUTION INTRAVENOUS; SUBCUTANEOUS at 08:14

## 2020-01-01 RX ADMIN — OXYCODONE HYDROCHLORIDE AND ACETAMINOPHEN 1 TABLET: 5; 325 TABLET ORAL at 04:27

## 2020-01-01 RX ADMIN — HEPARIN SODIUM 4000 UNITS: 5000 INJECTION INTRAVENOUS; SUBCUTANEOUS at 17:54

## 2020-01-01 RX ADMIN — ACETAMINOPHEN 650 MG: 325 TABLET ORAL at 02:47

## 2020-01-01 RX ADMIN — HYDROMORPHONE HYDROCHLORIDE 1 MG: 1 INJECTION, SOLUTION INTRAMUSCULAR; INTRAVENOUS; SUBCUTANEOUS at 04:58

## 2020-01-01 RX ADMIN — Medication 1 CAPSULE: at 08:54

## 2020-01-01 RX ADMIN — SEVELAMER CARBONATE 800 MG: 800 TABLET, FILM COATED ORAL at 12:25

## 2020-01-01 RX ADMIN — Medication 1 CAPSULE: at 09:07

## 2020-01-01 RX ADMIN — HUMAN INSULIN 40 UNITS: 100 INJECTION, SUSPENSION SUBCUTANEOUS at 08:16

## 2020-01-01 RX ADMIN — HYDROMORPHONE HYDROCHLORIDE 1 MG: 1 INJECTION, SOLUTION INTRAMUSCULAR; INTRAVENOUS; SUBCUTANEOUS at 16:48

## 2020-01-01 RX ADMIN — AZITHROMYCIN 500 MG: 250 TABLET, FILM COATED ORAL at 08:02

## 2020-01-01 RX ADMIN — OXYCODONE HYDROCHLORIDE AND ACETAMINOPHEN 1 TABLET: 5; 325 TABLET ORAL at 13:27

## 2020-01-01 RX ADMIN — HEPARIN SODIUM 7500 UNITS: 5000 INJECTION INTRAVENOUS; SUBCUTANEOUS at 17:39

## 2020-01-01 RX ADMIN — Medication 1 CAPSULE: at 08:24

## 2020-01-01 RX ADMIN — AZITHROMYCIN 500 MG: 250 TABLET, FILM COATED ORAL at 15:18

## 2020-01-01 RX ADMIN — PIPERACILLIN AND TAZOBACTAM 3.38 G: 3; .375 INJECTION, POWDER, LYOPHILIZED, FOR SOLUTION INTRAVENOUS at 00:14

## 2020-01-01 RX ADMIN — INSULIN LISPRO 2 UNITS: 100 INJECTION, SOLUTION INTRAVENOUS; SUBCUTANEOUS at 17:27

## 2020-01-01 RX ADMIN — OXYCODONE HYDROCHLORIDE AND ACETAMINOPHEN 1 TABLET: 5; 325 TABLET ORAL at 06:02

## 2020-01-01 RX ADMIN — PREDNISONE 60 MG: 20 TABLET ORAL at 09:00

## 2020-01-01 RX ADMIN — INSULIN LISPRO 3 UNITS: 100 INJECTION, SOLUTION INTRAVENOUS; SUBCUTANEOUS at 09:06

## 2020-01-01 RX ADMIN — HYDROMORPHONE HYDROCHLORIDE 1 MG: 1 INJECTION, SOLUTION INTRAMUSCULAR; INTRAVENOUS; SUBCUTANEOUS at 09:21

## 2020-01-01 RX ADMIN — PROPOFOL 10 MG: 10 INJECTION, EMULSION INTRAVENOUS at 07:04

## 2020-01-01 RX ADMIN — SODIUM CHLORIDE 50 ML/HR: 900 INJECTION, SOLUTION INTRAVENOUS at 16:34

## 2020-01-01 RX ADMIN — PROPOFOL 10 MG: 10 INJECTION, EMULSION INTRAVENOUS at 06:56

## 2020-01-01 RX ADMIN — HEPARIN SODIUM 7500 UNITS: 5000 INJECTION INTRAVENOUS; SUBCUTANEOUS at 22:44

## 2020-01-01 RX ADMIN — Medication 1 CAPSULE: at 09:43

## 2020-01-01 RX ADMIN — HYDROMORPHONE HYDROCHLORIDE 1 MG: 1 INJECTION, SOLUTION INTRAMUSCULAR; INTRAVENOUS; SUBCUTANEOUS at 17:31

## 2020-01-01 RX ADMIN — PREDNISONE 10 MG: 5 TABLET ORAL at 08:37

## 2020-01-01 RX ADMIN — OXYCODONE 5 MG: 5 TABLET ORAL at 20:37

## 2020-01-01 RX ADMIN — HEPARIN SODIUM 5000 UNITS: 5000 INJECTION INTRAVENOUS; SUBCUTANEOUS at 14:51

## 2020-01-01 RX ADMIN — Medication 10 ML: at 22:57

## 2020-01-01 RX ADMIN — GABAPENTIN 100 MG: 100 CAPSULE ORAL at 23:28

## 2020-01-01 RX ADMIN — INSULIN LISPRO 3 UNITS: 100 INJECTION, SOLUTION INTRAVENOUS; SUBCUTANEOUS at 21:21

## 2020-01-01 RX ADMIN — INSULIN LISPRO 6 UNITS: 100 INJECTION, SOLUTION INTRAVENOUS; SUBCUTANEOUS at 11:30

## 2020-01-01 RX ADMIN — INSULIN LISPRO 2 UNITS: 100 INJECTION, SOLUTION INTRAVENOUS; SUBCUTANEOUS at 08:41

## 2020-01-01 RX ADMIN — OXYCODONE HYDROCHLORIDE AND ACETAMINOPHEN 1 TABLET: 5; 325 TABLET ORAL at 17:30

## 2020-01-01 RX ADMIN — LOPERAMIDE HYDROCHLORIDE 2 MG: 2 CAPSULE ORAL at 11:38

## 2020-01-01 RX ADMIN — ACETAMINOPHEN 1000 MG: 500 TABLET ORAL at 04:42

## 2020-01-01 RX ADMIN — HYDROMORPHONE HYDROCHLORIDE 1 MG: 1 INJECTION, SOLUTION INTRAMUSCULAR; INTRAVENOUS; SUBCUTANEOUS at 11:49

## 2020-01-01 RX ADMIN — METOPROLOL TARTRATE 100 MG: 50 TABLET, FILM COATED ORAL at 17:15

## 2020-01-01 RX ADMIN — ASPIRIN 81 MG: 81 TABLET, COATED ORAL at 09:42

## 2020-01-06 ENCOUNTER — APPOINTMENT (OUTPATIENT)
Dept: ULTRASOUND IMAGING | Age: 35
DRG: 617 | End: 2020-01-06
Attending: INTERNAL MEDICINE
Payer: MEDICARE

## 2020-01-06 ENCOUNTER — APPOINTMENT (OUTPATIENT)
Dept: GENERAL RADIOLOGY | Age: 35
DRG: 617 | End: 2020-01-06
Attending: EMERGENCY MEDICINE
Payer: MEDICARE

## 2020-01-06 ENCOUNTER — HOSPITAL ENCOUNTER (INPATIENT)
Age: 35
LOS: 9 days | Discharge: HOME | DRG: 617 | End: 2020-01-15
Attending: EMERGENCY MEDICINE | Admitting: INTERNAL MEDICINE
Payer: MEDICARE

## 2020-01-06 DIAGNOSIS — M86.171 OTHER ACUTE OSTEOMYELITIS OF RIGHT FOOT (HCC): Primary | ICD-10-CM

## 2020-01-06 PROBLEM — L97.509 FOOT ULCER (HCC): Status: ACTIVE | Noted: 2020-01-06

## 2020-01-06 PROBLEM — L03.90 WOUND CELLULITIS: Status: ACTIVE | Noted: 2020-01-06

## 2020-01-06 PROBLEM — N18.6 ESRD (END STAGE RENAL DISEASE) (HCC): Status: ACTIVE | Noted: 2020-01-06

## 2020-01-06 LAB
ALBUMIN SERPL-MCNC: 2.9 G/DL (ref 3.5–5)
ALBUMIN/GLOB SERPL: 0.5 {RATIO} (ref 1.1–2.2)
ALP SERPL-CCNC: 43 U/L (ref 45–117)
ALT SERPL-CCNC: 16 U/L (ref 12–78)
ANION GAP SERPL CALC-SCNC: 11 MMOL/L (ref 5–15)
AST SERPL-CCNC: 16 U/L (ref 15–37)
BASOPHILS # BLD: 0 K/UL (ref 0–0.1)
BASOPHILS NFR BLD: 0 % (ref 0–1)
BILIRUB SERPL-MCNC: 0.5 MG/DL (ref 0.2–1)
BUN SERPL-MCNC: 70 MG/DL (ref 6–20)
BUN/CREAT SERPL: 5 (ref 12–20)
CALCIUM SERPL-MCNC: 8.9 MG/DL (ref 8.5–10.1)
CHLORIDE SERPL-SCNC: 100 MMOL/L (ref 97–108)
CO2 SERPL-SCNC: 25 MMOL/L (ref 21–32)
CREAT SERPL-MCNC: 13.6 MG/DL (ref 0.7–1.3)
DIFFERENTIAL METHOD BLD: ABNORMAL
EOSINOPHIL # BLD: 0.4 K/UL (ref 0–0.4)
EOSINOPHIL NFR BLD: 4 % (ref 0–7)
ERYTHROCYTE [DISTWIDTH] IN BLOOD BY AUTOMATED COUNT: 15.3 % (ref 11.5–14.5)
GLOBULIN SER CALC-MCNC: 5.6 G/DL (ref 2–4)
GLUCOSE BLD STRIP.AUTO-MCNC: 92 MG/DL (ref 65–100)
GLUCOSE SERPL-MCNC: 196 MG/DL (ref 65–100)
HBV SURFACE AB SER QL: NONREACTIVE
HBV SURFACE AB SER-ACNC: <3.1 MIU/ML
HBV SURFACE AG SER QL: <0.1 INDEX
HBV SURFACE AG SER QL: NEGATIVE
HCT VFR BLD AUTO: 32.7 % (ref 36.6–50.3)
HGB BLD-MCNC: 10.6 G/DL (ref 12.1–17)
IMM GRANULOCYTES # BLD AUTO: 0 K/UL (ref 0–0.04)
IMM GRANULOCYTES NFR BLD AUTO: 1 % (ref 0–0.5)
LACTATE SERPL-SCNC: 0.8 MMOL/L (ref 0.4–2)
LYMPHOCYTES # BLD: 1.4 K/UL (ref 0.8–3.5)
LYMPHOCYTES NFR BLD: 16 % (ref 12–49)
MCH RBC QN AUTO: 26.2 PG (ref 26–34)
MCHC RBC AUTO-ENTMCNC: 32.4 G/DL (ref 30–36.5)
MCV RBC AUTO: 80.9 FL (ref 80–99)
MONOCYTES # BLD: 0.7 K/UL (ref 0–1)
MONOCYTES NFR BLD: 8 % (ref 5–13)
NEUTS SEG # BLD: 6.3 K/UL (ref 1.8–8)
NEUTS SEG NFR BLD: 71 % (ref 32–75)
NRBC # BLD: 0 K/UL (ref 0–0.01)
NRBC BLD-RTO: 0 PER 100 WBC
PLATELET # BLD AUTO: 271 K/UL (ref 150–400)
PMV BLD AUTO: 9.9 FL (ref 8.9–12.9)
POTASSIUM SERPL-SCNC: 4.5 MMOL/L (ref 3.5–5.1)
PROT SERPL-MCNC: 8.5 G/DL (ref 6.4–8.2)
RBC # BLD AUTO: 4.04 M/UL (ref 4.1–5.7)
SERVICE CMNT-IMP: NORMAL
SODIUM SERPL-SCNC: 136 MMOL/L (ref 136–145)
WBC # BLD AUTO: 8.8 K/UL (ref 4.1–11.1)

## 2020-01-06 PROCEDURE — 87186 SC STD MICRODIL/AGAR DIL: CPT

## 2020-01-06 PROCEDURE — 82962 GLUCOSE BLOOD TEST: CPT

## 2020-01-06 PROCEDURE — 83036 HEMOGLOBIN GLYCOSYLATED A1C: CPT

## 2020-01-06 PROCEDURE — 73630 X-RAY EXAM OF FOOT: CPT

## 2020-01-06 PROCEDURE — 5A1D70Z PERFORMANCE OF URINARY FILTRATION, INTERMITTENT, LESS THAN 6 HOURS PER DAY: ICD-10-PCS | Performed by: INTERNAL MEDICINE

## 2020-01-06 PROCEDURE — 74011000250 HC RX REV CODE- 250: Performed by: INTERNAL MEDICINE

## 2020-01-06 PROCEDURE — 80053 COMPREHEN METABOLIC PANEL: CPT

## 2020-01-06 PROCEDURE — 90935 HEMODIALYSIS ONE EVALUATION: CPT

## 2020-01-06 PROCEDURE — 99285 EMERGENCY DEPT VISIT HI MDM: CPT

## 2020-01-06 PROCEDURE — 65660000000 HC RM CCU STEPDOWN

## 2020-01-06 PROCEDURE — 86706 HEP B SURFACE ANTIBODY: CPT

## 2020-01-06 PROCEDURE — 87205 SMEAR GRAM STAIN: CPT

## 2020-01-06 PROCEDURE — 36415 COLL VENOUS BLD VENIPUNCTURE: CPT

## 2020-01-06 PROCEDURE — 87040 BLOOD CULTURE FOR BACTERIA: CPT

## 2020-01-06 PROCEDURE — 74011000258 HC RX REV CODE- 258: Performed by: INTERNAL MEDICINE

## 2020-01-06 PROCEDURE — 87077 CULTURE AEROBIC IDENTIFY: CPT

## 2020-01-06 PROCEDURE — 83605 ASSAY OF LACTIC ACID: CPT

## 2020-01-06 PROCEDURE — 74011250636 HC RX REV CODE- 250/636: Performed by: INTERNAL MEDICINE

## 2020-01-06 PROCEDURE — 74011250636 HC RX REV CODE- 250/636: Performed by: EMERGENCY MEDICINE

## 2020-01-06 PROCEDURE — 87340 HEPATITIS B SURFACE AG IA: CPT

## 2020-01-06 PROCEDURE — 85025 COMPLETE CBC W/AUTO DIFF WBC: CPT

## 2020-01-06 PROCEDURE — 74011000258 HC RX REV CODE- 258: Performed by: EMERGENCY MEDICINE

## 2020-01-06 PROCEDURE — 96374 THER/PROPH/DIAG INJ IV PUSH: CPT

## 2020-01-06 RX ORDER — ACETAMINOPHEN 325 MG/1
650 TABLET ORAL
Status: DISCONTINUED | OUTPATIENT
Start: 2020-01-06 | End: 2020-01-01 | Stop reason: HOSPADM

## 2020-01-06 RX ORDER — LISINOPRIL 5 MG/1
10 TABLET ORAL
Status: DISCONTINUED | OUTPATIENT
Start: 2020-01-01 | End: 2020-01-01

## 2020-01-06 RX ORDER — OXYCODONE AND ACETAMINOPHEN 5; 325 MG/1; MG/1
1 TABLET ORAL
Status: DISCONTINUED | OUTPATIENT
Start: 2020-01-06 | End: 2020-01-01 | Stop reason: HOSPADM

## 2020-01-06 RX ORDER — MAGNESIUM SULFATE 100 %
4 CRYSTALS MISCELLANEOUS AS NEEDED
Status: DISCONTINUED | OUTPATIENT
Start: 2020-01-06 | End: 2020-01-01 | Stop reason: HOSPADM

## 2020-01-06 RX ORDER — INSULIN LISPRO 100 [IU]/ML
INJECTION, SOLUTION INTRAVENOUS; SUBCUTANEOUS
Status: DISCONTINUED | OUTPATIENT
Start: 2020-01-06 | End: 2020-01-01 | Stop reason: HOSPADM

## 2020-01-06 RX ORDER — HYDRALAZINE HYDROCHLORIDE 20 MG/ML
10 INJECTION INTRAMUSCULAR; INTRAVENOUS
Status: DISCONTINUED | OUTPATIENT
Start: 2020-01-06 | End: 2020-01-01 | Stop reason: HOSPADM

## 2020-01-06 RX ORDER — NICARDIPINE HYDROCHLORIDE 0.1 MG/ML
5-15 INJECTION INTRAVENOUS
Status: DISCONTINUED | OUTPATIENT
Start: 2020-01-06 | End: 2020-01-06 | Stop reason: SDUPTHER

## 2020-01-06 RX ORDER — HEPARIN SODIUM 5000 [USP'U]/ML
7500 INJECTION, SOLUTION INTRAVENOUS; SUBCUTANEOUS EVERY 8 HOURS
Status: DISCONTINUED | OUTPATIENT
Start: 2020-01-06 | End: 2020-01-01 | Stop reason: HOSPADM

## 2020-01-06 RX ORDER — DEXTROSE MONOHYDRATE 100 MG/ML
0-250 INJECTION, SOLUTION INTRAVENOUS AS NEEDED
Status: DISCONTINUED | OUTPATIENT
Start: 2020-01-06 | End: 2020-01-01 | Stop reason: HOSPADM

## 2020-01-06 RX ORDER — LABETALOL HYDROCHLORIDE 5 MG/ML
10 INJECTION, SOLUTION INTRAVENOUS
Status: DISCONTINUED | OUTPATIENT
Start: 2020-01-06 | End: 2020-01-01 | Stop reason: HOSPADM

## 2020-01-06 RX ORDER — INSULIN GLARGINE 100 [IU]/ML
40 INJECTION, SOLUTION SUBCUTANEOUS DAILY
Status: DISCONTINUED | OUTPATIENT
Start: 2020-01-01 | End: 2020-01-01

## 2020-01-06 RX ORDER — ONDANSETRON 2 MG/ML
4 INJECTION INTRAMUSCULAR; INTRAVENOUS
Status: DISCONTINUED | OUTPATIENT
Start: 2020-01-06 | End: 2020-01-01 | Stop reason: HOSPADM

## 2020-01-06 RX ADMIN — LABETALOL HYDROCHLORIDE 10 MG: 5 INJECTION INTRAVENOUS at 22:06

## 2020-01-06 RX ADMIN — VANCOMYCIN HYDROCHLORIDE 2000 MG: 10 INJECTION, POWDER, LYOPHILIZED, FOR SOLUTION INTRAVENOUS at 22:45

## 2020-01-06 RX ADMIN — SODIUM CHLORIDE 5 MG/HR: 900 INJECTION, SOLUTION INTRAVENOUS at 23:20

## 2020-01-06 RX ADMIN — HEPARIN SODIUM 7500 UNITS: 5000 INJECTION INTRAVENOUS; SUBCUTANEOUS at 22:30

## 2020-01-06 RX ADMIN — CEFEPIME 2 G: 2 INJECTION, POWDER, FOR SOLUTION INTRAVENOUS at 13:52

## 2020-01-06 RX ADMIN — HYDRALAZINE HYDROCHLORIDE 10 MG: 20 INJECTION INTRAMUSCULAR; INTRAVENOUS at 18:48

## 2020-01-06 NOTE — ED NOTES
Bedside and Verbal shift change report given to Mack  (oncoming nurse) by Amanda Orellana (offgoing nurse). Report included the following information SBAR.

## 2020-01-06 NOTE — PROCEDURES
[facility] Dialysis Team [facility]  [phone]    Vitals   Pre   Post   Assessment   Pre   Post     Temp  Temp: 97.8 °F (36.6 °C) (01/06/20 1519)  98.1 LOC  A&Ox4 A&Ox4   HR   Pulse (Heart Rate): 89 (01/06/20 1519) 90 Lungs   diminished  diminished   B/P   BP: (!) 212/118 (01/06/20 1519) 176/88 Cardiac   RRR RRR   Resp   Resp Rate: 18 (01/06/20 1519) 18 Skin   CDI CDI   Pain level  0  Edema  3+ BLE 2+ BLE   Orders:    Duration:   Start:    8939 End:    1952 Total:   4.25 hours   Dialyzer:   Dialyzer/Set Up Inspection: Kenny House (01/06/20 1519)   K Bath:   Dialysate K (mEq/L): 2 (01/06/20 1519)   Ca Bath:   Dialysate CA (mEq/L): 2.5 (01/06/20 1519)   Na/Bicarb:   Dialysate NA (mEq/L): 140 (01/06/20 1519)   Target Fluid Removal:   Goal/Amount of Fluid to Remove (mL): 4000 mL (01/06/20 1519)   Access     Type & Location:    PELON AVF: skin CDI. No s/s of infection. No issues with cannulation or hemostasis. Running well at . Consent signed & on file. Pt cannulated with 94G needles per policy & without issue. Labs drawn per request/ order. VSS. Dialysis Tx initiated. Labs     Obtained/Reviewed   Critical Results Called   Date when labs were drawn-  Hgb-    HGB   Date Value Ref Range Status   01/06/2020 10.6 (L) 12.1 - 17.0 g/dL Final     K-    Potassium   Date Value Ref Range Status   01/06/2020 4.5 3.5 - 5.1 mmol/L Final     Ca-   Calcium   Date Value Ref Range Status   01/06/2020 8.9 8.5 - 10.1 MG/DL Final     Bun-   BUN   Date Value Ref Range Status   01/06/2020 70 (H) 6 - 20 MG/DL Final     Creat-   Creatinine   Date Value Ref Range Status   01/06/2020 13.60 (H) 0.70 - 1.30 MG/DL Final        Medications/ Blood Products Given     Name   Dose   Route and Time     None ordered                Blood Volume Processed (BVP):    96.4 Net Fluid   Removed:  4000mL   Comments   All dialysis related medications have been reviewed. Assessment performed by RN.   Procedure and documentation observed and reviewed by Tommy Pettit RN. Time Out Done:  5410  Primary Nurse Rpt Pre:  Dalton Willard RN  Primary Nurse Rpt Post:  Yinka Rivera RN  Pt Education:  procedural  Care Plan:  On going  Tx Summary:  1519:  SBAR received from Primary RN. Pt arrived to HD suite A&Ox4, denies complaints. PELON AVF: skin CDI. No s/s of infection. No issues with cannulation or hemostasis. Running well at . Consent signed & on file. Pt cannulated with 52Q needles per policy & without issue. Labs drawn per request/ order. VSS. Dialysis Tx initiated. 1545:  Pt resting  1600:  Pt resting  1615:  Pt resting  1630:  Pt resting; Hospitalist at bedside aware of BP. Pt verbally states he feels fine, that he does not take BP meds on HD days d/t causing hypotension. MD aware. 1645:  Pt resting  1700:  Pt resting  1715:  Pt resting  1730:  Pt resting  1745:  Pt resting  1800:  Pt resting  1815:  Pt resting; bp cuff changed; Primary RN called for BP meds, no answer. 1830:  Pt resting  1845:  Primary RN aware of BP; pt resting  1850:  Primary RN at bedside to give BP meds  1900:  Pt resting  1915:  Pt resting  1930:  Pt resting  1945:  Pt resting  1952: Tx ended. VSS. All possible blood returned to patient. Hemostasis achieved without issue. Bed locked and in the lowest position, call bell and belongings in reach. SBAR given to Primary, RN. Patient is stable at time of their/ my departure. Admiting Diagnosis: SOB/ R middle toe wound  Pt's previous clinic- [address]  Consent signed - Informed Consent Verified: Yes (01/06/20 6202)  [facility] Consent - signed and on file  Hepatitis Status- drawn  Machine #- Machine Number: [id number] (01/06/20 1519)  Telemetry status-  Pre-dialysis wt. -

## 2020-01-06 NOTE — ED PROVIDER NOTES
EMERGENCY DEPARTMENT HISTORY AND PHYSICAL EXAM      Date: 1/6/2020  Patient Name: Kamaljit Shepherd    History of Presenting Illness     Chief Complaint   Patient presents with    Wound Check     c/o diabetic wound to right third toe for 2 days; reports 's       History Provided By: Patient    HPI: Kamaljit Shepherd, 27 y.o. male with PMHx significant for diabetes, end-stage renal disease on hemodialysis Monday/Wednesday/Friday, who presents with a chief complaint of a \"spot\" on his right third toe for about the last week with associated gradual increase in foot swelling. Patient has had 2 prior toe amputations of the right foot in the past.  Most recently performed in October by Dr. Elizabet Ryan. States that his last blood sugar today was 166 and notes fairly good control at home. Did not go to dialysis today. No chest pain, shortness of breath, abdominal pain, nausea, vomiting. PCP: Dao Best, DO    There are no other complaints, changes, or physical findings at this time.     Current Facility-Administered Medications   Medication Dose Route Frequency Provider Last Rate Last Dose    vancomycin (VANCOCIN) 2,000 mg in 0.9% sodium chloride 500 mL IVPB  2,000 mg IntraVENous NOW Melissa Faith MD        acetaminophen (TYLENOL) tablet 650 mg  650 mg Oral Q6H PRN Melissa Faith MD        [START ON 1/7/2020] insulin glargine (LANTUS) injection 40 Units  40 Units SubCUTAneous DAILY Bethena Mortimer., MD        [START ON 1/7/2020] lisinopril (PRINIVIL, ZESTRIL) tablet 10 mg  10 mg Oral EVERY Jazmin Saez MD        glucose chewable tablet 16 g  4 Tab Oral PRN Bethena Mortimer., MD        glucagon (GLUCAGEN) injection 1 mg  1 mg IntraMUSCular PRN Bethena Mortimer., MD        dextrose 10% infusion 0-250 mL  0-250 mL IntraVENous PRN Bethena Mortimer., MD        insulin lispro (HUMALOG) injection   SubCUTAneous AC&HS Bethena Mortimer., MD  hydrALAZINE (APRESOLINE) 20 mg/mL injection 10 mg  10 mg IntraVENous Q6H PRN Terri Nj MD        labetalol (NORMODYNE;TRANDATE) injection 10 mg  10 mg IntraVENous Q6H PRN Terri Nj MD        ondansetron [facility]) injection 4 mg  4 mg IntraVENous Q6H PRN Terri Nj MD        piperacillin-tazobactam (ZOSYN) 3.375 g in 0.9% sodium chloride (MBP/ADV) 100 mL  3.375 g IntraVENous Q8H Terri Nj MD        oxyCODONE-acetaminophen (PERCOCET) 5-325 mg per tablet 1 Tab  1 Tab Oral Q4H PRN Terri Nj MD         Current Outpatient Medications   Medication Sig Dispense Refill    lisinopril (PRINIVIL, ZESTRIL) 10 mg tablet Take 10 mg by mouth every Tuesday, Thursday, Saturday & Sunday. Non-HD days      insulin glargine (LANTUS U-100 INSULIN) 100 unit/mL injection 40 Units by SubCUTAneous route daily. Indications: type 2 diabetes mellitus 1 Vial 0    loperamide (IMODIUM) 2 mg capsule Take 1 Cap by mouth four (4) times daily as needed for Diarrhea.  60 Cap 0     Past History     Past Medical History:  Past Medical History:   Diagnosis Date    Cataracts     Chronic kidney disease     Diabetes ([address]     Hypercholesterolemia     Hypertension     Kidney failure     Obesity      Past Surgical History:  Past Surgical History:   Procedure Laterality Date    COLONOSCOPY N/A 12/8/2017    [facility]    COLONOSCOPY,DIAGNOSTIC  12/8/2017         HX AMPUTATION      Left great toe and L 5th toe    UPPER GI ENDOSCOPY,BIOPSY  12/8/2017         VASCULAR SURGERY PROCEDURE UNLIST      R side chest    VASCULAR SURGERY PROCEDURE UNLIST  07/25/2017    Creation transposed AV fistula right arm     Family History:  Family History   Problem Relation Age of Onset    Diabetes Mother     Liver Disease Father     Kidney Disease Maternal Grandfather     Diabetes Maternal Grandfather     Hypertension Maternal Grandfather     Diabetes Paternal Uncle     Hypertension Paternal Uncle      Social History:  Social History     Tobacco Use    Smoking status: Former Smoker     Packs/day: 0.25    Smokeless tobacco: Never Used    Tobacco comment: quit 6 months ago   Substance Use Topics    Alcohol use: No     Comment: rarely    Drug use: No     Allergies:  No Known Allergies  Review of Systems   Review of Systems   Constitutional: Negative for chills and fever. HENT: Negative for congestion, rhinorrhea and sore throat. Respiratory: Negative for cough and shortness of breath. Cardiovascular: Negative for chest pain. Gastrointestinal: Negative for abdominal pain, nausea and vomiting. Genitourinary: Negative for dysuria and urgency. Skin: Positive for wound. Negative for rash. Neurological: Negative for dizziness, light-headedness and headaches. All other systems reviewed and are negative. Physical Exam   Physical Exam  Vitals signs and nursing note reviewed. Constitutional:       General: He is not in acute distress. Appearance: He is well-developed. HENT:      Head: Normocephalic and atraumatic. Eyes:      Conjunctiva/sclera: Conjunctivae normal.      Pupils: Pupils are equal, round, and reactive to light. Neck:      Musculoskeletal: Normal range of motion. Cardiovascular:      Rate and Rhythm: Normal rate and regular rhythm. Pulmonary:      Effort: Pulmonary effort is normal. No respiratory distress. Breath sounds: Normal breath sounds. No stridor. Abdominal:      General: There is no distension. Palpations: Abdomen is soft. Tenderness: There is no tenderness. Musculoskeletal: Normal range of motion. Comments: Fistula in left upper extremity with palpable thrill  Wound to the right third toe which can be probed with a Q-tip down to bone, scant amount of purulence at the wound opening which is approximately 1/2 cm in diameter. Skin:     General: Skin is warm and dry.    Neurological: Mental Status: He is alert and oriented to person, place, and time. Diagnostic Study Results   Labs -     Recent Results (from the past 12 hour(s))   CBC WITH AUTOMATED DIFF    Collection Time: 01/06/20 11:01 AM   Result Value Ref Range    WBC 8.8 4.1 - 11.1 K/uL    RBC 4.04 (L) 4.10 - 5.70 M/uL    HGB 10.6 (L) 12.1 - 17.0 g/dL    HCT 32.7 (L) 36.6 - 50.3 %    MCV 80.9 80.0 - 99.0 FL    MCH 26.2 26.0 - 34.0 PG    MCHC 32.4 30.0 - 36.5 g/dL    RDW 15.3 (H) 11.5 - 14.5 %    PLATELET 186 901 - 530 K/uL    MPV 9.9 8.9 - 12.9 FL    NRBC 0.0 0  WBC    ABSOLUTE NRBC 0.00 0.00 - 0.01 K/uL    NEUTROPHILS 71 32 - 75 %    LYMPHOCYTES 16 12 - 49 %    MONOCYTES 8 5 - 13 %    EOSINOPHILS 4 0 - 7 %    BASOPHILS 0 0 - 1 %    IMMATURE GRANULOCYTES 1 (H) 0.0 - 0.5 %    ABS. NEUTROPHILS 6.3 1.8 - 8.0 K/UL    ABS. LYMPHOCYTES 1.4 0.8 - 3.5 K/UL    ABS. MONOCYTES 0.7 0.0 - 1.0 K/UL    ABS. EOSINOPHILS 0.4 0.0 - 0.4 K/UL    ABS. BASOPHILS 0.0 0.0 - 0.1 K/UL    ABS. IMM. GRANS. 0.0 0.00 - 0.04 K/UL    DF AUTOMATED     METABOLIC PANEL, COMPREHENSIVE    Collection Time: 01/06/20 11:01 AM   Result Value Ref Range    Sodium 136 136 - 145 mmol/L    Potassium 4.5 3.5 - 5.1 mmol/L    Chloride 100 97 - 108 mmol/L    CO2 25 21 - 32 mmol/L    Anion gap 11 5 - 15 mmol/L    Glucose 196 (H) 65 - 100 mg/dL    BUN 70 (H) 6 - 20 MG/DL    Creatinine 13.60 (H) 0.70 - 1.30 MG/DL    BUN/Creatinine ratio 5 (L) 12 - 20      GFR est AA 5 (L) >60 ml/min/1.73m2    GFR est non-AA 4 (L) >60 ml/min/1.73m2    Calcium 8.9 8.5 - 10.1 MG/DL    Bilirubin, total 0.5 0.2 - 1.0 MG/DL    ALT (SGPT) 16 12 - 78 U/L    AST (SGOT) 16 15 - 37 U/L    Alk.  phosphatase 43 (L) 45 - 117 U/L    Protein, total 8.5 (H) 6.4 - 8.2 g/dL    Albumin 2.9 (L) 3.5 - 5.0 g/dL    Globulin 5.6 (H) 2.0 - 4.0 g/dL    A-G Ratio 0.5 (L) 1.1 - 2.2         Radiologic Studies -   XR FOOT RT MIN 3 V   Final Result   IMPRESSION: New, partial destruction of the distal aspect of the proximal phalanx and near total destruction of the middle phalanx of the right third toe,   consistent with osteomyelitis. MRI FOOT RT WO CONT    (Results Pending)     Xr Foot Rt Min 3 V    Result Date: 1/6/2020  IMPRESSION: New, partial destruction of the distal aspect of the proximal phalanx and near total destruction of the middle phalanx of the right third toe, consistent with osteomyelitis. Medical Decision Making   I am the first provider for this patient. I reviewed the vital signs, available nursing notes, past medical history, past surgical history, family history and social history. Vital Signs-Reviewed the patient's vital signs. Patient Vitals for the past 12 hrs:   Temp Pulse Resp BP SpO2   01/06/20 1700  86 18 (!) 218/104    01/06/20 1645  90 18 (!) 208/90    01/06/20 1630  89 18 (!) 235/118    01/06/20 1615  87 18 (!) 236/124    01/06/20 1600  90 18 (!) 214/141    01/06/20 1545  87 18 (!) 208/105    01/06/20 1519 97.8 °F (36.6 °C) 89 18 (!) 212/118    01/06/20 1415    (!) 177/108 94 %   01/06/20 1150  92 16 (!) 175/100    01/06/20 0959 97.9 °F (36.6 °C) 93 18 (!) 198/118 95 %       Pulse Oximetry Analysis - 94% on ra      Records Reviewed: Nursing Notes and Old Medical Records    Provider Notes (Medical Decision Making):   Patient presents with a wound to the right great toe. On exam, wound can be probed down to bone. Concern for osteomyelitis given presentation and patient's history. Will check basic lab work, x-ray of the foot. ED Course:   Initial assessment performed. The patients presenting problems have been discussed, and they are in agreement with the care plan formulated and outlined with them. I have encouraged them to ask questions as they arise throughout their visit. X-ray shows osteomyelitis. Patient with no signs of systemic infection. Will discuss with podiatry.     ED Course as of Jan 06 1711   Mon Jan 06, 2020   1417 Spoke with podiatry,  Junie Plata, requests hospital admission with plans for amputation    [RADHA]      ED Course User Alma Bustillo MD     Spoke with Dr. Yusra Avila, hospitalist who will see the patient for admission. Requests that I speak with nephrology as the patient missed dialysis today. I did speak with nephrology who is put in orders for dialysis. Critical Care:  None    Disposition:    Admission Note:  Patient is being admitted to the hospital by Dr. Yusra Avila, Service: Hospitalist.  The results of their tests and reasons for their admission have been discussed with them and available family. They convey agreement and understanding for the need to be admitted and for their admission diagnosis. Diagnosis     Clinical Impression:   1. Other acute osteomyelitis of right foot ([address]        This note will not be viewable in [address] Please note that this dictation was completed with [employer], the computer voice recognition software. Quite often unanticipated grammatical, syntax, homophones, and other interpretive errors are inadvertently transcribed by the computer software. Please disregard these errors.   Please excuse any errors that have escaped final proofreading

## 2020-01-06 NOTE — PROGRESS NOTES

## 2020-01-06 NOTE — PROGRESS NOTES
Pharmacy Automatic Renal Dosing Protocol - Antimicrobials    Indication for Antimicrobials: DM foot Infection in setting of HD     Current Regimen of Each Antimicrobial:  Vancomycin 2gm IV x1, then 1gm IV after each HD (Start Date ; Day # 1)  Zosyn 3.375gm IV q8h; start ; Day #1    Previous Antimicrobial Therapy:  Cefepime 2gm IV x1  (Start Date     Goal Level: VANCOMYCIN TROUGH GOAL RANGE    Vancomycin Trough: 15 - 20 mcg/mL  (AUC: 400 - 600 mg/hr/Liter/day)     Date Dose & Interval Measured (mcg/mL) Extrapolated (mcg/mL)                       Date & time of next level: TBD    Significant Cultures:   : Toe Wound = (pending)  : Blood = (pending)    Radiology / Imaging results: (X-ray, CT scan or MRI):   : MRI Foot: (pending)    Paralysis, amputations, malnutrition: n/a    Labs:  Recent Labs     20  1101   CREA 13.60*   BUN 70*   WBC 8.8     Temp (24hrs), Av.9 °F (36.6 °C), Min:97.8 °F (36.6 °C), Max:97.9 °F (36.6 °C)    Creatinine Clearance (mL/min) or Dialysis: HD    Impression/Plan:   Receiving HD on admission  Vancomycin 2gm IV x1, then 1gm IV after each HD  Change Zosyn to 3.375gm IV x1 (30 min), then 3.375gm IV q12h (180 min)  Antimicrobial stop date TBD     Pharmacy will follow daily and adjust medications as appropriate for renal function and/or serum levels. Thank you,  [facility]    Recommended duration of therapy  [email or web address]/Pharmacy/Clinical%20Companion/[email or web address]. docx    Renal Dosing  [email or web address]/[facility]/Pharmacy/Clinical%20Companion/Renal%[id number]. pdf

## 2020-01-06 NOTE — PROGRESS NOTES
1752- TRANSFER - IN REPORT:    Verbal report received from [facility], RN (name) on Marlise Junk  being received from ED(unit) for routine progression of care      Report consisted of patients Situation, Background, Assessment and   Recommendations(SBAR). Information from the following report(s) SBAR, Kardex, Intake/Output, MAR and Recent Results was reviewed with the receiving nurse. Opportunity for questions and clarification was provided. Assessment completed upon patients arrival to unit and care assumed. Pt currently in dialysis. Per report, pt has about an hour or so left. Will update night shift accordingly.

## 2020-01-06 NOTE — ED NOTES
TRANSFER - OUT REPORT:    Verbal report given to keily(name) on Meghann Herrera  being transferred to pcu(unit) for routine progression of care       Report consisted of patients Situation, Background, Assessment and   Recommendations(SBAR). Information from the following report(s) SBAR and MAR was reviewed with the receiving nurse. Lines:   Peripheral IV 01/06/20 Right Antecubital (Active)   Site Assessment Clean, dry, & intact 1/6/2020 11:02 AM   Phlebitis Assessment 0 1/6/2020 11:02 AM   Infiltration Assessment 0 1/6/2020 11:02 AM   Dressing Status Clean, dry, & intact 1/6/2020 11:02 AM        Opportunity for questions and clarification was provided.       Patient transported with:   Monitor  Registered Nurse

## 2020-01-06 NOTE — CONSULTS
Consultation Note    NAME: Debbie Porras   :  1985   MRN:  [id number]     Date/Time:  2020 3:48 PM    I have been asked to see this patient by Dr. Thomas Olguin  for advice/opinion re: ESRD. Assessment :    Plan:  ESRD-[facility] - MWF  Anemia  DM Plan for HD today. Hold FARIDA for now. Subjective:   CHIEF COMPLAINT:  \"My foot hurts. \"    HISTORY OF PRESENT ILLNESS:     General Garcia is a 27 y.o.   male who has a history of ESRD admitted with a foot infection. He says that he has had left foot pain, 7/10 over the past  Week. He has not taken anything to help with the pain. He says that he went to HD on Friday but did not tell his center about his foot pain. He says that the foot pain worsened over the weekend and he decided to come to the ER versus his HD unit.     Past Medical History:   Diagnosis Date    Cataracts     Chronic kidney disease     Diabetes ([address]     Hypercholesterolemia     Hypertension     Kidney failure     Obesity       Past Surgical History:   Procedure Laterality Date    COLONOSCOPY N/A 2017    [facility] Park [facility]COPY    COLONOSCOPY,DIAGNOSTIC  2017         HX AMPUTATION      Left great toe and L 5th toe    UPPER GI ENDOSCOPY,BIOPSY  2017         VASCULAR SURGERY PROCEDURE UNLIST      R side chest    VASCULAR SURGERY PROCEDURE UNLIST  2017    Creation transposed AV fistula right arm     Social History     Tobacco Use    Smoking status: Former Smoker     Packs/day: 0.25    Smokeless tobacco: Never Used    Tobacco comment: quit 6 months ago   Substance Use Topics    Alcohol use: No     Comment: rarely      Family History   Problem Relation Age of Onset    Diabetes Mother     Liver Disease Father     Kidney Disease Maternal Grandfather     Diabetes Maternal Grandfather     Hypertension Maternal Grandfather     Diabetes Paternal Uncle     Hypertension Paternal Uncle       No Known Allergies   Prior to Admission medications    Medication Sig Start Date End Date Taking? Authorizing Provider   lisinopril (PRINIVIL, ZESTRIL) 10 mg tablet Take 10 mg by mouth every Tuesday, Thursday, Saturday & Sunday. Non-HD days   Yes Provider, Historical   insulin glargine (LANTUS U-100 INSULIN) 100 unit/mL injection 40 Units by SubCUTAneous route daily. Indications: type 2 diabetes mellitus 7/10/19  Yes Mayela Christopher MD   loperamide (IMODIUM) 2 mg capsule Take 1 Cap by mouth four (4) times daily as needed for Diarrhea.  12/9/17  Yes Saul Green MD     REVIEW OF SYSTEMS:     []  Unable to obtain reliable ROS due to  [] mental status  [] sedated   [] intubated   [x] Total of 12 systems reviewed as follows:  Constitutional: negative fever, negative chills, negative weight loss  Eyes:   negative visual changes  ENT:   negative sore throat, tongue or lip swelling  Respiratory:  negative cough, negative dyspnea  Cards:  negative for chest pain, palpitations, lower extremity edema  GI:   negative for nausea, vomiting, diarrhea, and abdominal pain  :  negative for frequency, dysuria  Integument:  negative for rash and pruritus  Heme:  negative for easy bruising and gum/nose bleeding  Musculoskel: negative for myalgias,  back pain and muscle weakness  Neuro:  negative for headaches, dizziness, vertigo  Psych:  negative for feelings of anxiety, depression   Travel?: none    Objective:   VITALS:    Visit Vitals  BP (!) 208/105   Pulse 87   Temp 97.8 °F (36.6 °C) (Oral)   Resp 18   Wt (!) 159 kg (350 lb 8.5 oz)   SpO2 94%   BMI 45.01 kg/m²     PHYSICAL EXAM:  Gen:  [x]  WD [x]  WN  [] cachectic []  thin []  obese []  disheveled             []  ill apearing  []   Critical  []   Chronic    [x]  No acute distress    HEENT:   [x] NC/AT/PERRL    [x] pink conjunctivae      [] pale conjunctivae                  PERRL  [] yes  [] no      [] moist mucosa    [] dry mucosa    hearing intact to voice [] yes  [] No                 NECK:   supple [x] yes  [] no        masses [] yes  [] No               thyroid  []  non tender  []  tender    RESP:   [x] CTA bilaterally/no wheezing/rhonchi/rales/crackles    [] rhonchi bilaterally - no dullness  [] wheezing   [] rhonchi   [] crackles     use of accessory muscles [] yes [] no    CARD:   [x]  regular rate and rhythm/No murmurs/rubs/gallops    murmur  [] yes ()  [] no      Rubs  [] yes  [] no       Gallops [] yes  [] no    Rate []  regular  []  irregular        carotid bruits  [] Right  []  Left                 LE edema [] yes  [x] no           JVP  []  yes   []  no    ABD:    [x] soft/non distended/non tender/+bowel sounds/no HSM    []  Rigid    tenderness [] yes [] no   Liver enlargement  []  yes []  no                Spleen enlargement  []  yes []  no     distended []  yes [] no     bowel sound  [] hypoactive   [] hyperactive    LYMPH:    Neck []  yes [x]  no       Axillae []  yes [x]  no    SKIN:   Rashes []  yes   [x]  no    Ulcers []  yes   []  no               [] tight to palpitation    skin turgor []  good  [] poor  [] decreased               Cyanosis/clubbing []  yes []  no    NEUR:   [x] cranial nerves II-XII grossly intact       [] Cranial nerves deficit                 []  facial droop    []  slurred speech   [] aphasic     [] Strength normal     []  weakness  []  LUE  []   RUE/ []  LLE  []   RLE    follows commands  [x]  yes []  no           PSYCH:   insight [] poor [x] good   Alert and Oriented to  [x] person  [x] place  [x]  time                    [] depressed [] anxious [] agitated  [] lethargic [] stuporous  [] sedated     LAB DATA REVIEWED:    Recent Labs     01/06/20  1101   WBC 8.8   HGB 10.6*   HCT 32.7*        Recent Labs     01/06/20  1101      K 4.5      CO2 25   BUN 70*   CREA 13.60*   *   CA 8.9     Recent Labs     01/06/20  1101   SGOT 16   ALT 16   AP 43*   TBILI 0.5   ALB 2.9*   GLOB 5.6*     No results for input(s): INR, PTP, APTT, INREXT in the last 72 hours. No results for input(s): FE, TIBC, PSAT, FERR in the last 72 hours. No results for input(s): PH, PCO2, PO2 in the last 72 hours. No results for input(s): CPK, CKMB in the last 72 hours.     No lab exists for component: TROPONINI  Lab Results   Component Value Date/Time    Glucose (POC) 97 10/14/2019 08:31 AM    Glucose (POC) 78 10/14/2019 08:14 AM    Glucose (POC) 72 10/14/2019 07:55 AM    Glucose (POC) 120 (H) 10/13/2019 09:28 PM    Glucose (POC) 129 (H) 10/13/2019 03:47 PM       Procedures: see electronic medical records for all procedures/Xrays and details which were not copied into this note but were reviewed prior to creation of Plan.    ________________________________________________________________________       ___________________________________________________  Consulting Physician: Kristan Sam MD

## 2020-01-06 NOTE — PROGRESS NOTES
Pharmacy Clarification of Prior to Admission Medication Regimen     The patient was interviewed regarding clarification of the prior to admission medication regimen and was questioned regarding use of any other inhalers, topical products, over the counter medications, herbal medications, vitamin products or ophthalmic/nasal/otic medication use. Information Obtained From: RX Query, Patient    Pertinent Pharmacy Findings:  Per patient, he receives dialysis every Monday, Wednesday, and Friday at [place]. He was last dialyzed on 1/3/20 and received full treatment. Patient also stated that he has been taking DayQuil and NyQuil at home to try to get rid of a cold. PTA medication list was corrected to the following:     Prior to Admission Medications   Prescriptions Last Dose Informant Taking?   insulin glargine (LANTUS U-100 INSULIN) 100 unit/mL injection 2020 at Unknown time Self Yes   Si Units by SubCUTAneous route daily. Indications: type 2 diabetes mellitus   lisinopril (PRINIVIL, ZESTRIL) 10 mg tablet 2020 at Unknown time Self Yes   Sig: Take 10 mg by mouth every Tuesday, Thursday, Saturday & . Non-HD days   loperamide (IMODIUM) 2 mg capsule 1/3/2020 at Unknown time Self Yes   Sig: Take 1 Cap by mouth four (4) times daily as needed for Diarrhea.       Facility-Administered Medications: None          Thank you,  Mo Redding  Medication History Pharmacy Technician

## 2020-01-06 NOTE — H&P
Hospitalist Admission Note    NAME: Mateo Parnell   :  1985   MRN:  [id number]     Date/Time:  2020 4:36 PM    Patient PCP: Eddie Torres, DO  ______________________________________________________________________  Given the patient's current clinical presentation, I have a high level of concern for decompensation if discharged from the emergency department. Complex decision making was performed, which includes reviewing the patient's available past medical records, laboratory results, and x-ray films. My assessment of this patient's clinical condition and my plan of care is as follows. Assessment / Plan:    osteomyelitics right 3th Toe: check MRI, get cultures, cont  Zosyn  and Vancomycin, get Podiatry evaluation. Follow cx/check lactic acid  xrays New, partial destruction of the distal aspect of the proximal  phalanx and near total destruction of the middle phalanx of the right third toe,  consistent with osteomyelitis. Chest xray ordered    HTN malignant- if after  restarting  Home meds and completes hd bp still eleavted  tsart drip-   Admit stepdown/vrs ICU depending on bp status     ESRD on HD MWF: get Nephrology eval for HD on schedule. HTN: c/w ACE, use labetalol prn.may need drip if bp still up      DM type 2 with hyperglycemia: c/w Lantus, place SSI, check HbA1C. Hyperlipidemia: not on statin   Check lipids     Obesity- counseled   Code Status: Full Code  Surrogate Decision Maker: aunana Alvarez [phone]  DVT Prophylaxis: SCD  GI Prophylaxis: not indicated  Baseline: fairly independent, full ADL            Subjective:   CHIEF COMPLAINT: wound rt foot     HISTORY OF PRESENT ILLNESS:     Chuy Martinez is a 27 y.o.   male who has a history of ESRD on Hd M/W/F admitted with a foot infection. c/o left foot pain,   7/10 over the past  Week. + chills. He has not taken anything to help with the pain.   He says that he went to HD on Friday but did not tell his center about his foot pain. pt was admitted here in September. denies fevers or any  Injury to the foot no .chest pain. No abd pain. No n/v. No headache. We were asked to admit for work up and evaluation of the above problems. Past Medical History:   Diagnosis Date    Cataracts     Chronic kidney disease     Diabetes ([address]     Hypercholesterolemia     Hypertension     Kidney failure     Obesity         Past Surgical History:   Procedure Laterality Date    COLONOSCOPY N/A 12/8/2017    COL[facility]olph [facility] at [facility] ENDOSCOPY    COLONOSCOPY,DIAGNOSTIC  12/8/2017         HX AMPUTATION      Left great toe and L 5th toe    UPPER GI ENDOSCOPY,BIOPSY  12/8/2017         VASCULAR SURGERY PROCEDURE UNLIST      R side chest    VASCULAR SURGERY PROCEDURE UNLIST  07/25/2017    Creation transposed AV fistula right arm       Social History     Tobacco Use    Smoking status: Former Smoker     Packs/day: 0.25    Smokeless tobacco: Never Used    Tobacco comment: quit 6 months ago   Substance Use Topics    Alcohol use: No     Comment: rarely        Family History   Problem Relation Age of Onset    Diabetes Mother     Liver Disease Father     Kidney Disease Maternal Grandfather     Diabetes Maternal Grandfather     Hypertension Maternal Grandfather     Diabetes Paternal Uncle     Hypertension Paternal Uncle      No Known Allergies     Prior to Admission medications    Medication Sig Start Date End Date Taking? Authorizing Provider   lisinopril (PRINIVIL, ZESTRIL) 10 mg tablet Take 10 mg by mouth every Tuesday, Thursday, Saturday & Sunday. Non-HD days   Yes Provider, Historical   insulin glargine (LANTUS U-100 INSULIN) 100 unit/mL injection 40 Units by SubCUTAneous route daily. Indications: type 2 diabetes mellitus 7/10/19  Yes Noris Jo MD   loperamide (IMODIUM) 2 mg capsule Take 1 Cap by mouth four (4) times daily as needed for Diarrhea.  12/9/17  Yes Pinky Man MD REVIEW OF SYSTEMS:     I am not able to complete the review of systems because: The patient is intubated and sedated    The patient has altered mental status due to his acute medical problems    The patient has baseline aphasia from prior stroke(s)    The patient has baseline dementia and is not reliable historian    The patient is in acute medical distress and unable to provide information           Total of 12 systems reviewed as follows:       POSITIVE= underlined text  Negative = text not underlined  General:  fever, chills, sweats, generalized weakness, weight loss/gain,      loss of appetite   Eyes:    blurred vision, eye pain, loss of vision, double vision  ENT:    rhinorrhea, pharyngitis   Respiratory:   cough, sputum production, SOB, LITTLE, wheezing, pleuritic pain   Cardiology:   chest pain, palpitations, orthopnea, PND, edema, syncope   Gastrointestinal:  abdominal pain , N/V, diarrhea, dysphagia, constipation, bleeding   Genitourinary:  frequency, urgency, dysuria, hematuria, incontinence   Muskuloskeletal :  arthralgia, myalgia, back pain rt foot pain  Hematology:  easy bruising, nose or gum bleeding, lymphadenopathy   Dermatological: rash, ulceration, pruritis, color change rt foot / jaundice  Endocrine:   hot flashes or polydipsia   Neurological:  headache, dizziness, confusion, focal weakness, paresthesia,     Speech difficulties, memory loss, gait difficulty  Psychological: Feelings of anxiety, depression, agitation    Objective:   VITALS:    Visit Vitals  BP (!) 235/118   Pulse 89   Temp 97.8 °F (36.6 °C) (Oral)   Resp 18   Wt (!) 159 kg (350 lb 8.5 oz)   SpO2 94%   BMI 45.01 kg/m²       PHYSICAL EXAM:    General:    Alert, obese cooperative, no distress, appears stated age. HEENT: Atraumatic, anicteric sclerae, pink conjunctivae     No oral ulcers, mucosa moist, throat clear, dentition fair  Neck:  Supple, symmetrical,  thyroid: non tender  Lungs:   Clear to auscultation bilaterally.   No Wheezing or Rhonchi. No rales. Chest wall:  No tenderness  No Accessory muscle use. Heart:   Regular  rhythm,  No  murmur   No edema calf ttp alfredo  Abdomen:   Soft, obese non-tender. Not distended. Bowel sounds normal  Extremities: No cyanosis. No clubbing,      Skin turgor normal, Capillary refill normal, Radial dial pulse 2+  Skin:     Not pale. Not Jaundiced  No rashes    soft palpable pedal pulses with diminished sensation  Good muscle strength  Previous well healed amputation 5th right toe and  4th right toe  Now with ulcer on there 3rd toe  w some drainage  Psych:  Good insight. Not depressed. Not anxious or agitated. Neurologic: EOMs intact. No facial asymmetry. No aphasia or slurred speech. Symmetrical strength, Sensation grossly intact. Alert and oriented X 4.     _______________________________________________________________________  Care Plan discussed with:    Comments   Patient x    Family      RN x    Care Manager                    Consultant:  x    _______________________________________________________________________  Expected  Disposition:   Home with Family x   HH/PT/OT/RN    SNF/LTC    JAJA    ________________________________________________________________________  TOTAL TIME:  61 Minutes    Critical Care Provided     Minutes non procedure based      Comments    x Reviewed previous records   >50% of visit spent in counseling and coordination of care x Discussion with patient and/or family and questions answered       ________________________________________________________________________  Signed: Elvie Velazquez MD    Procedures: see electronic medical records for all procedures/Xrays and details which were not copied into this note but were reviewed prior to creation of Plan.     LAB DATA REVIEWED:    Recent Results (from the past 24 hour(s))   CBC WITH AUTOMATED DIFF    Collection Time: 01/06/20 11:01 AM   Result Value Ref Range    WBC 8.8 4.1 - 11.1 K/uL    RBC 4.04 (L) 4.10 - 5.70 M/uL    HGB 10.6 (L) 12.1 - 17.0 g/dL    HCT 32.7 (L) 36.6 - 50.3 %    MCV 80.9 80.0 - 99.0 FL    MCH 26.2 26.0 - 34.0 PG    MCHC 32.4 30.0 - 36.5 g/dL    RDW 15.3 (H) 11.5 - 14.5 %    PLATELET 369 574 - 844 K/uL    MPV 9.9 8.9 - 12.9 FL    NRBC 0.0 0  WBC    ABSOLUTE NRBC 0.00 0.00 - 0.01 K/uL    NEUTROPHILS 71 32 - 75 %    LYMPHOCYTES 16 12 - 49 %    MONOCYTES 8 5 - 13 %    EOSINOPHILS 4 0 - 7 %    BASOPHILS 0 0 - 1 %    IMMATURE GRANULOCYTES 1 (H) 0.0 - 0.5 %    ABS. NEUTROPHILS 6.3 1.8 - 8.0 K/UL    ABS. LYMPHOCYTES 1.4 0.8 - 3.5 K/UL    ABS. MONOCYTES 0.7 0.0 - 1.0 K/UL    ABS. EOSINOPHILS 0.4 0.0 - 0.4 K/UL    ABS. BASOPHILS 0.0 0.0 - 0.1 K/UL    ABS. IMM. GRANS. 0.0 0.00 - 0.04 K/UL    DF AUTOMATED     METABOLIC PANEL, COMPREHENSIVE    Collection Time: 01/06/20 11:01 AM   Result Value Ref Range    Sodium 136 136 - 145 mmol/L    Potassium 4.5 3.5 - 5.1 mmol/L    Chloride 100 97 - 108 mmol/L    CO2 25 21 - 32 mmol/L    Anion gap 11 5 - 15 mmol/L    Glucose 196 (H) 65 - 100 mg/dL    BUN 70 (H) 6 - 20 MG/DL    Creatinine 13.60 (H) 0.70 - 1.30 MG/DL    BUN/Creatinine ratio 5 (L) 12 - 20      GFR est AA 5 (L) >60 ml/min/1.73m2    GFR est non-AA 4 (L) >60 ml/min/1.73m2    Calcium 8.9 8.5 - 10.1 MG/DL    Bilirubin, total 0.5 0.2 - 1.0 MG/DL    ALT (SGPT) 16 12 - 78 U/L    AST (SGOT) 16 15 - 37 U/L    Alk.  phosphatase 43 (L) 45 - 117 U/L    Protein, total 8.5 (H) 6.4 - 8.2 g/dL    Albumin 2.9 (L) 3.5 - 5.0 g/dL    Globulin 5.6 (H) 2.0 - 4.0 g/dL    A-G Ratio 0.5 (L) 1.1 - 2.2

## 2020-01-07 NOTE — WOUND CARE
Wound care nurse consult from staff nurse stating \" Wound right foot 3rd digit. Toe. \". patient is a 26 y/o 6'2\", 338 lb AAM admitted for foot ulcer. Past Medical History:   Diagnosis Date    Cataracts     Chronic kidney disease     Diabetes ([facility] [address]     Hypercholesterolemia     Hypertension     Kidney failure     Obesity      Past Surgical History:   Procedure Laterality Date    COLONOSCOPY N/A 12/8/2017    [facility]  12/8/2017         HX AMPUTATION      Left great toe and L 5th toe    UPPER GI ENDOSCOPY,BIOPSY  12/8/2017         VASCULAR SURGERY PROCEDURE UNLIST      R side chest    VASCULAR SURGERY PROCEDURE UNLIST  07/25/2017    Creation transposed AV fistula right arm     Patient just seen by Dr Chris Yung, podiatrist. She has ordered NOÉ's and consent for right 3rd toe amputation in the next couple of days. Patient has multiple toe amputations to both feet. Recommend:    Lac-Hydrin lotion for extremely dry skin to BL feet. Right 3rd toe: was painted with betadine and wrapped in Xeroform gauze, then dry 4x4's secured with gauze maggie on 1/7/20 at 1745. This dressing can stay in place until surgery.     Kwesi Hart RN, [facility]

## 2020-01-07 NOTE — ROUTINE PROCESS

## 2020-01-07 NOTE — PROGRESS NOTES
Patient to be scheduled for  amputation 3rd right toe with in next 2-3 days pending OR availability and completion of NOÉ's

## 2020-01-07 NOTE — PROGRESS NOTES
HD TRANSFER - OUT REPORT:    Verbal report given to Jm Ford RN on Debbie Porras being transferred to PCU [phone] for continuing care. Report consisted of patient's Situation, Background, Assessment and   Recommendations(SBAR). Information from the following report(s) dialysis treatment note was reviewed with the receiving nurse. Method:  $$ Method: Hemodialysis (01/06/20 1519)    Fluid Removed  NET Fluid Removed (mL): 4000 ml (01/06/20 1952)     Patient response to treatment:  well    End Time  Hemodialysis End Time: 1952 (01/06/20 1952)  If not documented, dialysis nurse to update post-dialysis row in HD/Filtration flowsheet     Medications /Volume expansion agents or Fluid boluses administered during treatment? no    Post-dialysis medication administration due?  no  Remind nurse to administer post-HD medication upon return to unit. Fistula hemostasis? yes    Opportunity for questions and clarification was provided.       Patient transported with: Belongings in stable condition

## 2020-01-07 NOTE — PROGRESS NOTES
Verbal shift change report given to José Miguel Jordan RN (oncoming nurse) by Clifford Landis RN (offgoing nurse). Report included the following information SBAR, Kardex, ED Summary, Intake/Output, MAR, Recent Results and Med Rec Status . 2206. Labetalol PRN given due to high BP on arrival on the unit.     1930. Patient still in Dialysis. Looked at chart for anticipation of patient to the unit.     2205. Patient arrived on the floor from dialysis. 9106. Patient in pain, PRN Percocet given. (Flowsheets, MAR for details.)    7950. Patient BP has been up and down through out the night. (MAR, Flowsheets for details.) Patient will clearly meet BP goal with Cardene drip but will drop too low with the gtt on for too long. Will be attempting PRN's before patient BP goes too high. Patient may not need Cardene gtt if given PRN's or PO. Will notify day shift nurse about tonight events. 8545. Patients BP high, PRN Hydralazine given. (Flowsheets/MAR for details.)    Patient progressing towards goals. Bedside and Verbal shift change report given to Jessica GREENBERG (oncoming nurse) by Karen Palomares (offgoing nurse). Report included the following information SBAR, Kardex, Intake/Output, MAR, Recent Results and Med Rec Status.

## 2020-01-07 NOTE — PROGRESS NOTES
Foot and Ankle Progress Note    Admit Date: 1/6/2020  Hospital day 7    Subjective:     Patient was admitted thru the ER one day ago with a swollen infected 3rd right toe of unknown duration. Previous well healed amputations of right toes 5 and 4. Current Facility-Administered Medications   Medication Dose Route Frequency    lisinopril (PRINIVIL, ZESTRIL) tablet 10 mg  10 mg Oral DAILY    acetaminophen (TYLENOL) tablet 650 mg  650 mg Oral Q6H PRN    insulin glargine (LANTUS) injection 40 Units  40 Units SubCUTAneous DAILY    glucose chewable tablet 16 g  4 Tab Oral PRN    glucagon (GLUCAGEN) injection 1 mg  1 mg IntraMUSCular PRN    dextrose 10% infusion 0-250 mL  0-250 mL IntraVENous PRN    insulin lispro (HUMALOG) injection   SubCUTAneous AC&HS    hydrALAZINE (APRESOLINE) 20 mg/mL injection 10 mg  10 mg IntraVENous Q6H PRN    labetalol (NORMODYNE;TRANDATE) injection 10 mg  10 mg IntraVENous Q6H PRN    ondansetron (ZOFRAN) injection 4 mg  4 mg IntraVENous Q6H PRN    oxyCODONE-acetaminophen (PERCOCET) 5-325 mg per tablet 1 Tab  1 Tab Oral Q4H PRN    heparin (porcine) injection 7,500 Units  7,500 Units SubCUTAneous Q8H    Vancomycin--Pharmacy to Dose   Other Rx Dosing/Monitoring    piperacillin-tazobactam (ZOSYN) 3.375 g in 0.9% sodium chloride (MBP/ADV) 100 mL  3.375 g IntraVENous Q12H        Review of Systems  Reviewed admitting H&P; no changes    Objective:     Patient Vitals for the past 8 hrs:   BP Temp Pulse Resp   01/07/20 1040 129/81 97.8 °F (36.6 °C) 87 16     No intake/output data recorded. 01/05 1901 - 01/07 0700  In: 360 [P.O.:360]  Out: 4000     Physical Exam: LOWER EXTREMITIES.   Palpable pedal pulses with diminished epicritic sensation  Fairly good muscle strength  Previous well healed amputation 1st left toe  Previous well healed amputation right toes 4 and 5  3rd right toe has a dorsal ulceration with exposed bone    Xray right foot:  Partial amputation distal 5th right metatarsal and toe. No changes in distal 5th metatarsal as compared to xrays from October 2019.  4th right toe ha been amputated at the [facility]. Erosive bone 3rd right proximal phalangeal head and middle phalanx  Calcified arterioles noted on XRAY    MRI right foot:  Bone edema distal remaining portion 5th metatarsal but no change from MRI performed October 2019.    erosive bone and cortical changes consistent with osteomyelitis of the phalanges 3rd right toe      NOÉ's performed July 2019 did not suggest sig arterial obstruction and PVR wave forms were normal         Data Review   Recent Results (from the past 24 hour(s))   GLUCOSE, POC    Collection Time: 01/06/20 10:27 PM   Result Value Ref Range    Glucose (POC) 92 65 - 100 mg/dL    Performed by Maycol SCOTT    HEMOGLOBIN A1C WITH EAG    Collection Time: 01/06/20 10:33 PM   Result Value Ref Range    Hemoglobin A1c 7.4 (H) 4.0 - 5.6 %    Est. average glucose 166 mg/dL   CULTURE, BLOOD    Collection Time: 01/06/20 10:33 PM   Result Value Ref Range    Special Requests: NO SPECIAL REQUESTS      Culture result: NO GROWTH AFTER 9 HOURS     LACTIC ACID    Collection Time: 01/06/20 10:33 PM   Result Value Ref Range    Lactic acid 0.8 0.4 - 2.0 MMOL/L   METABOLIC PANEL, BASIC    Collection Time: 01/07/20  4:03 AM   Result Value Ref Range    Sodium 139 136 - 145 mmol/L    Potassium 3.7 3.5 - 5.1 mmol/L    Chloride 101 97 - 108 mmol/L    CO2 29 21 - 32 mmol/L    Anion gap 9 5 - 15 mmol/L    Glucose 157 (H) 65 - 100 mg/dL    BUN 39 (H) 6 - 20 MG/DL    Creatinine 8.75 (H) 0.70 - 1.30 MG/DL    BUN/Creatinine ratio 4 (L) 12 - 20      GFR est AA 8 (L) >60 ml/min/1.73m2    GFR est non-AA 7 (L) >60 ml/min/1.73m2    Calcium 9.0 8.5 - 10.1 MG/DL   GLUCOSE, POC    Collection Time: 01/07/20  7:25 AM   Result Value Ref Range    Glucose (POC) 111 (H) 65 - 100 mg/dL    Performed by Unkown     GLUCOSE, POC    Collection Time: 01/07/20 11:18 AM   Result Value Ref Range    Glucose (POC) 123 (H) 65 - 100 mg/dL    Performed by Unkown             Assessment:     Active Problems:    Foot ulcer ([facility] [address] (1/6/2020)      ESRD (end stage renal disease) ([facility] [address] (1/6/2020)      Wound cellulitis (1/6/2020)    osteomyelitis 3rd right toe    Plan:   Discussed MRI findings with patient. Discussed amputation of the 3rd right toe  Along with risks, benefits, expected outcome as well as post op course. Discussed that he is at a high risk non healing and possible  of limb lost .  Alll questions answered. Patient wishes to proceed with amputation of the 3rd right toe.   Surgery will be scheduled pending OR availability    Order to repeat NOÉ\"s

## 2020-01-07 NOTE — DIABETES MGMT
[facility] SPECIALIST CONSULT    Presentation   Ryley Maldonado is a 27 y.o. male admitted with cellulitis and consulted by Provider for advanced diabetes nursing assessment and care, specifically related to Disease Specific Care Management . Current clinical course has been uncomplicated. Diabetes-related medical history    Acute complications  Denies  Neurological complications  Unknown  Microvascular disease  Retinopathy and Nephropathy  Macrovascular disease  Peripheral vascular disease and Foot wounds    Diabetes medication history  Drug class NA/unknown Never used Discontinued Currently in use   Biguanide x      DDP-4 inhibitor  x      Sulfonylurea x      Thiazolidinedione x      GLP-1 RA x      SGLT-2 inhibitors x      Basal insulin    X   Bolus insulin x        Subjective   I need help at home. I'm totally dependent on myself.     Social determinants of health impacting diabetes self-management practices   Doesn't always have enough money to buy his insulin. Objective   Physical exam    General Alert, oriented and in no acute distress. Conversant and cooperative  Vital Signs   Visit Vitals  /81 (BP 1 Location: Right arm, BP Patient Position: Sitting)   Pulse 87   Temp 97.8 °F (36.6 °C)   Resp 16   Ht 6' 2\" (1.88 m)   Wt 153.4 kg (338 lb 3 oz)   SpO2 95%   BMI 43.42 kg/m²   Skin  Warm and dry. Extremities Diabetic foot exam:    Left Foot     Visual Exam: amputation- without great and second toe. Well healed scar. Pulse DP: 1+ (weak)     Right Foot   Visual Exam: amputation- 4th & 5th toes. Second & third toes are covered with bandage.    Pulse DP: 1+ (weak)       Laboratory  Lab Results   Component Value Date/Time    Hemoglobin A1c 7.4 (H) 01/06/2020 10:33 PM    Hemoglobin A1c (POC) 9.3 04/03/2018 01:30 PM     Lab Results   Component Value Date/Time    LDL, calculated 175 (H) 04/03/2018 02:19 PM     Lab Results   Component Value Date/Time    Creatinine (POC) 7.3 (H) 07/25/2017 10:12 AM    Creatinine 8.75 (H) 01/07/2020 04:03 AM     Lab Results   Component Value Date/Time    Sodium 139 01/07/2020 04:03 AM    Potassium 3.7 01/07/2020 04:03 AM    Chloride 101 01/07/2020 04:03 AM    CO2 29 01/07/2020 04:03 AM    Anion gap 9 01/07/2020 04:03 AM    Glucose 157 (H) 01/07/2020 04:03 AM    BUN 39 (H) 01/07/2020 04:03 AM    Creatinine 8.75 (H) 01/07/2020 04:03 AM    BUN/Creatinine ratio 4 (L) 01/07/2020 04:03 AM    GFR est AA 8 (L) 01/07/2020 04:03 AM    GFR est non-AA 7 (L) 01/07/2020 04:03 AM    Calcium 9.0 01/07/2020 04:03 AM    Bilirubin, total 0.5 01/06/2020 11:01 AM    AST (SGOT) 16 01/06/2020 11:01 AM    Alk. phosphatase 43 (L) 01/06/2020 11:01 AM    Protein, total 8.5 (H) 01/06/2020 11:01 AM    Albumin 2.9 (L) 01/06/2020 11:01 AM    Globulin 5.6 (H) 01/06/2020 11:01 AM    A-G Ratio 0.5 (L) 01/06/2020 11:01 AM    ALT (SGPT) 16 01/06/2020 11:01 AM     Lab Results   Component Value Date/Time    ALT (SGPT) 16 01/06/2020 11:01 AM     Blood glucose pattern    Metric Breakfast Lunch Dinner Bedtime    (1/7/20) 123 (1/7/20) X 92 (1/6/20)   Basal dose 40      Bolus 0 0 0 0   Correction 0 0 0 0     Assessment and Plan   Nursing Diagnosis Risk for unstable blood glucose pattern   Nursing Intervention Domain 8846 Decision-making Support   Nursing Interventions Examined current inpatient diabetes control   Explored factors facilitating and impeding inpatient management  Identified self-management practices impeding diabetes control  Explored corrective strategies with patient   Informed patient of rational for basal bolus insulin strategy while hospitalized  Instructed patient that less costly insulin available through [facility]     Evaluation   Blood glucose readings are acceptable on current regimen of 40 units of Lantus insulin daily.  Since patient reports missing insulin dose sometimes due to the cost of Lantus, would recommend use of Relion NPH 20 units twice daily ($25/vial) for post-discharge management (available through [employer]). Patient reports he has drawn insulin up in the past; he could benefit from a BD Magniguide to allow accurate dosing (usually available through pharmacy for less than $10). Basal insulin dosing has stabilized blood glucose levels in the past 24 hours    May need to add mealtime insulin if patient establishes regular eating pattern as inpatient. Would use normal sensitivity correction dosing while hospitalized due to ESRD.      Blood glucose management has been impacted by  [x] ESRD     Recommendations   Basal insulin - renal dosing  [x] 0.3 units/kg/D = Lantus 40 units daily or NPH 20 units twice daily    Corrective insulin  [x] Normal resistant sensitivity    Referrals  [x] Pharmacy services for medication management (affordability of home medications including insulin)    Billing Code(s)   EVERETT Corado  Access via [phone]

## 2020-01-07 NOTE — PROGRESS NOTES
TRANSFER - IN REPORT:    Verbal report received from [address] [place] [address] on Lily Roque  being received from ER 31 for hemodialysis treatment. Report consisted of patients Situation, Background, Assessment and   Recommendations(SBAR). Information from the following report(s) kardex was reviewed with the receiving nurse. Opportunity for questions and clarification was provided. Assessment completed upon patients arrival to unit and care assumed.

## 2020-01-07 NOTE — PROGRESS NOTES
NAME: Francisco Javier Fong        :  1985        MRN:  [id number]        Assessment :    Plan:  --ESRD-[facility]- [facility] - MWF  Anemia  DM --No acute need for HD today. Plan HD in AM.    Holding FRAIDA for now. Subjective:     Chief Complaint:  \" I feel better. \"  Still with foot pain. No n/V. No dyspnea. Review of Systems:    Symptom Y/N Comments  Symptom Y/N Comments   Fever/Chills    Chest Pain     Poor Appetite    Edema     Cough    Abdominal Pain     Sputum    Joint Pain     SOB/LITTLE    Pruritis/Rash     Nausea/vomit    Tolerating PT/OT     Diarrhea    Tolerating Diet     Constipation    Other       Could not obtain due to:      Objective:     VITALS:   Last 24hrs VS reviewed since prior progress note. Most recent are:  Visit Vitals  BP (!) 163/106   Pulse 86   Temp 98.2 °F (36.8 °C)   Resp 16   Ht 6' 2\" (1.88 m)   Wt 153.4 kg (338 lb 3 oz)   SpO2 95%   BMI 43.42 kg/m²       Intake/Output Summary (Last 24 hours) at 2020 0914  Last data filed at 2020 0400  Gross per 24 hour   Intake 360 ml   Output 4000 ml   Net -3640 ml      Telemetry Reviewed:     PHYSICAL EXAM:  General: NAD  1+ edema      Lab Data Reviewed: (see below)    Medications Reviewed: (see below)    PMH/SH reviewed - no change compared to H&P  ________________________________________________________________________  Care Plan discussed with:  Patient     Family      RN     Care Manager                    Consultant:          Comments   >50% of visit spent in counseling and coordination of care       ________________________________________________________________________  Kristan Sam MD     Procedures: see electronic medical records for all procedures/Xrays and details which  were not copied into this note but were reviewed prior to creation of Plan.       LABS:  Recent Labs     20  1101   WBC 8.8   HGB 10.6*   HCT 32.7*    Recent Labs     01/07/20  0403 01/06/20  1101    136   K 3.7 4.5    100   CO2 29 25   BUN 39* 70*   CREA 8.75* 13.60*   * 196*   CA 9.0 8.9     Recent Labs     01/06/20  1101   SGOT 16   AP 43*   TP 8.5*   ALB 2.9*   GLOB 5.6*     No results for input(s): INR, PTP, APTT, INREXT in the last 72 hours. No results for input(s): FE, TIBC, PSAT, FERR in the last 72 hours. No results found for: FOL, RBCF   No results for input(s): PH, PCO2, PO2 in the last 72 hours. No results for input(s): CPK, CKMB in the last 72 hours.     No lab exists for component: TROPONINI  No components found for: [place]  Lab Results   Component Value Date/Time    Color YELLOW/STRAW 05/01/2017 05:39 PM    Appearance CLOUDY (A) 05/01/2017 05:39 PM    Specific gravity 1.018 05/01/2017 05:39 PM    Specific gravity >1.030 (H) 10/03/2014 04:35 AM    pH (UA) 5.5 05/01/2017 05:39 PM    Protein 300 (A) 05/01/2017 05:39 PM    Glucose 250 (A) 05/01/2017 05:39 PM    Ketone NEGATIVE  05/01/2017 05:39 PM    Bilirubin NEGATIVE  05/01/2017 05:39 PM    Urobilinogen 0.2 05/01/2017 05:39 PM    Nitrites NEGATIVE  05/01/2017 05:39 PM    Leukocyte Esterase NEGATIVE  05/01/2017 05:39 PM    Epithelial cells FEW 05/01/2017 05:39 PM    Bacteria NEGATIVE  05/01/2017 05:39 PM    WBC 0-4 05/01/2017 05:39 PM    RBC 5-10 05/01/2017 05:39 PM       MEDICATIONS:  Current Facility-Administered Medications   Medication Dose Route Frequency    niCARdipine (CARDENE) 25 mg in 0.9% sodium chloride 250 mL infusion  0-15 mg/hr IntraVENous TITRATE    acetaminophen (TYLENOL) tablet 650 mg  650 mg Oral Q6H PRN    insulin glargine (LANTUS) injection 40 Units  40 Units SubCUTAneous DAILY    lisinopril (PRINIVIL, ZESTRIL) tablet 10 mg  10 mg Oral EVERY TUES,THUR,SAT,SUN    glucose chewable tablet 16 g  4 Tab Oral PRN    glucagon (GLUCAGEN) injection 1 mg  1 mg IntraMUSCular PRN    dextrose 10% infusion 0-250 mL  0-250 mL IntraVENous PRN    insulin lispro (HUMALOG) injection   SubCUTAneous AC&HS    hydrALAZINE (APRESOLINE) 20 mg/mL injection 10 mg  10 mg IntraVENous Q6H PRN    labetalol (NORMODYNE;TRANDATE) injection 10 mg  10 mg IntraVENous Q6H PRN    ondansetron (ZOFRAN) injection 4 mg  4 mg IntraVENous Q6H PRN    oxyCODONE-acetaminophen (PERCOCET) 5-325 mg per tablet 1 Tab  1 Tab Oral Q4H PRN    heparin (porcine) injection 7,500 Units  7,500 Units SubCUTAneous Q8H    Vancomycin--Pharmacy to Dose   Other Rx Dosing/Monitoring    piperacillin-tazobactam (ZOSYN) 3.375 g in 0.9% sodium chloride (MBP/ADV) 100 mL  3.375 g IntraVENous Q12H

## 2020-01-07 NOTE — PROGRESS NOTES
Hospitalist Progress Note    NAME: Lily Roque   :  1985   MRN:  [id number]       Assessment / Plan:    osteomyelitics right 3th Toe:  cultures PENDING, cont  Zosyn  and Vancomycin,Podiatry evaluation pending. Follow cx/check lactic acid  xrays New, partial destruction of the distal aspect of the proximal  phalanx and near total destruction of the middle phalanx of the right third toe,  consistent with osteomyelitis. Chest xray ordered     HTN malignant- Stable now, Discontinue cardene drip and start daily lisinopril instead of using on dialysis days     ESRD on HD MWF: Appreciated Nephrology consult, Continue scheduled Dialysis   HTN: c/w ACE, use labetalol prn.      DM type 2 with hyperglycemia: c/w Lantus, place SSI, HbA1c is 7.4   Hyperlipidemia: not on statin        Obesity- counseled , due to excess caloric intake    40 or above Morbid obesity / Body mass index is 43.42 kg/m². Code status: Full  Prophylaxis: SCD's  Recommended Disposition: Home w/Family     Subjective:     Chief Complaint / Reason for Physician Visit  \"No active complaints, Denies fever or chills. \". Discussed with RN events overnight. Review of Systems:  Symptom Y/N Comments  Symptom Y/N Comments   Fever/Chills n   Chest Pain n    Poor Appetite n   Edema n    Cough nn   Abdominal Pain n    Sputum n   Joint Pain n    SOB/LITTLE n   Pruritis/Rash n    Nausea/vomit n   Tolerating PT/OT     Diarrhea n   Tolerating Diet     Constipation n   Other       Could NOT obtain due to:      Objective:     VITALS:   Last 24hrs VS reviewed since prior progress note.  Most recent are:  Patient Vitals for the past 24 hrs:   Temp Pulse Resp BP SpO2   20 1040 97.8 °F (36.6 °C) 87 16 129/81    20 0711 98.2 °F (36.8 °C) 86 16 (!) 163/106    20 0654  85  (!) 174/108    20 0605  85  129/85    20 0534  85  151/89    20 0530    151/89    20 0500  85  (!) 148/94    20 0430  84  145/89    01/07/20 0400 98.2 °F (36.8 °C) 86 16 (!) 161/95 95 %   01/07/20 0330  90  (!) 161/93    01/07/20 0300  93  (!) 170/98    01/07/20 0245  94  (!) 173/107    01/07/20 0230  95  (!) 173/107    01/07/20 0200  96  (!) 165/106    01/07/20 0135  93      01/07/20 0130    144/81    01/07/20 0100    (!) 164/94    01/07/20 0030    162/89    01/07/20 0000 98 °F (36.7 °C) 96 16 168/87 94 %   01/06/20 2330  95  (!) 185/109    01/06/20 2315    (!) 192/115 93 %   01/06/20 2206 98.4 °F (36.9 °C) 93 18 (!) 174/92 96 %   01/06/20 1952 98.1 °F (36.7 °C) 90 18 176/88    01/06/20 1945  90 18 170/84    01/06/20 1930  90 18 167/84    01/06/20 1915  92 18 (!) 194/117    01/06/20 1900  92 18 (!) 180/107    01/06/20 1845  90 18 (!) 191/104    01/06/20 1830  93 18 (!) 210/104    01/06/20 1815  90 18 (!) 247/139    01/06/20 1800  90 18 (!) 266/146    01/06/20 1745  90 18 (!) 260/143    01/06/20 1730  87 18 (!) 247/133    01/06/20 1715  87 18 (!) 237/131    01/06/20 1700  86 18 (!) 218/104    01/06/20 1645  90 18 (!) 208/90    01/06/20 1630  89 18 (!) 235/118    01/06/20 1615  87 18 (!) 236/124    01/06/20 1600  90 18 (!) 214/141    01/06/20 1545  87 18 (!) 208/105    01/06/20 1519 97.8 °F (36.6 °C) 89 18 (!) 212/118    01/06/20 1415    (!) 177/108 94 %       Intake/Output Summary (Last 24 hours) at 1/7/2020 1248  Last data filed at 1/7/2020 0400  Gross per 24 hour   Intake 360 ml   Output 4000 ml   Net -3640 ml        PHYSICAL EXAM:  General: WD, WN. Alert, cooperative, no acute distress    EENT:  EOMI. Anicteric sclerae. MMM  Resp:  CTA bilaterally, no wheezing or rales. No accessory muscle use  CV:  Regular  rhythm,  No edema  GI:  Soft, Non distended, Non tender.  +Bowel sounds  Neurologic:  Alert and oriented X 3, normal speech,   Psych:   Good insight. Not anxious nor agitated  Skin:  No rashes.   No jaundice, erosive ulcer on Right 3rd toe    Reviewed most current lab test results and cultures  YES  Reviewed most current radiology test results   YES  Review and summation of old records today    NO  Reviewed patient's current orders and MAR    YES  PMH/SH reviewed - no change compared to H&P  ________________________________________________________________________  Care Plan discussed with:    Comments   Patient x    Family      RN x    Care Manager     Consultant                        Multidiciplinary team rounds were held today with , nursing, pharmacist and clinical coordinator. Patient's plan of care was discussed; medications were reviewed and discharge planning was addressed. ________________________________________________________________________  Total NON critical care TIME:  35   Minutes    Total CRITICAL CARE TIME Spent:   Minutes non procedure based      Comments   >50% of visit spent in counseling and coordination of care     ________________________________________________________________________  Chioma Stone MD     Procedures: see electronic medical records for all procedures/Xrays and details which were not copied into this note but were reviewed prior to creation of Plan. LABS:  I reviewed today's most current labs and imaging studies.   Pertinent labs include:  Recent Labs     01/06/20  1101   WBC 8.8   HGB 10.6*   HCT 32.7*        Recent Labs     01/07/20  0403 01/06/20  1101    136   K 3.7 4.5    100   CO2 29 25   * 196*   BUN 39* 70*   CREA 8.75* 13.60*   CA 9.0 8.9   ALB  --  2.9*   TBILI  --  0.5   SGOT  --  16   ALT  --  16       Signed: Chioma Stone MD

## 2020-01-07 NOTE — PROGRESS NOTES
0720 Bedside, Verbal and Written shift change report given to Jessica RIOS RN  (oncoming nurse) by Aneta Mckeon (offgoing nurse). Report included the following information SBAR, Kardex, Intake/Output, MAR, Recent Results and Med Rec Status.     0811 off the floor to US     1440 MRI questionnaire filled out and faxed     [phone] attempted  To call report RN busy with pt care will try again       [phone] off the floor to MRI      1905 report  Called to [phone] RN pt will be transferred after MRI

## 2020-01-07 NOTE — PROGRESS NOTES

## 2020-01-07 NOTE — PROGRESS NOTES
11:55 AM attempted to call consult to Dr. Denson/Dr. Ty Zane office; vm only option. Left message to return call at [phone].

## 2020-01-07 NOTE — PROGRESS NOTES
Attempted to get pt for MRI from ED but pt was in dialysis.  MRI screening form must be completed and reviewed by MRI staff before MRI can be done

## 2020-01-08 NOTE — PROGRESS NOTES
HD TRANSFER - OUT REPORT:    Verbal report given to DIONICIO Young on Lazaro Roman being transferred to [facility] for routine progression of care       Report consisted of patient's Situation, Background, Assessment and   Recommendations(SBAR). Information from the following report(s) SBAR, Procedure Summary, Intake/Output and Recent Results was reviewed with the receiving nurse. Method:  $$ Method: Hemodialysis (01/08/20 0735)    Fluid Removed  NET Fluid Removed (mL): 4500 ml (01/08/20 1155)     Patient response to treatment:  Stable    End Time  Hemodialysis End Time: 8631 (01/08/20 1155)  If not documented, dialysis nurse to update post-dialysis row in HD/Filtration flowsheet     Medications /Volume expansion agents or Fluid boluses administered during treatment? no    Post-dialysis medication administration due?  yes  Remind nurse to administer post-HD medication upon return to unit. Fistula hemostasis? yes    Line heparinization? no    Lines: PELON AVF    Opportunity for questions and clarification was provided.       Patient transported with: [employer]

## 2020-01-08 NOTE — PROGRESS NOTES
Hospitalist Progress Note    NAME: Juanis Rice   :  1985   MRN:  [id number]     I reviewed pertinent labs/imaging and discussed plan of care with Dr. Robbie Iqbal who is in agreement. Assessment / Plan:  Osteomyelitis right 3rd toe  PVD  MRI right foot 20:    1. Destruction of the third PIP joint consistent with septic arthritis. Osteomyelitis is seen in the phalanges of the third toe. 2. Soft tissue wound of the dorsal third toe. No evidence of a focal drainable fluid collection. Significant subcutaneous edema surrounding the third toe and over the dorsum of foot. 3. Status post amputation of the phalanges of the fourth digit. Mild edema in the fourth metatarsal head is more likely reactive related to altered stress mechanics rather than early osteomyelitis. 4. Status post indication the fourth ray to the level of the mid metatarsal.  LE doppler 20:  No DVT of the legs. NOÉ 20:    · The right resting NOÉ is moderately decreased. Based on pulse volume recordings due to calcified vessels. · Arterial disease noted at the level of the femoral artery and popliteal on the right side. · The left resting NOÉ is mildly decreased. Based on pulse volume recordings due to calcified vessels. · Arterial disease noted at the level of the femoral artery and popliteal on the left side. - Continue current abx (vancomycin day #3/zosyn day #2). - Add probiotics while patient on abx.  - Plan for amputation of 4th toe 20.   - If patient has delayed healing consider Vascular Surgery consult. Mild diarrhea  - Add probiotics as noted above. - Add loperamide 2 mg po qid prn. HTN  - BP adequate currently. - Continue lisinopril 10 mg po daily. ESRD  - HD MWF (today). Anemia of chronic disease  - Continue epoetin 10,000 units sc 3 times weekly.     DM II, poorly controlled  HgbA1C 7.4  - Acceptable blood sugar control.    - Continue current basal insulin (glargine 40 units sc daily). - Continue FSBS with SSI correction scale. Obesity  - Discussed the effects of weight on overall health. 40 or above Morbid obesity / Body mass index is 43.42 kg/m². Code status: Full  Prophylaxis: Hep SQ  Recommended Disposition: Home w/Family     Subjective:     Chief Complaint / Reason for Physician Visit  Patient complains of mild diarrhea today. No other complaints. Plan of care reviewed with RN. Review of Systems:  Symptom Y/N Comments  Symptom Y/N Comments   Fever/Chills N   Chest Pain N    Poor Appetite N   Edema N    Cough N   Abdominal Pain N    Sputum N   Joint Pain Y Toe    SOB/LITTLE N   Pruritis/Rash N    Nausea/vomit N   Tolerating PT/OT     Diarrhea Y   Tolerating Diet Y    Constipation N   Other       Could NOT obtain due to:      Objective:     VITALS:   Last 24hrs VS reviewed since prior progress note.  Most recent are:  Patient Vitals for the past 24 hrs:   Temp Pulse Resp BP SpO2   01/08/20 1607 98.1 °F (36.7 °C) 88 18 145/71 92 %   01/08/20 1238 97.9 °F (36.6 °C) 90 18 163/89 91 %   01/08/20 1155 97.9 °F (36.6 °C) 87 18 (!) 179/95    01/08/20 1145  87 18 (!) 171/95    01/08/20 1130  86 18 (!) 172/100    01/08/20 1115  87 18 (!) 177/100    01/08/20 1100  87 18 (!) 187/106    01/08/20 1045  87 18 (!) 163/94    01/08/20 1030  86 18 (!) 185/101    01/08/20 1015  86 18 (!) 182/94    01/08/20 1000  84 18 (!) 185/107    01/08/20 0945  83 18 (!) 179/97    01/08/20 0930  84 18 (!) 168/98    01/08/20 0915  83 18 (!) 174/95    01/08/20 0900  84 18 (!) 175/97    01/08/20 0845  85 18 (!) 174/99    01/08/20 0830  85 18 (!) 177/97    01/08/20 0815  86 18 (!) 164/91    01/08/20 0800  86 18 (!) 180/102    01/08/20 0748 97.4 °F (36.3 °C) 85 18 (!) 191/114 95 %   01/08/20 0745  87 18 (!) 187/103    01/08/20 0735 97.9 °F (36.6 °C) 88 18 (!) 177/94    01/08/20 0407 98.7 °F (37.1 °C) 86 14 (!) 166/99 97 %   01/07/20 2345 98.6 °F (37 °C) 86 16 (!) 163/91 97 %       Intake/Output Summary (Last 24 hours) at 1/8/2020 1740  Last data filed at 1/8/2020 1155  Gross per 24 hour   Intake    Output 4500 ml   Net -4500 ml        PHYSICAL EXAM:  General:  A/A/O X 3. NAD. HEENT:  Normocephalic. Sclera anicteric. EOMI. Mucous membranes moist.    Chest:  Resps even/unlabored with symmetrical CWE. Air entry full. Lungs CTA. No use of accessory muscles. CV:  RRR. No peripheral edema. GI:  Abdomen soft/NT/ND. ABT X 4.    Neurologic:  Nonfocal.  CN II-XII grossly intact. Speech normal.     Psych:  Cooperative. No anxiety or agitation. Skin:  Left great toe and 2nd toe s/p amputation. Right foot dressed with C/D/I dressing. No rashes or jaundice. Reviewed most current lab test results and cultures  YES  Reviewed most current radiology test results   YES  Review and summation of old records today    NO  Reviewed patient's current orders and MAR    YES  PMH/SH reviewed - no change compared to H&P  ________________________________________________________________________  Care Plan discussed with:    Comments   Patient [address]     Consultant                        Multidiciplinary team rounds were held today with , nursing, pharmacist and clinical coordinator. Patient's plan of care was discussed; medications were reviewed and discharge planning was addressed. ______________________________________________________________________________________  Martha Aviles NP     Procedures: see electronic medical records for all procedures/Xrays and details which were not copied into this note but were reviewed prior to creation of Plan. LABS:  I reviewed today's most current labs and imaging studies.   Pertinent labs include:  Recent Labs     01/08/20  0408 01/06/20  1101   WBC 6.0 8.8   HGB 9.9* 10.6*   HCT 32.1* 32.7*    271     Recent Labs     01/08/20  0408 01/07/20  0403 01/06/20  1101    139 136 K 4.3 3.7 4.5    101 100   CO2 27 29 25   GLU 97 157* 196*   BUN 52* 39* 70*   CREA 10.90* 8.75* 13.60*   CA 8.6 9.0 8.9   ALB 2.6*  --  2.9*   TBILI 0.5  --  0.5   SGOT 14*  --  16   ALT 13  --  16       Signed: Jamia Sal, RACHEAL

## 2020-01-08 NOTE — PROGRESS NOTES
Problem: Falls - Risk of  Goal: *Absence of Falls  Description  Document Ivan Lassiter Fall Risk and appropriate interventions in the flowsheet.   Outcome: Progressing Towards Goal  Note: Fall Risk Interventions:            Medication Interventions: Assess postural VS orthostatic hypotension

## 2020-01-08 NOTE — PROCEDURES
[facility] Dialysis Team [facility]  [phone]    Vitals   Pre   Post   Assessment   Pre   Post     Temp  Temp: 97.9 °F (36.6 °C) (01/08/20 0735)  97.9 LOC  A&Ox4 A&Ox4   HR   Pulse (Heart Rate): 88 (01/08/20 0735) 87 Lungs   diminished Diminished bases   B/P   BP: (!) 177/94 (01/08/20 0735) 179/95 Cardiac   RRR RRR   Resp   Resp Rate: 18 (01/08/20 0735) 18 Skin   Toe wrapped Toe wrapped   Pain level  Pain Intensity 1: 0 (01/08/20 0407) 0 Edema  Trace lower extremeties Trace lower extremeties   Orders:    Duration:   Start:    4497 End:    1155 Total:   4.25 hours   Dialyzer:   Dialyzer/Set Up Inspection: Veda Garcia (01/08/20 0735)   K Bath:   Dialysate K (mEq/L): 2 (01/08/20 0735)   Ca Bath:   Dialysate CA (mEq/L): 2.5 (01/08/20 0735)   Na/Bicarb:   Dialysate NA (mEq/L): 140 (01/08/20 0735)   Target Fluid Removal:   Goal/Amount of Fluid to Remove (mL): 4500 mL (01/08/20 0735)   Access     Type & Location:   PELON AVF: skin CDI. No s/s of infection. No issues with cannulation or hemostasis. Running well at . Pt arrived to HD suite A&Ox4. Consent signed & on file. SBAR received from Primary RN. Pt cannulated with 28B needles per policy & without issue. VSS. Dialysis Tx initiated.     Labs     Obtained/Reviewed   Critical Results Called   Date when labs were drawn-  Hgb-    HGB   Date Value Ref Range Status   01/08/2020 9.9 (L) 12.1 - 17.0 g/dL Final     K-    Potassium   Date Value Ref Range Status   01/08/2020 4.3 3.5 - 5.1 mmol/L Final     Ca-   Calcium   Date Value Ref Range Status   01/08/2020 8.6 8.5 - 10.1 MG/DL Final     Bun-   BUN   Date Value Ref Range Status   01/08/2020 52 (H) 6 - 20 MG/DL Final     Creat-   Creatinine   Date Value Ref Range Status   01/08/2020 10.90 (H) 0.70 - 1.30 MG/DL Final        Medications/ Blood Products Given     Name   Dose   Route and Time     None ordered                Blood Volume Processed (BVP):    116.6 Net Fluid   Removed:  4500mL   Comments   All dialysis related medications have been reviewed. Assessment performed by RN. Procedure and documentation observed and reviewed by Nicanor Lovelace RN. Time Out Done: 0730  Primary Nurse Rpt Pre:  James Jorgensen RN  Primary Nurse Rpt Post:  Khalif Luis RN  Pt Education:  procedural  Care Plan:  On going  Tx Summary:  6626:  PELON AVF: skin CDI. No s/s of infection. No issues with cannulation or hemostasis. Running well at . Pt arrived to HD suite A&Ox4. Consent signed & on file. SBAR received from Primary RN. Pt cannulated with 15K needles per policy & without issue. VSS. Dialysis Tx initiated. 0745:  Pt resting  0800:  Pt resting  0815:  Pt resting  0830:  Pt resting  0845:  Pt resting  0900:  Pt resting   0915:  Pt resting  0930:  Pt resting  0945:  Pt resting  1000:  Pt resting  1015:  Pt resing  1030:  Pt resting  1045:  Pt resting  1100:  Pt resting  1115:  Pt resting  1130: BFR reduced to 450 d/t AP; pt resting  1145:  Pt resting  1155: Tx ended. VSS. All possible blood returned to patient. Hemostasis achieved without issue. Bed locked and in the lowest position, call bell and belongings in reach. SBAR given to Primary, RN. Patient is stable at time of their/ my departure. Admiting Diagnosis:  Foot ulcer  Pt's previous clinic- [address]  Consent signed - Informed Consent Verified: Yes (01/08/20 9375)  [facility] Consent - signed and on file  Hepatitis Status- neg 1/6/20  Machine #- Machine Number: [id number] (01/08/20 7946)  Telemetry status-  Pre-dialysis wt. -

## 2020-01-08 NOTE — PROGRESS NOTES
NAME: Kylie Saldana        :  1985        MRN:  [id number]        Assessment :    Plan:  --ESRD-[facility]- [facility] - MWF  Anemia  DM --Seen on HD at 9:15. Tolerating well. Start retacrit       Subjective:     Chief Complaint:  \" I'm going to surgery tomorrow. \"  No foot pain. No n/V. No dyspnea. Review of Systems:    Symptom Y/N Comments  Symptom Y/N Comments   Fever/Chills    Chest Pain     Poor Appetite    Edema     Cough    Abdominal Pain     Sputum    Joint Pain     SOB/LITTLE    Pruritis/Rash     Nausea/vomit    Tolerating PT/OT     Diarrhea    Tolerating Diet     Constipation    Other       Could not obtain due to:      Objective:     VITALS:   Last 24hrs VS reviewed since prior progress note. Most recent are:  Visit Vitals  BP (!) 174/95   Pulse 83   Temp 97.4 °F (36.3 °C)   Resp 18   Ht 6' 2\" (1.88 m)   Wt 153.4 kg (338 lb 3 oz)   SpO2 95%   BMI 43.42 kg/m²     No intake or output data in the 24 hours ending 20 6356   Telemetry Reviewed:     PHYSICAL EXAM:  General: NAD  1+ edema      Lab Data Reviewed: (see below)    Medications Reviewed: (see below)    PMH/SH reviewed - no change compared to H&P  ________________________________________________________________________  Care Plan discussed with:  Patient     Family      RN     Care Manager                    Consultant:          Comments   >50% of visit spent in counseling and coordination of care       ________________________________________________________________________  Emre Gil MD     Procedures: see electronic medical records for all procedures/Xrays and details which  were not copied into this note but were reviewed prior to creation of Plan.       LABS:  Recent Labs     20  0408 20  1101   WBC 6.0 8.8   HGB 9.9* 10.6*   HCT 32.1* 32.7*    271     Recent Labs     20  0408 20  0403 20  1101    139 136   K 4.3 3.7 4.5    101 100   CO2 27 29 25   BUN 52* 39* 70*   CREA 10.90* 8.75* 13.60*   GLU 97 157* 196*   CA 8.6 9.0 8.9     Recent Labs     01/08/20  0408 01/06/20  1101   SGOT 14* 16   AP 40* 43*   TP 8.2 8.5*   ALB 2.6* 2.9*   GLOB 5.6* 5.6*     No results for input(s): INR, PTP, APTT, INREXT, INREXT in the last 72 hours. No results for input(s): FE, TIBC, PSAT, FERR in the last 72 hours. No results found for: FOL, RBCF   No results for input(s): PH, PCO2, PO2 in the last 72 hours. No results for input(s): CPK, CKMB in the last 72 hours.     No lab exists for component: TROPONINI  No components found for: [place]  Lab Results   Component Value Date/Time    Color YELLOW/STRAW 05/01/2017 05:39 PM    Appearance CLOUDY (A) 05/01/2017 05:39 PM    Specific gravity 1.018 05/01/2017 05:39 PM    Specific gravity >1.030 (H) 10/03/2014 04:35 AM    pH (UA) 5.5 05/01/2017 05:39 PM    Protein 300 (A) 05/01/2017 05:39 PM    Glucose 250 (A) 05/01/2017 05:39 PM    Ketone NEGATIVE  05/01/2017 05:39 PM    Bilirubin NEGATIVE  05/01/2017 05:39 PM    Urobilinogen 0.2 05/01/2017 05:39 PM    Nitrites NEGATIVE  05/01/2017 05:39 PM    Leukocyte Esterase NEGATIVE  05/01/2017 05:39 PM    Epithelial cells FEW 05/01/2017 05:39 PM    Bacteria NEGATIVE  05/01/2017 05:39 PM    WBC 0-4 05/01/2017 05:39 PM    RBC 5-10 05/01/2017 05:39 PM       MEDICATIONS:  Current Facility-Administered Medications   Medication Dose Route Frequency    epoetin nata-epbx (RETACRIT) injection 10,000 Units  10,000 Units SubCUTAneous Q MON, WED & FRI    lisinopril (PRINIVIL, ZESTRIL) tablet 10 mg  10 mg Oral DAILY    ammonium lactate (LAC-HYDRIN) 12 % lotion   Topical BID    acetaminophen (TYLENOL) tablet 650 mg  650 mg Oral Q6H PRN    insulin glargine (LANTUS) injection 40 Units  40 Units SubCUTAneous DAILY    glucose chewable tablet 16 g  4 Tab Oral PRN    glucagon (GLUCAGEN) injection 1 mg  1 mg IntraMUSCular PRN    dextrose 10% infusion 0-250 mL  0-250 mL IntraVENous PRN    insulin lispro (HUMALOG) injection   SubCUTAneous AC&HS    hydrALAZINE (APRESOLINE) 20 mg/mL injection 10 mg  10 mg IntraVENous Q6H PRN    labetalol (NORMODYNE;TRANDATE) injection 10 mg  10 mg IntraVENous Q6H PRN    ondansetron (ZOFRAN) injection 4 mg  4 mg IntraVENous Q6H PRN    oxyCODONE-acetaminophen (PERCOCET) 5-325 mg per tablet 1 Tab  1 Tab Oral Q4H PRN    heparin (porcine) injection 7,500 Units  7,500 Units SubCUTAneous Q8H    Vancomycin--Pharmacy to Dose   Other Rx Dosing/Monitoring    piperacillin-tazobactam (ZOSYN) 3.375 g in 0.9% sodium chloride (MBP/ADV) 100 mL  3.375 g IntraVENous Q12H

## 2020-01-08 NOTE — PROGRESS NOTES
TRANSFER - IN REPORT:    Verbal report received from Monique Barry [facility] on Yue Mini  being received from Gen-Surg for ordered procedure      Report consisted of patients Situation, Background, Assessment and   Recommendations(SBAR). Information from the following report(s) SBAR, Kardex and Cardiac Rhythm NSR was reviewed with the receiving nurse. Opportunity for questions and clarification was provided. Assessment completed upon patients arrival to unit and care assumed.

## 2020-01-08 NOTE — PROGRESS NOTES
speedy's show mild to moderate arterial disease lower leg but should heal with amputation of the 4th toe. If there are any problems healing I will request vascular consult.   Pt. Is scheduled for amputation 3rd right toe tomorrow 1/9/20 @ 4:30 pm

## 2020-01-08 NOTE — PROGRESS NOTES
Pharmacy Automatic Renal Dosing Protocol - Antimicrobials    Indication for Antimicrobials: DM foot Infection in setting of HD     Current Regimen of Each Antimicrobial:  Vancomycin 2gm IV x1, then 1gm IV after each HD (Start Date ; Day # 2  Zosyn 3.375gm IV q8h; start 1; Day #2    Previous Antimicrobial Therapy:  Cefepime 2gm IV x1  (Start Date     Goal Level: VANCOMYCIN TROUGH GOAL RANGE    Vancomycin Trough: 15 - 20 mcg/mL  (AUC: 400 - 600 mg/hr/Liter/day)     Date Dose & Interval Measured (mcg/mL) Extrapolated (mcg/mL)                       Date & time of next level: TBD    Significant Cultures:   : Toe Wound = (pending)  1: Blood = (pending)    Radiology / Imaging results: (X-ray, CT scan or MRI):   : MRI Foot: (pending)    Paralysis, amputations, malnutrition: n/a    Labs:  Recent Labs     20  0408 20  0403 20  1101   CREA 10.90* 8.75* 13.60*   BUN 52* 39* 70*   WBC 6.0  --  8.8     Temp (24hrs), Av °F (36.7 °C), Min:97.4 °F (36.3 °C), Max:98.7 °F (37.1 °C)    Creatinine Clearance (mL/min) or Dialysis: HD    Impression/Plan:   Receiving HD on   Vancomycin 2 grams x 1 IV to complete loading dose, then 1 gram after each HD. Continue with Zosyn    Afebrile  Antimicrobial stop date TBD     Pharmacy will follow daily and adjust medications as appropriate for renal function and/or serum levels. Thank you,  Shun Pham, [address] [facility]    Recommended duration of therapy  http://[facility]/[place]/[facility]/Pharmacy/Clinical%20Companion/Duration%20of%20ABX%20therapy. docx    Renal Dosing  [email or web address]/[place]/[facility]/Pharmacy/Clinical%20Companion/Renal%[id number]. pdf

## 2020-01-08 NOTE — PROGRESS NOTES
HYPOGLYCEMIC EPISODE DOCUMENTATION    Patient with hypoglycemic episode(s) at 0703  on 1/8/2020 . BG value(s) pre-treatment 68  Was patient symptomatic?  [] yes, [x] no  Patient was treated with the following rescue medications/treatments: [] D50                [] Glucose tablets                [] Glucagon                [x] 4oz juice                [] 6oz reg soda                [] 8oz low fat milk  BG value post-treatment: 89  Once BG treated and value greater than 80mg/dl, pt was provided with the following:  [x] snack  [] meal

## 2020-01-09 NOTE — PROGRESS NOTES
Patient returned back from OR. Surgical show to be applied. Dressing intact. B/P elevated PRN med administered. Glucose 63, glucose tab administered, patient requested orange juice. Per conversation with Dr. Nasra Roberson, once patient starts to eat again, 5% Dextrose may be discontinued. Continue to monitor. Patient ordered food tray.

## 2020-01-09 NOTE — PROGRESS NOTES
Foot and Ankle Progress Note    Admit Date: 1/6/2020  Hospital day 3    Subjective:     Patient was admitted thru the three days ago with a swollen infected 3rd right toe of unknown duration. Previous well healed amputations of right toes 5 and 4.     Current Facility-Administered Medications   Medication Dose Route Frequency    [START ON 1/10/2020] VANCOMYCIN INFORMATION NOTE   Other ONCE    dextrose 5% infusion  50 mL/hr IntraVENous CONTINUOUS    [START ON 1/10/2020] 0.9% sodium chloride infusion  25 mL/hr IntraVENous CONTINUOUS    epoetin nata-epbx (RETACRIT) injection 10,000 Units  10,000 Units SubCUTAneous Q MON, WED & FRI    lactobac ac& pc-s.therm-b.anim (USMAN Q/RISAQUAD)  1 Cap Oral DAILY    loperamide (IMODIUM) capsule 2 mg  2 mg Oral QID PRN    lisinopril (PRINIVIL, ZESTRIL) tablet 10 mg  10 mg Oral DAILY    ammonium lactate (LAC-HYDRIN) 12 % lotion   Topical BID    acetaminophen (TYLENOL) tablet 650 mg  650 mg Oral Q6H PRN    insulin glargine (LANTUS) injection 40 Units  40 Units SubCUTAneous DAILY    glucose chewable tablet 16 g  4 Tab Oral PRN    glucagon (GLUCAGEN) injection 1 mg  1 mg IntraMUSCular PRN    dextrose 10% infusion 0-250 mL  0-250 mL IntraVENous PRN    insulin lispro (HUMALOG) injection   SubCUTAneous AC&HS    hydrALAZINE (APRESOLINE) 20 mg/mL injection 10 mg  10 mg IntraVENous Q6H PRN    labetalol (NORMODYNE;TRANDATE) injection 10 mg  10 mg IntraVENous Q6H PRN    ondansetron (ZOFRAN) injection 4 mg  4 mg IntraVENous Q6H PRN    oxyCODONE-acetaminophen (PERCOCET) 5-325 mg per tablet 1 Tab  1 Tab Oral Q4H PRN    heparin (porcine) injection 7,500 Units  7,500 Units SubCUTAneous Q8H    Vancomycin--Pharmacy to Dose   Other Rx Dosing/Monitoring    piperacillin-tazobactam (ZOSYN) 3.375 g in 0.9% sodium chloride (MBP/ADV) 100 mL  3.375 g IntraVENous Q12H        Review of Systems  Reviewed admitting H&P; no changes    Objective:     Patient Vitals for the past 8 hrs:   BP Temp Pulse Resp SpO2   01/09/20 1541 (!) 173/100 98 °F (36.7 °C) 81 15 93 %   01/09/20 1241 150/45 98.1 °F (36.7 °C) 74 16 92 %     No intake/output data recorded. 01/07 1901 - 01/09 0700  In: -   Out: 4500     Physical Exam: LOWER EXTREMITIES. Palpable pedal pulses with diminished epicritic sensation  Fairly good muscle strength  Previous well healed amputation 1st left toe  Previous well healed amputation right toes 4 and 5  3rd right toe has a dorsal ulceration with exposed bone    WBC's at 5.3    Xray right foot:  Partial amputation distal 5th right metatarsal and toe. No changes in distal 5th metatarsal as compared to xrays from October 2019.  4th right toe ha been amputated at the MP[facility]. Erosive bone 3rd right proximal phalangeal head and middle phalanx  Calcified arterioles noted on XRAY    MRI right foot:  Bone edema distal remaining portion 5th metatarsal but no change from MRI performed October 2019.    erosive bone and cortical changes consistent with osteomyelitis of the phalanges 3rd right toe      NOÉ's performed July 2019 did not suggest sig arterial obstruction and PVR wave forms were normal         Data Review   Recent Results (from the past 24 hour(s))   GLUCOSE, POC    Collection Time: 01/08/20  9:09 PM   Result Value Ref Range    Glucose (POC) 104 (H) 65 - 100 mg/dL    Performed by Hodan Mckeon (PCT)    CBC W/O DIFF    Collection Time: 01/09/20  3:59 AM   Result Value Ref Range    WBC 5.3 4.1 - 11.1 K/uL    RBC 4.06 (L) 4.10 - 5.70 M/uL    HGB 10.3 (L) 12.1 - 17.0 g/dL    HCT 33.6 (L) 36.6 - 50.3 %    MCV 82.8 80.0 - 99.0 FL    MCH 25.4 (L) 26.0 - 34.0 PG    MCHC 30.7 30.0 - 36.5 g/dL    RDW 15.3 (H) 11.5 - 14.5 %    PLATELET 780 646 - 008 K/uL    MPV 9.3 8.9 - 12.9 FL    NRBC 0.0 0  WBC    ABSOLUTE NRBC 0.00 0.00 - 5.99 K/uL   METABOLIC PANEL, COMPREHENSIVE    Collection Time: 01/09/20  3:59 AM   Result Value Ref Range    Sodium 136 136 - 145 mmol/L    Potassium 3.7 3.5 - 5.1 mmol/L Chloride 98 97 - 108 mmol/L    CO2 33 (H) 21 - 32 mmol/L    Anion gap 5 5 - 15 mmol/L    Glucose 100 65 - 100 mg/dL    BUN 33 (H) 6 - 20 MG/DL    Creatinine 8.20 (H) 0.70 - 1.30 MG/DL    BUN/Creatinine ratio 4 (L) 12 - 20      GFR est AA 9 (L) >60 ml/min/1.73m2    GFR est non-AA 8 (L) >60 ml/min/1.73m2    Calcium 8.5 8.5 - 10.1 MG/DL    Bilirubin, total 0.6 0.2 - 1.0 MG/DL    ALT (SGPT) 13 12 - 78 U/L    AST (SGOT) 17 15 - 37 U/L    Alk. phosphatase 40 (L) 45 - 117 U/L    Protein, total 8.2 6.4 - 8.2 g/dL    Albumin 2.5 (L) 3.5 - 5.0 g/dL    Globulin 5.7 (H) 2.0 - 4.0 g/dL    A-G Ratio 0.4 (L) 1.1 - 2.2     GLUCOSE, POC    Collection Time: 01/09/20  7:25 AM   Result Value Ref Range    Glucose (POC) 63 (L) 65 - 100 mg/dL    Performed by Susy Reaves    GLUCOSE, POC    Collection Time: 01/09/20  7:56 AM   Result Value Ref Range    Glucose (POC) 66 65 - 100 mg/dL    Performed by Tres Schmitz    GLUCOSE, POC    Collection Time: 01/09/20  9:52 AM   Result Value Ref Range    Glucose (POC) 107 (H) 65 - 100 mg/dL    Performed by Tereza Marr 23, POC    Collection Time: 01/09/20 12:13 PM   Result Value Ref Range    Glucose (POC) 63 (L) 65 - 100 mg/dL    Performed by Albertina Chavez (PCT)    GLUCOSE, POC    Collection Time: 01/09/20  1:04 PM   Result Value Ref Range    Glucose (POC) 78 65 - 100 mg/dL    Performed by Albertina Chavez (PCT)    GLUCOSE, POC    Collection Time: 01/09/20  1:56 PM   Result Value Ref Range    Glucose (POC) 79 65 - 100 mg/dL    Performed by Albertina Chavez (PCT)    GLUCOSE, POC    Collection Time: 01/09/20  4:18 PM   Result Value Ref Range    Glucose (POC) 74 65 - 100 mg/dL    Performed by Dorene Hernandez*            Assessment:     Active Problems:    Foot ulcer ([address] (1/6/2020)      ESRD (end stage renal disease) ([address] (1/6/2020)      Wound cellulitis (1/6/2020)    osteomyelitis 3rd right toe    Plan:   Discussed MRI findings with patient.   Discussed amputation of the 3rd right toe  Along with risks, benefits, expected outcome as well as post op course. Discussed that he is at a high risk non healing and possible  of limb lost .  Alll questions answered.       Scheduled today for amputation 3rd right toe

## 2020-01-09 NOTE — BRIEF OP NOTE
BRIEF OPERATIVE NOTE    Date of Procedure: 1/9/2020   Preoperative Diagnosis: OSTEOMYELITIS RIGHT 3RD TOE  Postoperative Diagnosis: OSTEOMYELITIS RIGHT 3RD TOE    Procedure(s):  AMPUTATION THIRD RIGHT TOE  Surgeon(s) and Role:     * Jr Rodriguez DPM - Primary         Surgical Assistant: none    Surgical Staff:  Circ-1: Cresencio Sutherland RN  Circ-Intern: Brian De Paz  Local Nurse Monitor: Hernan Arellano RN  Scrub Tech-1: Eddie CEDENO  Event Time In Time Out   Incision Start 1715    Incision Close 1738      Anesthesia: Local   Estimated Blood Loss: min  Specimens:   ID Type Source Tests Collected by Time Destination   1 : Right third toe Preservative Toe  Jr Rodriguez DPM 1/9/2020 1731 Pathology   1 : Right foot wound Wound Incision CULTURE, ANAEROBIC, CULTURE, WOUND W [place], [place] 1/9/2020 1725 Microbiology      Findings: infected bone and necrotic tissue   Complications: none  Implants: * No implants in log *

## 2020-01-09 NOTE — PROGRESS NOTES
Post op amputation 3rd right toe today. Tolerated procedure well and transferred back to the ortho floor.     Anticipate this patient to remain hospitalized until intra op wound cultures and bone pathology finalized

## 2020-01-09 NOTE — PROGRESS NOTES
01/09/20 1817   Vital Signs   Temp 98.4 °F (36.9 °C)   Temp Source Oral   Pulse (Heart Rate) 89   Heart Rate Source Monitor   Resp Rate 18   O2 Sat (%) 98 %   Level of Consciousness Alert   BP (!) 179/109   MAP (Calculated) 132   BP 1 Method Automatic   BP 1 Location Right arm   BP Patient Position At rest   MEWS Score 1     Medicate with PRN hydralazine

## 2020-01-09 NOTE — PERIOP NOTES
Ishan Campbell made aware of SBP > 170 during procedure. No interventions taken at this time per MD. MD advised RN to inform nurse on floor of BP and to address as needed per PRN order.

## 2020-01-09 NOTE — PERIOP NOTES
Dr Nichole Loyd our anesthesiologist has reviewed the patient chart. He has recommended that the patient is safe to have a registered nurse to monitor local anesthesia.

## 2020-01-09 NOTE — PROGRESS NOTES
Hospitalist Progress Note    NAME: Kamaljit Shepherd   :  1985   MRN:  [id number]     I reviewed pertinent labs/imaging and discussed plan of care with Dr. Ze Thompson who is in agreement. Assessment / Plan:  Osteomyelitis right 3rd toe  PVD  MRI right foot 20:    1. Destruction of the third PIP joint consistent with septic arthritis. Osteomyelitis is seen in the phalanges of the third toe. 2. Soft tissue wound of the dorsal third toe. No evidence of a focal drainable fluid collection. Significant subcutaneous edema surrounding the third toe and over the dorsum of foot. 3. Status post amputation of the phalanges of the fourth digit. Mild edema in the fourth metatarsal head is more likely reactive related to altered stress mechanics rather than early osteomyelitis. 4. Status post indication the fourth ray to the level of the mid metatarsal.  LE doppler 20:  No DVT of the legs. NOÉ 20:    · The right resting NOÉ is moderately decreased. Based on pulse volume recordings due to calcified vessels. · Arterial disease noted at the level of the femoral artery and popliteal on the right side. · The left resting NOÉ is mildly decreased. Based on pulse volume recordings due to calcified vessels. · Arterial disease noted at the level of the femoral artery and popliteal on the left side. - Continue current abx (vancomycin day #4/zosyn day #3). - Continue probiotics while patient on abx.  - Plan for amputation of 4th toe today (20), in surgery currently. - If patient has delayed healing consider Vascular Surgery consult. Mild diarrhea  - Continue probiotics as noted above. - Continue loperamide 2 mg po qid prn.    - Check enteric panel. HTN  - BP overall with adequate control.    - Continue lisinopril 10 mg po daily. ESRD  - HD MWF. Anemia of chronic disease  - Hgb overall stable. - Continue epoetin 10,000 units sc 3 times weekly.     DM II, poorly controlled  Hypoglycemia  HgbA1C 7.4  - Patient had had episodes of hypoglycemia requiring tx today, he is NPO for surgery. - Continue current basal insulin (glargine 40 units sc daily). - Continue FSBS with SSI correction scale. - Add D5W at 50 cc/hr X 6 hours, surgery will be complete at that time and patient will be able to take po. Obesity  - Discussed the effects of weight on overall health. 40 or above Morbid obesity / Body mass index is 43.42 kg/m². Code status: Full  Prophylaxis: Hep SQ  Recommended Disposition: Home w/Family     Subjective:     Chief Complaint / Reason for Physician Visit  Patient complains diarrhea today. No other complaints. Plan of care reviewed with RN. Review of Systems:  Symptom Y/N Comments  Symptom Y/N Comments   Fever/Chills N   Chest Pain N    Poor Appetite N   Edema N    Cough N   Abdominal Pain N    Sputum N   Joint Pain Y Toe    SOB/LITTLE N   Pruritis/Rash N    Nausea/vomit N   Tolerating PT/OT     Diarrhea Y   Tolerating Diet Y    Constipation N   Other       Could NOT obtain due to:      Objective:     VITALS:   Last 24hrs VS reviewed since prior progress note. Most recent are:  Patient Vitals for the past 24 hrs:   Temp Pulse Resp BP SpO2   01/09/20 1541 98 °F (36.7 °C) 81 15 (!) 173/100 93 %   01/09/20 1241 98.1 °F (36.7 °C) 74 16 150/45 92 %   01/09/20 0836 98.1 °F (36.7 °C) 73 18 147/42 98 %   01/09/20 0511 98.1 °F (36.7 °C) 86 18 (!) 163/99 95 %   01/09/20 0032 98.7 °F (37.1 °C) 93 18 108/56 93 %   01/08/20 1942 98 °F (36.7 °C) 91 18 135/74 92 %     No intake or output data in the 24 hours ending 01/09/20 1641     PHYSICAL EXAM:  General:  A/A/O X 3. NAD. HEENT:  Normocephalic. Sclera anicteric. EOMI. Mucous membranes moist.    Chest:  Resps even/unlabored with symmetrical CWE. Air entry full. Lungs CTA. No use of accessory muscles. CV:  RRR. No peripheral edema. GI:  Abdomen soft/NT/ND.   ABT X 4.    Neurologic:  Nonfocal.  CN II-XII grossly intact. Speech normal.     Psych:  Cooperative. No anxiety or agitation. Skin:  Left great toe and 2nd toe s/p amputation. Right foot dressed with C/D/I dressing. No rashes or jaundice. Reviewed most current lab test results and cultures  YES  Reviewed most current radiology test results   YES  Review and summation of old records today    NO  Reviewed patient's current orders and MAR    YES  PMH/SH reviewed - no change compared to H&P  ________________________________________________________________________  Care Plan discussed with:    Comments   Patient [address]     Consultant                        Multidiciplinary team rounds were held today with , nursing, pharmacist and clinical coordinator. Patient's plan of care was discussed; medications were reviewed and discharge planning was addressed. ______________________________________________________________________________________  Abraham Leger NP     Procedures: see electronic medical records for all procedures/Xrays and details which were not copied into this note but were reviewed prior to creation of Plan. LABS:  I reviewed today's most current labs and imaging studies.   Pertinent labs include:  Recent Labs     01/09/20  0359 01/08/20  0408   WBC 5.3 6.0   HGB 10.3* 9.9*   HCT 33.6* 32.1*    249     Recent Labs     01/09/20  0359 01/08/20  0408 01/07/20  0403    137 139   K 3.7 4.3 3.7   CL 98 101 101   CO2 33* 27 29    97 157*   BUN 33* 52* 39*   CREA 8.20* 10.90* 8.75*   CA 8.5 8.6 9.0   ALB 2.5* 2.6*  --    TBILI 0.6 0.5  --    SGOT 17 14*  --    ALT 13 13  --        Signed: Abraham Leger NP

## 2020-01-09 NOTE — PROGRESS NOTES
NAME: Gustavo Hunter        :  1985        MRN:  [id number]        Assessment :    Plan:  --ESRD-[facility]- [facility] - MWF  Anemia  DM --No acute need for HD today. Plan HD in AM.      Started retacrit       Subjective:     Chief Complaint:  \" I have surgery today. When can I go home? \"  No foot pain. No n/V. No dyspnea. Review of Systems:    Symptom Y/N Comments  Symptom Y/N Comments   Fever/Chills    Chest Pain     Poor Appetite    Edema     Cough    Abdominal Pain     Sputum    Joint Pain     SOB/LITTLE    Pruritis/Rash     Nausea/vomit    Tolerating PT/OT     Diarrhea    Tolerating Diet     Constipation    Other       Could not obtain due to:      Objective:     VITALS:   Last 24hrs VS reviewed since prior progress note. Most recent are:  Visit Vitals  /42   Pulse 73   Temp 98.1 °F (36.7 °C)   Resp 18   Ht 6' 2\" (1.88 m)   Wt 153.4 kg (338 lb 3 oz)   SpO2 98%   BMI 43.42 kg/m²       Intake/Output Summary (Last 24 hours) at 2020 0920  Last data filed at 2020 1155  Gross per 24 hour   Intake    Output 4500 ml   Net -4500 ml      Telemetry Reviewed:     PHYSICAL EXAM:  General: NAD  1+ edema      Lab Data Reviewed: (see below)    Medications Reviewed: (see below)    PMH/SH reviewed - no change compared to H&P  ________________________________________________________________________  Care Plan discussed with:  Patient     Family      RN     Care Manager                    Consultant:          Comments   >50% of visit spent in counseling and coordination of care       ________________________________________________________________________  Gian Lopez MD     Procedures: see electronic medical records for all procedures/Xrays and details which  were not copied into this note but were reviewed prior to creation of Plan.       LABS:  Recent Labs     20  0359 20  0408   WBC 5.3 6.0   HGB 10.3* 9.9* HCT 33.6* 32.1*    249     Recent Labs     01/09/20  0359 01/08/20  0408 01/07/20  0403    137 139   K 3.7 4.3 3.7   CL 98 101 101   CO2 33* 27 29   BUN 33* 52* 39*   CREA 8.20* 10.90* 8.75*    97 157*   CA 8.5 8.6 9.0     Recent Labs     01/09/20  0359 01/08/20  0408 01/06/20  1101   SGOT 17 14* 16   AP 40* 40* 43*   TP 8.2 8.2 8.5*   ALB 2.5* 2.6* 2.9*   GLOB 5.7* 5.6* 5.6*     No results for input(s): INR, PTP, APTT, INREXT, INREXT in the last 72 hours. No results for input(s): FE, TIBC, PSAT, FERR in the last 72 hours. No results found for: FOL, RBCF   No results for input(s): PH, PCO2, PO2 in the last 72 hours. No results for input(s): CPK, CKMB in the last 72 hours.     No lab exists for component: TROPONINI  No components found for: [place]  Lab Results   Component Value Date/Time    Color YELLOW/STRAW 05/01/2017 05:39 PM    Appearance CLOUDY (A) 05/01/2017 05:39 PM    Specific gravity 1.018 05/01/2017 05:39 PM    Specific gravity >1.030 (H) 10/03/2014 04:35 AM    pH (UA) 5.5 05/01/2017 05:39 PM    Protein 300 (A) 05/01/2017 05:39 PM    Glucose 250 (A) 05/01/2017 05:39 PM    Ketone NEGATIVE  05/01/2017 05:39 PM    Bilirubin NEGATIVE  05/01/2017 05:39 PM    Urobilinogen 0.2 05/01/2017 05:39 PM    Nitrites NEGATIVE  05/01/2017 05:39 PM    Leukocyte Esterase NEGATIVE  05/01/2017 05:39 PM    Epithelial cells FEW 05/01/2017 05:39 PM    Bacteria NEGATIVE  05/01/2017 05:39 PM    WBC 0-4 05/01/2017 05:39 PM    RBC 5-10 05/01/2017 05:39 PM       MEDICATIONS:  Current Facility-Administered Medications   Medication Dose Route Frequency    epoetin nata-epbx (RETACRIT) injection 10,000 Units  10,000 Units SubCUTAneous Q MON, WED & FRI    lactobac ac& pc-s.therm-b.anim (USMAN Q/RISAQUAD)  1 Cap Oral DAILY    loperamide (IMODIUM) capsule 2 mg  2 mg Oral QID PRN    lisinopril (PRINIVIL, ZESTRIL) tablet 10 mg  10 mg Oral DAILY    ammonium lactate (LAC-HYDRIN) 12 % lotion   Topical BID    acetaminophen (TYLENOL) tablet 650 mg  650 mg Oral Q6H PRN    insulin glargine (LANTUS) injection 40 Units  40 Units SubCUTAneous DAILY    glucose chewable tablet 16 g  4 Tab Oral PRN    glucagon (GLUCAGEN) injection 1 mg  1 mg IntraMUSCular PRN    dextrose 10% infusion 0-250 mL  0-250 mL IntraVENous PRN    insulin lispro (HUMALOG) injection   SubCUTAneous AC&HS    hydrALAZINE (APRESOLINE) 20 mg/mL injection 10 mg  10 mg IntraVENous Q6H PRN    labetalol (NORMODYNE;TRANDATE) injection 10 mg  10 mg IntraVENous Q6H PRN    ondansetron (ZOFRAN) injection 4 mg  4 mg IntraVENous Q6H PRN    oxyCODONE-acetaminophen (PERCOCET) 5-325 mg per tablet 1 Tab  1 Tab Oral Q4H PRN    heparin (porcine) injection 7,500 Units  7,500 Units SubCUTAneous Q8H    Vancomycin--Pharmacy to Dose   Other Rx Dosing/Monitoring    piperacillin-tazobactam (ZOSYN) 3.375 g in 0.9% sodium chloride (MBP/ADV) 100 mL  3.375 g IntraVENous Q12H

## 2020-01-09 NOTE — PERIOP NOTES
sbar note  Spoke with nurse Cheryl Harmon R.N. about her pt. Reports that pt has been npo except for meds. Pt glucose reading low x2 this day shift and pt administered  d10 bolus as ordered and  Glucose chewable tablets x2 as documented. Last reading at 66. Kardex, mar and medical history reviewed. Nurse will obtain consent form and chg have been done. Afternoon heparin dose held. Nurse informed that or team will call her when we are told to get pt.

## 2020-01-09 NOTE — PROGRESS NOTES
Reason for Admission:   ESRD                   RRAT Score:  9                   Plan for utilizing home health: Possible HHC for therapy and wound care                          Current Advanced Directive/Advance Care Plan: FULL-Cousin                          Transition of Care Plan:                      CM completed room visit with pt, with family by bedside. Pt reported that he resides alone in his one story home. Pt reported that he is active with PCP: seen a year ago, and uses [facility] ([facility]). Pt reported that he is independent with ADLs, and does not drive. Pt is known to be legally blind. Pt reported DME at home: cane. Pt reported no SNF/HHC. Pt is currently using dialysis treatment, provided by Shelby Ramirez: [facility] (10:00am). Pt uses [facility] to transport to and from dialysis. Pt listed his cousin as medical decision maker: Rachid Landon. Pt has planned procedure today at 4:00pm.  Pt will possibly require therapy and wound care at d/c. CM will continue to follow. Care Management Interventions  PCP Verified by CM: Yes  Mode of Transport at Discharge:  Other (see comment)  Transition of Care Consult (CM Consult): Discharge Planning  Discharge Durable Medical Equipment: No  Physical Therapy Consult: No  Occupational Therapy Consult: No  Speech Therapy Consult: No  Current Support Network: Lives Alone  Confirm Follow Up Transport: Family  Discharge Location  Discharge Placement: [facility]/ Heath Gunn 40, MSW, [address] [place] [address]

## 2020-01-09 NOTE — PROGRESS NOTES
Bedside and Verbal shift change report given to Evelyn Prieto RN (oncoming nurse) by Lj Ramsay RN (offgoing nurse). Report included the following information SBAR, Kardex, Intake/Output and MAR    Patient voice loose stools, probiotic administered. Patient refuse Imodium at the moment.

## 2020-01-09 NOTE — PROGRESS NOTES
Patient glucose 63, patient conscious. Reports, I can feel my sugar is low. SN administered 4 glucose tab. Glucose reassess 15 minutes, 66. SN administer Dextrose 10%, 125 cc. Will reassess in one hour per Ash Nuñez NP.      Patient remains NPO for possible procedure

## 2020-01-09 NOTE — PROGRESS NOTES
Glucose reassess 1 hour after consuming 4 Glucose tab and receiving 150 cc Dextrose 10%. Glucose 109; patient will remain NPO until procedure at 4:00 pm, Lantus held.  NO coverage administered

## 2020-01-10 NOTE — PROCEDURES
[facility] D[facility]  [phone]    Vitals   Pre   Post   Assessment   Pre   Post     Temp  Temp: 98 °F (36.7 °C) (01/10/20 0734)  97.8 LOC  A&Ox4 A&Ox4   HR   Pulse (Heart Rate): 90 (01/10/20 0734) 85 Lungs   Diminished bases  Diminished bases   B/P   BP: (!) 196/112 (01/10/20 0734) 174/96 Cardiac   RRR RRR   Resp   Resp Rate: 18 (01/10/20 0734) 18 Skin   R 3rd toe amp R 3rd toe amp   Pain level  Pain Intensity 1: 0 (01/10/20 0141) 0 Edema  Trace BLE Trace BLE   Orders:    Duration:   Start:    3230 End:    9699 Total:   4.25 hours   Dialyzer:   Dialyzer/Set Up Inspection: Renetta Frias (01/10/20 0734)   K Bath:   Dialysate K (mEq/L): 2 (01/10/20 0734)   Ca Bath:   Dialysate CA (mEq/L): 2.5 (01/10/20 0734)   Na/Bicarb:   Dialysate NA (mEq/L): 140 (01/10/20 0734)   Target Fluid Removal:   Goal/Amount of Fluid to Remove (mL): 4500 mL (01/10/20 0734)   Access     Type & Location:   PELON AVF: skin CDI. No s/s of infection. No issues with cannulation or hemostasis. Running well at . Pt arrived to HD suite A&Ox4. Consent signed & on file. SBAR received from Primary RN. Pt cannulated with 39F needles per policy & without issue. VSS. Dialysis Tx initiated.     Labs     Obtained/Reviewed   Critical Results Called   Date when labs were drawn-  Hgb-    HGB   Date Value Ref Range Status   01/09/2020 10.3 (L) 12.1 - 17.0 g/dL Final     K-    Potassium   Date Value Ref Range Status   01/10/2020 3.9 3.5 - 5.1 mmol/L Final     Ca-   Calcium   Date Value Ref Range Status   01/10/2020 8.7 8.5 - 10.1 MG/DL Final     Bun-   BUN   Date Value Ref Range Status   01/10/2020 37 (H) 6 - 20 MG/DL Final     Creat-   Creatinine   Date Value Ref Range Status   01/10/2020 10.60 (H) 0.70 - 1.30 MG/DL Final        Medications/ Blood Products Given     Name   Dose   Route and Time     None ordered                Blood Volume Processed (BVP):    91.0 Net Fluid   Removed:  4500mL   Comments   All dialysis related medications have been reviewed. Assessment performed by RN. Procedure and documentation observed and reviewed by Radha Bermudez RN. Time Out Done: 0730  Primary Nurse Rpt Pre:  Felicia Lo RN  Primary Nurse Rpt Post:  Darlene Pace RN  Pt Education:  procedural  Care Plan:  On going  Tx Summary:  8765:  PELON AVF: skin CDI. No s/s of infection. No issues with cannulation or hemostasis. Running well at . Pt arrived to HD suite A&Ox4. Consent signed & on file. SBAR received from Primary RN. Pt cannulated with 28P needles per policy & without issue. VSS. Dialysis Tx initiated. 0745:  Pt resting  0800:  Pt alert; Primary RN at bedside to check BS; reading 117  0815:  Pt resting  0830:  Pt resting  0845:  Pt resting  0900:  Pt resting   0915:  Pt resting  0930:  Pt resting  0945:  Pt resting  1000:  Pt resting  1015:  Pt resting  1030:  Pt resting  1045:  Pt resting  1100:  Pt resting  1115:  Pt resting  1130:  Pt resting  1145:  Pt resting  1157: Tx ended. VSS. All possible blood returned to patient. Hemostasis achieved without issue. Bed locked and in the lowest position, call bell and belongings in reach. SBAR given to Primary, RN. Patient is stable at time of their/ my departure. Admiting Diagnosis:  MRSA Foot ulcer  Pt's previous clinic-  [address]  Consent signed - Informed Consent Verified: Yes (01/10/20 0734)  [facility] Consent - signed and on file  Hepatitis Status- neg 1/6/20  Machine #- Machine Number: Deandra Hannon (01/10/20 [id number])  Telemetry status-  Pre-dialysis wt. -

## 2020-01-10 NOTE — PROGRESS NOTES
Bedside and Verbal shift change report given to Dharmesh Villegas RN (oncoming nurse) by DIONICIO Mcgregor (offgoing nurse). Report included the following information SBAR, Kardex, Intake/Output and MAR    Last glucose assess 90, food tray delivered.  Dextrose Fluid D/C

## 2020-01-10 NOTE — PROGRESS NOTES
NAME: Noris Blount        :  1985        MRN:  [id number]        Assessment :    Plan:  --[facility]- [facility] - MWF  Anemia  DM --Seen on HD at 10:30    Started retacrit       Subjective:     Chief Complaint:  \" I feel fine. \"  No foot pain. No n/V. No dyspnea. Review of Systems:    Symptom Y/N Comments  Symptom Y/N Comments   Fever/Chills    Chest Pain     Poor Appetite    Edema     Cough    Abdominal Pain     Sputum    Joint Pain     SOB/LITTLE    Pruritis/Rash     Nausea/vomit    Tolerating PT/OT     Diarrhea    Tolerating Diet     Constipation    Other       Could not obtain due to:      Objective:     VITALS:   Last 24hrs VS reviewed since prior progress note. Most recent are:  Visit Vitals  /77   Pulse 85   Temp 98 °F (36.7 °C) (Oral)   Resp 18   Ht 6' 2\" (1.88 m)   Wt 154 kg (339 lb 8.1 oz)   SpO2 94%   BMI 43.59 kg/m²       Intake/Output Summary (Last 24 hours) at 1/10/2020 1033  Last data filed at 1/10/2020 0141  Gross per 24 hour   Intake 600 ml   Output    Net 600 ml      Telemetry Reviewed:     PHYSICAL EXAM:  General: NAD  1+ edema      Lab Data Reviewed: (see below)    Medications Reviewed: (see below)    PMH/SH reviewed - no change compared to H&P  ________________________________________________________________________  Care Plan discussed with:  Patient     Family      RN     Care Manager                    Consultant:          Comments   >50% of visit spent in counseling and coordination of care       ________________________________________________________________________  Shu Red MD     Procedures: see electronic medical records for all procedures/Xrays and details which  were not copied into this note but were reviewed prior to creation of Plan.       LABS:  Recent Labs     20  0359 20  0408   WBC 5.3 6.0   HGB 10.3* 9.9*   HCT 33.6* 32.1*    249     Recent Labs 01/10/20  0504 01/09/20  0359 01/08/20  0408   * 136 137   K 3.9 3.7 4.3   CL 97 98 101   CO2 30 33* 27   BUN 37* 33* 52*   CREA 10.60* 8.20* 10.90*   GLU 86 100 97   CA 8.7 8.5 8.6     Recent Labs     01/10/20  0504 01/09/20  0359 01/08/20  0408   SGOT 15 17 14*   AP 36* 40* 40*   TP 8.1 8.2 8.2   ALB 2.5* 2.5* 2.6*   GLOB 5.6* 5.7* 5.6*     No results for input(s): INR, PTP, APTT, INREXT, INREXT in the last 72 hours. No results for input(s): FE, TIBC, PSAT, FERR in the last 72 hours. No results found for: FOL, RBCF   No results for input(s): PH, PCO2, PO2 in the last 72 hours. No results for input(s): CPK, CKMB in the last 72 hours.     No lab exists for component: TROPONINI  No components found for: [place]  Lab Results   Component Value Date/Time    Color YELLOW/STRAW 05/01/2017 05:39 PM    Appearance CLOUDY (A) 05/01/2017 05:39 PM    Specific gravity 1.018 05/01/2017 05:39 PM    Specific gravity >1.030 (H) 10/03/2014 04:35 AM    pH (UA) 5.5 05/01/2017 05:39 PM    Protein 300 (A) 05/01/2017 05:39 PM    Glucose 250 (A) 05/01/2017 05:39 PM    Ketone NEGATIVE  05/01/2017 05:39 PM    Bilirubin NEGATIVE  05/01/2017 05:39 PM    Urobilinogen 0.2 05/01/2017 05:39 PM    Nitrites NEGATIVE  05/01/2017 05:39 PM    Leukocyte Esterase NEGATIVE  05/01/2017 05:39 PM    Epithelial cells FEW 05/01/2017 05:39 PM    Bacteria NEGATIVE  05/01/2017 05:39 PM    WBC 0-4 05/01/2017 05:39 PM    RBC 5-10 05/01/2017 05:39 PM       MEDICATIONS:  Current Facility-Administered Medications   Medication Dose Route Frequency    0.9% sodium chloride infusion  25 mL/hr IntraVENous CONTINUOUS    bacitracin 50,000 Units in sodium chloride irrigation 0.9 % 1,000 mL Irrigation    PRN    bupivacaine 0.25% -EPINEPHrine 1:200,000 (SENSORCAINE) 0.25 %-1:200,000 injection    PRN    epoetin nata-epbx (RETACRIT) injection 10,000 Units  10,000 Units SubCUTAneous Q MON, WED & FRI    lactobac ac& pc-s.therm-b.anim (USMAN Q/RISAQUAD)  1 Cap Oral DAILY    loperamide (IMODIUM) capsule 2 mg  2 mg Oral QID PRN    lisinopril (PRINIVIL, ZESTRIL) tablet 10 mg  10 mg Oral DAILY    ammonium lactate (LAC-HYDRIN) 12 % lotion   Topical BID    acetaminophen (TYLENOL) tablet 650 mg  650 mg Oral Q6H PRN    insulin glargine (LANTUS) injection 40 Units  40 Units SubCUTAneous DAILY    glucose chewable tablet 16 g  4 Tab Oral PRN    glucagon (GLUCAGEN) injection 1 mg  1 mg IntraMUSCular PRN    dextrose 10% infusion 0-250 mL  0-250 mL IntraVENous PRN    insulin lispro (HUMALOG) injection   SubCUTAneous AC&HS    hydrALAZINE (APRESOLINE) 20 mg/mL injection 10 mg  10 mg IntraVENous Q6H PRN    labetalol (NORMODYNE;TRANDATE) injection 10 mg  10 mg IntraVENous Q6H PRN    ondansetron (ZOFRAN) injection 4 mg  4 mg IntraVENous Q6H PRN    oxyCODONE-acetaminophen (PERCOCET) 5-325 mg per tablet 1 Tab  1 Tab Oral Q4H PRN    heparin (porcine) injection 7,500 Units  7,500 Units SubCUTAneous Q8H    Vancomycin--Pharmacy to Dose   Other Rx Dosing/Monitoring    piperacillin-tazobactam (ZOSYN) 3.375 g in 0.9% sodium chloride (MBP/ADV) 100 mL  3.375 g IntraVENous Q12H

## 2020-01-10 NOTE — OP NOTES
[address]  OPERATIVE REPORT    Name:  Monse Hennessy  MR#:  [id number]  :  1985  ACCOUNT #:  [de-identified]  DATE OF SERVICE:  2020    PREOPERATIVE DIAGNOSIS:  Osteomyelitis, third right toe. POSTOPERATIVE DIAGNOSIS:  Osteomyelitis, third right toe. PROCEDURE PERFORMED:  Amputation, third right toe. SURGEON:  Nish Hernandez DPM    ASSISTANT:  none    ANESTHESIA:  Local.    COMPLICATIONS:  None. SPECIMENS REMOVED:  Third right toe. IMPLANTS:  none    ESTIMATED BLOOD LOSS:  Minimal.    INDICATIONS:  This 24-year-old white male presents with an infected third right toe with an induration and MRI indicates osteomyelitis of the proximal and middle phalanges. At this time, surgical intervention has been opted as a treatment choice by the patient. Medical history and physical has been performed. The patient is here for surgery. Physical examination revealed barely palpable pedal pulses, fairly good muscle strength lower extremities bilateral with grossly diminished epicritic sensation. He has had previous amputations in digits 5 through 4. Third right digit has a dorsal ulceration with exposed proximal phalangeal head. An MRI taken indicates osteomyelitis of the middle and distal proximal phalanx. ABIs showed great toe pressures of 0.41 on the right and noncompressible on the left. DESCRIPTION OF PROCEDURE:  The patient was brought to the operating room, placed on the operating table in supine position. Anesthesia was achieved to the right foot using 0.05% Marcaine with epinephrine. Right foot was now prepped and draped in usual sterile manner. A successful time-out was completed. Next, using a #15 blade, the third toe was disarticulated at the MPJ. All necrotic bone and tissue was removed. The metatarsal head cartilage appears to be intact and is glistening white, does not appear to be infected.   Aerobic as well as anaerobic wound cultures were taken.  The wound was now flushed with copious amount of sterile saline with bacitracin dissolved in it. All skin edges were now reapproximated using 3-0 nylon with simple interrupted sutures. Betadine-soaked Adaptic followed by sterile compressive dressing was now placed on the right foot. The patient appeared to tolerate anesthesia and procedure well, and was transferred from the operating room to recovery with all vital signs stable with the assessment of slightly elevated blood pressure. I did placed a vascular consult for this patient even though he had good bleeding on the table, his arterial flow is diminished bilateral lower legs. I do anticipate this patient will remain hospitalized until wound cultures and intraoperative bone pathology has been finalized. At this time, I will also request Infectious Disease consult.         VALENCIA [email or web address]  D:  01/09/2020 17:59  T:  01/10/2020 1:01  JOB #:  [id number]

## 2020-01-10 NOTE — PROGRESS NOTES
0720 - Bedside and Verbal shift change report given to kathleen gibbons (oncoming nurse) by Radha perez (offgoing nurse). Report included the following information SBAR, Kardex, Intake/Output, MAR and Recent Results. - patient with blood glucose of 75 at this time. Patient treated with 8oz juice. Patient taken to dialysis at this time. 0756 - patient's blood glucose 117.   1230 - patient back from dialysis. Paged MD regarding lantus order. 1500 - Bedside and Verbal shift change report given to jayshree gurrola (oncoming nurse) by Demi Zarate (offgoing nurse). Report included the following information SBAR, Kardex, Intake/Output, MAR and Recent Results.

## 2020-01-10 NOTE — PROGRESS NOTES
TRANSFER - IN REPORT:    Verbal report received from DIONICIO Singer on Meghann Herrera  being received from Ortho for ordered procedure      Report consisted of patients Situation, Background, Assessment and   Recommendations(SBAR). Information from the following report(s) SBAR and Kardex was reviewed with the receiving nurse. Opportunity for questions and clarification was provided. Assessment completed upon patients arrival to unit and care assumed.

## 2020-01-10 NOTE — PROGRESS NOTES
Bedside and Verbal shift change report given to Antonio Wren (oncoming nurse) by MITRA Winslow RN (offgoing nurse). Report given with SBAR, Kardex, Intake/Output, MAR and Recent Results.

## 2020-01-10 NOTE — PROGRESS NOTES
Pharmacy Automatic Renal Dosing Protocol - Antimicrobials    Indication for Antimicrobials: osteo 3rd R toe in setting of HD     Current Regimen of Each Antimicrobial:  Vancomycin 2gm IV x1, then 1gm IV after each HD (Start Date ; Day # 4  Zosyn 3.375gm IV q8h; start ; Day #4    Previous Antimicrobial Therapy:  Cefepime 2gm IV x1  (Start Date     Goal Level: VANCOMYCIN TROUGH GOAL RANGE    Vancomycin Trough: 15 - 20 mcg/mL  (AUC: 400 - 600 mg/hr/Liter/day)     Date Dose & Interval Measured (mcg/mL) Extrapolated (mcg/mL)   1/10 2 grams x 2 31.2                   Date & time of next level: TBD    Significant Cultures:   : Toe Wound = MRSA (JOSE=2); enterobacter (S zosyn) (final)  : Blood = NG x 3 days -(pending)    Radiology / Imaging results: (X-ray, CT scan or MRI):   : MRI Foot: septic arthritis; osteo phalanges of 3rd toe    Paralysis, amputations, malnutrition: none significant    Labs:  Recent Labs     01/10/20  0504 20  0359 20  0408   CREA 10.60* 8.20* 10.90*   BUN 37* 33* 52*   WBC  --  5.3 6.0     Temp (24hrs), Av °F (36.7 °C), Min:97.8 °F (36.6 °C), Max:98.4 °F (36.9 °C)    Creatinine Clearance (mL/min) or Dialysis: HD    Impression/Plan:   Amputation toe planned   No vancomycin after today's dialysis as level therapeutic this am. Patients elimination probably very slow due to expanded volume of distribution due to high weight. 500 mg after HD expected to give predialysis level of 20 mcg/ml. Next dose pf 500 mg on Monday after HD. Ordered vancomycin trough with am labs on   Continue with Zosyn    Afebrile  Antimicrobial stop date TBD     Pharmacy will follow daily and adjust medications as appropriate for renal function and/or serum levels.     Thank you,  Jose Oakley, [facility]

## 2020-01-10 NOTE — PROGRESS NOTES
HD TRANSFER - OUT REPORT:    Verbal report given to Courtney Oh RN on Yadi Gan being transferred to [facility] for routine progression of care       Report consisted of patient's Situation, Background, Assessment and   Recommendations(SBAR). Information from the following report(s) SBAR, Procedure Summary and Intake/Output was reviewed with the receiving nurse. Method:  $$ Method: Hemodialysis (01/10/20 0734)    Fluid Removed  NET Fluid Removed (mL): 4500 ml (01/10/20 1157)     Patient response to treatment:  Stable    End Time  Hemodialysis End Time: 2823 (01/10/20 1157)  If not documented, dialysis nurse to update post-dialysis row in HD/Filtration flowsheet     Medications /Volume expansion agents or Fluid boluses administered during treatment? no    Post-dialysis medication administration due?  yes  Remind nurse to administer post-HD medication upon return to unit. Fistula hemostasis? yes    Line heparinization? no    Lines: PELON AVF    Opportunity for questions and clarification was provided.       Patient transported with: [employer]

## 2020-01-11 NOTE — PROGRESS NOTES
Hospitalist Progress Note    NAME: Irvin Deng   :  1985   MRN:  [id number]     I reviewed pertinent labs/imaging and discussed plan of care with Dr. Dannielle Malik who is in agreement. Assessment / Plan:  Osteomyelitis right 3rd toe  PVD  MRI right foot 20:    1. Destruction of the third PIP joint consistent with septic arthritis. Osteomyelitis is seen in the phalanges of the third toe. 2. Soft tissue wound of the dorsal third toe. No evidence of a focal drainable fluid collection. Significant subcutaneous edema surrounding the third toe and over the dorsum of foot. 3. Status post amputation of the phalanges of the fourth digit. Mild edema in the fourth metatarsal head is more likely reactive related to altered stress mechanics rather than early osteomyelitis. 4. Status post indication the fourth ray to the level of the mid metatarsal.  LE doppler 20:  No DVT of the legs. NOÉ 20:    · The right resting NOÉ is moderately decreased. Based on pulse volume recordings due to calcified vessels. · Arterial disease noted at the level of the femoral artery and popliteal on the right side. · The left resting NOÉ is mildly decreased. Based on pulse volume recordings due to calcified vessels. · Arterial disease noted at the level of the femoral artery and popliteal on the left side. Blood culture 20:  NG at 5 days. Intraop wound culture 20:  MRSA  - Podiatry assistance appreciated. - Vascular surgery input appreciated. - Continue current abx (vancomycin day #6/zosyn day #5). - Continue probiotics while patient on abx. - S/P amputation of right 3rd toe today 20.    - Will need to follow up with Dr. Nidia Porter (Vascular Surgery) on 20. Mild asymptomatic hyponatremia   - No tx indicated. - Monitor. Mild diarrhea  - Improved. - Continue probiotics as noted above. - Continue loperamide 2 mg po qid prn.    - Enteric panel pending.       HTN  - BP overall with adequate control.    - Continue lisinopril 10 mg po daily. ESRD  - HD MWF. Anemia of chronic disease  - Hgb overall stable. - Continue epoetin 10,000 units sc 3 times weekly. DM II, poorly controlled  Hypoglycemia  HgbA1C 7.4  - Blood sugars improved today, no further hypoglycemia. - Change basal (glargine) insulin to 10 units sc daily, titrate prn.    - Continue FSBS with SSI correction scale. Obesity  - Discussed the effects of weight on overall health. 40 or above Morbid obesity / Body mass index is 43.59 kg/m². Code status: Full  Prophylaxis: Hep SQ  Recommended Disposition: Home w/Family     Subjective:     Chief Complaint / Reason for Physician Visit  Patient complains. Adequate pain control. No diarrhea today. Plan of care reviewed with RN. Review of Systems:  Symptom Y/N Comments  Symptom Y/N Comments   Fever/Chills N   Chest Pain N    Poor Appetite N   Edema N    Cough N   Abdominal Pain N    Sputum N   Joint Pain Y Toe, minimal   SOB/LITTLE N   Pruritis/Rash N    Nausea/vomit N   Tolerating PT/OT     Diarrhea N   Tolerating Diet Y    Constipation N   Other       Could NOT obtain due to:      Objective:     VITALS:   Last 24hrs VS reviewed since prior progress note. Most recent are:  Patient Vitals for the past 24 hrs:   Temp Pulse Resp BP SpO2   01/11/20 1522 98.2 °F (36.8 °C) 86 18 157/89 93 %   01/11/20 1132 98.1 °F (36.7 °C) 85 18 135/85 93 %   01/11/20 0740 98.2 °F (36.8 °C) 88 18 145/77 92 %   01/11/20 0546 98.8 °F (37.1 °C) 87 18 (!) 183/107 93 %   01/10/20 2028 98.9 °F (37.2 °C) 85 18 (!) 173/104 91 %       Intake/Output Summary (Last 24 hours) at 1/11/2020 1654  Last data filed at 1/11/2020 1500  Gross per 24 hour   Intake 920 ml   Output 0 ml   Net 920 ml        PHYSICAL EXAM:  General:  A/A/O X 3. NAD. HEENT:  Normocephalic. Sclera anicteric. EOMI. Mucous membranes moist.    Chest:  Resps even/unlabored with symmetrical CWE. Air entry full. Lungs CTA. No use of accessory muscles. CV:  RRR. No peripheral edema. GI:  Abdomen soft/NT/ND. ABT X 4.    Neurologic:  Nonfocal.  CN II-XII grossly intact. Speech normal.     Psych:  Cooperative. No anxiety or agitation. Skin:  Left great toe and 2nd toe s/p amputation. Right foot dressed with C/D/I dressing. No rashes or jaundice. Reviewed most current lab test results and cultures  YES  Reviewed most current radiology test results   YES  Review and summation of old records today    NO  Reviewed patient's current orders and MAR    YES  PMH/SH reviewed - no change compared to H&P  ________________________________________________________________________  Care Plan discussed with:    Comments   Patient [address]     Consultant                        Multidiciplinary team rounds were held today with , nursing, pharmacist and clinical coordinator. Patient's plan of care was discussed; medications were reviewed and discharge planning was addressed. ______________________________________________________________________________________  Misty Sam NP     Procedures: see electronic medical records for all procedures/Xrays and details which were not copied into this note but were reviewed prior to creation of Plan. LABS:  I reviewed today's most current labs and imaging studies.   Pertinent labs include:  Recent Labs     01/09/20  0359   WBC 5.3   HGB 10.3*   HCT 33.6*        Recent Labs     01/10/20  0504 01/09/20  0359   * 136   K 3.9 3.7   CL 97 98   CO2 30 33*   GLU 86 100   BUN 37* 33*   CREA 10.60* 8.20*   CA 8.7 8.5   ALB 2.5* 2.5*   TBILI 0.3 0.6   SGOT 15 17   ALT 13 13       Signed: Misty Sam NP

## 2020-01-11 NOTE — PROGRESS NOTES
NAME: Jennifer Braun        :  1985        MRN:  [id number]        Assessment :    Plan:  --ESRD-[facility]- [facility] - MWF  Anemia  DM --No acute need for HD today. Plan HD Monday. Started retacrit       Subjective:     Chief Complaint:  \" I feel better. \"  No foot pain. No n/V. No dyspnea. Review of Systems:    Symptom Y/N Comments  Symptom Y/N Comments   Fever/Chills    Chest Pain     Poor Appetite    Edema     Cough    Abdominal Pain     Sputum    Joint Pain     SOB/LITTLE    Pruritis/Rash     Nausea/vomit    Tolerating PT/OT     Diarrhea    Tolerating Diet     Constipation    Other       Could not obtain due to:      Objective:     VITALS:   Last 24hrs VS reviewed since prior progress note. Most recent are:  Visit Vitals  /77 (BP 1 Location: Right arm, BP Patient Position: At rest)   Pulse 88   Temp 98.2 °F (36.8 °C)   Resp 18   Ht 6' 2\" (1.88 m)   Wt 154 kg (339 lb 8.1 oz)   SpO2 92%   BMI 43.59 kg/m²       Intake/Output Summary (Last 24 hours) at 2020 1007  Last data filed at 1/10/2020 1157  Gross per 24 hour   Intake    Output 4500 ml   Net -4500 ml      Telemetry Reviewed:     PHYSICAL EXAM:  General: NAD  1+ edema      Lab Data Reviewed: (see below)    Medications Reviewed: (see below)    PMH/SH reviewed - no change compared to H&P  ________________________________________________________________________  Care Plan discussed with:  Patient     Family      RN     Care Manager                    Consultant:          Comments   >50% of visit spent in counseling and coordination of care       ________________________________________________________________________  Kin MD Arnav     Procedures: see electronic medical records for all procedures/Xrays and details which  were not copied into this note but were reviewed prior to creation of Plan.       LABS:  Recent Labs     20  0359   WBC 5.3   HGB 10.3*   HCT 33.6*        Recent Labs     01/10/20  0504 01/09/20  0359   * 136   K 3.9 3.7   CL 97 98   CO2 30 33*   BUN 37* 33*   CREA 10.60* 8.20*   GLU 86 100   CA 8.7 8.5     Recent Labs     01/10/20  0504 01/09/20  0359   SGOT 15 17   AP 36* 40*   TP 8.1 8.2   ALB 2.5* 2.5*   GLOB 5.6* 5.7*     No results for input(s): INR, PTP, APTT, INREXT, INREXT in the last 72 hours. No results for input(s): FE, TIBC, PSAT, FERR in the last 72 hours. No results found for: FOL, RBCF   No results for input(s): PH, PCO2, PO2 in the last 72 hours. No results for input(s): CPK, CKMB in the last 72 hours.     No lab exists for component: TROPONINI  No components found for: [place]  Lab Results   Component Value Date/Time    Color YELLOW/STRAW 05/01/2017 05:39 PM    Appearance CLOUDY (A) 05/01/2017 05:39 PM    Specific gravity 1.018 05/01/2017 05:39 PM    Specific gravity >1.030 (H) 10/03/2014 04:35 AM    pH (UA) 5.5 05/01/2017 05:39 PM    Protein 300 (A) 05/01/2017 05:39 PM    Glucose 250 (A) 05/01/2017 05:39 PM    Ketone NEGATIVE  05/01/2017 05:39 PM    Bilirubin NEGATIVE  05/01/2017 05:39 PM    Urobilinogen 0.2 05/01/2017 05:39 PM    Nitrites NEGATIVE  05/01/2017 05:39 PM    Leukocyte Esterase NEGATIVE  05/01/2017 05:39 PM    Epithelial cells FEW 05/01/2017 05:39 PM    Bacteria NEGATIVE  05/01/2017 05:39 PM    WBC 0-4 05/01/2017 05:39 PM    RBC 5-10 05/01/2017 05:39 PM       MEDICATIONS:  Current Facility-Administered Medications   Medication Dose Route Frequency    insulin glargine (LANTUS) 100 unit/mL injection        insulin glargine (LANTUS) injection 10 Units  10 Units SubCUTAneous DAILY    [START ON 1/13/2020] Vancomycin level 4 AM 1/13  with am labs  1 Each Other ONCE    0.9% sodium chloride infusion  25 mL/hr IntraVENous CONTINUOUS    bacitracin 50,000 Units in sodium chloride irrigation 0.9 % 1,000 mL Irrigation    PRN    bupivacaine 0.25% -EPINEPHrine 1:200,000 (SENSORCAINE) 0.25 %-1:200,000 injection    PRN    epoetin nata-epbx (RETACRIT) injection 10,000 Units  10,000 Units SubCUTAneous Q MON, WED & FRI    lactobac ac& pc-s.therm-b.anim (USMAN Q/RISAQUAD)  1 Cap Oral DAILY    loperamide (IMODIUM) capsule 2 mg  2 mg Oral QID PRN    lisinopril (PRINIVIL, ZESTRIL) tablet 10 mg  10 mg Oral DAILY    ammonium lactate (LAC-HYDRIN) 12 % lotion   Topical BID    acetaminophen (TYLENOL) tablet 650 mg  650 mg Oral Q6H PRN    glucose chewable tablet 16 g  4 Tab Oral PRN    glucagon (GLUCAGEN) injection 1 mg  1 mg IntraMUSCular PRN    dextrose 10% infusion 0-250 mL  0-250 mL IntraVENous PRN    insulin lispro (HUMALOG) injection   SubCUTAneous AC&HS    hydrALAZINE (APRESOLINE) 20 mg/mL injection 10 mg  10 mg IntraVENous Q6H PRN    labetalol (NORMODYNE;TRANDATE) injection 10 mg  10 mg IntraVENous Q6H PRN    ondansetron (ZOFRAN) injection 4 mg  4 mg IntraVENous Q6H PRN    oxyCODONE-acetaminophen (PERCOCET) 5-325 mg per tablet 1 Tab  1 Tab Oral Q4H PRN    heparin (porcine) injection 7,500 Units  7,500 Units SubCUTAneous Q8H    Vancomycin--Pharmacy to Dose   Other Rx Dosing/Monitoring    piperacillin-tazobactam (ZOSYN) 3.375 g in 0.9% sodium chloride (MBP/ADV) 100 mL  3.375 g IntraVENous Q12H

## 2020-01-11 NOTE — PROGRESS NOTES
Bedside and Verbal shift change report given to Via Camero (oncoming nurse) by Harsha Leonard (offgoing nurse). Report included the following information SBAR, Kardex, Intake/Output, MAR and Recent Results.

## 2020-01-11 NOTE — PROGRESS NOTES
Foot and Ankle Progress Note    Admit Date: 1/6/2020  Hospital day 5    Subjective:     Patient was admitted thru  ago with a swollen infected 3rd right toe of unknown duration. Previous well healed amputations of right toes 5 and 4.     He is post op 2 days amputation of the 3rd right toe    Current Facility-Administered Medications   Medication Dose Route Frequency    insulin glargine (LANTUS) injection 10 Units  10 Units SubCUTAneous DAILY    insulin glargine (LANTUS) 100 unit/mL injection        [START ON 1/13/2020] Vancomycin level 4 AM 1/13  with am labs  1 Each Other ONCE    0.9% sodium chloride infusion  25 mL/hr IntraVENous CONTINUOUS    bacitracin 50,000 Units in sodium chloride irrigation 0.9 % 1,000 mL Irrigation    PRN    bupivacaine 0.25% -EPINEPHrine 1:200,000 (SENSORCAINE) 0.25 %-1:200,000 injection    PRN    epoetin nata-epbx (RETACRIT) injection 10,000 Units  10,000 Units SubCUTAneous Q MON, WED & FRI    lactobac ac& pc-s.therm-b.anim (USMAN Q/RISAQUAD)  1 Cap Oral DAILY    loperamide (IMODIUM) capsule 2 mg  2 mg Oral QID PRN    lisinopril (PRINIVIL, ZESTRIL) tablet 10 mg  10 mg Oral DAILY    ammonium lactate (LAC-HYDRIN) 12 % lotion   Topical BID    acetaminophen (TYLENOL) tablet 650 mg  650 mg Oral Q6H PRN    glucose chewable tablet 16 g  4 Tab Oral PRN    glucagon (GLUCAGEN) injection 1 mg  1 mg IntraMUSCular PRN    dextrose 10% infusion 0-250 mL  0-250 mL IntraVENous PRN    insulin lispro (HUMALOG) injection   SubCUTAneous AC&HS    hydrALAZINE (APRESOLINE) 20 mg/mL injection 10 mg  10 mg IntraVENous Q6H PRN    labetalol (NORMODYNE;TRANDATE) injection 10 mg  10 mg IntraVENous Q6H PRN    ondansetron (ZOFRAN) injection 4 mg  4 mg IntraVENous Q6H PRN    oxyCODONE-acetaminophen (PERCOCET) 5-325 mg per tablet 1 Tab  1 Tab Oral Q4H PRN    heparin (porcine) injection 7,500 Units  7,500 Units SubCUTAneous Q8H    Vancomycin--Pharmacy to Dose   Other Rx Dosing/Monitoring    piperacillin-tazobactam (ZOSYN) 3.375 g in 0.9% sodium chloride (MBP/ADV) 100 mL  3.375 g IntraVENous Q12H            Objective:     Patient Vitals for the past 8 hrs:   BP Temp Pulse Resp SpO2   01/11/20 1522 157/89 98.2 °F (36.8 °C) 86 18 93 %   01/11/20 1132 135/85 98.1 °F (36.7 °C) 85 18 93 %     01/11 0701 - 01/11 1900  In: 885 [P.O.:720; I.V.:200]  Out: 0   01/09 1901 - 01/11 0700  In: 660 [P.O.:60; I.V.:600]  Out: 4500     Physical Exam: LOWER EXTREMITIES. Palpable pedal pulses with diminished epicritic sensation  Fairly good muscle strength  Previous well healed amputation 1st left toe  Previous well healed amputation right toes 4 and 5    Incision well co-apted dorsal right foot where 3rd right toe has tom amputated. min warmth and no erythema    WBC's at 5.3    Xray right foot:  Partial amputation distal 5th right metatarsal and toe. No changes in distal 5th metatarsal as compared to xrays from October 2019.  4th right toe ha been amputated at the [facility].   Erosive bone 3rd right proximal phalangeal head and middle phalanx  Calcified arterioles noted on XRAY        NOÉ's indicate moderate arterial insufficiency         Data Review   Recent Results (from the past 24 hour(s))   GLUCOSE, POC    Collection Time: 01/10/20  6:14 PM   Result Value Ref Range    Glucose (POC) 143 (H) 65 - 100 mg/dL    Performed by Karen Jaquez    GLUCOSE, POC    Collection Time: 01/10/20 10:21 PM   Result Value Ref Range    Glucose (POC) 143 (H) 65 - 100 mg/dL    Performed by James Young    GLUCOSE, POC    Collection Time: 01/11/20  7:51 AM   Result Value Ref Range    Glucose (POC) 117 (H) 65 - 100 mg/dL    Performed by Guerline Jones (PCT)    GLUCOSE, POC    Collection Time: 01/11/20 11:38 AM   Result Value Ref Range    Glucose (POC) 182 (H) 65 - 100 mg/dL    Performed by Guerline Jones (PCT)    GLUCOSE, POC    Collection Time: 01/11/20  4:06 PM   Result Value Ref Range    Glucose (POC) 91 65 - 100 mg/dL    Performed by Valentino Maxcy Erasto            Assessment:     Active Problems:    Foot ulcer ([address] (1/6/2020)      ESRD (end stage renal disease) ([address] (1/6/2020)      Wound cellulitis (1/6/2020)    osteomyelitis 3rd right toe    Plan:   Dressing changes performed today  Intra op wound cultures show MRSA  intraop bone pathology not finalized. Patient to remain hospitalized until bone pathology finalized.   At home antibiotics managed by Infectious disease

## 2020-01-11 NOTE — PROGRESS NOTES
Hospitalist Progress Note    NAME: Ryley Camera   :  1985   MRN:  [id number]     I reviewed pertinent labs/imaging and discussed plan of care with Dr. Marizol Bolivar who is in agreement. Assessment / Plan:  Osteomyelitis right 3rd toe  PVD  MRI right foot 20:    1. Destruction of the third PIP joint consistent with septic arthritis. Osteomyelitis is seen in the phalanges of the third toe. 2. Soft tissue wound of the dorsal third toe. No evidence of a focal drainable fluid collection. Significant subcutaneous edema surrounding the third toe and over the dorsum of foot. 3. Status post amputation of the phalanges of the fourth digit. Mild edema in the fourth metatarsal head is more likely reactive related to altered stress mechanics rather than early osteomyelitis. 4. Status post indication the fourth ray to the level of the mid metatarsal.  LE doppler 20:  No DVT of the legs. NOÉ 20:    · The right resting NOÉ is moderately decreased. Based on pulse volume recordings due to calcified vessels. · Arterial disease noted at the level of the femoral artery and popliteal on the right side. · The left resting NOÉ is mildly decreased. Based on pulse volume recordings due to calcified vessels. · Arterial disease noted at the level of the femoral artery and popliteal on the left side. - Podiatry assistance appreciated. - Vascular surgery input appreciated. - Continue current abx (vancomycin day #5/zosyn day #4). - Continue probiotics while patient on abx. - S/P amputation of right 3rd toe today 20.    - Will need to follow up with Dr. Mag Drummond (Vascular Surgery) on 20. Mild asymptomatic hyponatremia   - No tx indicated. - Monitor. Mild diarrhea  - Improved. - Continue probiotics as noted above. - Continue loperamide 2 mg po qid prn.    - Enteric panel pending. HTN  - BP overall with adequate control.    - Continue lisinopril 10 mg po daily.       ESRD  - HD MWF.    Anemia of chronic disease  - Hgb overall stable. - Continue epoetin 10,000 units sc 3 times weekly. DM II, poorly controlled  Hypoglycemia  HgbA1C 7.4  - Patient continues to have borderline blood sugars today. - Hold basal insulin (glargine 40 units sc daily). - Continue FSBS with SSI correction scale. Obesity  - Discussed the effects of weight on overall health. 40 or above Morbid obesity / Body mass index is 43.59 kg/m². Code status: Full  Prophylaxis: Hep SQ  Recommended Disposition: Home w/Family     Subjective:     Chief Complaint / Reason for Physician Visit  Patient complains. Adequate pain control. Plan of care reviewed with RN. Review of Systems:  Symptom Y/N Comments  Symptom Y/N Comments   Fever/Chills N   Chest Pain N    Poor Appetite N   Edema N    Cough N   Abdominal Pain N    Sputum N   Joint Pain Y Toe    SOB/LITTLE N   Pruritis/Rash N    Nausea/vomit N   Tolerating PT/OT     Diarrhea Y   Tolerating Diet Y    Constipation N   Other       Could NOT obtain due to:      Objective:     VITALS:   Last 24hrs VS reviewed since prior progress note.  Most recent are:  Patient Vitals for the past 24 hrs:   Temp Pulse Resp BP SpO2   01/10/20 1238 98.2 °F (36.8 °C) 90 18 148/76 92 %   01/10/20 1157 97.8 °F (36.6 °C) 85 18 (!) 174/96    01/10/20 1145  85 18 177/86    01/10/20 1130  89 18 149/59    01/10/20 1115  84 18 172/83    01/10/20 1100  87 18 167/77    01/10/20 1045  86 18 158/81    01/10/20 1030  85 18 175/85    01/10/20 1015  85 18 150/77    01/10/20 1000  85 18 154/79    01/10/20 0945  86 18 145/68    01/10/20 0930  85 18 155/68    01/10/20 0915  88 18 159/78    01/10/20 0900  87 18 133/67    01/10/20 0845  84 18 144/74    01/10/20 0830  83 18 158/80    01/10/20 0815  86 18 160/81    01/10/20 0800  88 18 153/82    01/10/20 0745  86 18 (!) 181/98    01/10/20 0734 98 °F (36.7 °C) 90 18 (!) 196/112    01/10/20 0141 97.9 °F (36.6 °C) 88 16 113/78 94 %   01/09/20 2315 98.1 °F (36.7 °C) 90 18 149/85 98 %       Intake/Output Summary (Last 24 hours) at 1/10/2020 1911  Last data filed at 1/10/2020 1157  Gross per 24 hour   Intake 660 ml   Output 4500 ml   Net -3840 ml        PHYSICAL EXAM:  General:  A/A/O X 3. NAD. HEENT:  Normocephalic. Sclera anicteric. EOMI. Mucous membranes moist.    Chest:  Resps even/unlabored with symmetrical CWE. Air entry full. Lungs CTA. No use of accessory muscles. CV:  RRR. No peripheral edema. GI:  Abdomen soft/NT/ND. ABT X 4.    Neurologic:  Nonfocal.  CN II-XII grossly intact. Speech normal.     Psych:  Cooperative. No anxiety or agitation. Skin:  Left great toe and 2nd toe s/p amputation. Right foot dressed with C/D/I dressing. No rashes or jaundice. Reviewed most current lab test results and cultures  YES  Reviewed most current radiology test results   YES  Review and summation of old records today    NO  Reviewed patient's current orders and MAR    YES  PMH/ reviewed - no change compared to H&P  ________________________________________________________________________  Care Plan discussed with:    Comments   Patient [address]     Consultant                        Multidiciplinary team rounds were held today with , nursing, pharmacist and clinical coordinator. Patient's plan of care was discussed; medications were reviewed and discharge planning was addressed. ______________________________________________________________________________________  Ori Wood NP     Procedures: see electronic medical records for all procedures/Xrays and details which were not copied into this note but were reviewed prior to creation of Plan. LABS:  I reviewed today's most current labs and imaging studies.   Pertinent labs include:  Recent Labs     01/09/20  0359 01/08/20  0408   WBC 5.3 6.0   HGB 10.3* 9.9*   HCT 33.6* 32.1*    249     Recent Labs     01/10/20  0504 01/09/20  0359 01/08/20  0408   * 136 137   K 3.9 3.7 4.3   CL 97 98 101   CO2 30 33* 27   GLU 86 100 97   BUN 37* 33* 52*   CREA 10.60* 8.20* 10.90*   CA 8.7 8.5 8.6   ALB 2.5* 2.5* 2.6*   TBILI 0.3 0.6 0.5   SGOT 15 17 14*   ALT 13 13 13       Signed: Leslie Whalen, NP

## 2020-01-12 NOTE — PROGRESS NOTES
Bedside and Verbal shift change report given to Mina Cruz (oncoming nurse) by Trudy Costello (offgoing nurse). Report included the following information SBAR, Kardex, MAR and Recent Results.

## 2020-01-12 NOTE — PROGRESS NOTES
NAME: Zoya Tejeda        :  1985        MRN:  [id number]        Assessment :    Plan:  --ESRD-[facility]- [facility] - MWF  Anemia  DM --No acute need for HD today. Plan HD Monday. Started retacrit       Subjective:     Chief Complaint:  \" I feel fine. \"  No foot pain. No n/V. No dyspnea. Review of Systems:    Symptom Y/N Comments  Symptom Y/N Comments   Fever/Chills    Chest Pain     Poor Appetite    Edema     Cough    Abdominal Pain     Sputum    Joint Pain     SOB/LITTLE    Pruritis/Rash     Nausea/vomit    Tolerating PT/OT     Diarrhea    Tolerating Diet     Constipation    Other       Could not obtain due to:      Objective:     VITALS:   Last 24hrs VS reviewed since prior progress note. Most recent are:  Visit Vitals  /78 (BP 1 Location: Left arm, BP Patient Position: At rest)   Pulse 85   Temp 98 °F (36.7 °C)   Resp 18   Ht 6' 2\" (1.88 m)   Wt 154 kg (339 lb 8.1 oz)   SpO2 92%   BMI 43.59 kg/m²       Intake/Output Summary (Last 24 hours) at 2020 1016  Last data filed at 2020 1907  Gross per 24 hour   Intake 1320 ml   Output 0 ml   Net 1320 ml      Telemetry Reviewed:     PHYSICAL EXAM:  General: NAD  1+ edema      Lab Data Reviewed: (see below)    Medications Reviewed: (see below)    PMH/SH reviewed - no change compared to H&P  ________________________________________________________________________  Care Plan discussed with:  Patient     Family      RN     Care Manager                    Consultant:          Comments   >50% of visit spent in counseling and coordination of care       ________________________________________________________________________  Denise Martinez MD     Procedures: see electronic medical records for all procedures/Xrays and details which  were not copied into this note but were reviewed prior to creation of Plan.       LABS:  No results for input(s): WBC, HGB, HCT, PLT, HGBEXT, HCTEXT, PLTEXT, HGBEXT, HCTEXT, PLTEXT in the last 72 hours. Recent Labs     01/10/20  0504   *   K 3.9   CL 97   CO2 30   BUN 37*   CREA 10.60*   GLU 86   CA 8.7     Recent Labs     01/10/20  0504   SGOT 15   AP 36*   TP 8.1   ALB 2.5*   GLOB 5.6*     No results for input(s): INR, PTP, APTT, INREXT, INREXT in the last 72 hours. No results for input(s): FE, TIBC, PSAT, FERR in the last 72 hours. No results found for: FOL, RBCF   No results for input(s): PH, PCO2, PO2 in the last 72 hours. No results for input(s): CPK, CKMB in the last 72 hours.     No lab exists for component: TROPONINI  No components found for: [place]  Lab Results   Component Value Date/Time    Color YELLOW/STRAW 05/01/2017 05:39 PM    Appearance CLOUDY (A) 05/01/2017 05:39 PM    Specific gravity 1.018 05/01/2017 05:39 PM    Specific gravity >1.030 (H) 10/03/2014 04:35 AM    pH (UA) 5.5 05/01/2017 05:39 PM    Protein 300 (A) 05/01/2017 05:39 PM    Glucose 250 (A) 05/01/2017 05:39 PM    Ketone NEGATIVE  05/01/2017 05:39 PM    Bilirubin NEGATIVE  05/01/2017 05:39 PM    Urobilinogen 0.2 05/01/2017 05:39 PM    Nitrites NEGATIVE  05/01/2017 05:39 PM    Leukocyte Esterase NEGATIVE  05/01/2017 05:39 PM    Epithelial cells FEW 05/01/2017 05:39 PM    Bacteria NEGATIVE  05/01/2017 05:39 PM    WBC 0-4 05/01/2017 05:39 PM    RBC 5-10 05/01/2017 05:39 PM       MEDICATIONS:  Current Facility-Administered Medications   Medication Dose Route Frequency    insulin glargine (LANTUS) injection 10 Units  10 Units SubCUTAneous DAILY    [START ON 1/13/2020] Vancomycin level 4 AM 1/13  with am labs  1 Each Other ONCE    0.9% sodium chloride infusion  25 mL/hr IntraVENous CONTINUOUS    bacitracin 50,000 Units in sodium chloride irrigation 0.9 % 1,000 mL Irrigation    PRN    bupivacaine 0.25% -EPINEPHrine 1:200,000 (SENSORCAINE) 0.25 %-1:200,000 injection    PRN    epoetin nata-epbx (RETACRIT) injection 10,000 Units  10,000 Units SubCUTAneous Q MON, WED & FRI    lactobac ac& pc-s.therm-b.anim (USMAN Q/RISAQUAD)  1 Cap Oral DAILY    loperamide (IMODIUM) capsule 2 mg  2 mg Oral QID PRN    lisinopril (PRINIVIL, ZESTRIL) tablet 10 mg  10 mg Oral DAILY    ammonium lactate (LAC-HYDRIN) 12 % lotion   Topical BID    acetaminophen (TYLENOL) tablet 650 mg  650 mg Oral Q6H PRN    glucose chewable tablet 16 g  4 Tab Oral PRN    glucagon (GLUCAGEN) injection 1 mg  1 mg IntraMUSCular PRN    dextrose 10% infusion 0-250 mL  0-250 mL IntraVENous PRN    insulin lispro (HUMALOG) injection   SubCUTAneous AC&HS    hydrALAZINE (APRESOLINE) 20 mg/mL injection 10 mg  10 mg IntraVENous Q6H PRN    labetalol (NORMODYNE;TRANDATE) injection 10 mg  10 mg IntraVENous Q6H PRN    ondansetron (ZOFRAN) injection 4 mg  4 mg IntraVENous Q6H PRN    oxyCODONE-acetaminophen (PERCOCET) 5-325 mg per tablet 1 Tab  1 Tab Oral Q4H PRN    heparin (porcine) injection 7,500 Units  7,500 Units SubCUTAneous Q8H    Vancomycin--Pharmacy to Dose   Other Rx Dosing/Monitoring    piperacillin-tazobactam (ZOSYN) 3.375 g in 0.9% sodium chloride (MBP/ADV) 100 mL  3.375 g IntraVENous Q12H

## 2020-01-12 NOTE — PROGRESS NOTES
Hospitalist Progress Note    NAME: Ramon Lincoln   :  1985   MRN:  [id number]     I reviewed pertinent labs/imaging and discussed plan of care with Dr. Salma Garcia who is in agreement. Assessment / Plan:  Osteomyelitis right 3rd toe  PVD  MRI right foot 20:    1. Destruction of the third PIP joint consistent with septic arthritis. Osteomyelitis is seen in the phalanges of the third toe. 2. Soft tissue wound of the dorsal third toe. No evidence of a focal drainable fluid collection. Significant subcutaneous edema surrounding the third toe and over the dorsum of foot. 3. Status post amputation of the phalanges of the fourth digit. Mild edema in the fourth metatarsal head is more likely reactive related to altered stress mechanics rather than early osteomyelitis. 4. Status post indication the fourth ray to the level of the mid metatarsal.  LE doppler 20:  No DVT of the legs. NOÉ 20:    · The right resting NOÉ is moderately decreased. Based on pulse volume recordings due to calcified vessels. · Arterial disease noted at the level of the femoral artery and popliteal on the right side. · The left resting NOÉ is mildly decreased. Based on pulse volume recordings due to calcified vessels. · Arterial disease noted at the level of the femoral artery and popliteal on the left side. Blood culture 20:  NG at 5 days. Intraop wound culture 20:  MRSA  - Podiatry assistance appreciated. - Vascular surgery input appreciated. - Continue current abx (vancomycin day #7/zosyn day #6). - Continue probiotics while patient on abx. - S/P amputation of right 3rd toe today 20.    - Will need to follow up with Dr. Jose Theodore (Vascular Surgery) on 20. Mild asymptomatic hyponatremia   - No tx indicated. - Monitor. Mild diarrhea  - Improved. - Continue probiotics as noted above.   - Continue loperamide 2 mg po qid prn.    - Enteric panel still pending, both patient and bedside nurse instructed to collect stool sample with next BM. HTN  - BP overall with adequate control.    - Continue lisinopril 10 mg po daily. ESRD  - HD MWF. Anemia of chronic disease  - Hgb overall stable. - Continue epoetin 10,000 units sc 3 times weekly. DM II, poorly controlled  Hypoglycemia  HgbA1C 7.4  - Blood sugars improved today, no further hypoglycemia.    - Increase basal (glargine) insulin to 12 units sc daily, titrate prn.    - Continue FSBS with SSI correction scale. Obesity  - Discussed the effects of weight on overall health. 40 or above Morbid obesity / Body mass index is 43.59 kg/m². Code status: Full  Prophylaxis: Hep SQ  Recommended Disposition: Home w/Family     Subjective:     Chief Complaint / Reason for Physician Visit  No complaints. Adequate pain control. Reports \"a couple\" of diarrheal stools today. Plan of care reviewed with RN. Review of Systems:  Symptom Y/N Comments  Symptom Y/N Comments   Fever/Chills N   Chest Pain N    Poor Appetite N   Edema N    Cough N   Abdominal Pain N    Sputum N   Joint Pain N    SOB/LITTLE N   Pruritis/Rash N    Nausea/vomit N   Tolerating PT/OT     Diarrhea Y   Tolerating Diet Y    Constipation N   Other       Could NOT obtain due to:      Objective:     VITALS:   Last 24hrs VS reviewed since prior progress note. Most recent are:  Patient Vitals for the past 24 hrs:   Temp Pulse Resp BP SpO2   01/12/20 1230 98.3 °F (36.8 °C) 88 18 144/73 93 %   01/12/20 0740 98 °F (36.7 °C) 85 18 140/78 92 %   01/12/20 0352 98.6 °F (37 °C) 86 18 139/85 91 %   01/11/20 1929 98.2 °F (36.8 °C) 82 18 160/82 95 %   01/11/20 1522 98.2 °F (36.8 °C) 86 18 157/89 93 %       Intake/Output Summary (Last 24 hours) at 1/12/2020 1336  Last data filed at 1/11/2020 1907  Gross per 24 hour   Intake 1320 ml   Output 0 ml   Net 1320 ml        PHYSICAL EXAM:  General:  A/A/O X 3. NAD. HEENT:  Normocephalic. Sclera anicteric. EOMI.   Mucous membranes moist.    Chest:  Resps even/unlabored with symmetrical CWE. Air entry full. Lungs CTA. No use of accessory muscles. CV:  RRR. No peripheral edema. GI:  Abdomen soft/NT/ND. ABT X 4.    Neurologic:  Nonfocal.  CN II-XII grossly intact. Speech normal.     Psych:  Cooperative. No anxiety or agitation. Skin:  Left great toe and 2nd toe s/p amputation. Right foot dressed with C/D/I dressing. No rashes or jaundice. Reviewed most current lab test results and cultures  YES  Reviewed most current radiology test results   YES  Review and summation of old records today    NO  Reviewed patient's current orders and MAR    YES  PMH/SH reviewed - no change compared to H&P  ________________________________________________________________________  Care Plan discussed with:    Comments   Patient [address]     Consultant                        Multidiciplinary team rounds were held today with , nursing, pharmacist and clinical coordinator. Patient's plan of care was discussed; medications were reviewed and discharge planning was addressed. ______________________________________________________________________________________  Iker Kothari NP     Procedures: see electronic medical records for all procedures/Xrays and details which were not copied into this note but were reviewed prior to creation of Plan. LABS:  I reviewed today's most current labs and imaging studies. Pertinent labs include:  No results for input(s): WBC, HGB, HCT, PLT, HGBEXT, HCTEXT, PLTEXT, HGBEXT, HCTEXT, PLTEXT in the last 72 hours.   Recent Labs     01/10/20  0504   *   K 3.9   CL 97   CO2 30   GLU 86   BUN 37*   CREA 10.60*   CA 8.7   ALB 2.5*   TBILI 0.3   SGOT 15   ALT 13       Signed: Iker Kothari NP

## 2020-01-12 NOTE — PROGRESS NOTES
Bedside and Verbal shift change report given to [address] [facility] (oncoming nurse) by Auugstus Carpneter (offgoing nurse). Report included the following information SBAR, Kardex, Intake/Output, MAR and Recent Results.

## 2020-01-13 NOTE — PROGRESS NOTES
[address] with family one medically cleared    OP follow-up appointment       EDNA Cho, [address]

## 2020-01-13 NOTE — PROGRESS NOTES
Bedside and Verbal shift change report given to [address] [place] [address] Avenue (oncoming nurse) by Dyana Otoole (offgoing nurse). Report included the following information SBAR, Kardex, Intake/Output, MAR, Accordion and Recent Results.

## 2020-01-13 NOTE — PROGRESS NOTES
Foot and Ankle Progress Note    Admit Date: 1/6/2020  Hospital day 5    Subjective:     Patient was admitted thru  with a swollen infected 3rd right toe of unknown duration. Previous well healed amputations of right toes 5 and 4.     He is post op 2 days amputation of the 3rd right toe    Current Facility-Administered Medications   Medication Dose Route Frequency    insulin glargine (LANTUS) injection 12 Units  12 Units SubCUTAneous DAILY    0.9% sodium chloride infusion  25 mL/hr IntraVENous CONTINUOUS    bacitracin 50,000 Units in sodium chloride irrigation 0.9 % 1,000 mL Irrigation    PRN    bupivacaine 0.25% -EPINEPHrine 1:200,000 (SENSORCAINE) 0.25 %-1:200,000 injection    PRN    epoetin nata-epbx (RETACRIT) injection 10,000 Units  10,000 Units SubCUTAneous Q MON, WED & FRI    lactobac ac& pc-s.therm-b.anim (USMAN Q/RISAQUAD)  1 Cap Oral DAILY    loperamide (IMODIUM) capsule 2 mg  2 mg Oral QID PRN    lisinopril (PRINIVIL, ZESTRIL) tablet 10 mg  10 mg Oral DAILY    ammonium lactate (LAC-HYDRIN) 12 % lotion   Topical BID    acetaminophen (TYLENOL) tablet 650 mg  650 mg Oral Q6H PRN    glucose chewable tablet 16 g  4 Tab Oral PRN    glucagon (GLUCAGEN) injection 1 mg  1 mg IntraMUSCular PRN    dextrose 10% infusion 0-250 mL  0-250 mL IntraVENous PRN    insulin lispro (HUMALOG) injection   SubCUTAneous AC&HS    hydrALAZINE (APRESOLINE) 20 mg/mL injection 10 mg  10 mg IntraVENous Q6H PRN    labetalol (NORMODYNE;TRANDATE) injection 10 mg  10 mg IntraVENous Q6H PRN    ondansetron (ZOFRAN) injection 4 mg  4 mg IntraVENous Q6H PRN    oxyCODONE-acetaminophen (PERCOCET) 5-325 mg per tablet 1 Tab  1 Tab Oral Q4H PRN    heparin (porcine) injection 7,500 Units  7,500 Units SubCUTAneous Q8H    Vancomycin--Pharmacy to Dose   Other Rx Dosing/Monitoring    piperacillin-tazobactam (ZOSYN) 3.375 g in 0.9% sodium chloride (MBP/ADV) 100 mL  3.375 g IntraVENous Q12H            Objective:     Patient Vitals for the past 8 hrs:   BP Temp Pulse Resp SpO2   01/13/20 1614 169/86 98 °F (36.7 °C) 89 18 93 %     No intake/output data recorded. 01/11 1901 - 01/13 0700  In: 6003 [P.O.:1360; I.V.:100]  Out: 0     Physical Exam: LOWER EXTREMITIES. Palpable pedal pulses with diminished epicritic sensation  Fairly good muscle strength  Previous well healed amputation 1st left toe  Previous well healed amputation right toes 4 and 5    Incision well co-apted dorsal right foot where 3rd right toe has tom amputated. min warmth and no erythema    WBC's at 5.3    Xray right foot:  Partial amputation distal 5th right metatarsal and toe. No changes in distal 5th metatarsal as compared to xrays from October 2019.  4th right toe ha been amputated at the [facility].   Erosive bone 3rd right proximal phalangeal head and middle phalanx  Calcified arterioles noted on XRAY    Intra op woundcultures show MRSA and enterobacter    NOÉ's indicate moderate arterial insufficiency         Data Review   Recent Results (from the past 24 hour(s))   GLUCOSE, POC    Collection Time: 01/12/20  9:06 PM   Result Value Ref Range    Glucose (POC) 123 (H) 65 - 100 mg/dL    Performed by LUANA Dwyer    Collection Time: 01/13/20  5:50 AM   Result Value Ref Range    Vancomycin, random 16.9 UG/ML   METABOLIC PANEL, BASIC    Collection Time: 01/13/20  5:50 AM   Result Value Ref Range    Sodium 136 136 - 145 mmol/L    Potassium 4.2 3.5 - 5.1 mmol/L    Chloride 95 (L) 97 - 108 mmol/L    CO2 29 21 - 32 mmol/L    Anion gap 12 5 - 15 mmol/L    Glucose 152 (H) 65 - 100 mg/dL    BUN 50 (H) 6 - 20 MG/DL    Creatinine 13.00 (H) 0.70 - 1.30 MG/DL    BUN/Creatinine ratio 4 (L) 12 - 20      GFR est AA 5 (L) >60 ml/min/1.73m2    GFR est non-AA 4 (L) >60 ml/min/1.73m2    Calcium 8.8 8.5 - 10.1 MG/DL   GLUCOSE, POC    Collection Time: 01/13/20  7:38 AM   Result Value Ref Range    Glucose (POC) 127 (H) 65 - 100 mg/dL    Performed by Kartik Myles (PCT)    GLUCOSE, POC    Collection Time: 01/13/20 12:07 PM   Result Value Ref Range    Glucose (POC) 158 (H) 65 - 100 mg/dL    Performed by Nadia Kim (PCT)    CBC W/O DIFF    Collection Time: 01/13/20  2:30 PM   Result Value Ref Range    WBC 7.6 4.1 - 11.1 K/uL    RBC 4.04 (L) 4.10 - 5.70 M/uL    HGB 10.5 (L) 12.1 - 17.0 g/dL    HCT 33.1 (L) 36.6 - 50.3 %    MCV 81.9 80.0 - 99.0 FL    MCH 26.0 26.0 - 34.0 PG    MCHC 31.7 30.0 - 36.5 g/dL    RDW 15.2 (H) 11.5 - 14.5 %    PLATELET 310 437 - 010 K/uL    MPV 8.8 (L) 8.9 - 12.9 FL    NRBC 0.0 0  WBC    ABSOLUTE NRBC 0.00 0.00 - 0.01 K/uL   GLUCOSE, POC    Collection Time: 01/13/20  4:06 PM   Result Value Ref Range    Glucose (POC) 106 (H) 65 - 100 mg/dL    Performed by Nadia Kim (PCT)            Assessment:     Active Problems:    Foot ulcer ([address] (1/6/2020)      ESRD (end stage renal disease) ([facility] [address] (1/6/2020)      Wound cellulitis (1/6/2020)    osteomyelitis 3rd right toe    Plan:   Dressing changes performed today  Intra op wound cultures show MRSA  intraop bone path showed osteomyelitis with clean proximal margins. From from a podiatry standpoint this patient can be discharged. Case management orders have been placed   At home antibiotics managed by Infectious disease

## 2020-01-13 NOTE — PROGRESS NOTES
Initial Nutrition Assessment:    INTERVENTIONS/RECOMMENDATIONS:   · Continue current diet  · Gelatein BID  · Please document % meals and supplements consumed in flowsheet I/O's under intake     ASSESSMENT:   Chart reviewed, medically noted for Osteomyelitis right 3rd toe, ESRD-HD, T2DM, and PMH shown below. Assessing pt due to LOS. Pt reports good appetite and eating well. We discussed the role that nutrition plays in wound healing, specifically focusing on protein sources at each meal as well as BG control. His protein needs are also increased due to HD. Will add gelatein BID to help meet protein needs. He reports he has had DM diet education in the past and declined further education at this time.     Past Medical History:   Diagnosis Date    Cataracts     Chronic kidney disease     Diabetes ([facility] [address]     Hypercholesterolemia     Hypertension     Kidney failure     Obesity        Diet Order: Consistent carb, Renal  % Eaten:    Patient Vitals for the past 72 hrs:   % Diet Eaten   01/13/20 0957 100 %   01/12/20 1500 100 %   01/11/20 1907 100 %   01/11/20 1500 100 %     Pertinent Medications: [x]Reviewed: epo, lantus, humalog, lactobac, imodium,   Pertinent Labs: [x]Reviewed:   Food Allergies: [x]NKFA  []Other   Last BM: 1/12  Edema:        [x]RUE 1+  [x]LUE 1+  [x]RLE 2+  [x]LLE   1+   Pressure Injury:  Osteomyelitis right 3rd toe    [] Stage I   [] Stage II   [] Stage III   [] Stage IV      Wt Readings from Last 30 Encounters:   01/13/20 153.8 kg (339 lb 1.6 oz)   10/09/19 155.9 kg (343 lb 11.2 oz)   07/11/19 152 kg (335 lb 1.6 oz)   07/09/19 157.6 kg (347 lb 8 oz)   06/29/19 (!) 161.4 kg (355 lb 13.2 oz)   09/14/18 (!) 160.6 kg (354 lb)   04/03/18 155.3 kg (342 lb 6.4 oz)   12/06/17 149.7 kg (330 lb)   12/06/17 151 kg (332 lb 14.3 oz)   11/29/17 149.4 kg (329 lb 5.9 oz)   09/07/17 156.9 kg (345 lb 12.8 oz)   08/23/17 (!) 158.9 kg (350 lb 6.4 oz)   08/16/17 156.5 kg (345 lb)   07/31/17 155.6 kg (343 lb) 07/26/17 151.5 kg (334 lb)   07/05/17 157.9 kg (348 lb)   06/07/17 153.8 kg (339 lb)   05/09/17 (!) 164.5 kg (362 lb 10.5 oz)   05/03/17 (!) 172 kg (379 lb 3.1 oz)   03/24/16 152.3 kg (335 lb 12.8 oz)   03/02/16 152.9 kg (337 lb)   12/29/15 (!) 158.8 kg (350 lb 3.2 oz)   12/10/15 131.5 kg (290 lb)   10/03/14 124.7 kg (275 lb)   07/02/13 129.2 kg (284 lb 13.4 oz)   11/28/12 133.9 kg (295 lb 3.1 oz)       Anthropometrics:   Height: 6' 2\" (188 cm)(3 mos PTA) Weight: 153.8 kg (339 lb 1.6 oz)   IBW (%IBW):   ( ) UBW (%UBW):   (  %)   Last Weight Metrics:  Weight Loss Metrics 1/13/2020 10/9/2019 7/11/2019 7/9/2019 6/28/2019 9/14/2018 4/3/2018   Today's Wt 339 lb 1.6 oz 343 lb 11.2 oz 335 lb 1.6 oz 347 lb 8 oz - 354 lb 342 lb 6.4 oz   BMI 43.54 kg/m2 44.13 kg/m2 43.02 kg/m2 - 44.62 kg/m2 45.45 kg/m2 43.96 kg/m2       BMI: Body mass index is 43.54 kg/m². This BMI is indicative of:   []Underweight    []Normal    []Overweight    [] Obesity   [x] Extreme Obesity (BMI>40)     Estimated Nutrition Needs (Based on):   1975 Kcals/day(20 kcal/kg Adj BW) , 150 g(1 g/kg) Protein  Carbohydrate:  At Least 130 g/day  Fluids: 1975 mL/day (1ml/kcal) or per primary team    NUTRITION DIAGNOSES:   Problem:  Increased nutrient needs(protein)      Etiology: related to wound healing and protein losses from HD     Signs/Symptoms: as evidenced by Osteomyelitis right 3rd toe and ESRD-HD      NUTRITION INTERVENTIONS:  Meals/Snacks: General/healthful diet   Supplements: Commercial supplement              GOAL:   consume >75% of meals and ONS in 5-7 days    LEARNING NEEDS (Diet, Food/Nutrient-Drug Interaction):    [x] None Identified   [] Identified and Education Provided/Documented   [] Identified and Pt declined/was not appropriate     Cultureal, Christianity, OR Ethnic Dietary Needs:    [x] None Identified   [] Identified and Addressed     [x] Interdisciplinary Care Plan Reviewed/Documented    [x] Discharge Planning:  Renal friendly, consistent carb diet with adequate protein d/t HD and wound healing      MONITORING /EVALUATION:      Food/Nutrient Intake Outcomes:  Total energy intake  Physical Signs/Symptoms Outcomes: Weight/weight change, Electrolyte and renal profile, GI, Glucose profile    NUTRITION RISK:    [] High              [] Moderate           [x]  Low  []  Minimal/Uncompromised    PT SEEN FOR:    []  MD Consult: []Calorie Count      []Diabetic Diet Education        []Diet Education     []Electrolyte Management     []General Nutrition Management and Supplements     []Management of Tube Feeding     []TPN Recommendations    []  RN Referral:  []MST score >=2     []Enteral/Parenteral Nutrition PTA     []Pregnant: Gestational DM or Multigestation     []Pressure Ulcer/Wound Care needs        []  Low BMI  [x]  LOS Referral       Madisyn Cosme RDN  Pager [phone]  Weekend Pager [phone]

## 2020-01-13 NOTE — PROGRESS NOTES
Pharmacy - EPO Dosing & Monitoring    Dose and schedule: retacrit 10,000 units MWF    Labs:  Recent Labs     01/13/20  1430   HGB 10.5*       Impression/Plan:   - HgB >10 today. No dose needed. - continue CBC MWF. Thanks,  Tamika Shaffer PHARMD      [email or web address]

## 2020-01-13 NOTE — PROGRESS NOTES
Hospitalist Progress Note    NAME: Unruly Higgins   :  1985   MRN:  [id number]     I reviewed pertinent labs/imaging and discussed plan of care with Dr. Aria Martins who is in agreement. Assessment / Plan:  Osteomyelitis right 3rd toe  PVD  MRI right foot 20:    1. Destruction of the third PIP joint consistent with septic arthritis. Osteomyelitis is seen in the phalanges of the third toe. 2. Soft tissue wound of the dorsal third toe. No evidence of a focal drainable fluid collection. Significant subcutaneous edema surrounding the third toe and over the dorsum of foot. 3. Status post amputation of the phalanges of the fourth digit. Mild edema in the fourth metatarsal head is more likely reactive related to altered stress mechanics rather than early osteomyelitis. 4. Status post indication the fourth ray to the level of the mid metatarsal.  LE doppler 20:  No DVT of the legs. NOÉ 20:    · The right resting NOÉ is moderately decreased. Based on pulse volume recordings due to calcified vessels. · Arterial disease noted at the level of the femoral artery and popliteal on the right side. · The left resting NOÉ is mildly decreased. Based on pulse volume recordings due to calcified vessels. · Arterial disease noted at the level of the femoral artery and popliteal on the left side. Blood culture 20:  NG at 5 days. Intraop wound culture 20:  MRSA, awaiting pathology. - Podiatry assistance appreciated. - Vascular surgery input appreciated. - Continue current abx (vancomycin day #8/zosyn day #7). - Continue probiotics while patient on abx. - S/P amputation of right 3rd toe today 20.    - Will need to follow up with Dr. Africa Campbell (Vascular Surgery) on 20.  - Per podiatry patient will need to remain in hospital until final OR pathology available. Mild asymptomatic hyponatremia, improved  - No tx indicated. - Monitor. Mild diarrhea  - Improved.     - Continue probiotics as noted above. - Continue loperamide 2 mg po qid prn.    - Enteric panel still pending. HTN  - BP overall with adequate control.    - Continue lisinopril 10 mg po daily. ESRD  - HD today. Anemia of chronic disease  - Hgb overall stable. - Continue epoetin 10,000 units sc 3 times weekly. DM II, poorly controlled  Hypoglycemia  HgbA1C 7.4  - Blood sugars improved, no further hypoglycemia.    - Continue basal (glargine) insulin 12 units sc daily, titrate prn.    - Continue FSBS with SSI correction scale. Obesity  - Discussed the effects of weight on overall health. 40 or above Morbid obesity / Body mass index is 43.59 kg/m². Code status: Full  Prophylaxis: Hep SQ  Recommended Disposition: Home w/Family     Subjective:     Chief Complaint / Reason for Physician Visit  No complaints. Adequate pain control. Plan of care reviewed with RN. Review of Systems:  Symptom Y/N Comments  Symptom Y/N Comments   Fever/Chills N   Chest Pain N    Poor Appetite N   Edema N    Cough N   Abdominal Pain N    Sputum N   Joint Pain N    SOB/LITTLE N   Pruritis/Rash N    Nausea/vomit N   Tolerating PT/OT     Diarrhea Y   Tolerating Diet Y    Constipation N   Other       Could NOT obtain due to:      Objective:     VITALS:   Last 24hrs VS reviewed since prior progress note. Most recent are:  Patient Vitals for the past 24 hrs:   Temp Pulse Resp BP SpO2   01/13/20 0523 98.6 °F (37 °C) 89 16  95 %   01/12/20 2216  86  156/88    01/12/20 2110 97.9 °F (36.6 °C) 85 18 (!) 188/103 94 %   01/12/20 1600 98.6 °F (37 °C) 92 18 (!) 164/92 92 %   01/12/20 1230 98.3 °F (36.8 °C) 88 18 144/73 93 %   01/12/20 0740 98 °F (36.7 °C) 85 18 140/78 92 %       Intake/Output Summary (Last 24 hours) at 1/13/2020 7197  Last data filed at 1/12/2020 1500  Gross per 24 hour   Intake 1060 ml   Output 0 ml   Net 1060 ml        PHYSICAL EXAM:  General:  A/A/O X 3. NAD. HEENT:  Normocephalic. Sclera anicteric. EOMI. Mucous membranes moist.    Chest:  Resps even/unlabored with symmetrical CWE. Air entry full. Lungs CTA. No use of accessory muscles. CV:  RRR. No peripheral edema. GI:  Abdomen soft/NT/ND. ABT X 4.    Neurologic:  Nonfocal.  CN II-XII grossly intact. Speech normal.     Psych:  Cooperative. No anxiety or agitation. Skin:  Left great toe and 2nd toe s/p amputation. Right foot dressed with C/D/I dressing. No rashes or jaundice. Reviewed most current lab test results and cultures  YES  Reviewed most current radiology test results   YES  Review and summation of old records today    NO  Reviewed patient's current orders and MAR    YES  PMH/SH reviewed - no change compared to H&P  ________________________________________________________________________  Care Plan discussed with:    Comments   Patient [address]     Consultant                        Multidiciplinary team rounds were held today with , nursing, pharmacist and clinical coordinator. Patient's plan of care was discussed; medications were reviewed and discharge planning was addressed. ______________________________________________________________________________________  Pepito Form, NP     Procedures: see electronic medical records for all procedures/Xrays and details which were not copied into this note but were reviewed prior to creation of Plan. LABS:  I reviewed today's most current labs and imaging studies. Pertinent labs include:  No results for input(s): WBC, HGB, HCT, PLT, HGBEXT, HCTEXT, PLTEXT, HGBEXT, HCTEXT, PLTEXT in the last 72 hours.   Recent Labs     01/13/20  0550      K 4.2   CL 95*   CO2 29   *   BUN 50*   CREA 13.00*   CA 8.8       Signed: Pepito Form, NP

## 2020-01-13 NOTE — PROGRESS NOTES
NAME: Tonie Zazueta        :  1985        MRN:  [id number]        Assessment :    Plan:  --ESRD-  Anemia  DM --[facility] - MWF   HD today. On retacrit       Subjective:     Chief Complaint:  \" I feel fine. \"  No foot pain. No n/V. No dyspnea. Review of Systems:    Symptom Y/N Comments  Symptom Y/N Comments   Fever/Chills    Chest Pain     Poor Appetite    Edema     Cough    Abdominal Pain     Sputum    Joint Pain     SOB/LITTLE    Pruritis/Rash     Nausea/vomit    Tolerating PT/OT     Diarrhea    Tolerating Diet     Constipation    Other       Could not obtain due to:      Objective:     VITALS:   Last 24hrs VS reviewed since prior progress note. Most recent are:  Visit Vitals  /88   Pulse 89   Temp 98.6 °F (37 °C)   Resp 16   Ht 6' 2\" (1.88 m)   Wt 154 kg (339 lb 8.1 oz)   SpO2 95%   BMI 43.59 kg/m²       Intake/Output Summary (Last 24 hours) at 2020 0645  Last data filed at 2020 1500  Gross per 24 hour   Intake 1060 ml   Output 0 ml   Net 1060 ml      Telemetry Reviewed:     PHYSICAL EXAM:  General: NAD  1+ edema      Lab Data Reviewed: (see below)    Medications Reviewed: (see below)    PMH/SH reviewed - no change compared to H&P  ________________________________________________________________________  Care Plan discussed with:  Patient     Family      RN     Care Manager                    Consultant:          Comments   >50% of visit spent in counseling and coordination of care       ________________________________________________________________________  Alfredo Blum MD     Procedures: see electronic medical records for all procedures/Xrays and details which  were not copied into this note but were reviewed prior to creation of Plan. LABS:  No results for input(s): WBC, HGB, HCT, PLT, HGBEXT, HCTEXT, PLTEXT, HGBEXT, HCTEXT, PLTEXT in the last 72 hours.   Recent Labs     20  0550    K 4.2   CL 95*   CO2 29   BUN 50*   CREA 13.00*   *   CA 8.8     No results for input(s): SGOT, GPT, AP, TBIL, TP, ALB, GLOB, GGT, AML, LPSE in the last 72 hours. No lab exists for component: AMYP, HLPSE  No results for input(s): INR, PTP, APTT, INREXT, INREXT in the last 72 hours. No results for input(s): FE, TIBC, PSAT, FERR in the last 72 hours. No results found for: FOL, RBCF   No results for input(s): PH, PCO2, PO2 in the last 72 hours. No results for input(s): CPK, CKMB in the last 72 hours.     No lab exists for component: TROPONINI  No components found for: [place]  Lab Results   Component Value Date/Time    Color YELLOW/STRAW 05/01/2017 05:39 PM    Appearance CLOUDY (A) 05/01/2017 05:39 PM    Specific gravity 1.018 05/01/2017 05:39 PM    Specific gravity >1.030 (H) 10/03/2014 04:35 AM    pH (UA) 5.5 05/01/2017 05:39 PM    Protein 300 (A) 05/01/2017 05:39 PM    Glucose 250 (A) 05/01/2017 05:39 PM    Ketone NEGATIVE  05/01/2017 05:39 PM    Bilirubin NEGATIVE  05/01/2017 05:39 PM    Urobilinogen 0.2 05/01/2017 05:39 PM    Nitrites NEGATIVE  05/01/2017 05:39 PM    Leukocyte Esterase NEGATIVE  05/01/2017 05:39 PM    Epithelial cells FEW 05/01/2017 05:39 PM    Bacteria NEGATIVE  05/01/2017 05:39 PM    WBC 0-4 05/01/2017 05:39 PM    RBC 5-10 05/01/2017 05:39 PM       MEDICATIONS:  Current Facility-Administered Medications   Medication Dose Route Frequency    insulin glargine (LANTUS) injection 12 Units  12 Units SubCUTAneous DAILY    0.9% sodium chloride infusion  25 mL/hr IntraVENous CONTINUOUS    bacitracin 50,000 Units in sodium chloride irrigation 0.9 % 1,000 mL Irrigation    PRN    bupivacaine 0.25% -EPINEPHrine 1:200,000 (SENSORCAINE) 0.25 %-1:200,000 injection    PRN    epoetin nata-epbx (RETACRIT) injection 10,000 Units  10,000 Units SubCUTAneous Q MON, WED & FRI    lactobac ac& pc-s.therm-b.anim (USMAN Q/RISAQUAD)  1 Cap Oral DAILY    loperamide (IMODIUM) capsule 2 mg  2 mg Oral QID PRN    lisinopril (PRINIVIL, ZESTRIL) tablet 10 mg  10 mg Oral DAILY    ammonium lactate (LAC-HYDRIN) 12 % lotion   Topical BID    acetaminophen (TYLENOL) tablet 650 mg  650 mg Oral Q6H PRN    glucose chewable tablet 16 g  4 Tab Oral PRN    glucagon (GLUCAGEN) injection 1 mg  1 mg IntraMUSCular PRN    dextrose 10% infusion 0-250 mL  0-250 mL IntraVENous PRN    insulin lispro (HUMALOG) injection   SubCUTAneous AC&HS    hydrALAZINE (APRESOLINE) 20 mg/mL injection 10 mg  10 mg IntraVENous Q6H PRN    labetalol (NORMODYNE;TRANDATE) injection 10 mg  10 mg IntraVENous Q6H PRN    ondansetron (ZOFRAN) injection 4 mg  4 mg IntraVENous Q6H PRN    oxyCODONE-acetaminophen (PERCOCET) 5-325 mg per tablet 1 Tab  1 Tab Oral Q4H PRN    heparin (porcine) injection 7,500 Units  7,500 Units SubCUTAneous Q8H    Vancomycin--Pharmacy to Dose   Other Rx Dosing/Monitoring    piperacillin-tazobactam (ZOSYN) 3.375 g in 0.9% sodium chloride (MBP/ADV) 100 mL  3.375 g IntraVENous Q12H

## 2020-01-14 NOTE — DIALYSIS
TRANSFER - IN REPORT:    Verbal report received from Jacek Peralta Rn(name) on Zoya Tejeda  being received from 3237(unit) for ordered procedure      Report consisted of patients Situation, Background, Assessment and   Recommendations(SBAR). Information from the following report(s) SBAR and MAR was reviewed with the receiving nurse. Opportunity for questions and clarification was provided. Assessment completed upon patients arrival to unit and care assumed.

## 2020-01-14 NOTE — PROGRESS NOTES
Bedside and Verbal shift change report given to [address] [facility] Ruth (oncoming nurse) by Naomi Spain (offgoing nurse). Report included the following information SBAR, Kardex, Intake/Output, MAR and Recent Results. Regi Copeland)

## 2020-01-14 NOTE — DIALYSIS
TRANSFER - OUT REPORT:    Verbal report given to Patricia Arredondo RN  (Name) on [facility] Cabrerao [phone]  [facility]Unit[facility] for ordered procedure       Report consisted of patient's Situation, Background, Assessment and   Recommendations(SBAR). Information from the following report(s) SBAR and Recent Results was reviewed with the receiving nurse. Method: $$ Method: Hemodialysis (01/13/20 9346)    Fluid removed:  NET Fluid Removed (mL): 4500 ml (01/14/20 0355)     Patient response to treatment:  Stable    Hemodialysis End Time:  Hemodialysis End Time: 4514 (01/14/20 0355)  If not documented, dialysis nurse to update post-dialysis row in HD/Filtration flowsheet     Medications /Volume expansion agents or fluid boluses administered during treatment? no    Post-dialysis medication administration due?  no  Remind nurse to administer post-HD antibiotic dose upon return to unit. Fistula hemostasis? yes      Opportunity for questions and clarification was provided.       Patient transported with: Registered Nurse

## 2020-01-14 NOTE — CONSULTS
Infectious Disease Consult Ronald Kaufman MD FACP Date of Consultation:  January 14.2020 Referring Physician: Dr Mc Samano Subjective:  
 
Patient is a 27 y.o. male who is being seen for -\"podiatry requesting abx recs for mrsa, enterobacter osteo with clean margins\" IMPRESSION:  
 
- Diabetic foot ulcer ,cellulitis of R/foot , OM of R/3rd toe - MRI- Destruction of the third PIP joint consistent with septic arthritis. Osteomyelitis is seen in the phalanges of the third toe. 2. Soft tissue wound of the dorsal third toe. No evidence of a focal drainable 
fluid collection. Significant subcutaneous edema surrounding the third toe and 
over the dorsum of foot. -Amputation of 3rd R/toe 1/9/20 WC-1/9 -few MRSA ( Vanc JOSE 1) , few Diphtheroids) , Anaerobic- E.cloacae isolated from thio broth only WC -1/6- Heavy MRSA , moderate E.cloacae, heavy mixed skin matt BC - 1/6- NG Bone pathology- clear margins 
- Diabetes Mellitus A1c 7.4 
-PAD NOÉ- R/resting moderate decrease L/resting mildly decreased For outpt F/u with Vascular for RLE arteriogram 
- ESRD on HD , AVF LUE 
- H/o OM of foot in past  
Amputation R/4th toe 10/11 for Om R/4th toe Amputation R/5th toe & partial 5th MT for Om 5th toe - WC - 10/11- MRSA in thio broth -  - 7/2- E.cloacae in thio broth - Morbid obesity BMI 42 PLAN:  
  
Vancomycin 500 mg IV after each HD & Cefepime 2/2/3 G IV after HD end date 1/23/20 Weekly CBC, CMP , Vanc trough fax reports to [phone] , call with critical labs [phone] Encourage yogurt, probiotic intake MRSA decolonization -Nasal  mupirocin, Hibiclens washes Blood sugar control Outpt follow up with Vascular, Podiatry ID follow up not required, call if fever occurs Coretta Downing, 27 y.o. male with PMHx significant for diabetes, end-stage renal disease on hemodialysis Monday/Wednesday/Friday, who presents with a chief complaint of a \"spot\" on his right third toe for about the last week with associated gradual increase in foot swelling. Patient has had 2 prior  
toe amputations of the right foot in the past.  Most recently performed in October by Dr. Diaz Goldstein. States that his last blood sugar today was 166 and notes 
 fairly good control at home. Did not go to dialysis today. No chest pain, shortness of breath, abdominal pain, nausea, vomiting. 
  
 
Patient Active Problem List  
Diagnosis Code  
 HTN (hypertension) I10  
 Postoperative hypoxia R09.02, Z98.890  
 Diarrhea R19.7  ESRD (end stage renal disease) on dialysis (HCC) N18.6, Z99.2  Hyperlipidemia associated with type 2 diabetes mellitus ([facility]) E11.69, E78.5  Morbid obesity with body mass index (BMI) of 40.0 to 49.9 ([facility]) E66.01  
 Uncontrolled type 2 diabetes mellitus with proliferative retinopathy, with long-term current use of insulin ([address] E11.3599, E11.65, Z79.4  
 Uncontrolled type 2 diabetes mellitus with hyperglycemia, with long-term current use of insulin ([facility]) E11.65, Z79.4  
 Uncontrolled hypertension I10  
 Acute osteomyelitis of toe of right foot ([address] M86.171  
 Osteomyelitis ([address] M86.9  Foot ulcer ([address] L97.509  ESRD (end stage renal disease) ([address] N18.6  Wound cellulitis L03.90 Past Medical History:  
Diagnosis Date  Cataracts  Chronic kidney disease  Diabetes ([address]  Hypercholesterolemia  Hypertension  Kidney failure  Obesity Family History Problem Relation Age of Onset  Diabetes Mother  Liver Disease Father  Kidney Disease Maternal Grandfather  Diabetes Maternal Grandfather  Hypertension Maternal Grandfather  Diabetes Paternal Uncle  Hypertension Paternal Uncle Social History Tobacco Use  Smoking status: Former Smoker Packs/day: 0.25  Smokeless tobacco: Never Used  Tobacco comment: \"quit about a year ago\" Substance Use Topics  Alcohol use: No  
  Comment: rarely Past Surgical History:  
Procedure Laterality Date  COLONOSCOPY N/A 2017 COLONOSCOPY performed by Maria R Quinonez MD at [facility] ENDOSCOPY  COLONOSCOPY,DIAGNOSTIC  2017  HX AMPUTATION Left great toe and L 5th toe  UPPER GI ENDOSCOPY,BIOPSY  2017  VASCULAR SURGERY PROCEDURE UNLIST    
 R side chest  
 VASCULAR SURGERY PROCEDURE UNLIST Left 2017 Creation transposed AV fistula arm Prior to Admission medications Medication Sig Start Date End Date Taking? Authorizing Provider  
lisinopril (PRINIVIL, ZESTRIL) 10 mg tablet Take 10 mg by mouth every Tuesday, Thursday, Saturday & . Non-HD days   Yes Provider, Historical  
insulin glargine (LANTUS U-100 INSULIN) 100 unit/mL injection 40 Units by SubCUTAneous route daily. Indications: type 2 diabetes mellitus 7/10/19  Yes Zoie Cerrato MD  
loperamide (IMODIUM) 2 mg capsule Take 1 Cap by mouth four (4) times daily as needed for Diarrhea. 17  Yes Carla Stark MD  
 
No Known Allergies Review of Systems:  A comprehensive review of systems was negative except for that written in the History of Present Illness. 10 point review of systems obtained . All other systems negative Objective:  
Blood pressure 149/76, pulse 82, temperature 98.1 °F (36.7 °C), resp. rate 18, height 6' 2\" (1.88 m), weight 330 lb 4.8 oz (149.8 kg), SpO2 94 %. Temp (24hrs), Av.2 °F (36.8 °C), Min:98.1 °F (36.7 °C), Max:98.3 °F (36.8 °C) Current Facility-Administered Medications Medication Dose Route Frequency  cefepime (MAXIPIME) 1 g in 0.9% sodium chloride (MBP/ADV) 50 mL  1 g IntraVENous Q24H  
 mupirocin (BACTROBAN) 2 % ointment   Both Nostrils BID  insulin glargine (LANTUS) injection 12 Units  12 Units SubCUTAneous DAILY  0.9% sodium chloride infusion  25 mL/hr IntraVENous CONTINUOUS  
  bacitracin 50,000 Units in sodium chloride irrigation 0.9 % 1,000 mL Irrigation    PRN  
 bupivacaine 0.25% -EPINEPHrine 1:200,000 (SENSORCAINE) 0.25 %-1:200,000 injection    PRN  
 epoetin nata-epbx (RETACRIT) injection 10,000 Units  10,000 Units SubCUTAneous Q MON, WED & FRI  lactobac ac& pc-s.therm-b.anim (USMAN Q/RISAQUAD)  1 Cap Oral DAILY  loperamide (IMODIUM) capsule 2 mg  2 mg Oral QID PRN  
 lisinopril (PRINIVIL, ZESTRIL) tablet 10 mg  10 mg Oral DAILY  ammonium lactate (LAC-HYDRIN) 12 % lotion   Topical BID  acetaminophen (TYLENOL) tablet 650 mg  650 mg Oral Q6H PRN  
 glucose chewable tablet 16 g  4 Tab Oral PRN  
 glucagon (GLUCAGEN) injection 1 mg  1 mg IntraMUSCular PRN  
 dextrose 10% infusion 0-250 mL  0-250 mL IntraVENous PRN  
 insulin lispro (HUMALOG) injection   SubCUTAneous AC&HS  hydrALAZINE (APRESOLINE) 20 mg/mL injection 10 mg  10 mg IntraVENous Q6H PRN  
 labetalol (NORMODYNE;TRANDATE) injection 10 mg  10 mg IntraVENous Q6H PRN  
 ondansetron (ZOFRAN) injection 4 mg  4 mg IntraVENous Q6H PRN  
 oxyCODONE-acetaminophen (PERCOCET) 5-325 mg per tablet 1 Tab  1 Tab Oral Q4H PRN  
 heparin (porcine) injection 7,500 Units  7,500 Units SubCUTAneous Q8H  Vancomycin--Pharmacy to Dose   Other Rx Dosing/Monitoring Exam:   
General:  Alert, cooperative, well noursished, well developed, appears stated age Eyes:  Sclera anicteric. Pupils equally round and reactive to light. Mouth/Throat: Mucous membranes normal, oral pharynx clear Neck: Supple Lungs:   Clear to auscultation bilaterally, good effort CV:  Regular rate and rhythm,no murmur, click, rub or gallop Abdomen:   Soft, non-tender. bowel sounds normal. non-distended Extremities: No cyanosis or edema Skin: Skin color, texture, turgor normal. no acute rash or lesions Lymph nodes: Cervical and supraclavicular normal  
Musculoskeletal: No swelling or deformity Lines/Devices:  Intact, no erythema, drainage or tenderness Psych: Alert and oriented, normal mood affect given the setting Data Review: CBC:  
Recent Labs  
  01/14/20 
0014 01/13/20 
1430 WBC 8.2 7.6  
RBC 4.08* 4.04* HGB 10.5* 10.5* HCT 33.1* 33.1*  
 279 CMP:  
Recent Labs  
  01/15/20 
0458 01/14/20 
0022 01/13/20 
0550 * 124* 152* * 135* 136  
K 3.9 3.7 4.2 CL 99 98 95* CO2 27 28 29 BUN 44* 44* 50* CREA 10.90* 10.90* 13.00* CA 8.9 8.8 8.8 AGAP 9 9 12 BUCR 4* 4* 4* AP  --  34*  --   
TP  --  8.4*  --   
ALB  --  2.5*  --   
GLOB  --  5.9*  --   
AGRAT  --  0.4*  --   
 
 
Lab Results Component Value Date/Time Culture result: NO ANAEROBES ISOLATED 01/09/2020 05:25 PM  
 Culture result: (A) 01/09/2020 05:25 PM  
  ENTEROBACTER CLOACAE (AEROBIC) ISOLATED FROM THIO BROTH ONLY Culture result: (A) 01/09/2020 05:25 PM  
  FEW * METHICILLIN RESISTANT STAPHYLOCOCCUS AUREUS * (**KNOWN MRSA PATIENT**) Culture result: FEW DIPHTHEROIDS (A) 01/09/2020 05:25 PM  
 Culture result: NO GROWTH 6 DAYS 01/06/2020 10:33 PM  
  
 
 
XR Results (most recent): 
Results from Hospital Encounter encounter on 01/06/20 XR FOOT RT MIN 3 V Narrative INDICATION: Diabetic wound to right third toe for 2 days. Exam: AP, lateral, oblique views of the right foot. Direct comparison is made to prior plain radiographs dated October 8, 2019. FINDINGS: There is new near total destruction of the middle phalanx of the right 
third toe and there is also partial destruction of the distal proximal phalanx 
of the right third toe. No gas is seen within the soft tissues. No radiodense 
foreign body is visualized. There has been interval resection of the distal fourth toe at the level of the 
MTP joint. Fifth toe transmetatarsal amputation postoperative changes are noted.  
  
 Impression IMPRESSION: New, partial destruction of the distal aspect of the proximal 
 phalanx and near total destruction of the middle phalanx of the right third toe, 
consistent with osteomyelitis. ICD-10-CM ICD-9-CM 1. Other acute osteomyelitis of right foot ([facility] M86.171 730.07 Antibiotic History Vancomycin/ Zosyn IV I have discussed the diagnosis with the patient and the intended plan as seen in the above orders. I have discussed medication side effects and warnings with the patient as well. Reviewed test results at length with patient Signed By: Luis Steiner MD FACP

## 2020-01-14 NOTE — PROGRESS NOTES
Hospitalist Progress Note NAME: Meghann Herrera :  1985 MRN:  [id number] Addendum:  Pt will need vanc and cefepime with hd. I spoke with nephrology on call. They carry vancomycin, but it is unclear if they will have cefepime. Hd center is currently close. Will need to check with nephrology in the am if pt can get cefepime with hd in the morning. Assessment / Plan: 
Osteomyelitis right 3rd toe PVD MRI right foot 20:   
1. Destruction of the third PIP joint consistent with septic arthritis. Osteomyelitis is seen in the phalanges of the third toe. 2. Soft tissue wound of the dorsal third toe. No evidence of a focal drainable fluid collection. Significant subcutaneous edema surrounding the third toe and over the dorsum of foot. 3. Status post amputation of the phalanges of the fourth digit. Mild edema in the fourth metatarsal head is more likely reactive related to altered stress mechanics rather than early osteomyelitis. 4. Status post indication the fourth ray to the level of the mid metatarsal. 
LE doppler 20:  No DVT of the legs. NOÉ 20:   
· The right resting NOÉ is moderately decreased. Based on pulse volume recordings due to calcified vessels. · Arterial disease noted at the level of the femoral artery and popliteal on the right side. · The left resting NOÉ is mildly decreased. Based on pulse volume recordings due to calcified vessels. · Arterial disease noted at the level of the femoral artery and popliteal on the left side. Blood culture 20:  NG at 5 days. Intraop wound culture 20:  MRSA, awaiting pathology. - Podiatry assistance appreciated. - Vascular surgery input appreciated. - Continue current abx (vancomycin day #8/zosyn day #7). - Continue probiotics while patient on abx. - S/P amputation of right 3rd toe today 20.   
- Will need to follow up with Dr. Royer Mitchell (Vascular Surgery) on 20. - id consulted but hasn't seen. Podiatry recs id eval for abx. Repeat consult placed. pt waiting on walking shoe and will need ride home on discharge.  
  
Mild asymptomatic hyponatremia, improved 
 
  
Mild diarrhea - Improved.   
 
  
HTN 
 
- Continue lisinopril 10 mg po daily.   
  
ESRD 
- HD today.   
  
Anemia of chronic disease - Continue epoetin 10,000 units sc 3 times weekly. 
  
DM II, poorly controlled Hypoglycemia HgbA1C 7.4 
 
- Continue basal (glargine) insulin 12 units sc daily, titrate prn.   
 
  
Obesity 
- Discussed the effects of weight on overall health.   
  
 
  
Code status: Full Prophylaxis: Hep SQ Recommended Disposition: Home w/Family 40 or above Morbid obesity / Body mass index is 42.05 kg/m². Subjective: Chief Complaint / Reason for Physician Visit \"no pain or chills. Doesn't have a ride home. \". Discussed with RN events overnight. Review of Systems: 
Symptom Y/N Comments  Symptom Y/N Comments Fever/Chills    Chest Pain Poor Appetite    Edema Cough    Abdominal Pain Sputum    Joint Pain SOB/LITTLE    Pruritis/Rash Nausea/vomit    Tolerating PT/OT Diarrhea    Tolerating Diet Constipation    Other Could NOT obtain due to:   
 
Objective: VITALS:  
Last 24hrs VS reviewed since prior progress note. Most recent are: 
Patient Vitals for the past 24 hrs: 
 Temp Pulse Resp BP SpO2  
01/14/20 1151 98.3 °F (36.8 °C) 83 18 (!) 166/96 94 % 01/14/20 0750 98.6 °F (37 °C) 89 18 165/90 95 % 01/14/20 0438 98.7 °F (37.1 °C) 88 20 137/76 94 % 01/14/20 0355 98 °F (36.7 °C) 82 20 128/72   
01/14/20 0345  81 18 126/88   
01/14/20 0330  81 16 143/78   
01/14/20 0315  80 18 (!) 166/99   
01/14/20 0300  81 20 166/88   
01/14/20 0245  88 20 165/86   
01/14/20 0230  85 20 (!) 189/99   
01/14/20 0215  87 18 (!) 187/110   
01/14/20 0200  88 18 (!) 188/110   
01/14/20 0145  80 16 (!) 150/101  01/14/20 0130  82 18 162/88   
01/14/20 0115  80 18 (!) 158/91   
01/14/20 0100  79 18 147/83   
01/14/20 0045  80 18 (!) 145/99   
01/14/20 0030  78 20 (!) 155/94   
01/14/20 0015  79 20 (!) 182/104   
01/14/20 0000  79 20 (!) 181/103   
01/13/20 2345 97.7 °F (36.5 °C) 83 18 (!) 189/102   
01/13/20 2312 97.7 °F (36.5 °C) 84 16 (!) 181/92 91 % 01/13/20 1959 97.8 °F (36.6 °C) 80 16 137/69 93 % 01/13/20 1614 98 °F (36.7 °C) 89 18 169/86 93 % Intake/Output Summary (Last 24 hours) at 1/14/2020 1532 Last data filed at 1/14/2020 1500 Gross per 24 hour Intake 1060 ml Output 4500 ml Net -3440 ml PHYSICAL EXAM: 
General: WD, WN. Alert, cooperative, no acute distress   
EENT:  EOMI. Anicteric sclerae. MMM Resp:  CTA bilaterally, no wheezing or rales. No accessory muscle use CV:  Regular  rhythm,  No edema GI:  Soft, Non distended, Non tender.  +Bowel sounds Neurologic:  Alert and oriented X 3, normal speech, Psych:   Good insight. Not anxious nor agitated Skin:  No rashes. No jaundice Reviewed most current lab test results and cultures  YES Reviewed most current radiology test results   YES Review and summation of old records today    NO Reviewed patient's current orders and MAR    YES 
PMH/SH reviewed - no change compared to H&P 
________________________________________________________________________ Care Plan discussed with: 
  Comments Patient Family RN Care Manager Consultant Multidiciplinary team rounds were held today with , nursing, pharmacist and clinical coordinator. Patient's plan of care was discussed; medications were reviewed and discharge planning was addressed. ________________________________________________________________________ Total NON critical care TIME:  20   Minutes Total CRITICAL CARE TIME Spent:   Minutes non procedure based Comments >50% of visit spent in counseling and coordination of care    
________________________________________________________________________ Julius Nicolas DO  
 
Procedures: see electronic medical records for all procedures/Xrays and details which were not copied into this note but were reviewed prior to creation of Plan. LABS: 
I reviewed today's most current labs and imaging studies. Pertinent labs include: 
Recent Labs  
  01/14/20 
0014 01/13/20 
1430 WBC 8.2 7.6 HGB 10.5* 10.5* HCT 33.1* 33.1*  
 279 Recent Labs  
  01/14/20 
0022 01/13/20 
0550 * 136  
K 3.7 4.2 CL 98 95* CO2 28 29 * 152* BUN 44* 50* CREA 10.90* 13.00* CA 8.8 8.8 ALB 2.5*  --   
TBILI 0.5  --   
SGOT 14*  --   
ALT 14  --   
 
 
Signed: Julius Nicolas DO

## 2020-01-14 NOTE — PROGRESS NOTES
TEJA:  
Home with OP follow-up once medically stable. Per chart review, awaiting ID recs for ABX and walking shoe. Eduardo Whitman, BS, [facility]

## 2020-01-14 NOTE — DIALYSIS
[facility] Dialysis Team [facility]  [phone]    Vitals   Pre   Post   Assessment   Pre   Post     Temp  Temp: 97.7 °F (36.5 °C) (01/13/20 2345)  98 LOC  Alert and oriented x4 Alert and oriented x4   HR   Pulse (Heart Rate): 83 (01/13/20 2345) 82 Lungs   Diminished on room air  diminished on room air   B/P   BP: (!) 189/102 (01/13/20 2345) 128/72 Cardiac   B/p elevated  b/p elevated   Resp   Resp Rate: 18 (01/13/20 2345) 20 Skin   intact  intact   Pain level  0 0 Edema  +2 generalized     +2 generalized   Orders:    Duration:   Start:    1006 End:    7403 Total:   4.15   Dialyzer:   Dialyzer/Set Up Inspection: Revaclear (01/13/20 2345)   K Bath:   Dialysate K (mEq/L): 2 (01/13/20 2345)   Ca Bath:   Dialysate CA (mEq/L): 2.5 (01/13/20 2345)   Na/Bicarb:   Dialysate NA (mEq/L): 140 (01/13/20 2345)   Target Fluid Removal:   Goal/Amount of Fluid to Remove (mL): 4500 mL (01/13/20 2345)   Access     Type & Location:   Lt arm fistula, good thrill and bruit noted. No signs of infection present. Area cleaned and accessed with 15g needles x2 without any issues.  Good blood flow noted   Labs     Obtained/Reviewed   Critical Results Called   Date when labs were drawn-  Hgb-    HGB   Date Value Ref Range Status   01/13/2020 10.5 (L) 12.1 - 17.0 g/dL Final     K-    Potassium   Date Value Ref Range Status   01/13/2020 4.2 3.5 - 5.1 mmol/L Final     Ca-   Calcium   Date Value Ref Range Status   01/13/2020 8.8 8.5 - 10.1 MG/DL Final     Bun-   BUN   Date Value Ref Range Status   01/13/2020 50 (H) 6 - 20 MG/DL Final     Creat-   Creatinine   Date Value Ref Range Status   01/13/2020 13.00 (H) 0.70 - 1.30 MG/DL Final        Medications/ Blood Products Given     Name   Dose   Route and Time        None ordered             Blood Volume Processed (BVP):    110 Net Fluid   Removed:  4.5kg   Comments   Time Out Done: 3057  Primary Nurse Rpt Pre:Marika Quigley Ace RN  Primary Nurse Rpt Ivis Ramirez RN  Pt [address] [facility] Summary:  0126 dialysis started  8138 Dialysis completed and blood rinsed back. Needles removed and pressure held till bleeding stopped. Dressing applied to each site, pt stable  Admiting Diagnosis:ESRD  Pt's previous clinic-[facility]  Consent signed - Informed Consent Verified: Yes (01/13/20 4856)  [facility] Consent - on file  Hepatitis Status- NEG AG 1/6/20  Machine #- Machine Number: [id number] (01/13/20 5246)  Telemetry status-  Pre-dialysis wt. -

## 2020-01-14 NOTE — PROGRESS NOTES
NAME: Adam Spencer :  1985 MRN:  [id number] Assessment :    Plan: 
--ESRD- Anemia DM --[facility] - MWF No HD today. On retacrit Subjective: Chief Complaint:  Resting. HD last night from 2340 to 0355 this am. 4.5 kg removed. I reviewed flow sheet. Review of Systems: 
 
Symptom Y/N Comments  Symptom Y/N Comments Fever/Chills    Chest Pain Poor Appetite    Edema Cough    Abdominal Pain Sputum    Joint Pain SOB/LITTLE    Pruritis/Rash Nausea/vomit    Tolerating PT/OT Diarrhea    Tolerating Diet Constipation    Other Could not obtain due to:   
 
Objective: VITALS:  
Last 24hrs VS reviewed since prior progress note. Most recent are: 
Visit Vitals /90 (BP 1 Location: Right arm, BP Patient Position: At rest) Pulse 89 Temp 98.6 °F (37 °C) Resp 18 Ht 6' 2\" (1.88 m) Wt 153.8 kg (339 lb 1.6 oz) SpO2 95% BMI 43.54 kg/m² Intake/Output Summary (Last 24 hours) at 2020 1122 Last data filed at 20207 Gross per 24 hour Intake  Output 4500 ml Net -4500 ml Telemetry Reviewed: PHYSICAL EXAM: 
General: NAD 
1+ edema Lab Data Reviewed: (see below) Medications Reviewed: (see below) PMH/SH reviewed - no change compared to H&P 
________________________________________________________________________ Care Plan discussed with: 
Patient Family RN Care Manager Consultant:     
 
  Comments >50% of visit spent in counseling and coordination of care    
 
________________________________________________________________________ Brody Babin MD  
 
Procedures: see electronic medical records for all procedures/Xrays and details which 
were not copied into this note but were reviewed prior to creation of Plan. LABS: 
Recent Labs  
  20 
0014 20 
1198 WBC 8.2 7.6 HGB 10.5* 10.5* HCT 33.1* 33.1*  
 279 Recent Labs  
  01/14/20 
0022 01/13/20 
0550 * 136  
K 3.7 4.2 CL 98 95* CO2 28 29 BUN 44* 50* CREA 10.90* 13.00* * 152* CA 8.8 8.8 Recent Labs  
  01/14/20 
0022 SGOT 14* AP 34*  
TP 8.4* ALB 2.5*  
GLOB 5.9* No results for input(s): INR, PTP, APTT, INREXT, INREXT in the last 72 hours. No results for input(s): FE, TIBC, PSAT, FERR in the last 72 hours. No results found for: FOL, RBCF No results for input(s): PH, PCO2, PO2 in the last 72 hours. No results for input(s): CPK, CKMB in the last 72 hours. No lab exists for component: TROPONINI No components found for: [place] Lab Results Component Value Date/Time Color YELLOW/STRAW 05/01/2017 05:39 PM  
 Appearance CLOUDY (A) 05/01/2017 05:39 PM  
 Specific gravity 1.018 05/01/2017 05:39 PM  
 Specific gravity >1.030 (H) 10/03/2014 04:35 AM  
 pH (UA) 5.5 05/01/2017 05:39 PM  
 Protein 300 (A) 05/01/2017 05:39 PM  
 Glucose 250 (A) 05/01/2017 05:39 PM  
 Ketone NEGATIVE  05/01/2017 05:39 PM  
 Bilirubin NEGATIVE  05/01/2017 05:39 PM  
 Urobilinogen 0.2 05/01/2017 05:39 PM  
 Nitrites NEGATIVE  05/01/2017 05:39 PM  
 Leukocyte Esterase NEGATIVE  05/01/2017 05:39 PM  
 Epithelial cells FEW 05/01/2017 05:39 PM  
 Bacteria NEGATIVE  05/01/2017 05:39 PM  
 WBC 0-4 05/01/2017 05:39 PM  
 RBC 5-10 05/01/2017 05:39 PM  
 
 
MEDICATIONS: 
Current Facility-Administered Medications Medication Dose Route Frequency  insulin glargine (LANTUS) injection 12 Units  12 Units SubCUTAneous DAILY  0.9% sodium chloride infusion  25 mL/hr IntraVENous CONTINUOUS  
 bacitracin 50,000 Units in sodium chloride irrigation 0.9 % 1,000 mL Irrigation    PRN  
 bupivacaine 0.25% -EPINEPHrine 1:200,000 (SENSORCAINE) 0.25 %-1:200,000 injection    PRN  
 epoetin nata-epbx (RETACRIT) injection 10,000 Units  10,000 Units SubCUTAneous Q MON, WED & FRI  
  lactobac ac& pc-s.therm-b.anim (USMAN Q/RISAQUAD)  1 Cap Oral DAILY  loperamide (IMODIUM) capsule 2 mg  2 mg Oral QID PRN  
 lisinopril (PRINIVIL, ZESTRIL) tablet 10 mg  10 mg Oral DAILY  ammonium lactate (LAC-HYDRIN) 12 % lotion   Topical BID  acetaminophen (TYLENOL) tablet 650 mg  650 mg Oral Q6H PRN  
 glucose chewable tablet 16 g  4 Tab Oral PRN  
 glucagon (GLUCAGEN) injection 1 mg  1 mg IntraMUSCular PRN  
 dextrose 10% infusion 0-250 mL  0-250 mL IntraVENous PRN  
 insulin lispro (HUMALOG) injection   SubCUTAneous AC&HS  hydrALAZINE (APRESOLINE) 20 mg/mL injection 10 mg  10 mg IntraVENous Q6H PRN  
 labetalol (NORMODYNE;TRANDATE) injection 10 mg  10 mg IntraVENous Q6H PRN  
 ondansetron (ZOFRAN) injection 4 mg  4 mg IntraVENous Q6H PRN  
 oxyCODONE-acetaminophen (PERCOCET) 5-325 mg per tablet 1 Tab  1 Tab Oral Q4H PRN  
 heparin (porcine) injection 7,500 Units  7,500 Units SubCUTAneous Q8H  Vancomycin--Pharmacy to Dose   Other Rx Dosing/Monitoring  piperacillin-tazobactam (ZOSYN) 3.375 g in 0.9% sodium chloride (MBP/ADV) 100 mL  3.375 g IntraVENous Q12H

## 2020-01-14 NOTE — PROGRESS NOTES
Bedside and Verbal shift change report given to Zechariah Alamo (oncoming nurse) by Terence Loving (offgoing nurse). Report included the following information SBAR, Kardex, Intake/Output, MAR and Recent Results.

## 2020-01-14 NOTE — PROGRESS NOTES
Bedside and Verbal shift change report given to Debbie Osborne (oncoming nurse) by Demetrio Chakraborty (offgoing nurse). Report included the following information SBAR, Kardex, Procedure Summary, Intake/Output, MAR, Accordion and Recent Results.

## 2020-01-14 NOTE — DISCHARGE SUMMARY
Hospitalist Discharge Summary Patient ID: 
Alexander Conner [id number] 
21 y.o. 
1985 1/6/2020 PCP on record: None Admit date: 1/6/2020 Discharge date and time: 1/14/2020 DISCHARGE DIAGNOSIS: 
 
Osteomyelitis right 3rd toe (sp amputation with clean margins) PVD Mild asymptomatic hyponatremia, improved 
 
  
Mild diarrhea 
  
  
HTN 
  
  
ESRD Anemia of chronic disease 
 
  
DM II, poorly controlled Hypoglycemia Obesity CONSULTATIONS: 
IP CONSULT TO PODIATRY IP CONSULT TO NEPHROLOGY 
IP CONSULT TO INFECTIOUS DISEASES 
IP CONSULT TO HOSPITALIST 
IP CONSULT TO VASCULAR SURGERY Excerpted HPI from H&P of Sana Jara MD: Adilene Dominguez is a 27 y.o.   Shasha Morgan has a history of ESRD on Hd M/W/F admitted with a foot infection. c/o left foot pain, 7/10 over the past  Week. + chills. He has not taken anything to help with the pain. Maxi Rosas says that he went to HD on Friday but did not tell his center about his foot pain.  pt was admitted here in September. denies fevers or any  Injury to the foot no .chest pain. No abd pain. No n/v. No headache. We were asked to admit for work up and evaluation of the above problems.  
  
______________________________________________________________________ DISCHARGE SUMMARY/HOSPITAL COURSE:  for full details see H&P, daily progress notes, labs, consult notes. The pt is sp speedy and eventually underwent aputation of right 3rd toe. Path was negative for osteo at the surgical margins. The pt is planned for fu with vascular surgery for angio and is planned for discharge home with iv abx (vanc and  Cefepime with hd through 1/23) 
 
 
_______________________________________________________________________ Patient seen and examined by me on discharge day. Pertinent Findings: 
Gen:    Not in distress Chest: Clear lungs CVS:   Regular rhythm. No edema Abd:  Soft, not distended, not tender Neuro:  Alert, nad _______________________________________________________________________ DISCHARGE MEDICATIONS:  
Current Discharge Medication List  
  
CONTINUE these medications which have NOT CHANGED Details  
lisinopril (PRINIVIL, ZESTRIL) 10 mg tablet Take 10 mg by mouth every Tuesday, Thursday, Saturday & Sunday. Non-HD days  
  
insulin glargine (LANTUS U-100 INSULIN) 100 unit/mL injection 40 Units by SubCUTAneous route daily. Indications: type 2 diabetes mellitus Qty: 1 Vial, Refills: 0  
  
loperamide (IMODIUM) 2 mg capsule Take 1 Cap by mouth four (4) times daily as needed for Diarrhea. Qty: 60 Cap, Refills: 0 Patient Follow Up Instructions: Activity: Activity as tolerated Diet: renal 
Wound Care: None needed Follow-up with podiatry in 1 week. Follow-up tests/labs none Follow-up Information Follow up With Specialties Details Why Contact Info Roshan Rosado MD  On 1/28/2020 at 06:30 am for a right leg arteriogram. Nothing to eat or drink after midnight on 01/27/2020. [place] [address] 
[phone]  
  
 
________________________________________________________________ Risk of deterioration: High 
 
Condition at Discharge:  Stable 
__________________________________________________________________ Disposition Home with family, no needs 
 
____________________________________________________________________ Code Status: Full Code 
___________________________________________________________________ Total time in minutes spent coordinating this discharge (includes going over instructions, follow-up, prescriptions, and preparing report for sign off to her PCP) :  31 minutes Signed: 
Norah Mckenna DO

## 2020-01-15 NOTE — PROGRESS NOTES
Bedside shift change report given to [place], RN (oncoming nurse) by Brigette Randle RN (offgoing nurse). Report included the following information SBAR, Kardex, OR Summary, Procedure Summary, Intake/Output, MAR, Accordion, Recent Results, Med Rec Status, Cardiac Rhythm no tele monitor, Alarm Parameters , Pre Procedure Checklist, Procedure Verification, Quality Measures and Dual Neuro Assessment.

## 2020-01-15 NOTE — PROGRESS NOTES
Bedside and Verbal shift change report given to [address] [facility] Ruth (oncoming nurse) by Harsha Leonard (offgoing nurse). Report included the following information SBAR, Kardex, Intake/Output, MAR and Recent Results.

## 2020-01-15 NOTE — PROGRESS NOTES
TEJA: 
Home with OP follow-up [facility]. CM faxed clinicals: [phone] Eduardo Whitman, BS, [address] [facility]

## 2020-01-15 NOTE — PROGRESS NOTES
Discharge yesterday was cancelled. Saw patient today while on dialysis and he demands to be discharged after his dialysis ends at 7pm 
 
Reviewed discharge medications. He says that he has 2 vials of lantus and humalog at home. The only prescription he wants is for his percocet. I have printed this for him.    
 
 will set up transportation at around 8pm

## 2020-01-15 NOTE — PROGRESS NOTES
Pharmacy - EPO Dosing & Monitoring Dose and schedule: retacrit 10,000 units MWF Labs: 
Recent Labs  
  01/14/20 
0014 01/13/20 
1430 HGB 10.5* 10.5* Impression/Plan:  
- HgB >10 today; did not dispense per policy 
- continue CBC MWF. Thanks, Jessica Jaeger, PHARMD

## 2020-01-15 NOTE — DIABETES MGMT
[address] [facility] Presentation Lily Roque is a 27 y.o. male admitted with cellutlits and amputation of 3rd right toe on January 9th. Current clinical course has been uncomplicated. Experienced hypoglycemia on home diabetes regimen on January 9th while NPO. Subjective I was supposed to go home yesterday. Now I don't have a ride. I won't be able to get my medicaitons.  Objective Physical exam 
General Alert, oriented and in no acute distress. Conversant and cooperative Vital Signs Visit Vitals /79 (BP 1 Location: Right arm, BP Patient Position: At rest) Pulse 82 Temp 98.2 °F (36.8 °C) Resp 18 Ht 6' 2\" (1.88 m) Wt 149.8 kg (330 lb 4.8 oz) SpO2 97% BMI 42.41 kg/m² Laboratory Lab Results Component Value Date/Time Hemoglobin A1c 7.4 (H) 01/06/2020 10:33 PM  
 Hemoglobin A1c (POC) 9.3 04/03/2018 01:30 PM  
 
Lab Results Component Value Date/Time LDL, calculated 175 (H) 04/03/2018 02:19 PM  
 
Lab Results Component Value Date/Time Creatinine (POC) 7.3 (H) 07/25/2017 10:12 AM  
 Creatinine 10.90 (H) 01/15/2020 04:58 AM  
 
Lab Results Component Value Date/Time Sodium 135 (L) 01/15/2020 04:58 AM  
 Potassium 3.9 01/15/2020 04:58 AM  
 Chloride 99 01/15/2020 04:58 AM  
 CO2 27 01/15/2020 04:58 AM  
 Anion gap 9 01/15/2020 04:58 AM  
 Glucose 117 (H) 01/15/2020 04:58 AM  
 BUN 44 (H) 01/15/2020 04:58 AM  
 Creatinine 10.90 (H) 01/15/2020 04:58 AM  
 BUN/Creatinine ratio 4 (L) 01/15/2020 04:58 AM  
 GFR est AA 7 (L) 01/15/2020 04:58 AM  
 GFR est non-AA 5 (L) 01/15/2020 04:58 AM  
 Calcium 8.9 01/15/2020 04:58 AM  
 Bilirubin, total 0.5 01/14/2020 12:22 AM  
 AST (SGOT) 14 (L) 01/14/2020 12:22 AM  
 Alk.  phosphatase 34 (L) 01/14/2020 12:22 AM  
 Protein, total 8.4 (H) 01/14/2020 12:22 AM  
 Albumin 2.5 (L) 01/14/2020 12:22 AM  
 Globulin 5.9 (H) 01/14/2020 12:22 AM  
 A-G Ratio 0.4 (L) 01/14/2020 12:22 AM  
 ALT (SGPT) 14 01/14/2020 12:22 AM  
 
Lab Results Component Value Date/Time ALT (SGPT) 14 01/14/2020 12:22 AM  
 
Blood glucose pattern Data over the past 24 hours includes Metric Breakfast Lunch Dinner Bedtime  (Today) 166 (Today) 142 (1/14/20) 143 (1/1/420) Basal dose 12 Bolus 0 0 0 0 Correction 0 0 2 0 Assessment and Plan Nursing Diagnosis Risk for unstable blood glucose pattern Nursing Intervention Domain 5250 Decision-making Support Nursing Interventions Examined current inpatient diabetes control Re-emphasized that there are inexpensive insulin options at [facility] that would assist him in attaining and maintaining good diabetes control Instructed patient that a BD MagniGuide would assist in drawing up insulin from a vial for home use; awaiting shipment into [address] later today Evaluation This gentleman, with known diabetes has achieved inpatient blood glucose target of 100-180mg/dl. In fact, his home dosing resulted in hypoglycemia on January 9th (while NPO). This may indicate two things:  
a) the missed doses of home insulin that the patient admitted to may have resulted in hyperglycemia, which unbeknownst to his PCP, would have prompted the increasing of his basal insulin dose; 
B) his dietary habits are markedly different than the meals he has received here. Basal insulin is in use. Bolus insulin isn't in use. Patient is eating meals. Corrective insulin is in use. Inpatient blood glucose management has been impacted by 
[x] Kidney dysfunction Recommendations In anticipation of discharge, would recommend consideration of an inexpensive insulin. The best option may be a combination insulin from [facility]. Since his insulin needs have been so low during this hospitalization, would recommend Relion 70/30 insulin 20 units twice daily, at breakfast and dinner (to cover both basal and bolus needs). Blood glucose readings at the same times would permit adjustment in insulin dosing post-discharge. Have located a BD MagniGuide, which could facilitate accurate insulin dosing, but must await shipment into [facility] at [address] [facility] today. If patient is still here, will bring to him and make sure he can use it. Consider referrals [x] Diabetes Self-Management Training through [facility] (Phone [phone] to schedule appointment) Billing Code(s) SIDRA Min [facility] Access via  
(C) [phone] 
 
5:30 pm 
Follow-up- 
Demonstrated BD MagniGuide for use in drawing insulin from a vial. 
Patient was on dialysis machine and could not actually practice drawing insulin up using the device. Gave the device to patient to take home. Dialysis nurse will allow patient to practice before he goes home to make sure he can actually see the syringe lines and numbers. He could obtain [employer] insulin (without a prescription) and give dose that is directed by his provider.

## 2020-01-15 NOTE — PROGRESS NOTES
Problem: Risk for Spread of Infection Goal: Prevent transmission of infectious organism to others Description Prevent the transmission of infectious organisms to other patients, staff members, and visitors. Outcome: Progressing Towards Goal 
  
Problem: Patient Education:  Go to Education Activity Goal: Patient/Family Education Outcome: Progressing Towards Goal 
  
Problem: Falls - Risk of 
Goal: *Absence of Falls Description Document Matteo Claros Fall Risk and appropriate interventions in the flowsheet. Outcome: Progressing Towards Goal 
Note: Fall Risk Interventions: 
Mobility Interventions: Patient to call before getting OOB Medication Interventions: Assess postural VS orthostatic hypotension, Evaluate medications/consider consulting pharmacy, Patient to call before getting OOB, Teach patient to arise slowly Elimination Interventions: Call light in reach History of Falls Interventions: Consult care management for discharge planning, Door open when patient unattended, Room close to nurse's station, Vital signs minimum Q4HRs X 24 hrs (comment for end date) Problem: Patient Education: Go to Patient Education Activity Goal: Patient/Family Education Outcome: Progressing Towards Goal 
  
Problem: Diabetes Self-Management Goal: *Disease process and treatment process Description Define diabetes and identify own type of diabetes; list 3 options for treating diabetes. Outcome: Progressing Towards Goal 
Goal: *Incorporating nutritional management into lifestyle Description Describe effect of type, amount and timing of food on blood glucose; list 3 methods for planning meals. Outcome: Progressing Towards Goal 
Goal: *Incorporating physical activity into lifestyle Description State effect of exercise on blood glucose levels. Outcome: Progressing Towards Goal 
Goal: *Developing strategies to promote health/change behavior Description Define the ABC's of diabetes; identify appropriate screenings, schedule and personal plan for screenings. Outcome: Progressing Towards Goal 
Goal: *Using medications safely Description State effect of diabetes medications on diabetes; name diabetes medication taking, action and side effects. Outcome: Progressing Towards Goal 
Goal: *Monitoring blood glucose, interpreting and using results Description Identify recommended blood glucose targets  and personal targets. Outcome: Progressing Towards Goal 
Goal: *Prevention, detection, treatment of acute complications Description List symptoms of hyper- and hypoglycemia; describe how to treat low blood sugar and actions for lowering  high blood glucose level. Outcome: Progressing Towards Goal 
Goal: *Prevention, detection and treatment of chronic complications Description Define the natural course of diabetes and describe the relationship of blood glucose levels to long term complications of diabetes. Outcome: Progressing Towards Goal 
Goal: *Developing strategies to address psychosocial issues Description Describe feelings about living with diabetes; identify support needed and support network Outcome: Progressing Towards Goal 
Goal: *Insulin pump training Outcome: Progressing Towards Goal 
Goal: *Sick day guidelines Outcome: Progressing Towards Goal 
Goal: *Patient Specific Goal (EDIT GOAL, INSERT TEXT) Outcome: Progressing Towards Goal 
  
Problem: Patient Education: Go to Patient Education Activity Goal: Patient/Family Education Outcome: Progressing Towards Goal 
  
Problem: Pain Goal: *Control of Pain Outcome: Progressing Towards Goal 
Goal: *PALLIATIVE CARE:  Alleviation of Pain Outcome: Progressing Towards Goal 
  
Problem: Patient Education: Go to Patient Education Activity Goal: Patient/Family Education Outcome: Progressing Towards Goal 
  
Problem: Pressure Injury - Risk of 
Goal: *Prevention of pressure injury Description Document Ranjeet Scale and appropriate interventions in the flowsheet. Outcome: Progressing Towards Goal 
Note: Pressure Injury Interventions: 
Sensory Interventions: Assess changes in LOC Moisture Interventions: Absorbent underpads Activity Interventions: Assess need for specialty bed, Increase time out of bed, Pressure redistribution bed/mattress(bed type), PT/OT evaluation Mobility Interventions: Assess need for specialty bed, PT/OT evaluation, Turn and reposition approx. every two hours(pillow and wedges) Nutrition Interventions: Document food/fluid/supplement intake Friction and Shear Interventions: HOB 30 degrees or less Problem: Patient Education: Go to Patient Education Activity Goal: Patient/Family Education Outcome: Progressing Towards Goal 
  
Problem: Diabetes Maintenance:Admission Goal: Activity/Safety Outcome: Progressing Towards Goal 
Goal: Diagnostic Tests/Procedures Outcome: Progressing Towards Goal 
Goal: Nutrition Outcome: Progressing Towards Goal 
Goal: Medications Outcome: Progressing Towards Goal 
Goal: Treatments/Interventions/Procedures Outcome: Progressing Towards Goal 
  
Problem: Diabetes Maintenance:Ongoing Goal: Activity/Safety Outcome: Progressing Towards Goal 
Goal: Nutrition Outcome: Progressing Towards Goal 
Goal: Medications Outcome: Progressing Towards Goal 
Goal: Treatments/Interventsions/Procedures Outcome: Progressing Towards Goal 
Goal: *Blood Glucose 80 to 180 md/dl Outcome: Progressing Towards Goal 
  
Problem: Diabetes Maintenance:Discharge Outcomes Goal: *Describes follow-up/return visits to physicians Outcome: Progressing Towards Goal 
Goal: *Blood glucose at patient's target range or approaching Outcome: Progressing Towards Goal 
Goal: *Aware of nutrition guidelines Outcome: Progressing Towards Goal 
Goal: *Verbalizes information about medication Description Verbalizes name, dosage, time, side effects, and number of days to 
continue medications. Outcome: Progressing Towards Goal 
Goal: *Describes goals, rules, symptoms, and treatments Description Describes blood glucose goals, monitoring, sick day rules, 
hypo/hyperglycemia prevention, symptoms, and treatment Outcome: Progressing Towards Goal 
Goal: *Describes available outpatient diabetes resources and support systems Outcome: Progressing Towards Goal 
  
Problem: Patient Education: Go to Patient Education Activity Goal: Patient/Family Education Outcome: Progressing Towards Goal 
  
Problem: Surgical Pathway: Discharge Outcomes Goal: *Hemodynamically stable Outcome: Progressing Towards Goal 
Goal: *Lungs clear or at baseline Outcome: Progressing Towards Goal 
Goal: *Demonstrates independent activity or return to baseline Outcome: Progressing Towards Goal 
Goal: *Optimal pain control at patient's stated goal 
Outcome: Progressing Towards Goal 
Goal: *Verbalizes understanding and describes prescribed diet Outcome: Progressing Towards Goal 
Goal: *Tolerating diet Outcome: Progressing Towards Goal 
Goal: *Verbalizes name, dosage, time, side effects, and number of days to continue medications Outcome: Progressing Towards Goal 
Goal: *No signs and symptoms of infection or wound complications Outcome: Progressing Towards Goal 
Goal: *Anxiety reduced or absent Outcome: Progressing Towards Goal 
Goal: *Understands and describes signs and symptoms to report to providers(Stroke Metric) Outcome: Progressing Towards Goal 
Goal: *Describes follow-up/return visits to physicians Outcome: Progressing Towards Goal 
Goal: *Describes available resources and support systems Outcome: Progressing Towards Goal

## 2020-01-15 NOTE — PROGRESS NOTES
TEJA 
Home with OP follow-up CM notified of Pt needing transportation home at discharge. CM has arranged Lyft ride for 8pm tonight. Shane Silverman, E[facility], [address] [facility]

## 2020-01-15 NOTE — PROGRESS NOTES
NAME: Lazaro Roman :  1985 MRN:  [id number] Assessment :    Plan: 
--ESRD- Anemia DM --[facility]- [facility] - MWF 
HD today. On retacrit Subjective: Chief Complaint:  Resting. The patient was seen on dialysis at 4:16 PM .  BP is stable. Access is working well. Review of Systems: 
 
Symptom Y/N Comments  Symptom Y/N Comments Fever/Chills    Chest Pain Poor Appetite    Edema Cough    Abdominal Pain Sputum    Joint Pain SOB/LITTLE    Pruritis/Rash Nausea/vomit    Tolerating PT/OT Diarrhea    Tolerating Diet Constipation    Other Could not obtain due to:   
 
Objective: VITALS:  
Last 24hrs VS reviewed since prior progress note. Most recent are: 
Visit Vitals /90 (BP 1 Location: Right arm, BP Patient Position: At rest;Supine) Pulse 87 Temp 98.2 °F (36.8 °C) Resp 20 Ht 6' 2\" (1.88 m) Wt 149.8 kg (330 lb 4.8 oz) SpO2 95% BMI 42.41 kg/m² Intake/Output Summary (Last 24 hours) at 1/15/2020 8428 Last data filed at 1/15/2020 4947 Gross per 24 hour Intake 1460 ml Output 0 ml Net 1460 ml Telemetry Reviewed: PHYSICAL EXAM: 
General: NAD 
1+ edema Lab Data Reviewed: (see below) Medications Reviewed: (see below) PMH/SH reviewed - no change compared to H&P 
________________________________________________________________________ Care Plan discussed with: 
Patient Family RN Care Manager Consultant:     
 
  Comments >50% of visit spent in counseling and coordination of care    
 
________________________________________________________________________ Loni Romeo MD  
 
Procedures: see electronic medical records for all procedures/Xrays and details which 
were not copied into this note but were reviewed prior to creation of Plan. LABS: 
Recent Labs  
  20 
0014 20 1430  
WBC 8.2 7.6 HGB 10.5* 10.5* HCT 33.1* 33.1*  
 279 Recent Labs  
  01/15/20 
0458 01/14/20 
0022 01/13/20 
0550 * 135* 136  
K 3.9 3.7 4.2 CL 99 98 95* CO2 27 28 29 BUN 44* 44* 50* CREA 10.90* 10.90* 13.00* * 124* 152* CA 8.9 8.8 8.8 PHOS 7.1*  --   --   
 
Recent Labs  
  01/14/20 
0022 SGOT 14* AP 34*  
TP 8.4* ALB 2.5*  
GLOB 5.9* No results for input(s): INR, PTP, APTT, INREXT, INREXT in the last 72 hours. No results for input(s): FE, TIBC, PSAT, FERR in the last 72 hours. No results found for: FOL, RBCF No results for input(s): PH, PCO2, PO2 in the last 72 hours. No results for input(s): CPK, CKMB in the last 72 hours. No lab exists for component: TROPONINI No components found for: [place] Lab Results Component Value Date/Time Color YELLOW/STRAW 05/01/2017 05:39 PM  
 Appearance CLOUDY (A) 05/01/2017 05:39 PM  
 Specific gravity 1.018 05/01/2017 05:39 PM  
 Specific gravity >1.030 (H) 10/03/2014 04:35 AM  
 pH (UA) 5.5 05/01/2017 05:39 PM  
 Protein 300 (A) 05/01/2017 05:39 PM  
 Glucose 250 (A) 05/01/2017 05:39 PM  
 Ketone NEGATIVE  05/01/2017 05:39 PM  
 Bilirubin NEGATIVE  05/01/2017 05:39 PM  
 Urobilinogen 0.2 05/01/2017 05:39 PM  
 Nitrites NEGATIVE  05/01/2017 05:39 PM  
 Leukocyte Esterase NEGATIVE  05/01/2017 05:39 PM  
 Epithelial cells FEW 05/01/2017 05:39 PM  
 Bacteria NEGATIVE  05/01/2017 05:39 PM  
 WBC 0-4 05/01/2017 05:39 PM  
 RBC 5-10 05/01/2017 05:39 PM  
 
 
MEDICATIONS: 
Current Facility-Administered Medications Medication Dose Route Frequency  cefepime (MAXIPIME) 1 g in 0.9% sodium chloride (MBP/ADV) 50 mL  1 g IntraVENous Q24H  
 mupirocin (BACTROBAN) 2 % ointment   Both Nostrils BID  insulin glargine (LANTUS) injection 12 Units  12 Units SubCUTAneous DAILY  0.9% sodium chloride infusion  25 mL/hr IntraVENous CONTINUOUS  
 bacitracin 50,000 Units in sodium chloride irrigation 0.9 % 1,000 mL Irrigation    PRN  
 bupivacaine 0.25% -EPINEPHrine 1:200,000 (SENSORCAINE) 0.25 %-1:200,000 injection    PRN  
 epoetin nata-epbx (RETACRIT) injection 10,000 Units  10,000 Units SubCUTAneous Q MON, WED & FRI  lactobac ac& pc-s.therm-b.anim (USMAN Q/RISAQUAD)  1 Cap Oral DAILY  loperamide (IMODIUM) capsule 2 mg  2 mg Oral QID PRN  
 lisinopril (PRINIVIL, ZESTRIL) tablet 10 mg  10 mg Oral DAILY  ammonium lactate (LAC-HYDRIN) 12 % lotion   Topical BID  acetaminophen (TYLENOL) tablet 650 mg  650 mg Oral Q6H PRN  
 glucose chewable tablet 16 g  4 Tab Oral PRN  
 glucagon (GLUCAGEN) injection 1 mg  1 mg IntraMUSCular PRN  
 dextrose 10% infusion 0-250 mL  0-250 mL IntraVENous PRN  
 insulin lispro (HUMALOG) injection   SubCUTAneous AC&HS  hydrALAZINE (APRESOLINE) 20 mg/mL injection 10 mg  10 mg IntraVENous Q6H PRN  
 labetalol (NORMODYNE;TRANDATE) injection 10 mg  10 mg IntraVENous Q6H PRN  
 ondansetron (ZOFRAN) injection 4 mg  4 mg IntraVENous Q6H PRN  
 oxyCODONE-acetaminophen (PERCOCET) 5-325 mg per tablet 1 Tab  1 Tab Oral Q4H PRN  
 heparin (porcine) injection 7,500 Units  7,500 Units SubCUTAneous Q8H  Vancomycin--Pharmacy to Dose   Other Rx Dosing/Monitoring

## 2020-01-15 NOTE — PROCEDURES
[facility] Dialysis Team [place]  [phone] Vitals   Pre   Post   Assessment   Pre   Post    
Temp  Temp: 97.9 °F (36.6 °C) (01/15/20 1432) 98.4 oral LOC  A&OX4 A&OX4 HR   Pulse (Heart Rate): 81 (01/15/20 1432) 83 Lungs   CTA CTA  
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 No s/s of infection. +B/T. No issues with cannulation or hemostasis. Running well at -400. Labs Obtained/Reviewed Critical Results Called   Date when labs were drawn- 
Hgb-   
HGB Date Value Ref Range Status 01/14/2020 10.5 (L) 12.1 - 17.0 g/dL Final  
 
K-   
Potassium Date Value Ref Range Status 01/15/2020 3.9 3.5 - 5.1 mmol/L Final  
 
Ca-  
Calcium Date Value Ref Range Status 01/15/2020 8.9 8.5 - 10.1 MG/DL Final  
 
Bun-  
BUN Date Value Ref Range Status 01/15/2020 44 (H) 6 - 20 MG/DL Final  
 
Creat-  
Creatinine Date Value Ref Range Status 01/15/2020 10.90 (H) 0.70 - 1.30 MG/DL Final  
 
  
Medications/ Blood Products Given Name   Dose   Route and Time    
none Blood Volume Processed (BVP):   92.5 L Net Fluid Removed:  4000 ml Comments Time Out Done: 14:30 Primary Nurse Rpt Pre: Terence Hernadez RN 
Primary Nurse Rpt Post: Pilo Copeland RN 
 Pt Education: procedural, access care Care Plan: ongoing, possible d/c today Tx Summary: 
Arrived to patient room set up and tested machine and water per policy. Patient is A&Ox4. Consent signed & on file. SBAR received from Primary DIONICIO Hernadez. 14:32- Pt cannulated with 84U needles per policy & without issue. Labs drawn per request/ order. VSS. Dialysis Tx initiated. Dialysis access visualized and lines intact. 15:00- Pt resting quietly. VSS. Pt denies pain or SOB. Dialysis access visualized and lines intact. 15:30- Pt resting quietly. VSS. Pt denies pain or SOB. Dialysis access visualized and lines intact. 15:45- MD Brittani Garcia at bedside 16:00- Pt resting quietly. VSS. Pt denies pain or SOB. Dialysis access visualized and lines intact. 16:30- Pt resting quietly. VSS. Pt denies pain or SOB. Dialysis access visualized and lines intact. 17:00- Pt resting quietly. VSS. Pt denies pain or SOB. Dialysis access visualized and lines intact. 17:30- Pt resting quietly. VSS. Pt denies pain or SOB. Dialysis access visualized and lines intact. 18:00- Pt resting quietly. VSS. Pt denies pain or SOB. Dialysis access visualized and lines intact. 18:30- BFR set to 375 to prevent arterial pressures from fluctuating below -260. 
18:45- Patient BP 71/47, gave 100 ml of NS, BP recovered to 84/44. Gave patient 400 ml of NS bolus. 18:51- Tx ended. Patient BP recovered to 107/59. VSS. MD Brittani Garcia notified. All possible blood returned to patient. Hemostasis achieved without issue. Bed locked and in the lowest position, call bell and belongings in reach. SBAR given to Primary, DIONICIO Castaneda. Patient is stable at time of my departure. All Dialysis related medications have been reviewed. Admiting Diagnosis: [facility] Cons[facility] signed - Informed Consent Verified: Yes (01/15/20 5032) [place] Consent - on file Hepatitis Status- HbAg negative 01/06/2020, HbAB <10, susceptible Machine #- Machine Number: [id number] (01/15/20 1432) Telemetry status- n/a

## 2020-01-15 NOTE — PROGRESS NOTES
Bedside shift change report given to Saundra Herring RN (oncoming nurse) by Carmen Sanders RN (offgoing nurse). Report included the following information SBAR, Kardex, OR Summary, Procedure Summary, Intake/Output, MAR, Accordion, Recent Results, Med Rec Status, Cardiac Rhythm no tele monitor, Alarm Parameters , Pre Procedure Checklist, Procedure Verification, Quality Measures and Dual Neuro Assessment.

## 2020-01-15 NOTE — DISCHARGE INSTRUCTIONS
HOSPITALIST DISCHARGE INSTRUCTIONS    NAME: Sukh Miranda   :  1985   MRN:  [id number]     Date/Time:  1/15/2020 4:15 PM    ADMIT DATE: 2020   DISCHARGE DATE: 1/15/2020         · It is important that you take the medication exactly as they are prescribed. · Keep your medication in the bottles provided by the pharmacist and keep a list of the medication names, dosages, and times to be taken in your wallet. · Do not take other medications without consulting your doctor. What to do at [address]:  Renal Diet    Recommended activity: Activity as tolerated      If you have questions regarding the hospital related prescriptions or hospital related issues please call [facility] at [phone]. You can always direct your questions to your primary care doctor if you are unable to reach your hospital physician; your PCP works as an extension of your hospital doctor just like your hospital doctor is an extension of your PCP for your time at the hospital [facility], [facility])    If you experience any of the following symptoms then please call your primary care physician or return to the emergency room if you cannot get hold of your doctor:    Fever, chills, nausea, vomiting, or persistent diarrhea  Worsening weakness or new problems with your speech or balance  Dark stools or visible blood in your stools  New Leg swelling or shortness of breath as these could be signs of a clot    Additional Instructions: Your antibiotics will be given after each dialysis until     Have your dialysis unit draw Weekly CBC, CMP , Vanc trough fax reports to [phone] , call with critical labs [phone]            Information obtained by :  I understand that if any problems occur once I am at home I am to contact my physician. I understand and acknowledge receipt of the instructions indicated above. Physician's or R.N.'s Signature                                                                  Date/Time                                                                                                                                              Patient or Representative Signature

## 2020-01-16 NOTE — PROGRESS NOTES
Infectious Disease Progress IMPRESSION:  
 
- Diabetic foot ulcer ,cellulitis of R/foot , OM of R/3rd toe - MRI- Destruction of the third PIP joint consistent with septic arthritis. Osteomyelitis is seen in the phalanges of the third toe. 2. Soft tissue wound of the dorsal third toe. No evidence of a focal drainable 
fluid collection. Significant subcutaneous edema surrounding the third toe and 
over the dorsum of foot. -Amputation of 3rd R/toe 1/9/20 -1/9 -few MRSA ( Vanc JOSE 1) , few Diphtheroids) , Anaerobic- E.cloacae isolated from thio broth only  -1/6- Heavy MRSA , moderate E.cloacae, heavy mixed skin matt BC - 1/6- NG Bone pathology- clear margins 
- Diabetes Mellitus A1c 7.4 
-PAD NOÉ- R/resting moderate decrease L/resting mildly decreased For outpt F/u with Vascular for RLE arteriogram 
- ESRD on HD , AVF LUE 
- H/o OM of foot in past  
Amputation R/4th toe 10/11 for Om R/4th toe Amputation R/5th toe & partial 5th MT for Om 5th toe -  - 10/11- MRSA in thio broth -  - 7/2- E.cloacae in thio broth - Morbid obesity BMI 42 PLAN:  
  
Vancomycin 500 mg IV after each HD & Cefepime 2/2/3 G IV after HD end date 1/23/20 Weekly CBC, CMP , Vanc trough fax reports to [phone] , call with critical labs [phone] Encourage yogurt, probiotic intake MRSA decolonization -Nasal  mupirocin, Hibiclens washes Blood sugar control Outpt follow up with Vascular, Podiatry, D/w pt 
ID follow up not required, call if fever occurs, D/wpt Subjective:  
 
Pt seen. Denies new complaints\" When am I getting out of here? \" 
  
 
Patient Active Problem List  
Diagnosis Code  
 HTN (hypertension) I10  
 Postoperative hypoxia R09.02, Z98.890  
 Diarrhea R19.7  ESRD (end stage renal disease) on dialysis (HCC) N18.6, Z99.2  Hyperlipidemia associated with type 2 diabetes mellitus (HCC) E11.69, E78.5  Morbid obesity with body mass index (BMI) of 40.0 to 49.9 ([facility]) E66.01  
 Uncontrolled type 2 diabetes mellitus with proliferative retinopathy, with long-term current use of insulin ([address] E11.3599, E11.65, Z79.4  
 Uncontrolled type 2 diabetes mellitus with hyperglycemia, with long-term current use of insulin ([facility]) E11.65, Z79.4  
 Uncontrolled hypertension I10  
 Acute osteomyelitis of toe of right foot ([address] M86.171  
 Osteomyelitis ([address] M86.9  Foot ulcer ([address] L97.509  ESRD (end stage renal disease) ([address] N18.6  Wound cellulitis L03.90 Past Medical History:  
Diagnosis Date  Cataracts  Chronic kidney disease  Diabetes ([address]  Hypercholesterolemia  Hypertension  Kidney failure  Obesity Family History Problem Relation Age of Onset  Diabetes Mother  Liver Disease Father  Kidney Disease Maternal Grandfather  Diabetes Maternal Grandfather  Hypertension Maternal Grandfather  Diabetes Paternal Uncle  Hypertension Paternal Uncle Social History Tobacco Use  Smoking status: Former Smoker Packs/day: 0.25  Smokeless tobacco: Never Used  Tobacco comment: \"quit about a year ago\" Substance Use Topics  Alcohol use: No  
  Comment: rarely Past Surgical History:  
Procedure Laterality Date  COLONOSCOPY N/A 12/8/2017 COLONOSCOPY performed by Eli Thorne MD at [facility] ENDOSCOPY  COLONOSCOPY,DIAGNOSTIC  12/8/2017  HX AMPUTATION Left great toe and L 5th toe  UPPER GI ENDOSCOPY,BIOPSY  12/8/2017  VASCULAR SURGERY PROCEDURE UNLIST    
 R side chest  
 VASCULAR SURGERY PROCEDURE UNLIST Left 07/25/2017 Creation transposed AV fistula arm Prior to Admission medications Medication Sig Start Date End Date Taking? Authorizing Provider  
insulin glargine (LANTUS U-100 INSULIN) 100 unit/mL injection 12 Units by SubCUTAneous route daily.  Indications: type 2 diabetes mellitus 1/15/20 Yes Yifan Galeano MD  
oxyCODONE-acetaminophen (PERCOCET) 5-325 mg per tablet Take 1 Tab by mouth every four (4) hours as needed for Pain for up to 3 days. Max Daily Amount: 6 Tabs. 1/15/20 1/18/20 Yes Yifan Galeano MD  
lisinopril (PRINIVIL, ZESTRIL) 10 mg tablet Take 10 mg by mouth every Tuesday, Thursday, Saturday & . Non-HD days   Yes Provider, Historical  
loperamide (IMODIUM) 2 mg capsule Take 1 Cap by mouth four (4) times daily as needed for Diarrhea. 17  Yes Toñito Huerta MD  
 
No Known Allergies Review of Systems:  A comprehensive review of systems was negative except for that written in the History of Present Illness. 10 point review of systems obtained . All other systems negative Objective:  
Blood pressure 107/59, pulse 83, temperature 98.4 °F (36.9 °C), temperature source Oral, resp. rate 16, height 6' 2\" (1.88 m), weight 330 lb 4.8 oz (149.8 kg), SpO2 97 %. Temp (24hrs), Av.2 °F (36.8 °C), Min:97.9 °F (36.6 °C), Max:98.4 °F (36.9 °C) No current facility-administered medications for this encounter. Current Outpatient Medications Medication Sig  
 insulin glargine (LANTUS U-100 INSULIN) 100 unit/mL injection 12 Units by SubCUTAneous route daily. Indications: type 2 diabetes mellitus  oxyCODONE-acetaminophen (PERCOCET) 5-325 mg per tablet Take 1 Tab by mouth every four (4) hours as needed for Pain for up to 3 days. Max Daily Amount: 6 Tabs.  lisinopril (PRINIVIL, ZESTRIL) 10 mg tablet Take 10 mg by mouth every Tuesday, Thursday, Saturday & . Non-HD days  loperamide (IMODIUM) 2 mg capsule Take 1 Cap by mouth four (4) times daily as needed for Diarrhea. Exam:   
General:  Alert, cooperative, well noursished, well developed, appears stated age Eyes:  Sclera anicteric. Pupils equally round and reactive to light. Mouth/Throat: Mucous membranes normal, oral pharynx clear Neck: Supple Lungs:   Clear to auscultation bilaterally, good effort CV:  Regular rate and rhythm,no murmur, click, rub or gallop Abdomen:   Soft, non-tender. bowel sounds normal. non-distended Extremities: No cyanosis or edema Skin: Skin color, texture, turgor normal. no acute rash or lesions Lymph nodes: Cervical and supraclavicular normal  
Musculoskeletal: No swelling or deformity Lines/Devices:  Intact, no erythema, drainage or tenderness Psych: Alert and oriented, normal mood affect given the setting Data Review: CBC:  
Recent Labs  
  01/14/20 
0014 01/13/20 
1430 WBC 8.2 7.6  
RBC 4.08* 4.04* HGB 10.5* 10.5* HCT 33.1* 33.1*  
 279 CMP:  
Recent Labs  
  01/15/20 
0458 01/14/20 
0022 01/13/20 
0550 * 124* 152* * 135* 136  
K 3.9 3.7 4.2 CL 99 98 95* CO2 27 28 29 BUN 44* 44* 50* CREA 10.90* 10.90* 13.00* CA 8.9 8.8 8.8 AGAP 9 9 12 BUCR 4* 4* 4* AP  --  34*  --   
TP  --  8.4*  --   
ALB  --  2.5*  --   
GLOB  --  5.9*  --   
AGRAT  --  0.4*  --   
 
 
Lab Results Component Value Date/Time Culture result: NO ANAEROBES ISOLATED 01/09/2020 05:25 PM  
 Culture result: (A) 01/09/2020 05:25 PM  
  ENTEROBACTER CLOACAE (AEROBIC) ISOLATED FROM THIO BROTH ONLY Culture result: (A) 01/09/2020 05:25 PM  
  FEW * METHICILLIN RESISTANT STAPHYLOCOCCUS AUREUS * (**KNOWN MRSA PATIENT**) Culture result: FEW DIPHTHEROIDS (A) 01/09/2020 05:25 PM  
 Culture result: NO GROWTH 6 DAYS 01/06/2020 10:33 PM  
  
 
 
XR Results (most recent): 
Results from Hospital Encounter encounter on 01/06/20 XR FOOT RT MIN 3 V Narrative INDICATION: Diabetic wound to right third toe for 2 days. Exam: AP, lateral, oblique views of the right foot. Direct comparison is made to prior plain radiographs dated October 8, 2019. FINDINGS: There is new near total destruction of the middle phalanx of the right 
third toe and there is also partial destruction of the distal proximal phalanx of the right third toe. No gas is seen within the soft tissues. No radiodense 
foreign body is visualized. There has been interval resection of the distal fourth toe at the level of the 
MTP joint. Fifth toe transmetatarsal amputation postoperative changes are noted. Impression IMPRESSION: New, partial destruction of the distal aspect of the proximal 
phalanx and near total destruction of the middle phalanx of the right third toe, 
consistent with osteomyelitis. ICD-10-CM ICD-9-CM 1. Other acute osteomyelitis of right foot ([facility]) M86.171 730.07 oxyCODONE-acetaminophen (PERCOCET) 5-325 mg per tablet Antibiotic History Vancomycin/ Zosyn IV I have discussed the diagnosis with the patient and the intended plan as seen in the above orders. I have discussed medication side effects and warnings with the patient as well. Reviewed test results at length with patient Signed By: Ysabel Levi MD FACP

## 2020-01-16 NOTE — PROGRESS NOTES
Bedside and Verbal shift change report given to Tanna Rubio RN (oncoming nurse) by Bonna Meckel RN (offgoing nurse). Report included the following information SBAR, Kardex, ED Summary, OR Summary, Procedure Summary, Intake/Output, MAR and Recent Results.

## 2020-08-02 NOTE — DISCHARGE INSTRUCTIONS
Patient Education        Broken Toe: Care Instructions  Your Care Instructions  You have broken (fractured) a bone in your toe. This kind of fracture does not need a special cast or brace. \"Payal-taping\" your broken toe to a healthy toe next to it is almost always enough to treat the problem and ease symptoms. The toe may take 4 weeks or more to heal.  You heal best when you take good care of yourself. Eat a variety of healthy foods, and don't smoke. Follow-up care is a key part of your treatment and safety. Be sure to make and go to all appointments, and call your doctor if you are having problems. It's also a good idea to know your test results and keep a list of the medicines you take. How can you care for yourself at home? · Be safe with medicines. Take pain medicines exactly as directed. ? If the doctor gave you a prescription medicine for pain, take it as prescribed. ? If you are not taking a prescription pain medicine, ask your doctor if you can take an over-the-counter medicine. · If your toe is taped to the toe next to it, your doctor has shown you how to change the tape. Protect the skin by putting something soft, such as felt or foam, between your toes before you tape them together. Never tape the toes together skin-to-skin. Your broken toe may need to be payal-taped for 2 to 4 weeks to heal.  · Rest and protect your toe. Do not walk on it until you can do so without too much pain. If the doctor has told you to use crutches, use them as instructed. · Put ice or a cold pack on your toe for 10 to 20 minutes at a time. Try to do this every 1 to 2 hours for the next 3 days (when you are awake) or until the swelling goes down. Put a thin cloth between the ice and your skin. · Prop up your foot on a pillow when you ice it or anytime you sit or lie down. Try to keep it above the level of your heart. This will help reduce swelling. · Make sure you go to your follow-up appointments.  Your doctor will need to check that your toe is healing right. When should you call for help? Call your doctor now or seek immediate medical care if:  · You have severe pain. · Your toe is cool or pale or changes color. · You have tingling, weakness, or numbness in your toe. Watch closely for changes in your health, and be sure to contact your doctor if:  · Pain and swelling get worse. · You are not getting better as expected. Where can you learn more? Go to http://www.gray.com/  Enter [id number] in the search box to learn more about \"Broken Toe: Care Instructions. \"  Current as of: March 2, 2020               Content Version: 12.5  © 5634-9270 [employer], [employer]. Care instructions adapted under license by [employer] (which disclaims liability or warranty for this information). If you have questions about a medical condition or this instruction, always ask your healthcare professional. [address] any warranty or liability for your use of this information.

## 2020-08-02 NOTE — ED NOTES
Bedside shift change report given to Roshan Pagan (oncoming nurse) by Mariia Dominguez (offgoing nurse). Report included the following information SBAR, Kardex, ED Summary, [facility] and Recent Results.

## 2020-08-02 NOTE — ED NOTES
Patient states that he had stubbed his right big toe approximately a week ago and his wife noticed that it is injured today. His toenail is disfigured and the toe is slightly discolored

## 2020-08-02 NOTE — ED PROVIDER NOTES
EMERGENCY DEPARTMENT HISTORY AND PHYSICAL EXAM 
 
Please note that this dictation was completed with [employer], the computer voice recognition software. Quite often unanticipated grammatical, syntax, homophones, and other interpretive errors are inadvertently transcribed by the computer software. Please disregard these errors. Please excuse any errors that have escaped final proofreading. Date: 8/2/2020 Patient Name: Jason Lange Patient Age and Sex: 38 y.o. male History of Presenting Illness Chief Complaint Patient presents with  Toe Injury  
  states he stubbed right great toe several days ago, now it is black and has an ulcer. History Provided By: Patient HPI: Jason Lange, is a 38 y.o. male whose medical history is noted below and includes poorly controlled diabetes, chronic kidney disease, multiple amputations of toes and his feet due to complications related to his diabetes, presents to the emergency room after stubbing his great toe on the right side a few days ago. Since then he has noted some drainage from a small skin tear on the tip of the toe. He is concerned about having osteomyelitis once again given that he and his podiatrist have already discussed the potential need of having to amputate this digit as well. No fevers or chills. No other systemic symptoms or complaints. Pt denies any other alleviating or exacerbating factors. There are no other complaints, changes or physical findings at this time. PCP: None Past History Past Medical History: 
Past Medical History:  
Diagnosis Date  Cataracts  Chronic kidney disease  Diabetes ([facility]  Hypercholesterolemia  Hypertension  Kidney failure  Obesity Past Surgical History: 
Past Surgical History:  
Procedure Laterality Date  COLONOSCOPY N/A 12/8/2017 COLONOSCOPY performed by Malick Banuelos MD at [facility] ENDOSCOPY  COLONOSCOPY,DIAGNOSTIC  12/8/2017  HX AMPUTATION Left great toe and L 5th toe  UPPER GI ENDOSCOPY,BIOPSY  12/8/2017  VASCULAR SURGERY PROCEDURE UNLIST    
 R side chest  
 VASCULAR SURGERY PROCEDURE UNLIST Left 07/25/2017 Creation transposed AV fistula arm Family History: 
Family History Problem Relation Age of Onset  Diabetes Mother  Liver Disease Father  Kidney Disease Maternal Grandfather  Diabetes Maternal Grandfather  Hypertension Maternal Grandfather  Diabetes Paternal Uncle  Hypertension Paternal Uncle Social History: 
Social History Tobacco Use  Smoking status: Former Smoker Packs/day: 0.25  Smokeless tobacco: Never Used  Tobacco comment: \"quit about a year ago\" Substance Use Topics  Alcohol use: No  
  Comment: rarely  Drug use: No  
 
 
Allergies: 
No Known Allergies Review of Systems All other ROS negative Constitutional: No fever, normal appetite Skin: No rash, no color change HEENT: No nasal congestion Resp: No cough or SOB 
CV: No chest pain, no palpitations GI: No vomiting or diarrhea : No dysuria or hematuria MSK: No joint pain or swelling Neuro: No numbness or focal weakness Psych: Not suicidal/homicidal 
 
Physical Exam  
Reviewed patients vital signs and nursing note General: alert, No acute distress Eyes: EOMI, normal conjunctiva, PERRLA 
ENT: moist mucous membranes Neck: Active, full ROM of neck Skin: No rashes, no ecchymosis Lungs: Equal chest expansion. No respiratory distress. Lungs clear to auscultation. Heart: Regular rate and rhythm. Equal and normal pulses in all extremities, no peripheral edema Abd:  non distended, soft, not tender MSK: Full, active ROM in all 4 extremities, no midline back pain Neuro: alert and oriented at baseline, normal speech; no unilateral or focal weakness Psych: Cooperative with exam; Appropriate mood and affect Diagnostic Study Results Labs - I have personally reviewed and interpreted all laboratory results. Dedra Jorgensen MD, MSc Recent Results (from the past 24 hour(s)) CBC WITH AUTOMATED DIFF Collection Time: 08/02/20  5:28 AM  
Result Value Ref Range WBC 10.0 4.1 - 11.1 K/uL  
 RBC 4.99 4. 10 - 5.70 M/uL  
 HGB 13.3 12.1 - 17.0 g/dL HCT 40.7 36.6 - 50.3 % MCV 81.6 80.0 - 99.0 FL  
 MCH 26.7 26.0 - 34.0 PG  
 MCHC 32.7 30.0 - 36.5 g/dL  
 RDW 14.6 (H) 11.5 - 14.5 % PLATELET 344 449 - 807 K/uL MPV 9.7 8.9 - 12.9 FL  
 NRBC 0.0 0  WBC ABSOLUTE NRBC 0.00 0.00 - 0.01 K/uL NEUTROPHILS 67 32 - 75 % LYMPHOCYTES 22 12 - 49 % MONOCYTES 10 5 - 13 % EOSINOPHILS 1 0 - 7 % BASOPHILS 0 0 - 1 % IMMATURE GRANULOCYTES 0 0.0 - 0.5 % ABS. NEUTROPHILS 6.7 1.8 - 8.0 K/UL  
 ABS. LYMPHOCYTES 2.2 0.8 - 3.5 K/UL  
 ABS. MONOCYTES 1.0 0.0 - 1.0 K/UL  
 ABS. EOSINOPHILS 0.1 0.0 - 0.4 K/UL  
 ABS. BASOPHILS 0.0 0.0 - 0.1 K/UL  
 ABS. IMM. GRANS. 0.0 0.00 - 0.04 K/UL  
 DF AUTOMATED METABOLIC PANEL, BASIC Collection Time: 08/02/20  5:28 AM  
Result Value Ref Range Sodium 131 (L) 136 - 145 mmol/L Potassium 4.2 3.5 - 5.1 mmol/L Chloride 93 (L) 97 - 108 mmol/L  
 CO2 29 21 - 32 mmol/L Anion gap 9 5 - 15 mmol/L Glucose 217 (H) 65 - 100 mg/dL BUN 50 (H) 6 - 20 MG/DL Creatinine 11.40 (H) 0.70 - 1.30 MG/DL  
 BUN/Creatinine ratio 4 (L) 12 - 20 GFR est AA 6 (L) >60 ml/min/1.73m2 GFR est non-AA 5 (L) >60 ml/min/1.73m2 Calcium 8.6 8.5 - 10.1 MG/DL POC LACTIC ACID Collection Time: 08/02/20  5:44 AM  
Result Value Ref Range Lactic Acid (POC) 0.55 0.40 - 2.00 mmol/L Radiologic Studies - I have personally reviewed and interpreted all imaging studies and agree with radiology interpretation and report. - Dedra Jorgensen MD, MSc 
XR GREAT TOE RT MIN 2 V Final Result IMPRESSION: Minimally displaced tuft fracture of the distal phalanx. No apparent  
osteomyelitis. Medical Decision Making I am the first provider for this patient. Records Reviewed: I reviewed our electronic medical record system for any past medical records that were available that may contribute to the patient's current condition, including their PMH, surgical history, social and family history. Reviewed the nursing notes and vital signs from today's visit. Nursing notes will be reviewed as they become available in realtime while the pt has been in the ED. Evalee Kocher, MD Msc 
 
Vital Signs-Reviewed the patient's vital signs. Patient Vitals for the past 24 hrs: 
 Temp Pulse Resp BP SpO2  
08/02/20 0407 98.5 °F (36.9 °C) 90 18 (!) 181/109 97 % 08/02/20 0058 98.9 °F (37.2 °C) 97 16 160/86 93 % Provider Notes (Medical Decision Making):  
Patient presents with a skin tear and injury to great toe on the right side after stubbing it a few days ago. He has a history of poorly controlled diabetes and recurrent osteomyelitis for chronic ulcerations in his feet. DDX: Osteo, fracture, cellulitis, less likely neck fracture given appearance of the wound. ED Course:  
Initial assessment performed. The patients presenting problems have been discussed, and they are in agreement with the care plan formulated and outlined with them. I have encouraged them to ask questions as they arise throughout their visit. Progress note: 
Patient has been reassessed and reports feeling considerably better, has normal vital signs and feels comfortable going home. I think this is reasonable as no findings today suggest a life-threatening condition. 6:40 AM 
I reviewed the patient's laboratory findings as well as imaging studies. There is a fracture of the toe which can be treated by providing the patient with [place]. We will do so in the emergency room. He has the skin tears on his toe and I am sure that these will have a very hard time with healing. We have discussed wound care in very great detail. Currently there is no evidence of osteomyelitis however. His white count is normal as is his lactic acid. He is afebrile. X-rays do not show evidence of osteomyelitis. The it is reasonable for now to start him on antibiotics here as a preventive measure given his high risk and to have him follow-up with podiatry on Monday morning. DISPOSITION: DISCHARGE The patient's results have been reviewed with patient and available family and/or caregiver. They verbally convey their understanding and agreement of the patient's signs, symptoms, diagnosis, treatment and prognosis and additionally agree to follow up as recommended in the discharge instructions or to return to the Emergency Department should the patient's condition change prior to their follow-up appointment. The patient and available family and/or caregiver verbally agree with the care plan and all of their questions have been answered. The discharge instructions have also been provided to the them with educational information regarding the patient's diagnosis as well a list of reasons why the patient would want to return to the ER prior to their follow-up appointment should any concerns arise, the patient's condition change or symptoms worsen. Douglas Joyner MD, Msc PLAN: 
Current Discharge Medication List  
  
START taking these medications Details  
clindamycin (CLEOCIN) 300 mg capsule Take 1 Cap by mouth four (4) times daily for 10 days. Qty: 40 Cap, Refills: 0  
  
  
1.  
2.    
Follow-up Information Follow up With Specialties Details Why Contact Info Adelso Damon DPM Podiatry Call in 1 day please update your podiatrist on today's visit and arrange follow up in 1-2 days [address] [place] [address] 
[phone] [facility] EMERGENCY DEPT Emergency Medicine  As needed, If symptoms worsen [address] [facility] 
[phone] 3. Return to ED if worse Divine Lisa MD, am the attending of record for this patient encounter. Diagnosis Clinical Impression: 1. Closed fracture of phalanx of right great toe, physeal involvement unspecified, unspecified phalanx, initial encounter Attestation: 
I personally performed the services described in this documentation on this date 8/2/2020 for patient Candis Ramirez.   Tion Dick MD

## 2020-08-02 NOTE — ED NOTES
Non stick dressing to right 1st toe and then 1st and 2nd toes and foot wrapped in 2 inch and 4 inch Kerlix. Post-op shoe given on discharge. Verbal and written discharge instructions given. Signatures obtained.

## 2020-08-03 NOTE — PROGRESS NOTES
Patient contacted regarding recent discharge and COVID-19 risk. Discussed COVID-19 related testing which was not done at this time. Test results were not done. Patient informed of results, if available? N/A Care Transition Nurse/ Ambulatory Care Manager contacted the patient by telephone to perform post discharge assessment. Verified name and  with patient as identifiers. Patient has following risk factors of: diabetes and chronic kidney disease. CTN/ACM reviewed discharge instructions, medical action plan and red flags related to discharge diagnosis. Reviewed and educated them on any new and changed medications related to discharge diagnosis; Rx - clindamycin. Advised obtaining a 90-day supply of all daily and as-needed medications. Advance Care Planning:  
Does patient have an Advance Directive: patient declined education Education provided regarding infection prevention, and signs and symptoms of COVID-19 and when to seek medical attention with patient who verbalized understanding. Discussed exposure protocols and quarantine from [address] you at higher risk for severe illness  and given an opportunity for questions and concerns. The patient agrees to contact the COVID-19 hotline [phone] or PCP office for questions related to their healthcare. CTN/ACM provided contact information for future reference. From CDC: Are you at higher risk for severe illness?  Wash your hands often.  Avoid close contact (6 feet, which is about two arm lengths) with people who are sick.  Put distance between yourself and other people if COVID-19 is spreading in your community.  Clean and disinfect frequently touched surfaces.  Avoid all cruise travel and non-essential air travel.  Call your healthcare professional if you have concerns about COVID-19 and your underlying condition or if you are sick.  
 
For more information on steps you can take to protect yourself, see CDC's How to Protect Yourself Patient/family/caregiver given information for [employer] and agrees to enroll no Patient's preferred e-mail:  N/A Patient's preferred phone number: N/A Based on Loop alert triggers, patient will be contacted by nurse care manager for worsening symptoms. Pt was advised to f/u with Dr. Ankita Salgado (Podiatry) post-discharge. ACM reminded patient today of recommended outpatient follow-up; patient verbalizes understanding. Patient reports followed by Dr. Perez Backers for PCP. Patient is agreeable to [employer] assistance today with scheduling of PCP follow-up. ACM connected call with central scheduling dept. Patient has been scheduled for PCP telephone follow-up today. Plan for follow-up call in 7-14 days based on severity of symptoms and risk factors.

## 2020-08-07 PROBLEM — M86.9 OSTEOMYELITIS OF SECOND TOE OF RIGHT FOOT (HCC): Status: ACTIVE | Noted: 2020-01-01

## 2020-08-07 NOTE — H&P
History and Physical 
 
Subjective:  
 
Karlee Arita is a 38 y.o.  male who presents with infected 1st right toe and swollen 2nd toe . Onset of symptoms was abrupt with gradually worsening course since that time. T Patient denies pain. Previous studies include xray. Past Medical History:  
Diagnosis Date  Cataracts  Chronic kidney disease  Diabetes ([address]  Hypercholesterolemia  Hypertension  Kidney failure  Obesity Past Surgical History:  
Procedure Laterality Date  COLONOSCOPY N/A 12/8/2017 COLONOSCOPY performed by Gloria Guerin MD at \[facility]\"" ENDOSCOPY  COLONOSCOPY,DIAGNOSTIC  12/8/2017  HX AMPUTATION Left great toe and L 5th toe  UPPER GI ENDOSCOPY,BIOPSY  12/8/2017  VASCULAR SURGERY PROCEDURE UNLIST    
 R side chest  
 VASCULAR SURGERY PROCEDURE UNLIST Left 07/25/2017 Creation transposed AV fistula arm Family History Problem Relation Age of Onset  Diabetes Mother  Liver Disease Father  Kidney Disease Maternal Grandfather  Diabetes Maternal Grandfather  Hypertension Maternal Grandfather  Diabetes Paternal Uncle  Hypertension Paternal Uncle Social History Tobacco Use  Smoking status: Former Smoker Packs/day: 0.25  Smokeless tobacco: Never Used  Tobacco comment: \"quit about a year ago\" Substance Use Topics  Alcohol use: No  
  Comment: rarely Prior to Admission medications Medication Sig Start Date End Date Taking? Authorizing Provider  
clindamycin (CLEOCIN) 300 mg capsule Take 1 Cap by mouth four (4) times daily for 10 days. 8/2/20 8/12/20  Raphael Mckenna MD  
insulin glargine (LANTUS U-100 INSULIN) 100 unit/mL injection 12 Units by SubCUTAneous route daily.  Indications: type 2 diabetes mellitus 1/15/20   Cipriano Og MD  
lisinopril (PRINIVIL, ZESTRIL) 10 mg tablet Take 10 mg by mouth every Tuesday, Thursday, Saturday & Sunday. Non-HD days    Provider, Historical  
loperamide (IMODIUM) 2 mg capsule Take 1 Cap by mouth four (4) times daily as needed for Diarrhea. 12/9/17   Shawn Stark MD  
 
No Known Allergies Review of Systems: A comprehensive review of systems was negative. Objective: Intake and Output:   
No intake/output data recorded. No intake/output data recorded. Physical Exam:  
Visit Vitals Temp 97.8 °F (36.6 °C) General:  Alert, cooperative, no distress, appears stated age. Head:  Normocephalic, without obvious abnormality, atraumatic. Eyes:  Conjunctivae/corneas clear. PERRL, EOMs intact. Ears:  Normal TMs  ear canals both ears. Nose: Nares normal. Septum midline. Mucosa normal. No drainage or sinus tenderness. Throat: Lips, mucosa, and tongue normal. Teeth and gums normal.  
Neck: Supple, symmetrical, trachea midline, no adenopathy, thyroid: no enlargement/tenderness/nodules,   
Back:   Symmetric, no curvature. ROM normal.  
Lungs:   Clear to auscultation bilaterally. Chest wall:  No tenderness or deformity. Heart:  Regular rate and rhythm, S1, S2 normal, no murmur, click, rub or gallop. Abdomen:   Soft, non-tender. Bowel sounds normal. No masses,  No organomegaly. Extremities: Extremities normal, atraumatic, no cyanosis or edema. Pulses: 1+ and symmetric all extremities. Skin: Skin color, texture, turgor normal. No rashes or lesions Lymph nodes: Cervical, supraclavicular, and axillary nodes normal.  
Neurologic: CNII-XII intact. Normal strength,  and reflexes throughout. LOWER EXTREMITIES: 
Palpable pedal pulses with diminished epicritic sensation Good muscle strength Previous well healed amputated right toes 3,4,5 
2nd right toe id contracted and swollen 1st right toe is grossly swollen, macerated draining nail bed; plantar toe is with a dried plaque that is adhered to bone XRAY right foot: 
Shows amputated toes 3,4,5 
 Soft tissue effusion 1st right toe with irregular cortices of the distal phalanx Data Review: No results found for this or any previous visit (from the past 24 hour(s)). Assessment:  
 
Principal Problem: 
  Osteomyelitis of second toe of right foot ([facility] [address] (8/7/2020) Plan:  
Scheduled for amputation 1st and 2nd right toes

## 2020-08-07 NOTE — ANESTHESIA PREPROCEDURE EVALUATION
Relevant Problems No relevant active problems Anesthetic History No history of anesthetic complications Review of Systems / Medical History Patient summary reviewed and pertinent labs reviewed Pulmonary Within defined limits Neuro/Psych Within defined limits Cardiovascular Hypertension Exercise tolerance: >4 METS 
  
GI/Hepatic/Renal 
  
 
 
Renal disease: ESRD and dialysis Endo/Other Diabetes Morbid obesity Other Findings Physical Exam 
 
Airway Mallampati: II 
TM Distance: 4 - 6 cm Neck ROM: normal range of motion Mouth opening: Normal 
 
 Cardiovascular Rhythm: regular Rate: normal 
 
 
 
 Dental 
 
Dentition: Lower dentition intact and Upper dentition intact Pulmonary Breath sounds clear to auscultation Abdominal 
GI exam deferred Other Findings Anesthetic Plan ASA: 3 Anesthesia type: MAC Anesthetic plan and risks discussed with: Patient

## 2020-08-07 NOTE — OP NOTES
[facility] 
OPERATIVE REPORT Name:  Mercy Velazquez 
MR#:  [id number] :  1985 ACCOUNT #:  [de-identified] DATE OF SERVICE:  2020 PREOPERATIVE DIAGNOSIS:  Osteomyelitis, first and second right toes. POSTOPERATIVE DIAGNOSIS:  Osteomyelitis, first and second right toes. PROCEDURE PERFORMED:  Amputation toes one and two right foot. SURGEON:  Audra Curiel DPM 
 
ASSISTANT:  none ANESTHESIA:  IV sedation with local. 
 
COMPLICATIONS:  None. SPECIMENS REMOVED:  Wound cultures and first and second right toes. IMPLANTS:  none. ESTIMATED BLOOD LOSS:  Minimal. 
 
INDICATIONS:  This 50-year-old black male presents with a swollen second right toe and a necrotic first right toe. At this time, surgical intervention has been opted as the treatment of choice. Medical history and physical has been performed. The patient elects for surgery. Physical examination revealed palpable pedal pulses, fairly good muscle strength lower extremities bilateral, grossly diminished epicritic sensation. Previous well-healed amputation of right toes three, four, and five. The second right toe is contracted and swollen. The first right toe has necrotic plantar base with macerated nail bed. X-rays taken showed some irregularity of the cortices of the distal phalanges first right toe. The wound on the right toe does probe to the bone plantarly. DESCRIPTION OF PROCEDURE:  The patient was brought into the operating room, placed on the operating table in the supine position. Under the influence of IV sedation, anesthesia was achieved using 0.05% Marcaine with epinephrine. The right foot was now prepped and draped in the usual sterile manner and a successful time-out was completed. A pneumatic ankle tourniquet was inflated to 265 mmHg.   Next, using a #15 blade, toes one and two were disarticulated at the MPJ in total.  The tibial sesamoid was also removed. Wound culture was taken from the incision where the first right toe was removed. Both wounds were now flushed with copious amount of sterile saline. Surgicel was placed into both wounds. Electrocautery was also used for hemostasis. All skin edges both wounds were now reapproximated using 3.0 nylon with horizontal mattress sutures. Betadine-soaked Adaptic followed by sterile compressive dressing was now placed on the right foot. Pneumatic ankle tourniquet was released. The patient was transported from the operating room to Recovery with all vital signs stable and the patient appeared to have tolerated the procedure well. Preoperatively, he was given 900 mg IV clindamycin. Dionisio Cassette, AROLDOM 
 
 
[id number] 
D:  08/07/2020 7:49 
T:  08/07/2020 13:17 JOB #:  [id number]

## 2020-08-07 NOTE — ANESTHESIA POSTPROCEDURE EVALUATION
Procedure(s): 
AMPUTATION RIGHT TOES ONE AND TWO. MAC Anesthesia Post Evaluation Multimodal analgesia: multimodal analgesia not used between 6 hours prior to anesthesia start to PACU discharge Patient location during evaluation: PACU Patient participation: complete - patient participated Level of consciousness: awake and alert Pain management: adequate Airway patency: patent Anesthetic complications: no 
Cardiovascular status: acceptable Respiratory status: acceptable Hydration status: acceptable Post anesthesia nausea and vomiting:  none Final Post Anesthesia Temperature Assessment:  Normothermia (36.0-37.5 degrees C) INITIAL Post-op Vital signs:  
Vitals Value Taken Time /99 8/7/2020  8:00 AM  
Temp 36.7 °C (98 °F) 8/7/2020  7:36 AM  
Pulse 87 8/7/2020  8:03 AM  
Resp 18 8/7/2020  8:02 AM  
SpO2 92 % 8/7/2020  8:04 AM  
Vitals shown include unvalidated device data.

## 2020-08-07 NOTE — PROGRESS NOTES
Upon doing admission charting, prompt noted to collect MRSA swab d/t patient's Hx of MRSA Specimen collected

## 2020-08-07 NOTE — PROGRESS NOTES
Upon assisting patient to car, small quarter size amount of BRB drainage noted to dressing. Dressing reinforced with ABD pad 4X4 and opsite. Reinforced importance of notifying MD if bleeding becomes excessive and to keep leg elevated. Ice pack provided. Patient requesting XXL post op shoe (patient came in with XL). NO XXL in our floor stock. Notified materials management. None available

## 2020-08-07 NOTE — BRIEF OP NOTE
Brief Postoperative Note Patient: Vanessa Vivar YOB: 1985 MRN: [id number] Date of Procedure: 8/7/2020 Pre-Op Diagnosis: OSTEOMYELITIS RIGHT FIRST AND SECOND TOES Post-Op Diagnosis: Same as preoperative diagnosis. Procedure(s): 
AMPUTATION RIGHT TOES ONE AND TWO Surgeon(s): 
Thania Dubon DPM 
 
Surgical Assistant: None Anesthesia: MAC Estimated Blood Loss (mL): Minimal 
 
Complications: None Specimens:  
ID Type Source Tests Collected by Time Destination 1 : Right Second [address]  Thania Dubon, [place] 8/7/2020 2351 Pathology 2 : Right First [address]  [place], [place] 8/7/2020 3515 Pathology 1 : Right First Toe Wound Wound Toe CULTURE, ANAEROBIC, CULTURE, WOUND Alexis Dy Thania [place], [place] 8/7/2020 1733 Microbiology Implants: * No implants in log * Drains: * No LDAs found * Findings:infected tissue Electronically Signed by Opal Lawrence DPM on 8/7/2020 at 7:34 AM

## 2020-08-07 NOTE — PERIOP NOTES
Patient: Jina Gar MRN: [id number]  SSN: [id number] YOB: 1985  Age: 38 y.o. Sex: male Patient is status post Procedure(s): 
AMPUTATION RIGHT TOES ONE AND TWO. Surgeon(s) and Role: Adonis Lisa DPM - Primary Local/Dose/Irrigation:  Marcaine Peripheral IV 08/07/20 Anterior;Right Hand (Active) Phlebitis Assessment 0 08/07/20 0555 Dressing Status Clean, dry, & intact 08/07/20 0555 Dressing Type Transparent 08/07/20 8269 Hub Color/Line Status Blue 08/07/20 0555 Hemodialysis Access  (Active) Central Line Being Utilized No 08/07/20 0532 Criteria for Appropriate Use Dialysis/apheresis 08/07/20 0532 Site Assessment Clean, dry, & intact 08/07/20 0532 Dressing/Packing:     C/D/I right foot Splint/Cast:  ] Other:  none

## 2020-08-07 NOTE — DISCHARGE INSTRUCTIONS
DO NOT TAKE TYLENOL/ACETAMINOPHEN WITH PERCOCET, [facility], [address] TAKE NARCOTIC PAIN MEDICATIONS WITH FOOD. DO NOT DRIVE WHILE TAKING NARCOTIC PAIN MEDICATIONS. DISCHARGE SUMMARY from Nurse    The following personal items collected during your admission are returned to you:   Dental Appliance: Dental Appliances: None  Vision: Visual Aid: None  Hearing Aid:    Jewelry: Jewelry: None  Clothing: Clothing: Pants, Shirt, socks, surgical shoe  Other Valuables:    Valuables sent to safe:      PATIENT INSTRUCTIONS:    After general anesthesia or intravenous sedation, for 24 hours or while taking prescription Narcotics:        Someone should be with you for the next 24 hours. For your own safety, a responsible adult must drive you home. · Limit your activities  · Do not drive and operate hazardous machinery  · Do not make important personal or business decisions  · Do  not drink alcoholic beverages  · If you have not urinated within 8 hours after discharge, please contact your surgeon on call. Report the following to your surgeon:  · Excessive pain, swelling, redness or odor of or around the surgical area  · Temperature over 100.5  · Nausea and vomiting lasting longer than 4 hours or if unable to take medications  · Any signs of decreased circulation or nerve impairment to extremity: change in color, persistent  numbness, tingling, coldness or increase pain  ·   ·   · You will receive a Post Operative Call from one of the Recovery Room Nurses on the day after your surgery to check on you. It is very important for us to know how you are recovering after your surgery. ·   · You may receive a call from Romeo who will survey your experience here at [facility]. Please take the time to complete the phone survey when called, as your feedback and suggestions are very important to us. Our goal is to provide you excellent care in a safe and friendly environment.   ·   ·   · We wish youre a speedy recovery ? What to do at Home:    Recommended activity: Activity as tolerated and no driving for today, Do not climb stairs or shower unattended for the next 24 hours. *  Please give a list of your current medications to your Primary Care Provider. *  Please update this list whenever your medications are discontinued, doses are      changed, or new medications (including over-the-counter products) are added. *  Please carry medication information at all times in case of emergency situations. These are general instructions for a healthy lifestyle:    No smoking/ No tobacco products/ Avoid exposure to second hand smoke    Surgeon General's Warning:  Quitting smoking now greatly reduces serious risk to your health. Obesity, smoking, and sedentary lifestyle greatly increases your risk for illness    A healthy diet, regular physical exercise & weight monitoring are important for maintaining a healthy lifestyle    You may be retaining fluid if you have a history of heart failure or if you experience any of the following symptoms:  Weight gain of 3 pounds or more overnight or 5 pounds in a week, increased swelling in our hands or feet or shortness of breath while lying flat in bed. Please call your doctor as soon as you notice any of these symptoms; do not wait until your next office visit. Recognize signs and symptoms of STROKE:    F-face looks uneven    A-arms unable to move or move even    S-speech slurred or non-existent    T-time-call 911 as soon as signs and symptoms begin-DO NOT go       Back to bed or wait to see if you get better-TIME IS BRAIN. If you have not received your influenza and/or pneumococcal vaccine, please follow up with your primary care physician. The discharge information has been reviewed with the aunt. The aunt verbalized understanding.

## 2020-08-10 NOTE — PATIENT INSTRUCTIONS
Medicare Wellness Visit, Male The best way to live healthy is to have a lifestyle where you eat a well-balanced diet, exercise regularly, limit alcohol use, and quit all forms of tobacco/nicotine, if applicable. Regular preventive services are another way to keep healthy. Preventive services (vaccines, screening tests, monitoring & exams) can help personalize your care plan, which helps you manage your own care. Screening tests can find health problems at the earliest stages, when they are easiest to treat. [facility] follows the current, evidence-based guidelines published by the [facility] Sergio Charles ([facility]) when recommending preventive services for our patients. Because we follow these guidelines, sometimes recommendations change over time as research supports it. (For example, a prostate screening blood test is no longer routinely recommended for men with no symptoms). Of course, you and your doctor may decide to screen more often for some diseases, based on your risk and co-morbidities (chronic disease you are already diagnosed with). Preventive services for you include: - Medicare offers their members a free annual wellness visit, which is time for you and your primary care provider to discuss and plan for your preventive service needs. Take advantage of this benefit every year! 
-All adults over age 72 should receive the recommended pneumonia vaccines. Current USPSTF guidelines recommend a series of two vaccines for the best pneumonia protection.  
-All adults should have a flu vaccine yearly and tetanus vaccine every 10 years. 
-All adults age 48 and older should receive the shingles vaccines (series of two vaccines).       
-All adults age 38-68 who are overweight should have a diabetes screening test once every three years.  
-Other screening tests & preventive services for persons with diabetes include: an eye exam to screen for diabetic retinopathy, a kidney function test, a foot exam, and stricter control over your cholesterol.  
-Cardiovascular screening for adults with routine risk involves an electrocardiogram (ECG) at intervals determined by the provider.  
-Colorectal cancer screening should be done for adults age 54-65 with no increased risk factors for colorectal cancer. There are a number of acceptable methods of screening for this type of cancer. Each test has its own benefits and drawbacks. Discuss with your provider what is most appropriate for you during your annual wellness visit. The different tests include: colonoscopy (considered the best screening method), a fecal occult blood test, a fecal DNA test, and sigmoidoscopy. 
-All adults born between [facility] should be screened once for Hepatitis C. 
-An Abdominal Aortic Aneurysm (AAA) Screening is recommended for men age 73-68 who has ever smoked in their lifetime. Here is a list of your current Health Maintenance items (your personalized list of preventive services) with a due date: 
Health Maintenance Due Topic Date Due  Eye Exam  07/22/1994  
 DTaP/Tdap/Td  (1 - Tdap) 07/22/2005  Albumin Urine Test  04/03/2019  Cholesterol Test   04/03/2019  Pneumococcal Vaccine (3 of 3 - PCV13) 09/14/2019 V[facility] Annual Well Visit  09/15/2019  Flu Vaccine  08/01/2020 Get fasting labs done.

## 2020-08-10 NOTE — PROGRESS NOTES
Praful Gerber is a 38 y.o. male who was seen by synchronous (real-time) audio-video technology on 8/10/2020 for Diabetes Last seen by me sept 14, 2018. Since then he has missed at least 3 visits. Today he is seen post op. He had right foot great toe and 1st toe amputated aug 7 due to osteomyelitis. He has follow up with podiatry in 2 days. He is currently on HD now for his ESRD. His biggest complaint today is related to ED. This is a virtual visit due to covid 19. It is also his awv. Assessment & Plan:  
Diagnoses and all orders for this visit: 
 
1. Medicare annual wellness visit, subsequent 2. Other chronic osteomyelitis of right foot ([facility] Comments: 
s/p great and 1st toe amputation, aug 2020 3. ESRD (end stage renal disease) on dialysis ([facility] 4. Essential hypertension 5. Hyperlipidemia associated with type 2 diabetes mellitus ([facility] 6. Uncontrolled type 2 diabetes mellitus with hyperglycemia, with long-term current use of insulin ([facility] I spent at least 25 minutes on this visit with this established patient. [address] Subjective:  
 
 
Prior to Admission medications Medication Sig Start Date End Date Taking? Authorizing Provider  
oxyCODONE-acetaminophen (PERCOCET) 5-325 mg per tablet Take 1 Tab by mouth every four (4) hours as needed for Pain for up to 14 days. Max Daily Amount: 6 Tabs. 8/7/20 8/21/20 Yes Audra Denson DPM  
insulin glargine (LANTUS U-100 INSULIN) 100 unit/mL injection 12 Units by SubCUTAneous route daily. Indications: type 2 diabetes mellitus 1/15/20  Yes Paul Preston MD  
loperamide (IMODIUM) 2 mg capsule Take 1 Cap by mouth four (4) times daily as needed for Diarrhea. 12/9/17  Yes Flori Zaragoza MD  
lisinopril (PRINIVIL, ZESTRIL) 10 mg tablet Take 10 mg by mouth every Tuesday, Thursday, Saturday & Sunday. Non-HD days    Provider, Historical  
 
 
 
ROS Objective:  
 
Patient-Reported Vitals 8/10/2020 Patient-Reported Weight 337 lb Patient-Reported Height 74\" Patient-Reported Systolic  770 Patient-Reported Diastolic 75 General: alert, cooperative, no distress Mental  status: normal mood, behavior, speech, dress, motor activity, and thought processes, able to follow commands HENT: NCAT Neck: no visualized mass Resp: no respiratory distress Neuro: no gross deficits Skin: no discoloration or lesions of concern on visible areas Psychiatric: normal affect, consistent with stated mood, no evidence of hallucinations Additional exam findings:  
 
1. Osteomyelitis, right foot--sp 2 largest toes amputated. Unclear if he is still on any abx. He has follow up with podiatry, dr Pretty Harry, on wed 2.  esrd--hd mwf 3.  htn--bp remains labile, no longer on lisinopril 4. ED--check T levels 5.  Dm, type 2--on lantus. Check a1c, flp 
 
rtc 6 months We discussed the expected course, resolution and complications of the diagnosis(es) in detail. Medication risks, benefits, costs, interactions, and alternatives were discussed as indicated. I advised him to contact the office if his condition worsens, changes or fails to improve as anticipated. He expressed understanding with the diagnosis(es) and plan. Skye Britton, who was evaluated through a patient-initiated, synchronous (real-time) audio-video encounter, and/or his healthcare decision maker, is aware that it is a billable service, with coverage as determined by his insurance carrier. He provided verbal consent to proceed: Yes, and patient identification was verified. It was conducted pursuant to the emergency declaration under the [address] [place] [address], [address] [place] [address] authority and the [employer] [place] General Act. A caregiver was present when appropriate. Ability to conduct physical exam was limited. I was in the office. The patient was at home.  
 
 
Jennifer Wolfeing III, DO 
 
 
 This is the Subsequent Medicare Annual Wellness Exam, performed 12 months or more after the Initial AWV or the last Subsequent AWV I have reviewed the patient's medical history in detail and updated the computerized patient record. History Patient Active Problem List  
Diagnosis Code  
 HTN (hypertension) I10  
 Postoperative hypoxia R09.02, Z98.890  
 Diarrhea R19.7  ESRD (end stage renal disease) on dialysis ([facility]) N18.6, Z99.2  Hyperlipidemia associated with type 2 diabetes mellitus ([facility]) E11.69, E78.5  Morbid obesity with body mass index (BMI) of 40.0 to 49.9 ([facility]) E66.01  
 Uncontrolled type 2 diabetes mellitus with proliferative retinopathy, with long-term current use of insulin ([address] E11.3599, E11.65, Z79.4  
 Uncontrolled type 2 diabetes mellitus with hyperglycemia, with long-term current use of insulin ([facility]) E11.65, Z79.4  
 Uncontrolled hypertension I10  
 Acute osteomyelitis of toe of right foot ([address] M86.171  
 Osteomyelitis ([address] M86.9  Foot ulcer ([address] L97.509  ESRD (end stage renal disease) ([address] N18.6  Wound cellulitis L03.90  
 Osteomyelitis of second toe of right foot ([address] M86.9 Past Medical History:  
Diagnosis Date  Cataracts  Chronic kidney disease  Diabetes ([address]  Hypercholesterolemia  Hypertension  Kidney failure  Obesity Past Surgical History:  
Procedure Laterality Date  COLONOSCOPY N/A 12/8/2017 [facility]OSCOPY  COLONOSCOPY,DIAGNOSTIC  12/8/2017  HX AMPUTATION Left great toe and L 5th toe  HX AMPUTATION TOE Right  UPPER GI ENDOSCOPY,BIOPSY  12/8/2017  VASCULAR SURGERY PROCEDURE UNLIST    
 R side chest  
 VASCULAR SURGERY PROCEDURE UNLIST Left 07/25/2017 Creation transposed AV fistula arm Current Outpatient Medications Medication Sig Dispense Refill  oxyCODONE-acetaminophen (PERCOCET) 5-325 mg per tablet Take 1 Tab by mouth every four (4) hours as needed for Pain for up to 14 days. Max Daily Amount: 6 Tabs. 30 Tab 0  
 insulin glargine (LANTUS U-100 INSULIN) 100 unit/mL injection 12 Units by SubCUTAneous route daily. Indications: type 2 diabetes mellitus 1 Vial 0  
 loperamide (IMODIUM) 2 mg capsule Take 1 Cap by mouth four (4) times daily as needed for Diarrhea. 60 Cap 0  
 lisinopril (PRINIVIL, ZESTRIL) 10 mg tablet Take 10 mg by mouth every Tuesday, Thursday, Saturday & Sunday. Non-HD days No Known Allergies Family History Problem Relation Age of Onset  Diabetes Mother  Liver Disease Father  Kidney Disease Maternal Grandfather  Diabetes Maternal Grandfather  Hypertension Maternal Grandfather  Diabetes Paternal Uncle  Hypertension Paternal Uncle Social History Tobacco Use  Smoking status: Former Smoker Packs/day: 0.25  Smokeless tobacco: Never Used  Tobacco comment: \"quit about a year ago\" Substance Use Topics  Alcohol use: No  
  Comment: rarely Depression Risk Factor Screening:  
 
3 most recent PHQ Screens 9/14/2018 Little interest or pleasure in doing things Not at all Feeling down, depressed, irritable, or hopeless Not at all Total Score PHQ 2 0 Alcohol Risk Factor Screening (MALE < 65): Do you average more than 2 drinks per night or 14 drinks a week: No 
 
On any one occasion in the past three months have you have had more than 4 drinks containing alcohol:  No 
 
 
Functional Ability and Level of Safety:  
Hearing: Hearing is good. Activities of Daily Living: The home contains: no safety equipment. Patient needs help with:  transportation Ambulation: with mild difficulty  Uses cane Fall Risk: 
No flowsheet data found. Abuse Screen: 
Patient is not abused Cognitive Screening Has your family/caregiver stated any concerns about your memory: no 
 
Cognitive Screening: Normal - MMSE (Mini Mental Status Exam) Patient Care Team  
Patient Care Team: 
None as PCP - General 
Sonia Bosch MD as Surgeon (General Surgery) Adelso Damon DPM (Podiatry) Donald Hodgkin, RN as Ambulatory Care /Plan Education and counseling provided: 
Are appropriate based on today's review and evaluation End-of-Life planning (with patient's consent) Pneumococcal Vaccine Influenza Vaccine Diagnoses and all orders for this visit: 
 
1. Medicare annual wellness visit, subsequent Health Maintenance Due Topic Date Due  Eye Exam Retinal or Dilated  07/22/1994  
 DTaP/Tdap/Td series (1 - Tdap) 07/22/2005  MICROALBUMIN Q1  04/03/2019  Lipid Screen  04/03/2019  Pneumococcal 0-64 years (3 of 3 - PCV13) 09/14/2019  Medicare Yearly Exam  09/15/2019  Influenza Age 5 to Adult  08/01/2020 Jean Carlos Singh, who was evaluated through a synchronous (real-time) audio-video encounter, and/or his healthcare decision maker, is aware that it is a billable service, with coverage as determined by his insurance carrier. He provided verbal consent to proceed: Yes, and patient identification was verified. It was conducted pursuant to the emergency declaration under the [facility], [facility] authority and the [employer] and [employer] General Act. A caregiver was present when appropriate. Ability to conduct physical exam was limited. I was in the office. The patient was at home.  
 
Radha Basilio III,

## 2020-08-12 PROBLEM — J96.01 ACUTE RESPIRATORY FAILURE WITH HYPOXIA AND HYPERCAPNIA (HCC): Status: ACTIVE | Noted: 2020-01-01

## 2020-08-12 PROBLEM — A41.9 SEPSIS (HCC): Status: ACTIVE | Noted: 2020-01-01

## 2020-08-12 PROBLEM — J96.02 ACUTE RESPIRATORY FAILURE WITH HYPOXIA AND HYPERCAPNIA (HCC): Status: ACTIVE | Noted: 2020-01-01

## 2020-08-12 PROBLEM — L03.115 CELLULITIS OF RIGHT FOOT: Status: ACTIVE | Noted: 2020-01-01

## 2020-08-12 NOTE — Clinical Note
Bilateral groin and bilateral chest clipped prepped with ChloraPrep and draped. Wet prep elapsed drying time: 3 mins.

## 2020-08-12 NOTE — Clinical Note
Sheath #1: sheath exchanged for INTRO [facility] CRV [facility] [id number]. Hemostasis achieved.  8fr sheath RFV removed/ SEPT advanced over package wire/ aspirated and flushed

## 2020-08-12 NOTE — H&P
Hospitalist Admission Note NAME: Marlene Turner :  1985 MRN:  [id number] Date/Time:  2020 1:18 PM 
 
Patient PCP: Karine Carrillo DO Podiatry:  Dr. Brigitte Meehan 
______________________________________________________________________ Assessment & Plan: 
Sepsis, POA  WBC 19K, tachycardia Right foot cellulitis, POA after amputation of right 1st and second toe for osteomyelitis  Hx osteomyelitis right 3rd and 4th toes 2020 and 10/2019 respectively Acute hypoxic respiratory failure 88% RA IDDM uncontrolled with hyperglycemia  ESRD on HD MWF Obesity 
 
--continue vancomycin and zosyn. Follow up blood cultures. --podiatry consult, MRI right foot (no IV contrast due to being ESRD and risk for systemic fibrosis). ESR elevated 116. ID consult per podiatry request 
--check for SARS-COV-2, supplemental O2, CXR clear and lungs clear on exam without pulmonary system. Incentive spirometry. If remains hypoxic and covid test negative, consider CTA chest for PE 
--continue lantus 20 units daily, add moderate SSI 
--renal consult to continue with dialysis including today. Body mass index is 43.36 kg/m². Code: full DVT prophylaxis: heparin Surrogate decision maker:  Cristobal John Subjective: CHIEF COMPLAINT:  Sent from podiatry office HISTORY OF PRESENT ILLNESS:    
Marlene Turner is a 38 y.o. male past medical history significant for end-stage renal disease on hemodialysis , diabetes, history of right foot osteomyelitis status post amputation of right third and fourth toes and more recently amputation of right first and second toe on  by Dr. Brigitte Meehan who was sent from her office today with concerns for right foot cellulitis when he saw her in postop follow-up.   He reports subjective fevers and chills since the weekend associated with generalized weakness, shortness of breath, loss of appetite, nausea and vomiting, diarrhea. Denies any abdominal pain, chest pain, cough. We were asked to admit for work up and evaluation of the above problems. Past Medical History:  
Diagnosis Date  Cataracts  Chronic kidney disease  Diabetes ([address]  Hypercholesterolemia  Hypertension  Kidney failure  Obesity Past Surgical History:  
Procedure Laterality Date  COLONOSCOPY N/A 12/8/2017 COLONOSCOPY performed by Darrius Anderson MD at [facility] ENDOSCOPY  COLONOSCOPY,DIAGNOSTIC  12/8/2017  HX AMPUTATION Left great toe and L 5th toe  HX AMPUTATION TOE Right  UPPER GI ENDOSCOPY,BIOPSY  12/8/2017  VASCULAR SURGERY PROCEDURE UNLIST    
 R side chest  
 VASCULAR SURGERY PROCEDURE UNLIST Left 07/25/2017 Creation transposed AV fistula arm Social History Tobacco Use  Smoking status: Former Smoker Packs/day: 0.25  Smokeless tobacco: Never Used  Tobacco comment: \"quit about a year ago\" Substance Use Topics  Alcohol use: No  
  Comment: rarely Lives alone Family History Problem Relation Age of Onset  Diabetes Mother  Liver Disease Father  Kidney Disease Maternal Grandfather  Diabetes Maternal Grandfather  Hypertension Maternal Grandfather  Diabetes Paternal Uncle  Hypertension Paternal Uncle No Known Allergies Prior to Admission medications Medication Sig Start Date End Date Taking? Authorizing Provider  
insulin glargine (LANTUS) 100 unit/mL injection 20 Units by SubCUTAneous route daily. Yes Provider, Historical  
doxycycline (VIBRAMYCIN) 100 mg capsule Take 100 mg by mouth two (2) times a day. For 10 days. Yes Provider, Historical  
oxyCODONE-acetaminophen (PERCOCET) 5-325 mg per tablet Take 1 Tab by mouth every four (4) hours as needed for Pain for up to 14 days. Max Daily Amount: 6 Tabs.  8/7/20 8/21/20 Yes Dione Nj DPM  
 loperamide (IMODIUM) 2 mg capsule Take 1 Cap by mouth four (4) times daily as needed for Diarrhea. 17  Yes Stuart Mosley MD  
 
REVIEW OF SYSTEMS:  POSITIVE= Bold. Negative = normal text General:  fever, chills, sweats, generalized weakness, weight loss/gain, loss of appetite Eyes:  blurred vision, eye pain, loss of vision, diplopia Ear Nose and Throat:  rhinorrhea, pharyngitis Respiratory:   cough, sputum production, SOB, wheezing, LITTLE, pleuritic pain 
Cardiology:  chest pain, palpitations, orthopnea, PND, edema, syncope Gastrointestinal:  abdominal pain, N/V, dysphagia, diarrhea, constipation, bleeding Genitourinary:  frequency, urgency, dysuria, hematuria, incontinence Muskuloskeletal :  arthralgia, myalgia Hematology:  easy bruising, bleeding, lymphadenopathy Dermatological:  rash, ulceration, pruritis Endocrine:  hot flashes or polydipsia Neurological:  headache, dizziness, confusion, focal weakness, paresthesia, memory loss, gait disturbance Psychological: anxiety, depression, agitation Objective: VITALS:   
Visit Vitals /73 Pulse (!) 108 Temp 99.9 °F (37.7 °C) Resp 26 Ht 6' 2\" (1.88 m) Wt 153.2 kg (337 lb 11.9 oz) SpO2 92% BMI 43.36 kg/m² Temp (24hrs), Av.9 °F (37.7 °C), Min:99.9 °F (37.7 °C), Max:99.9 °F (37.7 °C) Body mass index is 43.36 kg/m². PHYSICAL EXAM: 
 
General:    Alert, obese, cooperative, no distress, appears stated age. HEENT: Atraumatic, anicteric sclerae, pink conjunctivae No oral ulcers, mucosa moist, throat clear. Hearing intact. Neck:  Supple, symmetrical,  thyroid: non tender Lungs:   Clear to auscultation bilaterally. No Wheezing or Rhonchi. No rales. Chest wall:  No tenderness  No Accessory muscle use. Heart:   Regular  rhythm,  Mild tachycardia, No  murmur   No gallop. No edema. Abdomen:   Soft, non-tender. Not distended. Bowel sounds normal. No masses Extremities: No cyanosis. No clubbing Skin:     Not pale Not Jaundiced  Sutures on right medial foot stump. Dull redness on dorsum right foot with increased warmth, no fluctuance, crepitus or tenderness Psych:  Good insight. Not depressed. Not anxious or agitated. Neurologic: EOMs intact. No facial asymmetry. No aphasia or slurred speech. Symmetrical strength, Alert and oriented X 3. Peripheral pulse: Right, DP, 1+ IMAGING RESULTS: 
 []       I have personally reviewed the actual   []     CXR  []     CT scan CXR: 
CT : 
EKG: 
 ________________________________________________________________________ Care Plan discussed with: 
  Comments Patient y Family RN Care Manager Consultant:     
________________________________________________________________________ Prophylaxis: 
GI none DVT heparin  
________________________________________________________________________ Recommended Disposition:  
Home with Family y HH/PT/OT/RN y  
SNF/LTC   
JAJA   
________________________________________________________________________ Code Status: 
Full Code y  
DNR/DNI   
________________________________________________________________________ TOTAL TIME: 50 minutes Comments  
 y Reviewed previous records  
>50% of visit spent in counseling and coordination of care  Discussion with patient and/or family and questions answered 
  
 
 
______________________________________________________________________ Darrius Goldman MD 
 
 
Procedures: see electronic medical records for all procedures/Xrays and details which were not copied into this note but were reviewed prior to creation of Plan. LAB DATA REVIEWED:   
Recent Results (from the past 24 hour(s)) METABOLIC PANEL, COMPREHENSIVE Collection Time: 08/12/20 10:42 AM  
Result Value Ref Range Sodium 131 (L) 136 - 145 mmol/L Potassium 4.2 3.5 - 5.1 mmol/L Chloride 93 (L) 97 - 108 mmol/L  
 CO2 27 21 - 32 mmol/L  Anion gap 11 5 - 15 mmol/L  
 Glucose 248 (H) 65 - 100 mg/dL BUN 54 (H) 6 - 20 MG/DL Creatinine 12.60 (H) 0.70 - 1.30 MG/DL  
 BUN/Creatinine ratio 4 (L) 12 - 20 GFR est AA 6 (L) >60 ml/min/1.73m2 GFR est non-AA 5 (L) >60 ml/min/1.73m2 Calcium 9.0 8.5 - 10.1 MG/DL Bilirubin, total 2.0 (H) 0.2 - 1.0 MG/DL  
 ALT (SGPT) 64 12 - 78 U/L  
 AST (SGOT) 71 (H) 15 - 37 U/L Alk. phosphatase 89 45 - 117 U/L Protein, total 9.2 (H) 6.4 - 8.2 g/dL Albumin 2.5 (L) 3.5 - 5.0 g/dL Globulin 6.7 (H) 2.0 - 4.0 g/dL A-G Ratio 0.4 (L) 1.1 - 2.2    
CBC WITH AUTOMATED DIFF Collection Time: 08/12/20 10:42 AM  
Result Value Ref Range WBC 19.7 (H) 4.1 - 11.1 K/uL  
 RBC 4.17 4.10 - 5.70 M/uL  
 HGB 10.8 (L) 12.1 - 17.0 g/dL HCT 34.2 (L) 36.6 - 50.3 % MCV 82.0 80.0 - 99.0 FL  
 MCH 25.9 (L) 26.0 - 34.0 PG  
 MCHC 31.6 30.0 - 36.5 g/dL  
 RDW 14.6 (H) 11.5 - 14.5 % PLATELET 907 176 - 299 K/uL MPV 9.9 8.9 - 12.9 FL  
 NRBC 0.0 0  WBC ABSOLUTE NRBC 0.00 0.00 - 0.01 K/uL NEUTROPHILS 83 (H) 32 - 75 % LYMPHOCYTES 6 (L) 12 - 49 % MONOCYTES 9 5 - 13 % EOSINOPHILS 1 0 - 7 % BASOPHILS 0 0 - 1 % IMMATURE GRANULOCYTES 1 (H) 0.0 - 0.5 % ABS. NEUTROPHILS 16.5 (H) 1.8 - 8.0 K/UL  
 ABS. LYMPHOCYTES 1.1 0.8 - 3.5 K/UL  
 ABS. MONOCYTES 1.8 (H) 0.0 - 1.0 K/UL  
 ABS. EOSINOPHILS 0.1 0.0 - 0.4 K/UL  
 ABS. BASOPHILS 0.0 0.0 - 0.1 K/UL  
 ABS. IMM. GRANS. 0.2 (H) 0.00 - 0.04 K/UL  
 DF AUTOMATED    
SED RATE (ESR) Collection Time: 08/12/20 10:42 AM  
Result Value Ref Range Sed rate, automated 116 (H) 0 - 15 mm/hr POC LACTIC ACID Collection Time: 08/12/20 10:49 AM  
Result Value Ref Range  Lactic Acid (POC) 0.88 0.40 - 2.00 mmol/L

## 2020-08-12 NOTE — CONSULTS
Consultation Note NAME: Ronak Kidney :  1985 MRN:  [id number] Date/Time:  2020 12:50 PM 
 
I have been asked to see this patient by Ms. Dung Granger  for advice/opinion re: eskd. Assessment :    Plan: 
ESKD- Anemia DM 
HTN Mild hyponatremia Foot infection [facility] - MWF 
HD today. [facility] aware. Orders placed. 
  
No retacrit for now (hgb 10.8) covid r/o Subjective: CHIEF COMPLAINT:  eskd HISTORY OF PRESENT ILLNESS:    
Jefferson Ceballos is a 38 y.o.   male who has a history of the below. Here with infected foot wound. + chills/fever. Has not missed HD. Usually requires UF of 5 liters with each HD treatment. Hypoxia with clear chest xray. I spoke with his outpatient unit and jayde acute team and ER doc. Past Medical History:  
Diagnosis Date  Cataracts  Chronic kidney disease  Diabetes ([address]  Hypercholesterolemia  Hypertension  Kidney failure  Obesity Past Surgical History:  
Procedure Laterality Date  COLONOSCOPY N/A 2017 COLONOSCOPY performed by Moisés Gilman MD at [facility] ENDOSCOPY  COLONOSCOPY,DIAGNOSTIC  2017  HX AMPUTATION Left great toe and L 5th toe  HX AMPUTATION TOE Right  UPPER GI ENDOSCOPY,BIOPSY  2017  VASCULAR SURGERY PROCEDURE UNLIST    
 R side chest  
 VASCULAR SURGERY PROCEDURE UNLIST Left 2017 Creation transposed AV fistula arm Social History Tobacco Use  Smoking status: Former Smoker Packs/day: 0.25  Smokeless tobacco: Never Used  Tobacco comment: \"quit about a year ago\" Substance Use Topics  Alcohol use: No  
  Comment: rarely Family History Problem Relation Age of Onset  Diabetes Mother  Liver Disease Father  Kidney Disease Maternal Grandfather  Diabetes Maternal Grandfather  Hypertension Maternal Grandfather  Diabetes Paternal Uncle  Hypertension Paternal Uncle No Known Allergies Prior to Admission medications Medication Sig Start Date End Date Taking? Authorizing Provider  
insulin glargine (LANTUS) 100 unit/mL injection 20 Units by SubCUTAneous route daily. Yes Provider, Historical  
doxycycline (VIBRAMYCIN) 100 mg capsule Take 100 mg by mouth two (2) times a day. For 10 days. Yes Provider, Historical  
oxyCODONE-acetaminophen (PERCOCET) 5-325 mg per tablet Take 1 Tab by mouth every four (4) hours as needed for Pain for up to 14 days. Max Daily Amount: 6 Tabs. 8/7/20 8/21/20 Yes Audra Denson DPM  
loperamide (IMODIUM) 2 mg capsule Take 1 Cap by mouth four (4) times daily as needed for Diarrhea. 12/9/17  Yes Mk Wall MD  
 
REVIEW OF SYSTEMS:   
 []  Unable to obtain reliable ROS due to  [] mental status  [] sedated   [] intubated 
 [x] Total of 12 systems reviewed as follows: 
Constitutional: + fever,+chills, negative weight loss Eyes:   negative visual changes ENT:   negative sore throat, tongue or lip swelling Respiratory:  negative cough, negative dyspnea Cards:  negative for chest pain, palpitations, lower extremity edema GI:   negative for nausea, vomiting, diarrhea, and abdominal pain :  negative for frequency, dysuria Integument:  negative for rash and pruritus Heme:  negative for easy bruising and gum/nose bleeding Musculoskel: negative for myalgias,  back pain and muscle weakness Neuro:  negative for headaches, dizziness, vertigo Psych:  negative for feelings of anxiety, depression Travel?: none Objective: VITALS:   
Visit Vitals /73 Pulse (!) 108 Temp 99.9 °F (37.7 °C) Resp 26 Ht 6' 2\" (1.88 m) Wt 153.2 kg (337 lb 11.9 oz) SpO2 92% BMI 43.36 kg/m² PHYSICAL EXAM: 
Gen:  []  WD []  WN  [] cachectic []  thin [x]  obese []  disheveled 
           []  ill apearing  []   Critical  []   Chronic    [x]  No acute distress HEENT:   [x] NC/AT/PERRLA/EOMI 
  [] pink conjunctivae      [] pale conjunctivae PERRL  [] yes  [] no      [] moist mucosa    [] dry mucosa 
  hearing intact to voice [] yes  [] No 
              
NECK:   supple [] yes  [] no        masses [] yes  [] No 
             thyroid  []  non tender  []  tender RESP:   [x] CTA bilaterally/no wheezing/rhonchi/rales/crackles [] rhonchi bilaterally - no dullness  [] wheezing   [] rhonchi   [] crackles  
  use of accessory muscles [] yes [] no CARD:   []  regular rate and rhythm/No murmurs/rubs/gallops 
  murmur  [] yes ()  [] no      Rubs  [] yes  [] no       Gallops [] yes  [] no 
  Rate []  regular  []  irregular        carotid bruits  [] Right  []  Left LE edema [] yes  [] no           JVP  []  yes   []  no 
 
ABD:    [x] soft/non distended/non tender/+bowel sounds/no HSM []  Rigid    tenderness [] yes [] no   Liver enlargement  []  yes []  no  
             Spleen enlargement  []  yes []  no     distended []  yes [] no  
  bowel sound  [] hypoactive   [] hyperactive LYMPH:    Neck []  yes []  no       Axillae []  yes []  no SKIN:   Rashes []  yes   []  no    Ulcers []  yes   []  no 
             [] tight to palpitation    skin turgor []  good  [] poor  [] decreased Cyanosis/clubbing []  yes []  no 
 
NEUR:   [x] cranial nerves II-XII grossly intact    
  [] Cranial nerves deficit 
               []  facial droop    []  slurred speech   [] aphasic  
  [] Strength normal     []  weakness  []  LUE  []   RUE/ []  LLE  []   RLE 
  follows commands  []  yes []  no        
 
PSYCH:   insight [] poor [] good   Alert and Oriented to  [x] person  [x] place  [x]  time  
                 [] depressed [] anxious [] agitated  [] lethargic [] stuporous  [] sedated LAB DATA REVIEWED:   
Recent Labs 08/12/20 
1042 WBC 19.7* HGB 10.8* HCT 34.2*  
 Recent Labs 08/12/20 
1042 * K 4.2 CL 93* CO2 27 BUN 54* CREA 12.60* * CA 9.0 Recent Labs 08/12/20 
1042 ALT 64 AP 89 TBILI 2.0* ALB 2.5*  
GLOB 6.7* No results for input(s): INR, PTP, APTT, INREXT in the last 72 hours. No results for input(s): FE, TIBC, PSAT, FERR in the last 72 hours. No results for input(s): PH, PCO2, PO2 in the last 72 hours. No results for input(s): CPK, CKMB in the last 72 hours. No lab exists for component: TROPONINI Lab Results Component Value Date/Time Glucose (POC) 255 (H) 08/07/2020 08:05 AM  
 Glucose (POC) 265 (H) 08/07/2020 05:38 AM  
 Glucose (POC) 113 (H) 01/15/2020 04:31 PM  
 Glucose (POC) 166 (H) 01/15/2020 11:47 AM  
 Glucose (POC) 109 (H) 01/15/2020 07:46 AM  
 
 
Procedures: see electronic medical records for all procedures/Xrays and details which were not copied into this note but were reviewed prior to creation of Plan.   
________________________________________________________________________ 
  
 
___________________________________________________ Consulting Physician:  Monalisa Mejia MD

## 2020-08-12 NOTE — ED NOTES
Assumed care of pt from triage. Pt is A&O x 4. Pt recently had two toes on the right foot amputated on Friday. Pt went in today for a post-op follow up and was referred here because the doctor said the surgical area appeared to be infected. Pt doctor wants pt admitted for IV antibiotic administration. Pt is a MWF dialysis pt and is supposed to have dialysis today. Pt resting on stretcher in POC with call bell in reach. Pt placed on monitor x 3. VSS at this time. 1205: Pt oxygen dropping to 88%. Placed pt on 2L NC of oxygen. Pt denies feeling SOB at this time. PT oxygen now at 95% on 2L NC. 
 
1213: Pt provided pillow under right foot for comfort. 1247: Medicated pt per orders. Pt resting on stretcher in POC with call bell in reach. Pt remains on monitor x 3. VSS at this time. 1516: Lab called with rapid COVID result. Pt test is negative. Spoke with Dr. Dorothy Mosley and PCR COVID test to be ordered. 1722: Medicated pt per orders. Pt resting on stretcher in POC with call bell in reach. Pt remains on monitor x 3. VSS at this time. 1906: Spoke with dialysis nurse. ETA 2330 tonight. Pt will need to sign a consent. 1925: Bedside and Verbal shift change report given to Ammy Welch 1 (oncoming nurse) by Faraz Caba (offgoing nurse). Report included the following information SBAR, ED Summary and Recent Results.

## 2020-08-12 NOTE — Clinical Note
TRANSFER - OUT REPORT:     Verbal report given to: DIONICIO Castillo. Report consisted of patient's Situation, Background, Assessment and   Recommendations(SBAR). Opportunity for questions and clarification was provided. Patient transported with a Registered Nurse and Oxygen. Oxygen used for patient = nasal cannula. Patient transported to: IVCU bed 2156.    transported with EP Lab staff

## 2020-08-12 NOTE — H&P
2:32 PM 
I was personally available for consultation in the emergency department. I have reviewed the chart and agree with the documentation recorded by the [facility], including the assessment, treatment plan, and disposition. Stephanie Segal MD 
 
 
 
EMERGENCY DEPARTMENT HISTORY AND PHYSICAL EXAM 
 
 
Date: 8/12/2020 Patient Name: Pastor Encinas History of Presenting Illness Chief Complaint Patient presents with  Post OP Complication Toe amputation 5 days ago. Pt seen at surgeon today and referred to ED for admit. Pt has had chills and fevers. 99.9 in triage. 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 Patient was seen in clinic today by his podiatrist Dr. Ryne Dinh and referred to the emergency department for admission. Patient has been diagnosed with cellulitis in the postoperative period after amputation of toes 1 and 2 of the right foot. Patient is to be admitted for IV antimicrobial therapy. Preliminary wound culture positive for Klebsiella and several types of staph, currently looking for another gram-negative specimen. Patient endorses nausea. He is a dialysis patient on dialysis Monday Wednesday and Friday. Last dialysis treatment was Monday. Pt denies night sweats, chest pain, pressure, SOB, LITTLE, PND, orthopnea, abdominal pain, v/d, melena, hematuria, dysuria, constipation, HA, dizziness, and syncope. There are no other complaints, changes, or physical findings at this time. PCP: Gabriela Finney, DO No current facility-administered medications on file prior to encounter. Current Outpatient Medications on File Prior to Encounter Medication Sig Dispense Refill  insulin glargine (LANTUS) 100 unit/mL injection 20 Units by SubCUTAneous route daily.  doxycycline (VIBRAMYCIN) 100 mg capsule Take 100 mg by mouth two (2) times a day. For 10 days.  oxyCODONE-acetaminophen (PERCOCET) 5-325 mg per tablet Take 1 Tab by mouth every four (4) hours as needed for Pain for up to 14 days. Max Daily Amount: 6 Tabs. 30 Tab 0  
 loperamide (IMODIUM) 2 mg capsule Take 1 Cap by mouth four (4) times daily as needed for Diarrhea. 60 Cap 0  
 [DISCONTINUED] insulin glargine (LANTUS U-100 INSULIN) 100 unit/mL injection 12 Units by SubCUTAneous route daily. Indications: type 2 diabetes mellitus (Patient taking differently: 20 Units by SubCUTAneous route daily. Indications: type 2 diabetes mellitus) 1 Vial 0 Past History Past Medical History: 
Past Medical History:  
Diagnosis Date  Cataracts  Chronic kidney disease  Diabetes [address] [address]  Hypercholesterolemia  Hypertension  Kidney failure  Obesity Past Surgical History: 
Past Surgical History:  
Procedure Laterality Date  COLONOSCOPY N/A 12/8/2017 COLONOSCOPY performed by Verenice Kruger MD at [facility] ENDOSCOPY  COLONOSCOPY,DIAGNOSTIC  12/8/2017  HX AMPUTATION Left great toe and L 5th toe  HX AMPUTATION TOE Right  UPPER GI ENDOSCOPY,BIOPSY  12/8/2017  VASCULAR SURGERY PROCEDURE UNLIST    
 R side chest  
 VASCULAR SURGERY PROCEDURE UNLIST Left 07/25/2017 Creation transposed AV fistula arm Family History: 
Family History Problem Relation Age of Onset  Diabetes Mother  Liver Disease Father  Kidney Disease Maternal Grandfather  Diabetes Maternal Grandfather  Hypertension Maternal Grandfather  Diabetes Paternal Uncle  Hypertension Paternal Uncle Social History: 
Social History Tobacco Use  Smoking status: Former Smoker Packs/day: 0.25  Smokeless tobacco: Never Used  Tobacco comment: \"quit about a year ago\" Substance Use Topics  Alcohol use: No  
  Comment: rarely  Drug use:  No  
 
 
 Allergies: 
No Known Allergies Review of Systems Review of Systems Constitutional: Positive for chills and fever. Negative for activity change, appetite change, diaphoresis, fatigue and unexpected weight change. HENT: Negative for congestion, ear pain, rhinorrhea, sinus pressure, sore throat, tinnitus, trouble swallowing and voice change. Eyes: Negative for pain, discharge, redness and visual disturbance. Respiratory: Negative for apnea, cough, choking, chest tightness, shortness of breath, wheezing and stridor. Cardiovascular: Negative for chest pain, palpitations and leg swelling. Gastrointestinal: Positive for nausea. Negative for abdominal pain, constipation and vomiting. Endocrine: Negative for cold intolerance and heat intolerance. Genitourinary: Negative for difficulty urinating, dysuria, flank pain, hematuria, testicular pain and urgency. Musculoskeletal: Negative for arthralgias, back pain, gait problem, joint swelling, myalgias, neck pain and neck stiffness. Skin: Negative for color change, pallor, rash and wound. Allergic/Immunologic: Negative for immunocompromised state. Neurological: Negative for dizziness, tremors, syncope, weakness, light-headedness, numbness and headaches. Hematological: Does not bruise/bleed easily. Psychiatric/Behavioral: Negative for agitation, confusion and suicidal ideas. Physical Exam  
Physical Exam 
Vitals signs and nursing note reviewed. Constitutional:   
   General: He is not in acute distress. Appearance: He is well-developed. He is not diaphoretic. HENT:  
   Head: Atraumatic. Nose: Nose normal.  
   Mouth/Throat:  
   Pharynx: No oropharyngeal exudate. Eyes:  
   General: No scleral icterus. Right eye: No discharge. Left eye: No discharge. Conjunctiva/sclera: Conjunctivae normal.  
Neck: Musculoskeletal: Normal range of motion and neck supple. Thyroid: No thyromegaly. Vascular: No JVD. Trachea: No tracheal deviation. Cardiovascular:  
   Rate and Rhythm: Normal rate and regular rhythm. Heart sounds: No murmur. No friction rub. No gallop. Pulmonary:  
   Effort: No respiratory distress. Breath sounds: Normal breath sounds. No stridor. No wheezing or rales. Chest:  
   Chest wall: No tenderness. Abdominal:  
   General: Bowel sounds are normal. There is no distension. Palpations: Abdomen is soft. There is no mass. Tenderness: There is no abdominal tenderness. There is no guarding or rebound. Musculoskeletal: Normal range of motion. General: No tenderness. Comments: Left AV fistula with + thrill and bruit RLE in post op shoes with ace and dressing in place Lymphadenopathy:  
   Cervical: No cervical adenopathy. Skin: 
   General: Skin is warm and dry. Neurological:  
   Mental Status: He is alert and oriented to person, place, and time. Coordination: Coordination normal.  
Psychiatric:     
   Behavior: Behavior normal.  
 
 
 
Diagnostic Study Results Labs - Recent Results (from the past 12 hour(s)) METABOLIC PANEL, COMPREHENSIVE Collection Time: 08/12/20 10:42 AM  
Result Value Ref Range Sodium 131 (L) 136 - 145 mmol/L Potassium 4.2 3.5 - 5.1 mmol/L Chloride 93 (L) 97 - 108 mmol/L  
 CO2 27 21 - 32 mmol/L Anion gap 11 5 - 15 mmol/L Glucose 248 (H) 65 - 100 mg/dL BUN 54 (H) 6 - 20 MG/DL Creatinine 12.60 (H) 0.70 - 1.30 MG/DL  
 BUN/Creatinine ratio 4 (L) 12 - 20 GFR est AA 6 (L) >60 ml/min/1.73m2 GFR est non-AA 5 (L) >60 ml/min/1.73m2 Calcium 9.0 8.5 - 10.1 MG/DL Bilirubin, total 2.0 (H) 0.2 - 1.0 MG/DL  
 ALT (SGPT) 64 12 - 78 U/L  
 AST (SGOT) 71 (H) 15 - 37 U/L Alk. phosphatase 89 45 - 117 U/L Protein, total 9.2 (H) 6.4 - 8.2 g/dL Albumin 2.5 (L) 3.5 - 5.0 g/dL Globulin 6.7 (H) 2.0 - 4.0 g/dL  A-G Ratio 0.4 (L) 1.1 - 2.2    
CBC WITH AUTOMATED DIFF  
 Collection Time: 08/12/20 10:42 AM  
Result Value Ref Range WBC 19.7 (H) 4.1 - 11.1 K/uL  
 RBC 4.17 4.10 - 5.70 M/uL  
 HGB 10.8 (L) 12.1 - 17.0 g/dL HCT 34.2 (L) 36.6 - 50.3 % MCV 82.0 80.0 - 99.0 FL  
 MCH 25.9 (L) 26.0 - 34.0 PG  
 MCHC 31.6 30.0 - 36.5 g/dL  
 RDW 14.6 (H) 11.5 - 14.5 % PLATELET 057 157 - 157 K/uL MPV 9.9 8.9 - 12.9 FL  
 NRBC 0.0 0  WBC ABSOLUTE NRBC 0.00 0.00 - 0.01 K/uL NEUTROPHILS 83 (H) 32 - 75 % LYMPHOCYTES 6 (L) 12 - 49 % MONOCYTES 9 5 - 13 % EOSINOPHILS 1 0 - 7 % BASOPHILS 0 0 - 1 % IMMATURE GRANULOCYTES 1 (H) 0.0 - 0.5 % ABS. NEUTROPHILS 16.5 (H) 1.8 - 8.0 K/UL  
 ABS. LYMPHOCYTES 1.1 0.8 - 3.5 K/UL  
 ABS. MONOCYTES 1.8 (H) 0.0 - 1.0 K/UL  
 ABS. EOSINOPHILS 0.1 0.0 - 0.4 K/UL  
 ABS. BASOPHILS 0.0 0.0 - 0.1 K/UL  
 ABS. IMM. GRANS. 0.2 (H) 0.00 - 0.04 K/UL  
 DF AUTOMATED    
CRP, HIGH SENSITIVITY Collection Time: 08/12/20 10:42 AM  
Result Value Ref Range CRP, High sensitivity >9.5 mg/L  
SED RATE (ESR) Collection Time: 08/12/20 10:42 AM  
Result Value Ref Range Sed rate, automated 116 (H) 0 - 15 mm/hr POC LACTIC ACID Collection Time: 08/12/20 10:49 AM  
Result Value Ref Range Lactic Acid (POC) 0.88 0.40 - 2.00 mmol/L  
SARS-COV-2 Collection Time: 08/12/20  2:39 PM  
Result Value Ref Range Specimen source Nasopharyngeal    
 Specimen source Nasopharyngeal    
 COVID-19 rapid test Not detected NOTD Specimen type NP Swab Health status Symptomatic Testing D DIMER Collection Time: 08/12/20  2:39 PM  
Result Value Ref Range D-dimer 0.54 0.00 - 0.65 mg/L FEU PROTHROMBIN TIME + INR Collection Time: 08/12/20  2:39 PM  
Result Value Ref Range INR 1.2 (H) 0.9 - 1.1 Prothrombin time 12.4 (H) 9.0 - 11.1 sec PTT Collection Time: 08/12/20  2:39 PM  
Result Value Ref Range aPTT 36.7 (H) 22.1 - 32.0 sec  
 aPTT, therapeutic range     58.0 - 77.0 SECS FIBRINOGEN  
 Collection Time: 08/12/20  2:39 PM  
Result Value Ref Range Fibrinogen >800 (H) 200 - 475 mg/dL SARS-COV-2 Collection Time: 08/12/20  4:17 PM  
Result Value Ref Range Specimen source Nasopharyngeal    
 SARS-CoV-2 PENDING   
 SARS-CoV-2 PENDING Specimen source Nasopharyngeal    
 COVID-19 rapid test PENDING Specimen type NP Swab Health status PENDING   
 COVID-19 PENDING   
GLUCOSE, POC Collection Time: 08/12/20  5:13 PM  
Result Value Ref Range Glucose (POC) 178 (H) 65 - 100 mg/dL Performed by Jos Green RN   
 
 
Radiologic Studies -  
XR CHEST PORT Final Result IMPRESSION: No acute cardiopulmonary process MRI LOW EXT RT WO CONT    (Results Pending) CT Results  (Last 48 hours) None CXR Results  (Last 48 hours) 08/12/20 1229  XR CHEST PORT Final result Impression:  IMPRESSION: No acute cardiopulmonary process Narrative:  EXAM: XR CHEST PORT INDICATION: hypoxic, sepsis COMPARISON: 10/8/2019 FINDINGS: A portable AP radiograph of the chest was obtained at 1217 hours. The  
patient is on a cardiac monitor. The lungs are clear. The cardiac and  
mediastinal contours and pulmonary vascularity are normal.  The bones and soft  
tissues are grossly within normal limits. Medical Decision Making I am the first provider for this patient. I reviewed the vital signs, available nursing notes, past medical history, past surgical history, family history and social history. Vital Signs-Reviewed the patient's vital signs. Patient Vitals for the past 12 hrs: 
 Temp Pulse Resp BP SpO2  
08/12/20 1400  95 23 152/58 94 % 08/12/20 1208     92 % 08/12/20 1100  (!) 108 26 161/73 (!) 88 % 08/12/20 1022 99.9 °F (37.7 °C) (!) 105 18 (!) 171/91 95 % Records Reviewed: Nursing Notes, Old Medical Records, Previous electrocardiograms, Previous Radiology Studies and Previous Laboratory Studies Provider Notes (Medical Decision Making):  
Post op infection referred for admission for ABX therapy Routine laboratory data, consult to podiatry, consult the hospitalist, Martha 
 
ED Course:  
Initial assessment performed. The patients presenting problems have been discussed, and they are in agreement with the care plan formulated and outlined with them. I have encouraged them to ask questions as they arise throughout their visit. Hemodynamically stable patient in no acute distress CONSULT NOTE:  
[address] [facility] Cornelius [place], NP spoke with Dr. Britney Rose MD, Specialty: Podiatry Discussed pt's hx, disposition, and available diagnostic and imaging results. Reviewed care plans. Consultant agrees with plans as outlined. Consult ID. Agree with Martha for now. MRI right lower extremity to evaluate for abscess formation. Jesse Nolen NP 
 
CONSULT NOTE:  
[address] [place], NP spoke with Dr. Shanta Syed MD, Specialty: Hospitalist  
Discussed pt's hx, disposition, and available diagnostic and imaging results. Reviewed care plans. Consultant agrees with plans as outlined. MD to see patient for evaluation. Requests rapid COVID test. Jesse Nolen NP 
 
 
CONSULT NOTE:  
12:47 PM 
Jesse Nolen NP spoke with Dr. Gabbi Walker MD, Specialty: Nephrology Discussed pt's hx, disposition, and available diagnostic and imaging results. Reviewed care plans. Consultant agrees with plans as outlined. Would prefer MRI without contrast. Plan for iHD later today. Jesse Nolen NP Disposition: 
Admitted to Floor Gen Med the case was discussed with the admitting physician Dr. Shanta Syed MD 
 
Diagnosis Clinical Impression: 1. Cellulitis, wound, post-operative Attestations: 
 
Jesse Nolen NP Please note that this dictation was completed with [employer], the [employer] voice recognition software.   Quite often unanticipated grammatical, syntax, homophones, and other interpretive errors are inadvertently transcribed by the computer software. Please disregard these errors. Please excuse any errors that have escaped final proofreading. Thank you.

## 2020-08-12 NOTE — PROGRESS NOTES
Pharmacy Automatic Renal Dosing Protocol - Antimicrobials Indication for Antimicrobials: Surgical Site Infection  Pt recently had two toes on the right foot amputated on Friday for Osteomyelitis. Current Regimen of Each Antimicrobial: 
Piperacillin Tazobactam 3.375 (Start Date ; Day # 1) Vancomycin 2000 mg x1 then 1000 mg after each HD Previous Antimicrobial Therapy: 
 
Vancomycin Goal Level: 20-25 mcg/ml Vancomycin Levels Date Dose & Interval Measured (mcg/mL) Steady State (mcg/mL) Date & time of next level:  
 
Significant Cultures:  
 
Radiology / Imaging results: (X-ray, CT scan or MRI):  
 
 
Labs: 
Recent Labs 20 
1042 CREA 12.60* BUN 54* WBC 19.7* Temp (24hrs), Av.9 °F (37.7 °C), Min:99.9 °F (37.7 °C), Max:99.9 °F (37.7 °C) Paralysis, amputations, malnutrition:  
Creatinine Clearance (mL/min) or Dialysis: HD MWF Impression/Plan: Antibiotics as above Vancomycin Level before second HD. Pharmacy will follow daily and adjust medications as appropriate for renal function and/or serum levels. Thank you, 
Rubens Francis, [facility] Recommended duration of therapy 
http://[facility]/[place]/[facility]/Pharmacy/Clinical%20Companion/Duration%20of%20ABX%20therapy. docx Renal Dosing 
http://[facility]/[place]/[facility]/Pharmacy/Clinical%20Companion/Renal%[id number]. pdf

## 2020-08-12 NOTE — Clinical Note
Sheath #all: Closed using manual compression and Hemostasis Pad. Site secured by Tegaderm. Pressure held for: 10 minutes.

## 2020-08-12 NOTE — LETTER
[address] [facility] September 5, 2020 RE: Renay Salinas To Whom it May Concern: This is to certify that Renay Salinas has been in the hospital from 8/12/2020 to current. Renay Salinas will no longer able to live alone due to his complicated medical history. Please feel free to contact my office if you have any questions or concerns. Thank you for your assistance in this matter. Sincerely, 
 
 
 
 
Inna Hayes MD 
[phone]

## 2020-08-12 NOTE — PROGRESS NOTES
Pharmacy Clarification of Prior to Admission Medication Regimen The patient was interviewed regarding clarification of the prior to admission medication regimen. Patient present in room and obtained permission from patient to discuss drug regimen with visitor(s) present. Patient was questioned regarding use of any other inhalers, topical products, over the counter medications, herbal medications, vitamin products or ophthalmic/nasal/otic medication use. Information Obtained From: Patient Sebastian Nur Pertinent Pharmacy Findings: 
Updated patients preferred outpatient pharmacy to: [facility] Patient had prescription for Clindamycin, never picked up for taken. Patient unsure of what day he is on for Doxycycline. PTA medication list was corrected to the following:  
 
Prior to Admission Medications Prescriptions Last Dose Informant Taking?  
doxycycline (VIBRAMYCIN) 100 mg capsule 2020 at Unknown time Self Yes Sig: Take 100 mg by mouth two (2) times a day. For 10 days. insulin glargine (LANTUS) 100 unit/mL injection 2020 at Unknown time Self Yes Si Units by SubCUTAneous route daily. loperamide (IMODIUM) 2 mg capsule 8/10/2020 Self Yes Sig: Take 1 Cap by mouth four (4) times daily as needed for Diarrhea. oxyCODONE-acetaminophen (PERCOCET) 5-325 mg per tablet 2020 at Unknown time Self Yes Sig: Take 1 Tab by mouth every four (4) hours as needed for Pain for up to 14 days. Max Daily Amount: 6 Tabs. Facility-Administered Medications: None Thank you, Nicole YOUNG Do, hT Medication History Technician

## 2020-08-13 NOTE — PROGRESS NOTES
TRANSFER - IN REPORT: 
 
Verbal report received from [facility] on Niurka Mathews  being received from PCU for ordered procedure Report consisted of patients Situation, Background, Assessment and  
Recommendations(SBAR). Information from the following report(s) Kardex was reviewed with the receiving nurse. Opportunity for questions and clarification was provided. Assessment completed upon patients arrival to unit and care assumed.

## 2020-08-13 NOTE — PROGRESS NOTES
NAME: Cece Herrera :  1985 MRN:  [id number] Assessment :    Plan: 
--ESKD- Anemia DM 
HTN Mild hyponatremia Foot infection [facility]- [place] - MWF Getting HD now. HD again tomorrow to keep on schedule. 
  
Give retacrit 10 k sc weekly (hgb <10) 
  
covid r/o (1st neg) Subjective: Chief Complaint:  The patient was seen on dialysis at 9:35 AM .  BP is stable. Access is working well. Communicated with HD nurse. Seen via window to preserve ppe and limit potential spread of covid. Review of Systems: 
 
Symptom Y/N Comments  Symptom Y/N Comments Fever/Chills    Chest Pain Poor Appetite    Edema Cough    Abdominal Pain Sputum    Joint Pain SOB/LITTLE    Pruritis/Rash Nausea/vomit    Tolerating PT/OT Diarrhea    Tolerating Diet Constipation    Other Could not obtain due to:   
 
Objective: VITALS:  
Last 24hrs VS reviewed since prior progress note. Most recent are: 
Visit Vitals BP (!) 179/91 Pulse 94 Temp 98.9 °F (37.2 °C) (Oral) Resp 22 Ht 6' 2\" (1.88 m) Wt 153.2 kg (337 lb 11.9 oz) SpO2 97% BMI 43.36 kg/m² Intake/Output Summary (Last 24 hours) at 2020 7068 Last data filed at 2020 5111 Gross per 24 hour Intake 100 ml Output  Net 100 ml Telemetry Reviewed: PHYSICAL EXAM: 
General: NAD Lab Data Reviewed: (see below) Medications Reviewed: (see below) PMH/SH reviewed - no change compared to H&P 
________________________________________________________________________ Care Plan discussed with: 
Patient Family RN Care Manager Consultant:     
 
  Comments >50% of visit spent in counseling and coordination of care    
 
________________________________________________________________________ Lamine Smalls MD  
 
 Procedures: see electronic medical records for all procedures/Xrays and details which 
were not copied into this note but were reviewed prior to creation of Plan. LABS: 
Recent Labs 08/13/20 
0602 08/12/20 
1042 WBC 15.5* 19.7* HGB 9.7* 10.8* HCT 30.2* 34.2*  
 299 Recent Labs 08/13/20 
0602 08/12/20 
1042 * 131* K 4.4 4.2 CL 97 93* CO2 27 27 BUN 68* 54* CREA 14.80* 12.60* * 248* CA 8.6 9.0 Recent Labs 08/13/20 
0602 08/12/20 
1042 AP 75 89  
TP 8.1 9.2* ALB 2.1* 2.5*  
GLOB 6.0* 6.7* Recent Labs 08/12/20 
1439 INR 1.2* PTP 12.4* APTT 36.7* No results for input(s): FE, TIBC, PSAT, FERR in the last 72 hours. No results found for: FOL, RBCF No results for input(s): PH, PCO2, PO2 in the last 72 hours. No results for input(s): CPK, CKMB in the last 72 hours. No lab exists for component: TROPONINI No components found for: [place] Lab Results Component Value Date/Time Color YELLOW/STRAW 05/01/2017 05:39 PM  
 Appearance CLOUDY (A) 05/01/2017 05:39 PM  
 Specific gravity 1.018 05/01/2017 05:39 PM  
 Specific gravity >1.030 (H) 10/03/2014 04:35 AM  
 pH (UA) 5.5 05/01/2017 05:39 PM  
 Protein 300 (A) 05/01/2017 05:39 PM  
 Glucose 250 (A) 05/01/2017 05:39 PM  
 Ketone NEGATIVE  05/01/2017 05:39 PM  
 Bilirubin NEGATIVE  05/01/2017 05:39 PM  
 Urobilinogen 0.2 05/01/2017 05:39 PM  
 Nitrites NEGATIVE  05/01/2017 05:39 PM  
 Leukocyte Esterase NEGATIVE  05/01/2017 05:39 PM  
 Epithelial cells FEW 05/01/2017 05:39 PM  
 Bacteria NEGATIVE  05/01/2017 05:39 PM  
 WBC 0-4 05/01/2017 05:39 PM  
 RBC 5-10 05/01/2017 05:39 PM  
 
 
MEDICATIONS: 
Current Facility-Administered Medications Medication Dose Route Frequency  sodium chloride (NS) flush 5-40 mL  5-40 mL IntraVENous Q8H  
 sodium chloride (NS) flush 5-40 mL  5-40 mL IntraVENous PRN  
 acetaminophen (TYLENOL) tablet 650 mg  650 mg Oral Q6H PRN  Or  
  acetaminophen (TYLENOL) suppository 650 mg  650 mg Rectal Q6H PRN  polyethylene glycol (MIRALAX) packet 17 g  17 g Oral DAILY PRN  promethazine (PHENERGAN) tablet 12.5 mg  12.5 mg Oral Q6H PRN Or  
 ondansetron (ZOFRAN) injection 4 mg  4 mg IntraVENous Q6H PRN  
 heparin (porcine) injection 5,000 Units  5,000 Units SubCUTAneous Q8H  
 insulin lispro (HUMALOG) injection   SubCUTAneous AC&HS  
 glucose chewable tablet 16 g  4 Tab Oral PRN  
 dextrose (D50W) injection syrg 12.5-25 g  12.5-25 g IntraVENous PRN  
 glucagon (GLUCAGEN) injection 1 mg  1 mg IntraMUSCular PRN  
 insulin glargine (LANTUS) injection 20 Units  20 Units SubCUTAneous DAILY  oxyCODONE-acetaminophen (PERCOCET) 5-325 mg per tablet 1 Tab  1 Tab Oral Q4H PRN  piperacillin-tazobactam (ZOSYN) 3.375 g in 0.9% sodium chloride (MBP/ADV) 100 mL  3.375 g IntraVENous Q12H  VANCOMYCIN INFORMATION NOTE   Other Rx Dosing/Monitoring

## 2020-08-13 NOTE — PROGRESS NOTES
TRANSFER - IN REPORT: 
 
Verbal report received from [facility] (name) on Pinky Oh  being received from ED (unit) for routine progression of care Report consisted of patients Situation, Background, Assessment and  
Recommendations(SBAR). Information from the following report(s) SBAR, Kardex, ED Summary, Intake/Output, MAR, Recent Results and Cardiac Rhythm NSR was reviewed with the receiving nurse. Opportunity for questions and clarification was provided. Assessment completed upon patients arrival to unit and care assumed. 2 Person skin assessment completed with Gerard August. Patient refuses change of R 1st and 2nd toe amputation. 5190  Patient c/o 9/10 R foot pain. Patient given prn percocet. 0130  Wound consult ordered for R 1st and 2nd toe amputation surgical wound. Dressing was changed in ER. Patient refuses dressing change due to pain it causes. 4624  Patient has apneic episodes while sleeping. Patient sats dropping to 70's and rapidly recovering. Patient placed on O2 @2l/NC. Sats maintained in mid 80's to low 90's. Verbal shift change report given to Jamarcus Asif (oncoming nurse) by Gosia Gil (offgoing nurse). Report included the following information SBAR, Kardex, Intake/Output, MAR, Recent Results and Cardiac Rhythm NSR.

## 2020-08-13 NOTE — PROGRESS NOTES
[phone]: STAT consult for podiatry and ID that was placed on 8/12 was called yesterday per reporting RN Dk Faith ([facility]), no notes noted from MDs - will follow-up once patient arrives on [address] STAT order for hep B surface Antigen that was ordered at 0915 was not collected. 1525: Patient is physically in room 2207. Informed supervisor to change room from [facility] to 2207 in the unit census. 1540: Called Dr. Patricia Vargas office RE: consult placed yesterday to follow-up - no answer, on hold for 8 minutes; Consult was requested via [employer]. 1550: Called and left message at Dr. Eli Nguyen office RE: consult placed yesterday to follow-up. Unable to send message via [employer] - redirected to call MD's office. 1630: Refused SCDs, per patient he is already on Heparin. Initiated and instructed patient how to use IS, patient tolerated well.  
 
1640: Dr. Crystal Isabel called back and will see patient tomorrow. Per MD, did not received consult yesterday 1655: MRI screening sheet received per Matthias Higuera from MRI department

## 2020-08-13 NOTE — WOUND CARE
Wound care Nurse Consult: consult from admitting hospitalist for POA right foot surgical amputation site wound. Patient is a 39 y/o AAM admitted for sepsis. Past Medical History:  
Diagnosis Date  Cataracts  Chronic kidney disease  Diabetes ([address]  Hypercholesterolemia  Hypertension  Kidney failure  Obesity Past Surgical History:  
Procedure Laterality Date  COLONOSCOPY N/A 12/8/2017 COLONOSCOPY performed by David Brown MD at [facility] ENDOSCOPY  COLONOSCOPY,DIAGNOSTIC  12/8/2017  HX AMPUTATION Left great toe and L 5th toe  HX AMPUTATION TOE Right  UPPER GI ENDOSCOPY,BIOPSY  12/8/2017  VASCULAR SURGERY PROCEDURE UNLIST    
 R side chest  
 VASCULAR SURGERY PROCEDURE UNLIST Left 07/25/2017 Creation transposed AV fistula arm He is ESRD on HD currently and hospitalist removed dressing and was able to express some purulent drainage from wound. Staff nurse culturing wound and Dr Nakita Omalley consulted to see her surgical site. Staff nurse told to cover wound with a dry dressing and allow Dr Nakita Omalley to assess wound this evening and order wound care. Wound needs possible surgical intervention and abxs - will defer to podiatrist Dr Nakita Omalley.  
 
Efrem Canela RN, [employer]

## 2020-08-13 NOTE — ED NOTES
Pt stable. Verbal report given to Juan Sharp RN on PCU. All pending medications & orders reviewed at this time.

## 2020-08-13 NOTE — PROCEDURES
[facility] Dialysis Team [place]  [phone] Vitals   Pre   Post   Assessment   Pre   Post    
Temp  Temp: 98.9 °F (37.2 °C) (08/13/20 0745)  98.9 oral LOC  A &  O x 4 No change HR   Pulse (Heart Rate): 91 (08/13/20 0745) 102 Lungs   Slightly dim bases  No change B/P   BP: (!) 173/94 (08/13/20 0745) 190/89 Cardiac   NSR  No change Resp   Resp Rate: 20 (08/13/20 0745) 20 Skin   Arm, dry foot wound to right foot  No change Pain level  Pain Intensity 1: 4 (08/13/20 0254) 10 Edema   
generalized No change Orders: Duration:   Start:    0745 End:    1200 Total:   4.15 Dialyzer:   Dialyzer/Set Up Inspection: Tony Gomez (08/13/20 0745) K Bath:   Dialysate K (mEq/L): 3 (08/13/20 0745) Ca Bath:   Dialysate CA (mEq/L): 2.5 (08/13/20 0745) Na/Bicarb:   Dialysate NA (mEq/L): 138 (08/13/20 0745) Target Fluid Removal:   Goal/Amount of Fluid to Remove (mL): 4000 mL (08/13/20 0745) Access Type & Location:     
Labs Obtained/Reviewed Critical Results Called   Date when labs were drawn- 
Hgb-   
HGB Date Value Ref Range Status 08/13/2020 9.7 (L) 12.1 - 17.0 g/dL Final  
 
K-   
Potassium Date Value Ref Range Status 08/13/2020 4.4 3.5 - 5.1 mmol/L Final  
 
Ca-  
Calcium Date Value Ref Range Status 08/13/2020 8.6 8.5 - 10.1 MG/DL Final  
 
Bun-  
BUN Date Value Ref Range Status 08/13/2020 68 (H) 6 - 20 MG/DL Final  
 
Creat-  
Creatinine Date Value Ref Range Status 08/13/2020 14.80 (H) 0.70 - 1.30 MG/DL Final  
 
  
Medications/ Blood Products Given Name   Dose   Route and Time    
none Blood Volume Processed (BVP):    112.0 Net Fluid Removed:  4000ml Comments RN reviewed LPN assessment and completed RN assessment. RN completed patient assessment. RN reviewed technicians vital signs and procedure note. Tx completed. Reviewed by RN Celina Jones Time Out Done: 0009 Primary Nurse Rpt Pre: Earnest Arboleda RN 
Primary Nurse Rpt Post: 
 Pt Education:Care Plan: Tx Summary:PELON AVF: skin CDI. No s/s of infection. No issues with cannulation or hemostasis. Running well at . Pt arrived to HD [address]. Consent signed & on file. SBAR received from Primary RN. 0745: Pt cannulated with 79I needles per policy & without issue. Labs drawn per request/ order. VSS. Dialysis Tx initiated. 0800: Pt resting quietly. pt. stable no problems noted 0815: Pt. Stable, lines secure 0830: Pt. Stable,lines secure watching TV 
0845: Pt. Stable, lines secure 
0900: Pt. Stable, lines secure and visible 0915: Pt. Marcello. Hd well, no s/s of distress 0930: Pt. Marcello. Hd weill no s/s of distress 0945: Pt. Stable, lines secure 
1000: Pt. Marcello. Hd well, lines secure 1015: Pt. Watching TV 
1030: Pt. Stable, lines secure 1045; Pt. Marcello. Hd well 
1100: Pt. Marcello. Hd well 1115: Pt. Resting well lines secure 1130: Pt. Resting well 1145: Pt. Stable, lines secure 
 
1200: Tx ended. VSS. All possible blood returned to patient. Hemostasis achieved without issue. Bed locked and in the lowest position, call bell and belongings in reach. SBAR given to Primary, RN. Patient is stable at time of their/ my departure. All Dialysis related medications have been reviewed. Admiting Diagnosis:Sepsis, POA  WBC 19K, tachycardia Pt's previous clinic- [facility] 
Consent signed - Informed Consent Verified: Yes (08/13/20 0745) Sylvain Consent - verified Hepatitis Status- pending Machine #- Machine Number: [id number] (08/13/20 0745) Telemetry status- yes Pre-dialysis wt. -

## 2020-08-13 NOTE — PROGRESS NOTES
Problem: Falls - Risk of 
Goal: *Absence of Falls Description: Document Donata Carrel Fall Risk and appropriate interventions in the flowsheet. Outcome: Progressing Towards Goal 
Note: Fall Risk Interventions: 
  
 
  
 
Medication Interventions: Bed/chair exit alarm, Patient to call before getting OOB, Teach patient to arise slowly History of Falls Interventions: Bed/chair exit alarm, Room close to nurse's station Problem: Patient Education: Go to Patient Education Activity Goal: Patient/Family Education Outcome: Progressing Towards Goal 
  
Problem: Pressure Injury - Risk of 
Goal: *Prevention of pressure injury Description: Document Ranjeet Scale and appropriate interventions in the flowsheet. Outcome: Progressing Towards Goal 
Note: Pressure Injury Interventions: 
Sensory Interventions: Assess changes in LOC, Keep linens dry and wrinkle-free, Float heels, Maintain/enhance activity level, Minimize linen layers, Monitor skin under medical devices Activity Interventions: Increase time out of bed, Pressure redistribution bed/mattress(bed type) Mobility Interventions: Pressure redistribution bed/mattress (bed type), PT/OT evaluation Nutrition Interventions: Document food/fluid/supplement intake, Discuss nutritional consult with provider Problem: Patient Education: Go to Patient Education Activity Goal: Patient/Family Education Outcome: Progressing Towards Goal 
  
Problem: Risk for Spread of Infection Goal: Prevent transmission of infectious organism to others Description: Prevent the transmission of infectious organisms to other patients, staff members, and visitors. Outcome: Progressing Towards Goal 
  
Problem: Patient Education:  Go to Education Activity Goal: Patient/Family Education Outcome: Progressing Towards Goal 
  
Problem: Patient Education: Go to Patient Education Activity Goal: Patient/Family Education Outcome: Progressing Towards Goal 
  
 Problem: Sepsis: Day of Diagnosis Goal: Off Pathway (Use only if patient is Off Pathway) Outcome: Progressing Towards Goal 
Goal: *Fluid resuscitation Outcome: Progressing Towards Goal 
Goal: *Paired blood cultures prior to first dose of antibiotic Outcome: Progressing Towards Goal 
Goal: *First dose of  appropriate antibiotic within 3 hours of arrival to ED, within 1 hour of arrival to ICU Outcome: Progressing Towards Goal 
Goal: *Lactic acid with first set of blood cultures Outcome: Progressing Towards Goal 
Goal: *Pneumococcal immunization (if eligible) Outcome: Progressing Towards Goal 
Goal: *Influenza immunization (if eligible) Outcome: Progressing Towards Goal 
Goal: Activity/Safety Outcome: Progressing Towards Goal 
Goal: Consults, if ordered Outcome: Progressing Towards Goal 
Goal: Diagnostic Test/Procedures Outcome: Progressing Towards Goal 
Goal: Nutrition/Diet Outcome: Progressing Towards Goal 
Goal: Discharge Planning Outcome: Progressing Towards Goal 
Goal: Medications Outcome: Progressing Towards Goal 
Goal: Respiratory Outcome: Progressing Towards Goal 
Goal: Treatments/Interventions/Procedures Outcome: Progressing Towards Goal 
Goal: Psychosocial 
Outcome: Progressing Towards Goal 
  
Problem: Sepsis: Day 2 Goal: Off Pathway (Use only if patient is Off Pathway) Outcome: Progressing Towards Goal 
Goal: *Central Venous Pressure maintained at 8-12 mm Hg Outcome: Progressing Towards Goal 
Goal: *Hemodynamically stable Outcome: Progressing Towards Goal 
Goal: *Tolerating diet Outcome: Progressing Towards Goal 
Goal: Activity/Safety Outcome: Progressing Towards Goal 
Goal: Consults, if ordered Outcome: Progressing Towards Goal 
Goal: Diagnostic Test/Procedures Outcome: Progressing Towards Goal 
Goal: Nutrition/Diet Outcome: Progressing Towards Goal 
Goal: Discharge Planning Outcome: Progressing Towards Goal 
Goal: Medications Outcome: Progressing Towards Goal 
Goal: Respiratory Outcome: Progressing Towards Goal 
Goal: Treatments/Interventions/Procedures Outcome: Progressing Towards Goal 
Goal: Psychosocial 
Outcome: Progressing Towards Goal 
  
Problem: Sepsis: Day 3 Goal: Off Pathway (Use only if patient is Off Pathway) Outcome: Progressing Towards Goal 
Goal: *Central Venous Pressure maintained at 8-12 mm Hg Outcome: Progressing Towards Goal 
Goal: *Oxygen saturation within defined limits Outcome: Progressing Towards Goal 
Goal: *Vital sign stability Outcome: Progressing Towards Goal 
Goal: *Tolerating diet Outcome: Progressing Towards Goal 
Goal: *Demonstrates progressive activity Outcome: Progressing Towards Goal 
Goal: Activity/Safety Outcome: Progressing Towards Goal 
Goal: Consults, if ordered Outcome: Progressing Towards Goal 
Goal: Diagnostic Test/Procedures Outcome: Progressing Towards Goal 
Goal: Nutrition/Diet Outcome: Progressing Towards Goal 
Goal: Discharge Planning Outcome: Progressing Towards Goal 
Goal: Medications Outcome: Progressing Towards Goal 
Goal: Respiratory Outcome: Progressing Towards Goal 
Goal: Treatments/Interventions/Procedures Outcome: Progressing Towards Goal 
Goal: Psychosocial 
Outcome: Progressing Towards Goal 
  
Problem: Sepsis: Day 4 Goal: Off Pathway (Use only if patient is Off Pathway) Outcome: Progressing Towards Goal 
Goal: Activity/Safety Outcome: Progressing Towards Goal 
Goal: Consults, if ordered Outcome: Progressing Towards Goal 
Goal: Diagnostic Test/Procedures Outcome: Progressing Towards Goal 
Goal: Nutrition/Diet Outcome: Progressing Towards Goal 
Goal: Discharge Planning Outcome: Progressing Towards Goal 
Goal: Medications Outcome: Progressing Towards Goal 
Goal: Respiratory Outcome: Progressing Towards Goal 
Goal: Treatments/Interventions/Procedures Outcome: Progressing Towards Goal 
Goal: Psychosocial 
Outcome: Progressing Towards Goal 
Goal: *Oxygen saturation within defined limits Outcome: Progressing Towards Goal 
Goal: *Hemodynamically stable Outcome: Progressing Towards Goal 
Goal: *Vital signs within defined limits Outcome: Progressing Towards Goal 
Goal: *Tolerating diet Outcome: Progressing Towards Goal 
Goal: *Demonstrates progressive activity Outcome: Progressing Towards Goal 
Goal: *Fluid volume maintenance Outcome: Progressing Towards Goal 
  
Problem: Sepsis: Day 5 Goal: Off Pathway (Use only if patient is Off Pathway) Outcome: Progressing Towards Goal 
Goal: *Oxygen saturation within defined limits Outcome: Progressing Towards Goal 
Goal: *Vital signs within defined limits Outcome: Progressing Towards Goal 
Goal: *Tolerating diet Outcome: Progressing Towards Goal 
Goal: *Demonstrates progressive activity Outcome: Progressing Towards Goal 
Goal: *Discharge plan identified Outcome: Progressing Towards Goal 
Goal: Activity/Safety Outcome: Progressing Towards Goal 
Goal: Consults, if ordered Outcome: Progressing Towards Goal 
Goal: Diagnostic Test/Procedures Outcome: Progressing Towards Goal 
Goal: Nutrition/Diet Outcome: Progressing Towards Goal 
Goal: Discharge Planning Outcome: Progressing Towards Goal 
Goal: Medications Outcome: Progressing Towards Goal 
Goal: Respiratory Outcome: Progressing Towards Goal 
Goal: Treatments/Interventions/Procedures Outcome: Progressing Towards Goal 
Goal: Psychosocial 
Outcome: Progressing Towards Goal 
  
Problem: Sepsis: Day 6 Goal: Off Pathway (Use only if patient is Off Pathway) Outcome: Progressing Towards Goal 
Goal: *Oxygen saturation within defined limits Outcome: Progressing Towards Goal 
Goal: *Vital signs within defined limits Outcome: Progressing Towards Goal 
Goal: *Tolerating diet Outcome: Progressing Towards Goal 
Goal: *Demonstrates progressive activity Outcome: Progressing Towards Goal 
Goal: Influenza immunization Outcome: Progressing Towards Goal 
Goal: *Pneumococcal immunization Outcome: Progressing Towards Goal 
Goal: Activity/Safety Outcome: Progressing Towards Goal 
Goal: Diagnostic Test/Procedures Outcome: Progressing Towards Goal 
Goal: Nutrition/Diet Outcome: Progressing Towards Goal 
Goal: Discharge Planning Outcome: Progressing Towards Goal 
Goal: Medications Outcome: Progressing Towards Goal 
Goal: Respiratory Outcome: Progressing Towards Goal 
Goal: Treatments/Interventions/Procedures Outcome: Progressing Towards Goal 
Goal: Psychosocial 
Outcome: Progressing Towards Goal 
  
Problem: Sepsis: Discharge Outcomes Goal: *Vital signs within defined limits Outcome: Progressing Towards Goal 
Goal: *Tolerating diet Outcome: Progressing Towards Goal 
Goal: *Verbalizes understanding and describes prescribed diet Outcome: Progressing Towards Goal 
Goal: *Demonstrates progressive activity Outcome: Progressing Towards Goal 
Goal: *Describes follow-up/return visits to physicians Outcome: Progressing Towards Goal 
Goal: *Verbalizes name, dosage, time, side effects, and number of days to continue medications Outcome: Progressing Towards Goal 
Goal: *Influenza immunization (Oct-Mar only) Outcome: Progressing Towards Goal 
Goal: *Pneumococcal immunization Outcome: Progressing Towards Goal 
Goal: *Lungs clear or at baseline Outcome: Progressing Towards Goal 
Goal: *Oxygen saturation returns to baseline or 90% or better on room air Outcome: Progressing Towards Goal 
Goal: *Glycemic control Outcome: Progressing Towards Goal 
Goal: *Absence of deep venous thrombosis signs and symptoms(Stroke Metric) Outcome: Progressing Towards Goal 
Goal: *Describes available resources and support systems Outcome: Progressing Towards Goal 
Goal: *Optimal pain control at patient's stated goal 
Outcome: Progressing Towards Goal

## 2020-08-13 NOTE — PROGRESS NOTES
TRANSFER - IN REPORT: 
 
Verbal report received from [place], RN(name) on Cindy Cannon  being received from PCU(unit) for routine progression of care Report consisted of patients Situation, Background, Assessment and  
Recommendations(SBAR). Information from the following report(s) SBAR, Kardex, [facility] and Cardiac Rhythm NSR w/ some runs of sinus tachycardia was reviewed with the receiving nurse. Opportunity for questions and clarification was provided. [facility] END OF SHIFT REPORT Bedside and Verbal shift change report GIVEN TO Cipriano Ashford RN. Report included the following information SBAR, Kardex, Intake/Output and Cardiac Rhythm NSR.  
 
 
SIGNIFICANT CHANGES DURING SHIFT:  No 
 
 
CONCERNS TO ADDRESS WITH MD:  No 
 
 
 
[facility] NURSING NOTE Admission Date 8/12/2020 Admission Diagnosis Sepsis ([facility] [address] [A41.9] Cellulitis of right foot [G45.008] ESRD (end stage renal disease) ([facility] [address] [N18.6] Acute respiratory failure with hypoxia and hypercapnia (HCC) [J96.01, J96.02] Consults IP CONSULT TO PODIATRY IP CONSULT TO NEPHROLOGY 
IP CONSULT TO INFECTIOUS DISEASES 
IP CONSULT TO HOSPITALIST Cardiac Monitoring [x] Yes [] No  
  
Purposeful Hourly Rounding [x] Yes   
Aida Score Total Score: 2 Aida score 3 or > [x] Bed Alarm [] Avasys [] 1:1 sitter [] Patient refused (Signed refusal form in chart) Ranjeet Score Ranjeet Score: 17 Influenza Vaccine Received Flu Vaccine for Current Season (usually Sept-March): Not Flu Season Oxygen needs? [x] Room air Oxygen @  []1L    []2L    []3L   []4L    []5L   []6L via NC Chronic home O2 use? [] Yes [x] No 
Perform O2 challenge test and document in progress note using smartphrase (.Homeoxygen) Last bowel movement Last Bowel Movement Date: 08/13/20(per pt) Urinary Catheter LDAs Peripheral IV 08/12/20 Right Antecubital (Active) Site Assessment Clean, dry, & intact 08/13/20 3801 Phlebitis Assessment 0 08/13/20 1145 Infiltration Assessment 0 08/13/20 1145 Dressing Status Clean, dry, & intact 08/13/20 1145 Dressing Type Transparent 08/13/20 1145 Hub Color/Line Status Green;Capped 08/13/20 1145 Action Taken Open ports on tubing capped 08/13/20 1145 Alcohol Cap Used No 08/13/20 1145 Readmission Risk Assessment Tool Score Low Risk   
      
 9 Total Score 4 IP Visits Last 12 Months (1-3=4, 4=9, >4=11) 5 Pt. Coverage (Medicare=5 , Medicaid, or Self-Pay=4) Criteria that do not apply:  
 Has Seen PCP in Last 6 Months (Yes=3, No=0) . Living with Significant Other. Assisted Living. LTAC. SNF. or  
Rehab Patient Length of Stay (>5 days = 3) Charlson Comorbidity Score (Age + Comorbid Conditions) Expected Length of Stay 5d 0h  
Actual Length of Stay 1

## 2020-08-13 NOTE — PROGRESS NOTES
Transition of Care Plan: D/C Home vs, Home with [facility] 
F/U appointment 2nd IM Letter CM to arrange transportation at d/c Will need meds pick- up from [address] at d/c Reason for Admission:   Sepsis, R foot cellulitis RUR Score:   15% Plan for utilizing home health:      TBD 
 
PCP: First and Last name:  Dr. Keanu Pittman Name of Practice:  
 Are you a current patient: Yes/No: Yes Approximate date of last visit:  8/10/20 VV Can you participate in a virtual visit with your PCP:  Yes Current Advanced Directive/Advance Care Plan:  Pt is full cose, No ACD on file. Pt would like completed this admission. Aunt/ POC- Stacy SalasHlpde-Hipvm-[phone]. Transition of Care Plan: D/C Home vs, Home with [facility] 
F/U appointment 2nd IM Letter CM to arrange transportation at d/c Will need meds pick- up from [facility] [address] at d/c Pt is legally blind. Pt has ESRD - on dialysis treatments with [facility] [place]- MWF. Uses [employer] for transportation. CM verified with pt, pt demographics, insurance info and emergency contacts. Pt is a/o x 4, answering questions appropriately. Pt lives alone in one story apartment- apartment on 3rd floor. Pt uses elevator, over 50 steps to third floor. Pt is independent with ADLs, ambulates with cane and does not drive. Had [facility] for wound care in the past, No SNF. Uses [facility] on [address] and [address] in [facility]. 
  
Pt will need UAI screening- info placed in unit book Medicaid Screening-  email sent to [facility] CM will continue to follow pt for D/C planning and arrange services as deemed neccessary Care Management Interventions PCP Verified by CM: Yes Last Visit to PCP: 08/10/20 Mode of Transport at Discharge: Other (see comment)(CM to arrange) Current Support Network: Lives Alone Confirm Follow Up Transport: Other (see comment)(Uses [employer] for transportation.) [facility]  [facility] 
Phone: [phone]

## 2020-08-13 NOTE — PROGRESS NOTES
Hospitalist Progress Note NAME: Audra White :  1985 MRN:  [id number] Assessment / Plan: 
Sepsis, POA  WBC 19K, tachycardia Right foot cellulitis, POA after amputation of right 1st and second toe for osteomyelitis  Hx osteomyelitis right 3rd and 4th toes 2020 and 10/2019 respectively Acute hypoxic respiratory failure 88% RA IDDM uncontrolled with hyperglycemia  ESRD on HD MWF Obesity 
  
--continue vancomycin and zosyn. Follow up blood cultures. (NGTD) --podiatry consult, MRI right foot (no IV contrast due to being ESRD and risk for systemic fibrosis). ESR elevated 116. ID consult per podiatry requested and pending 
-- SARS-COV-2 negative,  
-supplemental O2, CXR clear and lungs clear on exam without pulmonary system. Incentive spirometry. Hypoxia is expected to improve after dalysis --continue lantus 20 units daily, add moderate SSI 
--renal consult to continue with dialysis including today. 
-Large amount of chocolate color purulent discharge expressed from Sugical wound Uncontrolled HTN: 
-add amlodipine and Lisinopril 
 
  
Body mass index is 43.36 kg/m². 
  
Code: full DVT prophylaxis: heparin Surrogate decision maker:  Haig Both Subjective: Chief Complaint / Reason for Physician Visit \"denies any active complaints, hypoxia is resolved, getting dialysis, Covid 19 testing is negative\". Discussed with RN events overnight. Review of Systems: 
Symptom Y/N Comments  Symptom Y/N Comments Fever/Chills n   Chest Pain n   
Poor Appetite n   Edema n   
Cough n   Abdominal Pain n   
Sputum n   Joint Pain SOB/LITTLE n   Pruritis/Rash Nausea/vomit n   Tolerating PT/OT Diarrhea n   Tolerating Diet Constipation n   Other Could NOT obtain due to:   
 
Objective: VITALS:  
Last 24hrs VS reviewed since prior progress note. Most recent are: 
Patient Vitals for the past 24 hrs: 
 Temp Pulse Resp BP SpO2 08/13/20 1115  99 18 (!) 190/93 98 % 08/13/20 1100  99 18 (!) 192/91 99 % 08/13/20 1045  98 18 (!) 186/96 97 % 08/13/20 1030  96 18 (!) 186/91 99 % 08/13/20 1015  98 18 (!) 196/98 97 % 08/13/20 1000  96 20 (!) 190/97 99 % 08/13/20 0945  93 20 (!) 185/96 97 % 08/13/20 0930  94 22 (!) 179/91 97 % 08/13/20 0915  93 20 (!) 181/96 97 % 08/13/20 0900  94 20 (!) 183/97 96 % 08/13/20 0845  94 20 (!) 175/94 98 % 08/13/20 0830  92 20 (!) 175/95 95 % 08/13/20 0815  92 20 (!) 170/93   
08/13/20 0800  92 20 167/84   
08/13/20 0745 98.9 °F (37.2 °C) 91 20 (!) 173/94 96 % 08/13/20 0254 98.6 °F (37 °C) 91 19 156/82 95 % 08/13/20 0002 98 °F (36.7 °C) 93 18 174/87 92 % 08/12/20 2330  91 22    
08/12/20 2315  88 21    
08/12/20 2300  92 22 129/63   
08/12/20 2245  90 25    
08/12/20 2230  86 25    
08/12/20 2215  83 21    
08/12/20 2145  91 21    
08/12/20 2130  89 29  94 % 08/12/20 2115  85 27  92 % 08/12/20 2100  82 21 133/59 95 % 08/12/20 2045  82 24  95 % 08/12/20 2030  87 26  97 % 08/12/20 2015  84 28  94 % 08/12/20 2000  88 27 115/65 95 % 08/12/20 1945  85 21  94 % 08/12/20 1930  87 23  94 % 08/12/20 1915  94 22  97 % 08/12/20 1900  94 23 137/63 94 % 08/12/20 1400  95 23 152/58 94 % 08/12/20 1208     92 % Intake/Output Summary (Last 24 hours) at 8/13/2020 1133 Last data filed at 8/13/2020 9455 Gross per 24 hour Intake 100 ml Output  Net 100 ml PHYSICAL EXAM: 
General: WD, WN. Alert, cooperative, no acute distress   
EENT:  EOMI. Anicteric sclerae. MMM Resp:  CTA bilaterally, no wheezing or rales. No accessory muscle use CV:  Regular  rhythm,  No edema GI:  Soft, Non distended, Non tender.  +Bowel sounds Neurologic:  Alert and oriented X 3, normal speech, Psych:   Good insight. Not anxious nor agitated Skin:  No rashes.   No jaundice, large amount of chocolate colored purulent discharged expressed from Rt surgical wound Reviewed most current lab test results and cultures  YES Reviewed most current radiology test results   YES Review and summation of old records today    NO Reviewed patient's current orders and MAR    YES 
PMH/SH reviewed - no change compared to H&P 
________________________________________________________________________ Care Plan discussed with: 
  Comments Patient x Family RN x Care Manager Consultant Multidiciplinary team rounds were held today with , nursing, pharmacist and clinical coordinator. Patient's plan of care was discussed; medications were reviewed and discharge planning was addressed. ________________________________________________________________________ Total NON critical care TIME:  40   Minutes Total CRITICAL CARE TIME Spent:   Minutes non procedure based Comments >50% of visit spent in counseling and coordination of care    
________________________________________________________________________ Alexandra Capone MD  
 
Procedures: see electronic medical records for all procedures/Xrays and details which were not copied into this note but were reviewed prior to creation of Plan. LABS: 
I reviewed today's most current labs and imaging studies. Pertinent labs include: 
Recent Labs 08/13/20 
0602 08/12/20 
1042 WBC 15.5* 19.7* HGB 9.7* 10.8* HCT 30.2* 34.2*  
 299 Recent Labs 08/13/20 
0602 08/12/20 
1439 08/12/20 
1042 *  --  131* K 4.4  --  4.2 CL 97  --  93* CO2 27  --  27 *  --  248* BUN 68*  --  54* CREA 14.80*  --  12.60* CA 8.6  --  9.0 ALB 2.1*  --  2.5* TBILI 2.3*  --  2.0* ALT 50  --  64 INR  --  1.2*  --   
 
 
Signed: Alexandra Capone MD

## 2020-08-13 NOTE — PROGRESS NOTES
Requested MRI has not yet been performed. WBC's are trending downward once patient was placed on IV antibiotics. I will be in to see the patient to access  for probable I&D to be planned pending MRI results.

## 2020-08-14 NOTE — PROGRESS NOTES
Problem: Falls - Risk of 
Goal: *Absence of Falls Description: Document Anila Wells Fall Risk and appropriate interventions in the flowsheet. Outcome: Progressing Towards Goal 
Note: Fall Risk Interventions: 
Mobility Interventions: Bed/chair exit alarm, Communicate number of staff needed for ambulation/transfer, Patient to call before getting OOB Medication Interventions: Bed/chair exit alarm, Patient to call before getting OOB Elimination Interventions: Bed/chair exit alarm, Call light in reach, Patient to call for help with toileting needs History of Falls Interventions: Bed/chair exit alarm, Investigate reason for fall Problem: Patient Education: Go to Patient Education Activity Goal: Patient/Family Education Outcome: Progressing Towards Goal

## 2020-08-14 NOTE — PROGRESS NOTES
pt is hypoxic low 80's during sleep while on 3L nasal canula, increased O2 to 4L pt still sating in the mid 80's, does come up to 90 but then desats again. when pt was sleeping on room air he was in the 70's. Spoke with MD Dawayne Lefort, stated that pt probably has sleep apnea, to keep pt on oxygen and will schedule a sleep study.

## 2020-08-14 NOTE — PROGRESS NOTES
Foot and Ankle Progress Note Admit Date: 8/12/2020 Hospital day 2 Subjective:  
 
Patient was admitted 2 days ago. He is post op amputation of right toes 1 and 2. He had developed fever and chills 4 days post op Current Facility-Administered Medications Medication Dose Route Frequency  vancomycin (VANCOCIN) 1250 mg in  ml infusion  1,250 mg IntraVENous ONCE  
 [START ON 8/17/2020] VANCOMYCIN INFORMATION NOTE   Other PRN  
 epoetin nata-epbx (RETACRIT) injection 10,000 Units  10,000 Units SubCUTAneous Q7D  
 amLODIPine (NORVASC) tablet 10 mg  10 mg Oral DAILY  lisinopriL (PRINIVIL, ZESTRIL) tablet 10 mg  10 mg Oral DAILY  sodium chloride (NS) flush 5-40 mL  5-40 mL IntraVENous Q8H  
 sodium chloride (NS) flush 5-40 mL  5-40 mL IntraVENous PRN  
 acetaminophen (TYLENOL) tablet 650 mg  650 mg Oral Q6H PRN Or  
 acetaminophen (TYLENOL) suppository 650 mg  650 mg Rectal Q6H PRN  polyethylene glycol (MIRALAX) packet 17 g  17 g Oral DAILY PRN  promethazine (PHENERGAN) tablet 12.5 mg  12.5 mg Oral Q6H PRN Or  
 ondansetron (ZOFRAN) injection 4 mg  4 mg IntraVENous Q6H PRN  
 heparin (porcine) injection 5,000 Units  5,000 Units SubCUTAneous Q8H  
 insulin lispro (HUMALOG) injection   SubCUTAneous AC&HS  
 glucose chewable tablet 16 g  4 Tab Oral PRN  
 dextrose (D50W) injection syrg 12.5-25 g  12.5-25 g IntraVENous PRN  
 glucagon (GLUCAGEN) injection 1 mg  1 mg IntraMUSCular PRN  
 insulin glargine (LANTUS) injection 20 Units  20 Units SubCUTAneous DAILY  oxyCODONE-acetaminophen (PERCOCET) 5-325 mg per tablet 1 Tab  1 Tab Oral Q4H PRN  piperacillin-tazobactam (ZOSYN) 3.375 g in 0.9% sodium chloride (MBP/ADV) 100 mL  3.375 g IntraVENous Q12H  VANCOMYCIN INFORMATION NOTE   Other Rx Dosing/Monitoring Review of Systems See current H&P; no changes to add Objective:  
 
Patient Vitals for the past 8 hrs: 
 BP Temp Temp src Pulse Resp SpO2 Weight 08/14/20 1316 147/88 99.3 °F (37.4 °C)  (!) 102 20 93 %   
08/14/20 1210 158/90 98.8 °F (37.1 °C) Oral 98 20    
08/14/20 1200 143/89   95 20    
08/14/20 1145 116/78   98 20    
08/14/20 1130 114/72   98 20    
08/14/20 1115 99/59   97 20    
08/14/20 1100 121/68   96 20    
08/14/20 1045 122/78   94 20    
08/14/20 1030 126/82   94 20    
08/14/20 1015 144/90   92 20    
08/14/20 1000 130/81   93 20    
08/14/20 0945 142/85   93 20    
08/14/20 0930 139/87   94 20    
08/14/20 0915 134/80   90 20    
08/14/20 0900 131/78   88 20    
08/14/20 0845 123/73   91 20    
08/14/20 0830 125/77   86 20    
08/14/20 0815 130/73   84 20    
08/14/20 0800 110/72   84 20    
08/14/20 0750 122/78 98.9 °F (37.2 °C) Oral 87 20    
08/14/20 0527       149.6 kg (329 lb 14.4 oz) 08/14 0701 - 08/14 1900 In: -  
Out: 4000  
08/12 1901 - 08/14 0700 In: 1000 [P.O.:700; I.V.:300] Out: 4000 Physical Exam: right foot Previously amputated right toes 5,4,3. Sutures where toes 1 nd 2 amputated one week ago. discoloration of the right forefoot; min swelling and no warmth MRI show possible gas pocket distal amputation site of the 2nd toe but may also be representative of skin defect WBC's 12.3 that has trended paola since admission and IV antibiotics Low grade temp of 99.3 Data Review Recent Results (from the past 24 hour(s)) HEP B SURFACE AG Collection Time: 08/13/20  3:04 PM  
Result Value Ref Range Hepatitis B surface Ag <0.10 Index Hep B surface Ag Interp. Negative NEG    
GLUCOSE, POC Collection Time: 08/13/20  4:02 PM  
Result Value Ref Range Glucose (POC) 176 (H) 65 - 100 mg/dL Performed by Tereza Qiu PCT   
GLUCOSE, POC Collection Time: 08/13/20  9:04 PM  
Result Value Ref Range Glucose (POC) 183 (H) 65 - 100 mg/dL Performed by Eliot Lynne (KEY) CBC W/O DIFF  
 Collection Time: 08/14/20  3:51 AM  
Result Value Ref Range WBC 12.3 (H) 4.1 - 11.1 K/uL  
 RBC 4.46 4.10 - 5.70 M/uL  
 HGB 11.6 (L) 12.1 - 17.0 g/dL HCT 36.2 (L) 36.6 - 50.3 % MCV 81.2 80.0 - 99.0 FL  
 MCH 26.0 26.0 - 34.0 PG  
 MCHC 32.0 30.0 - 36.5 g/dL  
 RDW 14.7 (H) 11.5 - 14.5 % PLATELET 143 019 - 288 K/uL MPV 10.8 8.9 - 12.9 FL  
 NRBC 0.0 0  WBC ABSOLUTE NRBC 0.00 0.00 - 0.01 K/uL METABOLIC PANEL, BASIC Collection Time: 08/14/20  5:24 AM  
Result Value Ref Range Sodium 132 (L) 136 - 145 mmol/L Potassium 4.1 3.5 - 5.1 mmol/L Chloride 96 (L) 97 - 108 mmol/L  
 CO2 30 21 - 32 mmol/L Anion gap 6 5 - 15 mmol/L Glucose 165 (H) 65 - 100 mg/dL BUN 40 (H) 6 - 20 MG/DL Creatinine 10.70 (H) 0.70 - 1.30 MG/DL  
 BUN/Creatinine ratio 4 (L) 12 - 20 GFR est AA 7 (L) >60 ml/min/1.73m2 GFR est non-AA 5 (L) >60 ml/min/1.73m2 Calcium 8.5 8.5 - 10.1 MG/DL  
PHOSPHORUS Collection Time: 08/14/20  5:24 AM  
Result Value Ref Range Phosphorus 4.8 (H) 2.6 - 4.7 MG/DL Michael Puff Collection Time: 08/14/20  6:04 AM  
Result Value Ref Range Vancomycin, random 15.7 UG/ML  
GLUCOSE, POC Collection Time: 08/14/20  8:42 AM  
Result Value Ref Range Glucose (POC) 122 (H) 65 - 100 mg/dL Performed by Jorge Miranda GLUCOSE, POC Collection Time: 08/14/20  1:12 PM  
Result Value Ref Range Glucose (POC) 103 (H) 65 - 100 mg/dL Performed by Jorge Miranda Assessment:  
 
Active Problems: 
  ESRD (end stage renal disease) ([facility] [address] (1/6/2020) Sepsis ([facility] [address] (8/12/2020) Cellulitis of right foot (8/12/2020) Acute respiratory failure with hypoxia and hypercapnia ([facility] [address] (8/12/2020) Plan:  
Bedside I&D central area of both incisions was performed. wound cultures of both incisions performed. Wounds loosely packed with saline moistened gauze. Infectious Disease on board Wound orders placed Vascular consult ordered for potential revascularization

## 2020-08-14 NOTE — DIALYSIS
[facility] Dialysis Team [place]  [phone] Vitals   Pre   Post   Assessment   Pre   Post    
Temp  Temp: 98.9 °F (37.2 °C) (08/14/20 0750)  98.8 LOC  AxOx4 AxOx4 HR   Pulse (Heart Rate): 87 (08/14/20 0750) 98 Lungs   CTA alfredo Even and unlabored B/P   BP: 122/78 (08/14/20 0750) 158/90 Cardiac   RRR RRR Resp   Resp Rate: 20 (08/14/20 0750) 20 Skin   Intact/ bandage to right foot intact Pain level  Pain Intensity 1: 10 (08/14/20 0505)  Edema  trace 
 trace Orders: Duration:   Start:    0750 End:    1210 Total:   4.15 hrs Dialyzer:   Dialyzer/Set Up Inspection: David Cheng (08/14/20 0750) K Bath:   Dialysate K (mEq/L): 3 (08/14/20 0750) Ca Bath:   Dialysate CA (mEq/L): 2.5 (08/14/20 0750) Na/Bicarb:   Dialysate NA (mEq/L): 138 (08/14/20 0750) Target Fluid Removal:   Goal/Amount of Fluid to Remove (mL): 4000 mL (08/14/20 0750) Access Type & Location:   Left upper arm AV Fistula without evidence of warmth, redness, or drainage. +thrill/+bruit. Each access site disinfected for 60 seconds per site with alcohol swabs per P&P. Cannulated with 15G needles x2 and secured with paper tape. +aspiration/+flushed. Labs Obtained/Reviewed Critical Results Called   Date when labs were drawn- 
Hgb-   
HGB Date Value Ref Range Status 08/14/2020 11.6 (L) 12.1 - 17.0 g/dL Final  
 
K-   
Potassium Date Value Ref Range Status 08/14/2020 4.1 3.5 - 5.1 mmol/L Final  
 
Ca-  
Calcium Date Value Ref Range Status 08/14/2020 8.5 8.5 - 10.1 MG/DL Final  
 
Bun-  
BUN Date Value Ref Range Status 08/14/2020 40 (H) 6 - 20 MG/DL Final  
  Comment:  
  INVESTIGATED PER DELTA CHECK PROTOCOL Creat-  
Creatinine Date Value Ref Range Status 08/14/2020 10.70 (H) 0.70 - 1.30 MG/DL Final  
  Comment:  
  INVESTIGATED PER DELTA CHECK PROTOCOL Medications/ Blood Products Given Name   Dose   Route and Time Heparin bolus  1:1000 1000 units @ 6727 Blood Volume Processed (BVP):    105.4 L Net Fluid Removed:  4000 mL Comments Time Out Done: 8912 Primary Nurse Rpt Pre: Bianca Devries RN 
Primary Nurse Rpt Post: Sanya Smith RN 
Pt Education: infection control/procedural 
Care Plan: continue current HD plan of care Tx Summary: 
[id number] HD treatment initiated per physician order. 7379 Dr Saad Esparza at bedside. Discussed need for heparin due to 4+ hrs treatment. Verbal order to give Heparin 1000 units bolus x 1 PRN. 4511 Heparin bolus given. 1210 HD treatment completed per physician order. All possible blood returned to patient. Hemostasis achieved in <10 minutes. Access site dressings clean, dry, and intact. +thrill. Admiting Diagnosis: Sepsis s/t cellulitis of right foot Pt's previous clinic- [address] 
Consent signed - Informed Consent Verified: Yes (08/14/20 0750) [place] Consent - verified Hepatitis Status- 8/13/20 neg/susc (cc) Machine #- Machine Number: [id number] (08/14/20 0750) Telemetry status- remote Pre-dialysis wt. -

## 2020-08-14 NOTE — PROGRESS NOTES
NAME: Carlos Felipe :  1985 MRN:  [id number] Assessment :    Plan: 
--ESKD- Anemia DM 
HTN Mild hyponatremia Foot infection [facility]- [place] - MWF Getting HD now.  
  
retacrit 10 k sc weekly  
  
covid neg Will see again on Monday, but please call with questions or changes in the meantime. Subjective: Chief Complaint:  The patient was seen on dialysis at 8:13 AM .  BP is stable. Access is working well. Resting Review of Systems: 
 
Symptom Y/N Comments  Symptom Y/N Comments Fever/Chills    Chest Pain Poor Appetite    Edema Cough    Abdominal Pain Sputum    Joint Pain SOB/LITTLE    Pruritis/Rash Nausea/vomit    Tolerating PT/OT Diarrhea    Tolerating Diet Constipation    Other Could not obtain due to:   
 
Objective: VITALS:  
Last 24hrs VS reviewed since prior progress note. Most recent are: 
Visit Vitals /72 Pulse 84 Temp 98.9 °F (37.2 °C) (Oral) Resp 20 Ht 6' 2\" (1.88 m) Wt 149.6 kg (329 lb 14.4 oz) SpO2 93% BMI 42.36 kg/m² Intake/Output Summary (Last 24 hours) at 2020 [facility] Last data filed at 2020 1859 Gross per 24 hour Intake 700 ml Output 4000 ml Net -3300 ml Telemetry Reviewed: PHYSICAL EXAM: 
General: NAD Lab Data Reviewed: (see below) Medications Reviewed: (see below) PMH/SH reviewed - no change compared to H&P 
________________________________________________________________________ Care Plan discussed with: 
Patient Family RN Care Manager Consultant:     
 
  Comments >50% of visit spent in counseling and coordination of care    
 
________________________________________________________________________ Sarahy Clemens MD  
 
Procedures: see electronic medical records for all procedures/Xrays and details which 
 were not copied into this note but were reviewed prior to creation of Plan. LABS: 
Recent Labs 08/14/20 
0351 08/13/20 
0602 WBC 12.3* 15.5* HGB 11.6* 9.7* HCT 36.2* 30.2*  278 Recent Labs 08/14/20 
0524 08/13/20 
0602 08/12/20 
1042 * 133* 131* K 4.1 4.4 4.2 CL 96* 97 93* CO2 30 27 27 BUN 40* 68* 54* CREA 10.70* 14.80* 12.60* * 120* 248* CA 8.5 8.6 9.0 PHOS 4.8*  --   --   
 
Recent Labs 08/13/20 
0602 08/12/20 
1042 AP 75 89  
TP 8.1 9.2* ALB 2.1* 2.5*  
GLOB 6.0* 6.7* Recent Labs 08/12/20 
1439 INR 1.2* PTP 12.4* APTT 36.7* No results for input(s): FE, TIBC, PSAT, FERR in the last 72 hours. No results found for: FOL, RBCF No results for input(s): PH, PCO2, PO2 in the last 72 hours. No results for input(s): CPK, CKMB in the last 72 hours. No lab exists for component: TROPONINI No components found for: [place] Lab Results Component Value Date/Time Color YELLOW/STRAW 05/01/2017 05:39 PM  
 Appearance CLOUDY (A) 05/01/2017 05:39 PM  
 Specific gravity 1.018 05/01/2017 05:39 PM  
 Specific gravity >1.030 (H) 10/03/2014 04:35 AM  
 pH (UA) 5.5 05/01/2017 05:39 PM  
 Protein 300 (A) 05/01/2017 05:39 PM  
 Glucose 250 (A) 05/01/2017 05:39 PM  
 Ketone NEGATIVE  05/01/2017 05:39 PM  
 Bilirubin NEGATIVE  05/01/2017 05:39 PM  
 Urobilinogen 0.2 05/01/2017 05:39 PM  
 Nitrites NEGATIVE  05/01/2017 05:39 PM  
 Leukocyte Esterase NEGATIVE  05/01/2017 05:39 PM  
 Epithelial cells FEW 05/01/2017 05:39 PM  
 Bacteria NEGATIVE  05/01/2017 05:39 PM  
 WBC 0-4 05/01/2017 05:39 PM  
 RBC 5-10 05/01/2017 05:39 PM  
 
 
MEDICATIONS: 
Current Facility-Administered Medications Medication Dose Route Frequency  epoetin nata-epbx (RETACRIT) injection 10,000 Units  10,000 Units SubCUTAneous Q7D  
 amLODIPine (NORVASC) tablet 10 mg  10 mg Oral DAILY  lisinopriL (PRINIVIL, ZESTRIL) tablet 10 mg  10 mg Oral DAILY  sodium chloride (NS) flush 5-40 mL  5-40 mL IntraVENous Q8H  
 sodium chloride (NS) flush 5-40 mL  5-40 mL IntraVENous PRN  
 acetaminophen (TYLENOL) tablet 650 mg  650 mg Oral Q6H PRN Or  
 acetaminophen (TYLENOL) suppository 650 mg  650 mg Rectal Q6H PRN  polyethylene glycol (MIRALAX) packet 17 g  17 g Oral DAILY PRN  promethazine (PHENERGAN) tablet 12.5 mg  12.5 mg Oral Q6H PRN Or  
 ondansetron (ZOFRAN) injection 4 mg  4 mg IntraVENous Q6H PRN  
 heparin (porcine) injection 5,000 Units  5,000 Units SubCUTAneous Q8H  
 insulin lispro (HUMALOG) injection   SubCUTAneous AC&HS  
 glucose chewable tablet 16 g  4 Tab Oral PRN  
 dextrose (D50W) injection syrg 12.5-25 g  12.5-25 g IntraVENous PRN  
 glucagon (GLUCAGEN) injection 1 mg  1 mg IntraMUSCular PRN  
 insulin glargine (LANTUS) injection 20 Units  20 Units SubCUTAneous DAILY  oxyCODONE-acetaminophen (PERCOCET) 5-325 mg per tablet 1 Tab  1 Tab Oral Q4H PRN  piperacillin-tazobactam (ZOSYN) 3.375 g in 0.9% sodium chloride (MBP/ADV) 100 mL  3.375 g IntraVENous Q12H  VANCOMYCIN INFORMATION NOTE   Other Rx Dosing/Monitoring

## 2020-08-14 NOTE — PROGRESS NOTES
0725: report received from Rahel Lockwood, [id number]: transport here to take pt to dialysis at this time. Pt sleeping on 3L oxygen nasal canula. Full assessment to be completed when pt returns to unit.

## 2020-08-14 NOTE — PROGRESS NOTES
Hospitalist Progress Note NAME: Jean Carlos Singh :  1985 MRN:  [id number] Assessment / Plan: 
Sepsis, POA  WBC 19K, tachycardia Right foot cellulitis with abscess, POA after amputation of right 1st and second toe for osteomyelitis  Hx osteomyelitis right 3rd and 4th toes 2020 and 10/2019 respectively Acute hypoxic respiratory failure 88% RA IDDM uncontrolled with hyperglycemia  ESRD on HD MWF Obesity 
  
--continue vancomycin and zosyn. Follow up blood cultures. (NGTD) -Wound gram stain showed gram negative rods --podiatry consult apreciated, MRI right foot showed fluid collection and abscess with no osteomyelitis 
-Plan for irrigation of wound likely by podiatry ESR elevated 116. ID consult per podiatry requested and pending 
-- SARS-COV-2 negative,  
-supplemental O2, CXR clear and lungs clear on exam without pulmonary system. Incentive spirometry. Hypoxia is resolved after dalysis --continue lantus 20 units daily, add moderate SSI 
-- appreciated renal consult to continue with dialysis  
-Large amount of chocolate color purulent discharge expressed from Sugical wound on manual expression Uncontrolled HTN: 
-added amlodipine and Lisinopril 
-Blood pressure better controlled now 
 
  Body mass index is 43.36 kg/m². 
  
Code: full DVT prophylaxis: heparin Surrogate decision maker:  Lanita Heimlich Subjective: Chief Complaint / Reason for Physician Visit \"denies any active complaints, hypoxia is resolved, getting dialysis, Covid 19 testing is negative\". Discussed with RN events overnight. Review of Systems: 
Symptom Y/N Comments  Symptom Y/N Comments Fever/Chills n   Chest Pain n   
Poor Appetite n   Edema n   
Cough n   Abdominal Pain n   
Sputum n   Joint Pain SOB/LITTLE n   Pruritis/Rash Nausea/vomit n   Tolerating PT/OT Diarrhea n   Tolerating Diet Constipation n   Other Could NOT obtain due to:   
 
Objective: VITALS:  
 Last 24hrs VS reviewed since prior progress note. Most recent are: 
Patient Vitals for the past 24 hrs: 
 Temp Pulse Resp BP SpO2  
08/14/20 1015  92 20 144/90   
08/14/20 1000  93 20 130/81   
08/14/20 0945  93 20 142/85   
08/14/20 0930  94 20 139/87   
08/14/20 0915  90 20 134/80   
08/14/20 0900  88 20 131/78   
08/14/20 0845  91 20 123/73   
08/14/20 0830  86 20 125/77   
08/14/20 0815  84 20 130/73   
08/14/20 0800  84 20 110/72   
08/14/20 0750 98.9 °F (37.2 °C) 87 20 122/78   
08/14/20 0300 99.3 °F (37.4 °C) 97 16 131/77 93 % 08/13/20 2311 99.9 °F (37.7 °C) 98 18 118/65 97 % 08/13/20 2030 98.7 °F (37.1 °C) (!) 101 18 130/75 94 % 08/13/20 2021     96 % 08/13/20 1530 99.3 °F (37.4 °C) 99 20 133/71 95 % 08/13/20 1145 98.9 °F (37.2 °C) (!) 101 20 186/90 96 % 08/13/20 1133 98.9 °F (37.2 °C)      
08/13/20 1130  99 18 (!) 196/98 98 % 08/13/20 1115  99 18 (!) 190/93 98 % 08/13/20 1100  99 18 (!) 192/91 99 % 08/13/20 1045  98 18 (!) 186/96 97 % 08/13/20 1030  96 18 (!) 186/91 99 % Intake/Output Summary (Last 24 hours) at 8/14/2020 1026 Last data filed at 8/14/2020 0556 Gross per 24 hour Intake 900 ml Output 4000 ml Net -3100 ml PHYSICAL EXAM: 
General: WD, WN. Alert, cooperative, no acute distress   
EENT:  EOMI. Anicteric sclerae. MMM Resp:  CTA bilaterally, no wheezing or rales. No accessory muscle use CV:  Regular  rhythm,  No edema GI:  Soft, Non distended, Non tender.  +Bowel sounds Neurologic:  Alert and oriented X 3, normal speech, Psych:   Good insight. Not anxious nor agitated Skin:  No rashes. No jaundice, Reviewed most current lab test results and cultures  YES Reviewed most current radiology test results   YES Review and summation of old records today    NO Reviewed patient's current orders and MAR    YES 
PMH/SH reviewed - no change compared to H&P 
 ________________________________________________________________________ Care Plan discussed with: 
  Comments Patient x Family RN x Care Manager Consultant Multidiciplinary team rounds were held today with , nursing, pharmacist and clinical coordinator. Patient's plan of care was discussed; medications were reviewed and discharge planning was addressed. ________________________________________________________________________ Total NON critical care TIME:  30   Minutes Total CRITICAL CARE TIME Spent:   Minutes non procedure based Comments >50% of visit spent in counseling and coordination of care    
________________________________________________________________________ Ramone Moralez MD  
 
Procedures: see electronic medical records for all procedures/Xrays and details which were not copied into this note but were reviewed prior to creation of Plan. LABS: 
I reviewed today's most current labs and imaging studies. Pertinent labs include: 
Recent Labs 08/14/20 
0351 08/13/20 
0602 08/12/20 
1042 WBC 12.3* 15.5* 19.7* HGB 11.6* 9.7* 10.8* HCT 36.2* 30.2* 34.2*  
 278 299 Recent Labs 08/14/20 
0524 08/13/20 
0602 08/12/20 
1439 08/12/20 
1042 * 133*  --  131* K 4.1 4.4  --  4.2 CL 96* 97  --  93* CO2 30 27  --  27 * 120*  --  248* BUN 40* 68*  --  54* CREA 10.70* 14.80*  --  12.60* CA 8.5 8.6  --  9.0 PHOS 4.8*  --   --   --   
ALB  --  2.1*  --  2.5* TBILI  --  2.3*  --  2.0* ALT  --  50  --  64 INR  --   --  1.2*  --   
 
 
Signed: Ramone Moralez MD

## 2020-08-14 NOTE — PROGRESS NOTES
Pharmacy Automatic Renal Dosing Protocol - Antimicrobials Indication for Antimicrobials: Surgical Site Infection  Pt recently had two toes on the right foot amputated on Friday for Osteomyelitis. Current Regimen of Each Antimicrobial: 
Piperacillin Tazobactam 3.375 (Start Date ; Day # 3) Vancomycin 1000 mg after each HD (Start Date ; Day # 3) Previous Antimicrobial Therapy: 
None Vancomycin Goal Level: 20-25 mcg/ml Vancomycin Levels Date Dose & Interval Measured (mcg/mL) Steady State (mcg/mL)  AM 1 g IV after HD 15.7 Date & time of next level: Next week Significant Cultures:  
 Blood: NGTD x 2 days- pending : MRSA: not present- final 
 Wound: pending Paralysis, amputations, malnutrition: None significant Labs: 
Recent Labs 20 
9794 20 
0351 20 
0602 20 
1042 CREA 10.70*  --  14.80* 12.60* BUN 40*  --  68* 54* WBC  --  12.3* 15.5* 19.7* Temp (24hrs), Av.2 °F (37.3 °C), Min:98.7 °F (37.1 °C), Max:99.9 °F (37.7 °C) Creatinine Clearance (mL/min) or Dialysis: HD MWF Impression/Plan:  
Level less than desired at 15.7 mcg/mL. Will change to 1250 mg IV after HD. Will continue current regimen for Zosyn as appropriate for indication and renal function. DOT: pending Pharmacy will follow daily and adjust medications as appropriate for renal function and/or serum levels. Thank you, Amy Claudette Rosales, PHARMD 
 
 
Recommended duration of therapy 
http://[facility]/[place]/[facility]/Pharmacy/Clinical%20Companion/Duration%20of%20ABX%20therapy. docx Renal Dosing 
http://[facility]/[place]/[facility]/Pharmacy/Clinical%20Companion/Renal%[email or web address]. pdf

## 2020-08-14 NOTE — PROGRESS NOTES
Consult received. Wound healing issues after 1st and second right toe amputations. Non-palpable pedal pulses. Diminished R NOÉ (1/2019). RLE critical limb threatening ischemia. Plan for right lower extremity angiogram possible intervention on Tuesday. Call with questions over the weekend. Addendum: 
Plan for OR Monday afternoon for RLE angiogram. NPO p MN. Will place preop orders/consent.

## 2020-08-14 NOTE — CONSULTS
Infectious Disease Consult Kimmy Griffin MD FAC Date of Consultation:  8/14/20 Date of Admission: 8/12/2020 Referring Moraima Penn Subjective:  
 
Patient is a 38 y.o. male who is being seen for - Polymicrobial wound infection IMPRESSION:  
  
- Diabetic R/ foot infection with cellulitis, abscess , OM 
  MRI- 8/13- 9 x 8 x 11 mm nonspecific soft tissue collection containing gas 
distal to the second metatarsal head. WC- 8/13- polymicrobial 
  Staph species, coagulase negative , K.pneumoniae, Pseudomonas( 2 colony types) E.cloacae complex, Diphtheroids BC- NG 
- S/p amputation of R/ 1st & 2nd toes on 8/7 No available Pathology - H/o OM of R 3rd, 4th toes Amputation of 3rd toe - 1/9/20, 4th toe- 10/11/19 
- H/o MRSA infection 1/20 
- Poorly controlled Diabetes A1c 9.4 High blood sugars 
-PAD S/p RLE arteriogram, angioplasty on 8/17 
- ESRD on HD 
- Poorly controlled HTN improved - ESR - 116 PLAN:  
  
  
  
- Continue Vancomycin,Zosyn iv pending final cultures, today's wound  cultures - Await Pathology report- if margins clear -  2 weeks of IV antibiotics with close follow up of wound progress. If margins + for OM, recommend further debridement, specially as WC has a polymicrobial growth of  bacterial organisms. Podiatry note reviewed. No plans for further Surgery , in that case, if margins are positive pt would need 6 weeks of antimicrobial therapy. Await final WC Results & plan  Antibiotic regimen - Blood sugar control- D/w pt 
 -Elevate foot , D/w pt 
   
 
 
 
Jean Carlos Singh is a 38 y.o. male with past medical history significant for end-stage renal disease on hemodialysis Monday Wednesday Friday, diabetes, history of right foot osteomyelitis status post amputation of right third and fourth toes and more recently amputation of right first and second toe on August 7 by Dr. Lindsey Bain .  He was  sent from her office on 8/12 with concerns for right foot cellulitis when he saw her on  postop follow-up. Per ED note he had reported subjective fevers and chills since the weekend associated with generalized weakness, shortness of breath, loss of appetite, nausea and vomiting, diarrhea. He had denies any abdominal pain, chest pain, cough. WC- 8/13- polymicrobial 
  Staph species, coagulase negative, K.pneumoniae, Pseudomonas( 2 colony types) E.cloacae complex, Diphtheroids. Final culture pending  
 Pt seen in HD. Complains of pain swelling of foot. D/w Dr Elena Mcdonnell Patient Active Problem List  
Diagnosis Code  
 HTN (hypertension) I10  
 Postoperative hypoxia R09.02, Z98.890  
 Diarrhea R19.7  ESRD (end stage renal disease) on dialysis ([facility]) N18.6, Z99.2  Hyperlipidemia associated with type 2 diabetes mellitus ([facility]) E11.69, E78.5  Morbid obesity with body mass index (BMI) of 40.0 to 49.9 ([facility]) E66.01  
 Uncontrolled type 2 diabetes mellitus with proliferative retinopathy, with long-term current use of insulin ([address] E11.3599, E11.65, Z79.4  
 Uncontrolled type 2 diabetes mellitus with hyperglycemia, with long-term current use of insulin ([facility]) E11.65, Z79.4  
 Uncontrolled hypertension I10  
 Acute osteomyelitis of toe of right foot ([address] M86.171  
 Osteomyelitis ([address] M86.9  Foot ulcer ([address] L97.509  ESRD (end stage renal disease) ([address] N18.6  Wound cellulitis L03.90  
 Osteomyelitis of second toe of right foot ([address] M86.9  Sepsis ([address] A41.9  Cellulitis of right foot L03.115  
 Acute respiratory failure with hypoxia and hypercapnia ([facility]) J96.01, J96.02  
 Atrial flutter ([facility]) I48.92  
 Abnormal stress test R94.39  
 CAD (coronary artery disease) I25.10  
 S/P cardiac cath Y09.480 Past Medical History:  
Diagnosis Date  Arrhythmia   
 aflutter  Blind  Cataracts  Cellulitis and abscess of foot  Chronic kidney disease [facility]  
 Diabetes ([address] DM II  GERD (gastroesophageal reflux disease)  Hearing loss  Hypercholesterolemia  Hypertension  Neuropathy  Obesity  Osteomyelitis ([facility]  Puerperal sepsis with acute hypoxic respiratory failure (HCC)  Sepsis ([facility] Family History Problem Relation Age of Onset  Diabetes Mother  Liver Disease Father  Kidney Disease Maternal Grandfather  Diabetes Maternal Grandfather  Hypertension Maternal Grandfather  Diabetes Paternal Uncle  Hypertension Paternal Uncle Social History Tobacco Use  Smoking status: Former Smoker Packs/day: 0.25 Years: 20.00 Pack years: 5.00 Last attempt to quit: 6/1/2017 Years since quitting: 3.4  Smokeless tobacco: Never Used  Tobacco comment: \"quit about a year ago\" Substance Use Topics  Alcohol use: Not Currently Comment: rarely Past Surgical History:  
Procedure Laterality Date  CARDIAC SURG PROCEDURE UNLIST  09/2020  
 ablation  COLONOSCOPY N/A 12/8/2017 COLONOSCOPY performed by Malick Banuelos MD at [facility] ENDOSCOPY  COLONOSCOPY,DIAGNOSTIC  12/8/2017  HX AFIB ABLATION  09/04/2020  HX AMPUTATION Left great toe and L 5th toe  HX AMPUTATION Right   
 bka  HX AMPUTATION TOE Right  HX VASCULAR ACCESS    
 VA COMPRE ELECTROPHYSIOL XM W/LEFT ATRIAL PACNG/REC N/A 9/4/2020 Lt Atrial Pace & Record During Ep Study performed by Desmond Daigle MD at [facility] CARDIAC CATH LAB  VA EPHYS EVAL W/ABLATION SUPRAVENT ARRHYTHMIA N/A 9/4/2020 ABLATION A-FLUTTER performed by Desmond Daigle MD at [facility] CARDIAC CATH LAB  VA INTRACARDIAC ELECTROPHYSIOLOGIC 3D MAPPING N/A 9/4/2020 Ep 3d Mapping performed by Desmond Daigle MD at [facility] CARDIAC CATH LAB  UPPER GI ENDOSCOPY,BIOPSY  12/8/2017  VASCULAR SURGERY PROCEDURE UNLIST    
 R side chest  
 VASCULAR SURGERY PROCEDURE UNLIST Left 07/25/2017 Creation transposed AV fistula arm Prior to Admission medications Medication Sig Start Date End Date Taking? Authorizing Provider  
oxyCODONE IR (ROXICODONE) 5 mg immediate release tablet Take 1 Tab by mouth every four (4) hours as needed for Pain for up to 7 days. Max Daily Amount: 30 mg. 10/27/20 11/3/20  Kelly Charlotte BERMAN NP  
gabapentin (NEURONTIN) 300 mg capsule Take 1 Cap by mouth nightly. Max Daily Amount: 300 mg. 10/27/20   Kelly Charlotte BERMAN NP  
insulin glargine (LANTUS) 100 unit/mL injection 10 Units by SubCUTAneous route nightly. 10/23/20   Vikash Joe NP  
aspirin delayed-release 81 mg tablet Take 1 Tab by mouth daily. 10/23/20   Kelly Charlotte BERMAN NP  
atorvastatin (LIPITOR) 20 mg tablet Take 1 Tab by mouth nightly. 10/23/20   Vikash Joe NP  
clopidogreL (PLAVIX) 75 mg tab Take 1 Tab by mouth daily. 10/23/20   Kelly Charlotte BERMAN NP  
metoprolol tartrate 75 mg tab Take 75 mg by mouth two (2) times a day. 10/23/20   Kelly Charlotte BERMAN NP  
polyethylene glycol (MIRALAX) 17 gram packet Take 1 Packet by mouth daily as needed for Constipation. 10/23/20   Vikash Joe NP  
folic acid/vit B complex and C (RENAL VITAMIN PO) Take  by mouth daily. Provider, Historical  
sevelamer (RENAGEL) 800 mg tablet Take 800 mg by mouth three (3) times daily (with meals). Provider, Historical  
 
No Known Allergies Review of Systems:  A comprehensive review of systems was negative except for that written in the History of Present Illness. 10 point review of systems obtained . All other systems negative Objective:  
Blood pressure 158/89, pulse 92, temperature 98 °F (36.7 °C), resp. rate 18, height 6' 2\" (1.88 m), weight 311 lb 15.2 oz (141.5 kg), SpO2 94 %. No data recorded. No current facility-administered medications for this encounter. Current Outpatient Medications Medication Sig  
 oxyCODONE IR (ROXICODONE) 5 mg immediate release tablet Take 1 Tab by mouth every four (4) hours as needed for Pain for up to 7 days. Max Daily Amount: 30 mg.  
 gabapentin (NEURONTIN) 300 mg capsule Take 1 Cap by mouth nightly. Max Daily Amount: 300 mg.  
 insulin glargine (LANTUS) 100 unit/mL injection 10 Units by SubCUTAneous route nightly.  aspirin delayed-release 81 mg tablet Take 1 Tab by mouth daily.  atorvastatin (LIPITOR) 20 mg tablet Take 1 Tab by mouth nightly.  clopidogreL (PLAVIX) 75 mg tab Take 1 Tab by mouth daily.  metoprolol tartrate 75 mg tab Take 75 mg by mouth two (2) times a day.  polyethylene glycol (MIRALAX) 17 gram packet Take 1 Packet by mouth daily as needed for Constipation.  folic acid/vit B complex and C (RENAL VITAMIN PO) Take  by mouth daily.  sevelamer (RENAGEL) 800 mg tablet Take 800 mg by mouth three (3) times daily (with meals). Exam:   
General:  Awake, cooperative, Eyes:  Sclera anicteric. Pupils equally round and reactive to light. Mouth/Throat: Mucous membranes normal, oral pharynx clear Neck: Supple Lungs:   Reduced to auscultation bases CV:  Regular rate and rhythm,no murmur, click, rub or gallop Abdomen:   Soft, non-tender. bowel sounds normal. non-distended Extremities:  edema + Skin: Skin color, texture, turgor normal. no acute rash or lesions Lymph nodes: Cervical and supraclavicular normal  
Musculoskeletal: swelling ++ RLE Lines/Devices:  Intact, no erythema, drainage or tenderness Psych: Awake and oriented, depressedmood affect Data Reviewed:  
 
Lab Results Component Value Date/Time  Culture result: LIGHT KLEBSIELLA PNEUMONIAE (A) 08/21/2020 11:50 AM  
 Culture result: LIGHT ENTEROBACTER CLOACAE COMPLEX (A) 08/21/2020 11:50 AM  
 Culture result: LIGHT DIPHTHEROIDS (A) 08/21/2020 11:50 AM  
 Culture result: LIGHT PSEUDOMONAS AERUGINOSA (A) 08/21/2020 11:50 AM  
 Culture result: NO ANAEROBES ISOLATED 08/21/2020 11:50 AM  
  
 
 
XR Results (most recent): 
 Results from [facility] Encounter encounter on 08/12/20 XR CHEST PORT Narrative EXAM: Portable CXR. 15 hours. INDICATION: chest pain FINDINGS: 
The lungs appear clear. Heart is normal in size. There is no pulmonary edema. There is no evident pneumothorax, adenopathy or pleural effusion. No significant 
change since 8/12/2020. Impression IMPRESSION: No Acute Disease. ICD-10-CM ICD-9-CM 1. Cellulitis, wound, post-operative  T81.49XA 998.59   
2. Acute osteomyelitis of toe of right foot ([facility])  M86.171 730.07 oxyCODONE IR (ROXICODONE) 5 mg immediate release tablet DISCONTINUED: gabapentin (NEURONTIN) 100 mg capsule DISCONTINUED: oxyCODONE IR (ROXICODONE) 5 mg immediate release tablet DISCONTINUED: gabapentin (NEURONTIN) 100 mg capsule 3. Acute respiratory failure with hypoxia and hypercapnia ([facility])  J96.01 518.81   
 J96.02    
4. Cellulitis of right foot  L03.115 682.7 5. Diarrhea of infectious origin  A09 009.2 6. Osteomyelitis of foot, right, acute ([facility]  M86.171 730.07   
7. Pseudomonas infection  A49.8 041.7 8. Klebsiella infection  A49.8 041.85   
9. Infection caused by Enterobacter cloacae  A49.8 041.85   
10. ESRD (end stage renal disease) ([facility]  N18.6 585.6 11. Poorly controlled diabetes mellitus ([facility]  E11.65 250.00   
12. ESRD (end stage renal disease) on dialysis ([facility])  N18.6 585.6 Z99.2 V45.11   
13. Other chronic osteomyelitis of right foot ([facility]  M86.671 730.17   
14. Sepsis due to Pseudomonas species with acute renal failure without septic shock, unspecified acute renal failure type ([facility]  A41.52 038.43   
 R65.20 995.92   
 N17.9 584.9 15. Bandemia  D72.825 288.66   
16. Essential hypertension  I10 401.9 17. Morbid obesity with body mass index (BMI) of 40.0 to 49.9 ([facility])  E66.01 278.01   
18. Osteomyelitis of second toe of right foot (HCC)  M86.9 730.27   
19.  Postoperative hypoxia  R09.02 799.02   
 Z98.890    
 20. Uncontrolled hypertension  I10 401.9   
21. Uncontrolled type 2 diabetes mellitus with hyperglycemia, with long-term current use of insulin ([facility])  E11.65 250.02   
 Z79.4 V58.67   
22. Uncontrolled type 2 diabetes mellitus with proliferative retinopathy, with long-term current use of insulin ([facility]  E11.3599 250.52 E11.65 362.02   
 Z79.4 V58.67   
23. Wound cellulitis  L03.90 682.9 24. Typical atrial flutter ([facility])  I48.3 427.32   
25. Atrial flutter, unspecified type ([facility])  I48.92 427.32 ELECTROPHYSIOLOGY PROCEDURE  
   ELECTROPHYSIOLOGY PROCEDURE Antibiotic History Vancomycin & Zosyn IV I have discussed the diagnosis with the patient and the intended plan as seen in the above orders. I have discussed medication side effects and warnings with the patient as well. Reviewed test results at length with patient Signed By: Bhavin Lopez MD

## 2020-08-14 NOTE — ROUTINE PROCESS
Bedside and Verbal shift change report given to Ranjan Medina RN (oncoming nurse) by [address] DIONICIO Belcher (offgoing nurse). Report included the following information SBAR, Kardex, Intake/Output and MAR.

## 2020-08-15 NOTE — PROGRESS NOTES
Hospitalist Progress Note NAME: Praful Gerber :  1985 MRN:  [id number] Assessment / Plan: 
Sepsis, POA  WBC 19K, tachycardia Right foot cellulitis with abscess, POA after amputation of right 1st and second toe for osteomyelitis  Hx osteomyelitis right 3rd and 4th toes 2020 and 10/2019 respectively Acute hypoxic respiratory failure 88% RA IDDM uncontrolled with hyperglycemia  ESRD on HD MWF Obesity 
  
--continue vancomycin and zosyn. Follow up blood cultures. (NGTD) -Wound gram stain showed gram negative rods --podiatry consult apreciated, MRI right foot showed fluid collection and abscess with no osteomyelitis -s/p irrigation of wound  
-Noticed to be febrile this morning ESR elevated 116. ID consult per podiatry requested and pending 
-- SARS-COV-2 negative,  
-supplemental O2, CXR clear and lungs clear on exam without pulmonary system. Incentive spirometry. Hypoxia is resolved after dalysis --continue lantus 20 units daily, add moderate SSI 
-- appreciated renal consult to continue with dialysis  
-Large amount of chocolate color purulent discharge expressed from Sugical wound on manual expression 
-Plan for lower extremity angiogram by vascular team on Monday Uncontrolled HTN: 
-added amlodipine and Lisinopril 
-Blood pressure better controlled now Diarrhea: 
-Likely Antibiotic induced -Imodium PRN 
-Will add probiotic 
 
  
Body mass index is 43.36 kg/m². 
  
Code: full DVT prophylaxis: heparin Surrogate decision maker:  Alexandria Neves Subjective: Chief Complaint / Reason for Physician Visit \"denies any active complaints, hypoxia is resolved, getting dialysis, Covid 19 testing is negative, reports watery diarrhea\". Discussed with RN events overnight. Review of Systems: 
Symptom Y/N Comments  Symptom Y/N Comments Fever/Chills n   Chest Pain n   
Poor Appetite n   Edema n   
Cough n   Abdominal Pain n   
Sputum n   Joint Pain SOB/LITTLE n   Pruritis/Rash Nausea/vomit n   Tolerating PT/OT Diarrhea y   Tolerating Diet Constipation n   Other Could NOT obtain due to:   
 
Objective: VITALS:  
Last 24hrs VS reviewed since prior progress note. Most recent are: 
Patient Vitals for the past 24 hrs: 
 Temp Pulse Resp BP SpO2  
08/15/20 0950  88  107/62   
08/15/20 0819 99.3 °F (37.4 °C) 88 18 118/72 90 % 08/15/20 0646 98.4 °F (36.9 °C) 86     
08/15/20 0236 (!) 101.4 °F (38.6 °C) 92 18 128/69 97 % 08/14/20 2220 100 °F (37.8 °C) 97     
08/14/20 2111 (!) 101.5 °F (38.6 °C) (!) 104 18 136/74 95 % 08/14/20 2016 (!) 103 °F (39.4 °C) (!) 106 18 136/74 94 % 08/14/20 1607 99.7 °F (37.6 °C) 99 20 115/76 93 % 08/14/20 1316 99.3 °F (37.4 °C) (!) 102 20 147/88 93 % 08/14/20 1210 98.8 °F (37.1 °C) 98 20 158/90   
08/14/20 1200  95 20 143/89   
08/14/20 1145  98 20 116/78   
08/14/20 1130  98 20 114/72   
08/14/20 1115  97 20 99/59   
08/14/20 1100  96 20 121/68   
08/14/20 1045  94 20 122/78  Intake/Output Summary (Last 24 hours) at 8/15/2020 1038 Last data filed at 8/14/2020 1210 Gross per 24 hour Intake  Output 4000 ml Net -4000 ml PHYSICAL EXAM: 
General: WD, WN. Alert, cooperative, no acute distress   
EENT:  EOMI. Anicteric sclerae. MMM Resp:  CTA bilaterally, no wheezing or rales. No accessory muscle use CV:  Regular  rhythm,  No edema GI:  Soft, Non distended, Non tender.  +Bowel sounds Neurologic:  Alert and oriented X 3, normal speech, Psych:   Good insight. Not anxious nor agitated Skin:  No rashes. No jaundice, Reviewed most current lab test results and cultures  YES Reviewed most current radiology test results   YES Review and summation of old records today    NO Reviewed patient's current orders and MAR    YES 
PMH/SH reviewed - no change compared to H&P 
________________________________________________________________________ Care Plan discussed with: 
 Comments Patient x Family RN x Care Manager Consultant Multidiciplinary team rounds were held today with , nursing, pharmacist and clinical coordinator. Patient's plan of care was discussed; medications were reviewed and discharge planning was addressed. ________________________________________________________________________ Total NON critical care TIME:  30   Minutes Total CRITICAL CARE TIME Spent:   Minutes non procedure based Comments >50% of visit spent in counseling and coordination of care    
________________________________________________________________________ Lorie Quiñonez MD  
 
Procedures: see electronic medical records for all procedures/Xrays and details which were not copied into this note but were reviewed prior to creation of Plan. LABS: 
I reviewed today's most current labs and imaging studies. Pertinent labs include: 
Recent Labs 08/15/20 
0018 08/14/20 
4297 08/13/20 
0602 WBC 11.0 12.3* 15.5* HGB 10.8* 11.6* 9.7* HCT 35.6* 36.2* 30.2*  307 278 Recent Labs 08/14/20 
0524 08/13/20 
0602 08/12/20 
1439 08/12/20 
1042 * 133*  --  131* K 4.1 4.4  --  4.2 CL 96* 97  --  93* CO2 30 27  --  27 * 120*  --  248* BUN 40* 68*  --  54* CREA 10.70* 14.80*  --  12.60* CA 8.5 8.6  --  9.0 PHOS 4.8*  --   --   --   
ALB  --  2.1*  --  2.5* TBILI  --  2.3*  --  2.0* ALT  --  50  --  64 INR  --   --  1.2*  --   
 
 
Signed: Lorie Quiñonez MD

## 2020-08-15 NOTE — PROGRESS NOTES
0700: Bedside shift change report given to Dustin Fox RN (oncoming nurse) by Alfreda Antonio RN (offgoing nurse). Report included the following information SBAR.  
 
1542: Dr. Mayra Duvall notified of 6 beats of Vtach. Instructed to monitor for now.

## 2020-08-15 NOTE — PROGRESS NOTES
Problem: Falls - Risk of 
Goal: *Absence of Falls Description: Document Jessica Vang Fall Risk and appropriate interventions in the flowsheet. Outcome: Progressing Towards Goal 
Note: Fall Risk Interventions: 
Mobility Interventions: Assess mobility with egress test, Bed/chair exit alarm, Mechanical lift, OT consult for ADLs, PT Consult for mobility concerns, PT Consult for assist device competence, Strengthening exercises (ROM-active/passive), Utilize walker, cane, or other assistive device Medication Interventions: Assess postural VS orthostatic hypotension, Bed/chair exit alarm, Patient to call before getting OOB, Teach patient to arise slowly Elimination Interventions: Bed/chair exit alarm, Call light in reach, Patient to call for help with toileting needs, Toilet paper/wipes in reach, Toileting schedule/hourly rounds History of Falls Interventions: Bed/chair exit alarm

## 2020-08-15 NOTE — PROGRESS NOTES
Pt  With temp of 104. Tylenol  Given. Paired  Blood cultures  drawn  as per infectious diseases   MD note. . Pain medication given twice Bedside and Verbal shift change report given to [address] (oncoming nurse) by Ainsley Macedo RN (offgoing nurse). Report included the following information SBAR, Kardex, Intake/Output, MAR, Recent Results and Med Rec Status.

## 2020-08-16 NOTE — PROGRESS NOTES
Hospitalist Progress Note NAME: Pinky Oh :  1985 MRN:  [id number] Assessment / Plan: 
Sepsis, POA  WBC 19K, tachycardia Right foot cellulitis with abscess, POA after amputation of right 1st and second toe for osteomyelitis  Hx osteomyelitis right 3rd and 4th toes 2020 and 10/2019 respectively Acute hypoxic respiratory failure 88% RA IDDM uncontrolled with hyperglycemia  ESRD on HD MWF Obesity 
  
--continue vancomycin and zosyn. Follow up blood cultures. (NGTD) -Wound gram stain showed gram negative rods --podiatry consult apreciated, MRI right foot showed fluid collection and abscess with no osteomyelitis -s/p irrigation of wound  
-Noticed to be febrile this morning ESR elevated 116. ID consult per podiatry requested and pending 
-- SARS-COV-2 negative,  
-supplemental O2, CXR clear and lungs clear on exam without pulmonary system. Incentive spirometry. Hypoxia is resolved after dalysis --continue lantus 20 units daily, add moderate SSI 
-- appreciated renal consult to continue with dialysis  
-Large amount of chocolate color purulent discharge expressed from Sugical wound on manual expression 
-Plan for lower extremity angiogram by vascular team on Monday Uncontrolled HTN: 
-added amlodipine and Lisinopril 
-Blood pressure better controlled now Diarrhea: 
-Likely Antibiotic induced -Imodium PRN 
-Will add probiotic 
 
  
Body mass index is 43.36 kg/m². 
  
Code: full DVT prophylaxis: heparin Surrogate decision maker:  Katherin Cramer Subjective: Chief Complaint / Reason for Physician Visit \"denies any active complaints, hypoxia is resolved, getting dialysis, Covid 19 testing is negative, reports watery diarrhea\". Discussed with RN events overnight. Review of Systems: 
Symptom Y/N Comments  Symptom Y/N Comments Fever/Chills n   Chest Pain n   
Poor Appetite n   Edema n   
Cough n   Abdominal Pain n   
Sputum n   Joint Pain SOB/LITTLE n   Pruritis/Rash Nausea/vomit n   Tolerating PT/OT Diarrhea y   Tolerating Diet Constipation n   Other Could NOT obtain due to:   
 
Objective: VITALS:  
Last 24hrs VS reviewed since prior progress note. Most recent are: 
Patient Vitals for the past 24 hrs: 
 Temp Pulse Resp BP SpO2  
08/16/20 1102 98.1 °F (36.7 °C) 83 20 127/65 94 % 08/16/20 0745  80     
08/16/20 0730 98.8 °F (37.1 °C) 81 18 127/73 95 % 08/16/20 0715  82     
08/16/20 0700  83     
08/16/20 0335 98.7 °F (37.1 °C) 94 18 126/72 94 % 08/15/20 2319 (!) 102.6 °F (39.2 °C) 95 18 110/76 91 % 08/15/20 2003 99.2 °F (37.3 °C) 94 18 115/67 94 % 08/15/20 1714 98.9 °F (37.2 °C) 88 18 136/68 93 % 08/15/20 1132 98.7 °F (37.1 °C) 85 18 96/45 (!) 89 % Intake/Output Summary (Last 24 hours) at 8/16/2020 1110 Last data filed at 8/16/2020 5013 Gross per 24 hour Intake 460 ml Output  Net 460 ml PHYSICAL EXAM: 
General: WD, WN. Alert, cooperative, no acute distress   
EENT:  EOMI. Anicteric sclerae. MMM Resp:  CTA bilaterally, no wheezing or rales. No accessory muscle use CV:  Regular  rhythm,  No edema GI:  Soft, Non distended, Non tender.  +Bowel sounds Neurologic:  Alert and oriented X 3, normal speech, Psych:   Good insight. Not anxious nor agitated Skin:  No rashes. No jaundice, Reviewed most current lab test results and cultures  YES Reviewed most current radiology test results   YES Review and summation of old records today    NO Reviewed patient's current orders and MAR    YES 
PMH/SH reviewed - no change compared to H&P 
________________________________________________________________________ Care Plan discussed with: 
  Comments Patient x Family RN x Care Manager Consultant Multidiciplinary team rounds were held today with , nursing, pharmacist and clinical coordinator.   Patient's plan of care was discussed; medications were reviewed and discharge planning was addressed. ________________________________________________________________________ Total NON critical care TIME:  30   Minutes Total CRITICAL CARE TIME Spent:   Minutes non procedure based Comments >50% of visit spent in counseling and coordination of care    
________________________________________________________________________ Opal Andrade MD  
 
Procedures: see electronic medical records for all procedures/Xrays and details which were not copied into this note but were reviewed prior to creation of Plan. LABS: 
I reviewed today's most current labs and imaging studies. Pertinent labs include: 
Recent Labs 08/15/20 
0018 20 
8860 WBC 11.0 12.3* HGB 10.8* 11.6* HCT 35.6* 36.2*  
 307 Recent Labs 20 
1109 * K 4.1 CL 96* CO2 30 * BUN 40* CREA 10.70* CA 8.5 PHOS 4.8* Signed: Opal Andrade MD 
 
 
 
 
Hospitalist Progress Note NAME: Tatyana Mccoy :  1985 MRN:  [id number] Assessment / Plan: 
Sepsis, POA  WBC 19K, tachycardia Right foot cellulitis with abscess, POA after amputation of right 1st and second toe for osteomyelitis  Hx osteomyelitis right 3rd and 4th toes 2020 and 10/2019 respectively Acute hypoxic respiratory failure 88% RA IDDM uncontrolled with hyperglycemia  ESRD on HD MWF Obesity 
  
--continue vancomycin and transition zosyn to cefepime. Follow up blood cultures. (NGTD) -Wound cx growing diphtheroids(sensitivities pending) --podiatry consult apreciated, MRI right foot showed fluid collection and abscess with no osteomyelitis -s/p irrigation of wound  
-Noticed to be febrile this morning ESR elevated 116.   ID consult per podiatry requested and pending 
-- SARS-COV-2 negative,  
-supplemental O2, CXR clear and lungs clear on exam without pulmonary system. Incentive spirometry. Hypoxia is resolved after dalysis --continue lantus 20 units daily, add moderate SSI 
-- appreciated renal consult to continue with dialysis  
-Large amount of chocolate color purulent discharge expressed from Sugical wound on manual expression 
-Plan for lower extremity angiogram by vascular team on Monday Uncontrolled HTN: 
-added amlodipine and Lisinopril 
-Blood pressure better controlled now Diarrhea: 
-Likely Antibiotic induced -Imodium PRN 
-Added probiotic 
 
  
Body mass index is 43.36 kg/m². 
  
Code: full DVT prophylaxis: heparin Surrogate decision maker:  Keith Guzman Subjective: Chief Complaint / Reason for Physician Visit \"denies any active complaints, hypoxia is resolved, getting dialysis, Covid 19 testing is negative, reports watery diarrhea has improved, fever noticed last night\". Discussed with RN events overnight. Review of Systems: 
Symptom Y/N Comments  Symptom Y/N Comments Fever/Chills n   Chest Pain n   
Poor Appetite n   Edema n   
Cough n   Abdominal Pain n   
Sputum n   Joint Pain SOB/LITTLE n   Pruritis/Rash Nausea/vomit n   Tolerating PT/OT Diarrhea y   Tolerating Diet Constipation n   Other Could NOT obtain due to:   
 
Objective: VITALS:  
Last 24hrs VS reviewed since prior progress note. Most recent are: 
Patient Vitals for the past 24 hrs: 
 Temp Pulse Resp BP SpO2  
08/16/20 1102 98.1 °F (36.7 °C) 83 20 127/65 94 % 08/16/20 0745  80     
08/16/20 0730 98.8 °F (37.1 °C) 81 18 127/73 95 % 08/16/20 0715  82     
08/16/20 0700  83     
08/16/20 0335 98.7 °F (37.1 °C) 94 18 126/72 94 % 08/15/20 2319 (!) 102.6 °F (39.2 °C) 95 18 110/76 91 % 08/15/20 2003 99.2 °F (37.3 °C) 94 18 115/67 94 % 08/15/20 1714 98.9 °F (37.2 °C) 88 18 136/68 93 % 08/15/20 1132 98.7 °F (37.1 °C) 85 18 96/45 (!) 89 % Intake/Output Summary (Last 24 hours) at 8/16/2020 1111 Last data filed at 8/16/2020 2374 Gross per 24 hour Intake 460 ml Output  Net 460 ml PHYSICAL EXAM: 
General: WD, WN. Alert, cooperative, no acute distress   
EENT:  EOMI. Anicteric sclerae. MMM Resp:  CTA bilaterally, no wheezing or rales. No accessory muscle use CV:  Regular  rhythm,  No edema GI:  Soft, Non distended, Non tender.  +Bowel sounds Neurologic:  Alert and oriented X 3, normal speech, Psych:   Good insight. Not anxious nor agitated Skin:  No rashes. No jaundice, Reviewed most current lab test results and cultures  YES Reviewed most current radiology test results   YES Review and summation of old records today    NO Reviewed patient's current orders and MAR    YES 
PMH/SH reviewed - no change compared to H&P 
________________________________________________________________________ Care Plan discussed with: 
  Comments Patient x Family RN x Care Manager Consultant Multidiciplinary team rounds were held today with , nursing, pharmacist and clinical coordinator. Patient's plan of care was discussed; medications were reviewed and discharge planning was addressed. ________________________________________________________________________ Total NON critical care TIME:  30   Minutes Total CRITICAL CARE TIME Spent:   Minutes non procedure based Comments >50% of visit spent in counseling and coordination of care    
________________________________________________________________________ Onofre Rock MD  
 
Procedures: see electronic medical records for all procedures/Xrays and details which were not copied into this note but were reviewed prior to creation of Plan. LABS: 
I reviewed today's most current labs and imaging studies. Pertinent labs include: 
Recent Labs 08/15/20 
0018 08/14/20 
9829 WBC 11.0 12.3* HGB 10.8* 11.6* HCT 35.6* 36.2*  
 307 Recent Labs 08/14/20 
4843 * K 4.1 CL 96* CO2 30  
 * BUN 40* CREA 10.70* CA 8.5 PHOS 4.8* Signed: Onofre Rock MD

## 2020-08-16 NOTE — PROGRESS NOTES
Pharmacy Automatic Renal Dosing Protocol - Antimicrobials Indication for Antimicrobials: Surgical Site Infection  Pt recently had two toes on the right foot amputated on Friday for Osteomyelitis. Current Regimen of Each Antimicrobial: 
Cefepime 1 g IV every 12 hours (Start Date ; Day #1 of 7) Vancomycin 1250 mg after each HD (Start Date ; Day # 5) Previous Antimicrobial Therapy: 
Zosyn 3.375 g IV every 12 hours (Start Date ; Day # 5) Vancomycin Goal Level: 20-25 mcg/ml Vancomycin Levels Date Dose & Interval Measured (mcg/mL) Steady State (mcg/mL)  AM 1 g IV after HD 15.7 Date & time of next level: Next week Significant Cultures:  
 Blood: NGTD x 4 days- pending : MRSA: not present- final 
 Wound: Heavy klebsiella, heavy possible 2nd GNR, possible 3rd GNR, light diphtheroids, few CoNS- pending  Foot: Heavy GNRs and diphtheroids- pending  Foot: Heavy GNRs and diphtheroids- pending  Blood: NGTD x 1 day- pending Paralysis, amputations, malnutrition: None significant Labs: 
Recent Labs 08/15/20 
0018 20 
5214 20 
4718 CREA  --  10.70*  --   
BUN  --  40*  --   
WBC 11.0  --  12.3* Temp (24hrs), Av.5 °F (37.5 °C), Min:98.7 °F (37.1 °C), Max:102.6 °F (39.2 °C) Creatinine Clearance (mL/min) or Dialysis: HD MWF Impression/Plan:  
Adjust cefepime to 1 g IV daily for HD dosing Piperacillin-tazobactam discontinued Will continue current vancomycin regimen as appropriate for indication and renal function. DOT: pending; 7 days for cefepime Pharmacy will follow daily and adjust medications as appropriate for renal function and/or serum levels. Thank you, Mari He, PHARMD 
 
 
Recommended duration of therapy 
http://[facility]/[place]/[facility]/Pharmacy/Clinical%20Companion/Duration%20of%20ABX%20therapy. docx Renal Dosing http://[facility]/[place]/[facility]/Pharmacy/Clinical%20Companion/Renal%[id number]. pdf

## 2020-08-16 NOTE — PROGRESS NOTES
0700: Received bedside report from [facility], off going nurse. Assumed care of patient. 1798:  Notified Dr. Irina Parker that both night shift and day shift were unable to obtain labs this morning. Received order to wait until tomorrow with dialysis to have them drawn. 1900: Bedside shift change report given to Jeovany Bass (oncoming nurse) by Benito Roy (offgoing nurse). Report included the following information SBAR. Problem: Falls - Risk of 
Goal: *Absence of Falls Description: Document [place] Fall Risk and appropriate interventions in the flowsheet. Outcome: Progressing Towards Goal 
Note: Fall Risk Interventions: 
Mobility Interventions: Bed/chair exit alarm, OT consult for ADLs, Patient to call before getting OOB, PT Consult for mobility concerns, PT Consult for assist device competence, Utilize walker, cane, or other assistive device Medication Interventions: Bed/chair exit alarm, Patient to call before getting OOB, Teach patient to arise slowly Elimination Interventions: Call light in reach, Patient to call for help with toileting needs History of Falls Interventions: Bed/chair exit alarm, Door open when patient unattended, Room close to nurse's station, Utilize gait belt for transfer/ambulation, Assess for delayed presentation/identification of injury for 48 hrs (comment for end date) Problem: Sepsis: Day 2 Goal: Off Pathway (Use only if patient is Off Pathway) Outcome: Resolved/Not Met 
Goal: *Central Venous Pressure maintained at 8-12 mm Hg Outcome: Resolved/Not Met 
Goal: *Hemodynamically stable Outcome: Resolved/Not Met 
Goal: *Tolerating diet Outcome: Resolved/Not Met 
Goal: Activity/Safety Outcome: Resolved/Not Met 
Goal: Consults, if ordered Outcome: Resolved/Not Met 
Goal: Diagnostic Test/Procedures Outcome: Progressing Towards Goal 
Goal: Nutrition/Diet Outcome: Resolved/Not Met 
Goal: Discharge Planning Outcome: Resolved/Not Met 
Goal: Medications Outcome: Resolved/Not Met 
Goal: Respiratory Outcome: Resolved/Not Met 
Goal: Treatments/Interventions/Procedures Outcome: Resolved/Not Met 
Goal: Psychosocial 
Outcome: Resolved/Not Met

## 2020-08-17 NOTE — PERIOP NOTES
Handoff Report from Operating Room to PACU Report received from Angelina Stevens  and Denisa Bowers CRNA regarding Jina Gar. Surgeon(s): 
Godwin Trevino MD  And Procedure(s) (LRB): 
RIGHT LEG ARTERIOGRAM ANTERIOR TIBIAL ARTERY AND ANGIOPLASTY (Left)  confirmed  
with dressings discussed. Anesthesia type, drugs, patient history, complications, estimated blood loss, vital signs, intake and output, and last pain medication were reviewed.

## 2020-08-17 NOTE — PERIOP NOTES
TRANSFER - OUT REPORT: 
 
Verbal report given to Megan RN (name) on Greg Barrientos  being transferred to 2207(unit) for routine post - op Report consisted of patients Situation, Background, Assessment and  
Recommendations(SBAR). Information from the following report(s) SBAR, Kardex, OR Summary, Intake/Output, MAR, Recent Results and Cardiac Rhythm NSR was reviewed with the receiving nurse. Opportunity for questions and clarification was provided. Patient transported with: 
 Monitor Registered Nurse

## 2020-08-17 NOTE — PROGRESS NOTES
0700: Bedside shift change report given to Raz You RN (oncoming nurse) by Anish Martinez RN (offgoing nurse). Report included the following information SBAR.  
 
8138: Spoke with OR concerning medications to hold before procedure, per OR nurse hold lisinopril and subcu heparin.

## 2020-08-17 NOTE — PROGRESS NOTES
Patient scheduled for revascularization some time today Ill be in to round on this patient late tonite or in the am

## 2020-08-17 NOTE — ANESTHESIA POSTPROCEDURE EVALUATION
Procedure(s): RIGHT LEG ARTERIOGRAM ANTERIOR TIBIAL ARTERY AND ANGIOPLASTY. general 
 
Anesthesia Post Evaluation Patient location during evaluation: PACU Note status: Adequate. Level of consciousness: responsive to verbal stimuli and sleepy but conscious Pain management: satisfactory to patient Airway patency: patent Anesthetic complications: no 
Cardiovascular status: acceptable Respiratory status: acceptable Hydration status: acceptable Comments: +Post-Anesthesia Evaluation and Assessment Patient: Sindy Abarca MRN: [id number]  SSN: [id number] YOB: 1985  Age: 38 y.o. Sex: male Cardiovascular Function/Vital Signs /74   Pulse 80   Temp 37 °C (98.6 °F)   Resp 15   Ht 6' 2\" (1.88 m)   Wt 149.2 kg (328 lb 14.8 oz)   SpO2 95%   BMI 42.23 kg/m² Patient is status post Procedure(s): RIGHT LEG ARTERIOGRAM ANTERIOR TIBIAL ARTERY AND ANGIOPLASTY. Nausea/Vomiting: Controlled. Postoperative hydration reviewed and adequate. Pain: 
Pain Scale 1: Numeric (0 - 10) (08/17/20 1915) Pain Intensity 1: 0 (08/17/20 1915) Managed. Neurological Status:  
Neuro (WDL): Within Defined Limits (08/17/20 1834) At baseline. Mental Status and Level of Consciousness: Arousable. Pulmonary Status:  
O2 Device: Room air (08/17/20 1915) Adequate oxygenation and airway patent. Complications related to anesthesia: None Post-anesthesia assessment completed. No concerns. Signed By: Zeina Dahl MD  
 8/17/2020 Post anesthesia nausea and vomiting:  controlled INITIAL Post-op Vital signs:  
Vitals Value Taken Time /74 8/17/2020  7:30 PM  
Temp 37 °C (98.6 °F) 8/17/2020  7:15 PM  
Pulse 81 8/17/2020  7:36 PM  
Resp 20 8/17/2020  7:36 PM  
SpO2 94 % 8/17/2020  7:36 PM  
Vitals shown include unvalidated device data.

## 2020-08-17 NOTE — PROCEDURES
[facility] Dialysis Team [place]  [phone] Vitals   Pre   Post   Assessment   Pre   Post    
Temp  Temp: 98.3 °F (36.8 °C) (08/17/20 0745)  98.3 LOC  A&Ox4 A&Ox4 HR   Pulse (Heart Rate): 82 (08/17/20 0745) 92 Lungs   clear clear B/P   BP: 123/55 (08/17/20 0745) 128/56 Cardiac   RRR RRR Resp   Resp Rate: 18 (08/17/20 0745) 18 Skin   R foot wrapped, dressing CDI R foot wrapped, dressing CDI Pain level  Pain Intensity 1: 0 (08/16/20 1912) 0 Edema  None noted None Orders: Duration:   Start:    0745 End:    1220 Total:   4.25 hours Dialyzer:   Dialyzer/Set Up Inspection: Tony Gomez (08/17/20 0745) K Bath:   Dialysate K (mEq/L): 3 (08/17/20 0745) Ca Bath:   Dialysate CA (mEq/L): 2.5 (08/17/20 0745) Na/Bicarb:   Dialysate NA (mEq/L): 138 (08/17/20 0745) Target Fluid Removal:   Goal/Amount of Fluid to Remove (mL): 4000 mL (08/17/20 0745) Access Type & Location:   PELON AVF: skin CDI. No s/s of infection. No issues with cannulation or hemostasis. Running well at . Pt arrived to HD suite A&Ox4. Consent signed & on file. SBAR received from Primary RN. Pt cannulated with 09I needles per policy & without issue. Labs drawn per request/ order. VSS. Dialysis Tx initiated. Labs Obtained/Reviewed Critical Results Called   Date when labs were drawn- 
Hgb-   
HGB Date Value Ref Range Status 08/15/2020 10.8 (L) 12.1 - 17.0 g/dL Final  
 
K-   
Potassium Date Value Ref Range Status 08/14/2020 4.1 3.5 - 5.1 mmol/L Final  
 
Ca-  
Calcium Date Value Ref Range Status 08/14/2020 8.5 8.5 - 10.1 MG/DL Final  
 
Bun-  
BUN Date Value Ref Range Status 08/14/2020 40 (H) 6 - 20 MG/DL Final  
  Comment:  
  INVESTIGATED PER DELTA CHECK PROTOCOL Creat-  
Creatinine Date Value Ref Range Status 08/14/2020 10.70 (H) 0.70 - 1.30 MG/DL Final  
  Comment:  
  INVESTIGATED PER DELTA CHECK PROTOCOL Medications/ Blood Products Given Name   Dose   Route and Time None ordered Blood Volume Processed (BVP):    106.7 Net Fluid Removed:  4000mL Comments All dialysis related medications have been reviewed. Assessment performed by RN. Procedure and documentation observed and reviewed by Jaelyn Manley RN. Time Out Done: 0151 Primary Nurse Rpt Pre:  Dione Gifford (olvin), RN 
Primary Nurse Rpt Post:  Anais Bailey RN 
Pt Education:  procedural 
Care Plan:  On going Tx Summary: 
46:  PELON AVF: skin CDI. No s/s of infection. No issues with cannulation or hemostasis. Running well at . Pt arrived to HD suite A&Ox4. Consent signed & on file. SBAR received from Primary RN. Pt cannulated with 14Z needles per policy & without issue. Labs drawn per request/ order. VSS. Dialysis Tx initiated. 0800:  Pt resting 0815:  Pt resting 
0830:  Pt resting 0845:  Pt resting 
0900:  Pt resting 
0915:  Pt resting 
0930:  250ns flush given to prevent clotting, goal adjusted accordingly to remove; pt resting 
0945:  Pt resting 
1000:  Pt resting 1015:  Pt resting 1030:  Pt resting 1045:  Pt resting 
1100:  Pt resting 1115:  Pt resting 1130:  Pt resting 1145:  Pt resting 
1200:  Pt resting 1215:  Pt resting 
1220: Tx ended. VSS. All possible blood returned to patient. Hemostasis achieved without issue. Bed locked and in the lowest position, call bell and belongings in reach. SBAR given to Primary, RN. Patient is stable at time of their/ my departure. Admiting Diagnosis:  Sepsis Pt's previous clinic-  [facility] 
Consent signed - Informed Consent Verified: Yes (08/17/20 0745) Sylvain Consent - signed and on file Hepatitis Status- neg/susc 8/13/2020 Machine #- Machine Number: [id number]  (08/17/20 0745) Telemetry status- 
Pre-dialysis wt. -

## 2020-08-17 NOTE — PROGRESS NOTES
Problem: Sepsis: Day 5 Goal: *Vital signs within defined limits Outcome: Progressing Towards Goal 
  
Problem: Sepsis: Day 5 Goal: *Demonstrates progressive activity Outcome: Progressing Towards Goal 
  
Problem: Sepsis: Day 5 Goal: Activity/Safety Outcome: Progressing Towards Goal

## 2020-08-17 NOTE — PROGRESS NOTES
7186: AM wound care completed per order. 0700: 1360 Cricket Levine END OF SHIFT REPORT Bedside shift change report GIVEN TO Noreen Spurling, RN. Report included the following information SBAR, Kardex, Intake/Output, MAR, Recent Results and Cardiac Rhythm NSR.  
 
 
SIGNIFICANT CHANGES DURING SHIFT:   
 
 
CONCERNS TO ADDRESS WITH MD:   
 
 
 
[address] NURSING NOTE Admission Date 8/12/2020 Admission Diagnosis Sepsis ([facility] [address] [A41.9] Cellulitis of right foot [Z07.302] ESRD (end stage renal disease) ([facility] [address] [N18.6] Acute respiratory failure with hypoxia and hypercapnia (HCC) [J96.01, J96.02] Consults IP CONSULT TO PODIATRY IP CONSULT TO NEPHROLOGY 
IP CONSULT TO INFECTIOUS DISEASES 
IP CONSULT TO HOSPITALIST 
IP CONSULT TO VASCULAR SURGERY Cardiac Monitoring [x] Yes [] No  
  
Purposeful Hourly Rounding [x] Yes   
Adia Score Total Score: 2 Aida score 3 or > [] Bed Alarm [] Avasys [] 1:1 sitter [] Patient refused (Signed refusal form in chart) Ranjeet Score Ranjeet Score: 20 Ranjeet score 14 or < [] PMT consult [] Wound Care consult  
 []  Specialty bed  [] Nutrition consult Influenza Vaccine Received Flu Vaccine for Current Season (usually Sept-March): Not Flu Season Oxygen needs? [] Room air Oxygen @  []1L    [x]2L    []3L   []4L    []5L   []6L via NC Chronic home O2 use? [] Yes [x] No 
Perform O2 challenge test and document in progress note using smartphrase (.Homeoxygen) Last bowel movement Last Bowel Movement Date: 08/15/20 Urinary Catheter LDAs Peripheral IV 08/14/20 Posterior;Right Forearm (Active) Site Assessment Clean, dry, & intact 08/17/20 0529 Phlebitis Assessment 0 08/17/20 0529 Infiltration Assessment 0 08/17/20 0529 Dressing Status Clean, dry, & intact 08/17/20 0529 Dressing Type Tape;Transparent 08/17/20 0529 Hub Color/Line Status Blue;Flushed 08/17/20 0529 Readmission Risk Assessment Tool Score Low Risk 9      
Total Score 4 IP Visits Last 12 Months (1-3=4, 4=9, >4=11) 5 Pt. Coverage (Medicare=5 , Medicaid, or Self-Pay=4) Criteria that do not apply:  
 Has Seen PCP in Last 6 Months (Yes=3, No=0) . Living with Significant Other. Assisted Living. LTAC. SNF. or  
Rehab Patient Length of Stay (>5 days = 3) Charlson Comorbidity Score (Age + Comorbid Conditions) Expected Length of Stay 5d 0h  
Actual Length of Stay 5

## 2020-08-17 NOTE — PERIOP NOTES
TRANSFER - IN REPORT: 
 
Verbal report received from [facility] on Susan Alvarado  being received from 2207 for ordered procedure Report consisted of patients Situation, Background, Assessment and  
Recommendations(SBAR). Information from the following report(s) SBAR, Kardex, Intake/Output, MAR, Recent Results and Cardiac Rhythm NSR was reviewed with the receiving nurse. Opportunity for questions and clarification was provided. Assessment completed upon patients arrival to unit and care assumed.

## 2020-08-17 NOTE — PROGRESS NOTES
Hospitalist Progress Note NAME: Renay Salinas :  1985 MRN:  [id number] Assessment / Plan: 
Sepsis, POA  WBC 19K, tachycardia Right foot cellulitis with abscess, POA after amputation of right 1st and second toe for osteomyelitis  Hx osteomyelitis right 3rd and 4th toes 2020 and 10/2019 respectively Acute hypoxic respiratory failure 88% RA IDDM uncontrolled with hyperglycemia  ESRD on HD MWF Obesity 
  
--continue vancomycin and zosyn was transitioned to cefepime due to persistent fever. Follow up blood cultures. (NGTD) -Wound cx Culture result: Abnormal          Preliminary HEAVY KLEBSIELLA PNEUMONIAE Culture result: Abnormal          Preliminary HEAVY ENTEROBACTER CLOACAE COMPLEX Culture result: Abnormal    LIGHT  
POSSIBLE  
3RD GRAM NEGATIVE TEODORA       Preliminary Culture result: Abnormal          Preliminary LIGHT PSEUDOMONAS AERUGINOSA SENSITIVITY TO FOLLOW Culture result: Abnormal          Preliminary FEW MIXED STAPHYLOCOCCUS SPECIES, COAGULASE NEGATIVE Culture result: Abnormal          Preliminary FEW MIXED DIPHTHEROIDS   
 
 
 
--podiatry consult apreciated, MRI right foot showed fluid collection and abscess with no osteomyelitis -s/p irrigation of wound ESR elevated 116. ID consult appreciated -- SARS-COV-2 negative,  
-supplemental O2, CXR clear and lungs clear on exam without pulmonary system. Incentive spirometry. Hypoxia is resolved after dalysis --continue lantus 20 units daily, add moderate SSI 
-- appreciated renal consult to continue with dialysis  
-Large amount of chocolate color purulent discharge expressed from Sugical wound on manual expression 
-Plan for lower extremity angiogram by vascular team today Uncontrolled HTN: 
-added amlodipine and Lisinopril 
-Blood pressure better controlled now Diarrhea: 
-Likely Antibiotic induced 
-Resolved -Imodium PRN 
-c/w probiotic 
 
  
 Body mass index is 43.36 kg/m². 
  
Code: full DVT prophylaxis: heparin Surrogate decision maker:  Jose Saavedra Subjective: Chief Complaint / Reason for Physician Visit \"denies any active complaints, hypoxia is resolved, getting dialysis, Covid 19 testing is negative, reports watery diarrhea has improved, fever noticed last night\". Discussed with RN events overnight. Review of Systems: 
Symptom Y/N Comments  Symptom Y/N Comments Fever/Chills n   Chest Pain n   
Poor Appetite n   Edema n   
Cough n   Abdominal Pain n   
Sputum n   Joint Pain SOB/LITTLE n   Pruritis/Rash Nausea/vomit n   Tolerating PT/OT Diarrhea y   Tolerating Diet Constipation n   Other Could NOT obtain due to:   
 
Objective: VITALS:  
Last 24hrs VS reviewed since prior progress note. Most recent are: 
Patient Vitals for the past 24 hrs: 
 Temp Pulse Resp BP SpO2  
08/17/20 0830  80 18 135/63   
08/17/20 0815  77 18 133/62   
08/17/20 0800  78 18 153/78   
08/17/20 0745 98.3 °F (36.8 °C) 82 18 123/55   
08/17/20 0719 98.4 °F (36.9 °C) 83 18 139/87 96 % 08/17/20 0253 99.2 °F (37.3 °C) 85 18 114/55 91 % 08/16/20 2302 98.6 °F (37 °C) 88 18 124/71 93 % 08/16/20 1912 98.1 °F (36.7 °C) 86 18 108/57 92 % 08/16/20 1647 98.8 °F (37.1 °C) 85 18 130/69 93 % 08/16/20 1632  82     
08/16/20 1617  83     
08/16/20 1602  84     
08/16/20 1454 99.5 °F (37.5 °C) 90 18 149/86 96 % 08/16/20 1102 98.1 °F (36.7 °C) 83 20 127/65 94 % Intake/Output Summary (Last 24 hours) at 8/17/2020 2216 Last data filed at 8/16/2020 1819 Gross per 24 hour Intake 940 ml Output  Net 940 ml PHYSICAL EXAM: 
General: WD, WN. Alert, cooperative, no acute distress   
EENT:  EOMI. Anicteric sclerae. MMM Resp:  CTA bilaterally, no wheezing or rales. No accessory muscle use CV:  Regular  rhythm,  No edema GI:  Soft, Non distended, Non tender.  +Bowel sounds Neurologic:  Alert and oriented X 3, normal speech, Psych:   Good insight. Not anxious nor agitated Skin:  No rashes. No jaundice, Reviewed most current lab test results and cultures  YES Reviewed most current radiology test results   YES Review and summation of old records today    NO Reviewed patient's current orders and MAR    YES 
PMH/SH reviewed - no change compared to H&P 
________________________________________________________________________ Care Plan discussed with: 
  Comments Patient x Family RN x Care Manager Consultant Multidiciplinary team rounds were held today with , nursing, pharmacist and clinical coordinator. Patient's plan of care was discussed; medications were reviewed and discharge planning was addressed. ________________________________________________________________________ Total NON critical care TIME:  30   Minutes Total CRITICAL CARE TIME Spent:   Minutes non procedure based Comments >50% of visit spent in counseling and coordination of care    
________________________________________________________________________ Alejandro Larch, MD  
 
Procedures: see electronic medical records for all procedures/Xrays and details which were not copied into this note but were reviewed prior to creation of Plan. LABS: 
I reviewed today's most current labs and imaging studies. Pertinent labs include: 
Recent Labs 08/15/20 
0018 WBC 11.0 HGB 10.8* HCT 35.6*  
 Recent Labs 08/17/20 
2992 * K 4.5  
CL 92* CO2 25 * BUN 64* CREA 13.60* CA 8.9 Signed: Javon Brumfield MD

## 2020-08-17 NOTE — BRIEF OP NOTE
Brief Postoperative Note Patient: Cece Herrera YOB: 1985 MRN: [id number] Date of Procedure: 8/17/2020 Pre-Op Diagnosis: OSTEOMYELITIS Post-Op Diagnosis: Same as preoperative diagnosis. Procedure(s): RIGHT LEG ARTERIOGRAM ANTERIOR TIBIAL ARTERY AND ANGIOPLASTY Surgeon(s): 
Brian Panchal MD 
 
Surgical Assistant: None Anesthesia: General  
 
Estimated Blood Loss (mL): less than 100 Complications: None Specimens: * No specimens in log * Implants: * No implants in log * Drains: * No LDAs found * Findings: anterior tibial artery occlusion successfully recanalized. Inline flow but severe small vessel disease. SFA/popliteal stenosis < 50%. proglide closure, 6 Czech sheath. [id number] Electronically Signed by Ammon Salcido MD on 8/17/2020 at 6:25 PM

## 2020-08-17 NOTE — PROGRESS NOTES
Pharmacy Automatic Renal Dosing Protocol - Antimicrobials Indication for Antimicrobials: Surgical Site Infection  Pt recently had two toes on the right foot amputated on Friday for Osteomyelitis. Current Regimen of Each Antimicrobial: 
Cefepime 1 g IV every 24 hours (Start Date ; Day #2/7) Vancomycin 1250 mg after each HD (Start Date ; Day # 6) Previous Antimicrobial Therapy: 
Zosyn 3.375 g IV every 12 hours (Start Date ; Day # 5) Vancomycin Goal Level: 20-25 mcg/ml Vancomycin Levels Date Dose & Interval Measured (mcg/mL) Steady State (mcg/mL)  AM 1 g IV after HD 15.7  AM 1250 mg after HD 17.8 Date & time of next level:  Significant Cultures:  
 Blood: NGTD x 5 days- pending : MRSA: not present- final 
 Wound: Heavy klebsiella, heavy enterobacter, light psedomonas, light diphtheroids, few CoNS- pending  Foot: Heavy GNRs and probable pseudomonas, and diphtheroids- pending  Foot: Heavy klebsiella, CoNS, and diphtheroids- pending  Blood: NGTD x 2 days- pending Paralysis, amputations, malnutrition: None significant Labs: 
Recent Labs 20 
8201 08/15/20 
0018 CREA 13.60*  --   
BUN 64*  --   
WBC 17.9* 11.0 Temp (24hrs), Av.6 °F (37 °C), Min:98.1 °F (36.7 °C), Max:99.5 °F (37.5 °C) Creatinine Clearance (mL/min) or Dialysis: HD MWF Impression/Plan:  
Patient only received 1 dose of the 1250 mg dose so will continue at this time and check another level on Friday. Will continue current regimen for cefepime as appropriate for indication and renal function. DOT: pending; 7 days for cefepime Pharmacy will follow daily and adjust medications as appropriate for renal function and/or serum levels. Thank you, Anna Vidales, PHARMD 
 
 
Recommended duration of therapy 
[email or web address]/[place]/[facility]/Pharmacy/Clinical%20Companion/Duration%20of%20ABX%20therapy. docx Renal Dosing [email or web address]/[place]/[facility]/Pharmacy/Clinical%20Companion/Renal%[email or web address]. pdf

## 2020-08-17 NOTE — ANESTHESIA PREPROCEDURE EVALUATION
Anesthetic History No history of anesthetic complications Review of Systems / Medical History Patient summary reviewed, nursing notes reviewed and pertinent labs reviewed Pulmonary Smoker Comments: Former smoker Neuro/Psych Within defined limits Cardiovascular Hypertension Hyperlipidemia Exercise tolerance: <4 METS Comments: TTE (5/2/17): Left ventricle: Systolic function was mildly reduced. Ejection fraction 
was estimated in the range of 45 % to 50 %. There were no regional wall 
motion abnormalities. Wall thickness was mildly to moderately increased. Pericardium: A small pericardial effusion was identified circumferential 
to the heart. GI/Hepatic/Renal 
  
 
 
Renal disease: ESRD Endo/Other Diabetes: poorly controlled, type 2, using insulin Morbid obesity and anemia Other Findings Comments: Sepsis Right foot osteomyelitis s/p amputation of first and second toes (8/7/20) Physical Exam 
 
Airway Mallampati: II 
TM Distance: > 6 cm Neck ROM: normal range of motion Mouth opening: Normal 
 
 Cardiovascular Regular rate and rhythm,  S1 and S2 normal,  no murmur, click, rub, or gallop Dental 
 
 
Comments: Some broken teeth Pulmonary Breath sounds clear to auscultation Abdominal 
GI exam deferred Other Findings Anesthetic Plan ASA: 4 Anesthesia type: general 
 
 
 
 
Induction: Intravenous Anesthetic plan and risks discussed with: Patient

## 2020-08-17 NOTE — PROGRESS NOTES
Problem: Falls - Risk of 
Goal: *Absence of Falls Description: Document Fortino Simba Fall Risk and appropriate interventions in the flowsheet. Outcome: Progressing Towards Goal 
Note: Fall Risk Interventions: 
Mobility Interventions: Assess mobility with egress test, Bed/chair exit alarm, OT consult for ADLs, PT Consult for mobility concerns, PT Consult for assist device competence, Strengthening exercises (ROM-active/passive), Utilize walker, cane, or other assistive device, Utilize gait belt for transfers/ambulation Medication Interventions: Assess postural VS orthostatic hypotension, Bed/chair exit alarm, Patient to call before getting OOB, Teach patient to arise slowly Elimination Interventions: Bed/chair exit alarm, Call light in reach, Patient to call for help with toileting needs, Toilet paper/wipes in reach, Toileting schedule/hourly rounds History of Falls Interventions: Bed/chair exit alarm, Consult care management for discharge planning, Investigate reason for fall, Room close to nurse's station, Utilize gait belt for transfer/ambulation, Vital signs minimum Q4HRs X 24 hrs (comment for end date)

## 2020-08-18 NOTE — PROGRESS NOTES
Problem: Falls - Risk of 
Goal: *Absence of Falls Description: Document Bernard Riggs Fall Risk and appropriate interventions in the flowsheet. Outcome: Progressing Towards Goal 
Note: Fall Risk Interventions: 
Mobility Interventions: Bed/chair exit alarm Medication Interventions: Assess postural VS orthostatic hypotension Elimination Interventions: Bed/chair exit alarm, Call light in reach History of Falls Interventions: Bed/chair exit alarm Problem: Patient Education: Go to Patient Education Activity Goal: Patient/Family Education Outcome: Progressing Towards Goal 
  
Problem: Pressure Injury - Risk of 
Goal: *Prevention of pressure injury Description: Document Ranjeet Scale and appropriate interventions in the flowsheet. Outcome: Progressing Towards Goal 
Note: Pressure Injury Interventions: 
Sensory Interventions: Assess changes in LOC Moisture Interventions: Absorbent underpads, Apply protective barrier, creams and emollients Activity Interventions: Assess need for specialty bed, Increase time out of bed Mobility Interventions: Assess need for specialty bed Nutrition Interventions: Document food/fluid/supplement intake Friction and Shear Interventions: Apply protective barrier, creams and emollients Problem: Patient Education: Go to Patient Education Activity Goal: Patient/Family Education Outcome: Progressing Towards Goal 
  
Problem: Risk for Spread of Infection Goal: Prevent transmission of infectious organism to others Description: Prevent the transmission of infectious organisms to other patients, staff members, and visitors. Outcome: Progressing Towards Goal 
  
Problem: Patient Education:  Go to Education Activity Goal: Patient/Family Education Outcome: Progressing Towards Goal 
  
Problem: Patient Education: Go to Patient Education Activity Goal: Patient/Family Education Outcome: Progressing Towards Goal 
  
Problem: Sepsis: Day of Diagnosis Goal: Off Pathway (Use only if patient is Off Pathway) Outcome: Progressing Towards Goal 
Goal: *Fluid resuscitation Outcome: Progressing Towards Goal 
Goal: *Paired blood cultures prior to first dose of antibiotic Outcome: Progressing Towards Goal 
Goal: *First dose of  appropriate antibiotic within 3 hours of arrival to ED, within 1 hour of arrival to ICU Outcome: Progressing Towards Goal 
Goal: *Lactic acid with first set of blood cultures Outcome: Progressing Towards Goal 
Goal: *Pneumococcal immunization (if eligible) Outcome: Progressing Towards Goal 
Goal: *Influenza immunization (if eligible) Outcome: Progressing Towards Goal 
Goal: Activity/Safety Outcome: Progressing Towards Goal 
Goal: Consults, if ordered Outcome: Progressing Towards Goal 
Goal: Diagnostic Test/Procedures Outcome: Progressing Towards Goal 
Goal: Nutrition/Diet Outcome: Progressing Towards Goal 
Goal: Discharge Planning Outcome: Progressing Towards Goal 
Goal: Medications Outcome: Progressing Towards Goal 
Goal: Respiratory Outcome: Progressing Towards Goal 
Goal: Treatments/Interventions/Procedures Outcome: Progressing Towards Goal 
Goal: Psychosocial 
Outcome: Progressing Towards Goal 
  
Problem: Sepsis: Day 2 Goal: Off Pathway (Use only if patient is Off Pathway) Outcome: Progressing Towards Goal 
Goal: *Central Venous Pressure maintained at 8-12 mm Hg Outcome: Progressing Towards Goal 
Goal: *Hemodynamically stable Outcome: Progressing Towards Goal 
Goal: *Tolerating diet Outcome: Progressing Towards Goal 
Goal: Activity/Safety Outcome: Progressing Towards Goal 
Goal: Consults, if ordered Outcome: Progressing Towards Goal 
Goal: Diagnostic Test/Procedures Outcome: Progressing Towards Goal 
Goal: Nutrition/Diet Outcome: Progressing Towards Goal 
Goal: Discharge Planning Outcome: Progressing Towards Goal 
Goal: Medications Outcome: Progressing Towards Goal 
Goal: Respiratory Outcome: Progressing Towards Goal 
 Goal: Treatments/Interventions/Procedures Outcome: Progressing Towards Goal 
Goal: Psychosocial 
Outcome: Progressing Towards Goal 
  
Problem: Sepsis: Day 3 Goal: Off Pathway (Use only if patient is Off Pathway) Outcome: Progressing Towards Goal 
Goal: *Central Venous Pressure maintained at 8-12 mm Hg Outcome: Progressing Towards Goal 
Goal: *Oxygen saturation within defined limits Outcome: Progressing Towards Goal 
Goal: *Vital sign stability Outcome: Progressing Towards Goal 
Goal: *Tolerating diet Outcome: Progressing Towards Goal 
Goal: *Demonstrates progressive activity Outcome: Progressing Towards Goal 
Goal: Activity/Safety Outcome: Progressing Towards Goal 
Goal: Consults, if ordered Outcome: Progressing Towards Goal 
Goal: Diagnostic Test/Procedures Outcome: Progressing Towards Goal 
Goal: Nutrition/Diet Outcome: Progressing Towards Goal 
Goal: Discharge Planning Outcome: Progressing Towards Goal 
Goal: Medications Outcome: Progressing Towards Goal 
Goal: Respiratory Outcome: Progressing Towards Goal 
Goal: Treatments/Interventions/Procedures Outcome: Progressing Towards Goal 
Goal: Psychosocial 
Outcome: Progressing Towards Goal 
  
Problem: Sepsis: Day 4 Goal: Off Pathway (Use only if patient is Off Pathway) Outcome: Progressing Towards Goal 
Goal: Activity/Safety Outcome: Progressing Towards Goal 
Goal: Consults, if ordered Outcome: Progressing Towards Goal 
Goal: Diagnostic Test/Procedures Outcome: Progressing Towards Goal 
Goal: Nutrition/Diet Outcome: Progressing Towards Goal 
Goal: Discharge Planning Outcome: Progressing Towards Goal 
Goal: Medications Outcome: Progressing Towards Goal 
Goal: Respiratory Outcome: Progressing Towards Goal 
Goal: Treatments/Interventions/Procedures Outcome: Progressing Towards Goal 
Goal: Psychosocial 
Outcome: Progressing Towards Goal 
Goal: *Oxygen saturation within defined limits Outcome: Progressing Towards Goal 
 Goal: *Hemodynamically stable Outcome: Progressing Towards Goal 
Goal: *Vital signs within defined limits Outcome: Progressing Towards Goal 
Goal: *Tolerating diet Outcome: Progressing Towards Goal 
Goal: *Demonstrates progressive activity Outcome: Progressing Towards Goal 
Goal: *Fluid volume maintenance Outcome: Progressing Towards Goal 
  
Problem: Sepsis: Day 5 Goal: Off Pathway (Use only if patient is Off Pathway) Outcome: Progressing Towards Goal 
Goal: *Oxygen saturation within defined limits Outcome: Progressing Towards Goal 
Goal: *Vital signs within defined limits Outcome: Progressing Towards Goal 
Goal: *Tolerating diet Outcome: Progressing Towards Goal 
Goal: *Demonstrates progressive activity Outcome: Progressing Towards Goal 
Goal: *Discharge plan identified Outcome: Progressing Towards Goal 
Goal: Activity/Safety Outcome: Progressing Towards Goal 
Goal: Consults, if ordered Outcome: Progressing Towards Goal 
Goal: Diagnostic Test/Procedures Outcome: Progressing Towards Goal 
Goal: Nutrition/Diet Outcome: Progressing Towards Goal 
Goal: Discharge Planning Outcome: Progressing Towards Goal 
Goal: Medications Outcome: Progressing Towards Goal 
Goal: Respiratory Outcome: Progressing Towards Goal 
Goal: Treatments/Interventions/Procedures Outcome: Progressing Towards Goal 
Goal: Psychosocial 
Outcome: Progressing Towards Goal 
  
Problem: Sepsis: Day 6 Goal: Off Pathway (Use only if patient is Off Pathway) Outcome: Progressing Towards Goal 
Goal: *Oxygen saturation within defined limits Outcome: Progressing Towards Goal 
Goal: *Vital signs within defined limits Outcome: Progressing Towards Goal 
Goal: *Tolerating diet Outcome: Progressing Towards Goal 
Goal: *Demonstrates progressive activity Outcome: Progressing Towards Goal 
Goal: Influenza immunization Outcome: Progressing Towards Goal 
Goal: *Pneumococcal immunization Outcome: Progressing Towards Goal 
Goal: Activity/Safety Outcome: Progressing Towards Goal 
Goal: Diagnostic Test/Procedures Outcome: Progressing Towards Goal 
Goal: Nutrition/Diet Outcome: Progressing Towards Goal 
Goal: Discharge Planning Outcome: Progressing Towards Goal 
Goal: Medications Outcome: Progressing Towards Goal 
Goal: Respiratory Outcome: Progressing Towards Goal 
Goal: Treatments/Interventions/Procedures Outcome: Progressing Towards Goal 
Goal: Psychosocial 
Outcome: Progressing Towards Goal 
  
Problem: Sepsis: Discharge Outcomes Goal: *Vital signs within defined limits Outcome: Progressing Towards Goal 
Goal: *Tolerating diet Outcome: Progressing Towards Goal 
Goal: *Verbalizes understanding and describes prescribed diet Outcome: Progressing Towards Goal 
Goal: *Demonstrates progressive activity Outcome: Progressing Towards Goal 
Goal: *Describes follow-up/return visits to physicians Outcome: Progressing Towards Goal 
Goal: *Verbalizes name, dosage, time, side effects, and number of days to continue medications Outcome: Progressing Towards Goal 
Goal: *Influenza immunization (Oct-Mar only) Outcome: Progressing Towards Goal 
Goal: *Pneumococcal immunization Outcome: Progressing Towards Goal 
Goal: *Lungs clear or at baseline Outcome: Progressing Towards Goal 
Goal: *Oxygen saturation returns to baseline or 90% or better on room air Outcome: Progressing Towards Goal 
Goal: *Glycemic control Outcome: Progressing Towards Goal 
Goal: *Absence of deep venous thrombosis signs and symptoms(Stroke Metric) Outcome: Progressing Towards Goal 
Goal: *Describes available resources and support systems Outcome: Progressing Towards Goal 
Goal: *Optimal pain control at patient's stated goal 
Outcome: Progressing Towards Goal

## 2020-08-18 NOTE — PROGRESS NOTES
Foot and Ankle Progress Note Admit Date: 8/12/2020 Hospital day 5 Subjective:  
 
Patient was admitted 5 days ago. He is post op amputation of right toes 1 and 2 of 8 days and post op angiogram 1 day ago. Patient resting comfortably Current Facility-Administered Medications Medication Dose Route Frequency  sodium chloride (NS) flush 5-40 mL  5-40 mL IntraVENous Q8H  
 sodium chloride (NS) flush 5-40 mL  5-40 mL IntraVENous PRN  
 lidocaine (PF) (XYLOCAINE) 10 mg/mL (1 %) injection 0.1 mL  0.1 mL SubCUTAneous PRN  
 cefepime (MAXIPIME) 1 g in 0.9% sodium chloride (MBP/ADV) 50 mL  1 g IntraVENous Q24H  
 lactobac ac& pc-s.therm-b.anim (USMAN Q/RISAQUAD)  1 Cap Oral DAILY  loperamide (IMODIUM) capsule 2 mg  2 mg Oral Q4H PRN  
 epoetin nata-epbx (RETACRIT) injection 10,000 Units  10,000 Units SubCUTAneous Q7D  
 amLODIPine (NORVASC) tablet 10 mg  10 mg Oral DAILY  lisinopriL (PRINIVIL, ZESTRIL) tablet 10 mg  10 mg Oral DAILY  sodium chloride (NS) flush 5-40 mL  5-40 mL IntraVENous Q8H  
 sodium chloride (NS) flush 5-40 mL  5-40 mL IntraVENous PRN  
 acetaminophen (TYLENOL) tablet 650 mg  650 mg Oral Q6H PRN Or  
 acetaminophen (TYLENOL) suppository 650 mg  650 mg Rectal Q6H PRN  polyethylene glycol (MIRALAX) packet 17 g  17 g Oral DAILY PRN  promethazine (PHENERGAN) tablet 12.5 mg  12.5 mg Oral Q6H PRN Or  
 ondansetron (ZOFRAN) injection 4 mg  4 mg IntraVENous Q6H PRN  
 heparin (porcine) injection 5,000 Units  5,000 Units SubCUTAneous Q8H  
 insulin lispro (HUMALOG) injection   SubCUTAneous AC&HS  
 glucose chewable tablet 16 g  4 Tab Oral PRN  
 dextrose (D50W) injection syrg 12.5-25 g  12.5-25 g IntraVENous PRN  
 glucagon (GLUCAGEN) injection 1 mg  1 mg IntraMUSCular PRN  
 insulin glargine (LANTUS) injection 20 Units  20 Units SubCUTAneous DAILY  oxyCODONE-acetaminophen (PERCOCET) 5-325 mg per tablet 1 Tab  1 Tab Oral Q4H PRN  
  VANCOMYCIN INFORMATION NOTE   Other Rx Dosing/Monitoring Review of Systems See current H&P; no changes to add Objective:  
 
Patient Vitals for the past 8 hrs: 
 BP Temp Pulse Resp SpO2  
08/18/20 1032 105/51 98.4 °F (36.9 °C) 82 18 96 % 08/18/20 0858 118/70  84    
08/18/20 0730 109/59 98.4 °F (36.9 °C) 85 18 96 % No intake/output data recorded. 08/16 1901 - 08/18 0700 In: 0 [P.O.:400; I.V.:250] Out: 4010 Physical Exam: right foot Previously amputated right toes 5,4,3. Sutures where toes 1 nd 2 amputated one week ago. discoloration of the right forefoot; min swelling and no warmth 
 
 
loosely coapt incision that were not dressed properly. With dried bloody drainage Data Review Recent Results (from the past 24 hour(s)) GLUCOSE, POC Collection Time: 08/17/20 12:55 PM  
Result Value Ref Range Glucose (POC) 126 (H) 65 - 100 mg/dL Performed by Tereza Qiu PCT   
GLUCOSE, POC Collection Time: 08/17/20  3:11 PM  
Result Value Ref Range Glucose (POC) 128 (H) 65 - 100 mg/dL Performed by [employer] [address] Collection Time: 08/17/20  3:35 PM  
Result Value Ref Range Calcium, ionized (POC) 1.06 (L) 1.12 - 1.32 mmol/L Sodium (POC) 135 (L) 136 - 145 mmol/L Potassium (POC) 4.3 3.5 - 5.1 mmol/L Chloride (POC) 95 (L) 98 - 107 mmol/L  
 CO2 (POC) 27 21 - 32 mmol/L Anion gap (POC) 18 10 - 20 mmol/L Glucose (POC) 147 (H) 65 - 100 mg/dL BUN (POC) 25 (H) 9 - 20 mg/dL Creatinine (POC) 8.0 (H) 0.6 - 1.3 mg/dL GFRAA, POC 9 (L) >60 ml/min/1.73m2 GFRNA, POC 8 (L) >60 ml/min/1.73m2 Hematocrit (POC) 36 (L) 36.6 - 50.3 % Comment Comment Not Indicated. GLUCOSE, POC Collection Time: 08/17/20  6:37 PM  
Result Value Ref Range Glucose (POC) 121 (H) 65 - 100 mg/dL Performed by [employer]) GLUCOSE, POC Collection Time: 08/17/20  8:47 PM  
Result Value Ref Range Glucose (POC) 119 (H) 65 - 100 mg/dL Performed by Sophia Ambriz PCT GLUCOSE, POC Collection Time: 08/18/20  7:22 AM  
Result Value Ref Range Glucose (POC) 246 (H) 65 - 100 mg/dL Performed by Via Uyen PCT   
GLUCOSE, POC Collection Time: 08/18/20 11:01 AM  
Result Value Ref Range Glucose (POC) 222 (H) 65 - 100 mg/dL Performed by Via Uyen PCT Assessment:  
 
Active Problems: 
  ESRD (end stage renal disease) ([facility] [address] (1/6/2020) Sepsis ([facility] [address] (8/12/2020) Cellulitis of right foot (8/12/2020) Acute respiratory failure with hypoxia and hypercapnia ([address] (8/12/2020) Plan: Wounds loosely packed with saline moistened packing. I do not plan to takethis patient back into surgery at this time; I feel that his wounds will probably heal now that he has been revascularized. I will contact pathology because his path report from his initial surgery has not been released. I will continue to follow this patient while he is hospitalized and will monitor and perform further debridements on the right foot if needed. From a podiatry standpoint , this patient can be discharged pending Infectious Disease managing antibiotic therapy. Case management  orders have been left

## 2020-08-18 NOTE — PROGRESS NOTES
Problem: Falls - Risk of 
Goal: *Absence of Falls Description: Document Kaya Shepard Fall Risk and appropriate interventions in the flowsheet. Outcome: Progressing Towards Goal 
Note: Fall Risk Interventions: 
Mobility Interventions: Assess mobility with egress test, Bed/chair exit alarm, OT consult for ADLs, PT Consult for mobility concerns, PT Consult for assist device competence, Strengthening exercises (ROM-active/passive), Utilize walker, cane, or other assistive device, Utilize gait belt for transfers/ambulation, Patient to call before getting OOB Medication Interventions: Assess postural VS orthostatic hypotension, Bed/chair exit alarm, Patient to call before getting OOB, Teach patient to arise slowly Elimination Interventions: Bed/chair exit alarm, Call light in reach, Patient to call for help with toileting needs, Toilet paper/wipes in reach, Toileting schedule/hourly rounds History of Falls Interventions: Bed/chair exit alarm, Consult care management for discharge planning, Evaluate medications/consider consulting pharmacy, Room close to nurse's station, Utilize gait belt for transfer/ambulation, Assess for delayed presentation/identification of injury for 48 hrs (comment for end date), Vital signs minimum Q4HRs X 24 hrs (comment for end date)

## 2020-08-18 NOTE — PROGRESS NOTES
Per chart review, patient is currently admitted to [address] [facility] as of 8/12/2020. Current COVID Care Transitions episode resolved at this time as patient will be reassigned to CTN/ACM team at hospital discharge.

## 2020-08-18 NOTE — PROGRESS NOTES
0700: Bedside shift change report given to Bernadette Berry RN (oncoming nurse) by Venkata Cotto RN (offgoing nurse). Report included the following information SBAR.

## 2020-08-18 NOTE — PROGRESS NOTES
[facility] END OF SHIFT REPORT Bedside shift change report GIVEN TO DIONICIO Toribio. Report included the following information SBAR. SIGNIFICANT CHANGES DURING SHIFT:  Patient remained flat for four hours post-op. No bleeding from surgical site. CONCERNS TO ADDRESS WITH MD:    
 
 
 
[facility] NURSING NOTE Admission Date 8/12/2020 Admission Diagnosis Sepsis ([facility] [address] [A41.9] Cellulitis of right foot [P23.395] ESRD (end stage renal disease) ([facility] [address] [N18.6] Acute respiratory failure with hypoxia and hypercapnia (HCC) [J96.01, J96.02] Consults IP CONSULT TO PODIATRY IP CONSULT TO NEPHROLOGY 
IP CONSULT TO INFECTIOUS DISEASES 
IP CONSULT TO HOSPITALIST 
IP CONSULT TO VASCULAR SURGERY Cardiac Monitoring [x] Yes [] No  
  
Purposeful Hourly Rounding [x] Yes   
Iada Score Total Score: 2 Aida score 3 or > [x] Bed Alarm [] Avasys [] 1:1 sitter [] Patient refused (Signed refusal form in chart) Ranjeet Score Ranjeet Score: 20 Ranjeet score 14 or < [] PMT consult [] Wound Care consult  
 []  Specialty bed  [] Nutrition consult Influenza Vaccine Received Flu Vaccine for Current Season (usually Sept-March): Not Flu Season Oxygen needs? [x] Room air Oxygen @  []1L    []2L    []3L   []4L    []5L   []6L via NC Chronic home O2 use? [] Yes [x] No 
Perform O2 challenge test and document in progress note using smartphrase (.Homeoxygen) Last bowel movement Last Bowel Movement Date: 08/17/20 Urinary Catheter LDAs Peripheral IV 08/14/20 Posterior;Right Forearm (Active) Site Assessment Clean, dry, & intact 08/18/20 0404 Phlebitis Assessment 0 08/18/20 0404 Infiltration Assessment 0 08/18/20 0404 Dressing Status Clean, dry, & intact 08/18/20 0404 Dressing Type Transparent 08/18/20 0404 Hub Color/Line Status Blue 08/18/20 0404 Peripheral IV 08/17/20 Right Forearm (Active) Site Assessment Clean, dry, & intact 08/18/20 0404 Phlebitis Assessment 0 08/18/20 0404 Infiltration Assessment 0 08/18/20 0404 Dressing Status Clean, dry, & intact 08/18/20 0404 Dressing Type Transparent 08/18/20 0404 Hub Color/Line Status Green 08/18/20 0404 Airway - Endotracheal Tube 08/17/20 Oral (Active) Line Estrada Lips 08/17/20 0000 Readmission Risk Assessment Tool Score Low Risk 12 Total Score 3 Patient Length of Stay (>5 days = 3) 4 IP Visits Last 12 Months (1-3=4, 4=9, >4=11) 5 Pt. Coverage (Medicare=5 , Medicaid, or Self-Pay=4) Criteria that do not apply:  
 Has Seen PCP in Last 6 Months (Yes=3, No=0) . Living with Significant Other. Assisted Living. LTAC. SNF. or  
Rehab Charlson Comorbidity Score (Age + Comorbid Conditions) Expected Length of Stay 5d 0h  
Actual Length of Stay 6

## 2020-08-18 NOTE — CONSULTS
Vascular Surgery Consult/Progress Note 8/18/2020 Subjective:  
 
Peña Giordano is a very pleasant 38 y.o.  male with a pmhx significant for ESRD on HD, DM, HTN, HLD, and obesity. He is admitted to the hospital w/ sepsis secondary to cellulitis of the right leg. He is s/p amputation of the right great toe and second toe on 08/07/2020. He has a previous hx of amputation of the right third and fourth toes for osteomyelitis. He hospital course has been complicated by acute hypoxic respiratory failure. We have been asked to evaluate for PAD. His last NOÉ were in 01/2020 and were NC on the right w/ a TBI of 0.41. Past Medical History ESRD 
-HD MWF Diabetes Mellitus Hypertension Hyperlipidemia Morbid obesity Past Surgical History Amputation of the right 1st and 2nd toe 8/7/2020 Amputation of the right third toe 1/2020 Amputation of the right fourth toe Amputation of the left great toe and 5th toe Creation of transposed left arm AVF 7/2017 Family History Problem Relation Age of Onset  Diabetes Mother  Liver Disease Father  Kidney Disease Maternal Grandfather  Diabetes Maternal Grandfather  Hypertension Maternal Grandfather  Diabetes Paternal Uncle  Hypertension Paternal Uncle Social History Tobacco Use  Smoking status: Former Smoker Packs/day: 0.25  Smokeless tobacco: Never Used  Tobacco comment: \"quit about a year ago\" Substance Use Topics  Alcohol use: No  
  Comment: rarely Prior to Admission medications Medication Sig Start Date End Date Taking? Authorizing Provider  
insulin glargine (LANTUS) 100 unit/mL injection 20 Units by SubCUTAneous route daily. Yes Provider, Historical  
doxycycline (VIBRAMYCIN) 100 mg capsule Take 100 mg by mouth two (2) times a day. For 10 days.    Yes Provider, Historical  
oxyCODONE-acetaminophen (PERCOCET) 5-325 mg per tablet Take 1 Tab by mouth every four (4) hours as needed for Pain for up to 14 days. Max Daily Amount: 6 Tabs. 8/7/20 8/21/20 Yes Audra Denson DPM  
loperamide (IMODIUM) 2 mg capsule Take 1 Cap by mouth four (4) times daily as needed for Diarrhea. 12/9/17  Yes Jeremie Torres MD  
 
No Known Allergies Review of Systems Constitutional: Positive for chills and fever. Negative for activity change. HENT: Negative for congestion. Respiratory: Negative for cough, chest tightness and shortness of breath. Cardiovascular: Positive for leg swelling. Negative for chest pain. Gastrointestinal: Negative for nausea. Endocrine: Negative for polydipsia and polyuria. Genitourinary: Negative. Musculoskeletal: Positive for gait problem and myalgias. Skin: Positive for color change and wound. Allergic/Immunologic: Negative for immunocompromised state. Neurological: Negative for weakness. Hematological: Negative. Psychiatric/Behavioral: Negative. Objective:  
 
 
Patient Vitals for the past 24 hrs: 
 BP Temp Temp src Pulse Resp SpO2 Weight 08/18/20 0858 118/70   84     
08/18/20 0730 109/59 98.4 °F (36.9 °C)  85 18 96 %   
08/18/20 0325 111/55 98.4 °F (36.9 °C)  91 10 96 % 142.5 kg (314 lb 3.2 oz) 08/17/20 2317 105/62 98.6 °F (37 °C)  86 20 93 %   
08/17/20 2226 122/81 98.9 °F (37.2 °C)  81 20 90 %   
08/17/20 2046 149/87 98.6 °F (37 °C)  85 18 95 %   
08/17/20 2001 148/90 98.2 °F (36.8 °C)  85 16 95 %   
08/17/20 1930 106/74 98.7 °F (37.1 °C)  80 15 95 %   
08/17/20 1915 114/69 98.6 °F (37 °C)  81 16 95 %   
08/17/20 1900 112/61 98.6 °F (37 °C)  79 20 95 %   
08/17/20 1845 105/61   78 20 95 %   
08/17/20 1840 99/65   79 11 96 %   
08/17/20 1835 100/83   80 13 98 %   
08/17/20 1834 104/68 98.7 °F (37.1 °C)  82 16 99 %   
08/17/20 1455 110/72 99.3 °F (37.4 °C)  94 20 95 %   
08/17/20 1433 122/81 98.8 °F (37.1 °C)  97 18 94 %   
 08/17/20 1256 129/89 99.6 °F (37.6 °C)  98 18 96 %   
08/17/20 1220 128/56 98.3 °F (36.8 °C) Oral 92 18    
08/17/20 1215 114/57   91 18    
08/17/20 1200 120/59   93 18    
08/17/20 1145 127/65   93 18    
08/17/20 1130 (!) 129/30   91 18    
08/17/20 1115 105/55   89 18    
08/17/20 1100 (!) 114/24   89 18    
08/17/20 1045 (!) 119/30   88 18    
08/17/20 1030 117/47   87 18   Physical Exam 
Constitutional:   
   Appearance: He is obese. HENT:  
   Head: Normocephalic. Nose: Nose normal.  
   Mouth/Throat:  
   Mouth: Mucous membranes are moist.  
Eyes:  
   Pupils: Pupils are equal, round, and reactive to light. Neck: Musculoskeletal: Normal range of motion. Cardiovascular:  
   Rate and Rhythm: Normal rate and regular rhythm. Pulses: Normal pulses. Heart sounds: Normal heart sounds. Comments: Feet are warm and perfused w/o palpable distal pulses. Pulmonary:  
   Effort: Pulmonary effort is normal.  
   Breath sounds: Normal breath sounds. Abdominal:  
   General: Abdomen is flat. Palpations: Abdomen is soft. Musculoskeletal: Normal range of motion. Skin: 
   General: Skin is warm. Comments: Bulky dressing to the right foot. Neurological:  
   General: No focal deficit present. Mental Status: He is alert. Mental status is at baseline. Psychiatric:     
   Mood and Affect: Mood normal.     
   Behavior: Behavior normal.  
 
 
Pertinent Test Results:  
Recent Results (from the past 24 hour(s)) GLUCOSE, POC Collection Time: 08/17/20 12:55 PM  
Result Value Ref Range Glucose (POC) 126 (H) 65 - 100 mg/dL Performed by [employer] PCT   
GLUCOSE, POC Collection Time: 08/17/20  3:11 PM  
Result Value Ref Range Glucose (POC) 128 (H) 65 - 100 mg/dL Performed by Afshan Zendejas [address] Collection Time: 08/17/20  3:35 PM  
Result Value Ref Range Calcium, ionized (POC) 1.06 (L) 1.12 - 1.32 mmol/L Sodium (POC) 135 (L) 136 - 145 mmol/L Potassium (POC) 4.3 3.5 - 5.1 mmol/L Chloride (POC) 95 (L) 98 - 107 mmol/L  
 CO2 (POC) 27 21 - 32 mmol/L Anion gap (POC) 18 10 - 20 mmol/L Glucose (POC) 147 (H) 65 - 100 mg/dL BUN (POC) 25 (H) 9 - 20 mg/dL Creatinine (POC) 8.0 (H) 0.6 - 1.3 mg/dL GFRAA, POC 9 (L) >60 ml/min/1.73m2 GFRNA, POC 8 (L) >60 ml/min/1.73m2 Hematocrit (POC) 36 (L) 36.6 - 50.3 % Comment Comment Not Indicated. GLUCOSE, POC Collection Time: 08/17/20  6:37 PM  
Result Value Ref Range Glucose (POC) 121 (H) 65 - 100 mg/dL Performed by Andres Duffy) GLUCOSE, POC Collection Time: 08/17/20  8:47 PM  
Result Value Ref Range Glucose (POC) 119 (H) 65 - 100 mg/dL Performed by Prakash Lundberg PCT GLUCOSE, POC Collection Time: 08/18/20  7:22 AM  
Result Value Ref Range Glucose (POC) 246 (H) 65 - 100 mg/dL Performed by Tereza Qiu PCT Assessmen/Plan: PAD w/ non-healing diabetic wound of the right foot complicated by gas gangrene  
-s/p right leg LEXI BAP 08/17/2020 Right foot is warm and perfused. Debridement and wound care per podiatry. Sepsis  
-worsening leukocytosis overnight  
-blood cx NGTD  
-wound cx polymicrobial: Kelbisella pneumoniae and pseudomonas aeruginosa/stutzeri Antibxs and volume management per primary team.   
 
ESRD 
-HD MWF Hyponatremia Anemia of chronic renal disease  
-stable Plan per nephrology Uncontrolled Diabetes Mellitus w/ hyperglycemia  
-HA1c 9.4 Hypertension Hyperlipidemia Morbid obesity Management of comorbid conditions by primary team. 
 
VTE Prophylaxis: [facility] Disposition: TBD by primary team 
 
Vascular surgery signing off. We appreciate the opportunity to participate in the care of Mr. Fatuma Lobo. Patient should f/u in 2 weeks with Dr. Bernerd Big. Please reconsult as needed.    
 
 
Signed By: Lauro Cosby NP   
 August 18, 2020

## 2020-08-18 NOTE — PROGRESS NOTES
Hospitalist Progress Note NAME: Skye Britton :  1985 MRN:  [id number] Assessment / Plan: 
 
Sepsis, POA  WBC 19K, tachycardia Right foot cellulitis with abscess, POA after amputation of right 1st and second toe for osteomyelitis  Hx osteomyelitis right 3rd and 4th toes 2020 and 10/2019 respectively Acute hypoxic respiratory failure 88% RA IDDM uncontrolled with hyperglycemia  ESRD on HD MWF Obesity 
  
--continue vancomycin and zosyn was transitioned to cefepime due to persistent fever.  Follow up blood cultures. (NGTD) -Wound cx Culture result: Abnormal             Preliminary HEAVY KLEBSIELLA PNEUMONIAE Culture result: Abnormal             Preliminary HEAVY ENTEROBACTER CLOACAE COMPLEX Culture result: Abnormal    LIGHT  
POSSIBLE  
3RD GRAM NEGATIVE TEODORA         Preliminary Culture result: Abnormal             Preliminary LIGHT PSEUDOMONAS AERUGINOSA SENSITIVITY TO FOLLOW Culture result: Abnormal             Preliminary FEW MIXED STAPHYLOCOCCUS SPECIES, COAGULASE NEGATIVE Culture result: Abnormal             Preliminary FEW MIXED DIPHTHEROIDS   
  
  
  
--podiatry consult apreciated, MRI right foot showed fluid collection and abscess with no osteomyelitis -s/p irrigation of wound  
  ESR elevated 116.  ID consult appreciated -- SARS-COV-2 negative,  
-supplemental O2, CXR clear and lungs clear on exam without pulmonary system.  Incentive spirometry.  Hypoxia is resolved after dalysis --continue lantus 20 units daily, add moderate SSI 
-- appreciated renal consult to continue with dialysis  
-Large amount of chocolate color purulent discharge expressed from Sugical wound on manual expression 
-s/p  RIGHT LEG ARTERIOGRAM ANTERIOR TIBIAL ARTERY AND ANGIOPLASTY 
  
Uncontrolled HTN: 
-added amlodipine and Lisinopril 
-Blood pressure better controlled now 
  
Diarrhea: 
-Likely Antibiotic induced 
-Resolved -Imodium PRN 
-c/w probiotic 
  
   
Body mass index is 43.36 kg/m². 
  
Code: full DVT prophylaxis: heparin Surrogate decision maker:  Aunt Yumiko List Subjective: Chief Complaint / Reason for Physician Visit Discussed with RN events overnight. Feel better , waiting for podiatry and ID further recommendation Review of Systems: 
Symptom Y/N Comments  Symptom Y/N Comments Fever/Chills n   Chest Pain n   
Poor Appetite n   Edema n   
Cough n   Abdominal Pain n   
Sputum n   Joint Pain SOB/LITTLE n   Pruritis/Rash Nausea/vomit n   Tolerating PT/OT Diarrhea n   Tolerating Diet n   
Constipation n   Other Could NOT obtain due to:   
 
Objective: VITALS:  
Last 24hrs VS reviewed since prior progress note. Most recent are: 
Patient Vitals for the past 24 hrs: 
 Temp Pulse Resp BP SpO2  
08/18/20 0858  84  118/70   
08/18/20 0730 98.4 °F (36.9 °C) 85 18 109/59 96 % 08/18/20 0325 98.4 °F (36.9 °C) 91 10 111/55 96 % 08/17/20 2317 98.6 °F (37 °C) 86 20 105/62 93 % 08/17/20 2226 98.9 °F (37.2 °C) 81 20 122/81 90 % 08/17/20 2046 98.6 °F (37 °C) 85 18 149/87 95 % 08/17/20 2001 98.2 °F (36.8 °C) 85 16 148/90 95 % 08/17/20 1930 98.7 °F (37.1 °C) 80 15 106/74 95 % 08/17/20 1915 98.6 °F (37 °C) 81 16 114/69 95 % 08/17/20 1900 98.6 °F (37 °C) 79 20 112/61 95 % 08/17/20 1845  78 20 105/61 95 % 08/17/20 1840  79 11 99/65 96 % 08/17/20 1835  80 13 100/83 98 % 08/17/20 1834 98.7 °F (37.1 °C) 82 16 104/68 99 % 08/17/20 1455 99.3 °F (37.4 °C) 94 20 110/72 95 % 08/17/20 1433 98.8 °F (37.1 °C) 97 18 122/81 94 % 08/17/20 1256 99.6 °F (37.6 °C) 98 18 129/89 96 % 08/17/20 1220 98.3 °F (36.8 °C) 92 18 128/56   
08/17/20 1215  91 18 114/57   
08/17/20 1200  93 18 120/59   
08/17/20 1145  93 18 127/65   
08/17/20 1130  91 18 (!) 129/30   
08/17/20 1115  89 18 105/55   
08/17/20 1100  89 18 (!) 114/24   
08/17/20 1045  88 18 (!) 119/30   
08/17/20 1030  87 18 117/47  08/17/20 1015  84 18 120/56   
08/17/20 1000  81 18 124/57   
08/17/20 0945  63 18 109/57   
08/17/20 0930  86 18 141/69  Intake/Output Summary (Last 24 hours) at 8/18/2020 2706 Last data filed at 8/18/2020 0150 Gross per 24 hour Intake 650 ml Output 4010 ml Net -3360 ml PHYSICAL EXAM: 
General: WD, WN. Alert, cooperative, no acute distress   
EENT:  EOMI. Anicteric sclerae. MMM Resp:  CTA bilaterally, no wheezing or rales. No accessory muscle use CV:  Regular  rhythm,  No edema GI:  Soft, Non distended, Non tender.  +Bowel sounds Neurologic:  Alert and oriented X 3, normal speech, Psych:   Good insight. Not anxious nor agitated Skin:  No rashes. No jaundice Reviewed most current lab test results and cultures  YES Reviewed most current radiology test results   YES Review and summation of old records today    NO Reviewed patient's current orders and MAR    YES 
PMH/SH reviewed - no change compared to H&P 
________________________________________________________________________ Care Plan discussed with: 
  Comments Patient y Family  y   
RN y   
Care Manager y Consultant  y Multidiciplinary team rounds were held today with , nursing, pharmacist and clinical coordinator. Patient's plan of care was discussed; medications were reviewed and discharge planning was addressed. ________________________________________________________________________ Total NON critical care TIME:  60   Minutes Total CRITICAL CARE TIME Spent:   Minutes non procedure based Comments >50% of visit spent in counseling and coordination of care y   
________________________________________________________________________ Carmelina Yates MD  
 
Procedures: see electronic medical records for all procedures/Xrays and details which were not copied into this note but were reviewed prior to creation of Plan.    
 
LABS: 
 I reviewed today's most current labs and imaging studies. Pertinent labs include: 
Recent Labs 08/17/20 
8325 WBC 17.9* HGB 10.2* HCT 31.2*  
 Recent Labs 08/17/20 
6409 * K 4.5  
CL 92* CO2 25 * BUN 64* CREA 13.60* CA 8.9 Signed:  Queta White MD

## 2020-08-18 NOTE — PROGRESS NOTES
[address] INTERDISCIPLINARY ROUNDS Cardiopulmonary Care Interdisciplinary Rounds were held today to discuss patient's plan of care and outcomes. The following members were present: NP/Physician, Pharmacy, Nursing and Case Management. PLAN OF CARE:  
Continue current treatment plan Expected Length of Stay:  5d 0h

## 2020-08-19 NOTE — PROGRESS NOTES
TRANSFER - IN REPORT: 
 
Verbal report received from Dialysis, RN (name) on Audra White  being received from Dialysis (unit) for routine progression of care Report consisted of patients Situation, Background, Assessment and  
Recommendations(SBAR). Information from the following report(s) SBAR, Kardex, Intake/Output and MAR was reviewed with the receiving nurse. Opportunity for questions and clarification was provided. Assessment completed upon patients arrival to unit and care assumed.

## 2020-08-19 NOTE — PROGRESS NOTES
Infectious Disease Progress Note IMPRESSION:  
 
- Diabetic R/ foot infection with cellulitis, abscess , OM 
  MRI- 8/13- 9 x 8 x 11 mm nonspecific soft tissue collection containing gas 
distal to the second metatarsal head. WC- 8/14- polymicrobial 
  K.pneumoniae, Pseudomonas aeruginosa, , Pseudomonas stutzerii E.colacae complex, Diphtheroids, mixed skin matt. Anaerobic - NG 
  WC- 8/13- polymicrobial 
  Staph species, coagulase negative , K.pneumoniae, Pseudomonas( 2 colony types) E.cloacae complex, Diphtheroids BC- NG 
- S/p amputation of R/ 1st & 2nd toes on 8/7 No available Pathology - H/o OM of R 3rd, 4th toes Amputation of 3rd toe - 1/9/20, 4th toe- 10/11/19 
- H/o MRSA infection 1/20 
- Poorly controlled Diabetes A1c 9.4 High blood sugars 
-PAD S/p RLE arteriogram, angioplasty on 8/17 
- ESRD on HD 
- Poorly controlled HTN improved - ESR - 116 PLAN:  
  
 
 
- Continue Vancomycin, Cefepime IV  
- Await Pathology report- if margins clear -  2 weeks of IV antibiotics with close follow up of wound progress. If margins + for OM, recommend further debridement , specially as WC have a polymicrobial growth of  Bacterial organisms. Podiatry note reviewed. No plans for further Surgery , in that case, if margins are positive pt would need 6 weeks of antimicrobial therapy. Vancomycin & Cefepime should be able to be administered in HD  
 - Blood sugar control- D/w pt 
 -Elevate foot , pt has foot hanging off bed, D/w pt Subjective:  
 
Pt seen . No fever. Complains of pain in R/foot Review of Systems:  A comprehensive review of systems was negative except for that written in the History of Present Illness. 10 point ROS obtained . All other systems negative . Objective:  
 
Blood pressure 124/78, pulse 85, temperature 98.4 °F (36.9 °C), resp. rate 18, height 6' 2\" (1.88 m), weight 314 lb 3.2 oz (142.5 kg), SpO2 90 %. Temp (24hrs), Av.9 °F (37.2 °C), Min:98.4 °F (36.9 °C), Max:99.7 °F (37.6 °C) Patient Vitals for the past 24 hrs: 
 Temp Pulse Resp BP SpO2  
20 0354 98.4 °F (36.9 °C) 85 18 124/78 90 % 20 0016 99.3 °F (37.4 °C) 89 18 140/76 90 % 20 1840 99.3 °F (37.4 °C) 82 18 144/65 96 % 20 1444 99.7 °F (37.6 °C) 83 18 109/65 96 % 20 1032 98.4 °F (36.9 °C) 82 18 105/51 96 % 20 0858  84  118/70   
20 0730 98.4 °F (36.9 °C) 85 18 109/59 96 % Lines:  Peripheral IV:    
 
Physical Exam:  
General:  Awake, cooperative, Eyes:  Sclera anicteric. Pupils equally round and reactive to light. Mouth/Throat: Mucous membranes normal, oral pharynx clear Neck: Supple Lungs:    Reduced auscultation bases CV:  Regular rate and rhythm,no murmur, click, rub or gallop Abdomen:   Soft, non-tender. bowel sounds normal. non-distended Extremities: No  edema Skin: Skin color, texture, turgor normal. no acute rash or lesions Lymph nodes: Cervical and supraclavicular normal  
Musculoskeletal: No swelling or deformity, R/foot dressing + Lines/Devices:  Intact, no erythema, drainage or tenderness Psych: Awake and oriented, normal mood affect Data Reviewed: CBC:  
Recent Labs 20 
9812 20 
4281 WBC 21.2* 17.9*  
RBC 4.01* 3.91* HGB 10.3* 10.2* HCT 32.3* 31.2*  
 347 GRANS 86*  --   
LYMPH 6*  --   
EOS 0  --   
 
CMP:  
Recent Labs 20 
9421 20 
7994 * 164* * 128* K 4.4 4.5  
CL 91* 92* CO2 27 25 BUN 44* 64* CREA 10.80* 13.60* CA 9.2 8.9 AGAP 8 11 BUCR 4* 5* AP 79  --   
TP 8.9*  --   
ALB 1.6*  --   
GLOB 7.3*  --   
AGRAT 0.2*  --   
 
 
Studies:     
Lab Results Component Value Date/Time  Culture result: NO GROWTH 3 DAYS 2020 11:54 PM  
 Culture result: HEAVY KLEBSIELLA PNEUMONIAE (A) 2020 01:45 PM  
 Culture result: MODERATE PSEUDOMONAS AERUGINOSA (A) 08/14/2020 01:45 PM  
 Culture result: MODERATE PSEUDOMONAS STUTZERI (A) 08/14/2020 01:45 PM  
 Culture result: HEAVY MIXED SKIN USMAN ISOLATED 08/14/2020 01:45 PM  
  
 
XR Results (most recent): 
Results from Hospital Encounter encounter on 08/12/20 XR FEMUR RT 2 VS  
 Narrative INTERPRETATION PROVIDED FOR COMPLIANCE ONLY AT NO CHARGE CLINICAL HISTORY: Right leg arteriogram.. COMPARISON: None. Impression IMPRESSION: 
15 image/s was/were obtained intraoperatively lower extremity angiography. These 
show multifocal stenoses with interventions. No radiologist was present during 
image acquisition. Please see operative note for further details. FLUOROSCOPY TIME PROVIDED: 635 seconds Patient Active Problem List  
Diagnosis Code  
 HTN (hypertension) I10  
 Postoperative hypoxia R09.02, Z98.890  
 Diarrhea R19.7  ESRD (end stage renal disease) on dialysis ([facility]) N18.6, Z99.2  Hyperlipidemia associated with type 2 diabetes mellitus ([facility]) E11.69, E78.5  Morbid obesity with body mass index (BMI) of 40.0 to 49.9 ([facility]) E66.01  
 Uncontrolled type 2 diabetes mellitus with proliferative retinopathy, with long-term current use of insulin ([address] E11.3599, E11.65, Z79.4  
 Uncontrolled type 2 diabetes mellitus with hyperglycemia, with long-term current use of insulin ([facility]) E11.65, Z79.4  
 Uncontrolled hypertension I10  
 Acute osteomyelitis of toe of right foot ([address] M86.171  
 Osteomyelitis ([address] M86.9  Foot ulcer ([address] L97.509  ESRD (end stage renal disease) ([address] N18.6  Wound cellulitis L03.90  
 Osteomyelitis of second toe of right foot ([address] M86.9  Sepsis ([address] A41.9  Cellulitis of right foot L03.115  
 Acute respiratory failure with hypoxia and hypercapnia ([facility]) J96.01, J96.02  
 
 
  ICD-10-CM ICD-9-CM 1.  Cellulitis, wound, post-operative  T81.49XA 998.59   
 
 
 I have discussed the diagnosis with the patient and the intended plan as seen in the above orders. I have discussed medication side effects and warnings with the patient as well. Reviewed test results at length with patient Anti-infectives:  
 
 Vancomycin/ Cefepime IV  Nikki Harris MD FACP

## 2020-08-19 NOTE — PROGRESS NOTES
Bedside and Verbal shift change report given to Pau Seth [address] (oncoming nurse) by Isaac Kaiser (offgoing nurse). Report included the following information SBAR, Kardex, ED Summary, OR Summary, MAR, Accordion, Recent Results and Cardiac Rhythm nsr.

## 2020-08-19 NOTE — PROGRESS NOTES
TEJA: Home with Home Health and Follow-up Appointments 9:20am- CM met with pt at bedside to discuss d/c plan. CM discussed with pt about SNF. Pt declined SNF. CM discussed with pt about home health. Pt stated that he was in agreement to home health. CM provided CM with [facility] list.  
 
The Plan for Transition of Care is related to the following treatment goals: Home Health The Patient  was provided with a choice of provider and agrees  
with the discharge plan. [x] Yes [] No 
 
Freedom of choice list was provided with basic dialogue that supports the patient's individualized plan of care/goals, treatment preferences and shares the quality data associated with the providers. [x] Yes [] No 
 
Pt selected [facility]. [facility] document signed and placed in pt's bedside chart. Referral sent to [facility] via [facility]. Pt mentioned about [facility]. CM inquired with pt as to whether he has been screened for Medicaid. Pt stated that he has not been screened for [address]. CM informed pt that CM will email [employer] to have him screened for Medicaid. Medicare pt has received, reviewed, and signed 2nd IM letter informing them of their right to appeal the discharge. Signed copy has been placed on pt bedside chart. CM will continue to follow patient for discharge planning needs and arrange for services as deemed necessary. Bryanna Avila [address] [place] [address] 
[phone]

## 2020-08-19 NOTE — PROCEDURES
[facility] Dialysis Team [place]  [phone] Vitals   Pre   Post   Assessment   Pre   Post    
Temp  Temp: 97.7 °F (36.5 °C) (08/19/20 1300)  98.2 oral LOC  A & O x 4 No change HR   Pulse (Heart Rate): 83 (08/19/20 1400) 88 Lungs   clear No change B/P   BP: 145/89 (08/19/20 1400) 123/75 Cardiac   NSR No change Resp   Resp Rate: 18 (08/19/20 1400) 18 Skin   Warm, dry, right foot wrapped in dressing No change Pain level  Pain Intensity 1: 0 (08/19/20 1214) 0 Edema Generalized + 2 lower ext. No change Orders: Duration:   Start:    1300 End:    1715 Total:   4.15 Dialyzer:   Dialyzer/Set Up Inspection: Susan Feeling (08/19/20 1300) K Bath:   Dialysate K (mEq/L): 3 (08/19/20 1300) Ca Bath:   Dialysate CA (mEq/L): 2.5 (08/19/20 1300) Na/Bicarb:   Dialysate NA (mEq/L): 138 (08/19/20 1300) Target Fluid Removal:   Goal/Amount of Fluid to Remove (mL): 4000 mL (08/19/20 1300) Access  CVC Type & Location:   Left upper AVF Labs Obtained/Reviewed Critical Results Called   Date when labs were drawn- 
Hgb-   
HGB Date Value Ref Range Status 08/19/2020 10.3 (L) 12.1 - 17.0 g/dL Final  
 
K-   
Potassium Date Value Ref Range Status 08/19/2020 4.4 3.5 - 5.1 mmol/L Final  
 
Ca-  
Calcium Date Value Ref Range Status 08/19/2020 9.2 8.5 - 10.1 MG/DL Final  
 
Bun-  
BUN Date Value Ref Range Status 08/19/2020 44 (H) 6 - 20 MG/DL Final  
 
Creat-  
Creatinine Date Value Ref Range Status 08/19/2020 10.80 (H) 0.70 - 1.30 MG/DL Final  
 
  
Medications/ Blood Products Given Name   Dose   Route and Time    
none Blood Volume Processed (BVP):    95.5 Net Fluid Removed:  4000ml Comments RN reviewed LPN assessment and completed RN assessment. RN completed patient assessment. RN reviewed technicians vital signs and procedure note. Tx completed. Reviewed by DIONICIO Gupta Time Out Done:  1288 Primary Nurse Rpt Pre:DIONICIO Tello  
 Primary Nurse Rpt PostSoundraulito Leonard, DIONICIO 
Pt Education:access care Care Plan:continue HD Tx Summary:PELON AVF: skin CDI. No s/s of infection. No issues with cannulation or hemostasis. Running well at . Pt arrived to HD [address]. Consent signed & on file. SBAR received from Primary RN. 1300: Pt cannulated with 81D needles per policy & without issue. Labs drawn per request/ order. VSS. Dialysis Tx initiated. 1315: Pt. Resting well, talking. Lines secure and visible 1330: Pt resting quietly. Lines secure 1345: Pt. Resting well, lines secure 1400: Pt. marcello hd well, lines secure and visible 1415: Pt. Stable, lines secure and visible 1430: Pt. Marcello. HD no issues noted 1445: Pt. Resting well, lines secure and visible 
1500: Pt. Resting well, lines secure 1515: Pt. Stable, lines secure and visible 1530: Pt. Resting well 1545: Pt. Resting well 
1600: Pt. Stable, resting well, lines secure 1615: Pt. Stable. 1630: Pt. Stable lines secure 1645: Pt. Stable, lines secure 
1700: Pt. Stable, lines secure 1715: Tx ended. VSS. All possible blood returned to patient. Hemostasis achieved without issue. Bed locked and in the lowest position, call bell and belongings in reach. SBAR given to Primary, RN. Patient is stable at time of their/ my departure. All Dialysis related medications have been reviewed. Admiting Diagnosis:Sepsis Pt's previous clinic- [facility]  
Consent signed - Informed Consent Verified: Yes (08/19/20 1300) Sylvain Consent - verified Hepatitis Status- neg/susc 8/13/2020 Machine #- Machine Number: [id number] (08/19/20 1300) Telemetry status- yes Pre-dialysis wt. -

## 2020-08-19 NOTE — OP NOTES
[address] 
OPERATIVE REPORT Name:  Mynor Goetz 
MR#:  [id number] :  1985 ACCOUNT #:  [[id number]] DATE OF SERVICE:  2020 PREOPERATIVE DIAGNOSIS:  Right foot osteomyelitis. POSTOPERATIVE DIAGNOSIS:  Right foot osteomyelitis. PROCEDURE PERFORMED: 
1. Aortogram, right lower extremity angiogram. 
2.  Right anterior tibial artery recanalization and angioplasty with 3 x 100 balloon. 3.  Ultrasound-guided access to left common femoral artery. SURGEON:  Omar Perkins MD 
 
ASSISTANT:  none SURGICAL ASSISTANT:  None. ANESTHESIA:  MAC. COMPLICATIONS:  None. SPECIMENS REMOVED:  None. IMPLANTS:  Mynx closure device. ESTIMATED BLOOD LOSS:  Less than 50 mL. DRAINS:  None. INDICATIONS FOR PROCEDURE:  The patient is a 79-year-old male with severe diabetes and had noninvasive studies earlier in 2020 showing moderately decreased perfusion at the ankle with tissue loss. At which time, in January, he underwent anterior tibial artery and posterior tibial artery recanalization and angioplasty, but he presented to the hospital after amputation of his right first and second toes approximately 1 week ago. Unfortunately, after the first procedure in January, he was lost to follow up. After his amputation by Dr. Selena Roland, this was complicated by wound dehiscence, and on exam, he no longer had palpable pulses. Given these findings, I had a discussion with the patient regarding risks and benefits of angiogram and revascularization, and he wished to proceed. DESCRIPTION OF PROCEDURE IN DETAIL:  The patient was brought into the operating room, prepped and draped in the usual sterile fashion. Under ultrasound guidance to the left common femoral artery, it was accessed with a micropuncture, and this was upsized to a 6-[place]ith[place]ian sheath.   An aortogram was performed, which showed widely patent abdominal aorta without aneurysmal degeneration or stenosis. A pelvic angiogram showed no significant stenosis in the bilateral iliac arteries. Then, using Omniflush catheter and Glidewire, I was able to get up and over the aortic bifurcation, and the catheter was placed in the right femoral artery. Lower extremity angiogram was done, which showed widely patent common femoral artery, superficial femoral artery, and profunda arteries, as well as popliteal artery. The anterior tibial artery was occluded after take off and reconstituted distally. The posterior tibial artery and peroneal artery were occluded after the take off and did not appear to reconstitute distally. Heparin was administered and long 6 x 45 sheath was placed using a floppy guidewire and 0.035 Trail[place]. I was able to recanalize the anterior tibial artery occlusion and contrast injection in the distal anterior tibial artery and confirmed placement, and this showed an intact distal anterior tibial artery and dorsalis pedis artery, but severe small vessel disease in the foot. Next, 0.014 [place] wire was placed distally and the entire anterior tibial artery was ballooned with 3 x 100 balloon with prolonged inflation. Following this, repeat angiography showed widely patent anterior tibial artery with no residual significant stenosis. The wire and catheter were removed and the long 6-Macanese sheath was exchanged for a short 6-Macanese sheath, and a Mynx closure device was used. There was good hemostasis. Protamine was administered. All needle and sponge counts were correct at the end of the case. The patient was extubated and taken to PACU in stable condition. Scarlett Gomez MD 
 
 
[id number] 
D:  08/19/2020 8:39 T:  08/19/2020 14:45 JOB #:  [id number]

## 2020-08-19 NOTE — PROGRESS NOTES
Hospitalist Progress Note NAME: Ronak Kidney :  1985 MRN:  [id number] Assessment / Plan: 
Sepsis, POA  WBC 19K, tachycardia Right foot cellulitis with abscess, POA after amputation of right 1st and second toe for osteomyelitis  Hx osteomyelitis right 3rd and 4th toes 2020 and 10/2019 respectively Acute hypoxic respiratory failure 88% RA IDDM uncontrolled with hyperglycemia  ESRD on HD MWF Obesity 
  
--continue vancomycin and zosyn was transitioned to cefepime due to persistent fever.  Follow up blood cultures. (NGTD) -Wound cx Culture result: Abnormal             Preliminary HEAVY KLEBSIELLA PNEUMONIAE Culture result: Abnormal             Preliminary HEAVY ENTEROBACTER CLOACAE COMPLEX Culture result: Abnormal    LIGHT  
POSSIBLE  
3RD GRAM NEGATIVE TEODORA         Preliminary Culture result: Abnormal             Preliminary LIGHT PSEUDOMONAS AERUGINOSA SENSITIVITY TO FOLLOW Culture result: Abnormal             Preliminary FEW MIXED STAPHYLOCOCCUS SPECIES, COAGULASE NEGATIVE Culture result: Abnormal             Preliminary FEW MIXED DIPHTHEROIDS   
  
  
  
--podiatry consult apreciated, MRI right foot showed fluid collection and abscess with no osteomyelitis -s/p irrigation of wound  
  ESR elevated 116.  ID consult appreciated -- SARS-COV-2 negative,  
-supplemental O2, CXR clear and lungs clear on exam without pulmonary system.  Incentive spirometry.  Hypoxia is resolved after dalysis --continue lantus 20 units daily, add moderate SSI 
-- appreciated renal consult to continue with dialysis  
-Large amount of chocolate color purulent discharge expressed from Sugical wound on manual expression 
-s/p  RIGHT LEG ARTERIOGRAM ANTERIOR TIBIAL ARTERY AND ANGIOPLASTY 
-waiting for pathology report Uncontrolled HTN: 
-added amlodipine and Lisinopril 
-Blood pressure better controlled now 
  
Diarrhea: 
-Likely Antibiotic induced 
-Resolved -Imodium PRN 
-c/w probiotic 
  
  
Body mass index is 43.36 kg/m². 
  
Code: full DVT prophylaxis: heparin Surrogate decision maker:  Aunt Briana Rene Patient ready to be d/c waiting for path report for antibiotics duration Subjective: Chief Complaint / Reason for Physician Visit Discussed with RN events overnight. Feel better waiting for path report Review of Systems: 
Symptom Y/N Comments  Symptom Y/N Comments Fever/Chills n   Chest Pain n   
Poor Appetite n   Edema n   
Cough n   Abdominal Pain n   
Sputum n   Joint Pain y   
SOB/LITTLE n   Pruritis/Rash Nausea/vomit n   Tolerating PT/OT Diarrhea n   Tolerating Diet y Constipation n   Other Could NOT obtain due to:   
 
Objective: VITALS:  
Last 24hrs VS reviewed since prior progress note. Most recent are: 
Patient Vitals for the past 24 hrs: 
 Temp Pulse Resp BP SpO2  
08/19/20 0823  85   (!) 83 % 08/19/20 0822  85   (!) 87 % 08/19/20 0821  86   (!) 86 % 08/19/20 0820  87   (!) 87 % 08/19/20 0819  87   91 % 08/19/20 0818  87   90 % 08/19/20 0817  87   90 % 08/19/20 0816  87   (!) 89 % 08/19/20 0815  87   (!) 89 % 08/19/20 0814  87   90 % 08/19/20 0813  87   (!) 89 % 08/19/20 0812  88   90 % 08/19/20 0811  89   93 % 08/19/20 0810  89   90 % 08/19/20 0809  90   (!) 89 % 08/19/20 0808  90   (!) 89 % 08/19/20 0807  91   (!) 88 % 08/19/20 0806  91   93 % 08/19/20 0805  89   90 % 08/19/20 0804  90   90 % 08/19/20 0803  87   93 % 08/19/20 0802  86   90 % 08/19/20 0801  87   90 % 08/19/20 0800  87   90 % 08/19/20 0759  86   91 % 08/19/20 0758  87   92 % 08/19/20 0757  87   92 % 08/19/20 0756  89   92 % 08/19/20 0755  86   92 % 08/19/20 0754  86   91 % 08/19/20 0753  86   (!) 89 % 08/19/20 0752  87   93 % 08/19/20 0751  87   94 % 08/19/20 0750  88   94 % 08/19/20 0749  89   93 % 08/19/20 0748  89   90 % 08/19/20 0747  90   95 % 08/19/20 0746  88   96 % 08/19/20 0745  88   96 % 08/19/20 0744  89   95 % 08/19/20 0742  84   95 % 08/19/20 0741  83   95 % 08/19/20 0740  83   94 % 08/19/20 0739  84   93 % 08/19/20 0738  83   93 % 08/19/20 0737 99.2 °F (37.3 °C) 84 18 115/59 95 % 08/19/20 0736  84   95 % 08/19/20 0735  84   95 % 08/19/20 0734  84   95 % 08/19/20 0733  83   94 % 08/19/20 0732  83   94 % 08/19/20 0731  83   93 % 08/19/20 0730  86   93 % 08/19/20 0729  85   93 % 08/19/20 0728  85   93 % 08/19/20 0727  86   94 % 08/19/20 0726  86   95 % 08/19/20 0725  84   93 % 08/19/20 0724  86   93 % 08/19/20 0723  85   (!) 89 % 08/19/20 0722  84   95 % 08/19/20 0721  84   93 % 08/19/20 0720  84   92 % 08/19/20 0719  86   91 % 08/19/20 0718  84   94 % 08/19/20 0717  85   95 % 08/19/20 0716  86   95 % 08/19/20 0715  87   92 % 08/19/20 0714  85   96 % 08/19/20 0713  84   95 % 08/19/20 0712  84   96 % 08/19/20 0711  85   95 % 08/19/20 0710  84   94 % 08/19/20 0709  84   95 % 08/19/20 0708  84   95 % 08/19/20 0707  84   96 % 08/19/20 0706  84   95 % 08/19/20 0705  84   95 % 08/19/20 0704  85   93 % 08/19/20 0703  83   94 % 08/19/20 0702  83   95 % 08/19/20 0701  84   94 % 08/19/20 0700  84   95 % 08/19/20 0659  84   94 % 08/19/20 0658  84     
08/19/20 0657  84     
08/19/20 0656  85   95 % 08/19/20 0655  86   95 % 08/19/20 0654  85   95 % 08/19/20 0653  85   94 % 08/19/20 0652  85   95 % 08/19/20 0651  85   93 % 08/19/20 0650  84   95 % 08/19/20 0649  86   94 % 08/19/20 0648  86   94 % 08/19/20 0647  86   94 % 08/19/20 0646  86   92 % 08/19/20 0645  87   93 % 08/19/20 0644  89   95 % 08/19/20 0643  88   95 % 08/19/20 0642  88   94 % 08/19/20 0641  88   93 % 08/19/20 0640  86   94 % 08/19/20 0639  85   93 % 08/19/20 0638  86   93 % 08/19/20 0637  86   94 % 08/19/20 0636  87   94 % 08/19/20 0635  86   94 % 08/19/20 0634  88   94 % 08/19/20 0633  85   93 % 08/19/20 0632  85   93 % 08/19/20 0631  85   92 % 08/19/20 0630  87   (!) 88 % 08/19/20 0629  84   (!) 89 % 08/19/20 0628  84   (!) 89 % 08/19/20 0627  85   (!) 87 % 08/19/20 0626  84   (!) 89 % 08/19/20 0625  85   (!) 88 % 08/19/20 0624  85   (!) 89 % 08/19/20 0623  83   (!) 88 % 08/19/20 0622  84   (!) 86 % 08/19/20 0621  84   (!) 88 % 08/19/20 0620  84   (!) 89 % 08/19/20 0619  85   (!) 88 % 08/19/20 0618  84   (!) 89 % 08/19/20 0617  85   (!) 87 % 08/19/20 0616  85   (!) 89 % 08/19/20 0615  85   90 % 08/19/20 0614  83   (!) 88 % 08/19/20 0613  85   (!) 87 % 08/19/20 0612  83   (!) 85 % 08/19/20 0611  83   (!) 89 % 08/19/20 0610  83   (!) 88 % 08/19/20 0609  84   (!) 89 % 08/19/20 0608  86   93 % 08/19/20 0607  85   (!) 85 % 08/19/20 0606  85   (!) 87 % 08/19/20 0605  85   (!) 89 % 08/19/20 0604  85   91 % 08/19/20 0603  85   (!) 86 % 08/19/20 0602  85   (!) 89 % 08/19/20 0601  84   (!) 89 % 08/19/20 0600  85   91 % 08/19/20 0559  84   (!) 87 % 08/19/20 0558  84   90 % 08/19/20 0557  84   (!) 87 % 08/19/20 0556  85   90 % 08/19/20 0555  84   90 % 08/19/20 0554  86   (!) 87 % 08/19/20 0553  87   (!) 84 % 08/19/20 0552  86   (!) 88 % 08/19/20 0551  88   (!) 85 % 08/19/20 0550  91   (!) 89 % 08/19/20 0549  87   (!) 81 % 08/19/20 0548  87   90 % 08/19/20 0547  87   (!) 85 % 08/19/20 0546  88   (!) 83 % 08/19/20 0545  87   (!) 84 % 08/19/20 0544  88   (!) 86 % 08/19/20 0543  87   (!) 88 % 08/19/20 0542  90   91 % 08/19/20 0541  88   (!) 89 % 08/19/20 0540  88   (!) 89 % 08/19/20 0539  88   (!) 87 % 08/19/20 0538  87   91 % 08/19/20 0537  87   (!) 86 % 08/19/20 0536  86   (!) 88 % 08/19/20 0535  87   (!) 88 % 08/19/20 0534  88   (!) 87 % 08/19/20 0533  88   (!) 88 % 08/19/20 0532  88   (!) 86 % 08/19/20 0531  89   90 % 08/19/20 0530  88   (!) 88 % 08/19/20 0529  89   91 % 08/19/20 0528  89   (!) 88 % 08/19/20 0527  90   90 % 08/19/20 0526  82   (!) 87 % 08/19/20 0525  82   (!) 86 % 08/19/20 0524  83   (!) 86 % 08/19/20 0523  83   (!) 81 % 08/19/20 0522  82   (!) 86 % 08/19/20 0521  83   (!) 86 % 08/19/20 0520  82   (!) 86 % 08/19/20 0519  82   (!) 86 % 08/19/20 0518  83   (!) 86 % 08/19/20 0517  83   (!) 86 % 08/19/20 0516  83   (!) 87 % 08/19/20 0515  83   (!) 87 % 08/19/20 0514  83   (!) 87 % 08/19/20 0513  83   (!) 86 % 08/19/20 0512  84   (!) 87 % 08/19/20 0511  84   (!) 88 % 08/19/20 0510  84   (!) 87 % 08/19/20 0509  84   (!) 87 % 08/19/20 0508  84   (!) 87 % 08/19/20 0507  85   (!) 87 % 08/19/20 0506  85   (!) 86 % 08/19/20 0505  86   (!) 86 % 08/19/20 0504  85   (!) 88 % 08/19/20 0503  86   (!) 80 % 08/19/20 0502  85   (!) 86 % 08/19/20 0501  86   (!) 85 % 08/19/20 0500  86   (!) 86 % 08/19/20 0459  87   (!) 86 % 08/19/20 0458  87   (!) 87 % 08/19/20 0457  88   (!) 86 % 08/19/20 0456  87   (!) 88 % 08/19/20 0455  86   (!) 85 % 08/19/20 0454  87   (!) 85 % 08/19/20 0453  87   93 % 08/19/20 0452  87   (!) 88 % 08/19/20 0451  87   (!) 89 % 08/19/20 0450  88   (!) 89 % 08/19/20 0449  87   (!) 88 % 08/19/20 0448  87   (!) 89 % 08/19/20 0447  86   (!) 87 % 08/19/20 0446  87   (!) 88 % 08/19/20 0445  87   (!) 83 % 08/19/20 0444  87   (!) 85 % 08/19/20 0443  85   (!) 87 % 08/19/20 0442  84   (!) 85 % 08/19/20 0441  85   (!) 85 % 08/19/20 0440  83   (!) 86 % 08/19/20 0439  84   (!) 87 % 08/19/20 0438  84   (!) 87 % 08/19/20 0437  84   (!) 86 % 08/19/20 0436  83   (!) 86 % 08/19/20 0435  83   (!) 86 % 08/19/20 0434  84   90 % 08/19/20 0433  83   (!) 85 % 08/19/20 0432  84   (!) 84 % 08/19/20 0431  84   (!) 88 % 08/19/20 0430  84   (!) 85 % 08/19/20 0429  84   (!) 86 % 08/19/20 0428  83   (!) 87 % 08/19/20 0427  83   (!) 85 % 08/19/20 0426  84   (!) 88 % 08/19/20 0425  84   (!) 85 % 08/19/20 0424  85   (!) 87 % 08/19/20 0423  85   (!) 86 % 08/19/20 0422  85   (!) 86 % 08/19/20 0421  87   (!) 88 % 08/19/20 0420  88   91 % 08/19/20 0419  85   (!) 87 % 08/19/20 0418  84   (!) 85 % 08/19/20 0417  84   (!) 86 % 08/19/20 0354 98.4 °F (36.9 °C) 85 18 124/78 90 % 08/19/20 0016 99.3 °F (37.4 °C) 89 18 140/76 90 % 08/18/20 1840 99.3 °F (37.4 °C) 82 18 144/65 96 % 08/18/20 1444 99.7 °F (37.6 °C) 83 18 109/65 96 % 08/18/20 1032 98.4 °F (36.9 °C) 82 18 105/51 96 % Intake/Output Summary (Last 24 hours) at 8/19/2020 5035 Last data filed at 8/19/2020 7080 Gross per 24 hour Intake 390 ml Output  Net 390 ml PHYSICAL EXAM: 
General: WD, WN. Alert, cooperative, no acute distress   
EENT:  EOMI. Anicteric sclerae. MMM Resp:  CTA bilaterally, no wheezing or rales. No accessory muscle use CV:  Regular  rhythm,  No edema GI:  Soft, Non distended, Non tender.  +Bowel sounds Neurologic:  Alert and oriented X 3, normal speech, Psych:   Good insight. Not anxious nor agitated Skin:  No rashes. No jaundice Reviewed most current lab test results and cultures  YES 
 Reviewed most current radiology test results   YES Review and summation of old records today    NO Reviewed patient's current orders and MAR    YES 
PMH/SH reviewed - no change compared to H&P 
________________________________________________________________________ Care Plan discussed with: 
  Comments Patient y Family RN y   
Care Manager Consultant  y Multidiciplinary team rounds were held today with , nursing, pharmacist and clinical coordinator. Patient's plan of care was discussed; medications were reviewed and discharge planning was addressed. ________________________________________________________________________ Total NON critical care TIME:  35   Minutes Total CRITICAL CARE TIME Spent:   Minutes non procedure based Comments >50% of visit spent in counseling and coordination of care y   
________________________________________________________________________ En Farmer MD  
 
Procedures: see electronic medical records for all procedures/Xrays and details which were not copied into this note but were reviewed prior to creation of Plan. LABS: 
I reviewed today's most current labs and imaging studies. Pertinent labs include: 
Recent Labs 08/19/20 
1467 08/17/20 
3977 WBC 21.2* 17.9* HGB 10.3* 10.2* HCT 32.3* 31.2*  
 347 Recent Labs 08/19/20 
1571 08/17/20 
0728 * 128* K 4.4 4.5  
CL 91* 92* CO2 27 25 * 164* BUN 44* 64* CREA 10.80* 13.60* CA 9.2 8.9 ALB 1.6*  --   
TBILI 1.9*  --   
ALT 32  --   
 
 
Signed:  En Farmer MD

## 2020-08-19 NOTE — PROGRESS NOTES
Comprehensive Nutrition Assessment Type and Reason for Visit: Initial(LOS) Nutrition Recommendations/Plan:  
· Continue renal diet. Recommend consistent CHO intake. · Continue monitoring PO, BG, wt, labs, POC · Please document % meals and supplements consumed in flowsheet I/O's under intake Nutrition Assessment:    
8/19: LOS pt. Chart reviewed. Med noted for ESRD, DM. PMHx: R foot osteomyelitis, cellulits, R foot 3rd & 4th toe amputation, L foot great & 5th toe amputation, HTN, HLD. Pt reports decreased appetite 4-5 days PTA. Appetite has started to return since admission. Good intakes recorded. Pt reports he is currently unable to check his BG at home because he needs a new meter. He reports not following any particular diet in regards to his DM or CKD. He \"eats what he can afford. \" RD encouraged pt to notify Case Management if food assistance is needed. Pt reports no use of SNAP benefits or food palacios. Pt reports usual wt at dialysis is 152kg. CBW 142kg. Wt fluctuations likely related to fluid retention & loss. Patient Vitals for the past 72 hrs: 
 % Diet Eaten 08/19/20 0856 75 % 08/16/20 1819 100 % 08/16/20 1329 100 % Wt Readings from Last 5 Encounters:  
08/18/20 142.5 kg (314 lb 3.2 oz) 08/13/20 153.2 kg (337 lb 11.9 oz) 08/07/20 153.8 kg (339 lb 1.1 oz) 08/02/20 154 kg (339 lb 8.1 oz) 01/15/20 149.8 kg (330 lb 4.8 oz) ] Estimated Daily Nutrient Needs: 
Energy (kcal):  6041 (BMR 2425 x 1. 3AF - 500) Protein (g):  103g (1.2g/kg) Fluid (ml/day):  1500mL Nutrition Related Findings:  Meds: cefepime, lantus, humalog, matt-Q, percocet. Labs: A1c 9.4 (8/15), Na 126, BUN 44, Cr 10.8 (both improving). BM 8/19. Hemodialysis M/W/F. Wounds:   
Surgical wound, Full thickness Current Nutrition Therapies: DIET RENAL Regular Anthropometric Measures: 
· Height:  6' 2\" (188 cm) · Current Body Wt:  142.5 kg (314 lb 2.5 oz) · Admission Body Wt:      
 · Usual Body Wt:       
· Ideal Body Wt:  190 lbs:  165.3 % · Adjusted Body Weight:   ; Weight Adjustment for: No adjustment · Adjusted BMI:      
· BMI Category:  Obese class 3 (BMI 40.0 or greater) Nutrition Diagnosis: · Altered nutrition-related lab values related to renal dysfunction, endocrine dysfunction as evidenced by dialysis, lab values Nutrition Interventions:  
Food and/or Nutrient Delivery: Continue current diet Nutrition Education and Counseling: No recommendations at this time Coordination of Nutrition Care: Feeding assistance/environmental change, Coordination of community care Goals: 
Continue meal intake >50% with improved renal labs and BG <200mg/dl next 5-7 days Nutrition Monitoring and Evaluation:  
Behavioral-Environmental Outcomes:   
Food/Nutrient Intake Outcomes: Food and nutrient intake Physical Signs/Symptoms Outcomes: Biochemical data, Other (specify), Hemodynamic status Discharge Planning:   
Continue current diet Electronically signed by Usman Xiong RD on 8/19/2020 at 11:51 AM 
 
Contact: ext [address] [place] [address] Pager [phone]

## 2020-08-19 NOTE — PROGRESS NOTES
TRANSFER - IN REPORT: 
 
Verbal report received from Krysta Simpson, [facility] on [facility]  being received from [facility][1] for ordered procedure Report consisted of patients Situation, Background, Assessment and  
Recommendations(SBAR). Information from the following report(s) SBAR, Kardex, Recent Results and Cardiac Rhythm NSR was reviewed with the receiving nurse. Opportunity for questions and clarification was provided. Assessment completed upon patients arrival to unit and care assumed.   
 
 
 
 
** report received; will bring patient to suite ~12:30pm today for HD**

## 2020-08-19 NOTE — ROUTINE PROCESS
TRANSFER - OUT REPORT: 
 
Verbal report given to Israel Lima RN (name) on Cece Herrera  being transferred to Dialysis (unit) for ordered procedure Report consisted of patients Situation, Background, Assessment and  
Recommendations(SBAR). Information from the following report(s) SBAR, Kardex, Intake/Output and MAR was reviewed with the receiving nurse. Lines:  
Peripheral IV 08/14/20 Posterior;Right Forearm (Active) Site Assessment Clean, dry, & intact 08/19/20 0700 Phlebitis Assessment 0 08/19/20 0700 Infiltration Assessment 0 08/19/20 0700 Dressing Status Clean, dry, & intact 08/19/20 0700 Dressing Type Tape;Transparent 08/19/20 0700 Hub Color/Line Status Blue;Flushed 08/19/20 0700 Action Taken Open ports on tubing capped 08/19/20 0700 Alcohol Cap Used Yes 08/19/20 0700 Peripheral IV 08/17/20 Right Forearm (Active) Site Assessment Clean, dry, & intact 08/19/20 0700 Phlebitis Assessment 0 08/19/20 0700 Infiltration Assessment 0 08/19/20 0700 Dressing Status Clean, dry, & intact 08/19/20 0700 Dressing Type Tape;Transparent 08/19/20 0700 Hub Color/Line Status Green;Flushed 08/19/20 0700 Action Taken Open ports on tubing capped 08/19/20 0700 Alcohol Cap Used Yes 08/19/20 0700 Opportunity for questions and clarification was provided. Patient transported with: 
 O2 @ 2 liters

## 2020-08-19 NOTE — PROGRESS NOTES
Infectious Disease Progress Note IMPRESSION:  
 
- Sepsis , wbc 21.2 
- Diabetic R/ foot infection with cellulitis, abscess, OM 
  MRI- 8/13- 9 x 8 x 11 mm nonspecific soft tissue collection containing gas 
distal to the second metatarsal head. WC- 8/14- polymicrobial 
  K.pneumoniae, Pseudomonas aeruginosa, , Pseudomonas stutzerii E.cloacae complex, Diphtheroids, mixed skin matt. Anaerobic - NG 
  WC- 8/13- polymicrobial 
  Staph species, coagulase negative , K.pneumoniae, Pseudomonas( 2 colony types) E.cloacae complex, Diphtheroids BC- NG 
- S/p amputation of R/ 1st & 2nd toes on 8/7 Pathology back - wound margins clear WC - 8/7- Klebsiella pneumoniae( 2 colony types) , Staph epidermis, Diphtheroids - H/o OM of R 3rd, 4th toes Amputation of 3rd toe - 1/9/20, 4th toe- 10/11/19 
- H/o MRSA infection 1/20 
- Poorly controlled Diabetes A1c 9.4 High blood sugars - PAD S/p RLE arteriogram, angioplasty on 8/17 
- ESRD on HD 
- Poorly controlled HTN improved - ESR - 116 PLAN:  
  
 
 
- Continue Vancomycin, Cefepime IV, continue at HD, Flagyl added while in patient - Margins  Are clear -  2  weeks of IV antibiotics with close follow up of wound progress. Pt has persistently high wbc will extend to 3 weeks of IV antibiotics out pt , he has had 1 week of antibiotics , so total of 4 weeks. Pt still has high wbc, low grade temps,  polymicrobial growth on WC . Will D/w Dr Matilda Torres if wound debridement is planned. 
 -Blood sugar control- D/w pt 
 -Elevate R/foot Antibiotic orders for DC Vancomycin 750 mg after HD three times weekly  & Cefepime2/2/3 G regimen after HD  x 3 weeks end date 9/9. Weekly CBC, CMP, Vanc trough & ESR/ CRP every other week fax results to [phone], call with abnormal results at [phone]. Target Vnc trough 15-20 Encourage daily probiotic or yogurt Out pt follow up [facility] 9[facility]8 at 3 pm 
 Out pt follow up with Podiatry Subjective: Pt seen . Complains of pain in R/foot Review of Systems:  A comprehensive review of systems was negative except for that written in the History of Present Illness. 10 point ROS obtained . All other systems negative . Objective:  
 
Blood pressure 123/75, pulse 88, temperature 98.2 °F (36.8 °C), temperature source Oral, resp. rate 18, height 6' 2\" (1.88 m), weight 314 lb 3.2 oz (142.5 kg), SpO2 91 %. Temp (24hrs), Av.6 °F (37 °C), Min:97.7 °F (36.5 °C), Max:99.3 °F (37.4 °C) Patient Vitals for the past 24 hrs: 
 Temp Pulse Resp BP SpO2  
20 1715  88 18 123/75   
20 1712 98.2 °F (36.8 °C)  18    
20 1700  86 20 120/75   
20 1645  85 20 121/81   
20 1630  85 18 125/81   
20 1615  82 18 96/67   
20 1600  85 20 (!) 131/94   
20 1545  80 22 125/78   
20 1530  82 18 116/78   
20 1515  82 18 116/76   
20 1500  80 18 131/78   
20 1445  81 18 138/81   
20 1430  80 18 124/85   
20 1415  81 18 131/83   
20 1400  83 18 145/89   
20 1345  86 18 133/76   
20 1330  82 20 99/48   
20 1315  89 20 119/74   
20 1300 97.7 °F (36.5 °C) 87 22 133/83   
20 1130 98.3 °F (36.8 °C) 88 22 127/76 91 % 20 1040     93 % 20 0823  85   (!) 83 % 20 0822  85   (!) 87 % 20 0821  86   (!) 86 % 20 0820  87   (!) 87 % 08/19/20 0819  87   91 % 08//20 0818  87   90 % 08//20 0817  87   90 % 08//20 0816  87   (!) 89 % 08 0815  87   (!) 89 % 08//20 0814  87   90 % 08/ 0813  87   (!) 89 % 08//20 0812  88   90 % 08/ 0811  89   93 % 20 0810  89   90 % 08 0809  90   (!) 89 % 08/20 0808  90   (!) 89 % 08/20 0807  91   (!) 88 % 08/20 0806  91   93 % 08//20 0805  89   90 % 0820 0804  90   90 % 08/19/20 0803  87   93 % 08/19/20 0802  86   90 % 08/19/20 0801  87   90 % 08/19/20 0800  87   90 % 08/19/20 0759  86   91 % 08/19/20 0758  87   92 % 08/19/20 0757  87   92 % 08/19/20 0756  89   92 % 08/19/20 0755  86   92 % 08/19/20 0754  86   91 % 08/19/20 0753  86   (!) 89 % 08/19/20 0752  87   93 % 08/19/20 0751  87   94 % 08/19/20 0750  88   94 % 08/19/20 0749  89   93 % 08/19/20 0748  89   90 % 08/19/20 0747  90   95 % 08/19/20 0746  88   96 % 08/19/20 0745  88   96 % 08/19/20 0744  89   95 % 08/19/20 0742  84   95 % 08/19/20 0741  83   95 % 08/19/20 0740  83   94 % 08/19/20 0739  84   93 % 08/19/20 0738  83   93 % 08/19/20 0737 99.2 °F (37.3 °C) 84 18 115/59 95 % 08/19/20 0736  84   95 % 08/19/20 0735  84   95 % 08/19/20 0734  84   95 % 08/19/20 0733  83   94 % 08/19/20 0732  83   94 % 08/19/20 0731  83   93 % 08/19/20 0730  86   93 % 08/19/20 0729  85   93 % 08/19/20 0728  85   93 % 08/19/20 0727  86   94 % 08/19/20 0726  86   95 % 08/19/20 0725  84   93 % 08/19/20 0724  86   93 % 08/19/20 0723  85   (!) 89 % 08/19/20 0722  84   95 % 08/19/20 0721  84   93 % 08/19/20 0720  84   92 % 08/19/20 0719  86   91 % 08/19/20 0718  84   94 % 08/19/20 0717  85   95 % 08/19/20 0716  86   95 % 08/19/20 0715  87   92 % 08/19/20 0714  85   96 % 08/19/20 0713  84   95 % 08/19/20 0712  84   96 % 08/19/20 0711  85   95 % 08/19/20 0710  84   94 % 08/19/20 0709  84   95 % 08/19/20 0708  84   95 % 08/19/20 0707  84   96 % 08/19/20 0706  84   95 % 08/19/20 0705  84   95 % 08/19/20 0704  85   93 % 08/19/20 0703  83   94 % 08/19/20 0702  83   95 % 08/19/20 0701  84   94 % 08/19/20 0700  84   95 % 08/19/20 0659  84   94 % 08/19/20 0658  84     
08/19/20 0657  84     
08/19/20 0656  85   95 % 08/19/20 0655  86   95 % 08/19/20 0654  85   95 % 08/19/20 0653  85   94 % 08/19/20 0652  85   95 % 08/19/20 0651  85   93 % 08/19/20 0650  84   95 % 08/19/20 0649  86   94 % 08/19/20 0648  86   94 % 08/19/20 0647  86   94 % 08/19/20 0646  86   92 % 08/19/20 0645  87   93 % 08/19/20 0644  89   95 % 08/19/20 0643  88   95 % 08/19/20 0642  88   94 % 08/19/20 0641  88   93 % 08/19/20 0640  86   94 % 08/19/20 0639  85   93 % 08/19/20 0638  86   93 % 08/19/20 0637  86   94 % 08/19/20 0636  87   94 % 08/19/20 0635  86   94 % 08/19/20 0634  88   94 % 08/19/20 0633  85   93 % 08/19/20 0632  85   93 % 08/19/20 0631  85   92 % 08/19/20 0630  87   (!) 88 % 08/19/20 0629  84   (!) 89 % 08/19/20 0628  84   (!) 89 % 08/19/20 0627  85   (!) 87 % 08/19/20 0626  84   (!) 89 % 08/19/20 0625  85   (!) 88 % 08/19/20 0624  85   (!) 89 % 08/19/20 0623  83   (!) 88 % 08/19/20 0622  84   (!) 86 % 08/19/20 0621  84   (!) 88 % 08/19/20 0620  84   (!) 89 % 08/19/20 0619  85   (!) 88 % 08/19/20 0618  84   (!) 89 % 08/19/20 0617  85   (!) 87 % 08/19/20 0616  85   (!) 89 % 08/19/20 0615  85   90 % 08/19/20 0614  83   (!) 88 % 08/19/20 0613  85   (!) 87 % 08/19/20 0612  83   (!) 85 % 08/19/20 0611  83   (!) 89 % 08/19/20 0610  83   (!) 88 % 08/19/20 0609  84   (!) 89 % 08/19/20 0608  86   93 % 08/19/20 0607  85   (!) 85 % 08/19/20 0606  85   (!) 87 % 08/19/20 0605  85   (!) 89 % 08/19/20 0604  85   91 % 08/19/20 0603  85   (!) 86 % 08/19/20 0602  85   (!) 89 % 08/19/20 0601  84   (!) 89 % 08/19/20 0600  85   91 % 08/19/20 0559  84   (!) 87 % 08/19/20 0558  84   90 % 08/19/20 0557  84   (!) 87 % 08/19/20 0556  85   90 % 08/19/20 0555  84   90 % 08/19/20 0554  86   (!) 87 % 08/19/20 0553  87   (!) 84 % 08/19/20 0552  86   (!) 88 % 08/19/20 0551  88   (!) 85 % 08/19/20 0550  91   (!) 89 % 08/19/20 0549  87   (!) 81 % 08/19/20 0548  87   90 % 08/19/20 0547  87   (!) 85 % 08/19/20 0546  88   (!) 83 % 08/19/20 0545  87   (!) 84 % 08/19/20 0544  88   (!) 86 % 08/19/20 0543  87   (!) 88 % 08/19/20 0542  90   91 % 08/19/20 0541  88   (!) 89 % 08/19/20 0540  88   (!) 89 % 08/19/20 0539  88   (!) 87 % 08/19/20 0538  87   91 % 08/19/20 0537  87   (!) 86 % 08/19/20 0536  86   (!) 88 % 08/19/20 0535  87   (!) 88 % 08/19/20 0534  88   (!) 87 % 08/19/20 0533  88   (!) 88 % 08/19/20 0532  88   (!) 86 % 08/19/20 0531  89   90 % 08/19/20 0530  88   (!) 88 % 08/19/20 0529  89   91 % 08/19/20 0528  89   (!) 88 % 08/19/20 0527  90   90 % 08/19/20 0526  82   (!) 87 % 08/19/20 0525  82   (!) 86 % 08/19/20 0524  83   (!) 86 % 08/19/20 0523  83   (!) 81 % 08/19/20 0522  82   (!) 86 % 08/19/20 0521  83   (!) 86 % 08/19/20 0520  82   (!) 86 % 08/19/20 0519  82   (!) 86 % 08/19/20 0518  83   (!) 86 % 08/19/20 0517  83   (!) 86 % 08/19/20 0516  83   (!) 87 % 08/19/20 0515  83   (!) 87 % 08/19/20 0514  83   (!) 87 % 08/19/20 0513  83   (!) 86 % 08/19/20 0512  84   (!) 87 % 08/19/20 0511  84   (!) 88 % 08/19/20 0510  84   (!) 87 % 08/19/20 0509  84   (!) 87 % 08/19/20 0508  84   (!) 87 % 08/19/20 0507  85   (!) 87 % 08/19/20 0506  85   (!) 86 % 08/19/20 0505  86   (!) 86 % 08/19/20 0504  85   (!) 88 % 08/19/20 0503  86   (!) 80 % 08/19/20 0502  85   (!) 86 % 08/19/20 0501  86   (!) 85 % 08/19/20 0500  86   (!) 86 % 08/19/20 0459  87   (!) 86 % 08/19/20 0458  87   (!) 87 % 08/19/20 0457  88   (!) 86 % 08/19/20 0456  87   (!) 88 % 08/19/20 0455  86   (!) 85 % 08/19/20 0454  87   (!) 85 % 08/19/20 0453  87   93 % 08/19/20 0452  87   (!) 88 % 08/19/20 0451  87   (!) 89 % 08/19/20 0450  88   (!) 89 % 08/19/20 0449  87   (!) 88 % 08/19/20 0448  87   (!) 89 % 08/19/20 0447  86   (!) 87 % 08/19/20 0446  87   (!) 88 % 08/19/20 0445  87   (!) 83 % 08/19/20 0444  87   (!) 85 % 08/19/20 0443  85   (!) 87 % 08/19/20 0442  84   (!) 85 % 08/19/20 0441  85   (!) 85 % 08/19/20 0440  83   (!) 86 % 08/19/20 0439  84   (!) 87 % 08/19/20 0438  84   (!) 87 % 08/19/20 0437  84   (!) 86 % 08/19/20 0436  83   (!) 86 % 08/19/20 0435  83   (!) 86 % 08/19/20 0434  84   90 % 08/19/20 0433  83   (!) 85 % 08/19/20 0432  84   (!) 84 % 08/19/20 0431  84   (!) 88 % 08/19/20 0430  84   (!) 85 % 08/19/20 0429  84   (!) 86 % 08/19/20 0428  83   (!) 87 % 08/19/20 0427  83   (!) 85 % 08/19/20 0426  84   (!) 88 % 08/19/20 0425  84   (!) 85 % 08/19/20 0424  85   (!) 87 % 08/19/20 0423  85   (!) 86 % 08/19/20 0422  85   (!) 86 % 08/19/20 0421  87   (!) 88 % 08/19/20 0420  88   91 % 08/19/20 0419  85   (!) 87 % 08/19/20 0418  84   (!) 85 % 08/19/20 0417  84   (!) 86 % 08/19/20 0354 98.4 °F (36.9 °C) 85 18 124/78 90 % 08/19/20 0016 99.3 °F (37.4 °C) 89 18 140/76 90 % 08/18/20 1840 99.3 °F (37.4 °C) 82 18 144/65 96 % Lines:  Peripheral IV:    
 
Physical Exam:  
General:  Awake, cooperative, Eyes:  Sclera anicteric. Pupils equally round and reactive to light. Mouth/Throat: Mucous membranes normal, oral pharynx clear Neck: Supple Lungs:    Reduced auscultation bases CV:  Regular rate and rhythm,no murmur, click, rub or gallop Abdomen:   Soft, non-tender. bowel sounds normal. non-distended Extremities: No  edema Skin: Skin color, texture, turgor normal. no acute rash or lesions Lymph nodes: Cervical and supraclavicular normal  
Musculoskeletal: No swelling or deformity, R/foot dressing + Lines/Devices:  Intact, no erythema, drainage or tenderness Psych: Awake and oriented, normal mood affect Data Reviewed: CBC:  
Recent Labs 08/19/20 
2564 08/17/20 
0678 WBC 21.2* 17.9*  
RBC 4.01* 3.91* HGB 10.3* 10.2* HCT 32.3* 31.2*  
 347 GRANS 86*  --   
LYMPH 6*  --   
EOS 0  --   
 
CMP:  
Recent Labs 08/19/20 
8887 08/17/20 
5368 * 164* * 128* K 4.4 4.5  
CL 91* 92* CO2 27 25 BUN 44* 64* CREA 10.80* 13.60* CA 9.2 8.9 AGAP 8 11 BUCR 4* 5* AP 79  --   
TP 8.9*  --   
ALB 1.6*  --   
GLOB 7.3*  --   
AGRAT 0.2*  --   
 
 
Studies:     
Lab Results Component Value Date/Time Culture result: NO GROWTH 4 DAYS 08/14/2020 11:54 PM  
 Culture result: HEAVY KLEBSIELLA PNEUMONIAE (A) 08/14/2020 01:45 PM  
 Culture result: MODERATE PSEUDOMONAS AERUGINOSA (A) 08/14/2020 01:45 PM  
 Culture result: MODERATE PSEUDOMONAS STUTZERI (A) 08/14/2020 01:45 PM  
 Culture result: HEAVY MIXED SKIN USMAN ISOLATED 08/14/2020 01:45 PM  
  
 
XR Results (most recent): 
Results from Hospital Encounter encounter on 08/12/20 XR FEMUR RT 2 VS  
 Narrative INTERPRETATION PROVIDED FOR COMPLIANCE ONLY AT NO CHARGE CLINICAL HISTORY: Right leg arteriogram.. COMPARISON: None. Impression IMPRESSION: 
15 image/s was/were obtained intraoperatively lower extremity angiography. These 
show multifocal stenoses with interventions. No radiologist was present during 
image acquisition. Please see operative note for further details. FLUOROSCOPY TIME PROVIDED: 635 seconds Patient Active Problem List  
Diagnosis Code  
 HTN (hypertension) I10  
 Postoperative hypoxia R09.02, Z98.890  
  Diarrhea R19.7  ESRD (end stage renal disease) on dialysis ([facility]) N18.6, Z99.2  Hyperlipidemia associated with type 2 diabetes mellitus ([facility]) E11.69, E78.5  Morbid obesity with body mass index (BMI) of 40.0 to 49.9 ([facility]) E66.01  
 Uncontrolled type 2 diabetes mellitus with proliferative retinopathy, with long-term current use of insulin ([facility] [address] E11.3599, E11.65, Z79.4  
 Uncontrolled type 2 diabetes mellitus with hyperglycemia, with long-term current use of insulin ([facility]) E11.65, Z79.4  
 Uncontrolled hypertension I10  
 Acute osteomyelitis of toe of right foot ([address] M86.171  
 Osteomyelitis ([address] M86.9  Foot ulcer ([address] L97.509  ESRD (end stage renal disease) ([address] N18.6  Wound cellulitis L03.90  
 Osteomyelitis of second toe of right foot ([address] M86.9  Sepsis ([address] A41.9  Cellulitis of right foot L03.115  
 Acute respiratory failure with hypoxia and hypercapnia ([facility]) J96.01, J96.02  
 
 
  ICD-10-CM ICD-9-CM 1. Cellulitis, wound, post-operative  T81.49XA 998.59   
2. Acute osteomyelitis of toe of right foot ([address]  M86.171 730.07   
3. Acute respiratory failure with hypoxia and hypercapnia ([facility])  J96.01 518.81   
 J96.02    
4. Cellulitis of right foot  L03.115 682.7 5. Diarrhea of infectious origin  A09 009.2 6. Osteomyelitis of foot, right, acute ([address]  M86.171 730.07   
7. Pseudomonas infection  A49.8 041.7 8. Klebsiella infection  A49.8 041.85   
9. Infection caused by Enterobacter cloacae  A49.8 041.85   
10. ESRD (end stage renal disease) ([address]  N18.6 585.6 11. Poorly controlled diabetes mellitus ([address]  E11.65 250.00 I have discussed the diagnosis with the patient and the intended plan as seen in the above orders. I have discussed medication side effects and warnings with the patient as well. Reviewed test results at length with patient Anti-infectives:  
 
 Vancomycin/ Cefepime IV  James Santos MD FACP

## 2020-08-20 NOTE — ROUTINE PROCESS
Attempted to schedule PCP [facility] apt with Dr. Carrie Whyte. Due to lack of apt availability, the nurse will contact the patient with apt date and time

## 2020-08-20 NOTE — PROGRESS NOTES
Foot and Ankle Progress Note Admit Date: 8/12/2020 Hospital day 5 Subjective:  
 
Patient was admitted 8 days ago. He is post op amputation of right toes 1 and 2 of 8 days and is post op revascularization lower right leg by Dr. Cher Harris, however, dorsalis pedis is still with some stenosis and patient has significant small vessel disease in the foot. Patient resting comfortably Current Facility-Administered Medications Medication Dose Route Frequency  EPO- HOLD dose tonight  1 Each Other ONCE  
 [START ON 8/21/2020] Vancomycin Level- Please draw with AM labs on 8/21/20  1 Each Other ONCE  
 heparin (porcine) injection 5,000 Units  5,000 Units SubCUTAneous Q12H  
 metroNIDAZOLE (FLAGYL) IVPB premix 500 mg  500 mg IntraVENous Q12H  
 sodium chloride (NS) flush 5-40 mL  5-40 mL IntraVENous Q8H  
 sodium chloride (NS) flush 5-40 mL  5-40 mL IntraVENous PRN  
 lidocaine (PF) (XYLOCAINE) 10 mg/mL (1 %) injection 0.1 mL  0.1 mL SubCUTAneous PRN  
 cefepime (MAXIPIME) 1 g in 0.9% sodium chloride (MBP/ADV) 50 mL  1 g IntraVENous Q24H  
 lactobac ac& pc-s.therm-b.anim (USMAN Q/RISAQUAD)  1 Cap Oral DAILY  loperamide (IMODIUM) capsule 2 mg  2 mg Oral Q4H PRN  
 epoetin nata-epbx (RETACRIT) injection 10,000 Units  10,000 Units SubCUTAneous Q7D  
 sodium chloride (NS) flush 5-40 mL  5-40 mL IntraVENous Q8H  
 sodium chloride (NS) flush 5-40 mL  5-40 mL IntraVENous PRN  
 acetaminophen (TYLENOL) tablet 650 mg  650 mg Oral Q6H PRN Or  
 acetaminophen (TYLENOL) suppository 650 mg  650 mg Rectal Q6H PRN  polyethylene glycol (MIRALAX) packet 17 g  17 g Oral DAILY PRN  promethazine (PHENERGAN) tablet 12.5 mg  12.5 mg Oral Q6H PRN  Or  
 ondansetron (ZOFRAN) injection 4 mg  4 mg IntraVENous Q6H PRN  
 insulin lispro (HUMALOG) injection   SubCUTAneous AC&HS  
 glucose chewable tablet 16 g  4 Tab Oral PRN  
 dextrose (D50W) injection syrg 12.5-25 g  12.5-25 g IntraVENous PRN  
  glucagon (GLUCAGEN) injection 1 mg  1 mg IntraMUSCular PRN  
 insulin glargine (LANTUS) injection 20 Units  20 Units SubCUTAneous DAILY  oxyCODONE-acetaminophen (PERCOCET) 5-325 mg per tablet 1 Tab  1 Tab Oral Q4H PRN  
 VANCOMYCIN INFORMATION NOTE   Other Rx Dosing/Monitoring Review of Systems See current H&P; no changes to add Objective:  
 
Patient Vitals for the past 8 hrs: 
 BP Temp Pulse Resp SpO2  
08/20/20 1444 115/70 98.8 °F (37.1 °C) 85 16 95 % 08/20/20 1417 100/52      
08/20/20 1333 (!) 92/39  84  92 % 08/20/20 1318 (!) 85/55  88    
08/20/20 1218 120/67  84  93 % 08/20/20 1118 90/51      
08/20/20 1104 (!) 84/61 98.8 °F (37.1 °C) 89 18 91 % No intake/output data recorded. 08/18 1901 - 08/20 0700 In: 740 [P.O.:720; I.V.:150] Out: 2500 Physical Exam: right foot Previously amputated right toes 5,4,3. Incision  where toes 1 and 2 amputated one week ago are with bloody dark thick drainage  
discoloration of the right forefoot; min swelling and no warmth of the foot nor leg Temp. 98.8 Data Review Recent Results (from the past 24 hour(s)) GLUCOSE, POC Collection Time: 08/19/20  8:28 PM  
Result Value Ref Range Glucose (POC) 188 (H) 65 - 100 mg/dL Performed by Kaz Antony \"Evelyn\" CBC W/O DIFF Collection Time: 08/20/20  5:19 AM  
Result Value Ref Range WBC 23.5 (H) 4.1 - 11.1 K/uL  
 RBC 4.17 4.10 - 5.70 M/uL  
 HGB 10.7 (L) 12.1 - 17.0 g/dL HCT 33.6 (L) 36.6 - 50.3 % MCV 80.6 80.0 - 99.0 FL  
 MCH 25.7 (L) 26.0 - 34.0 PG  
 MCHC 31.8 30.0 - 36.5 g/dL  
 RDW 15.0 (H) 11.5 - 14.5 % PLATELET 509 585 - 552 K/uL MPV 10.2 8.9 - 12.9 FL  
 NRBC 0.0 0  WBC ABSOLUTE NRBC 0.00 0.00 - 0.01 K/uL GLUCOSE, POC Collection Time: 08/20/20  7:32 AM  
Result Value Ref Range Glucose (POC) 173 (H) 65 - 100 mg/dL Performed by Via U[facility]   
GLUCOSE, POC  Collection Time: 08/20/20 11:51 AM  
 Result Value Ref Range Glucose (POC) 235 (H) 65 - 100 mg/dL Performed by Via Uyen PCT   
EKG, 12 LEAD, INITIAL Collection Time: 08/20/20  2:36 PM  
Result Value Ref Range Ventricular Rate 84 BPM  
 Atrial Rate 84 BPM  
 P-R Interval 204 ms QRS Duration 116 ms  
 Q-T Interval 400 ms QTC Calculation (Bezet) 472 ms Calculated P Axis 41 degrees Calculated R Axis 9 degrees Calculated T Axis 105 degrees Diagnosis Normal sinus rhythm Left ventricular hypertrophy with QRS widening ( Sokolow-Zaragoza , Maxim  
product , Romhilt-Rapp ) Inferior infarct , age undetermined ST & T wave abnormality, consider lateral ischemia Abnormal ECG When compared with ECG of 01-MAY-2017 15:23, 
Questionable change in QRS axis Inferior infarct is now present ST now depressed in Lateral leads T wave inversion now evident in Lateral leads TROPONIN I Collection Time: 08/20/20  2:45 PM  
Result Value Ref Range Troponin-I, Qt. <0.05 <0.05 ng/mL CK W/ CKMB & INDEX Collection Time: 08/20/20  2:45 PM  
Result Value Ref Range CK - MB 3.0 <3.6 NG/ML  
 CK-MB Index 2.3 0.0 - 2.5  39 - 308 U/L  
GLUCOSE, POC Collection Time: 08/20/20  4:11 PM  
Result Value Ref Range Glucose (POC) 210 (H) 65 - 100 mg/dL Performed by Via Uyen PCT Assessment:  
 
Active Problems: 
  ESRD (end stage renal disease) ([facility] [address] (1/6/2020) Sepsis ([facility] [address] (8/12/2020) Cellulitis of right foot (8/12/2020) Acute respiratory failure with hypoxia and hypercapnia ([facility] [address] (8/12/2020) Plan: Most of this patients's issues are probably vascular related. I will schedule him for further I&D and debridement of tissue if needed tomorrow , however this limb may not be salvageable. I spoke with Dr. Kraft and we both agree that this patient has severe small  vessel disease and though its worth a try, this patient may or may not heal with further debridement

## 2020-08-20 NOTE — PROGRESS NOTES
Rapid response called for chest pain, EKG being started upon entrance into room. Patient alert, sates pain is in center of chest.  Labs to be drawn , Dr. Anthony Brandt gave verbal orders for [facility] [place], EKG and labs to be drawn. Staff to give percocet for pain per Dr. Anthony Brandt. Staff to call with further questions to Rapid Response or CCU [id number].

## 2020-08-20 NOTE — PROGRESS NOTES
Bedside and Verbal shift change report given to Melissa Martinez RN (oncoming nurse) by Vince Stout (offgoing nurse). Report given with SBAR, Kardex, Intake/Output, MAR and Recent Results.

## 2020-08-20 NOTE — PROGRESS NOTES
TEJA: Home with Home Health and Follow-up Appointments 10:08am- CM called Dr. Howard Thomas office to schedule pt's follow-up appointment. Appointment scheduled for 9/10/2020 at 2:30pm. 
 
9:56am- CM faxed IV antibiotics order and clinical documentation to [facility] ([phone]). 9:50am- CM called [facility] via phone. CM spoke to the  at [facility] inquired about fax number and to informed SW that CM will be faxing an order and clinical documentation to her. SW inquired if pt would be receiving HH or be going to SNF. CM informed SW that CM spoke to pt about SNF and pt declined SNF but agreed with [facility]. CM will continue to follow patient for discharge planning needs and arrange for services as deemed necessary. Bryanna Martinez [address] [place] [address] 
[phone]

## 2020-08-20 NOTE — PROGRESS NOTES
Hospitalist Progress Note NAME: Praful Gerber :  1985 MRN:  [id number] Assessment / Plan: 
Sepsis, POA  WBC 19K, tachycardia Right foot cellulitis with abscess, POA after amputation of right 1st and second toe for osteomyelitis  Hx osteomyelitis right 3rd and 4th toes 2020 and 10/2019 respectively Acute hypoxic respiratory failure 88% RA IDDM uncontrolled with hyperglycemia  ESRD on HD MWF Obesity 
  
--continue vancomycin and zosyn was transitioned to cefepime due to persistent fever.  Follow up blood cultures. (NGTD) -Wound cx Culture result: Abnormal             Preliminary HEAVY KLEBSIELLA PNEUMONIAE Culture result: Abnormal             Preliminary HEAVY ENTEROBACTER CLOACAE COMPLEX Culture result: Abnormal    LIGHT  
POSSIBLE  
3RD GRAM NEGATIVE TEODORA         Preliminary Culture result: Abnormal             Preliminary LIGHT PSEUDOMONAS AERUGINOSA SENSITIVITY TO FOLLOW Culture result: Abnormal             Preliminary FEW MIXED STAPHYLOCOCCUS SPECIES, COAGULASE NEGATIVE Culture result: Abnormal             Preliminary FEW MIXED DIPHTHEROIDS   
  
  
  
--podiatry consult apreciated, MRI right foot showed fluid collection and abscess with no osteomyelitis -s/p irrigation of wound  
  ESR elevated 116.  ID consult appreciated -- SARS-COV-2 negative,  
-supplemental O2, CXR clear and lungs clear on exam without pulmonary system.  Incentive spirometry.  Hypoxia is resolved after dalysis --continue lantus 20 units daily, add moderate SSI 
-- appreciated renal consult to continue with dialysis  
-Large amount of chocolate color purulent discharge expressed from Sugical wound on manual expression 
-s/p  RIGHT LEG ARTERIOGRAM ANTERIOR TIBIAL ARTERY AND ANGIOPLASTY 
-path report margin clear ID recommend  Vancomycin 750 mg after HD three times weekly  & Cefepime2/2/3 G regimen after HD  x 3 weeks end date . Uncontrolled HTN: 
 -Hold  amlodipine and Lisinopril 
-patient hypotensive today  
-check orthostatic vital  
-physical therapy  
  
Diarrhea: 
-Likely Antibiotic induced 
-Resolved -Imodium PRN 
-c/w probiotic 
  
Hyponatremia  
-motoring  
  
Body mass index is 43.36 kg/m². 
  
Code: full DVT prophylaxis: heparin Surrogate decision maker:  Aunt Monico Ellison 
  
 
 
  
 
Subjective: Chief Complaint / Reason for Physician Visit Discussed with RN events overnight. Feel dizzy , hypotensive , check orthostatic vital  
 
Review of Systems: 
Symptom Y/N Comments  Symptom Y/N Comments Fever/Chills y   Chest Pain n   
Poor Appetite n   Edema n   
Cough n   Abdominal Pain n   
Sputum n   Joint Pain SOB/LITTLE n   Pruritis/Rash Nausea/vomit n   Tolerating PT/OT Diarrhea n   Tolerating Diet y Constipation n   Other Could NOT obtain due to:   
 
Objective: VITALS:  
Last 24hrs VS reviewed since prior progress note. Most recent are: 
Patient Vitals for the past 24 hrs: 
 Temp Pulse Resp BP SpO2  
08/20/20 0733 98.2 °F (36.8 °C) 89 18 109/51 90 % 08/20/20 0522 98.2 °F (36.8 °C) 91 20 107/62 92 % 08/19/20 2312 98.5 °F (36.9 °C) 91 18 100/56 (!) 80 % 08/19/20 2132 98.5 °F (36.9 °C)  18    
08/19/20 1941 (!) 100.5 °F (38.1 °C) 91 20 102/64 90 % 08/19/20 1900 98 °F (36.7 °C) 88 18 122/60 98 % 08/19/20 1845 98 °F (36.7 °C) 90 20 (!) 80/50 98 % 08/19/20 1715  88 18 123/75   
08/19/20 1712 98.2 °F (36.8 °C)  18    
08/19/20 1700  86 20 120/75   
08/19/20 1645  85 20 121/81   
08/19/20 1630  85 18 125/81   
08/19/20 1615  82 18 96/67   
08/19/20 1600  85 20 (!) 131/94   
08/19/20 1545  80 22 125/78   
08/19/20 1530  82 18 116/78   
08/19/20 1515  82 18 116/76   
08/19/20 1500  80 18 131/78   
08/19/20 1445  81 18 138/81   
08/19/20 1430  80 18 124/85   
08/19/20 1415  81 18 131/83   
08/19/20 1400  83 18 145/89   
08/19/20 1345  86 18 133/76   
08/19/20 1330  82 20 99/48  08/19/20 1315  89 20 119/74   
08/19/20 1300 97.7 °F (36.5 °C) 87 22 133/83   
08/19/20 1130 98.3 °F (36.8 °C) 88 22 127/76 91 % 08/19/20 1040     93 % Intake/Output Summary (Last 24 hours) at 8/20/2020 1023 Last data filed at 8/19/2020 2157 Gross per 24 hour Intake 480 ml Output 2500 ml Net -2020 ml PHYSICAL EXAM: 
General: WD, WN. Alert, cooperative, no acute distress   
EENT:  EOMI. Anicteric sclerae. MMM Resp:  CTA bilaterally, no wheezing or rales. No accessory muscle use CV:  Regular  rhythm,  No edema GI:  Soft, Non distended, Non tender.  +Bowel sounds Neurologic:  Alert and oriented X 3, normal speech, Psych:   Good insight. Not anxious nor agitated Skin:  No rashes. No jaundice Reviewed most current lab test results and cultures  YES Reviewed most current radiology test results   YES Review and summation of old records today    NO Reviewed patient's current orders and MAR    YES 
PMH/SH reviewed - no change compared to H&P 
________________________________________________________________________ Care Plan discussed with: 
  Comments Patient y Family RN y   
Care Manager y Consultant     
                 y Multidiciplinary team rounds were held today with , nursing, pharmacist and clinical coordinator. Patient's plan of care was discussed; medications were reviewed and discharge planning was addressed. ________________________________________________________________________ Total NON critical care TIME:  35    Minutes Total CRITICAL CARE TIME Spent:   Minutes non procedure based Comments >50% of visit spent in counseling and coordination of care y   
________________________________________________________________________ Jose Manuel Dias MD  
 
Procedures: see electronic medical records for all procedures/Xrays and details which were not copied into this note but were reviewed prior to creation of Plan.    
 
LABS: 
 I reviewed today's most current labs and imaging studies. Pertinent labs include: 
Recent Labs  
  08/20/20 
0519 08/19/20 
9440 WBC 23.5* 21.2* HGB 10.7* 10.3* HCT 33.6* 32.3*  
 384 Recent Labs 08/19/20 
4946 *  
K 4.4 CL 91* CO2 27 * BUN 44* CREA 10.80* CA 9.2 ALB 1.6* TBILI 1.9* ALT 32 Signed:  Dannie Underwood MD

## 2020-08-20 NOTE — PROGRESS NOTES
Spiritual Care Assessment/Progress Note [facility] 
 
 
NAME: Audra White      MRN: [id number] AGE: 38 y.o. SEX: male Mu-ism Affiliation: No preference Language: English  
 
8/20/2020     Total Time (in minutes): 14 Spiritual Assessment begun in [facility] CARDIOPULMONARY CARE through conversation with: 
  
    []Patient        [] Family    [] Friend(s) Reason for Consult: Rapid response team  
 
Spiritual beliefs: (Please include comment if needed) 
   [] Identifies with a celeste tradition:     
   [] Supported by a celeste community:        
   [] Claims no spiritual orientation:       
   [] Seeking spiritual identity:            
   [] Adheres to an individual form of spirituality:       
   [x] Not able to assess:                   
 
    
Identified resources for coping:  
   [] Prayer                           
   [] Music                  [] Guided Imagery 
   [] Family/friends                 [] Pet visits [] Devotional reading                         [x] Unknown 
   [] Other:                                          
 
 
Interventions offered during this visit: (See comments for more details) Plan of Care: 
 
 [] Support spiritual and/or cultural needs  
 [] Support AMD and/or advance care planning process    
 [] Support grieving process 
 [] Coordinate Rites and/or Rituals  
 [] Coordination with community clergy 
 [x] No spiritual needs identified at this time 
 [] Detailed Plan of Care below (See Comments)  [] Make referral to Music Therapy 
[] Make referral to Pet Therapy    
[] Make referral to Addiction services 
[] Make referral to [facility] 
[] Make referral to Spiritual Care Partner 
[] No future visits requested       
[] Follow up visits as needed Comments:  Attempted Initial Spiritual Assessment in Cardio Care. . Unable to assess patients needs. No family present at this time.    Patient in room with medical staff person. Provided support to visitor leaving the patient. Chaplains will follow as able and/or as needed. Rev. Wilfredo Peterson EdD MDiv  For [place] Page [phone])

## 2020-08-20 NOTE — PROGRESS NOTES
Infectious Disease Progress Note IMPRESSION:  
 
- Sepsis, wbc 23.5, hypotensive 
- Diabetic R/ foot infection with cellulitis, abscess, OM 
  MRI- 8/13- 9 x 8 x 11 mm nonspecific soft tissue collection containing gas 
distal to the second metatarsal head. WC- 8/14- polymicrobial 
  K.pneumoniae, Pseudomonas aeruginosa, , Pseudomonas stutzerii E.cloacae complex, Diphtheroids, mixed skin matt. Anaerobic - NG 
  WC- 8/13- polymicrobial 
  Staph species, coagulase negative , K.pneumoniae, Pseudomonas( 2 colony types) E.cloacae complex, Diphtheroids BC- NG 
- S/p amputation of R/ 1st & 2nd toes on 8/7 Pathology back - wound margins clear WC - 8/7- Klebsiella pneumoniae( 2 colony types) , Staph epidermis, Diphtheroids - H/o OM of R 3rd, 4th toes Amputation of 3rd toe - 1/9/20, 4th toe- 10/11/19 
- H/o MRSA infection 1/20 
- Poorly controlled Diabetes A1c 9.4 High blood sugars - PAD S/p RLE arteriogram, angioplasty on 8/17 
- ESRD on HD 
- Poorly controlled HTN improved - ESR - 116 PLAN:  
  
 
 
- Continue Vancomycin, Cefepime IV, Flagyl - BC x 2 if temps >100.5 - Margins  Are clear -  2  weeks of IV antibiotics with close follow up of wound progress. Pt has persistently high wbc will extend to 3 weeks of IV antibiotics out pt , he has had 1 week of antibiotics , so total of 4 weeks. Pt still has high wbc, low grade temps,  polymicrobial growth on WC . D/w Dr Selena Roland  wound debridement  
 -Blood sugar control- D/w pt 
 -Elevate R/foot Subjective:  
 
Pt seen . Hyotensive Denies complaints D/w RN Review of Systems:  A comprehensive review of systems was negative except for that written in the History of Present Illness. 10 point ROS obtained . All other systems negative . Objective:  
 
Blood pressure 120/67, pulse 84, temperature 98.8 °F (37.1 °C), resp. rate 18, height 6' 2\" (1.88 m), weight 313 lb (142 kg), SpO2 93 %. Temp (24hrs), Av.5 °F (36.9 °C), Min:98 °F (36.7 °C), Max:100.5 °F (38.1 °C) Patient Vitals for the past 24 hrs: 
 Temp Pulse Resp BP SpO2  
20 1218  84  120/67 93 % 20 1118    90/51   
20 1104 98.8 °F (37.1 °C) 89 18 (!) 84/61 91 % 20 0733 98.2 °F (36.8 °C) 89 18 109/51 90 % 20 0522 98.2 °F (36.8 °C) 91 20 107/62 92 % 20 2312 98.5 °F (36.9 °C) 91 18 100/56 (!) 80 % 20 2132 98.5 °F (36.9 °C)  18    
20 1941 (!) 100.5 °F (38.1 °C) 91 20 102/64 90 % 20 1900 98 °F (36.7 °C) 88 18 122/60 98 % 20 1845 98 °F (36.7 °C) 90 20 (!) 80/50 98 % 20 1715  88 18 123/75   
20 1712 98.2 °F (36.8 °C)  18    
20 1700  86 20 120/75   
20 1645  85 20 121/81   
20 1630  85 18 125/81   
20 1615  82 18 96/67   
20 1600  85 20 (!) 131/94   
20 1545  80 22 125/78   
20 1530  82 18 116/78   
20 1515  82 18 116/76   
20 1500  80 18 131/78   
20 1445  81 18 138/81   
20 1430  80 18 124/85   
20 1415  81 18 131/83   
20 1400  83 18 145/89   
20 1345  86 18 133/76   
20 1330  82 20 99/48  Lines:  Peripheral IV:    
 
Physical Exam:  
General:  Awake, cooperative, Eyes:  Sclera anicteric. Pupils equally round and reactive to light. Mouth/Throat: Mucous membranes normal, oral pharynx clear Neck: Supple Lungs:    Reduced auscultation bases CV:  Regular rate and rhythm,no murmur, click, rub or gallop Abdomen:   Soft, non-tender. bowel sounds normal. non-distended Extremities: No  edema Skin: Skin color, texture, turgor normal. no acute rash or lesions Lymph nodes: Cervical and supraclavicular normal  
Musculoskeletal: No swelling or deformity, R/foot dressing + Lines/Devices:  Intact, no erythema, drainage or tenderness Psych: Awake and oriented, normal mood affect Data Reviewed: CBC:  
 Recent Labs  
  08/20/20 
0519 08/19/20 
3820 WBC 23.5* 21.2*  
RBC 4.17 4.01* HGB 10.7* 10.3* HCT 33.6* 32.3*  
 384 GRANS  --  86* LYMPH  --  6*  
EOS  --  0 CMP:  
Recent Labs 08/19/20 
4436 * *  
K 4.4 CL 91* CO2 27 BUN 44* CREA 10.80* CA 9.2 AGAP 8  
BUCR 4* AP 79  
TP 8.9* ALB 1.6*  
GLOB 7.3* AGRAT 0.2* Studies:     
Lab Results Component Value Date/Time Culture result: NO GROWTH 5 DAYS 08/14/2020 11:54 PM  
 Culture result: HEAVY KLEBSIELLA PNEUMONIAE (A) 08/14/2020 01:45 PM  
 Culture result: MODERATE PSEUDOMONAS AERUGINOSA (A) 08/14/2020 01:45 PM  
 Culture result: MODERATE PSEUDOMONAS STUTZERI (A) 08/14/2020 01:45 PM  
 Culture result: HEAVY MIXED SKIN USMAN ISOLATED 08/14/2020 01:45 PM  
  
 
XR Results (most recent): 
Results from Hospital Encounter encounter on 08/12/20 XR FEMUR RT 2 VS  
 Narrative INTERPRETATION PROVIDED FOR COMPLIANCE ONLY AT NO CHARGE CLINICAL HISTORY: Right leg arteriogram.. COMPARISON: None. Impression IMPRESSION: 
15 image/s was/were obtained intraoperatively lower extremity angiography. These 
show multifocal stenoses with interventions. No radiologist was present during 
image acquisition. Please see operative note for further details. FLUOROSCOPY TIME PROVIDED: 635 seconds Patient Active Problem List  
Diagnosis Code  
 HTN (hypertension) I10  
 Postoperative hypoxia R09.02, Z98.890  
 Diarrhea R19.7  ESRD (end stage renal disease) on dialysis ([facility]) N18.6, Z99.2  Hyperlipidemia associated with type 2 diabetes mellitus ([facility]) E11.69, E78.5  Morbid obesity with body mass index (BMI) of 40.0 to 49.9 ([facility]) E66.01  
 Uncontrolled type 2 diabetes mellitus with proliferative retinopathy, with long-term current use of insulin ([facility] [address] E11.3599, E11.65, Z79.4  
 Uncontrolled type 2 diabetes mellitus with hyperglycemia, with long-term current use of insulin ([facility]) E11.65, Z79.4  Uncontrolled hypertension I10  
 Acute osteomyelitis of toe of right foot ([facility] [address] M86.171  
 Osteomyelitis ([facility] [address] M86.9  Foot ulcer ([facility] [address] L97.509  ESRD (end stage renal disease) ([facility] [address] N18.6  Wound cellulitis L03.90  
 Osteomyelitis of second toe of right foot ([facility] [address] M86.9  Sepsis ([facility] [address] A41.9  Cellulitis of right foot L03.115  
 Acute respiratory failure with hypoxia and hypercapnia ([facility]) J96.01, J96.02  
 
 
  ICD-10-CM ICD-9-CM 1. Cellulitis, wound, post-operative  T81.49XA 998.59   
2. Acute osteomyelitis of toe of right foot ([facility] [address]  M86.171 730.07   
3. Acute respiratory failure with hypoxia and hypercapnia ([facility])  J96.01 518.81   
 J96.02    
4. Cellulitis of right foot  L03.115 682.7 5. Diarrhea of infectious origin  A09 009.2 6. Osteomyelitis of foot, right, acute ([facility] [address]  M86.171 730.07   
7. Pseudomonas infection  A49.8 041.7 8. Klebsiella infection  A49.8 041.85   
9. Infection caused by Enterobacter cloacae  A49.8 041.85   
10. ESRD (end stage renal disease) ([facility] [address]  N18.6 585.6 11. Poorly controlled diabetes mellitus ([facility] [address]  E11.65 250.00   
12. ESRD (end stage renal disease) on dialysis ([facility])  N18.6 585.6 Z99.2 V45.11   
13. Other chronic osteomyelitis of right foot ([facility] [address]  M86.671 730.17 I have discussed the diagnosis with the patient and the intended plan as seen in the above orders. I have discussed medication side effects and warnings with the patient as well. Reviewed test results at length with patient Anti-infectives:  
 
 Vancomycin/ Cefepime IV  Tyrone Hernandez MD FACP

## 2020-08-20 NOTE — PROGRESS NOTES
Problem: Falls - Risk of 
Goal: *Absence of Falls Description: Document Kaya Shepard Fall Risk and appropriate interventions in the flowsheet. Outcome: Progressing Towards Goal 
Note: Fall Risk Interventions: 
Mobility Interventions: Bed/chair exit alarm, Patient to call before getting OOB Medication Interventions: Bed/chair exit alarm, Patient to call before getting OOB Elimination Interventions: Bed/chair exit alarm, Call light in reach, Patient to call for help with toileting needs History of Falls Interventions: Bed/chair exit alarm

## 2020-08-20 NOTE — PROGRESS NOTES
Pt BP 85/55. Pt had ambulated from the bed to the recliner and felt dizzy/lightheaded. BP rechecked now 92/39. MD Valorie Hendricks aware, orders for 250 bolus. Will continue to monitor.

## 2020-08-20 NOTE — PROGRESS NOTES
I just reviewed MRI and I will be in later this pm to eval for I&D Hopefully this limb will be salvagble

## 2020-08-21 NOTE — PERIOP NOTES
Handoff Report from Operating Room to PACU Report received from [address] RN and A. [employer] CRNA regarding Wiliam Quintero. Surgeon(s): 
Audra Denson DPM  And Procedure(s) (LRB): 
RELEASE OF SUTURES, WASHOUT RIGHT FOOT (Right)  confirmed  
with allergies, dressings, wound packing and local anesthetic discussed. Anesthesia type, drugs, patient history, complications, estimated blood loss, vital signs, intake and output, and last pain medication, lines and temperature were reviewed.

## 2020-08-21 NOTE — PROGRESS NOTES
Hospitalist Progress Note NAME: Eddie Roldan :  1985 MRN:  [id number] Assessment / Plan: 
Sepsis, POA  WBC 19K, tachycardia Right foot cellulitis with abscess, POA after amputation of right 1st and second toe for osteomyelitis  Hx osteomyelitis right 3rd and 4th toes 2020 and 10/2019 respectively Acute hypoxic respiratory failure 88% RA IDDM uncontrolled with hyperglycemia  ESRD on HD MWF Obesity 
  
--continue vancomycin and zosyn was transitioned to cefepime due to persistent fever.  Follow up blood cultures. (NGTD) -Wound cx Culture result: Abnormal             Preliminary HEAVY KLEBSIELLA PNEUMONIAE Culture result: Abnormal             Preliminary HEAVY ENTEROBACTER CLOACAE COMPLEX Culture result: Abnormal    LIGHT  
POSSIBLE  
3RD GRAM NEGATIVE TEODORA         Preliminary Culture result: Abnormal             Preliminary LIGHT PSEUDOMONAS AERUGINOSA SENSITIVITY TO FOLLOW Culture result: Abnormal             Preliminary FEW MIXED STAPHYLOCOCCUS SPECIES, COAGULASE NEGATIVE Culture result: Abnormal             Preliminary FEW MIXED DIPHTHEROIDS   
  
  
  
--podiatry consult apreciated, MRI right foot showed fluid collection and abscess with no osteomyelitis -s/p irrigation of wound  
  ESR elevated 116.  ID consult appreciated -- SARS-COV-2 negative,  
-supplemental O2, CXR clear and lungs clear on exam without pulmonary system.  Incentive spirometry.  Hypoxia is resolved after dalysis --continue lantus 20 units daily, add moderate SSI 
-- appreciated renal consult to continue with dialysis  
-Large amount of chocolate color purulent discharge expressed from Sugical wound on manual expression 
-s/p  RIGHT LEG ARTERIOGRAM ANTERIOR TIBIAL ARTERY AND ANGIOPLASTY 
-path report margin clear ID recommend  Vancomycin 750 mg after HD three times weekly  & Cefepime2/2/3 G regimen after HD  x 3 weeks end date .  Patient scheduled for excision and debridement of necrotic tissue right foot today Uncontrolled HTN: 
-Hold  amlodipine and Lisinopril 
-patient hypotensive today  
-check orthostatic vital  
-physical therapy  
  
Diarrhea: 
-Likely Antibiotic induced 
-Resolved -Imodium PRN 
-c/w probiotic 
  
Hyponatremia  
-motoring  
  
Chest pain  
-troponin negative  
- EKG show no acute abnormality 
-Echo was order Body mass index is 43.36 kg/m². 
  
Code: full DVT prophylaxis: heparin Surrogate decision maker:  Aunt Jammie Gonzales Subjective: Chief Complaint / Reason for Physician Visit Jacy De La Rosa Discussed with RN events overnight. RRT was called yesterday because of chest pain ,troponin negative , plan today for  excision and debridement of necrotic tissue right foot ,WBC Trending up Review of Systems: 
Symptom Y/N Comments  Symptom Y/N Comments Fever/Chills n   Chest Pain n   
Poor Appetite n   Edema n   
Cough n   Abdominal Pain n   
Sputum n   Joint Pain y   
SOB/LITTLE n   Pruritis/Rash Nausea/vomit n   Tolerating PT/OT Diarrhea n   Tolerating Diet y Constipation n   Other Could NOT obtain due to:   
 
Objective: VITALS:  
Last 24hrs VS reviewed since prior progress note. Most recent are: 
Patient Vitals for the past 24 hrs: 
 Temp Pulse Resp BP SpO2  
08/21/20 0716 98 °F (36.7 °C) 83 18 104/60 92 % 08/21/20 0415    110/60   
08/21/20 0412 98 °F (36.7 °C) 83 18  90 % 08/20/20 2314 98.4 °F (36.9 °C) 82 18 99/60 97 % 08/20/20 1957 98.5 °F (36.9 °C) 84 18 112/49 94 % 08/20/20 1444 98.8 °F (37.1 °C) 85 16 115/70 95 % 08/20/20 1417    100/52   
08/20/20 1333  84  (!) 92/39 92 % 08/20/20 1318  88  (!) 85/55   
08/20/20 1218  84  120/67 93 % 08/20/20 1118    90/51   
08/20/20 1104 98.8 °F (37.1 °C) 89 18 (!) 84/61 91 % No intake or output data in the 24 hours ending 08/21/20 0959 PHYSICAL EXAM: 
General: WD, WN.  Alert, cooperative, no acute distress   
 EENT:  EOMI. Anicteric sclerae. MMM Resp:  CTA bilaterally, no wheezing or rales. No accessory muscle use CV:  Regular  rhythm,  No edema GI:  Soft, Non distended, Non tender.  +Bowel sounds Neurologic:  Alert and oriented X 3, normal speech, Psych:   Good insight. Not anxious nor agitated Skin:  No rashes. No jaundice Reviewed most current lab test results and cultures  YES Reviewed most current radiology test results   YES Review and summation of old records today    NO Reviewed patient's current orders and MAR    YES 
PMH/SH reviewed - no change compared to H&P 
________________________________________________________________________ Care Plan discussed with: 
  Comments Patient y Family RN y   
Care Manager y Consultant Multidiciplinary team rounds were held today with , nursing, pharmacist and clinical coordinator. Patient's plan of care was discussed; medications were reviewed and discharge planning was addressed. ________________________________________________________________________ Total NON critical care TIME:  35   Minutes Total CRITICAL CARE TIME Spent:   Minutes non procedure based Comments >50% of visit spent in counseling and coordination of care y   
________________________________________________________________________ Roxanne Krishnan MD  
 
Procedures: see electronic medical records for all procedures/Xrays and details which were not copied into this note but were reviewed prior to creation of Plan. LABS: 
I reviewed today's most current labs and imaging studies. Pertinent labs include: 
Recent Labs  
  08/21/20 
0416 08/20/20 
0519 08/19/20 
6089 WBC 21.0* 23.5* 21.2* HGB 11.0* 10.7* 10.3* HCT 34.8* 33.6* 32.3*  
* 391 384 Recent Labs  
  08/21/20 
0416 08/19/20 
9178 * 126*  
K 4.8 4.4 CL 91* 91* CO2 29 27 * 211* BUN 44* 44* CREA 10.70* 10.80* CA 9.9 9.2 ALB 1.7* 1.6* TBILI 1.2* 1.9* ALT 26 32 Signed:  Porsha Edward MD

## 2020-08-21 NOTE — PERIOP NOTES
TRANSFER - IN REPORT: 
 
Verbal report received from Danika Odom RN on Susan Christiano  being received from 2207 for ordered procedure Report consisted of patients Situation, Background, Assessment and  
Recommendations(SBAR). Information from the following report(s) SBAR, Kardex, Intake/Output, MAR and Recent Results was reviewed with the receiving nurse. Opportunity for questions and clarification was provided. Assessment completed upon patients arrival to unit and care assumed.

## 2020-08-21 NOTE — PROGRESS NOTES
Progress Note NAME: Genie Watkins :  1985 MRN:  [id number] Date/Time:  20=late entry Assessment :    Plan: 
ESKD- Anemia DM 
HTN Mild hyponatremia Foot infection [facility] Seen on HD, bp stable 
  
covid (-) Retacrit not needed at this point Subjective:  
 
 
Past Medical History:  
Diagnosis Date  Cataracts  Chronic kidney disease  Diabetes ([address]  Hypercholesterolemia  Hypertension  Kidney failure  Obesity Past Surgical History:  
Procedure Laterality Date  COLONOSCOPY N/A 2017 COLONOSCOPY performed by Tamara Waters MD at [facility] ENDOSCOPY  COLONOSCOPY,DIAGNOSTIC  2017  HX AMPUTATION Left great toe and L 5th toe  HX AMPUTATION TOE Right  UPPER GI ENDOSCOPY,BIOPSY  2017  VASCULAR SURGERY PROCEDURE UNLIST    
 R side chest  
 VASCULAR SURGERY PROCEDURE UNLIST Left 2017 Creation transposed AV fistula arm Social History Tobacco Use  Smoking status: Former Smoker Packs/day: 0.25  Smokeless tobacco: Never Used  Tobacco comment: \"quit about a year ago\" Substance Use Topics  Alcohol use: No  
  Comment: rarely Family History Problem Relation Age of Onset  Diabetes Mother  Liver Disease Father  Kidney Disease Maternal Grandfather  Diabetes Maternal Grandfather  Hypertension Maternal Grandfather  Diabetes Paternal Uncle  Hypertension Paternal Uncle No Known Allergies Prior to Admission medications Medication Sig Start Date End Date Taking? Authorizing Provider  
insulin glargine (LANTUS) 100 unit/mL injection 20 Units by SubCUTAneous route daily. Yes Provider, Historical  
doxycycline (VIBRAMYCIN) 100 mg capsule Take 100 mg by mouth two (2) times a day. For 10 days.    Yes Provider, Historical  
oxyCODONE-acetaminophen (PERCOCET) 5-325 mg per tablet Take 1 Tab by mouth every four (4) hours as needed for Pain for up to 14 days. Max Daily Amount: 6 Tabs. 8/7/20 8/21/20 Yes Audra Denson DPM  
loperamide (IMODIUM) 2 mg capsule Take 1 Cap by mouth four (4) times daily as needed for Diarrhea. 12/9/17  Yes Roger Ruvalcaba MD  
 
REVIEW OF SYSTEMS:   
 []  Unable to obtain reliable ROS due to  [] mental status  [] sedated   [] intubated 
 no n/v/cp/sob Objective: VITALS:   
Visit Vitals /69 (BP 1 Location: Right arm, BP Patient Position: At rest;Supine) Pulse 84 Temp 98.8 °F (37.1 °C) Resp 18 Ht 6' 2\" (1.88 m) Wt 151.3 kg (333 lb 8.9 oz) SpO2 93% BMI 42.83 kg/m² PHYSICAL EXAM: 
Gen:  []  WD []  WN  [] cachectic []  thin [x]  obese []  disheveled 
           []  ill apearing  []   Critical  []   Chronic    [x]  No acute distress HEENT:   [x] NC/AT/PERRLA/EOMI 
  [] pink conjunctivae      [] pale conjunctivae PERRL  [] yes  [] no      [] moist mucosa    [] dry mucosa 
  hearing intact to voice [] yes  [] No 
              
NECK:   supple [] yes  [] no        masses [] yes  [] No 
             thyroid  []  non tender  []  tender RESP:   [x] CTA bilaterally/no wheezing/rhonchi/rales/crackles [] rhonchi bilaterally - no dullness  [] wheezing   [] rhonchi   [] crackles  
  use of accessory muscles [] yes [] no CARD:   []  regular rate and rhythm/No murmurs/rubs/gallops 
  murmur  [] yes ()  [] no      Rubs  [] yes  [] no       Gallops [] yes  [] no 
  Rate []  regular  []  irregular        carotid bruits  [] Right  []  Left LE edema [] yes  [] no           JVP  []  yes   []  no 
 
ABD:    [x] soft/non distended/non tender/+bowel sounds/no HSM []  Rigid    tenderness [] yes [] no   Liver enlargement  []  yes []  no  
             Spleen enlargement  []  yes []  no     distended []  yes [] no  
  bowel sound  [] hypoactive   [] hyperactive LYMPH:    Neck []  yes []  no       Axillae []  yes []  no 
 
 SKIN:   Rashes []  yes   []  no    Ulcers []  yes   []  no 
             [] tight to palpitation    skin turgor []  good  [] poor  [] decreased Cyanosis/clubbing []  yes []  no 
 
NEUR:   [x] cranial nerves II-XII grossly intact    
  [] Cranial nerves deficit 
               []  facial droop    []  slurred speech   [] aphasic  
  [] Strength normal     []  weakness  []  LUE  []   RUE/ []  LLE  []   RLE 
  follows commands  []  yes []  no        
 
PSYCH:   insight [] poor [] good   Alert and Oriented to  [x] person  [x] place  [x]  time  
                 [] depressed [] anxious [] agitated  [] lethargic [] stuporous  [] sedated LAB DATA REVIEWED:   
Recent Labs  
  08/21/20 0416 08/20/20 
5032 WBC 21.0* 23.5* HGB 11.0* 10.7* HCT 34.8* 33.6* * 391 Recent Labs  
  08/21/20 0416 08/19/20 
7645 * 126*  
K 4.8 4.4 CL 91* 91* CO2 29 27 BUN 44* 44* CREA 10.70* 10.80* * 211* CA 9.9 9.2 Recent Labs  
  08/21/20 0416 08/19/20 
3830 ALT 26 32 AP 83 79 TBILI 1.2* 1.9* ALB 1.7* 1.6*  
GLOB 8.1* 7.3* No results for input(s): INR, PTP, APTT, INREXT, INREXT in the last 72 hours. No results for input(s): FE, TIBC, PSAT, FERR in the last 72 hours. No results for input(s): PH, PCO2, PO2 in the last 72 hours. Recent Labs  
  08/20/20 
1445  CKMB 3.0 Lab Results Component Value Date/Time Glucose (POC) 157 (H) 08/21/2020 10:52 AM  
 Glucose (POC) 162 (H) 08/21/2020 07:19 AM  
 Glucose (POC) 195 (H) 08/20/2020 08:43 PM  
 Glucose (POC) 210 (H) 08/20/2020 04:11 PM  
 Glucose (POC) 235 (H) 08/20/2020 11:51 AM  
 
 
Procedures: see electronic medical records for all procedures/Xrays and details which were not copied into this note but were reviewed prior to creation of Plan.   
________________________________________________________________________ 
  
 
___________________________________________________ Consulting Physician: Cherelle Mayen MD

## 2020-08-21 NOTE — PROGRESS NOTES
Vascular Surgery Progress Note Deepti Doyle ACNP-BC 
8/21/2020 Subjective:  
 
Ammon Marks is a very pleasant 35 y.o.  male with a pmhx significant for ESRD on HD, DM, HTN, HLD, and obesity. Melvina Lopez is admitted to the hospital w/ sepsis secondary to cellulitis of the right leg. He is s/p amputation of the right great toe and second toe on 08/07/2020. He has a previous hx of amputation of the right third and fourth toes for osteomyelitis. He hospital course has been complicated by acute hypoxic respiratory failure. We were asked to evaluate for PAD earlier in his admission and he is s/p right LEXI BAP on 08/17/2020. He then underwent excisional debridement of the bone and necrotic tissue with Dr. Jinny Chen today. We have been re consulted. The foot is not salvageable. Right BKA is recommended. Nursing Data:  
 
Patient Vitals for the past 24 hrs: 
 BP Temp Pulse Resp SpO2 Weight 08/21/20 1435 98/56  78  93 %   
08/21/20 1400 114/58  80  94 %   
08/21/20 1355 104/52 98.1 °F (36.7 °C) 82 20 93 %   
08/21/20 1330 102/49 98.2 °F (36.8 °C) 81 23 95 %   
08/21/20 1320   83 19 91 %   
08/21/20 1315 98/50  82 22 95 %   
08/21/20 1300 103/51  80 21 96 %   
08/21/20 1250 102/56  79 12 96 %   
08/21/20 1245 98/46  81 15 99 %   
08/21/20 1244 (!) 108/39 98.3 °F (36.8 °C) 79 12 97 %   
08/21/20 1241 (!) 108/39  80 19 94 %   
08/21/20 1041 127/69 98.8 °F (37.1 °C) 84 18 93 %   
08/21/20 1032 119/67  90     
08/21/20 1028 118/85  84     
08/21/20 0716 104/60 98 °F (36.7 °C) 83 18 92 %   
08/21/20 0614      151.3 kg (333 lb 8.9 oz) 08/21/20 0415 110/60       
08/21/20 0412  98 °F (36.7 °C) 83 18 90 %   
08/20/20 2314 99/60 98.4 °F (36.9 °C) 82 18 97 %   
08/20/20 1957 112/49 98.5 °F (36.9 °C) 84 18 94 %   
 
--------------------------------------------------------------------------------------------------------- 
 
 Intake/Output Summary (Last 24 hours) at 8/21/2020 1524 Last data filed at 8/21/2020 1330 Gross per 24 hour Intake 125 ml Output 50 ml Net 75 ml Exam:  
 
Physical Exam 
Constitutional:   
   Appearance: He is obese. HENT:  
   Head: Normocephalic. Nose: Nose normal.  
   Mouth/Throat:  
   Mouth: Mucous membranes are moist.  
Eyes:  
   Pupils: Pupils are equal, round, and reactive to light. Neck: Musculoskeletal: Normal range of motion. Cardiovascular:  
   Rate and Rhythm: Normal rate and regular rhythm. Pulses: Normal pulses. Heart sounds: Normal heart sounds. Comments: Feet are warm and perfused w/o palpable distal pulses. Pulmonary:  
   Effort: Pulmonary effort is normal.  
   Breath sounds: Normal breath sounds. Abdominal:  
   General: Abdomen is flat. Palpations: Abdomen is soft. Musculoskeletal: Normal range of motion. Skin: 
   General: Skin is warm. Comments: Bulky dressing to the right foot. Neurological:  
   General: No focal deficit present. Mental Status: He is alert. Mental status is at baseline. Psychiatric:     
   Mood and Affect: Mood normal.     
   Behavior: Behavior normal.  
 
Lab Review:  
 
. Recent Results (from the past 24 hour(s)) GLUCOSE, POC Collection Time: 08/20/20  4:11 PM  
Result Value Ref Range Glucose (POC) 210 (H) 65 - 100 mg/dL Performed by [facility] Uyen PCT   
GLUCOSE, POC Collection Time: 08/20/20  8:43 PM  
Result Value Ref Range Glucose (POC) 195 (H) 65 - 100 mg/dL Performed by Sherie Hood (PCT) Heydi Cobb Collection Time: 08/21/20  4:16 AM  
Result Value Ref Range Vancomycin, random 27.0 UG/ML  
CBC WITH AUTOMATED DIFF Collection Time: 08/21/20  4:16 AM  
Result Value Ref Range WBC 21.0 (H) 4.1 - 11.1 K/uL  
 RBC 4.29 4. 10 - 5.70 M/uL  
 HGB 11.0 (L) 12.1 - 17.0 g/dL HCT 34.8 (L) 36.6 - 50.3 %  MCV 81.1 80.0 - 99.0 FL  
 MCH 25.6 (L) 26.0 - 34.0 PG  
 MCHC 31.6 30.0 - 36.5 g/dL  
 RDW 15.2 (H) 11.5 - 14.5 % PLATELET 452 (H) 233 - 400 K/uL MPV 10.0 8.9 - 12.9 FL  
 NRBC 0.0 0  WBC ABSOLUTE NRBC 0.00 0.00 - 0.01 K/uL NEUTROPHILS 79 (H) 32 - 75 % LYMPHOCYTES 10 (L) 12 - 49 % MONOCYTES 11 5 - 13 % EOSINOPHILS 0 0 - 7 % BASOPHILS 0 0 - 1 % IMMATURE GRANULOCYTES 0 0.0 - 0.5 % ABS. NEUTROPHILS 16.6 (H) 1.8 - 8.0 K/UL  
 ABS. LYMPHOCYTES 2.1 0.8 - 3.5 K/UL  
 ABS. MONOCYTES 2.3 (H) 0.0 - 1.0 K/UL  
 ABS. EOSINOPHILS 0.0 0.0 - 0.4 K/UL  
 ABS. BASOPHILS 0.0 0.0 - 0.1 K/UL  
 ABS. IMM. GRANS. 0.0 0.00 - 0.04 K/UL  
 DF MANUAL    
 RBC COMMENTS HYPOCHROMIA 1+ METABOLIC PANEL, COMPREHENSIVE Collection Time: 08/21/20  4:16 AM  
Result Value Ref Range Sodium 129 (L) 136 - 145 mmol/L Potassium 4.8 3.5 - 5.1 mmol/L Chloride 91 (L) 97 - 108 mmol/L  
 CO2 29 21 - 32 mmol/L Anion gap 9 5 - 15 mmol/L Glucose 159 (H) 65 - 100 mg/dL BUN 44 (H) 6 - 20 MG/DL Creatinine 10.70 (H) 0.70 - 1.30 MG/DL  
 BUN/Creatinine ratio 4 (L) 12 - 20 GFR est AA 7 (L) >60 ml/min/1.73m2 GFR est non-AA 5 (L) >60 ml/min/1.73m2 Calcium 9.9 8.5 - 10.1 MG/DL Bilirubin, total 1.2 (H) 0.2 - 1.0 MG/DL  
 ALT (SGPT) 26 12 - 78 U/L  
 AST (SGOT) 30 15 - 37 U/L Alk. phosphatase 83 45 - 117 U/L Protein, total 9.8 (H) 6.4 - 8.2 g/dL Albumin 1.7 (L) 3.5 - 5.0 g/dL Globulin 8.1 (H) 2.0 - 4.0 g/dL A-G Ratio 0.2 (L) 1.1 - 2.2 GLUCOSE, POC Collection Time: 08/21/20  7:19 AM  
Result Value Ref Range Glucose (POC) 162 (H) 65 - 100 mg/dL Performed by Altaf Torres GLUCOSE, POC Collection Time: 08/21/20 10:52 AM  
Result Value Ref Range Glucose (POC) 157 (H) 65 - 100 mg/dL Performed by Dayanna Mcneil GLUCOSE, POC Collection Time: 08/21/20 12:42 PM  
Result Value Ref Range Glucose (POC) 162 (H) 65 - 100 mg/dL Performed by Abbe Zhu Assessment/Plan: PAD w/ non-healing diabetic wound of the right foot complicated by gas gangrene  
-s/p right leg LEXI BAP 08/17/2020 
-s/p excisional debridement of the bone and necrotic tissue 08/21/2020 We will plan for right BKA on Monday 08/24/2020. Hold DVT prophylaxis after am dose on Sunday. NPO after midnight on Sunday. Obtain consent.    
  
Sepsis  
-persistent leukocytosis  
-blood cx NGTD  
-wound cx polymicrobial: Kelbisella pneumoniae and pseudomonas aeruginosa/stutzeri Antibxs and volume management per primary team.   
  
Hypotension with a hx of HTN Hyperlipidemia ESRD 
-HD MWF Hyponatremia Anemia of chronic renal disease  
-stable Plan per nephrology 
  
Uncontrolled Diabetes Mellitus w/ hyperglycemia  
-HA1c 9.4 Morbid obesity Management of comorbid conditions by primary team. 
  
VTE Prophylaxis: 
[facility]-hold after am dose on Sunday  
  
Disposition: 
Inpatient rehabilitation mid next week

## 2020-08-21 NOTE — PROGRESS NOTES
Pharmacy Automatic Renal Dosing Protocol - Antimicrobials Indication for Antimicrobials: Surgical Site Infection  Pt recently had two toes on the right foot amputated on Friday for Osteomyelitis. Current Regimen of Each Antimicrobial: 
Cefepime 1 g IV every 24 hours (Start Date ; Day #6/7) Vancomycin 1250 mg after each HD (Start Date ; Day # 9) Previous Antimicrobial Therapy: 
Zosyn 3.375 g IV every 12 hours (Start Date ; Day # 5) Vancomycin Goal Level: 20-25 mcg/ml Vancomycin Levels Date Dose & Interval Measured (mcg/mL) Steady State (mcg/mL)  AM 1 g IV after HD 15.7  AM 1250 mg after HD 17.8   
 AM 1250 mg after HD 27 Date & time of next level:  Significant Cultures:  
 Blood: NGTD x 5 days- final 
: MRSA: not present- final 
 Wound: Heavy klebsiella, heavy enterobacter, light psedomonas, light diphtheroids, few CoNS- final 
 Foot: pseudomonas, and diphtheroids, enterobacter- final 
 Foot: Heavy klebsiella, moderate pseudomonas aeruginosa and stutzeri, and diphtheroids- final 
 Blood: NGTD x 5 days- final 
 
Paralysis, amputations, malnutrition: None significant Labs: 
Recent Labs  
  20 
0416 20 
0519 20 
0968 CREA 10.70*  --  10.80* BUN 44*  --  44* WBC 21.0* 23.5* 21.2* Temp (24hrs), Av.4 °F (36.9 °C), Min:98 °F (36.7 °C), Max:98.8 °F (37.1 °C) Creatinine Clearance (mL/min) or Dialysis: HD MWF Impression/Plan:  
Vancomycin level resulted as supratherapeutic at 27 mcg/mL. Will change back to 1 g IV after HD Will continue current regimen for cefepime as appropriate for indication and renal function. DOT: pending; 7 days for cefepime Pharmacy will follow daily and adjust medications as appropriate for renal function and/or serum levels. Thank you, Sinai Bell, PHARMD 
 
 
Recommended duration of therapy 
http://[facility]/[place]/[facility]/Pharmacy/Clinical%20Compa nion/Duration%20of%20ABX%20therapy. docx Renal Dosing 
[email or web address]/[facility]/Pharmacy/Clinical%20Companion/Renal%[id number]. pdf

## 2020-08-21 NOTE — ANESTHESIA POSTPROCEDURE EVALUATION
Procedure(s): RELEASE OF SUTURES, WASHOUT RIGHT FOOT. 
 
MAC, general - backup Anesthesia Post Evaluation Patient location during evaluation: PACU Note status: Adequate. Level of consciousness: responsive to verbal stimuli and sleepy but conscious Pain management: satisfactory to patient Airway patency: patent Anesthetic complications: no 
Cardiovascular status: acceptable Respiratory status: acceptable Hydration status: acceptable Comments: +Post-Anesthesia Evaluation and Assessment Patient: Tatyana Mccoy MRN: [id number]  SSN: [id number] YOB: 1985  Age: 38 y.o. Sex: male Cardiovascular Function/Vital Signs BP 98/50   Pulse 83   Temp 36.8 °C (98.3 °F)   Resp 19   Ht 6' 2\" (1.88 m)   Wt 151.3 kg (333 lb 8.9 oz)   SpO2 91%   BMI 42.83 kg/m² Patient is status post Procedure(s): RELEASE OF SUTURES, WASHOUT RIGHT FOOT. Nausea/Vomiting: Controlled. Postoperative hydration reviewed and adequate. Pain: 
Pain Scale 1: Numeric (0 - 10) (08/21/20 1245) Pain Intensity 1: 0 (08/21/20 1245) Managed. Neurological Status:  
Neuro (WDL): Exceptions to WDL (08/21/20 1241) At baseline. Mental Status and Level of Consciousness: Arousable. Pulmonary Status:  
O2 Device: Nasal cannula (08/21/20 1250) Adequate oxygenation and airway patent. Complications related to anesthesia: None Post-anesthesia assessment completed. No concerns. Signed By: Candice Lombardi MD  
 8/21/2020 Post anesthesia nausea and vomiting:  controlled INITIAL Post-op Vital signs:  
Vitals Value Taken Time BP 98/50 8/21/2020  1:15 PM  
Temp 36.8 °C (98.3 °F) 8/21/2020 12:44 PM  
Pulse 81 8/21/2020  1:26 PM  
Resp 16 8/21/2020  1:26 PM  
SpO2 97 % 8/21/2020  1:26 PM  
Vitals shown include unvalidated device data.

## 2020-08-21 NOTE — PROGRESS NOTES
Pt going to dialysis around 1700. Spoke with dialysis nurse [id number] [facility]. 1650: echo tech in room to do echo on pt. Informed tech that pt is getting ready to go to dialysis. Stated pt can be done first thing in the morning. 1710: transport here to take pt to dialysis. 1910: Bedside and Verbal shift change report given to Rome Crandall RN (oncoming nurse) by Radha Mariano (offgoing nurse). Report given with SBAR, Kardex, Intake/Output, MAR and Recent Results.

## 2020-08-21 NOTE — PROGRESS NOTES
Progress Note NAME: Praful Gerber :  1985 MRN:  [id number] Date/Time: 20 Assessment :    Plan: 
ESKD- Anemia DM 
HTN Mild hyponatremia Foot infection [facility] 
HD this afternoon Getting debridement Id following-on v/f/c Holding bp meds Give albumin/midodrine prn lower bp Will fu Monday, call if problems arise over weekend Out of room in or at this time Subjective:  
 
 
Past Medical History:  
Diagnosis Date  Cataracts  Chronic kidney disease  Diabetes ([address]  Hypercholesterolemia  Hypertension  Kidney failure  Obesity Past Surgical History:  
Procedure Laterality Date  COLONOSCOPY N/A 2017 COLONOSCOPY performed by Elizabet Schwab MD at [facility] ENDOSCOPY  COLONOSCOPY,DIAGNOSTIC  2017  HX AMPUTATION Left great toe and L 5th toe  HX AMPUTATION TOE Right  UPPER GI ENDOSCOPY,BIOPSY  2017  VASCULAR SURGERY PROCEDURE UNLIST    
 R side chest  
 VASCULAR SURGERY PROCEDURE UNLIST Left 2017 Creation transposed AV fistula arm Social History Tobacco Use  Smoking status: Former Smoker Packs/day: 0.25  Smokeless tobacco: Never Used  Tobacco comment: \"quit about a year ago\" Substance Use Topics  Alcohol use: No  
  Comment: rarely Family History Problem Relation Age of Onset  Diabetes Mother  Liver Disease Father  Kidney Disease Maternal Grandfather  Diabetes Maternal Grandfather  Hypertension Maternal Grandfather  Diabetes Paternal Uncle  Hypertension Paternal Uncle No Known Allergies Prior to Admission medications Medication Sig Start Date End Date Taking? Authorizing Provider  
insulin glargine (LANTUS) 100 unit/mL injection 20 Units by SubCUTAneous route daily.    Yes Provider, Historical  
doxycycline (VIBRAMYCIN) 100 mg capsule Take 100 mg by mouth two (2) times a day. For 10 days. Yes Provider, Historical  
oxyCODONE-acetaminophen (PERCOCET) 5-325 mg per tablet Take 1 Tab by mouth every four (4) hours as needed for Pain for up to 14 days. Max Daily Amount: 6 Tabs. 8/7/20 8/21/20 Yes Audra Denson DPM  
loperamide (IMODIUM) 2 mg capsule Take 1 Cap by mouth four (4) times daily as needed for Diarrhea. 12/9/17  Yes Maria T Spence MD  
 
REVIEW OF SYSTEMS:   
 []  Unable to obtain reliable ROS due to  [] mental status  [] sedated   [] intubated 
 no n/v/cp/sob Objective: VITALS:   
Visit Vitals /69 (BP 1 Location: Right arm, BP Patient Position: At rest;Supine) Pulse 84 Temp 98.8 °F (37.1 °C) Resp 18 Ht 6' 2\" (1.88 m) Wt 151.3 kg (333 lb 8.9 oz) SpO2 93% BMI 42.83 kg/m² PHYSICAL EXAM: 
Gen:  []  WD []  WN  [] cachectic []  thin [x]  obese []  disheveled 
           []  ill apearing  []   Critical  []   Chronic    [x]  No acute distress HEENT:   [x] NC/AT/PERRLA/EOMI 
  [] pink conjunctivae      [] pale conjunctivae PERRL  [] yes  [] no      [] moist mucosa    [] dry mucosa 
  hearing intact to voice [] yes  [] No 
              
NECK:   supple [] yes  [] no        masses [] yes  [] No 
             thyroid  []  non tender  []  tender RESP:   [x] CTA bilaterally/no wheezing/rhonchi/rales/crackles [] rhonchi bilaterally - no dullness  [] wheezing   [] rhonchi   [] crackles  
  use of accessory muscles [] yes [] no CARD:   []  regular rate and rhythm/No murmurs/rubs/gallops 
  murmur  [] yes ()  [] no      Rubs  [] yes  [] no       Gallops [] yes  [] no 
  Rate []  regular  []  irregular        carotid bruits  [] Right  []  Left LE edema [] yes  [] no           JVP  []  yes   []  no 
 
ABD:    [x] soft/non distended/non tender/+bowel sounds/no HSM   []  Rigid    tenderness [] yes [] no   Liver enlargement  []  yes []  no  
             Spleen enlargement  []  yes []  no     distended []  yes [] no bowel sound  [] hypoactive   [] hyperactive LYMPH:    Neck []  yes []  no       Axillae []  yes []  no SKIN:   Rashes []  yes   []  no    Ulcers []  yes   []  no 
             [] tight to palpitation    skin turgor []  good  [] poor  [] decreased Cyanosis/clubbing []  yes []  no 
 
NEUR:   [x] cranial nerves II-XII grossly intact    
  [] Cranial nerves deficit 
               []  facial droop    []  slurred speech   [] aphasic  
  [] Strength normal     []  weakness  []  LUE  []   RUE/ []  LLE  []   RLE 
  follows commands  []  yes []  no        
 
PSYCH:   insight [] poor [] good   Alert and Oriented to  [x] person  [x] place  [x]  time  
                 [] depressed [] anxious [] agitated  [] lethargic [] stuporous  [] sedated LAB DATA REVIEWED:   
Recent Labs  
  08/21/20 0416 08/20/20 
7865 WBC 21.0* 23.5* HGB 11.0* 10.7* HCT 34.8* 33.6* * 391 Recent Labs  
  08/21/20 
0416 08/19/20 
0830 * 126*  
K 4.8 4.4 CL 91* 91* CO2 29 27 BUN 44* 44* CREA 10.70* 10.80* * 211* CA 9.9 9.2 Recent Labs  
  08/21/20 
0416 08/19/20 
1347 ALT 26 32 AP 83 79 TBILI 1.2* 1.9* ALB 1.7* 1.6*  
GLOB 8.1* 7.3* No results for input(s): INR, PTP, APTT, INREXT, INREXT in the last 72 hours. No results for input(s): FE, TIBC, PSAT, FERR in the last 72 hours. No results for input(s): PH, PCO2, PO2 in the last 72 hours. Recent Labs  
  08/20/20 
1445  CKMB 3.0 Lab Results Component Value Date/Time Glucose (POC) 157 (H) 08/21/2020 10:52 AM  
 Glucose (POC) 162 (H) 08/21/2020 07:19 AM  
 Glucose (POC) 195 (H) 08/20/2020 08:43 PM  
 Glucose (POC) 210 (H) 08/20/2020 04:11 PM  
 Glucose (POC) 235 (H) 08/20/2020 11:51 AM  
 
 
Procedures: see electronic medical records for all procedures/Xrays and details which were not copied into this note but were reviewed prior to creation of Plan. ________________________________________________________________________ 
  
 
___________________________________________________ Consulting Physician: Carleen Andersen, MD

## 2020-08-21 NOTE — PROGRESS NOTES
Foot and Ankle Progress Note Admit Date: 8/12/2020 Hospital day 6 Subjective:  
 
Patient was admitted 8 days ago. He is post op amputation of right toes 1 and 2 of 8 days and is post op revascularization lower right leg by Dr. Miranda Coy, however, dorsalis pedis is still with some stenosis and patient has significant small vessel disease in the foot. Patient scheduled for excision and debridement of necrotic tissue right foot today Current Facility-Administered Medications Medication Dose Route Frequency  heparin (porcine) injection 5,000 Units  5,000 Units SubCUTAneous Q12H  
 metroNIDAZOLE (FLAGYL) IVPB premix 500 mg  500 mg IntraVENous Q12H  
 sodium chloride (NS) flush 5-40 mL  5-40 mL IntraVENous Q8H  
 sodium chloride (NS) flush 5-40 mL  5-40 mL IntraVENous PRN  
 lidocaine (PF) (XYLOCAINE) 10 mg/mL (1 %) injection 0.1 mL  0.1 mL SubCUTAneous PRN  
 cefepime (MAXIPIME) 1 g in 0.9% sodium chloride (MBP/ADV) 50 mL  1 g IntraVENous Q24H  
 lactobac ac& pc-s.therm-b.anim (USMAN Q/RISAQUAD)  1 Cap Oral DAILY  loperamide (IMODIUM) capsule 2 mg  2 mg Oral Q4H PRN  
 epoetin nata-epbx (RETACRIT) injection 10,000 Units  10,000 Units SubCUTAneous Q7D  
 acetaminophen (TYLENOL) tablet 650 mg  650 mg Oral Q6H PRN Or  
 acetaminophen (TYLENOL) suppository 650 mg  650 mg Rectal Q6H PRN  polyethylene glycol (MIRALAX) packet 17 g  17 g Oral DAILY PRN  promethazine (PHENERGAN) tablet 12.5 mg  12.5 mg Oral Q6H PRN Or  
 ondansetron (ZOFRAN) injection 4 mg  4 mg IntraVENous Q6H PRN  
 insulin lispro (HUMALOG) injection   SubCUTAneous AC&HS  
 glucose chewable tablet 16 g  4 Tab Oral PRN  
 dextrose (D50W) injection syrg 12.5-25 g  12.5-25 g IntraVENous PRN  
 glucagon (GLUCAGEN) injection 1 mg  1 mg IntraMUSCular PRN  
 insulin glargine (LANTUS) injection 20 Units  20 Units SubCUTAneous DAILY  oxyCODONE-acetaminophen (PERCOCET) 5-325 mg per tablet 1 Tab  1 Tab Oral Q4H PRN  
  VANCOMYCIN INFORMATION NOTE   Other Rx Dosing/Monitoring Review of Systems See current H&P; no changes to add Objective:  
 
Patient Vitals for the past 8 hrs: 
 BP Temp Pulse Resp SpO2 Weight 08/21/20 0716 104/60 98 °F (36.7 °C) 83 18 92 %   
08/21/20 0614      151.3 kg (333 lb 8.9 oz) 08/21/20 0415 110/60       
08/21/20 0412  98 °F (36.7 °C) 83 18 90 %  No intake/output data recorded. 08/19 1901 - 08/21 0700 In: 240 [P.O.:240] Out: 0 Physical Exam: right foot Previously amputated right toes 5,4,3. Incision  where toes 1 and 2 amputated one week ago are with bloody dark thick drainage  
discoloration of the right forefoot; min swelling and no warmth of the foot nor leg Temp. 98.8 Data Review Recent Results (from the past 24 hour(s)) GLUCOSE, POC Collection Time: 08/20/20 11:51 AM  
Result Value Ref Range Glucose (POC) 235 (H) 65 - 100 mg/dL Performed by [facility] PCT   
EKG, 12 LEAD, INITIAL Collection Time: 08/20/20  2:36 PM  
Result Value Ref Range Ventricular Rate 84 BPM  
 Atrial Rate 84 BPM  
 P-R Interval 204 ms QRS Duration 116 ms  
 Q-T Interval 400 ms QTC Calculation (Bezet) 472 ms Calculated P Axis 41 degrees Calculated R Axis 9 degrees Calculated T Axis 105 degrees Diagnosis Normal sinus rhythm Left ventricular hypertrophy with QRS widening ( Sokolow-Zaragoza , [place]  
product , Romhilt-Rapp ) Inferior infarct , age undetermined ST & T wave abnormality, consider lateral ischemia Abnormal ECG When compared with ECG of 01-MAY-2017 15:23, 
Questionable change in QRS axis Inferior infarct is now present ST now depressed in Lateral leads T wave inversion now evident in Lateral leads TROPONIN I Collection Time: 08/20/20  2:45 PM  
Result Value Ref Range Troponin-I, Qt. <0.05 <0.05 ng/mL CK W/ CKMB & INDEX Collection Time: 08/20/20  2:45 PM  
Result Value Ref Range CK - MB 3.0 <3.6 NG/ML  
 CK-MB Index 2.3 0.0 - 2.5  39 - 308 U/L  
GLUCOSE, POC Collection Time: 08/20/20  4:11 PM  
Result Value Ref Range Glucose (POC) 210 (H) 65 - 100 mg/dL Performed by [facility] PCT   
GLUCOSE, POC Collection Time: 08/20/20  8:43 PM  
Result Value Ref Range Glucose (POC) 195 (H) 65 - 100 mg/dL Performed by Phylicia Hendricks (PCT) Helchristiana Holter Collection Time: 08/21/20  4:16 AM  
Result Value Ref Range Vancomycin, random 27.0 UG/ML  
CBC WITH AUTOMATED DIFF Collection Time: 08/21/20  4:16 AM  
Result Value Ref Range WBC 21.0 (H) 4.1 - 11.1 K/uL  
 RBC 4.29 4. 10 - 5.70 M/uL  
 HGB 11.0 (L) 12.1 - 17.0 g/dL HCT 34.8 (L) 36.6 - 50.3 % MCV 81.1 80.0 - 99.0 FL  
 MCH 25.6 (L) 26.0 - 34.0 PG  
 MCHC 31.6 30.0 - 36.5 g/dL  
 RDW 15.2 (H) 11.5 - 14.5 % PLATELET 707 (H) 323 - 400 K/uL MPV 10.0 8.9 - 12.9 FL  
 NRBC 0.0 0  WBC ABSOLUTE NRBC 0.00 0.00 - 0.01 K/uL NEUTROPHILS 79 (H) 32 - 75 % LYMPHOCYTES 10 (L) 12 - 49 % MONOCYTES 11 5 - 13 % EOSINOPHILS 0 0 - 7 % BASOPHILS 0 0 - 1 % IMMATURE GRANULOCYTES 0 0.0 - 0.5 % ABS. NEUTROPHILS 16.6 (H) 1.8 - 8.0 K/UL  
 ABS. LYMPHOCYTES 2.1 0.8 - 3.5 K/UL  
 ABS. MONOCYTES 2.3 (H) 0.0 - 1.0 K/UL  
 ABS. EOSINOPHILS 0.0 0.0 - 0.4 K/UL  
 ABS. BASOPHILS 0.0 0.0 - 0.1 K/UL  
 ABS. IMM. GRANS. 0.0 0.00 - 0.04 K/UL  
 DF MANUAL    
 RBC COMMENTS HYPOCHROMIA 1+ METABOLIC PANEL, COMPREHENSIVE Collection Time: 08/21/20  4:16 AM  
Result Value Ref Range Sodium 129 (L) 136 - 145 mmol/L Potassium 4.8 3.5 - 5.1 mmol/L Chloride 91 (L) 97 - 108 mmol/L  
 CO2 29 21 - 32 mmol/L Anion gap 9 5 - 15 mmol/L Glucose 159 (H) 65 - 100 mg/dL BUN 44 (H) 6 - 20 MG/DL Creatinine 10.70 (H) 0.70 - 1.30 MG/DL  
 BUN/Creatinine ratio 4 (L) 12 - 20 GFR est AA 7 (L) >60 ml/min/1.73m2 GFR est non-AA 5 (L) >60 ml/min/1.73m2  Calcium 9.9 8.5 - 10.1 MG/DL  
 Bilirubin, total 1.2 (H) 0.2 - 1.0 MG/DL  
 ALT (SGPT) 26 12 - 78 U/L  
 AST (SGOT) 30 15 - 37 U/L Alk. phosphatase 83 45 - 117 U/L Protein, total 9.8 (H) 6.4 - 8.2 g/dL Albumin 1.7 (L) 3.5 - 5.0 g/dL Globulin 8.1 (H) 2.0 - 4.0 g/dL A-G Ratio 0.2 (L) 1.1 - 2.2 GLUCOSE, POC Collection Time: 08/21/20  7:19 AM  
Result Value Ref Range Glucose (POC) 162 (H) 65 - 100 mg/dL Performed by Tank Blackburn Assessment:  
 
Active Problems: 
  ESRD (end stage renal disease) ([facility] [address] (1/6/2020) Sepsis ([facility] [address] (8/12/2020) Cellulitis of right foot (8/12/2020) Acute respiratory failure with hypoxia and hypercapnia ([facility] [address] (8/12/2020) Plan: Most of this patients's issues are probably vascular related. I will schedule him for further I&D and debridement of tissue if needed  , however this limb may not be salvageable. I spoke with Dr. Aly Diaz and we both agree that this patient has severe small  vessel disease and though its worth a try, this patient may or may not heal with further debridement.

## 2020-08-21 NOTE — PERIOP NOTES
Patient: Niurka Mathews MRN: [id number]  SSN: [id number] YOB: 1985  Age: 38 y.o. Sex: male Patient is status post Procedure(s): RELEASE OF SUTURES, WASHOUT RIGHT FOOT. Surgeon(s) and Role: Sammi Shelton DPM - Primary Local/Dose/Irrigation:  IRRISEPT used PRN irrigation 
 
20mL bupivacaine-EPINEPHrine (PF) (SENSORCAINE PF) 0.5 %-1:200,000 injection Peripheral IV 08/14/20 Posterior;Right Forearm (Active) Site Assessment Clean, dry, & intact 08/21/20 1046 Phlebitis Assessment 0 08/21/20 1046 Infiltration Assessment 0 08/21/20 1046 Dressing Status Clean, dry, & intact 08/21/20 1046 Dressing Type Tape;Transparent 08/21/20 1046 Hub Color/Line Status Blue;Capped;Flushed;Patent 08/21/20 1046 Action Taken Open ports on tubing capped 08/19/20 0700 Alcohol Cap Used Yes 08/19/20 0700 Peripheral IV 08/17/20 Right Forearm (Active) Site Assessment Clean, dry, & intact 08/21/20 1046 Phlebitis Assessment 0 08/21/20 1046 Infiltration Assessment 0 08/21/20 1046 Dressing Status Clean, dry, & intact 08/21/20 1046 Dressing Type Tape;Transparent 08/21/20 1046 Hub Color/Line Status Green; Infusing;Patent 08/21/20 1046 Action Taken Open ports on tubing capped 08/19/20 0700 Alcohol Cap Used Yes 08/19/20 0700 Dressing/Packing:  Wound Groin Left 1 sITE WITH 4X4 AND TEGADERM-Dressing Type: Gauze wrap (maggie) (08/20/20 1028) Wound Foot Right-Dressing Type: 4 x 4;Gauze;Elastic bandage(Packed 4x4, Kerlix, ACE) (08/21/20 1230) [REMOVED] Wound Toe (Comment  which one) Right First -Dressing Type: Gauze;Gauze wrap (maggie) (08/13/20 0745) [REMOVED] Wound Toe (Comment  which one) Right Second-Dressing Type: Gauze (08/13/20 0745) Wound Foot Right-Dressing Type: Packing;Gauze;Gauze wrap (maggie) (08/19/20 8310) Splint/Cast:  ]

## 2020-08-21 NOTE — PROGRESS NOTES
Progress Note NAME: Pniky Oh :  1985 MRN:  [id number] Date/Time:  20=late entry Assessment :    Plan: 
ESKD- Anemia DM 
HTN Mild hyponatremia Foot infection [facility] Seen on HD, bp stable 
  
covid (-) Retacrit, transfuse prn Subjective:  
 
 
Past Medical History:  
Diagnosis Date  Cataracts  Chronic kidney disease  Diabetes ([address]  Hypercholesterolemia  Hypertension  Kidney failure  Obesity Past Surgical History:  
Procedure Laterality Date  COLONOSCOPY N/A 2017 COLONOSCOPY performed by Gomez Maynard MD at [facility] ENDOSCOPY  COLONOSCOPY,DIAGNOSTIC  2017  HX AMPUTATION Left great toe and L 5th toe  HX AMPUTATION TOE Right  UPPER GI ENDOSCOPY,BIOPSY  2017  VASCULAR SURGERY PROCEDURE UNLIST    
 R side chest  
 VASCULAR SURGERY PROCEDURE UNLIST Left 2017 Creation transposed AV fistula arm Social History Tobacco Use  Smoking status: Former Smoker Packs/day: 0.25  Smokeless tobacco: Never Used  Tobacco comment: \"quit about a year ago\" Substance Use Topics  Alcohol use: No  
  Comment: rarely Family History Problem Relation Age of Onset  Diabetes Mother  Liver Disease Father  Kidney Disease Maternal Grandfather  Diabetes Maternal Grandfather  Hypertension Maternal Grandfather  Diabetes Paternal Uncle  Hypertension Paternal Uncle No Known Allergies Prior to Admission medications Medication Sig Start Date End Date Taking? Authorizing Provider  
insulin glargine (LANTUS) 100 unit/mL injection 20 Units by SubCUTAneous route daily. Yes Provider, Historical  
doxycycline (VIBRAMYCIN) 100 mg capsule Take 100 mg by mouth two (2) times a day. For 10 days.    Yes Provider, Historical  
oxyCODONE-acetaminophen (PERCOCET) 5-325 mg per tablet Take 1 Tab by mouth every four (4) hours as needed for Pain for up to 14 days. Max Daily Amount: 6 Tabs. 8/7/20 8/21/20 Yes Audra Denson DPM  
loperamide (IMODIUM) 2 mg capsule Take 1 Cap by mouth four (4) times daily as needed for Diarrhea. 12/9/17  Yes Mk Wall MD  
 
REVIEW OF SYSTEMS:   
 []  Unable to obtain reliable ROS due to  [] mental status  [] sedated   [] intubated 
 no n/v/cp/sob Objective: VITALS:   
Visit Vitals /69 (BP 1 Location: Right arm, BP Patient Position: At rest;Supine) Pulse 84 Temp 98.8 °F (37.1 °C) Resp 18 Ht 6' 2\" (1.88 m) Wt 151.3 kg (333 lb 8.9 oz) SpO2 93% BMI 42.83 kg/m² PHYSICAL EXAM: 
Gen:  []  WD []  WN  [] cachectic []  thin [x]  obese []  disheveled 
           []  ill apearing  []   Critical  []   Chronic    [x]  No acute distress HEENT:   [x] NC/AT/PERRLA/EOMI 
  [] pink conjunctivae      [] pale conjunctivae PERRL  [] yes  [] no      [] moist mucosa    [] dry mucosa 
  hearing intact to voice [] yes  [] No 
              
NECK:   supple [] yes  [] no        masses [] yes  [] No 
             thyroid  []  non tender  []  tender RESP:   [x] CTA bilaterally/no wheezing/rhonchi/rales/crackles [] rhonchi bilaterally - no dullness  [] wheezing   [] rhonchi   [] crackles  
  use of accessory muscles [] yes [] no CARD:   []  regular rate and rhythm/No murmurs/rubs/gallops 
  murmur  [] yes ()  [] no      Rubs  [] yes  [] no       Gallops [] yes  [] no 
  Rate []  regular  []  irregular        carotid bruits  [] Right  []  Left LE edema [] yes  [] no           JVP  []  yes   []  no 
 
ABD:    [x] soft/non distended/non tender/+bowel sounds/no HSM []  Rigid    tenderness [] yes [] no   Liver enlargement  []  yes []  no  
             Spleen enlargement  []  yes []  no     distended []  yes [] no  
  bowel sound  [] hypoactive   [] hyperactive LYMPH:    Neck []  yes []  no       Axillae []  yes []  no 
 
 SKIN:   Rashes []  yes   []  no    Ulcers []  yes   []  no 
             [] tight to palpitation    skin turgor []  good  [] poor  [] decreased Cyanosis/clubbing []  yes []  no 
 
NEUR:   [x] cranial nerves II-XII grossly intact    
  [] Cranial nerves deficit 
               []  facial droop    []  slurred speech   [] aphasic  
  [] Strength normal     []  weakness  []  LUE  []   RUE/ []  LLE  []   RLE 
  follows commands  []  yes []  no        
 
PSYCH:   insight [] poor [] good   Alert and Oriented to  [x] person  [x] place  [x]  time  
                 [] depressed [] anxious [] agitated  [] lethargic [] stuporous  [] sedated LAB DATA REVIEWED:   
Recent Labs  
  08/21/20 0416 08/20/20 
8990 WBC 21.0* 23.5* HGB 11.0* 10.7* HCT 34.8* 33.6* * 391 Recent Labs  
  08/21/20 0416 08/19/20 
1880 * 126*  
K 4.8 4.4 CL 91* 91* CO2 29 27 BUN 44* 44* CREA 10.70* 10.80* * 211* CA 9.9 9.2 Recent Labs  
  08/21/20 0416 08/19/20 
3402 ALT 26 32 AP 83 79 TBILI 1.2* 1.9* ALB 1.7* 1.6*  
GLOB 8.1* 7.3* No results for input(s): INR, PTP, APTT, INREXT, INREXT in the last 72 hours. No results for input(s): FE, TIBC, PSAT, FERR in the last 72 hours. No results for input(s): PH, PCO2, PO2 in the last 72 hours. Recent Labs  
  08/20/20 
1445  CKMB 3.0 Lab Results Component Value Date/Time Glucose (POC) 157 (H) 08/21/2020 10:52 AM  
 Glucose (POC) 162 (H) 08/21/2020 07:19 AM  
 Glucose (POC) 195 (H) 08/20/2020 08:43 PM  
 Glucose (POC) 210 (H) 08/20/2020 04:11 PM  
 Glucose (POC) 235 (H) 08/20/2020 11:51 AM  
 
 
Procedures: see electronic medical records for all procedures/Xrays and details which were not copied into this note but were reviewed prior to creation of Plan.   
________________________________________________________________________ 
  
 
___________________________________________________ Consulting Physician: Janice Hannah MD

## 2020-08-21 NOTE — PERIOP NOTES
TRANSFER - OUT REPORT: 
 
Verbal report given to Randal Ordonez RN(name) on Carlos Felipe  being transferred to PCU/2207(unit) for routine post - op Report consisted of patients Situation, Background, Assessment and  
Recommendations(SBAR). Information from the following report(s) SBAR, OR Summary, Intake/Output, MAR, Recent Results and Cardiac Rhythm NSR w/ BBB was reviewed with the receiving nurse. Opportunity for questions and clarification was provided. Patient transported with: 
 Monitor O2 @ 2 liters Registered Nurse Tech

## 2020-08-21 NOTE — BRIEF OP NOTE
Brief Postoperative Note Patient: Kurt Anderson YOB: 1985 MRN: [id number] Date of Procedure: 8/21/2020 Pre-Op Diagnosis: INFECTED RIGHT FOOT Post-Op Diagnosis: gangrenous wound right foot Procedure(s): EXCISIONAL DEBRIDEMENT OF BONE AND NECROTIC TISSUE Surgeon(s): 
Guillermina Lockhart DPM 
 
Surgical Assistant: None Anesthesia: MAC Estimated Blood Loss (mL): less than 50 Complications: None Specimens:  
ID Type Source Tests Collected by Time Destination 1 : First and second right metatarsal Preservative Foot, Right  Silvano Ayse 8/21/2020 1219 Pathology 1 : Wound, Right Foot Wound Foot, Right AEROBIC/ANAEROBIC CULTURE, CULTURE, WOUND Adelfo Dotson 8/21/2020 1142 Microbiology Implants: * No implants in log * Drains: * No LDAs found * Findings: NECROTIC TISSUE AND BONE Electronically Signed by Scottie Gustafson DPM on 8/21/2020 at 12:40 PM

## 2020-08-21 NOTE — PERIOP NOTES
[facility]brid[facility]  Ref: Eladio Dear: [id number] LOT: [id number] Expiration Date: 2022/06/30

## 2020-08-21 NOTE — ANESTHESIA PREPROCEDURE EVALUATION
Anesthetic History No history of anesthetic complications Review of Systems / Medical History Patient summary reviewed, nursing notes reviewed and pertinent labs reviewed Pulmonary Smoker Comments: Former smoker Neuro/Psych Within defined limits Cardiovascular Hypertension Hyperlipidemia Exercise tolerance: <4 METS Comments: TTE (5/2/17): Left ventricle: Systolic function was mildly reduced. Ejection fraction 
was estimated in the range of 45 % to 50 %. There were no regional wall 
motion abnormalities. Wall thickness was mildly to moderately increased. Pericardium: A small pericardial effusion was identified circumferential 
to the heart. GI/Hepatic/Renal 
  
 
 
Renal disease: ESRD Endo/Other Diabetes: poorly controlled, type 2, using insulin Morbid obesity and anemia Other Findings Comments: Sepsis Right foot osteomyelitis s/p amputation of first and second toes (8/7/20) Physical Exam 
 
Airway Mallampati: III 
TM Distance: 4 - 6 cm Neck ROM: normal range of motion Mouth opening: Normal 
 
 Cardiovascular Regular rate and rhythm,  S1 and S2 normal,  no murmur, click, rub, or gallop Dental 
 
 
Comments: Some broken teeth Pulmonary Breath sounds clear to auscultation Abdominal 
GI exam deferred Other Findings Anesthetic Plan ASA: 4 Anesthesia type: MAC and general - backup Induction: Intravenous Anesthetic plan and risks discussed with: Patient

## 2020-08-21 NOTE — PROGRESS NOTES
RAPID RESPONSE TEAM- Follow Up Rounded on patient due to recent rapid response for chest pain. Lab Results Component Value Date/Time  08/20/2020 02:45 PM  
 CK - MB 3.0 08/20/2020 02:45 PM  
 CK-MB Index 2.3 08/20/2020 02:45 PM  
 Troponin-I, Qt. <0.05 08/20/2020 02:45 PM  
 
CXR showed no effusion, pneumo, or edema. Spoke to primary RN Jaxon Pratt; patient has had no further complaints of CP. Order for ECHO tomorrow. VS currently stable. No RRT interventions currently indicated. Please call with any further questions or concerns. Perez Eli Vitals w/ MEWS Score (last day) Date/Time MEWS Score Pulse Resp Temp BP Level of Consciousness SpO2  
 08/20/20 2314  2  82  18  98.4 °F (36.9 °C)  99/60  Alert  97 % 08/20/20 1957  1  84  18  98.5 °F (36.9 °C)  112/49  Alert  94 % 08/20/20 14:44:47  1  85  16  98.8 °F (37.1 °C)  115/70  Alert  95 % 08/20/20 14:44:46            Alert    
 08/20/20 1417          100/52      
 08/20/20 1333    84      (!) 92/39    92 % 08/20/20 1318    88      (!) 85/55      
 08/20/20 1218    84      120/67    93 % 08/20/20 1118          90/51      
 08/20/20 1104    89  18  98.8 °F (37.1 °C)  (!) 84/61    91 % 08/20/20 0906    82      92/52    92 % 08/20/20 0733  1  89  18  98.2 °F (36.8 °C)  109/51  Alert  90 % 08/20/20 0522  1  91  20  98.2 °F (36.8 °C)  107/62  Alert  92 %

## 2020-08-21 NOTE — PROGRESS NOTES
Pt still complaining of some dizziness, but feels better than he did yesterday. BP meds continue to be held. Orthostatics completed- pt not orthostatic this morning. OR here to take pt down to pre-op for scheduled I&D and debridement with dr David Tripp. Spoke with MD Devin White. Pt blood glucose 162 this am and pt NPO with no appetite last night or this morning. Ok to hold morning humalog and monitor glucose when pt returns for his daily 20u lantus. PT/OT is ordered. Spoke with therapist, and Мария Arboleda will see pt today. MD aware of pts BP this morning. Pt still needs echo to be completed today. Pt on schedule for dialysis today. Spoke with MD Catarino Park, updated on pts BP and complaints of dizziness. Pt to be dialyzed this afternoon with albumin  and midodrine might be ordered for pt as well. Updated dialysis RN Heidi that I spoke with Catarino Park and that pt is in pre-op at this time.

## 2020-08-22 NOTE — PROGRESS NOTES
Infectious Disease Progress Note IMPRESSION:  
 
- Sepsis - Diabetic R/ foot infection with gangrene S/p I&D of necrotic tissue & bone today MRI- - 9 x 8 x 11 mm nonspecific soft tissue collection containing gas 
distal to the second metatarsal head. WC- - polymicrobial 
  K.pneumoniae, Pseudomonas aeruginosa, , Pseudomonas stutzerii E.cloacae complex, Diphtheroids, mixed skin matt. Anaerobic - NG 
  WC- - polymicrobial 
  Staph species, coagulase negative , K.pneumoniae, Pseudomonas( 2 colony types) E.cloacae complex, Diphtheroids BC- NG 
- S/p amputation of R/ 1st & 2nd toes on  Pathology back - wound margins clear WC - - Klebsiella pneumoniae( 2 colony types) , Staph epidermis, Diphtheroids - H/o OM of R 3rd, 4th toes Amputation of 3rd toe - 20, 4th toe- 10/11/19 
- H/o MRSA infection  
- Poorly controlled Diabetes A1c 9.4 High blood sugars - PAD S/p RLE arteriogram, angioplasty on  
- ESRD on HD 
- Poorly controlled HTN improved - ESR - 116 PLAN:  
  
 
 
- Continue Vancomycin, Cefepime IV, Flagyl - BC x 2 if temps >100.5 
- Plan is for BKA on  
- Blood sugar control- D/w pt Subjective:  
 
Pt seen . Mildly hypotensive in Dialysis Denies complaints D/w  Dialysis RN Review of Systems:  A comprehensive review of systems was negative except for that written in the History of Present Illness. 10 point ROS obtained . All other systems negative . Objective:  
 
Blood pressure 116/70, pulse 85, temperature 98.1 °F (36.7 °C), temperature source Oral, resp. rate 18, height 6' 2\" (1.88 m), weight 333 lb 8.9 oz (151.3 kg), SpO2 93 %. Temp (24hrs), Av.2 °F (36.8 °C), Min:98 °F (36.7 °C), Max:98.8 °F (37.1 °C) Patient Vitals for the past 24 hrs: 
 Temp Pulse Resp BP SpO2  
20 1730 98.1 °F (36.7 °C) 85 18 116/70   
20 1632    131/89   
20 1605  86  129/90  08/21/20 1535  83  111/64   
08/21/20 1505  81  112/59 93 % 08/21/20 1435  78  98/56 93 % 08/21/20 1400  80  114/58 94 % 08/21/20 1355 98.1 °F (36.7 °C) 82 20 104/52 93 % 08/21/20 1330 98.2 °F (36.8 °C) 81 23 102/49 95 % 08/21/20 1320  83 19  91 % 08/21/20 1315  82 22 98/50 95 % 08/21/20 1300  80 21 103/51 96 % 08/21/20 1250  79 12 102/56 96 % 08/21/20 1245  81 15 98/46 99 % 08/21/20 1244 98.3 °F (36.8 °C) 79 12 (!) 108/39 97 % 08/21/20 1241  80 19 (!) 108/39 94 % 08/21/20 1041 98.8 °F (37.1 °C) 84 18 127/69 93 % 08/21/20 1032  90  119/67   
08/21/20 1028  84  118/85   
08/21/20 0716 98 °F (36.7 °C) 83 18 104/60 92 % 08/21/20 0415    110/60   
08/21/20 0412 98 °F (36.7 °C) 83 18  90 % 08/20/20 2314 98.4 °F (36.9 °C) 82 18 99/60 97 % Lines:  Peripheral IV:    
 
Physical Exam:  
General:  Awake, cooperative, Eyes:  Sclera anicteric. Pupils equally round and reactive to light. Mouth/Throat: Mucous membranes normal, oral pharynx clear Neck: Supple Lungs:    Reduced auscultation bases CV:  Regular rate and rhythm,no murmur, click, rub or gallop Abdomen:   Soft, non-tender. bowel sounds normal. non-distended Extremities: No  edema Skin: Skin color, texture, turgor normal. no acute rash or lesions Lymph nodes: Cervical and supraclavicular normal  
Musculoskeletal: No swelling or deformity, R/foot dressing + Lines/Devices:  Intact, no erythema, drainage or tenderness Psych: Awake and oriented, normal mood affect Data Reviewed: CBC:  
Recent Labs  
  08/21/20 
0416 08/20/20 
0519 08/19/20 
8833 WBC 21.0* 23.5* 21.2*  
RBC 4.29 4.17 4.01* HGB 11.0* 10.7* 10.3* HCT 34.8* 33.6* 32.3*  
* 391 384 GRANS 79*  --  86* LYMPH 10*  --  6*  
EOS 0  --  0 CMP:  
Recent Labs  
  08/21/20 
0416 08/19/20 
0874 * 211* * 126*  
K 4.8 4.4 CL 91* 91* CO2 29 27 BUN 44* 44* CREA 10.70* 10.80* CA 9.9 9.2 AGAP 9 8 BUCR 4* 4* AP 83 79  
TP 9.8* 8.9* ALB 1.7* 1.6*  
GLOB 8.1* 7.3* AGRAT 0.2* 0.2* Studies:     
Lab Results Component Value Date/Time Culture result: PENDING 08/21/2020 11:50 AM  
 Culture result: NO GROWTH 5 DAYS 08/14/2020 11:54 PM  
 Culture result: HEAVY KLEBSIELLA PNEUMONIAE (A) 08/14/2020 01:45 PM  
 Culture result: MODERATE PSEUDOMONAS AERUGINOSA (A) 08/14/2020 01:45 PM  
 Culture result: MODERATE PSEUDOMONAS STUTZERI (A) 08/14/2020 01:45 PM  
 Culture result: HEAVY MIXED SKIN USMAN ISOLATED 08/14/2020 01:45 PM  
  
 
XR Results (most recent): 
Results from Hospital Encounter encounter on 08/12/20 XR CHEST PORT Narrative EXAM: Portable CXR. 15 hours. INDICATION: chest pain FINDINGS: 
The lungs appear clear. Heart is normal in size. There is no pulmonary edema. There is no evident pneumothorax, adenopathy or pleural effusion. No significant 
change since 8/12/2020. Impression IMPRESSION: No Acute Disease. Patient Active Problem List  
Diagnosis Code  
 HTN (hypertension) I10  
 Postoperative hypoxia R09.02, Z98.890  
 Diarrhea R19.7  ESRD (end stage renal disease) on dialysis ([facility]) N18.6, Z99.2  Hyperlipidemia associated with type 2 diabetes mellitus ([facility]) E11.69, E78.5  Morbid obesity with body mass index (BMI) of 40.0 to 49.9 ([facility]) E66.01  
 Uncontrolled type 2 diabetes mellitus with proliferative retinopathy, with long-term current use of insulin ([address] E11.3599, E11.65, Z79.4  
 Uncontrolled type 2 diabetes mellitus with hyperglycemia, with long-term current use of insulin ([facility]) E11.65, Z79.4  
 Uncontrolled hypertension I10  
 Acute osteomyelitis of toe of right foot ([address] M86.171  
 Osteomyelitis ([address] M86.9  Foot ulcer ([address] L97.509  ESRD (end stage renal disease) ([address] N18.6  Wound cellulitis L03.90  
 Osteomyelitis of second toe of right foot ([address] M86.9  Sepsis ([address] A41.9  Cellulitis of right foot L03.115  
 Acute respiratory failure with hypoxia and hypercapnia ([facility]) J96.01, J96.02  
 
 
  ICD-10-CM ICD-9-CM 1. Cellulitis, wound, post-operative  T81.49XA 998.59   
2. Acute osteomyelitis of toe of right foot ([facility]  M86.171 730.07   
3. Acute respiratory failure with hypoxia and hypercapnia ([facility])  J96.01 518.81   
 J96.02    
4. Cellulitis of right foot  L03.115 682.7 5. Diarrhea of infectious origin  A09 009.2 6. Osteomyelitis of foot, right, acute ([facility]  M86.171 730.07   
7. Pseudomonas infection  A49.8 041.7 8. Klebsiella infection  A49.8 041.85   
9. Infection caused by Enterobacter cloacae  A49.8 041.85   
10. ESRD (end stage renal disease) ([facility]  N18.6 585.6 11. Poorly controlled diabetes mellitus ([place] [address]  E11.65 250.00   
12. ESRD (end stage renal disease) on dialysis ([facility])  N18.6 585.6 Z99.2 V45.11   
13. Other chronic osteomyelitis of right foot ([facility]  M86.671 730.17 I have discussed the diagnosis with the patient and the intended plan as seen in the above orders. I have discussed medication side effects and warnings with the patient as well. Reviewed test results at length with patient Anti-infectives:  
 
 Vancomycin/ Cefepime IV  Mose Soulier MD FACP

## 2020-08-22 NOTE — PROGRESS NOTES
Foot and Ankle Progress Note Admit Date: 8/12/2020 Hospital day 10 Subjective:  
 
Patient was admitted 10 days ago. He is post op amputation of right toes 1 and 2 of 8 days and is post op revascularization lower right leg by Dr. Regina Browne, however, dorsalis pedis is still with some stenosis and patient has significant small vessel disease in the foot. One day ago further excision and debridement of wound was performed; large amounts of necrotic tissue was found extending to plantar heel. I consulted with vascular and Patient is scheduled for BKA with Dr. Regina Browne Current Facility-Administered Medications Medication Dose Route Frequency  albumin human 25% (BUMINATE) solution 25 g  25 g IntraVENous PRN  
 midodrine (PROAMATINE) tablet 5 mg  5 mg Oral TID WITH MEALS  
 0.9% sodium chloride infusion  50 mL/hr IntraVENous CONTINUOUS  
 heparin (porcine) injection 5,000 Units  5,000 Units SubCUTAneous Q12H  piperacillin-tazobactam (ZOSYN) 3.375 g in 0.9% sodium chloride (MBP/ADV) 100 mL  3.375 g IntraVENous Q12H  
 [START ON 8/24/2020] Vancomycin level 4 am 8/24  1 Each Other ONCE  
 sodium chloride (NS) flush 5-40 mL  5-40 mL IntraVENous Q8H  
 sodium chloride (NS) flush 5-40 mL  5-40 mL IntraVENous PRN  
 lidocaine (PF) (XYLOCAINE) 10 mg/mL (1 %) injection 0.1 mL  0.1 mL SubCUTAneous PRN  
 lactobac ac& pc-s.therm-b.anim (USMAN Q/RISAQUAD)  1 Cap Oral DAILY  loperamide (IMODIUM) capsule 2 mg  2 mg Oral Q4H PRN  
 epoetin nata-epbx (RETACRIT) injection 10,000 Units  10,000 Units SubCUTAneous Q7D  
 acetaminophen (TYLENOL) tablet 650 mg  650 mg Oral Q6H PRN Or  
 acetaminophen (TYLENOL) suppository 650 mg  650 mg Rectal Q6H PRN  polyethylene glycol (MIRALAX) packet 17 g  17 g Oral DAILY PRN  promethazine (PHENERGAN) tablet 12.5 mg  12.5 mg Oral Q6H PRN  Or  
 ondansetron (ZOFRAN) injection 4 mg  4 mg IntraVENous Q6H PRN  
 insulin lispro (HUMALOG) injection   SubCUTAneous AC&HS  
  glucose chewable tablet 16 g  4 Tab Oral PRN  
 dextrose (D50W) injection syrg 12.5-25 g  12.5-25 g IntraVENous PRN  
 glucagon (GLUCAGEN) injection 1 mg  1 mg IntraMUSCular PRN  
 insulin glargine (LANTUS) injection 20 Units  20 Units SubCUTAneous DAILY  oxyCODONE-acetaminophen (PERCOCET) 5-325 mg per tablet 1 Tab  1 Tab Oral Q4H PRN  
 VANCOMYCIN INFORMATION NOTE   Other Rx Dosing/Monitoring Review of Systems See current H&P; no changes to add Objective:  
 
Patient Vitals for the past 8 hrs: 
 BP Temp Pulse Resp SpO2 Height Weight 08/22/20 1125 111/63     6' 2\" (1.88 m) 138.3 kg (305 lb)  
08/22/20 1008 111/63 98.9 °F (37.2 °C) 95 18 96 %    
 
08/22 0701 - 08/22 1900 In: 720 [P.O.:720] Out: -  
08/20 1901 - 08/22 0700 In: 946.7 [I.V.:946.7] Out: 2850 Physical Exam: right foot Distal right foot has been packed open there is continued formation of necrotic tissue that is extending plantar heel Foot is hyperpigmented Temp. 98.9 WBC has trended downwards however still remains at 20.1 Data Review Recent Results (from the past 24 hour(s)) GLUCOSE, POC Collection Time: 08/21/20  4:52 PM  
Result Value Ref Range Glucose (POC) 143 (H) 65 - 100 mg/dL Performed by Albin Keenan GLUCOSE, POC Collection Time: 08/21/20 10:46 PM  
Result Value Ref Range Glucose (POC) 128 (H) 65 - 100 mg/dL Performed by Fabiola Mcdermott ([facility]) GLUCOSE, POC Collection Time: 08/22/20  7:07 AM  
Result Value Ref Range Glucose (POC) 250 (H) 65 - 100 mg/dL Performed by Derek Schultz GLUCOSE, POC Collection Time: 08/22/20 10:17 AM  
Result Value Ref Range Glucose (POC) 299 (H) 65 - 100 mg/dL Performed by Lis Flores ECHO ADULT COMPLETE Collection Time: 08/22/20 11:41 AM  
Result Value Ref Range IVSd 1.57 (A) 0.6 - 1.0 cm LVIDd 4.74 4.2 - 5.9 cm LVIDs 4.40 cm  LVOT d 2.29 cm  
 LVPWd 1.85 (A) 0.6 - 1.0 cm  
 LVOT Cardiac Output 6.42 liter/minute LVOT Peak Gradient 5.18 mmHg LVOT Peak Gradient 5.18 mmHg Left Ventricular Outflow Tract Mean Gradient 3.08 mmHg LVOT SV 71.2 mL  
 LVOT Peak Velocity 113.82 cm/s LVOT Peak Velocity 113.82 cm/s LVOT VTI 17.31 cm Left Atrium Major Axis 4.54 cm Aortic Valve Area by Continuity of Peak Velocity 3.48 cm2 Aortic Valve Area by Continuity of Peak Velocity 3.48 cm2 Aortic Valve Area by Continuity of VTI 3.78 cm2 AoV PG 7.26 mmHg Aortic Valve Systolic Mean Gradient 9.56 mmHg Aortic Valve Systolic Peak Velocity 284.63 cm/s AoV VTI 18.84 cm  
 MV A Cornelius 96.71 cm/s Mitral Valve E Wave Deceleration Time 0.05 s MV E Cornelius 94.81 cm/s MVA VTI 3.70 cm2 MV Peak Gradient 5.09 mmHg MV Mean Gradient 2.54 mmHg Mitral Valve Pressure Half-time 0.02 s Mitral Valve Max Velocity 112.80 cm/s Mitral Valve Annulus Velocity Time Integral 19.26 cm  
 MVA (PHT) 11.73 cm2 Pulmonic Valve Systolic Peak Instantaneous Gradient 7.39 mmHg Pulmonic Valve Systolic Peak Instantaneous Gradient 8.52 mmHg Pulmonic Valve Systolic Mean Gradient 0.60 mmHg Pulmonic Valve Max Velocity 145.93 cm/s Pulmonic Valve Max Velocity 135.90 cm/s Pulmonic Valve Systolic Velocity Time Integral 21.11 cm Ao Root D 3.49 cm  
 MV E/A 0.98   
 LV Mass .1 88 - 224 g LV Mass AL Index 140.0 49 - 115 g/m2 Left Atrium Minor Axis 1.75 cm DEJA/BSA VTI 1.5 cm2/m2 Assessment:  
 
Active Problems: 
  ESRD (end stage renal disease) ([facility] (1/6/2020) Sepsis ([address] (8/12/2020) Cellulitis of right foot (8/12/2020) Acute respiratory failure with hypoxia and hypercapnia ([address] (8/12/2020) Plan: This limb is not salvageable I spoke with the patient regarding finding and that at this time his best option will be a BKA. Patient understands and will await to be scheduled for the BKA by Dr. Laine Dickerson. Mr. Janna Sims is alert, understands well and is able to make his own decisions and lives alone. I Have also been in contact with Dr. Miranda Coy

## 2020-08-22 NOTE — OP NOTES
[address] 
OPERATIVE REPORT Name:  Barbi Thacker 
MR#:  [id number] :  1985 ACCOUNT #:  [de-identified] DATE OF SERVICE:  2020 PREOPERATIVE DIAGNOSIS:  Gangrenous, necrotic, infected right foot. POSTOPERATIVE DIAGNOSIS:  Gangrenous, necrotic, infected right foot. PROCEDURE PERFORMED:  Excisional debridement of necrotic tissue and bone, right foot. SURGEON:  Ismael Curran DPM 
 
ASSISTANT:  none ANESTHESIA:  IV sedation with local. 
 
COMPLICATIONS:  none SPECIMENS REMOVED:  Wound cultures and bone. IMPLANTS:  none ESTIMATED BLOOD LOSS:  Minimal. 
 
INDICATIONS:  This 58-year-old black male has been taken to the OR for limb salvage procedure to debride necrotic tissue and bone from the right foot. The patient previously had indication of severely infected first right toe less than two weeks ago as well as attempted revascularization of the right lower limb. Wound is failing to heal and necrotic tissue continues to progress. Physical examination of lower extremities revealed nonpalpable pedal pulses, fairly good muscle strength in lower extremities bilaterally, grossly diminished epicritic sensation. On right foot, he has had well-healed amputations of toes 5, 4, and 3. There are necrotic incisions where toes 1 and 2 was amputated approximately 10 days ago with necrotic base. The patient was attempted to be revascularized by Dr. Dinesh Loyd; however, this still remains small vessel disease and a non-patent dorsalis pedis. He has linear necrotic wounds where the first and second toes were amputated. Each wound measures 2.5 cm in length and 4-5 cm wide with 0.5 depth, they appears to be bone probed at the first metatarsal head. An MRI indicated potential gas pocket on the distal aspect where the second toe was removed. However, it also indicated that it could be a skin defect. DESCRIPTION OF PROCEDURE:  The patient was brought to the operating room, placed on the operating table in supine position. Under the influence of IV sedation, anesthesia was achieved to the right foot using 0.05% Marcaine with epinephrine. A successful time-out was completed. Using scissors, the loosely coapted sutures were removed and blunt dissection was used to separate fascial structures. There was liquefactive necrosis extending plantar mid foot down to the calcaneus. The plantar aspect of the foot was found to be with necrosis. The distal portion of the second metatarsal and first metatarsal appeared to be necrotic and dusky in color. A sagittal saw was used to remove the distal one-third of second and first metatarsals. There was a significant amount of necrotic tissue and purulence that was removed using Misonix debridement tool as well as pulse lavage. Aerobic as well as anaerobic wound cultures were taken. However, the wound continued to probe plantarly through the calcaneus and through the mid foot. All necrotic tissue as possible was removed. One loose suture was used to coapt the distal flap of tissue. The wound was now packed with saline wet-to-dry dressings followed by Adaptic followed by an outer Ace. At this time, I did speak with Dr. Dinesh Loyd and I do not feel this limb is salvageable. The patient has extensive necrotic tissue as well as vascular disease and I do not feel that a transmetatarsal amputation will heal either. I have spoken with Dr. Dinesh Loyd, who has agreed to perform a below-the-knee amputation on the patient within the next upcoming days. Otherwise, I will follow up with the patient until and make sure dressing changes are performed. I have Infectious Disease consulted to cover him for success until he can receive the BKA. VALENCIA [id number] 
D:  08/21/2020 13:00 
T:  08/21/2020 20:30 
JOB #:  [id number]

## 2020-08-22 NOTE — PROGRESS NOTES
RAPID RESPONSE TEAM- Follow Up Rounded on patient due to recent rapid response for CP. Lab Results Component Value Date/Time  08/20/2020 02:45 PM  
 CK - MB 3.0 08/20/2020 02:45 PM  
 CK-MB Index 2.3 08/20/2020 02:45 PM  
 Troponin-I, Qt. <0.05 08/20/2020 02:45 PM  
 
Spoke with primary RN, Howard Singh. Patient in Dialysis. Dialysis VS currently stable. No RRT interventions indicated at this time. Please call with any questions or concerns. Abigail Melchor Patient Vitals for the past 8 hrs: 
 Temp Pulse Resp BP SpO2  
08/21/20 2115  86 18 98/66   
08/21/20 2100  85 18 104/64   
08/21/20 2045  85 18 105/72   
08/21/20 2030  82 18 96/66   
08/21/20 2015  82 18 100/62   
08/21/20 2000  84 18 93/63   
08/21/20 1945  84 18 97/65   
08/21/20 1930  86 18 113/74   
08/21/20 1915  83 18 93/65   
08/21/20 1900  83 18 94/66   
08/21/20 1845  86 18 (!) 85/63   
08/21/20 1830  86 18 99/63   
08/21/20 1815  88 18 109/74   
08/21/20 1800  87 18 118/72   
08/21/20 1745  88 18 102/68   
08/21/20 1730 98.1 °F (36.7 °C) 85 18 116/70   
08/21/20 1632    131/89   
08/21/20 1605  86  129/90   
08/21/20 1535  83  111/64   
08/21/20 1505  81  112/59 93 % 08/21/20 1435  78  98/56 93 % 08/21/20 1400  80  114/58 94 % 08/21/20 1355 98.1 °F (36.7 °C) 82 20 104/52 93 %

## 2020-08-22 NOTE — PROGRESS NOTES
Hospitalist Progress Note NAME: Audra White :  1985 MRN:  [id number] Assessment / Plan: 
Sepsis, POA  WBC 19K, tachycardia Right foot cellulitis with abscess, POA after amputation of right 1st and second toe for osteomyelitis  Hx osteomyelitis right 3rd and 4th toes 2020 and 10/2019 respectively Acute hypoxic respiratory failure 88% RA IDDM uncontrolled with hyperglycemia  ESRD on HD MWF Obesity 
  
--continue vancomycin and zosyn was transitioned to cefepime due to persistent fever.  Follow up blood cultures. (NGTD) -Wound cx Culture result: Abnormal             Preliminary HEAVY KLEBSIELLA PNEUMONIAE Culture result: Abnormal             Preliminary HEAVY ENTEROBACTER CLOACAE COMPLEX Culture result: Abnormal    LIGHT  
POSSIBLE  
3RD GRAM NEGATIVE TEODORA         Preliminary Culture result: Abnormal             Preliminary LIGHT PSEUDOMONAS AERUGINOSA SENSITIVITY TO FOLLOW Culture result: Abnormal             Preliminary FEW MIXED STAPHYLOCOCCUS SPECIES, COAGULASE NEGATIVE Culture result: Abnormal             Preliminary FEW MIXED DIPHTHEROIDS   
  
  
  
--podiatry consult apreciated, MRI right foot showed fluid collection and abscess with no osteomyelitis -s/p irrigation of wound  
  ESR elevated 116.  ID consult appreciated -- SARS-COV-2 negative,  
-supplemental O2, CXR clear and lungs clear on exam without pulmonary system.  Incentive spirometry.  Hypoxia is resolved after dalysis --continue lantus 20 units daily, add moderate SSI 
-- appreciated renal consult to continue with dialysis  
-Large amount of chocolate color purulent discharge expressed from Sugical wound on manual expression 
-s/p  RIGHT LEG ARTERIOGRAM ANTERIOR TIBIAL ARTERY AND ANGIOPLASTY 
-path report margin clear ID recommend  Vancomycin 750 mg after HD three times weekly  & Cefepime2/2/3 G regimen after HD  x 3 weeks end date .  Patient scheduled for excision and debridement of necrotic tissue right foot 08/21 As per podiatry note plan for BKA next week Uncontrolled HTN: 
-Hold  amlodipine and Lisinopril 
-patient hypotensive today  
-check orthostatic vital  
-physical therapy  
  
Diarrhea: 
-Likely Antibiotic induced 
-Resolved -Imodium PRN 
-c/w probiotic 
  
Hyponatremia  
-motoring  
  
Chest pain  
-troponin negative  
- EKG show no acute abnormality 
-Echo was order   
  
Body mass index is 43.36 kg/m². 
  
Code: full DVT prophylaxis: heparin Surrogate decision maker:  Aunt Chris Mayo 
  
  
 
 
  
 
Subjective: Chief Complaint / Reason for Physician Visit Discussed with RN events overnight. Plan for BKA Monday , stated he feel better Review of Systems: 
Symptom Y/N Comments  Symptom Y/N Comments Fever/Chills n   Chest Pain n   
Poor Appetite n   Edema n   
Cough n   Abdominal Pain n   
Sputum n   Joint Pain y   
SOB/LITTLE n   Pruritis/Rash Nausea/vomit n   Tolerating PT/OT Diarrhea n   Tolerating Diet y Constipation n   Other Could NOT obtain due to:   
 
Objective: VITALS:  
Last 24hrs VS reviewed since prior progress note. Most recent are: 
Patient Vitals for the past 24 hrs: 
 Temp Pulse Resp BP SpO2  
08/22/20 0725 98.9 °F (37.2 °C) 95 18 112/64 95 % 08/22/20 0405 99.2 °F (37.3 °C) 98 20 94/57 93 % 08/21/20 2248 98 °F (36.7 °C) 92 20 129/75   
08/21/20 2150 98 °F (36.7 °C) 88 18 96/65   
08/21/20 2130  88 18 96/64   
08/21/20 2115  86 18 98/66   
08/21/20 2100  85 18 104/64   
08/21/20 2045  85 18 105/72   
08/21/20 2030  82 18 96/66   
08/21/20 2015  82 18 100/62   
08/21/20 2000  84 18 93/63   
08/21/20 1945  84 18 97/65   
08/21/20 1930  86 18 113/74   
08/21/20 1915  83 18 93/65   
08/21/20 1900  83 18 94/66   
08/21/20 1845  86 18 (!) 85/63   
08/21/20 1830  86 18 99/63   
08/21/20 1815  88 18 109/74   
08/21/20 1800  87 18 118/72  08/21/20 1745  88 18 102/68   
08/21/20 1730 98.1 °F (36.7 °C) 85 18 116/70   
08/21/20 1632    131/89   
08/21/20 1605  86  129/90   
08/21/20 1535  83  111/64   
08/21/20 1505  81  112/59 93 % 08/21/20 1435  78  98/56 93 % 08/21/20 1400  80  114/58 94 % 08/21/20 1355 98.1 °F (36.7 °C) 82 20 104/52 93 % 08/21/20 1330 98.2 °F (36.8 °C) 81 23 102/49 95 % 08/21/20 1320  83 19  91 % 08/21/20 1315  82 22 98/50 95 % 08/21/20 1300  80 21 103/51 96 % 08/21/20 1250  79 12 102/56 96 % 08/21/20 1245  81 15 98/46 99 % 08/21/20 1244 98.3 °F (36.8 °C) 79 12 (!) 108/39 97 % 08/21/20 1241  80 19 (!) 108/39 94 % 08/21/20 1041 98.8 °F (37.1 °C) 84 18 127/69 93 % 08/21/20 1032  90  119/67   
08/21/20 1028  84  118/85  Intake/Output Summary (Last 24 hours) at 8/22/2020 6874 Last data filed at 8/21/2020 2304 Gross per 24 hour Intake 946.67 ml Output 2850 ml Net -1903.33 ml PHYSICAL EXAM: 
General: WD, WN. Alert, cooperative, no acute distress   
EENT:  EOMI. Anicteric sclerae. MMM Resp:  CTA bilaterally, no wheezing or rales. No accessory muscle use CV:  Regular  rhythm,  No edema GI:  Soft, Non distended, Non tender.  +Bowel sounds Neurologic:  Alert and oriented X 3, normal speech, Psych:   Good insight. Not anxious nor agitated Skin:  No rashes. No jaundice Reviewed most current lab test results and cultures  YES Reviewed most current radiology test results   YES Review and summation of old records today    NO Reviewed patient's current orders and MAR    YES 
PMH/SH reviewed - no change compared to H&P 
________________________________________________________________________ Care Plan discussed with: 
  Comments Patient y Family RN y   
Care Manager Consultant  y   
                 y Multidiciplinary team rounds were held today with , nursing, pharmacist and clinical coordinator.   Patient's plan of care was discussed; medications were reviewed and discharge planning was addressed. ________________________________________________________________________ Total NON critical care TIME:  35    Minutes Total CRITICAL CARE TIME Spent:   Minutes non procedure based Comments >50% of visit spent in counseling and coordination of care y   
________________________________________________________________________ Sheridan Sanchez MD  
 
Procedures: see electronic medical records for all procedures/Xrays and details which were not copied into this note but were reviewed prior to creation of Plan. LABS: 
I reviewed today's most current labs and imaging studies. Pertinent labs include: 
Recent Labs  
  08/21/20 
0416 08/20/20 
0519 WBC 21.0* 23.5* HGB 11.0* 10.7* HCT 34.8* 33.6* * 391 Recent Labs  
  08/21/20 
4446 *  
K 4.8  
CL 91* CO2 29 * BUN 44* CREA 10.70* CA 9.9 ALB 1.7* TBILI 1.2* ALT 26  
 
 
Signed:  Sheridan Sanchez MD

## 2020-08-22 NOTE — PROGRESS NOTES
Pharmacy Automatic Renal Dosing Protocol - Antimicrobials Indication for Antimicrobials: infected/gangrenous R foot s/p recent amputation of R 1st and 2nd toes  for osteomyelitis  I&D bone and necrotic tissue Current Regimen of Each Antimicrobial: 
Piperacillin/tazobactam 3.375g IV q12h, start ; day 2 Vancomycin 1000 mg after each HD (Start Date ; Day # 10) Previous Antimicrobial Therapy: 
Zosyn 3.375 g IV every 12 hours (Start Date ; Day # 5) Metronidazole 500 mg q12h; started - Cefepime 1 g IV every 24 hours (Start Date - Vancomycin Goal Level: 20-25 mcg/ml Vancomycin Levels Date Dose & Interval Measured (mcg/mL) Steady State (mcg/mL)  AM 1 g IV after HD 15.7  AM 1250 mg after HD 17.8   
 AM 1250 mg after HD 27   
 Date & time of next level:  Significant Cultures:  
 Blood: NGTD x 5 days- final 
: MRSA: not present- final 
 Wound: Heavy klebsiella, heavy enterobacter, light psedomonas, light diphtheroids, few CoNS- final 
 Foot: pseudomonas, and diphtheroids, enterobacter- final 
 Foot: Heavy klebsiella, moderate pseudomonas aeruginosa and stutzeri, and diphtheroids- final 
 Blood: NG- final 
 wound cx: GNRs, pending Radiology: MRI- - 9 x 8 x 11 mm nonspecific soft tissue collection containing gas 
distal to the second metatarsal head. Paralysis, amputations, malnutrition: None significant Labs: 
Recent Labs  
  20 
0416 20 
7287 CREA 10.70*  --   
BUN 44*  --   
WBC 21.0* 23.5* Temp (24hrs), Av.5 °F (36.9 °C), Min:98 °F (36.7 °C), Max:99.2 °F (37.3 °C) Creatinine Clearance (mL/min) or Dialysis: HD MWF Impression/Plan:  
Continue current doses ID following Plan for BKA next week  Duration: pending Pharmacy will follow daily and adjust medications as appropriate for renal function and/or serum levels. Thank you, CARYN Rangel

## 2020-08-22 NOTE — DIALYSIS
[facility] Dialysis Team [place]  [phone] Vitals   Pre   Post   Assessment   Pre   Post    
Temp  98.1  98.0 LOC  AxOx4 AxOx4 HR   85 88 Lungs   CTA alfredo Even and unlabored B/P   116/70 96/65 Cardiac   RRR  RRR Resp   18 18 Skin   Intact / post op dressing to RLE Intact / post op dressing to RLE Pain level    Edema  No edema No edema Orders: Duration:   Start:    1730 End:    2150 Total:   4.15 hr  
Dialyzer:   Dialyzer/Set Up Inspection: Tony Gomez (08/21/20 1730) K Bath:   Dialysate K (mEq/L): 3 (08/21/20 1730) Ca Bath:   Dialysate CA (mEq/L): 2.5 (08/21/20 1730) Na/Bicarb:   Dialysate NA (mEq/L): 138 (08/21/20 1730) Target Fluid Removal:   Goal/Amount of Fluid to Remove (mL): 4000 mL (08/19/20 1300) Access Type & Location:   Left upper arm AV Fistula without evidence of warmth, redness, or drainage. +thrill/+bruit. Each access site disinfected for 60 seconds per site with alcohol swabs per P&P. Cannulated with 15G needles x2 and secured with paper tape. +aspiration/+flushed. Labs Obtained/Reviewed Critical Results Called   Date when labs were drawn- 
Hgb-   
HGB Date Value Ref Range Status 08/21/2020 11.0 (L) 12.1 - 17.0 g/dL Final  
 
K-   
Potassium Date Value Ref Range Status 08/21/2020 4.8 3.5 - 5.1 mmol/L Final  
 
Ca-  
Calcium Date Value Ref Range Status 08/21/2020 9.9 8.5 - 10.1 MG/DL Final  
 
Bun-  
BUN Date Value Ref Range Status 08/21/2020 44 (H) 6 - 20 MG/DL Final  
 
Creat-  
Creatinine Date Value Ref Range Status 08/21/2020 10.70 (H) 0.70 - 1.30 MG/DL Final  
 
  
Medications/ Blood Products Given Name   Dose   Route and Time Albumin 25% 12.5 g  @ 1900 Blood Volume Processed (BVP):    86.2 L Net Fluid Removed:  2800 mL Comments Time Out Done: 4335 Primary Nurse Rpt Pre: Daniel Doctor Day, RN 
Primary Nurse Rpt Post: Sonal Flower RN 
Pt Education: infection control Care Plan: continue current HD plan of care Tx Summary:  
7375 HD treatment initiated per physician order. 1900 Albumin started. 2000 Albumin completed. 2150 HD treatment completed per physician order. All possible blood returned to patient. Hemostasis achieved in <10 minutes. Access site dressings clean, dry, and intact. +thrill. Admiting Diagnosis: Sepsis Pt's previous clinic- [facility] 
Consent signed - Informed Consent Verified: Yes (08/21/20 1730) Sylvain Consent - verified Hepatitis Status- 8/13/20 neg/sus (cc) Machine #- Machine Number: [id number] (08/21/20 1730) Telemetry status- remote Pre-dialysis wt. -

## 2020-08-22 NOTE — PROGRESS NOTES
315 Corrine Crespo with Dr. Sanjay Mccracken regarding continuous fluids that were ordered but not started. Was informed to hold off on infusion. Will continue to monitor. Bedside(SBAR) Shift report given to [facility] ,RN. Pt had uneventful day.

## 2020-08-23 NOTE — PROGRESS NOTES
Anesthesiology Hyponatremia noted. Corrected sodium 132. OK to proceed with scheduled surgical procedure, assuming no acute changes in overnight clinical status or lab derangements and patient remains NPO.

## 2020-08-23 NOTE — PROGRESS NOTES
Hospitalist Progress Note NAME: Peña Giordano :  1985 MRN:  [id number] Assessment / Plan: 
Sepsis, POA  WBC 19K, tachycardia Right foot cellulitis with abscess, POA after amputation of right 1st and second toe for osteomyelitis  Hx osteomyelitis right 3rd and 4th toes 2020 and 10/2019 respectively Acute hypoxic respiratory failure 88% RA IDDM uncontrolled with hyperglycemia  ESRD on HD MWF Obesity 
  
--continue vancomycin and zosyn was transitioned to cefepime due to persistent fever.  Follow up blood cultures. (NGTD) -Wound cx Culture result: Abnormal             Preliminary HEAVY KLEBSIELLA PNEUMONIAE Culture result: Abnormal             Preliminary HEAVY ENTEROBACTER CLOACAE COMPLEX Culture result: Abnormal    LIGHT  
POSSIBLE  
3RD GRAM NEGATIVE TEODORA         Preliminary Culture result: Abnormal             Preliminary LIGHT PSEUDOMONAS AERUGINOSA SENSITIVITY TO FOLLOW Culture result: Abnormal             Preliminary FEW MIXED STAPHYLOCOCCUS SPECIES, COAGULASE NEGATIVE Culture result: Abnormal             Preliminary FEW MIXED DIPHTHEROIDS   
  
  
  
--podiatry consult apreciated, MRI right foot showed fluid collection and abscess with no osteomyelitis -s/p irrigation of wound  
  ESR elevated 116.  ID consult appreciated -- SARS-COV-2 negative,  
-supplemental O2, CXR clear and lungs clear on exam without pulmonary system.  Incentive spirometry.  Hypoxia is resolved after dalysis --continue lantus 20 units daily, add moderate SSI 
-- appreciated renal consult to continue with dialysis  
-Large amount of chocolate color purulent discharge expressed from Sugical wound on manual expression 
-s/p  RIGHT LEG ARTERIOGRAM ANTERIOR TIBIAL ARTERY AND ANGIOPLASTY 
-path report margin clear ID recommend  Vancomycin 750 mg after HD three times weekly  & Cefepime2/2/3 G regimen after HD  x 3 weeks end date .  Patient scheduled for excision and debridement of necrotic tissue right foot 08/21 As per podiatry note plan for BKA next week Uncontrolled HTN: 
-Hold  amlodipine and Lisinopril 
-patient hypotensive today  
-check orthostatic vital  
-physical therapy  
  
Diarrhea: 
-Likely Antibiotic induced 
-Resolved -Imodium PRN 
-c/w probiotic 
  
Hyponatremia  
-motoring  
  
Chest pain  
-troponin negative  
- EKG show no acute abnormality 
-Echo show EF 45-50 % 
  Body mass index is 43.36 kg/m². 
  
Code: full DVT prophylaxis: heparin Surrogate decision maker:  Aunana Patton  
  
  
  
 
Subjective: Chief Complaint / Reason for Physician Visit Discussed with RN events overnight. Plan for BKA  Monday Review of Systems: 
Symptom Y/N Comments  Symptom Y/N Comments Fever/Chills n   Chest Pain n   
Poor Appetite n   Edema n   
Cough n   Abdominal Pain n   
Sputum n   Joint Pain y   
SOB/LITTLE n   Pruritis/Rash Nausea/vomit n   Tolerating PT/OT Diarrhea n   Tolerating Diet Constipation n   Other Could NOT obtain due to:   
 
Objective: VITALS:  
Last 24hrs VS reviewed since prior progress note. Most recent are: 
Patient Vitals for the past 24 hrs: 
 Temp Pulse Resp BP SpO2  
08/23/20 0742 98.2 °F (36.8 °C) 87 18 139/80 98 % 08/23/20 0315 98.2 °F (36.8 °C) 86 16 129/72 93 % 08/22/20 2320 97.9 °F (36.6 °C) 92 17 132/73 93 % 08/22/20 2037 97.9 °F (36.6 °C) 95 17 129/64 95 % 08/22/20 1710 98.3 °F (36.8 °C) 92 18 129/76 94 % 08/22/20 1125    111/63   
08/22/20 1008 98.9 °F (37.2 °C) 95 18 111/63 96 % Intake/Output Summary (Last 24 hours) at 8/23/2020 4806 Last data filed at 8/22/2020 1430 Gross per 24 hour Intake 720 ml Output  Net 720 ml PHYSICAL EXAM: 
General: WD, WN. Alert, cooperative, no acute distress   
EENT:  EOMI. Anicteric sclerae. MMM Resp:  CTA bilaterally, no wheezing or rales. No accessory muscle use CV:  Regular  rhythm,  No edema GI:  Soft, Non distended, Non tender.  +Bowel sounds Neurologic:  Alert and oriented X 3, normal speech, Psych:   Good insight. Not anxious nor agitated Skin:  No rashes. No jaundice Reviewed most current lab test results and cultures  YES Reviewed most current radiology test results   YES Review and summation of old records today    NO Reviewed patient's current orders and MAR    YES 
PMH/SH reviewed - no change compared to H&P 
________________________________________________________________________ Care Plan discussed with: 
  Comments Patient Family  y   
RN y   
Care Manager y Consultant     
                 y Multidiciplinary team rounds were held today with , nursing, pharmacist and clinical coordinator. Patient's plan of care was discussed; medications were reviewed and discharge planning was addressed. ________________________________________________________________________ Total NON critical care TIME:  35    Minutes Total CRITICAL CARE TIME Spent:   Minutes non procedure based Comments >50% of visit spent in counseling and coordination of care y   
________________________________________________________________________ Kellee Schmid MD  
 
Procedures: see electronic medical records for all procedures/Xrays and details which were not copied into this note but were reviewed prior to creation of Plan. LABS: 
I reviewed today's most current labs and imaging studies. Pertinent labs include: 
Recent Labs  
  08/23/20 0322 08/21/20 
0416 WBC 19.1* 21.0* HGB 10.3* 11.0*  
HCT 32.8* 34.8*  
* 438* Recent Labs  
  08/23/20 0322 08/21/20 
0416 * 129*  
K 4.8 4.8  
CL 91* 91* CO2 27 29 * 159* BUN 44* 44* CREA 9.18* 10.70* CA 9.4 9.9 ALB  --  1.7* TBILI  --  1.2* ALT  --  26 Signed:  Kellee Schmid MD

## 2020-08-23 NOTE — PROGRESS NOTES
Problem: Patient Education: Go to Patient Education Activity Goal: Patient/Family Education Outcome: Progressing Towards Goal 
  
Problem: Pressure Injury - Risk of 
Goal: *Prevention of pressure injury Description: Document Ranjeet Scale and appropriate interventions in the flowsheet. Outcome: Progressing Towards Goal 
Note: Pressure Injury Interventions: 
Sensory Interventions: Assess changes in LOC, Discuss PT/OT consult with provider Moisture Interventions: Assess need for specialty bed, Apply protective barrier, creams and emollients, Absorbent underpads, Check for incontinence Q2 hours and as needed Activity Interventions: Increase time out of bed, Chair cushion, Pressure redistribution bed/mattress(bed type), Assess need for specialty bed Mobility Interventions: Chair cushion, Float heels, Pressure redistribution bed/mattress (bed type), HOB 30 degrees or less, PT/OT evaluation, Turn and reposition approx. every two hours(pillow and wedges) Nutrition Interventions: Document food/fluid/supplement intake, Discuss nutritional consult with provider, Offer support with meals,snacks and hydration Friction and Shear Interventions: Feet elevated on foot rest, Apply protective barrier, creams and emollients, Foam dressings/transparent film/skin sealants, HOB 30 degrees or less, Lift sheet, Lift team/patient mobility team, Minimize layers, Sit at 90-degree angle Problem: Sepsis: Day 3 Goal: Off Pathway (Use only if patient is Off Pathway) Outcome: Progressing Towards Goal 
Goal: *Central Venous Pressure maintained at 8-12 mm Hg Outcome: Progressing Towards Goal 
Goal: *Oxygen saturation within defined limits Outcome: Progressing Towards Goal 
Goal: *Vital sign stability Outcome: Progressing Towards Goal 
Goal: *Tolerating diet Outcome: Progressing Towards Goal 
Goal: *Demonstrates progressive activity Outcome: Progressing Towards Goal 
Goal: Activity/Safety Outcome: Progressing Towards Goal 
Goal: Consults, if ordered Outcome: Progressing Towards Goal 
Goal: Diagnostic Test/Procedures Outcome: Progressing Towards Goal 
Goal: Nutrition/Diet Outcome: Progressing Towards Goal 
Goal: Discharge Planning Outcome: Progressing Towards Goal 
Goal: Medications Outcome: Progressing Towards Goal 
Goal: Respiratory Outcome: Progressing Towards Goal 
Goal: Treatments/Interventions/Procedures Outcome: Progressing Towards Goal 
Goal: Psychosocial 
Outcome: Progressing Towards Goal 
  
Problem: Sepsis: Day 4 Goal: Off Pathway (Use only if patient is Off Pathway) Outcome: Progressing Towards Goal 
Goal: Activity/Safety Outcome: Progressing Towards Goal 
Goal: Consults, if ordered Outcome: Progressing Towards Goal 
Goal: Diagnostic Test/Procedures Outcome: Progressing Towards Goal 
Goal: Nutrition/Diet Outcome: Progressing Towards Goal 
Goal: Discharge Planning Outcome: Progressing Towards Goal 
Goal: Medications Outcome: Progressing Towards Goal 
Goal: Respiratory Outcome: Progressing Towards Goal 
Goal: Treatments/Interventions/Procedures Outcome: Progressing Towards Goal 
Goal: Psychosocial 
Outcome: Progressing Towards Goal 
Goal: *Oxygen saturation within defined limits Outcome: Progressing Towards Goal 
Goal: *Hemodynamically stable Outcome: Progressing Towards Goal 
Goal: *Vital signs within defined limits Outcome: Progressing Towards Goal 
Goal: *Tolerating diet Outcome: Progressing Towards Goal 
Goal: *Demonstrates progressive activity Outcome: Progressing Towards Goal 
Goal: *Fluid volume maintenance Outcome: Progressing Towards Goal 
  
Problem: Sepsis: Day 5 Goal: Off Pathway (Use only if patient is Off Pathway) Outcome: Progressing Towards Goal 
Goal: *Oxygen saturation within defined limits Outcome: Progressing Towards Goal 
Goal: *Vital signs within defined limits Outcome: Progressing Towards Goal 
Goal: *Tolerating diet Outcome: Progressing Towards Goal 
Goal: *Demonstrates progressive activity Outcome: Progressing Towards Goal 
Goal: *Discharge plan identified Outcome: Progressing Towards Goal 
Goal: Activity/Safety Outcome: Progressing Towards Goal 
Goal: Consults, if ordered Outcome: Progressing Towards Goal 
Goal: Diagnostic Test/Procedures Outcome: Progressing Towards Goal 
Goal: Nutrition/Diet Outcome: Progressing Towards Goal 
Goal: Discharge Planning Outcome: Progressing Towards Goal 
Goal: Medications Outcome: Progressing Towards Goal 
Goal: Respiratory Outcome: Progressing Towards Goal 
Goal: Treatments/Interventions/Procedures Outcome: Progressing Towards Goal 
Goal: Psychosocial 
Outcome: Progressing Towards Goal 
  
Problem: Sepsis: Day 6 Goal: Off Pathway (Use only if patient is Off Pathway) Outcome: Progressing Towards Goal 
Goal: *Oxygen saturation within defined limits Outcome: Progressing Towards Goal 
Goal: *Vital signs within defined limits Outcome: Progressing Towards Goal 
Goal: *Tolerating diet Outcome: Progressing Towards Goal 
Goal: *Demonstrates progressive activity Outcome: Progressing Towards Goal 
Goal: Influenza immunization Outcome: Progressing Towards Goal 
Goal: *Pneumococcal immunization Outcome: Progressing Towards Goal 
Goal: Activity/Safety Outcome: Progressing Towards Goal 
Goal: Diagnostic Test/Procedures Outcome: Progressing Towards Goal 
Goal: Nutrition/Diet Outcome: Progressing Towards Goal 
Goal: Discharge Planning Outcome: Progressing Towards Goal 
Goal: Medications Outcome: Progressing Towards Goal 
Goal: Respiratory Outcome: Progressing Towards Goal 
Goal: Treatments/Interventions/Procedures Outcome: Progressing Towards Goal 
Goal: Psychosocial 
Outcome: Progressing Towards Goal

## 2020-08-23 NOTE — PROGRESS NOTES
Bedside and Verbal shift change report given to [place] (oncoming nurse) by Ryley Stanford (offgoing nurse). Report included the following information SBAR, Kardex, Intake/Output and Recent Results.

## 2020-08-23 NOTE — PROGRESS NOTES
Pt progressing in all care plan goals. Rt foot pain well controlled with percocet as ordered. Tolerating diet well. Reinforced safety/fall prevention strategies with pt. Call for assist w/ ambulation and transfers, utilize walker for support while NWB on RLE. Noon dose of midorine held d/t pt 's. Will continue to monitor for s/s of hypotension. Cont course of abx as prescribed and monitor for further s/s of infection. Plan for BKA tomorrow, PT/OT and CM support services. Pt in stable condition.

## 2020-08-24 NOTE — PROGRESS NOTES
Name: Tatyana Mccoy MRN: [id number] : 1985 Assessment   :                                               Plan: ESKD Anemia DM 
HTN Hyponatremia Foot infection- for R BKA [facility]-due for HD today; followed by me as outpt. Chronic non compliance with diet/fluid intake S/p debridement. Now for R BKA today Id following-on v/f/c Holding bp meds On gentle IVF- d/c once he is able to take po post op and stable BP Give albumin/midodrine prn lower bp Will place on FR of 1.5 L/day Subjective: \"I am going for surgery today\" ROS:  
No nausea, no vomiting No chest pain, no shortness of breath Exam: 
Visit Vitals /87 (BP 1 Location: Right arm, BP Patient Position: At rest) Pulse 81 Temp 98.7 °F (37.1 °C) Resp 12 Ht 6' 2\" (1.88 m) Wt 141.3 kg (311 lb 6.4 oz) SpO2 98% BMI 39.98 kg/m² NAD No resp distress Alert awake Current Facility-Administered Medications Medication Dose Route Frequency Last Dose  sodium chloride (NS) flush 5-40 mL  5-40 mL IntraVENous Q8H    
 sodium chloride (NS) flush 5-40 mL  5-40 mL IntraVENous PRN    
 lidocaine (PF) (XYLOCAINE) 10 mg/mL (1 %) injection 0.1 mL  0.1 mL SubCUTAneous PRN    
 0.9% sodium chloride infusion    CONTINUOUS 1,000 mL at 20 1336  albumin human 25% (BUMINATE) solution 25 g  25 g IntraVENous PRN    
 midodrine (PROAMATINE) tablet 5 mg  5 mg Oral TID WITH MEALS Stopped at 20 1200  
 0.9% sodium chloride infusion  50 mL/hr IntraVENous CONTINUOUS 50 mL/hr at 20 1634  piperacillin-tazobactam (ZOSYN) 3.375 g in 0.9% sodium chloride (MBP/ADV) 100 mL  3.375 g IntraVENous Q12H 3.375 g at 20 0826  
 sodium chloride (NS) flush 5-40 mL  5-40 mL IntraVENous Q8H Stopped at 20 0600  sodium chloride (NS) flush 5-40 mL  5-40 mL IntraVENous PRN    
 lidocaine (PF) (XYLOCAINE) 10 mg/mL (1 %) injection 0.1 mL  0.1 mL SubCUTAneous PRN    
 lactobac ac& pc-s.therm-b.anim (USMAN Q/RISAQUAD)  1 Cap Oral DAILY 1 Cap at 08/24/20 8126  loperamide (IMODIUM) capsule 2 mg  2 mg Oral Q4H PRN 2 mg at 08/18/20 0901  epoetin nata-epbx (RETACRIT) injection 10,000 Units  10,000 Units SubCUTAneous Q7D Stopped at 08/20/20 2100  acetaminophen (TYLENOL) tablet 650 mg  650 mg Oral Q6H  mg at 08/15/20 0950 Or  acetaminophen (TYLENOL) suppository 650 mg  650 mg Rectal Q6H PRN  polyethylene glycol (MIRALAX) packet 17 g  17 g Oral DAILY PRN  promethazine (PHENERGAN) tablet 12.5 mg  12.5 mg Oral Q6H PRN Or  
 ondansetron (ZOFRAN) injection 4 mg  4 mg IntraVENous Q6H PRN    
 insulin lispro (HUMALOG) injection   SubCUTAneous AC&HS 3 Units at 08/24/20 0824  
 glucose chewable tablet 16 g  4 Tab Oral PRN    
 dextrose (D50W) injection syrg 12.5-25 g  12.5-25 g IntraVENous PRN    
 glucagon (GLUCAGEN) injection 1 mg  1 mg IntraMUSCular PRN    
 insulin glargine (LANTUS) injection 20 Units  20 Units SubCUTAneous DAILY 20 Units at 08/24/20 0824  
 oxyCODONE-acetaminophen (PERCOCET) 5-325 mg per tablet 1 Tab  1 Tab Oral Q4H PRN 1 Tab at 08/24/20 0405  VANCOMYCIN INFORMATION NOTE   Other Rx Dosing/Monitoring Facility-Administered Medications Ordered in Other Encounters Medication Dose Route Frequency Last Dose  PHENYLephrine (NEOSYNEPHRINE) in NS syringe   IntraVENous  mcg at 08/24/20 1304  
 glycopyrrolate (ROBINUL) injection   IntraVENous PRN 0.6 mg at 08/24/20 1350  fentaNYL citrate (PF) injection   IntraVENous PRN 25 mcg at 08/24/20 1336  lidocaine (PF) (XYLOCAINE) 20 mg/mL (2 %) injection   IntraVENous PRN 60 mg at 08/24/20 1227  propofoL (DIPRIVAN) 10 mg/mL injection   IntraVENous  mg at 08/24/20 6873  rocuronium injection   IntraVENous PRN 20 mg at 08/24/20 1227  
 0.9% sodium chloride infusion   IntraVENous CONTINUOUS    
 ondansetron (ZOFRAN) injection   IntraVENous PRN 4 mg at 08/24/20 1244  PHENYLephrine (NEOSYNEPHRINE) 10 mg in 0.9% sodium chloride 250 mL infusion (premix or compounded)    CONTINUOUS 120 mcg/min at 08/24/20 1331  
 neostigmine (PROSTIGMINE) 1 mg/mL syringe   IntraVENous PRN 3 mg at 08/24/20 1350 Labs/Data: 
 
Lab Results Component Value Date/Time WBC 17.2 (H) 08/24/2020 03:22 AM  
 Hemoglobin (POC) 11.9 (L) 07/25/2017 10:12 AM  
 HGB 10.0 (L) 08/24/2020 03:22 AM  
 Hematocrit (POC) 33 (L) 08/24/2020 10:51 AM  
 HCT 32.1 (L) 08/24/2020 03:22 AM  
 PLATELET 106 (H) 91/33/8478 03:22 AM  
 MCV 81.1 08/24/2020 03:22 AM  
 
 
Lab Results Component Value Date/Time Sodium 127 (L) 08/24/2020 03:22 AM  
 Potassium 4.9 08/24/2020 03:22 AM  
 Chloride 90 (L) 08/24/2020 03:22 AM  
 CO2 25 08/24/2020 03:22 AM  
 Anion gap 12 08/24/2020 03:22 AM  
 Glucose 251 (H) 08/24/2020 03:22 AM  
 BUN 58 (H) 08/24/2020 03:22 AM  
 Creatinine 11.50 (H) 08/24/2020 03:22 AM  
 BUN/Creatinine ratio 5 (L) 08/24/2020 03:22 AM  
 GFR est AA 6 (L) 08/24/2020 03:22 AM  
 GFR est non-AA 5 (L) 08/24/2020 03:22 AM  
 Calcium 9.6 08/24/2020 03:22 AM  
 
 
Wt Readings from Last 3 Encounters:  
08/23/20 141.3 kg (311 lb 6.4 oz) 08/13/20 153.2 kg (337 lb 11.9 oz) 08/07/20 153.8 kg (339 lb 1.1 oz) Intake/Output Summary (Last 24 hours) at 8/24/2020 1406 Last data filed at 8/24/2020 1356 Gross per 24 hour Intake 0 ml Output 300 ml Net -300 ml Patient seen and examined. Chart reviewed. Labs, data and other pertinent notes reviewed in last 24 hrs. PMH/SH/FH reviewed and unchanged compared to H&P Discussed with pt/HD RN Cori Wei MD

## 2020-08-24 NOTE — PROGRESS NOTES
TRANSFER - OUT REPORT: 
 
Verbal report given to [facility]) on [place]  being transferred to pre-OP (unit) for ordered procedure Report consisted of patients Situation, Background, Assessment and  
Recommendations(SBAR). Information from the following report(s) SBAR, Kardex, Procedure Summary, Intake/Output, MAR, Recent Results and Cardiac Rhythm NSR was reviewed with the receiving nurse. Lines:  
Peripheral IV 08/14/20 Posterior;Right Forearm (Active) Site Assessment Clean, dry, & intact 08/24/20 4322 Phlebitis Assessment 0 08/24/20 0909 Infiltration Assessment 0 08/24/20 0909 Dressing Status Clean, dry, & intact 08/24/20 5471 Dressing Type Transparent;Tape 08/24/20 9888 Hub Color/Line Status Blue;Flushed;Capped 08/24/20 7925 Action Taken Open ports on tubing capped 08/19/20 0700 Alcohol Cap Used Yes 08/24/20 2547 Peripheral IV 08/17/20 Right Forearm (Active) Site Assessment Clean, dry, & intact 08/24/20 1618 Phlebitis Assessment 0 08/24/20 0909 Infiltration Assessment 0 08/24/20 0909 Dressing Status Clean, dry, & intact 08/24/20 1609 Dressing Type Transparent;Tape 08/24/20 5362 Hub Color/Line Status Green; Infusing 08/24/20 0909 Action Taken Open ports on tubing capped 08/19/20 0700 Alcohol Cap Used Yes 08/24/20 6981 Opportunity for questions and clarification was provided. Patient transported with: 
 Monitor Tech

## 2020-08-24 NOTE — ANESTHESIA PREPROCEDURE EVALUATION
Anesthetic History No history of anesthetic complications Review of Systems / Medical History Patient summary reviewed, nursing notes reviewed and pertinent labs reviewed Pulmonary Smoker Comments: Former smoker Neuro/Psych Within defined limits Cardiovascular Hypertension Hyperlipidemia Exercise tolerance: <4 METS Comments: TTE (5/2/17): Left ventricle: Systolic function was mildly reduced. Ejection fraction 
was estimated in the range of 45 % to 50 %. There were no regional wall 
motion abnormalities. Wall thickness was mildly to moderately increased. Pericardium: A small pericardial effusion was identified circumferential 
to the heart. GI/Hepatic/Renal 
  
 
 
Renal disease: ESRD and dialysis Endo/Other Diabetes: poorly controlled, type 2, using insulin Morbid obesity and anemia Comments: Hyponatremia  Other Findings Comments: Sepsis Right foot osteomyelitis s/p amputation of first and second toes (8/7/20), right leg arteriogram and tibial artery angioplasty (8/17/20), and I&D of right foot (8/21/20) Physical Exam 
 
Airway Mallampati: III 
TM Distance: 4 - 6 cm Neck ROM: normal range of motion Mouth opening: Normal 
 
 Cardiovascular Regular rate and rhythm,  S1 and S2 normal,  no murmur, click, rub, or gallop Dental 
 
 
Comments: Some broken teeth Pulmonary Breath sounds clear to auscultation Abdominal 
GI exam deferred Other Findings Anesthetic Plan ASA: 4 Anesthesia type: general 
 
Monitoring Plan: BIS Induction: Intravenous Anesthetic plan and risks discussed with: Patient

## 2020-08-24 NOTE — OP NOTES
[facility] 
OPERATIVE REPORT Name: Ronak Rhodes MR#: [id number] : 1985 DATE OF SERVICE:  20 PREOPERATIVE DIAGNOSIS:  Right foot diabetic infection POSTOPERATIVE DIAGNOSIS:  Right foot diabetic infection PROCEDURE PERFORMED: 
1. Right Below knee amputation SURGEON:  Carol Wadsworth MD. ANESTHESIA:  General. 
  
COMPLICATIONS:  None. SPECIMENS REMOVED:  Right leg IMPLANTS:  None. ESTIMATED BLOOD LOSS:  200 mL. INDICATIONS:  The patient is a 70-year-old male s/p toe amputation c/b wound dehiscence and extensive infection despite multiple debridements and revascularziation. Discussed with Dr. Selena Roland and limb not salvageable given extensive nature of foot infection. Discussed with patient regarding below knee amputation and he wished to proceed. PROCEDURE:  After appropriate informed consent obtained, the patient was taken to the operating room and placed in the supine position. General anesthesia induced. Once adequate anesthesia had been achieved, the right leg was prepped and draped in sterile fashion. A transverse incision made about the 10 cm distal to the tibial tuberosity. A long posterior flap was created. It was taken to the subcutaneous tissues with electrocautery. Please note that tourniquet had been inflated after exsanguination of the limb. Anterior compartment was divided. The anterior neurovascular bundle identified, clamped, and cut. The periosteum of the tibia elevated proximally along with the fibula. The tibia was then cut with the oscillating saw. It was beveled anteriorly and smoothed down with a rasp. The fibula was cut about 2cm proximal to this using a large bone cutter. The remaining posterior compartment was divided with the amputation knife. The peroneal bundle identified, clamped, and cut. The leg was then passed off of the field.  The vascular bundles were suture ligated with 2-0 prolene. The tourniquet was released. Good bleeding from the tissues and hemostasis obtained with electrocautery and suture ligation with 3-0 silk suture. Copious irrigation performed using antibiotic-impregnated solution. A gastroc soleus fascia brought up and attached to the anterior fascia and periosteum with 2-0 Vicryl in an interrupted fashion. The remaining fascia was closed with 2-0 Vicryl. Skin closed with skin staples. Xeroform gauze, 4 x 4, and a padded soft dressing applied. A knee immobilizer was placed. He was then awakened, extubated, and taken to recovery in stable condition. There were no immediate operative complications, and he tolerated the procedure well.

## 2020-08-24 NOTE — PERIOP NOTES
Surgicel Fibrillar Absorbable Hemostat 4in x 4in Reference number: 1964 Lot Number: [id number] Expiration Date: 2022/07/31 Irrisept Wound Debridement and Cleansing System  Ref: Harshil Palacio: [id number] LOT: [id number] Expiration Date: 2022/06/30

## 2020-08-24 NOTE — PERIOP NOTES
TRANSFER - OUT REPORT: 
 
Verbal report given to Jessica Hare RN (name) on Vick Parkinson  being transferred to 2207(unit) for routine post - op Report consisted of patients Situation, Background, Assessment and  
Recommendations(SBAR). Information from the following report(s) SBAR, Kardex, OR Summary, Intake/Output, MAR, Recent Results and Cardiac Rhythm NSR was reviewed with the receiving nurse. Opportunity for questions and clarification was provided. Patient transported with: 
 Monitor O2 @ 2 liters Registered Nurse Tech

## 2020-08-24 NOTE — PERIOP NOTES
TRANSFER - IN REPORT: 
 
Verbal report received from [facility] on Vanessa Vivar  being received from 2207 for ordered procedure Report consisted of patients Situation, Background, Assessment and  
Recommendations(SBAR). Information from the following report(s) SBAR, ED Summary, Procedure Summary, Intake/Output and MAR was reviewed with the receiving nurse. Opportunity for questions and clarification was provided. Assessment completed upon patients arrival to unit and care assumed.

## 2020-08-24 NOTE — PROGRESS NOTES
Bedside and Verbal shift change report given to [place] [id number] (oncoming nurse) by Naomi Jensen (offgoing nurse). Report included the following information SBAR, Kardex, Procedure Summary, Intake/Output, MAR, Recent Results and Cardiac Rhythm NSR.

## 2020-08-24 NOTE — PROGRESS NOTES
Hospitalist Progress Note NAME: Germain Chappell :  1985 MRN:  [id number] Assessment / Plan: 
Sepsis, POA  WBC 19K, tachycardia Right foot cellulitis with abscess, POA after amputation of right 1st and second toe for osteomyelitis  Hx osteomyelitis right 3rd and 4th toes 2020 and 10/2019 respectively Acute hypoxic respiratory failure 88% RA IDDM uncontrolled with hyperglycemia  ESRD on HD MWF Obesity 
  
--continue vancomycin and zosyn was transitioned to cefepime due to persistent fever.  Follow up blood cultures. (NGTD) -Wound cx Culture result: Abnormal             Preliminary HEAVY KLEBSIELLA PNEUMONIAE Culture result: Abnormal             Preliminary HEAVY ENTEROBACTER CLOACAE COMPLEX Culture result: Abnormal    LIGHT  
POSSIBLE  
3RD GRAM NEGATIVE TEODORA         Preliminary Culture result: Abnormal             Preliminary LIGHT PSEUDOMONAS AERUGINOSA SENSITIVITY TO FOLLOW Culture result: Abnormal             Preliminary FEW MIXED STAPHYLOCOCCUS SPECIES, COAGULASE NEGATIVE Culture result: Abnormal             Preliminary FEW MIXED DIPHTHEROIDS   
  
  
  
--podiatry consult apreciated, MRI right foot showed fluid collection and abscess with no osteomyelitis -s/p irrigation of wound  
  ESR elevated 116.  ID consult appreciated -- SARS-COV-2 negative,  
-supplemental O2, CXR clear and lungs clear on exam without pulmonary system.  Incentive spirometry.  Hypoxia is resolved after dalysis --continue lantus 20 units daily, add moderate SSI 
-- appreciated renal consult to continue with dialysis  
-Large amount of chocolate color purulent discharge expressed from Sugical wound on manual expression 
-s/p  RIGHT LEG ARTERIOGRAM ANTERIOR TIBIAL ARTERY AND ANGIOPLASTY 
-path report margin clear ID recommend  Vancomycin 750 mg after HD three times weekly  & Cefepime2/2/3 G regimen after HD  x 3 weeks end date .  Patient scheduled for excision and debridement of necrotic tissue right foot 08/21 BKA 08/24 Uncontrolled HTN: 
-Hold  amlodipine and Lisinopril 
-patient hypotensive today  
-check orthostatic vital  
-physical therapy  
  
Diarrhea: 
-Likely Antibiotic induced 
-Resolved -Imodium PRN 
-c/w probiotic 
  
Hyponatremia  
-motoring  
  
Chest pain  
-troponin negative  
- EKG show no acute abnormality 
-Echo show EF 45-50 % 
  Body mass index is 43.36 kg/m². 
  
Code: full DVT prophylaxis: heparin Surrogate decision maker:  Aunt Tegan Alex 
  
  
  
 
 
 
  
 
Subjective: Chief Complaint / Reason for Physician Visit Discussed with RN events overnight. Plan for BKA today Review of Systems: 
Symptom Y/N Comments  Symptom Y/N Comments Fever/Chills n   Chest Pain n   
Poor Appetite n   Edema n   
Cough n   Abdominal Pain Sputum n   Joint Pain y   
SOB/LITTLE n   Pruritis/Rash Nausea/vomit n   Tolerating PT/OT Diarrhea n   Tolerating Diet Constipation n   Other Could NOT obtain due to:   
 
Objective: VITALS:  
Last 24hrs VS reviewed since prior progress note. Most recent are: 
Patient Vitals for the past 24 hrs: 
 Temp Pulse Resp BP SpO2  
08/24/20 0820 98.2 °F (36.8 °C) 84 16 154/88 95 % 08/24/20 0800    154/88   
08/24/20 0336 98.5 °F (36.9 °C) 91 18 140/69 95 % 08/24/20 0050 98.8 °F (37.1 °C) 82 17 145/87 92 % 08/23/20 1857 98.2 °F (36.8 °C) 90 18 113/47 98 % 08/23/20 1536 98.2 °F (36.8 °C) 88 18 130/80 98 % 08/23/20 1403  92  107/61   
08/23/20 1207 98.2 °F (36.8 °C) 82 18 152/87 98 % 08/23/20 1200    153/87  Intake/Output Summary (Last 24 hours) at 8/24/2020 8952 Last data filed at 8/23/2020 8442 Gross per 24 hour Intake 440 ml Output  Net 440 ml PHYSICAL EXAM: 
General: WD, WN. Alert, cooperative, no acute distress   
EENT:  EOMI. Anicteric sclerae. MMM Resp:  CTA bilaterally, no wheezing or rales. No accessory muscle use CV:  Regular  rhythm,  No edema GI:  Soft, Non distended, Non tender.  +Bowel sounds Neurologic:  Alert and oriented X 3, normal speech, Psych:   Good insight. Not anxious nor agitated Skin:  No rashes. No jaundice Reviewed most current lab test results and cultures  YES Reviewed most current radiology test results   YES Review and summation of old records today    NO Reviewed patient's current orders and MAR    YES 
PMH/SH reviewed - no change compared to H&P 
________________________________________________________________________ Care Plan discussed with: 
  Comments Patient y Family RN y   
Care Manager Consultant  y Multidiciplinary team rounds were held today with , nursing, pharmacist and clinical coordinator. Patient's plan of care was discussed; medications were reviewed and discharge planning was addressed. ________________________________________________________________________ Total NON critical care TIME:35   Minutes Total CRITICAL CARE TIME Spent:   Minutes non procedure based Comments >50% of visit spent in counseling and coordination of care y   
________________________________________________________________________ Teodora Lozada MD  
 
Procedures: see electronic medical records for all procedures/Xrays and details which were not copied into this note but were reviewed prior to creation of Plan. LABS: 
I reviewed today's most current labs and imaging studies. Pertinent labs include: 
Recent Labs  
  08/24/20 
0322 08/23/20 
0322 WBC 17.2* 19.1* HGB 10.0* 10.3* HCT 32.1* 32.8* * 476* Recent Labs  
  08/24/20 
0322 08/23/20 
9393 * 128* K 4.9 4.8  
CL 90* 91* CO2 25 27 * 247* BUN 58* 44* CREA 11.50* 9.18* CA 9.6 9.4 Signed:  Teodora Lozada MD

## 2020-08-24 NOTE — PERIOP NOTES
Patient oxygen ranging between 88% - 94%. When patient sleeping oxygen level drops. Patient stated that he has been wearing oxygen at night. 2 liters oxygen via nasal cannula placed on patient. Oxygen level 98%. Patient has no complaints of SOB.

## 2020-08-24 NOTE — PROGRESS NOTES
Physical Therapy Screening: 
Services are indicated and therapy order is required. Patient is undergoing R BKA this date. [facility] for [facility] therapy based on results obtained during the nursing admission assessment. The patients chart was reviewed and the patient is appropriate for a skilled therapy evaluation. Please order a consult for physical therapy if you are in agreement and would like an evaluation to be completed. Thank you.  
 
Arsalan Feldman, PT, DPT

## 2020-08-24 NOTE — PROGRESS NOTES
Pharmacy Automatic Renal Dosing Protocol - Antimicrobials Indication for Antimicrobials: infected/gangrenous R foot s/p recent amputation of R 1st and 2nd toes  for osteomyelitis  I&D bone and necrotic tissue Current Regimen of Each Antimicrobial: 
Zosyn 3.375 g IV every 12 hours (Start Date ; Day 5) Vancomycin 1000 mg after each HD (Start Date ; Day # 12) Previous Antimicrobial Therapy: 
Zosyn 3.375 g IV every 12 hours (Start Date ; Day # 5) Metronidazole 500 mg q12h; started - Cefepime 1 g IV every 24 hours; Start Date - Vancomycin Goal Level: 20-25 mcg/ml Vancomycin Levels Date Dose & Interval Measured (mcg/mL)  AM 1 g IV after HD 15.7  AM 1250 mg after HD 17.8  
 AM 1250 mg after HD 27  
 1000 mg after HD 27 Date & time of next level:  Significant Cultures:  
 Blood: NGTD x 5 days- final 
: MRSA: not present- final 
 Wound: Heavy klebsiella, heavy enterobacter, light psedomonas, light diphtheroids, few CoNS- final 
 Foot: pseudomonas, and diphtheroids, enterobacter- final 
 Foot: Heavy klebsiella, moderate pseudomonas aeruginosa and stutzeri, and diphtheroids- final 
 Blood: NGTD x 5 days- final 
 wound: Light GNRs and light diphtheroids- pending Radiology: MRI- - 9 x 8 x 11 mm nonspecific soft tissue collection containing gas distal to the second metatarsal head. Paralysis, amputations, malnutrition: None significant Labs: 
Recent Labs  
  20 
0322 20 
2953 CREA 11.50* 9.18* BUN 58* 44* WBC 17.2* 19.1* Temp (24hrs), Av.4 °F (36.9 °C), Min:98.2 °F (36.8 °C), Max:98.8 °F (37.1 °C) Creatinine Clearance (mL/min) or Dialysis: HD MWF Impression/Plan:  
Will change to 750 mg IV after HD. Will continue Zosyn as appropriate for indication and renal function. Duration: pending Pharmacy will follow daily and adjust medications as appropriate for renal function and/or serum levels. Thank you, Jhonatan Zaragoza, ALEXANDERD

## 2020-08-24 NOTE — PROGRESS NOTES
TEJA: Home with Home Health and Follow-up Appointments 4:15pm-CM met with pt to discuss d/c plan. No new needs at this time. CM will continue to follow pt for d/c planning needs. Bryanna Leo [address] 
[phone]

## 2020-08-24 NOTE — PROGRESS NOTES
Bedside shift change report given to Karyn Campbell (oncoming nurse) by Keshai Snell (offgoing nurse). Report included the following information SBAR, Kardex, Procedure Summary, Intake/Output, MAR, Recent Results and Cardiac Rhythm NSR.

## 2020-08-24 NOTE — ANESTHESIA POSTPROCEDURE EVALUATION
Procedure(s): RIGHT BELOW KNEE AMPUTATION. general 
 
Anesthesia Post Evaluation Patient location during evaluation: PACU Note status: Adequate. Level of consciousness: responsive to verbal stimuli and sleepy but conscious Pain management: satisfactory to patient Airway patency: patent Anesthetic complications: no 
Cardiovascular status: acceptable Respiratory status: acceptable Hydration status: acceptable Comments: +Post-Anesthesia Evaluation and Assessment Patient: Jean Carlos Singh MRN: [id number]  SSN: [id number] YOB: 1985  Age: 38 y.o. Sex: male Cardiovascular Function/Vital Signs BP 96/44 (BP 1 Location: Right leg, BP Patient Position: At rest)   Pulse 83   Temp 36.6 °C (97.8 °F)   Resp 18   Ht 6' 2\" (1.88 m)   Wt 141.3 kg (311 lb 6.4 oz)   SpO2 98%   BMI 39.98 kg/m² Patient is status post Procedure(s): RIGHT BELOW KNEE AMPUTATION. Nausea/Vomiting: Controlled. Postoperative hydration reviewed and adequate. Pain: 
Pain Scale 1: Numeric (0 - 10) (08/24/20 1520) Pain Intensity 1: 2 (08/24/20 1520) Managed. Neurological Status:  
Neuro (WDL): Exceptions to WDL (08/24/20 1420) At baseline. Mental Status and Level of Consciousness: Arousable. Pulmonary Status:  
O2 Device: Nasal cannula (08/24/20 1520) Adequate oxygenation and airway patent. Complications related to anesthesia: None Post-anesthesia assessment completed. No concerns. Signed By: Genet Mcdonald MD  
 8/24/2020 Post anesthesia nausea and vomiting:  controlled INITIAL Post-op Vital signs:  
Vitals Value Taken Time BP 96/44 8/24/2020  3:20 PM  
Temp 36.6 °C (97.8 °F) 8/24/2020  3:20 PM  
Pulse 84 8/24/2020  3:27 PM  
Resp 15 8/24/2020  3:27 PM  
SpO2 98 % 8/24/2020  3:27 PM  
Vitals shown include unvalidated device data.

## 2020-08-24 NOTE — PERIOP NOTES
Handoff Report from Operating Room to PACU Report received from [facility] [employer] [facility]  and [facility] [place] CRNA regarding Jason Lange. Surgeon(s): 
Bernardino Bergman MD  And Procedure(s) (LRB): 
RIGHT BELOW KNEE AMPUTATION (Right)  confirmed  
with dressings discussed. Anesthesia type, drugs, patient history, complications, estimated blood loss, vital signs, intake and output, and last pain medication were reviewed.

## 2020-08-24 NOTE — BRIEF OP NOTE
Brief Postoperative Note Patient: Cami Thorpe YOB: 1985 MRN: [id number] Date of Procedure: 8/24/2020 Pre-Op Diagnosis: OSTEOMYELITIS Post-Op Diagnosis: Same as preoperative diagnosis. Procedure(s): RIGHT BELOW KNEE AMPUTATION Surgeon(s): 
Wale Alamo MD 
 
Surgical Assistant: None Anesthesia: General  
 
Estimated Blood Loss (mL): 200 Complications: None Specimens:  
ID Type Source Tests Collected by Time Destination 1 : Right below the knee amputation Fresh Leg, Right  [place], Rasheeda Pope MD 8/24/2020 1303 Pathology Implants: * No implants in log * Drains: * No LDAs found * Findings: viable tissue Electronically Signed by Jerald South MD on 8/24/2020 at 1:58 PM

## 2020-08-25 NOTE — PROGRESS NOTES
Pharmacy consult Pharmacy requested to review patients medications for possible causes of ototoxicity and loss of hearing. After assessing patients medications, two possible causes of hearing loss were determined, the first being long term (>14 days, patient was on day 13 of treatment) vancomycin use.  
ClubMonetize.fr The second potential cause is general anesthesia, literature has found that between 3% and 92% of patients had some form of hearing loss after spinal or general anesthesia. Other literature describes perioperative hearing impairment is an underreported phenomenon that we don't understand the mechanism of. Https://anesthesiology. pubs. asahq.org/Article. aspx?nnldklszd=0302296#:~:text=Perioperative%20hearing%20impairment%20is%20often%20subclinical%20and%20may,reported%20following%20virtually%20every%20type%20of%20anesthetic%20technique. https://journals.lww.com/anesthesia-analgesia/Fulltext/2000/97380/Hearing_Loss_after_Spinal_and_General_Anesthesia_. 32.aspx As of now we can consider the discontinuation of vancomycin therapy as no MRSA + cultures have been found, and continue to monitor the situation. Thanks, 
Rosalva Herbert PharmD Candidate [id number]

## 2020-08-25 NOTE — PROGRESS NOTES
Vascular Surgery Progress Note Araceli Andre ACNP-BC 
8/25/2020 Subjective:  
 
Gonzalo Marks is a very pleasant 35 y.o.  male with a pmhx significant for ESRD on HD, DM, HTN, HLD, and obesity. Patient is legally blind and he has hearing loss. He communicates using his cell phone. [facility] is admitted to the hospital w/ sepsis secondary to cellulitis of the right leg. He is s/p amputation of the right great toe and second toe on 08/07/2020. He has a previous hx of amputation of the right third and fourth toes for osteomyelitis. He hospital course has been complicated by acute hypoxic respiratory failure. We were asked to evaluate for PAD earlier in his admission and he is s/p right LEXI BAP on 08/17/2020. He then underwent excisional debridement of the bone and necrotic tissue with Dr. Vik Lane on 8/21/2020. Despite revascularization his foot was not salvageable and his is s/p right BKA on 08/24/2020. This am he is in pain. His stump is dry and intact. Nursing Data:  
 
Patient Vitals for the past 24 hrs: 
 BP Temp Pulse Resp SpO2  
08/25/20 0800 110/68 98.9 °F (37.2 °C) 99 18 96 % 08/25/20 0430 107/67 98.3 °F (36.8 °C) 88 18 98 % 08/25/20 0415 111/65  85 18 97 % 08/25/20 0400 102/63  87 18 98 % 08/25/20 0346 108/73  83 18 96 % 08/25/20 0331 98/67  82 18 97 % 08/25/20 0315 107/66  83 18 95 % 08/25/20 0300 110/68  81 18 98 % 08/25/20 0245 112/70  80 18 97 % 08/25/20 0230 121/75  81 18 97 % 08/25/20 0215 113/71  80 18 97 % 08/25/20 0200 121/76  83 18 97 % 08/25/20 0145 126/84  98 18 95 % 08/25/20 0129 107/67  79 18 98 % 08/25/20 0115 111/69  78 18 98 % 08/25/20 0100 108/64  78 18 98 % 08/25/20 0045 106/61  79 18 97 % 08/25/20 0033 101/64  78 18 98 % 08/25/20 0015 100/69 98.5 °F (36.9 °C) 79 18 98 % 08/24/20 2326 131/90 98.6 °F (37 °C) 84  98 % 08/24/20 2000 107/74  86   08/24/20 1749 117/69 97.8 °F (36.6 °C) 81 15 95 % 08/24/20 1649 107/66 97.6 °F (36.4 °C) 81 16 97 % 08/24/20 1600 104/61 97.3 °F (36.3 °C) 84 17 98 % 08/24/20 1540 100/47  82 17 99 % 08/24/20 1530 99/51  83 16 98 % 08/24/20 1527  98 °F (36.7 °C)     
08/24/20 1520 96/44 97.8 °F (36.6 °C) 83 18 98 % 08/24/20 1510 94/42  85 17 96 % 08/24/20 1500 96/49  86 16 99 % 08/24/20 1450 91/51  84 15 100 % 08/24/20 1440 (!) 88/41  85 17 99 % 08/24/20 1435 (!) 86/51  85 12 100 % 08/24/20 1430 (!) 86/41  84 15 97 % 08/24/20 1425 (!) 87/40  84 26   
08/24/20 1420 100/47 99.1 °F (37.3 °C) 88 12 100 % 08/24/20 1111     98 % 08/24/20 1029 131/87 98.7 °F (37.1 °C) 81 12 94 %  
 
--------------------------------------------------------------------------------------------------------- Intake/Output Summary (Last 24 hours) at 8/25/2020 7851 Last data filed at 8/25/2020 0430 Gross per 24 hour Intake 1140 ml Output 3800 ml Net -2660 ml Exam:  
 
Physical Exam 
Constitutional:   
   Appearance: He is obese. HENT:  
   Head: Normocephalic. Nose: Nose normal.  
   Mouth/Throat:  
   Mouth: Mucous membranes are moist.  
Eyes:  
   Pupils: Pupils are equal, round, and reactive to light. Neck: Musculoskeletal: Normal range of motion. Cardiovascular:  
   Rate and Rhythm: Normal rate and regular rhythm. Pulses: Normal pulses. Heart sounds: Normal heart sounds. Left foot is warm w/o palpable DP pulse. Pulmonary:  
   Effort: Pulmonary effort is normal.  
   Breath sounds: Normal breath sounds. Abdominal:  
   General: Abdomen is flat. Palpations: Abdomen is soft. Musculoskeletal: Normal range of motion. Skin: 
   General: Skin is warm. Comments: Bulky dressing to the right stump. Neurological:  
   General: No focal deficit present. Mental Status: He is alert. Mental status is at baseline.   
Psychiatric:     
 Mood and Affect: Mood normal.     
   Behavior: Behavior normal.  
 
Lab Review:  
 
. Recent Results (from the past 24 hour(s)) GLUCOSE, POC Collection Time: 08/24/20 10:43 AM  
Result Value Ref Range Glucose (POC) 212 (H) 65 - 100 mg/dL Performed by Abdifatah Luis POC CHEM8 Collection Time: 08/24/20 10:51 AM  
Result Value Ref Range Calcium, ionized (POC) 1.09 (L) 1.12 - 1.32 mmol/L Sodium (POC) 130 (L) 136 - 145 mmol/L Potassium (POC) 5.0 3.5 - 5.1 mmol/L Chloride (POC) 95 (L) 98 - 107 mmol/L  
 CO2 (POC) 24 21 - 32 mmol/L Anion gap (POC) 18 10 - 20 mmol/L Glucose (POC) 213 (H) 65 - 100 mg/dL BUN (POC) 54 (H) 9 - 20 mg/dL Creatinine (POC) 12.8 (H) 0.6 - 1.3 mg/dL GFRAA, POC 5 (L) >60 ml/min/1.73m2 GFRNA, POC 4 (L) >60 ml/min/1.73m2 Hematocrit (POC) 33 (L) 36.6 - 50.3 % Comment Comment Not Indicated. GLUCOSE, POC Collection Time: 08/24/20  2:29 PM  
Result Value Ref Range Glucose (POC) 186 (H) 65 - 100 mg/dL Performed by Juliet Ackerman* GLUCOSE, POC Collection Time: 08/24/20  4:19 PM  
Result Value Ref Range Glucose (POC) 213 (H) 65 - 100 mg/dL Performed by Welton Councilman GLUCOSE, POC Collection Time: 08/24/20  8:36 PM  
Result Value Ref Range Glucose (POC) 209 (H) 65 - 100 mg/dL Performed by Aria Dyer PCT   
CBC WITH AUTOMATED DIFF Collection Time: 08/25/20  5:47 AM  
Result Value Ref Range WBC 18.9 (H) 4.1 - 11.1 K/uL  
 RBC 3.86 (L) 4.10 - 5.70 M/uL HGB 9.9 (L) 12.1 - 17.0 g/dL HCT 32.4 (L) 36.6 - 50.3 % MCV 83.9 80.0 - 99.0 FL  
 MCH 25.6 (L) 26.0 - 34.0 PG  
 MCHC 30.6 30.0 - 36.5 g/dL  
 RDW 15.6 (H) 11.5 - 14.5 % PLATELET 318 (H) 219 - 400 K/uL MPV 9.9 8.9 - 12.9 FL  
 NRBC 0.0 0  WBC ABSOLUTE NRBC 0.00 0.00 - 0.01 K/uL NEUTROPHILS 78 (H) 32 - 75 % LYMPHOCYTES 6 (L) 12 - 49 % MONOCYTES 8 5 - 13 % EOSINOPHILS 1 0 - 7 % BASOPHILS 1 0 - 1 % IMMATURE GRANULOCYTES 6 (H) 0.0 - 0.5 % ABS. NEUTROPHILS 14.8 (H) 1.8 - 8.0 K/UL  
 ABS. LYMPHOCYTES 1.1 0.8 - 3.5 K/UL  
 ABS. MONOCYTES 1.5 (H) 0.0 - 1.0 K/UL  
 ABS. EOSINOPHILS 0.2 0.0 - 0.4 K/UL  
 ABS. BASOPHILS 0.2 (H) 0.0 - 0.1 K/UL  
 ABS. IMM. GRANS. 1.1 (H) 0.00 - 0.04 K/UL  
 DF SMEAR SCANNED    
 RBC COMMENTS ANISOCYTOSIS 1+ 
    
 RBC COMMENTS MACROCYTOSIS 1+ 
    
 RBC COMMENTS MICROCYTOSIS 
1+ METABOLIC PANEL, BASIC Collection Time: 08/25/20  5:47 AM  
Result Value Ref Range Sodium 130 (L) 136 - 145 mmol/L Potassium 5.2 (H) 3.5 - 5.1 mmol/L Chloride 94 (L) 97 - 108 mmol/L  
 CO2 25 21 - 32 mmol/L Anion gap 11 5 - 15 mmol/L Glucose 163 (H) 65 - 100 mg/dL BUN 35 (H) 6 - 20 MG/DL Creatinine 7.71 (H) 0.70 - 1.30 MG/DL  
 BUN/Creatinine ratio 5 (L) 12 - 20 GFR est AA 10 (L) >60 ml/min/1.73m2 GFR est non-AA 8 (L) >60 ml/min/1.73m2 Calcium 9.6 8.5 - 10.1 MG/DL  
GLUCOSE, POC Collection Time: 08/25/20  8:13 AM  
Result Value Ref Range Glucose (POC) 203 (H) 65 - 100 mg/dL Performed by Norma Nunez Assessment/Plan: PAD w/ non-healing diabetic wound of the right foot complicated by gas gangrene  
-s/p right leg LEXI BAP 08/17/2020 
-s/p excisional debridement of the bone and necrotic tissue 08/21/2020 
-s/p right BKA 08/24/2020 Stump is dry and intact this am.  H&H with expected drop and not at a level to transfuse. Modify pain regimen for better control. OOB w/ PT/OT today. Encourage IS. Resume DVT prophylaxis in the am.  Float left heel w/ suspension boot. Air mattress. OOB daily with [facility] lift. Sepsis  
-persistent leukocytosis unchanged overngiht. Continue to follow 
-blood cx NGTD  
-wound cx polymicrobial: Kelbisella pneumoniae and pseudomonas aeruginosa/stutzeri Antibxs and volume management per primary team.   
  
Hypotension with a hx of HTN  
-BP stable this am on midodrine Hyperlipidemia ESRD 
-HD MWF 
 Hyponatremia Hyperkalemia Acute blood loss anemia with underlying anemia of chronic renal disease  
-not at a level to transfuse  
-Retacrit per nephrology Plan per nephrology 
  
Uncontrolled Diabetes Mellitus w/ hyperglycemia  
-HA1c 9.4 Morbid obesity Management of comorbid conditions by primary team. 
  
VTE Prophylaxis: 
UH-resume in am  
  
Disposition: 
Inpatient rehabilitation: case management consult.

## 2020-08-25 NOTE — PROGRESS NOTES
Problem: Mobility Impaired (Adult and Pediatric) Goal: *Acute Goals and Plan of Care (Insert Text) Description: FUNCTIONAL STATUS PRIOR TO ADMISSION: At baseline pt ambulated with cane. Lives alone and takes [employer] to HD appointments. HOME SUPPORT PRIOR TO ADMISSION: Patient lived alone. Physical Therapy Goals Initiated 8/25/2020 1. Patient will move from supine to sit and sit to supine , scoot up and down, and roll side to side in bed with minimal assistance/contact guard assist within 7 day(s). 2.  Patient will transfer from bed to chair and chair to bed with moderate assistance  using the least restrictive device within 7 day(s). 3.  Patient will perform sit to stand with moderate assistance  within 7 day(s). 4.  Patient will ambulate with maximal assistance for 10 feet with the least restrictive device within 7 day(s). Outcome: Progressing Towards Goal 
 PHYSICAL THERAPY EVALUATION Patient: Doug Gonzalez (73 y.o. male) Date: 8/25/2020 Primary Diagnosis: Sepsis ([facility] [address] [A41.9] Cellulitis of right foot [H90.929] ESRD (end stage renal disease) ([facility] [address] [N18.6] Acute respiratory failure with hypoxia and hypercapnia (HCC) [J96.01, J96.02] Procedure(s) (LRB): 
RIGHT BELOW KNEE AMPUTATION (Right) 1 Day Post-Op Precautions:   Fall, NWB(legally blind, [place]) ASSESSMENT Based on the objective data described below, the patient presents with confusion, impaired communication ability, poor sitting balance, global weakness and functional mobility well below baseline following R BKA POD#1. Pt is legally blind and per nursing as of this morning has lost his hearing. Communicating by writing with large font on paper and pt slowly reading. Questionable comprehension. Pt required largely Mod-Max A x 2 to perform bed mobility d/t several LOB. Poor core strength with frequent rest breaks taken.  Pt declined standing attempt today but did sit EOB ~8 minutes prior to requesting to stop for today. Pt will require rehab placement prior to discharge home. Current Level of Function Impacting Discharge (mobility/balance): lethargy, difficulty with communication, Max A x 2 to return to supine Functional Outcome Measure: The patient scored 0/28 on the Tinetti outcome measure which is indicative of high fall risk. Other factors to consider for discharge: HD, obesity, blind, [facility] Patient will benefit from skilled therapy intervention to address the above noted impairments. PLAN : 
Recommendations and Planned Interventions: bed mobility training, transfer training, gait training, therapeutic exercises, neuromuscular re-education, patient and family training/education, and therapeutic activities Frequency/Duration: Patient will be followed by physical therapy:  5 times a week to address goals. Recommendation for discharge: (in order for the patient to meet his/her long term goals) Therapy 3 hours per day 5-7 days per week This discharge recommendation: 
Has been made in collaboration with the attending provider and/or case management IF patient discharges home will need the following DME: hospital bed, mechanical lift, wheelchair, and family assistance SUBJECTIVE:  
Patient stated Do we have to do it today? Antione Evangelista OBJECTIVE DATA SUMMARY:  
HISTORY:   
Past Medical History:  
Diagnosis Date Cataracts Chronic kidney disease Diabetes ([facility] [address] Hypercholesterolemia Hypertension Kidney failure Obesity Past Surgical History:  
Procedure Laterality Date COLONOSCOPY N/A 12/8/2017 COLONOSCOPY performed by Kirtland Hashimoto, MD at [address]  12/8/2017 HX AMPUTATION Left great toe and L 5th toe HX AMPUTATION TOE Right UPPER GI ENDOSCOPY,BIOPSY  12/8/2017  VASCULAR SURGERY PROCEDURE UNLIST    
 R side chest  
 VASCULAR SURGERY PROCEDURE UNLIST Left 07/25/2017 Creation transposed AV fistula arm Personal factors and/or comorbidities impacting plan of care: HD, diabetes, obesity, cataracts Home Situation Home Environment: Apartment # Steps to Enter: (uses elevator for 3rd floor apartment) One/Two Story Residence: One story Living Alone: Yes Support Systems: Family member(s) Patient Expects to be Discharged to[de-identified] Rehabilitation facility Current DME Used/Available at Home: Cane, straight EXAMINATION/PRESENTATION/DECISION MAKING:  
Critical Behavior: 
Neurologic State: Alert Orientation Level: Oriented X4 Cognition: Follows commands Hearing: Auditory Auditory Impairment: None Hearing Aids/Status: Does not own Range Of Motion: 
AROM: Generally decreased, functional 
  
  
  
PROM: Generally decreased, functional 
  
  
  
Strength:   
Strength: Generally decreased, functional 
  
  
  
  
  
  
Tone & Sensation:  
  
  
  
  
  
  
  
  
  
   
Coordination: 
Coordination: Generally decreased, functional(pt can not following commands for testing) Vision:  
  
Functional Mobility: 
Bed Mobility: 
Rolling: Maximum assistance Supine to Sit: Maximum assistance Sit to Supine: Maximum assistance;Assist x2 Scooting: Moderate assistance Transfers: 
  
  
     
  
     
  
  
Balance:  
Sitting: Impaired Sitting - Static: Fair (occasional) Sitting - Dynamic: Poor (constant support) Ambulation/Gait Training: 
  
  
  
  
  
  
Right Side Weight Bearing: Non-weight bearing Functional Measure: 
Tinetti test: 
 
Sitting Balance: 0 Arises: 0 Attempts to Rise: 0 Immediate Standing Balance: 0 Standing Balance: 0 Nudged: 0 Eyes Closed: 0 Turn 360 Degrees - Continuous/Discontinuous: 0 Turn 360 Degrees - Steady/Unsteady: 0 Sitting Down: 0 Balance Score: 0 Balance total score Indication of Gait: 0 
R Step Length/Height: 0 
L Step Length/Height: 0 
R Foot Clearance: 0 L Foot Clearance: 0 Step Symmetry: 0 Step Continuity: 0 Path: 0 Trunk: 0 Walking Time: 0 Gait Score: 0 Gait total score Total Score: 0/28 Overall total score Tinetti Tool Score Risk of Falls 
<19 = High Fall Risk 19-24 = Moderate Fall Risk 25-28 = Low Fall Risk Tinetti ME. Performance-Oriented Assessment of Mobility Problems in Elderly Patients. [facility] 66; [id number]. (Scoring Description: PT Bulletin Feb. 10, 1993) Older adults: Jaden Catherine et al, 2009; n = [address] [employer] elderly evaluated with ABC, OG, ADL, and IADL) · Mean OG score for males aged 69-68 years = 26.21(3.40) · Mean OG score for females age 69-68 years = 25.16(4.30) · Mean OG score for males over 80 years = 23.29(6.02) · Mean OG score for females over 80 years = 17.20(8.32) Physical Therapy Evaluation Charge Determination History Examination Presentation Decision-Making MEDIUM  Complexity : 1-2 comorbidities / personal factors will impact the outcome/ POC  MEDIUM Complexity : 3 Standardized tests and measures addressing body structure, function, activity limitation and / or participation in recreation  LOW Complexity : Stable, uncomplicated  Other outcome measures Tinetti  LOW Based on the above components, the patient evaluation is determined to be of the following complexity level: LOW Activity Tolerance:  
Poor, requires rest breaks, and observed SOB with activity Please refer to the flowsheet for vital signs taken during this treatment. After treatment patient left in no apparent distress:  
Supine in bed, Heels elevated for pressure relief, Call bell within reach, and Side rails x 3 
 
COMMUNICATION/EDUCATION:  
The patients plan of care was discussed with: Registered nurse.   
 
Fall prevention education was provided and the patient/caregiver indicated understanding., Patient/family have participated as able in goal setting and plan of care. , and Patient/family agree to work toward stated goals and plan of care. Thank you for this referral. 
Rae Yin, PT Time Calculation: 23 mins

## 2020-08-25 NOTE — PROGRESS NOTES
Spoke to PT who just saw patient. He is very fatigued and was not able to do much with PT. Recommended OT try back later based on this performance. Will follow.  
 
Benson Fierro MS, OTR/L, CSRS

## 2020-08-25 NOTE — PROGRESS NOTES
Problem: Pressure Injury - Risk of 
Goal: *Prevention of pressure injury Description: Document Ranjeet Scale and appropriate interventions in the flowsheet. Outcome: Progressing Towards Goal 
Note: Pressure Injury Interventions: 
Sensory Interventions: Assess changes in LOC, Assess need for specialty bed, Avoid rigorous massage over bony prominences, Chair cushion, Check visual cues for pain, Discuss PT/OT consult with provider, Float heels, Keep linens dry and wrinkle-free, Maintain/enhance activity level, Monitor skin under medical devices Moisture Interventions: Apply protective barrier, creams and emollients, Assess need for specialty bed, Check for incontinence Q2 hours and as needed Activity Interventions: Assess need for specialty bed, Pressure redistribution bed/mattress(bed type), Increase time out of bed, PT/OT evaluation Mobility Interventions: Assess need for specialty bed, Chair cushion, Float heels, HOB 30 degrees or less, Pressure redistribution bed/mattress (bed type), PT/OT evaluation Nutrition Interventions: Document food/fluid/supplement intake, Discuss nutritional consult with provider, Offer support with meals,snacks and hydration Friction and Shear Interventions: Apply protective barrier, creams and emollients, Lift sheet, Minimize layers

## 2020-08-25 NOTE — PROGRESS NOTES
Infectious Disease Progress Note IMPRESSION:  
 
- Sepsis - Diabetic R/ foot infection with gangrene S/p R/ BKA  
- Reported hearing loss,pt answering questions appropeiately WC- - polymicrobial 
  K.pneumoniae, Pseudomonas aeruginosa, , Pseudomonas stutzerii E.cloacae complex, Diphtheroids, mixed skin matt. Anaerobic - NG 
  WC- - polymicrobial 
  Staph species, coagulase negative , K.pneumoniae, Pseudomonas( 2 colony types) E.cloacae complex, Diphtheroids BC- NG 
- S/p amputation of R/ 1st & 2nd toes on  WC - Klebsiella pneumoniae( 2 colony types) , Staph epidermis, Diphtheroids - H/o OM of R 3rd, 4th toes Amputation of 3rd toe - 20, 4th toe- 10/11/19 
- H/o MRSA infection  
- Poorly controlled Diabetes A1c 9.4 
 - PAD S/p RLE arteriogram, angioplasty on  
- ESRD on HD 
- Poorly controlled HTN improved - ESR - 116 PLAN:  
  
 
 
- Continue Cefepime IV, Flagylx 5 days, s/p DC of Vancomycin - BC x 2 if temps >100.5 - Blood sugar control- D/w pt - Monitor for hearing issues- Pharmacy review of meds Subjective:  
 
Pt seen. Answering questions. No evidence of hearing loss D/w RN events of day Review of Systems:  A comprehensive review of systems was negative except for that written in the History of Present Illness. 10 point ROS obtained . All other systems negative . Objective:  
 
Blood pressure 103/68, pulse 94, temperature 99.6 °F (37.6 °C), resp. rate 18, height 6' 2\" (1.88 m), weight 308 lb 6.8 oz (139.9 kg), SpO2 96 %. Temp (24hrs), Av.6 °F (37 °C), Min:97.8 °F (36.6 °C), Max:99.6 °F (37.6 °C) Patient Vitals for the past 24 hrs: 
 Temp Pulse Resp BP SpO2  
20 1526 99.6 °F (37.6 °C) 94 18 103/68 96 % 20 1214 98.8 °F (37.1 °C) 98 18 104/60 92 % 20 0800 98.9 °F (37.2 °C) 99 18 110/68 96 % 20 0430 98.3 °F (36.8 °C) 88 18 107/67 98 % 20 0415  85 18 111/65 97 % 08/25/20 0400  87 18 102/63 98 % 08/25/20 0346  83 18 108/73 96 % 08/25/20 0331  82 18 98/67 97 % 08/25/20 0315  83 18 107/66 95 % 08/25/20 0300  81 18 110/68 98 % 08/25/20 0245  80 18 112/70 97 % 08/25/20 0230  81 18 121/75 97 % 08/25/20 0215  80 18 113/71 97 % 08/25/20 0200  83 18 121/76 97 % 08/25/20 0145  98 18 126/84 95 % 08/25/20 0129  79 18 107/67 98 % 08/25/20 0115  78 18 111/69 98 % 08/25/20 0100  78 18 108/64 98 % 08/25/20 0045  79 18 106/61 97 % 08/25/20 0033  78 18 101/64 98 % 08/25/20 0015 98.5 °F (36.9 °C) 79 18 100/69 98 % 08/24/20 2326 98.6 °F (37 °C) 84  131/90 98 % 08/24/20 2000  86  107/74   
08/24/20 1749 97.8 °F (36.6 °C) 81 15 117/69 95 % Lines:  Peripheral IV:    
 
Physical Exam:  
General:  Awake, cooperative, Eyes:  Sclera anicteric. Pupils equally round and reactive to light. Mouth/Throat: Mucous membranes normal, oral pharynx clear Neck: Supple Lungs:    Reduced auscultation bases CV:  Regular rate and rhythm,no murmur, click, rub or gallop Abdomen:   Soft, non-tender. bowel sounds normal. non-distended Extremities: No  edema Skin: Skin color, texture, turgor normal. no acute rash or lesions Lymph nodes: Cervical and supraclavicular normal  
Musculoskeletal: No swelling or deformity, R/BKA Lines/Devices:  Intact, no erythema, drainage or tenderness Psych: Awake and oriented, depressed mood affect Data Reviewed: CBC:  
Recent Labs  
  08/25/20 
0547 08/24/20 
0322 08/23/20 
0322 WBC 18.9* 17.2* 19.1*  
RBC 3.86* 3.96* 4.06* HGB 9.9* 10.0* 10.3* HCT 32.4* 32.1* 32.8* * 467* 476* GRANS 78* 83* 76* LYMPH 6* 7* 7* EOS 1 0 2 CMP:  
Recent Labs  
  08/25/20 
0547 08/24/20 
0322 08/23/20 
8962 * 251* 247* * 127* 128*  
K 5.2* 4.9 4.8  
CL 94* 90* 91* CO2 25 25 27 BUN 35* 58* 44* CREA 7.71* 11.50* 9.18* CA 9.6 9.6 9.4 AGAP 11 12 10 BUCR 5* 5* 5*  
 
 
 Studies:     
Lab Results Component Value Date/Time Culture result: LIGHT KLEBSIELLA PNEUMONIAE (A) 08/21/2020 11:50 AM  
 Culture result: LIGHT ENTEROBACTER CLOACAE COMPLEX (A) 08/21/2020 11:50 AM  
 Culture result: LIGHT DIPHTHEROIDS (A) 08/21/2020 11:50 AM  
 Culture result: LIGHT PSEUDOMONAS AERUGINOSA (A) 08/21/2020 11:50 AM  
 Culture result: NO ANAEROBES ISOLATED 08/21/2020 11:50 AM  
  
 
XR Results (most recent): 
Results from Hospital Encounter encounter on 08/12/20 XR CHEST PORT Narrative EXAM: Portable CXR. 15 hours. INDICATION: chest pain FINDINGS: 
The lungs appear clear. Heart is normal in size. There is no pulmonary edema. There is no evident pneumothorax, adenopathy or pleural effusion. No significant 
change since 8/12/2020. Impression IMPRESSION: No Acute Disease. Patient Active Problem List  
Diagnosis Code  
 HTN (hypertension) I10  
 Postoperative hypoxia R09.02, Z98.890  
 Diarrhea R19.7  ESRD (end stage renal disease) on dialysis ([facility]) N18.6, Z99.2  Hyperlipidemia associated with type 2 diabetes mellitus ([facility]) E11.69, E78.5  Morbid obesity with body mass index (BMI) of 40.0 to 49.9 ([facility]) E66.01  
 Uncontrolled type 2 diabetes mellitus with proliferative retinopathy, with long-term current use of insulin ([address] E11.3599, E11.65, Z79.4  
 Uncontrolled type 2 diabetes mellitus with hyperglycemia, with long-term current use of insulin ([facility]) E11.65, Z79.4  
 Uncontrolled hypertension I10  
 Acute osteomyelitis of toe of right foot ([address] M86.171  
 Osteomyelitis ([address] M86.9  Foot ulcer ([address] L97.509  ESRD (end stage renal disease) ([address] N18.6  Wound cellulitis L03.90  
 Osteomyelitis of second toe of right foot ([address] M86.9  Sepsis ([address] A41.9  Cellulitis of right foot L03.115  
 Acute respiratory failure with hypoxia and hypercapnia ([facility]) J96.01, J96.02  
 
 
  ICD-10-CM ICD-9-CM 1. Cellulitis, wound, post-operative  T81.49XA 998.59   
2. Acute osteomyelitis of toe of right foot ([facility]  M86.171 730.07   
3. Acute respiratory failure with hypoxia and hypercapnia ([facility])  J96.01 518.81   
 J96.02    
4. Cellulitis of right foot  L03.115 682.7 5. Diarrhea of infectious origin  A09 009.2 6. Osteomyelitis of foot, right, acute ([place] [address]  M86.171 730.07   
7. Pseudomonas infection  A49.8 041.7 8. Klebsiella infection  A49.8 041.85   
9. Infection caused by Enterobacter cloacae  A49.8 041.85   
10. ESRD (end stage renal disease) ([facility]  N18.6 585.6 11. Poorly controlled diabetes mellitus ([place] [address]  E11.65 250.00   
12. ESRD (end stage renal disease) on dialysis ([facility])  N18.6 585.6 Z99.2 V45.11   
13. Other chronic osteomyelitis of right foot ([place] [address]  M86.671 730.17   
14. Sepsis due to Pseudomonas species with acute renal failure without septic shock, unspecified acute renal failure type ([place] [address]  A41.52 038.43   
 R65.20 995.92   
 N17.9 584.9 15. Bandemia  D72.825 288.66   
16. Essential hypertension  I10 401.9 17. Morbid obesity with body mass index (BMI) of 40.0 to 49.9 ([facility])  E66.01 278.01   
18. Osteomyelitis of second toe of right foot ([facility])  M86.9 730.27   
19. Postoperative hypoxia  R09.02 799.02   
 Z98.890    
20. Uncontrolled hypertension  I10 401.9   
21. Uncontrolled type 2 diabetes mellitus with hyperglycemia, with long-term current use of insulin ([facility])  E11.65 250.02   
 Z79.4 V58.67   
22. Uncontrolled type 2 diabetes mellitus with proliferative retinopathy, with long-term current use of insulin ([facility]  E11.3599 250.52 E11.65 362.02   
 Z79.4 V58.67   
23. Wound cellulitis  L03.90 682.9 I have discussed the diagnosis with the patient and the intended plan as seen in the above orders. I have discussed medication side effects and warnings with the patient as well. Reviewed test results at length with patient Anti-infectives:  
 
 Vancomycin/ Cefepime IV 
 
 Tamika Kidney MD FACP

## 2020-08-25 NOTE — DIALYSIS
[facility] Dialysis Team [place]  [phone] Vitals   Pre   Post   Assessment   Pre   Post    
Temp  Temp: 98.5 °F (36.9 °C) (08/25/20 0015)  98.3 LOC  A+Ox4 same HR   Pulse (Heart Rate): 79 (08/25/20 0015) 88 Lungs   Diminished  same B/P   BP: 100/69 (08/25/20 0015) 107/67 Cardiac   RRR  same Resp   Resp Rate: 18 (08/25/20 0015) 18 Skin   Warm/dry  same Pain level  Pain Intensity 1: 2 (08/24/20 2330) 3 Edema Generalized-recent R BKA,  
 same Orders: Duration:   Start:    0015 End:    0430 Total:   4.25 Dialyzer:   Dialyzer/Set Up Inspection: Srikanth Sanchez (08/25/20 0015) K Bath:   Dialysate K (mEq/L): 3 (08/25/20 0015) Ca Bath:   Dialysate CA (mEq/L): 2.5 (08/25/20 0015) Na/Bicarb:   Dialysate NA (mEq/L): 138 (08/25/20 0015) Target Fluid Removal:   Goal/Amount of Fluid to Remove (mL): 4000 mL (08/25/20 0015) Access Type & Location:   PELON AVF-w/ bruit/thrill. Site intact, no S+S of infection. Cannulated w/ 2x 15g, BFR @ 450 Labs Obtained/Reviewed Critical Results Called   Date when labs were drawn- 
Hgb-   
HGB Date Value Ref Range Status 08/24/2020 10.0 (L) 12.1 - 17.0 g/dL Final  
 
K-   
Potassium Date Value Ref Range Status 08/24/2020 4.9 3.5 - 5.1 mmol/L Final  
 
Ca-  
Calcium Date Value Ref Range Status 08/24/2020 9.6 8.5 - 10.1 MG/DL Final  
 
Bun-  
BUN Date Value Ref Range Status 08/24/2020 58 (H) 6 - 20 MG/DL Final  
 
Creat-  
Creatinine Date Value Ref Range Status 08/24/2020 11.50 (H) 0.70 - 1.30 MG/DL Final  
 
  
Medications/ Blood Products Given Name   Dose   Route and Time Blood Volume Processed (BVP):    98.9 Net Fluid Removed:  3,500ml Comments Time Out Done: 0005 Primary Nurse Rpt Pre:Evelyn Primary Nurse Rpt Post:Evelyn Pt Education: Fluid restrictions Care Plan:Continue Tx as ordered Tx Summary:  Bp remained stable, but Pt had frequent c/o dizziness and light headedness +nausea. UF goal lowered. All possible blood returned via NS rinseback. Needles pulled, sites secured, no excessive bleeding Admiting Diagnosis:Non-healing foot wound Pt's previous clinic-[facility] 
Consent signed - Informed Consent Verified: Yes (08/25/20 0015) Sylvain Consent - Signed Hepatitis Status- Neg 
Machine #- Machine Number: [id number] (08/25/20 0015) Telemetry status-Remote/bedside Pre-dialysis wt. -

## 2020-08-25 NOTE — PROGRESS NOTES
Hospitalist Progress Note NAME: Peña Giordano :  1985 MRN:  [id number] Assessment / Plan: 
Sepsis, POA  WBC 19K, tachycardia Right foot cellulitis with abscess, POA after amputation of right 1st and second toe for osteomyelitis  Hx osteomyelitis right 3rd and 4th toes 2020 and 10/2019 respectively Acute hypoxic respiratory failure 88% RA IDDM uncontrolled with hyperglycemia  ESRD on HD MWF Obesity 
  
  
--podiatry consult apreciated, MRI right foot showed fluid collection and abscess with no osteomyelitis -s/p irrigation of wound  
  ESR elevated 116.  ID consult appreciated -- SARS-COV-2 negative,  
-supplemental O2, CXR clear and lungs clear on exam without pulmonary system.  Incentive spirometry.  Hypoxia is resolved after dalysis --continue lantus 20 units daily, add moderate SSI 
-- appreciated renal consult to continue with dialysis -s/p  RIGHT LEG ARTERIOGRAM ANTERIOR TIBIAL ARTERY AND ANGIOPLASTY  
 s/p  excision and debridement of necrotic tissue right foot   
 s/p BKA  Continue zosyn , Vancomycin hold because of patient developed hearing loss Need ID follow up Hypotension  
-Hold  amlodipine and Lisinopril 
-continue midodrine Hyperkalemia  
-kayexalate   
 
  
Diarrhea: 
-Likely Antibiotic induced 
-Resolved -Imodium PRN 
-c/w probiotic 
  
Hyponatremia  
-motoring  
  
Chest pain  
-troponin negative  
- EKG show no acute abnormality 
-Echo show EF 45-50 % Hearing loss  
-hold vanco  
-ENT consult  
-pharmacy consult  
  
Body mass index is 43.36 kg/m². 
  
Code: full DVT prophylaxis: heparin Surrogate decision maker:  Aunt Ariel Sensor 
  
  
  
  
   
 
Subjective: Chief Complaint / Reason for Physician Visit Discussed with RN events overnight. Patient lost his hearing , hold vancomycin , pharmacy was called and ID was called and case was discussed with them hold vancomycin Review of Systems: Symptom Y/N Comments  Symptom Y/N Comments Fever/Chills    Chest Pain Poor Appetite    Edema Cough    Abdominal Pain Sputum    Joint Pain SOB/LITTLE    Pruritis/Rash Nausea/vomit    Tolerating PT/OT Diarrhea    Tolerating Diet Constipation    Other Could NOT obtain due to: Hearing lost   
 
Objective: VITALS:  
Last 24hrs VS reviewed since prior progress note. Most recent are: 
Patient Vitals for the past 24 hrs: 
 Temp Pulse Resp BP SpO2  
08/25/20 1526 99.6 °F (37.6 °C) 94 18 103/68 96 % 08/25/20 1214 98.8 °F (37.1 °C) 98 18 104/60 92 % 08/25/20 0800 98.9 °F (37.2 °C) 99 18 110/68 96 % 08/25/20 0430 98.3 °F (36.8 °C) 88 18 107/67 98 % 08/25/20 0415  85 18 111/65 97 % 08/25/20 0400  87 18 102/63 98 % 08/25/20 0346  83 18 108/73 96 % 08/25/20 0331  82 18 98/67 97 % 08/25/20 0315  83 18 107/66 95 % 08/25/20 0300  81 18 110/68 98 % 08/25/20 0245  80 18 112/70 97 % 08/25/20 0230  81 18 121/75 97 % 08/25/20 0215  80 18 113/71 97 % 08/25/20 0200  83 18 121/76 97 % 08/25/20 0145  98 18 126/84 95 % 08/25/20 0129  79 18 107/67 98 % 08/25/20 0115  78 18 111/69 98 % 08/25/20 0100  78 18 108/64 98 % 08/25/20 0045  79 18 106/61 97 % 08/25/20 0033  78 18 101/64 98 % 08/25/20 0015 98.5 °F (36.9 °C) 79 18 100/69 98 % 08/24/20 2326 98.6 °F (37 °C) 84  131/90 98 % 08/24/20 2000  86  107/74   
08/24/20 1749 97.8 °F (36.6 °C) 81 15 117/69 95 % 08/24/20 1649 97.6 °F (36.4 °C) 81 16 107/66 97 % Intake/Output Summary (Last 24 hours) at 8/25/2020 1625 Last data filed at 8/25/2020 1214 Gross per 24 hour Intake 480 ml Output 3500 ml Net -3020 ml PHYSICAL EXAM: 
General: WD, WN. Alert, cooperative, no acute distress   
EENT:  EOMI. Anicteric sclerae. MMM Resp:  CTA bilaterally, no wheezing or rales. No accessory muscle use CV:  Regular  rhythm,  No edema GI:  Soft, Non distended, Non tender.  +Bowel sounds Neurologic:  Alert and oriented X 3, normal speech, Psych:   Good insight. Not anxious nor agitated Skin:  No rashes. No jaundice Reviewed most current lab test results and cultures  YES Reviewed most current radiology test results   YES Review and summation of old records today    NO Reviewed patient's current orders and MAR    YES 
PMH/SH reviewed - no change compared to H&P 
________________________________________________________________________ Care Plan discussed with: 
  Comments Patient y Family RN y   
Care Manager y Consultant     
                 y Multidiciplinary team rounds were held today with , nursing, pharmacist and clinical coordinator. Patient's plan of care was discussed; medications were reviewed and discharge planning was addressed. ________________________________________________________________________ Total NON critical care TIME:  35   Minutes Total CRITICAL CARE TIME Spent:   Minutes non procedure based Comments >50% of visit spent in counseling and coordination of care y   
________________________________________________________________________ Myranda Merino MD  
 
Procedures: see electronic medical records for all procedures/Xrays and details which were not copied into this note but were reviewed prior to creation of Plan. LABS: 
I reviewed today's most current labs and imaging studies. Pertinent labs include: 
Recent Labs  
  08/25/20 
0547 08/24/20 
0322 08/23/20 
0322 WBC 18.9* 17.2* 19.1* HGB 9.9* 10.0* 10.3* HCT 32.4* 32.1* 32.8* * 467* 476* Recent Labs  
  08/25/20 
0547 08/24/20 
0322 08/23/20 
5255 * 127* 128*  
K 5.2* 4.9 4.8  
CL 94* 90* 91* CO2 25 25 27 * 251* 247* BUN 35* 58* 44* CREA 7.71* 11.50* 9.18* CA 9.6 9.6 9.4 Signed:  Myranda Merino MD

## 2020-08-25 NOTE — PROGRESS NOTES
Name: Tonia Marie MRN: [id number] : 1985 Assessment   :                                               Plan: ESKD Anemia of ESRD + post op blood loss DM-2, uncontrolled Mild Hyperkalemia HTN>>Hypotension Hyponatremia Foot infection- s/p  R BKA on  Hearing loss Legally blind [facility] - he got HD late, finished early AM 
Serum K was drawn within couple hrs after HD and hence may not be reliable. No need to Rx mild high K 
 
HD tomm per schedule ABx per ID;  
?cause of new hearing loss Holding bp meds-on Midodrine Will place on FR of 1.5 L/day Subjective: 
Resting. Hard of hearing- relatively new. Had some mild deficit but not as bad as now ROS:  
Deferred Exam: 
Visit Vitals /60 Pulse 98 Temp 98.8 °F (37.1 °C) Resp 18 Ht 6' 2\" (1.88 m) Wt 141.3 kg (311 lb 6.4 oz) SpO2 92% BMI 39.98 kg/m² NAD No resp distress RRR 
R BKA; LLE with no edema Alert awake [address] Current Facility-Administered Medications Medication Dose Route Frequency Last Dose  acetaminophen (TYLENOL) tablet 1,000 mg  1,000 mg Oral Q8H 1,000 mg at 20 1211  
 gabapentin (NEURONTIN) capsule 100 mg  100 mg Oral QHS  oxyCODONE IR (ROXICODONE) tablet 5-10 mg  5-10 mg Oral Q4H PRN    
 [START ON 2020] heparin (porcine) injection 5,000 Units  5,000 Units SubCUTAneous Q8H    
 [START ON 2020] VANCOMYCIN INFORMATION NOTE   Other Rx Dosing/Monitoring  sodium chloride (NS) flush 5-40 mL  5-40 mL IntraVENous PRN    
 HYDROmorphone (PF) (DILAUDID) injection 1 mg  1 mg IntraVENous Q3H PRN 1 mg at 20 0912  
 alcohol 62% (NOZIN) nasal  1 Ampule  1 Ampule Topical Q12H 1 Ampule at 20 1211  phenol throat spray (CHLORASEPTIC) 1 Spray  1 Spray Oral PRN    
  albumin human 25% (BUMINATE) solution 25 g  25 g IntraVENous PRN    
 midodrine (PROAMATINE) tablet 5 mg  5 mg Oral TID WITH MEALS 5 mg at 08/25/20 1211  piperacillin-tazobactam (ZOSYN) 3.375 g in 0.9% sodium chloride (MBP/ADV) 100 mL  3.375 g IntraVENous Q12H 3.375 g at 08/25/20 0913  lactobac ac& pc-s.therm-b.anim (USMAN Q/RISAQUAD)  1 Cap Oral DAILY 1 Cap at 08/25/20 8129  loperamide (IMODIUM) capsule 2 mg  2 mg Oral Q4H PRN 2 mg at 08/18/20 0901  epoetin nata-epbx (RETACRIT) injection 10,000 Units  10,000 Units SubCUTAneous Q7D Stopped at 08/20/20 2100  
 ondansetron (ZOFRAN) injection 4 mg  4 mg IntraVENous Q6H PRN 4 mg at 08/25/20 0150  
 insulin lispro (HUMALOG) injection   SubCUTAneous AC&HS 2 Units at 08/25/20 1211  
 glucose chewable tablet 16 g  4 Tab Oral PRN    
 dextrose (D50W) injection syrg 12.5-25 g  12.5-25 g IntraVENous PRN    
 glucagon (GLUCAGEN) injection 1 mg  1 mg IntraMUSCular PRN    
 insulin glargine (LANTUS) injection 20 Units  20 Units SubCUTAneous DAILY 20 Units at 08/25/20 4666 Labs/Data: 
 
Lab Results Component Value Date/Time WBC 18.9 (H) 08/25/2020 05:47 AM  
 Hemoglobin (POC) 11.9 (L) 07/25/2017 10:12 AM  
 HGB 9.9 (L) 08/25/2020 05:47 AM  
 Hematocrit (POC) 33 (L) 08/24/2020 10:51 AM  
 HCT 32.4 (L) 08/25/2020 05:47 AM  
 PLATELET 479 (H) 45/35/9294 05:47 AM  
 MCV 83.9 08/25/2020 05:47 AM  
 
 
Lab Results Component Value Date/Time Sodium 130 (L) 08/25/2020 05:47 AM  
 Potassium 5.2 (H) 08/25/2020 05:47 AM  
 Chloride 94 (L) 08/25/2020 05:47 AM  
 CO2 25 08/25/2020 05:47 AM  
 Anion gap 11 08/25/2020 05:47 AM  
 Glucose 163 (H) 08/25/2020 05:47 AM  
 BUN 35 (H) 08/25/2020 05:47 AM  
 Creatinine 7.71 (H) 08/25/2020 05:47 AM  
 BUN/Creatinine ratio 5 (L) 08/25/2020 05:47 AM  
 GFR est AA 10 (L) 08/25/2020 05:47 AM  
 GFR est non-AA 8 (L) 08/25/2020 05:47 AM  
 Calcium 9.6 08/25/2020 05:47 AM  
 
 
Wt Readings from Last 3 Encounters: 08/23/20 141.3 kg (311 lb 6.4 oz) 08/13/20 153.2 kg (337 lb 11.9 oz) 08/07/20 153.8 kg (339 lb 1.1 oz) Intake/Output Summary (Last 24 hours) at 8/25/2020 1435 Last data filed at 8/25/2020 1214 Gross per 24 hour Intake 1080 ml Output 3500 ml Net -2420 ml Patient seen and examined. Chart reviewed. Labs, data and other pertinent notes reviewed in last 24 hrs. PMH/SH/FH reviewed and unchanged compared to H&P Jerson Padron MD

## 2020-08-26 NOTE — PROGRESS NOTES
Hospitalist Progress Note NAME: Candis Ramirez :  1985 MRN:  [id number] Assessment / Plan: 
Sepsis, POA  WBC 19K, tachycardia Right foot cellulitis with abscess, POA after amputation of right 1st and second toe for osteomyelitis  Hx osteomyelitis right 3rd and 4th toes 2020 and 10/2019 respectively Acute hypoxic respiratory failure 88% RA IDDM uncontrolled with hyperglycemia  ESRD on HD MWF Obesity 
  
  
--podiatry consult apreciated, MRI right foot showed fluid collection and abscess with no osteomyelitis -s/p irrigation of wound  
  ESR elevated 116.  ID consult appreciated -- SARS-COV-2 negative,  
-supplemental O2, CXR clear and lungs clear on exam without pulmonary system.  Incentive spirometry.  Hypoxia is resolved after dalysis --continue lantus 20 units daily, add moderate SSI 
-- appreciated renal consult to continue with dialysis -s/p  RIGHT LEG ARTERIOGRAM ANTERIOR TIBIAL ARTERY AND ANGIOPLASTY  
 s/p  excision and debridement of necrotic tissue right foot   
 s/p BKA  Continue zosyn , Vancomycin hold because of patient developed hearing loss Need ID follow up Hypotension  
-Hold  amlodipine and Lisinopril 
-continue midodrine Hyperkalemia  
-kayexalate   
 
  
Diarrhea: 
-Likely Antibiotic induced 
-Resolved -Imodium PRN 
-c/w probiotic 
  
Hyponatremia  
-motoring  
  
Chest pain  
-troponin negative  
- EKG show no acute abnormality 
-Echo show EF 45-50 % Hearing loss  
-hold vanco  
-ENT consult  
-pharmacy consult  
  
Body mass index is 43.36 kg/m². : 
 
Patient back from Dialysis. ENT consult pending for evaluation of hearing loss. No acute events. On antibiotics per ID. Vitals remain stable. Leukocytosis is getting better . 
  
Code: full DVT prophylaxis: heparin Surrogate decision maker:  Aunana Last 
  
  
  
  
   
 
Subjective: Chief Complaint / Reason for Physician Visit Discussed with RN events overnight. Patient lost his hearing , hold vancomycin , pharmacy was called and ID was called and case was discussed with them hold vancomycin Review of Systems: 
Symptom Y/N Comments  Symptom Y/N Comments Fever/Chills    Chest Pain Poor Appetite    Edema Cough    Abdominal Pain Sputum    Joint Pain SOB/LITTLE    Pruritis/Rash Nausea/vomit    Tolerating PT/OT Diarrhea    Tolerating Diet Constipation    Other Could NOT obtain due to: Hearing lost   
 
Objective: VITALS:  
Last 24hrs VS reviewed since prior progress note. Most recent are: 
Patient Vitals for the past 24 hrs: 
 Temp Pulse Resp BP SpO2  
08/26/20 1229 98.1 °F (36.7 °C) 79 19 124/67 99 % 08/26/20 1200  77 18 125/82   
08/26/20 1145  69 20 110/71   
08/26/20 1130  76 20 91/53   
08/26/20 1115  76 18 105/71   
08/26/20 1100  77 18 103/60   
08/26/20 1045  78 18 115/56   
08/26/20 1030  81 18 101/53   
08/26/20 1015  82 18 105/57   
08/26/20 1000  82 18 111/62   
08/26/20 0945  68 18 110/86   
08/26/20 0930  78 18 95/52   
08/26/20 0915  75 18 101/45   
08/26/20 0900  77 18 112/53   
08/26/20 0845  75 18 102/54   
08/26/20 0830  81 18 100/58   
08/26/20 0815  80 18 97/62   
08/26/20 0800  80 18 99/61   
08/26/20 0745  83 18 121/77   
08/26/20 0730 98.1 °F (36.7 °C) 87 18 122/72   
08/26/20 0325 98.9 °F (37.2 °C) 92 20 110/60 93 % 08/25/20 2240 99.6 °F (37.6 °C) 93 18 124/84 92 % 08/25/20 2040 99.5 °F (37.5 °C) 89 19 120/67 91 % 08/25/20 1526 99.6 °F (37.6 °C) 94 18 103/68 96 % Intake/Output Summary (Last 24 hours) at 8/26/2020 1407 Last data filed at 8/26/2020 1200 Gross per 24 hour Intake 240 ml Output 2000 ml Net -1760 ml PHYSICAL EXAM: 
General: WD, WN. Alert, cooperative, no acute distress   
EENT:  EOMI. Anicteric sclerae. MMM Resp:  CTA bilaterally, no wheezing or rales. No accessory muscle use CV:  Regular  rhythm,  No edema GI:  Soft, Non distended, Non tender.  +Bowel sounds Neurologic:  Alert and oriented X 3, normal speech, Psych:   Good insight. Not anxious nor agitated Skin:  No rashes. No jaundice Reviewed most current lab test results and cultures  YES Reviewed most current radiology test results   YES Review and summation of old records today    NO Reviewed patient's current orders and MAR    YES 
PMH/SH reviewed - no change compared to H&P 
________________________________________________________________________ Care Plan discussed with: 
  Comments Patient y Family RN y   
Care Manager y Consultant     
                 y Multidiciplinary team rounds were held today with , nursing, pharmacist and clinical coordinator. Patient's plan of care was discussed; medications were reviewed and discharge planning was addressed. ________________________________________________________________________ Total NON critical care TIME:  35   Minutes Total CRITICAL CARE TIME Spent:   Minutes non procedure based Comments >50% of visit spent in counseling and coordination of care y   
________________________________________________________________________ Vanessa Angel MD  
 
Procedures: see electronic medical records for all procedures/Xrays and details which were not copied into this note but were reviewed prior to creation of Plan. LABS: 
I reviewed today's most current labs and imaging studies. Pertinent labs include: 
Recent Labs  
  08/26/20 
0157 08/25/20 
0547 08/24/20 
0322 WBC 14.3* 18.9* 17.2* HGB 8.8* 9.9* 10.0* HCT 28.7* 32.4* 32.1*  
* 432* 467* Recent Labs  
  08/26/20 
0157 08/25/20 
0547 08/24/20 
7687 * 130* 127*  
K 5.0 5.2* 4.9  
CL 95* 94* 90* CO2 27 25 25 * 163* 251* BUN 46* 35* 58* CREA 9.82* 7.71* 11.50* CA 9.3 9.6 9.6 Signed: Vanessa Angel MD

## 2020-08-26 NOTE — PROGRESS NOTES
OT referral received, chart reviewed, and attempted to see patient for evaluation. Patient is presently off the floor for HD. Will follow up later today or tomorrow for evaluation.

## 2020-08-26 NOTE — PROCEDURES
[facility] Dialysis Team [place]  [phone] Vitals   Pre   Post   Assessment   Pre   Post    
Temp  Temp: 98.1 °F (36.7 °C) (08/26/20 0730)  97.5oral LOC  A & O x 4 No change HR   Pulse (Heart Rate): 83 (08/26/20 0745) 77 Lungs   Dim  bases  No change B/P   BP: 121/77 (08/26/20 0745) 125/82 Cardiac   NSR  No change Resp   Resp Rate: 18 (08/26/20 0745) 18 Skin   Warm, dry Intact / post op dressing to RLE  No Change Pain level  Pain Intensity 1: 0 (08/26/20 0325) 0 Edema  + 1 lower ext 
 
 +1 lower ext. Orders: Duration:   Start:    0730 End:    1200 Total:   4.0 Dialyzer:   Dialyzer/Set Up Inspection: Martina Flynn (08/26/20 0730) K Bath:   Dialysate K (mEq/L): 2 (08/26/20 0730) Ca Bath:   Dialysate CA (mEq/L): 2.5 (08/26/20 0730) Na/Bicarb:   Dialysate NA (mEq/L): 138 (08/26/20 0730) Target Fluid Removal:   Goal/Amount of Fluid to Remove (mL): 4000 mL (08/26/20 0730) Access  AVF Type & Location:   Left upper AVF Labs Obtained/Reviewed Critical Results Called   Date when labs were drawn- 
Hgb-   
HGB Date Value Ref Range Status 08/26/2020 8.8 (L) 12.1 - 17.0 g/dL Final  
 
K-   
Potassium Date Value Ref Range Status 08/26/2020 5.0 3.5 - 5.1 mmol/L Final  
 
Ca-  
Calcium Date Value Ref Range Status 08/26/2020 9.3 8.5 - 10.1 MG/DL Final  
 
Bun-  
BUN Date Value Ref Range Status 08/26/2020 46 (H) 6 - 20 MG/DL Final  
 
Creat-  
Creatinine Date Value Ref Range Status 08/26/2020 9.82 (H) 0.70 - 1.30 MG/DL Final  
 
  
Medications/ Blood Products Given Name   Dose   Route and Time Albumin 25gm IV 0915 Blood Volume Processed (BVP):    94.5 Net Fluid Removed:  2000ml Comments RN reviewed LPN assessment and completed RN assessment. RN completed patient assessment. RN reviewed technicians vital signs and procedure note. Tx completed. Reviewed by RN Stephen Cook Time Out Done: 3638 Primary Nurse Rpt Pre:DIONICIO Lyons  
 Primary Nurse Rpt Jaime Mcnair RN 
Pt Education:access care Care Plan: Continue HD Tx Summary:PELON AVF: skin CDI. No s/s of infection. No issues with cannulation or hemostasis. Running well at . Pt arrived to HD suite A&Ox[address]. Consent signed & on file. SBAR received from Primary RN. 0730: Pt cannulated with 90H needles per policy & without issue. Labs drawn per request/ order. VSS. Dialysis Tx initiated. 0800: Pt. Stable,lines secure and visible 0815: Pt. Resting well 0830: bp trending down, decreased goal. 
0845: Pt. Stable, lines secure 
0900: Pt resting quietly. 0915: Pt. Resting well, lines secure. Albumin 25g IV admin. 0930: No s/s of distress, lines secure. Dr. Josselyn Fry rounding no changes at this time 0945: stable, resting well.  
1000: Dr. Josselyn Fry rounding. 1015: Pt. Stable, lines secure 1030: Pt. Stable 1045: Pt. Stable, mikel hd well 
1100: Pt. Stable, lines secure 1115: Pt. Resting well, lines secure 1130: Pt. Stable, lines secure 1145: Pt. Stable, lines secure 
1200: Tx ended. VSS. All possible blood returned to patient. Hemostasis achieved without issue. Bed locked and in the lowest position, call bell and belongings in reach. SBAR given to Primary, RN. Patient is stable at time of their/ my departure. All Dialysis related medications have been reviewed. Admiting Diagnosis: sepsis Pt's previous clinic- [facility] Cheadle Consent signed - Informed Consent Verified: Yes (08/26/20 0730) Sylvain Consent - yes Hepatitis Status- 8/13/20 neg/[facility] [place] Machine #- Machine Number: [id number] (08/26/20 0730) Telemetry status- yes Pre-dialysis wt. -

## 2020-08-26 NOTE — PROGRESS NOTES
Vascular Surgery Progress Note Hossein Rodríguez ACNP-BC 
8/26/2020 Subjective:  
 
Vivien Marks is a very pleasant 35 y.o.  male with a pmhx significant for ESRD on HD, DM, HTN, HLD, and obesity. Patient is legally blind and he has hearing loss. He communicates using his cell phone. Adrian Aviles is admitted to the hospital w/ sepsis secondary to cellulitis of the right leg. He is s/p amputation of the right great toe and second toe on 08/07/2020. He has a previous hx of amputation of the right third and fourth toes for osteomyelitis. He hospital course has been complicated by acute hypoxic respiratory failure. We were asked to evaluate for PAD earlier in his admission and he is s/p right LEXI BAP on 08/17/2020. He then underwent excisional debridement of the bone and necrotic tissue with Dr. Daiana Bhat on 8/21/2020. Despite revascularization his foot was not salvageable and his is s/p right BKA on 08/24/2020. This am he is in pain. His stump is dry and intact. He reports new onset hearing loss. Nursing Data:  
 
Patient Vitals for the past 24 hrs: 
 BP Temp Temp src Pulse Resp SpO2  
08/26/20 1448 125/73 98.6 °F (37 °C)  92 19 97 % 08/26/20 1229 124/67 98.1 °F (36.7 °C)  79 19 99 % 08/26/20 1200 125/82   77 18   
08/26/20 1145 110/71   69 20   
08/26/20 1130 91/53   76 20   
08/26/20 1115 105/71   76 18   
08/26/20 1100 103/60   77 18   
08/26/20 1045 115/56   78 18   
08/26/20 1030 101/53   81 18   
08/26/20 1015 105/57   82 18   
08/26/20 1000 111/62   82 18   
08/26/20 0945 110/86   68 18   
08/26/20 0930 95/52   78 18   
08/26/20 0915 101/45   75 18   
08/26/20 0900 112/53   77 18   
08/26/20 0845 102/54   75 18   
08/26/20 0830 100/58   81 18   
08/26/20 0815 97/62   80 18   
08/26/20 0800 99/61   80 18   
08/26/20 0745 121/77   83 18   
08/26/20 0730 122/72 98.1 °F (36.7 °C) Oral 87 18  08/26/20 0325 110/60 98.9 °F (37.2 °C)  92 20 93 % 08/25/20 2240 124/84 99.6 °F (37.6 °C)  93 18 92 % 08/25/20 2040 120/67 99.5 °F (37.5 °C)  89 19 91 %  
 
--------------------------------------------------------------------------------------------------------- Intake/Output Summary (Last 24 hours) at 8/26/2020 1605 Last data filed at 8/26/2020 1200 Gross per 24 hour Intake 240 ml Output 2000 ml Net -1760 ml Exam:  
 
Physical Exam 
Constitutional:   
   Appearance: He is obese. HENT:  
   Head: Normocephalic. Nose: Nose normal.  
   Mouth/Throat:  
   Mouth: Mucous membranes are moist.  
Eyes:  
   Pupils: Pupils are equal, round, and reactive to light. Neck: Musculoskeletal: Normal range of motion. Cardiovascular:  
   Rate and Rhythm: Normal rate and regular rhythm. Pulses: Normal pulses. Heart sounds: Normal heart sounds. Left foot is warm w/o palpable DP pulse. Pulmonary:  
   Effort: Pulmonary effort is normal.  
   Breath sounds: Normal breath sounds. Abdominal:  
   General: Abdomen is flat. Palpations: Abdomen is soft. Musculoskeletal: Normal range of motion. Skin: 
   General: Skin is warm. Comments: Bulky dressing to the right stump. Neurological:  
   General: No focal deficit present. Mental Status: He is alert. Mental status is at baseline. Psychiatric:     
   Mood and Affect: Mood normal.     
   Behavior: Behavior normal.  
 
Lab Review:  
 
. Recent Results (from the past 24 hour(s)) GLUCOSE, POC Collection Time: 08/25/20  4:29 PM  
Result Value Ref Range Glucose (POC) 179 (H) 65 - 100 mg/dL Performed by Rosy Worthy (PCT) GLUCOSE, POC Collection Time: 08/25/20  9:03 PM  
Result Value Ref Range Glucose (POC) 189 (H) 65 - 100 mg/dL Performed by Uche Flynn CBC WITH AUTOMATED DIFF Collection Time: 08/26/20  1:57 AM  
Result Value Ref Range WBC 14.3 (H) 4.1 - 11.1 K/uL RBC 3.48 (L) 4.10 - 5.70 M/uL HGB 8.8 (L) 12.1 - 17.0 g/dL HCT 28.7 (L) 36.6 - 50.3 % MCV 82.5 80.0 - 99.0 FL  
 MCH 25.3 (L) 26.0 - 34.0 PG  
 MCHC 30.7 30.0 - 36.5 g/dL  
 RDW 15.7 (H) 11.5 - 14.5 % PLATELET 113 (H) 869 - 400 K/uL MPV 9.5 8.9 - 12.9 FL  
 NRBC 0.0 0  WBC ABSOLUTE NRBC 0.00 0.00 - 0.01 K/uL NEUTROPHILS 74 32 - 75 % LYMPHOCYTES 12 12 - 49 % MONOCYTES 9 5 - 13 % EOSINOPHILS 1 0 - 7 % BASOPHILS 1 0 - 1 % IMMATURE GRANULOCYTES 3 (H) 0.0 - 0.5 % ABS. NEUTROPHILS 10.7 (H) 1.8 - 8.0 K/UL  
 ABS. LYMPHOCYTES 1.7 0.8 - 3.5 K/UL  
 ABS. MONOCYTES 1.3 (H) 0.0 - 1.0 K/UL  
 ABS. EOSINOPHILS 0.1 0.0 - 0.4 K/UL  
 ABS. BASOPHILS 0.1 0.0 - 0.1 K/UL  
 ABS. IMM. GRANS. 0.4 (H) 0.00 - 0.04 K/UL  
 DF SMEAR SCANNED    
 PLATELET COMMENTS CLUMPED PLATELETS    
 RBC COMMENTS ANISOCYTOSIS 1+ 
    
 RBC COMMENTS ROULEAUX PRESENT 
    
METABOLIC PANEL, BASIC Collection Time: 08/26/20  1:57 AM  
Result Value Ref Range Sodium 132 (L) 136 - 145 mmol/L Potassium 5.0 3.5 - 5.1 mmol/L Chloride 95 (L) 97 - 108 mmol/L  
 CO2 27 21 - 32 mmol/L Anion gap 10 5 - 15 mmol/L Glucose 157 (H) 65 - 100 mg/dL BUN 46 (H) 6 - 20 MG/DL Creatinine 9.82 (H) 0.70 - 1.30 MG/DL  
 BUN/Creatinine ratio 5 (L) 12 - 20 GFR est AA 7 (L) >60 ml/min/1.73m2 GFR est non-AA 6 (L) >60 ml/min/1.73m2 Calcium 9.3 8.5 - 10.1 MG/DL  
GLUCOSE, POC Collection Time: 08/26/20  1:41 PM  
Result Value Ref Range Glucose (POC) 141 (H) 65 - 100 mg/dL Performed by Serena Mitchell Assessment/Plan: PAD w/ non-healing diabetic wound of the right foot complicated by gas gangrene  
-s/p right leg LEXI BAP 08/17/2020 
-s/p excisional debridement of the bone and necrotic tissue 08/21/2020 
-s/p right BKA 08/24/2020 Stump is dry and intact this am.  H&H with expected drop and not at a level to transfuse. Continue current pain regimen.   Continue to encourage OOB w/ PT/OT today. Encourage IS. Float left heel w/ suspension boot. Air mattress. OOB daily with Bret Bock lift. Sepsis  
-leukocytosis now trending down 
-blood cx NGTD  
-wound cx polymicrobial: Kelbisella pneumoniae and pseudomonas aeruginosa/stutzeri Antibxs and volume management per primary team.   
  
Acute hypoxic respiratory failure Chest pain  
-troponin negative  
-no acute EKG changes  
-Echo show EF 45-50 % Hypotension  
-with a hx of HTN  
-BP stable this am on midodrine Hyperlipidemia ESRD 
-HD MWF Hyponatremia Hyperkalemia  
-resolved Acute blood loss anemia with underlying anemia of chronic renal disease  
-not at a level to transfuse  
-Retacrit per nephrology Plan per nephrology Diarrhea 
-unclear if this was before or after kayexalate 
-resolved 
  
Uncontrolled Diabetes Mellitus w/ hyperglycemia  
-HA1c 9.4 Morbid obesity Management of comorbid conditions by primary team. 
  
VTE Prophylaxis: [facility] 
  
Disposition: 
Inpatient rehabilitation: case management consult. Patient is stable for discharge from a vascular standpoint. Vascular surgery signing off. We appreciate the opportunity to participate in the care of Mr. Janna Sims. Patient should f/u in 4 weeks with Dr. Artemio Resendez. Please reconsult as needed. Discharge instructions and follow up information placed on AVS.  Pain prescriptions provided on the bedside chart for facility placement.

## 2020-08-26 NOTE — PROGRESS NOTES
TRANSFER - IN REPORT: 
 
Verbal report received from Cory Ratliff [facility] on Vick Parkinson  being received from [facility] Cricket Levine for ordered procedure Report consisted of patients Situation, Background, Assessment and  
Recommendations(SBAR). Information from the following report(s) SBAR, Kardex and Cardiac Rhythm NSR was reviewed with the receiving nurse. Opportunity for questions and clarification was provided. Assessment completed upon patients arrival to unit and care assumed.

## 2020-08-26 NOTE — PROGRESS NOTES
Name: Jina Gar MRN: [id number] : 1985 Assessment   :                                               Plan: ESKD Anemia of ESRD + post op blood loss DM-2, uncontrolled Mild Hyperkalemia-better HTN>>Hypotension Hyponatremia-better Foot infection- s/p  R BKA on 8- Hearing loss Legally blind [facility]  
Seen on HD. Tolerating well. BP lowish. UF cut back to 2 Kg Switched to 2k. Using L AV Access, 450 QB /86 during visit Next HD on Friday Increase RETACRIT to 10K 3x/week ABx per ID;  
?cause of new hearing loss- vanco vs anesthesia Holding bp meds-on Midodrine Ct FR of 1.5 L/day Subjective: 
Resting. Hard of hearing- persists. Also legally blind. Comfortable on HD. Tried to communicate via writing on paper. Was challenging as he cannot see properly either ROS:  
Deferred Exam: 
Visit Vitals /71 Pulse 76 Temp 98.1 °F (36.7 °C) (Oral) Resp 18 Ht 6' 2\" (1.88 m) Wt 139.9 kg (308 lb 6.8 oz) Comment: VIA WESLEY LIFT SpO2 93% BMI 39.60 kg/m² NAD No resp distress R BKA; LLE with no edema Alert awake +[place] 
L AV access patent Current Facility-Administered Medications Medication Dose Route Frequency Last Dose  epoetin nata-epbx (RETACRIT) injection 10,000 Units  10,000 Units SubCUTAneous Q MON, WED & FRI  acetaminophen (TYLENOL) tablet 1,000 mg  1,000 mg Oral Q8H 1,000 mg at 20 0553  
 gabapentin (NEURONTIN) capsule 100 mg  100 mg Oral  mg at 20  oxyCODONE IR (ROXICODONE) tablet 5-10 mg  5-10 mg Oral Q4H PRN 10 mg at 20 5817  heparin (porcine) injection 5,000 Units  5,000 Units SubCUTAneous Q8H    
 [START ON 2020] VANCOMYCIN INFORMATION NOTE   Other Rx Dosing/Monitoring  zinc oxide-cod liver oil (DESITIN) 40 % paste   Topical PRN    
 sodium chloride (NS) flush 5-40 mL  5-40 mL IntraVENous PRN 10 mL at 08/26/20 0554  
 HYDROmorphone (PF) (DILAUDID) injection 1 mg  1 mg IntraVENous Q3H PRN 1 mg at 08/26/20 0200  
 alcohol 62% (NOZIN) nasal  1 Ampule  1 Ampule Topical Q12H 1 Ampule at 08/25/20 2100  phenol throat spray (CHLORASEPTIC) 1 Spray  1 Spray Oral PRN    
 albumin human 25% (BUMINATE) solution 25 g  25 g IntraVENous PRN 25 g at 08/26/20 0929  
 midodrine (PROAMATINE) tablet 5 mg  5 mg Oral TID WITH MEALS 5 mg at 08/26/20 0800  piperacillin-tazobactam (ZOSYN) 3.375 g in 0.9% sodium chloride (MBP/ADV) 100 mL  3.375 g IntraVENous Q12H 3.375 g at 08/25/20 2044  lactobac ac& pc-s.therm-b.anim (USMAN Q/RISAQUAD)  1 Cap Oral DAILY 1 Cap at 08/25/20 4608  loperamide (IMODIUM) capsule 2 mg  2 mg Oral Q4H PRN 2 mg at 08/18/20 0901  
 ondansetron (ZOFRAN) injection 4 mg  4 mg IntraVENous Q6H PRN 4 mg at 08/25/20 0150  
 insulin lispro (HUMALOG) injection   SubCUTAneous AC&HS Stopped at 08/25/20 2107  
 glucose chewable tablet 16 g  4 Tab Oral PRN    
 dextrose (D50W) injection syrg 12.5-25 g  12.5-25 g IntraVENous PRN    
 glucagon (GLUCAGEN) injection 1 mg  1 mg IntraMUSCular PRN    
 insulin glargine (LANTUS) injection 20 Units  20 Units SubCUTAneous DAILY 20 Units at 08/25/20 1063 Labs/Data: 
 
Lab Results Component Value Date/Time WBC 14.3 (H) 08/26/2020 01:57 AM  
 Hemoglobin (POC) 11.9 (L) 07/25/2017 10:12 AM  
 HGB 8.8 (L) 08/26/2020 01:57 AM  
 Hematocrit (POC) 33 (L) 08/24/2020 10:51 AM  
 HCT 28.7 (L) 08/26/2020 01:57 AM  
 PLATELET 323 (H) 74/07/2652 01:57 AM  
 MCV 82.5 08/26/2020 01:57 AM  
 
 
Lab Results Component Value Date/Time  Sodium 132 (L) 08/26/2020 01:57 AM  
 Potassium 5.0 08/26/2020 01:57 AM  
 Chloride 95 (L) 08/26/2020 01:57 AM  
 CO2 27 08/26/2020 01:57 AM  
 Anion gap 10 08/26/2020 01:57 AM  
 Glucose 157 (H) 08/26/2020 01:57 AM  
 BUN 46 (H) 08/26/2020 01:57 AM  
 Creatinine 9.82 (H) 08/26/2020 01:57 AM  
 BUN/Creatinine ratio 5 (L) 08/26/2020 01:57 AM  
 GFR est AA 7 (L) 08/26/2020 01:57 AM  
 GFR est non-AA 6 (L) 08/26/2020 01:57 AM  
 Calcium 9.3 08/26/2020 01:57 AM  
 
 
Wt Readings from Last 3 Encounters:  
08/25/20 139.9 kg (308 lb 6.8 oz) 08/13/20 153.2 kg (337 lb 11.9 oz) 08/07/20 153.8 kg (339 lb 1.1 oz) Intake/Output Summary (Last 24 hours) at 8/26/2020 1128 Last data filed at 8/25/2020 1650 Gross per 24 hour Intake 480 ml Output  Net 480 ml Patient seen and examined. Chart reviewed. Labs, data and other pertinent notes reviewed in last 24 hrs. PMH/SH/FH reviewed and unchanged compared to H&P Tim Enriquez MD

## 2020-08-26 NOTE — PROGRESS NOTES
Bedside and Verbal shift change report given to Betzy Calderon, RN and Esequiel Roberto, RN (oncoming nurse) by Kirill Fonseca, RN (offgoing nurse). Report included the following information SBAR, Kardex and Intake/Output. 0730 Pt in dialysis. Off the floor. 200 Talked to Ismael Moody on the phone and she requested me to place her [facility]. She is Margarita Blend first cousin and I will confirm that with the patient as soon as he gets back from Dialysis. 1230 Pt back on floor. [phone] with ENT Dr. Michael Fish and was told he can see him from outpatient. Dr. Vanna Burton notified. [address] with Karine uRpalishashank and she is frustrated about not knowing the reason why the patient has had hearing loss. She stated, \"The whole family and cousins spoke with PATRICK on the phone on Monday and now he can't hear. There are too many people that know about this situation and I have an ART degree and know what is supposed to be happening at a hospital and what should not. I will be up there tomorrow after 11:30a. \"  
 
 MD notified about the upset family member. ENT has been consulted and Internal medicine have been made aware of the circumstance. 1700 Patient stated, \"I want Ismael Moody to be my medical power of . \"  paged for Advanced Directive to be addressed. [facility] and he needs a nurse to call for  tomorrow to work on advanced medical directive. They are closed right now for coming in to do advanced directive and is more than happy to address this ASAP tomorrow. Day shift RN will be notified to reach out to .

## 2020-08-26 NOTE — PROGRESS NOTES
HD TRANSFER - OUT REPORT: 
 
Verbal report given to Ramandeep Whitfield on Wiliam Quintero being transferred to [facility]  (Unit) for ordered procedure Report consisted of patient's Situation, Background, Assessment and  
Recommendations(SBAR). Information from the following report(s) Kardex was reviewed with the receiving nurse. Method:  $$ Method: Hemodialysis (08/26/20 1200) Fluid Removed  NET Fluid Removed (mL): 2000 ml (08/26/20 1200) Patient response to treatment:  Stable End Time  Hemodialysis End Time: 0400 (08/26/20 1200) If not documented, dialysis nurse to update post-dialysis row in HD/Filtration flowsheet Medications /Volume expansion agents or Fluid boluses administered during treatment? yes Post-dialysis medication administration due?  no 
Remind nurse to administer post-HD medication upon return to unit. Fistula hemostasis? Yes 
 
Line heparinization? no 
 
Lines: secure Opportunity for questions and clarification was provided. Patient transported with: Giiv

## 2020-08-26 NOTE — PROGRESS NOTES
Problem: Patient Education: Go to Patient Education Activity Goal: Patient/Family Education Outcome: Progressing Towards Goal 
  
Problem: Pressure Injury - Risk of 
Goal: *Prevention of pressure injury Description: Document Ranjeet Scale and appropriate interventions in the flowsheet. Outcome: Progressing Towards Goal 
Note: Pressure Injury Interventions: 
Sensory Interventions: Assess changes in LOC Moisture Interventions: Absorbent underpads Activity Interventions: Assess need for specialty bed Mobility Interventions: Assess need for specialty bed Nutrition Interventions: Document food/fluid/supplement intake Friction and Shear Interventions: Apply protective barrier, creams and emollients, Lift sheet, Minimize layers

## 2020-08-26 NOTE — PROGRESS NOTES
7;30 Am Patient off floor for HD. 
 
10:00 AM night shift nurse was told by Nursing supervisor Adolfo Britton that Dr. Freda Garsia does ENT consult for [address] Called Dr. Bhavna Aguirre office for the consult and was redirected to Dr. Sharron Burkett saying \" he is covering for Dr. Freda Garsia till Friday and will see patients at  [address] \".  
 
 
3:00 AM Got a call from Dr. Sharron Burkett saying he doesnot come see pateints here at the hospital but had some recommendations of what might be done for the patient. He wanted to talk to the attending Dr. Stapleton Heading. Message relayed to the Dr. Stapleton Heading.

## 2020-08-26 NOTE — PROGRESS NOTES
Bedside(SBAR) Shift report given to Vick De Leon RN. Pt with loss of hearing today that started early this AM. MD made aware and new orders received. Order for ENT consult place but ENT does not come to the hospital. MD made aware. Pt  placed on specialty bed as ordered and L heel boot applied. Jin was unable to deliver the Air loss envision model but substituted the mattress for another air model. Pt with some possible excoriation, pt encouraged to turn, placed on mobility team, and zinc cream ordered. Pt OOB today by kat lift.

## 2020-08-26 NOTE — WOUND CARE
Wound care Nurse Consult: consult from staff nurse for  \"excoriation along gluteal fold\". Patient is a legally blind and [place] 37 y/o AAM admitted for sepsis. Past Medical History:  
Diagnosis Date  Cataracts  Chronic kidney disease  Diabetes ([address]  Hypercholesterolemia  Hypertension  Kidney failure  Obesity Past Surgical History:  
Procedure Laterality Date  COLONOSCOPY N/A 12/8/2017 COLONOSCOPY performed by Nasreen Oliver MD at [facility] ENDOSCOPY  COLONOSCOPY,DIAGNOSTIC  12/8/2017  HX AMPUTATION Left great toe and L 5th toe  HX AMPUTATION TOE Right  UPPER GI ENDOSCOPY,BIOPSY  12/8/2017  VASCULAR SURGERY PROCEDURE UNLIST    
 R side chest  
 VASCULAR SURGERY PROCEDURE UNLIST Left 07/25/2017 Creation transposed AV fistula arm Patient had just returned from HD. Right BKA with knee strightener on and left foot ace wrap - both of these wounds/dressing managed by vascular and podiatry. Tere Lange Gluteal cleft fissure caused by skin on skin (intertriginous) and moisture found: 
   
Wound Gluteal fold/cleft fissure to gluteal cleft (Active) Dressing Status Intact 08/26/20 1402 Dressing Type Zinc based paste 08/26/20 1402 Non-staged Wound Description Partial thickness 08/26/20 1402 Shape linear 08/26/20 1402 Wound Length (cm) 1.5 cm 08/26/20 1402 Wound Width (cm) 0.2 cm 08/26/20 1402 Wound Surface Area (cm^2) 0.3 cm^2 08/26/20 1402 Condition of [place] 08/26/20 1402 Drainage Amount None 08/26/20 1402 Wound Odor None 08/26/20 1402 Amanda-wound Assessment Denuded 08/26/20 1402 Procedure Tolerated Well 08/26/20 1402 Number of days: 1 Recommend: 
 
Gluteal cleft fissure: cleanse gently with soap and water, pat skin dry and apply ES Desitin to skin. Avoid pulling buttocks to far apart or cleansing to hard to avoid further skin damage.  
 
Rudolph Culp RN, [employer]

## 2020-08-27 NOTE — PROGRESS NOTES
Name: Kurt Anderson MRN: [id number] : 1985 Assessment   :                                               Plan: ESKD Anemia of ESRD + post op blood loss DM-2, uncontrolled Mild Hyperkalemia- resolved HTN>>Hypotension Hyponatremia-better Foot infection- s/p  R BKA on 8-24 Hearing loss Legally blind [facility]  
 
HD tomm per schedule Off Vanco 
 
Ct  RETACRIT to 10K 3x/week ABx per ID;  
?cause of new hearing loss- vanco vs anesthesia Holding bp meds; was on midodrine (now off) Ct FR of 1.5 L/day Subjective: 
Resting. Hard of hearing- persists. Also legally blind. D/W RN at bedside. C/o pain on R side of leg near hip No fever or erythema locally ROS:  
Deferred Exam: 
Visit Vitals /67 (BP 1 Location: Right arm, BP Patient Position: At rest) Pulse 73 Temp 98 °F (36.7 °C) Resp 18 Ht 6' 2\" (1.88 m) Wt 139.9 kg (308 lb 6.8 oz) Comment: VIA WESLEY LIFT SpO2 94% BMI 39.60 kg/m² NAD No resp distress R hip site- without any obvious warmth, erythema or edema R BKA; LLE with no edema Alert awake +[place] 
L AV access patent Current Facility-Administered Medications Medication Dose Route Frequency Last Dose  epoetin nata-epbx (RETACRIT) injection 10,000 Units  10,000 Units SubCUTAneous Q MON, WED & FRI 10,000 Units at 20 2228  zinc oxide-cod liver oil (DESITIN) 40 % paste   Topical BID and QHS  acetaminophen (TYLENOL) tablet 1,000 mg  1,000 mg Oral Q8H 1,000 mg at 20 0600  
 gabapentin (NEURONTIN) capsule 100 mg  100 mg Oral  mg at 20 2228  oxyCODONE IR (ROXICODONE) tablet 5-10 mg  5-10 mg Oral Q4H PRN 10 mg at 20 2419  heparin (porcine) injection 5,000 Units  5,000 Units SubCUTAneous Q8H 5,000 Units at 20 0902  sodium chloride (NS) flush 5-40 mL  5-40 mL IntraVENous PRN 10 mL at 08/26/20 0554  
 HYDROmorphone (PF) (DILAUDID) injection 1 mg  1 mg IntraVENous Q3H PRN 1 mg at 08/26/20 1831  phenol throat spray (CHLORASEPTIC) 1 Spray  1 Spray Oral PRN    
 albumin human 25% (BUMINATE) solution 25 g  25 g IntraVENous PRN 25 g at 08/26/20 0929  
 midodrine (PROAMATINE) tablet 5 mg  5 mg Oral TID WITH MEALS 5 mg at 08/27/20 7933  piperacillin-tazobactam (ZOSYN) 3.375 g in 0.9% sodium chloride (MBP/ADV) 100 mL  3.375 g IntraVENous Q12H 3.375 g at 08/27/20 0934  
 lactobac ac& pc-s.therm-b.anim (USMAN Q/RISAQUAD)  1 Cap Oral DAILY 1 Cap at 08/27/20 9720  loperamide (IMODIUM) capsule 2 mg  2 mg Oral Q4H PRN 2 mg at 08/18/20 0901  
 ondansetron (ZOFRAN) injection 4 mg  4 mg IntraVENous Q6H PRN 4 mg at 08/25/20 0150  
 insulin lispro (HUMALOG) injection   SubCUTAneous AC&HS Stopped at 08/26/20 1630  
 glucose chewable tablet 16 g  4 Tab Oral PRN    
 dextrose (D50W) injection syrg 12.5-25 g  12.5-25 g IntraVENous PRN    
 glucagon (GLUCAGEN) injection 1 mg  1 mg IntraMUSCular PRN    
 insulin glargine (LANTUS) injection 20 Units  20 Units SubCUTAneous DAILY 20 Units at 08/27/20 0940 Labs/Data: 
 
Lab Results Component Value Date/Time WBC 11.5 (H) 08/27/2020 05:42 AM  
 Hemoglobin (POC) 11.9 (L) 07/25/2017 10:12 AM  
 HGB 9.1 (L) 08/27/2020 05:42 AM  
 Hematocrit (POC) 33 (L) 08/24/2020 10:51 AM  
 HCT 29.3 (L) 08/27/2020 05:42 AM  
 PLATELET 682 (H) 32/73/9210 05:42 AM  
 MCV 83.5 08/27/2020 05:42 AM  
 
 
Lab Results Component Value Date/Time  Sodium 132 (L) 08/26/2020 01:57 AM  
 Potassium 5.0 08/26/2020 01:57 AM  
 Chloride 95 (L) 08/26/2020 01:57 AM  
 CO2 27 08/26/2020 01:57 AM  
 Anion gap 10 08/26/2020 01:57 AM  
 Glucose 157 (H) 08/26/2020 01:57 AM  
 BUN 46 (H) 08/26/2020 01:57 AM  
 Creatinine 9.82 (H) 08/26/2020 01:57 AM  
 BUN/Creatinine ratio 5 (L) 08/26/2020 01:57 AM  
 GFR est AA 7 (L) 08/26/2020 01:57 AM  
 GFR est non-AA 6 (L) 08/26/2020 01:57 AM  
 Calcium 9.3 08/26/2020 01:57 AM  
 
 
Wt Readings from Last 3 Encounters:  
08/25/20 139.9 kg (308 lb 6.8 oz) 08/13/20 153.2 kg (337 lb 11.9 oz) 08/07/20 153.8 kg (339 lb 1.1 oz) Intake/Output Summary (Last 24 hours) at 8/27/2020 1112 Last data filed at 8/27/2020 2098 Gross per 24 hour Intake 700 ml Output 2000 ml Net -1300 ml Patient seen and examined. Chart reviewed. Labs, data and other pertinent notes reviewed in last 24 hrs. PMH/SH/FH reviewed and unchanged compared to H&P Arianna Pepe MD

## 2020-08-27 NOTE — PROGRESS NOTES
TEJA PLAN: 
 
1) Acute IP Rehab - referrals sent to [facility], [facility] per patient choice 2) Screening for Medicaid - will need UAI completed - another e-mail sent to [employer] to help family with application 3) Resumption of outpatient dialysis at discharge from [facility] - goes to [facility] 
 
4) 2nd IM letter prior to discharge 5) Follow-up appts 6) Assist with completing ACP - designates his cousin Jareth Vilchis - [phone] as his mPOA. 7) Will likely need medical transport at discharge. Juliet Thompson, RN, BSN, ACM [address] [facility]   Coordinator [phone]

## 2020-08-27 NOTE — PROGRESS NOTES
Infectious Disease Progress Note IMPRESSION:  
 
- Sepsiswbc 14.3 
- Diabetic R/ foot infection with gangrene S/p R/ BKA  
- Reported hearing loss,pt answering questions appropeiately WC- - polymicrobial 
  K.pneumoniae, Pseudomonas aeruginosa, , Pseudomonas stutzerii E.cloacae complex, Diphtheroids, mixed skin matt. Anaerobic - NG 
  WC- - polymicrobial 
  Staph species, coagulase negative , K.pneumoniae, Pseudomonas( 2 colony types) E.cloacae complex, Diphtheroids BC- NG 
- S/p amputation of R/ 1st & 2nd toes on  WC - - Klebsiella pneumoniae( 2 colony types) , Staph epidermis, Diphtheroids - H/o OM of R 3rd, 4th toes Amputation of 3rd toe - 20, 4th toe- 10/11/19 
- H/o MRSA infection  
- Poorly controlled Diabetes A1c 9.4 
 - PAD S/p RLE arteriogram, angioplasty on  
- ESRD on HD 
- Poorly controlled HTN improved - ESR - 116 PLAN:  
  
 
 
- Continue Cefepime IV, Flagyl x total 5 days, s/p DC of Vancomycin - BC x 2 if temps >100.5 - Blood sugar control- D/w pt - Monitor for hearing issues- Pharmacy review of meds Subjective:  
 
Pt seen. Not answering questions today D/w RN events of day Review of Systems:  A comprehensive review of systems was negative except for that written in the History of Present Illness. 10 point ROS obtained . All other systems negative . Objective:  
 
Blood pressure 122/71, pulse 86, temperature 98.6 °F (37 °C), resp. rate 18, height 6' 2\" (1.88 m), weight 308 lb 6.8 oz (139.9 kg), SpO2 96 %. Temp (24hrs), Av.5 °F (36.9 °C), Min:98.1 °F (36.7 °C), Max:98.9 °F (37.2 °C) Patient Vitals for the past 24 hrs: 
 Temp Pulse Resp BP SpO2  
20 2250 98.6 °F (37 °C) 86 18 122/71 96 % 20 1920 98.4 °F (36.9 °C) 95 22 119/69 96 % 20 1913  95   95 % 20 1448 98.6 °F (37 °C) 92 19 125/73 97 % 20 1229 98.1 °F (36.7 °C) 79 19 124/67 99 % 08/26/20 1200  77 18 125/82   
08/26/20 1145  69 20 110/71   
08/26/20 1130  76 20 91/53   
08/26/20 1115  76 18 105/71   
08/26/20 1100  77 18 103/60   
08/26/20 1045  78 18 115/56   
08/26/20 1030  81 18 101/53   
08/26/20 1015  82 18 105/57   
08/26/20 1000  82 18 111/62   
08/26/20 0945  68 18 110/86   
08/26/20 0930  78 18 95/52   
08/26/20 0915  75 18 101/45   
08/26/20 0900  77 18 112/53   
08/26/20 0845  75 18 102/54   
08/26/20 0830  81 18 100/58   
08/26/20 0815  80 18 97/62   
08/26/20 0800  80 18 99/61   
08/26/20 0745  83 18 121/77   
08/26/20 0730 98.1 °F (36.7 °C) 87 18 122/72   
08/26/20 0325 98.9 °F (37.2 °C) 92 20 110/60 93 % Lines:  Peripheral IV:    
 
Physical Exam:  
General:  Awake, cooperative, Eyes:  Sclera anicteric. Pupils equally round and reactive to light. Mouth/Throat: Mucous membranes normal, oral pharynx clear Neck: Supple Lungs:    Reduced auscultation bases CV:  Regular rate and rhythm,no murmur, click, rub or gallop Abdomen:   Soft, non-tender. bowel sounds normal. non-distended Extremities: No  edema Skin: Skin color, texture, turgor normal. no acute rash or lesions Lymph nodes: Cervical and supraclavicular normal  
Musculoskeletal: No swelling or deformity, R/BKA Lines/Devices:  Intact, no erythema, drainage or tenderness Psych: Awake and oriented, depressed mood affect Data Reviewed: CBC:  
Recent Labs  
  08/26/20 
0157 08/25/20 
0547 08/24/20 
3046 WBC 14.3* 18.9* 17.2*  
RBC 3.48* 3.86* 3.96* HGB 8.8* 9.9* 10.0* HCT 28.7* 32.4* 32.1*  
* 432* 467* GRANS 74 78* 83* LYMPH 12 6* 7* EOS 1 1 0 CMP:  
Recent Labs  
  08/26/20 
0157 08/25/20 
0547 08/24/20 
9226 * 163* 251* * 130* 127*  
K 5.0 5.2* 4.9  
CL 95* 94* 90* CO2 27 25 25 BUN 46* 35* 58* CREA 9.82* 7.71* 11.50* CA 9.3 9.6 9.6 AGAP 10 11 12 BUCR 5* 5* 5* Studies:     
Lab Results Component Value Date/Time Culture result: LIGHT KLEBSIELLA PNEUMONIAE (A) 08/21/2020 11:50 AM  
 Culture result: LIGHT ENTEROBACTER CLOACAE COMPLEX (A) 08/21/2020 11:50 AM  
 Culture result: LIGHT DIPHTHEROIDS (A) 08/21/2020 11:50 AM  
 Culture result: LIGHT PSEUDOMONAS AERUGINOSA (A) 08/21/2020 11:50 AM  
 Culture result: NO ANAEROBES ISOLATED 08/21/2020 11:50 AM  
  
 
XR Results (most recent): 
Results from Hospital Encounter encounter on 08/12/20 XR CHEST PORT Narrative EXAM: Portable CXR. 15 hours. INDICATION: chest pain FINDINGS: 
The lungs appear clear. Heart is normal in size. There is no pulmonary edema. There is no evident pneumothorax, adenopathy or pleural effusion. No significant 
change since 8/12/2020. Impression IMPRESSION: No Acute Disease. Patient Active Problem List  
Diagnosis Code  
 HTN (hypertension) I10  
 Postoperative hypoxia R09.02, Z98.890  
 Diarrhea R19.7  ESRD (end stage renal disease) on dialysis ([facility]) N18.6, Z99.2  Hyperlipidemia associated with type 2 diabetes mellitus ([facility]) E11.69, E78.5  Morbid obesity with body mass index (BMI) of 40.0 to 49.9 ([facility]) E66.01  
 Uncontrolled type 2 diabetes mellitus with proliferative retinopathy, with long-term current use of insulin ([address] E11.3599, E11.65, Z79.4  
 Uncontrolled type 2 diabetes mellitus with hyperglycemia, with long-term current use of insulin ([facility]) E11.65, Z79.4  
 Uncontrolled hypertension I10  
 Acute osteomyelitis of toe of right foot ([address] M86.171  
 Osteomyelitis ([address] M86.9  Foot ulcer ([address] L97.509  ESRD (end stage renal disease) ([address] N18.6  Wound cellulitis L03.90  
 Osteomyelitis of second toe of right foot ([address] M86.9  Sepsis ([address] A41.9  Cellulitis of right foot L03.115  
 Acute respiratory failure with hypoxia and hypercapnia ([facility]) J96.01, J96.02  
 
 
  ICD-10-CM ICD-9-CM 1.  Cellulitis, wound, post-operative  T81.49XA 998.59   
 2. Acute osteomyelitis of toe of right foot ([place] [address]  M86.171 730.07   
3. Acute respiratory failure with hypoxia and hypercapnia ([facility])  J96.01 518.81   
 J96.02    
4. Cellulitis of right foot  L03.115 682.7 5. Diarrhea of infectious origin  A09 009.2 6. Osteomyelitis of foot, right, acute ([place] [address]  M86.171 730.07   
7. Pseudomonas infection  A49.8 041.7 8. Klebsiella infection  A49.8 041.85   
9. Infection caused by Enterobacter cloacae  A49.8 041.85   
10. ESRD (end stage renal disease) ([facility]  N18.6 585.6 11. Poorly controlled diabetes mellitus ([place] [address]  E11.65 250.00   
12. ESRD (end stage renal disease) on dialysis ([facility])  N18.6 585.6 Z99.2 V45.11   
13. Other chronic osteomyelitis of right foot ([place] [address]  M86.671 730.17   
14. Sepsis due to Pseudomonas species with acute renal failure without septic shock, unspecified acute renal failure type ([place] [address]  A41.52 038.43   
 R65.20 995.92   
 N17.9 584.9 15. Bandemia  D72.825 288.66   
16. Essential hypertension  I10 401.9 17. Morbid obesity with body mass index (BMI) of 40.0 to 49.9 ([facility])  E66.01 278.01   
18. Osteomyelitis of second toe of right foot ([facility])  M86.9 730.27   
19. Postoperative hypoxia  R09.02 799.02   
 Z98.890    
20. Uncontrolled hypertension  I10 401.9   
21. Uncontrolled type 2 diabetes mellitus with hyperglycemia, with long-term current use of insulin ([facility])  E11.65 250.02   
 Z79.4 V58.67   
22. Uncontrolled type 2 diabetes mellitus with proliferative retinopathy, with long-term current use of insulin ([facility]  E11.3599 250.52 E11.65 362.02   
 Z79.4 V58.67   
23. Wound cellulitis  L03.90 682.9 I have discussed the diagnosis with the patient and the intended plan as seen in the above orders. I have discussed medication side effects and warnings with the patient as well. Reviewed test results at length with patient Anti-infectives:  
 
 Vancomycin/ Cefepime IV  Keke Garcia MD FACP

## 2020-08-27 NOTE — PROGRESS NOTES
Comprehensive Nutrition Assessment Type and Reason for Visit: Starlin Leventhal Nutrition Recommendations/Plan:  
Continue current diet RD to add ONS to promote protein intake Continue monitoring PO intake, wt, labs, POC Please document % meals and supplements consumed in flowsheet I/O's under intake Nutrition Assessment:     
Pt seen for follow up. Pt has had significant hearing loss since BKA surgery (8/24) and communicated with RD via phone. He is also legally blind. Pt reports sore throat and only able to eat soft foods and liquids at this time. RN reports 50% meal intake and drinking well. Pt agreeable to try ONS to promote intake. Pt also requested soup, spaghetti, and pudding for sore throat. RD will continue monitoring for nutrition needs and ONS use. Patient Vitals for the past 72 hrs: 
 % Diet Eaten 08/27/20 0940 50 % 08/26/20 1411 50 % 08/25/20 1650 0 % 08/25/20 1214 25 % Wt Readings from Last 5 Encounters:  
08/25/20 139.9 kg (308 lb 6.8 oz) 08/13/20 153.2 kg (337 lb 11.9 oz) 08/07/20 153.8 kg (339 lb 1.1 oz) 08/02/20 154 kg (339 lb 8.1 oz) 01/15/20 149.8 kg (330 lb 4.8 oz) ] Estimated Daily Nutrient Needs: 
Energy (kcal):  2308 (BMR 2340 x 1. 2AF - 500) Protein (g):  97-113g (1.2-1.4g/kg IBW) Fluid (ml/day):  1500ml Nutrition Related Findings:  Meds: lantus, humalog, floraQ, zosyn. Labs: BG [id number]. BM 8/23. Wounds:   
Surgical wound, Partial thickness Current Nutrition Therapies: DIET RENAL Regular; Consistent Carb 1800kcal; FR 1500ML; AHA-LOW-CHOL FAT Anthropometric Measures: 
· Height:  6' 2\" (188 cm) · Current Body Wt:  139.9 kg (308 lb 6.8 oz) · Admission Body Wt:      
· Usual Body Wt:       
· Ideal Body Wt:  190 lbs:  165.3 % · Adjusted Body Weight:  326.6; Weight Adjustment for: Amputation · Adjusted BMI:  41.9 · BMI Category:  Obese class 2 (BMI 35.0-39. 9) Nutrition Diagnosis: · Altered nutrition-related lab values related to renal dysfunction, endocrine dysfunction as evidenced by dialysis, lab values Nutrition diagnosis continues; K currently WNL · Increased nutrient needs related to (increased need for protein) as evidenced by wounds(s/p BKA; HD) Nutrition Interventions:  
Food and/or Nutrient Delivery: Continue current diet, Start oral nutrition supplement Nutrition Education and Counseling: No recommendations at this time Coordination of Nutrition Care: Continued inpatient monitoring Goals: 
Intake >75% meals + ONS with BG maintained <200mg/dl and renal labs controlled next 5-7 days Nutrition Monitoring and Evaluation:  
Behavioral-Environmental Outcomes:   
Food/Nutrient Intake Outcomes: Food and nutrient intake, Supplement intake Physical Signs/Symptoms Outcomes: Biochemical data, Weight, Skin Discharge Planning:   
Continue current diet, Assist with food insecurity Electronically signed by Nery Dumas RD on 8/27/2020 at 12:49 PM 
 
Contact: [place] [address] [place] [address] Pager [phone]

## 2020-08-27 NOTE — PROGRESS NOTES
Problem: Mobility Impaired (Adult and Pediatric) Goal: *Acute Goals and Plan of Care (Insert Text) Description: FUNCTIONAL STATUS PRIOR TO ADMISSION: At baseline pt ambulated with cane. Lives alone and takes [facility] to HD appointments. HOME SUPPORT PRIOR TO ADMISSION: Patient lived alone. Physical Therapy Goals Initiated 8/25/2020 1. Patient will move from supine to sit and sit to supine , scoot up and down, and roll side to side in bed with minimal assistance/contact guard assist within 7 day(s). 2.  Patient will transfer from bed to chair and chair to bed with moderate assistance  using the least restrictive device within 7 day(s). 3.  Patient will perform sit to stand with moderate assistance  within 7 day(s). 4.  Patient will ambulate with maximal assistance for 10 feet with the least restrictive device within 7 day(s). Outcome: Progressing Towards Goal 
 PHYSICAL THERAPY TREATMENT Patient: Susan Alvarado (71 y.o. male) Date: 8/27/2020 Diagnosis: Sepsis ([facility] [A41.9] Cellulitis of right foot [A10.323] ESRD (end stage renal disease) ([facility] [N18.6] Acute respiratory failure with hypoxia and hypercapnia ([facility]) [J96.01, J96.02]  
<principal problem not specified> Procedure(s) (LRB): 
RIGHT BELOW KNEE AMPUTATION (Right) 3 Days Post-Op Precautions: Fall, NWB(legally blind, [place]) Chart, physical therapy assessment, plan of care and goals were reviewed. ASSESSMENT Patient continues with skilled PT services and is progressing towards goals. Patient received in bed and agreeable to participate, still experiencing hearing loss so communicated with his cell phone. Patient initially declined participating, but was agreeable after education on importance of being up. Patient come to EOB with mod assist, sitting balance was intact today and patient was able to perform a variety of exercises for UEs and LEs without LOB.   Attempted coming to stand x 2 reps with max assist x 2  but patient unable to come fully up, but was able to scoot to [facility] in sitting position with SBA. Patient left in supine, is well below baseline and would benefit from skilled rehab post discharge. Current Level of Function Impacting Discharge (mobility/balance): mod assist for mobility Other factors to consider for discharge: legally blind and now unable to hear, below baseline PLAN : 
Patient continues to benefit from skilled intervention to address the above impairments. Continue treatment per established plan of care. to address goals. Recommendation for discharge: (in order for the patient to meet his/her long term goals) Therapy 3 hours per day 5-7 days per week This discharge recommendation: 
Has been made in collaboration with the attending provider and/or case management IF patient discharges home will need the following DME: to be determined (TBD) SUBJECTIVE:  
Patient stated you don't understand how much this hurts.  OBJECTIVE DATA SUMMARY:  
Critical Behavior: 
Neurologic State: Alert Orientation Level: Oriented X4 Cognition: Follows commands Functional Mobility Training: 
Bed Mobility: 
Rolling: Moderate assistance;Assist x2 Supine to Sit: Moderate assistance;Assist x2 Sit to Supine: Moderate assistance;Assist x2 Scooting: Stand-by assistance(at EOB) Transfers: 
  
  
     
  
     
  
  
  
  
Balance: 
Sitting: Intact Standing: (unable to come to stand) Ambulation/Gait Training: 
  
  
  
  
  
  
  
  
  
  
  
  
  
  
  
  
  
  
Stairs: Therapeutic Exercises:  
Head turns, shoulder circles, arm raises, LAQ Pain Rating: 
 
 
Activity Tolerance:  
Fair Please refer to the flowsheet for vital signs taken during this treatment.  
 
After treatment patient left in no apparent distress:  
Supine in bed, Call bell within reach, and Side rails x 3 
 
COMMUNICATION/COLLABORATION:  
 The patients plan of care was discussed with: Occupational therapist and Registered nurse. Adalberto Muñoz, PT Time Calculation: 43 mins

## 2020-08-27 NOTE — PROGRESS NOTES
Problem: Self Care Deficits Care Plan (Adult) Goal: *Acute Goals and Plan of Care (Insert Text) Description:  
FUNCTIONAL STATUS PRIOR TO ADMISSION:  
 
HOME SUPPORT:  
 
Occupational Therapy Goals Initiated 8/27/2020 1. Patient will perform supine to sit EOB  with minimal assistance in preparation for adls, within 7 day(s). 2.  Patient will perform grooming, seated EOB with supervision/set-up within 7 day(s). 3.  Patient will perform sponge bathing with minimal assistance/contact guard assist within 7 day(s). 4.  Patient will perform toilet transfers with maximal assistance X2 within 7 day(s). 5.  Patient will perform all aspects of toileting with moderate assistance  within 7 day(s). 6.  Patient will participate in upper extremity therapeutic exercise/activities with supervision/set-up for 10 minutes within 7 day(s). 7.  Patient will utilize energy conservation techniques during functional activities with minimal verbal cues within 7 day(s). 8.  Patient will perform sit to stand with moderate assistance in preparation for adl mobility/transfers training within 7 days Outcome: Not Met OCCUPATIONAL THERAPY EVALUATION Patient: Pinky Oh (28 y.o. male) Date: 8/27/2020 Primary Diagnosis: Sepsis ([facility] [address] [A41.9] Cellulitis of right foot [R70.519] ESRD (end stage renal disease) ([facility] [address] [N18.6] Acute respiratory failure with hypoxia and hypercapnia (HCC) [J96.01, J96.02] Procedure(s) (LRB): 
RIGHT BELOW KNEE AMPUTATION (Right) 3 Days Post-Op Precautions:   Fall, NWB(legally blind, [place]), pt is recently deaf--he is able to read very large bold print--1 to 2 inch. ASSESSMENT Based on the objective data described below, the patient presents with complex medical history admitted with sepsis and cellulitis of R foot.   Pt had a  recent R BKA POD 3, has new deafness, baseline vision impairment (legally blind), long hospitalization (admitted 8/12),  generally intact sitting balance, but unable to stand, resulting in impaired adls and mobility. Pt is functioning below his baseline and will benefit from intensive rehab at discharge. Shereen Vaughn Current Level of Function Impacting Discharge (ADLs/self-care): pt is generally assist X2 for mobility, and max A for self care Functional Outcome Measure: The patient scored Total: 40/100 on the Barthel Index outcome measure which is indicative of 60% impaired ability to care for basic self needs/dependency on others; inferred dependency on others for instrumental ADLs. Other factors to consider for discharge: long hospitalization, complex medical condition, new deafness, dialysis pt. Patient will benefit from skilled therapy intervention to address the above noted impairments. PLAN : 
Recommendations and Planned Interventions: self care training, functional mobility training, therapeutic exercise, balance training, visual/perceptual training, therapeutic activities, endurance activities, patient education, home safety training, and family training/education Frequency/Duration: Patient will be followed by occupational therapy 4 times a week to address goals. Recommendation for discharge: (in order for the patient to meet his/her long term goals) Therapy 3 hours per day 5-7 days per week This discharge recommendation: 
Has not yet been discussed the attending provider and/or case management IF patient discharges home will need the following DME: tbd (would need kat lift, BSC if d/c home this date) SUBJECTIVE:  
Patient reports that he would like to be able to stand OBJECTIVE DATA SUMMARY:  
HISTORY:  
Past Medical History:  
Diagnosis Date Cataracts Chronic kidney disease Diabetes ([facility] [address] Hypercholesterolemia Hypertension Kidney failure Obesity Past Surgical History:  
Procedure Laterality Date COLONOSCOPY N/A 12/8/2017 COLONOSCOPY performed by Paulo Savage MD at [facility]  12/8/2017 HX AMPUTATION Left great toe and L 5th toe HX AMPUTATION TOE Right UPPER GI ENDOSCOPY,BIOPSY  12/8/2017 VASCULAR SURGERY PROCEDURE UNLIST    
 R side chest  
 VASCULAR SURGERY PROCEDURE UNLIST Left 07/25/2017 Creation transposed AV fistula arm Expanded or extensive additional review of patient history:  
 
Home Situation Home Environment: Apartment # Steps to Enter: (uses elevator for 3rd floor apartment) One/Two Story Residence: One story Living Alone: Yes Support Systems: Family member(s) Patient Expects to be Discharged to[de-identified] Rehabilitation facility Current DME Used/Available at Home: Cane, straight Hand dominance: Right EXAMINATION OF PERFORMANCE DEFICITS: 
Cognitive/Behavioral Status: 
Neurologic State: Alert Orientation Level: Appropriate for age Cognition: Follows commands Perception: Appears intact Perseveration: No perseveration noted Safety/Judgement: Fall prevention; Insight into deficits Skin: appears generally intact, BKA site not viewed--brace on 
 
Edema: none observed Hearing: Auditory Auditory Impairment: Deaf(recent total hearing loss) Hearing Aids/Status: Does not own Vision/Perceptual:   
    
    
    
  
    
Acuity: (legally blind at baseline-needs Xtra large 1 to 2 \"boldprint) Range of Motion: BUEs: 
AROM: Within functional limits(BUEs:  ) PROM: Within functional limits(BUEs:) Strength: BUEs: 
Strength: Generally decreased, functional(weak due to hospitalization/surgery pt adm 8/12) Coordination: 
  
Fine Motor Skills-Upper: Left Intact; Right Intact Gross Motor Skills-Upper: Left Intact; Right Intact Tone & Sensation: Tone is normal 
Sensation-not tested Balance: 
Sitting: Intact Sitting - Static: Good (unsupported) Sitting - Dynamic: (not tested) Standing: (unable to come to stand) Functional Mobility and Transfers for ADLs: 
Bed Mobility: 
Rolling: Moderate assistance;Assist x2 Supine to Sit: Moderate assistance;Assist x2 Sit to Supine: Moderate assistance;Assist x2 Scooting: Stand-by assistance(at EOB) Transfers: 
Sit to Stand: (attempted however, pt minimally clearing bottom.  ) Stand to Sit: Minimum assistance Bed to Chair: (unable to safely perform at this time, unable to hear comman) ADL Assessment: 
Feeding: Stand-by assistance;Setup Oral Facial Hygiene/Grooming: Supervision;Stand-by assistance Bathing: Maximum assistance Upper Body Dressing: Minimum assistance Lower Body Dressing: Maximum assistance ADL Intervention and task modifications: 
  
Pt was able to maintain static and mild dynamic balance challenges while seated EOB. Pt was able wipe face / neck area with set up only while seated EOB. No LOB with therapeutic exercises in B shoulder flexion overhead. Would benefit from theraband program 
Pt communicated by therapist talking into his cell phone and then pt using his magnifying glass to read the print on his phone in order to respond. Pt is also able to read bold one to two inch print. Cognitive Retraining Safety/Judgement: Fall prevention; Insight into deficits Functional Measure: 
Barthel Index: 
 
Bathin Bladder: 10 Bowels: 10 
Groomin Dressin Feeding: 10 Mobility: 0 Stairs: 0 Toilet Use: 0 Transfer (Bed to Chair and Back): 0 Total: 40/100 The Barthel ADL Index: Guidelines 1. The index should be used as a record of what a patient does, not as a record of what a patient could do. 2. The main aim is to establish degree of independence from any help, physical or verbal, however minor and for whatever reason. 3. The need for supervision renders the patient not independent. 4. A patient's performance should be established using the best available evidence. Asking the patient, friends/relatives and nurses are the usual sources, but direct observation and common sense are also important. However direct testing is not needed. 5. Usually the patient's performance over the preceding 24-48 hours is important, but occasionally longer periods will be relevant. 6. Middle categories imply that the patient supplies over 50 per cent of the effort. 7. Use of aids to be independent is allowed. Kee Medina., Barthel, D.W. ([id number]). Functional evaluation: the Barthel Index. [address] [facility] (14)2. JAY Ewing, Gisselle Hirsch., Asia Newton., [place], [address] [facility] (1999). Measuring the change indisability after inpatient rehabilitation; comparison of the responsiveness of the Barthel Index and Functional Spelter Measure. Journal of Neurology, Neurosurgery, and Psychiatry, 66(4), 541-621. Lendon Soulier, N.J.A, YULISSA Rodriguez, & Gabe Gerardo M.A. (2004.) Assessment of post-stroke quality of life in cost-effectiveness studies: The usefulness of the Barthel Index and the EuroQoL-5D. [facility], 13, 319-10 Occupational Therapy Evaluation Charge Determination History Examination Decision-Making LOW Complexity : Brief history review  MEDIUM Complexity : 3-5 performance deficits relating to physical, cognitive , or psychosocial skils that result in activity limitations and / or participation restrictions MEDIUM Complexity : Patient may present with comorbidities that affect occupational performnce. Miniml to moderate modification of tasks or assistance (eg, physical or verbal ) with assesment(s) is necessary to enable patient to complete evaluation Based on the above components, the patient evaluation is determined to be of the following complexity level: LOW Pain Rating: 
Pt did not rate, but requested pain meds during session--nursing informed Activity Tolerance:  
Good Please refer to the flowsheet for vital signs taken during this treatment. After treatment patient left in no apparent distress:   
Supine in bed, Call bell within reach, and Side rails x 3 
 
COMMUNICATION/EDUCATION:  
The patients plan of care was discussed with: Physical therapist and Registered nurse. Patient/family agree to work toward stated goals and plan of care. This patients plan of care is appropriate for delegation to [facility]. Thank you for this referral. 
Kirill Birmingham, OTR/L Time Calculation: 39 mins

## 2020-08-27 NOTE — PROGRESS NOTES
Hospitalist Progress Note NAME: Audra White :  1985 MRN:  [id number] Assessment / Plan: 
Sepsis, POA  WBC 19K, tachycardia Right foot cellulitis with abscess, POA after amputation of right 1st and second toe for osteomyelitis  Hx osteomyelitis right 3rd and 4th toes 2020 and 10/2019 respectively Acute hypoxic respiratory failure 88% RA IDDM uncontrolled with hyperglycemia  ESRD on HD MWF Obesity 
  
--podiatry consult apreciated, MRI right foot showed fluid collection and abscess with no osteomyelitis -s/p irrigation of wound -- SARS-COV-2 negative,  
--continue lantus 20 units daily, add moderate SSI 
-- appreciated renal consult to continue with dialysis -s/p  RIGHT LEG ARTERIOGRAM ANTERIOR TIBIAL ARTERY AND ANGIOPLASTY  
 s/p  excision and debridement of necrotic tissue right foot   
 s/p BKA  Continue zosyn , Vancomycin hold because of patient developed hearing loss Will discuss with pharmacy about patient's medications to figure out which agent may have caused patient's hearing loss. Hypotension  
-Hold  amlodipine and Lisinopril 
-continue midodrine Hyperkalemia  
-kayexalate   
 
  
Diarrhea: 
-Likely Antibiotic induced 
-Resolved -Imodium PRN 
-c/w probiotic 
  
Hyponatremia  
-motoring  
  
Chest pain  
-troponin negative  
- EKG show no acute abnormality 
-Echo show EF 45-50 % Hearing loss  
- remove offending agents which may affect pt's hearing -ENT consult  
-pharmacy consult  
  
Body mass index is 43.36 kg/m². 
 
 
  
Code: full DVT prophylaxis: heparin Surrogate decision maker:  Kala and Anne Robbins (Cousins). Pt verbalized these 2 names when asked about decision maker. D/w ROXANE De La Rosa Patient will need rehab at discharge, Inpatient rehab eval sent  
  
  
  
   
 
Subjective: Chief Complaint / Reason for Physician Visit Discussed with RN events overnight. Patient lost his hearing and is very frustrated Aunt at bedside, got patient's consent to discuss medical information Review of Systems: 
Symptom Y/N Comments  Symptom Y/N Comments Fever/Chills    Chest Pain Poor Appetite    Edema Cough    Abdominal Pain Sputum    Joint Pain SOB/LITTLE    Pruritis/Rash Nausea/vomit    Tolerating PT/OT Diarrhea    Tolerating Diet Constipation    Other Could NOT obtain due to: Hearing lost   
 
Objective: VITALS:  
Last 24hrs VS reviewed since prior progress note. Most recent are: 
Patient Vitals for the past 24 hrs: 
 Temp Pulse Resp BP SpO2  
08/27/20 1457 98.1 °F (36.7 °C) 81 18 113/70 96 % 08/27/20 1150 97.7 °F (36.5 °C) 82 18 122/74 95 % 08/27/20 0832 98 °F (36.7 °C)  18 121/67 94 % 08/27/20 0345 98.2 °F (36.8 °C) 73 19 123/86 97 % 08/26/20 2250 98.6 °F (37 °C) 86 18 122/71 96 % 08/26/20 1920 98.4 °F (36.9 °C) 95 22 119/69 96 % 08/26/20 1913  95   95 % Intake/Output Summary (Last 24 hours) at 8/27/2020 1529 Last data filed at 8/27/2020 1150 Gross per 24 hour Intake 240 ml Output  Net 240 ml PHYSICAL EXAM: 
General: WD, WN. Alert, cooperative, no acute distress   
EENT:  EOMI. Anicteric sclerae. MMM Resp:  Clear CV:  Regular  rhythm,  No edema GI:  Soft, Non distended, Non tender.  +Bowel sounds Neurologic:  Alert and oriented X 3, normal speech, Psych:   Good insight. Not anxious nor agitated Ext:   Rt BKA, dressing in place Reviewed most current lab test results and cultures  YES Reviewed most current radiology test results   YES Review and summation of old records today    NO Reviewed patient's current orders and MAR    YES 
PMH/SH reviewed - no change compared to H&P 
________________________________________________________________________ Care Plan discussed with: 
  Comments Patient y Family RN y   
Care Manager y Consultant     
                 y Multidiciplinary team rounds were held today with case manager, nursing, pharmacist and clinical coordinator. Patient's plan of care was discussed; medications were reviewed and discharge planning was addressed. ________________________________________________________________________ Total NON critical care TIME:  35   Minutes Total CRITICAL CARE TIME Spent:   Minutes non procedure based Comments >50% of visit spent in counseling and coordination of care y   
________________________________________________________________________ Vasiliy Trejo MD  
 
Procedures: see electronic medical records for all procedures/Xrays and details which were not copied into this note but were reviewed prior to creation of Plan. LABS: 
I reviewed today's most current labs and imaging studies. Pertinent labs include: 
Recent Labs  
  08/27/20 
0542 08/26/20 
0157 08/25/20 
0547 WBC 11.5* 14.3* 18.9* HGB 9.1* 8.8* 9.9*  
HCT 29.3* 28.7* 32.4*  
* 448* 432* Recent Labs  
  08/26/20 
0157 08/25/20 
0547 * 130*  
K 5.0 5.2*  
CL 95* 94* CO2 27 25 * 163* BUN 46* 35* CREA 9.82* 7.71* CA 9.3 9.6 Signed: Vasiliy Trejo MD

## 2020-08-27 NOTE — ACP (ADVANCE CARE PLANNING)
This ACP facilitator (CM ) met patient with Thomasine Sicard, PCT in patient's room to discuss naming health care decision makers for patient, if patient is unable to speak for himself or not have the ability to make his own healthcare decisions. Pt and this CM used patient's cell phone and magnifying glass to communicate with typed text due to patient being legally blind and extremely hard of hearing. Pt verbalized understanding of what this CM was in the room to discuss and called his cousin, Chao Pierce ([phone]) for confirmation from cousin that it was appropriate for him to proceed with working with this CM. CM obtained address from Ms. Fortino Wilhelm and information on patient's secondary healthcare agent, Derek Nur (another cousin) [phone]. Only the portion of naming healthcare decision maker was completed at this time. Pt would like to have cousin present to discuss advance care planning portion of the document. This CM and PCT signed as witnesses to patient placing his signature on this document. Document completed with healthcare decision makers identified and the rest of the AMD crossed out that pertains to medical decisions. Copies of the AMD were made. Original document and copies placed in patient's backpack at his request to keep for his records and give to his cousins whom he has identified as his primary and secondary healthcare decision makers. A copy was placed on patient's bedside medical chart to be scanned into his Electronic Medical Record. Names and relationship updated in patients electronic medical record reflecting Ms. Akanksha Wallace as primary healthcare decision maker and Derek Nur as secondary. Javier Martins Care Manager - [facility] Advanced Steps ACP Facilitator Zone Phone: [phone]

## 2020-08-28 NOTE — PROGRESS NOTES
Occupational Therapy Medical record reviewed. Pt is in HD at this time. Will defer and continue to follow as appropriate.

## 2020-08-28 NOTE — PROGRESS NOTES
ID  
 D/w Pharmacy earlier today Hearing loss possibly secondary to anesthesia . Vancomycin effect less likely D/w Dr Baron Palacios. ENT has been consulted & recommendations received

## 2020-08-28 NOTE — PROCEDURES
[facility] Dialysis Team [place]  [phone] Vitals   Pre   Post   Assessment   Pre   Post    
Temp  Temp: 98.2 °F (36.8 °C) (08/28/20 0845)  98.3 LOC  AxOx4 AxOx4 HR   Pulse (Heart Rate): 74 (08/28/20 0845) 77 Lungs   CTA alfredo Even and unlabored B/P   BP: 159/86 (08/28/20 0845) 128/75 Cardiac   RRR RRR Resp   Resp Rate: 18 (08/28/20 0845) 18 Skin   intact intact Pain level  Pain Intensity 1: 3 (08/27/20 1150)  Edema  No edema No edema Orders: Duration:   Start:    0845 End:    1310 Total:   4 hrs Dialyzer:   Dialyzer/Set Up Inspection: Alex Hopper (08/28/20 0845) K Bath:   Dialysate K (mEq/L): 2 (08/28/20 0845) Ca Bath:   Dialysate CA (mEq/L): 2.5 (08/28/20 0845) Na/Bicarb:   Dialysate NA (mEq/L): 138 (08/28/20 0845) Target Fluid Removal:   Goal/Amount of Fluid to Remove (mL): 2000 mL (08/28/20 0845) Access Type & Location:   Left upper arm AV Fistula without evidence of warmth, redness, or drainage. +thrill/+bruit. Each access site disinfected for 60 seconds per site with alcohol swabs per P&P. Cannulated with 15G needles x2 and secured with paper tape. +aspiration/+flushed. Labs Obtained/Reviewed Critical Results Called   Date when labs were drawn- 
Hgb-   
HGB Date Value Ref Range Status 08/28/2020 8.6 (L) 12.1 - 17.0 g/dL Final  
 
K-   
Potassium Date Value Ref Range Status 08/26/2020 5.0 3.5 - 5.1 mmol/L Final  
 
Ca-  
Calcium Date Value Ref Range Status 08/26/2020 9.3 8.5 - 10.1 MG/DL Final  
 
Bun-  
BUN Date Value Ref Range Status 08/26/2020 46 (H) 6 - 20 MG/DL Final  
 
Creat-  
Creatinine Date Value Ref Range Status 08/26/2020 9.82 (H) 0.70 - 1.30 MG/DL Final  
 
  
Medications/ Blood Products Given Name   Dose   Route and Time Albumin 25% 12.5 g  @ 1100 Blood Volume Processed (BVP):   94.8 L Net Fluid Removed:  1500 mL Comments Time Out Done: 0859 Primary Nurse Rpt Pre: Lisa Amaro RN 
 Primary Nurse Rpt Post: Eduardo Lau RN 
Pt Education: infection control Care Plan: continue current HD plan of care Tx Summary: 
0845 HD treatment initiated per physician order. [address] Alan Spencer at bedside. 1100 Albumin initiated due to BP trending down. 1310 HD treatment completed per physician order. All possible blood returned to patient. Each catheter limb disinfected for 60 seconds per limb with alcohol swabs. Dialysis CVC hub scrubbed with Prevantics for 5 seconds, followed by a 5 second dry time per Hospital P&P.  
+flushed/+heplock/+capped. Admiting Diagnosis: Sepsis s/t cellulitis Pt's previous clinic: [address] 
Consent signed - Informed Consent Verified: Yes (08/28/20 0845) Sylvain Consent - verified Hepatitis Status- 8/13/20 Ag neg (cc) Machine #- Machine Number: [id number] (08/28/20 0845) Telemetry status- remote Pre-dialysis wt. -

## 2020-08-28 NOTE — PROGRESS NOTES
TEJA- 1. DC to Inpatient rehab 
2. 2nd Im letter  Needed at time of discharge 3. Medical transport at time of discharge 3:10pm- CM contacted pt cousin and shared. that [facility] also willing to accept pt. She prefers this location as it is close to her home. This writer has informed [facility] of the preference and to disregard the referral. [facility] will be able to accept on Monday 8/31/20. 
 
11:49am- contacted pt cousin Roxanne Cintron on today to provide updates regarding inpatient rehab. She had questions regarding ID and DD waivers. This writer was able to connect her with the local [facility] ( [address] [place] [address]) [phone] to call and speak with intake. Cousin reports that she believes pt may have been diagnosed with an intellectual disability prior to age 24. Cousin is also requesting for a copy of the recent Advanced Directive and is requesting for this writer to provide a copy. 9:50am- [facility]patient rehab Silvia Olea contacted this writer and reports the their Physician has approved pt. [facility] will now work on setting up dialysis with their contracted agency Erica Arechiga). [facility] will likely be ready for pt admission on Monday 8/31 or Tuesday 9/1. CM will continue to follow patient for discharge planning needs and arrange for services as deemed necessary. Nadeem Francis 1984 [facility], [facility] 
PRN Care Manager [facility]

## 2020-08-28 NOTE — PROGRESS NOTES
Name: Toña Braxton MRN: [id number] : 1985 Assessment   :                                               Plan: ESKD Anemia of ESRD + post op blood loss DM-2, uncontrolled Mild Hyperkalemia- resolved HTN>>Hypotension Hyponatremia-mild Foot infection- s/p  R BKA on 8-24 Hearing loss Legally blind [facility]  
 
HD today; using L AVG, 400 QB (high  at higher QB), 2k, 2.5 Ca 
UF of 1-2 Kg attempted. Limited by relative hypotension. /80 during visit Off Vanco 
 
Ct  RETACRIT to 10K 3x/week ABx per ID;  
?cause of new hearing loss- vanco vs anesthesia Holding bp meds; was on midodrine Ct FR of 1.5 L/day Subjective: 
Seen on HD at 10:12 am. Resting comfortably ROS:  
Deferred Exam: 
Visit Vitals /67 Pulse 75 Temp 98.2 °F (36.8 °C) (Oral) Resp 18 Ht 6' 2\" (1.88 m) Wt 139.9 kg (308 lb 6.8 oz) Comment: VIA WESLEY LIFT SpO2 96% BMI 39.60 kg/m² NAD No resp distress R BKA; LLE with no edema Sleeping comfortably L AV access patent Current Facility-Administered Medications Medication Dose Route Frequency Last Dose  epoetin nata-epbx (RETACRIT) injection 10,000 Units  10,000 Units SubCUTAneous Q MON, WED & FRI 10,000 Units at 20  zinc oxide-cod liver oil (DESITIN) 40 % paste   Topical BID and QHS  acetaminophen (TYLENOL) tablet 1,000 mg  1,000 mg Oral Q8H 1,000 mg at 20 0609  
 gabapentin (NEURONTIN) capsule 100 mg  100 mg Oral  mg at 20 204  oxyCODONE IR (ROXICODONE) tablet 5-10 mg  5-10 mg Oral Q4H PRN 10 mg at 20  
 heparin (porcine) injection 5,000 Units  5,000 Units SubCUTAneous Q8H 5,000 Units at 20 0024  sodium chloride (NS) flush 5-40 mL  5-40 mL IntraVENous PRN 10 mL at 20 0609  HYDROmorphone (PF) (DILAUDID) injection 1 mg  1 mg IntraVENous Q3H PRN 1 mg at 08/28/20 0608  phenol throat spray (CHLORASEPTIC) 1 Spray  1 Spray Oral PRN    
 albumin human 25% (BUMINATE) solution 25 g  25 g IntraVENous PRN 25 g at 08/26/20 0929  
 midodrine (PROAMATINE) tablet 5 mg  5 mg Oral TID WITH MEALS 5 mg at 08/28/20 2956  piperacillin-tazobactam (ZOSYN) 3.375 g in 0.9% sodium chloride (MBP/ADV) 100 mL  3.375 g IntraVENous Q12H 3.375 g at 08/27/20 2050  lactobac ac& pc-s.therm-b.anim (USMAN Q/RISAQUAD)  1 Cap Oral DAILY 1 Cap at 08/27/20 9944  loperamide (IMODIUM) capsule 2 mg  2 mg Oral Q4H PRN 2 mg at 08/18/20 0901  
 ondansetron (ZOFRAN) injection 4 mg  4 mg IntraVENous Q6H PRN 4 mg at 08/25/20 0150  
 insulin lispro (HUMALOG) injection   SubCUTAneous AC&HS Stopped at 08/27/20 2200  
 glucose chewable tablet 16 g  4 Tab Oral PRN    
 dextrose (D50W) injection syrg 12.5-25 g  12.5-25 g IntraVENous PRN    
 glucagon (GLUCAGEN) injection 1 mg  1 mg IntraMUSCular PRN    
 insulin glargine (LANTUS) injection 20 Units  20 Units SubCUTAneous DAILY 20 Units at 08/27/20 0940 Labs/Data: 
 
Lab Results Component Value Date/Time WBC 10.8 08/28/2020 08:52 AM  
 Hemoglobin (POC) 11.9 (L) 07/25/2017 10:12 AM  
 HGB 8.6 (L) 08/28/2020 08:52 AM  
 Hematocrit (POC) 33 (L) 08/24/2020 10:51 AM  
 HCT 28.5 (L) 08/28/2020 08:52 AM  
 PLATELET 001 (H) 53/28/6875 08:52 AM  
 MCV 83.8 08/28/2020 08:52 AM  
 
 
Lab Results Component Value Date/Time  Sodium 131 (L) 08/28/2020 08:52 AM  
 Potassium 4.4 08/28/2020 08:52 AM  
 Chloride 97 08/28/2020 08:52 AM  
 CO2 24 08/28/2020 08:52 AM  
 Anion gap 10 08/28/2020 08:52 AM  
 Glucose 159 (H) 08/28/2020 08:52 AM  
 BUN 42 (H) 08/28/2020 08:52 AM  
 Creatinine 10.20 (H) 08/28/2020 08:52 AM  
 BUN/Creatinine ratio 4 (L) 08/28/2020 08:52 AM  
 GFR est AA 7 (L) 08/28/2020 08:52 AM  
 GFR est non-AA 6 (L) 08/28/2020 08:52 AM  
 Calcium 9.1 08/28/2020 08:52 AM  
 
 
Wt Readings from Last 3 Encounters:  
08/25/20 139.9 kg (308 lb 6.8 oz) 08/13/20 153.2 kg (337 lb 11.9 oz) 08/07/20 153.8 kg (339 lb 1.1 oz) Intake/Output Summary (Last 24 hours) at 8/28/2020 1108 Last data filed at 8/27/2020 1150 Gross per 24 hour Intake 120 ml Output  Net 120 ml Patient seen and examined. Chart reviewed. Labs, data and other pertinent notes reviewed in last 24 hrs. PMH/SH/FH reviewed and unchanged compared to H&P Glennie Lundborg, MD

## 2020-08-28 NOTE — PROGRESS NOTES
Physical Therapy Chart reviewed, patient currently in HD. Will defer and continue to follow. Alessandro Mcdermott

## 2020-08-28 NOTE — PROGRESS NOTES
Spoke to Pt about trying to use oral medication. He thought we were only giving Tylenol  I explained that we need to give oral meds first and giving oxy. His cousin called and I explained. She was ok. We talked about how much she has on her plate.

## 2020-08-29 PROBLEM — N18.6 ESRD (END STAGE RENAL DISEASE) (HCC): Chronic | Status: ACTIVE | Noted: 2020-01-06

## 2020-08-29 NOTE — PROGRESS NOTES
A Larry Holter attempted to get information on the patient. Patient stated she could NOT have any information and that the only person that could have any information on the patient is his POA which is Tyler Rodriguez. Patient actually requested that we tell Larry Holter that he has been discharged if she calls again. Nurse confirmed POA with patient and stated that we could jason his room as private so if Ms. Ashish Denis called she could not even get his room number.

## 2020-08-29 NOTE — PROGRESS NOTES
Problem: Pressure Injury - Risk of 
Goal: *Prevention of pressure injury Description: Document Ranjeet Scale and appropriate interventions in the flowsheet. Outcome: Progressing Towards Goal 
Note: Pressure Injury Interventions: 
Sensory Interventions: Assess changes in LOC Moisture Interventions: Absorbent underpads Activity Interventions: Increase time out of bed Mobility Interventions: Float heels Nutrition Interventions: Document food/fluid/supplement intake Friction and Shear Interventions: Apply protective barrier, creams and emollients, Feet elevated on foot rest, Foam dressings/transparent film/skin sealants, Lift sheet, Lift team/patient mobility team, Minimize layers, Transfer aides:transfer board/Stephen lift/ceiling lift, Transferring/repositioning devices Problem: Risk for Spread of Infection Goal: Prevent transmission of infectious organism to others Description: Prevent the transmission of infectious organisms to other patients, staff members, and visitors.  
Outcome: Progressing Towards Goal

## 2020-08-29 NOTE — PROGRESS NOTES
Bedside and Verbal shift change report given to Sebas Carrasquillo RN and Kelly Taylor, RN (oncoming nurse) by Daxa Munoz RN (offgoing nurse). Report included the following information SBAR, Kardex, Intake/Output and MAR.  
 
[id number] Talked to Vinicius Arenas on the phone and gave her an update. Vinicius Arenas communicated with me [facility] I confirmed with patient. 1200 Patient said he can hear the faint beeping of his monitor in his room. Patient stated, \"When you talk you sound like a robot and I can't understand you. \"

## 2020-08-29 NOTE — PROGRESS NOTES
Hospitalist Progress Note NAME: Audra White :  1985 MRN:  [id number] Assessment / Plan: 
Sepsis, POA  (WBC 19K, tachycardia)- now resolved Right foot cellulitis with abscess, POA - s/p BKA  this admission H/o s/p amputation ()of right 1st and second toe for osteomyelitis POA Hx osteomyelitis right 3rd and 4th toes 2020 and 10/2019 respectively Acute hypoxic respiratory failure (88% on RA) POA_ now resolved, on RA IDDM uncontrolled with hyperglycemia POA- now controlled ESRD on HD MWF Morbid Obesity POA 
SARS-COV-2 negative Initial Blood Cx neg Initial Wound Cx= Pseudomonal, Enterococcal & Klebsiella sp. (poly microbial) growth noted S/p podiatry consult apreciated, MRI right foot showed fluid collection and abscess with no osteomyelitis noted s/p irrigation of wound s/p lantus 20 units daily, add moderate SSI,increased Lantus to 30 units daily now as pt to be on prednisone 
appreciated renal consult to continue with dialysis MWF 
s/p  RIGHT LEG ARTERIOGRAM ANTERIOR TIBIAL ARTERY AND ANGIOPLASTY  by vascular Sx 
s/p  excision and debridement of necrotic tissue right foot   
s/p BKA  Continue zosyn until  , Vancomycin discontinued. Hypotension - resolved 
-Hold  amlodipine and Lisinopril 
-continue midodrine Hyperkalemia S/p kayexalate Cont HD 
 
  
Diarrhea: 
-Likely Antibiotic induced 
-Resolved -Imodium PRN 
-c/w probiotic 
  
Hyponatremia  
-motoring - stable with HD 
  
Chest pain - now resolved 
-troponin negative  
- EKG show no acute abnormality 
-Echo show EF 45-50 % Hearing loss not POA - possibly anesthesia related? 
- remove offending agents which may affect pt's hearing S/p ENT consult- Spoken with Dr. Darrell Chacko with ENT, recommends Prednisone for now and outpatient follow up. Cont Prednisone 60mg X 5 days, 40mg X 5 days, 20mg X 5 days  And 10mg X 5 days  Will have to adjust Lantus to control BG.  
 
  
 Body mass index is 43.36 kg/m². 
 
 
  
Code: full DVT prophylaxis: heparin Surrogate decision maker:  Kala and Delia Templeton (Cousins). Pt verbalized these 2 names when asked about decision maker. D/w ROXANE Pathak Patient will need rehab at discharge, Inpatient rehab eval sent - [facility] inpatient Rehab accepted pt- likely DC on Monday planned 8/31 Patient continues to require inpatient stay of IV antibiotics and arrangement for rehab.  
  
  
  
   
 
Subjective: Chief Complaint / Reason for Physician Visit Discussed with RN events overnight. Pt doing okay today Still with complete hearing loss Review of Systems: 
Symptom Y/N Comments  Symptom Y/N Comments Fever/Chills    Chest Pain Poor Appetite    Edema Cough    Abdominal Pain Sputum    Joint Pain SOB/LITTLE    Pruritis/Rash Nausea/vomit    Tolerating PT/OT Diarrhea    Tolerating Diet Constipation    Other Could NOT obtain due to: Hearing lost   
 
Objective: VITALS:  
Last 24hrs VS reviewed since prior progress note. Most recent are: 
Patient Vitals for the past 24 hrs: 
 Temp Pulse Resp BP SpO2  
08/29/20 0553 98.3 °F (36.8 °C) 88 18 149/83 97 % 08/28/20 2339 98.5 °F (36.9 °C) 91 18 144/88 98 % 08/28/20 1959 98 °F (36.7 °C) 89 18 (!) 152/92 100 % 08/28/20 1543 98.3 °F (36.8 °C) 91 18 108/62 98 % 08/28/20 1412 98.1 °F (36.7 °C) 88 18 129/82 93 % 08/28/20 1310  77 18 128/75   
08/28/20 1300  75 18 130/77   
08/28/20 1245  78 18 116/68   
08/28/20 1230  81 18 122/77   
08/28/20 1215  69 18 102/65   
08/28/20 1200  72 18 107/64   
08/28/20 1145  74 18 109/62   
08/28/20 1130  73 18 110/65   
08/28/20 1115  73 18 110/65   
08/28/20 1100  72 18 92/52   
08/28/20 1045  73 18 98/60   
08/28/20 1030  75 18 107/67   
08/28/20 1015  74 18 111/62   
08/28/20 1000  73 18 100/80   
08/28/20 0945  72 18 117/70   
08/28/20 0930  72 18 123/77   
08/28/20 0915  74 18 150/85  08/28/20 0900  79 18 (!) 139/92   
08/28/20 0845 98.2 °F (36.8 °C) 74 18 159/86  Intake/Output Summary (Last 24 hours) at 8/29/2020 5593 Last data filed at 8/28/2020 1500 Gross per 24 hour Intake 240 ml Output 1500 ml Net -1260 ml PHYSICAL EXAM: 
General: WD, WN. Alert, cooperative, no acute distress   
EENT:  EOMI. Anicteric sclerae. MMM Resp:  Clear CV:  Regular  rhythm,  No edema GI:  Soft, Non distended, Non tender.  +Bowel sounds Neurologic:  Alert and oriented X 3, normal speech, Psych:   Good insight. Not anxious nor agitated Ext:   Rt BKA, dressing in place Reviewed most current lab test results and cultures  YES Reviewed most current radiology test results   YES Review and summation of old records today    NO Reviewed patient's current orders and MAR    YES 
PMH/ reviewed - no change compared to H&P 
________________________________________________________________________ Care Plan discussed with: 
  Comments Patient x Family RN x Care Manager x [facility]) Consultant Multidiciplinary team rounds were held today with , nursing, pharmacist and clinical coordinator. Patient's plan of care was discussed; medications were reviewed and discharge planning was addressed. ________________________________________________________________________ Total NON critical care TIME:  35   Minutes Total CRITICAL CARE TIME Spent:   Minutes non procedure based Comments >50% of visit spent in counseling and coordination of care y   
________________________________________________________________________ Fredis Najera MD  
 
Procedures: see electronic medical records for all procedures/Xrays and details which were not copied into this note but were reviewed prior to creation of Plan. LABS: 
I reviewed today's most current labs and imaging studies. Pertinent labs include: 
Recent Labs  
  08/28/20 8511 08/27/20 
5363 WBC 10.8 11.5* HGB 8.6* 9.1*  
HCT 28.5* 29.3*  
* 461* Recent Labs  
  08/28/20 
9291 * K 4.4 CL 97  
CO2 24 * BUN 42* CREA 10.20* CA 9.1 Signed: Karen Palacios MD

## 2020-08-30 NOTE — PROGRESS NOTES
Bedside and Verbal shift change report given to Bianca Arnett (oncoming nurse) by Shante Rose (offgoing nurse). Report included the following information SBAR, ED Summary, Procedure Summary, MAR and Recent Results.

## 2020-08-30 NOTE — PROGRESS NOTES
0206-Checked patients vitals. BP was 173/99. Let patient know that his blood pressure was high and that I would reach out to the physician. Patient stated that he did not want any medications to lower his blood pressure due to BP being low when he came into the ER and him feeling drowsy. He stated that once he receives dialysis that it will bring his blood pressure down. Sent tele hospitalist message in reference to patents blood pressure.

## 2020-08-30 NOTE — PROGRESS NOTES
Hospitalist Progress Note NAME: Yuval Siegel :  1985 MRN:  [id number] Assessment / Plan: 
Sepsis, POA  (WBC 19K, tachycardia)- now resolved Right foot cellulitis with abscess, POA - s/p BKA  this admission H/o s/p amputation ()of right 1st and second toe for osteomyelitis POA Hx osteomyelitis right 3rd and 4th toes 2020 and 10/2019 respectively Acute hypoxic respiratory failure (88% on RA) POA_ now resolved, on RA IDDM uncontrolled with hyperglycemia POA- now uncontrolled due to steroids ESRD on HD MWF Morbid Obesity POA 
SARS-COV-2 negative Initial Blood Cx neg Initial Wound Cx= Pseudomonal, Enterococcal & Klebsiella sp. (poly microbial) growth noted S/p podiatry consult apreciated, MRI right foot showed fluid collection and abscess with no osteomyelitis noted s/p irrigation of wound s/p lantus 20 units daily, add moderate SSI, increased Lantus to 40 units daily now as pt to be on prednisone for hearing loss per ENT recc 
appreciated renal consult to continue with dialysis MWF 
s/p  RIGHT LEG ARTERIOGRAM ANTERIOR TIBIAL ARTERY AND ANGIOPLASTY  by vascular Sx 
s/p  excision and debridement of necrotic tissue right foot   
s/p BKA  Continue zosyn until  , Vancomycin discontinued. Hypotension - resolved Resume Amlodipine at 5 mg daily today Cont to hold Lisinopril Hold midodrine Hyperkalemia S/p kayexalate Cont HD 
 
  
Diarrhea: 
-Likely Antibiotic induced 
-Resolved -Imodium PRN 
-c/w probiotic 
  
Hyponatremia  
-motoring - stable with HD 
  
Chest pain - now resolved 
-troponin negative  
- EKG show no acute abnormality 
-Echo show EF 45-50 % Hearing loss not POA - possibly anesthesia related? 
- remove offending agents which may affect pt's hearing S/p ENT consult- Spoken with Dr. Sherrie Allen with ENT, recommends Prednisone for now and outpatient follow up. Cont Prednisone 60mg X 5 days, 40mg X 5 days, 20mg X 5 days  And 10mg X 5 days Will have to adjust Lantus to control BG.  
 
  
Body mass index is 43.36 kg/m². 
 
 
  
Code: full DVT prophylaxis: heparin Surrogate decision maker:  Kala and Lyssa Orr (Cousins). Pt verbalized these 2 names when asked about decision maker. D/w ROXANE Rick Camarena Patient will need rehab at discharge, Inpatient rehab eval sent - [facility] inpatient Rehab accepted pt- likely DC on Monday planned 8/31 Patient continues to require inpatient stay of IV antibiotics and arrangement for rehab.  
  
  
  
   
 
Subjective: Chief Complaint / Reason for Physician Visit Discussed with RN events overnight. Pt doing okay today Still with complete hearing loss Review of Systems: 
Symptom Y/N Comments  Symptom Y/N Comments Fever/Chills    Chest Pain Poor Appetite    Edema Cough    Abdominal Pain Sputum    Joint Pain SOB/LITTLE    Pruritis/Rash Nausea/vomit    Tolerating PT/OT Diarrhea    Tolerating Diet Constipation    Other Could NOT obtain due to: Hearing lost   
 
Objective: VITALS:  
Last 24hrs VS reviewed since prior progress note. Most recent are: 
Patient Vitals for the past 24 hrs: 
 Temp Pulse Resp BP SpO2  
08/30/20 0802 97.9 °F (36.6 °C) 92 18 (!) 182/111 95 % 08/30/20 0402 98.5 °F (36.9 °C) 83 16 (!) 168/99 98 % 08/30/20 0206 97.7 °F (36.5 °C) 83 16 (!) 173/99 93 % 08/29/20 1910 98.3 °F (36.8 °C) 89 18 (!) 144/99 95 % 08/29/20 1757  90 18 153/88 97 % 08/29/20 1530 98.4 °F (36.9 °C) 84 18 (!) 168/91 100 % 08/29/20 1049 98 °F (36.7 °C) 85 18 139/88 98 % Intake/Output Summary (Last 24 hours) at 8/30/2020 0840 Last data filed at 8/29/2020 1310 Gross per 24 hour Intake 240 ml Output  Net 240 ml PHYSICAL EXAM: 
General: WD, WN. Alert, cooperative, no acute distress   
EENT:  EOMI. Anicteric sclerae. MMM Resp:  Clear CV:  Regular  rhythm,  No edema GI:  Soft, Non distended, Non tender.  +Bowel sounds Neurologic:  Alert and oriented X 3, normal speech, Psych:   Good insight. Not anxious nor agitated Ext:   Rt BKA, dressing in place Reviewed most current lab test results and cultures  YES Reviewed most current radiology test results   YES Review and summation of old records today    NO Reviewed patient's current orders and MAR    YES 
PMH/SH reviewed - no change compared to H&P 
________________________________________________________________________ Care Plan discussed with: 
  Comments Patient x Family RN x Care Manager Consultant Multidiciplinary team rounds were held today with , nursing, pharmacist and clinical coordinator. Patient's plan of care was discussed; medications were reviewed and discharge planning was addressed. ________________________________________________________________________ Total NON critical care TIME:  26   Minutes Total CRITICAL CARE TIME Spent:   Minutes non procedure based Comments >50% of visit spent in counseling and coordination of care y   
________________________________________________________________________ Shmuel Villarreal MD  
 
Procedures: see electronic medical records for all procedures/Xrays and details which were not copied into this note but were reviewed prior to creation of Plan. LABS: 
I reviewed today's most current labs and imaging studies. Pertinent labs include: 
Recent Labs  
  08/28/20 
0101 WBC 10.8 HGB 8.6* HCT 28.5* * Recent Labs  
  08/28/20 
2871 * K 4.4 CL 97  
CO2 24 * BUN 42* CREA 10.20* CA 9.1 Signed: Shmuel Villarreal MD

## 2020-08-30 NOTE — PROGRESS NOTES
Pharmacy Automatic Renal Dosing Protocol - Antimicrobials Indication for Antimicrobials: infected/gangrenous R foot s/p recent amputation of R 1st and 2nd toes  for osteomyelitis  I&D bone and necrotic tissue Current Regimen of Each Antimicrobial: 
Zosyn 3.375 g IV every 12 hours (Start Date ; Day 10) Previous Antimicrobial Therapy: 
Vancomycin 750 mg after each HD (Start Date ; Day # 13) Zosyn 3.375 g IV every 12 hours (Start Date ; Day # 5) Metronidazole 500 mg q12h; started - Cefepime 1 g IV every 24 hours; Start Date - Significant Cultures:  
 Blood: NGTD x 5 days- final 
: MRSA: not present- final 
 Wound: Heavy klebsiella, heavy enterobacter, light psedomonas, light diphtheroids, few CoNS- final 
 Foot: pseudomonas, and diphtheroids, enterobacter- final 
 Foot: Heavy klebsiella, moderate pseudomonas aeruginosa and stutzeri, and diphtheroids- final 
 Blood: NGTD x 5 days- final 
 wound: Light klebsiella, light enterobacter, light pseudomonas, and light diphtheroids- final 
 
Radiology: MRI- - 9 x 8 x 11 mm nonspecific soft tissue collection containing gas distal to the second metatarsal head. Paralysis, amputations, malnutrition: None significant Labs: 
Recent Labs  
  20 
4812 CREA 10.20* BUN 42* WBC 10.8 Temp (24hrs), Av.1 °F (36.7 °C), Min:97.5 °F (36.4 °C), Max:98.5 °F (36.9 °C) Creatinine Clearance (mL/min) or Dialysis: HD MWF Impression/Plan:  
Will continue current regimen as appropriate for indication and renal function. Vancomycin stopped due to ototoxicity Duration: pending Pharmacy will follow daily and adjust medications as appropriate for renal function and/or serum levels. Thank you, Melchor Madden, [facility]

## 2020-08-30 NOTE — PROGRESS NOTES
Name: Renay Salinas MRN: [id number] : 1985 Assessment   :                                               Plan: ESKD Anemia of ESRD + post op blood loss DM-2, uncontrolled Mild Hyperkalemia- resolved HTN-prior hypotension resolved Hyponatremia-mild Foot infection- s/p  R BKA on 8-24 Hearing loss (new) Legally blind [facility]  
 
HD tomm per schedule. Will start more UF as BP is now high Off Midodrine Started on Norvasc. Lisinopril would be next as needed Noted- started on Prednisone per ENT rec's for hearing loss (tapering dose over 20 days) Will draw labs with HD tomm Off Vanco 
 
Ct  RETACRIT to 10K 3x/week ABx per ID; Ct FR of 1.5 L/day Subjective: 
Resting. Still with hearing loss No events ROS:  
Deferred Exam: 
Visit Vitals /85 (BP 1 Location: Left arm, BP Patient Position: Supine) Pulse 93 Temp 98.4 °F (36.9 °C) Resp 16 Ht 6' 2\" (1.88 m) Wt 142.2 kg (313 lb 7.9 oz) SpO2 97% BMI 40.25 kg/m² NAD No resp distress R BKA; LLE with no edema Alert awake L AV access in place Current Facility-Administered Medications Medication Dose Route Frequency Last Dose  insulin glargine (LANTUS) injection 40 Units  40 Units SubCUTAneous DAILY 40 Units at 20 0833  
 amLODIPine (NORVASC) tablet 5 mg  5 mg Oral DAILY 5 mg at 20 1002  predniSONE (DELTASONE) tablet 60 mg  60 mg Oral DAILY WITH BREAKFAST 60 mg at 20 6982  epoetin nata-epbx (RETACRIT) injection 10,000 Units  10,000 Units SubCUTAneous Q MON, WED & FRI 10,000 Units at 20 6484  zinc oxide-cod liver oil (DESITIN) 40 % paste   Topical BID and QHS  acetaminophen (TYLENOL) tablet 1,000 mg  1,000 mg Oral Q8H 1,000 mg at 20 0559  gabapentin (NEURONTIN) capsule 100 mg  100 mg Oral  mg at 08/29/20 2123  
 oxyCODONE IR (ROXICODONE) tablet 5-10 mg  5-10 mg Oral Q4H PRN 10 mg at 08/28/20 1508  heparin (porcine) injection 5,000 Units  5,000 Units SubCUTAneous Q8H 5,000 Units at 08/30/20 2505  sodium chloride (NS) flush 5-40 mL  5-40 mL IntraVENous PRN 10 mL at 08/30/20 0601  
 HYDROmorphone (PF) (DILAUDID) injection 1 mg  1 mg IntraVENous Q3H PRN 1 mg at 08/30/20 0218  phenol throat spray (CHLORASEPTIC) 1 Spray  1 Spray Oral PRN    
 albumin human 25% (BUMINATE) solution 25 g  25 g IntraVENous PRN 12.5 g at 08/28/20 1100  [Held by provider] midodrine (PROAMATINE) tablet 5 mg  5 mg Oral TID WITH MEALS Stopped at 08/29/20 1810  piperacillin-tazobactam (ZOSYN) 3.375 g in 0.9% sodium chloride (MBP/ADV) 100 mL  3.375 g IntraVENous Q12H 3.375 g at 08/30/20 0559  lactobac ac& pc-s.therm-b.anim (USMAN Q/RISAQUAD)  1 Cap Oral DAILY 1 Cap at 08/30/20 1609  loperamide (IMODIUM) capsule 2 mg  2 mg Oral Q4H PRN 2 mg at 08/29/20 2123  ondansetron (ZOFRAN) injection 4 mg  4 mg IntraVENous Q6H PRN 4 mg at 08/25/20 0150  
 insulin lispro (HUMALOG) injection   SubCUTAneous AC&HS 15 Units at 08/30/20 1229  
 glucose chewable tablet 16 g  4 Tab Oral PRN    
 dextrose (D50W) injection syrg 12.5-25 g  12.5-25 g IntraVENous PRN    
 glucagon (GLUCAGEN) injection 1 mg  1 mg IntraMUSCular PRN Labs/Data: 
 
Lab Results Component Value Date/Time WBC 10.8 08/28/2020 08:52 AM  
 Hemoglobin (POC) 11.9 (L) 07/25/2017 10:12 AM  
 HGB 8.6 (L) 08/28/2020 08:52 AM  
 Hematocrit (POC) 33 (L) 08/24/2020 10:51 AM  
 HCT 28.5 (L) 08/28/2020 08:52 AM  
 PLATELET 542 (H) 69/60/5117 08:52 AM  
 MCV 83.8 08/28/2020 08:52 AM  
 
 
Lab Results Component Value Date/Time  Sodium 131 (L) 08/28/2020 08:52 AM  
 Potassium 4.4 08/28/2020 08:52 AM  
 Chloride 97 08/28/2020 08:52 AM  
 CO2 24 08/28/2020 08:52 AM  
 Anion gap 10 08/28/2020 08:52 AM  
 Glucose 159 (H) 08/28/2020 08:52 AM  
 BUN 42 (H) 08/28/2020 08:52 AM  
 Creatinine 10.20 (H) 08/28/2020 08:52 AM  
 BUN/Creatinine ratio 4 (L) 08/28/2020 08:52 AM  
 GFR est AA 7 (L) 08/28/2020 08:52 AM  
 GFR est non-AA 6 (L) 08/28/2020 08:52 AM  
 Calcium 9.1 08/28/2020 08:52 AM  
 
 
Wt Readings from Last 3 Encounters:  
08/30/20 142.2 kg (313 lb 7.9 oz) 08/13/20 153.2 kg (337 lb 11.9 oz) 08/07/20 153.8 kg (339 lb 1.1 oz) Intake/Output Summary (Last 24 hours) at 8/30/2020 1545 Last data filed at 8/30/2020 1339 Gross per 24 hour Intake 120 ml Output  Net 120 ml Patient seen and examined. Chart reviewed. Labs, data and other pertinent notes reviewed in last 24 hrs. PMH/SH/FH reviewed and unchanged compared to H&P Felipe Jones MD

## 2020-08-30 NOTE — PROGRESS NOTES
0820 Perfect serve note to Dr. Libby Lugo regarding elevated BP and blood sugar. New orders received. @1144Note to Dr. Libby Lugo regarding elevated Blood sugar and BP. New orders noted. 1855 Bedside and Verbal shift change report given to Opal Beranbe RN (oncoming nurse) by DIONICIO Aggarwal (offgoing nurse). Report included the following information SBAR, Kardex, Intake/Output and MAR and events of the day.

## 2020-08-30 NOTE — PROGRESS NOTES
TRANSFER - IN REPORT: 
 
Verbal report received from [facility] (name) on Niurka Mathews  being received from [place] (unit) for routine progression of care Report consisted of patients Situation, Background, Assessment and  
Recommendations(SBAR). Information from the following report(s) SBAR, Kardex, ED Summary, Procedure Summary and Recent Results was reviewed with the receiving nurse. Opportunity for questions and clarification was provided. Assessment completed upon patients arrival to unit and care assumed. Primary Nurse Kenneth Giles RN and Rema Barrett RN performed a dual skin assessment on this patient Impairment noted- see wound doc flow sheet Ranjeet score is 17

## 2020-08-31 NOTE — PROGRESS NOTES
RAPID RESPONSE TEAM 
  
Higher lever of care transfer order placed 2/2 hyperglycemia requiring insulin gtt. Accessed chart to assess need for PCU vs [address] stepdown transfer. Riley Titus

## 2020-08-31 NOTE — PROGRESS NOTES
Tele Hospitalist 
Blood sugar still elevated after 9 unit X2 , get BMP and start patient on insulin drip

## 2020-08-31 NOTE — PROGRESS NOTES
Hospitalist Progress Note NAME: Genie Watkins :  1985 MRN:  [id number] Assessment / Plan: 
Sepsis, POA  (WBC 19K, tachycardia)- now resolved Right foot cellulitis with abscess, POA - s/p BKA  this admission H/o s/p amputation ()of right 1st and second toe for osteomyelitis POA Hx osteomyelitis right 3rd and 4th toes 2020 and 10/2019 respectively Acute hypoxic respiratory failure (88% on RA) POA_ now resolved, on RA IDDM uncontrolled with hyperglycemia POA- now uncontrolled due to steroids ESRD on HD MWF Morbid Obesity POA 
SARS-COV-2 negative Initial Blood Cx neg Initial Wound Cx= Pseudomonal, Enterococcal & Klebsiella sp. (poly microbial) growth noted S/p podiatry consult apreciated, MRI right foot showed fluid collection and abscess with no osteomyelitis noted s/p irrigation of wound s/p lantus 20 units daily, add moderate SSI, had increased Lantus to 40 units daily  but now on Insulin drip (transferred to PCU) as pt to be on prednisone for hearing loss per ENT recc- will Decrease prednisone dose down to 40 mg daily today 
appreciated renal consult to continue with dialysis MWF 
s/p  RIGHT LEG ARTERIOGRAM ANTERIOR TIBIAL ARTERY AND ANGIOPLASTY  by vascular Sx 
s/p  excision and debridement of necrotic tissue right foot   
s/p BKA   
s/p zosyn completed now , Vancomycin discontinued. Hypotension - resolved HTN - uncontrolled now Resumed Amlodipine at 5 mg daily yesterday Resume Lisinopril today DC midodrine for good now Hyperkalemia S/p kayexalate Cont HD - today per nephrology 
 
  
Diarrhea: 
-Likely Antibiotic induced 
-Resolved -Imodium PRN 
-c/w probiotic 
  
Hyponatremia  
-motoring - stable with HD 
  
Chest pain - now resolved 
-troponin negative  
- EKG show no acute abnormality 
-Echo show EF 45-50 % Hearing loss not POA - possibly anesthesia related? 
- remove offending agents which may affect pt's hearing S/p ENT consult- Spoken with Dr. Bret Haji with ENT, recommends Prednisone for now and outpatient follow up. Cont Prednisone 60mg X 5 days, 40mg X 5 days, 20mg X 5 days  And 10mg X 5 days Currently decreased to 40 mg daily today to help with Hyperglycemia On Insulin drip overnight & transferred to PCU noted 
 
 
  
Body mass index is 43.36 kg/m². 
 
 
  
Code: full DVT prophylaxis: heparin Surrogate decision maker:  Kala and Katherine Roche (Cousins). Pt verbalized these 2 names when asked about decision maker. D/w ROXANE Francisco Shadow Patient will need rehab at discharge, Inpatient rehab eval sent - [id number] accepted pt- likely DC on in 24 hrs once FS stable 
  
  
  
   
 
Subjective: Chief Complaint / Reason for Physician Visit Discussed with RN events overnight. Pt doing okay today Still with complete hearing loss Review of Systems: 
Symptom Y/N Comments  Symptom Y/N Comments Fever/Chills    Chest Pain Poor Appetite    Edema Cough    Abdominal Pain Sputum    Joint Pain SOB/LITTLE    Pruritis/Rash Nausea/vomit    Tolerating PT/OT Diarrhea    Tolerating Diet Constipation    Other Could NOT obtain due to: Hearing lost   
 
Objective: VITALS:  
Last 24hrs VS reviewed since prior progress note. Most recent are: 
Patient Vitals for the past 24 hrs: 
 Temp Pulse Resp BP SpO2  
08/31/20 0212 98 °F (36.7 °C) 86 17 (!) 176/106 97 % 08/30/20 2345 97.8 °F (36.6 °C) 91 18 (!) 169/99 95 % 08/30/20 2022 97.3 °F (36.3 °C) 89 18 146/84 95 % 08/30/20 1457 98.4 °F (36.9 °C) 93 16 152/85   
08/30/20 1057 97.5 °F (36.4 °C) 82 16 (!) 181/105 97 % 08/30/20 0802 97.9 °F (36.6 °C) 92 18 (!) 182/111 95 % Intake/Output Summary (Last 24 hours) at 8/31/2020 3445 Last data filed at 8/30/2020 1655 Gross per 24 hour Intake 1000 ml Output  Net 1000 ml PHYSICAL EXAM: 
General: WD, WN. Alert, cooperative, no acute distress   
EENT:  EOMI. Anicteric sclerae. MMM Resp:  Clear CV:  Regular  rhythm,  No edema GI:  Soft, Non distended, Non tender.  +Bowel sounds Neurologic:  Alert and oriented X 3, normal speech, Psych:   Good insight. Not anxious nor agitated Ext:   Rt BKA, dressing in place Reviewed most current lab test results and cultures  YES Reviewed most current radiology test results   YES Review and summation of old records today    NO Reviewed patient's current orders and MAR    YES 
PMH/SH reviewed - no change compared to H&P 
________________________________________________________________________ Care Plan discussed with: 
  Comments Patient x Family RN x Care Manager x Consultant Multidiciplinary team rounds were held today with , nursing, pharmacist and clinical coordinator. Patient's plan of care was discussed; medications were reviewed and discharge planning was addressed. ________________________________________________________________________ Total NON critical care TIME:  26   Minutes Total CRITICAL CARE TIME Spent:   Minutes non procedure based Comments >50% of visit spent in counseling and coordination of care y   
________________________________________________________________________ Neel Elkins MD  
 
Procedures: see electronic medical records for all procedures/Xrays and details which were not copied into this note but were reviewed prior to creation of Plan. LABS: 
I reviewed today's most current labs and imaging studies. Pertinent labs include: 
Recent Labs 08/31/20 
3386 08/28/20 
8904 WBC 13.7* 10.8 HGB 9.7* 8.6* HCT 31.4* 28.5* * 416* Recent Labs 08/31/20 
3378 08/28/20 
2371 * 131*  
K 5.0 4.4 CL 90* 97  
CO2 23 24 * 159* BUN 63* 42* CREA 11.60* 10.20* CA 9.1 9.1 PHOS 8.6*  --   
 
 
Signed: Neel Elkins MD

## 2020-08-31 NOTE — PROGRESS NOTES
Problem: Mobility Impaired (Adult and Pediatric) Goal: *Acute Goals and Plan of Care (Insert Text) Description: FUNCTIONAL STATUS PRIOR TO ADMISSION: At baseline pt ambulated with cane. Lives alone and takes [facility] to HD appointments. HOME SUPPORT PRIOR TO ADMISSION: Patient lived alone. Physical Therapy Goals Initiated 8/25/2020 1. Patient will move from supine to sit and sit to supine , scoot up and down, and roll side to side in bed with minimal assistance/contact guard assist within 7 day(s). 2.  Patient will transfer from bed to chair and chair to bed with moderate assistance  using the least restrictive device within 7 day(s). 3.  Patient will perform sit to stand with moderate assistance  within 7 day(s). 4.  Patient will ambulate with maximal assistance for 10 feet with the least restrictive device within 7 day(s). Outcome: Progressing Towards Goal 
PHYSICAL THERAPY TREATMENT Patient: Genie Watkins (9 y.o. male) Date: 8/31/2020 Diagnosis: [facility]epsis ([facility] [A41.9] Cellulitis of right foot [S38.856] ESRD (end stage renal disease) ([facility] [N18.6] Acute respiratory failure with hypoxia and hypercapnia (HCC) [J96.01, J96.02]  
<principal problem not specified> Procedure(s) (LRB): 
RIGHT BELOW KNEE AMPUTATION (Right) 7 Days Post-Op Precautions: Fall, NWB(legally blind, [place]) Chart, physical therapy assessment, plan of care and goals were reviewed. ASSESSMENT Patient continues with skilled PT services and is progressing towards goals. Pt received supine in bed and agreeable to therapy. Pt reported he felt that his hearing is improving, but still hard for him to hear therapist's cues at times. Pt continues to be limited by generalized weakness, decreased functional mobility, impulsivity, baseline vision impairment and new hearing loss.  Pt demonstrated improvement in bed mobility skills requiring only standby-supervision  level of assist. Pt practiced sit<>Stand from elevated bed with max A x 2 while pulling up on the back of the recliner bar for support. Pt practiced scooting laterally along the bedside with supervision and assisted back to bed due to request to use the bedpan. Pt may be able to laterally scoot to a drop arm recliner going forward. Pt will continue to benefit from PT to progress mobility as tolerated. Current Level of Function Impacting Discharge (mobility/balance): supervision supine<>sit, max A x 2 sit<>stand with bar on the back of the recliner Other factors to consider for discharge: previously mod I with a cane PLAN : 
Patient continues to benefit from skilled intervention to address the above impairments. Continue treatment per established plan of care. to address goals. Recommendation for discharge: (in order for the patient to meet his/her long term goals) Therapy 3 hours per day 5-7 days per week This discharge recommendation: 
Has been made in collaboration with the attending provider and/or case management SUBJECTIVE:  
Patient stated I need to use the bathroom.  OBJECTIVE DATA SUMMARY:  
Critical Behavior: 
Neurologic State: Alert Orientation Level: Oriented to person, Oriented to place, Oriented to situation Cognition: Follows commands Safety/Judgement: Awareness of environment, Fall prevention, Insight into deficits Functional Mobility Training: 
Bed Mobility: 
Rolling: Supervision;Stand-by assistance Supine to Sit: Supervision;Stand-by assistance Sit to Supine: Supervision;Stand-by assistance Scooting: Supervision;Stand-by assistance Transfers: 
Sit to Stand: Maximum assistance; Additional time;Assist x2(pulled up on bar on back of chair) Stand to Sit: Moderate assistance;Assist x2 Balance: 
Sitting: Intact Sitting - Static: Good (unsupported) Standing: Impaired; Without support Standing - Static: Constant support;Poor(max A X2 for sit to and from standing) Ambulation/Gait Training: 
  
  
 
 
Pain Rating: 
Pt denied pain Activity Tolerance:  
Good and Fair Please refer to the flowsheet for vital signs taken during this treatment. After treatment patient left in no apparent distress:  
Supine in bed, Call bell within reach, and Side rails x 3 
 
COMMUNICATION/COLLABORATION:  
The patients plan of care was discussed with: Occupational therapist and Registered nurse. Orlin Bashir, PT, DPT Time Calculation: 18 mins

## 2020-08-31 NOTE — PROGRESS NOTES
Tele hospitalist  
 
Rechecked patients blood sugar and it is 474 after giving 9 units of Humalog. Give another 9 unit and recheck after 1 hour

## 2020-08-31 NOTE — PROGRESS NOTES
Sent tele hospitalist a message in reference to patients 1 hour recheck on blood sugar after giving 9 units. His BGL was 474. Orders received. Also received orders in reference to patients blood pressure.

## 2020-08-31 NOTE — PROGRESS NOTES
Problem: Self Care Deficits Care Plan (Adult) Goal: *Acute Goals and Plan of Care (Insert Text) Description:  
FUNCTIONAL STATUS PRIOR TO ADMISSION:  
 
HOME SUPPORT:  
 
Occupational Therapy Goals Initiated 8/27/2020 1. Patient will perform supine to sit EOB  with minimal assistance in preparation for adls, within 7 day(s). 2.  Patient will perform grooming, seated EOB with supervision/set-up within 7 day(s). 3.  Patient will perform sponge bathing with minimal assistance/contact guard assist within 7 day(s). 4.  Patient will perform toilet transfers with maximal assistance X2 within 7 day(s). 5.  Patient will perform all aspects of toileting with moderate assistance  within 7 day(s). 6.  Patient will participate in upper extremity therapeutic exercise/activities with supervision/set-up for 10 minutes within 7 day(s). 7.  Patient will utilize energy conservation techniques during functional activities with minimal verbal cues within 7 day(s). 8.  Patient will perform sit to stand with moderate assistance in preparation for adl mobility/transfers training within 7 days Outcome: Progressing Towards Goal 
 OCCUPATIONAL THERAPY TREATMENT Patient: Lisa Forrester (56 y.o. male) Date: 8/31/2020 Diagnosis: Sepsis ([facility] [A41.9] Cellulitis of right foot [X47.729] ESRD (end stage renal disease) ([facility] [N18.6] Acute respiratory failure with hypoxia and hypercapnia (HCC) [J96.01, J96.02]  
<principal problem not specified> Procedure(s) (LRB): 
RIGHT BELOW KNEE AMPUTATION (Right) 7 Days Post-Op Precautions: Fall, NWB(legally blind, [place]) Chart, occupational therapy assessment, plan of care, and goals were reviewed. ASSESSMENT Patient continues with skilled OT services and is progressing towards goals. Pt has somewhat better hearing if spoken to in a loud voice today, and communication was improved. Used writing in large letters for basics. Pt performed bed mobility quite well today; He was able to sit EOB without assistance and attempted standing twice, with max A X2, bed raised, and pulling up on the chair's bar. This is improved compared to previous tx session. Pt had the urge for BM and was returned to bed, made comfortable and placed on bed pan. Pt will need kat lift to chair with staff for safety. Current Level of Function Impacting Discharge (ADLs): moderate to maximal assistance for adls and Assist X2 to 3 for functional mobility Other factors to consider for discharge: states that he was much stronger and athletic prior to initiating dialysis PLAN : 
Patient continues to benefit from skilled intervention to address the above impairments. Continue treatment per established plan of care. to address goals. Recommend with staff: kat lift to chair Recommend next OT session: seated EOB-f/u on theraband exercises/plan of care Recommendation for discharge: (in order for the patient to meet his/her long term goals) Therapy 3 hours per day 5-7 days per week This discharge recommendation: 
Has not yet been discussed the attending provider and/or case management IF patient discharges home will need the following DME: tbd SUBJECTIVE:  
Patient stated I can do that.  OBJECTIVE DATA SUMMARY:  
Cognitive/Behavioral Status: 
Neurologic State: Alert Orientation Level: Oriented to person;Oriented to place;Oriented to situation Cognition: Follows commands Perception: Appears intact Perseveration: No perseveration noted Safety/Judgement: Awareness of environment; Fall prevention; Insight into deficits Functional Mobility and Transfers for ADLs: 
Bed Mobility: 
Rolling: Supervision;Stand-by assistance Supine to Sit: Supervision;Stand-by assistance Sit to Supine: Supervision;Stand-by assistance Scooting: Supervision;Stand-by assistance Transfers: Sit to Stand: Maximum assistance; Additional time;Assist x2 to 3(pulled up on bar on back of chair) with bed raised Balance: 
Sitting: Intact Sitting - Static: Good (unsupported) Standing: Impaired; Without support Standing - Static: Constant support;Poor(max A X2 for sit to and from standing) ADL Intervention: 
  
Pt had urge for BM during tx session and was assisted with placing bed pan---nursing aware. Toileting Toileting Assistance: Total assistance(dependent) Bowel Hygiene: Total assistance (dependent)(requests nurses assist for bowel hygiene/pericare) Clothing Management: Moderate assistance Cues: Physical assistance for pants down;Verbal cues provided Adaptive Equipment: (used bed pan at bed level - total A to place) Cognitive Retraining Safety/Judgement: Awareness of environment; Fall prevention; Insight into deficits Therapeutic Exercises:  
Second session attempted later to initiate red theraband exercises. Demonstrated exercises to pt, he requested that the theraband be left for him and declined Education on each exercise under therapist's direction. Pt verbalized understanding after being provided initial demonstration. Pain: No complaints Activity Tolerance:  
Good After treatment patient left in no apparent distress:  
Supine in bed, Call bell within reach, Side rails x 3 and nursing and nursing tech informed COMMUNICATION/COLLABORATION:  
The patients plan of care was discussed with: Physical therapist, Registered nurse, Rehabilitation technician and Certified nursing assistant/patient care technician. Maria De Jesus Coy OTR/CUCO Time Calculation: 22 mins first session plus 6 minutes second session--total 28 minutes.

## 2020-08-31 NOTE — DIABETES MGMT
[address] INITIAL NOTE Presentation Genie Watkins is a 38 y.o. male with  a PMH of diabetes with neuropathy, CKD, HTN, Hypercholesteremia who presented to his podiatrist for follow up after amputation of his first and second right toes. He was experiencing fevers and chills that have been occurring for several days. His podiatrist sent him to the ED for evaluation. In the ED he was noted to be septic and admitted for IV antibiotics. DX: Sepsis TX: IV antibiotics, HD Current clinical course has been complicated by steroid induced hyperglycemia, renal failure requiring HD, Hyperkalemia and chest pain. Diabetes: Patient has known Type two diabetes, treated with Lantus PTA. Admitting A1C 9.4% (up from 7.4% 1/20) Consulted by Provider for advanced diabetes nursing assessment and care, specifically related to  
[] Transitioning off Kimi Camper [x] Inpatient management strategy [] Home management assessment 
[] Survival skill education Diabetes-related medical history Acute complications: None Neurological complications Peripheral neuropathy Microvascular disease Nephropathy Macrovascular disease Peripheral vascular disease and Foot wounds Diabetes medication history Drug class Currently in use Discontinued Never used Biguanide DDP-4 inhibitor Sulfonylurea Thiazolidinedione GLP-1 RA SGLT-2 inhibitors Basal insulin Lantus 20 units daily Fixed Dose  Combinations Bolus insulin Subjective I'll let you facetime my POA\" (POA is a first cousin) Patient reports the following home diabetes self-care practices: PTA he was living independently but per family member she felt it was not the best plan for him to go home to that same situation. Taking medications pattern 
[x] Consistent administration 
[x] Affordable Objective Physical exam 
 General Alert, oriented and in no acute distress/ill-appearing. Conversant and cooperative. Vital Signs Visit Vitals /88 (BP 1 Location: Right arm, BP Patient Position: Sitting) Pulse 85 Temp 98.7 °F (37.1 °C) Resp 18 Ht 6' 2\" (1.88 m) Wt 142.3 kg (313 lb 11.4 oz) SpO2 98% BMI 40.28 kg/m² Laboratory Lab Results Component Value Date/Time Hemoglobin A1c 9.4 (H) 08/15/2020 12:19 AM  
 Hemoglobin A1c (POC) 9.3 04/03/2018 01:30 PM  
 
Lab Results Component Value Date/Time LDL, calculated 83 08/15/2020 12:18 AM  
 
Lab Results Component Value Date/Time Creatinine (POC) 12.8 (H) 08/24/2020 10:51 AM  
 Creatinine 11.30 (H) 08/31/2020 06:53 AM  
 
Lab Results Component Value Date/Time Sodium 130 (L) 08/31/2020 06:53 AM  
 Potassium 4.8 08/31/2020 06:53 AM  
 Chloride 92 (L) 08/31/2020 06:53 AM  
 CO2 24 08/31/2020 06:53 AM  
 Anion gap 14 08/31/2020 06:53 AM  
 Glucose 366 (H) 08/31/2020 06:53 AM  
 BUN 64 (H) 08/31/2020 06:53 AM  
 Creatinine 11.30 (H) 08/31/2020 06:53 AM  
 BUN/Creatinine ratio 6 (L) 08/31/2020 06:53 AM  
 GFR est AA 6 (L) 08/31/2020 06:53 AM  
 GFR est non-AA 5 (L) 08/31/2020 06:53 AM  
 Calcium 8.7 08/31/2020 06:53 AM  
 Bilirubin, total 1.2 (H) 08/21/2020 04:16 AM  
 Alk. phosphatase 83 08/21/2020 04:16 AM  
 Protein, total 9.8 (H) 08/21/2020 04:16 AM  
 Albumin 1.7 (L) 08/21/2020 04:16 AM  
 Globulin 8.1 (H) 08/21/2020 04:16 AM  
 A-G Ratio 0.2 (L) 08/21/2020 04:16 AM  
 ALT (SGPT) 26 08/21/2020 04:16 AM  
 
Lab Results Component Value Date/Time ALT (SGPT) 26 08/21/2020 04:16 AM  
 
 
Factors affecting BG pattern Factor Dose Comments Nutrition: 
Renal regular: Carb-controlled meals 1800kcal   
Drugs: 
Prednisone 40 mg daily with breakfast   
Pain On hydromorphone Infection/ Sepsis Blood glucose pattern Assessment and Plan Nursing Diagnosis Risk for unstable blood glucose pattern Nursing Intervention Domain 4270 Decision-making Support Nursing Interventions Examined current inpatient diabetes control Explored factors facilitating and impeding inpatient management Identified self-management practices impeding diabetes control Explored corrective strategies with patient's POA Informed patient of rational for insulin strategy while hospitalized Evaluation This gentleman, with uncontrolled Type 2 diabetes, hasn't achieved inpatient blood glucose target of 100-180mg/dl. Inpatient blood glucose management has been impacted by 
[x] Kidney dysfunction 
[x] Glucocorticoid use Admission blood glucose was 150 and patient's blood glucose was relatively well controlled on his home dose of 20 units of Lantus daily. However with the initiation of steroids his blood glucose increased into the 400s requiring an insulin infusion. He received 30 units of Lantus on  and it was increased to 40 units on  for persistent steroid induced hyperglycemia. At baseline he has metabolic basal needs that require insulin but is also requiring extra insulin coverage for the steroids. Prednisone dose has been tapered today to 40mg daily with breakfast so would recommend to link a dose of NPH with the steroid given. Recommendations Recommend: 
1. Insulin infusion per primary team 
 
2. When transitioning off insulin infusion: 
Basal needs: 20 units daily Lantus In addition to Lantus: NPH given at the same time prednisone given: renal dose 0.3 units/k units. To be weaned with steroids. Pharmacy to link dose together. When Prednisone 30 units- 0.2 units/kg: NPH 28 units prednisone is 20 units- 0.15 units/kg: NPH to 20 units 3. D/C current NPH dose with insulin infusion 4. Per Patient's POA she would like to speak to Nephrology about his kidney function and the Attending physician for an update on patient's condition. Billing Code(s) I personally reviewed chart, notes, data and current medications in the medical record, and examined the patient at bedside before making care recommendations. [x] 00204 IP subsequent hospital care - 45 minutes Jelena Soto MSN, RN, ACCNS-AG in collaboration with Maritza Ortez MSN, RN, [place] [address], [facility] & [address]. Jelena Soto [facility] Access via [facility]

## 2020-08-31 NOTE — PROGRESS NOTES
Sent tele hospitalist message in reference to patients blood glucose recheck- BGL was 434 after 9 units of insulin x2. Orders received to transfer patient to stepdown unit for insulin drip-waiting on bed placement for room.

## 2020-08-31 NOTE — PROGRESS NOTES
Stan Plan: D/C Rehab- [facility] 
F/u appointments 2nd IM Letter at d/c [facility] transportation CM reviewed  pt chart. Pt s/p BKA  on 8/24. Developed hearing loss post op. Per pt hearing is a little better today. CM will continue to follow pt for D/C planning and arrange services as deemed neccessary [facility]  [facility] 
Phone: [phone]

## 2020-08-31 NOTE — PROGRESS NOTES
Spiritual Care Assessment/Progress Note [address] 
 
 
NAME: Audra White      MRN: [id number] AGE: 38 y.o. SEX: male Bahai Affiliation: No preference Language: [place] 8/31/2020     Total Time (in minutes): 14 Spiritual Assessment begun in [facility] 2 PROGRESSIVE CARE through conversation with: 
  
    [x]Patient        [] Family    [] Friend(s) Reason for Consult: Advance medical directive consult Spiritual beliefs: (Please include comment if needed) 
   [] Identifies with a celeste tradition:     
   [] Supported by a celeste community:        
   [] Claims no spiritual orientation:       
   [] Seeking spiritual identity:            
   [] Adheres to an individual form of spirituality:       
   [x] Not able to assess:                   
 
    
Identified resources for coping:  
   [] Prayer                           
   [] Music                  [] Guided Imagery [x] Family/friends                 [] Pet visits [] Devotional reading                         [] Unknown 
   [] Other:                                          
 
 
Interventions offered during this visit: (See comments for more details) Patient Interventions: Advance medical directive consult, Initial/Spiritual assessment, patient floor Plan of Care: 
 
 [] Support spiritual and/or cultural needs [x] Support AMD and/or advance care planning process    
 [] Support grieving process 
 [] Coordinate Rites and/or Rituals  
 [] Coordination with community clergy [] No spiritual needs identified at this time 
 [] Detailed Plan of Care below (See Comments)  [] Make referral to Music Therapy 
[] Make referral to Pet Therapy    
[] Make referral to Addiction services 
[] Make referral to [facility] 
[] Make referral to Spiritual Care Partner 
[] No future visits requested       
[x] Follow up visits as needed Comments: Provided support to this pt in [place] [address].   Pt wasn't available to complete document. Advised pt of chaplains' availability to offer assistance with completing AMD when ready. [address] [place] [address] will continue to follow to assist with the possible completion of this document. Gentry Brito MDiv. Staff  Request  Support/Spiritual Care Services via [address] [place] [address]

## 2020-08-31 NOTE — PROGRESS NOTES
Name: Fernando Zaman MRN: [id number] : 1985 Assessment   :                                               Plan: ESKD Anemia of ESRD + post op blood loss DM-2, uncontrolled Mild Hyperkalemia- resolved HTN-prior hypotension resolved Hyponatremia-mild Foot infection- s/p  R BKA on 8-24 Hearing loss (new) Legally blind [facility] MWF HD; HD today. Increase UF now that HTN. Off Midodrine On Norvasc and Lisinopril RETACRIT 10K 3x/week ABx per ID Ct FR of 1.5 L/day Subjective: 
Resting. Still with hearing loss, but a tad better. We discussed the above. No events ROS:  
Deferred Exam: 
Visit Vitals BP (!) 163/100 Pulse 82 Temp 97.6 °F (36.4 °C) Resp 16 Ht 6' 2\" (1.88 m) Wt 142.3 kg (313 lb 11.4 oz) SpO2 96% BMI 40.28 kg/m² NAD No resp distress R BKA; LLE with no edema Alert awake L AV access in place Current Facility-Administered Medications Medication Dose Route Frequency Last Dose  hydrALAZINE (APRESOLINE) 20 mg/mL injection 10 mg  10 mg IntraVENous Q6H PRN    
 insulin regular (NOVOLIN R, HUMULIN R) 100 Units in 0.9% sodium chloride 100 mL infusion  0-50 Units/hr IntraVENous TITRATE 6.9 Units/hr at 20 1008  insulin lispro (HUMALOG) injection   SubCUTAneous TIDAC  glucose chewable tablet 16 g  4 Tab Oral PRN    
 dextrose (D50W) injection syrg 12.5-25 g  25-50 mL IntraVENous PRN    
 glucagon (GLUCAGEN) injection 1 mg  1 mg IntraMUSCular PRN  predniSONE (DELTASONE) tablet 40 mg  40 mg Oral DAILY WITH BREAKFAST 40 mg at 20 8791  lisinopriL (PRINIVIL, ZESTRIL) tablet 5 mg  5 mg Oral DAILY 5 mg at 20 9187  amLODIPine (NORVASC) tablet 5 mg  5 mg Oral DAILY 5 mg at 20 6392  epoetin nata-epbx (RETACRIT) injection 10,000 Units  10,000 Units SubCUTAneous Q MON, WED & FRI 10,000 Units at 08/28/20 2154  zinc oxide-cod liver oil (DESITIN) 40 % paste   Topical BID and QHS  acetaminophen (TYLENOL) tablet 1,000 mg  1,000 mg Oral Q8H 1,000 mg at 08/31/20 0519  
 gabapentin (NEURONTIN) capsule 100 mg  100 mg Oral  mg at 08/30/20 2206  oxyCODONE IR (ROXICODONE) tablet 5-10 mg  5-10 mg Oral Q4H PRN 10 mg at 08/30/20 2326  
 heparin (porcine) injection 5,000 Units  5,000 Units SubCUTAneous Q8H 5,000 Units at 08/31/20 2304  sodium chloride (NS) flush 5-40 mL  5-40 mL IntraVENous PRN 10 mL at 08/30/20 2209  
 HYDROmorphone (PF) (DILAUDID) injection 1 mg  1 mg IntraVENous Q3H PRN 1 mg at 08/31/20 0137  phenol throat spray (CHLORASEPTIC) 1 Spray  1 Spray Oral PRN    
 albumin human 25% (BUMINATE) solution 25 g  25 g IntraVENous PRN 12.5 g at 08/28/20 1100  
 lactobac ac& pc-s.therm-b.anim (USMAN Q/RISAQUAD)  1 Cap Oral DAILY 1 Cap at 08/31/20 0953  loperamide (IMODIUM) capsule 2 mg  2 mg Oral Q4H PRN 2 mg at 08/31/20 8064  ondansetron (ZOFRAN) injection 4 mg  4 mg IntraVENous Q6H PRN 4 mg at 08/25/20 0150 Labs/Data: 
 
Lab Results Component Value Date/Time WBC 13.7 (H) 08/31/2020 02:19 AM  
 Hemoglobin (POC) 11.9 (L) 07/25/2017 10:12 AM  
 HGB 9.7 (L) 08/31/2020 02:19 AM  
 Hematocrit (POC) 33 (L) 08/24/2020 10:51 AM  
 HCT 31.4 (L) 08/31/2020 02:19 AM  
 PLATELET 664 (H) 10/88/0041 02:19 AM  
 MCV 82.2 08/31/2020 02:19 AM  
 
 
Lab Results Component Value Date/Time  Sodium 130 (L) 08/31/2020 06:53 AM  
 Potassium 4.8 08/31/2020 06:53 AM  
 Chloride 92 (L) 08/31/2020 06:53 AM  
 CO2 24 08/31/2020 06:53 AM  
 Anion gap 14 08/31/2020 06:53 AM  
 Glucose 366 (H) 08/31/2020 06:53 AM  
 BUN 64 (H) 08/31/2020 06:53 AM  
 Creatinine 11.30 (H) 08/31/2020 06:53 AM  
 BUN/Creatinine ratio 6 (L) 08/31/2020 06:53 AM  
 GFR est AA 6 (L) 08/31/2020 06:53 AM  
 GFR est non-AA 5 (L) 08/31/2020 06:53 AM  
 Calcium 8.7 08/31/2020 06:53 AM  
 
 
Wt Readings from Last 3 Encounters:  
08/31/20 142.3 kg (313 lb 11.4 oz) 08/13/20 153.2 kg (337 lb 11.9 oz) 08/07/20 153.8 kg (339 lb 1.1 oz) Intake/Output Summary (Last 24 hours) at 8/31/2020 1016 Last data filed at 8/30/2020 1655 Gross per 24 hour Intake 880 ml Output  Net 880 ml Patient seen and examined. Chart reviewed. Labs, data and other pertinent notes reviewed in last 24 hrs. PMH/SH/FH reviewed and unchanged compared to H&P Ce Watt MD

## 2020-08-31 NOTE — WOUND CARE
Wound Care Nurse Consult: consult from staff nurse stating \" Right BKA- need nursing wound care orders for care at new facility\". Patient is a 39 y/o legally blind and [place] AAM who is POD# 7 from Right BKA by Dr John Hylton. Past Medical History:  
Diagnosis Date  Cataracts  Chronic kidney disease  Diabetes ([facility] [address]  Hypercholesterolemia  Hypertension  Kidney failure  Obesity Patient's dressing removedand staple line well approximated, scant, scattered dry drainage, no erythema, some dusky areas around lateral edge of incision: 
 
 
Patient has outpatient follow-up with Dr John Hylton and they have signed off case this visit. Recommend and done: RBKA incision: change dressing using dry bulky Kelrlix gauze and ace wrap on MWF's 
 
Celio Casas RN, [facility] 
 
 Medical Necessity Information: It is in your best interest to select a reason for this procedure from the list below. All of these items fulfill various CMS LCD requirements except the new and changing color options. Bill For Surgical Tray: no Anesthesia Type: 1% lidocaine with epinephrine Post-Care Instructions: I reviewed with the patient in detail post-care instructions. Patient is to keep the biopsy site dry overnight, and then apply bacitracin twice daily until healed. Patient may apply hydrogen peroxide soaks to remove any crusting. Billing Type: Third-Party Bill Was A Bandage Applied: Yes Consent was obtained from the patient. The risks and benefits to therapy were discussed in detail. Specifically, the risks of infection, scarring, bleeding, prolonged wound healing, incomplete removal, allergy to anesthesia, nerve injury and recurrence were addressed. Prior to the procedure, the treatment site was clearly identified and confirmed by the patient. All components of Universal Protocol/PAUSE Rule completed. Wound Care: Polysporin ointment Detail Level: Detailed Hemostasis: Electrocautery Size Of Lesion In Cm (Required): 0.8 Path Notes (To The Dermatopathologist): R/O ca Medical Necessity Clause: This procedure was medically necessary because the lesion that was treated was: Biopsy Method: Dermablade X Size Of Lesion In Cm (Optional): 0 Notification Instructions: Patient will be notified of biopsy results. However, patient instructed to call the office if not contacted within 2 weeks.

## 2020-09-01 NOTE — DIALYSIS
[facility] Dialysis Team [place]  [phone] Vitals   Pre   Post   Assessment   Pre   Post    
Temp  98.2  98.1 LOC  A+Ox4 same HR   79 82 Lungs   Diminished across bases  same B/P   157/100 140/92 Cardiac   RRR  same Resp   20 18 Skin   Warm/dry  same Pain level  Pain Intensity 1: 4 (09/01/20 0300) 4 Edema Generalized 
 same Orders: Duration:   Start:    2230 End:    0345 Total:   4.25 Dialyzer:   Dialyzer/Set Up Inspection: Kenyatta Marte (08/31/20 2315) K Bath:   Dialysate K (mEq/L): 2 (08/31/20 2315) Ca Bath:   Dialysate CA (mEq/L): 2.5 (08/31/20 2315) Na/Bicarb:   Dialysate NA (mEq/L): 138 (08/31/20 2315) Target Fluid Removal:   Goal/Amount of Fluid to Remove (mL): 2500 mL (08/31/20 2315) Access Type & Location:   PELON-AVF-w/ (+) bruit/thrill. Site intact, no S+S of infection. Cannulated w/ 2x 15g, BFR @ 450 Labs Obtained/Reviewed Critical Results Called   Date when labs were drawn- 
Hgb-   
HGB Date Value Ref Range Status 08/31/2020 9.7 (L) 12.1 - 17.0 g/dL Final  
 
K-   
Potassium Date Value Ref Range Status 08/31/2020 4.8 3.5 - 5.1 mmol/L Final  
 
Ca-  
Calcium Date Value Ref Range Status 08/31/2020 8.7 8.5 - 10.1 MG/DL Final  
 
Bun-  
BUN Date Value Ref Range Status 08/31/2020 64 (H) 6 - 20 MG/DL Final  
 
Creat-  
Creatinine Date Value Ref Range Status 08/31/2020 11.30 (H) 0.70 - 1.30 MG/DL Final  
 
  
Medications/ Blood Products Given Name   Dose   Route and Time Blood Volume Processed (BVP):    101.9 Net Fluid Removed:  2500ml Comments Time Out Done: 2320 Primary Nurse Rpt Pre:Primary Primary Nurse Rpt Post:Primary Pt Education:Fluid restrictions Care Plan:Continue Tx as ordered Tx Summary:Pt tolerated Tx well. All possible blood returned via NS rinseback. Needles pulled, sites secured, no excessive bleeding. Admiting Diagnosis:Non healing would Pt's previous clinic- 
 Consent signed - Informed Consent Verified: Yes (08/31/20 2315) Sylvain Consent - Signed Hepatitis Status- Neg 
Machine #- Machine Number: [id number] (08/31/20 8259) Telemetry status-Bedside/remote Pre-dialysis wt. - Pre-Dialysis Weight: 142.3 kg (313 lb 11.4 oz) (08/31/20 2315)

## 2020-09-01 NOTE — PROGRESS NOTES
Assisted pt with advance medical directive in [place] [address]. Pt initially stated he had done this with another [address] [place] [address] and a CM. Explained we needed a hard copy in his physical chart with his signature. Pt shared he only wanted to name his \"sister\" Kala as MPOA and thought this was already done. [address] [place] [address] checked pt's chart and it was electronically entered as such, but explained we needed a doc with his signature that showed us legally that is the person he chose to add as his MPOA. Assisted patient with completion of the document. Made copy for patients chart and added it with pt's physical chart. Returned original document and copies to the patient and on his instruction placed it in the bag with his belongings. Contact Spiritual Care Services for any further questions or any spiritual or emotional support needs. Ainsley Barboza MDiv. Staff  Request  Support/Spiritual Care Services via [address] [place] [address]

## 2020-09-01 NOTE — PROGRESS NOTES
TEJA Plan: D/C Rehab- [facility] 
F/u appointments 2nd IM Letter at d/c [facility] transportation 
  
CM spoke with Bhum[facility] Oates, can accept pt on Wednesday- tomorrow when bed is available. Pt will need neg COVID test within 72hr of d/c. Last test was 8/12/20. CM will continue to follow pt for D/C planning and arrange services as deemed neccessary [facility]  [facility] 
Phone: [phone]

## 2020-09-01 NOTE — PROGRESS NOTES
Problem: Self Care Deficits Care Plan (Adult) Goal: *Acute Goals and Plan of Care (Insert Text) Description:  
FUNCTIONAL STATUS PRIOR TO ADMISSION:  
 
HOME SUPPORT:  
 
Occupational Therapy Goals Initiated 8/27/2020 1. Patient will perform supine to sit EOB  with minimal assistance in preparation for adls, within 7 day(s). 2.  Patient will perform grooming, seated EOB with supervision/set-up within 7 day(s). 3.  Patient will perform sponge bathing with minimal assistance/contact guard assist within 7 day(s). 4.  Patient will perform toilet transfers with maximal assistance X2 within 7 day(s). 5.  Patient will perform all aspects of toileting with moderate assistance  within 7 day(s). 6.  Patient will participate in upper extremity therapeutic exercise/activities with supervision/set-up for 10 minutes within 7 day(s). 7.  Patient will utilize energy conservation techniques during functional activities with minimal verbal cues within 7 day(s). 8.  Patient will perform sit to stand with moderate assistance in preparation for adl mobility/transfers training within 7 days Outcome: Progressing Towards Goal 
 OCCUPATIONAL THERAPY TREATMENT Patient: Niurka Mathews (49 y.o. male) Date: 9/1/2020 Diagnosis: Sepsis ([facility] [address] [A41.9] Cellulitis of right foot [P56.987] ESRD (end stage renal disease) ([facility] [address] [N18.6] Acute respiratory failure with hypoxia and hypercapnia (HCC) [J96.01, J96.02]  
<principal problem not specified> Procedure(s) (LRB): 
RIGHT BELOW KNEE AMPUTATION (Right) 8 Days Post-Op Precautions: Fall, NWB(legally blind, [place]) Chart, occupational therapy assessment, plan of care, and goals were reviewed. ASSESSMENT Patient continues with skilled OT services and is progressing towards goals. Pt had no c/o pain this session and seemed to have slightly improved hearing.    Pt was able to perform bed mobility with supervision and sat EOB without support. A drop arm recliner chair was positioned adjacent to bed and pt was able to scoot to and from (his R)  the bed with supervision. Pt demonstrated good effort during standing trials. Pt was able to sit up in the chair for his meal and for performing BUEs to increase strength and endurance for adls. Current Level of Function Impacting Discharge (ADLs): generally moderate assistance for adls, toileting maximal assistance at bed level (would benefit from drop arm BSC) supervision for scooting from bed to  drop arm chair. Other factors to consider for discharge: hearing remains impaired,  pt is vision impaired at baseline. PLAN : 
Patient continues to benefit from skilled intervention to address the above impairments. Continue treatment per established plan of care. to address goals. Recommend with staff: drop arm BSC for toilet; drop arm recliner for OOB Recommend next OT session: work on self care at bed level Recommendation for discharge: (in order for the patient to meet his/her long term goals) Therapy 3 hours per day 5-7 days per week This discharge recommendation: 
Has not yet been discussed the attending provider and/or case management IF patient discharges home will need the following DME: tbd at rehab SUBJECTIVE:  
Patient stated I was doing that .   (red theraband) OBJECTIVE DATA SUMMARY:  
Cognitive/Behavioral Status: 
Neurologic State: Alert Orientation Level: Oriented X4 Cognition: Follows commands Perception: Appears intact Perseveration: No perseveration noted Functional Mobility and Transfers for ADLs: 
Bed Mobility: 
Rolling: Supervision Supine to Sit: Supervision Scooting: Supervision Transfers: 
Sit to Stand: Maximum assistance;Assist x2(unable to come to full stand) Bed to Chair: Supervision(pt able to scoot from bed to chair using drop arm and close   
s) Balance: 
Sitting: Intact Sitting - Static: Good (unsupported) Sitting - Dynamic: Good (unsupported) Standing: Impaired; With support Standing - Static: Poor;Constant support ADL Intervention: 
Feeding Feeding Assistance: Independent(questions what is on his food tray due to impaired vision) Educated on arm chair push ups to increase strength and endurance for adl mobility Therapeutic Exercises:  
Red theraband--shoulders-horizontal abd/adduction at shoulder level and unilaterally Biceps curls and triceps curls. Pt educated on proper technique and verbalized understanding. Will need reinforcement next session. Pain: No complaints of pain Activity Tolerance:  
Good After treatment patient left in no apparent distress:  
Sitting in chair COMMUNICATION/COLLABORATION:  
The patients plan of care was discussed with: Physical therapist and Registered nurse. CHUN Harrison/CUCO Time Calculation: 39 mins

## 2020-09-01 NOTE — PROGRESS NOTES
Hospitalist Progress Note NAME: Cami Thorpe :  1985 MRN:  [id number] Assessment / Plan: 
Discharge Delayed as [facility] IP rehab require COVID test neg in 72 hrs of DC & bed unavailable till tomorrow per CM team 
 
Sepsis, POA  (WBC 19K, tachycardia)- now resolved Right foot cellulitis with abscess, POA - s/p BKA  this admission H/o s/p amputation ()of right 1st and second toe for osteomyelitis POA Hx osteomyelitis right 3rd and 4th toes 2020 and 10/2019 respectively Acute hypoxic respiratory failure (88% on RA) POA_ now resolved, on RA IDDM uncontrolled with hyperglycemia POA- now uncontrolled due to steroids ESRD on HD MWF Morbid Obesity POA 
SARS-COV-2 negative Initial Blood Cx neg Initial Wound Cx= Pseudomonal, Enterococcal & Klebsiella sp. (poly microbial) growth noted S/p podiatry consult apreciated, MRI right foot showed fluid collection and abscess with no osteomyelitis noted s/p irrigation of wound s/p lantus 20 units daily, add moderate SSI, had increased Lantus to 40 units daily  but now on Insulin drip (transferred to PCU) as pt to be on prednisone for hearing loss per ENT recc- will Decrease prednisone dose down to 40 mg daily today 
appreciated renal consult to continue with dialysis MWF 
s/p  RIGHT LEG ARTERIOGRAM ANTERIOR TIBIAL ARTERY AND ANGIOPLASTY  by vascular Sx 
s/p  excision and debridement of necrotic tissue right foot   
s/p BKA   
s/p zosyn completed now , Vancomycin discontinued. Hypotension - resolved HTN - uncontrolled still Resumed Amlodipine - increased to 10 mg daily Resumed Lisinopril - increased to 20 mg daily DC midodrine for good now Hyperkalemia S/p kayexalate Cont HD - s/p HD yesterday, now HD in AM before DC to IP Rehab 
 
  
Diarrhea: 
-Likely Antibiotic induced 
-Resolved -Imodium PRN 
-c/w probiotic 
  
Hyponatremia  
-motoring - stable with HD 
  
Chest pain - now resolved -troponin negative  
- EKG show no acute abnormality 
-Echo show EF 45-50 % Hearing loss not POA - possibly anesthesia related? 
- remove offending agents which may affect pt's hearing S/p ENT consult- Spoken with Dr. Elana Ospina with ENT, recommends Prednisone for now and outpatient follow up. Cont Prednisone 60mg X 5 days, 40mg X 5 days, 20mg X 5 days  And 10mg X 5 days Currently decreased to 40 mg daily today to help with Hyperglycemia DC Insulin drip Started on NPH with prednisone dosing in AM 
Cont Lantus Q HS 
 
 
  
Body mass index is 43.36 kg/m². 
 
 
  
Code: full DVT prophylaxis: heparin Surrogate decision maker:  Kala and Davide Gomez (Cousins). Pt verbalized these 2 names when asked about decision maker. D/w ROXANE Salvador Patient will need rehab at discharge, Inpatient rehab eval sent - [id number] accepted pt- likely DC on in 24 hrs once repeat COVID test comes back negative & bed available on Wednesday 
  
  
  
   
 
Subjective: Chief Complaint / Reason for Physician Visit Discussed with RN events overnight. Pt doing okay today Still with complete hearing loss Review of Systems: 
Symptom Y/N Comments  Symptom Y/N Comments Fever/Chills    Chest Pain Poor Appetite    Edema Cough    Abdominal Pain Sputum    Joint Pain SOB/LITTLE    Pruritis/Rash Nausea/vomit    Tolerating PT/OT Diarrhea    Tolerating Diet Constipation    Other Could NOT obtain due to: Hearing lost   
 
Objective: VITALS:  
Last 24hrs VS reviewed since prior progress note. Most recent are: 
Patient Vitals for the past 24 hrs: 
 Temp Pulse Resp BP SpO2  
09/01/20 0930     100 % 09/01/20 0727 98 °F (36.7 °C) 98 18 (!) 166/105 98 % 09/01/20 0330 98.1 °F (36.7 °C) 82 18 (!) 140/92 95 % 09/01/20 0315  84 18 (!) 148/94 96 % 09/01/20 0300 98.2 °F (36.8 °C) 88 20 (!) 153/97 95 % 09/01/20 0245  81 18 140/86 94 % 09/01/20 0230  82 18 (!) 161/92 96 % 09/01/20 0215  83 18 (!) 155/96 94 % 09/01/20 0200  82 18 (!) 153/96 97 % 09/01/20 0145  87 18 141/90 95 % 09/01/20 0130  90 20 (!) 153/93 95 % 09/01/20 0115  77 18 (!) 144/96 95 % 09/01/20 0100  75 18 (!) 141/91 96 % 09/01/20 0045  71 18 131/83 96 % 09/01/20 0030  71 18 140/90 97 % 09/01/20 0015  78 20 (!) 143/96 95 % 09/01/20 0000  75 20 (!) 142/96 95 % 08/31/20 2345  77 20 (!) 145/93 91 % 08/31/20 2330  80 20 (!) 140/94 98 % 08/31/20 2315 98.2 °F (36.8 °C) 79 20 (!) 157/100 97 % 08/31/20 2031 98.3 °F (36.8 °C) 87 21 (!) 177/110 92 % 08/31/20 1447 98.6 °F (37 °C) 86 18 (!) 157/91 98 % 08/31/20 1425  87  (!) 157/91   
08/31/20 1046 98.7 °F (37.1 °C) 85 18 146/88 98 % Intake/Output Summary (Last 24 hours) at 9/1/2020 1030 Last data filed at 9/1/2020 5424 Gross per 24 hour Intake 338.33 ml Output 2500 ml Net -2161.67 ml PHYSICAL EXAM: 
General: WD, WN. Alert, cooperative, no acute distress   
EENT:  EOMI. Anicteric sclerae. MMM Resp:  Clear CV:  Regular  rhythm,  No edema GI:  Soft, Non distended, Non tender.  +Bowel sounds Neurologic:  Alert and oriented X 3, normal speech, Psych:   Good insight. Not anxious nor agitated Ext:   Rt BKA, dressing in place Reviewed most current lab test results and cultures  YES Reviewed most current radiology test results   YES Review and summation of old records today    NO Reviewed patient's current orders and MAR    YES 
PMH/SH reviewed - no change compared to H&P 
________________________________________________________________________ Care Plan discussed with: 
  Comments Patient x Family RN x Care Manager x Kit Last Consultant Multidiciplinary team rounds were held today with , nursing, pharmacist and clinical coordinator. Patient's plan of care was discussed; medications were reviewed and discharge planning was addressed. ________________________________________________________________________ Total NON critical care TIME:  26   Minutes Total CRITICAL CARE TIME Spent:   Minutes non procedure based Comments >50% of visit spent in counseling and coordination of care y   
________________________________________________________________________ Aracely Martínez MD  
 
Procedures: see electronic medical records for all procedures/Xrays and details which were not copied into this note but were reviewed prior to creation of Plan. LABS: 
I reviewed today's most current labs and imaging studies. Pertinent labs include: 
Recent Labs 08/31/20 
8488 WBC 13.7* HGB 9.7* HCT 31.4* * Recent Labs  
  09/01/20 
0340 08/31/20 
2946 08/31/20 
6890 * 130* 126*  
K 3.0* 4.8 5.0  
CL 94* 92* 90* CO2 27 24 23 * 366* 417* BUN 27* 64* 63* CREA 4.90* 11.30* 11.60* CA 9.4 8.7 9.1 MG 2.2 2.6*  --   
PHOS  --   --  8.6* Signed: Aracely Martínez MD

## 2020-09-01 NOTE — ACP (ADVANCE CARE PLANNING)
Assisted pt with advance medical directive in [place] [address]. Pt initially stated he had done this with another [address] [place] [address] and a CM. Explained we needed a hard copy in his physical chart with his signature. Pt shared he only wanted to name his \"sister\" Kala as MPOA and thought this was already done. [address] [place] [address] checked pt's chart and it was electronically entered as such, but explained we needed a doc with his signature that showed us legally that is the person he chose to add as his MPOA. Assisted patient with completion of the document. Made copy for patients chart and added it with pt's physical chart. Returned original document and copies to the patient and on his instruction placed it in the bag with his belongings. Contact Spiritual Care Services for any further questions or any spiritual or emotional support needs. Marcos Murry MDiv. Staff  Request  Support/Spiritual Care Services via [address] [place] [address]

## 2020-09-01 NOTE — PROGRESS NOTES
Problem: Mobility Impaired (Adult and Pediatric) Goal: *Acute Goals and Plan of Care (Insert Text) Description: FUNCTIONAL STATUS PRIOR TO ADMISSION: At baseline pt ambulated with cane. Lives alone and takes [facility] to HD appointments. HOME SUPPORT PRIOR TO ADMISSION: Patient lived alone. Physical Therapy Goals Initiated 8/25/2020 1. Patient will move from supine to sit and sit to supine , scoot up and down, and roll side to side in bed with minimal assistance/contact guard assist within 7 day(s). 2.  Patient will transfer from bed to chair and chair to bed with moderate assistance  using the least restrictive device within 7 day(s). 3.  Patient will perform sit to stand with moderate assistance  within 7 day(s). 4.  Patient will ambulate with maximal assistance for 10 feet with the least restrictive device within 7 day(s). Revised 9/1/2020 1. Patient will move from supine to sit and sit to supine , scoot up and down, and roll side to side in bed with mod I within 7 day(s). 2.  Patient will transfer from bed to chair and chair to bed with mod I  using the least restrictive device within 7 day(s). 3.  Patient will perform sit to stand with moderate assistance  within 7 day(s). 4.  Patient will ambulate with maximal assistance for 5 feet with the least restrictive device within 7 day(s). Outcome: Progressing Towards Goal 
PHYSICAL THERAPY TREATMENT: WEEKLY REASSESSMENT Patient: Kurt Anderson (38 y.o. male) Date: 9/1/2020 Primary Diagnosis: Sepsis ([facility] [A41.9] Cellulitis of right foot [L30.237] ESRD (end stage renal disease) ([facility] [address] [N18.6] Acute respiratory failure with hypoxia and hypercapnia (HCC) [J96.01, J96.02] Procedure(s) (LRB): 
RIGHT BELOW KNEE AMPUTATION (Right) 8 Days Post-Op Precautions:   Fall, NWB(legally blind, [place]) ASSESSMENT Patient continues with skilled PT services and is progressing towards goals. Pt was received in supine and cleared by nursing to mobilize. VSS during session. He continues to be extremely [place]. He was able to come to the EOB and scoot over to drop arm recliner. Worked on going from chair to recliner and then back with height differences. Once in recliner worked on attempting to stand with RW. Tried both with pushing form arms of the chair and transition to RW as well as LUE on arm of chair and RUE on  of walker. He was almost able to come to full stand, but would get anxious and sit back down. Worked on UE exercises with red t-band. Patient's progression toward goals since last assessment: progressing towards all goals Current Level of Function Impacting Discharge (mobility/balance): supervision for bed mobility and max for transfers Other factors to consider for discharge: new R BKA PLAN : 
Goals have been updated based on progression since last assessment. Patient continues to benefit from skilled intervention to address the above impairments. Recommendations and Planned Interventions: bed mobility training, transfer training, gait training, therapeutic exercises, patient and family training/education, and therapeutic activities Frequency/Duration: Patient will be followed by physical therapy:  5 times a week to address goals. Recommendation for discharge: (in order for the patient to meet his/her long term goals) Therapy 3 hours per day 5-7 days per week This discharge recommendation: 
Has not yet been discussed the attending provider and/or case management IF patient discharges home will need the following DME: to be determined (TBD) SUBJECTIVE:  
Patient stated it feels good to be in the chair.  OBJECTIVE DATA SUMMARY:  
HISTORY:   
Past Medical History:  
Diagnosis Date Cataracts Chronic kidney disease Diabetes ([facility] [address] Hypercholesterolemia Hypertension Kidney failure Obesity Past Surgical History:  
Procedure Laterality Date COLONOSCOPY N/A 12/8/2017 COLONOSCOPY performed by Elham Jaime MD at [facility]  12/8/2017 HX AMPUTATION Left great toe and L 5th toe HX AMPUTATION TOE Right UPPER GI ENDOSCOPY,BIOPSY  12/8/2017 VASCULAR SURGERY PROCEDURE UNLIST    
 R side chest  
 VASCULAR SURGERY PROCEDURE UNLIST Left 07/25/2017 Creation transposed AV fistula arm Personal factors and/or comorbidities impacting plan of care:  
 
Home Situation Home Environment: Apartment # Steps to Enter: (uses elevator for 3rd floor apartment) One/Two Story Residence: One story Living Alone: Yes Support Systems: Family member(s) Patient Expects to be Discharged to[de-identified] Rehabilitation facility Current DME Used/Available at Home: Cane, straight EXAMINATION/PRESENTATION/DECISION MAKING:  
Critical Behavior: 
Neurologic State: Alert Orientation Level: Oriented X4 Cognition: Follows commands Safety/Judgement: Awareness of environment, Fall prevention, Insight into deficits Hearing: Auditory Auditory Impairment: Deaf(recent total hearing loss) Hearing Aids/Status: Does not own Skin:  ace wrap in place Edema: WDL Functional Mobility: 
Bed Mobility: 
Rolling: Supervision Supine to Sit: Supervision Scooting: Supervision Transfers: 
Sit to Stand: Maximum assistance;Assist x2(unable to come to full stand) Stand to Sit: Moderate assistance;Assist x2 Balance:  
Sitting: Intact Standing: Impaired; With support Standing - Static: Poor;Constant support Therapeutic Exercises:  
UE exercises with red t-band Activity Tolerance:  
Good Please refer to the flowsheet for vital signs taken during this treatment. After treatment patient left in no apparent distress:  
Sitting in chair and Call bell within reach COMMUNICATION/EDUCATION:  
 The patients plan of care was discussed with: Occupational therapist and Registered nurse. Fall prevention education was provided and the patient/caregiver indicated understanding. and Patient/family agree to work toward stated goals and plan of care. Thank you for this referral. 
Chico Landis, PT, DPT Time Calculation: 29 mins

## 2020-09-01 NOTE — PROGRESS NOTES
**Consult Information** 
Member Facility: [facility] MRN: [id number] Consult ID: [id number] Facility Time Zone: ET 
Date and Time of Request: 09/01/2020 05:46:48 AM 
Requesting Clinician: Kaylee Downs Patient Name: Meli Griffin Date of Birth: [id number] Gender: Male **Clinical Note** Clinical Note: Notified about patient being in atrial fibrillation (HR 90s-130s) with BP of 149/84. Am labs show K of 3. Order placed for PO Potassium and IV Lopressor (2.5 mg). EKG has been done. **Attestation** Interaction Mode: Phone Only Phone Duration (mins): 3 Time of Call : 09/01/2020 05:51 AM 
Interaction Attestation: Interprofessional internet consultation was delivered through telephonic and/or electronic communication upon the request of the patients treating provider, while the requesting and the rendering provider were not in the same physical location. A written summary report was provided to the requesting provider at the originating site. Evaluation Duration (mins): 3 **Physician Signature** This document was electronically signed by: Ed Dyson MD  09/01/2020 05:52 AM

## 2020-09-01 NOTE — DISCHARGE SUMMARY
Hospitalist Discharge Summary Patient ID: 
Tatyana Mccoy [id number] 
14 y.o. 
1985 8/12/2020 PCP on record: Liborio Taylor DO Admit date: 8/12/2020 Discharge date and time: 9/1/2020 DISCHARGE DIAGNOSIS: 
 
Sepsis, POA  (WBC 19K, tachycardia)- now resolved, completed IV Abx in this admission Right foot cellulitis with abscess, POA - s/p BKA 8/24 this admission- cont wound care, outpatient follow up with Dr Cody Babcock ([facility]) in 4 weeks H/o s/p amputation (8/7)of right 1st and second toe for osteomyelitis POA Hx osteomyelitis right 3rd and 4th toes 1/2020 and 10/2019 respectively Acute hypoxic respiratory failure (88% on RA) POA_ now resolved, on RA IDDM uncontrolled with hyperglycemia POA- now uncontrolled due to steroids ESRD on HD MWF Morbid Obesity POA Hypotension - resolved HTN - uncontrolled now, Increased Norvasc to 10 mg daily (home dose), started on Lisinopril 20 mg daily this admission, titrate as needed Hyperkalemia Diarrhea Chest pain FULL CODE 
 
 
CONSULTATIONS: 
IP CONSULT TO PODIATRY IP CONSULT TO NEPHROLOGY 
IP CONSULT TO INFECTIOUS DISEASES 
IP CONSULT TO OTOLARYNGOLOGY 
IP CONSULT TO VASCULAR SURGERY Excerpted HPI from H&P of Demi Silva MD: 
Abbie.[age] y.o. male past medical history significant for end-stage renal disease on hemodialysis Monday Wednesday Friday, diabetes, history of right foot osteomyelitis status post amputation of right third and fourth toes and more recently amputation of right first and second toe on August 7 by Dr. Norris Elkins who was sent from her office today with concerns for right foot cellulitis when he saw her in postop follow-up. He reports subjective fevers and chills since the weekend associated with generalized weakness, shortness of breath, loss of appetite, nausea and vomiting, diarrhea.   Denies any abdominal pain, chest pain, cough.   
  
 We were asked to admit for work up and evaluation of the above problems\" ______________________________________________________________________ DISCHARGE SUMMARY/HOSPITAL COURSE:  for full details see H&P, daily progress notes, labs, consult notes. Sepsis, POA  (WBC 19K, tachycardia)- now resolved Right foot cellulitis with abscess, POA - s/p BKA 8/24 this admission H/o s/p amputation (8/7)of right 1st and second toe for osteomyelitis POA Hx osteomyelitis right 3rd and 4th toes 1/2020 and 10/2019 respectively Acute hypoxic respiratory failure (88% on RA) POA_ now resolved, on RA IDDM uncontrolled with hyperglycemia POA- now uncontrolled due to steroids ESRD on HD MWF Morbid Obesity POA 
SARS-COV-2 negative Initial Blood Cx neg Initial Wound Cx= Pseudomonal, Enterococcal & Klebsiella sp. (poly microbial) growth noted 
  
S/p podiatry consult apreciated, MRI right foot showed fluid collection and abscess with no osteomyelitis noted s/p irrigation of wound s/p lantus 20 units daily, add moderate SSI, had increased Lantus to 40 units daily 8/30 but now on Insulin drip (transferred to PCU) as pt to be on prednisone for hearing loss per ENT recc- will Decrease prednisone dose down to 40 mg daily today 
appreciated renal consult to continue with dialysis MWF 
s/p  RIGHT LEG ARTERIOGRAM ANTERIOR TIBIAL ARTERY AND ANGIOPLASTY 08/17 by vascular Sx 
s/p  excision and debridement of necrotic tissue right foot 08/21  
s/p BKA 08/24  
s/p zosyn completed now , Vancomycin discontinued.  
  
  
Hypotension - resolved HTN - uncontrolled now Resumed Amlodipine at 5 mg daily yesterday Resume Lisinopril today DC midodrine for good now 
  
Hyperkalemia S/p kayexalate Cont HD - today per nephrology 
  
  
Diarrhea: 
-Likely Antibiotic induced 
-Resolved -Imodium PRN 
-c/w probiotic 
  
Hyponatremia  
-motoring - stable with HD 
  
Chest pain - now resolved 
-troponin negative - EKG show no acute abnormality 
-Echo show EF 45-50 % 
  
Hearing loss not POA - possibly anesthesia related? 
- remove offending agents which may affect pt's hearing S/p ENT consult- Spoken with Dr. Genet Moss with ENT, recommends Prednisone for now and outpatient follow up. Cont Prednisone 60mg X 5 days, 40mg X 5 days, 20mg X 5 days  And 10mg X 5 days Currently decreased to 40 mg daily today to help with Hyperglycemia 
 
 
 
_______________________________________________________________________ Patient seen and examined by me on discharge day. Pertinent Findings: 
Gen:    Not in distress Chest: Clear lungs CVS:   Regular rhythm. No edema Abd:  Soft, not distended, not tender Neuro:  Alert, oriented x 3 
ENT:  Hearing improved on Prednisone Ext: R BK stump noted + 
_______________________________________________________________________ DISCHARGE MEDICATIONS:  
Current Discharge Medication List  
  
START taking these medications Details  
gabapentin (NEURONTIN) 100 mg capsule Take 1 Cap by mouth nightly. Max Daily Amount: 100 mg. Qty: 30 Cap, Refills: 0 Associated Diagnoses: Acute osteomyelitis of toe of right foot ([address] oxyCODONE IR (ROXICODONE) 5 mg immediate release tablet Take 1 Tab by mouth every four (4) hours as needed for Pain for up to 14 days. Max Daily Amount: 30 mg. 
Qty: 30 Tab, Refills: 0 Associated Diagnoses: Acute osteomyelitis of toe of right foot ([address] predniSONE (DELTASONE) 20 mg tablet 40mg X 5 days, 20mg X 5 days  And 10mg X 5 days Qty: 30 Tab, Refills: 0  
  
insulin NPH (NOVOLIN N, HUMULIN N) 100 unit/mL injection 40 units SQ daily with PO prednisone dose as long as pt on steroid taper, adjust dose down as needed 
Qty: 1 Vial, Refills: 0  
  
lisinopriL (PRINIVIL, ZESTRIL) 20 mg tablet Take 1 Tab by mouth daily. Qty: 30 Tab, Refills: 0 CONTINUE these medications which have CHANGED Details insulin glargine (LANTUS) 100 unit/mL injection 20 Units by SubCUTAneous route nightly. Qty: 1 Vial, Refills: 0 CONTINUE these medications which have NOT CHANGED Details  
loperamide (IMODIUM) 2 mg capsule Take 1 Cap by mouth four (4) times daily as needed for Diarrhea. Qty: 60 Cap, Refills: 0  
  
amLODIPine (NORVASC) 10 mg tablet Take 1 Tab by mouth. STOP taking these medications  
  
 doxycycline (VIBRAMYCIN) 100 mg capsule Comments:  
Reason for Stopping:   
   
 oxyCODONE-acetaminophen (PERCOCET) 5-325 mg per tablet Comments:  
Reason for Stopping:   
   
  
 
 
 
Patient Follow Up Instructions: Activity: Activity as tolerated Diet: Cardiac Diet and Renal Diet Wound Care: Keep wound clean and dry Follow-up with Vascular Surgery in 4 weeks, Nephrology for HD MWF & HTN management Follow-up Information Follow up With Specialties Details Why Contact Info Rasheeda Mcleod,  Internal Medicine  The nurse will contact you with appointment date and time  [address] [facility] 
[phone] Niru Kapoor MD Infectious Disease, Internal Medicine Go on 9/8/2020 For follow up appointment at 3:00PM  [address] [place] [address] 
[phone] Aline Recinos MD Vascular Surgery In 4 weeks  [address] 
[phone] Mary Gar MD Otolaryngology  ENT Follow up as outpaient after your rehab. May need Hearing test, call office for follow up  [facility] [address] [place] [address] 
[phone] 
  
  
 
________________________________________________________________ Risk of deterioration: Moderate Condition at Discharge:  Stable 
__________________________________________________________________ Disposition IP Rehab 
 
____________________________________________________________________ Code Status: Full Code ___________________________________________________________________ Total time in minutes spent coordinating this discharge (includes going over instructions, follow-up, prescriptions, and preparing report for sign off to her PCP) :  36 minutes Signed: 
Paul Morales MD

## 2020-09-01 NOTE — PROGRESS NOTES
1900- Bedside shift change report given to Byron Dial RN (oncoming nurse) by Elisa Dyson RN (offgoing nurse). Report included the following information SBAR, Kardex, Intake/Output, MAR, Recent Results, Med Rec Status and Cardiac Rhythm NSR.  
 
2300- Dialysis at bedside. 0530- Pt HR converted to a fib/a flutter. EKG done showing a flutter with with variable AV block. Pt is otherwise asymptomatic with HR between . Message sent via new request explaining situation. Spoke with Dr. Dashawn Chang, orders placed for K replenishment an one time order for metoprolol 2.5mg. 
 
0700- Bedside shift change report given to Dasha RN (oncoming nurse) by Byron Dial RN (offgoing nurse). Report included the following information SBAR, Kardex, Procedure Summary, Intake/Output, MAR, Recent Results, Med Rec Status and Cardiac Rhythm A fib.

## 2020-09-01 NOTE — CONSULTS
Bhanu Denton is a 38 y.o. male with  a PMH of diabetes with neuropathy, CKD, HTN, Hypercholesteremia who presented to his podiatrist for follow up after amputation of his first and second right toes. He was experiencing fevers and chills that have been occurring for several days. His podiatrist sent him to the ED for evaluation. In the ED he was noted to be septic and admitted for IV antibiotics. He is now status post right BKA. Admission A1c 9.4% by history on Lantus insulin interestingly, he has been noted to have decreased hearing and is currently on high-dose steroids for this condition. He was receiving an insulin infusion until 10:00 this morning. He did receive 20 units of glargine last night. Blood sugars currently 227 after discontinuation of the insulin infusion. He is also receiving 40 units of NPH insulin for every 40 mg of prednisone and received the most recent dose this morning. Examination this is a very pleasant but clearly hard of hearing gentleman in no acute distress Blood pressure 114/93 Pulse 99 The right stump is dressed. Impression 1. Type 2 diabetes mellitus with poor blood sugar control 2. Chronic kidney disease currently on hemodialysis 3. Status post right BKA Recommendations: We would recommend the addition of mealtime insulin rather than increasing his glargine. The NPH can be titrated with the prednisone.

## 2020-09-01 NOTE — PROGRESS NOTES
Name: Vanessa Vivar MRN: [id number] : 1985 Assessment   :                                               Plan: ESKD Anemia of ESRD + post op blood loss DM-2, uncontrolled Mild Hyperkalemia- resolved HTN-prior hypotension resolved Hyponatremia-mild Foot infection- s/p  R BKA on 8-24 Hearing loss (new) Legally blind [facility] MWF HD; no HD today. Off Midodrine On Norvasc and Lisinopril RETACRIT 10K 3x/week ABx per ID Ct FR of 1.5 L/day Subjective: 
Resting. Still with hearing loss. We discussed the above. No events ROS:  
Deferred Exam: 
Visit Vitals BP (!) 166/105 (BP 1 Location: Right arm, BP Patient Position: At rest) Pulse 98 Temp 98 °F (36.7 °C) Resp 18 Ht 6' 2\" (1.88 m) Wt 142.3 kg (313 lb 11.4 oz) SpO2 100% BMI 40.28 kg/m² NAD No resp distress R BKA; LLE with no edema Alert awake L AV access in place Current Facility-Administered Medications Medication Dose Route Frequency Last Dose  [START ON 2020] amLODIPine (NORVASC) tablet 10 mg  10 mg Oral DAILY  amLODIPine (NORVASC) tablet 5 mg  5 mg Oral ONCE    
 [START ON 2020] lisinopriL (PRINIVIL, ZESTRIL) tablet 20 mg  20 mg Oral DAILY  lisinopriL (PRINIVIL, ZESTRIL) tablet 10 mg  10 mg Oral ONCE  hydrALAZINE (APRESOLINE) 20 mg/mL injection 10 mg  10 mg IntraVENous Q6H PRN    
 insulin lispro (HUMALOG) injection   SubCUTAneous TIDAC 4 Units at 20 0831  
 glucose chewable tablet 16 g  4 Tab Oral PRN    
 dextrose (D50W) injection syrg 12.5-25 g  25-50 mL IntraVENous PRN    
 glucagon (GLUCAGEN) injection 1 mg  1 mg IntraMUSCular PRN    
 insulin glargine (LANTUS) injection 20 Units  20 Units SubCUTAneous QHS 20 Units at 20 2200  predniSONE (DELTASONE) tablet 40 mg  40 mg Oral DAILY WITH BREAKFAST 40 mg at 09/01/20 0830 And  
 insulin NPH (NOVOLIN N, HUMULIN N) injection 40 Units  40 Units SubCUTAneous DAILY 40 Units at 09/01/20 0831  epoetin nata-epbx (RETACRIT) injection 10,000 Units  10,000 Units SubCUTAneous Q MON, WED & FRI 10,000 Units at 08/31/20 2253  zinc oxide-cod liver oil (DESITIN) 40 % paste   Topical BID and QHS  acetaminophen (TYLENOL) tablet 1,000 mg  1,000 mg Oral Q8H 1,000 mg at 09/01/20 0442  
 gabapentin (NEURONTIN) capsule 100 mg  100 mg Oral  mg at 08/31/20 2254  oxyCODONE IR (ROXICODONE) tablet 5-10 mg  5-10 mg Oral Q4H PRN 10 mg at 09/01/20 0830  
 heparin (porcine) injection 5,000 Units  5,000 Units SubCUTAneous Q8H 5,000 Units at 09/01/20 0831  
 sodium chloride (NS) flush 5-40 mL  5-40 mL IntraVENous PRN 10 mL at 08/31/20 1750  
 HYDROmorphone (PF) (DILAUDID) injection 1 mg  1 mg IntraVENous Q3H PRN 1 mg at 09/01/20 0443  phenol throat spray (CHLORASEPTIC) 1 Spray  1 Spray Oral PRN    
 albumin human 25% (BUMINATE) solution 25 g  25 g IntraVENous PRN 12.5 g at 08/28/20 1100  
 lactobac ac& pc-s.therm-b.anim (USMAN Q/RISAQUAD)  1 Cap Oral DAILY 1 Cap at 09/01/20 0830  loperamide (IMODIUM) capsule 2 mg  2 mg Oral Q4H PRN 2 mg at 09/01/20 0830  
 ondansetron (ZOFRAN) injection 4 mg  4 mg IntraVENous Q6H PRN 4 mg at 08/25/20 0150 Labs/Data: 
 
Lab Results Component Value Date/Time WBC 13.7 (H) 08/31/2020 02:19 AM  
 Hemoglobin (POC) 11.9 (L) 07/25/2017 10:12 AM  
 HGB 9.7 (L) 08/31/2020 02:19 AM  
 Hematocrit (POC) 33 (L) 08/24/2020 10:51 AM  
 HCT 31.4 (L) 08/31/2020 02:19 AM  
 PLATELET 933 (H) 27/96/0317 02:19 AM  
 MCV 82.2 08/31/2020 02:19 AM  
 
 
Lab Results Component Value Date/Time  Sodium 132 (L) 09/01/2020 03:40 AM  
 Potassium 3.0 (L) 09/01/2020 03:40 AM  
 Chloride 94 (L) 09/01/2020 03:40 AM  
 CO2 27 09/01/2020 03:40 AM  
 Anion gap 11 09/01/2020 03:40 AM  
 Glucose 198 (H) 09/01/2020 03:40 AM  
 BUN 27 (H) 09/01/2020 03:40 AM  
 Creatinine 4.90 (H) 09/01/2020 03:40 AM  
 BUN/Creatinine ratio 6 (L) 09/01/2020 03:40 AM  
 GFR est AA 16 (L) 09/01/2020 03:40 AM  
 GFR est non-AA 14 (L) 09/01/2020 03:40 AM  
 Calcium 9.4 09/01/2020 03:40 AM  
 
 
Wt Readings from Last 3 Encounters:  
08/31/20 142.3 kg (313 lb 11.4 oz) 08/13/20 153.2 kg (337 lb 11.9 oz) 08/07/20 153.8 kg (339 lb 1.1 oz) Intake/Output Summary (Last 24 hours) at 9/1/2020 1103 Last data filed at 9/1/2020 0499 Gross per 24 hour Intake 338.33 ml Output 2500 ml Net -2161.67 ml Patient seen and examined. Chart reviewed. Labs, data and other pertinent notes reviewed in last 24 hrs. PMH/SH/FH reviewed and unchanged compared to H&P Abundio Appiah MD

## 2020-09-02 NOTE — PROGRESS NOTES
Name: Susan Alvarado MRN: [id number] : 1985 Assessment   :                                               Plan: ESKD Anemia of ESRD + post op blood loss DM-2, uncontrolled Mild Hyperkalemia- resolved HTN-prior hypotension resolved Hyponatremia-mild Foot infection- s/p  R BKA on 8-24 Hearing loss (new) Legally blind [facility] MW HD; HD today. Off Midodrine On Norvasc and Lisinopril RETACRIT 10K 3x/week ABx per ID Ct FR of 1.5 L/day Subjective: 
Resting. Hearing loss is better again. We discussed the above. The patient was seen on dialysis at 8:59 AM .  BP is stable. Access is working well. No events ROS:  
Deferred Exam: 
Visit Vitals BP (!) 134/93 Pulse 78 Temp 97.6 °F (36.4 °C) (Oral) Resp 20 Ht 6' 2\" (1.88 m) Wt 139 kg (306 lb 7 oz) SpO2 99% BMI 39.34 kg/m² NAD No resp distress R BKA; LLE with no edema Alert awake L AV access in place Current Facility-Administered Medications Medication Dose Route Frequency Last Dose  amLODIPine (NORVASC) tablet 10 mg  10 mg Oral DAILY  lisinopriL (PRINIVIL, ZESTRIL) tablet 20 mg  20 mg Oral DAILY  insulin lispro (HUMALOG) injection   SubCUTAneous AC&HS 4 Units at 20 2200  
 glucose chewable tablet 16 g  4 Tab Oral PRN    
 dextrose (D50W) injection syrg 12.5-25 g  12.5-25 g IntraVENous PRN    
 glucagon (GLUCAGEN) injection 1 mg  1 mg IntraMUSCular PRN    
 insulin lispro (HUMALOG) injection 6 Units  6 Units SubCUTAneous TIDAC 6 Units at 20 1646  hydrALAZINE (APRESOLINE) 20 mg/mL injection 10 mg  10 mg IntraVENous Q6H PRN    
 dextrose (D50W) injection syrg 12.5-25 g  25-50 mL IntraVENous PRN    
 insulin glargine (LANTUS) injection 20 Units  20 Units SubCUTAneous QHS 20 Units at 201  predniSONE (DELTASONE) tablet 40 mg  40 mg Oral DAILY WITH BREAKFAST 40 mg at 09/01/20 0830 And  
 insulin NPH (NOVOLIN N, HUMULIN N) injection 40 Units  40 Units SubCUTAneous DAILY 40 Units at 09/01/20 0831  epoetin nata-epbx (RETACRIT) injection 10,000 Units  10,000 Units SubCUTAneous Q MON, WED & FRI 10,000 Units at 08/31/20 2253  zinc oxide-cod liver oil (DESITIN) 40 % paste   Topical BID and QHS  acetaminophen (TYLENOL) tablet 1,000 mg  1,000 mg Oral Q8H Stopped at 09/02/20 0600  
 gabapentin (NEURONTIN) capsule 100 mg  100 mg Oral  mg at 09/01/20 2150  
 oxyCODONE IR (ROXICODONE) tablet 5-10 mg  5-10 mg Oral Q4H PRN 10 mg at 09/01/20 2354  heparin (porcine) injection 5,000 Units  5,000 Units SubCUTAneous Q8H 5,000 Units at 09/01/20 2353  sodium chloride (NS) flush 5-40 mL  5-40 mL IntraVENous PRN 10 mL at 08/31/20 1750  
 HYDROmorphone (PF) (DILAUDID) injection 1 mg  1 mg IntraVENous Q3H PRN 1 mg at 09/01/20 1348  phenol throat spray (CHLORASEPTIC) 1 Spray  1 Spray Oral PRN    
 albumin human 25% (BUMINATE) solution 25 g  25 g IntraVENous PRN 12.5 g at 08/28/20 1100  
 lactobac ac& pc-s.therm-b.anim (USMAN Q/RISAQUAD)  1 Cap Oral DAILY 1 Cap at 09/01/20 0830  loperamide (IMODIUM) capsule 2 mg  2 mg Oral Q4H PRN 2 mg at 09/01/20 2239  ondansetron (ZOFRAN) injection 4 mg  4 mg IntraVENous Q6H PRN 4 mg at 08/25/20 0150 Labs/Data: 
 
Lab Results Component Value Date/Time WBC 12.6 (H) 09/02/2020 07:57 AM  
 Hemoglobin (POC) 11.9 (L) 07/25/2017 10:12 AM  
 HGB 10.3 (L) 09/02/2020 07:57 AM  
 Hematocrit (POC) 33 (L) 08/24/2020 10:51 AM  
 HCT 32.7 (L) 09/02/2020 07:57 AM  
 PLATELET 060 (H) 56/08/0077 07:57 AM  
 MCV 81.8 09/02/2020 07:57 AM  
 
 
Lab Results Component Value Date/Time  Sodium 132 (L) 09/01/2020 03:40 AM  
 Potassium 3.0 (L) 09/01/2020 03:40 AM  
 Chloride 94 (L) 09/01/2020 03:40 AM  
 CO2 27 09/01/2020 03:40 AM  
 Anion gap 11 09/01/2020 03:40 AM  
 Glucose 198 (H) 09/01/2020 03:40 AM  
 BUN 27 (H) 09/01/2020 03:40 AM  
 Creatinine 4.90 (H) 09/01/2020 03:40 AM  
 BUN/Creatinine ratio 6 (L) 09/01/2020 03:40 AM  
 GFR est AA 16 (L) 09/01/2020 03:40 AM  
 GFR est non-AA 14 (L) 09/01/2020 03:40 AM  
 Calcium 9.4 09/01/2020 03:40 AM  
 
 
Wt Readings from Last 3 Encounters:  
09/02/20 139 kg (306 lb 7 oz) 08/13/20 153.2 kg (337 lb 11.9 oz) 08/07/20 153.8 kg (339 lb 1.1 oz) Intake/Output Summary (Last 24 hours) at 9/2/2020 8618 Last data filed at 9/1/2020 2243 Gross per 24 hour Intake 780 ml Output 0 ml Net 780 ml Patient seen and examined. Chart reviewed. Labs, data and other pertinent notes reviewed in last 24 hrs. PMH/SH/FH reviewed and unchanged compared to H&P Brody Simpson MD

## 2020-09-02 NOTE — CONSULTS
Chula Marks is a 38 y. o. male with  a PMH of diabetes with neuropathy, CKD, HTN, Hypercholesteremia who presented to his podiatrist for follow up after amputation of his first and second right toes.  He was experiencing fevers and chills that have been occurring for several days. [facility] podiatrist sent him to the ED for evaluation.  In the ED he was noted to be septic and admitted for IV antibiotics. He is now status post right BKA. On rounds today, he was a bit lightheaded but this was immediately following hemodialysis. Blood sugar at the time was 209. Blood pressure was 112/52. Otherwise there is been no significant change. Examination Blood pressure 122/68 Pulse 78 Respirations 18 Impression type 2 diabetes mellitus with end-stage renal disease now status post right BKA currently on 20 units of glargine daily, 40 units of NPH to coincide with 40 mg of prednisone which he is taking for edema, 6 units with meals and correction despite that his blood sugars have ranged from a low of.  192 to a high of 330. Plan: We will increase the Lantus to 25 units and continue the NPH coverage for the steroids.

## 2020-09-02 NOTE — PROGRESS NOTES
Chart reviewed. Patient currently off floor for dialysis. Will defer and continue to follow. Recommend rehab at discharge. Afshan Khanna

## 2020-09-02 NOTE — PROCEDURES
[facility] Dialysis Team [place]  [phone] Vitals   Pre   Post   Assessment   Pre   Post    
Temp  Temp: 97.6 °F (36.4 °C) (09/02/20 0745)  97.9 oral LOC  A & O x 4 No Change HR   Pulse (Heart Rate): (!) 49 (09/02/20 0800) 79 Lungs   Clear  No change B/P   BP: (!) 157/93 (09/02/20 0800) 138/87 Cardiac   NSR No change Resp   Resp Rate: 20 (09/02/20 0800) 18 Skin   Right BKA  no change Pain level  Pain Intensity 1: 0 (09/02/20 0721) 0 Edema  None noted No change Orders: Duration:   Start:    0745 End:    1200 Total:   4.15 Dialyzer:   Dialyzer/Set Up Inspection: [place] Proud (09/02/20 0745) K Bath:   Dialysate K (mEq/L): 3 (09/02/20 0745) Ca Bath:   Dialysate CA (mEq/L): 2.5 (09/02/20 0745) Na/Bicarb:   Dialysate NA (mEq/L): 138 (09/02/20 0745) Target Fluid Removal:   Goal/Amount of Fluid to Remove (mL): 3000 mL (09/02/20 0745) Access  AVF Type & Location:   Left upper AVF Labs Obtained/Reviewed Critical Results Called   Date when labs were drawn- 
Hgb-   
HGB Date Value Ref Range Status 08/31/2020 9.7 (L) 12.1 - 17.0 g/dL Final  
 
K-   
Potassium Date Value Ref Range Status 09/01/2020 3.0 (L) 3.5 - 5.1 mmol/L Final  
  Comment:  
  INVESTIGATED PER DELTA CHECK PROTOCOL Ca-  
Calcium Date Value Ref Range Status 09/01/2020 9.4 8.5 - 10.1 MG/DL Final  
 
Bun-  
BUN Date Value Ref Range Status 09/01/2020 27 (H) 6 - 20 MG/DL Final  
  Comment:  
  INVESTIGATED PER DELTA CHECK PROTOCOL Creat-  
Creatinine Date Value Ref Range Status 09/01/2020 4.90 (H) 0.70 - 1.30 MG/DL Final  
  Comment:  
  INVESTIGATED PER DELTA CHECK PROTOCOL Medications/ Blood Products Given Name   Dose   Route and Time    
none Blood Volume Processed (BVP):    104.2 Net Fluid Removed:  3000ml Comments RN reviewed LPN assessment and completed RN assessment. RN completed patient assessment.  RN reviewed technicians vital signs and procedure note. Tx completed. Reviewed by DIONICIO Saldana Time Out Done:  
Primary Nurse Rpt Pre: 
Primary Nurse Rpt Post:DIONICIO Quezada Pt Education:access care Care Plan:contiue hd Tx Summary:PELON AVF: skin CDI. No s/s of infection. No issues with cannulation or hemostasis. Running well at . Pt arrived to HD suite A&Ox[address]. Consent signed & on file. SBAR received from Primary RN. 0745: Pt cannulated with 52D needles per policy & without issue. Labs drawn per request/ order. VSS. Dialysis Tx initiated. 0800: Pt. Stable, lines secure and visible 0815: Pt. Stable, lines secure 0830: Pt. Stable 0845: Pt. stable 
0900: Pt resting quietly. 0915: Pt. Marcello. Hd well 
0930: Pt. Stable, lines secure 0945: Pt. Stable, lines secure 
1000: Pt. Stable, lines secure 100ml NS  
1015: Pt. Stable 1030: Pt. Stable, lines secure and visible 1045: Pt. Stable, lines secure 
1100: Pt. Marcello.hd well. Lines secure 100ml NS 
1115: Pt. Stable 1130: Pt. Stable 1145: Pt. Talking on cell phone 
1200: Tx ended. VSS. All possible blood returned to patient. Hemostasis achieved without issue. Bed locked and in the lowest position, call bell and belongings in reach. SBAR given to Primary, RN. Patient is stable at time of their/ my departure. All Dialysis related medications have been reviewed. Admiting Diagnosis: Sepsis Pt's previous clinic- [facility] Consent signed - Informed Consent Verified: Yes (09/02/20 0745) Sylvain Consent - verified Hepatitis Status-  8/13/20 neg/[facility] (cc) Machine #- Machine Number: [id number] (09/02/20 0745) Telemetry status- Yes Pre-dialysis wt. - Pre-Dialysis Weight: 142.3 kg (313 lb 11.4 oz) (08/31/20 6238)

## 2020-09-02 NOTE — PROGRESS NOTES
Hospitalist Progress Note NAME: Pinky Oh :  1985 MRN:  [id number] Assessment / Plan: 
Discharge Delayed as [facility] IP rehab require COVID test neg in 72 hrs of DC - test still pending this AM-- DC once test comes back negative & bed available today Sepsis, POA  (WBC 19K, tachycardia)- now resolved Right foot cellulitis with abscess, POA - s/p BKA  this admission H/o s/p amputation ()of right 1st and second toe for osteomyelitis POA Hx osteomyelitis right 3rd and 4th toes 2020 and 10/2019 respectively Acute hypoxic respiratory failure (88% on RA) POA_ now resolved, on RA IDDM uncontrolled with hyperglycemia POA- now uncontrolled due to steroids ESRD on HD MWF Morbid Obesity POA 
SARS-COV-2 negative Initial Blood Cx neg Initial Wound Cx= Pseudomonal, Enterococcal & Klebsiella sp. (poly microbial) growth noted S/p podiatry consult apreciated, MRI right foot showed fluid collection and abscess with no osteomyelitis noted s/p irrigation of wound s/p lantus 20 units daily, add moderate SSI, had increased Lantus to 40 units daily  but now on Insulin drip (transferred to PCU) as pt to be on prednisone for hearing loss per ENT recc- will Decrease prednisone dose down to 40 mg daily today 
appreciated renal consult to continue with dialysis MWF 
s/p  RIGHT LEG ARTERIOGRAM ANTERIOR TIBIAL ARTERY AND ANGIOPLASTY  by vascular Sx 
s/p  excision and debridement of necrotic tissue right foot   
s/p BKA   
s/p zosyn completed now , Vancomycin discontinued. Hypotension - resolved HTN - uncontrolled still Resumed Amlodipine - increased to 10 mg daily Resumed Lisinopril - increased to 20 mg daily DC midodrine for good now Hyperkalemia S/p kayexalate Cont HD - s/p HD yesterday, now HD today AM before DC to IP Rehab if COVID test comes back negative 
 
  
Diarrhea: 
-Likely Antibiotic induced 
-Resolved -Imodium PRN 
-c/w probiotic 
  
 Hyponatremia  
-motoring - stable with HD 
  
Chest pain - now resolved 
-troponin negative  
- EKG show no acute abnormality 
-Echo show EF 45-50 % Hearing loss not POA - possibly anesthesia related? 
- remove offending agents which may affect pt's hearing S/p ENT consult- Spoken with Dr. Sherrie Berg with ENT, recommends Prednisone for now and outpatient follow up. Cont Prednisone 60mg X 5 days, 40mg X 5 days, 20mg X 5 days  And 10mg X 5 days Currently decreased to 40 mg daily today to help with Hyperglycemia DC Insulin drip Started on NPH with prednisone dosing in AM 
Cont Lantus Q HS 
 
 
  
Body mass index is 43.36 kg/m². 
 
 
  
Code: full DVT prophylaxis: heparin Surrogate decision maker:  Kala and Emelia Keating (Cousins). Pt verbalized these 2 names when asked about decision maker. D/w ROXANE Gama Patient will need rehab at discharge, Inpatient rehab eval sent - [phone] accepted pt- likely DC today/24 hrs once repeat COVID test comes back negative & bed available - was told it will be available today 
  
  
  
   
 
Subjective: Chief Complaint / Reason for Physician Visit Discussed with RN events overnight. Pt doing okay today Still with complete hearing loss Review of Systems: 
Symptom Y/N Comments  Symptom Y/N Comments Fever/Chills    Chest Pain Poor Appetite    Edema Cough    Abdominal Pain Sputum    Joint Pain SOB/LITTLE    Pruritis/Rash Nausea/vomit    Tolerating PT/OT Diarrhea    Tolerating Diet Constipation    Other Could NOT obtain due to: Hearing lost   
 
Objective: VITALS:  
Last 24hrs VS reviewed since prior progress note. Most recent are: 
Patient Vitals for the past 24 hrs: 
 Temp Pulse Resp BP SpO2  
09/02/20 1228 97.5 °F (36.4 °C) 78 18 122/68 100 % 09/02/20 1200 97.9 °F (36.6 °C) 79 18 138/87   
09/02/20 1145  80 18 (!) 142/101   
09/02/20 1130  79 20 (!) 120/91   
09/02/20 1115  80 20 119/78  09/02/20 1100  79 18 (!) 164/103   
09/02/20 1045  80 18 (!) 168/100   
09/02/20 1030  78 20 (!) 171/112   
09/02/20 1015  78 20 (!) 158/103   
09/02/20 1000  (!) 0 20 (!) 156/106   
09/02/20 0945  76 18 (!) 158/103   
09/02/20 0930  76 18 125/84   
09/02/20 0915  77 18 (!) 139/92   
09/02/20 0900  77 20 145/90   
09/02/20 0845  78 20 (!) 134/93   
09/02/20 0830  76 22 (!) 117/106   
09/02/20 0815  (!) 52 20 (!) 177/98   
09/02/20 0800  (!) 49 20 (!) 157/93   
09/02/20 0745 97.6 °F (36.4 °C) (!) 47 20 125/89   
09/02/20 0721 97.7 °F (36.5 °C) (!) 105 18 (!) 160/102 99 % 09/01/20 2354 98.2 °F (36.8 °C) 97 18 145/87 97 % 09/01/20 1935 98.3 °F (36.8 °C) (!) 106 18 137/88 96 % 09/01/20 1432 97.7 °F (36.5 °C) 90 18 127/69 93 % Intake/Output Summary (Last 24 hours) at 9/2/2020 1312 Last data filed at 9/2/2020 1200 Gross per 24 hour Intake 780 ml Output 3000 ml Net -2220 ml PHYSICAL EXAM: 
General: WD, WN. Alert, cooperative, no acute distress   
EENT:  EOMI. Anicteric sclerae. MMM Resp:  Clear CV:  Regular  rhythm,  No edema GI:  Soft, Non distended, Non tender.  +Bowel sounds Neurologic:  Alert and oriented X 3, normal speech, Psych:   Good insight. Not anxious nor agitated Ext:   Rt BKA, dressing in place Reviewed most current lab test results and cultures  YES Reviewed most current radiology test results   YES Review and summation of old records today    NO Reviewed patient's current orders and MAR    YES 
PMH/SH reviewed - no change compared to H&P 
________________________________________________________________________ Care Plan discussed with: 
  Comments Patient x Family RN x Care Manager x Gabriela Schneider Consultant Multidiciplinary team rounds were held today with , nursing, pharmacist and clinical coordinator.   Patient's plan of care was discussed; medications were reviewed and discharge planning was addressed. ________________________________________________________________________ Total NON critical care TIME:  16   Minutes Total CRITICAL CARE TIME Spent:   Minutes non procedure based Comments >50% of visit spent in counseling and coordination of care y   
________________________________________________________________________ Ravi Luis MD  
 
Procedures: see electronic medical records for all procedures/Xrays and details which were not copied into this note but were reviewed prior to creation of Plan. LABS: 
I reviewed today's most current labs and imaging studies. Pertinent labs include: 
Recent Labs  
  09/02/20 
0757 08/31/20 
0219 WBC 12.6* 13.7* HGB 10.3* 9.7* HCT 32.7* 31.4* * 442* Recent Labs  
  09/01/20 
0340 08/31/20 
6517 08/31/20 
0269 * 130* 126*  
K 3.0* 4.8 5.0  
CL 94* 92* 90* CO2 27 24 23 * 366* 417* BUN 27* 64* 63* CREA 4.90* 11.30* 11.60* CA 9.4 8.7 9.1 MG 2.2 2.6*  --   
PHOS  --   --  8.6* Signed: Ravi Luis MD

## 2020-09-02 NOTE — PROGRESS NOTES
Pharmacy - EPO Dosing & Monitoring Dose and schedule: 10,000 units [facility] Labs: 
Recent Labs  
  09/02/20 
0757 08/31/20 
8365 HGB 10.3* 9.7* Impression/Plan: - Per [facility] protocol, will hold the epogen since the Hgb is >10 Thanks, 
Anurag Lamar, PharmD Student Discussed with Michelle Melchor 
 
 
[email or web address]

## 2020-09-02 NOTE — PROGRESS NOTES
0700: Bedside shift change report given to Cate Rogers RN (oncoming nurse) by Beverly Ly RN (offgoing nurse). Report included the following information SBAR, Kardex, ED Summary, Intake/Output, MAR and Recent Results. 0730:  VSS, Transport present to take pt off floor for dialysis with phone,  taped to bed in which pt transported. 1215: Suzie Age with Dialysis called. No issues with pt for dialysis. 3L removed, a febrile, 138/87, HR, RR stable. Transport contacted for pt to return to floor. 1230:  Pt returned to floor. Refused lisinopril med d/t \"I dont want my pressure bottoming out since I had dialysis. \"  Pt immediately requested hamburger and corn; RN contacted dietary for tray request.  Per MD Ira Garzon, pt can be discharged as soon as COVID result is returned. VSS upon return to room. 1348:  Pt insulin NPH and lispro administered as soon as pt lunch tray delivered. Pt requested peanut butter and crackers; RN complied with one packet each. Pt transferred to chair for lunch. 1424: Pt complained of feeling light headed when sitting in chair. VSS (/51), moved to chair, temp taken, blood sugar taken. Pt complains of pain in bottom part of amputation. RN and pt discussed Roxicodone as pain med instead of dilaudid in case pt is discharged and needs to be alert. 1700:  Changed pt dressing on right BKA with maggie gauze, ACE bandage. Also changed undersheet, bedsheets, underpad gown after bowel movement. Pt set up for dinner after deciding NOT to sit in chair once moved to chair d/t \"not feeling just right sitting here. \"  RN recommended pt move back to bed for dinner in order not to chance feeling bad in the chair. Pt agreed and moved back to bed.   
 
1900:  Bedside shift change report given to Codi Deluna RN (oncoming nurse) by Cate Rogers RN (offgoing nurse). Report included the following information SBAR, Kardex, Intake/Output, MAR and Recent Results.

## 2020-09-02 NOTE — PROGRESS NOTES
HD TRANSFER - OUT REPORT: 
 
Verbal report given to DIONICIO Quezada on Pinky Oh being transferred to PCU  (Unit) for ordered procedure Report consisted of patient's Situation, Background, Assessment and  
Recommendations(SBAR). Information from the following report(s) Kardex was reviewed with the receiving nurse. Method:  $$ Method: Hemodialysis (09/02/20 1200) Fluid Removed  NET Fluid Removed (mL): 3000 ml (09/02/20 1200) Patient response to treatment:  Resolved End Time  Hemodialysis End Time: 1200 (09/02/20 1200) If not documented, dialysis nurse to update post-dialysis row in HD/Filtration flowsheet Medications /Volume expansion agents or Fluid boluses administered during treatment? no 
 
Post-dialysis medication administration due?  no 
Remind nurse to administer post-HD medication upon return to unit. Fistula hemostasis? yes Line heparinization? no 
 
Lines: secure Opportunity for questions and clarification was provided. Patient transported with: ITN

## 2020-09-03 PROBLEM — I48.92 ATRIAL FLUTTER (HCC): Status: ACTIVE | Noted: 2020-01-01

## 2020-09-03 NOTE — PROGRESS NOTES
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 3 liters off on  Off Midodrine On Norvasc and Lisinopril RETACRIT 10K 3x/week ABx per ID Ct FR of 1.5 L/day Subjective: 
Resting. Hearing loss is better. We discussed the above. ROS:  
Deferred Exam: 
Visit Vitals /87 (BP 1 Location: Right arm, BP Patient Position: At rest) Pulse 77 Temp 97.7 °F (36.5 °C) Resp 20 Ht 6' 2\" (1.88 m) Wt 138.8 kg (306 lb) SpO2 100% BMI 39.29 kg/m² NAD No resp distress R BKA; LLE with no edema Alert awake L AV access in place Current Facility-Administered Medications Medication Dose Route Frequency Last Dose  insulin glargine (LANTUS) injection 30 Units  30 Units SubCUTAneous QHS  [START ON 2020] epoetin nata-epbx (RETACRIT) injection 10,000 Units  10,000 Units SubCUTAneous Q MON, WED & FRI  amLODIPine (NORVASC) tablet 10 mg  10 mg Oral DAILY 10 mg at 20 8017  lisinopriL (PRINIVIL, ZESTRIL) tablet 20 mg  20 mg Oral DAILY 20 mg at 20 0813  
 insulin lispro (HUMALOG) injection   SubCUTAneous AC&HS 3 Units at 20 0814  
 glucose chewable tablet 16 g  4 Tab Oral PRN    
 dextrose (D50W) injection syrg 12.5-25 g  12.5-25 g IntraVENous PRN    
 glucagon (GLUCAGEN) injection 1 mg  1 mg IntraMUSCular PRN    
 insulin lispro (HUMALOG) injection 6 Units  6 Units SubCUTAneous TIDAC 6 Units at 20 4337  hydrALAZINE (APRESOLINE) 20 mg/mL injection 10 mg  10 mg IntraVENous Q6H PRN    
 dextrose (D50W) injection syrg 12.5-25 g  25-50 mL IntraVENous PRN  predniSONE (DELTASONE) tablet 40 mg  40 mg Oral DAILY WITH BREAKFAST 40 mg at 09/03/20 3934 And  
 insulin NPH (NOVOLIN N, HUMULIN N) injection 40 Units  40 Units SubCUTAneous DAILY 40 Units at 09/03/20 0816  
 zinc oxide-cod liver oil (DESITIN) 40 % paste   Topical BID and QHS  acetaminophen (TYLENOL) tablet 1,000 mg  1,000 mg Oral Q8H 1,000 mg at 09/03/20 0530  
 gabapentin (NEURONTIN) capsule 100 mg  100 mg Oral  mg at 09/02/20 2100  
 oxyCODONE IR (ROXICODONE) tablet 5-10 mg  5-10 mg Oral Q4H PRN 10 mg at 09/03/20 0530  
 heparin (porcine) injection 5,000 Units  5,000 Units SubCUTAneous Q8H 5,000 Units at 09/03/20 0815  sodium chloride (NS) flush 5-40 mL  5-40 mL IntraVENous PRN 10 mL at 08/31/20 1750  
 HYDROmorphone (PF) (DILAUDID) injection 1 mg  1 mg IntraVENous Q3H PRN 1 mg at 09/01/20 1348  phenol throat spray (CHLORASEPTIC) 1 Spray  1 Spray Oral PRN    
 albumin human 25% (BUMINATE) solution 25 g  25 g IntraVENous PRN 12.5 g at 08/28/20 1100  
 lactobac ac& pc-s.therm-b.anim (USMAN Q/RISAQUAD)  1 Cap Oral DAILY 1 Cap at 09/03/20 7686  loperamide (IMODIUM) capsule 2 mg  2 mg Oral Q4H PRN 2 mg at 09/03/20 0530  
 ondansetron (ZOFRAN) injection 4 mg  4 mg IntraVENous Q6H PRN 4 mg at 08/25/20 0150 Labs/Data: 
 
Lab Results Component Value Date/Time WBC 12.6 (H) 09/02/2020 07:57 AM  
 Hemoglobin (POC) 11.9 (L) 07/25/2017 10:12 AM  
 HGB 10.3 (L) 09/02/2020 07:57 AM  
 Hematocrit (POC) 33 (L) 08/24/2020 10:51 AM  
 HCT 32.7 (L) 09/02/2020 07:57 AM  
 PLATELET 121 (H) 45/02/7259 07:57 AM  
 MCV 81.8 09/02/2020 07:57 AM  
 
 
Lab Results Component Value Date/Time  Sodium 132 (L) 09/01/2020 03:40 AM  
 Potassium 3.0 (L) 09/01/2020 03:40 AM  
 Chloride 94 (L) 09/01/2020 03:40 AM  
 CO2 27 09/01/2020 03:40 AM  
 Anion gap 11 09/01/2020 03:40 AM  
 Glucose 198 (H) 09/01/2020 03:40 AM  
 BUN 27 (H) 09/01/2020 03:40 AM  
 Creatinine 4.90 (H) 09/01/2020 03:40 AM  
 BUN/Creatinine ratio 6 (L) 09/01/2020 03:40 AM  
 GFR est AA 16 (L) 09/01/2020 03:40 AM  
 GFR est non-AA 14 (L) 09/01/2020 03:40 AM  
 Calcium 9.4 09/01/2020 03:40 AM  
 
 
Wt Readings from Last 3 Encounters:  
09/03/20 138.8 kg (306 lb) 08/13/20 153.2 kg (337 lb 11.9 oz) 08/07/20 153.8 kg (339 lb 1.1 oz) Intake/Output Summary (Last 24 hours) at 9/3/2020 1128 Last data filed at 9/3/2020 1100 Gross per 24 hour Intake 760 ml Output 3000 ml Net -2240 ml Patient seen and examined. Chart reviewed. Labs, data and other pertinent notes reviewed in last 24 hrs. PMH/SH/FH reviewed and unchanged compared to H&P Claudine Kelley MD

## 2020-09-03 NOTE — CONSULTS
Wellington Marks is a 38 y. o. male with  a PMH of diabetes with neuropathy, CKD, HTN, Hypercholesteremia who presented to his podiatrist for follow up after amputation of his first and second right toes.  He was experiencing fevers and chills that have been occurring for several days. [facility] podiatrist sent him to the ED for evaluation.  In the ED he was noted to be septic and admitted for IV antibiotics.  He is now status post right BKA.   
 
 He is currently on 40 units of NPH to coincide with 40 mg of prednisone. He is also receiving 25 units of Lantus and 6 units of mealtime. We have elected to increase the Lantus to 30 units and continue the 40 and 40 and the mealtime insulin. If the prednisone is going to be discontinued, the NPH should also be discontinued. We will continue to follow.

## 2020-09-03 NOTE — PROGRESS NOTES
Hospitalist Progress Note NAME: Ronak Kidney :  1985 MRN:  [id number] Assessment / Plan: 
 
 
Sepsis, POA  (WBC 19K, tachycardia)- now resolved Right foot cellulitis with abscess, POA - s/p BKA  this admission H/o s/p amputation ()of right 1st and second toe for osteomyelitis POA Hx osteomyelitis right 3rd and 4th toes 2020 and 10/2019 respectively Acute hypoxic respiratory failure (88% on RA) POA_ now resolved, on RA IDDM uncontrolled with hyperglycemia POA- now uncontrolled due to steroids ESRD on HD MWF Morbid Obesity POA 
SARS-COV-2 negative Initial Blood Cx neg Initial Wound Cx= Pseudomonal, Enterococcal & Klebsiella sp. (poly microbial) growth noted S/p podiatry consult apreciated, MRI right foot showed fluid collection and abscess with no osteomyelitis noted s/p irrigation of wound s/p lantus 20 units daily, add moderate SSI, had increased Lantus to 40 units daily  but now on Insulin (transferred to PCU) as pt to be on prednisone for hearing loss per ENT recc- will Decrease prednisone dose down to 40 mg daily today 
appreciated renal consult to continue with dialysis MWF 
s/p  RIGHT LEG ARTERIOGRAM ANTERIOR TIBIAL ARTERY AND ANGIOPLASTY  by vascular Sx 
s/p  excision and debridement of necrotic tissue right foot   
s/p BKA   
s/p zosyn completed now , Vancomycin discontinued. Atrial flutter, rate controlled New this admission, echo reviewed 
-Episode of dizziness yesterday, ? Related to a flutter, unsure - Cardiology opinion before discharge Check TSH 
? Need beta-blocker Hypotension - resolved HTN - uncontrolled still Resumed Amlodipine - increased to 10 mg daily Resumed Lisinopril - increased to 20 mg daily DC midodrine for good now Hyperkalemia S/p kayexalate Cont HD - s/p HD yesterday, now HD today AM before DC to IP Rehab if COVID test comes back negative 
 
  
Diarrhea: 
-Likely Antibiotic induced -Resolved -Imodium PRN 
-c/w probiotic 
  
Hyponatremia  
-motoring - stable with HD 
  
Chest pain - now resolved 
-troponin negative  
- EKG show no acute abnormality 
-Echo show EF 45-50 % Hearing loss not POA - possibly anesthesia related? 
- remove offending agents which may affect pt's hearing S/p ENT consult- Spoken with Dr. Hernan Love with ENT, recommends Prednisone for now and outpatient follow up. Cont Prednisone 60mg X 5 days, 40mg X 5 days, 20mg X 5 days  And 10mg X 5 days Currently decreased to 40 mg daily today to help with Hyperglycemia DC Insulin drip Started on NPH with prednisone dosing in AM 
Cont Lantus Q HS 
 
 
  
Body mass index is 43.36 kg/m². 
 
 
  
Code: full DVT prophylaxis: heparin Surrogate decision maker:  Kala and Arturo Pike (Cousins). Pt verbalized these 2 names when asked about decision maker. D/w ROXANE Dorseygarrick Zaldivar Patient will need rehab at discharge, Inpatient rehab eval sent -  inpatient Rehab accepted pt-  
 
- 
  
  
  
   
 
Subjective: Chief Complaint / Reason for Physician Visit Discussed with RN events overnight. Difficult communication due to recent hearing loss. Reported dizziness yesterday, currently none. Denies any chest pain. Review telemetry patient has been in flutter for some time. Review of Systems: 
Symptom Y/N Comments  Symptom Y/N Comments Fever/Chills    Chest Pain Poor Appetite    Edema Cough    Abdominal Pain Sputum    Joint Pain SOB/LITTLE    Pruritis/Rash Nausea/vomit    Tolerating PT/OT Diarrhea    Tolerating Diet Constipation    Other Could NOT obtain due to: Hearing lost   
 
Objective: VITALS:  
Last 24hrs VS reviewed since prior progress note. Most recent are: 
Patient Vitals for the past 24 hrs: 
 Temp Pulse Resp BP SpO2  
09/03/20 1100 97.7 °F (36.5 °C) 77 20 133/87 100 % 09/03/20 0728 97.9 °F (36.6 °C) 76 20 157/89 97 % 09/03/20 0426 97.9 °F (36.6 °C) 94 20 (!) 147/99 97 % 09/03/20 0107 98.2 °F (36.8 °C) 94 18 129/76 98 % 09/02/20 1955 98.4 °F (36.9 °C) (!) 104 18 98/42 98 % 09/02/20 1953  (!) 101  95/42 100 % 09/02/20 1453 98.6 °F (37 °C) (!) 102 18 100/46 100 % 09/02/20 1228 97.5 °F (36.4 °C) 78 18 122/68 100 % 09/02/20 1200 97.9 °F (36.6 °C) 79 18 138/87  Intake/Output Summary (Last 24 hours) at 9/3/2020 1157 Last data filed at 9/3/2020 1100 Gross per 24 hour Intake 760 ml Output 3000 ml Net -2240 ml PHYSICAL EXAM: 
General: WD, WN. Alert, cooperative, no acute distress   
EENT:  EOMI. Anicteric sclerae. MMM Resp:  Clear CV:  irRegular  rhythm,  No edema GI:  Soft, Non distended, Non tender.  +Bowel sounds Neurologic:  Alert and oriented X 3, normal speech, Psych:   Good insight. Not anxious nor agitated Ext:   Rt BKA, dressing in place Reviewed most current lab test results and cultures  YES Reviewed most current radiology test results   YES Review and summation of old records today    NO Reviewed patient's current orders and MAR    YES 
PMH/ reviewed - no change compared to H&P 
________________________________________________________________________ Care Plan discussed with: 
  Comments Patient x Family RN x Care Manager x Marzena Husain Consultant Multidiciplinary team rounds were held today with , nursing, pharmacist and clinical coordinator. Patient's plan of care was discussed; medications were reviewed and discharge planning was addressed. ________________________________________________________________________ Total NON critical care TIME:  16   Minutes Total CRITICAL CARE TIME Spent:   Minutes non procedure based Comments >50% of visit spent in counseling and coordination of care y   
________________________________________________________________________ Mandi Goncalves MD  
 
Procedures: see electronic medical records for all procedures/Xrays and details which were not copied into this note but were reviewed prior to creation of Plan. LABS: 
I reviewed today's most current labs and imaging studies. Pertinent labs include: 
Recent Labs  
  09/02/20 
0757 WBC 12.6* HGB 10.3* HCT 32.7*  
* Recent Labs  
  09/01/20 
0340 * K 3.0*  
CL 94* CO2 27 * BUN 27* CREA 4.90* CA 9.4 MG 2.2 Signed: Sammuel Schwab, MD

## 2020-09-03 NOTE — CONSULTS
[facility] Date of  Admission: 8/12/2020 10:23 AM  
 
Admission type:Emergency Consult for: a flutter Consult by: hospitalist  
 
 Subjective:  
 
Renay Salinas is a 38 y.o. male with PMH ESRD, HTN, blindness, DM, HLD who was admitted for Sepsis ([address] [A41.9] Cellulitis of right foot [X22.695] ESRD (end stage renal disease) ([address] [N18.6] Acute respiratory failure with hypoxia and hypercapnia (HCC) [J96.01, J96.02]. Per hospitalist provider note Renay Salinas has been admitted to the hospital for sepsis/osteomyelitis and is s/p R BKA 8/24. Cardiology consulted for a flutter. On assessment, Renay Salinas is feeling well. He has some discomfort at his surgical site from his R BKA. He denies chest pain, SOB, palpitations, dizziness. No prior history of a flutter. He denies any bleeding history. Renay Salinas  Does not follow with a cardiologist.  ECHO earlier this admit with EF 45-50% and mod concentric hypertrophy, which is similar to ECHO in 05/17. Cardiac risk factors: smoking/ tobacco exposure, dyslipidemia, diabetes mellitus, obesity, sedentary life style, male gender, hypertension. Patient Active Problem List  
 Diagnosis Date Noted  Atrial flutter ([address] 09/03/2020  Sepsis ([address] 08/12/2020  Cellulitis of right foot 08/12/2020  Acute respiratory failure with hypoxia and hypercapnia ([address] 08/12/2020  Osteomyelitis of second toe of right foot ([address] 08/07/2020  Foot ulcer ([address] 01/06/2020  ESRD (end stage renal disease) ([address] 01/06/2020  Wound cellulitis 01/06/2020  Osteomyelitis ([address] 10/08/2019  Uncontrolled hypertension 06/29/2019  Acute osteomyelitis of toe of right foot ([address] 06/29/2019  ESRD (end stage renal disease) on dialysis ([address] 09/14/2018  Hyperlipidemia associated with type 2 diabetes mellitus ([address] 09/14/2018  Morbid obesity with body mass index (BMI) of 40.0 to 49.9 ([facility] 09/14/2018  Uncontrolled type 2 diabetes mellitus with proliferative retinopathy, with long-term current use of insulin ([facility] 09/14/2018  Uncontrolled type 2 diabetes mellitus with hyperglycemia, with long-term current use of insulin ([facility] 09/14/2018  Diarrhea 12/06/2017  Postoperative hypoxia 05/10/2017  
 HTN (hypertension) 10/16/2009 Anuja Cho DO Past Medical History:  
Diagnosis Date  Cataracts  Chronic kidney disease  Diabetes ([facility]  Hypercholesterolemia  Hypertension  Kidney failure  Obesity Social History Socioeconomic History  Marital status: LEGALLY  Spouse name: Not on file  Number of children: Not on file  Years of education: Not on file  Highest education level: Not on file Tobacco Use  Smoking status: Former Smoker Packs/day: 0.25  Smokeless tobacco: Never Used  Tobacco comment: \"quit about a year ago\" Substance and Sexual Activity  Alcohol use: No  
  Comment: rarely  Drug use: No  
 Sexual activity: Yes  
  Partners: Female No Known Allergies Family History Problem Relation Age of Onset  Diabetes Mother  Liver Disease Father  Kidney Disease Maternal Grandfather  Diabetes Maternal Grandfather  Hypertension Maternal Grandfather  Diabetes Paternal Uncle  Hypertension Paternal Uncle Current Facility-Administered Medications Medication Dose Route Frequency  insulin glargine (LANTUS) injection 30 Units  30 Units SubCUTAneous QHS  [START ON 9/4/2020] epoetin nata-epbx (RETACRIT) injection 10,000 Units  10,000 Units SubCUTAneous Q MON, WED & FRI  amLODIPine (NORVASC) tablet 10 mg  10 mg Oral DAILY  lisinopriL (PRINIVIL, ZESTRIL) tablet 20 mg  20 mg Oral DAILY  insulin lispro (HUMALOG) injection   SubCUTAneous AC&HS  
 glucose chewable tablet 16 g  4 Tab Oral PRN  
  dextrose (D50W) injection syrg 12.5-25 g  12.5-25 g IntraVENous PRN  
 glucagon (GLUCAGEN) injection 1 mg  1 mg IntraMUSCular PRN  
 insulin lispro (HUMALOG) injection 6 Units  6 Units SubCUTAneous TIDAC  hydrALAZINE (APRESOLINE) 20 mg/mL injection 10 mg  10 mg IntraVENous Q6H PRN  
 dextrose (D50W) injection syrg 12.5-25 g  25-50 mL IntraVENous PRN  predniSONE (DELTASONE) tablet 40 mg  40 mg Oral DAILY WITH BREAKFAST And  
 insulin NPH (NOVOLIN N, HUMULIN N) injection 40 Units  40 Units SubCUTAneous DAILY  zinc oxide-cod liver oil (DESITIN) 40 % paste   Topical BID and QHS  acetaminophen (TYLENOL) tablet 1,000 mg  1,000 mg Oral Q8H  
 gabapentin (NEURONTIN) capsule 100 mg  100 mg Oral QHS  oxyCODONE IR (ROXICODONE) tablet 5-10 mg  5-10 mg Oral Q4H PRN  
 heparin (porcine) injection 5,000 Units  5,000 Units SubCUTAneous Q8H  
 sodium chloride (NS) flush 5-40 mL  5-40 mL IntraVENous PRN  
 HYDROmorphone (PF) (DILAUDID) injection 1 mg  1 mg IntraVENous Q3H PRN  phenol throat spray (CHLORASEPTIC) 1 Spray  1 Spray Oral PRN  
 albumin human 25% (BUMINATE) solution 25 g  25 g IntraVENous PRN  
 lactobac ac& pc-s.therm-b.anim (USMAN Q/RISAQUAD)  1 Cap Oral DAILY  loperamide (IMODIUM) capsule 2 mg  2 mg Oral Q4H PRN  
 ondansetron (ZOFRAN) injection 4 mg  4 mg IntraVENous Q6H PRN Review of Symptoms:  
11 systems reviewed, negative other than as stated in the HPI Objective:  
  
Visit Vitals /87 (BP 1 Location: Right arm, BP Patient Position: At rest) Pulse 77 Temp 97.7 °F (36.5 °C) Resp 20 Ht 6' 2\" (1.88 m) Wt 138.8 kg (306 lb) SpO2 100% BMI 39.29 kg/m² Physical:  
General: pleasant, adult AAM resting in bed in NAD; DE[facility] Heart: RRR, no m/S3/JVD Lungs: clear Abdomen: Soft, +BS, NTND Extremities: R BKA. Weak distal pulses. Neurologic: Grossly normal 
 
Data Review:  
Recent Labs  
  09/02/20 
0757 WBC 12.6* HGB 10.3* HCT 32.7*  
 * Recent Labs  
  09/01/20 
0340 * K 3.0*  
CL 94* CO2 27 * BUN 27* CREA 4.90* CA 9.4 MG 2.2 No results for input(s): TROIQ, CPK, CKMB in the last 72 hours. Intake/Output Summary (Last 24 hours) at 9/3/2020 1505 Last data filed at 9/3/2020 1100 Gross per 24 hour Intake 760 ml Output 0 ml Net 760 ml  
  
 
Cardiographics Telemetry: a flutter 70s ECG: a flutter with variable AV block Echocardiogram: EF 45-50% and mod concentric hypertrophy CXRAY: no acute process Assessment:  
  
 Active Problems: 
  ESRD (end stage renal disease) on dialysis ([address] (9/14/2018) Morbid obesity with body mass index (BMI) of 40.0 to 49.9 ([address] (9/14/2018) Uncontrolled type 2 diabetes mellitus with proliferative retinopathy, with long-term current use of insulin ([address] (9/14/2018) Uncontrolled type 2 diabetes mellitus with hyperglycemia, with long-term current use of insulin ([address] (9/14/2018) ESRD (end stage renal disease) ([address] (1/6/2020) Sepsis ([address] (8/12/2020) Cellulitis of right foot (8/12/2020) Acute respiratory failure with hypoxia and hypercapnia ([address] (8/12/2020) Atrial flutter ([address] (9/3/2020) Plan:  
 
Sindy Abarca is a 38 y.o. male who was been admitted and receiving treatment for sepsis/osteomyeltis and is s/p R BKA last month. Cardiology consulted after the patient developed a flutter. K on 9/1 3.0.  
· New onset atrial flutter. Hypokalemic when it started. No repeat labs since. · STAT BMP to check potassium · ECHO earlier this admit reviewed · Recent BKA on 8/24/20. I double checked with Vascular NP, and it is fine to start anticoagulation. Will check with EP whether they are okay with a dose of eliquis or whether they prefer heparin drip started. · Rate currently controlled without rate control medications · Consult EP in the AM   
 · Above discussed with patient, his sister, and hospitalist   
 
 
 
Thank you for [facility] Kameron Murcia, RACHEAL 
DNP, RN, AGACNP-BC

## 2020-09-03 NOTE — PROGRESS NOTES
Nutrition Assessment Type and Reason for Visit: Dinesh Payton Nutrition Recommendations/Plan:  
Consider discontinuing cardiac restrictions, Continue Renal/Consistent carb (renal includes 2g Na restriction) Continue Nepro BID Nutrition Assessment:    
Chart reviewed; medically noted for right BKA, post op hearing loss, ESRD on HD, DM, and HTN. New atrial flutter; cardiology consulted. Orders in for NPO after midnight. Discharge has been delayed the last few days. PO varying. Will continue nepro shakes BID. Last K+ low 2 days ago; at upper end of normal on 8/31. Last phos elevated. Continue renal diet for now along with CCD for optimal BG control. Encouraged intake of meals/supplements. Estimated Daily Nutrient Needs: 
Energy (kcal):  2118 kcal (BMR 2390 x 1. 2AF -750kcal) Protein (g):  112-129g (1.3-1.5 g/kg IBW) Fluid (ml/day):  1500 mL Nutrition Related Findings:   
BM 9/3 -[phone], phos 8.6 (8/31), K+ 3.0 (9/1) Current Nutrition Therapies: DIET RENAL Regular; Consistent Carb 1800kcal; FR 1500ML; AHA-LOW-CHOL FAT 
DIET NUTRITIONAL SUPPLEMENTS Breakfast, Dinner; Nepro DIET NPO Except Meds Anthropometric Measures: 
· Height:  6' 2\" (188 cm) · Current Body Wt:  138.8 kg (306 lb) · BMI: 39.3 Nutrition Diagnosis:  
· Inadequate protein-energy intake related to (decreased appetite, prolonged hospitalization) as evidenced by (varying PO) Nutrition Intervention: 
Food and/or Nutrient Delivery: Continue current diet, Continue oral nutrition supplement Nutrition Education and Counseling: No recommendations at this time Coordination of Nutrition Care: No recommendation at this time Goals: 
PO intake >70% of meals/supplements next 3-5 days Nutrition Monitoring and Evaluation:  
Behavioral-Environmental Outcomes: Knowledge or skill Food/Nutrient Intake Outcomes: Food and nutrient intake, Supplement intake Physical Signs/Symptoms Outcomes: Biochemical data, Weight Discharge Planning:   
Continue current diet, Continue oral nutrition supplement Electronically signed by Heather Cifuentes RD on 9/3/2020 at 3:41 PM 
 
Contact Number: [phone]

## 2020-09-03 NOTE — PROGRESS NOTES
0700:  Bedside shift change report given to Evelia Sanchez RN (oncoming nurse) by Joseph Delgado RN (offgoing nurse). Report included the following information SBAR, Kardex, Intake/Output, MAR and Recent Results. 0730: Pt complained of pain in right leg. 
 
0820:  Morning meds given. Pt stated he did not want to take lisinopril d/t his pressures dropping too low after dialysis. RN reassured him that today his BP looked like it needed lisinopril. Pt took med. RN held oxycodone for pain since he had been given 10mg at 6:30am.  Will continue to monitor. 0825:  RN paged MD Azam Ramírez re: possible a-flutter from last night. MD requested strips. RN will look. 1015:  EKG obtained per MD Azam Ramírez for review of possible a-flutter. Discharge pending reading. 1053:  RN contacted [facility] [phone] for consult for EKG. Dr. Cheri Mackay or Christopher Toussaint will consult with patient.

## 2020-09-03 NOTE — PROGRESS NOTES
TEJA Plan: 
  
D/C Rehab- [facility] -  
F/u appointments 2nd IM Letter given [facility] set up for 1pm. 
 
Pt had episode of a flutter this am . Further f/u with cardiology. [facility] can accept pt today. CM confirmed with admission liaison at [address] [facility]. [facility]  [facility] 
Phone: [phone]

## 2020-09-04 NOTE — PROGRESS NOTES
[facility] [address]  [phone] Cardiology Progress Note 9/4/2020 12:00 PM 
 
Admit Date: 8/12/2020 Admit Diagnosis:  
Sepsis ([facility] [A41.9] Cellulitis of right foot [X13.760] ESRD (end stage renal disease) ([facility] [N18.6] Acute respiratory failure with hypoxia and hypercapnia (HCC) [J96.01, J96.02] Interval History/Subjective:  
 
Ronak Rhodes is a 38 y.o. male with PMH ESRD, HTN, blindness, DM, HLD who was admitted for Sepsis ([facility] [A41.9] Cellulitis of right foot [B29.782] ESRD (end stage renal disease) ([facility] [N18.6] Acute respiratory failure with hypoxia and hypercapnia (HCC) [J96.01, J96.02]. 
 
-VSS 
-labs steady -no weight 
-Mr. Acacia Lr is feeling well. He's cold. Denies pain or SOB or palpitations. Dialysis in progress Visit Vitals /90 Pulse 87 Temp 97.6 °F (36.4 °C) Resp 18 Ht 6' 2\" (1.88 m) Wt 138.7 kg (305 lb 12.5 oz) SpO2 98% BMI 39.26 kg/m² Current Facility-Administered Medications Medication Dose Route Frequency  insulin glargine (LANTUS) injection 30 Units  30 Units SubCUTAneous QHS  epoetin nata-epbx (RETACRIT) injection 10,000 Units  10,000 Units SubCUTAneous Q MON, WED & FRI  amLODIPine (NORVASC) tablet 10 mg  10 mg Oral DAILY  lisinopriL (PRINIVIL, ZESTRIL) tablet 20 mg  20 mg Oral DAILY  insulin lispro (HUMALOG) injection   SubCUTAneous AC&HS  
 glucose chewable tablet 16 g  4 Tab Oral PRN  
 dextrose (D50W) injection syrg 12.5-25 g  12.5-25 g IntraVENous PRN  
 glucagon (GLUCAGEN) injection 1 mg  1 mg IntraMUSCular PRN  
 insulin lispro (HUMALOG) injection 6 Units  6 Units SubCUTAneous TIDAC  hydrALAZINE (APRESOLINE) 20 mg/mL injection 10 mg  10 mg IntraVENous Q6H PRN  
 dextrose (D50W) injection syrg 12.5-25 g  25-50 mL IntraVENous PRN  
 insulin NPH (NOVOLIN N, HUMULIN N) injection 40 Units  40 Units SubCUTAneous DAILY  zinc oxide-cod liver oil (DESITIN) 40 % paste   Topical BID and QHS  acetaminophen (TYLENOL) tablet 1,000 mg  1,000 mg Oral Q8H  
 gabapentin (NEURONTIN) capsule 100 mg  100 mg Oral QHS  oxyCODONE IR (ROXICODONE) tablet 5-10 mg  5-10 mg Oral Q4H PRN  
 sodium chloride (NS) flush 5-40 mL  5-40 mL IntraVENous PRN  
 HYDROmorphone (PF) (DILAUDID) injection 1 mg  1 mg IntraVENous Q3H PRN  phenol throat spray (CHLORASEPTIC) 1 Spray  1 Spray Oral PRN  
 albumin human 25% (BUMINATE) solution 25 g  25 g IntraVENous PRN  
 lactobac ac& pc-s.therm-b.anim (USMAN Q/RISAQUAD)  1 Cap Oral DAILY  loperamide (IMODIUM) capsule 2 mg  2 mg Oral Q4H PRN  
 ondansetron (ZOFRAN) injection 4 mg  4 mg IntraVENous Q6H PRN Objective:  
  
Physical Exam: 
General: pleasant, adult AAM resting in bed in NAD; DEYA [facility] Heart: RRR, no m/S3/JVD Lungs: clear Abdomen: Soft, +BS, NTND Extremities: R BKA. Weak distal pulses. Neurologic: Grossly normal 
Skin:  Warm and dry. dressing R stump Data Review:  
Recent Labs  
  09/04/20 
0700 09/03/20 
1816 09/02/20 
0757 WBC 15.1* 16.0* 12.6* HGB 10.2* 10.9* 10.3* HCT 32.9* 34.4* 32.7*  
 446* 449* Recent Labs  
  09/04/20 
0904 09/03/20 
1458 * 131*  
K 3.7 4.2 CL 94* 94* CO2 28 28 * 192* BUN 62* 59* CREA 7.45* 8.07* CA 8.3* 8.7 No results for input(s): TROIQ, CPK, CKMB in the last 72 hours. Intake/Output Summary (Last 24 hours) at 9/4/2020 1213 Last data filed at 9/3/2020 2012 Gross per 24 hour Intake 240 ml Output  Net 240 ml Telemetry: converted to SR 
ECG: a flutter with variable AV block Echocardiogram: EF 45-50% and mod concentric hypertrophy CXRAY: no acute process Assessment:  
 
Active Problems: 
  HTN (hypertension) (10/16/2009) ESRD (end stage renal disease) on dialysis ([facility] (9/14/2018) Morbid obesity with body mass index (BMI) of 40.0 to 49.9 ([facility] (9/14/2018) Uncontrolled type 2 diabetes mellitus with proliferative retinopathy, with long-term current use of insulin ([address] (9/14/2018) Uncontrolled type 2 diabetes mellitus with hyperglycemia, with long-term current use of insulin ([address] (9/14/2018) ESRD (end stage renal disease) ([address] (1/6/2020) Sepsis ([address] (8/12/2020) Cellulitis of right foot (8/12/2020) Acute respiratory failure with hypoxia and hypercapnia ([address] (8/12/2020) Atrial flutter ([address] (9/3/2020) Plan:  
 
Atrial flutter:  Now converted to sinus rhythm. · Heparin drip d/c'd for procedure. Start DOAC afterwards. · EP consulted this morning and plans for a flutter ablation later today · Rate controlled without medications HTN: variable with dialysis · Continue current meds Natalya Goodrich NP 
DNP, RN, AGACNP-BC

## 2020-09-04 NOTE — PROGRESS NOTES
Occupational Therapy Medical record reviewed. Pt is currently receiving dialysis and per nursing will have cardiac procedure/ablation this date. Will defer and continue to follow as appropriate

## 2020-09-04 NOTE — PROGRESS NOTES
Pt for AV node ablation.  did come and answer more questions for pt. Pt remains NPO except meds before B/P meds on hold per HD  Also Heparin stopped per order

## 2020-09-04 NOTE — PROGRESS NOTES
TEJA Plan: D/C Rehab- [facility] -  
F/u appointments 2nd IM Letter to be given [facility] transportation to be set up  
Will need COVID test 72hrs prior to d/c . CM reviewed pt chart. Pt for AV node ablation today. If not d/c today will need COVID test 72hrs  prior to d/c . CM will continue to follow pt for D/C planning and arrange services as deemed necessary [facility]  [facility] 
Phone: [phone]

## 2020-09-04 NOTE — PROGRESS NOTES
Chart reviewed and spoke with nursing. Patient currently receiving dialysis. Will continue to follow. Gabriel Salinas

## 2020-09-04 NOTE — DIABETES MGMT
[address] INITIAL NOTE Presentation Talha Rasmussen is a 38 y.o. male with  a PMH of diabetes with neuropathy, CKD, HTN, Hypercholesteremia who presented to his podiatrist for follow up after amputation of his first and second right toes. He was experiencing fevers and chills that have been occurring for several days. His podiatrist sent him to the ED for evaluation. In the ED he was noted to be septic and admitted for IV antibiotics. He is now status post right BKA. Last night he was to be in atrial flutter and is s/p ablation today. DX: Sepsis, atrial flutter, ESRD TX: IV antibiotics, HD, insulin, gabapentin, BP meds Current clinical course has been complicated by steroid induced hyperglycemia, renal failure requiring HD, Hyperkalemia and chest pain. Diabetes: Patient has known Type two diabetes, treated with Lantus PTA. Admitting A1C 9.4% (up from 7.4% 1/20) Consulted by Provider for advanced diabetes nursing assessment and care, specifically related to  
[] Transitioning off Dave Repress [x] Inpatient management strategy [] Home management assessment 
[] Survival skill education Diabetes-related medical history Acute complications: None Neurological complications Peripheral neuropathy Microvascular disease Nephropathy Macrovascular disease Peripheral vascular disease and Foot wounds Diabetes medication history Drug class Currently in use Discontinued Never used Biguanide DDP-4 inhibitor Sulfonylurea Thiazolidinedione GLP-1 RA SGLT-2 inhibitors Basal insulin Lantus 20 units daily Fixed Dose  Combinations Bolus insulin Subjective Patient currently at [facility] Objective Physical exam 
General  
Vital Signs Visit Vitals BP (!) 165/99 Pulse 87 Temp 98 °F (36.7 °C) Resp 18 Ht 6' 2\" (1.88 m) Wt 138.7 kg (305 lb 12.5 oz) SpO2 98% BMI 39.26 kg/m² Laboratory Lab Results Component Value Date/Time Hemoglobin A1c 9.4 (H) 08/15/2020 12:19 AM  
 Hemoglobin A1c (POC) 9.3 04/03/2018 01:30 PM  
 
Lab Results Component Value Date/Time LDL, calculated 83 08/15/2020 12:18 AM  
 
Lab Results Component Value Date/Time Creatinine (POC) 12.8 (H) 08/24/2020 10:51 AM  
 Creatinine 7.45 (H) 09/04/2020 09:04 AM  
 
Lab Results Component Value Date/Time Sodium 133 (L) 09/04/2020 09:04 AM  
 Potassium 3.7 09/04/2020 09:04 AM  
 Chloride 94 (L) 09/04/2020 09:04 AM  
 CO2 28 09/04/2020 09:04 AM  
 Anion gap 11 09/04/2020 09:04 AM  
 Glucose 257 (H) 09/04/2020 09:04 AM  
 BUN 62 (H) 09/04/2020 09:04 AM  
 Creatinine 7.45 (H) 09/04/2020 09:04 AM  
 BUN/Creatinine ratio 8 (L) 09/04/2020 09:04 AM  
 GFR est AA 10 (L) 09/04/2020 09:04 AM  
 GFR est non-AA 8 (L) 09/04/2020 09:04 AM  
 Calcium 8.3 (L) 09/04/2020 09:04 AM  
 Bilirubin, total 1.2 (H) 08/21/2020 04:16 AM  
 Alk. phosphatase 83 08/21/2020 04:16 AM  
 Protein, total 9.8 (H) 08/21/2020 04:16 AM  
 Albumin 1.7 (L) 08/21/2020 04:16 AM  
 Globulin 8.1 (H) 08/21/2020 04:16 AM  
 A-G Ratio 0.2 (L) 08/21/2020 04:16 AM  
 ALT (SGPT) 26 08/21/2020 04:16 AM  
 
Lab Results Component Value Date/Time ALT (SGPT) 26 08/21/2020 04:16 AM  
 
 
Factors affecting BG pattern Factor Dose Comments Nutrition: 
Renal regular: Carb-controlled meals 1800kcal  
NPO for procedure Drugs: 
Prednisone 40 mg daily with breakfast  
Stopped today Pain On hydromorphone Infection/ Sepsis Blood glucose pattern Assessment and Plan Nursing Diagnosis Risk for unstable blood glucose pattern Nursing Intervention Domain 4390 Decision-making Support Nursing Interventions Examined current inpatient diabetes control Explored factors facilitating and impeding inpatient management Explored corrective strategies with patient's POA Evaluation This gentleman, with uncontrolled Type 2 diabetes, hasn't achieved inpatient blood glucose target of 100-180mg/dl. Inpatient blood glucose management has been impacted by 
[x] Kidney dysfunction Admission blood glucose was 150 and patient's blood glucose was relatively well controlled on his home dose of 20 units of Lantus daily. However with the initiation of steroids his blood glucose increased into the 400s requiring an insulin infusion. He received 30 units of Lantus on 8/29 and it was increased to 40 units on 8/30 for persistent steroid induced hyperglycemia. At baseline he has metabolic basal needs that require insulin but steroids have been discontinued as well as the linked NPH dose. He is also receiving bolus meal time insulin at 6 units. Patient went into atrial flutter over night that required an emergent ablation. Did not receive basal insulin this morning and has been NPO. Recommendations Recommend: 
1. Give 20 units of Lantus tonight as a one time dose and start 40 units of Lantus Daily in the morning to cover basal insulin needs. If blood glucose is less than 100 in the morning (FBS) then continue current basal dose of Lantus 30 units Daily. 2. Continue 6 units of meal time insulin with consumed meals 3. Change correction scale to insulin resistant sensitivity for extra coverage needs 4. D/C NPH 40 units that was linked with Prednisone that had been discontinued Billing Code(s) I personally reviewed chart, notes, data and current medications in the medical record, and examined the patient at bedside before making care recommendations. [x] [id number] IP subsequent hospital care - 30 minutes Belinda Parker MSN, RN, ACCNS-AG in collaboration with Spencer Johnston MSN, RN AGCNS-BC [facility] Access via [facility]

## 2020-09-04 NOTE — ANESTHESIA PREPROCEDURE EVALUATION
Relevant Problems No relevant active problems Anesthetic History No history of anesthetic complications Review of Systems / Medical History Patient summary reviewed, nursing notes reviewed and pertinent labs reviewed Pulmonary Within defined limits Neuro/Psych Within defined limits Cardiovascular Hypertension Dysrhythmias : atrial flutter PAD Exercise tolerance: <4 METS Comments: Left ventricle: Systolic function was mildly reduced. Ejection fraction 
was estimated in the range of 45 % to 50 %. There were no regional wall 
motion abnormalities. Wall thickness was mildly to moderately increased. 
   
GI/Hepatic/Renal 
  
 
 
Renal disease: dialysis and ESRD Endo/Other Diabetes Obesity and anemia Other Findings Physical Exam 
 
Airway Mallampati: II 
TM Distance: 4 - 6 cm Neck ROM: normal range of motion, short neck Mouth opening: Normal 
 
 Cardiovascular Regular rate and rhythm,  S1 and S2 normal,  no murmur, click, rub, or gallop Dental 
No notable dental hx Pulmonary Breath sounds clear to auscultation Abdominal 
GI exam deferred Other Findings Anesthetic Plan ASA: 3 Anesthesia type: MAC Induction: Intravenous Anesthetic plan and risks discussed with: Patient

## 2020-09-04 NOTE — CONSULTS
Subjective:  
  
           
[address]  [phone] Date of  Admission: 8/12/2020 10:23 AM  
 
Admission type:Emergency Toña Braxton is a 38 y.o. male admitted for Sepsis ([address] [A41.9]; Cellulitis of right foot [L03.115];ESRD (end stage renal disease) ([address] [N18.6]; Acute respiratory failure with hypoxia and hypercapnia ([facility]) [J96.01, J96.02]. Had cellulitis and ended up getting R BKA on 8/24. Currently, getting dialysis. Was found to be in atrial flutter and asked to see him with regards to that. Denies cp/sob Patient Active Problem List  
 Diagnosis Date Noted  Atrial flutter ([address] 09/03/2020  Sepsis ([address] 08/12/2020  Cellulitis of right foot 08/12/2020  Acute respiratory failure with hypoxia and hypercapnia ([address] 08/12/2020  Osteomyelitis of second toe of right foot ([address] 08/07/2020  Foot ulcer ([address] 01/06/2020  ESRD (end stage renal disease) ([address] 01/06/2020  Wound cellulitis 01/06/2020  Osteomyelitis ([address] 10/08/2019  Uncontrolled hypertension 06/29/2019  Acute osteomyelitis of toe of right foot ([address] 06/29/2019  ESRD (end stage renal disease) on dialysis ([address] 09/14/2018  Hyperlipidemia associated with type 2 diabetes mellitus ([address] 09/14/2018  Morbid obesity with body mass index (BMI) of 40.0 to 49.9 ([address] 09/14/2018  Uncontrolled type 2 diabetes mellitus with proliferative retinopathy, with long-term current use of insulin ([address] 09/14/2018  Uncontrolled type 2 diabetes mellitus with hyperglycemia, with long-term current use of insulin ([address] 09/14/2018  Diarrhea 12/06/2017  Postoperative hypoxia 05/10/2017  
 HTN (hypertension) 10/16/2009 Joyice Anon, DO Past Medical History:  
Diagnosis Date  Cataracts  Chronic kidney disease  Diabetes ([facility]  Hypercholesterolemia  Hypertension  Kidney failure  Obesity Past Surgical History: Procedure Laterality Date  COLONOSCOPY N/A 12/8/2017 COLONOSCOPY performed by Christopher Yin MD at [facility] ENDOSCOPY  COLONOSCOPY,DIAGNOSTIC  12/8/2017  HX AMPUTATION Left great toe and L 5th toe  HX AMPUTATION TOE Right  UPPER GI ENDOSCOPY,BIOPSY  12/8/2017  VASCULAR SURGERY PROCEDURE UNLIST    
 R side chest  
 VASCULAR SURGERY PROCEDURE UNLIST Left 07/25/2017 Creation transposed AV fistula arm No Known Allergies Social History Tobacco Use  Smoking status: Former Smoker Packs/day: 0.25  Smokeless tobacco: Never Used  Tobacco comment: \"quit about a year ago\" Substance Use Topics  Alcohol use: No  
  Comment: rarely  Drug use: No  
 
Family History Problem Relation Age of Onset  Diabetes Mother  Liver Disease Father  Kidney Disease Maternal Grandfather  Diabetes Maternal Grandfather  Hypertension Maternal Grandfather  Diabetes Paternal Uncle  Hypertension Paternal Uncle Current Facility-Administered Medications Medication Dose Route Frequency  insulin glargine (LANTUS) injection 30 Units  30 Units SubCUTAneous QHS  heparin (porcine) injection 2,000 Units  2,000 Units IntraVENous PRN Or  
 heparin (porcine) injection 4,000 Units  4,000 Units IntraVENous PRN  
 epoetin nata-epbx (RETACRIT) injection 10,000 Units  10,000 Units SubCUTAneous Q MON, WED & FRI  amLODIPine (NORVASC) tablet 10 mg  10 mg Oral DAILY  lisinopriL (PRINIVIL, ZESTRIL) tablet 20 mg  20 mg Oral DAILY  insulin lispro (HUMALOG) injection   SubCUTAneous AC&HS  
 glucose chewable tablet 16 g  4 Tab Oral PRN  
 dextrose (D50W) injection syrg 12.5-25 g  12.5-25 g IntraVENous PRN  
 glucagon (GLUCAGEN) injection 1 mg  1 mg IntraMUSCular PRN  
 insulin lispro (HUMALOG) injection 6 Units  6 Units SubCUTAneous TIDAC  hydrALAZINE (APRESOLINE) 20 mg/mL injection 10 mg  10 mg IntraVENous Q6H PRN  
  dextrose (D50W) injection syrg 12.5-25 g  25-50 mL IntraVENous PRN  predniSONE (DELTASONE) tablet 40 mg  40 mg Oral DAILY WITH BREAKFAST And  
 insulin NPH (NOVOLIN N, HUMULIN N) injection 40 Units  40 Units SubCUTAneous DAILY  zinc oxide-cod liver oil (DESITIN) 40 % paste   Topical BID and QHS  acetaminophen (TYLENOL) tablet 1,000 mg  1,000 mg Oral Q8H  
 gabapentin (NEURONTIN) capsule 100 mg  100 mg Oral QHS  oxyCODONE IR (ROXICODONE) tablet 5-10 mg  5-10 mg Oral Q4H PRN  
 sodium chloride (NS) flush 5-40 mL  5-40 mL IntraVENous PRN  
 HYDROmorphone (PF) (DILAUDID) injection 1 mg  1 mg IntraVENous Q3H PRN  phenol throat spray (CHLORASEPTIC) 1 Spray  1 Spray Oral PRN  
 albumin human 25% (BUMINATE) solution 25 g  25 g IntraVENous PRN  
 lactobac ac& pc-s.therm-b.anim (USMAN Q/RISAQUAD)  1 Cap Oral DAILY  loperamide (IMODIUM) capsule 2 mg  2 mg Oral Q4H PRN  
 ondansetron (ZOFRAN) injection 4 mg  4 mg IntraVENous Q6H PRN Review of Symptoms: 
Constitutional: negative Eyes: negative Ears, nose, mouth, throat, and face: negative Respiratory: negative Cardiovascular: negative Gastrointestinal: negative Genitourinary: negative Musculoskeletal: R BKA Neurological: negative Behvioral/Psych: negative Endocrine: negative Subjective:  
  
Visit Vitals /83 Pulse 79 Temp 97.6 °F (36.4 °C) Resp 18 Ht 6' 2\" (1.88 m) Wt 306 lb (138.8 kg) SpO2 98% BMI 39.29 kg/m² Physical Exam 
Abdomen: soft, non-tender. Extremities: r bka - dressing c/d/i, AV Fistula Heart: regular rate and rhythm Lungs: clear to auscultation bilaterally Pulses: 2+ and symmetric Cardiographics Telemetry: nsr 
 
ECG: afl with variable block Echocardiogram: lvef 45-50, +MR Labs: 
Recent Labs  
  09/04/20 
0700 09/03/20 
1816 09/02/20 
0757 WBC 15.1* 16.0* 12.6* HGB 10.2* 10.9* 10.3* HCT 32.9* 34.4* 32.7*  
 446* 449* Recent Labs  
  09/03/20 
1450 * K 4.2 CL 94* CO2 28 * BUN 59* CREA 8.07* CA 8.7 No results for input(s): TROIQ, CPK, CKMB in the last 72 hours. Intake/Output Summary (Last 24 hours) at 9/4/2020 4861 Last data filed at 9/3/2020 2012 Gross per 24 hour Intake 360 ml Output  Net 360 ml Assessment: 
 
 Assessment:  
  
 Active Problems: 
  HTN (hypertension) (10/16/2009) ESRD (end stage renal disease) on dialysis ([address] (9/14/2018) Morbid obesity with body mass index (BMI) of 40.0 to 49.9 ([address] (9/14/2018) Uncontrolled type 2 diabetes mellitus with proliferative retinopathy, with long-term current use of insulin ([address] (9/14/2018) Uncontrolled type 2 diabetes mellitus with hyperglycemia, with long-term current use of insulin ([address] (9/14/2018) ESRD (end stage renal disease) ([address] (1/6/2020) Sepsis ([address] (8/12/2020) Cellulitis of right foot (8/12/2020) Acute respiratory failure with hypoxia and hypercapnia ([address] (8/12/2020) Atrial flutter ([address] (9/3/2020) Plan:  
 
Pinky Oh is a young man with multiple co-morbidities. He was in atrial flutter yesterday. We discussed options - long term oac versus AFL abl and 4 weeks of oac. D/w his sister as well. Agrees to proceed with AFL abl and 4 weeks of oac. I discussed the risks/benefits/alternatives of the procedure with the patient. Risks include (but are not limited to) bleeding, heart block, infection, cva/mi/tamponade/death. The patient understands and agrees to proceed. Thank you for this interesting consultation. Npo. Lisset Zaragoza MD, [facility] 
 
9/4/2020

## 2020-09-04 NOTE — PROGRESS NOTES
Hospitalist Progress Note NAME: Tonia Marie :  1985 MRN:  [id number] Assessment / Plan: 
 
 
Sepsis, POA  (WBC 19K, tachycardia)- now resolved Right foot cellulitis with abscess, POA - s/p BKA  this admission H/o s/p amputation ()of right 1st and second toe for osteomyelitis POA Hx osteomyelitis right 3rd and 4th toes 2020 and 10/2019 respectively Acute hypoxic respiratory failure (88% on RA) POA_ now resolved, on RA IDDM uncontrolled with hyperglycemia POA- now uncontrolled due to steroids ESRD on HD MWF Morbid Obesity POA 
SARS-COV-2 negative Initial Blood Cx neg Initial Wound Cx= Pseudomonal, Enterococcal & Klebsiella sp. (poly microbial) growth noted S/p podiatry consult apreciated, MRI right foot showed fluid collection and abscess with no osteomyelitis noted s/p irrigation of wound s/p lantus 20 units daily, add moderate SSI, had increased Lantus to 40 units daily  but now on Insulin (transferred to PCU) as pt to be on prednisone for hearing loss per ENT recc- will Decrease prednisone dose down to 40 mg daily today 
appreciated renal consult to continue with dialysis MWF 
s/p  RIGHT LEG ARTERIOGRAM ANTERIOR TIBIAL ARTERY AND ANGIOPLASTY  by vascular Sx 
s/p  excision and debridement of necrotic tissue right foot   
s/p BKA   
s/p zosyn completed now , Vancomycin discontinued. Atrial flutter, rate controlled New this admission, echo reviewed 
-Episode of dizziness yesterday, ? Related to a flutter, unsure - Cardiology help appreciated: s/p ablation today Hypotension - resolved HTN - uncontrolled still Resumed Amlodipine - increased to 10 mg daily Resumed Lisinopril - increased to 20 mg daily DC midodrine for good now Hyperkalemia S/p kayexalate Cont HD - s/p HD yesterday, now HD today AM before DC to IP Rehab if COVID test comes back negative 
 
  
Diarrhea: 
-Likely Antibiotic induced 
-Resolved -Imodium PRN 
 -c/w probiotic 
  
Hyponatremia  
-motoring - stable with HD 
  
Chest pain - now resolved 
-troponin negative  
- EKG show no acute abnormality 
-Echo show EF 45-50 % Hearing loss not POA - possibly anesthesia related? 
- remove offending agents which may affect pt's hearing S/p ENT consult- Spoken with Dr. Rachel Duff with ENT, recommends Prednisone for now and outpatient follow up. Cont Prednisone 60mg X 5 days, 40mg X 5 days, 20mg X 5 days  And 10mg X 5 days Currently decreased to 40 mg daily today to help with Hyperglycemia DC Insulin drip Started on NPH with prednisone dosing in AM 
Cont Lantus Q HS 
 
 
  
Body mass index is 43.36 kg/m². 
 
 
  
Code: full DVT prophylaxis: heparin Surrogate decision maker:  Kala and Vijaya Roland (Cousins). Pt verbalized these 2 names when asked about decision maker. D/w ROXANE Cabrera Johnnie Kalynok Patient will need rehab at discharge, Inpatient rehab eval sent - [facility] accepted pt, bed will be available 9/8-9/9 Need new covid test  
  
  
  
   
 
Subjective: Chief Complaint / Reason for Physician Visit: FU cellulitis, Afib S/p ablation today Reports minimal pain at the groin site Discussed with RN events overnight. Review of Systems: 
Symptom Y/N Comments  Symptom Y/N Comments Fever/Chills n   Chest Pain n   
Poor Appetite    Edema Cough    Abdominal Pain n   
Sputum    Joint Pain SOB/LITTLE n   Pruritis/Rash Nausea/vomit    Tolerating PT/OT Diarrhea    Tolerating Diet Constipation    Other Could NOT obtain due to:   
 
Objective: VITALS:  
Last 24hrs VS reviewed since prior progress note. Most recent are: 
Patient Vitals for the past 24 hrs: 
 Temp Pulse Resp BP SpO2  
09/04/20 1715  87 23 (!) 143/109   
09/04/20 1630  82 18 (!) 163/106   
09/04/20 1615  84  (!) 159/100   
09/04/20 1612  83 20 (!) 168/99   
09/04/20 1600  89 19 (!) 174/112   
09/04/20 1500  87 16 (!) 167/91   
09/04/20 1445  89 24 (!) 152/95  09/04/20 1435  92 18 120/77 98 % 09/04/20 1430  90 17 128/77 98 % 09/04/20 1425  (!) 108 15 110/65 98 % 09/04/20 1420 98.3 °F (36.8 °C) 100 15 109/52   
09/04/20 1416 98.6 °F (37 °C) (!) 107 15 110/65 100 % 09/04/20 1300    (!) 165/99   
09/04/20 1253 98 °F (36.7 °C) 87 18 (!) 165/99   
09/04/20 1245  80 18 (!) 177/99   
09/04/20 1230  82 18 (!) 173/102   
09/04/20 1215  79 18 (!) 167/99   
09/04/20 1200  78 18 139/70   
09/04/20 1145  87 18 140/90   
09/04/20 1130  78 18 123/88   
09/04/20 1115  80 18 105/57 98 % 09/04/20 1100  82  127/68   
09/04/20 1045  83  103/71   
09/04/20 1030  82 18 118/62   
09/04/20 1015  84 18 (!) 143/93   
09/04/20 1000  88 18 (!) 147/92   
09/04/20 0945  81 18 164/85   
09/04/20 0930  83 18 (!) 161/98   
09/04/20 0915  82 18 (!) 162/106   
09/04/20 0900  83 18 (!) 136/94   
09/04/20 0845  78 18 (!) 126/95   
09/04/20 0830 97.6 °F (36.4 °C) 79 18 129/83 98 % 09/04/20 0754  85   98 % 09/04/20 0753 97.6 °F (36.4 °C) 82 18 136/89 98 % 09/04/20 0005 98.3 °F (36.8 °C) 86 18 138/75 97 % 09/03/20 2005 98.1 °F (36.7 °C) (!) 108 18 125/59 98 % Intake/Output Summary (Last 24 hours) at 9/4/2020 1801 Last data filed at 9/4/2020 1253 Gross per 24 hour Intake 240 ml Output 2500 ml Net -2260 ml PHYSICAL EXAM: 
General: WD, WN. Alert, cooperative, no acute distress   
EENT:  EOMI. Anicteric sclerae. MMM Resp:  Clear CV:  irRegular  rhythm,  No edema GI:  Soft, Non distended, Non tender.  +Bowel sounds Neurologic:  Alert and oriented X 3, normal speech, Psych:   Good insight. Not anxious nor agitated Ext:   Rt BKA, dressing in place Reviewed most current lab test results and cultures  YES Reviewed most current radiology test results   YES Review and summation of old records today    NO Reviewed patient's current orders and MAR    YES 
PMH/SH reviewed - no change compared to H&P 
 ________________________________________________________________________ Care Plan discussed with: 
  Comments Patient x Family RN x Care Manager x Consultant Multidiciplinary team rounds were held today with , nursing, pharmacist and clinical coordinator. Patient's plan of care was discussed; medications were reviewed and discharge planning was addressed. ________________________________________________________________________ Total NON critical care TIME:  25   Minutes Total CRITICAL CARE TIME Spent:   Minutes non procedure based Comments >50% of visit spent in counseling and coordination of care y   
________________________________________________________________________ Asmita Atkins MD  
 
Procedures: see electronic medical records for all procedures/Xrays and details which were not copied into this note but were reviewed prior to creation of Plan. LABS: 
I reviewed today's most current labs and imaging studies. Pertinent labs include: 
Recent Labs  
  09/04/20 
0700 09/03/20 
1816 09/02/20 
0757 WBC 15.1* 16.0* 12.6* HGB 10.2* 10.9* 10.3* HCT 32.9* 34.4* 32.7*  
 446* 449* Recent Labs  
  09/04/20 
0904 09/03/20 
1458 * 131*  
K 3.7 4.2 CL 94* 94* CO2 28 28 * 192* BUN 62* 59* CREA 7.45* 8.07* CA 8.3* 8.7 Signed: Asmita Atkins MD

## 2020-09-04 NOTE — PROGRESS NOTES
Pt refusing to take B/P med's until after his procedure. Then he changed his mind and took them. Had a bath and off to EP lab

## 2020-09-04 NOTE — PROGRESS NOTES
[facility] Dialysis Team Charli Collado  [phone] Vitals   Pre   Post   Assessment   Pre   Post    
Temp  Temp: 97.6 °F (36.4 °C) (09/04/20 0830)  98 LOC  A&Ox4 cooperative, follows commands A&Ox4 cooperative, follows commands HR   Pulse (Heart Rate): 79 (09/04/20 0830) 87 Lungs   Clear bilaterally Clear bilaterally B/P   BP: 129/83 (09/04/20 0830) 167/99 Cardiac   NSR, Regular, S1, S2 NSR, Regular, S1, S2  
Resp   Resp Rate: 18 (09/04/20 0830) 18 Skin   LUE AVF LUE AVF Pain level  0 0 Edema  Trace Trace Orders: Duration:   Start:    0836 End:    1253 Total:   4.15 hours Dialyzer:   Dialyzer/Set Up Inspection: Elsa Taylor (09/04/20 0830) K Bath:   Dialysate K (mEq/L): 2 (09/04/20 0830) Ca Bath:   Dialysate CA (mEq/L): 2.5 (09/04/20 0830) Na/Bicarb:   Dialysate NA (mEq/L): 140 (09/04/20 0830) Target Fluid Removal:   Goal/Amount of Fluid to Remove (mL): 2500 mL (09/04/20 0830) Access Type & Location:   PELON AVF: skin CDI. No s/s of infection. No issues with cannulation or hemostasis. Running well at . Labs Obtained/Reviewed Critical Results Called   Date when labs were drawn- 
Hgb-   
HGB Date Value Ref Range Status 09/04/2020 10.2 (L) 12.1 - 17.0 g/dL Final  
 
K-   
Potassium Date Value Ref Range Status 09/03/2020 4.2 3.5 - 5.1 mmol/L Final  
  Comment:  
  INVESTIGATED PER DELTA CHECK PROTOCOL Ca-  
Calcium Date Value Ref Range Status 09/03/2020 8.7 8.5 - 10.1 MG/DL Final  
 
Bun-  
BUN Date Value Ref Range Status 09/03/2020 59 (H) 6 - 20 MG/DL Final  
  Comment:  
  INVESTIGATED PER DELTA CHECK PROTOCOL Creat-  
Creatinine Date Value Ref Range Status 09/03/2020 8.07 (H) 0.70 - 1.30 MG/DL Final  
  Comment:  
  INVESTIGATED PER DELTA CHECK PROTOCOL Medications/ Blood Products Given Name   Dose   Route and Time Albumin 25%  25 g IV LUE AVF @ 1103 Blood Volume Processed (BVP):    104 L Net Fluid Removed:  2500 mL Comments Time Out Done: Yes Primary Nurse Rpt Pre: Elizabeth Mason RN 
Primary Nurse Rpt Post: Elizabeth Mason RN 
Pt Education: Ordered HD treatment Care Plan: CKD Tx Summary:  
Dialysis RN arrived to patient room, pt A&Ox4, reviewed ordered dialysis treatment, pt in agreement, physical assessment, machine preparation and safety testing performed. Consent signed & on file. SBAR received from Primary RN. 
6122: Pt cannulated with 32L needles per policy & without issue. Labs drawn per request/ order. VSS. Dialysis Tx initiated. 0930: Cardiologist made bedside rounds and reviewed upcoming cardiology procedure for today. VSS. No complaints of pain. Lines intact and patent. Tolerating treatment well.  
1103: BP trending down, Albumin 25 g administered per order. 1200: Pt resting quietly. VSS. No complaints of pain. Lines intact and patent, tolerating treatment well. 1253: Tx ended. VSS. All possible blood returned to patient. Hemostasis achieved without issue. Bed locked and in the lowest position, call bell and belongings in reach. SBAR given to Primary, RN. Patient is stable at time of my departure. All Dialysis related medications have been reviewed. Admiting Diagnosis: Sepsis, R foot cellulitis and abscess [facility] 
Consent signed - Informed Consent Verified: Yes (09/04/20 0830) Sylvain Consent - Signed and on file Hepatitis Status- 08/13/20 HBsAG Negative, 01/06/20 HBsAB <3.10 Susceptible Machine #- Machine Number: [id number] (09/04/20 0830) Telemetry status- NSR Pre-dialysis wt. - Pre-Dialysis Weight: 142.3 kg (313 lb 11.4 oz) (08/31/20 3022)

## 2020-09-04 NOTE — PROGRESS NOTES
Name: Cami Thorpe MRN: [id number] : 1985 Assessment   :                                               Plan: ESKD Anemia of ESRD + post op blood loss DM-2, uncontrolled Mild Hyperkalemia- resolved HTN-prior hypotension resolved Hyponatremia-mild Foot infection- s/p  R BKA on - Hearing loss (new) Legally blind [facility] MWF HD; HD today. 3 liters off on  Off Midodrine On Norvasc and Lisinopril  
 
hgb > 10; stop RETACRIT for now ABx per ID Ct FR of 1.5 L/day Will see again on Monday, but please call with questions or changes in the meantime. Subjective: 
Resting. The patient was seen on dialysis at 12:29 PM .  BP is stable. Access is working well. ROS:  
Deferred Exam: 
Visit Vitals BP (!) 167/99 Pulse 79 Temp 97.6 °F (36.4 °C) Resp 18 Ht 6' 2\" (1.88 m) Wt 138.7 kg (305 lb 12.5 oz) SpO2 98% BMI 39.26 kg/m² NAD No resp distress R BKA; LLE with no edema Alert awake L AV access in place Current Facility-Administered Medications Medication Dose Route Frequency Last Dose  insulin NPH (NOVOLIN N, HUMULIN N) injection 10 Units  10 Units SubCUTAneous ONCE    
 insulin glargine (LANTUS) injection 30 Units  30 Units SubCUTAneous QHS 30 Units at 20 2232  amLODIPine (NORVASC) tablet 10 mg  10 mg Oral DAILY Stopped at 20 0900  
 lisinopriL (PRINIVIL, ZESTRIL) tablet 20 mg  20 mg Oral DAILY Stopped at 20 0900  
 insulin lispro (HUMALOG) injection   SubCUTAneous AC&HS Stopped at 20 0730  
 glucose chewable tablet 16 g  4 Tab Oral PRN    
 dextrose (D50W) injection syrg 12.5-25 g  12.5-25 g IntraVENous PRN    
 glucagon (GLUCAGEN) injection 1 mg  1 mg IntraMUSCular PRN    
 insulin lispro (HUMALOG) injection 6 Units  6 Units SubCUTAneous TIDAC Stopped at 09/03/20 1630  hydrALAZINE (APRESOLINE) 20 mg/mL injection 10 mg  10 mg IntraVENous Q6H PRN    
 dextrose (D50W) injection syrg 12.5-25 g  25-50 mL IntraVENous PRN    
 insulin NPH (NOVOLIN N, HUMULIN N) injection 40 Units  40 Units SubCUTAneous DAILY 40 Units at 09/03/20 0816  
 zinc oxide-cod liver oil (DESITIN) 40 % paste   Topical BID and QHS  acetaminophen (TYLENOL) tablet 1,000 mg  1,000 mg Oral Q8H 1,000 mg at 09/03/20 2143  
 gabapentin (NEURONTIN) capsule 100 mg  100 mg Oral  mg at 09/03/20 2143  oxyCODONE IR (ROXICODONE) tablet 5-10 mg  5-10 mg Oral Q4H PRN 10 mg at 09/03/20 2142  sodium chloride (NS) flush 5-40 mL  5-40 mL IntraVENous PRN 10 mL at 08/31/20 1750  
 HYDROmorphone (PF) (DILAUDID) injection 1 mg  1 mg IntraVENous Q3H PRN 1 mg at 09/01/20 1348  phenol throat spray (CHLORASEPTIC) 1 Spray  1 Spray Oral PRN    
 albumin human 25% (BUMINATE) solution 25 g  25 g IntraVENous PRN 25 g at 09/04/20 1103  
 lactobac ac& pc-s.therm-b.anim (USMAN Q/RISAQUAD)  1 Cap Oral DAILY 1 Cap at 09/04/20 1013  loperamide (IMODIUM) capsule 2 mg  2 mg Oral Q4H PRN 2 mg at 09/03/20 2143  ondansetron (ZOFRAN) injection 4 mg  4 mg IntraVENous Q6H PRN 4 mg at 08/25/20 0150 Labs/Data: 
 
Lab Results Component Value Date/Time WBC 15.1 (H) 09/04/2020 07:00 AM  
 Hemoglobin (POC) 11.9 (L) 07/25/2017 10:12 AM  
 HGB 10.2 (L) 09/04/2020 07:00 AM  
 Hematocrit (POC) 33 (L) 08/24/2020 10:51 AM  
 HCT 32.9 (L) 09/04/2020 07:00 AM  
 PLATELET 487 76/05/3064 07:00 AM  
 MCV 83.9 09/04/2020 07:00 AM  
 
 
Lab Results Component Value Date/Time  Sodium 133 (L) 09/04/2020 09:04 AM  
 Potassium 3.7 09/04/2020 09:04 AM  
 Chloride 94 (L) 09/04/2020 09:04 AM  
 CO2 28 09/04/2020 09:04 AM  
 Anion gap 11 09/04/2020 09:04 AM  
 Glucose 257 (H) 09/04/2020 09:04 AM  
 BUN 62 (H) 09/04/2020 09:04 AM  
 Creatinine 7.45 (H) 09/04/2020 09:04 AM  
 BUN/Creatinine ratio 8 (L) 09/04/2020 09:04 AM  
 GFR est AA 10 (L) 09/04/2020 09:04 AM  
 GFR est non-AA 8 (L) 09/04/2020 09:04 AM  
 Calcium 8.3 (L) 09/04/2020 09:04 AM  
 
 
Wt Readings from Last 3 Encounters:  
09/04/20 138.7 kg (305 lb 12.5 oz) 08/13/20 153.2 kg (337 lb 11.9 oz) 08/07/20 153.8 kg (339 lb 1.1 oz) Intake/Output Summary (Last 24 hours) at 9/4/2020 1228 Last data filed at 9/3/2020 2012 Gross per 24 hour Intake 240 ml Output  Net 240 ml Patient seen and examined. Chart reviewed. Labs, data and other pertinent notes reviewed in last 24 hrs. PMH/SH/FH reviewed and unchanged compared to H&P Jose Luis Nelson MD

## 2020-09-04 NOTE — ANESTHESIA POSTPROCEDURE EVALUATION
Procedure(s): 
ABLATION A-FLUTTER Ep 3d Mapping Lt Atrial Pace & Record During Ep Study. MAC Anesthesia Post Evaluation Patient location during evaluation: PACU Note status: Adequate. Level of consciousness: responsive to verbal stimuli and sleepy but conscious Pain management: satisfactory to patient Airway patency: patent Anesthetic complications: no 
Cardiovascular status: acceptable Respiratory status: acceptable Hydration status: acceptable Comments: +Post-Anesthesia Evaluation and Assessment Patient: Tatyana Mccoy MRN: [id number]  SSN: [id number] YOB: 1985  Age: 38 y.o. Sex: male Cardiovascular Function/Vital Signs /65 (BP Patient Position: At rest)   Pulse (!) 108   Temp 36.8 °C (98.3 °F)   Resp 15   Ht 6' 2\" (1.88 m)   Wt 138.7 kg (305 lb 12.5 oz)   SpO2 98%   BMI 39.26 kg/m² Patient is status post Procedure(s): 
ABLATION A-FLUTTER Ep 3d Mapping Lt Atrial Pace & Record During Ep Study. Nausea/Vomiting: Controlled. Postoperative hydration reviewed and adequate. Pain: 
Pain Scale 1: Numeric (0 - 10) (09/04/20 1421) Pain Intensity 1: 0 (09/04/20 1421) Managed. Neurological Status:  
Neuro (WDL): Exceptions to WDL (08/24/20 1527) At baseline. Mental Status and Level of Consciousness: Arousable. Pulmonary Status:  
O2 Device: Nasal cannula (09/04/20 1425) Adequate oxygenation and airway patent. Complications related to anesthesia: None Post-anesthesia assessment completed. No concerns. Signed By: Galina Whittington MD  
 9/4/2020 Post anesthesia nausea and vomiting:  controlled INITIAL Post-op Vital signs:  
Vitals Value Taken Time /65 9/4/2020  2:25 PM  
Temp 36.8 °C (98.3 °F) 9/4/2020  2:20 PM  
Pulse 108 9/4/2020  2:25 PM  
Resp 15 9/4/2020  2:25 PM  
SpO2 98 % 9/4/2020  2:25 PM

## 2020-09-04 NOTE — PROGRESS NOTES
Pharmacy - EPO Dosing & Monitoring Dose and schedule: 10,000 units [facility] Labs: 
Recent Labs  
  09/04/20 
0700 09/03/20 
1816 09/02/20 
0757 HGB 10.2* 10.9* 10.3* Impression/Plan: - Discussed with Dr. Sudha Kraft and will discontinue epoetin per [facility] protocol after holding it twice with Hgb >10 Thanks, 
Migue Gomez, PharmD Student Discussed with Michelle Melchor 
 
 
[email or web address]. [email or web address]

## 2020-09-04 NOTE — PROGRESS NOTES
CM acknowledged patient on IVCU. TEJA Plan: 
  
D/C Rehab- [facility] -  
F/u appointments 2nd IM Letter to be given [facility] transportation to be set up  
Will need COVID test 72hrs prior to d/c . 
  
  
CM reviewed pt chart. Pt for AV node ablation today. This CM has called Bill Patel at [address] [place] [address] Drive [phone]. Left message. This CM has submitted updated notes via [employer]. This CM has submitted [employer] message to attending to inform of need for new Covid test. 
 
Attending has informed CM to proceed with order for Covid test. Nursing informed. Potential delay in discharge pending medical stability and covid resulting. [phone] CM spoke with Radha Tejeda [phone] at [address] [place] [address]. Bed will not be available until Tue/Wed 9/8 or 9/9/20. Mr. Sajan Tripp has requested updates in [employer] when patient is medically stable and ready for discharge. CM reviewed with attending. Covid test to be ordered for Sunday, 9/6/2020. Nursing informed. CM will continue to follow for discharge planning. Brooklynn Lux RN CM Ext [phone]

## 2020-09-05 NOTE — PROGRESS NOTES
Problem: Patient Education: Go to Patient Education Activity Goal: Patient/Family Education Outcome: Progressing Towards Goal 
  
Problem: Pressure Injury - Risk of 
Goal: *Prevention of pressure injury Description: Document Ranjeet Scale and appropriate interventions in the flowsheet. Outcome: Progressing Towards Goal 
Note: Pressure Injury Interventions: 
Sensory Interventions: Assess changes in LOC, Discuss PT/OT consult with provider, Float heels, Keep linens dry and wrinkle-free, Maintain/enhance activity level, Minimize linen layers Moisture Interventions: Absorbent underpads, Apply protective barrier, creams and emollients, Check for incontinence Q2 hours and as needed, Maintain skin hydration (lotion/cream), Moisture barrier Activity Interventions: Increase time out of bed, PT/OT evaluation Mobility Interventions: HOB 30 degrees or less, Turn and reposition approx. every two hours(pillow and wedges) Nutrition Interventions: Offer support with meals,snacks and hydration Friction and Shear Interventions: HOB 30 degrees or less Problem: Sepsis: Day 6 Goal: *Oxygen saturation within defined limits Outcome: Progressing Towards Goal 
Goal: *Vital signs within defined limits Outcome: Progressing Towards Goal 
Goal: *Tolerating diet Outcome: Progressing Towards Goal 
Goal: Activity/Safety Outcome: Progressing Towards Goal 
Goal: Discharge Planning Outcome: Progressing Towards Goal 
Goal: Medications Outcome: Progressing Towards Goal 
Goal: Respiratory Outcome: Progressing Towards Goal

## 2020-09-05 NOTE — PROGRESS NOTES
Hospitalist Progress Note NAME: Tatyana Mccoy :  1985 MRN:  [id number] Assessment / Plan: 
Atrial flutter ( new this admission) HTN  
-New this admission - s/p Ablation , doing well  
-cardiology help appreciated:  
AFL abl , will need 4 weeks of oac, started Eliquis - On Amlodipine - increased to 10 mg daily ; Lisinopril - increased to 20 mg daily Midodrine was stopped  
-Echo show EF 45-50 % Addendum: start Sepsis, POA  (WBC 19K, tachycardia)- now resolved Right foot cellulitis with abscess, POA - s/p BKA  this admission H/o s/p amputation ()of right 1st and second toe for osteomyelitis POA Hx osteomyelitis right 3rd and 4th toes 2020 and 10/2019 respectively Acute hypoxic respiratory failure (88% on RA) POA_ now resolved, on RA 
SARS-COV-2 negative Initial Blood Cx neg Initial Wound Cx= Pseudomonal, Enterococcal & Klebsiella sp. (poly microbial) growth noted S/p podiatry consult apreciated, MRI right foot showed fluid collection and abscess with no osteomyelitis noted s/p irrigation of wound  
appreciated renal consult to continue with dialysis MWF 
s/p  RIGHT LEG ARTERIOGRAM ANTERIOR TIBIAL ARTERY AND ANGIOPLASTY  by vascular Sx 
s/p  excision and debridement of necrotic tissue right foot   
s/p BKA   
s/p zosyn completed now , Vancomycin discontinued. ESRD HD MWF Hyponatremia  
- nephrology help appreciated  
- monitor closely. Avoid nephrotoxic drugs, adjust all meds to GFR. Hearing loss not POA - possibly anesthesia related? 
- improving slowly, now ~ 40% back , can hear normal voice conversation  
- removed offending agents which may affect pt's hearing S/p ENT consult this admission with Dr. Gandhi Confer: recommended tapering Prednisone and outpatient follow up.   
Tapering Prednisone 60mg X 5 days, 40mg X 5 days, 20mg X 5 days  And 10mg X 5 days ( dropped off the list, so will re start at 20 mg now since appears to be effective) IDDM type II 
-190->200 , expected fluctuation in HD pt  
required ins gtt while was on prednisone  
- cont lantus + SS , adjusting per diabetic management recommendations Continue 6 units of meal time insulin with consumed meals Diarrhea, resolved. Likely Antibiotic induced. C/w Imodium PRN and probiotic Diabetic neuropathy, cont neurontin Hypotension, resolved, required midodrine, now off Hyperkalemia, resolved Chest pain, resolved Obesity. Body mass index is 43.36 kg/m². 9/5: sister Kary Christina was updated at the bedside. Letter provided per pt request stating that he is no longer will be able to live alone to break his lease.  
  
 
 
Code: full DVT prophylaxis: heparin Surrogate decision maker:  Kala and Kary Christina (Cousins). Pt verbalized these 2 names when asked about decision maker. Baseline: he was living alone at the apartment Disposition:  [facility] rehab accepted pt, DC was delayed d/t new Afib RVR. Now bed will be available 9/8 or 9/9, need new covid test   
 
Subjective: Chief Complaint / Reason for Physician Visit: FU cellulitis, Afib S/p ablation today Reports minimal pain at the groin site Discussed with RN events overnight. Review of Systems: 
Symptom Y/N Comments  Symptom Y/N Comments Fever/Chills n   Chest Pain n   
Poor Appetite    Edema Cough    Abdominal Pain n   
Sputum    Joint Pain SOB/LITTLE n   Pruritis/Rash Nausea/vomit    Tolerating PT/OT Diarrhea    Tolerating Diet Constipation    Other Could NOT obtain due to:   
 
Objective: VITALS:  
Last 24hrs VS reviewed since prior progress note. Most recent are: 
Patient Vitals for the past 24 hrs: 
 Temp Pulse Resp BP SpO2  
09/05/20 1053 97.6 °F (36.4 °C) 83 14 (!) 147/92 98 % 09/05/20 0718 97.8 °F (36.6 °C) 90 20 138/85 97 % 09/05/20 0614  87 16 119/60   
09/05/20 0400  84  (!) 171/108   
09/05/20 0354 97.9 °F (36.6 °C) 79 13 (!) 171/108 96 % 09/04/20 2347 97.6 °F (36.4 °C) 88 18 (!) 159/91 94 % 09/04/20 2012 98.1 °F (36.7 °C) 85 18 117/75 93 % 09/04/20 1815  95 20 (!) 164/101   
09/04/20 1800  95 19 (!) 165/95   
09/04/20 1746  94 24 (!) 153/93   
09/04/20 1715  87 23 (!) 143/109   
09/04/20 1630  82 18 (!) 163/106   
09/04/20 1615  84  (!) 159/100   
09/04/20 1612  83 20 (!) 168/99   
09/04/20 1600  89 19 (!) 174/112   
09/04/20 1500  87 16 (!) 167/91   
09/04/20 1445  89 24 (!) 152/95  Intake/Output Summary (Last 24 hours) at 9/5/2020 1440 Last data filed at 9/5/2020 9151 Gross per 24 hour Intake 250 ml Output  Net 250 ml PHYSICAL EXAM: 
General: WD, WN. Alert, cooperative, no acute distress   
EENT:  EOMI. Anicteric sclerae. MMM Resp:  Clear CV:  irRegular  rhythm,  No edema GI:  Soft, Non distended, Non tender.  +Bowel sounds Neurologic:  Alert and oriented X 3, normal speech, Psych:   Good insight. Not anxious nor agitated Ext:   Rt BKA, dressing in place Reviewed most current lab test results and cultures  YES Reviewed most current radiology test results   YES Review and summation of old records today    NO Reviewed patient's current orders and MAR    YES 
PMH/SH reviewed - no change compared to H&P 
________________________________________________________________________ Care Plan discussed with: 
  Comments Patient x Family  x Sister bedside RN x Care Manager Consultant Multidiciplinary team rounds were held today with , nursing, pharmacist and clinical coordinator. Patient's plan of care was discussed; medications were reviewed and discharge planning was addressed. ________________________________________________________________________ Total NON critical care TIME:  35   Minutes Total CRITICAL CARE TIME Spent:   Minutes non procedure based Comments >50% of visit spent in counseling and coordination of care y ________________________________________________________________________ Marylu Landin MD  
 
Procedures: see electronic medical records for all procedures/Xrays and details which were not copied into this note but were reviewed prior to creation of Plan. LABS: 
I reviewed today's most current labs and imaging studies. Pertinent labs include: 
Recent Labs  
  09/04/20 
0700 09/03/20 
1816 WBC 15.1* 16.0*  
HGB 10.2* 10.9* HCT 32.9* 34.4*  
 446* Recent Labs  
  09/04/20 
0904 09/03/20 
1458 * 131*  
K 3.7 4.2 CL 94* 94* CO2 28 28 * 192* BUN 62* 59* CREA 7.45* 8.07* CA 8.3* 8.7 Signed: Marylu Landin MD

## 2020-09-05 NOTE — PROGRESS NOTES
Patient ws visited on General Surge unit in response to a page ( Rosaura Resendiz) and conversation with patient's nurse regarding a POA document. The patient's family member was present and clarified that what the patient needed was a letter from the patient's physician documenting patient's medical challenges. Patient's physician entered the room and resolved the matter. Advised patient of  availability Rev. Sophia San EdD  MDiv  [facility] Page [phone])

## 2020-09-06 NOTE — PROGRESS NOTES
0700: Bedside and Verbal shift change report given to day shift nurse Heidy (oncoming nurse) by Za Fong RN (offgoing nurse). Report included the following information: SBAR, Kardex, Intake/Output, MAR, Procedural information, and Recent Results. Scheduled tylenol not sent up from pharmacy, communicated with day shift RN.

## 2020-09-06 NOTE — PROGRESS NOTES
Problem: Patient Education: Go to Patient Education Activity Goal: Patient/Family Education Outcome: Progressing Towards Goal 
  
Problem: Pressure Injury - Risk of 
Goal: *Prevention of pressure injury Description: Document Ranjeet Scale and appropriate interventions in the flowsheet. Outcome: Progressing Towards Goal 
Note: Pressure Injury Interventions: 
Sensory Interventions: Assess changes in LOC Moisture Interventions: Absorbent underpads Activity Interventions: Assess need for specialty bed Mobility Interventions: Assess need for specialty bed Nutrition Interventions: Document food/fluid/supplement intake Friction and Shear Interventions: HOB 30 degrees or less Problem: Patient Education: Go to Patient Education Activity Goal: Patient/Family Education Outcome: Progressing Towards Goal

## 2020-09-06 NOTE — PROGRESS NOTES
Hospitalist Progress Note NAME: Karlee Arita :  1985 MRN:  [id number] Assessment / Plan: 
Atrial flutter ( new this admission) HTN  
-New this admission - s/p Ablation , doing well  
-cardiology help appreciated:  
AFL abl , will need 4 weeks of oac, started Eliquis - On Amlodipine -increased to 10 mg daily ; Lisinopril - increased to 20 mg daily Midodrine was stopped  
-Echo show EF 45-50 % Addendum: start Sepsis, POA  (WBC 19K, tachycardia)- now resolved Right foot cellulitis with abscess, POA - s/p BKA  this admission H/o s/p amputation ()of right 1st and second toe for osteomyelitis POA Hx osteomyelitis right 3rd and 4th toes 2020 and 10/2019 respectively Acute hypoxic respiratory failure (88% on RA) POA_ now resolved, on RA 
SARS-COV-2 negative Initial Blood Cx neg Initial Wound Cx= Pseudomonal, Enterococcal & Klebsiella sp. (poly microbial) growth noted S/p podiatry consult apreciated, MRI right foot showed fluid collection and abscess with no osteomyelitis noted s/p irrigation of wound  
appreciated renal consult to continue with dialysis MWF 
s/p  RIGHT LEG ARTERIOGRAM ANTERIOR TIBIAL ARTERY AND ANGIOPLASTY  by vascular Sx 
s/p  excision and debridement of necrotic tissue right foot   
s/p BKA   
s/p zosyn completed now , Vancomycin discontinued. ESRD HD MWF Hyponatremia  
- nephrology help appreciated  
- monitor closely. Avoid nephrotoxic drugs, adjust all meds to GFR. Hearing loss not POA - possibly anesthesia related? 
- improving slowly, now ~ 40% back , can hear normal voice conversation  
- removed offending agents which may affect pt's hearing S/p ENT consult this admission with Dr. Michael Fish: recommended tapering Prednisone and outpatient follow up.   
Tapering Prednisone 60mg X 5 days, 40mg X 5 days, 20mg X 5 days  And 10mg X 5 days ( dropped off the list, so will re start at 20 mg now since appears to be effective) IDDM type II 
-BS >200 d/t steroids  
required ins gtt while was on prednisone  
- cont lantus + SS , adjusting per diabetic management recommendations Continue 6 units of meal time insulin with consumed meals Extra dose NPH now d/t steroids Diarrhea, resolved. Likely Antibiotic induced. C/w Imodium PRN and probiotic Diabetic neuropathy, cont neurontin Hypotension, resolved, required midodrine, now off Hyperkalemia, resolved Poor vision site Chest pain, resolved Obesity. Body mass index is 43.36 kg/m². 9/5: sister Josseline Calhoun was updated at the bedside. Letter provided per pt request stating that he is no longer will be able to live alone to break his lease.  
  
 
 
Code: full DVT prophylaxis: heparin Surrogate decision maker:  Kala and Josseline Calhoun (Cousins). Pt verbalized these 2 names when asked about decision maker. Baseline: he was living alone at the apartment Disposition:  Medically ready for [address] IP rehab accepted pt, DC was delayed d/t new Afib RVR. Now bed will be available 9/8 or 9/9, need new covid test   
 
Subjective: Chief Complaint / Reason for Physician Visit: FU cellulitis, Afib No new complaints Discussed with RN events overnight. Review of Systems: 
Symptom Y/N Comments  Symptom Y/N Comments Fever/Chills n   Chest Pain n   
Poor Appetite    Edema Cough    Abdominal Pain n   
Sputum    Joint Pain SOB/LITTLE n   Pruritis/Rash Nausea/vomit    Tolerating PT/OT Diarrhea    Tolerating Diet Constipation    Other Could NOT obtain due to:   
 
Objective: VITALS:  
Last 24hrs VS reviewed since prior progress note. Most recent are: 
Patient Vitals for the past 24 hrs: 
 Temp Pulse Resp BP SpO2  
09/06/20 0700 98 °F (36.7 °C) 91 19 114/61 94 % 09/06/20 0300 97.5 °F (36.4 °C) 83 16 119/79 96 % 09/05/20 2300 97.7 °F (36.5 °C) 83 16 124/71 94 % 09/05/20 1915 97.7 °F (36.5 °C) 79 16 117/60 97 % 09/05/20 1512 97.5 °F (36.4 °C) 80 18 122/67 99 % No intake or output data in the 24 hours ending 09/06/20 1145 PHYSICAL EXAM: 
General: WD, WN. Alert, cooperative, no acute distress   
EENT:  EOMI. Anicteric sclerae. MMM Resp:  Clear CV:  irRegular  rhythm,  No edema GI:  Soft, Non distended, Non tender.  +Bowel sounds Neurologic:  Alert and oriented X 3, normal speech, Psych:   Good insight. Not anxious nor agitated Ext:   Rt BKA, dressing in place Reviewed most current lab test results and cultures  YES Reviewed most current radiology test results   YES Review and summation of old records today    NO Reviewed patient's current orders and MAR    YES 
PMH/ reviewed - no change compared to H&P 
________________________________________________________________________ Care Plan discussed with: 
  Comments Patient x Family RN x Care Manager Consultant Multidiciplinary team rounds were held today with , nursing, pharmacist and clinical coordinator. Patient's plan of care was discussed; medications were reviewed and discharge planning was addressed. ________________________________________________________________________ Total NON critical care TIME:  25   Minutes Total CRITICAL CARE TIME Spent:   Minutes non procedure based Comments >50% of visit spent in counseling and coordination of care y   
________________________________________________________________________ Alfonzo Patel MD  
 
Procedures: see electronic medical records for all procedures/Xrays and details which were not copied into this note but were reviewed prior to creation of Plan. LABS: 
I reviewed today's most current labs and imaging studies. Pertinent labs include: 
Recent Labs  
  09/04/20 
0700 09/03/20 
1816 WBC 15.1* 16.0*  
HGB 10.2* 10.9* HCT 32.9* 34.4*  
 446* Recent Labs  
  09/04/20 
0904 09/03/20 
1458 * 131* K 3.7 4.2 CL 94* 94* CO2 28 28 * 192* BUN 62* 59* CREA 7.45* 8.07* CA 8.3* 8.7 Signed: Lord Jaswinder MD

## 2020-09-06 NOTE — PROGRESS NOTES
Problem: Pressure Injury - Risk of 
Goal: *Prevention of pressure injury Description: Document Ranjeet Scale and appropriate interventions in the flowsheet. Outcome: Progressing Towards Goal 
Note: Pressure Injury Interventions: 
Sensory Interventions: Assess changes in LOC, Check visual cues for pain, Monitor skin under medical devices Moisture Interventions: Absorbent underpads, Apply protective barrier, creams and emollients Activity Interventions: Assess need for specialty bed, Increase time out of bed Mobility Interventions: Assess need for specialty bed, HOB 30 degrees or less Nutrition Interventions: Document food/fluid/supplement intake Friction and Shear Interventions: HOB 30 degrees or less

## 2020-09-07 NOTE — PROGRESS NOTES
telemed: B, exceeding sliding scale in MAR. How many unit of insulin pt. would need for tonight? Thank you. Plan: Chart reviewed, 5 units lispro given

## 2020-09-07 NOTE — PROGRESS NOTES
Bedside shift change report given to -----------, RN (oncoming nurse) by Everlina Ganser, RN (offgoing nurse). Report included the following information SBAR, Kardex, Procedure Summary, Intake/Output, MAR, Accordion, Recent Results, Med Rec Status, Cardiac Rhythm NSR, PVC, Alarm Parameters , Pre Procedure Checklist, Procedure Verification, Quality Measures and Dual Neuro Assessment.

## 2020-09-07 NOTE — PROGRESS NOTES
Hospitalist Progress Note NAME: Niurka Mathews :  1985 MRN:  [id number] Assessment / Plan: 
Atrial flutter ( new this admission) HTN  
-New this admission - s/p Ablation , doing well  
-cardiology help appreciated:  
AFL abl , will need 4 weeks of oac, started Eliquis - On Amlodipine -increased to 10 mg daily ; Lisinopril - increased to 20 mg daily Midodrine was stopped  
-Echo show EF 45-50 % Sepsis, POA  (WBC 19K, tachycardia)- now resolved Right foot cellulitis with abscess, POA - s/p BKA  this admission H/o s/p amputation ()of right 1st and second toe for osteomyelitis POA Hx osteomyelitis right 3rd and 4th toes 2020 and 10/2019 respectively Acute hypoxic respiratory failure (88% on RA) POA_ now resolved, on RA 
SARS-COV-2 negative Initial Blood Cx neg Initial Wound Cx= Pseudomonal, Enterococcal & Klebsiella sp. (poly microbial) growth noted S/p podiatry consult apreciated, MRI right foot showed fluid collection and abscess with no osteomyelitis noted s/p irrigation of wound  
appreciated renal consult to continue with dialysis MWF 
s/p  RIGHT LEG ARTERIOGRAM ANTERIOR TIBIAL ARTERY AND ANGIOPLASTY  by vascular Sx 
s/p  excision and debridement of necrotic tissue right foot   
s/p BKA   
s/p zosyn completed now , Vancomycin discontinued. ESRD HD MWF Hyponatremia  
- nephrology help appreciated  
- monitor closely. Avoid nephrotoxic drugs, adjust all meds to GFR. Hearing loss not POA - possibly anesthesia related? 
- improving slowly, now ~ 40% back , can hear normal voice conversation  
- removed offending agents which may affect pt's hearing S/p ENT consult this admission with Dr. Gilberto Flores: recommended tapering Prednisone and outpatient follow up. Tapering Prednisone IDDM type II 
- this am, better   
required ins gtt while was on high dose prednisone - cont lantus + SS , adjusting per diabetic management recommendations Continue 6 units of meal time insulin with consumed meals Extra dose NPH now d/t steroids Diarrhea, resolved. Likely Antibiotic induced. C/w Imodium PRN and probiotic Diabetic neuropathy, cont neurontin Hypotension, resolved, required midodrine, now off Hyperkalemia, resolved Poor vision site Chest pain, resolved Obesity. Body mass index is 43.36 kg/m². 9/5: sister Ronni Neff was updated at the bedside. Letter provided per pt request stating that he is no longer will be able to live alone to break his lease.  
  
 
 
Code: full DVT prophylaxis: heparin Surrogate decision maker:  Kala and Ronni Neff (Cousins). Pt verbalized these 2 names when asked about decision maker. Baseline: he was living alone at the apartment Disposition:  Medically ready for [address] IP rehab accepted pt, DC was delayed d/t new Afib RVR. Now bed will be available 9/8 or 9/9, need new covid test ( pending) Subjective: Chief Complaint / Reason for Physician Visit: FU cellulitis, Afib No new complaints  
covid testing ordered 9/6, done earlier this am  
 
Discussed with RN events overnight. Review of Systems: 
Symptom Y/N Comments  Symptom Y/N Comments Fever/Chills n   Chest Pain n   
Poor Appetite    Edema Cough    Abdominal Pain n   
Sputum    Joint Pain SOB/LITTLE n   Pruritis/Rash Nausea/vomit    Tolerating PT/OT Diarrhea    Tolerating Diet Constipation    Other Could NOT obtain due to:   
 
Objective: VITALS:  
Last 24hrs VS reviewed since prior progress note. Most recent are: 
Patient Vitals for the past 24 hrs: 
 Temp Pulse Resp BP SpO2  
09/07/20 1139 97.8 °F (36.6 °C) 81 14 132/82 96 % 09/07/20 0749 97.7 °F (36.5 °C) 78 11 (!) 170/92 96 % 09/07/20 0232 97.5 °F (36.4 °C) 88 14 128/71 96 % 09/06/20 2325 97.7 °F (36.5 °C) 89 20 130/82 95 % 09/06/20 2123 97.6 °F (36.4 °C) 93 20 (!) 171/94 97 % 09/06/20 1507 98.6 °F (37 °C) 84 19  97 % Intake/Output Summary (Last 24 hours) at 9/7/2020 1316 Last data filed at 9/7/2020 5236 Gross per 24 hour Intake 500 ml Output 0 ml Net 500 ml PHYSICAL EXAM: 
General: WD, WN. Alert, cooperative, no acute distress   
EENT:  EOMI. Anicteric sclerae. MMM Resp:  Clear CV:  irRegular  rhythm,  No edema GI:  Soft, Non distended, Non tender.  +Bowel sounds Neurologic:  Alert and oriented X 3, normal speech, Psych:   Good insight. Not anxious nor agitated Ext:   Rt BKA, dressing in place Reviewed most current lab test results and cultures  YES Reviewed most current radiology test results   YES Review and summation of old records today    NO Reviewed patient's current orders and MAR    YES 
PMH/SH reviewed - no change compared to H&P 
________________________________________________________________________ Care Plan discussed with: 
  Comments Patient x Family RN x Care Manager Consultant Multidiciplinary team rounds were held today with , nursing, pharmacist and clinical coordinator. Patient's plan of care was discussed; medications were reviewed and discharge planning was addressed. ________________________________________________________________________ Total NON critical care TIME:  25   Minutes Total CRITICAL CARE TIME Spent:   Minutes non procedure based Comments >50% of visit spent in counseling and coordination of care y   
________________________________________________________________________ Anali Nichols MD  
 
Procedures: see electronic medical records for all procedures/Xrays and details which were not copied into this note but were reviewed prior to creation of Plan. LABS: 
I reviewed today's most current labs and imaging studies. Pertinent labs include: 
Recent Labs  
  09/07/20 
0236 WBC 11.7* HGB 10.3* HCT 32.5*  
 No results for input(s): NA, K, CL, CO2, GLU, BUN, CREA, CA, MG, PHOS, ALB, TBIL, TBILI, ALT, INR, INREXT, INREXT in the last 72 hours. No lab exists for component: SGOT Signed: Anali Nichols MD

## 2020-09-07 NOTE — PROGRESS NOTES
PT Note: 
 
Pt is currently receiving HD and unavailable for skilled PT tx session. F/u hermelinda Coyle, PT, DPT

## 2020-09-07 NOTE — PROGRESS NOTES
Bedside report given to Mike Charles, RN. SBAR, KARDEX and MAR were discussed. Vicky Park RN assumed care of the patient.

## 2020-09-07 NOTE — PROCEDURES
[facility] Dialysis Team [facility]  [phone] Vitals   Pre   Post   Assessment   Pre   Post    
Temp  Temp: 97.5 °F (36.4 °C) (09/07/20 1323)  98.0 LOC  A&Ox4 A&Ox4 HR   Pulse (Heart Rate): 83 (09/07/20 1323) 84 Lungs   diminished diminished B/P   BP: 131/81 (09/07/20 1323) 116/60 Cardiac   RRR RRR Resp   Resp Rate: 18 (09/07/20 1323) 18 Skin   RBKA RBKA Pain level  Pain Intensity 1: 0 (09/07/20 1139) 0 Edema  Generalized/trace Generalized/trace Orders: Duration:   Start:    1323 End:    1740 Total:   4.25 hours Dialyzer:   Dialyzer/Set Up Inspection: [place]illa Bottom (09/07/20 1323) K Bath:   Dialysate K (mEq/L): 2 (09/07/20 1323) Ca Bath:   Dialysate CA (mEq/L): 2.5 (09/07/20 1323) Na/Bicarb:   Dialysate NA (mEq/L): 138 (09/07/20 1323) Target Fluid Removal:   Goal/Amount of Fluid to Remove (mL): 3000 mL (09/07/20 1323) Access Type & Location:   PELON AVF: skin CDI. No s/s of infection. No issues with cannulation or hemostasis. Running well at . Pt arrived to HD suite A&Ox4. Consent signed & on file. SBAR received from Primary RN. Pt cannulated with 57Q needles per policy & without issue. VSS. Dialysis Tx initiated. Labs Obtained/Reviewed Critical Results Called   Date when labs were drawn- 
Hgb-   
HGB Date Value Ref Range Status 09/07/2020 10.3 (L) 12.1 - 17.0 g/dL Final  
 
K-   
Potassium Date Value Ref Range Status 09/04/2020 3.7 3.5 - 5.1 mmol/L Final  
 
Ca-  
Calcium Date Value Ref Range Status 09/04/2020 8.3 (L) 8.5 - 10.1 MG/DL Final  
 
Bun-  
BUN Date Value Ref Range Status 09/04/2020 62 (H) 6 - 20 MG/DL Final  
 
Creat-  
Creatinine Date Value Ref Range Status 09/04/2020 7.45 (H) 0.70 - 1.30 MG/DL Final  
 
  
Medications/ Blood Products Given Name   Dose   Route and Time Tylenol  [email or web address] Blood Volume Processed (BVP):   104.3 Net Fluid Removed:  3000mL Comments All dialysis related medications have been reviewed. Assessment performed by RN. Procedure and documentation observed and reviewed by Ester Garcia RN Time Out Done: 1319 Primary Nurse Rpt Pre:  Kylie Shane RN 
Primary Nurse Rpt Post:  Kylie Shane RN 
Pt Education:  Site rotation Care Plan:  On going Tx Summary: 
5360:  PELON AVF: skin CDI. No s/s of infection. No issues with cannulation or hemostasis. Running well at . Pt arrived to HD suite A&Ox4. Consent signed & on file. SBAR received from Primary RN. Pt cannulated with 90D needles per policy & without issue. VSS. Dialysis Tx initiated. 1345:  Pt resting 1400:  Pt resting 1415:  Pt resting 1430:  Pt resting 1445:  Pt resting 
1500:  Pt resting 1515:  Pt resting 1530:  Pt resting 1545:  Pt resting 
1600:  Pt resting 1615:  Pt resting 1630:  Pt resting 
1700:  Pt resting 1715:  Pt resting 1730:  Pt resting 1740: Tx ended. VSS. All possible blood returned to patient. Hemostasis achieved without issue. Bed locked and in the lowest position, call bell and belongings in reach. SBAR given to Primary, RN. Patient is stable at time of their/ my departure. Admiting Diagnosis: 
Pt's previous clinic- [facility] 
Consent signed - Informed Consent Verified: Yes (09/07/20 1323) Sylvain Consent - signed and on file Hepatitis Status- neg/susc 8/13/20 Machine #- Machine Number: [id number] (09/07/20 1323) Telemetry status- 
Pre-dialysis wt. - Pre-Dialysis Weight: 142.3 kg (313 lb 11.4 oz) (08/31/20 3506)

## 2020-09-07 NOTE — PROGRESS NOTES
Name: Jean Carlos Singh MRN: [id number] : 1985 Assessment   :                                               Plan: ESKD Anemia of ESRD + post op blood loss DM-2, uncontrolled Mild Hyperkalemia- resolved HTN-prior hypotension resolved Hyponatremia-mild Foot infection- s/p  R BKA on - Hearing loss (new) Legally blind [facility] MWF HD; HD today. 3 liters off on  Off Midodrine 
  
 
hgb > 10; stop RETACRIT for now ABx per ID Ct FR of 1.5 L/day HD today Subjective: 
BS is up Exam: 
Visit Vitals BP (!) 170/92 (BP 1 Location: Right arm, BP Patient Position: At rest) Pulse 78 Temp 97.7 °F (36.5 °C) Resp 11 Ht 6' 2\" (1.88 m) Wt 143.1 kg (315 lb 7.7 oz) SpO2 96% BMI 40.50 kg/m² NAD 
  
L AV access in place Labs/Data: 
 
Lab Results Component Value Date/Time WBC 11.7 (H) 2020 02:36 AM  
 Hemoglobin (POC) 11.9 (L) 2017 10:12 AM  
 HGB 10.3 (L) 2020 02:36 AM  
 Hematocrit (POC) 33 (L) 2020 10:51 AM  
 HCT 32.5 (L) 2020 02:36 AM  
 PLATELET 501  02:36 AM  
 MCV 83.1 2020 02:36 AM  
 
 
Lab Results Component Value Date/Time Sodium 133 (L) 2020 09:04 AM  
 Potassium 3.7 2020 09:04 AM  
 Chloride 94 (L) 2020 09:04 AM  
 CO2 28 2020 09:04 AM  
 Anion gap 11 2020 09:04 AM  
 Glucose 257 (H) 2020 09:04 AM  
 BUN 62 (H) 2020 09:04 AM  
 Creatinine 7.45 (H) 2020 09:04 AM  
 BUN/Creatinine ratio 8 (L) 2020 09:04 AM  
 GFR est AA 10 (L) 2020 09:04 AM  
 GFR est non-AA 8 (L) 2020 09:04 AM  
 Calcium 8.3 (L) 2020 09:04 AM  
 
 
Wt Readings from Last 3 Encounters:  
20 143.1 kg (315 lb 7.7 oz) 20 153.2 kg (337 lb 11.9 oz) 20 153.8 kg (339 lb 1.1 oz) Intake/Output Summary (Last 24 hours) at 9/7/2020 6793 Last data filed at 9/7/2020 1759 Gross per 24 hour Intake 500 ml Output 0 ml Net 500 ml Patient seen and examined. Chart reviewed. Labs, data and other pertinent notes reviewed in last 24 hrs. PMH/SH/FH reviewed and unchanged compared to H&P Bigg Dotson MD

## 2020-09-07 NOTE — PROGRESS NOTES
Bedside shift change report given to Donaldo Aldridge RN (oncoming nurse) by Pawan Crawford RN (offgoing nurse). Report included the following information SBAR, Kardex, Procedure Summary, Intake/Output, MAR, Accordion, Recent Results, Med Rec Status, Cardiac Rhythm NSR, PVC, Alarm Parameters , Pre Procedure Checklist, Procedure Verification, Quality Measures and Dual Neuro Assessment.

## 2020-09-07 NOTE — PROGRESS NOTES
TRANSFER - IN REPORT: 
 
Verbal report received from DIONICIO Holbrook on Toña Braxton  being received from [facility] for ordered procedure Report consisted of patients Situation, Background, Assessment and  
Recommendations(SBAR). Information from the following report(s) SBAR, Kardex and Cardiac Rhythm NSR, PVCs was reviewed with the receiving nurse. Opportunity for questions and clarification was provided. Assessment completed upon patients arrival to unit and care assumed.

## 2020-09-07 NOTE — PROGRESS NOTES
HD TRANSFER - OUT REPORT: 
 
Verbal report given to Dejuanφόροfito Ποσειδώνοfito Reyes RN on Ronak Kidney being transferred to [facility] for routine progression of care Report consisted of patient's Situation, Background, Assessment and  
Recommendations(SBAR). Information from the following report(s) SBAR, Procedure Summary, Intake/Output, MAR and Recent Results was reviewed with the receiving nurse. Method:  $$ Method: Hemodialysis (09/07/20 1323) Fluid Removed  NET Fluid Removed (mL): 3000 ml (09/07/20 1740) Patient response to treatment:  Stable End Time  Hemodialysis End Time: 8970 (09/07/20 1740) If not documented, dialysis nurse to update post-dialysis row in HD/Filtration flowsheet Medications /Volume expansion agents or Fluid boluses administered during treatment? no 
 
Post-dialysis medication administration due?  yes Remind nurse to administer post-HD medication upon return to unit. Fistula hemostasis? yes Line heparinization? no 
 
Lines: PELON AVF Opportunity for questions and clarification was provided. Patient transported with: Registered Nurse Tech

## 2020-09-07 NOTE — PROGRESS NOTES
Problem: Patient Education: Go to Patient Education Activity Goal: Patient/Family Education Outcome: Progressing Towards Goal 
  
Problem: Pressure Injury - Risk of 
Goal: *Prevention of pressure injury Description: Document Ranjeet Scale and appropriate interventions in the flowsheet. Outcome: Progressing Towards Goal 
Note: Pressure Injury Interventions: 
Sensory Interventions: Assess changes in LOC, Assess need for specialty bed, Avoid rigorous massage over bony prominences, Discuss PT/OT consult with provider, Float heels, Keep linens dry and wrinkle-free, Maintain/enhance activity level, Minimize linen layers, Monitor skin under medical devices, Pad between skin to skin, Turn and reposition approx. every two hours (pillows and wedges if needed) Moisture Interventions: Absorbent underpads Activity Interventions: Assess need for specialty bed, Increase time out of bed, Pressure redistribution bed/mattress(bed type), PT/OT evaluation Mobility Interventions: Assess need for specialty bed, Float heels, Pressure redistribution bed/mattress (bed type), PT/OT evaluation, Turn and reposition approx. every two hours(pillow and wedges) Nutrition Interventions: Document food/fluid/supplement intake Friction and Shear Interventions: Apply protective barrier, creams and emollients, HOB 30 degrees or less, Lift sheet, Minimize layers, Transferring/repositioning devices Problem: Patient Education: Go to Patient Education Activity Goal: Patient/Family Education Outcome: Progressing Towards Goal 
  
Problem: Risk for Spread of Infection Goal: Prevent transmission of infectious organism to others Description: Prevent the transmission of infectious organisms to other patients, staff members, and visitors. Outcome: Progressing Towards Goal 
  
Problem: Patient Education:  Go to Education Activity Goal: Patient/Family Education Outcome: Progressing Towards Goal 
  
 Problem: Patient Education: Go to Patient Education Activity Goal: Patient/Family Education Outcome: Progressing Towards Goal 
  
Problem: Sepsis: Day of Diagnosis Goal: Off Pathway (Use only if patient is Off Pathway) Outcome: Progressing Towards Goal 
Goal: *Fluid resuscitation Outcome: Progressing Towards Goal 
Goal: *Paired blood cultures prior to first dose of antibiotic Outcome: Progressing Towards Goal 
Goal: *First dose of  appropriate antibiotic within 3 hours of arrival to ED, within 1 hour of arrival to ICU Outcome: Progressing Towards Goal 
Goal: *Lactic acid with first set of blood cultures Outcome: Progressing Towards Goal 
Goal: *Pneumococcal immunization (if eligible) Outcome: Progressing Towards Goal 
Goal: *Influenza immunization (if eligible) Outcome: Progressing Towards Goal 
Goal: Activity/Safety Outcome: Progressing Towards Goal 
Goal: Consults, if ordered Outcome: Progressing Towards Goal 
Goal: Diagnostic Test/Procedures Outcome: Progressing Towards Goal 
Goal: Nutrition/Diet Outcome: Progressing Towards Goal 
Goal: Discharge Planning Outcome: Progressing Towards Goal 
Goal: Medications Outcome: Progressing Towards Goal 
Goal: Respiratory Outcome: Progressing Towards Goal 
Goal: Treatments/Interventions/Procedures Outcome: Progressing Towards Goal 
Goal: Psychosocial 
Outcome: Progressing Towards Goal 
  
Problem: Sepsis: Day 2 Goal: Off Pathway (Use only if patient is Off Pathway) Outcome: Progressing Towards Goal 
Goal: *Central Venous Pressure maintained at 8-12 mm Hg Outcome: Progressing Towards Goal 
Goal: *Hemodynamically stable Outcome: Progressing Towards Goal 
Goal: *Tolerating diet Outcome: Progressing Towards Goal 
Goal: Activity/Safety Outcome: Progressing Towards Goal 
Goal: Consults, if ordered Outcome: Progressing Towards Goal 
Goal: Diagnostic Test/Procedures Outcome: Progressing Towards Goal 
Goal: Nutrition/Diet Outcome: Progressing Towards Goal 
 Goal: Discharge Planning Outcome: Progressing Towards Goal 
Goal: Medications Outcome: Progressing Towards Goal 
Goal: Respiratory Outcome: Progressing Towards Goal 
Goal: Treatments/Interventions/Procedures Outcome: Progressing Towards Goal 
Goal: Psychosocial 
Outcome: Progressing Towards Goal 
  
Problem: Sepsis: Day 3 Goal: Off Pathway (Use only if patient is Off Pathway) Outcome: Progressing Towards Goal 
Goal: *Central Venous Pressure maintained at 8-12 mm Hg Outcome: Progressing Towards Goal 
Goal: *Oxygen saturation within defined limits Outcome: Progressing Towards Goal 
Goal: *Vital sign stability Outcome: Progressing Towards Goal 
Goal: *Tolerating diet Outcome: Progressing Towards Goal 
Goal: *Demonstrates progressive activity Outcome: Progressing Towards Goal 
Goal: Activity/Safety Outcome: Progressing Towards Goal 
Goal: Consults, if ordered Outcome: Progressing Towards Goal 
Goal: Diagnostic Test/Procedures Outcome: Progressing Towards Goal 
Goal: Nutrition/Diet Outcome: Progressing Towards Goal 
Goal: Discharge Planning Outcome: Progressing Towards Goal 
Goal: Medications Outcome: Progressing Towards Goal 
Goal: Respiratory Outcome: Progressing Towards Goal 
Goal: Treatments/Interventions/Procedures Outcome: Progressing Towards Goal 
Goal: Psychosocial 
Outcome: Progressing Towards Goal 
  
Problem: Sepsis: Day 4 Goal: Off Pathway (Use only if patient is Off Pathway) Outcome: Progressing Towards Goal 
Goal: Activity/Safety Outcome: Progressing Towards Goal 
Goal: Consults, if ordered Outcome: Progressing Towards Goal 
Goal: Diagnostic Test/Procedures Outcome: Progressing Towards Goal 
Goal: Nutrition/Diet Outcome: Progressing Towards Goal 
Goal: Discharge Planning Outcome: Progressing Towards Goal 
Goal: Medications Outcome: Progressing Towards Goal 
Goal: Respiratory Outcome: Progressing Towards Goal 
Goal: Treatments/Interventions/Procedures Outcome: Progressing Towards Goal 
Goal: Psychosocial 
Outcome: Progressing Towards Goal 
Goal: *Oxygen saturation within defined limits Outcome: Progressing Towards Goal 
Goal: *Hemodynamically stable Outcome: Progressing Towards Goal 
Goal: *Vital signs within defined limits Outcome: Progressing Towards Goal 
Goal: *Tolerating diet Outcome: Progressing Towards Goal 
Goal: *Demonstrates progressive activity Outcome: Progressing Towards Goal 
Goal: *Fluid volume maintenance Outcome: Progressing Towards Goal 
  
Problem: Sepsis: Day 5 Goal: Off Pathway (Use only if patient is Off Pathway) Outcome: Progressing Towards Goal 
Goal: *Oxygen saturation within defined limits Outcome: Progressing Towards Goal 
Goal: *Vital signs within defined limits Outcome: Progressing Towards Goal 
Goal: *Tolerating diet Outcome: Progressing Towards Goal 
Goal: *Demonstrates progressive activity Outcome: Progressing Towards Goal 
Goal: *Discharge plan identified Outcome: Progressing Towards Goal 
Goal: Activity/Safety Outcome: Progressing Towards Goal 
Goal: Consults, if ordered Outcome: Progressing Towards Goal 
Goal: Diagnostic Test/Procedures Outcome: Progressing Towards Goal 
Goal: Nutrition/Diet Outcome: Progressing Towards Goal 
Goal: Discharge Planning Outcome: Progressing Towards Goal 
Goal: Medications Outcome: Progressing Towards Goal 
Goal: Respiratory Outcome: Progressing Towards Goal 
Goal: Treatments/Interventions/Procedures Outcome: Progressing Towards Goal 
Goal: Psychosocial 
Outcome: Progressing Towards Goal 
  
Problem: Sepsis: Day 6 Goal: Off Pathway (Use only if patient is Off Pathway) Outcome: Progressing Towards Goal 
Goal: *Oxygen saturation within defined limits Outcome: Progressing Towards Goal 
Goal: *Vital signs within defined limits Outcome: Progressing Towards Goal 
Goal: *Tolerating diet Outcome: Progressing Towards Goal 
Goal: *Demonstrates progressive activity Outcome: Progressing Towards Goal 
Goal: Influenza immunization Outcome: Progressing Towards Goal 
Goal: *Pneumococcal immunization Outcome: Progressing Towards Goal 
Goal: Activity/Safety Outcome: Progressing Towards Goal 
Goal: Diagnostic Test/Procedures Outcome: Progressing Towards Goal 
Goal: Nutrition/Diet Outcome: Progressing Towards Goal 
Goal: Discharge Planning Outcome: Progressing Towards Goal 
Goal: Medications Outcome: Progressing Towards Goal 
Goal: Respiratory Outcome: Progressing Towards Goal 
Goal: Treatments/Interventions/Procedures Outcome: Progressing Towards Goal 
Goal: Psychosocial 
Outcome: Progressing Towards Goal 
  
Problem: Sepsis: Discharge Outcomes Goal: *Vital signs within defined limits Outcome: Progressing Towards Goal 
Goal: *Tolerating diet Outcome: Progressing Towards Goal 
Goal: *Verbalizes understanding and describes prescribed diet Outcome: Progressing Towards Goal 
Goal: *Demonstrates progressive activity Outcome: Progressing Towards Goal 
Goal: *Describes follow-up/return visits to physicians Outcome: Progressing Towards Goal 
Goal: *Verbalizes name, dosage, time, side effects, and number of days to continue medications Outcome: Progressing Towards Goal 
Goal: *Influenza immunization (Oct-Mar only) Outcome: Progressing Towards Goal 
Goal: *Pneumococcal immunization Outcome: Progressing Towards Goal 
Goal: *Lungs clear or at baseline Outcome: Progressing Towards Goal 
Goal: *Oxygen saturation returns to baseline or 90% or better on room air Outcome: Progressing Towards Goal 
Goal: *Glycemic control Outcome: Progressing Towards Goal 
Goal: *Absence of deep venous thrombosis signs and symptoms(Stroke Metric) Outcome: Progressing Towards Goal 
Goal: *Describes available resources and support systems Outcome: Progressing Towards Goal 
Goal: *Optimal pain control at patient's stated goal 
Outcome: Progressing Towards Goal

## 2020-09-08 NOTE — PROGRESS NOTES
Overnight patient has no complaints, patient eating and drinking well. Patient this morning wants to defer Tylenol, patient wants to sleep

## 2020-09-08 NOTE — DISCHARGE SUMMARY
Hospitalist Discharge Summary Patient ID: 
Jason Lange [id number] 
59 y.o. 
1985 8/12/2020 PCP on record: Nidia Ordaz DO Admit date: 8/12/2020 Discharge date and time: 9/8/2020 DISCHARGE DIAGNOSIS: 
Atrial flutter (new this admission) HTN Hypotension (now resolved) S/P atrial flutter ablation on 09/04/2020 Will need 4 weeks anticoagulation (apixaban 5 mp po q12h) Continue current antihypertensive therapy (amlodipine 10 mg p.o. daily/lisinopril 20 mg p.o. daily)  
 
Sepsis, POA (now resolved)   Right foot cellulitis with abscess, POA 
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 
  
Hearing loss Possibly anesthesia related Significantly improved, able to have normal conversation and talk on telephone S/P ENT consult this admission with Dr. Jorge Doyle:  Recommended tapering Prednisone (currently on 10 mg po daily with breakfast, will need to continue taper at discharge) and outpatient follow up  
  
DM II Continue basal (glargine) insulin 40 units sc daily Continue lispro insulin 6 units sc tid with meals Continue FSBS with SSI correction scale Diabetic neuropathy Continue gabapentin 100 mg po at hs 
  
Diarrhea (no resolved) Hyperkalemia (now relolved) Chest pain ( now resolved) Obesity. Body mass index is 43.36 kg/m². CONSULTATIONS: 
IP CONSULT TO PODIATRY IP CONSULT TO NEPHROLOGY 
IP CONSULT TO INFECTIOUS DISEASES 
IP CONSULT TO OTOLARYNGOLOGY 
IP CONSULT TO ELECTROPHYSIOLOGY 
IP CONSULT TO VASCULAR SURGERY 
IP CONSULT TO CARDIOLOGY Excerpted HPI from H&P of Suzie Helms MD: Jazz Mccray is a 38 y.o. male past medical history significant for end-stage renal disease on hemodialysis Monday Wednesday Friday, diabetes, history of right foot osteomyelitis status post amputation of right third and fourth toes and more recently amputation of right first and second toe on August 7 by Dr. Nakita Omalley who was sent from her office today with concerns for right foot cellulitis when he saw her in postop follow-up. He reports subjective fevers and chills since the weekend associated with generalized weakness, shortness of breath, loss of appetite, nausea and vomiting, diarrhea. Denies any abdominal pain, chest pain, cough. \" 
 
______________________________________________________________________ DISCHARGE SUMMARY/HOSPITAL COURSE:  for full details see H&P, daily progress notes, labs, consult notes. Patient was admitted with sepsis 2/2 osteomyelitis right first and second toe. Cultures were obtained as noted above and appropriate IV antibiotics were initiated. On 08/24/2020 he underwent right BKA. Postoperatively the patient was found to have hearing loss which was thought to possibly to be related to anesthesia. An ENT consult was obtained and they made recommendations to wean patient's steroids (which is ongoing) and have him follow-up as an outpatient. At the time of discharge the patient's hearing had significantly improved. On 09/01/2020 the patient was scheduled for discharge however he subsequently developed atrial flutter and his discharge was postponed. A cardiology consult was obtained and on 09/04/2020 he underwent atrial flutter ablation. He will need to be [facility] [place] [facility] 4 weeks following this procedure and at that time he was started on apixaban 5 mg p.o. every 12 hours. On the morning of 09/08/2020 word was received that the patient had a bed. Prior discharge summary, medications, and current plan of care were reviewed.   It was felt that the patient was in stable condition for discharge. At the time of discharge his vital signs were as follows:  T 97.6, /84, HR 88, RR 18, SpO2 97% on room air. At the time of discharge patient's prednisone dose was 10 mg p.o. daily with breakfast.  This dose will be completed on 09/10/2020 and will need to be further weaned to off at [facility]. Echo 08/22/20: 
· LV: Estimated LVEF is 45 - 50%. Normal cavity size. Moderate concentric hypertrophy. · LA: Mildly dilated left atrium. · Image quality for this study was technically difficult. · Echo study was technically difficulty. · MV: Moderate mitral annular calcification. · Pericardium: Trivial pericardial effusion. _______________________________________________________________________ Patient seen and examined by me on discharge day. Pertinent Findings: 
General:  A/A/O X 3. NAD. HEENT:  Normocephalic. Sclera anicteric. Mucous membranes moist.   
Chest:  Resps even/unlabored with symmetrical CWE. Air entry full. Lungs CTA. No use of accessory muscles. CV:  RRR. No peripheral edema. GI:  Abdomen soft/NT/ND. ABT X 4.   
:  HD 
Neurologic:  Speech normal.    
Psych:  Cooperative. No anxiety or agitation. Skin:  No rashes or jaundice. Right BKA dressing C/D/I   
 
_______________________________________________________________________ DISCHARGE MEDICATIONS:  
Current Discharge Medication List  
  
START taking these medications Details  
apixaban (Eliquis) 2.5 mg tablet Take 1 Tab by mouth two (2) times a day for 30 days. Qty: 60 Tab, Refills: 0  
  
gabapentin (NEURONTIN) 100 mg capsule Take 1 Cap by mouth nightly. Max Daily Amount: 100 mg. Qty: 30 Cap, Refills: 0 Associated Diagnoses: Acute osteomyelitis of toe of right foot ([address] oxyCODONE IR (ROXICODONE) 5 mg immediate release tablet Take 1 Tab by mouth every four (4) hours as needed for Pain for up to 14 days. Max Daily Amount: 30 mg. 
Qty: 30 Tab, Refills: 0 Associated Diagnoses: Acute osteomyelitis of toe of right foot ([address] predniSONE (DELTASONE) 20 mg tablet 40mg X 5 days, 20mg X 5 days  And 10mg X 5 days Qty: 30 Tab, Refills: 0  
  
insulin NPH (NOVOLIN N, HUMULIN N) 100 unit/mL injection 40 units SQ daily with PO prednisone dose as long as pt on steroid taper, adjust dose down as needed 
Qty: 1 Vial, Refills: 0  
  
lisinopriL (PRINIVIL, ZESTRIL) 20 mg tablet Take 1 Tab by mouth daily. Qty: 30 Tab, Refills: 0 CONTINUE these medications which have CHANGED Details  
insulin glargine (LANTUS) 100 unit/mL injection 20 Units by SubCUTAneous route nightly. Qty: 1 Vial, Refills: 0 CONTINUE these medications which have NOT CHANGED Details  
loperamide (IMODIUM) 2 mg capsule Take 1 Cap by mouth four (4) times daily as needed for Diarrhea. Qty: 60 Cap, Refills: 0  
  
amLODIPine (NORVASC) 10 mg tablet Take 1 Tab by mouth. STOP taking these medications  
  
 doxycycline (VIBRAMYCIN) 100 mg capsule Comments:  
Reason for Stopping:   
   
 oxyCODONE-acetaminophen (PERCOCET) 5-325 mg per tablet Comments:  
Reason for Stopping:   
   
  
 
 
 
Patient Follow Up Instructions: Activity: PT/OT Eval and Treat Diet: Diabetic Diet Wound Care: Keep RLE stump ACE wrap intact Follow-up as noted below Follow-up tests/labs per Rehab provider Follow-up Information Follow up With Specialties Details Why Contact Info Barba Buerger,  Internal Medicine Call The nurse will contact you with appointment date and time  [facility] [address] [facility] 
[phone] Chris Carroll MD Infectious Disease, Internal Medicine Call Infectious Disease - please call to schedule a follow up appointment after you have completed  acute inpatient rehab.  [facility] 
[phone]  Juan Perez MD Vascular Surgery Call in 4 weeks Vacular Surgery - hospital follow up. Please call to schedule after you have completed inpatient rehab. [address] [place] 
[phone] Nando Serrato MD Otolaryngology Call ENT Follow up as outpaient after your rehab. May need Hearing test, call office for follow up  [address] [facility] [address] 
[phone] Michelle Alcantara DPM Podiatry Call Podiatry - please call to schedule when you have completed inpatient rehab [facility] [address] [facility] [address] 
[phone] [address] [place] [address] are being discharged to this facility for inpatient acute rehab [address] 
[phone]  
  
 
________________________________________________________________ Risk of deterioration: Moderate Condition at Discharge:  Stable 
__________________________________________________________________ Disposition IP Rehab 
 
____________________________________________________________________ Code Status: Full Code 
___________________________________________________________________ Total time in minutes spent coordinating this discharge (includes going over instructions, follow-up, prescriptions, and preparing report for sign off to her PCP) :  45 minutes Signed: 
Santa Bhakta NP

## 2020-09-08 NOTE — PROGRESS NOTES
Comprehensive Nutrition Assessment Type and Reason for Visit: Miriam Abdul Nutrition Recommendations/Plan:  
· Continue cardiac diet. Consider consistent CHO if BG trends high. Consider renal diet restrictions if K and Phos trend high. · Continue Nepro BID · Please document % meals and supplements consumed in flowsheet I/O's under intake Nutrition Assessment:     
Chart reviewed. Recorded intakes 100%. Current diet: cardiac. Pt on prednisone which likely spiked BG 9/6-9/7. BG well-controlled today. Last CBC 9/4 showed K WNL. Per CM, DC to rehab pending covid test results. Estimated Daily Nutrient Needs: 
Energy (kcal):  2118 kcal (BMR 2390 x 1. 2AF -750kcal) Protein (g):  112-129g (1.3-1.5 g/kg IBW) Fluid (ml/day):  1500 mL Nutrition Related Findings:  Meds: lantus, humalog, matt-Q, oxycodone, prednisone. Labs: -164. BM 9/7. Edema 2+BLE. Wounds:   
Surgical wound Current Nutrition Therapies: DIET NUTRITIONAL SUPPLEMENTS Breakfast, Dinner; Nepro DIET ONE TIME MESSAGE 
DIET ONE TIME MESSAGE 
DIET CARDIAC Regular DIET ONE TIME MESSAGE Anthropometric Measures: 
· Height:  6' 2\" (188 cm) · Current Body Wt:  141.5 kg (311 lb 15.2 oz) · Admission Body Wt:      
· Usual Body Wt:       
· Ideal Body Wt:  190 lbs:  161.1 % · Adjusted Body Weight:  326.6; Weight Adjustment for: Amputation · Adjusted BMI:  41.9 · BMI Category:  Obese class 3 (BMI 40.0 or greater) Nutrition Diagnosis:  
· Inadequate protein-energy intake related to (decreased appetite, prolonged hospitalization) as evidenced by (varying PO) · Increased nutrient needs related to (increased need for protein) as evidenced by wounds(s/p BKA; HD) Nutrition Interventions:  
Food and/or Nutrient Delivery: Continue current diet, Continue oral nutrition supplement Nutrition Education and Counseling: No recommendations at this time Coordination of Nutrition Care: No recommendation at this time Goals: Pt will continue to consume >75% meals and supplements next 4-6 days Nutrition Monitoring and Evaluation:  
Behavioral-Environmental Outcomes: Knowledge or skill Food/Nutrient Intake Outcomes: Food and nutrient intake Physical Signs/Symptoms Outcomes: Biochemical data, Fluid status or edema, Weight Discharge Planning:   
(Consistent CHO diet) Electronically signed by Pravin Pickett RD on 9/8/2020 at 11:23 AM 
 
Contact: ext [address] [place] [address] Pager [phone]

## 2020-09-08 NOTE — DISCHARGE INSTRUCTIONS
HOSPITALIST DISCHARGE INSTRUCTIONS    NAME: Corinne Wadsworth   :  1985   MRN:  [id number]     Date/Time:  2020 10:17 AM    ADMIT DATE: 2020     DISCHARGE DATE: 2020     DISCHARGE DIAGNOSIS:  Sepsis, POA  (WBC 19K, tachycardia)- now resolved, completed IV Abx in this admission  Right foot cellulitis with abscess, POA - s/p BKA  this admission- cont wound care, outpatient follow up with Dr Jose Alfredo Briggs ([facility]) in 4 weeks  H/o s/p amputation ()of right 1st and second toe for osteomyelitis POA  Hx osteomyelitis right 3rd and 4th toes 2020 and 10/2019 respectively  Acute hypoxic respiratory failure (88% on RA) POA_ now resolved, on RA  IDDM uncontrolled with hyperglycemia POA- now uncontrolled due to steroids, NPH with prednisone for Hearing loss likely due to Anesthesia related complication  ESRD on HD MWF  Morbid Obesity POA  Hypotension - resolved  HTN - uncontrolled now, Increased Norvasc to 10 mg daily (home dose), started on Lisinopril 20 mg daily this admission, titrate as needed  Hyperkalemia  Diarrhea   Chest pain      FULL CODE    Active Problems:    ESRD (end stage renal disease) on dialysis ([address] (2018)      Morbid obesity with body mass index (BMI) of 40.0 to 49.9 ([address] (2018)      Uncontrolled type 2 diabetes mellitus with proliferative retinopathy, with long-term current use of insulin ([address] (2018)      Uncontrolled type 2 diabetes mellitus with hyperglycemia, with long-term current use of insulin ([address] (2018)      ESRD (end stage renal disease) ([address] (2020)      Sepsis ([address] (2020)      Cellulitis of right foot (2020)      Acute respiratory failure with hypoxia and hypercapnia ([address] (2020)         MEDICATIONS:  As per medication reconciliation  list  · It is important that you take the medication exactly as they are prescribed.    · Keep your medication in the bottles provided by the pharmacist and keep a list of the medication names, dosages, and times to be taken in your wallet. · Do not take other medications without consulting your doctor. Pain Management: per above medications    What to do at Home    Recommended diet:  Cardiac Diet, Diabetic Diet, Low fat, Low cholesterol and Renal Diet    Recommended activity: Activity as tolerated    If you have questions regarding the hospital related prescriptions or hospital related issues please call [facility] at . If you experience any of the following symptoms then please call your primary care physician or return to the emergency room if you cannot get hold of your doctor:  Fever, chills, nausea, vomiting, diarrhea, change in mentation, falling, bleeding, shortness of breath, ***    Follow Up:  Dr. Autumn Razo, DO  you are to call and set up an appointment to see them in 7-10 days. Dr Leslie Feng (Vascular Surgery) in 4 weeks Post Op follow up  Dr Radha Benz (ENT) as outpatient for Hearing loss after rehab stay completed  Dr Santa Burleson ([place]) on 9/8/2020 as arranged    Information obtained by :  I understand that if any problems occur once I am at home I am to contact my physician. I understand and acknowledge receipt of the instructions indicated above. Physician's or R.N.'s Signature                                                                  Date/Time                                                                                                                                              Patient or Representative Signature                                                          Date/Time  STUMP: Remove dressing, cleanse wound with NS, pat dry, apply xeroform dressing, cover with dry guaze, pad knee, secure with kerlix. Change daily.       Limb guard at all times when out of bed.          [address] [facility]  [address] INSTRUCTIONS    Patient ID:  Skye Britton  [id number]  44 y.o.  1985    Admit Date: 8/12/2020      Admitting Physician: Nakul Zamora MD     Discharge Physician: Nakul Zamora MD    Admission Diagnoses:   Sepsis [facility]) [A41.9]  Cellulitis of right foot [M78.091]  ESRD (end stage renal disease) ([address] [N18.6]  Acute respiratory failure with hypoxia and hypercapnia ([address] [J96.01, J96.02]    Discharge Diagnoses: Active Problems:    HTN (hypertension) (10/16/2009)      ESRD (end stage renal disease) on dialysis ([address] (9/14/2018)      Morbid obesity with body mass index (BMI) of 40.0 to 49.9 ([address] (9/14/2018)      Uncontrolled type 2 diabetes mellitus with proliferative retinopathy, with long-term current use of insulin ([address] (9/14/2018)      Uncontrolled type 2 diabetes mellitus with hyperglycemia, with long-term current use of insulin ([address] (9/14/2018)      ESRD (end stage renal disease) ([address] (1/6/2020)      Sepsis ([address] (8/12/2020)      Cellulitis of right foot (8/12/2020)      Acute respiratory failure with hypoxia and hypercapnia (HCC) (8/12/2020)      Atrial flutter ([address] (9/3/2020)        Discharge Condition: Good    Cardiology Procedures this Admission:  Atrial flutter ablation    Disposition: home    Reference discharge instructions provided by nursing for diet and activity. Follow-up with Dr Analilia Kang or his Nurse Practitioners in 1-2 weeks. Call [phone] to make an appointment. S/P ATRIAL FLUTTER ABLATION DISCHARGE INSTRUCTIONS    It is normal to feel tired the first couple days. Take it easy and follow the physicians instructions. CHECK THE CATHETER INSERTION SITE DAILY:  You may shower 24 hours after the procedure, remove the bandage during showering. Wash with soap and water and pat dry.   Gentle cleaning of the site with soap and water is sufficient, cover with a dry clean dressing or bandage. Do not apply creams or powders to the area. Do not sit in a bathtub or pool of water for 7 days or until wound has completely healed. Temporary bruising and discomfort is normal and may last a few weeks. You may have a  formation of a small lump at the site which may last up to 6 weeks. CALL THE PHYSICIAN:  If the site becomes red, swollen or feels warm to the touch  If there is bleeding or drainage or if there is unusual pain at the groin or down the leg. If there is any bleeding, lie down, apply pressure or have someone apply pressure with a clean cloth until the bleeding stops. If the bleeding continues, call 911 to be transported to the hospital.  DO NOT DRIVE YOURSELF, [facility] Activity:      For the first 24-48 hours or as instructed by the physician:  No lifting, pushing or pulling over 5 pounds and no straining the insertion site. Do not life grocery bags or the garbage can, do not run the vacuum  or  for 7 days. Start with short walks as in the hospital and gradually increase as tolerated each day. It is recommended to walk 30 minutes 5-7 days per week. Follow your physicians instructions on activity. Avoid walking outside in extremes of heat or cold. Walk inside when it is cold and windy or hot and humid. Things to keep in mind:  No driving for at least 5 days or as designated by your physician. Limit the number of times you go up and down the stairs  Take rests and pace yourself with activity. Be careful and do not strain with bowel movements. Medications:     Take all medications as prescribed  Call your physician if you have any questions  Keep an updated list of your medications with you at all times and give a list to your physician and pharmacist    Signs and Symptoms:  Be cautious of symptoms of angina or recurrent symptoms such as chest discomfort, unusual shortness of breath or fatigue, palpitations. After Care: Follow up with your physician as instructed. Follow a heart healthy diet with proper portion control, daily stress management, daily exercise, blood pressure and cholesterol control , and smoking cessation.

## 2020-09-08 NOTE — PROGRESS NOTES
Problem: Self Care Deficits Care Plan (Adult) Goal: *Acute Goals and Plan of Care (Insert Text) Description:  
FUNCTIONAL STATUS PRIOR TO ADMISSION:  
 
HOME SUPPORT:  
 
Occupational Therapy Goals Initiated 8/27/2020; Weekly Re-Assessment 9/8/2020 - Continue all goals. 1.  Patient will perform supine to sit EOB  with minimal assistance in preparation for adls, within 7 day(s). 2.  Patient will perform grooming, seated EOB with supervision/set-up within 7 day(s). 3.  Patient will perform sponge bathing with minimal assistance/contact guard assist within 7 day(s). 4.  Patient will perform toilet transfers with maximal assistance X2 within 7 day(s). - 
5. Patient will perform all aspects of toileting with moderate assistance  within 7 day(s). 6.  Patient will participate in upper extremity therapeutic exercise/activities with supervision/set-up for 10 minutes within 7 day(s). 7.  Patient will utilize energy conservation techniques during functional activities with minimal verbal cues within 7 day(s). 8.  Patient will perform sit to stand with moderate assistance in preparation for adl mobility/transfers training within 7 day(s). Outcome: Progressing Towards Goal 
  
OCCUPATIONAL THERAPY TREATMENT/WEEKLY RE-ASSESSMENT Patient: Susan Alvarado (24 y.o. male) Date: 9/8/2020 Diagnosis: Sepsis ([facility] [address] [A41.9] Cellulitis of right foot [A46.216] ESRD (end stage renal disease) ([facility] [address] [N18.6] Acute respiratory failure with hypoxia and hypercapnia (HCC) [J96.01, J96.02]  
<principal problem not specified> Procedure(s) (LRB): ABLATION A-FLUTTER (N/A) Ep 3d Mapping (N/A) Lt Atrial Pace & Record During Ep Study (N/A) 4 Days Post-Op Precautions: Fall, NWB(legally blind, [place]) Chart, occupational therapy assessment, plan of care, and goals were reviewed. ASSESSMENT Patient continues with skilled OT services and is progressing well towards all goals. Patient received in recliner chair completing bathing - able to don underwear with set-up and SBA with cues for use of weight-shifting technique to don in seated. Patient educated on importance of progressing activity slowly, building strength in prep for greater participation in mobility and self-care. Able to complete 10 chair push-ups and encouraged to continue HEP using theraband. Patient verbalizing understanding. This date, tolerating three trials standing with RW (up to approx 20 seconds at a time) - required max multimodal cues for hand placement and transfer technique as well as frequent encouragement and reassurance 2/2 fear of falling. Noted patient with decreased safety awareness and increased impulsivity with fatigue, with poor carryover of reaching back to chair prior to sitting and requiring up to mod assist x2 for controlled descent. Patient continues to demonstrate excellent motivation and participation in therapy services - plan to continue all goals in acute setting with IPR recommended at discharge. Current Level of Function Impacting Discharge (ADLs): SBA for scooting; set-up for seated ADLs; mod assist x2 to stand with RW Other factors to consider for discharge: motivated with excellent participation PLAN : 
Goals have been updated based on progression since last assessment. Patient continues to benefit from skilled intervention to address the above impairments. Continue to follow patient 4 times a week to address goals. Recommend with staff: OOB to chair via lateral scooting to drop-arm recliner chair; use of bariatric drop-arm BSC as available Recommend next OT session: standing tolerance; seated ADLs Recommendation for discharge: (in order for the patient to meet his/her long term goals) Therapy 3 hours per day 5-7 days per week This discharge recommendation: 
Has been made in collaboration with the attending provider and/or case management IF patient discharges home will need the following DME: bedside commode, gait belt, and wheelchair SUBJECTIVE:  
Patient stated so what are our objectives today?  OBJECTIVE DATA SUMMARY:  
Cognitive/Behavioral Status: 
Neurologic State: Alert; Appropriate for age Orientation Level: Oriented X4 Cognition: Follows commands Perception: Appears intact Perseveration: No perseveration noted Safety/Judgement: Fall prevention; Insight into deficits Functional Mobility and Transfers for ADLs: 
Bed Mobility: 
Rolling: (received and returned to recliner chair) Scooting: Stand-by assistance Transfers: 
Sit to Stand: Moderate assistance;Assist x2;Adaptive equipment Stand to Sit: Moderate assistance;Assist x2;Adaptive equipment Balance: 
Sitting: Intact Sitting - Static: Good (unsupported) Sitting - Dynamic: Good (unsupported) Standing: Impaired; With support Standing - Static: Poor;Constant support Standing - Dynamic : Poor;Constant support ADL Intervention: 
Upper Body Bathing Bathing Assistance: Set-up Position Performed: Seated in chair Patient received in recliner chair, in process of finishing bathing. Per patient and mother (present throughout session with patient permission), patient able to complete largely with set-up with increased assist needed distally and at back. Upper Body Dressing Assistance Dressing Assistance: Set-up Pullover Shirt: Set-up Lower Body Dressing Assistance Dressing Assistance: Stand-by assistance Underpants: Stand-by assistance(using lateral weightshifting technique) Position Performed: Seated in chair Cues: Verbal cues provided;Visual cues provided Cognitive Retraining Orientation Retraining: Reorienting;Situation(safety needs/concerns) Problem Solving: Awareness of environment;General alternative solution Executive Functions: Executing cognitive plans Organizing/Sequencing: Breaking task down Safety/Judgement: Fall prevention; Insight into deficits Therapeutic Exercises:  
Patient tolerating 3 sit<>stand trials from recliner chair - repeat cues for hand placement and reaching back to chair prior to sit needed. Patient with decreased eccentric control - up to mod assist x2 to control descent to chair as patient more impulsive and with decreased safety awareness with increasing fatigue. Patient completing 10 chair pushups in recliner chair with cues for technique. Encouraged continued engagement in HEP using red theraband. Pain: 
Patient reporting mild-mod pain in R residual limb. Activity Tolerance:  
Fair, requires rest breaks, and observed SOB with activity Please refer to the flowsheet for vital signs taken during this treatment. After treatment patient left in no apparent distress:  
Sitting in chair, Call bell within reach, and Caregiver / family present COMMUNICATION/COLLABORATION:  
The patients plan of care was discussed with: Physical therapist and Registered nurse. Александр Lim OT Time Calculation: 38 mins

## 2020-09-08 NOTE — PROGRESS NOTES
11:00 AM Spoke with Maggi Banda, RACHEAL, regarding pending covid results and patient awaiting discharge to rehab. NP states patient can transfer to telemetry floor while awaiting discharge. NP also aware of patient's c/o dizziness while working with PT and BP 86/52 while sitting in chair. 11:27 AM Updated NP, Edwin Howard, patient's BP 96/55 and patient is back in bed.

## 2020-09-08 NOTE — PROGRESS NOTES
[facility] to [address] [place] [address] Rd 38 y.o.   male [address]   Room: 2156/01    [facility] 2 [address] [facility]  Unit Phone# :  [phone] [address] 
MRM 2 INTRVNTNL CARDIO 
[address] [facility] [place] [address] Dept: [phone] Loc: [id number] SITUATION Admitted:  8/12/2020         Attending Provider:  No att. providers found Consultations:  [address] [facility] IP CONSULT TO NEPHROLOGY 
IP CONSULT TO INFECTIOUS DISEASES 
IP CONSULT TO OTOLARYNGOLOGY 
IP CONSULT TO ELECTROPHYSIOLOGY 
IP CONSULT TO VASCULAR SURGERY 
IP CONSULT TO CARDIOLOGY 
 
PCP:  Bismark Wright, [address] [place] [address] Treatment Team: Consulting Provider: Bill Dee; Consulting Provider: Obey Bills MD; Consulting Provider: Christine Sutherland MD; Utilization Review: Richy Ugalde RN; Utilization Review: Tolu Mathew RN; Primary Nurse: Saul Campbell; Consulting Provider: Diego Francois MD; Consulting Provider: Alonzo Roche NP; Consulting Provider: Faustino Otto MD; Nurse Practitioner: Jeff Ash NP; Primary Nurse: Basia Spears RN Admitting Dx:  Sepsis ([facility] [address] [A41.9] Cellulitis of right foot [Q28.023] ESRD (end stage renal disease) ([facility] [address] [N18.6] Acute respiratory failure with hypoxia and hypercapnia (HCC) [J96.01, J96.02] Principal Problem: <principal problem not specified> 
 
4 Days Post-Op of  
Procedure(s): 
ABLATION A-FLUTTER Ep 3d Mapping Lt Atrial Pace & Record During Ep Study BY: Faustino Otto MD             ON: 9/4/2020 Code Status: Full Code Advance Directives:  
Advance Care Planning 9/1/2020 Patient's Healthcare Decision Maker is: Named in scanned ACP document Primary Decision Maker Name -  
 Primary Decision Maker Phone Number -  
Primary Decision Maker Relationship to Patient -  
Confirm Advance Directive - Patient Would Like to Complete Advance Directive - (Send w/patient) No Doesnt Have Isolation:  Contact       MDRO: MRSA Pain Medications given:  Oxycodone IR    Last dose: 9/8/2020 at  1345 Special Equipment needed: yes  Type of equipment: Walker 
 
hemodialysis L Upper Arm AV Fistula MWF BACKGROUND Allergies: 
No Known Allergies Past Medical History:  
Diagnosis Date  Cataracts  Chronic kidney disease  Diabetes ([address]  Hypercholesterolemia  Hypertension  Kidney failure  Obesity Past Surgical History:  
Procedure Laterality Date  COLONOSCOPY N/A 12/8/2017 COLONOSCOPY performed by Nasreen Oliver MD at [facility] ENDOSCOPY  COLONOSCOPY,DIAGNOSTIC  12/8/2017  HX AMPUTATION Left great toe and L 5th toe  HX AMPUTATION TOE Right  MD COMPRE ELECTROPHYSIOL XM W/LEFT ATRIAL PACNG/REC N/A 9/4/2020 Lt Atrial Pace & Record During Ep Study performed by Toni Peterson MD at [facility] CARDIAC CATH LAB  MD EPHYS EVAL W/ABLATION SUPRAVENT ARRHYTHMIA N/A 9/4/2020 ABLATION A-FLUTTER performed by Toni Peterson MD at [facility] CARDIAC CATH LAB  MD INTRACARDIAC ELECTROPHYSIOLOGIC 3D MAPPING N/A 9/4/2020 Ep 3d Mapping performed by Toni Peterson MD at [facility] CARDIAC CATH LAB  UPPER GI ENDOSCOPY,BIOPSY  12/8/2017  VASCULAR SURGERY PROCEDURE UNLIST    
 R side chest  
 VASCULAR SURGERY PROCEDURE UNLIST Left 07/25/2017 Creation transposed AV fistula arm No medications prior to admission. Hard scripts included in transfer packet yes Vaccinations:   
Immunization History Administered Date(s) Administered  Influenza Vaccine (Quad) PF 12/14/2015  Pneumococcal Conjugate (PCV-13) 09/14/2018  Pneumococcal Polysaccharide (PPSV-23) 12/12/2015, 09/14/2018 Readmission Risks:    Known Risks: DM, ESRD The [employer] Index tool is an evidenced based tool that has more automatic generated information. The tool looks at many different items such as the age of the patient, how many times they were admitted in the last calendar year, current length of stay in the hospital and their diagnosis. All of these items are pulled automatically from information documented in the chart from various places and will generate a score that predicts whether a patient is at low (less than 13), medium (13-20) or high (21 or greater) risk of being readmitted. ASSESSMENT Temp: 98 °F (36.7 °C) (09/08/20 1546) Pulse (Heart Rate): 92 (09/08/20 1546) Resp Rate: 18 (09/08/20 1546)           BP: 158/89 (09/08/20 1546) O2 Sat (%): 94 % (09/08/20 1546) Weight: 141.5 kg (311 lb 15.2 oz)    Height: 6' 2\" (188 cm) (09/08/20 0335) If above not within 1 hour of discharge: 
 
BP:_____  P:____  R:____ T:_____ O2 Sat: ___%  O2: ______ Active Orders Diet DIET CARDIAC Regular Orientation: oriented to time, place, person and situation Active Behaviors: None Active Lines/Drains:  (Peg Tube / Soto / CL or S/L?): yes, peripheral IV Urinary Status: Anuria     Last BM: Last Bowel Movement Date: 09/08/20 Skin Integrity: Incision (comment)(R BKA) Mobility: Very limited Weight Bearing Status: WBAT (Weight Bearing as Tolerated), NWB (Non Weight Bearing) Lab Results Component Value Date/Time Glucose 257 (H) 09/04/2020 09:04 AM  
 Hemoglobin A1c 9.4 (H) 08/15/2020 12:19 AM  
 INR 1.2 (H) 08/12/2020 02:39 PM  
 HGB 10.3 (L) 09/07/2020 02:36 AM  
 HGB 10.2 (L) 09/04/2020 07:00 AM  
  
  RECOMMENDATION See After Visit Summary (AVS) for: · Discharge instructions · After [address] · Special equipment needed (entered pre-discharge by Care Management) · Medication Reconciliation · Follow up Appointment(s) Report given/sent by:  Marce English Verbal report given to: Zaheer Bunch  FAXED to:  [phone] Estimated discharge time:  9/8/2020 at 1600

## 2020-09-08 NOTE — PROGRESS NOTES
Name: Toña Braxton MRN: [id number] : 1985 Assessment   :                                               Plan: ESKD Anemia of ESRD + post op blood loss DM-2, uncontrolled Mild Hyperkalemia- resolved HTN-prior hypotension resolved Hyponatremia-mild Foot infection- s/p  R BKA on - Hearing loss (new) Legally blind [facility] MWF HD; HD today. 3 liters off on  Off Midodrine 
  
 
hgb > 10; stop RETACRIT for now ABx per ID Ct FR of 1.5 L/day Stable for dc to [facility] from renal standpoint Subjective: 
BS is up Exam: 
Visit Vitals BP 96/55 Pulse 81 Temp 98.3 °F (36.8 °C) Resp 18 Ht 6' 2\" (1.88 m) Wt 141.5 kg (311 lb 15.2 oz) SpO2 100% BMI 40.05 kg/m² NAD 
L AV access in place No rales 
rrr S/P amp Labs/Data: 
 
Lab Results Component Value Date/Time WBC 11.7 (H) 2020 02:36 AM  
 Hemoglobin (POC) 11.9 (L) 2017 10:12 AM  
 HGB 10.3 (L) 2020 02:36 AM  
 Hematocrit (POC) 33 (L) 2020 10:51 AM  
 HCT 32.5 (L) 2020 02:36 AM  
 PLATELET 994  02:36 AM  
 MCV 83.1 2020 02:36 AM  
 
 
Lab Results Component Value Date/Time Sodium 133 (L) 2020 09:04 AM  
 Potassium 3.7 2020 09:04 AM  
 Chloride 94 (L) 2020 09:04 AM  
 CO2 28 2020 09:04 AM  
 Anion gap 11 2020 09:04 AM  
 Glucose 257 (H) 2020 09:04 AM  
 BUN 62 (H) 2020 09:04 AM  
 Creatinine 7.45 (H) 2020 09:04 AM  
 BUN/Creatinine ratio 8 (L) 2020 09:04 AM  
 GFR est AA 10 (L) 2020 09:04 AM  
 GFR est non-AA 8 (L) 2020 09:04 AM  
 Calcium 8.3 (L) 2020 09:04 AM  
 
 
Wt Readings from Last 3 Encounters:  
20 141.5 kg (311 lb 15.2 oz) 20 153.2 kg (337 lb 11.9 oz) 20 153.8 kg (339 lb 1.1 oz) Intake/Output Summary (Last 24 hours) at 9/8/2020 1152 Last data filed at 9/8/2020 0800 Gross per 24 hour Intake 740 ml Output 3000 ml Net -2260 ml Patient seen and examined. Chart reviewed. Labs, data and other pertinent notes reviewed in last 24 hrs. PMH/SH/FH reviewed and unchanged compared to H&P Janet Tian MD

## 2020-09-08 NOTE — PROGRESS NOTES
Problem: Mobility Impaired (Adult and Pediatric) Goal: *Acute Goals and Plan of Care (Insert Text) Description: FUNCTIONAL STATUS PRIOR TO ADMISSION: At baseline pt ambulated with cane. Lives alone and takes [facility] to HD appointments. HOME SUPPORT PRIOR TO ADMISSION: Patient lived alone. Physical Therapy Goals Initiated 8/25/2020 1. Patient will move from supine to sit and sit to supine , scoot up and down, and roll side to side in bed with minimal assistance/contact guard assist within 7 day(s). 2.  Patient will transfer from bed to chair and chair to bed with moderate assistance  using the least restrictive device within 7 day(s). 3.  Patient will perform sit to stand with moderate assistance  within 7 day(s). 4.  Patient will ambulate with maximal assistance for 10 feet with the least restrictive device within 7 day(s). Revised 9/1/2020; Goals reviewed and remain appropriate 9/8/2020 1. Patient will move from supine to sit and sit to supine , scoot up and down, and roll side to side in bed with mod I within 7 day(s). 2.  Patient will transfer from bed to chair and chair to bed with mod I  using the least restrictive device within 7 day(s). 3.  Patient will perform sit to stand with moderate assistance  within 7 day(s). 4.  Patient will ambulate with maximal assistance for 5 feet with the least restrictive device within 7 day(s). Outcome: Progressing Towards Goal 
 PHYSICAL THERAPY TREATMENT: WEEKLY REASSESSMENT Patient: Germain Chappell (4 y.o. male) Date: 9/8/2020 Primary Diagnosis: Sepsis ([facility] [address] [A41.9] Cellulitis of right foot [M00.144] ESRD (end stage renal disease) ([facility] [address] [N18.6] Acute respiratory failure with hypoxia and hypercapnia (HCC) [J96.01, J96.02] Procedure(s) (LRB): ABLATION A-FLUTTER (N/A) Ep 3d Mapping (N/A) Lt Atrial Pace & Record During Ep Study (N/A) 4 Days Post-Op Precautions:  Fall, NWB(legally blind, [place]) ASSESSMENT Patient continues with skilled PT services and is slowly progressing towards goals. Patient continues to demonstrate limited functional mobility and decreased independence secondary to impaired standing balance, impaired gait, R BKA, increased R residual limb pain, generalized weakness, impaired coordination, and decreased activity tolerance. Received sitting in recliner chair post-bath and agreeable to PT intervention. Patient requires frequent instruction and cueing for proper technique and performance during mobility and to progress mobility. Patient able to practice sit<>stand transfers x 3 from recliner chair with mod A x 2 overall. Needed significant cueing for proper hand and L foot placement and safe use of RW during transfers. Needed most assistance with transition of hands from armrests of chair to/from RW. Patient able to tolerate upright standing with RW support x ~ 20 seconds at the longest. He does fatigue easily. Pt was left sitting in chair with all needs met, family present, and RN aware following therapy session. Continue to recommend patient discharge to IP rehab to address functional impairments as noted above as patient with significant decline from baseline mobility and is at an increased risk for falls. Patient's progression toward goals since last assessment: slow, but good progress; progressing sit<>stand transfers Current Level of Function Impacting Discharge (mobility/balance): mod A x 2 Functional Outcome Measure: The patient scored 0/28 on the Tinetti outcome measure which is indicative of high fall risk. Other factors to consider for discharge: increased risk for falls; decline from baseline mobility; R BKA; [place]; legally blind PLAN : 
Goals have been updated based on progression since last assessment. Patient continues to benefit from skilled intervention to address the above impairments. Recommendations and Planned Interventions: bed mobility training, transfer training, gait training, therapeutic exercises, patient and family training/education, and therapeutic activities Frequency/Duration: Patient will be followed by physical therapy:  5 times a week to address goals. Recommendation for discharge: (in order for the patient to meet his/her long term goals) Therapy 3 hours per day 5-7 days per week This discharge recommendation: 
Has been made in collaboration with the attending provider and/or case management IF patient discharges home will need the following DME: to be determined (TBD) SUBJECTIVE:  
Patient stated You want me to walk?  OBJECTIVE DATA SUMMARY:  
HISTORY:   
Past Medical History:  
Diagnosis Date Cataracts Chronic kidney disease Diabetes ([facility] [address] Hypercholesterolemia Hypertension Kidney failure Obesity Past Surgical History:  
Procedure Laterality Date COLONOSCOPY N/A 12/8/2017 COLONOSCOPY performed by Konstantin Goins MD at [address]  12/8/2017 HX AMPUTATION Left great toe and L 5th toe HX AMPUTATION TOE Right SC COMPRE ELECTROPHYSIOL XM W/LEFT ATRIAL PACNG/REC N/A 9/4/2020 Lt Atrial Pace & Record During Ep Study performed by Deidra Magdaleno MD at [facility]  
 SC EPHYS EVAL W/ABLATION SUPRAVENT ARRHYTHMIA N/A 9/4/2020 ABLATION A-FLUTTER performed by Deidra Magdaleno MD at [facility]  
 SC INTRACARDIAC ELECTROPHYSIOLOGIC 3D MAPPING N/A 9/4/2020 Ep 3d Mapping performed by Deidra Magdaleno MD at [facility]  
 UPPER GI ENDOSCOPY,BIOPSY  12/8/2017 VASCULAR SURGERY PROCEDURE UNLIST    
 R side chest  
 VASCULAR SURGERY PROCEDURE UNLIST Left 07/25/2017 Creation transposed AV fistula arm Personal factors and/or comorbidities impacting plan of care: CKD; HTN; diabetes Home Situation Home Environment: Apartment # Steps to Enter: (uses elevator for 3rd floor apartment) One/Two Story Residence: One story Living Alone: Yes Support Systems: Family member(s) Patient Expects to be Discharged to[de-identified] Rehabilitation facility Current DME Used/Available at Home: Cane, straight EXAMINATION/PRESENTATION/DECISION MAKING:  
Critical Behavior: 
Neurologic State: Alert Orientation Level: Oriented X4 Cognition: Appropriate decision making, Appropriate safety awareness, Appropriate for age attention/concentration, Follows commands Safety/Judgement: Awareness of environment, Fall prevention, Insight into deficits Hearing: Auditory Auditory Impairment: Deaf(recent total hearing loss) Hearing Aids/Status: Does not own Skin:  R BKA incision Edema: Mild edema at R BKA surgical site Range Of Motion: 
AROM: Within functional limits PROM: Within functional limits Strength:   
Strength: Generally decreased, functional 
  
  
  
  
  
  
Tone & Sensation:  
Tone: Normal 
  
  
  
  
  
  
  
  
   
Coordination: 
Coordination: Generally decreased, functional 
Vision:  
  
Functional Mobility: 
Bed Mobility: 
Rolling: (received and returned to recliner chair) Scooting: Supervision Transfers: 
Sit to Stand: Moderate assistance;Assist x2(x 3 from recliner chair) Stand to Sit: Maximum assistance(uncontrolled descent into sitting) Balance:  
Sitting: Intact Sitting - Static: Good (unsupported) Sitting - Dynamic: Good (unsupported) Standing: Impaired; With support Standing - Static: Poor;Constant support Standing - Dynamic : Poor;Constant support Ambulation/Gait Training: 
  
  
  
  
  
  
Right Side Weight Bearing: Non-weight bearing(R BKA) Left Side Weight Bearing: Full Therapeutic Exercises:  
Arm push ups in chair x 10 Functional Measure: 
Tinetti test: 
 
Sitting Balance: 0 Arises: 0 Attempts to Rise: 0 Immediate Standing Balance: 0 Standing Balance: 0 Nudged: 0 
 Eyes Closed: 0 Turn 360 Degrees - Continuous/Discontinuous: 0 Turn 360 Degrees - Steady/Unsteady: 0 Sitting Down: 0 Balance Score: 0 Balance total score Indication of Gait: 0 
R Step Length/Height: 0 
L Step Length/Height: 0 
R Foot Clearance: 0 
L Foot Clearance: 0 Step Symmetry: 0 Step Continuity: 0 Path: 0 Trunk: 0 Walking Time: 0 Gait Score: 0 Gait total score Total Score: 0/28 Overall total score Tinetti Tool Score Risk of Falls 
<19 = High Fall Risk 19-24 = Moderate Fall Risk 25-28 = Low Fall Risk Tinetti ME. Performance-Oriented Assessment of Mobility Problems in Elderly Patients. [facility]; [id number]. (Scoring Description: PT Bulletin Feb. 10, 1993) Older adults: Kristi Hill et al, 2009; n = [address] [facility] elderly evaluated with ABC, OG, ADL, and IADL) · Mean OG score for males aged 69-68 years = 26.21(3.40) · Mean OG score for females age 69-68 years = 25.16(4.30) · Mean OG score for males over 80 years = 23.29(6.02) · Mean OG score for females over 80 years = 17.20(8.32) Pain Rating: 
3/10 R residual limb pain Activity Tolerance:  
Fair and requires rest breaks Please refer to the flowsheet for vital signs taken during this treatment. After treatment patient left in no apparent distress:  
Sitting in chair, Call bell within reach, and Caregiver / family present COMMUNICATION/EDUCATION:  
The patients plan of care was discussed with: Physical therapist, Occupational therapist, and Registered nurse. Fall prevention education was provided and the patient/caregiver indicated understanding., Patient/family have participated as able in goal setting and plan of care. , and Patient/family agree to work toward stated goals and plan of care. Thank you for this referral. 
Jarred Hurtado, PT, DPT Time Calculation: 39 mins

## 2020-09-08 NOTE — PROGRESS NOTES
Discharge order written acknowledged. TEJA [address] [facility] transport [facility] transport. Need for most recent Covid submitted 9/7/2020 waived per Theodore Fitting at [address] has informed this CM they cannot accept transport after 1500 today. Accepting MD - Dr. Kameron Jones Assigned room - 5th floor RN to call report to:  [phone] BEFORE transport pickup, please FAX or UPLOAD the following documents: DC Summary and DC Med list, MAR Report (showing last meds given) -- Fax #: [phone]. // Call report to [phone], Accepting MD: Dr Kameron Jones, Patient is admitting to 5th floor at [place] [address], [address] [facility] CM has submitted transport request to [facility]. Anticipate delay due to timing. Attending is aware of planned discharge for today. CM will update once I have confirmed transport time from [facility]. 
 
[address] [facility] confirmed transport at 1500. Attending informed. D/C planned for 1530. [facility] informed. Jone Puga at [facility] informed. [phone] Requested documents faxed at [phone]. Jone Puga informed. CM tasks complete for discharge planning. Nursing informed. Dorcas Ram, DIONICIOCM Ext [phone]

## 2020-09-08 NOTE — PROGRESS NOTES
TEJA Plan: 
  
D/C Rehab- [facility] Acute Re[facility] -  
F/u appointments 2nd IM Letter to be given [facility] transportation to be set up  
 
Barrier to Discharge - Covid test ordered 9/6/2020. Results pending. Contact at [facility] [phone] CM has updated noted in [employer]. 9:40am 
Anuja Todd at [facility] informed CM he has bed available today. Covid test pending. Chart review. Ordered 9/6/2020. Collected 9/7/20. CM called Lab at [address] - transferred to [facility] lab. CM spoke with Lab hospitals [facility]. Confirmed receipt of specimen. This specimen has been submitted to [facility] for processing. Unable to give an ETA. Yvangabriella Todd informed. 1216pm 
Anuja Perez from [facility] called and inquired to Covid test.  Results pending. Asked CM to review charting of temperature readings since last Covid test on 6/1/2020. No elevated temps recorded. Evelina Severin specifically asked about temps at 100.4 or above. Then requested CM review O2 needs since September 1st.  Patient is presently on room air. Evelina Severin has informed CM he will review with admission team and update CM. Will continue to follow for discharge planning. [phone] Anuja Todd has informed CM they will accept COVID test results from 9/1/2020 and will follow for updated results. Mr Madai Cross is APPROVED to come to [facility]ER[facility]SY[facility] BEFORE transport pickup, please FAX or UPLOAD the following documents: DC Summary and DC Med list, MAR Report (showing last meds given) -- Fax #: [phone]. // Call report to [phone], Accepting MD: Dr Douglas Swan, Patient is admitting to 5th floor at [facility] [address] [facility], [address] [place] [address]) Chart review. Patient is a dialysis patient MWF. Reviewed with attending. CM reviewed with Anuja Todd latest transport time accepted for today would be 1500. 
 
[phone] Patient had scheduled follow up with Dr. Milena Mckay for today.   Patient is still an inpatient. This CM spent greater than 20 minutes on hold informed office of discharge plan. CM will continue to follow for discharge planning. Adry Gonzales RN CM Ext [phone] supervision

## 2020-09-08 NOTE — PROGRESS NOTES
Problem: Patient Education: Go to Patient Education Activity Goal: Patient/Family Education Outcome: Resolved/Met Problem: Pressure Injury - Risk of 
Goal: *Prevention of pressure injury Description: Document Ranjeet Scale and appropriate interventions in the flowsheet. Outcome: Resolved/Met Problem: Patient Education: Go to Patient Education Activity Goal: Patient/Family Education Outcome: Resolved/Met Problem: Risk for Spread of Infection Goal: Prevent transmission of infectious organism to others Description: Prevent the transmission of infectious organisms to other patients, staff members, and visitors. Outcome: Resolved/Met Problem: Patient Education:  Go to Education Activity Goal: Patient/Family Education Outcome: Resolved/Met Problem: Patient Education: Go to Patient Education Activity Goal: Patient/Family Education Outcome: Resolved/Met Problem: Sepsis: Day of Diagnosis Goal: Off Pathway (Use only if patient is Off Pathway) Outcome: Resolved/Met Goal: *Fluid resuscitation Outcome: Resolved/Met Goal: *Paired blood cultures prior to first dose of antibiotic Outcome: Resolved/Met Goal: *First dose of  appropriate antibiotic within 3 hours of arrival to ED, within 1 hour of arrival to ICU Outcome: Resolved/Met Goal: *Lactic acid with first set of blood cultures Outcome: Resolved/Met Goal: *Pneumococcal immunization (if eligible) Outcome: Resolved/Met Goal: *Influenza immunization (if eligible) Outcome: Resolved/Met Goal: Activity/Safety Outcome: Resolved/Met Goal: Consults, if ordered Outcome: Resolved/Met Goal: Diagnostic Test/Procedures Outcome: Resolved/Met Goal: Nutrition/Diet Outcome: Resolved/Met Goal: Discharge Planning Outcome: Resolved/Met Goal: Medications Outcome: Resolved/Met Goal: Respiratory Outcome: Resolved/Met Goal: Treatments/Interventions/Procedures Outcome: Resolved/Met Goal: Psychosocial 
Outcome: Resolved/Met Problem: Sepsis: Day 2 Goal: Off Pathway (Use only if patient is Off Pathway) Outcome: Resolved/Met Goal: *Central Venous Pressure maintained at 8-12 mm Hg Outcome: Resolved/Met Goal: *Hemodynamically stable Outcome: Resolved/Met Goal: *Tolerating diet Outcome: Resolved/Met Goal: Activity/Safety Outcome: Resolved/Met Goal: Consults, if ordered Outcome: Resolved/Met Goal: Diagnostic Test/Procedures Outcome: Resolved/Met Goal: Nutrition/Diet Outcome: Resolved/Met Goal: Discharge Planning Outcome: Resolved/Met Goal: Medications Outcome: Resolved/Met Goal: Respiratory Outcome: Resolved/Met Goal: Treatments/Interventions/Procedures Outcome: Resolved/Met Goal: Psychosocial 
Outcome: Resolved/Met Problem: Sepsis: Day 3 Goal: Off Pathway (Use only if patient is Off Pathway) Outcome: Resolved/Met Goal: *Central Venous Pressure maintained at 8-12 mm Hg Outcome: Resolved/Met Goal: *Oxygen saturation within defined limits Outcome: Resolved/Met Goal: *Vital sign stability Outcome: Resolved/Met Goal: *Tolerating diet Outcome: Resolved/Met Goal: *Demonstrates progressive activity Outcome: Resolved/Met Goal: Activity/Safety Outcome: Resolved/Met Goal: Consults, if ordered Outcome: Resolved/Met Goal: Diagnostic Test/Procedures Outcome: Resolved/Met Goal: Nutrition/Diet Outcome: Resolved/Met Goal: Discharge Planning Outcome: Resolved/Met Goal: Medications Outcome: Resolved/Met Goal: Respiratory Outcome: Resolved/Met Goal: Treatments/Interventions/Procedures Outcome: Resolved/Met Goal: Psychosocial 
Outcome: Resolved/Met Problem: Sepsis: Day 4 Goal: Off Pathway (Use only if patient is Off Pathway) Outcome: Resolved/Met Goal: Activity/Safety Outcome: Resolved/Met Goal: Consults, if ordered Outcome: Resolved/Met Goal: Diagnostic Test/Procedures Outcome: Resolved/Met Goal: Nutrition/Diet Outcome: Resolved/Met Goal: Discharge Planning Outcome: Resolved/Met Goal: Medications Outcome: Resolved/Met Goal: Respiratory Outcome: Resolved/Met Goal: Treatments/Interventions/Procedures Outcome: Resolved/Met Goal: Psychosocial 
Outcome: Resolved/Met Goal: *Oxygen saturation within defined limits Outcome: Resolved/Met Goal: *Hemodynamically stable Outcome: Resolved/Met Goal: *Vital signs within defined limits Outcome: Resolved/Met Goal: *Tolerating diet Outcome: Resolved/Met Goal: *Demonstrates progressive activity Outcome: Resolved/Met Goal: *Fluid volume maintenance Outcome: Resolved/Met Problem: Sepsis: Day 5 Goal: Off Pathway (Use only if patient is Off Pathway) Outcome: Resolved/Met Goal: *Oxygen saturation within defined limits Outcome: Resolved/Met Goal: *Vital signs within defined limits Outcome: Resolved/Met Goal: *Tolerating diet Outcome: Resolved/Met Goal: *Demonstrates progressive activity Outcome: Resolved/Met Goal: *Discharge plan identified Outcome: Resolved/Met Goal: Activity/Safety Outcome: Resolved/Met Goal: Consults, if ordered Outcome: Resolved/Met Goal: Diagnostic Test/Procedures Outcome: Resolved/Met Goal: Nutrition/Diet Outcome: Resolved/Met Goal: Discharge Planning Outcome: Resolved/Met Goal: Medications Outcome: Resolved/Met Goal: Respiratory Outcome: Resolved/Met Goal: Treatments/Interventions/Procedures Outcome: Resolved/Met Goal: Psychosocial 
Outcome: Resolved/Met Problem: Sepsis: Day 6 Goal: Off Pathway (Use only if patient is Off Pathway) Outcome: Resolved/Met Goal: *Oxygen saturation within defined limits Outcome: Resolved/Met Goal: *Vital signs within defined limits Outcome: Resolved/Met Goal: *Tolerating diet Outcome: Resolved/Met Goal: *Demonstrates progressive activity Outcome: Resolved/Met Goal: Influenza immunization Outcome: Resolved/Met Goal: *Pneumococcal immunization Outcome: Resolved/Met Goal: Activity/Safety Outcome: Resolved/Met Goal: Diagnostic Test/Procedures Outcome: Resolved/Met Goal: Nutrition/Diet Outcome: Resolved/Met Goal: Discharge Planning Outcome: Resolved/Met Goal: Medications Outcome: Resolved/Met Goal: Respiratory Outcome: Resolved/Met Goal: Treatments/Interventions/Procedures Outcome: Resolved/Met Goal: Psychosocial 
Outcome: Resolved/Met Problem: Sepsis: Discharge Outcomes Goal: *Vital signs within defined limits Outcome: Resolved/Met Goal: *Tolerating diet Outcome: Resolved/Met Goal: *Verbalizes understanding and describes prescribed diet Outcome: Resolved/Met Goal: *Demonstrates progressive activity Outcome: Resolved/Met Goal: *Describes follow-up/return visits to physicians Outcome: Resolved/Met Goal: *Verbalizes name, dosage, time, side effects, and number of days to continue medications Outcome: Resolved/Met Goal: *Influenza immunization (Oct-Mar only) Outcome: Resolved/Met Goal: *Pneumococcal immunization Outcome: Resolved/Met Goal: *Lungs clear or at baseline Outcome: Resolved/Met Goal: *Oxygen saturation returns to baseline or 90% or better on room air Outcome: Resolved/Met Goal: *Glycemic control Outcome: Resolved/Met Goal: *Absence of deep venous thrombosis signs and symptoms(Stroke Metric) Outcome: Resolved/Met Goal: *Describes available resources and support systems Outcome: Resolved/Met Goal: *Optimal pain control at patient's stated goal 
Outcome: Resolved/Met Problem: Diabetes Self-Management Goal: *Disease process and treatment process Description: Define diabetes and identify own type of diabetes; list 3 options for treating diabetes. Outcome: Resolved/Met Goal: *Incorporating nutritional management into lifestyle Description: Describe effect of type, amount and timing of food on blood glucose; list 3 methods for planning meals. Outcome: Resolved/Met Goal: *Incorporating physical activity into lifestyle Description: State effect of exercise on blood glucose levels. Outcome: Resolved/Met Goal: *Developing strategies to promote health/change behavior Description: Define the ABC's of diabetes; identify appropriate screenings, schedule and personal plan for screenings. Outcome: Resolved/Met Goal: *Using medications safely Description: State effect of diabetes medications on diabetes; name diabetes medication taking, action and side effects. Outcome: Resolved/Met Goal: *Monitoring blood glucose, interpreting and using results Description: Identify recommended blood glucose targets  and personal targets. Outcome: Resolved/Met Goal: *Prevention, detection, treatment of acute complications Description: List symptoms of hyper- and hypoglycemia; describe how to treat low blood sugar and actions for lowering  high blood glucose level. Outcome: Resolved/Met Goal: *Prevention, detection and treatment of chronic complications Description: Define the natural course of diabetes and describe the relationship of blood glucose levels to long term complications of diabetes. Outcome: Resolved/Met Goal: *Developing strategies to address psychosocial issues Description: Describe feelings about living with diabetes; identify support needed and support network Outcome: Resolved/Met Goal: *Insulin pump training Outcome: Resolved/Met Goal: *Sick day guidelines Outcome: Resolved/Met Goal: *Patient Specific Goal (EDIT GOAL, INSERT TEXT) Outcome: Resolved/Met Problem: Patient Education: Go to Patient Education Activity Goal: Patient/Family Education Outcome: Resolved/Met Problem: Patient Education: Go to Patient Education Activity Goal: Patient/Family Education Outcome: Resolved/Met Problem: Patient Education: Go to Patient Education Activity Goal: Patient/Family Education Outcome: Resolved/Met Problem: Patient Education: Go to Patient Education Activity Goal: Patient/Family Education Outcome: Resolved/Met Problem: Cath Lab Procedures: Pre-Procedure Goal: Off Pathway (Use only if patient is Off Pathway) Outcome: Resolved/Met Goal: Activity/Safety Outcome: Resolved/Met Goal: Consults, if ordered Outcome: Resolved/Met Goal: Diagnostic Test/Procedures Outcome: Resolved/Met Goal: Nutrition/Diet Outcome: Resolved/Met Goal: Discharge Planning Outcome: Resolved/Met Goal: Medications Outcome: Resolved/Met Goal: Respiratory Outcome: Resolved/Met Goal: Treatments/Interventions/Procedures Outcome: Resolved/Met Goal: Psychosocial 
Outcome: Resolved/Met Goal: *Verbalize description of procedure Outcome: Resolved/Met Goal: *Consent signed Outcome: Resolved/Met Problem: Cath Lab Procedures: Post-Cath Day of Procedure (Initiate SCIP Measures for Post-Op Care) Goal: Off Pathway (Use only if patient is Off Pathway) Outcome: Resolved/Met Goal: Activity/Safety Outcome: Resolved/Met Goal: Consults, if ordered Outcome: Resolved/Met Goal: Diagnostic Test/Procedures Outcome: Resolved/Met Goal: Nutrition/Diet Outcome: Resolved/Met Goal: Discharge Planning Outcome: Resolved/Met Goal: Medications Outcome: Resolved/Met Goal: Respiratory Outcome: Resolved/Met Goal: Treatments/Interventions/Procedures Outcome: Resolved/Met Goal: Psychosocial 
Outcome: Resolved/Met Goal: *Procedure site is without bleeding and signs of infection six hours post sheath removal 
Outcome: Resolved/Met Goal: *Hemodynamically stable Outcome: Resolved/Met Goal: *Optimal pain control at patient's stated goal 
Outcome: Resolved/Met Problem: Cath Lab Procedures: Post-Cath Day 1 Goal: Off Pathway (Use only if patient is Off Pathway) Outcome: Resolved/Met Goal: Activity/Safety Outcome: Resolved/Met Goal: Diagnostic Test/Procedures Outcome: Resolved/Met Goal: Nutrition/Diet Outcome: Resolved/Met Goal: Discharge Planning Outcome: Resolved/Met Goal: Medications Outcome: Resolved/Met Goal: Respiratory Outcome: Resolved/Met Goal: Treatments/Interventions/Procedures Outcome: Resolved/Met Goal: Psychosocial 
Outcome: Resolved/Met Problem: Cath Lab Procedures: Discharge Outcomes Goal: *Stable cardiac rhythm Outcome: Resolved/Met Goal: *Hemodynamically stable Outcome: Resolved/Met Goal: *Optimal pain control at patient's stated goal 
Outcome: Resolved/Met Goal: *Pulses palpable, skin color within defined limits, skin temperature warm Outcome: Resolved/Met Goal: *Lungs clear or at baseline Outcome: Resolved/Met Goal: *Demonstrates ability to perform prescribed activity without shortness of breath or discomfort Outcome: Resolved/Met Goal: *Verbalizes home exercise program, activity guidelines, cardiac precautions Outcome: Resolved/Met Goal: *Verbalizes understanding and describes prescribed diet Outcome: Resolved/Met Goal: *Verbalizes understanding and describes medication purposes and frequencies Outcome: Resolved/Met Goal: *Identifies cardiac risk factors Outcome: Resolved/Met Goal: *No signs and symptoms of infection or wound complications Outcome: Resolved/Met Goal: *Anxiety reduced or absent Outcome: Resolved/Met Goal: *Verbalizes and demonstrates incision care Outcome: Resolved/Met Goal: *Understands and describes signs and symptoms to report to providers(Stroke Metric) Outcome: Resolved/Met Goal: *Describes follow-up/return visits to physicians Outcome: Resolved/Met Goal: *Describes available resources and support systems Outcome: Resolved/Met Goal: *Influenza immunization Outcome: Resolved/Met Goal: *Pneumococcal immunization Outcome: Resolved/Met

## 2020-09-08 NOTE — PROGRESS NOTES
1240 Report called to Opal Bernabe RN on Gen Surg. All questions answered. Will transfer shortly. 1320 Informed by CM that patient will be discharged to rehab today. Transfer on hold. 1520 Report called to Annabella Rosales RN at [place]. Annabella Rosales requested to keep at least one IV site. This RN removed 20 R wrist and kept 22 R FA (most recent site). [id number] Discussed discharge instructions with patient. All questions answered. VSS. R BKA dressing changed. [facility] at bedside, transferred patient via stretcher. All belongings with patient, along with discharge paperwork. Emtala, copy of discharge instructions, and prescriptions given to [facility]. Joel Alonzo made aware that patient is in route.

## 2020-09-11 NOTE — PROGRESS NOTES
Covid negative. Resulted 9/9/2020. CM faxed to [facility] [phone] Asim Alexander RNCM Ext [phone]

## 2020-09-30 NOTE — ADVANCED PRACTICE NURSE
A flutter ablation 9/4/20 for new a flutter. On Eliquis x 4 weeks. Recommended FU w/ EP after ablation prior to surgery with Dr. Matilda Putnam 10/9/20. RAY RN to notify surgeon's office to schedule EP FU.

## 2020-09-30 NOTE — PERIOP NOTES
[facility] Preoperative Instructions Surgery Date 10/9/20          Time of Arrival 1100 
 
1. On the day of your surgery, please report to the Surgical Services Registration Desk and sign in at your designated time. The Surgery Center is located to the right of the Emergency Room. 2. You must have someone with you to drive you home. You should not drive a car for 24 hours following surgery. Please make arrangements for a friend or family member to stay with you for the first 24 hours after your surgery. 3. Do not have anything to eat or drink (including water, gum, mints, coffee, juice) after midnight ?10/8/20? Delwyn Scarce ? This may not apply to medications prescribed by your physician. ?(Please note below the special instructions with medications to take the morning of your procedure.) 4. We recommend you do not drink any alcoholic beverages for 24 hours before and after your surgery. 5. Contact your surgeons office for instructions on the following medications: non-steroidal anti-inflammatory drugs (i.e. Advil, Aleve), vitamins, and supplements. (Some surgeons will want you to stop these medications prior to surgery and others may allow you to take them) **If you are currently taking Plavix, Coumadin, Aspirin and/or other blood-thinning agents, contact your surgeon for instructions. ** Your surgeon will partner with the physician prescribing these medications to determine if it is safe to stop or if you need to continue taking. Please do not stop taking these medications without instructions from your surgeon 6. Wear comfortable clothes. Wear glasses instead of contacts. Do not bring any money or jewelry. Please bring picture ID, insurance card, and any prearranged co-payment or hospital payment. Do not wear make-up, particularly mascara the morning of your surgery. Do not wear nail polish, particularly if you are having foot /hand surgery.   Wear your hair loose or down, no ponytails, buns, jasper pins or clips. All body piercings must be removed. Please shower with antibacterial soap for three consecutive days before and on the morning of surgery, but do not apply any lotions, powders or deodorants after the shower on the day of surgery. Please use a fresh towels after each shower. Please sleep in clean clothes and change bed linens the night before surgery. Please do not shave for 48 hours prior to surgery. Shaving of the face is acceptable. 7. You should understand that if you do not follow these instructions your surgery may be cancelled. If your physical condition changes (I.e. fever, cold or flu) please contact your surgeon as soon as possible. 8. It is important that you be on time. If a situation occurs where you may be late, please call [phone] (OR Holding Area). 9. If you have any questions and or problems, please call [phone] (Pre-admission Testing). 10. Your surgery time may be subject to change. You will receive a phone call the evening prior if your time changes. 11.  If having outpatient surgery, you must have someone to drive you here, stay with you during the duration of your stay, and to drive you home at time of discharge. Special Instructions: *Eliquis should be completed by 10/4 (ordered x 4 weeks on 9/4) *needs to have follow-up appointment with cardiology Dr. Santo Barrera prior to surgery TAKE ALL MEDICATIONS DAY OF SURGERY EXCEPT: 
Vitamins/supplements I understand a pre-operative phone call will be made to verify my surgery time. In the event that I am not available, I give permission for a message to be left on my answering service and/or with another person? [employer] [phone] 
 
 
 
 ___________________      __________   _________ 
  (Signature of Patient)             (Witness)                (Date and Time)

## 2020-09-30 NOTE — PERIOP NOTES
Called MD office and spoke with Larry Maxwell. Notified her patient needs to f/u with Dr. Venessa Mata prior to surgery. Also let her know patient is currently at [facility] and [address] [place] [address] and also will likely not be able to make it to [facility] for covid testing due on 10/5 as he has dialysis Monday mornings and set transportation. Delilah to work on cardiology f/u appt and with [phone].

## 2020-10-01 NOTE — PROGRESS NOTES
Subjective:  
  
Isaias Devi is a 38 y.o. male is here for EP consult. Sp afl ablation. The patient denies chest pain/ shortness of breath, orthopnea, PND, LE edema, palpitations, syncope, presyncope or fatigue. Patient Active Problem List  
 Diagnosis Date Noted  Atrial flutter ([address] 09/03/2020  Sepsis ([address] 08/12/2020  Cellulitis of right foot 08/12/2020  Acute respiratory failure with hypoxia and hypercapnia ([address] 08/12/2020  Osteomyelitis of second toe of right foot ([address] 08/07/2020  Foot ulcer ([address] 01/06/2020  ESRD (end stage renal disease) ([address] 01/06/2020  Wound cellulitis 01/06/2020  Osteomyelitis ([address] 10/08/2019  Uncontrolled hypertension 06/29/2019  Acute osteomyelitis of toe of right foot ([address] 06/29/2019  ESRD (end stage renal disease) on dialysis ([address] 09/14/2018  Hyperlipidemia associated with type 2 diabetes mellitus ([address] 09/14/2018  Morbid obesity with body mass index (BMI) of 40.0 to 49.9 ([address] 09/14/2018  Uncontrolled type 2 diabetes mellitus with proliferative retinopathy, with long-term current use of insulin ([address] 09/14/2018  Uncontrolled type 2 diabetes mellitus with hyperglycemia, with long-term current use of insulin ([address] 09/14/2018  Diarrhea 12/06/2017  Postoperative hypoxia 05/10/2017  
 HTN (hypertension) 10/16/2009 Shiraz Tyson DO Past Medical History:  
Diagnosis Date  Arrhythmia   
 aflutter  Blind  Cataracts  Cellulitis and abscess of foot  Chronic kidney disease [facility]  
 Diabetes ([address] DM II  
 GERD (gastroesophageal reflux disease)  Hearing loss  Hypercholesterolemia  Hypertension  Neuropathy  Obesity  Osteomyelitis ([address]  Puerperal sepsis with acute hypoxic respiratory failure (HCC)  Sepsis ([address] Past Surgical History:  
Procedure Laterality Date  CARDIAC SURG PROCEDURE UNLIST  09/2020  
 ablation  COLONOSCOPY N/A 12/8/2017 COLONOSCOPY performed by Mary Washington MD at [facility] ENDOSCOPY  COLONOSCOPY,DIAGNOSTIC  12/8/2017  HX AFIB ABLATION  09/04/2020  HX AMPUTATION Left great toe and L 5th toe  HX AMPUTATION Right   
 bka  HX AMPUTATION TOE Right  HX VASCULAR ACCESS    
 LA COMPRE ELECTROPHYSIOL XM W/LEFT ATRIAL PACNG/REC N/A 9/4/2020 Lt Atrial Pace & Record During Ep Study performed by Cesar Cruz MD at [facility] C[facility]AC CATH LAB  LA EPHYS EVAL W/ABLATION SUPRAVENT ARRHYTHMIA N/A 9/4/2020 ABLATION A-FLUTTER performed by Cesar Cruz MD at [facility] C[facility]ATH LAB  LA INTRACARDIAC ELECTROPHYSIOLOGIC 3D MAPPING N/A 9/4/2020 Ep 3d Mapping performed by Cesar Cruz MD at [facility] CARDIAC CATH LAB  UPPER GI ENDOSCOPY,BIOPSY  12/8/2017  VASCULAR SURGERY PROCEDURE UNLIST    
 R side chest  
 VASCULAR SURGERY PROCEDURE UNLIST Left 07/25/2017 Creation transposed AV fistula arm No Known Allergies Family History Problem Relation Age of Onset  Diabetes Mother  Liver Disease Father  Kidney Disease Maternal Grandfather  Diabetes Maternal Grandfather  Hypertension Maternal Grandfather  Diabetes Paternal Uncle  Hypertension Paternal Uncle   
 negative for cardiac disease Social History Socioeconomic History  Marital status: LEGALLY  Spouse name: Not on file  Number of children: Not on file  Years of education: Not on file  Highest education level: Not on file Tobacco Use  Smoking status: Former Smoker Packs/day: 0.25 Years: 20.00 Pack years: 5.00 Last attempt to quit: 6/1/2017 Years since quitting: 3.3  Smokeless tobacco: Never Used  Tobacco comment: \"quit about a year ago\" Substance and Sexual Activity  Alcohol use: Not Currently Comment: rarely  Drug use: No  
 Sexual activity: Yes  
  Partners: Female Current Outpatient Medications Medication Sig  
 gabapentin (NEURONTIN) 300 mg capsule 300 mg.  sevelamer (RENAGEL) 800 mg tablet Take  by mouth three (3) times daily (with meals).  insulin glargine (LANTUS) 100 unit/mL injection 20 Units by SubCUTAneous route nightly. (Patient taking differently: 22 Units by SubCUTAneous route nightly.)  loperamide (IMODIUM) 2 mg capsule Take 1 Cap by mouth four (4) times daily as needed for Diarrhea.  oxyCODONE IR (ROXICODONE) 5 mg immediate release tablet Take 5 mg by mouth every six (6) hours as needed for Pain.  PEG 3350-Electrolytes (GO-LYTELY) 236-22.74-6.74 -5.86 gram suspension Take  by mouth daily as needed (17 gm in 8 oz of water).  folic acid/vit B complex and C (RENAL VITAMIN PO) Take  by mouth daily.  apixaban (Eliquis) 2.5 mg tablet Take 1 Tab by mouth two (2) times a day for 30 days. (Patient taking differently: Take 2.5 mg by mouth two (2) times a day. On x4 weeks, started 9/4, should be done 10/4) No current facility-administered medications for this visit. Vitals:  
 10/01/20 1036 BP: 112/70 Pulse: 97 Resp: 18 SpO2: 97% Weight: 330 lb (149.7 kg) Height: 6' 2\" (1.88 m) I have reviewed the nurses notes, vitals, problem list, allergy list, medical history, family, social history and medications. Review of Symptoms: 
 
General: Pt denies excessive weight gain or loss. Pt is able to conduct ADL's HEENT: Denies blurred vision, headaches, hearing loss, epistaxis and difficulty swallowing. Respiratory: Denies cough, congestion, shortness of breath, LITTLE, wheezing or stridor. Cardiovascular: Denies precordial pain, palpitations, edema or PND Gastrointestinal: Denies poor appetite, indigestion, abdominal pain or blood in stool Genitourinary: Denies hematuria, dysuria, increased urinary frequency Musculoskeletal: Denies joint pain or swelling from muscles or joints Neurologic: Denies tremor, paresthesias, headache, or sensory motor disturbance Psychiatric: Denies confusion, insomnia, depression Integumentray: Denies rash, itching or ulcers. Hematologic: Denies easy bruising, bleeding Physical Exam:   
 
General: Well developed, in no acute distress. HEENT: Eyes - PERRL, no jvd Heart:  Normal S1/S2 negative S3 or S4. Regular, no murmur, gallop or rub. Respiratory: Clear bilaterally x 4, no wheezing or rales Abdomen:   Soft, non-tender, bowel sounds are active. Extremities:  Sp right bka, No edema, normal cap refill, no cyanosis. Musculoskeletal: No clubbing Neuro: A&Ox3, speech clear, gait stable. Skin: Skin color is normal. No rashes or lesions. Non diaphoretic, no ulcers or subcutaneous nodule, dressing right leg - c/d/i Vascular: 2+ pulses symmetric in all extremities Psych - judgement intact and orientation is wnl Cardiographics Ekg: nsr Results for orders placed or performed during the hospital encounter of 08/12/20 EKG, 12 LEAD, INITIAL Result Value Ref Range Ventricular Rate 77 BPM  
 Atrial Rate 308 BPM  
 QRS Duration 122 ms  
 Q-T Interval 426 ms  
 QTC Calculation (Bezet) 482 ms Calculated P Axis -106 degrees Calculated R Axis -4 degrees Calculated T Axis 126 degrees Diagnosis Atrial flutter with 4:1 AV conduction Left ventricular hypertrophy with QRS widening and repolarization abnormality ( Maxim product , Romhilt-Rapp ) Confirmed by Daune Dance ([id number]) on 9/5/2020 8:52:24 PM 
  
 
 
 
Lab Results Component Value Date/Time WBC 11.7 (H) 09/07/2020 02:36 AM  
 Hemoglobin (POC) 11.9 (L) 07/25/2017 10:12 AM  
 HGB 10.3 (L) 09/07/2020 02:36 AM  
 Hematocrit (POC) 33 (L) 08/24/2020 10:51 AM  
 HCT 32.5 (L) 09/07/2020 02:36 AM  
 PLATELET 443 32/80/0792 02:36 AM  
 MCV 83.1 09/07/2020 02:36 AM  
  
Lab Results Component Value Date/Time  Sodium 133 (L) 09/04/2020 09:04 AM  
 Potassium 3.7 09/04/2020 09:04 AM  
 Chloride 94 (L) 09/04/2020 09:04 AM  
 CO2 28 2020 09:04 AM  
 Anion gap 11 2020 09:04 AM  
 Glucose 257 (H) 2020 09:04 AM  
 BUN 62 (H) 2020 09:04 AM  
 Creatinine 7.45 (H) 2020 09:04 AM  
 BUN/Creatinine ratio 8 (L) 2020 09:04 AM  
 GFR est AA 10 (L) 2020 09:04 AM  
 GFR est non-AA 8 (L) 2020 09:04 AM  
 Calcium 8.3 (L) 2020 09:04 AM  
 Bilirubin, total 1.2 (H) 2020 04:16 AM  
 Alk. phosphatase 83 2020 04:16 AM  
 Protein, total 9.8 (H) 2020 04:16 AM  
 Albumin 1.7 (L) 2020 04:16 AM  
 Globulin 8.1 (H) 2020 04:16 AM  
 A-G Ratio 0.2 (L) 2020 04:16 AM  
 ALT (SGPT) 26 2020 04:16 AM  
  
 
 Assessment: 
 
 Assessment: ICD-10-CM ICD-9-CM 1. Atrial flutter, unspecified type ([facility]  I48.92 427.32 AMB POC EKG ROUTINE W/ 12 LEADS, INTER & REP 2. Essential hypertension  I10 401.9 3. Uncontrolled type 2 diabetes mellitus with hyperglycemia, with long-term current use of insulin (HCC)  E11.65 250.02   
 Z79.4 V58.67   
4. Hyperlipidemia associated with type 2 diabetes mellitus (HCC)  E11.69 250.80 E78.5 272.4 Orders Placed This Encounter  AMB POC EKG ROUTINE W/ 12 LEADS, INTER & REP Order Specific Question:   Reason for Exam: Answer:   routine  gabapentin (NEURONTIN) 300 mg capsule Si mg.  
 DISCONTD: sevelamer carbonate (RENVELA) 800 mg tab tab Si mg. Plan:  
Ceci Orantes is sp afl ablation. He is in sinus and asymptomatic. Cont oac for a total of 4 weeks. Cont med rx for htn and hyperlipidemia. Can f/u prn. Continue medical management for afl, htn and hyperlipidemia. Thank you for allowing me to participate in Ceci Orantes 's care.  
 
Berta Parker MD, Kathi Goldman

## 2020-10-01 NOTE — PROGRESS NOTES
Chief Complaint Patient presents with  Post OP Follow Up  
  from ablation on 9/4/20- denies cardiac sx  Pre-op Exam  
  needs clearance for debridement of right leg - below the knee amp on 8/245/20 1. Have you been to the ER, urgent care clinic since your last visit? Hospitalized since your last visit? Yes see above 2. Have you seen or consulted any other health care providers outside of the [address] [place] [address] since your last visit? Include any pap smears or colon screening.   Yes [facility]

## 2020-10-01 NOTE — LETTER
10/1/20 Patient: Grazyna Francis YOB: 1985 Date of Visit: 10/1/2020 Professor Ruthann Haro DO 
[address] [facility] [address] [id number] VIA In Basket Dear Professor Ruthann Haro DO, Thank you for referring Mr. Lion Iraheta to [address] [place] [address] for evaluation. My notes for this consultation are attached. If you have questions, please do not hesitate to call me. I look forward to following your patient along with you. Sincerely, Maya Bojorquez MD

## 2020-10-08 NOTE — PROGRESS NOTES
This patient was seeking cardiac clearance. He was seen by K in the hosp and G did afl ablation. Stress is abnormal. I defer clearance and follow up to you all. Thanks.

## 2020-10-09 PROBLEM — R94.39 ABNORMAL STRESS TEST: Status: ACTIVE | Noted: 2020-01-01

## 2020-10-09 NOTE — PROGRESS NOTES
Kait Roach,    His stress test was abnormal.  He needs to f/u with Dr. Cheryl Stringer to discuss further -- his surgery will need to be postponed until we complete our workup.     Thanks,  [employer]

## 2020-10-09 NOTE — PROGRESS NOTES
1700-pt in IVCU unit, oriented to unit, vitals taken, call bell within reach, PIV started, NP TERESA to see pt, labs drawn, WOUND Nurse at bedside to assess pt, new wound dressing applied to right stump, recommendations in per wound nurse, will continue to monitor and act accordingly 1830-this nurse called consult to nephrology for consult, this nurse spoke with MD Nirmal Nettles, this MD stated that HD will happen tomorrow after cath procedure, pt up in bed, resting at this time, denies pain, denies SOB, will monitor 1900-report given to [facility] RN, all questions answered

## 2020-10-09 NOTE — Clinical Note
TRANSFER - OUT REPORT:     Verbal report given to: jona bella. Report consisted of patient's Situation, Background, Assessment and   Recommendations(SBAR). Opportunity for questions and clarification was provided. Patient transported with a Registered Nurse. Patient transported to: ivcu.

## 2020-10-09 NOTE — Clinical Note
Lesion: Located in the Proximal LAD. Stent inserted. Stent deployed. Single technique used. First inflation pressure = 16 osiris; inflation time: 15 sec.

## 2020-10-09 NOTE — Clinical Note
Lesion located in the Proximal LAD. Balloon inserted. Balloon inflated using single inflation technique. Lesion #1: Pressure = 12 osiris; Duration = 20 sec.

## 2020-10-09 NOTE — Clinical Note
Lesion: Located in the Mid LAD. Stent inserted. Stent deployed. First inflation pressure = 12 osiris; inflation time: 15 sec.

## 2020-10-09 NOTE — Clinical Note
Lesion: Located in the Distal RCA. Stent inserted. Stent deployed. First inflation pressure = 16 osiris; inflation time: 15 sec.

## 2020-10-09 NOTE — WOUND CARE
Wound Care consulted to recommend a dressing for this patient. Pt. Was admitted for a vascular procedure to be done tomorrow. He was supposed to get a BKA wound debridement today but the procedure needed to be done before that. Assessment: the BKA is one month post op and the staples are loose and there is necrotic tissue in the wound that is  the wound. On the lateral side of the wound there is a pink wound bed but no granulation present. This is surrounded by black eschar. 10-9-2020 - overall BKA wound. 10-9-2020 - Close up of the right BKA wound. Clearly devitalized And no longer approximated. Loose staples. Recommend a packing of iodoform packing in the lateral aspect of the wound. Top the rest of the wound with NS moist gauze and cover with a high drainage pack. Wrap with maggie and tape to secure. Plan: Definitive plan per Dr. Zac Cleveland after today.   
Sukh Lau, RN,BSN, CWON

## 2020-10-09 NOTE — Clinical Note
TRANSFER - OUT REPORT:     Verbal report given to: Radha Vance. Report consisted of patient's Situation, Background, Assessment and   Recommendations(SBAR). Opportunity for questions and clarification was provided. Patient transported with a Registered Nurse. Patient transported to: IVCU.

## 2020-10-09 NOTE — TELEPHONE ENCOUNTER
----- Message from Estrella Pitt NP sent at 10/9/2020 10:25 AM EDT -----  Darcy Nazario,    His stress test was abnormal.  He needs to f/u with Dr. Ciara Asif to discuss further -- his surgery will need to be postponed until we complete our workup.     Thanks,  Matilda Huynh

## 2020-10-09 NOTE — Clinical Note
Lesion: Located in the Mid Cx. Stent inserted. Stent deployed. Single technique used. First inflation pressure = 16 osiris; inflation time: 15 sec.

## 2020-10-09 NOTE — Clinical Note
Lesion located in the Distal RCA. Balloon inserted. Lesion #1: Pressure = 12 osiris; Duration = 15 sec.

## 2020-10-09 NOTE — CONSULTS
[address] [facility] Date of  Admission: 10/9/2020  2:36 PM  
 
Admission type:Elective Consult for: 
Consult by: Subjective:  
 
Tiffani Quiñonez is a 38 y.o. male admitted for Abnormal stress test [R94.39]. Needs debridement of stump. Just finally had his stress because of + trop last admit. And its abnormal and high risk. Denies CP recently Patient Active Problem List  
 Diagnosis Date Noted  Abnormal stress test 10/09/2020  Atrial flutter ([address] 09/03/2020  Sepsis ([address] 08/12/2020  Cellulitis of right foot 08/12/2020  Acute respiratory failure with hypoxia and hypercapnia ([address] 08/12/2020  Osteomyelitis of second toe of right foot ([address] 08/07/2020  Foot ulcer ([address] 01/06/2020  ESRD (end stage renal disease) ([address] 01/06/2020  Wound cellulitis 01/06/2020  Osteomyelitis ([address] 10/08/2019  Uncontrolled hypertension 06/29/2019  Acute osteomyelitis of toe of right foot ([address] 06/29/2019  ESRD (end stage renal disease) on dialysis ([address] 09/14/2018  Hyperlipidemia associated with type 2 diabetes mellitus ([address] 09/14/2018  Morbid obesity with body mass index (BMI) of 40.0 to 49.9 ([address] 09/14/2018  Uncontrolled type 2 diabetes mellitus with proliferative retinopathy, with long-term current use of insulin ([address] 09/14/2018  Uncontrolled type 2 diabetes mellitus with hyperglycemia, with long-term current use of insulin ([address] 09/14/2018  Diarrhea 12/06/2017  Postoperative hypoxia 05/10/2017  
 HTN (hypertension) 10/16/2009 Matilda Babcock DO Past Medical History:  
Diagnosis Date  Arrhythmia   
 aflutter  Blind  Cataracts  Cellulitis and abscess of foot  Chronic kidney disease [facility]  
 Diabetes ([address] DM II  
 GERD (gastroesophageal reflux disease)  Hearing loss  Hypercholesterolemia  Hypertension  Neuropathy  Obesity  Osteomyelitis ([facility]  Puerperal sepsis with acute hypoxic respiratory failure (HCC)  Sepsis ([facility] Social History Socioeconomic History  Marital status: LEGALLY  Spouse name: Not on file  Number of children: Not on file  Years of education: Not on file  Highest education level: Not on file Tobacco Use  Smoking status: Former Smoker Packs/day: 0.25 Years: 20.00 Pack years: 5.00 Last attempt to quit: 6/1/2017 Years since quitting: 3.3  Smokeless tobacco: Never Used  Tobacco comment: \"quit about a year ago\" Substance and Sexual Activity  Alcohol use: Not Currently Comment: rarely  Drug use: No  
 Sexual activity: Yes  
  Partners: Female No Known Allergies Family History Problem Relation Age of Onset  Diabetes Mother  Liver Disease Father  Kidney Disease Maternal Grandfather  Diabetes Maternal Grandfather  Hypertension Maternal Grandfather  Diabetes Paternal Uncle  Hypertension Paternal Uncle No current facility-administered medications for this encounter. Review of Symptoms:  
11 systems reviewed, negative other than as stated in the HPI Objective: There were no vitals taken for this visit. Physical:  
General:  
Heart: RRR, no m/S3/JVD, no carotid bruits Lungs: clear Abdomen: Soft, +BS, NTND Extremities: LE alfredo +DP/PT, no edema Neurologic: Grossly normal 
 
Data Review: No results for input(s): WBC, HGB, HCT, PLT, HGBEXT, HCTEXT, PLTEXT in the last 72 hours. No results for input(s): NA, K, CL, CO2, GLU, BUN, CREA, CA, MG, PHOS, ALB, TBIL, TBILI, ALT, INR, INREXT in the last 72 hours. No lab exists for component: SGOT,  BNP No results for input(s): TROIQ, CPK, CKMB in the last 72 hours. No intake or output data in the 24 hours ending 10/09/20 1632 Cardiographics Telemetry:  
ECG: NSR Echocardiogram:  
CXRAY: 
 
 
 Assessment: Active Problems: 
  Abnormal stress test (10/9/2020) Plan:  
 
Cardiac cath over the weekend if possible due to + trop last admission and abnormal OP myoview.  
 
Paulino Edwards MD

## 2020-10-09 NOTE — Clinical Note
Lesion located in the Mid LAD. Balloon inserted. Balloon inflated using single inflation technique. Lesion #1: Pressure = 8 osiris; Duration = 11 sec.

## 2020-10-09 NOTE — Clinical Note
Sheath #1: Closed using Angio-Seal and Hemostasis Pad. Site secured by Tegaderm. Pressure held for: 3 minutes.

## 2020-10-10 PROBLEM — I25.10 CAD (CORONARY ARTERY DISEASE): Status: ACTIVE | Noted: 2020-01-01

## 2020-10-10 NOTE — CONSULTS
Consultation Note NAME: Fco Siddiqui :  1985 MRN:  [id number] Date/Time:  10/10/2020 9:32 AM 
 
I have been asked to see this patient by Dr. Giuliana Peterson  for advice/opinion re: ESRD. Assessment :    Plan: 
[facility] 
Anemia CAD He missed HD yesterday. Plan for HD today after cath then HD again Monday. Holding FARIDA We will see again Monday. Please call if any questions. Subjective: CHIEF COMPLAINT:  \"I have a cath today. \" HISTORY OF PRESENT ILLNESS:    
Jaziel Sharma is a 38 y.o.   male who has a history of ESRD admitted with abnormal stress test. He says that he had a stress test that was abnormal and that he is going to get a cath today. At present he denies any CP/SOB. NO N/V. Hungry. Past Medical History:  
Diagnosis Date  Arrhythmia   
 aflutter  Blind  Cataracts  Cellulitis and abscess of foot  Chronic kidney disease [facility]  
 Diabetes ([facility] [address] DM II  
 GERD (gastroesophageal reflux disease)  Hearing loss  Hypercholesterolemia  Hypertension  Neuropathy  Obesity  Osteomyelitis ([facility] [address]  Puerperal sepsis with acute hypoxic respiratory failure (HCC)  Sepsis ([facility] [address] Past Surgical History:  
Procedure Laterality Date  CARDIAC SURG PROCEDURE UNLIST  2020  
 ablation  COLONOSCOPY N/A 2017 COLONOSCOPY performed by Katie Rose MD at [facility] ENDOSCOPY  COLONOSCOPY,DIAGNOSTIC  2017  HX AFIB ABLATION  2020  HX AMPUTATION Left great toe and L 5th toe  HX AMPUTATION Right   
 bka  HX AMPUTATION TOE Right  HX VASCULAR ACCESS    
 KS COMPRE ELECTROPHYSIOL XM W/LEFT ATRIAL PACNG/REC N/A 2020 Lt Atrial Pace & Record During Ep Study performed by Jose Paz MD at [facility] C[facility] CATH LAB  KS EPHYS EVAL W/ABLATION SUPRAVENT ARRHYTHMIA N/A 2020  ABLATION A-FLUTTER performed by Jose Paz MD at [facility] CARD[facility] CATH [facility]  
  DC INTRACARDIAC ELECTROPHYSIOLOGIC 3D MAPPING N/A 9/4/2020 Ep 3d Mapping performed by Jim Schmitt MD at [facility] C[facility] CATH LAB  UPPER GI ENDOSCOPY,BIOPSY  12/8/2017  VASCULAR SURGERY PROCEDURE UNLIST    
 R side chest  
 VASCULAR SURGERY PROCEDURE UNLIST Left 07/25/2017 Creation transposed AV fistula arm Social History Tobacco Use  Smoking status: Former Smoker Packs/day: 0.25 Years: 20.00 Pack years: 5.00 Last attempt to quit: 6/1/2017 Years since quitting: 3.3  Smokeless tobacco: Never Used  Tobacco comment: \"quit about a year ago\" Substance Use Topics  Alcohol use: Not Currently Comment: rarely Family History Problem Relation Age of Onset  Diabetes Mother  Liver Disease Father  Kidney Disease Maternal Grandfather  Diabetes Maternal Grandfather  Hypertension Maternal Grandfather  Diabetes Paternal Uncle  Hypertension Paternal Uncle No Known Allergies Prior to Admission medications Medication Sig Start Date End Date Taking? Authorizing Provider  
gabapentin (NEURONTIN) 300 mg capsule 300 mg. 9/22/20   Provider, Historical  
oxyCODONE IR (ROXICODONE) 5 mg immediate release tablet Take 5 mg by mouth every six (6) hours as needed for Pain. Provider, Historical  
PEG 3350-Electrolytes (GO-LYTELY) 236-22.74-6.74 -5.86 gram suspension Take  by mouth daily as needed (17 gm in 8 oz of water). Provider, Historical  
folic acid/vit B complex and C (RENAL VITAMIN PO) Take  by mouth daily. Provider, Historical  
sevelamer (RENAGEL) 800 mg tablet Take 800 mg by mouth three (3) times daily (with meals). Provider, Historical  
insulin glargine (LANTUS) 100 unit/mL injection 20 Units by SubCUTAneous route nightly. Patient taking differently: 22 Units by SubCUTAneous route nightly.  9/1/20   Nura Jordan MD  
loperamide (IMODIUM) 2 mg capsule Take 1 Cap by mouth four (4) times daily as needed for Diarrhea. 12/9/17   Sandra Gambino MD  
 
REVIEW OF SYSTEMS:   
 []  Unable to obtain reliable ROS due to  [] mental status  [] sedated   [] intubated 
 [x] Total of 12 systems reviewed as follows: 
Constitutional: negative fever, negative chills, negative weight loss Eyes:   negative visual changes ENT:   negative sore throat, tongue or lip swelling Respiratory:  negative cough, negative dyspnea Cards:  negative for chest pain, palpitations, lower extremity edema GI:   negative for nausea, vomiting, diarrhea, and abdominal pain :  negative for frequency, dysuria Integument:  negative for rash and pruritus Heme:  negative for easy bruising and gum/nose bleeding Musculoskel: negative for myalgias,  back pain and muscle weakness Neuro:  negative for headaches, dizziness, vertigo Psych:  negative for feelings of anxiety, depression Travel?: none Objective: VITALS:   
Visit Vitals BP (!) 162/100 (BP 1 Location: Right arm, BP Patient Position: At rest;Supine) Pulse 79 Temp 98 °F (36.7 °C) Resp 18 Ht 6' 2\" (1.88 m) Wt 153.5 kg (338 lb 8 oz) SpO2 98% BMI 43.46 kg/m² PHYSICAL EXAM: 
Gen:  [x]  WD [x]  WN  [] cachectic []  thin []  obese []  disheveled 
           []  ill apearing  []   Critical  []   Chronic    []  No acute distress HEENT:   [x] NC/AT/PERRL [x] pink conjunctivae      [] pale conjunctivae PERRL  [] yes  [] no      [] moist mucosa    [] dry mucosa 
  hearing intact to voice [] yes  [] No 
              
NECK:   supple [x] yes  [] no        masses [] yes  [] No 
             thyroid  []  non tender  []  tender RESP:   [x] CTA bilaterally/no wheezing/rhonchi/rales/crackles [] rhonchi bilaterally - no dullness  [] wheezing   [] rhonchi   [] crackles  
  use of accessory muscles [] yes [] no CARD:   [x]  regular rate and rhythm/No murmurs/rubs/gallops 
  murmur  [] yes ()  [] no      Rubs  [] yes  [] no       Gallops [] yes [] no Rate []  regular  []  irregular        carotid bruits  [] Right  []  Left LE edema [] yes  [x] no           JVP  []  yes   []  no 
 
ABD:    [x] soft/non distended/non tender/+bowel sounds/no HSM []  Rigid    tenderness [] yes [] no   Liver enlargement  []  yes []  no  
             Spleen enlargement  []  yes []  no     distended []  yes [] no  
  bowel sound  [] hypoactive   [] hyperactive LYMPH:    Neck []  yes [x]  no       Axillae []  yes [x]  no SKIN:   Rashes []  yes   [x]  no    Ulcers []  yes   [x]  no 
             [] tight to palpitation    skin turgor []  good  [] poor  [] decreased Cyanosis/clubbing []  yes []  no 
 
NEUR:   [x] cranial nerves II-XII grossly intact    
  [] Cranial nerves deficit 
               []  facial droop    []  slurred speech   [] aphasic  
  [] Strength normal     []  weakness  []  LUE  []   RUE/ []  LLE  []   RLE 
  follows commands  [x]  yes []  no        
 
PSYCH:   insight [] poor [x] good   Alert and Oriented to  [x] person  [x] place  [x]  time  
                 [] depressed [] anxious [] agitated  [] lethargic [] stuporous  [] sedated LAB DATA REVIEWED:   
Recent Labs 10/09/20 
1721 WBC 8.4 HGB 10.6* HCT 33.8*  
 Recent Labs 10/09/20 
1721   
K 4.8  
CL 99  
CO2 32 BUN 65* CREA 10.70* GLU 85  
CA 9.1 No results for input(s): ALT, AP, TBIL, TBILI, ALB, GLOB, GGT, AML, LPSE in the last 72 hours. No lab exists for component: SGOT, GPT, AMYP, HLPSE No results for input(s): INR, PTP, APTT, INREXT in the last 72 hours. No results for input(s): FE, TIBC, PSAT, FERR in the last 72 hours. No results for input(s): PH, PCO2, PO2 in the last 72 hours. No results for input(s): CPK, CKMB in the last 72 hours. No lab exists for component: TROPONINI Lab Results Component Value Date/Time  Glucose (POC) 103 (H) 10/10/2020 08:54 AM  
 Glucose (POC) 79 10/10/2020 07:47 AM  
 Glucose (POC) 211 (H) 10/10/2020 12:07 AM  
 Glucose (POC) 87 10/09/2020 05:34 PM  
 Glucose (POC) 96 09/08/2020 11:05 AM  
 
 
Procedures: see electronic medical records for all procedures/Xrays and details which were not copied into this note but were reviewed prior to creation of Plan.   
________________________________________________________________________ 
  
 
___________________________________________________ Consulting Physician: Scot John MD

## 2020-10-10 NOTE — PHYSICIAN ADVISORY
We recommend that the following pt's hospitalization under OUTPATIENT status  Be upgraded to INPATIENT status. Name:          Ceci Orantes :            1985 [facility]# :         [id number] Insurance:   Medicare Part A and B Clinical summary  39 y/o gentleman with a PMH significant for ESRD on HD, Hypertension, Dyslipidemia, DM, obesity and S/P R. BKA admitted for pre-op evaluation with a [facility] for abnormal stress test ahead of surgical debridement procedure on 10/12/20. Patient is also requiring optimization of BP control as noted to be in Hypertensive urgency. Nephrology service consulted for HD arrangements. Vitals                  /100, HR 79/min, RR 18/min, Afebrile, O2 sats Labs and Imaging  H/H 10.6/33.8, BUN/Cr 65/10.70 MCG criteria applies  No  
Comments  Placed at increased risk of decompensation. This chart was reviewed at 9:50 AM 10/10/2020 Thai Vasquez MD FACP Physician Advisor [facility] [employer] Cell: [phone] 
_______________________________________________________________________________________ The information in this document is a recommendation to be used for utilization review and utilization management purposes only. This recommendation is not an order. The recommendation is made based on the information reviewed at the time of the referral, is pursuant to the [facility] Conditions of Participation (42 CFR Part 482), and is neither a judgment nor an assessment with regard to the appropriateness or quality of clinical care. Nothing in this document may be used to limit, alter, or affect clinical services provided to the patient named below. The provider of services is ultimately responsible for the submission of a claim that has met all requirements for correct coding, billing, and reimbursement.

## 2020-10-10 NOTE — PROGRESS NOTES
Bedside shift change report given to Margy Ford RN (oncoming nurse) by Andres Reyes RN (offgoing nurse). Report included the following information SBAR, Kardex, Procedure Summary, Intake/Output, MAR, Accordion, Recent Results, Med Rec Status, Cardiac Rhythm NSR, Alarm Parameters , Pre Procedure Checklist, Procedure Verification, Quality Measures and Dual Neuro Assessment.

## 2020-10-10 NOTE — PROGRESS NOTES
10/10/2020 8:15 AM 
 
Admit Date: 10/9/2020 Admit Diagnosis: Abnormal stress test [R94.39] Subjective:  
 
Jadon Clement   denies chest pain, chest pressure/discomfort, dyspnea, palpitations, irregular heart beats, near-syncope, syncope, fatigue, orthopnea, paroxysmal nocturnal dyspnea, exertional chest pressure/discomfort, claudication. Visit Vitals BP (!) 162/100 (BP 1 Location: Right arm, BP Patient Position: At rest;Supine) Pulse 79 Temp 98 °F (36.7 °C) Resp 18 Ht 6' 2\" (1.88 m) Wt 338 lb 8 oz (153.5 kg) SpO2 98% BMI 43.46 kg/m² Current Facility-Administered Medications Medication Dose Route Frequency  insulin glargine (LANTUS) injection 22 Units  22 Units SubCUTAneous QHS  loperamide (IMODIUM) capsule 2 mg  2 mg Oral QID PRN  
 oxyCODONE IR (ROXICODONE) tablet 5 mg  5 mg Oral Q6H PRN  
 sevelamer carbonate (RENVELA) tab 800 mg  800 mg Oral TID WITH MEALS  sodium chloride (NS) flush 5-40 mL  5-40 mL IntraVENous Q8H  
 sodium chloride (NS) flush 5-40 mL  5-40 mL IntraVENous PRN  
 acetaminophen (TYLENOL) tablet 650 mg  650 mg Oral Q6H PRN Or  
 acetaminophen (TYLENOL) suppository 650 mg  650 mg Rectal Q6H PRN  polyethylene glycol (MIRALAX) packet 17 g  17 g Oral DAILY PRN  
 aspirin delayed-release tablet 81 mg  81 mg Oral DAILY  atorvastatin (LIPITOR) tablet 20 mg  20 mg Oral QHS  metoprolol tartrate (LOPRESSOR) tablet 12.5 mg  12.5 mg Oral Q12H  hydrALAZINE (APRESOLINE) 20 mg/mL injection 20 mg  20 mg IntraVENous Q6H PRN  
 insulin lispro (HUMALOG) injection   SubCUTAneous AC&HS  
 glucose chewable tablet 16 g  4 Tab Oral PRN  
 dextrose (D50W) injection syrg 12.5-25 g  12.5-25 g IntraVENous PRN  
 glucagon (GLUCAGEN) injection 1 mg  1 mg IntraMUSCular PRN Objective:  
  
Visit Vitals BP (!) 162/100 (BP 1 Location: Right arm, BP Patient Position: At rest;Supine) Pulse 79 Temp 98 °F (36.7 °C) Resp 18 Ht 6' 2\" (1.88 m) Wt 338 lb 8 oz (153.5 kg) SpO2 98% BMI 43.46 kg/m² Physical Exam: 
Resting comfortably. No resp distress. Extremities: extremities normal, atraumatic, no cyanosis or edema Heart: regular rate and rhythm, S1, S2 normal, no murmur, click, rub or gallop Lungs: clear to auscultation bilaterally Neurologic: Grossly normal 
 
Data Review:  
Labs:   
Recent Results (from the past 24 hour(s)) METABOLIC PANEL, BASIC Collection Time: 10/09/20  5:21 PM  
Result Value Ref Range Sodium 138 136 - 145 mmol/L Potassium 4.8 3.5 - 5.1 mmol/L Chloride 99 97 - 108 mmol/L  
 CO2 32 21 - 32 mmol/L Anion gap 7 5 - 15 mmol/L Glucose 85 65 - 100 mg/dL BUN 65 (H) 6 - 20 MG/DL Creatinine 10.70 (H) 0.70 - 1.30 MG/DL  
 BUN/Creatinine ratio 6 (L) 12 - 20 GFR est AA 7 (L) >60 ml/min/1.73m2 GFR est non-AA 5 (L) >60 ml/min/1.73m2 Calcium 9.1 8.5 - 10.1 MG/DL  
CBC W/O DIFF Collection Time: 10/09/20  5:21 PM  
Result Value Ref Range WBC 8.4 4.1 - 11.1 K/uL  
 RBC 4.10 4. 10 - 5.70 M/uL  
 HGB 10.6 (L) 12.1 - 17.0 g/dL HCT 33.8 (L) 36.6 - 50.3 % MCV 82.4 80.0 - 99.0 FL  
 MCH 25.9 (L) 26.0 - 34.0 PG  
 MCHC 31.4 30.0 - 36.5 g/dL  
 RDW 16.5 (H) 11.5 - 14.5 % PLATELET 619 648 - 786 K/uL MPV 9.4 8.9 - 12.9 FL  
 NRBC 0.0 0  WBC ABSOLUTE NRBC 0.00 0.00 - 0.01 K/uL GLUCOSE, POC Collection Time: 10/09/20  5:34 PM  
Result Value Ref Range Glucose (POC) 87 65 - 100 mg/dL Performed by Ayden Buckle GLUCOSE, POC Collection Time: 10/10/20 12:07 AM  
Result Value Ref Range Glucose (POC) 211 (H) 65 - 100 mg/dL Performed by Laura Chua RN   
GLUCOSE, POC Collection Time: 10/10/20  7:47 AM  
Result Value Ref Range Glucose (POC) 79 65 - 100 mg/dL Performed by Ernie Gilliam PCT Telemetry: SR 
 
 
Assessment:  
 
Active Problems: 
  Abnormal stress test (10/9/2020) Plan: 1. Abnormal stress test, pre op: for cardiac cath today. I discussed the risks/benefits/alternatives of the procedure with the patient. Risks include (but are not limited to) bleeding, infection, cva/mi/tamponade/death. The patient understands and agrees to proceed. 2. S/p right BKA. Plans for surgical debridement on Monday planned. 3. ESRD: on HD. 4. Continue current meds. Monitor BP. Increase BB dose.

## 2020-10-10 NOTE — PROGRESS NOTES
Bedside shift change report given to Melinda Goodwin RN (oncoming nurse) by Roderick Gordon RN (offgoing nurse). Report included the following information SBAR, Kardex, Procedure Summary, Intake/Output, MAR, Accordion, Recent Results, Med Rec Status, Cardiac Rhythm NSR, Alarm Parameters , Pre Procedure Checklist, Procedure Verification, Quality Measures and Dual Neuro Assessment.

## 2020-10-10 NOTE — PROCEDURES
[facility] Dialysis Team [place]  [phone] Vitals   Pre   Post   Assessment   Pre   Post    
Temp  Temp: 97.5 °F (36.4 °C) (10/10/20 1645)  97.78 LOC  Alert and oriented x 4 same HR   Pulse (Heart Rate): 78 (10/10/20 1645) 75 Lungs   Diminished at bases  same B/P   BP: 92/62 (10/10/20 1645) 199/70 Cardiac   RRR  same Resp   Resp Rate: 16 (10/10/20 1645) 16 Skin   Dry and intact  same Pain level  Pain Intensity 1: 1 (10/10/20 1500) 4 Edema Trace 
 same Orders: Duration:   Start:    5819 End:    2100 Total:   4.25 hours Dialyzer:    Revaclear K Bath:    2k Ca Bath:    2.5 CA Na/Bicarb: Target Fluid Removal:    4kg Access Type & Location:   Left upper arm AVF,+ bruit and thrill,no s/s of infection,patent bloodflow and cannulated with x2 15 gauge needle without any issues Labs Obtained/Reviewed Critical Results Called   Date when labs were drawn- 
Hgb-   
HGB Date Value Ref Range Status 10/09/2020 10.6 (L) 12.1 - 17.0 g/dL Final  
 
K-   
Potassium Date Value Ref Range Status 10/09/2020 4.8 3.5 - 5.1 mmol/L Final  
 
Ca-  
Calcium Date Value Ref Range Status 10/09/2020 9.1 8.5 - 10.1 MG/DL Final  
 
Bun-  
BUN Date Value Ref Range Status 10/09/2020 65 (H) 6 - 20 MG/DL Final  
 
Creat-  
Creatinine Date Value Ref Range Status 10/09/2020 10.70 (H) 0.70 - 1.30 MG/DL Final  
 
  
Medications/ Blood Products Given Name   Dose   Route and Time    
     
none Blood Volume Processed (BVP):    89.3 Net Fluid Removed:  3500ml Comments Time Out Done: 9692 Primary Nurse Rpt Pre:Michael RIOS RN 
Primary Nurse Rpt Post:DIONICIO Meraz 
Pt Education:Procedural 
Care Plan:Kulwinder current plan of care Tx Summary:1645- Labs, orders and medication were reviewed. SBAR received from primary nurse. HD was started using Left upper arm AVf without any issues. 2100- HD was completed and all possible blood was returned.  Goal removed was 3.5kg d/t bp low in the middle of treatment. Post Avf + bruit and thrill. Hemostasis was achieved with gauze and tape applied. Primary nurse was given a handoff report. Call bell within reach and bed lower to safety level. Admiting Diagnosis: Heart catherization Pt's previous clinic- [place] end Consent signed -  Obtained Hepatitis Status- Negtaive Hep B AG 08/23/2020 Machine #-  [id number] Telemetry status- Monitored at bedside Pre-dialysis wt. -  n/a

## 2020-10-11 NOTE — PROGRESS NOTES
10/11/2020 7:35 AM 
 
Admit Date: 10/9/2020 Admit Diagnosis: Abnormal stress test [R94.39];CAD (coronary artery disease) [I25.10] Subjective:  
 
Ruddy Cai   denies chest pain, chest pressure/discomfort, dyspnea, palpitations, irregular heart beats, near-syncope, syncope, fatigue, orthopnea, paroxysmal nocturnal dyspnea. Visit Vitals BP (!) 129/41 (BP 1 Location: Left leg, BP Patient Position: At rest;Supine) Pulse 81 Temp 97.8 °F (36.6 °C) Resp 16 Ht 6' 2\" (1.88 m) Wt 343 lb 1.6 oz (155.6 kg) SpO2 95% BMI 44.05 kg/m² Current Facility-Administered Medications Medication Dose Route Frequency  metoprolol tartrate (LOPRESSOR) tablet 25 mg  25 mg Oral Q12H  
 insulin glargine (LANTUS) injection 22 Units  22 Units SubCUTAneous QHS  loperamide (IMODIUM) capsule 2 mg  2 mg Oral QID PRN  
 oxyCODONE IR (ROXICODONE) tablet 5 mg  5 mg Oral Q6H PRN  
 sevelamer carbonate (RENVELA) tab 800 mg  800 mg Oral TID WITH MEALS  sodium chloride (NS) flush 5-40 mL  5-40 mL IntraVENous Q8H  
 sodium chloride (NS) flush 5-40 mL  5-40 mL IntraVENous PRN  
 acetaminophen (TYLENOL) tablet 650 mg  650 mg Oral Q6H PRN Or  
 acetaminophen (TYLENOL) suppository 650 mg  650 mg Rectal Q6H PRN  polyethylene glycol (MIRALAX) packet 17 g  17 g Oral DAILY PRN  
 aspirin delayed-release tablet 81 mg  81 mg Oral DAILY  atorvastatin (LIPITOR) tablet 20 mg  20 mg Oral QHS  hydrALAZINE (APRESOLINE) 20 mg/mL injection 20 mg  20 mg IntraVENous Q6H PRN  
 insulin lispro (HUMALOG) injection   SubCUTAneous AC&HS  
 glucose chewable tablet 16 g  4 Tab Oral PRN  
 dextrose (D50W) injection syrg 12.5-25 g  12.5-25 g IntraVENous PRN  
 glucagon (GLUCAGEN) injection 1 mg  1 mg IntraMUSCular PRN Objective:  
  
Visit Vitals BP (!) 129/41 (BP 1 Location: Left leg, BP Patient Position: At rest;Supine) Pulse 81 Temp 97.8 °F (36.6 °C) Resp 16 Ht 6' 2\" (1.88 m) Wt 343 lb 1.6 oz (155.6 kg) SpO2 95% BMI 44.05 kg/m² Physical Exam: 
Resting comfortably. No resp distress. Extremities: Rt. BKA Heart: regular rate and rhythm, S1, S2 normal, no murmur, click, rub or gallop Lungs: clear to auscultation bilaterally Neurologic: Grossly normal 
 
Data Review:  
Labs:   
Recent Results (from the past 24 hour(s)) GLUCOSE, POC Collection Time: 10/10/20  7:47 AM  
Result Value Ref Range Glucose (POC) 79 65 - 100 mg/dL Performed by Donny Jeni PCT   
GLUCOSE, POC Collection Time: 10/10/20  8:54 AM  
Result Value Ref Range Glucose (POC) 103 (H) 65 - 100 mg/dL Performed by Donny Jeni PCT   
GLUCOSE, POC Collection Time: 10/10/20 11:52 AM  
Result Value Ref Range Glucose (POC) 81 65 - 100 mg/dL Performed by Loral Marcelo GLUCOSE, POC Collection Time: 10/10/20  4:24 PM  
Result Value Ref Range Glucose (POC) 163 (H) 65 - 100 mg/dL Performed by Loral Moneta GLUCOSE, POC Collection Time: 10/10/20 11:45 PM  
Result Value Ref Range Glucose (POC) 150 (H) 65 - 100 mg/dL Performed by Marjorie Phillips RN Telemetry: SR 
 
 
Assessment:  
 
Active Problems: 
  Abnormal stress test (10/9/2020) CAD (coronary artery disease) (10/10/2020) Plan: 1. Multivessel CAD, Abnormal stress test, pre op: CTS to see today. Keep NPO after mid night for possible multivessel PCI tomorrow if turned down for CABG. 2. S/p right BKA. D/w vascular surgery yesterday. Tomorrow procedure on hold for now. 3. ESRD: on HD. 4. BP better after HD. Monitor.

## 2020-10-11 NOTE — PROGRESS NOTES
Problem: Risk for Spread of Infection Goal: Prevent transmission of infectious organism to others Description: Prevent the transmission of infectious organisms to other patients, staff members, and visitors. Outcome: Progressing Towards Goal 
  
Problem: Patient Education:  Go to Education Activity Goal: Patient/Family Education Outcome: Progressing Towards Goal 
  
Problem: Falls - Risk of 
Goal: *Absence of Falls Description: Document Savannah Ann Fall Risk and appropriate interventions in the flowsheet. Outcome: Progressing Towards Goal 
Note: Fall Risk Interventions: 
Mobility Interventions: Communicate number of staff needed for ambulation/transfer, Assess mobility with egress test, Patient to call before getting OOB Medication Interventions: Assess postural VS orthostatic hypotension, Evaluate medications/consider consulting pharmacy, Patient to call before getting OOB, Teach patient to arise slowly Problem: Patient Education: Go to Patient Education Activity Goal: Patient/Family Education Outcome: Progressing Towards Goal 
  
Problem: Pressure Injury - Risk of 
Goal: *Prevention of pressure injury Description: Document Ranjeet Scale and appropriate interventions in the flowsheet. Outcome: Progressing Towards Goal 
Note: Pressure Injury Interventions: Activity Interventions: Assess need for specialty bed, Increase time out of bed, Pressure redistribution bed/mattress(bed type), PT/OT evaluation Mobility Interventions: Assess need for specialty bed, Pressure redistribution bed/mattress (bed type), Turn and reposition approx. every two hours(pillow and wedges) Nutrition Interventions: Document food/fluid/supplement intake Friction and Shear Interventions: Apply protective barrier, creams and emollients, Feet elevated on foot rest, Foam dressings/transparent film/skin sealants, HOB 30 degrees or less, Lift team/patient mobility team, Lift sheet, Transferring/repositioning devices, Sit at 90-degree angle Problem: Patient Education: Go to Patient Education Activity Goal: Patient/Family Education Outcome: Progressing Towards Goal 
  
Problem: Pain Goal: *Control of Pain Outcome: Progressing Towards Goal 
Goal: *PALLIATIVE CARE:  Alleviation of Pain Outcome: Progressing Towards Goal 
  
Problem: Patient Education: Go to Patient Education Activity Goal: Patient/Family Education Outcome: Progressing Towards Goal 
  
Problem: Patient Education: Go to Patient Education Activity Goal: Patient/Family Education Outcome: Progressing Towards Goal 
  
Problem: Cath Lab Procedures: Post-Cath Day of Procedure (Initiate SCIP Measures for Post-Op Care) Goal: Off Pathway (Use only if patient is Off Pathway) Outcome: Progressing Towards Goal 
Goal: Activity/Safety Outcome: Progressing Towards Goal 
Goal: Consults, if ordered Outcome: Progressing Towards Goal 
Goal: Diagnostic Test/Procedures Outcome: Progressing Towards Goal 
Goal: Nutrition/Diet Outcome: Progressing Towards Goal 
Goal: Discharge Planning Outcome: Progressing Towards Goal 
Goal: Medications Outcome: Progressing Towards Goal 
Goal: Respiratory Outcome: Progressing Towards Goal 
Goal: Treatments/Interventions/Procedures Outcome: Progressing Towards Goal 
Goal: Psychosocial 
Outcome: Progressing Towards Goal 
Goal: *Procedure site is without bleeding and signs of infection six hours post sheath removal 
Outcome: Progressing Towards Goal 
Goal: *Hemodynamically stable Outcome: Progressing Towards Goal 
Goal: *Optimal pain control at patient's stated goal 
Outcome: Progressing Towards Goal 
  
Problem: Patient Education: Go to Patient Education Activity Goal: Patient/Family Education Outcome: Progressing Towards Goal 
  
Problem: Unstable angina/NSTEMI: Day 2 Goal: Off Pathway (Use only if patient is Off Pathway) Outcome: Progressing Towards Goal 
 Goal: Activity/Safety Outcome: Progressing Towards Goal 
Goal: Consults, if ordered Outcome: Progressing Towards Goal 
Goal: Diagnostic Test/Procedures Outcome: Progressing Towards Goal 
Goal: Nutrition/Diet Outcome: Progressing Towards Goal 
Goal: Discharge Planning Outcome: Progressing Towards Goal 
Goal: Medications Outcome: Progressing Towards Goal 
Goal: Respiratory Outcome: Progressing Towards Goal 
Goal: Treatments/Interventions/Procedures Outcome: Progressing Towards Goal 
Goal: Psychosocial 
Outcome: Progressing Towards Goal 
Goal: *Hemodynamically stable Outcome: Progressing Towards Goal 
Goal: *Optimal pain control at patient's stated goal 
Outcome: Progressing Towards Goal 
Goal: *Lungs clear or at baseline Outcome: Progressing Towards Goal 
  
Problem: Diabetes Maintenance:Admission Goal: Activity/Safety Outcome: Progressing Towards Goal 
Goal: Diagnostic Tests/Procedures Outcome: Progressing Towards Goal 
Goal: Nutrition Outcome: Progressing Towards Goal 
Goal: Medications Outcome: Progressing Towards Goal 
Goal: Treatments/Interventions/Procedures Outcome: Progressing Towards Goal 
  
Problem: Diabetes Maintenance:Ongoing Goal: Activity/Safety Outcome: Progressing Towards Goal 
Goal: Nutrition Outcome: Progressing Towards Goal 
Goal: Medications Outcome: Progressing Towards Goal 
Goal: Treatments/Interventsions/Procedures Outcome: Progressing Towards Goal 
Goal: *Blood Glucose 80 to 180 md/dl Outcome: Progressing Towards Goal 
  
Problem: Diabetes Maintenance:Discharge Outcomes Goal: *Describes follow-up/return visits to physicians Outcome: Progressing Towards Goal 
Goal: *Blood glucose at patient's target range or approaching Outcome: Progressing Towards Goal 
Goal: *Aware of nutrition guidelines Outcome: Progressing Towards Goal 
Goal: *Verbalizes information about medication Description: Verbalizes name, dosage, time, side effects, and number of days to 
 continue medications. Outcome: Progressing Towards Goal 
Goal: *Describes goals, rules, symptoms, and treatments Description: Describes blood glucose goals, monitoring, sick day rules, 
hypo/hyperglycemia prevention, symptoms, and treatment Outcome: Progressing Towards Goal 
Goal: *Describes available outpatient diabetes resources and support systems Outcome: Progressing Towards Goal 
  
Problem: Patient Education: Go to Patient Education Activity Goal: Patient/Family Education Outcome: Progressing Towards Goal 
  
Problem: Heart Failure: Day 2 Goal: Off Pathway (Use only if patient is Off Pathway) Outcome: Progressing Towards Goal 
Goal: Activity/Safety Outcome: Progressing Towards Goal 
Goal: Consults, if ordered Outcome: Progressing Towards Goal 
Goal: Diagnostic Test/Procedures Outcome: Progressing Towards Goal 
Goal: Nutrition/Diet Outcome: Progressing Towards Goal 
Goal: Discharge Planning Outcome: Progressing Towards Goal 
Goal: Medications Outcome: Progressing Towards Goal 
Goal: Respiratory Outcome: Progressing Towards Goal 
Goal: Treatments/Interventions/Procedures Outcome: Progressing Towards Goal 
Goal: Psychosocial 
Outcome: Progressing Towards Goal 
Goal: *Oxygen saturation within defined limits Outcome: Progressing Towards Goal 
Goal: *Hemodynamically stable Outcome: Progressing Towards Goal 
Goal: *Optimal pain control at patient's stated goal 
Outcome: Progressing Towards Goal 
Goal: *Anxiety reduced or absent Outcome: Progressing Towards Goal 
Goal: *Demonstrates progressive activity Outcome: Progressing Towards Goal

## 2020-10-11 NOTE — PROGRESS NOTES
Bedside shift change report given to Kia Hylton RN (oncoming nurse) by Halley Desai RN (offgoing nurse). Report included the following information SBAR, Kardex, Procedure Summary, Intake/Output, MAR, Accordion, Recent Results, Med Rec Status, Cardiac Rhythm NSR, Alarm Parameters , Pre Procedure Checklist, Procedure Verification, Quality Measures and Dual Neuro Assessment.

## 2020-10-11 NOTE — PROGRESS NOTES
Bedside shift change report given to Peter Kendall RN (oncoming nurse) by Jose Mae RN (offgoing nurse). Report included the following information SBAR, Kardex, Procedure Summary, Intake/Output, MAR, Accordion, Recent Results, Med Rec Status, Cardiac Rhythm NSR, Alarm Parameters , Pre Procedure Checklist, Procedure Verification, Quality Measures and Dual Neuro Assessment.

## 2020-10-11 NOTE — CONSULTS
Ask by Dr Kayli Badillo to review cath and pt hx regarding consideration for CABG He has multiple co morbidities as noted below His CAD is diffuse with poor targets for revascularization His LV function is poor Would favor attempt at PCI although this would be difficult as well I am reluctant despite age to recommend CABG at this point given likely poor outcome Will discuss with Dr Kayli Badillo Abnormal stress test 10/09/2020  Atrial flutter ([address] 09/03/2020  Sepsis ([address] 08/12/2020  Cellulitis of right foot 08/12/2020  Acute respiratory failure with hypoxia and hypercapnia ([address] 08/12/2020  Osteomyelitis of second toe of right foot ([address] 08/07/2020  Foot ulcer ([address] 01/06/2020  ESRD (end stage renal disease) ([address] 01/06/2020  Wound cellulitis 01/06/2020  Osteomyelitis ([address] 10/08/2019  Uncontrolled hypertension 06/29/2019  Acute osteomyelitis of toe of right foot ([address] 06/29/2019  ESRD (end stage renal disease) on dialysis ([address] 09/14/2018  Hyperlipidemia associated with type 2 diabetes mellitus ([address] 09/14/2018  Morbid obesity with body mass index (BMI) of 40.0 to 49.9 ([address] 09/14/2018  Uncontrolled type 2 diabetes mellitus with proliferative retinopathy, with long-term current use of insulin ([address] 09/14/2018  Uncontrolled type 2 diabetes mellitus with hyperglycemia, with long-term current use of insulin ([address] 09/14/2018  Diarrhea 12/06/2017  Postoperative hypoxia 05/10/2017  
 HTN (hypertension)

## 2020-10-12 PROBLEM — Z98.890 S/P CARDIAC CATH: Status: ACTIVE | Noted: 2020-01-01

## 2020-10-12 NOTE — PROGRESS NOTES
Bedside and Verbal shift change report GIVEN TO Genevieve Everett RN. Report included the following information SBAR, Kardex, ED Summary, Procedure Summary, Intake/Output and MAR.  
 
513: jose r flores states pt needs PT/OT for return to SNF on d/c.

## 2020-10-12 NOTE — PROGRESS NOTES
[address] [place] [address]  [phone] Cardiology Progress Note 10/12/2020 3 PM 
 
Admit Date: 10/9/2020 Admit Diagnosis:  
Abnormal stress test [R94.39] CAD (coronary artery disease) [I25.10] Interval History/Subjective:  
 
Unruly Higgins is a 38 y.o. male admitted for Abnormal stress test [R94.39] CAD (coronary artery disease) [I25.10] 
 
-BP elevated 
-labs steady -no weight 
-Mr. Erma Barreto is back to his room from dialysis. He's feeling well other than being hungry. He denies pain or SOB. Visit Vitals BP (!) 180/94 Pulse 75 Temp 97.9 °F (36.6 °C) Resp 20 Ht 6' 2\" (1.88 m) Wt 155.6 kg (343 lb 1.6 oz) SpO2 92% BMI 44.05 kg/m² Current Facility-Administered Medications Medication Dose Route Frequency  insulin glargine (LANTUS) injection 20 Units  20 Units SubCUTAneous QHS  metoprolol tartrate (LOPRESSOR) tablet 50 mg  50 mg Oral BID  loperamide (IMODIUM) capsule 2 mg  2 mg Oral QID PRN  
 oxyCODONE IR (ROXICODONE) tablet 5 mg  5 mg Oral Q6H PRN  
 sevelamer carbonate (RENVELA) tab 800 mg  800 mg Oral TID WITH MEALS  sodium chloride (NS) flush 5-40 mL  5-40 mL IntraVENous Q8H  
 sodium chloride (NS) flush 5-40 mL  5-40 mL IntraVENous PRN  
 acetaminophen (TYLENOL) tablet 650 mg  650 mg Oral Q6H PRN Or  
 acetaminophen (TYLENOL) suppository 650 mg  650 mg Rectal Q6H PRN  polyethylene glycol (MIRALAX) packet 17 g  17 g Oral DAILY PRN  
 aspirin delayed-release tablet 81 mg  81 mg Oral DAILY  atorvastatin (LIPITOR) tablet 20 mg  20 mg Oral QHS  hydrALAZINE (APRESOLINE) 20 mg/mL injection 20 mg  20 mg IntraVENous Q6H PRN  
 insulin lispro (HUMALOG) injection   SubCUTAneous AC&HS  
 glucose chewable tablet 16 g  4 Tab Oral PRN  
 dextrose (D50W) injection syrg 12.5-25 g  12.5-25 g IntraVENous PRN  
 glucagon (GLUCAGEN) injection 1 mg  1 mg IntraMUSCular PRN Objective:  
  
Physical Exam: General Appearance:  pleasant, adult AAM resting in bed in NAD Chest:   slight scattered wheezes Cardiovascular:  Regular rate and rhythm, no murmur. Abdomen:   Soft, non-tender, bowel sounds are active. Extremities: R BKA. Skin:  Warm and dry. Data Review:  
Recent Labs 10/09/20 
1721 WBC 8.4 HGB 10.6* HCT 33.8*  
 Recent Labs 10/12/20 
6241 10/09/20 
1721  138  
K 4.7 4.8  
 99 CO2 27 32 GLU 92 85 BUN 52* 65* CREA 9.25* 10.70* CA 7.8* 9.1 No results for input(s): TROIQ, CPK, CKMB in the last 72 hours. No intake or output data in the 24 hours ending 10/12/20 1517 Telemetry: SR 
EKG: not showing in system Cxray: no acute process Assessment:  
 
Active Problems: 
  Abnormal stress test (10/9/2020) CAD (coronary artery disease) (10/10/2020) S/P cardiac cath (10/10/2020) Overview: 10/10/20:  Significant multi-vessel CAD Plan:  
 
CAD:  Abnormal out patient stress test.   Cardiac cath 10/10 with significant multi-vessel CAD. · Not a good surgical candidate · Plan for cardiac cath tomorrow with Dr. Carleen Javed for PCI · Continue ASA, statin, BB 
· I updated the patient's sister on plan of care per his request  
 
 
DM: BGL slightly low today · Will decrease lantus dose slightly · Hasn't needed SSI 
 
 
ESRD:  Dialysis today · Appreciate nephrology assistance HTN: BP elevated despite dialysis · Increase BB 
· Has PRN hydralazine · Would add ACEi, but patient may have vascular surgery later this week and unclear if vascular team would have vasoplegia concerns with ACEi PAD:  With dehiscence of BKA wound · Vascular following. Possible debridement Wednesday Klaudia Starks NP 
DNP, RN, AGACNP-BC

## 2020-10-12 NOTE — PROGRESS NOTES
TEJA 
Return to [facility] Follow up appointments Reason for Admission:   Abnormal stress test.  Cardiology note reflects need of debridement of stump RUR Score:     13 PCP: First and Last name:  Dee Zafar MD 
 Name of Practice:   [phone] Are you a current patient: Yes/No:   Yes Approximate date of last visit:   8/10/20 Can you participate in a virtual visit if needed: Do you (patient/family) have any concerns for transition/discharge? Patient no longer has an apartment. Patient was dischargedd to [facility] on 9/8/2020; then transitioned to [facility]. Pt reports being at [address] for 2 weeks. Patient reports he is waiting on Medicaid approval to go to a group home. Patient has not returned to a private residence. Plan for utilizing home health: Will most likely return to SNF at this time. Current Advanced Directive/Advance Care Plan:  Full Code. AMD on file. Primary - [facility] [phone]; Secondary [facility] [phone] Patient has requested that Cipriano Agosto be removed as emergency contact. Chart review. Patient is legally blind. Previous admission reflects loss of hearing after procedure. Patient is [place]. ADL's/IADL's - w/c bound at this time. Patient reports [facility] was teaching patient to stand with parallel bars. Informed CM that [facility] does not have parallel bars and \"they only work with me for 10 minutes a day\". \"They don't work with me like they did at [facility] Patient reports needing assistance with bathing, dressing and feeding. [facility] w/c is present in the room. DME - none  - Patient has not been discharged to a private residence. HD - [facility] - [facility] M[facility] - reports using [employer] for transportation. Care Management Interventions PCP Verified by CM:  Yes 
 Mode of Transport at Discharge: BLS(patient will require medical transport) Transition of Care Consult (CM Consult): SNF(return to [facility]) Physical Therapy Consult: No(nursing has been informed of need for PTOT) Occupational Therapy Consult: No 
Current Support Network: Nursing Facility([facility]) Confirm Follow Up Transport: Family Discharge Location Discharge Placement: Skilled nursing facility Tim Laird RNCM Ext [phone]

## 2020-10-12 NOTE — PROGRESS NOTES
Problem: Falls - Risk of 
Goal: *Absence of Falls Description: Document Dung Click Fall Risk and appropriate interventions in the flowsheet. Outcome: Progressing Towards Goal 
Note: Fall Risk Interventions: 
Mobility Interventions: Assess mobility with egress test, Bed/chair exit alarm, Patient to call before getting OOB, Utilize walker, cane, or other assistive device Medication Interventions: Teach patient to arise slowly, Patient to call before getting OOB, Bed/chair exit alarm Problem: Pressure Injury - Risk of 
Goal: *Prevention of pressure injury Description: Document Ranjeet Scale and appropriate interventions in the flowsheet. Outcome: Progressing Towards Goal 
Note: Pressure Injury Interventions: Activity Interventions: Assess need for specialty bed, Increase time out of bed Mobility Interventions: Assess need for specialty bed, HOB 30 degrees or less, Turn and reposition approx. every two hours(pillow and wedges) Nutrition Interventions: Document food/fluid/supplement intake Friction and Shear Interventions: Apply protective barrier, creams and emollients, Feet elevated on foot rest, Foam dressings/transparent film/skin sealants, HOB 30 degrees or less, Lift team/patient mobility team, Lift sheet, Transferring/repositioning devices, Sit at 90-degree angle

## 2020-10-12 NOTE — PROGRESS NOTES
1900: Received report from day shift nurse Chantell Robledo. Reviewed SBAR, Kardex, I/O, MAR, Procedural information and Recent results. BP elevated, NSR (rhythm), RA (oxygenation). A/O X 4 Pt resting in bed/sitting up in the bed. Cath lab site: RR CDI, no bleeding/hematoma. Pt reporting no CP/SOB. Pt reporting discomfort in right hip, heat applied. 10:41 PM , no coverage needed. PRN Tylenol given for leg pain. Evening meds given. Pt rest in bed, VSS.

## 2020-10-12 NOTE — CONSULTS
Vascular Surgery Consult Note 10/12/2020 Subjective:  
 
Mr. Avery Gilman a very HRZCCYLV 21 Y. o.  male with a pmhx significant for ESRD on HD, DM, HTN, HLD, and obesity. Patient is legally blind and he has hearing loss. 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 Since his BKA he has undergone an ablation of AFlutter. Plan was for debridement and prior to the procedure a stress test was recommended which he completed on 10/09/2020 that was abnl and he was referred for admission. He underwent a diagnostic cath that was significant for multivessel disease. He was evaluated by CT surgery and he is currently is not a candidate for CABG due to diffuse disease with poor targets for revascularization and poor LV function. Plan is for attempt at PCI later today. Past Medical History ESRD 
-HD MWF [facility] 
Diabetes Mellitus II 
ASHD 
-diagnostic cath 10/10/2020 Hypertension Hyperlipidemia AFlutter  
-s/p ablation 9/2020 Morbid obesity GERD Legally blind Hearing loss  
  
Past Surgical History Right BKA on 08/24/2020 Right foot excisional debridement of the bone and necrotic tissue on 8/21/2020 (Dr. Gabriel Mosley). Right LEXI BAP on 08/17/2020 Amputation of the right 1st and 2nd toe 8/7/2020 Amputation of the right third toe 1/2020 Amputation of the right fourth toe Amputation of the left great toe and 5th toe Creation of transposed left arm AVF 7/2017 Removal of cataracts Family History Problem Relation Age of Onset  Diabetes Mother  Liver Disease Father  Kidney Disease Maternal Grandfather  Diabetes Maternal Grandfather  Hypertension Maternal Grandfather  Diabetes Paternal Uncle  Hypertension Paternal Uncle Social History Tobacco Use  Smoking status: Former Smoker Packs/day: 0.25 Years: 20.00   Pack years: 5.00  
 Last attempt to quit: 6/1/2017 Years since quitting: 3.3  Smokeless tobacco: Never Used  Tobacco comment: \"quit about a year ago\" Substance Use Topics  Alcohol use: Not Currently Comment: rarely Prior to Admission medications Medication Sig Start Date End Date Taking? Authorizing Provider  
gabapentin (NEURONTIN) 300 mg capsule 300 mg. 9/22/20   Provider, Historical  
oxyCODONE IR (ROXICODONE) 5 mg immediate release tablet Take 5 mg by mouth every six (6) hours as needed for Pain. Provider, Historical  
PEG 3350-Electrolytes (GO-LYTELY) 236-22.74-6.74 -5.86 gram suspension Take  by mouth daily as needed (17 gm in 8 oz of water). Provider, Historical  
folic acid/vit B complex and C (RENAL VITAMIN PO) Take  by mouth daily. Provider, Historical  
sevelamer (RENAGEL) 800 mg tablet Take 800 mg by mouth three (3) times daily (with meals). Provider, Historical  
insulin glargine (LANTUS) 100 unit/mL injection 20 Units by SubCUTAneous route nightly. Patient taking differently: 22 Units by SubCUTAneous route nightly. 9/1/20   Laila Gaines MD  
loperamide (IMODIUM) 2 mg capsule Take 1 Cap by mouth four (4) times daily as needed for Diarrhea. 12/9/17   Tyson Botello MD  
 
No Known Allergies Review of Systems Constitutional: Negative for activity change, chills, diaphoresis, fatigue and fever. HENT: Negative for congestion. Eyes: Negative for visual disturbance. Respiratory: Negative for cough, chest tightness and shortness of breath. Cardiovascular: Positive for leg swelling. Negative for chest pain. Gastrointestinal: Negative for nausea and vomiting. Endocrine: Negative for polydipsia and polyuria. Genitourinary: Negative. Musculoskeletal: Positive for gait problem and joint swelling. Skin: Positive for color change and wound. Allergic/Immunologic: Negative for immunocompromised state. Neurological: Negative for weakness. Hematological: Negative. Psychiatric/Behavioral: Negative. Objective:  
 
Patient Vitals for the past 24 hrs: 
 BP Temp Pulse Resp SpO2  
10/12/20 0724 138/83 98.1 °F (36.7 °C) 70 21 96 % 10/12/20 0343 (!) 160/85 97.3 °F (36.3 °C) 72 22 90 % 10/11/20 2241 (!) 165/89 97.3 °F (36.3 °C) 80 18 96 % 10/11/20 2237 (!) 164/100      
10/11/20 1944 (!) 159/91 97.5 °F (36.4 °C) 80 18 96 % 10/11/20 1544 (!) 149/71 97.7 °F (36.5 °C) 78 17 95 % 10/11/20 1144 (!) 159/87 97.4 °F (36.3 °C) 75 16 97 % Physical Exam 
Constitutional:   
   Appearance: He is obese. HENT:  
   Head: Normocephalic. Nose: Nose normal.  
   Mouth/Throat:  
   Mouth: Mucous membranes are moist.  
Eyes:  
   Pupils: Pupils are equal, round, and reactive to light. Neck: Musculoskeletal: Normal range of motion. Cardiovascular:  
   Rate and Rhythm: Normal rate and regular rhythm. Pulmonary:  
   Effort: Pulmonary effort is normal.  
   Breath sounds: Normal breath sounds. Abdominal:  
   General: Abdomen is flat. Palpations: Abdomen is soft. Musculoskeletal: Normal range of motion. Skin: 
   Findings: Wound (Stump with dehiscence and necrosis) present. Neurological:  
   General: No focal deficit present. Mental Status: He is alert. Psychiatric:     
   Mood and Affect: Mood normal.     
   Thought Content: Thought content normal.  
 
 
Pertinent Test Results:  
Recent Results (from the past 24 hour(s)) GLUCOSE, POC Collection Time: 10/11/20 11:15 AM  
Result Value Ref Range Glucose (POC) 104 (H) 65 - 100 mg/dL Performed by Melissa Sport GLUCOSE, POC Collection Time: 10/11/20  4:24 PM  
Result Value Ref Range Glucose (POC) 151 (H) 65 - 100 mg/dL Performed by Melissa Sport GLUCOSE, POC Collection Time: 10/11/20  9:39 PM  
Result Value Ref Range Glucose (POC) 135 (H) 65 - 100 mg/dL  Performed by Fausto Meyers RN   
METABOLIC PANEL, BASIC  
 Collection Time: 10/12/20  3:37 AM  
Result Value Ref Range Sodium 138 136 - 145 mmol/L Potassium 4.7 3.5 - 5.1 mmol/L Chloride 106 97 - 108 mmol/L  
 CO2 27 21 - 32 mmol/L Anion gap 5 5 - 15 mmol/L Glucose 92 65 - 100 mg/dL BUN 52 (H) 6 - 20 MG/DL Creatinine 9.25 (H) 0.70 - 1.30 MG/DL  
 BUN/Creatinine ratio 6 (L) 12 - 20 GFR est AA 8 (L) >60 ml/min/1.73m2 GFR est non-AA 6 (L) >60 ml/min/1.73m2 Calcium 7.8 (L) 8.5 - 10.1 MG/DL  
GLUCOSE, POC Collection Time: 10/12/20  7:33 AM  
Result Value Ref Range Glucose (POC) 79 65 - 100 mg/dL Performed by Rosy YOUNG   
GLUCOSE, POC Collection Time: 10/12/20  8:09 AM  
Result Value Ref Range Glucose (POC) 79 65 - 100 mg/dL Performed by Michoacano Silva RN   
GLUCOSE, POC Collection Time: 10/12/20  9:11 AM  
Result Value Ref Range Glucose (POC) 72 65 - 100 mg/dL Performed by Rosy YOUNG Assessmen/Plan:  
 
Consult problem PAD w/ dehiscence of surgical incision w/ necrosis -s/p right BKA 08/24/2020 Tentative plan for debridement on Wednesday pending result of today's catheterization.   
  
Acute problems ASHD Hyperlipidemia · Diagnostic cath 10/10/2020-diffuse multivessel disease · He is not a candidate for CABG · Attempt at PCI planned for later today · BBlocker, ASA, & statin Hypertension · Labile Plan per cardiology Active problems ESRD 
· HD MWF [facility]  
· HD planned for today Anemia of chronic renal disease · Not at a level to transfuse Plan per nephrology 
  
Uncontrolled Diabetes Mellitus w/ hyperglycemia · HA1c 9.4 8/15/2020 · Basal and bolus insulin Morbid obesity  
Management of comorbid conditions by primary team. 
  
VTE Prophylaxis: 
Held for procedure  
  
Disposition: TBD post procedure Signed By: Sofia Bains NP October 12, 2020

## 2020-10-12 NOTE — PROCEDURES
[facility] Dialysis Team [place]  [phone] Vitals   Pre   Post   Assessment   Pre   Post    
Temp  Temp: 98.2 °F (36.8 °C) (10/12/20 0949)  97.9 LOC  AxOx4 AxOx4 HR   Pulse (Heart Rate): 70 (10/12/20 0949) 74 Lungs   CTA alfredo / denies SOB Even and unlabored B/P   BP: (!) 170/80 (10/12/20 0949) 173/78 Cardiac   RRR RRR Resp   Resp Rate: 20 (10/12/20 0949) 20 Skin   intact intact Pain level  Pain Intensity 1: 0 (10/12/20 0738) 0 Edema  generalized 
 generalized Orders: Duration:   Start:    0949 End:    1358 Total:   4.15 hrs Dialyzer:   Dialyzer/Set Up Inspection: Skye Gray (10/12/20 0182) K Bath:   Dialysate K (mEq/L): 2 (10/12/20 0949) Ca Bath:   Dialysate CA (mEq/L): 2.5 (10/12/20 0949) Na/Bicarb:   Dialysate NA (mEq/L): 140 (10/12/20 0949) Target Fluid Removal:   Goal/Amount of Fluid to Remove (mL): 3500 mL (10/12/20 0949) Access Type & Location:   Left upper arm AV Fistula without evidence of warmth, redness, or drainage. +thrill/+bruit. Each access site disinfected for 60 seconds per site with alcohol swabs per P&P. Cannulated with 15G needles x2 and secured with paper tape. +aspiration/+flushed. Labs Obtained/Reviewed Critical Results Called   Date when labs were drawn- 
Hgb-   
HGB Date Value Ref Range Status 10/09/2020 10.6 (L) 12.1 - 17.0 g/dL Final  
 
K-   
Potassium Date Value Ref Range Status 10/12/2020 4.7 3.5 - 5.1 mmol/L Final  
 
Ca-  
Calcium Date Value Ref Range Status 10/12/2020 7.8 (L) 8.5 - 10.1 MG/DL Final  
 
Bun-  
BUN Date Value Ref Range Status 10/12/2020 52 (H) 6 - 20 MG/DL Final  
 
Creat-  
Creatinine Date Value Ref Range Status 10/12/2020 9.25 (H) 0.70 - 1.30 MG/DL Final  
 
  
Medications/ Blood Products Given Name   Dose   Route and Time    
none Blood Volume Processed (BVP):   97.1 L Net Fluid Removed:  3500 mL Comments Time Out Done: 4526 Primary Nurse Rpt Pre: Sameer Garcia RN 
Primary Nurse Rpt Post: Sameer Garcia RN 
Pt Education: infection control / procedural 
Care Plan: continue current HD plan of care Tx Summary:  
3438 HD treatment initiated per physician order. [address] alarm due to clotting in venous chamber. Unable to clear alarm. 1358 HD treatment terminated 13 minutes early due to clotting. All possible blood returned to patient. Hemostasis achieved in <10 minutes. Access site dressings clean, dry, and intact. +thrill. Admiting Diagnosis: Abnormal Stress test 
Pt's previous clinic- [facility] 
Consent signed - Informed Consent Verified: Yes (10/12/20 0939) [place] Consent - verified Hepatitis Status- unknown Machine #- Machine Number: [id number] (10/12/20 8231) Telemetry status- remote tele Pre-dialysis wt. -

## 2020-10-12 NOTE — CONSULTS
CSS Consult Subjective:  
 **information obtained from chart review. Pt off the floor for dialysis. Elvi Rowland is a 38 y.o. male who was referred for cardiac evaluation by Dr. Tyler Borjas for CAD. The patient had an outpatient stress test and then cardiac cath for cardiac clearance for vascular surgery. He has a medical history of ESRD on HD, DM, HTN, Aflutter s/p ablation, GERD, blind, hard of hearing, and morbid obesity. He is a former smoker. He denies alcohol. He has a family history of HTN and DM. Cardiac Testing Cardiac catheterization:  
Left Main The vessel is angiographically normal.   
Left Anterior Descending Mid LAD lesion, 80% stenosed. Dist LAD lesion, 99% stenosed. First Diagonal Branch There is severe disease. Left Circumflex Ost Cx lesion, 40% stenosed. Mid Cx lesion, 70% stenosed. First Obtuse Marginal Branch 1st Mrg lesion, 99% stenosed. Right Coronary Artery Mid RCA lesion, 50% stenosed. Dist RCA lesion, 80% stenosed. Right Posterior Descending Artery RPDA lesion, 90% stenosed. ECHO 8/22/20:   
Left Ventricle  Normal cavity size. Moderate concentric hypertrophy. The estimated EF is 45 - 50%. Mildly reduced systolic function. Left Atrium  Mildly dilated left atrium. Right Ventricle  Normal cavity size, wall thickness and global systolic function. Right Atrium  Normal cavity size. Aortic Valve  Normal valve structure, trileaflet valve structure, no stenosis and no regurgitation. Mitral Valve  Normal valve structure, no stenosis and no regurgitation. Moderate mitral annular calcification. Pulmonic Valve  Pulmonic valve not well visualized, but normal doppler findings. Aorta  Normal aortic root. IVC/Hepatic Veins  Normal structure. Normal central venous pressure (0-5 mmHg); IVC diameter is less than 21 mm and collapses more than 50% with respiration. Pericardium  Trivial pericardial effusion noted. Past Medical History:  
Diagnosis Date  Arrhythmia   
 aflutter  Blind  Cataracts  Cellulitis and abscess of foot  Chronic kidney disease MWF- [facility]  
 Diabetes ([facility] [address] DM II  
 GERD (gastroesophageal reflux disease)  Hearing loss  Hypercholesterolemia  Hypertension  Neuropathy  Obesity  Osteomyelitis ([facility] [address]  Puerperal sepsis with acute hypoxic respiratory failure (HCC)  Sepsis ([facility] [address] Past Surgical History:  
Procedure Laterality Date  CARDIAC SURG PROCEDURE UNLIST  09/2020  
 ablation  COLONOSCOPY N/A 12/8/2017 COLONOSCOPY performed by Chris Hernandez MD at [facility] ENDOSCOPY  COLONOSCOPY,DIAGNOSTIC  12/8/2017  HX AFIB ABLATION  09/04/2020  HX AMPUTATION Left great toe and L 5th toe  HX AMPUTATION Right   
 bka  HX AMPUTATION TOE Right  HX VASCULAR ACCESS    
 ME COMPRE ELECTROPHYSIOL XM W/LEFT ATRIAL PACNG/REC N/A 9/4/2020 Lt Atrial Pace & Record During Ep Study performed by Negrita San MD at [facility] CARDIAC CATH LAB  ME EPHYS EVAL W/ABLATION SUPRAVENT ARRHYTHMIA N/A 9/4/2020 ABLATION A-FLUTTER performed by Negrita San MD at [facility] CARDIAC CATH LAB  ME INTRACARDIAC ELECTROPHYSIOLOGIC 3D MAPPING N/A 9/4/2020 Ep 3d Mapping performed by Negrita San MD at [facility] CARDIAC CATH LAB  UPPER GI ENDOSCOPY,BIOPSY  12/8/2017  VASCULAR SURGERY PROCEDURE UNLIST    
 R side chest  
 VASCULAR SURGERY PROCEDURE UNLIST Left 07/25/2017 Creation transposed AV fistula arm Social History Tobacco Use  Smoking status: Former Smoker Packs/day: 0.25 Years: 20.00 Pack years: 5.00 Last attempt to quit: 6/1/2017 Years since quitting: 3.3  Smokeless tobacco: Never Used  Tobacco comment: \"quit about a year ago\" Substance Use Topics  Alcohol use: Not Currently Comment: rarely Family History Problem Relation Age of Onset  Diabetes Mother  Liver Disease Father  Kidney Disease Maternal Grandfather  Diabetes Maternal Grandfather  Hypertension Maternal Grandfather  Diabetes Paternal Uncle  Hypertension Paternal Uncle Prior to Admission medications Medication Sig Start Date End Date Taking? Authorizing Provider  
gabapentin (NEURONTIN) 300 mg capsule 300 mg. 9/22/20   Provider, Historical  
oxyCODONE IR (ROXICODONE) 5 mg immediate release tablet Take 5 mg by mouth every six (6) hours as needed for Pain. Provider, Historical  
PEG 3350-Electrolytes (GO-LYTELY) 236-22.74-6.74 -5.86 gram suspension Take  by mouth daily as needed (17 gm in 8 oz of water). Provider, Historical  
folic acid/vit B complex and C (RENAL VITAMIN PO) Take  by mouth daily. Provider, Historical  
sevelamer (RENAGEL) 800 mg tablet Take 800 mg by mouth three (3) times daily (with meals). Provider, Historical  
insulin glargine (LANTUS) 100 unit/mL injection 20 Units by SubCUTAneous route nightly. Patient taking differently: 22 Units by SubCUTAneous route nightly. 9/1/20   Dalia Capps MD  
loperamide (IMODIUM) 2 mg capsule Take 1 Cap by mouth four (4) times daily as needed for Diarrhea. 12/9/17   No Martin MD  
   
No Known Allergies Review of Systems: unable to complete pt off the floor for testing Consititutional: Denies fever or chills. Eyes:  Denies use of glasses or vision problems(cataracts). ENT:  Denies hearing or swallowing difficulty. CV: Denies CP, claudication, HTN. Resp: Denies dyspnea, productive cough. : Denies dialysis or kidney problems. GI: Denies ulcers, esophageal strictures, liver problems. M/S: Denies joint or bone problems, or implanted artificial hardware. Skin: Denies varicose veins, edema. Neuro: Denies strokes, or TIAs. Psych: Denies anxiety or depression. Endocrine: Denies thyroid problems or diabetes. Heme/Lymphatic: Denies easy bruising or lymphedema. Objective:  
 
VS:  
Visit Vitals BP (!) 173/97 Pulse 71 Temp 98.2 °F (36.8 °C) (Oral) Resp 20 Ht 6' 2\" (1.88 m) Wt 343 lb 1.6 oz (155.6 kg) SpO2 96% BMI 44.05 kg/m² Physical Exam:  Unable to complete pt off the floor for HD General appearance: alert, cooperative, no distress Head: normocephalic, without obvious abnormality; atraumatic Eyes: conjunctivae/corneas clear; EOM's intact. Nose: nares normal; no drainage. Neck: no carotid bruit and no JVD Lungs: clear to auscultation bilaterally Heart: regular rate and rhythm; no murmur Abdomen: soft, non-tender; bowel sounds normal 
Extremities: moves all extremities; no weakness. Skin: Skin color normal; No varicose veins or edema. Neurologic: Grossly normal   
 
Labs:  
Recent Labs 10/12/20 
0911  10/12/20 
6976  10/09/20 
1721 WBC  --   --   --   --  8.4 HGB  --   --   --   --  10.6* HCT  --   --   --   --  33.8* PLT  --   --   --   --  351 NA  --   --  138  --  138 K  --   --  4.7  --  4.8 BUN  --   --  52*  --  65* CREA  --   --  9.25*  --  10.70* GLU  --   --  92  --  85 GLUCPOC 72   < >  --    < >  --   
 < > = values in this interval not displayed. Assessment:  
 
Active Problems: 
  Abnormal stress test (10/9/2020) CAD (coronary artery disease) (10/10/2020) S/P cardiac cath (10/10/2020) Overview: 10/10/20:  Significant multi-vessel CAD Plan: STS Risk Calculator V2.81 - Discussed by surgeon with patient. Procedure: Isolated CAB CALCULATE Risk of Mortality:  6.664% Renal Failure:  NA  
Permanent Stroke:  1.349% Prolonged Ventilation:  26.440% DSW Infection:  2.193% Reoperation:  2.663% Morbidity or Mortality:  34.762% Short Length of Stay:  9.721% Long Length of Stay:  25.404% Treatment Plan: 1. CAD: On ASA, statin, BB. Dr. Dali Lauren discussed with Dr. Jay Gan. No plans for CABG. 2. ESRD: On HD per renal. Dr. Doc Rees following. 3. DM: On lantus and SSI. 4. PAD: previous BKA with incision dehiscence and necrosis. Debridement this week by vascular. 5. HTN: Cont current meds 6. Aflutter s/p ablation: on BB Signed By: Harman Elizondo NP October 12, 2020

## 2020-10-12 NOTE — PROGRESS NOTES
NAME: Bharat Alegre :  1985 MRN:  [id number] Assessment :    Plan: 
--ESRD- Anemia CAD Labile BP, now HTN 
SHPT --MWF-[facility]; HD today Holding FARIDA, Hgb > 10 On renvela Subjective: Chief Complaint:  The patient was seen on dialysis at 10:01 AM .  BP is stable. Access is working well. Review of Systems: 
 
Symptom Y/N Comments  Symptom Y/N Comments Fever/Chills    Chest Pain Poor Appetite    Edema Cough    Abdominal Pain Sputum    Joint Pain SOB/LITTLE    Pruritis/Rash Nausea/vomit    Tolerating PT/OT Diarrhea    Tolerating Diet Constipation    Other Could not obtain due to:   
 
Objective: VITALS:  
Last 24hrs VS reviewed since prior progress note. Most recent are: 
Visit Vitals BP (!) 160/85 (BP 1 Location: Right arm, BP Patient Position: At rest) Pulse 72 Temp 97.3 °F (36.3 °C) Resp 22 Ht 6' 2\" (1.88 m) Wt 155.6 kg (343 lb 1.6 oz) SpO2 90% BMI 44.05 kg/m² No intake or output data in the 24 hours ending 10/12/20 0705 Telemetry Reviewed: PHYSICAL EXAM: 
General: NAD 
cta alfredo on room air Lab Data Reviewed: (see below) Medications Reviewed: (see below) PMH/SH reviewed - no change compared to H&P 
________________________________________________________________________ Care Plan discussed with: 
Patient y Family RN Care Manager Consultant:     
 
  Comments >50% of visit spent in counseling and coordination of care    
 
________________________________________________________________________ Mildred York MD  
 
Procedures: see electronic medical records for all procedures/Xrays and details which 
were not copied into this note but were reviewed prior to creation of Plan. LABS: 
Recent Labs 10/09/20 
1721 WBC 8.4 HGB 10.6* HCT 33.8*  
 Recent Labs 10/12/20 
1066 10/09/20 
1721  138  
K 4.7 4.8  
 99 CO2 27 32 BUN 52* 65* CREA 9.25* 10.70* GLU 92 85  
CA 7.8* 9.1 No results for input(s): AP, TBIL, TP, ALB, GLOB, GGT, AML, LPSE in the last 72 hours. No lab exists for component: SGOT, GPT, AMYP, HLPSE No results for input(s): INR, PTP, APTT, INREXT in the last 72 hours. No results for input(s): FE, TIBC, PSAT, FERR in the last 72 hours. No results found for: FOL, RBCF No results for input(s): PH, PCO2, PO2 in the last 72 hours. No results for input(s): CPK, CKMB in the last 72 hours. No lab exists for component: TROPONINI No components found for: [place] Lab Results Component Value Date/Time Color YELLOW/STRAW 05/01/2017 05:39 PM  
 Appearance CLOUDY (A) 05/01/2017 05:39 PM  
 Specific gravity 1.018 05/01/2017 05:39 PM  
 Specific gravity >1.030 (H) 10/03/2014 04:35 AM  
 pH (UA) 5.5 05/01/2017 05:39 PM  
 Protein 300 (A) 05/01/2017 05:39 PM  
 Glucose 250 (A) 05/01/2017 05:39 PM  
 Ketone NEGATIVE  05/01/2017 05:39 PM  
 Bilirubin NEGATIVE  05/01/2017 05:39 PM  
 Urobilinogen 0.2 05/01/2017 05:39 PM  
 Nitrites NEGATIVE  05/01/2017 05:39 PM  
 Leukocyte Esterase NEGATIVE  05/01/2017 05:39 PM  
 Epithelial cells FEW 05/01/2017 05:39 PM  
 Bacteria NEGATIVE  05/01/2017 05:39 PM  
 WBC 0-4 05/01/2017 05:39 PM  
 RBC 5-10 05/01/2017 05:39 PM  
 
 
MEDICATIONS: 
Current Facility-Administered Medications Medication Dose Route Frequency  metoprolol tartrate (LOPRESSOR) tablet 25 mg  25 mg Oral Q12H  
 insulin glargine (LANTUS) injection 22 Units  22 Units SubCUTAneous QHS  loperamide (IMODIUM) capsule 2 mg  2 mg Oral QID PRN  
 oxyCODONE IR (ROXICODONE) tablet 5 mg  5 mg Oral Q6H PRN  
 sevelamer carbonate (RENVELA) tab 800 mg  800 mg Oral TID WITH MEALS  sodium chloride (NS) flush 5-40 mL  5-40 mL IntraVENous Q8H  
 sodium chloride (NS) flush 5-40 mL  5-40 mL IntraVENous PRN  
  acetaminophen (TYLENOL) tablet 650 mg  650 mg Oral Q6H PRN Or  
 acetaminophen (TYLENOL) suppository 650 mg  650 mg Rectal Q6H PRN  polyethylene glycol (MIRALAX) packet 17 g  17 g Oral DAILY PRN  
 aspirin delayed-release tablet 81 mg  81 mg Oral DAILY  atorvastatin (LIPITOR) tablet 20 mg  20 mg Oral QHS  hydrALAZINE (APRESOLINE) 20 mg/mL injection 20 mg  20 mg IntraVENous Q6H PRN  
 insulin lispro (HUMALOG) injection   SubCUTAneous AC&HS  
 glucose chewable tablet 16 g  4 Tab Oral PRN  
 dextrose (D50W) injection syrg 12.5-25 g  12.5-25 g IntraVENous PRN  
 glucagon (GLUCAGEN) injection 1 mg  1 mg IntraMUSCular PRN

## 2020-10-13 NOTE — PROGRESS NOTES
Problem: Self Care Deficits Care Plan (Adult) Goal: *Acute Goals and Plan of Care (Insert Text) Description:  
FUNCTIONAL STATUS PRIOR TO ADMISSION: Pt admitted from [facility], with reports he plans to transfer to group home s/p rehab, with pt reporting [place] prior to most recent hospitalization. Reports he has W/C at [facility]; however, does not want to return there. HOME SUPPORT: The patient lived with SNF staffing to provide PRN assist. 
 
Occupational Therapy Goals Initiated 10/13/2020 1. Patient will perform W/C grooming with modified independence within 7 day(s). 2.  Patient will perform W/C full body dressing with modified independence within 7 day(s). 3.  Patient will perform W/C bathing with modified independence within 7 day(s). 4.  Patient will perform toilet transfers, from W/C, with modified independence within 7 day(s). 5.  Patient will perform all aspects of toileting with modified independence within 7 day(s). Outcome: Progressing Towards Goal 
 
OCCUPATIONAL THERAPY EVALUATION Patient: Michaelle Ponce (68 y.o. male) Date: 10/13/2020 Primary Diagnosis: Abnormal stress test [R94.39] CAD (coronary artery disease) [I25.10] Procedure(s) (LRB): LEFT HEART CATH / CORONARY ANGIOGRAPHY (N/A) 3 Days Post-Op Precautions:  Fall, Other (comment)(Impaired vision, [facility]) ASSESSMENT Based on the objective data described below, the patient presents with decreased higher level mobility, decreased strength/endurance and decreased activity tolerance, all of which limits pt's ability to safely complete self-care routine at level congruent with reported PLOF. Currently, pt completes self-feeding with Supervision (for clock orientation), S/U for EOB grooming, bathing, UB/LE dressing and stand-by assist for toileting at [facility].   Pt demonstrates good motivation with presented challenges, as well as, good safety with lateral depression transfer from EOB to W/C. Of note, pt legally blind and [place]! bilaterally. Pt benefits from skilled OT to address functional deficits in an attempt at maximizing pt's highest level of safe functional independence prior to discharge, with reporting therapist believing pt would benefit from inpatient rehab up discharge. Current Level of Function Impacting Discharge (ADLs/self-care): S/U Functional Outcome Measure: The patient scored 60/100 on the Barthel Index outcome measure. Other factors to consider for discharge: none Patient will benefit from skilled therapy intervention to address the above noted impairments. PLAN : 
Recommendations and Planned Interventions: self care training, functional mobility training, therapeutic exercise, balance training, therapeutic activities, endurance activities, and patient education Frequency/Duration: Patient will be followed by occupational therapy 4 times a week to address goals. Recommendation for discharge: (in order for the patient to meet his/her long term goals) Therapy 3 hours per day 5-7 days per week This discharge recommendation: 
Has been made in collaboration with the attending provider and/or case management IF patient discharges home will need the following DME: TBD SUBJECTIVE:  
Patient stated I like to play 2k, I just sit close to the T.V. and I can see what I'm doing.  OBJECTIVE DATA SUMMARY:  
HISTORY:  
Past Medical History:  
Diagnosis Date Arrhythmia   
 aflutter Blind Cataracts Cellulitis and abscess of foot Chronic kidney disease [facility]  
 Diabetes ([facility] [address] DM II  
 GERD (gastroesophageal reflux disease) Hearing loss Hypercholesterolemia Hypertension Neuropathy Obesity Osteomyelitis ([facility] [address] Puerperal sepsis with acute hypoxic respiratory failure ([facility] [address] Sepsis ([facility] [address] Past Surgical History:  
Procedure Laterality Date CARDIAC SURG PROCEDURE UNLIST  09/2020  
 ablation COLONOSCOPY N/A 12/8/2017 COLONOSCOPY performed by Tiffani Matthew MD at [facility]  12/8/2017 HX AFIB ABLATION  09/04/2020 HX AMPUTATION Left great toe and L 5th toe HX AMPUTATION Right   
 bka HX AMPUTATION TOE Right HX VASCULAR ACCESS    
 SC COMPRE ELECTROPHYSIOL XM W/LEFT ATRIAL PACNG/REC N/A 9/4/2020 Lt Atrial Pace & Record During Ep Study performed by Silvia Barnes MD at [facility]  
 SC EPHYS EVAL W/ABLATION SUPRAVENT ARRHYTHMIA N/A 9/4/2020 ABLATION A-FLUTTER performed by Silvia Barnes MD at [facility]ATH [facility]  
 SC INTRACARDIAC ELECTROPHYSIOLOGIC 3D MAPPING N/A 9/4/2020 Ep 3d Mapping performed by Silvia Barnes MD at [facility] CARDIAC CATH [facility]  
 UPPER GI ENDOSCOPY,BIOPSY  12/8/2017 VASCULAR SURGERY PROCEDURE UNLIST    
 R side chest  
 VASCULAR SURGERY PROCEDURE UNLIST Left 07/25/2017 Creation transposed AV fistula arm Expanded or extensive additional review of patient history:  
 
Home Situation Home Environment: Skilled nursing facility Care Facility Name: [facility](Pt does not wish to return) Wheelchair Ramp: Yes One/Two Story Residence: One story Living Alone: No 
Support Systems: Skilled nursing facility Patient Expects to be Discharged to[de-identified] Rehabilitation facility Current DME Used/Available at Home: Glucometer Tub or Shower Type: Shower Hand dominance: Right EXAMINATION OF PERFORMANCE DEFICITS: 
Cognitive/Behavioral Status: 
Neurologic State: Alert Orientation Level: Oriented X4 Cognition: Appropriate safety awareness; Follows commands Perception: Appears intact Perseveration: No perseveration noted Safety/Judgement: Awareness of environment;Good awareness of safety precautions; Insight into deficits Hearing: Auditory Auditory Impairment: Hard of hearing, bilateral 
Hearing Aids/Status: Does not own Vision/Perceptual:   
Acuity: Impaired near vision; Impaired far vision(legally blind) Corrective Lenses: (\"legally blind\") Range of Motion: 
AROM: Generally decreased, functional(L ankle DF to neutral) PROM: Within functional limits Strength: 
Strength: Generally decreased, functional(B LE 4-/5 throughout) Coordination: 
Coordination: Within functional limits Fine Motor Skills-Upper: Left Intact; Right Intact Gross Motor Skills-Upper: Left Intact; Right Intact Tone & Sensation: 
Tone: Normal 
Sensation: Impaired(Decreased to light touch on L foot) Balance: 
Sitting: Intact Standing: Impaired; With support Standing - Static: Fair;Constant support Standing - Dynamic : Poor;Constant support Functional Mobility and Transfers for ADLs: 
Bed Mobility: 
Rolling: Modified independent Supine to Sit: Modified independent Sit to Supine: Modified independent Scooting: Modified independent Transfers: 
Sit to Stand: Minimum assistance;Assist x2 Stand to Sit: Minimum assistance;Assist x1 Bed to Chair: Supervision(lateral depression transfer EOB to W/C) ADL Assessment: 
Feeding: Supervision Oral Facial Hygiene/Grooming: Setup Bathing: Setup Upper Body Dressing: Setup Lower Body Dressing: Setup Toileting: Stand by assistance ADL Intervention and task modifications: 
Patient instructed and indicated understanding the benefits of maintaining activity tolerance, functional mobility, and independence with self care tasks during acute stay  to ensure safe return home and to baseline. Encouraged patient to increase frequency and duration OOB, be out of bed for all meals, perform daily ADLs (as approved by RN/MD regarding bathing etc), and performing functional mobility to/from [facility]. Cognitive Retraining Safety/Judgement: Awareness of environment;Good awareness of safety precautions; Insight into deficits Functional Measure: 
Barthel Index: 
 
Bathin Bladder: 10 Bowels: 10 
 Groomin Dressin Feeding: 10 Mobility: 5 Stairs: 0 Toilet Use: 5 Transfer (Bed to Chair and Back): 10 Total: 60/100 The Barthel ADL Index: Guidelines 1. The index should be used as a record of what a patient does, not as a record of what a patient could do. 2. The main aim is to establish degree of independence from any help, physical or verbal, however minor and for whatever reason. 3. The need for supervision renders the patient not independent. 4. A patient's performance should be established using the best available evidence. Asking the patient, friends/relatives and nurses are the usual sources, but direct observation and common sense are also important. However direct testing is not needed. 5. Usually the patient's performance over the preceding 24-48 hours is important, but occasionally longer periods will be relevant. 6. Middle categories imply that the patient supplies over 50 per cent of the effort. 7. Use of aids to be independent is allowed. Neita Saver., Barthel, D.W. ([id number]). Functional evaluation: the Barthel Index. [facility] (14)2. [facility] marie JAY Contreras, Jolie Fernandez., Mendy Barnett., [place], [address] [place] [address] (). Measuring the change indisability after inpatient rehabilitation; comparison of the responsiveness of the Barthel Index and Functional [place] Measure. Journal of Neurology, Neurosurgery, and Psychiatry, 66(4), 289-230. Karson Herrera, N.J.A, LIDYA Rodriguez.JEREMIAS, & Familia Blackman M.A. (2004.) Assessment of post-stroke quality of life in cost-effectiveness studies: The usefulness of the Barthel Index and the EuroQoL-5D. [facility], 13, [phone] Occupational Therapy Evaluation Charge Determination History Examination Decision-Making LOW Complexity : Brief history review  MEDIUM Complexity : 3-5 performance deficits relating to physical, cognitive , or psychosocial skils that result in activity limitations and / or participation restrictions LOW Complexity : No comorbidities that affect functional and no verbal or physical assistance needed to complete eval tasks Based on the above components, the patient evaluation is determined to be of the following complexity level: LOW Pain Rating: 
No c/o pain Activity Tolerance:  
Good After treatment patient left in no apparent distress:   
Sitting in chair, Call bell within reach, and Physical Therapist present COMMUNICATION/EDUCATION:  
The patients plan of care was discussed with: Physical therapist, Registered nurse, and Case management. Home safety education was provided and the patient/caregiver indicated understanding., Patient/family have participated as able in goal setting and plan of care. , and Patient/family agree to work toward stated goals and plan of care. This patients plan of care is appropriate for delegation to [facility]. Thank you for this referral. 
Seretha Moritz, OT Time Calculation: 32 mins

## 2020-10-13 NOTE — PROGRESS NOTES
10:28 PM BG 96, pt has a hx of dropping over night. No regular coverage given, pt given an evening snack. 10:28 PM Contacted RACHEAL Moyer about the event Lantus 20units order. Per her orders, holding the Lantus. 5:28 AM Wound care completed on Right BKA. Wound care completed per order. Overall time was 15 minutes, pt handled it well. See additional wound care note for more details.

## 2020-10-13 NOTE — PROGRESS NOTES
Problem: Mobility Impaired (Adult and Pediatric) Goal: *Acute Goals and Plan of Care (Insert Text) Description: FUNCTIONAL STATUS PRIOR TO ADMISSION: The patient was functional at the wheelchair level and was supervision for transfers to the chair. HOME SUPPORT PRIOR TO ADMISSION: came from SNF, awaiting group home set up Physical Therapy Goals Initiated 10/13/2020 1. Patient will propel wheelchair 150ft with B UE technique mod I within 7 days 2. Patient will transfer from bed to chair and chair to bed with modified independence using the least restrictive device within 7 day(s). 3.  Patient will perform sit to stand with supervision/set-up within 7 day(s). 4. Patient will ambulate 10ft with RW and min A using hop to pattern within 7 days Outcome: Not Met PHYSICAL THERAPY EVALUATION Patient: Jadon Clement (89 y.o. male) Date: 10/13/2020 Primary Diagnosis: Abnormal stress test [R94.39] CAD (coronary artery disease) [I25.10] Procedure(s) (LRB): LEFT HEART CATH / CORONARY ANGIOGRAPHY (N/A) 3 Days Post-Op Precautions: R BKA,   Fall, Other (comment)(Impaired vision, [place]) ASSESSMENT Based on the objective data described below, the patient presents requiring supervision for transfers, min A x2 for standing, and supervision for wheelchair mobility. He is not able to ambulate at this time. He demonstrates generalized weakness, decreased endurance, and balance impairment. He would benefit from skilled PT services to address deficits and facilitate improved independence with mobility. He is motivated for improvement and eager for intense level of rehab. Would recommend IPR at current level however pt may progress to point where he can return home at wheelchair level with [facility]. Will continue to follow acutely. Current Level of Function Impacting Discharge (mobility/balance): min A x 2 to stand, non-ambulatory Functional Outcome Measure:   The patient scored 60/100 on the Barthel Index outcome measure which is indicative of 40% functional impairment. Other factors to consider for discharge: working on group home set up for patient Patient will benefit from skilled therapy intervention to address the above noted impairments. PLAN : 
Recommendations and Planned Interventions: bed mobility training, transfer training, gait training, therapeutic exercises, neuromuscular re-education, edema management/control, patient and family training/education, and therapeutic activities Frequency/Duration: Patient will be followed by physical therapy:  4 times a week to address goals. Recommendation for discharge: (in order for the patient to meet his/her long term goals) Therapy 3 hours per day 5-7 days per week This discharge recommendation: A follow-up discussion with the attending provider and/or case management is planned IF patient discharges home will need the following DME: wheelchair SUBJECTIVE:  
Patient stated Maranda Boyle didn't do anything with me at [facility].  OBJECTIVE DATA SUMMARY:  
HISTORY:   
Past Medical History:  
Diagnosis Date Arrhythmia   
 aflutter Blind Cataracts Cellulitis and abscess of foot Chronic kidney disease [facility]  
 Diabetes ([facility] [address] DM II  
 GERD (gastroesophageal reflux disease) Hearing loss Hypercholesterolemia Hypertension Neuropathy Obesity Osteomyelitis ([address] Puerperal sepsis with acute hypoxic respiratory failure ([facility] [address] Sepsis ([facility] [address] Past Surgical History:  
Procedure Laterality Date CARDIAC SURG PROCEDURE UNLIST  09/2020  
 ablation COLONOSCOPY N/A 12/8/2017 COLONOSCOPY performed by Griselda Drought, MD at [facility]  12/8/2017 HX AFIB ABLATION  09/04/2020 HX AMPUTATION Left great toe and L 5th toe HX AMPUTATION Right   
 bka HX AMPUTATION TOE Right  HX VASCULAR ACCESS    
 HI COMPRE ELECTROPHYSIOL XM W/LEFT ATRIAL PACNG/REC N/A 9/4/2020 Lt Atrial Pace & Record During Ep Study performed by Darlene Fajardo MD at [facility]ATH [facility]  
 HI EPHYS EVAL W/ABLATION SUPRAVENT ARRHYTHMIA N/A 9/4/2020 ABLATION A-FLUTTER performed by Darlene Fajardo MD at [facility]ATH [facility]  
 HI INTRACARDIAC ELECTROPHYSIOLOGIC 3D MAPPING N/A 9/4/2020 Ep 3d Mapping performed by Darlene Fajardo MD at [facility]ATH [facility]  
 UPPER GI ENDOSCOPY,BIOPSY  12/8/2017 VASCULAR SURGERY PROCEDURE UNLIST    
 R side chest  
 VASCULAR SURGERY PROCEDURE UNLIST Left 07/25/2017 Creation transposed AV fistula arm Personal factors and/or comorbidities impacting plan of care: R BKA, ESRD, Morbid obesity, legally blind, [place], decreased social support Home Situation Home Environment: Skilled nursing facility Care Facility Name: [facility](Pt does not wish to return) Wheelchair Ramp: Yes One/Two Story Residence: One story Living Alone: No 
Support Systems: Skilled nursing facility Patient Expects to be Discharged to[de-identified] Rehabilitation facility Current DME Used/Available at Home: Glucometer Tub or Shower Type: Shower EXAMINATION/PRESENTATION/DECISION MAKING:  
Critical Behavior: 
Neurologic State: Alert Orientation Level: Oriented X4 Cognition: Appropriate safety awareness, Follows commands Safety/Judgement: Awareness of environment, Good awareness of safety precautions, Insight into deficits Hearing: Auditory Auditory Impairment: Hard of hearing, bilateral 
Hearing Aids/Status: Does not own Skin:  Visible skin intact. R residual limb wrapped with some yellow drainage noted on dressing. RN aware Edema: None noted Range Of Motion: 
AROM: Generally decreased, functional(L ankle DF to neutral) PROM: Within functional limits Strength:   
Strength: Generally decreased, functional(B LE 4-/5 throughout) Tone & Sensation:  
Tone: Normal 
  
  
 Sensation: Impaired(Decreased to light touch on L foot) Coordination: 
Coordination: Within functional limits Vision:  
Acuity: Impaired near vision; Impaired far vision(legally blind) Corrective Lenses: (\"legally blind\") Functional Mobility: 
Bed Mobility: 
Rolling: Modified independent Supine to Sit: Modified independent Sit to Supine: Modified independent Scooting: Modified independent Transfers: 
Sit to Stand: Minimum assistance;Assist x2 Stand to Sit: Minimum assistance;Assist x1 Bed to Chair: Supervision(lateral depression transfer EOB to W/C) Lateral Transfers: Supervision Wheelchair Mobility: 100ft with B UE technique at supervision. Requires one rest break due to fatigue. Balance:  
Sitting: Intact Standing: Impaired; With support Standing - Static: Fair;Constant support Standing - Dynamic : Poor;Constant support Functional Measure: 
Barthel Index: 
 
Bathin Bladder: 10 Bowels: 10 
Groomin Dressin Feeding: 10 Mobility: 5 Stairs: 0 Toilet Use: 5 Transfer (Bed to Chair and Back): 10 Total: 60/100 The Barthel ADL Index: Guidelines 1. The index should be used as a record of what a patient does, not as a record of what a patient could do. 2. The main aim is to establish degree of independence from any help, physical or verbal, however minor and for whatever reason. 3. The need for supervision renders the patient not independent. 4. A patient's performance should be established using the best available evidence. Asking the patient, friends/relatives and nurses are the usual sources, but direct observation and common sense are also important. However direct testing is not needed. 5. Usually the patient's performance over the preceding 24-48 hours is important, but occasionally longer periods will be relevant. 6. Middle categories imply that the patient supplies over 50 per cent of the effort. 7. Use of aids to be independent is allowed. Marcos Haque., Barthel, D.W. (2018). Functional evaluation: the Barthel Index. [address] (14)2. JAY Bolanos Lott Rolls., Preston Rosas., [place], [address] (1999). Measuring the change indisability after inpatient rehabilitation; comparison of the responsiveness of the Barthel Index and Functional [place] Measure. Journal of Neurology, Neurosurgery, and Psychiatry, 66(4), 476-306. SUYAPA Camargo, YULISSA Rodriguez, & Graeme Fermin M.A. (2004.) Assessment of post-stroke quality of life in cost-effectiveness studies: The usefulness of the Barthel Index and the EuroQoL-5D. [facility], 13, 419-39 Physical Therapy Evaluation Charge Determination History Examination Presentation Decision-Making HIGH Complexity :3+ comorbidities / personal factors will impact the outcome/ POC  HIGH Complexity : 4+ Standardized tests and measures addressing body structure, function, activity limitation and / or participation in recreation  HIGH Complexity : Unstable and unpredictable characteristics  HIGH Complexity : FOTO score of 1- 25 Based on the above components, the patient evaluation is determined to be of the following complexity level: HIGH Pain Rating: 
Reports 3/10 pain in R residual limb Activity Tolerance:  
Good and requires rest breaks Please refer to the flowsheet for vital signs taken during this treatment. After treatment patient left in no apparent distress:  
Supine in bed and Call bell within reach COMMUNICATION/EDUCATION:  
The patients plan of care was discussed with: Occupational therapist.  
 
Fall prevention education was provided and the patient/caregiver indicated understanding., Patient/family have participated as able in goal setting and plan of care. , and Patient/family agree to work toward stated goals and plan of care.  
 
Thank you for this referral. 
Bernabe Davis DPT 
 Time Calculation: 26 mins

## 2020-10-13 NOTE — PROGRESS NOTES
NAME: Bharat Alegre :  1985 MRN:  [id number] Assessment :    Plan: 
--ESRD- Anemia CAD Labile BP, now HTN 
SHPT --[facility]; no HD today; UF as able with HD Holding FARIDA, Hgb > 10 On renvela, check phos Subjective: Chief Complaint:  Resting, awoke without difficulty. No complaint. Not talkative. Review of Systems: 
 
Symptom Y/N Comments  Symptom Y/N Comments Fever/Chills    Chest Pain Poor Appetite    Edema Cough    Abdominal Pain Sputum    Joint Pain SOB/LITTLE    Pruritis/Rash Nausea/vomit    Tolerating PT/OT Diarrhea    Tolerating Diet Constipation    Other Could not obtain due to:   
 
Objective: VITALS:  
Last 24hrs VS reviewed since prior progress note. Most recent are: 
Visit Vitals BP (!) 155/83 (BP 1 Location: Right arm, BP Patient Position: At rest) Pulse 77 Temp 97.6 °F (36.4 °C) Resp 16 Ht 6' 2\" (1.88 m) Wt 155.6 kg (343 lb 1.6 oz) SpO2 97% BMI 44.05 kg/m² Intake/Output Summary (Last 24 hours) at 10/13/2020 1158 Last data filed at 10/12/2020 1358 Gross per 24 hour Intake  Output 3400 ml Net -3400 ml Telemetry Reviewed: PHYSICAL EXAM: 
General: NAD 
cta alfredo on room air Lab Data Reviewed: (see below) Medications Reviewed: (see below) PMH/SH reviewed - no change compared to H&P 
________________________________________________________________________ Care Plan discussed with: 
Patient y Family RN Care Manager Consultant:     
 
  Comments >50% of visit spent in counseling and coordination of care    
 
________________________________________________________________________ Mildred York MD  
 
Procedures: see electronic medical records for all procedures/Xrays and details which 
were not copied into this note but were reviewed prior to creation of Plan.   
 
LABS: 
No results for input(s): WBC, HGB, HCT, PLT, HGBEXT, HCTEXT, PLTEXT, HGBEXT, HCTEXT, PLTEXT in the last 72 hours. Recent Labs 10/12/20 
4607   
K 4.7  CO2 27 BUN 52* CREA 9.25* GLU 92  
CA 7.8* No results for input(s): AP, TBIL, TP, ALB, GLOB, GGT, AML, LPSE in the last 72 hours. No lab exists for component: SGOT, GPT, AMYP, HLPSE No results for input(s): INR, PTP, APTT, INREXT, INREXT in the last 72 hours. No results for input(s): FE, TIBC, PSAT, FERR in the last 72 hours. No results found for: FOL, RBCF No results for input(s): PH, PCO2, PO2 in the last 72 hours. No results for input(s): CPK, CKMB in the last 72 hours. No lab exists for component: TROPONINI No components found for: [place] Lab Results Component Value Date/Time Color YELLOW/STRAW 05/01/2017 05:39 PM  
 Appearance CLOUDY (A) 05/01/2017 05:39 PM  
 Specific gravity 1.018 05/01/2017 05:39 PM  
 Specific gravity >1.030 (H) 10/03/2014 04:35 AM  
 pH (UA) 5.5 05/01/2017 05:39 PM  
 Protein 300 (A) 05/01/2017 05:39 PM  
 Glucose 250 (A) 05/01/2017 05:39 PM  
 Ketone NEGATIVE  05/01/2017 05:39 PM  
 Bilirubin NEGATIVE  05/01/2017 05:39 PM  
 Urobilinogen 0.2 05/01/2017 05:39 PM  
 Nitrites NEGATIVE  05/01/2017 05:39 PM  
 Leukocyte Esterase NEGATIVE  05/01/2017 05:39 PM  
 Epithelial cells FEW 05/01/2017 05:39 PM  
 Bacteria NEGATIVE  05/01/2017 05:39 PM  
 WBC 0-4 05/01/2017 05:39 PM  
 RBC 5-10 05/01/2017 05:39 PM  
 
 
MEDICATIONS: 
Current Facility-Administered Medications Medication Dose Route Frequency  albuterol-ipratropium (DUO-NEB) 2.5 MG-0.5 MG/3 ML  3 mL Nebulization Q6H PRN  
 insulin glargine (LANTUS) injection 20 Units  20 Units SubCUTAneous QHS  metoprolol tartrate (LOPRESSOR) tablet 50 mg  50 mg Oral BID  loperamide (IMODIUM) capsule 2 mg  2 mg Oral QID PRN  
 oxyCODONE IR (ROXICODONE) tablet 5 mg  5 mg Oral Q6H PRN  
  sevelamer carbonate (RENVELA) tab 800 mg  800 mg Oral TID WITH MEALS  sodium chloride (NS) flush 5-40 mL  5-40 mL IntraVENous Q8H  
 sodium chloride (NS) flush 5-40 mL  5-40 mL IntraVENous PRN  
 acetaminophen (TYLENOL) tablet 650 mg  650 mg Oral Q6H PRN Or  
 acetaminophen (TYLENOL) suppository 650 mg  650 mg Rectal Q6H PRN  polyethylene glycol (MIRALAX) packet 17 g  17 g Oral DAILY PRN  
 aspirin delayed-release tablet 81 mg  81 mg Oral DAILY  atorvastatin (LIPITOR) tablet 20 mg  20 mg Oral QHS  hydrALAZINE (APRESOLINE) 20 mg/mL injection 20 mg  20 mg IntraVENous Q6H PRN  
 insulin lispro (HUMALOG) injection   SubCUTAneous AC&HS  
 glucose chewable tablet 16 g  4 Tab Oral PRN  
 dextrose (D50W) injection syrg 12.5-25 g  12.5-25 g IntraVENous PRN  
 glucagon (GLUCAGEN) injection 1 mg  1 mg IntraMUSCular PRN

## 2020-10-13 NOTE — PROGRESS NOTES
0050 Patient returned to room from [address] [place] [address]. L groin cath site c/d/i, no hematoma. Pulses present. VSS. HOB flat. EKG completed.

## 2020-10-13 NOTE — PROGRESS NOTES
[facility] 
10/13/2020 Subjective:  
 
Mr. Jo Liu a very ATSEULVM 81 Q. o.  male with a pmhx significant for ESRD on HD, DM, HTN, HLD, and obesity.  Patient is legally blind and he has hearing loss. 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 Since his BKA he has undergone an ablation of AFlutter. Plan was for debridement and prior to the procedure a stress test was recommended which he completed on 10/09/2020 that was abnl and he was referred for admission. He underwent a diagnostic cath that was significant for multivessel disease. He was evaluated by CT surgery and he is currently is not a candidate for CABG due to diffuse disease with poor targets for revascularization and poor LV function. No acute changes overnight. Plan is for attempt at PCI later today. Nursing Data:  
 
Patient Vitals for the past 24 hrs: 
 BP Temp Temp src Pulse Resp SpO2  
10/13/20 0759 (!) 155/83 97.6 °F (36.4 °C)  77 16 97 % 10/13/20 0758    78  97 % 10/12/20 2327 (!) 141/65 98.1 °F (36.7 °C)  81 18 90 % 10/12/20 1944 130/65 97.9 °F (36.6 °C)  85 18 95 % 10/12/20 1639 121/67       
10/12/20 1542 (!) 174/94 97.9 °F (36.6 °C)  80 19   
10/12/20 1537 (!) 174/94       
10/12/20 1443 (!) 180/94 97.9 °F (36.6 °C)  75 20 92 % 10/12/20 1358 (!) 173/78 97.9 °F (36.6 °C) Oral 74 20   
10/12/20 1345 (!) 171/81   73 20   
10/12/20 1330 (!) 174/82   72 20   
10/12/20 1315 (!) 180/83   72 20   
10/12/20 1300 (!) 173/89   72 20   
10/12/20 1245 (!) 165/76   72 20   
10/12/20 1230 (!) 173/97   71 20   
10/12/20 1215 (!) 166/93   72 20   
10/12/20 1200 (!) 181/81   72 20   
10/12/20 1145 (!) 174/83   70 20   
10/12/20 1130 (!) 163/82   70 20   
10/12/20 1115 (!) 157/76   69 20   
10/12/20 1100 (!) 171/87   70 20  --------------------------------------------------------------------------------------------------------- Intake/Output Summary (Last 24 hours) at 10/13/2020 1046 Last data filed at 10/12/2020 1358 Gross per 24 hour Intake  Output 3400 ml Net -3400 ml Exam:  
 
Physical Exam 
Constitutional:   
   Appearance: He is obese. He is legally and is [place]. HENT:  
   Head: Normocephalic. Nose: Nose normal.  
   Mouth/Throat:  
   Mouth: Mucous membranes are moist.  
Eyes:  
   Pupils: Pupils are equal, round, and reactive to light. Neck: Musculoskeletal: Normal range of motion. Cardiovascular:  
   Rate and Rhythm: Normal rate and regular rhythm. Pulmonary:  
   Effort: Pulmonary effort is normal.  
   Breath sounds: Normal breath sounds. Abdominal:  
   General: Abdomen is flat. Palpations: Abdomen is soft. Musculoskeletal: Normal range of motion. Skin: 
   Findings: Wound (Stump with dehiscence and necrosis) present. Neurological:  
   General: No focal deficit present. Mental Status: He is alert. Psychiatric:     
   Mood and Affect: Mood normal.     
   Thought Content: Thought content normal.  
 
Lab Review:  
 
. Recent Results (from the past 24 hour(s)) GLUCOSE, POC Collection Time: 10/12/20  3:25 PM  
Result Value Ref Range Glucose (POC) 62 (L) 65 - 100 mg/dL Performed by Pawan Coast BSN   
GLUCOSE, POC Collection Time: 10/12/20  3:41 PM  
Result Value Ref Range Glucose (POC) 120 (H) 65 - 100 mg/dL Performed by Pawan Coast BSN   
GLUCOSE, POC Collection Time: 10/12/20  9:27 PM  
Result Value Ref Range Glucose (POC) 94 65 - 100 mg/dL Performed by Fausto Meyers RN   
GLUCOSE, POC Collection Time: 10/13/20  7:09 AM  
Result Value Ref Range Glucose (POC) 110 (H) 65 - 100 mg/dL Performed by [facility] Assessment/Plan:  
  
 
Consult problem PAD w/ dehiscence of surgical incision w/ necrosis -s/p right BKA 08/24/2020 Tentative plan for debridement pending result of today's catheterization.   
  
Acute problems ASHD Hyperlipidemia · Diagnostic cath 10/10/2020-diffuse multivessel disease · He is not a candidate for CABG · Attempt at PCI planned for later today · BBlocker, ASA, & statin Hypertension · Labile Plan per cardiology  
  
Active problems ESRD 
· HD [facility]  
· HD planned for today Anemia of chronic renal disease · Not at a level to transfuse Plan per nephrology 
  
Uncontrolled Diabetes Mellitus w/ hyperglycemia · HA1c 9.4 8/15/2020 · Basal and bolus insulin Morbid obesity  
Management of comorbid conditions by primary team. 
  
VTE Prophylaxis: 
Held for procedure  
  
Disposition: TBD post procedure

## 2020-10-13 NOTE — CARDIO/PULMONARY
Cardiac Rehab Note: chart review Patient back from cath lab with stents, was not candidate for CABG Smoking history assessed. Patient is a former smoker. Smoking Cessation Program link has not been added to the AVS. Patient just back from cath lab, CM at bedside at this time. CAD folder left on computer in room. Per chart, patient is blind and [place]. D/c planning in progress. Patient is recent right BKA and HD 3x weekly.

## 2020-10-13 NOTE — PROGRESS NOTES
PTOT evaluating patient. CM informed patient will benefit from IP Rehab. Notes to follow. Patient has informed OT he does not want to return to [facility] Rehab. Patient will need COVID test to continue with discharge planning. CM reviewed with Param Soto NP. Patient scheduled for procedure today and will continue to follow for ordering Covid test.  
 
1536 Failed attempt to meet with patient to review PTOT recommendations for IP Rehab. Patient on phone in conversation. [address] CM reviewed recommended discharge plan with patient. He referred CM to GINGER Liu. CM called. Voice mail is full. Unable to leave a message. Alli Flood RN CM Ext [phone]

## 2020-10-13 NOTE — PROGRESS NOTES
10/13/20 0532 Wound Leg Right BKA Incision Date First Assessed/Time First Assessed: 08/24/20 1305   Primary Wound Type: Incision  Location: Leg  Wound Location Orientation: Right  Wound Description: BKA Incision Dressing Status Changed per order;Clean, dry, and intact Dressing Type Gauze;Gauze wrap (maggie); Moist to dry;Non adherent;Packing Incision Site Well Approximated No  
Assessment Painful;Red;Drainage;Black Tissue Type Percent Black 5 % Tissue Type Percent Pink 50 Tissue Type Percent Red 45 Drainage Amount Moderate Drainage Color Clear;Serous; Yellow Wound Odor Foul;Mild Amanda-wound Assessment Intact; Black;Pink;Red Cleansing and Cleansing Agents  Normal saline Dressing Changed Changed/New Dressing Type Applied Gauze;Gauze wrap (maggie); Moist to dry;Non-adherent Procedure Bleeding None Closure Staples Procedure Tolerated Well Dressing change on right BKA completed 10/13/20 at 0300

## 2020-10-13 NOTE — PROGRESS NOTES
[address] [place] [address]  [phone] Cardiology Progress Note 10/13/2020 10AM 
 
Admit Date: 10/9/2020 Admit Diagnosis:  
Abnormal stress test [R94.39] CAD (coronary artery disease) [I25.10] Interval History/Subjective:  
 
Venessa Bean is a 38 y.o. male admitted for Abnormal stress test [R94.39] CAD (coronary artery disease) [I25.10] 
 
-BP slightly elevated 
-no labs 
-Mr. Naga Jacob is feeling well. He denies chest pain or SOB. He did feel slightly SOB when working with OT. Visit Vitals BP (!) 155/83 (BP 1 Location: Right arm, BP Patient Position: At rest) Pulse 77 Temp 97.6 °F (36.4 °C) Resp 16 Ht 6' 2\" (1.88 m) Wt 155.6 kg (343 lb 1.6 oz) SpO2 97% BMI 44.05 kg/m² Current Facility-Administered Medications Medication Dose Route Frequency  insulin glargine (LANTUS) injection 20 Units  20 Units SubCUTAneous QHS  metoprolol tartrate (LOPRESSOR) tablet 50 mg  50 mg Oral BID  loperamide (IMODIUM) capsule 2 mg  2 mg Oral QID PRN  
 oxyCODONE IR (ROXICODONE) tablet 5 mg  5 mg Oral Q6H PRN  
 sevelamer carbonate (RENVELA) tab 800 mg  800 mg Oral TID WITH MEALS  sodium chloride (NS) flush 5-40 mL  5-40 mL IntraVENous Q8H  
 sodium chloride (NS) flush 5-40 mL  5-40 mL IntraVENous PRN  
 acetaminophen (TYLENOL) tablet 650 mg  650 mg Oral Q6H PRN Or  
 acetaminophen (TYLENOL) suppository 650 mg  650 mg Rectal Q6H PRN  polyethylene glycol (MIRALAX) packet 17 g  17 g Oral DAILY PRN  
 aspirin delayed-release tablet 81 mg  81 mg Oral DAILY  atorvastatin (LIPITOR) tablet 20 mg  20 mg Oral QHS  hydrALAZINE (APRESOLINE) 20 mg/mL injection 20 mg  20 mg IntraVENous Q6H PRN  
 insulin lispro (HUMALOG) injection   SubCUTAneous AC&HS  
 glucose chewable tablet 16 g  4 Tab Oral PRN  
 dextrose (D50W) injection syrg 12.5-25 g  12.5-25 g IntraVENous PRN  
 glucagon (GLUCAGEN) injection 1 mg  1 mg IntraMUSCular PRN  
   
 Objective:  
  
Physical Exam: 
General Appearance:  pleasant, adult AAM resting in bed in NAD Chest:   slight scattered wheezes L>R Cardiovascular:  Regular rate and rhythm, no murmur. Abdomen:   Soft, non-tender, bowel sounds are active. Extremities: R BKA. Skin:  Warm and dry. Data Review: No results for input(s): WBC, HGB, HCT, PLT, HGBEXT, HCTEXT, PLTEXT, HGBEXT, HCTEXT, PLTEXT in the last 72 hours. Recent Labs 10/12/20 
2770   
K 4.7  CO2 27 GLU 92 BUN 52* CREA 9.25* CA 7.8* No results for input(s): TROIQ, CPK, CKMB in the last 72 hours. Intake/Output Summary (Last 24 hours) at 10/13/2020 1004 Last data filed at 10/12/2020 1358 Gross per 24 hour Intake  Output 3400 ml Net -3400 ml Telemetry: SR  
EKG: not showing in system Cxray: no acute process Assessment:  
 
Active Problems: 
  Abnormal stress test (10/9/2020) CAD (coronary artery disease) (10/10/2020) S/P cardiac cath (10/10/2020) Overview: 10/10/20:  Significant multi-vessel CAD Plan:  
 
CAD:  Abnormal out patient stress test.   Cardiac cath 10/10 with significant multi-vessel CAD. · Not a good surgical candidate · Plan for cardiac cath today with Dr. Sveta Rosen for PCI · Continue ASA, statin, BB 
· Given loading dose of plavix this morning DM: BGL low yesterday · Decreased lantus dose for yesterday, but RN did not give dose regardless · Hasn't needed SSI 
 
 
ESRD:  Dialysis yesterday · Appreciate nephrology assistance HTN: BP slightly elevated still · Increased BB yesterday · Has PRN hydralazine · Would add ACEi, but patient may have vascular surgery later this week and unclear if vascular team would have vasoplegia concerns with ACEi PAD:  With dehiscence of BKA wound · Vascular following. Possible debridement Wednesday No DVT prophylaxis due to scheduled/planned procedures.   
 
 
 
Ankit Drake NP 
 MIGUEL, RN, AGAP-BC

## 2020-10-14 NOTE — PROGRESS NOTES
Vascular Surgery Progress Note Jamia [facility] ACNP-BC 
10/14/2020 Subjective:  
 
Mr. Ang Wong a very FNFSOSED 12 H. o.  male with a pmhx significant for ESRD on HD, DM, HTN, HLD, and obesity.  Patient is legally blind and he has hearing loss. 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 Since his BKA he has undergone an ablation of AFlutter. Plan was for debridement and prior to the procedure a stress test was recommended which he completed on 10/09/2020 that was abnl and he was referred for admission. He underwent a diagnostic cath that was significant for multivessel disease. He was evaluated by CT surgery and he is currently is not a candidate for CABG due to diffuse disease with poor targets for revascularization and poor LV function. Patient is s/p PCI w/ KRISHAN 10/13/2020. No acute changes overnight. This am he denies CP. Currently receiving HD. Nursing Data:  
 
Patient Vitals for the past 24 hrs: 
 BP Temp Temp src Pulse Resp SpO2  
10/14/20 1015 (!) 172/88   75 18   
10/14/20 1000 (!) 160/63   77 18   
10/14/20 0945 (!) 164/74   76 18   
10/14/20 0930 (!) 162/77   76 18   
10/14/20 0915 (!) 156/82   76 18   
10/14/20 0900 (!) 149/70   76 18   
10/14/20 0845 (!) 147/69   78 18   
10/14/20 0830 (!) 152/74 97.8 °F (36.6 °C) Oral 78 16 99 % 10/14/20 0720 (!) 160/74 98 °F (36.7 °C)  76 16 98 % 10/14/20 0308 (!) 165/84 97.9 °F (36.6 °C)  78 18 96 % 10/13/20 2334 (!) 162/89 97.4 °F (36.3 °C)  79 18 99 % 10/13/20 1930 (!) 167/92 98.4 °F (36.9 °C)  77 18 95 % 10/13/20 1545 (!) 157/80       
10/13/20 1530 (!) 150/80   72  93 % 10/13/20 1515 (!) 156/76   73  93 % 10/13/20 1503 (!) 159/81 97.3 °F (36.3 °C)  74 17 97 % 10/13/20 1121 137/82 98 °F (36.7 °C)  73 16 91 %  
 
--------------------------------------------------------------------------- ------------------------------ No intake or output data in the 24 hours ending 10/14/20 1030 Exam:  
 
Physical Exam 
Constitutional:   
   Appearance: He is obese. He is legally and is [place]. HENT:  
   Head: Normocephalic. Nose: Nose normal.  
   Mouth/Throat:  
   Mouth: Mucous membranes are moist.  
Eyes:  
   Pupils: Pupils are equal, round, and reactive to light. Neck: Musculoskeletal: Normal range of motion. Cardiovascular:  
   Rate and Rhythm: Normal rate and regular rhythm. Pulmonary:  
   Effort: Pulmonary effort is normal.  
   Breath sounds: Normal breath sounds. Abdominal:  
   General: Abdomen is flat. Palpations: Abdomen is soft. Musculoskeletal: Normal range of motion. Skin: 
   Findings: Wound (Stump with dehiscence and necrosis) present. Neurological:  
   General: No focal deficit present. Mental Status: He is alert. Psychiatric:     
   Mood and Affect: Mood normal.     
   Thought Content: Thought content normal.  
 
Lab Review:  
 
. Recent Results (from the past 24 hour(s)) GLUCOSE, POC Collection Time: 10/13/20 11:17 AM  
Result Value Ref Range Glucose (POC) 93 65 - 100 mg/dL Performed by Rajendra Gallegos EKG, 12 LEAD, INITIAL Collection Time: 10/13/20  2:59 PM  
Result Value Ref Range Ventricular Rate 72 BPM  
 Atrial Rate 72 BPM  
 P-R Interval 186 ms QRS Duration 96 ms  
 Q-T Interval 442 ms QTC Calculation (Bezet) 483 ms Calculated P Axis -14 degrees Calculated R Axis -29 degrees Calculated T Axis 107 degrees Diagnosis Normal sinus rhythm Minimal voltage criteria for LVH, may be normal variant ( [place] product ) 
ST & T wave abnormality, consider lateral ischemia Prolonged QT When compared with ECG of 09-OCT-2020 17:53, 
ST now depressed in Anterior leads Confirmed by Gamaliel Lopez ([id number]) on 10/13/2020 8:14:05 PM 
  
GLUCOSE, POC Collection Time: 10/13/20  4:57 PM  
Result Value Ref Range Glucose (POC) 103 (H) 65 - 100 mg/dL Performed by Iris Desai SARS-COV-2 Collection Time: 10/13/20  6:39 PM  
Result Value Ref Range Specimen source Nasopharyngeal    
 SARS-CoV-2 Not detected NOTD Specimen source Nasopharyngeal    
 Specimen type NP Swab Health status Symptomatic Testing GLUCOSE, POC Collection Time: 10/13/20  9:59 PM  
Result Value Ref Range Glucose (POC) 125 (H) 65 - 100 mg/dL Performed by Nini Martell METABOLIC PANEL, BASIC Collection Time: 10/14/20  3:12 AM  
Result Value Ref Range Sodium 137 136 - 145 mmol/L Potassium 4.6 3.5 - 5.1 mmol/L Chloride 100 97 - 108 mmol/L  
 CO2 31 21 - 32 mmol/L Anion gap 6 5 - 15 mmol/L Glucose 90 65 - 100 mg/dL BUN 44 (H) 6 - 20 MG/DL Creatinine 8.96 (H) 0.70 - 1.30 MG/DL  
 BUN/Creatinine ratio 5 (L) 12 - 20 GFR est AA 8 (L) >60 ml/min/1.73m2 GFR est non-AA 7 (L) >60 ml/min/1.73m2 Calcium 8.1 (L) 8.5 - 10.1 MG/DL  
PHOSPHORUS Collection Time: 10/14/20  3:12 AM  
Result Value Ref Range Phosphorus 5.4 (H) 2.6 - 4.7 MG/DL  
GLUCOSE, POC Collection Time: 10/14/20  7:56 AM  
Result Value Ref Range Glucose (POC) 106 (H) 65 - 100 mg/dL Performed by Jonny Ibarra RN Assessment/Plan:  
  
 
Consult problem PAD w/ dehiscence of surgical incision w/ necrosis -s/p right BKA 08/24/2020 Revision of right BKA in the am.  NPO after midnight. Continue ASA and Plavix. Hold DVT prophylaxis. Obtain consent.   
  
Acute problems ASHD Hyperlipidemia · Diagnostic cath 10/10/2020-diffuse multivessel disease · He is not a candidate for CABG 
· PCI with KRISHAN placement 10/13/2020 · BBlocker, ASA, & statin Hypertension · Labile Plan per cardiology  
  
Active problems ESRD 
· HD [facility]  
· HD today Anemia of chronic renal disease · Not at a level to transfuse Plan per nephrology 
  
Uncontrolled Diabetes Mellitus w/ hyperglycemia · HA1c 9.4 8/15/2020 · Controlled this am on basal and bolus insulin Morbid obesity  
Management of comorbid conditions by primary team. 
  
VTE Prophylaxis: 
Held for procedure in the am  
  
Disposition: TBD post procedure

## 2020-10-14 NOTE — PROGRESS NOTES
TEJA: Inpatient Rehab at [facility] vs [facility] if referral is accepted Pt will need COVID test for IPR admission 2nd  Medicare letter will be needed at discharge Pt will need [facility]- EMTALA transport if going to [facility] 
 
CM spoke with Janki Reardon, patient's cousin who is listed as mPOA and has been requested to be contacted by patient. CM spoke with Ms. Saul Haley who states that she would prefer for patient to return back to [address] [place] [address] rehab program or have referral sent to [facility]. CM will submit referrals for IPR admission. Pt will need COVID test for IPR admission or return back to [facility] if not accepted at [facility]. Irina Jo Care Manager - ED [facility] ACP Facilitator Zone Phone: [phone]

## 2020-10-14 NOTE — PROGRESS NOTES
Occupational Therapy Note: 
 
Chart reviewed. Pt is currently receiving HD and may undergo revision of right BKA tomorrow. Will defer at this time and continue to follow. Thank you, [facility], OTR/L

## 2020-10-14 NOTE — PROGRESS NOTES
TRANSFER - IN REPORT: 
 
Verbal report received from [facility] on August Code  being received from [facility](unit) for ordered procedure Report consisted of patients Situation, Background, Assessment and  
Recommendations(SBAR). Information from the following report(s) Kardex was reviewed with the receiving nurse. Opportunity for questions and clarification was provided. Assessment completed upon patients arrival to unit and care assumed.

## 2020-10-14 NOTE — PROGRESS NOTES
[address] [facility] [address], [address] [facility] [address], [address] [facility]  [phone] Cardiology Progress Note 10/14/2020 11:09 AM 
 
Admit Date: 10/9/2020 Admit Diagnosis:  
Abnormal stress test [R94.39];CAD (coronary artery disease) [I25.10] Subjective:  
 
Soraida Mora No CP Visit Vitals BP (!) 162/80 Pulse 77 Temp 97.8 °F (36.6 °C) (Oral) Resp 18 Ht 6' 2\" (1.88 m) Wt 343 lb 1.6 oz (155.6 kg) SpO2 99% BMI 44.05 kg/m² Current Facility-Administered Medications Medication Dose Route Frequency  metoprolol tartrate (LOPRESSOR) tablet 75 mg  75 mg Oral BID  albuterol-ipratropium (DUO-NEB) 2.5 MG-0.5 MG/3 ML  3 mL Nebulization Q6H PRN  
 clopidogreL (PLAVIX) tablet 75 mg  75 mg Oral DAILY  insulin glargine (LANTUS) injection 20 Units  20 Units SubCUTAneous QHS  loperamide (IMODIUM) capsule 2 mg  2 mg Oral QID PRN  
 oxyCODONE IR (ROXICODONE) tablet 5 mg  5 mg Oral Q6H PRN  
 sevelamer carbonate (RENVELA) tab 800 mg  800 mg Oral TID WITH MEALS  sodium chloride (NS) flush 5-40 mL  5-40 mL IntraVENous Q8H  
 sodium chloride (NS) flush 5-40 mL  5-40 mL IntraVENous PRN  
 acetaminophen (TYLENOL) tablet 650 mg  650 mg Oral Q6H PRN Or  
 acetaminophen (TYLENOL) suppository 650 mg  650 mg Rectal Q6H PRN  polyethylene glycol (MIRALAX) packet 17 g  17 g Oral DAILY PRN  
 aspirin delayed-release tablet 81 mg  81 mg Oral DAILY  atorvastatin (LIPITOR) tablet 20 mg  20 mg Oral QHS  hydrALAZINE (APRESOLINE) 20 mg/mL injection 20 mg  20 mg IntraVENous Q6H PRN  
 insulin lispro (HUMALOG) injection   SubCUTAneous AC&HS  
 glucose chewable tablet 16 g  4 Tab Oral PRN  
 dextrose (D50W) injection syrg 12.5-25 g  12.5-25 g IntraVENous PRN  
 glucagon (GLUCAGEN) injection 1 mg  1 mg IntraMUSCular PRN Objective:  
  
Physical Exam: 
General Appearance:   
Chest:   Clear Cardiovascular: RRR Extremities: no edema Skin:  Warm and dry.  
 Data Review: No results for input(s): WBC, HGB, HCT, PLT, HGBEXT, HCTEXT, PLTEXT in the last 72 hours. Recent Labs 10/14/20 
6241 10/12/20 
8694  138  
K 4.6 4.7  106 CO2 31 27 GLU 90 92 BUN 44* 52* CREA 8.96* 9.25* CA 8.1* 7.8* PHOS 5.4*  -- No results for input(s): TROIQ, CPK, CKMB in the last 72 hours. No intake or output data in the 24 hours ending 10/14/20 1109 Telemetry:  
EKG: 
Cxray: 
 
Assessment:  
 
Active Problems: 
  Abnormal stress test (10/9/2020) CAD (coronary artery disease) (10/10/2020) S/P cardiac cath (10/10/2020) Overview: 10/10/20:  Significant multi-vessel CAD Plan:  
 
Stable post PCI. OK for debridement. Keep on DAPT Carrie Patino M.D., [facility]

## 2020-10-14 NOTE — PROGRESS NOTES
Problem: Falls - Risk of 
Goal: *Absence of Falls Description: Document Radha Kirby Fall Risk and appropriate interventions in the flowsheet. Outcome: Progressing Towards Goal 
Note: Fall Risk Interventions: 
Mobility Interventions: OT consult for ADLs, PT Consult for mobility concerns Medication Interventions: Patient to call before getting OOB, Teach patient to arise slowly Elimination Interventions: Call light in reach

## 2020-10-14 NOTE — PROCEDURES
[facility] Dialysis Team [facility]  [phone] Vitals   Pre   Post   Assessment   Pre   Post    
Temp  Temp: 97.8 °F (36.6 °C) (10/14/20 0830) 98.0 oral LOC  A & o X 4 No change HR   Pulse (Heart Rate): 76 (10/14/20 0900) 77 Lungs   Dim RL  Lower Lobes  No change B/P   BP: (!) 149/70 (10/14/20 0900) 183/98 Cardiac   S1S2  No change Resp   Resp Rate: 18 (10/14/20 0900) 18 Skin   Warm,dry L BKA  No change Pain level  Pain Intensity 1: 0 (10/14/20 0854) 0 Edema Generalized edema No change Orders: Duration:   Start:    0830 End:   12:30 Total:   4.0 Dialyzer:   Dialyzer/Set Up Inspection: Maggy Lance (10/14/20 0830) K Bath:   Dialysate K (mEq/L): 2 (10/14/20 0830) Ca Bath:   Dialysate CA (mEq/L): 2.5 (10/14/20 0830) Na/Bicarb:   Dialysate NA (mEq/L): 140 (10/12/20 0949) Target Fluid Removal:   Goal/Amount of Fluid to Remove (mL): 3500 mL (10/14/20 0830) Access  AVF Type & Location:   Left Upper AVF Labs Obtained/Reviewed Critical Results Called   Date when labs were drawn- 
Hgb-   
HGB Date Value Ref Range Status 10/09/2020 10.6 (L) 12.1 - 17.0 g/dL Final  
 
K-   
Potassium Date Value Ref Range Status 10/14/2020 4.6 3.5 - 5.1 mmol/L Final  
 
Ca-  
Calcium Date Value Ref Range Status 10/14/2020 8.1 (L) 8.5 - 10.1 MG/DL Final  
 
Bun-  
BUN Date Value Ref Range Status 10/14/2020 44 (H) 6 - 20 MG/DL Final  
 
Creat-  
Creatinine Date Value Ref Range Status 10/14/2020 8.96 (H) 0.70 - 1.30 MG/DL Final  
 
  
Medications/ Blood Products Given Name   Dose   Route and Time    
none Blood Volume Processed (BVP):    100.0 Net Fluid Removed:  3500ml Comments RN reviewed LPN assessment and completed RN assessment. RN completed patient assessment. RN reviewed technicians vital signs and procedure note. Tx completed. Reviewed by DIONICIO Martinez Time Out Done: 3977 Primary Nurse Rpt Pre:DIONICIO Olivo 
 Primary Nurse Rpt Post:DIONICIO Olivo Pt Education:access care Care Plan:continuie HD Tx Summary:PELON AVF: skin CDI. No s/s of infection. + B/T. No issues with cannulation or hemostasis. Running well at . I arrived to pt's room A&Ox4. Consent signed & on file. SBAR received from Primary RN. 0830: Pt cannulated with 04S needles per policy & without issue. Labs drawn per request/ order. VSS. Dialysis Tx initiated. 0845: Pt. Stable,lines secure and visible 
0900: Pt resting quietly. Lines secure 0915: Pt.stable, lines secure 0930: Pt. Resting well,  
0945: pt. Stable, lines secure  
1000: Pt. Marcello. Hd well,. NO s/s of distress 1015: Pt. Stable,lines secure 1030: Pt. Resting well, lines secure 1045: Pt. Resting well,  rounding ok'd to run pt. 4.0 hrslines scure 
1100: Pt. Stable, lines secure 1115; Pt. Stable,lines secure and visible 1130: Pt stable, lines secure 1145: Pt.stable,lines 1200: Pt. Stable, lines secure and visible 1215: Pt. Watching TV and on cell phone 12:30 Tx ended. VSS. All possible blood returned to patient. Hemostasis achieved without issue. Bed locked and in the lowest position, call bell and belongings in reach. SBAR given to Primary, RN. Patient is stable at time of their/ my departure. All Dialysis related medications have been reviewed. Admiting Diagnosis:Abnormal Stress test 
Pt's previous clinic- [facility] 
Consent signed - Informed Consent Verified: Yes (10/14/20 0830) Sylvain Consent - verified Hepatitis Status- neg/susc 9/25/20 Machine #- Machine Number: [id number] (10/14/20 0830) Telemetry status-yes Pre-dialysis wt. -

## 2020-10-14 NOTE — PROGRESS NOTES
HD TRANSFER - OUT REPORT: 
 
Verbal report given to Bro Olmedo being transferred to [facility]  (Unit) for ordered procedure Report consisted of patient's Situation, Background, Assessment and  
Recommendations(SBAR). Information from the following report(s) Kardex was reviewed with the receiving nurse. Method:  $$ Method: Hemodialysis (10/14/20 1230) Fluid Removed  NET Fluid Removed (mL): 3500 ml (10/14/20 1230) Patient response to treatment:  Stable End Time  Hemodialysis End Time: 1230 (10/14/20 1230) If not documented, dialysis nurse to update post-dialysis row in HD/Filtration flowsheet Medications /Volume expansion agents or Fluid boluses administered during treatment? no 
 
Post-dialysis medication administration due?  no 
Remind nurse to administer post-HD medication upon return to unit. Fistula hemostasis? yes Line heparinization? no 
 
Lines: AVF/Secure Opportunity for questions and clarification was provided. Patient transported with: HD at bedside

## 2020-10-14 NOTE — PROGRESS NOTES
Problem: Mobility Impaired (Adult and Pediatric) Goal: *Acute Goals and Plan of Care (Insert Text) Description: FUNCTIONAL STATUS PRIOR TO ADMISSION: The patient was functional at the wheelchair level and was supervision for transfers to the chair. HOME SUPPORT PRIOR TO ADMISSION: came from SNF, awaiting group home set up Physical Therapy Goals Initiated 10/13/2020 1. Patient will propel wheelchair 150ft with B UE technique mod I within 7 days 2. Patient will transfer from bed to chair and chair to bed with modified independence using the least restrictive device within 7 day(s). 3.  Patient will perform sit to stand with supervision/set-up within 7 day(s). 4. Patient will ambulate 10ft with RW and min A using hop to pattern within 7 days Outcome: Progressing Towards Goal 
 PHYSICAL THERAPY TREATMENT Patient: Brynn Cowden (11 y.o. male) Date: 10/14/2020 Diagnosis: Abnormal stress test [R94.39] CAD (coronary artery disease) [I25.10] Procedure(s) (LRB): ANGIOPLASTY CORONARY (N/A) Insert Stent Bms Coronary (N/A) Ultrasound Guided Vascular Access (N/A) 1 Day Post-Op Precautions: Fall, (Impaired vision, [place]) Chart, physical therapy assessment, plan of care and goals were reviewed. ASSESSMENT: Patient continues with skilled PT services and is progressing slowly towards goals, pt stated he hasn't been on hi feet in a long time bet he should be able to stand, did well with the transfer to chair and ther-ex, going for AKA tomorrow, will need re-eval post procedure. Current Level of Function Impacting Discharge (mobility/balance): Stand-by assistance/CGA Other factors to consider for discharge: going for AKA tomorrow PLAN : Patient continues to benefit from skilled intervention to address the above impairments. Continue treatment per established plan of care 
to address goals. Recommendation for discharge: (in order for the patient to meet his/her long term goals) To be determined: This discharge recommendation: Has not yet been discussed the attending provider and/or case management IF patient discharges home will need the following DME: to be determined (TBD) OBJECTIVE DATA SUMMARY:  
 
Critical Behavior: 
Neurologic State: Alert Orientation Level: Oriented X4 Cognition: Appropriate safety awareness, Follows commands Safety/Judgement: Awareness of environment, Good awareness of safety precautions, Insight into deficits Functional Mobility Training: 
Bed Mobility: 
Rolling: Modified independent Supine to Sit: Modified independent Scooting: Modified independent Level of Assistance: Modified independent Interventions: Verbal cues Transfers: 
Bed to Chair: Stand-by assistance Interventions: Verbal cues Level of Assistance: Stand-by assistance Balance: 
Sitting: Intact; Without support Sitting - Static: Good (unsupported) Standing: (N/T) Ambulation/Gait Training: unable at this time. Therapeutic Exercises:  
sitting EXERCISE Sets Reps Active Active Assist  
Passive Comments Ankle pumps 1 10 [x] [] [] LLE Heel raises 1 10 [x] [] [] \" Toe tap 1 10 [x] [] [] \" Knee ext 1 10 [x] [] [] bilat Hip flex 1 10 [x] [] [] \"  
 
Pain Ratin Activity Tolerance: Fair After treatment patient left in no apparent distress: Sitting in W/chair and Call bell within reach COMMUNICATION/COLLABORATION:  
The patients plan of care was discussed with: Registered nurse. Eden Nuñez PTA Time Calculation: 25 mins

## 2020-10-14 NOTE — PROGRESS NOTES
NAME: Kalyn Fuller :  1985 MRN:  [id number] Assessment :    Plan: 
--ESRD- Anemia CAD Labile BP, now HTN 
SHPT --[facility]; HD today; UF as able with HD Holding FARIDA, Hgb > 10 On renvela, check phos Subjective: Chief Complaint:  The patient was seen on dialysis at 12:34 PM .  BP is stable. Access is working well. . No complaint. Not talkative. Review of Systems: 
 
Symptom Y/N Comments  Symptom Y/N Comments Fever/Chills    Chest Pain Poor Appetite    Edema Cough    Abdominal Pain Sputum    Joint Pain SOB/LITTLE    Pruritis/Rash Nausea/vomit    Tolerating PT/OT Diarrhea    Tolerating Diet Constipation    Other Could not obtain due to:   
 
Objective: VITALS:  
Last 24hrs VS reviewed since prior progress note. Most recent are: 
Visit Vitals BP (!) 183/98 Pulse 78 Temp 98.8 °F (37.1 °C) (Oral) Resp 20 Ht 6' 2\" (1.88 m) Wt 155.6 kg (343 lb 1.6 oz) SpO2 99% BMI 44.05 kg/m² Intake/Output Summary (Last 24 hours) at 10/14/2020 1234 Last data filed at 10/14/2020 1230 Gross per 24 hour Intake  Output 3500 ml Net -3500 ml Telemetry Reviewed: PHYSICAL EXAM: 
General: NAD 
cta alfredo on room air Lab Data Reviewed: (see below) Medications Reviewed: (see below) PMH/SH reviewed - no change compared to H&P 
________________________________________________________________________ Care Plan discussed with: 
Patient y Family RN Care Manager Consultant:     
 
  Comments >50% of visit spent in counseling and coordination of care    
 
________________________________________________________________________ Evelene Pineda, MD  
 
Procedures: see electronic medical records for all procedures/Xrays and details which 
were not copied into this note but were reviewed prior to creation of Plan.   
 
LABS: 
No results for input(s): WBC, HGB, HCT, PLT, HGBEXT, HCTEXT, PLTEXT, HGBEXT, HCTEXT, PLTEXT in the last 72 hours. Recent Labs 10/14/20 
8154 10/12/20 
8168  138  
K 4.6 4.7  106 CO2 31 27 BUN 44* 52* CREA 8.96* 9.25* GLU 90 92 CA 8.1* 7.8* PHOS 5.4*  -- No results for input(s): AP, TBIL, TP, ALB, GLOB, GGT, AML, LPSE in the last 72 hours. No lab exists for component: SGOT, GPT, AMYP, HLPSE No results for input(s): INR, PTP, APTT, INREXT, INREXT in the last 72 hours. No results for input(s): FE, TIBC, PSAT, FERR in the last 72 hours. No results found for: FOL, RBCF No results for input(s): PH, PCO2, PO2 in the last 72 hours. No results for input(s): CPK, CKMB in the last 72 hours. No lab exists for component: TROPONINI No components found for: [place] Lab Results Component Value Date/Time Color YELLOW/STRAW 05/01/2017 05:39 PM  
 Appearance CLOUDY (A) 05/01/2017 05:39 PM  
 Specific gravity 1.018 05/01/2017 05:39 PM  
 Specific gravity >1.030 (H) 10/03/2014 04:35 AM  
 pH (UA) 5.5 05/01/2017 05:39 PM  
 Protein 300 (A) 05/01/2017 05:39 PM  
 Glucose 250 (A) 05/01/2017 05:39 PM  
 Ketone NEGATIVE  05/01/2017 05:39 PM  
 Bilirubin NEGATIVE  05/01/2017 05:39 PM  
 Urobilinogen 0.2 05/01/2017 05:39 PM  
 Nitrites NEGATIVE  05/01/2017 05:39 PM  
 Leukocyte Esterase NEGATIVE  05/01/2017 05:39 PM  
 Epithelial cells FEW 05/01/2017 05:39 PM  
 Bacteria NEGATIVE  05/01/2017 05:39 PM  
 WBC 0-4 05/01/2017 05:39 PM  
 RBC 5-10 05/01/2017 05:39 PM  
 
 
MEDICATIONS: 
Current Facility-Administered Medications Medication Dose Route Frequency  metoprolol tartrate (LOPRESSOR) tablet 75 mg  75 mg Oral BID  albuterol-ipratropium (DUO-NEB) 2.5 MG-0.5 MG/3 ML  3 mL Nebulization Q6H PRN  
 clopidogreL (PLAVIX) tablet 75 mg  75 mg Oral DAILY  insulin glargine (LANTUS) injection 20 Units  20 Units SubCUTAneous QHS  loperamide (IMODIUM) capsule 2 mg  2 mg Oral QID PRN  
 oxyCODONE IR (ROXICODONE) tablet 5 mg  5 mg Oral Q6H PRN  
 sevelamer carbonate (RENVELA) tab 800 mg  800 mg Oral TID WITH MEALS  sodium chloride (NS) flush 5-40 mL  5-40 mL IntraVENous Q8H  
 sodium chloride (NS) flush 5-40 mL  5-40 mL IntraVENous PRN  
 acetaminophen (TYLENOL) tablet 650 mg  650 mg Oral Q6H PRN Or  
 acetaminophen (TYLENOL) suppository 650 mg  650 mg Rectal Q6H PRN  polyethylene glycol (MIRALAX) packet 17 g  17 g Oral DAILY PRN  
 aspirin delayed-release tablet 81 mg  81 mg Oral DAILY  atorvastatin (LIPITOR) tablet 20 mg  20 mg Oral QHS  hydrALAZINE (APRESOLINE) 20 mg/mL injection 20 mg  20 mg IntraVENous Q6H PRN  
 insulin lispro (HUMALOG) injection   SubCUTAneous AC&HS  
 glucose chewable tablet 16 g  4 Tab Oral PRN  
 dextrose (D50W) injection syrg 12.5-25 g  12.5-25 g IntraVENous PRN  
 glucagon (GLUCAGEN) injection 1 mg  1 mg IntraMUSCular PRN

## 2020-10-15 NOTE — PROGRESS NOTES
Attempted to see pt this pm and pt is off floor for procedure. If pt is getting AKA will need new orders. Thank you.

## 2020-10-15 NOTE — PROGRESS NOTES
[address]  [phone] Cardiology Progress Note 10/15/2020 10AM 
 
Admit Date: 10/9/2020 Admit Diagnosis:  
Abnormal stress test [R94.39] CAD (coronary artery disease) [I25.10] Interval History/Subjective:  
 
Rubina Cotter is a 38 y.o. male admitted for Abnormal stress test [R94.39] CAD (coronary artery disease) [I25.10] Awaiting debridement of wound scheduled today. No c/o CP, SOB. Visit Vitals BP (!) 162/87 Pulse 80 Temp 98 °F (36.7 °C) Resp 18 Ht 6' 2\" (1.88 m) Wt 343 lb 1.6 oz (155.6 kg) SpO2 98% BMI 44.05 kg/m² Current Facility-Administered Medications Medication Dose Route Frequency  influenza vaccine 2020-21 (6 mos+)(PF) (FLUARIX/FLULAVAL/FLUZONE QUAD) injection 0.5 mL  0.5 mL IntraMUSCular PRIOR TO DISCHARGE  metoprolol tartrate (LOPRESSOR) tablet 75 mg  75 mg Oral BID  albuterol-ipratropium (DUO-NEB) 2.5 MG-0.5 MG/3 ML  3 mL Nebulization Q6H PRN  
 clopidogreL (PLAVIX) tablet 75 mg  75 mg Oral DAILY  insulin glargine (LANTUS) injection 20 Units  20 Units SubCUTAneous QHS  loperamide (IMODIUM) capsule 2 mg  2 mg Oral QID PRN  
 oxyCODONE IR (ROXICODONE) tablet 5 mg  5 mg Oral Q6H PRN  
 sevelamer carbonate (RENVELA) tab 800 mg  800 mg Oral TID WITH MEALS  sodium chloride (NS) flush 5-40 mL  5-40 mL IntraVENous Q8H  
 sodium chloride (NS) flush 5-40 mL  5-40 mL IntraVENous PRN  
 acetaminophen (TYLENOL) tablet 650 mg  650 mg Oral Q6H PRN Or  
 acetaminophen (TYLENOL) suppository 650 mg  650 mg Rectal Q6H PRN  polyethylene glycol (MIRALAX) packet 17 g  17 g Oral DAILY PRN  
 aspirin delayed-release tablet 81 mg  81 mg Oral DAILY  atorvastatin (LIPITOR) tablet 20 mg  20 mg Oral QHS  hydrALAZINE (APRESOLINE) 20 mg/mL injection 20 mg  20 mg IntraVENous Q6H PRN  
 insulin lispro (HUMALOG) injection   SubCUTAneous AC&HS  
 glucose chewable tablet 16 g  4 Tab Oral PRN  
  dextrose (D50W) injection syrg 12.5-25 g  12.5-25 g IntraVENous PRN  
 glucagon (GLUCAGEN) injection 1 mg  1 mg IntraMUSCular PRN Objective:  
  
Physical Exam: 
General Appearance:  pleasant, obese AAM in no acute distress Chest:  clear Cardiovascular:  Regular rate and rhythm, no murmur. Abdomen:   Soft, non-tender, bowel sounds are active. Extremities: R BKA. Skin:  Warm and dry. Data Review: No results for input(s): WBC, HGB, HCT, PLT, HGBEXT, HCTEXT, PLTEXT, HGBEXT, HCTEXT, PLTEXT in the last 72 hours. Recent Labs 10/14/20 
1443   
K 4.6  CO2 31  
GLU 90 BUN 44* CREA 8.96* CA 8.1*  
PHOS 5.4* No results for input(s): TROIQ, CPK, CKMB in the last 72 hours. Intake/Output Summary (Last 24 hours) at 10/15/2020 4880 Last data filed at 10/14/2020 1230 Gross per 24 hour Intake  Output 3500 ml Net -3500 ml Telemetry: SR  
 
 
Assessment:  
 
Active Problems: 
  Abnormal stress test (10/9/2020) CAD (coronary artery disease) (10/10/2020) S/P cardiac cath (10/10/2020) Overview: 10/13/2020 PCI/KRISHAN to pLAD, mLAD, mLCX and dRCA 
    10/10/20:  Significant multi-vessel CAD Plan:  
 
CAD:   
Abnormal out patient stress test.   Cardiac cath 10/10 with significant multi-vessel CAD. 10/13/2020 PCI/Iza to pLAD, mLAD, mLCx and dRCA Continue ASA, Plavix 75mg daily x 1 year, statin, BB 
 
DM:   
BGL stable with decreased lantus dose ESRD:  Dialysis yesterday Neg 3.5L off Appreciate nephrology assistance HTN:  
BP better controlled with increased BB Has PRN hydralazine Would add ACEi, but patient may have vascular surgery later this week and unclear if vascular team would have vasoplegia concerns with ACEi PAD:  With dehiscence of BKA wound Vascular following. Possible debridement today Litzy Luevano ACNP Cardiology

## 2020-10-15 NOTE — BRIEF OP NOTE
Brief Postoperative Note Patient: Elvi Rowland YOB: 1985 MRN: [id number] Date of Procedure: 10/15/2020 Pre-Op Diagnosis: ischemic leg Post-Op Diagnosis: Same as preoperative diagnosis. Procedure(s): REVISION RIGHT BELOW THE KNEE AMPUTATION Surgeon(s): 
Jesika Asher MD 
 
Surgical Assistant: None Anesthesia: General  
 
Estimated Blood Loss (mL): less than 100 Complications: None Specimens: * No specimens in log * Implants: * No implants in log * Drains: * No LDAs found * Findings: superficial skin and fat necrosis resected and able to close primarily. Lateral wound left open and packed. [id number] Electronically Signed by Tonia Laguerre MD on 10/15/2020 at 4:32 PM

## 2020-10-15 NOTE — PROGRESS NOTES
NAME: August Hernandez :  1985 MRN:  [id number] Assessment :    Plan: 
--ESRD- Anemia CAD Labile BP, now HTN 
SHPT --MWF-[facility]; no HD today Holding FARIDA, Hgb > 10 On renvela, phos is < 5.5 Subjective: Chief Complaint:  Resting. No new issues. Review of Systems: 
 
Symptom Y/N Comments  Symptom Y/N Comments Fever/Chills    Chest Pain Poor Appetite    Edema Cough    Abdominal Pain Sputum    Joint Pain SOB/LITTLE    Pruritis/Rash Nausea/vomit    Tolerating PT/OT Diarrhea    Tolerating Diet Constipation    Other Could not obtain due to:   
 
Objective: VITALS:  
Last 24hrs VS reviewed since prior progress note. Most recent are: 
Visit Vitals BP (!) 162/87 (BP 1 Location: Right arm, BP Patient Position: At rest) Pulse 76 Temp 98.3 °F (36.8 °C) Resp 18 Ht 6' 2\" (1.88 m) Wt 155.6 kg (343 lb 1.6 oz) SpO2 98% BMI 44.05 kg/m² Intake/Output Summary (Last 24 hours) at 10/15/2020 1156 Last data filed at 10/14/2020 1230 Gross per 24 hour Intake  Output 3500 ml Net -3500 ml Telemetry Reviewed: PHYSICAL EXAM: 
General: NAD 
cta alfredo on room air Lab Data Reviewed: (see below) Medications Reviewed: (see below) PMH/SH reviewed - no change compared to H&P 
________________________________________________________________________ Care Plan discussed with: 
Patient y Family RN Care Manager Consultant:     
 
  Comments >50% of visit spent in counseling and coordination of care    
 
________________________________________________________________________ Sarah Ehrich, MD  
 
Procedures: see electronic medical records for all procedures/Xrays and details which 
were not copied into this note but were reviewed prior to creation of Plan.    
 
LABS: 
 No results for input(s): WBC, HGB, HCT, PLT, HGBEXT, HCTEXT, PLTEXT, HGBEXT, HCTEXT, PLTEXT in the last 72 hours. Recent Labs 10/14/20 
9228   
K 4.6  CO2 31 BUN 44* CREA 8.96* GLU 90  
CA 8.1*  
PHOS 5.4* No results for input(s): AP, TBIL, TP, ALB, GLOB, GGT, AML, LPSE in the last 72 hours. No lab exists for component: SGOT, GPT, AMYP, HLPSE No results for input(s): INR, PTP, APTT, INREXT, INREXT in the last 72 hours. No results for input(s): FE, TIBC, PSAT, FERR in the last 72 hours. No results found for: FOL, RBCF No results for input(s): PH, PCO2, PO2 in the last 72 hours. No results for input(s): CPK, CKMB in the last 72 hours. No lab exists for component: TROPONINI No components found for: [place] Lab Results Component Value Date/Time Color YELLOW/STRAW 05/01/2017 05:39 PM  
 Appearance CLOUDY (A) 05/01/2017 05:39 PM  
 Specific gravity 1.018 05/01/2017 05:39 PM  
 Specific gravity >1.030 (H) 10/03/2014 04:35 AM  
 pH (UA) 5.5 05/01/2017 05:39 PM  
 Protein 300 (A) 05/01/2017 05:39 PM  
 Glucose 250 (A) 05/01/2017 05:39 PM  
 Ketone NEGATIVE  05/01/2017 05:39 PM  
 Bilirubin NEGATIVE  05/01/2017 05:39 PM  
 Urobilinogen 0.2 05/01/2017 05:39 PM  
 Nitrites NEGATIVE  05/01/2017 05:39 PM  
 Leukocyte Esterase NEGATIVE  05/01/2017 05:39 PM  
 Epithelial cells FEW 05/01/2017 05:39 PM  
 Bacteria NEGATIVE  05/01/2017 05:39 PM  
 WBC 0-4 05/01/2017 05:39 PM  
 RBC 5-10 05/01/2017 05:39 PM  
 
 
MEDICATIONS: 
Current Facility-Administered Medications Medication Dose Route Frequency  influenza vaccine 2020-21 (6 mos+)(PF) (FLUARIX/FLULAVAL/FLUZONE QUAD) injection 0.5 mL  0.5 mL IntraMUSCular PRIOR TO DISCHARGE  metoprolol tartrate (LOPRESSOR) tablet 75 mg  75 mg Oral BID  albuterol-ipratropium (DUO-NEB) 2.5 MG-0.5 MG/3 ML  3 mL Nebulization Q6H PRN  
 clopidogreL (PLAVIX) tablet 75 mg  75 mg Oral DAILY  insulin glargine (LANTUS) injection 20 Units  20 Units SubCUTAneous QHS  loperamide (IMODIUM) capsule 2 mg  2 mg Oral QID PRN  
 oxyCODONE IR (ROXICODONE) tablet 5 mg  5 mg Oral Q6H PRN  
 sevelamer carbonate (RENVELA) tab 800 mg  800 mg Oral TID WITH MEALS  sodium chloride (NS) flush 5-40 mL  5-40 mL IntraVENous Q8H  
 sodium chloride (NS) flush 5-40 mL  5-40 mL IntraVENous PRN  
 acetaminophen (TYLENOL) tablet 650 mg  650 mg Oral Q6H PRN Or  
 acetaminophen (TYLENOL) suppository 650 mg  650 mg Rectal Q6H PRN  polyethylene glycol (MIRALAX) packet 17 g  17 g Oral DAILY PRN  
 aspirin delayed-release tablet 81 mg  81 mg Oral DAILY  atorvastatin (LIPITOR) tablet 20 mg  20 mg Oral QHS  hydrALAZINE (APRESOLINE) 20 mg/mL injection 20 mg  20 mg IntraVENous Q6H PRN  
 insulin lispro (HUMALOG) injection   SubCUTAneous AC&HS  
 glucose chewable tablet 16 g  4 Tab Oral PRN  
 dextrose (D50W) injection syrg 12.5-25 g  12.5-25 g IntraVENous PRN  
 glucagon (GLUCAGEN) injection 1 mg  1 mg IntraMUSCular PRN

## 2020-10-15 NOTE — ANESTHESIA POSTPROCEDURE EVALUATION
Procedure(s): REVISION RIGHT BELOW THE KNEE AMPUTATION. general 
 
Anesthesia Post Evaluation Patient location during evaluation: PACU Note status: Adequate. Level of consciousness: responsive to verbal stimuli and sleepy but conscious Pain management: satisfactory to patient Airway patency: patent Anesthetic complications: no 
Cardiovascular status: acceptable Respiratory status: acceptable Hydration status: acceptable Comments: +Post-Anesthesia Evaluation and Assessment Patient: Verlan Paget MRN: [id number]  SSN: [id number] YOB: 1985  Age: 38 y.o. Sex: male Cardiovascular Function/Vital Signs BP (!) 141/79   Pulse 70   Temp 37.2 °C (99 °F)   Resp 18   Ht 6' 2\" (1.88 m)   Wt 155.6 kg (343 lb 1.6 oz)   SpO2 99%   BMI 44.05 kg/m² Patient is status post Procedure(s): REVISION RIGHT BELOW THE KNEE AMPUTATION. Nausea/Vomiting: Controlled. Postoperative hydration reviewed and adequate. Pain: 
Pain Scale 1: FLACC (10/15/20 1715) Pain Intensity 1: 0 (10/15/20 1715) Managed. Neurological Status:  
Neuro (WDL): Exceptions to WDL (10/15/20 1630) At baseline. Mental Status and Level of Consciousness: Arousable. Pulmonary Status:  
O2 Device: Nasal cannula (10/15/20 1715) Adequate oxygenation and airway patent. Complications related to anesthesia: None Post-anesthesia assessment completed. No concerns. Signed By: Chuck Panchal MD  
 10/15/2020 Post anesthesia nausea and vomiting:  controlled INITIAL Post-op Vital signs:  
Vitals Value Taken Time /79 10/15/2020  5:15 PM  
Temp 37.2 °C (99 °F) 10/15/2020  4:30 PM  
Pulse 69 10/15/2020  5:25 PM  
Resp 6 10/15/2020  5:25 PM  
SpO2 98 % 10/15/2020  5:25 PM  
Vitals shown include unvalidated device data.

## 2020-10-15 NOTE — PROGRESS NOTES
Occupational Therapy Chart reviewed, patient off floor for procedure on BKA, will follow up, will need OT re-evaluation post surgical procedure. Edvin Citizen.  MS Mark OTR/L

## 2020-10-15 NOTE — PROGRESS NOTES
0730-report received from off going RO and care resumed for MR RIVERA,introductions made, whiteboard updated, pt appears to be resting in bed at this time, would like nurse to re-dress wound, will continue to monitor and act accordingly 0930-pt BS in the 70s, refer to chart, pt felt symptomatic at the time, pt medicated with dextrose, will continue to monitor 1250-pt off unit, heading to OR procedure 1520- TRANSFER - OUT REPORT: 
 
Verbal report given to [address] THOMPSON RN(name) on Fletcher Moore  being transferred to [facility] SURG(unit) for routine post - op Report consisted of patients Situation, Background, Assessment and  
Recommendations(SBAR). Information from the following report(s) SBAR, Procedure Summary, MAR, Recent Results and Cardiac Rhythm NSR was reviewed with the receiving nurse. Lines:  
Peripheral IV 10/09/20 Anterior;Distal;Right Hand (Active) Site Assessment Clean, dry, & intact 10/15/20 1315 Phlebitis Assessment 0 10/15/20 1315 Infiltration Assessment 0 10/15/20 1315 Dressing Status Intact 10/15/20 1315 Dressing Type Transparent 10/15/20 1315 Hub Color/Line Status Blue;Flushed 10/15/20 1315 Action Taken Open ports on tubing capped 10/11/20 0300 Alcohol Cap Used Yes 10/11/20 0300 Peripheral IV 10/15/20 Right Hand (Active) Site Assessment Clean, dry, & intact 10/15/20 1423 Phlebitis Assessment 0 10/15/20 1423 Infiltration Assessment 0 10/15/20 1423 Dressing Status Clean, dry, & intact 10/15/20 1423 Dressing Type 4 X 4;Tape 10/15/20 1423 Hub Color/Line Status Pink 10/15/20 1423 Opportunity for questions and clarification was provided.

## 2020-10-15 NOTE — PROGRESS NOTES
General Surgery End of Shift Nursing Note Bedside shift change report given to DIONICIO Estrada (oncoming nurse) by Pramod Jain RN (offgoing nurse). Report included the following information SBAR, Kardex and MAR. Shift worked:   [id number] Summary of shift:    Received patient to floor around 1800. Placed call to Lin Baxter MD regarding strike through bleeding at amputation site. Received orders to reinforce and monitor. Analia Sommers RN (Charge at bedside to help assess bleeding). Patients pain was managed with PRN medications. Patient tolerated apple juice. No BM or gas for this shift. Skin assessment shows bilateral butt wounds POA. Issues for physician to address:   None Pain Management: 
Current medication: See [facility] Patient states pain is manageable on current pain medication: YES 
 
GI: 
 
Current diet:  DIET ONE TIME MESSAGE 
DIET ONE TIME MESSAGE 
DIET ONE TIME MESSAGE 
DIET DIABETIC CONSISTENT CARB Regular Tolerating current diet: YES Passing flatus: NO 
Last Bowel Movement: yesterday Patient Safety: 
Falls Score: 4 Tiago Pathak, RN

## 2020-10-15 NOTE — PROGRESS NOTES
Chart review. IP Rehab - [facility] decision in review. Awaiting debridement of wound scheduled for today. TEJA:    Inpatient Rehab at [facility] vs [facility] if referral is accepted Pt will need COVID test for [facility] admission 2nd IM Medicare letter will be needed at discharge Pt will need [facility] transport if going to [facility] 
 
CM will continue to follow. Alli Flood RN CM Ext [phone]

## 2020-10-15 NOTE — PROGRESS NOTES
Problem: Risk for Spread of Infection Goal: Prevent transmission of infectious organism to others Description: Prevent the transmission of infectious organisms to other patients, staff members, and visitors. 10/15/2020 0454 by En Weller RN Outcome: Progressing Towards Goal 
10/15/2020 0047 by En Weller RN Outcome: Progressing Towards Goal

## 2020-10-15 NOTE — PERIOP NOTES
1630-Handoff Report from Operating Room to PACU Report received from Shravan Francis RN and Eun Beltre CRNA 
 
 regarding Ruddy Cai. Surgeon(s): 
Godwin Trevino MD  And Procedure(s) (LRB): 
REVISION RIGHT BELOW THE KNEE AMPUTATION (Right)  confirmed  
with allergies and dressing discussed. Anesthesia type, drugs, patient history, complications, estimated blood loss, vital signs, intake and output, and last pain medication, lines, reversal medications and temperature were reviewed.

## 2020-10-16 NOTE — PROGRESS NOTES
General Surgery End of Shift Nursing Note Bedside shift change report given to DIONICIO Estrada (oncoming nurse) by Tony Croft RN (offgoing nurse). Report included the following information SBAR, Kardex and MAR. Shift worked:  7a to 7p Summary of shift:    HD today took 3.5 kg off  Right leg dressing remains intact, no break though drainage. Contact precautions removed, verified with Infection Control Issues for physician to address:   None Pain Management: 
Current medication: See [facility] Patient states pain is manageable on current pain medication: YES 
 
GI: 
 
Current diet:  DIET ONE TIME MESSAGE 
DIET ONE TIME MESSAGE 
DIET ONE TIME MESSAGE 
DIET DIABETIC CONSISTENT CARB Regular Tolerating current diet: YES Passing flatus: NO 
Last Bowel Movement: yesterday Patient Safety: 
Falls Score: 4 Sherly Joseph RN

## 2020-10-16 NOTE — PROCEDURES
[facility]  [phone] Vitals   Pre   Post   Assessment   Pre   Post    
Temp  Temp: 98.8 °F (37.1 °C) (10/16/20 1244)  98.3 LOC  AxOx4 AxOx4 HR   Pulse (Heart Rate): 76 (10/16/20 1244) 77 Lungs   CTA alfredo / denies SOB Even and unlabored B/P   BP: 125/78 (10/16/20 1244) 169/97 Cardiac   RRR RRR Resp   Resp Rate: 16 (10/16/20 1244) 18 Skin   intact intact Pain level  Pain Intensity 1: 0 (10/16/20 1141) 10 Edema  No edema No edema Orders: Duration:   Start:    9342 End:    1652 Total:   4 hrs Dialyzer:   Dialyzer/Set Up Inspection: Neeta Gorman (10/16/20 7231) K Bath:   Dialysate K (mEq/L): 2 (10/16/20 1244) Ca Bath:   Dialysate CA (mEq/L): 2.5 (10/16/20 1244) Na/Bicarb:   Dialysate NA (mEq/L): 138 (10/16/20 1244) Target Fluid Removal:   Goal/Amount of Fluid to Remove (mL): 3500 mL (10/16/20 1244) Access Type & Location:   Left upper arm AV Fistula without evidence of warmth, redness, or drainage. +thrill/+bruit. Each access site disinfected for 60 seconds per site with alcohol swabs per P&P. Cannulated with 15G needles x2 and secured with paper tape. +aspiration/+flushed. Labs Obtained/Reviewed Critical Results Called   Date when labs were drawn- 
Hgb-   
HGB Date Value Ref Range Status 10/16/2020 10.6 (L) 12.1 - 17.0 g/dL Final  
 
K-   
Potassium Date Value Ref Range Status 10/16/2020 4.7 3.5 - 5.1 mmol/L Final  
 
Ca-  
Calcium Date Value Ref Range Status 10/16/2020 8.1 (L) 8.5 - 10.1 MG/DL Final  
 
Bun-  
BUN Date Value Ref Range Status 10/16/2020 44 (H) 6 - 20 MG/DL Final  
 
Creat-  
Creatinine Date Value Ref Range Status 10/16/2020 9.22 (H) 0.70 - 1.30 MG/DL Final  
 
  
Medications/ Blood Products Given Name   Dose   Route and Time    
none Blood Volume Processed (BVP):   99.3 L Net Fluid Removed:  3500 mL Comments Time Out Done: 1240 Primary Nurse Rpt Pre: Samuel Matamoros RN 
 Primary Nurse Rpt Post: Irvin Ritchie RN 
Pt Education: procedural / infection control Care Plan: continue current HD plan of care Tx Summary: 
1240 Patient asked how long his treatment was going to be today, notified of 4.15 hrs. Requested only 4 hours. Dr Zakia Duke notified and agreed to reduced to 4 hours. 1244 HD treatment initiated by Denise Mackenzie. Sylvain CDT per physician order. 1320 Dr Zakia Duke at bedside. 1324 [facility] care of patient at this time. Even rise and fall of chest noted. Patient tolerating treatment well with stable vitals. 1652 HD treatment completed per physician order. All possible blood returned to patient. Hemostasis achieved in <10 minutes. Access site dressings clean, dry, and intact. +thrill. Admiting Diagnosis: Abnormal Stress test 
Pt's previous clinic- [facility] [facility] 
Consent signed - Informed Consent Verified: Yes (10/16/20 1244) Sylvain Consent - verified Hepatitis Status- 9/25/20 Neg/susc (clinic) Machine #- Machine Number: [id number] (10/16/20 6634) Telemetry status- remote Pre-dialysis wt. -

## 2020-10-16 NOTE — PROGRESS NOTES
NAME: Tiffani Quiñonez :  1985 MRN:  [id number] Assessment :    Plan: 
--ESRD- Anemia CAD Labile BP, now HTN 
SHPT --MWF-[facility]; HD today Holding FARIDA, Hgb > 10 On renvela, phos is < 5.5 Will see again on Monday, but please call with questions or changes in the meantime. Subjective: Chief Complaint:  Resting. No new issues. The patient was seen on dialysis at 1:20 PM .  BP is stable. Access is working well. Review of Systems: 
 
Symptom Y/N Comments  Symptom Y/N Comments Fever/Chills    Chest Pain Poor Appetite    Edema Cough    Abdominal Pain Sputum    Joint Pain SOB/LITTLE    Pruritis/Rash Nausea/vomit    Tolerating PT/OT Diarrhea    Tolerating Diet Constipation    Other Could not obtain due to:   
 
Objective: VITALS:  
Last 24hrs VS reviewed since prior progress note. Most recent are: 
Visit Vitals BP (!) 147/95 Pulse 74 Temp 98.8 °F (37.1 °C) (Oral) Resp 16 Ht 6' 2\" (1.88 m) Wt 150 kg (330 lb 11 oz) SpO2 93% BMI 42.46 kg/m² Intake/Output Summary (Last 24 hours) at 10/16/2020 1320 Last data filed at 10/16/2020 6899 Gross per 24 hour Intake 942.5 ml Output 200 ml Net 742.5 ml Telemetry Reviewed: PHYSICAL EXAM: 
General: NAD 
cta alfredo on room air Lab Data Reviewed: (see below) Medications Reviewed: (see below) PMH/SH reviewed - no change compared to H&P 
________________________________________________________________________ Care Plan discussed with: 
Patient y Family RN Care Manager Consultant:     
 
  Comments >50% of visit spent in counseling and coordination of care    
 
________________________________________________________________________ Yaneli Costello MD  
 
Procedures: see electronic medical records for all procedures/Xrays and details which 
 were not copied into this note but were reviewed prior to creation of Plan. LABS: 
Recent Labs 10/16/20 
0256 10/15/20 
2047 WBC 6.8 8.0 HGB 10.6* 8.2* HCT 34.0* 26.8*  
 308 Recent Labs 10/16/20 
0256 10/14/20 
8629  137  
K 4.7 4.6  100 CO2 31 31 BUN 44* 44* CREA 9.22* 8.96* * 90  
CA 8.1* 8.1*  
PHOS  --  5.4* No results for input(s): AP, TBIL, TP, ALB, GLOB, GGT, AML, LPSE in the last 72 hours. No lab exists for component: SGOT, GPT, AMYP, HLPSE No results for input(s): INR, PTP, APTT, INREXT, INREXT in the last 72 hours. No results for input(s): FE, TIBC, PSAT, FERR in the last 72 hours. No results found for: FOL, RBCF No results for input(s): PH, PCO2, PO2 in the last 72 hours. No results for input(s): CPK, CKMB in the last 72 hours. No lab exists for component: TROPONINI No components found for: [place] Lab Results Component Value Date/Time Color YELLOW/STRAW 05/01/2017 05:39 PM  
 Appearance CLOUDY (A) 05/01/2017 05:39 PM  
 Specific gravity 1.018 05/01/2017 05:39 PM  
 Specific gravity >1.030 (H) 10/03/2014 04:35 AM  
 pH (UA) 5.5 05/01/2017 05:39 PM  
 Protein 300 (A) 05/01/2017 05:39 PM  
 Glucose 250 (A) 05/01/2017 05:39 PM  
 Ketone NEGATIVE  05/01/2017 05:39 PM  
 Bilirubin NEGATIVE  05/01/2017 05:39 PM  
 Urobilinogen 0.2 05/01/2017 05:39 PM  
 Nitrites NEGATIVE  05/01/2017 05:39 PM  
 Leukocyte Esterase NEGATIVE  05/01/2017 05:39 PM  
 Epithelial cells FEW 05/01/2017 05:39 PM  
 Bacteria NEGATIVE  05/01/2017 05:39 PM  
 WBC 0-4 05/01/2017 05:39 PM  
 RBC 5-10 05/01/2017 05:39 PM  
 
 
MEDICATIONS: 
Current Facility-Administered Medications Medication Dose Route Frequency  influenza vaccine 2020-21 (6 mos+)(PF) (FLUARIX/FLULAVAL/FLUZONE QUAD) injection 0.5 mL  0.5 mL IntraMUSCular PRIOR TO DISCHARGE  lidocaine (PF) (XYLOCAINE) 10 mg/mL (1 %) injection 0.1 mL  0.1 mL SubCUTAneous PRN  
  oxyCODONE IR (ROXICODONE) tablet 5 mg  5 mg Oral Q4H PRN  
 HYDROmorphone (PF) (DILAUDID) injection 1 mg  1 mg IntraVENous Q4H PRN  
 metoprolol tartrate (LOPRESSOR) tablet 75 mg  75 mg Oral BID  albuterol-ipratropium (DUO-NEB) 2.5 MG-0.5 MG/3 ML  3 mL Nebulization Q6H PRN  
 clopidogreL (PLAVIX) tablet 75 mg  75 mg Oral DAILY  insulin glargine (LANTUS) injection 20 Units  20 Units SubCUTAneous QHS  loperamide (IMODIUM) capsule 2 mg  2 mg Oral QID PRN  
 sevelamer carbonate (RENVELA) tab 800 mg  800 mg Oral TID WITH MEALS  sodium chloride (NS) flush 5-40 mL  5-40 mL IntraVENous PRN  
 acetaminophen (TYLENOL) tablet 650 mg  650 mg Oral Q6H PRN Or  
 acetaminophen (TYLENOL) suppository 650 mg  650 mg Rectal Q6H PRN  polyethylene glycol (MIRALAX) packet 17 g  17 g Oral DAILY PRN  
 aspirin delayed-release tablet 81 mg  81 mg Oral DAILY  atorvastatin (LIPITOR) tablet 20 mg  20 mg Oral QHS  hydrALAZINE (APRESOLINE) 20 mg/mL injection 20 mg  20 mg IntraVENous Q6H PRN  
 insulin lispro (HUMALOG) injection   SubCUTAneous AC&HS  
 glucose chewable tablet 16 g  4 Tab Oral PRN  
 dextrose (D50W) injection syrg 12.5-25 g  12.5-25 g IntraVENous PRN  
 glucagon (GLUCAGEN) injection 1 mg  1 mg IntraMUSCular PRN

## 2020-10-16 NOTE — PROGRESS NOTES
General Surgery End of Shift Nursing Note 35yr old  male admitted 10/09/20. Abnormal stress test, CAD, S/P Cardiac Cath 
10/15/20 revision of (R) ASHWINA Shift worked:   Tiffany Stephenspatricia Ryanfely 954 Summary of shift:    A&O x3, verbal, tolerated all PO medications whole with thin liquids, continent of B/B, currently resting in bed with call bell in reach Issues for physician to address:   No new issues to report Number times ambulated in hallway past shift: 0 Number of times OOB to chair past shift: 0 Pain Management: 
Current medication: see MAR Patient states pain is manageable on current pain medication: YES 
 
GI: 
 
Current diet:  DIET ONE TIME MESSAGE 
DIET ONE TIME MESSAGE 
DIET ONE TIME MESSAGE 
DIET DIABETIC CONSISTENT CARB Regular Tolerating current diet: YES Passing flatus: YES Last Bowel Movement: 10/14/20 Respiratory: 
 
Incentive Spirometer at bedside: YES Patient instructed on use: YES Patient Safety: 
 
Falls Score: 4 Bed Alarm On? No 
Sitter?  No 
 
Art Mayes LPN

## 2020-10-16 NOTE — PROGRESS NOTES
General Surgery End of Shift Nursing Note Bedside shift change report given to Jillian Foley (oncoming nurse) by Amada Cordova (offgoing nurse). Report included the following information SBAR, Kardex, Intake/Output, MAR and Recent Results. Shift worked:   7p-7a Summary of shift:   Pt had bleeding through his RBKA dressing during the beginning of the shift. On call was contacted and dressing was changed with RRN and NP present. Dressing was changed again at [place] [address] due to breakthrough bleeding. Leg was elevated and dressing remained CDI the rest of the shift. Pt's BP and hbg remained stable. Issues for physician to address:   None, unless breakthrough bleeding starts again. Number times ambulated in hallway past shift: 0 Number of times OOB to chair past shift: 0 Pain Management: 
Current medication: Tylenol PRN, dilaudid PRN, oxycodone PRN Patient states pain is manageable on current pain medication: YES 
 
GI: 
 
Current diet:  DIET ONE TIME MESSAGE 
DIET ONE TIME MESSAGE 
DIET ONE TIME MESSAGE 
DIET DIABETIC CONSISTENT CARB Regular Tolerating current diet: YES Passing flatus: YES Last Bowel Movement: yesterday Appearance: Not visualized Patient Safety: 
 
Falls Score: 4 Brown Lucio

## 2020-10-16 NOTE — PROGRESS NOTES
Comprehensive Nutrition Assessment Type and Reason for Visit: Initial 
 
Nutrition Recommendations/Plan:  
Continue Diabetic diet as tolerated RD to add Nepro BID Nutrition Assessment:   Pt admitted with CAD + BK complication. Chart reviewed for LOS, pt off the floor at time of attempted visit. For HD tx today. MST negative for previous nutritional risk factors. Noted diet noncompliance per MD notes (clotilde nelson at bedside). BG well controlled. K WNL past few days. Phos slightly high, he is on a binder. Est needs are quite high given body habitus and HD. Will add Nepro BID to provide an additional 840 kcals and 38g Protein daily. Estimated Daily Nutrient Needs: 
Energy (kcal):  MSJ 2500 (2500 x 1.2 -500 for obesity) Protein (g):  120-150g (0.8-1gPro/kg) Fluid (ml/day):  1800mL Nutrition Related Findings:  Meds: renvela, lantus, humalog. BM 10/14 Wounds:   
Surgical wound, Partial thickness (partial thickness-buttocks) Current Nutrition Therapies: DIET DIABETIC CONSISTENT CARB Regular DIET NUTRITIONAL SUPPLEMENTS Lunch, Dinner; Nepro Anthropometric Measures: 
· Height:  6' 2\" (188 cm) · Current Body Wt:  150 kg (330 lb 11 oz) · Ideal Body Wt:  190 lbs:  174 % · BMI Category:  Obese class 3 (BMI 40.0 or greater) Nutrition Diagnosis:  
· Inadequate protein-energy intake related to other (specify)(large habitus and HD) as evidenced by other (specify)(est needs 2500 kcals and 120-150g Pro daily.) Nutrition Interventions:  
Food and/or Nutrient Delivery: Continue current diet, Start oral nutrition supplement Nutrition Education and Counseling: No recommendations at this time Coordination of Nutrition Care: Continued inpatient monitoring Goals: Pt will consume >75% of meals/supplements in 3-5 days. Nutrition Monitoring and Evaluation:  
Food/Nutrient Intake Outcomes: Diet advancement/tolerance, Supplement intake Physical Signs/Symptoms Outcomes: Discharge Planning:   
Continue current diet Electronically signed by Hilda Watts RD, [address]  on 10/16/2020 at 2:59 PM 
 
Contact: [phone]

## 2020-10-16 NOTE — PROGRESS NOTES
2020: Pt's dressing for BKA had been soaked through. On call notified by Simeon Boss RN. On call, Cindy Raymundo, called me and stated to remove dressing and rewrap dressing with gauze and ACE wrap. Rapid response nurse and on call NP were also notified and were present when wound was rewrapped. CBC was ordered. 2047: CBC came back. Hgb 8.2 & Hct 26.8.

## 2020-10-16 NOTE — PROGRESS NOTES
Problem: Self Care Deficits Care Plan (Adult) Goal: *Acute Goals and Plan of Care (Insert Text) Description:  
FUNCTIONAL STATUS PRIOR TO ADMISSION: Pt admitted from [facility], with reports he plans to transfer to group home s/p rehab, with pt reporting [facility] prior to most recent hospitalization. Reports he has W/C at [facility]; however, does not want to return there. HOME SUPPORT: The patient lived with SNF staffing to provide PRN assist. 
 
Occupational Therapy Goals Initiated 10/13/2020, Re-Evaluation post BKA revision on 10/16/2020, continue all goals 1. Patient will perform W/C grooming with modified independence within 7 day(s). 2.  Patient will perform W/C full body dressing with modified independence within 7 day(s). 3.  Patient will perform W/C bathing with modified independence within 7 day(s). 4.  Patient will perform toilet transfers, from W/C, with modified independence within 7 day(s). 5.  Patient will perform all aspects of toileting with modified independence within 7 day(s). Outcome: Progressing Towards Goal 
 OCCUPATIONAL THERAPY RE-EVALUATION Patient: Julio Nair (71 y.o. male) Date: 10/16/2020 Diagnosis: Abnormal stress test [R94.39] CAD (coronary artery disease) [I25.10] <principal problem not specified> Procedure(s) (LRB): 
REVISION RIGHT BELOW THE KNEE AMPUTATION (Right) 1 Day Post-Op Precautions: Fall, Other (comment)(Impaired vision, [place]) Chart, occupational therapy assessment, plan of care, and goals were reviewed. ASSESSMENT Based on the objective data described below, POD1 post R BKA revision/debridement, noted limited session due to 10/10 pain, however agreeable for repositioning and ADLs in bed with encouragement.  Mod A for bed mobility with inability to sit EOB this session, leanin to R side, and agreeable to minimal positioning, however, setup for grooming and self feeding with minimal encouragement. Participating in OT as able, continue to recommend rehab at discharge. Current Level of Function Impacting Discharge (ADLs): total A for LB ADLs, setup for grooming and self feeding Other factors to consider for discharge: from SNF 
    
 
PLAN : 
Recommendations and Planned Interventions: self care training, functional mobility training, therapeutic exercise, balance training, therapeutic activities, endurance activities, and patient education Frequency/Duration: Patient will be followed by occupational therapy 4 times a week to address goals. Recommend with staff: EOB for ADLs and self feeding as able Recommend next OT session: EOB ADLs, UB exercise/theraband, pain management Recommendation for discharge: (in order for the patient to meet his/her long term goals) Therapy 3 hours per day 5-7 days per week This discharge recommendation: 
Has not yet been discussed the attending provider and/or case management Equipment recommendations for successful discharge (if) home: hospital bed, wheelchair, and sliding/transfer board SUBJECTIVE:  
Patient stated I can only do this much, this is hurting bad.  OBJECTIVE DATA SUMMARY:  
Hospital course since last seen and reason for reevaluation: R BKA revision 10/15/2020 Cognitive/Behavioral Status: 
Neurologic State: Alert Cognition: Follows commands Skin: intact, wound draining but wrapped Edema: moderate R LE Hearing: Auditory Auditory Impairment: Hard of hearing, bilateral 
Hearing Aids/Status: Does not own Vision/Perceptual:   
    
    
    
  
    
    
  
 
Range of Motion: 
B UE limited Strength: 
B UE limited, very weak, 3/5 Coordination: Tone & Sensation: 
Normal B UE Functional Mobility and Transfers for ADLs: 
Bed Mobility: Rolling: Moderate assistance(unable to tolerate full roll due to pain) Transfers: 
  
  
  
 
Balance: 
Sitting: (unable to tolerate) ADL Assessment: ADL Intervention and task modifications: 
Feeding Container Management: Set-up Cutting Food: Set-up Food to Mouth: Independent Drink to Mouth: Independent Grooming Position Performed: Long sitting on bed Washing Face: Set-up Washing Hands: Set-up Brushing Teeth: Set-up 
 
  
encouraged EOB and full participation with ADLs to improve strength and endurance. Patient instructed and indicated understanding the benefits of maintaining activity tolerance, functional mobility, and independence with self care tasks during acute stay  to ensure safe return home and to baseline. Encouraged patient to increase frequency and duration OOB, be out of bed for all meals, perform daily ADLs (as approved by RN/MD regarding bathing etc), and performing functional mobility to/from bathroom. a Toileting Bladder Hygiene: Total assistance (dependent) Clothing Management: Total assistance (dependent) Therapeutic Exercises:  
 
 
Functional Measure: 
 
 
Pain: 
10/10 medications already given, assisted with repositioning, offered distraction with eating/ADLs for management of pain, felt better but pain remains Activity Tolerance:  
Fair and requires rest breaks Please refer to the flowsheet for vital signs taken during this treatment. After treatment patient left in no apparent distress:  
Supine in bed and Call bell within reach COMMUNICATION/COLLABORATION:  
The patients plan of care was discussed with: Physical therapist and Registered nurse. Wyatt Madden OT Time Calculation: 25 mins

## 2020-10-16 NOTE — PROGRESS NOTES
Problem: Mobility Impaired (Adult and Pediatric) Goal: *Acute Goals and Plan of Care (Insert Text) Description: FUNCTIONAL STATUS PRIOR TO ADMISSION: The patient was functional at the wheelchair level and was supervision for transfers to the chair. HOME SUPPORT PRIOR TO ADMISSION: came from SNF, awaiting group home set up Physical Therapy Goals Initiated 10/13/2020 1. Patient will propel wheelchair 150ft with B UE technique mod I within 7 days 2. Patient will transfer from bed to chair and chair to bed with modified independence using the least restrictive device within 7 day(s). 3.  Patient will perform sit to stand with supervision/set-up within 7 day(s). 4. Patient will ambulate 10ft with RW and min A using hop to pattern within 7 days Outcome: Progressing Towards Goal 
 PHYSICAL THERAPY TREATMENT: WEEKLY REASSESSMENT Patient: Ceci Orantes (7 y.o. male) Date: 10/16/2020 Primary Diagnosis: Abnormal stress test [R94.39] CAD (coronary artery disease) [I25.10] Procedure(s) (LRB): 
REVISION RIGHT BELOW THE KNEE AMPUTATION (Right) 1 Day Post-Op Precautions:   Fall, Other (comment)(Impaired vision, [place]) ASSESSMENT Patient continues with skilled PT services and is progressing towards goals. Patient seen for re-evaluation following debridement of BKA. Limited greatly by pain this session and unable to tolerate much of any activity. ModA to attempt rolling and screams out in pain at attempts to roll. Worked on position in bed and sat upright in partial chair position for ADL with OT. Patient continues to be far below his functional baseline and may benefit from inpt rehab setting to progress patient to more independent level of function. Patient's progression toward goals since last assessment: seen for re-eval after BKA debridement Other factors to consider for discharge: at risk for falls, below functional baseline PLAN : 
 Goals have been updated based on progression since last assessment. Patient continues to benefit from skilled intervention to address the above impairments. Recommendations and Planned Interventions: bed mobility training, transfer training, gait training, therapeutic exercises, patient and family training/education, and therapeutic activities Frequency/Duration: Patient will be followed by physical therapy:  4 times a week to address goals. Recommendation for discharge: (in order for the patient to meet his/her long term goals) Therapy 3 hours per day 5-7 days per week IF patient discharges home will need the following DME: to be determined (TBD) SUBJECTIVE:  
Patient stated I am in so much pain. I can't even move it right now.  OBJECTIVE DATA SUMMARY:  
HISTORY:   
Past Medical History:  
Diagnosis Date Arrhythmia   
 aflutter Blind Cataracts Cellulitis and abscess of foot Chronic kidney disease F- [facility]  
 Diabetes ([facility] [address] DM II  
 GERD (gastroesophageal reflux disease) Hearing loss Hypercholesterolemia Hypertension Neuropathy Obesity Osteomyelitis ([facility] [address] Puerperal sepsis with acute hypoxic respiratory failure ([facility] [address] Sepsis ([facility] [address] Past Surgical History:  
Procedure Laterality Date CARDIAC SURG PROCEDURE UNLIST  09/2020  
 ablation COLONOSCOPY N/A 12/8/2017 COLONOSCOPY performed by Andrews Stovall MD at [facility]  12/8/2017 HX AFIB ABLATION  09/04/2020 HX AMPUTATION Left great toe and L 5th toe HX AMPUTATION Right   
 bka HX AMPUTATION TOE Right HX VASCULAR ACCESS    
 WI COMPRE ELECTROPHYSIOL XM W/LEFT ATRIAL PACNG/REC N/A 9/4/2020 Lt Atrial Pace & Record During Ep Study performed by Russell Alfredo MD at [facility]  
 WI SARAHY VILLA W/ABLATION SUPRAVENT ARRHYTHMIA N/A 9/4/2020 ABLATION A-FLUTTER performed by Ryan Kaufman MD at [facility]ATH [facility]  
 RI INTRACARDIAC ELECTROPHYSIOLOGIC 3D MAPPING N/A 9/4/2020 Ep 3d Mapping performed by Ryan Kaufman MD at [facility] CARDIAC [facility]ATH [facility]  
 UPPER GI ENDOSCOPY,BIOPSY  12/8/2017 VASCULAR SURGERY PROCEDURE UNLIST    
 R side chest  
 VASCULAR SURGERY PROCEDURE UNLIST Left 07/25/2017 Creation transposed AV fistula arm Personal factors and/or comorbidities impacting plan of care:  
 
Home Situation Home Environment: Skilled nursing facility Care Facility Name: [facility](Pt does not wish to return) Wheelchair Ramp: Yes One/Two Story Residence: One story Living Alone: No 
Support Systems: Skilled nursing facility Patient Expects to be Discharged to[de-identified] Rehabilitation facility Current DME Used/Available at Home: Glucometer Tub or Shower Type: Shower EXAMINATION/PRESENTATION/DECISION MAKING:  
Critical Behavior: 
Neurologic State: Alert Orientation Level: Oriented X4 Cognition: Follows commands Safety/Judgement: Awareness of environment, Good awareness of safety precautions, Insight into deficits Hearing: Auditory Auditory Impairment: Hard of hearing, bilateral 
Hearing Aids/Status: Does not own Functional Mobility: 
Bed Mobility: 
Rolling: Moderate assistance(unable to tolerate full roll due to pain) Supine to Sit: Total assistance(via bed mechanics and cannot tolerate full chair position) Transfers: 
 Not tested Balance:  
Sitting: (unable to tolerate) Therapeutic Exercises:  
Reviewed HEP to be performed as tolerated Pain Rating: 
Reports 10/10 pain with activity Activity Tolerance:  
Fair and requires rest breaks Please refer to the flowsheet for vital signs taken during this treatment. After treatment patient left in no apparent distress:  
Supine in bed and Call bell within reach COMMUNICATION/EDUCATION:  
 The patients plan of care was discussed with: Physical therapist, Occupational therapist, and Registered nurse. Fall prevention education was provided and the patient/caregiver indicated understanding., Patient/family have participated as able in goal setting and plan of care. , and Patient/family agree to work toward stated goals and plan of care. Thank you for this referral. 
Josi Garcia, PT, DPT Time Calculation: 21 mins

## 2020-10-16 NOTE — PROGRESS NOTES
Called to see pt with rapid nurse   Nurse reports Dr. Lucia Vyas notified and made aware . Jessea Ben Wound assessed  beefy red with brisk bleeding from wound bed . Manual compression applied approx 5 minutes which seemed to slow bleeding . Site packed with 4x4 , abd , maggie wrap and ace applied . Nursing to continue to monitor bleeding . Pt  on ASA and Plavix for recent PCI , will hold am Plavix , check CBC . Defer to Dr. Lucia Vyas in the morning

## 2020-10-16 NOTE — PROGRESS NOTES
Vascular Surgery Progress Note [facility] ACNP-BC 
10/16/2020 Subjective:  
 
Mr. Melissa Arthur a very MTAUEZYJ 89 W. o.  male with a pmhx significant for ESRD on HD, DM, HTN, HLD, and obesity.  Patient is legally blind and he has hearing loss. 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 Since his BKA he has undergone an ablation for AFlutter. Plan was for debridement and prior to the procedure a stress test was recommended which he completed on 10/09/2020 that was abnl and he was referred for admission. He underwent a diagnostic cath that was significant for multivessel disease. He was evaluated by CT surgery and currently is not a candidate for CABG due to diffuse disease with poor targets for revascularization and poor LV function. Patient is s/p PCI w/ KRISHAN 10/13/2020. Once stable he underwent debridement of his right BKA on 10/15/2020. Overnight he had issues with bleeding and his dressing was changed x2. This am his dressing is dry and intact. His H&H is stable. He has a low grade fever. Remainder of VS are stable. Of note: patient has a [employer] at the bedside. Nursing Data:  
 
Patient Vitals for the past 24 hrs: 
 BP Temp Pulse Resp SpO2 Weight 10/16/20 0804 138/88 99.6 °F (37.6 °C) 80 16 90 %   
10/16/20 0307 (!) 148/81 99.1 °F (37.3 °C) 93 16 91 %   
10/16/20 0014 139/75  77  91 %   
10/15/20 2310 139/79 97.8 °F (36.6 °C) 77 16 97 %   
10/15/20 2019 (!) 159/90 98 °F (36.7 °C) 71 16 93 %   
10/15/20 1902 (!) 146/78 97.8 °F (36.6 °C) 71 15 96 % 150 kg (330 lb 11 oz) 10/15/20 1804 (!) 148/85 98 °F (36.7 °C) 70 12 (!) 70 %   
10/15/20 1745 (!) 142/78  70 16 93 %   
10/15/20 1730 132/83  69 11 97 %   
10/15/20 1715 (!) 141/79  70 12 99 %   
10/15/20 1700 (!) 142/84  71 18 94 %   
10/15/20 1655 (!) 148/84  72 22 98 %   
 10/15/20 1650 (!) 149/73  72 20 98 %   
10/15/20 1645 (!) 155/84  71 22 100 %   
10/15/20 1640 (!) 154/86  72  99 %   
10/15/20 1635 (!) 155/83  71 22 98 %   
10/15/20 1630 (!) 156/88 99 °F (37.2 °C) 71 21 99 %   
10/15/20 1300 (!) 176/98 97.9 °F (36.6 °C) 76 20 98 %   
10/15/20 1202 (!) 166/92 97.6 °F (36.4 °C) 86 18 96 %   
 
--------------------------------------------------------------------------------------------------------- Intake/Output Summary (Last 24 hours) at 10/16/2020 6045 Last data filed at 10/15/2020 2019 Gross per 24 hour Intake 722.5 ml Output 200 ml Net 522.5 ml Exam:  
 
Physical Exam 
Constitutional:   
   Appearance: He is obese. He is legally and is [place]. HENT:  
   Head: Normocephalic. Nose: Nose normal.  
   Mouth/Throat:  
   Mouth: Mucous membranes are moist.  
Eyes:  
   Pupils: Pupils are equal, round, and reactive to light. Neck: Musculoskeletal: Normal range of motion. Cardiovascular:  
   Rate and Rhythm: Normal rate and regular rhythm. Pulmonary:  
   Effort: Pulmonary effort is normal.  
   Breath sounds: Normal breath sounds. Abdominal:  
   General: Abdomen is flat. Palpations: Abdomen is soft. Musculoskeletal: Normal range of motion. Skin: 
   Findings: Dressing to right stump is now dry and intact. Neurological:  
   General: No focal deficit present. Mental Status: He is alert. Psychiatric:     
   Mood and Affect: Mood normal.     
   Thought Content: Thought content normal.  
 
Lab Review:  
 
. Recent Results (from the past 24 hour(s)) GLUCOSE, POC Collection Time: 10/15/20  9:20 AM  
Result Value Ref Range Glucose (POC) 70 65 - 100 mg/dL Performed by [employer] GLUCOSE, POC Collection Time: 10/15/20 10:17 AM  
Result Value Ref Range Glucose (POC) 88 65 - 100 mg/dL Performed by [employer] GLUCOSE, POC  Collection Time: 10/15/20 12:00 PM  
 Result Value Ref Range Glucose (POC) 73 65 - 100 mg/dL Performed by Angel Villa RN   
GLUCOSE, POC Collection Time: 10/15/20  1:40 PM  
Result Value Ref Range Glucose (POC) 80 65 - 100 mg/dL Performed by Gerard Moore   
GLUCOSE, POC Collection Time: 10/15/20  4:32 PM  
Result Value Ref Range Glucose (POC) 60 (L) 65 - 100 mg/dL Performed by Donny Elliott GLUCOSE, POC Collection Time: 10/15/20  4:51 PM  
Result Value Ref Range Glucose (POC) 78 65 - 100 mg/dL Performed by Salomón Jordan CBC WITH AUTOMATED DIFF Collection Time: 10/15/20  8:47 PM  
Result Value Ref Range WBC 8.0 4.1 - 11.1 K/uL  
 RBC 3.21 (L) 4.10 - 5.70 M/uL HGB 8.2 (L) 12.1 - 17.0 g/dL HCT 26.8 (L) 36.6 - 50.3 % MCV 83.5 80.0 - 99.0 FL  
 MCH 25.5 (L) 26.0 - 34.0 PG  
 MCHC 30.6 30.0 - 36.5 g/dL  
 RDW 16.3 (H) 11.5 - 14.5 % PLATELET 565 576 - 275 K/uL MPV 9.4 8.9 - 12.9 FL  
 NRBC 0.0 0  WBC ABSOLUTE NRBC 0.00 0.00 - 0.01 K/uL NEUTROPHILS 64 32 - 75 % LYMPHOCYTES 24 12 - 49 % MONOCYTES 8 5 - 13 % EOSINOPHILS 3 0 - 7 % BASOPHILS 1 0 - 1 % IMMATURE GRANULOCYTES 0 0.0 - 0.5 % ABS. NEUTROPHILS 5.1 1.8 - 8.0 K/UL  
 ABS. LYMPHOCYTES 2.0 0.8 - 3.5 K/UL  
 ABS. MONOCYTES 0.7 0.0 - 1.0 K/UL  
 ABS. EOSINOPHILS 0.3 0.0 - 0.4 K/UL  
 ABS. BASOPHILS 0.0 0.0 - 0.1 K/UL  
 ABS. IMM. GRANS. 0.0 0.00 - 0.04 K/UL  
 DF AUTOMATED    
GLUCOSE, POC Collection Time: 10/15/20  8:55 PM  
Result Value Ref Range Glucose (POC) 84 65 - 100 mg/dL Performed by [employer] (PCT) CBC W/O DIFF Collection Time: 10/16/20  2:56 AM  
Result Value Ref Range WBC 6.8 4.1 - 11.1 K/uL  
 RBC 4.11 4.10 - 5.70 M/uL  
 HGB 10.6 (L) 12.1 - 17.0 g/dL HCT 34.0 (L) 36.6 - 50.3 % MCV 82.7 80.0 - 99.0 FL  
 MCH 25.8 (L) 26.0 - 34.0 PG  
 MCHC 31.2 30.0 - 36.5 g/dL  
 RDW 16.3 (H) 11.5 - 14.5 % PLATELET 841 496 - 350 K/uL MPV 9.8 8.9 - 12.9 FL  
 NRBC 0.0 0  WBC ABSOLUTE NRBC 0.00 0.00 - 0.01 K/uL METABOLIC PANEL, BASIC Collection Time: 10/16/20  2:56 AM  
Result Value Ref Range Sodium 138 136 - 145 mmol/L Potassium 4.7 3.5 - 5.1 mmol/L Chloride 103 97 - 108 mmol/L  
 CO2 31 21 - 32 mmol/L Anion gap 4 (L) 5 - 15 mmol/L Glucose 120 (H) 65 - 100 mg/dL BUN 44 (H) 6 - 20 MG/DL Creatinine 9.22 (H) 0.70 - 1.30 MG/DL  
 BUN/Creatinine ratio 5 (L) 12 - 20 GFR est AA 8 (L) >60 ml/min/1.73m2 GFR est non-AA 7 (L) >60 ml/min/1.73m2 Calcium 8.1 (L) 8.5 - 10.1 MG/DL  
GLUCOSE, POC Collection Time: 10/16/20  6:45 AM  
Result Value Ref Range Glucose (POC) 100 65 - 100 mg/dL Performed by [employer] (PCT) Assessment/Plan:  
  
 
Consult problem PAD w/ dehiscence of surgical incision w/ necrosis · S/p right BKA 08/24/2020 · S/p revision of right BKA 10/15/2020 · Despite bleeding H&H is stable this am.  
· Resume dressing changes in the am.  
· Continue ASA and Plavix for KRISHAN  
· Continue to hold DVT prophylaxis due to bleeding · OOB today with PT   
  
Acute problems ASHD Hyperlipidemia · Diagnostic cath 10/10/2020-diffuse multivessel disease · He is not a candidate for CABG 
· PCI with KRISHAN placement 10/13/2020 · BBlocker, ASA, & statin · Dietary non-compliance Hypertension · Labile Plan per cardiology  
  
Active problems ESRD 
· HD [facility]  
Anemia of chronic renal disease · Not at a level to transfuse Plan per nephrology 
  
Uncontrolled Diabetes Mellitus w/ hyperglycemia · HA1c 9.4 8/15/2020 · Controlled this am on basal and bolus insulin · Dietary non-compliance Morbid obesity  
Management of comorbid conditions by primary team. 
  
VTE Prophylaxis: 
Held for bleeding  
  
Disposition: TBD post PT evaluation.

## 2020-10-17 NOTE — PROGRESS NOTES
Problem: Falls - Risk of 
Goal: *Absence of Falls Description: Document Myke Chin Fall Risk and appropriate interventions in the flowsheet. Outcome: Progressing Towards Goal 
Note: Fall Risk Interventions: 
Mobility Interventions: Communicate number of staff needed for ambulation/transfer Medication Interventions: Patient to call before getting OOB, Teach patient to arise slowly Elimination Interventions: Call light in reach, Patient to call for help with toileting needs History of Falls Interventions: Room close to nurse's station, Door open when patient unattended

## 2020-10-17 NOTE — PROGRESS NOTES
Problem: Falls - Risk of 
Goal: *Absence of Falls Description: Document Manuel Vogel Fall Risk and appropriate interventions in the flowsheet. Outcome: Progressing Towards Goal 
Note: Fall Risk Interventions: 
Mobility Interventions: Assess mobility with egress test 
 
  
 
Medication Interventions: Patient to call before getting OOB, Teach patient to arise slowly Elimination Interventions: Call light in reach History of Falls Interventions: Investigate reason for fall

## 2020-10-17 NOTE — PROGRESS NOTES
General Surgery End of Shift Nursing Note Bedside shift change report given to DIONICIO Estrada (oncoming nurse) by Tony Croft RN (offgoing nurse). Report included the following information SBAR, Kardex and MAR. Shift worked:  7a to 7p Summary of shift:    Dr. Abimael Patel to change dressing to right leg tomorrow. Issues for physician to address:  Pt says he does not take BP meds at home. Pain Management: 
Current medication: IV Dilaudid, Patient states pain is manageable on current pain medication: YES 
 
GI: 
 
Current diet:  DIET DIABETIC CONSISTENT CARB Regular DIET NUTRITIONAL SUPPLEMENTS Lunch, Dinner; Nepro DIET ONE TIME MESSAGE Tolerating current diet: YES Passing flatus: NO 
Last Bowel Movement: 10/15/20 Patient Safety: 
Falls Score: 4 Sherly Joseph RN

## 2020-10-17 NOTE — PROGRESS NOTES
Vascular No c/o Stable No further bleeding Will leave bandage in place another day I will change dressing tomorrow

## 2020-10-17 NOTE — PROGRESS NOTES
General Surgery End of Shift Nursing Note Bedside shift change report given to Hermelindo Rios (oncoming nurse) by Darrel Cruz (offgoing nurse). Report included the following information SBAR, Kardex, Intake/Output, MAR and Recent Results. Shift worked:   7p-7a Summary of shift:    Pt had uneventful shift and rested well. Pain managed with oxycodone and dilaudid. Dressing remained CDI for RBKA. Issues for physician to address:   None Number times ambulated in hallway past shift: 0 Number of times OOB to chair past shift: 0 Pain Management: 
Current medication: Oxycodone PRN, Dilaudid PRN Patient states pain is manageable on current pain medication: YES 
 
GI: 
 
Current diet:  DIET DIABETIC CONSISTENT CARB Regular DIET NUTRITIONAL SUPPLEMENTS Lunch, Dinner; Nepro DIET ONE TIME MESSAGE Tolerating current diet: YES Passing flatus: YES Last Bowel Movement: several days ago Appearance: Not visualized Patient Safety: 
 
Falls Score: 4 Omelia Roof

## 2020-10-18 NOTE — PROGRESS NOTES
General Surgery End of Shift Nursing Note Bedside shift change report given to Hermelindo Rios (oncoming nurse) by Darrel Cruz (offgoing nurse). Report included the following information SBAR, Kardex, Intake/Output, MAR and Recent Results. Shift worked:   7p-7a Summary of shift:   Dressing has remained CDI. Pt's pain was managed with dilaudid and oxycodone. Pt rested well most of the night. Issues for physician to address:   None Number times ambulated in hallway past shift: 0 Number of times OOB to chair past shift: 0 Pain Management: 
Current medication: Dilaudid PRN, Oxycodone PRN Patient states pain is manageable on current pain medication: YES 
 
GI: 
 
Current diet:  DIET DIABETIC CONSISTENT CARB Regular DIET NUTRITIONAL SUPPLEMENTS Lunch, Dinner; Nepro DIET ONE TIME MESSAGE Tolerating current diet: YES Passing flatus: YES Last Bowel Movement: several days ago Appearance: Not visualized Patient Safety: 
 
Falls Score: 4 Omelia Roof

## 2020-10-18 NOTE — PROGRESS NOTES
Vascular 
  
No new c/o No bleeding Afebrile Wound on lateral Rt BKA has some odor and gray/tan tissue No crepitus or pus Stable but not sure BKA is going to heal 
Cont wound care and Abx Dr Zac Cleveland will evaluate tomorrow

## 2020-10-18 NOTE — PROGRESS NOTES
Problem: Falls - Risk of 
Goal: *Absence of Falls Description: Document Vanessa Jack Fall Risk and appropriate interventions in the flowsheet. 10/18/2020 0103 by Ankita Panchal Outcome: Progressing Towards Goal 
Note: Fall Risk Interventions: 
Mobility Interventions: Assess mobility with egress test, Communicate number of staff needed for ambulation/transfer, Patient to call before getting OOB, Utilize walker, cane, or other assistive device Medication Interventions: Patient to call before getting OOB, Teach patient to arise slowly Elimination Interventions: Call light in reach, Patient to call for help with toileting needs History of Falls Interventions: Door open when patient unattended, Room close to nurse's station 10/18/2020 0101 by Ankita Panchal Outcome: Progressing Towards Goal 
Note: Fall Risk Interventions: 
Mobility Interventions: Assess mobility with egress test, Communicate number of staff needed for ambulation/transfer, Patient to call before getting OOB, Utilize walker, cane, or other assistive device Medication Interventions: Patient to call before getting OOB, Teach patient to arise slowly Elimination Interventions: Call light in reach, Patient to call for help with toileting needs History of Falls Interventions: Door open when patient unattended, Room close to nurse's station Problem: Patient Education: Go to Patient Education Activity Goal: Patient/Family Education 10/18/2020 0103 by Ankita Panchal Outcome: Progressing Towards Goal 
10/18/2020 0101 by Ankita Panchal Outcome: Progressing Towards Goal 
  
Problem: Pressure Injury - Risk of 
Goal: *Prevention of pressure injury Description: Document Ranjeet Scale and appropriate interventions in the flowsheet. 10/18/2020 0103 by Ankita Panchal Outcome: Progressing Towards Goal 
Note: Pressure Injury Interventions: 
Sensory Interventions: Assess changes in LOC, Keep linens dry and wrinkle-free, Minimize linen layers Moisture Interventions: Absorbent underpads Activity Interventions: PT/OT evaluation, Increase time out of bed Mobility Interventions: HOB 30 degrees or less, Float heels Nutrition Interventions: Document food/fluid/supplement intake Friction and Shear Interventions: HOB 30 degrees or less, Minimize layers, Apply protective barrier, creams and emollients 10/18/2020 0101 by Cece Perry Outcome: Progressing Towards Goal 
Note: Pressure Injury Interventions: 
Sensory Interventions: Assess changes in LOC, Keep linens dry and wrinkle-free, Minimize linen layers Moisture Interventions: Absorbent underpads Activity Interventions: PT/OT evaluation, Increase time out of bed Mobility Interventions: HOB 30 degrees or less, Float heels Nutrition Interventions: Document food/fluid/supplement intake Friction and Shear Interventions: HOB 30 degrees or less, Minimize layers, Apply protective barrier, creams and emollients Problem: Patient Education: Go to Patient Education Activity Goal: Patient/Family Education 10/18/2020 0103 by Cece Perry Outcome: Progressing Towards Goal 
10/18/2020 0101 by Cece Perry Outcome: Progressing Towards Goal 
  
Problem: Pain Goal: *Control of Pain 10/18/2020 0103 by Cece Perry Outcome: Progressing Towards Goal 
10/18/2020 0101 by Cece Perry Outcome: Progressing Towards Goal 
  
Problem: Patient Education: Go to Patient Education Activity Goal: Patient/Family Education 10/18/2020 0103 by Cece Perry Outcome: Progressing Towards Goal 
10/18/2020 0101 by Cece Perry Outcome: Progressing Towards Goal 
  
Problem: Risk for Spread of Infection Goal: Prevent transmission of infectious organism to others Description: Prevent the transmission of infectious organisms to other patients, staff members, and visitors. Outcome: Resolved/Met Problem: Patient Education:  Go to Education Activity Goal: Patient/Family Education 10/18/2020 0103 by Camden Espino Outcome: Resolved/Met 
10/18/2020 0101 by Camden Espino Outcome: Resolved/Not Met

## 2020-10-18 NOTE — PROGRESS NOTES
Problem: Falls - Risk of 
Goal: *Absence of Falls Description: Document Sommer Snowcedric Fall Risk and appropriate interventions in the flowsheet. Outcome: Progressing Towards Goal 
Note: Fall Risk Interventions: 
Mobility Interventions: Assess mobility with egress test 
 
  
 
Medication Interventions: Patient to call before getting OOB Elimination Interventions: Call light in reach History of Falls Interventions: Room close to nurse's station Problem: Pressure Injury - Risk of 
Goal: *Prevention of pressure injury Description: Document Ranjeet Scale and appropriate interventions in the flowsheet. Outcome: Progressing Towards Goal 
Note: Pressure Injury Interventions: 
Sensory Interventions: Assess changes in LOC Moisture Interventions: Absorbent underpads Activity Interventions: PT/OT evaluation Mobility Interventions: HOB 30 degrees or less Nutrition Interventions: Document food/fluid/supplement intake Friction and Shear Interventions: HOB 30 degrees or less

## 2020-10-19 NOTE — PROGRESS NOTES
NAME: Mary Gould :  1985 MRN:  [id number] Assessment :    Plan: 
--ESRD- Anemia CAD Labile BP, now HTN 
SHPT --MWF-[facility];Seen on HD, BP stable Resume FARIDA today-10 k MWF On renvela, phos is < 5.5 Subjective: Chief Complaint:  Resting on HD earlier this am 
Review of Systems: 
 
Symptom Y/N Comments  Symptom Y/N Comments Fever/Chills    Chest Pain Poor Appetite    Edema Cough    Abdominal Pain Sputum    Joint Pain SOB/LITTLE    Pruritis/Rash Nausea/vomit    Tolerating PT/OT Diarrhea    Tolerating Diet Constipation    Other Could not obtain due to:   
 
Objective: VITALS:  
Last 24hrs VS reviewed since prior progress note. Most recent are: 
Visit Vitals BP (!) 170/91 Pulse 75 Temp 98.5 °F (36.9 °C) (Oral) Resp 16 Ht 6' 2\" (1.88 m) Wt 149 kg (328 lb 6.4 oz) SpO2 96% BMI 42.16 kg/m² Intake/Output Summary (Last 24 hours) at 10/19/2020 1300 Last data filed at 10/19/2020 1207 Gross per 24 hour Intake 480 ml Output 3500 ml Net -3020 ml Telemetry Reviewed: PHYSICAL EXAM: 
General: NAD 
cta alfredo on room air RRR 
bka Lab Data Reviewed: (see below) Medications Reviewed: (see below) PMH/SH reviewed - no change compared to H&P 
________________________________________________________________________ Care Plan discussed with: 
Patient y Family RN Care Manager Consultant:     
 
  Comments >50% of visit spent in counseling and coordination of care    
 
________________________________________________________________________ Sebastian Padilla MD  
 
Procedures: see electronic medical records for all procedures/Xrays and details which 
were not copied into this note but were reviewed prior to creation of Plan. LABS: 
Recent Labs 10/19/20 
5599 10/18/20 
2710 WBC 8.7 8.9 HGB 7.4* 7.9*  
HCT 24.4* 26.1*  
 280 Recent Labs 10/19/20 
0329   
K 4.7  CO2 30 BUN 43* CREA 11.30* GLU 68  
CA 8.3* No results for input(s): AP, TBIL, TP, ALB, GLOB, GGT, AML, LPSE in the last 72 hours. No lab exists for component: SGOT, GPT, AMYP, HLPSE No results for input(s): INR, PTP, APTT, INREXT, INREXT in the last 72 hours. No results for input(s): FE, TIBC, PSAT, FERR in the last 72 hours. No results found for: FOL, RBCF No results for input(s): PH, PCO2, PO2 in the last 72 hours. No results for input(s): CPK, CKMB in the last 72 hours. No lab exists for component: TROPONINI No components found for: [place] Lab Results Component Value Date/Time Color YELLOW/STRAW 05/01/2017 05:39 PM  
 Appearance CLOUDY (A) 05/01/2017 05:39 PM  
 Specific gravity 1.018 05/01/2017 05:39 PM  
 Specific gravity >1.030 (H) 10/03/2014 04:35 AM  
 pH (UA) 5.5 05/01/2017 05:39 PM  
 Protein 300 (A) 05/01/2017 05:39 PM  
 Glucose 250 (A) 05/01/2017 05:39 PM  
 Ketone NEGATIVE  05/01/2017 05:39 PM  
 Bilirubin NEGATIVE  05/01/2017 05:39 PM  
 Urobilinogen 0.2 05/01/2017 05:39 PM  
 Nitrites NEGATIVE  05/01/2017 05:39 PM  
 Leukocyte Esterase NEGATIVE  05/01/2017 05:39 PM  
 Epithelial cells FEW 05/01/2017 05:39 PM  
 Bacteria NEGATIVE  05/01/2017 05:39 PM  
 WBC 0-4 05/01/2017 05:39 PM  
 RBC 5-10 05/01/2017 05:39 PM  
 
 
MEDICATIONS: 
Current Facility-Administered Medications Medication Dose Route Frequency  influenza vaccine 2020-21 (6 mos+)(PF) (FLUARIX/FLULAVAL/FLUZONE QUAD) injection 0.5 mL  0.5 mL IntraMUSCular PRIOR TO DISCHARGE  lidocaine (PF) (XYLOCAINE) 10 mg/mL (1 %) injection 0.1 mL  0.1 mL SubCUTAneous PRN  
 oxyCODONE IR (ROXICODONE) tablet 5 mg  5 mg Oral Q4H PRN  
 HYDROmorphone (PF) (DILAUDID) injection 1 mg  1 mg IntraVENous Q4H PRN  
 metoprolol tartrate (LOPRESSOR) tablet 75 mg  75 mg Oral BID  
  albuterol-ipratropium (DUO-NEB) 2.5 MG-0.5 MG/3 ML  3 mL Nebulization Q6H PRN  
 clopidogreL (PLAVIX) tablet 75 mg  75 mg Oral DAILY  insulin glargine (LANTUS) injection 20 Units  20 Units SubCUTAneous QHS  loperamide (IMODIUM) capsule 2 mg  2 mg Oral QID PRN  
 sevelamer carbonate (RENVELA) tab 800 mg  800 mg Oral TID WITH MEALS  sodium chloride (NS) flush 5-40 mL  5-40 mL IntraVENous PRN  
 acetaminophen (TYLENOL) tablet 650 mg  650 mg Oral Q6H PRN Or  
 acetaminophen (TYLENOL) suppository 650 mg  650 mg Rectal Q6H PRN  polyethylene glycol (MIRALAX) packet 17 g  17 g Oral DAILY PRN  
 aspirin delayed-release tablet 81 mg  81 mg Oral DAILY  atorvastatin (LIPITOR) tablet 20 mg  20 mg Oral QHS  hydrALAZINE (APRESOLINE) 20 mg/mL injection 20 mg  20 mg IntraVENous Q6H PRN  
 insulin lispro (HUMALOG) injection   SubCUTAneous AC&HS  
 glucose chewable tablet 16 g  4 Tab Oral PRN  
 dextrose (D50W) injection syrg 12.5-25 g  12.5-25 g IntraVENous PRN  
 glucagon (GLUCAGEN) injection 1 mg  1 mg IntraMUSCular PRN

## 2020-10-19 NOTE — PROGRESS NOTES
General Surgery End of Shift Nursing Note Bedside shift change report given to Car Carranza (oncoming nurse) by Sharad Berry (offgoing nurse). Report included the following information SBAR, Kardex, Intake/Output, MAR and Recent Results. José Miguel Lei

## 2020-10-19 NOTE — PROGRESS NOTES
General Surgery End of Shift Nursing Note Bedside shift change report given to Ramya De La Paz RN (oncoming nurse) by Bhargav Jama RN (offgoing nurse). Report included the following information SBAR, Kardex and MAR. Shift worked:  7a to 7p Summary of shift:    Dr. Merry Neff changed dressing to right leg today. Issues for physician to address:  Pt says he does not take BP meds at home. Pain Management: 
Current medication: IV Dilaudid, Patient states pain is manageable on current pain medication: YES 
 
GI: 
 
Current diet:  DIET DIABETIC CONSISTENT CARB Regular DIET NUTRITIONAL SUPPLEMENTS Lunch, Dinner; Nepro DIET ONE TIME MESSAGE Tolerating current diet: YES Passing flatus: NO 
Last Bowel Movement: 10/15/20 Patient Safety: 
Falls Score: 4 Hmoe Chris RN

## 2020-10-19 NOTE — PROGRESS NOTES
Occupational Therapy:  
OT attempted to see patient --currently in HD. Will continue to f/u this pm as schedule permits.  
Diogenes Alvarez, OTR/L

## 2020-10-19 NOTE — PROGRESS NOTES
Vascular Surgery Progress Note Pauline Alvarez ACNP-BC 
10/19/2020 Subjective:  
 
Mr. Chiquita Townsend a very AIANJPWJ 73 R. o.  male with a pmhx significant for ESRD on HD, DM, HTN, HLD, and obesity.  Patient is legally blind and he has hearing loss. 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 Since his BKA he has undergone an ablation for AFlutter. Plan was for debridement and prior to the procedure a stress test was recommended which he completed on 10/09/2020 that was abnl and he was referred for admission. He underwent a diagnostic cath that was significant for multivessel disease. He was evaluated by CT surgery and currently is not a candidate for CABG due to diffuse disease with poor targets for revascularization and poor LV function. Patient is s/p PCI w/ KRISHAN 10/13/2020. Once stable he underwent debridement of his right BKA on 10/15/2020. Patient has worsening necrosis of his stump. He remains afebrile. Brief period of hypoglycemia this am.  
 
Nursing Data:  
 
Patient Vitals for the past 24 hrs: 
 BP Temp Temp src Pulse Resp SpO2  
10/19/20 1312      93 % 10/19/20 1311 (!) 147/83 98.6 °F (37 °C)  75 18 (!) 87 % 10/19/20 1207 (!) 170/91 98.5 °F (36.9 °C) Oral 75 16   
10/19/20 1200 (!) 162/94   75 16   
10/19/20 1145 (!) 166/87   75 16   
10/19/20 1130 (!) 166/90   75 16   
10/19/20 1115 (!) 164/84   75 16   
10/19/20 1100 (!) 168/90   75 16   
10/19/20 1045 (!) 153/79   75 16   
10/19/20 1030 (!) 159/84   75 16   
10/19/20 1015 (!) 143/71   74 16   
10/19/20 1000 (!) 142/73   74 16   
10/19/20 0945 (!) 143/72   73 16   
10/19/20 0930 (!) 154/73   74 16   
10/19/20 0915 135/74   74 16   
10/19/20 0900 131/71   73 16   
10/19/20 0845 (!) 149/72   73 16   
10/19/20 0830 (!) 152/77   74 16   
10/19/20 0815 (!) 156/81   73 16  10/19/20 0802 (!) 147/79 98.7 °F (37.1 °C) Oral 74 16   
10/19/20 0237 (!) 158/89 98.1 °F (36.7 °C)  76 16 96 % 10/18/20 2220 (!) 170/90 97.9 °F (36.6 °C)  78 16 98 % 10/18/20 2017 (!) 162/88 98.5 °F (36.9 °C)  76 16 98 % 10/18/20 1639 (!) 158/98 98 °F (36.7 °C)  76 17 94 %  
 
--------------------------------------------------------------------------------------------------------- Intake/Output Summary (Last 24 hours) at 10/19/2020 1542 Last data filed at 10/19/2020 1207 Gross per 24 hour Intake 240 ml Output 3500 ml Net -3260 ml Exam:  
 
Physical Exam 
Constitutional:   
   Appearance: He is obese. He is legally and is [place]. HENT:  
   Head: Normocephalic. Nose: Nose normal.  
   Mouth/Throat:  
   Mouth: Mucous membranes are moist.  
Eyes:  
   Pupils: Pupils are equal, round, and reactive to light. Neck: Musculoskeletal: Normal range of motion. Cardiovascular:  
   Rate and Rhythm: Normal rate and regular rhythm. Pulmonary:  
   Effort: Pulmonary effort is normal.  
   Breath sounds: Normal breath sounds. Abdominal:  
   General: Abdomen is flat. Palpations: Abdomen is soft. Musculoskeletal: Normal range of motion. Skin: 
   Findings: Dressing to right stump is now dry and intact. Neurological:  
   General: No focal deficit present. Mental Status: He is alert. Psychiatric:     
   Mood and Affect: Mood normal.     
   Thought Content: Thought content normal.  
 
Lab Review:  
 
. Recent Results (from the past 24 hour(s)) GLUCOSE, POC Collection Time: 10/18/20  4:36 PM  
Result Value Ref Range Glucose (POC) 93 65 - 100 mg/dL Performed by Cindy Guy GLUCOSE, POC Collection Time: 10/18/20  8:55 PM  
Result Value Ref Range Glucose (POC) 97 65 - 100 mg/dL Performed by Cresencio Hines \"Adelso\" PCT   
CBC W/O DIFF Collection Time: 10/19/20  3:29 AM  
Result Value Ref Range WBC 8.7 4.1 - 11.1 K/uL RBC 2.94 (L) 4.10 - 5.70 M/uL HGB 7.4 (L) 12.1 - 17.0 g/dL HCT 24.4 (L) 36.6 - 50.3 % MCV 83.0 80.0 - 99.0 FL  
 MCH 25.2 (L) 26.0 - 34.0 PG  
 MCHC 30.3 30.0 - 36.5 g/dL  
 RDW 15.8 (H) 11.5 - 14.5 % PLATELET 926 676 - 631 K/uL MPV 9.5 8.9 - 12.9 FL  
 NRBC 0.0 0  WBC ABSOLUTE NRBC 0.00 0.00 - 0.01 K/uL METABOLIC PANEL, BASIC Collection Time: 10/19/20  3:29 AM  
Result Value Ref Range Sodium 138 136 - 145 mmol/L Potassium 4.7 3.5 - 5.1 mmol/L Chloride 101 97 - 108 mmol/L  
 CO2 30 21 - 32 mmol/L Anion gap 7 5 - 15 mmol/L Glucose 68 65 - 100 mg/dL BUN 43 (H) 6 - 20 MG/DL Creatinine 11.30 (H) 0.70 - 1.30 MG/DL  
 BUN/Creatinine ratio 4 (L) 12 - 20 GFR est AA 6 (L) >60 ml/min/1.73m2 GFR est non-AA 5 (L) >60 ml/min/1.73m2 Calcium 8.3 (L) 8.5 - 10.1 MG/DL  
GLUCOSE, POC Collection Time: 10/19/20  4:28 AM  
Result Value Ref Range Glucose (POC) 72 65 - 100 mg/dL Performed by Dorene Fuentes GLUCOSE, POC Collection Time: 10/19/20  6:36 AM  
Result Value Ref Range Glucose (POC) 83 65 - 100 mg/dL Performed by Stacey Flores \"Adelso\" PCT   
GLUCOSE, POC Collection Time: 10/19/20  1:00 PM  
Result Value Ref Range Glucose (POC) 58 (L) 65 - 100 mg/dL Performed by Uriah Levy GLUCOSE, POC Collection Time: 10/19/20  1:01 PM  
Result Value Ref Range Glucose (POC) 64 (L) 65 - 100 mg/dL Performed by Uriah Levy GLUCOSE, POC Collection Time: 10/19/20  1:33 PM  
Result Value Ref Range Glucose (POC) 55 (L) 65 - 100 mg/dL Performed by Uriah Levy GLUCOSE, POC Collection Time: 10/19/20  1:54 PM  
Result Value Ref Range Glucose (POC) 67 65 - 100 mg/dL Performed by Socorro Yan (RN)   
GLUCOSE, POC Collection Time: 10/19/20  2:12 PM  
Result Value Ref Range Glucose (POC) 79 65 - 100 mg/dL Performed by Socorro Yan (DIONICIO) Assessment/Plan:  
  
 
Consult problem PAD w/ dehiscence of surgical incision w/ necrosis · S/p right BKA 08/24/2020 · S/p revision of right BKA 10/15/2020 
· H&H stable · Continue dressing changes in the am.  
· Continue ASA and Plavix for KRISHAN  
· Continue to hold DVT prophylaxis for possible procedure intervention of stump · Continue OOB today with PT daily  
  
Acute problems ASHD Hyperlipidemia · Diagnostic cath 10/10/2020-diffuse multivessel disease · He is not a candidate for CABG 
· PCI with KRISHAN placement 10/13/2020 · BBlocker, ASA, & statin · Dietary non-compliance Hypertension · Labile Plan per cardiology  
  
Active problems ESRD 
· HD MWF [facility]  
Anemia of chronic renal disease · Not at a level to transfuse Plan per nephrology 
  
Uncontrolled Diabetes Mellitus w/ hyperglycemia · HA1c 9.4 8/15/2020 · Labile. Decrease basal dose of insulin. · Dietary non-compliance Morbid obesity  
Management of comorbid conditions by primary team. 
  
VTE Prophylaxis: 
Held for possible procedure intervention  
  
Disposition: TBD

## 2020-10-19 NOTE — PROGRESS NOTES
Problem: Falls - Risk of 
Goal: *Absence of Falls Description: Document Myke Chin Fall Risk and appropriate interventions in the flowsheet. Outcome: Progressing Towards Goal 
Note: Fall Risk Interventions: 
Mobility Interventions: Assess mobility with egress test 
 
  
 
Medication Interventions: Teach patient to arise slowly, Patient to call before getting OOB Elimination Interventions: Call light in reach History of Falls Interventions: Evaluate medications/consider consulting pharmacy

## 2020-10-19 NOTE — PROCEDURES
[facility] Team [place]  [phone] Vitals   Pre   Post   Assessment   Pre   Post    
Temp  Temp: 98.7 °F (37.1 °C) (10/19/20 0802)  98.5 LOC  A&Ox4 A&Ox4 HR   Pulse (Heart Rate): 74 (10/19/20 0802) 75 Lungs   clear clear B/P   BP: (!) 147/79 (10/19/20 0802) 170/91 Cardiac   RRR RRR Resp   Resp Rate: 16 (10/19/20 0802) 16 Skin   RLE dressing; CDI RLE dressing; CDI Pain level  Pain Intensity 1: 9 (10/19/20 0030) 0 Edema  generalized generalized Orders: Duration:   Start:    3252 End:    1207 Total:   4 hours Dialyzer:   Dialyzer/Set Up Inspection: Freda Haji (10/19/20 0802) K Bath:   Dialysate K (mEq/L): 2 (10/19/20 0802) Ca Bath:   Dialysate CA (mEq/L): 2.5 (10/19/20 0802) Na/Bicarb:   Dialysate NA (mEq/L): 138 (10/19/20 0802) Target Fluid Removal:   Goal/Amount of Fluid to Remove (mL): 3500 mL (10/19/20 0802) Access Type & Location:   PELON AVF: skin CDI. No s/s of infection. + B/T. No issues with cannulation or hemostasis. Running well at . Pt arrived to HD suite A&Ox4. Consent signed & on file. SBAR received from Primary RN. Pt cannulated with 08P needles per policy & without issue. VSS. Dialysis Tx initiated. Labs Obtained/Reviewed Critical Results Called   Date when labs were drawn- 
Hgb-   
HGB Date Value Ref Range Status 10/19/2020 7.4 (L) 12.1 - 17.0 g/dL Final  
 
K-   
Potassium Date Value Ref Range Status 10/19/2020 4.7 3.5 - 5.1 mmol/L Final  
 
Ca-  
Calcium Date Value Ref Range Status 10/19/2020 8.3 (L) 8.5 - 10.1 MG/DL Final  
 
Bun-  
BUN Date Value Ref Range Status 10/19/2020 43 (H) 6 - 20 MG/DL Final  
 
Creat-  
Creatinine Date Value Ref Range Status 10/19/2020 11.30 (H) 0.70 - 1.30 MG/DL Final  
 
  
Medications/ Blood Products Given Name   Dose   Route and Time None ordered Blood Volume Processed (BVP):    99.9 Net Fluid Removed:  3500mL Comments All dialysis related medications have been reviewed. Assessment performed by RN. Procedure and documentation observed and reviewed by Alexis Virgen RN Time Out Done:  8794 Primary Nurse Rpt Pre:  Keven Quintana RN 
Primary Nurse Rpt Post:  Darien Sutherland RN 
Pt Education:  procedural 
Care Plan:  On going Tx Summary: 
0802:  PELON AVF: skin CDI. No s/s of infection. + B/T. No issues with cannulation or hemostasis. Running well at . Pt arrived to HD suite A&Ox4. Consent signed & on file. SBAR received from Primary RN. Pt cannulated with 58M needles per policy & without issue. VSS. Dialysis Tx initiated. 0815:  Pt resting 
0830:  Pt resting 0845:  Pt resting 
0900:  Pt resting  
0915:  Pt resting 
0930:  Pt resting 
0945:  Pt resting 
1000:  Pt resting 1015:  Pt resting 1030:  Pt resting 1045:  Pt resting 
1100:  Pt resting 1115:  Pt resting 1130:  Pt resting 1145:  Pt resting 
1200:  Pt resting 
1207: Tx ended. VSS. All possible blood returned to patient. Hemostasis achieved without issue. Bed locked and in the lowest position, call bell and belongings in reach. SBAR given to Primary, RN. Patient is stable at time of their/ my departure. Admiting Diagnosis: BKA revision Pt's previous clinic- [address] 
Consent signed - Informed Consent Verified: Yes (10/19/20 0802) Sylvain Consent - signed and on file Hepatitis Status- neg/susc 09/25/2020 Machine #- Machine Number: [id number] (10/19/20 7295) Telemetry status- 
Pre-dialysis wt. -

## 2020-10-19 NOTE — ROUTINE PROCESS
Occupational Therapy: OT f/u with nursing after hypoglycemic episode--she cleared patient for OT. Upon OT arrival in room, noted bright red blood on patient's L upper arm and on his tshirt. Session deferred and nurse notified. Will f/u per 4 x week POC . Beverley Johnston, OTR/L

## 2020-10-19 NOTE — PROGRESS NOTES
General Surgery End of Shift Nursing Note Bedside shift change report given to Krysta Tolentino RN (oncoming nurse) by Gabby Su RN (offgoing nurse). Report included the following information SBAR, Kardex, OR Summary, Intake/Output, MAR and Recent Results. Shift worked:   7a-7p Summary of shift:    Patient to dialysis at shift change. Patient had episode of hypoglycemia today. Patient also had saturated dialysis dressing after lunch. Dr Linwood Hamlin made aware after first dressing change. Dialysis nurse Kobi Husain came to [facility]. Patient had dressing change done by physician. Treated for pain three times Issues for physician to address:   n/A Number times ambulated in hallway past shift: 0 Number of times OOB to chair past shift: 0 Pain Management: 
Current medication: Dilaudid Patient states pain is manageable on current pain medication: YES 
 
GI: 
 
Current diet:  DIET DIABETIC CONSISTENT CARB Regular DIET NUTRITIONAL SUPPLEMENTS Lunch, Dinner; Nepro DIET ONE TIME MESSAGE Tolerating current diet: YES Passing flatus: YES Last Bowel Movement: several days ago Respiratory: 
 
 
Patient Safety: 
 
Falls Score: 4 Bed Alarm On? Yes Sitter? Not applicable Silke Pennington RN

## 2020-10-19 NOTE — PROGRESS NOTES
HYPOGLYCEMIC EPISODE DOCUMENTATION Patient with hypoglycemic episode(s) at 1333(time) on 10/19 2020(date). BG value(s) pre-treatment 55 Was patient symptomatic? [] yes, [x] no Patient was treated with the following rescue medications/treatments: [] D50 [] Glucose tablets 
              [] Glucagon 
              [x] 4oz juice apple juice 
              [] 6oz reg soda 
              [] 8oz low fat milk BG value post-treatment: 67 Once BG treated and value greater than 80mg/dl, pt was provided with the following: 
[] snack 
[] meal 
Name of MD notified Dr. Loretta Reece The following orders were received: 
 
 
Pt given another 4 oz apple juice. 1415 BS 79. Peanut butter and 1 pkg of crackers given to pt. Dr Loretta Reece notified of hypoglycemic episode. No new orders given. 1830 Pt AV fistula bleeding, soaked through two bandages in past 1 1/2 -2 hours. Called Dr. Loretta Reece, per Dr Loretta Reece compression bandage around Av fistula. 1900 called dialysis, Ysabel Henao came to check out fistula. Per Maritza Landeros RN, pt is slow to clot due to being on blood thinners. Maritza Landeros RN redressed and taped fistula and said to call back if it starts bleeding again, tho she did not think it would.

## 2020-10-19 NOTE — PROGRESS NOTES
Attempted to see pt this pm and will defer tx today 2/2 to pt having very low blood sugar. Will continue to follow and tx when pt is stable and when cleared by nursing.

## 2020-10-20 NOTE — WOUND CARE
Wound care nurse called to room for wound VAC alarming low pressure: Patients wound oozing blood that caused a clot in dressing not allowing NPWT to be applied properly. WC nurse removed Trac pad and foam over area, applied a piece of Surgicell to wound and replaced foam, Trac pad and new cannister. Alfa Trevino is being called back to room for constant oozing blood from [place] fistula  [place] [facility]: if wound VAC alarms low pressure due to bleeding and clotting again. Remove wound vac dressing, place a moist to dry compression dressing over wound and notify WC nurse.  
 
Isaiah Segal RN

## 2020-10-20 NOTE — PROGRESS NOTES
Occupational Therapy Patient chart reviewed and attempted to see patient for OT treatment session. Upon arrival, patient's wound vac alarming 2/2 low pressure and bandage on LUE partially saturated with blood. Immediately notified RN and awaited wound care to assist and determine whether mobilization is safe at this time. Of note, patient with bloody linens and various trash on floor - assisted in cleaning room and facilitating patient comfort while awaiting wound care. Noted identification bracelet very tight on forearm - moved to wrist and encouraged active movement to decrease swelling - patient verbalized understanding. Aborted session with plan to follow-up tomorrow when hopefully patient's wound vac is functional and patient is safe to mobilize. CHUN Weiss/L Time Spent: 15 minutes

## 2020-10-20 NOTE — PROGRESS NOTES
Vascular Surgery Progress Note Kitty Gross ACNP-BC 
10/20/2020 Subjective:  
 
Mr. Lexie Higgins a  89 F. o.  male with a pmhx significant for ESRD on HD, DM, HTN, HLD, and obesity.  Patient is legally blind and has hearing loss. 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 Since his BKA he has undergone an ablation for AFlutter. Plan was for debridement and prior to the procedure a stress test was recommended which he completed on 10/09/2020 that was abnl and he was referred for admission. He underwent a diagnostic cath that was significant for multivessel disease. He was evaluated by CT surgery and currently is not a candidate for CABG due to diffuse disease with poor targets for revascularization and poor LV function. Patient is s/p PCI w/ KRISHAN 10/13/2020. Once stable he underwent debridement of his right BKA on 10/15/2020. Post procedure he underwent bedside debridement x 2 for persistent necrosis. This am he is oozing from his stump and from his HD access. Nursing Data:  
 
Patient Vitals for the past 24 hrs: 
 BP Temp Temp src Pulse Resp SpO2  
10/20/20 0813 136/82       
10/20/20 0740 (!) 140/42 98.9 °F (37.2 °C)  80 17 97 % 10/20/20 0338 (!) 152/75 98.8 °F (37.1 °C)  82 16 97 % 10/19/20 2318 (!) 134/47 98.3 °F (36.8 °C)  81 16 99 % 10/19/20 2005 (!) 142/79 98.7 °F (37.1 °C)  75 16 98 % 10/19/20 1731    76    
10/19/20 1709 131/66 98.4 °F (36.9 °C)  80 18 98 % 10/19/20 1312      93 % 10/19/20 1311 (!) 147/83 98.6 °F (37 °C)  75 18 (!) 87 % 10/19/20 1207 (!) 170/91 98.5 °F (36.9 °C) Oral 75 16   
10/19/20 1200 (!) 162/94   75 16   
10/19/20 1145 (!) 166/87   75 16   
10/19/20 1130 (!) 166/90   75 16   
10/19/20 1115 (!) 164/84   75 16   
10/19/20 1100 (!) 168/90   75 16   
10/19/20 1045 (!) 153/79   75 16  10/19/20 1030 (!) 159/84   75 16   
10/19/20 1015 (!) 143/71   74 16   
10/19/20 1000 (!) 142/73   74 16   
10/19/20 0945 (!) 143/72   73 16   
10/19/20 0930 (!) 154/73   74 16   
 
--------------------------------------------------------------------------------------------------------- Intake/Output Summary (Last 24 hours) at 10/20/2020 4728 Last data filed at 10/20/2020 4200 Gross per 24 hour Intake 780 ml Output 3500 ml Net -2720 ml Exam:  
 
Physical Exam 
Constitutional:   
   Appearance: He is obese. He is legally and is [place]. HENT:  
   Head: Normocephalic. Nose: Nose normal.  
   Mouth/Throat:  
   Mouth: Mucous membranes are moist.  
Eyes:  
   Pupils: Pupils are equal, round, and reactive to light. Neck: Musculoskeletal: Normal range of motion. Cardiovascular:  
   Rate and Rhythm: Normal rate and regular rhythm. Pulmonary:  
   Effort: Pulmonary effort is normal.  
   Breath sounds: Normal breath sounds. Abdominal:  
   General: Abdomen is flat. Palpations: Abdomen is soft. Musculoskeletal: Normal range of motion. Skin: 
   Findings: Oozing from right stump. Necrosis of stump debrided at the bedside. Oozing from HD access. Neurological:  
   General: No focal deficit present. Mental Status: He is alert. Psychiatric:     
   Mood and Affect: Mood normal.     
   Thought Content: Thought content normal.  
 
Lab Review:  
 
. Recent Results (from the past 24 hour(s)) GLUCOSE, POC Collection Time: 10/19/20  1:00 PM  
Result Value Ref Range Glucose (POC) 58 (L) 65 - 100 mg/dL Performed by Gomez Milan GLUCOSE, POC Collection Time: 10/19/20  1:01 PM  
Result Value Ref Range Glucose (POC) 64 (L) 65 - 100 mg/dL Performed by Gomez Milan GLUCOSE, POC Collection Time: 10/19/20  1:33 PM  
Result Value Ref Range Glucose (POC) 55 (L) 65 - 100 mg/dL Performed by Gomez Yates GLUCOSE, POC  
 Collection Time: 10/19/20  1:54 PM  
Result Value Ref Range Glucose (POC) 67 65 - 100 mg/dL Performed by Christina Whitlock (RN)   
GLUCOSE, POC Collection Time: 10/19/20  2:12 PM  
Result Value Ref Range Glucose (POC) 79 65 - 100 mg/dL Performed by Christina Whitlock (RN)   
GLUCOSE, POC Collection Time: 10/19/20  5:03 PM  
Result Value Ref Range Glucose (POC) 158 (H) 65 - 100 mg/dL Performed by Federica Mora (PCT) GLUCOSE, POC Collection Time: 10/19/20  9:21 PM  
Result Value Ref Range Glucose (POC) 75 65 - 100 mg/dL Performed by Emilio [facility] METABOLIC PANEL, BASIC Collection Time: 10/20/20  5:35 AM  
Result Value Ref Range Sodium 138 136 - 145 mmol/L Potassium 4.3 3.5 - 5.1 mmol/L Chloride 99 97 - 108 mmol/L  
 CO2 31 21 - 32 mmol/L Anion gap 8 5 - 15 mmol/L Glucose 86 65 - 100 mg/dL BUN 26 (H) 6 - 20 MG/DL Creatinine 8.49 (H) 0.70 - 1.30 MG/DL  
 BUN/Creatinine ratio 3 (L) 12 - 20 GFR est AA 9 (L) >60 ml/min/1.73m2 GFR est non-AA 7 (L) >60 ml/min/1.73m2 Calcium 8.3 (L) 8.5 - 10.1 MG/DL  
CBC W/O DIFF Collection Time: 10/20/20  5:35 AM  
Result Value Ref Range WBC 8.3 4.1 - 11.1 K/uL  
 RBC 2.97 (L) 4.10 - 5.70 M/uL HGB 7.4 (L) 12.1 - 17.0 g/dL HCT 24.5 (L) 36.6 - 50.3 % MCV 82.5 80.0 - 99.0 FL  
 MCH 24.9 (L) 26.0 - 34.0 PG  
 MCHC 30.2 30.0 - 36.5 g/dL  
 RDW 15.8 (H) 11.5 - 14.5 % PLATELET 676 750 - 759 K/uL MPV 9.6 8.9 - 12.9 FL  
 NRBC 0.0 0  WBC ABSOLUTE NRBC 0.00 0.00 - 0.01 K/uL GLUCOSE, POC Collection Time: 10/20/20  7:33 AM  
Result Value Ref Range Glucose (POC) 110 (H) 65 - 100 mg/dL Performed by Neeraj Amor (PCT) Assessment/Plan:  
  
 
Consult problem PAD w/ dehiscence of surgical incision w/ necrosis · S/p right BKA 08/24/2020 · S/p revision of right BKA 10/15/2020 · Bedside debridement x 2.   
· H&H stable · Continue ASA and Plavix for KRISHAN  
 · Continue to hold DVT prophylaxis for bleeding · Continue OOB today with PT daily Complication of HD access · Bleeding · Reinforced with ACE wrap · Will re-evaluate later this am and stitch if needed 
  
Acute problems ASHD Hyperlipidemia · Diagnostic cath 10/10/2020-diffuse multivessel disease · He is not a candidate for CABG 
· PCI with KRISHAN placement 10/13/2020 · BBlocker, ASA, & statin · Dietary non-compliance Hypertension · Labile Plan per cardiology  
  
Active problems ESRD 
· HD F [facility]  
Anemia of chronic renal disease · Stable and not at a level to transfuse Plan per nephrology 
  
Uncontrolled Diabetes Mellitus w/ hyperglycemia · HA1c 9.4 8/15/2020 · Labile. Decreased basal dose of insulin. · Hypoglycemia resolved · Dietary non-compliance Morbid obesity  
Management of comorbid conditions by primary team. 
  
VTE Prophylaxis: 
Held for bleeding  
  
Disposition: 
Case management consult for disposition. SNF placement.   Likely will be stable for discharge in the am from a vascular standpoint.

## 2020-10-20 NOTE — PROGRESS NOTES
NAME: Nicanor Abbasi :  1985 MRN:  [id number] Assessment :    Plan: 
--ESRD- Anemia CAD Labile BP, now HTN 
SHPT --MWF-[facility];Seen on HD, BP stable Resume FARIDA today-10 k MWF On renvela, phos is < 5.5 Oozing from hd access-thru bandage-see note from vascular yesterday-on asa/plavix. Subjective: Chief Complaint:  My arm is still bleeding. Review of Systems: 
 
Symptom Y/N Comments  Symptom Y/N Comments Fever/Chills    Chest Pain Poor Appetite    Edema Cough    Abdominal Pain Sputum    Joint Pain SOB/LITTLE    Pruritis/Rash Nausea/vomit    Tolerating PT/OT Diarrhea    Tolerating Diet Constipation    Other Could not obtain due to:   
 
Objective: VITALS:  
Last 24hrs VS reviewed since prior progress note. Most recent are: 
Visit Vitals /82 Pulse 80 Temp 98.9 °F (37.2 °C) Resp 17 Ht 6' 2\" (1.88 m) Wt 149 kg (328 lb 6.4 oz) SpO2 97% BMI 42.16 kg/m² Intake/Output Summary (Last 24 hours) at 10/20/2020 3056 Last data filed at 10/20/2020 4294 Gross per 24 hour Intake 780 ml Output 3500 ml Net -2720 ml Telemetry Reviewed: PHYSICAL EXAM: 
General: NAD 
cta alfredo on room air RRR Bka, (L) dressing oozing Lab Data Reviewed: (see below) Medications Reviewed: (see below) PMH/SH reviewed - no change compared to H&P 
________________________________________________________________________ Care Plan discussed with: 
Patient y Family RN Care Manager Consultant:  y   
 
  Comments >50% of visit spent in counseling and coordination of care    
 
________________________________________________________________________ Edison Parra MD  
 
Procedures: see electronic medical records for all procedures/Xrays and details which 
 were not copied into this note but were reviewed prior to creation of Plan. LABS: 
Recent Labs 10/20/20 
0535 10/19/20 
7670 WBC 8.3 8.7 HGB 7.4* 7.4* HCT 24.5* 24.4*  
 292 Recent Labs 10/20/20 
0535 10/19/20 
1121  138  
K 4.3 4.7 CL 99 101 CO2 31 30 BUN 26* 43* CREA 8.49* 11.30* GLU 86 68  
CA 8.3* 8.3* No results for input(s): AP, TBIL, TP, ALB, GLOB, GGT, AML, LPSE in the last 72 hours. No lab exists for component: SGOT, GPT, AMYP, HLPSE No results for input(s): INR, PTP, APTT, INREXT, INREXT in the last 72 hours. No results for input(s): FE, TIBC, PSAT, FERR in the last 72 hours. No results found for: FOL, RBCF No results for input(s): PH, PCO2, PO2 in the last 72 hours. No results for input(s): CPK, CKMB in the last 72 hours. No lab exists for component: TROPONINI No components found for: [place] Lab Results Component Value Date/Time Color YELLOW/STRAW 05/01/2017 05:39 PM  
 Appearance CLOUDY (A) 05/01/2017 05:39 PM  
 Specific gravity 1.018 05/01/2017 05:39 PM  
 Specific gravity >1.030 (H) 10/03/2014 04:35 AM  
 pH (UA) 5.5 05/01/2017 05:39 PM  
 Protein 300 (A) 05/01/2017 05:39 PM  
 Glucose 250 (A) 05/01/2017 05:39 PM  
 Ketone NEGATIVE  05/01/2017 05:39 PM  
 Bilirubin NEGATIVE  05/01/2017 05:39 PM  
 Urobilinogen 0.2 05/01/2017 05:39 PM  
 Nitrites NEGATIVE  05/01/2017 05:39 PM  
 Leukocyte Esterase NEGATIVE  05/01/2017 05:39 PM  
 Epithelial cells FEW 05/01/2017 05:39 PM  
 Bacteria NEGATIVE  05/01/2017 05:39 PM  
 WBC 0-4 05/01/2017 05:39 PM  
 RBC 5-10 05/01/2017 05:39 PM  
 
 
MEDICATIONS: 
Current Facility-Administered Medications Medication Dose Route Frequency  epoetin nata-epbx (RETACRIT) injection 10,000 Units  10,000 Units SubCUTAneous Q MON, WED & FRI  insulin glargine (LANTUS) injection 10 Units  10 Units SubCUTAneous QHS  influenza vaccine 2020-21 (6 mos+)(PF) (FLUARIX/FLULAVAL/FLUZONE QUAD) injection 0.5 mL  0.5 mL IntraMUSCular PRIOR TO DISCHARGE  lidocaine (PF) (XYLOCAINE) 10 mg/mL (1 %) injection 0.1 mL  0.1 mL SubCUTAneous PRN  
 oxyCODONE IR (ROXICODONE) tablet 5 mg  5 mg Oral Q4H PRN  
 HYDROmorphone (PF) (DILAUDID) injection 1 mg  1 mg IntraVENous Q4H PRN  
 metoprolol tartrate (LOPRESSOR) tablet 75 mg  75 mg Oral BID  albuterol-ipratropium (DUO-NEB) 2.5 MG-0.5 MG/3 ML  3 mL Nebulization Q6H PRN  
 clopidogreL (PLAVIX) tablet 75 mg  75 mg Oral DAILY  loperamide (IMODIUM) capsule 2 mg  2 mg Oral QID PRN  
 sevelamer carbonate (RENVELA) tab 800 mg  800 mg Oral TID WITH MEALS  sodium chloride (NS) flush 5-40 mL  5-40 mL IntraVENous PRN  
 acetaminophen (TYLENOL) tablet 650 mg  650 mg Oral Q6H PRN Or  
 acetaminophen (TYLENOL) suppository 650 mg  650 mg Rectal Q6H PRN  polyethylene glycol (MIRALAX) packet 17 g  17 g Oral DAILY PRN  
 aspirin delayed-release tablet 81 mg  81 mg Oral DAILY  atorvastatin (LIPITOR) tablet 20 mg  20 mg Oral QHS  hydrALAZINE (APRESOLINE) 20 mg/mL injection 20 mg  20 mg IntraVENous Q6H PRN  
 insulin lispro (HUMALOG) injection   SubCUTAneous AC&HS  
 glucose chewable tablet 16 g  4 Tab Oral PRN  
 dextrose (D50W) injection syrg 12.5-25 g  12.5-25 g IntraVENous PRN  
 glucagon (GLUCAGEN) injection 1 mg  1 mg IntraMUSCular PRN

## 2020-10-20 NOTE — PROGRESS NOTES
Spiritual Care Assessment/Progress Note [address] 
 
 
NAME: Pedrito Srinivasan      MRN: [id number] AGE: 38 y.o. SEX: male Restorationism Affiliation: No preference Language: English  
 
10/20/2020     Total Time (in minutes): 10 Spiritual Assessment begun in [facility] GENERAL SURGERY through conversation with: 
  
    [x]Patient        [] Family    [] Friend(s) Reason for Consult: Initial/Spiritual assessment, patient floor Spiritual beliefs: (Please include comment if needed) 
   [] Identifies with a celeste tradition:     
   [] Supported by a celeste community:        
   [] Claims no spiritual orientation:       
   [] Seeking spiritual identity:            
   [] Adheres to an individual form of spirituality:       
   [x] Not able to assess:                   
 
    
Identified resources for coping:  
   [] Prayer                           
   [] Music                  [] Guided Imagery [x] Family/friends                 [] Pet visits [] Devotional reading                         [] Unknown 
   [x] Other: Television Interventions offered during this visit: (See comments for more details) Patient Interventions: Affirmation of emotions/emotional suffering, Coping skills reviewed/reinforced, Initial/Spiritual assessment, patient floor, Normalization of emotional/spiritual concerns Plan of Care: 
 
 [] Support spiritual and/or cultural needs  
 [] Support AMD and/or advance care planning process    
 [] Support grieving process 
 [] Coordinate Rites and/or Rituals  
 [] Coordination with community clergy 
 [x] No spiritual needs identified at this time 
 [] Detailed Plan of Care below (See Comments)  [] Make referral to Music Therapy 
[] Make referral to Pet Therapy    
[] Make referral to Addiction services 
[] Make referral to [facility] 
[] Make referral to Spiritual Care Partner [] No future visits requested       
[x] Follow up visits as needed Comments: Provided initial spiritual assessment for pt [facility] on GEN SURG. Pt discussed feeling supported by staff and strong family system that is able to visit. Did not identify spiritual needs. Advised of  services. Chaplains will follow as able and/or needed. [phone] [facility] Spiritual Care Provider  Paging Service [phone])

## 2020-10-20 NOTE — PROGRESS NOTES
General Surgery End of Shift Nursing Note Bedside shift change report given to [address] (oncoming nurse) by Marianne Curiel (offgoing nurse). Report included the following information SBAR, Kardex, Intake/Output, MAR and Recent Results. Blanquita Sy

## 2020-10-20 NOTE — PROGRESS NOTES
General Surgery End of Shift Nursing Note Bedside shift change report given to 2020 Eyad Levine (oncoming nurse) by Mayra leach RN (offgoing nurse). Report included the following information SBAR, Kardex, OR Summary, Procedure Summary, Intake/Output, MAR, Accordion and Recent Results. Shift worked:   7a-7p Summary of shift:    Pts fistula to LUE cont to ooze blood, call from dialysis RN stating that Dr Lillian Angulo wanted a surgi foam drsg applied. Shortly after this call Dr Ballard Lennox arrived to see pt and applied surgi foam with a pressure drsg to LUE and changed the drsg to right BKA. Not long after new BKA drsg applied, drsg had breakthrough bloody drainage. Call to Justus Gregg NP to inform. Pt had a little debridment at drsg chg so bleeding is expected. VAC drsg applied to right BKA. 16:00 Pts LUE fistula cont to ooze through pressure drsg. Call to Oswald Brown to inform, she will speak with Dr Ballard Lennox and plan to suture the fistula. Fistula has been sutured, no oozing as of 18:00. Issues for physician to address:   Continue to monitor LUE fistula Number times ambulated in hallway past shift: 0 Number of times OOB to chair past shift: 0 Pain Management: 
Current medication: dilaudid Patient states pain is manageable on current pain medication: YES 
 
GI: 
 
Current diet:  DIET DIABETIC CONSISTENT CARB Regular DIET NUTRITIONAL SUPPLEMENTS Lunch, Dinner; Nepro DIET ONE TIME MESSAGE Tolerating current diet: YES Passing flatus: YES Last Bowel Movement:  
 Appearance:  
 
Respiratory: 
 
Incentive Spirometer at bedside: NO 
Patient instructed on use: NO 
 
Patient Safety: 
 
Falls Score: 3 Bed Alarm On? No 
Sitter?  No 
 
Lana Martinez RN

## 2020-10-20 NOTE — PROGRESS NOTES
Attempted to see pt this am and pt's stump is bleeding through his ace wrap (nurse notified). Will defer tx until bleeding is under control. 14:45 Attempted to see pt again this pm and found wound vac alarming (nurse notified) and left arm is still bleeding. Called wound care nurse and aborted tx for today. Will follow up tomorrow when these issues have been resolved.

## 2020-10-20 NOTE — WOUND CARE
Wound VAC dressing application: consult received from vascular team to place a NPWT dressing to right BKA surgical wound. Patient is a 37 y/o AAM who is legally blind, has ESRD-HD and uncontrolled DM. He is POD# 5 from a revision of right BKA due to right leg ischemia. Past Medical History:  
Diagnosis Date  Arrhythmia   
 aflutter  Blind  Cataracts  Cellulitis and abscess of foot  Chronic kidney disease MWF- [facility]  
 Diabetes ([facility] [address] DM II  
 GERD (gastroesophageal reflux disease)  Hearing loss  Hypercholesterolemia  Hypertension  Neuropathy  Obesity  Osteomyelitis ([facility] [address]  Puerperal sepsis with acute hypoxic respiratory failure (HCC)  Sepsis ([facility] [address] Patient pre-medicated for pain prior to dressing placement. Dr Ming Chua debrided the lateral wound this am And there is small-moderate bloody drainage. Staple line has some bloody drainage also. Wound Leg Right BKA Incision (Active) Dressing Status Removed 10/20/20 1323 Dressing Type Moist to dry 10/20/20 1323 Incision Site Well Approximated No 10/20/20 1323 Non-staged Wound Description Full thickness 10/20/20 1323 Wound Length (cm) 4.5 cm 10/20/20 1323 Wound Width (cm) 5.5 cm 10/20/20 1323 Wound Depth (cm) 2.8 cm 10/20/20 1323 Wound Surface Area (cm^2) 24.75 cm^2 10/20/20 1323 Wound Volume (cm^3) 69.3 cm^3 10/20/20 1323 Condition of Base Granulation; Adipose exposed 10/20/20 1323 Condition of Edges Rolled/curled 10/20/20 1323 Assessment Clean;Bleeding 10/20/20 1323 Tissue Type Percent Black 0 % 10/20/20 1323 Tissue Type Percent Pink 50 10/20/20 1323 Tissue Type Percent Red 50 10/20/20 1323 Drainage Amount Moderate 10/20/20 1323 Drainage Color Serosanguinous 10/20/20 1323 Wound Odor None 10/20/20 1323 Amanda-wound Assessment Intact 10/20/20 1323 Cleansing and Cleansing Agents  Normal saline 10/20/20 1323 Dressing Changed Changed/New 10/20/20 1323 Dressing Type Applied Negative pressure wound therapy 10/20/20 1323 Procedure Bleeding None 10/13/20 0532 Closure Staples 10/13/20 0532 Procedure Tolerated Well 10/20/20 1323 Number of days: 62 Wound Care nurse decided to wound vac the open part of incision and the staples using a small granufoam kit and extra drape. Right BKA wound NPWT dressing 
-125 mm/hg, continuous Dressing change x2/week Rescue dressing in case of wound VAC failure: Rt. BKA wound - pack wound with NS moist gauze and cover with a dry dressing daily until wound VAC can be re-applied. Plan: Next dressing change Friday at bedside.  
Matthew James RN, [employer]

## 2020-10-21 NOTE — PROCEDURES
[facility] Dialysis Team Charli Collado  [phone] Vitals   Pre   Post   Assessment   Pre   Post    
Temp  Temp: 98 °F (36.7 °C) (10/21/20 0810)  98.0, oral LOC  A&Ox4; hard of hearing; legally blind A&Ox4; hard of hearing; legally blind HR   Pulse (Heart Rate): 73 (10/21/20 0810) 77 Lungs   Diminished Dim bases B/P   BP: (!) 145/94 (10/21/20 0810) 151/88 Cardiac   Reg S1 S2 Reg S1 S2 Resp   Resp Rate: 16 (10/21/20 0810) 16 Skin   R BKA R BKA Pain level  0 0 Edema  generalized 
 
 generalized Orders: Duration:   Start:    0810 End:    1215 Total:   4hr  
Dialyzer:   Dialyzer/Set Up Inspection: Macarena Lokesh (10/21/20 0810) K Bath:   Dialysate K (mEq/L): 2 (10/21/20 0810) Ca Bath:   Dialysate CA (mEq/L): 2.5 (10/21/20 0810) Na/Bicarb:   Dialysate NA (mEq/L): 138 (10/21/20 0810) Target Fluid Removal:   Goal/Amount of Fluid to Remove (mL): 3500 mL (10/21/20 0810) Access Type & Location:   PELON AVF: skin CDI, one suture present in arterial area of AVF. No s/s of infection. + B/T. No issues with cannulation or hemostasis. Running well at . Labs Obtained/Reviewed Critical Results Called   Date when labs were drawn- 
Hgb-   
HGB Date Value Ref Range Status 10/21/2020 7.9 (L) 12.1 - 17.0 g/dL Final  
 
K-   
Potassium Date Value Ref Range Status 10/20/2020 4.3 3.5 - 5.1 mmol/L Final  
 
Ca-  
Calcium Date Value Ref Range Status 10/20/2020 8.3 (L) 8.5 - 10.1 MG/DL Final  
 
Bun-  
BUN Date Value Ref Range Status 10/20/2020 26 (H) 6 - 20 MG/DL Final  
 
Creat-  
Creatinine Date Value Ref Range Status 10/20/2020 8.49 (H) 0.70 - 1.30 MG/DL Final  
  Comment:  
  INVESTIGATED PER DELTA CHECK PROTOCOL Medications/ Blood Products Given Name   Dose   Route and Time None ordered Blood Volume Processed (BVP):    99.8L Net Fluid Removed:  3500ml Comments Time Out Done: 0771 Primary Nurse Rpt Pre: Miguel Mercedes RN 
 Primary Nurse Rpt Post: Anna Dinh RN 
Pt Education: keep access arm still for safety Care Plan: ongoing Tx Summary: 
Pt arrived to HD suite A&Ox4. Consent signed & on file. SBAR received from Primary RN. 2370: Pt cannulated with 93O needles per policy & without issue. Labs drawn per request/ order. VSS. Dialysis Tx initiated. ** no issues during treatment** 
1215: Tx ended. VSS. All possible blood returned to patient. Hemostasis achieved without issue. Bed locked and in the lowest position, call bell and belongings in reach. SBAR given to Primary, RN. Patient is stable at time of their departure. All Dialysis related medications have been reviewed. Admiting Diagnosis: Debridement R BKA Pt's previous clinic- [address] 
Consent signed - Informed Consent Verified: Yes (10/21/20 0810) Sylvain Consent - on file Hepatitis Status- neg/susc 09/25/2020 Machine #- Machine Number: [id number] (10/21/20 1915) Telemetry status- none Pre-dialysis wt. -

## 2020-10-21 NOTE — PROGRESS NOTES
Occupational Therapy: Attempted to see Pt for therapy yet he is currently receiving HD. Will defer and continue to follow.   
 
[facility], OTR/L

## 2020-10-21 NOTE — PROGRESS NOTES
TRANSFER - IN REPORT: 
 
Verbal report received from [facility] on Faustine June  being received from [facility] for ordered procedure Report consisted of patients Situation, Background, Assessment and  
Recommendations(SBAR). Information from the following report(s) SBAR and Kardex was reviewed with the receiving nurse. Opportunity for questions and clarification was provided. Assessment completed upon patients arrival to unit and care assumed.

## 2020-10-21 NOTE — PROGRESS NOTES
TEJA: 
 
Possible Inpatient Rehab 
 
CM: Katie Franz is currently working with the Gen Surg Unit. CM informed that pt referral was sent to [facility], and currently under review, by previous CM. CM informed that rehab facility is in need of updated therapy clinicals. CM sent updated notes and is currently waiting for rehab facility to review. Admin coordinator will contact ROXANE with decision. Katie Franz, LEATHA, [address] [facility]

## 2020-10-21 NOTE — PROGRESS NOTES
Vascular Surgery Progress Note [facility] 
10/21/2020 Subjective:  
 
Mr. Karmen Duenas a  65 O. o.  male with a pmhx significant for ESRD on HD, DM, HTN, HLD, and obesity.  Patient is legally blind and has hearing loss. 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 Since his BKA he has undergone an ablation for AFlutter. Plan was for debridement and prior to the procedure a stress test was recommended which he completed on 10/09/2020 that was abnl and he was referred for admission. He underwent a diagnostic cath that was significant for multivessel disease. He was evaluated by CT surgery and currently is not a candidate for CABG due to diffuse disease with poor targets for revascularization and poor LV function. Patient is s/p PCI w/ KRISHAN 10/13/2020. Once stable he underwent debridement of his right BKA on 10/15/2020. Post procedure he underwent bedside debridement x 2 for persistent necrosis. This am he is oozing from his stump and from his HD access. Nursing Data:  
 
Patient Vitals for the past 24 hrs: 
 BP Temp Temp src Pulse Resp SpO2  
10/21/20 1253 (!) 158/85 98 °F (36.7 °C)  84 17 98 % 10/21/20 1215 (!) 151/88 98 °F (36.7 °C) Oral 77 16   
10/21/20 1200 (!) 148/88   77 16   
10/21/20 1145 131/85   77 16   
10/21/20 1130 138/80   75 16   
10/21/20 1115 136/76   74 16   
10/21/20 1100 (!) 142/76   76 16   
10/21/20 1045 139/88   75 16   
10/21/20 1030 139/82   74 16   
10/21/20 1015 (!) 161/92   74 16   
10/21/20 1000 (!) 161/95   77 16   
10/21/20 0945 (!) 153/87   74 16   
10/21/20 0930 (!) 150/91   74 16   
10/21/20 0915 (!) 140/89   73 16   
10/21/20 0900 (!) 150/88   72 16   
10/21/20 0845 (!) 159/90   74 16   
10/21/20 0830 (!) 152/93   72 16   
10/21/20 0815 (!) 146/92   73 16  10/21/20 0810 (!) 145/94 98 °F (36.7 °C) Oral 73 16   
10/21/20 0339 (!) 143/73 98.6 °F (37 °C)  74 17 95 % 10/20/20 2325 (!) 146/81 98.7 °F (37.1 °C)  76 17 95 % 10/20/20 1937 (!) 144/89 98.1 °F (36.7 °C)  77 17 94 % 10/20/20 1608 128/74 98.2 °F (36.8 °C)  82 17 99 % Intake/Output Summary (Last 24 hours) at 10/21/2020 1301 Last data filed at 10/21/2020 1215 Gross per 24 hour Intake 480 ml Output 3500 ml Net -3020 ml Exam:  
 
Physical Exam 
Constitutional:   
   Appearance: He is obese. He is legally blind and is [place]. HENT:  
   Head: Normocephalic. Nose: Nose normal.  
   Mouth/Throat:  
   Mouth: Mucous membranes are moist.  
Eyes:  
   Pupils: Pupils are equal, round, and reactive to light. Neck: Musculoskeletal: Normal range of motion. Cardiovascular:  
   Rate and Rhythm: Normal rate and regular rhythm. Pulmonary:  
   Effort: Pulmonary effort is normal.  
   Breath sounds: Normal breath sounds. Abdominal:  
   General: Abdomen is flat. Palpations: Abdomen is soft. Musculoskeletal: Normal range of motion. Skin: Wound vac right stump. Oozing from left AVF resolved w/ suture Neurological:  
   General: No focal deficit present. Mental Status: He is alert. Psychiatric:     
   Mood and Affect: Mood normal.     
   Thought Content: Thought content normal.  
 
Lab Review:  
 
. Recent Results (from the past 24 hour(s)) GLUCOSE, POC Collection Time: 10/20/20  6:43 PM  
Result Value Ref Range Glucose (POC) 110 (H) 65 - 100 mg/dL Performed by Cruz Henson (PCT) GLUCOSE, POC Collection Time: 10/20/20  9:43 PM  
Result Value Ref Range Glucose (POC) 134 (H) 65 - 100 mg/dL Performed by Meng Gray CBC W/O DIFF Collection Time: 10/21/20  5:08 AM  
Result Value Ref Range WBC 7.5 4.1 - 11.1 K/uL  
 RBC 3.15 (L) 4.10 - 5.70 M/uL HGB 7.9 (L) 12.1 - 17.0 g/dL HCT 26.2 (L) 36.6 - 50.3 %  MCV 83.2 80.0 - 99.0 FL  
 MCH 25.1 (L) 26.0 - 34.0 PG  
 MCHC 30.2 30.0 - 36.5 g/dL  
 RDW 15.8 (H) 11.5 - 14.5 % PLATELET 002 714 - 703 K/uL MPV 9.1 8.9 - 12.9 FL  
 NRBC 0.0 0  WBC ABSOLUTE NRBC 0.00 0.00 - 0.01 K/uL METABOLIC PANEL, BASIC Collection Time: 10/21/20  9:08 AM  
Result Value Ref Range Sodium 137 136 - 145 mmol/L Potassium 4.8 3.5 - 5.1 mmol/L Chloride 100 97 - 108 mmol/L  
 CO2 31 21 - 32 mmol/L Anion gap 6 5 - 15 mmol/L Glucose 89 65 - 100 mg/dL BUN 34 (H) 6 - 20 MG/DL Creatinine 11.00 (H) 0.70 - 1.30 MG/DL  
 BUN/Creatinine ratio 3 (L) 12 - 20 GFR est AA 6 (L) >60 ml/min/1.73m2 GFR est non-AA 5 (L) >60 ml/min/1.73m2 Calcium 8.6 8.5 - 10.1 MG/DL Assessment/Plan: PAD w/ dehiscence of surgical incision w/ necrosis · S/p right BKA 08/24/2020 · S/p revision of right BKA 10/15/2020 · Bedside debridement x 2. 
· Wound vac 10/20/2020 =>   
· H&H stable · Continue ASA and Plavix for KRISHAN  
· Continue to hold DVT prophylaxis for oozing from stump · Continue OOB with PT daily Complication of HD access · Bleeding resolved w/ suture · Outpatient f/u at [facility] · Used this am w/o event  
  
ASHD Hyperlipidemia · Diagnostic cath 10/10/2020-diffuse multivessel disease · He is not a candidate for CABG 
· PCI with KRISHAN placement 10/13/2020 · BBlocker, ASA, & statin · Dietary non-compliance Hypertension · Labile Plan per cardiology  
  
Active problems ESRF 
· HD MWF [facility]  
Anemia of chronic renal disease · Stable and not at a level to transfuse Plan per nephrology 
  
Uncontrolled Diabetes Mellitus w/ hyperglycemia/hypoglycemia · HA1c 9.4 8/15/2020 · Labile · Controlled after decreased dose of basal insulin · Dietary non-compliance Morbid obesity  
 
Management of comorbid conditions by primary team. 
  
VTE Prophylaxis: 
Held for oozing from stump Encourage OOB with assistance.  
  
Disposition: Case management consult for disposition. SNF placement. Stable for discharge today w/ wound vac from a vascular standpoint.   Awaiting PT/OT consult.

## 2020-10-21 NOTE — PROGRESS NOTES
General Surgery End of Shift Nursing Note Bedside shift change report given to Flip Ring (oncoming nurse) by Kenroy Gillette (offgoing nurse). Report included the following information SBAR, Kardex, Intake/Output, MAR and Recent Results. Raquel Ackerman

## 2020-10-21 NOTE — PROGRESS NOTES
HD TRANSFER - OUT REPORT: 
 
Verbal report given to Nigel Mcclain RN on Zulema Wallis being transferred to [facility] for routine progression of care Report consisted of patient's Situation, Background, Assessment and  
Recommendations(SBAR). Information from the following report(s) SBAR, Procedure Summary, Intake/Output and Recent Results was reviewed with the receiving nurse. Method:  $$ Method: Hemodialysis (10/21/20 0810) Fluid Removed  NET Fluid Removed (mL): 3500 ml (10/21/20 1215) Patient response to treatment:  Stable End Time  Hemodialysis End Time: 8679 (10/21/20 1215) If not documented, dialysis nurse to update post-dialysis row in HD/Filtration flowsheet Medications /Volume expansion agents or Fluid boluses administered during treatment? no 
 
Post-dialysis medication administration due?  yes Remind nurse to administer post-HD medication upon return to unit. Fistula hemostasis? yes Line heparinization? no 
 
Lines: PELON AVF Opportunity for questions and clarification was provided. Patient transported with: [facility]

## 2020-10-21 NOTE — PROGRESS NOTES
Physical Therapy Chart reviewed, patient currently in HD so will defer and continue to follow. Neeta Jiménez

## 2020-10-21 NOTE — PROGRESS NOTES
Problem: Falls - Risk of 
Goal: *Absence of Falls Description: Document [place] Fall Risk and appropriate interventions in the flowsheet. Outcome: Progressing Towards Goal 
Note: Fall Risk Interventions: 
Mobility Interventions: Communicate number of staff needed for ambulation/transfer, Patient to call before getting OOB, Bed/chair exit alarm Medication Interventions: Patient to call before getting OOB, Teach patient to arise slowly, Bed/chair exit alarm Elimination Interventions: Call light in reach, Patient to call for help with toileting needs, Stay With Me (per policy), Toilet paper/wipes in reach, Toileting schedule/hourly rounds, Urinal in reach, Bed/chair exit alarm History of Falls Interventions: Bed/chair exit alarm, Door open when patient unattended, Room close to nurse's station Problem: Patient Education: Go to Patient Education Activity Goal: Patient/Family Education Outcome: Progressing Towards Goal 
  
Problem: Pressure Injury - Risk of 
Goal: *Prevention of pressure injury Description: Document Ranjeet Scale and appropriate interventions in the flowsheet. Outcome: Progressing Towards Goal 
Note: Pressure Injury Interventions: 
Sensory Interventions: Turn and reposition approx. every two hours (pillows and wedges if needed) Moisture Interventions: Absorbent underpads Activity Interventions: Pressure redistribution bed/mattress(bed type) Mobility Interventions: Pressure redistribution bed/mattress (bed type) Nutrition Interventions: Document food/fluid/supplement intake Friction and Shear Interventions: Apply protective barrier, creams and emollients, Feet elevated on foot rest, HOB 30 degrees or less, Lift sheet, Transferring/repositioning devices Problem: Patient Education: Go to Patient Education Activity Goal: Patient/Family Education Outcome: Progressing Towards Goal 
  
Problem: Pain Goal: *Control of Pain Outcome: Progressing Towards Goal 
  
Problem: Patient Education: Go to Patient Education Activity Goal: Patient/Family Education Outcome: Progressing Towards Goal 
  
Problem: Patient Education: Go to Patient Education Activity Goal: Patient/Family Education Outcome: Progressing Towards Goal 
  
Problem: Diabetes Maintenance:Admission Goal: Activity/Safety Outcome: Progressing Towards Goal 
Goal: Diagnostic Tests/Procedures Outcome: Progressing Towards Goal 
Goal: Nutrition Outcome: Progressing Towards Goal 
Goal: Medications Outcome: Progressing Towards Goal 
Goal: Treatments/Interventions/Procedures Outcome: Progressing Towards Goal 
  
Problem: Diabetes Maintenance:Ongoing Goal: Activity/Safety Outcome: Progressing Towards Goal 
Goal: Nutrition Outcome: Progressing Towards Goal 
Goal: Medications Outcome: Progressing Towards Goal 
Goal: Treatments/Interventsions/Procedures Outcome: Progressing Towards Goal 
Goal: *Blood Glucose 80 to 180 md/dl Outcome: Progressing Towards Goal 
  
Problem: Patient Education: Go to Patient Education Activity Goal: Patient/Family Education Outcome: Progressing Towards Goal

## 2020-10-21 NOTE — PROGRESS NOTES
0719 Bedside report, Pt Ax4 no s/s of acute distress noted. 1915 Bedside and Verbal shift change report given to Payton GREENBERG (oncoming nurse) by Hedy Yu (offgoing nurse). Report included the following information SBAR, Kardex, Intake/Output, MAR, Accordion, Recent Results, Med Rec Status and Quality Measures.

## 2020-10-21 NOTE — PROGRESS NOTES
NAME: Mariano Garcia :  1985 MRN:  [id number] Assessment :    Plan: 
--ESRD- Anemia CAD Labile BP, now HTN 
SHPT --MWF-[facility];Seen on HD, BP stable Resume FARIDA today-10 k MWF On renvela, phos is < 5.5 Dc planning Subjective: Chief Complaint:  None, singing on hd this am 
Review of Systems: 
 
Symptom Y/N Comments  Symptom Y/N Comments Fever/Chills    Chest Pain Poor Appetite    Edema Cough    Abdominal Pain Sputum    Joint Pain SOB/LITTLE    Pruritis/Rash Nausea/vomit    Tolerating PT/OT Diarrhea    Tolerating Diet Constipation    Other Could not obtain due to:   
 
Objective: VITALS:  
Last 24hrs VS reviewed since prior progress note. Most recent are: 
Visit Vitals BP (!) 158/85 (BP 1 Location: Right arm, BP Patient Position: At rest) Pulse 84 Temp 98 °F (36.7 °C) Resp 17 Ht 6' 2\" (1.88 m) Wt 149 kg (328 lb 6.4 oz) SpO2 98% BMI 42.16 kg/m² Intake/Output Summary (Last 24 hours) at 10/21/2020 1359 Last data filed at 10/21/2020 1215 Gross per 24 hour Intake 480 ml Output 3500 ml Net -3020 ml Telemetry Reviewed: PHYSICAL EXAM: 
General: NAD 
cta alfredo on room air RRR Bka, (L) dressing oozing Lab Data Reviewed: (see below) Medications Reviewed: (see below) PMH/SH reviewed - no change compared to H&P 
________________________________________________________________________ Care Plan discussed with: 
Patient y Family RN Care Manager Consultant:  y   
 
  Comments >50% of visit spent in counseling and coordination of care    
 
________________________________________________________________________ Rosie Ceballos MD  
 
Procedures: see electronic medical records for all procedures/Xrays and details which 
 were not copied into this note but were reviewed prior to creation of Plan. LABS: 
Recent Labs 10/21/20 
4523 10/20/20 
6633 WBC 7.5 8.3 HGB 7.9* 7.4* HCT 26.2* 24.5*  
 322 Recent Labs 10/21/20 
8500 10/20/20 
0535 10/19/20 
2260  138 138  
K 4.8 4.3 4.7  99 101 CO2 31 31 30 BUN 34* 26* 43* CREA 11.00* 8.49* 11.30* GLU 89 86 68  
CA 8.6 8.3* 8.3* No results for input(s): AP, TBIL, TP, ALB, GLOB, GGT, AML, LPSE in the last 72 hours. No lab exists for component: SGOT, GPT, AMYP, HLPSE No results for input(s): INR, PTP, APTT, INREXT, INREXT in the last 72 hours. No results for input(s): FE, TIBC, PSAT, FERR in the last 72 hours. No results found for: FOL, RBCF No results for input(s): PH, PCO2, PO2 in the last 72 hours. No results for input(s): CPK, CKMB in the last 72 hours. No lab exists for component: TROPONINI No components found for: [place] Lab Results Component Value Date/Time Color YELLOW/STRAW 05/01/2017 05:39 PM  
 Appearance CLOUDY (A) 05/01/2017 05:39 PM  
 Specific gravity 1.018 05/01/2017 05:39 PM  
 Specific gravity >1.030 (H) 10/03/2014 04:35 AM  
 pH (UA) 5.5 05/01/2017 05:39 PM  
 Protein 300 (A) 05/01/2017 05:39 PM  
 Glucose 250 (A) 05/01/2017 05:39 PM  
 Ketone NEGATIVE  05/01/2017 05:39 PM  
 Bilirubin NEGATIVE  05/01/2017 05:39 PM  
 Urobilinogen 0.2 05/01/2017 05:39 PM  
 Nitrites NEGATIVE  05/01/2017 05:39 PM  
 Leukocyte Esterase NEGATIVE  05/01/2017 05:39 PM  
 Epithelial cells FEW 05/01/2017 05:39 PM  
 Bacteria NEGATIVE  05/01/2017 05:39 PM  
 WBC 0-4 05/01/2017 05:39 PM  
 RBC 5-10 05/01/2017 05:39 PM  
 
 
MEDICATIONS: 
Current Facility-Administered Medications Medication Dose Route Frequency  epoetin nata-epbx (RETACRIT) injection 10,000 Units  10,000 Units SubCUTAneous Q MON, WED & FRI  insulin glargine (LANTUS) injection 10 Units  10 Units SubCUTAneous QHS  influenza vaccine 2020-21 (6 mos+)(PF) (FLUARIX/FLULAVAL/FLUZONE QUAD) injection 0.5 mL  0.5 mL IntraMUSCular PRIOR TO DISCHARGE  lidocaine (PF) (XYLOCAINE) 10 mg/mL (1 %) injection 0.1 mL  0.1 mL SubCUTAneous PRN  
 oxyCODONE IR (ROXICODONE) tablet 5 mg  5 mg Oral Q4H PRN  
 HYDROmorphone (PF) (DILAUDID) injection 1 mg  1 mg IntraVENous Q4H PRN  
 metoprolol tartrate (LOPRESSOR) tablet 75 mg  75 mg Oral BID  albuterol-ipratropium (DUO-NEB) 2.5 MG-0.5 MG/3 ML  3 mL Nebulization Q6H PRN  
 clopidogreL (PLAVIX) tablet 75 mg  75 mg Oral DAILY  loperamide (IMODIUM) capsule 2 mg  2 mg Oral QID PRN  
 sevelamer carbonate (RENVELA) tab 800 mg  800 mg Oral TID WITH MEALS  sodium chloride (NS) flush 5-40 mL  5-40 mL IntraVENous PRN  
 acetaminophen (TYLENOL) tablet 650 mg  650 mg Oral Q6H PRN Or  
 acetaminophen (TYLENOL) suppository 650 mg  650 mg Rectal Q6H PRN  polyethylene glycol (MIRALAX) packet 17 g  17 g Oral DAILY PRN  
 aspirin delayed-release tablet 81 mg  81 mg Oral DAILY  atorvastatin (LIPITOR) tablet 20 mg  20 mg Oral QHS  hydrALAZINE (APRESOLINE) 20 mg/mL injection 20 mg  20 mg IntraVENous Q6H PRN  
 insulin lispro (HUMALOG) injection   SubCUTAneous AC&HS  
 glucose chewable tablet 16 g  4 Tab Oral PRN  
 dextrose (D50W) injection syrg 12.5-25 g  12.5-25 g IntraVENous PRN  
 glucagon (GLUCAGEN) injection 1 mg  1 mg IntraMUSCular PRN

## 2020-10-22 NOTE — PROGRESS NOTES
General Surgery End of Shift Nursing Note Bedside shift change report given to Phong Madden RN (oncoming nurse) by Natalya Briceno (offgoing nurse). Report included the following information SBAR, Kardex, Intake/Output, MAR and Recent Results. Mayela Castorena

## 2020-10-22 NOTE — PROGRESS NOTES
Problem: Self Care Deficits Care Plan (Adult) Goal: *Acute Goals and Plan of Care (Insert Text) Description:  
FUNCTIONAL STATUS PRIOR TO ADMISSION: Pt admitted from [facility], with reports he plans to transfer to group home s/p rehab, with pt reporting [facility] prior to most recent hospitalization. Reports he has W/C at [facility]; however, does not want to return there. HOME SUPPORT: The patient lived with SNF staffing to provide PRN assist. 
 
Occupational Therapy Goals Initiated 10/13/2020, Re-Evaluation post BKA revision on 10/16/2020, continue all goals 1. Patient will perform W/C grooming with modified independence within 7 day(s). 2.  Patient will perform W/C full body dressing with modified independence within 7 day(s). 3.  Patient will perform W/C bathing with modified independence within 7 day(s). 4.  Patient will perform toilet transfers, from W/C, with modified independence within 7 day(s). 5.  Patient will perform all aspects of toileting with modified independence within 7 day(s). Outcome: Progressing Towards Goal 
 OCCUPATIONAL THERAPY TREATMENT Patient: Tiffani Quiñonez (39 y.o. male) Date: 10/22/2020 Diagnosis: Abnormal stress test [R94.39] CAD (coronary artery disease) [I25.10] <principal problem not specified> Procedure(s) (LRB): 
REVISION RIGHT BELOW THE KNEE AMPUTATION (Right) 7 Days Post-Op Precautions: Fall, Skin(8-24 R BKA/10-15debrid/x2, wd vac; No BP L UE, blind; cardia) Chart, occupational therapy assessment, plan of care, and goals were reviewed. ASSESSMENT Patient continues with skilled OT services and is progressing towards goals. Good participation despite sitting up in w/c since completing PT earlier today. Sitting in full R knee flexion/dependent position; edema management focus of tx session due to ++ edema R compared to L and reported 10/10 P! With R LE in D position.  Receptive to concept and cooperative with psotioning recommendations Current Level of Function Impacting Discharge (ADLs): set up UE ADLs limited by visual deficts Other factors to consider for discharge: motivated for healing, return to mod I 
    
 
PLAN : 
Patient continues to benefit from skilled intervention to address the above impairments. Continue treatment per established plan of care. to address goals. Recommend with staff: up to chair for all meals, R LE in supported position/knee extended in w/c Recommend next OT session: toilet transfer to [facility]; review recall of edema management training from today Recommendation for discharge: (in order for the patient to meet his/her long term goals) Therapy 3 hours per day 5-7 days per week This discharge recommendation: 
Has been made in collaboration with the attending provider and/or case management IF patient discharges home will need the following DME: R limb adaptor leg rest for w/c, w/c cushion, SUBJECTIVE:  
Patient stated That makes sense to get the swelling better OBJECTIVE DATA SUMMARY:  
Cognitive/Behavioral Status: 
Neurologic State: Alert Orientation Level: Oriented X4 Cognition: Appropriate decision making; Appropriate for age attention/concentration; Follows commands(appears WFL; not formally assessed) Perception: (legally blind) Perseveration: No perseveration noted Safety/Judgement: Awareness of environment; Fall prevention;Home safety(learning about BKA) Functional Mobility and Transfers for ADLs: 
Bed Mobility: 
Rolling: Stand-by assistance Supine to Sit: Stand-by assistance Sit to Supine: Stand-by assistance Scooting: Modified independent Transfers: 
  
  
Bed to Chair: Supervision(lateral transfer w/c to bed) Balance: 
Sitting: Intact; Without support Sitting - Static: Good (unsupported) Standing: (N/T) ADL Intervention: 
  
Edema management including AROM, elevation and desensitization techniques with BKA care; rehab expectations and need to take ownership of eg elevation, having access to pillows etc for management of edema Cognitive Retraining Attention to Task: Single task Maintains Attention For (Time): Greater than 10 minutes Following Commands: Follows one step commands/directions Safety/Judgement: Awareness of environment; Fall prevention;Home safety(learning about BKA) Therapeutic Exercises:  
Desensitization and knee extension trained as part of BKA care Pain: 
10/10, R LORNA, RN aware; recently medicated Activity Tolerance:  
Good and SpO2 stable on RA Please refer to the flowsheet for vital signs taken during this treatment. After treatment patient left in no apparent distress:  
Supine in bed, Call bell within reach, Side rails x 3, and R BKA elevated on 4 pillows, \"above level of the heart\" for edema management COMMUNICATION/COLLABORATION:  
The patients plan of care was discussed with: Registered nurse. Cherie Irvin OTR/L Time Calculation: 30 mins

## 2020-10-22 NOTE — PROGRESS NOTES
Problem: Falls - Risk of 
Goal: *Absence of Falls Description: Document Eric Howell Fall Risk and appropriate interventions in the flowsheet. Outcome: Progressing Towards Goal 
Note: Fall Risk Interventions: 
Mobility Interventions: Communicate number of staff needed for ambulation/transfer, Patient to call before getting OOB, Bed/chair exit alarm Medication Interventions: Patient to call before getting OOB, Teach patient to arise slowly, Bed/chair exit alarm Elimination Interventions: Call light in reach, Patient to call for help with toileting needs, Stay With Me (per policy), Toilet paper/wipes in reach, Toileting schedule/hourly rounds, Urinal in reach, Bed/chair exit alarm History of Falls Interventions: Bed/chair exit alarm, Door open when patient unattended, Room close to nurse's station Problem: Patient Education: Go to Patient Education Activity Goal: Patient/Family Education Outcome: Progressing Towards Goal 
  
Problem: Pressure Injury - Risk of 
Goal: *Prevention of pressure injury Description: Document Ranjeet Scale and appropriate interventions in the flowsheet. Outcome: Progressing Towards Goal 
Note: Pressure Injury Interventions: 
Sensory Interventions: Turn and reposition approx. every two hours (pillows and wedges if needed) Moisture Interventions: Absorbent underpads Activity Interventions: Pressure redistribution bed/mattress(bed type) Mobility Interventions: Pressure redistribution bed/mattress (bed type) Nutrition Interventions: Document food/fluid/supplement intake Friction and Shear Interventions: Apply protective barrier, creams and emollients, Feet elevated on foot rest, HOB 30 degrees or less, Lift sheet, Transferring/repositioning devices Problem: Patient Education: Go to Patient Education Activity Goal: Patient/Family Education Outcome: Progressing Towards Goal 
  
Problem: Pain Goal: *Control of Pain Outcome: Progressing Towards Goal 
  
Problem: Patient Education: Go to Patient Education Activity Goal: Patient/Family Education Outcome: Progressing Towards Goal 
  
Problem: Patient Education: Go to Patient Education Activity Goal: Patient/Family Education Outcome: Progressing Towards Goal 
  
Problem: Cath Lab Procedures: Post-Cath Day of Procedure (Initiate SCIP Measures for Post-Op Care) Goal: Off Pathway (Use only if patient is Off Pathway) Outcome: Progressing Towards Goal 
Goal: Activity/Safety Outcome: Progressing Towards Goal 
Goal: Consults, if ordered Outcome: Progressing Towards Goal 
Goal: Diagnostic Test/Procedures Outcome: Progressing Towards Goal 
Goal: Nutrition/Diet Outcome: Progressing Towards Goal 
Goal: Discharge Planning Outcome: Progressing Towards Goal 
Goal: Medications Outcome: Progressing Towards Goal 
Goal: Respiratory Outcome: Progressing Towards Goal 
Goal: Treatments/Interventions/Procedures Outcome: Progressing Towards Goal 
Goal: Psychosocial 
Outcome: Progressing Towards Goal 
Goal: *Procedure site is without bleeding and signs of infection six hours post sheath removal 
Outcome: Progressing Towards Goal 
Goal: *Hemodynamically stable Outcome: Progressing Towards Goal 
Goal: *Optimal pain control at patient's stated goal 
Outcome: Progressing Towards Goal 
  
Problem: Diabetes Maintenance:Admission Goal: Activity/Safety Outcome: Progressing Towards Goal 
Goal: Diagnostic Tests/Procedures Outcome: Progressing Towards Goal 
Goal: Nutrition Outcome: Progressing Towards Goal 
Goal: Medications Outcome: Progressing Towards Goal 
Goal: Treatments/Interventions/Procedures Outcome: Progressing Towards Goal 
  
Problem: Diabetes Maintenance:Ongoing Goal: Activity/Safety Outcome: Progressing Towards Goal 
Goal: Nutrition Outcome: Progressing Towards Goal 
Goal: Medications Outcome: Progressing Towards Goal 
Goal: Treatments/Interventsions/Procedures Outcome: Progressing Towards Goal 
Goal: *Blood Glucose 80 to 180 md/dl Outcome: Progressing Towards Goal 
  
Problem: Diabetes Maintenance:Discharge Outcomes Goal: *Describes follow-up/return visits to physicians Outcome: Progressing Towards Goal 
Goal: *Blood glucose at patient's target range or approaching Outcome: Progressing Towards Goal 
Goal: *Aware of nutrition guidelines Outcome: Progressing Towards Goal 
Goal: *Verbalizes information about medication Description: Verbalizes name, dosage, time, side effects, and number of days to 
continue medications. Outcome: Progressing Towards Goal 
Goal: *Describes goals, rules, symptoms, and treatments Description: Describes blood glucose goals, monitoring, sick day rules, 
hypo/hyperglycemia prevention, symptoms, and treatment Outcome: Progressing Towards Goal 
Goal: *Describes available outpatient diabetes resources and support systems Outcome: Progressing Towards Goal

## 2020-10-22 NOTE — PROGRESS NOTES
Problem: Mobility Impaired (Adult and Pediatric) Goal: *Acute Goals and Plan of Care (Insert Text) Description: FUNCTIONAL STATUS PRIOR TO ADMISSION: The patient was functional at the wheelchair level and was supervision for transfers to the chair. HOME SUPPORT PRIOR TO ADMISSION: came from SNF, awaiting group home set up Physical Therapy Goals Initiated 10/13/2020 1. Patient will propel wheelchair 150ft with B UE technique mod I within 7 days 2. Patient will transfer from bed to chair and chair to bed with modified independence using the least restrictive device within 7 day(s). 3.  Patient will perform sit to stand with supervision/set-up within 7 day(s). 4. Patient will ambulate 10ft with RW and min A using hop to pattern within 7 days Outcome: Progressing Towards Goal 
 PHYSICAL THERAPY TREATMENT Patient: Reba Wallace (56 y.o. male) Date: 10/22/2020 Diagnosis: Abnormal stress test [R94.39] CAD (coronary artery disease) [I25.10] Procedure(s) (LRB): REVISION RIGHT BELOW THE KNEE AMPUTATION (Right) 7 Days Post-Op Precautions: Fall, Skin(8-24 R BKA/10-15debrid/x2, wd vac; No BP L UE, blind; cardia) Chart, physical therapy assessment, plan of care and goals were reviewed. ASSESSMENT: Patient continues with skilled PT services and is progressing towards goals, pt does very well with bed mob and lateral sliding transfers to w/c, controls w/c well, vc's for safety, good prosthesis candidate. Current Level of Function Impacting Discharge (mobility/balance): Stand-by assistance PLAN : Patient continues to benefit from skilled intervention to address the above impairments. Continue treatment per established plan of care 
to address goals. Recommendation for discharge: (in order for the patient to meet his/her long term goals) Therapy 3 hours per day 5-7 days per week This discharge recommendation: Has been made in collaboration with the attending provider and/or case management IF patient discharges home will need the following DME: bedside commode and rolling walker OBJECTIVE DATA SUMMARY:  
 
Critical Behavior: 
Neurologic State: Alert Orientation Level: Oriented X4 Cognition: Appropriate for age attention/concentration Safety/Judgement: Awareness of environment, Good awareness of safety precautions, Insight into deficits Functional Mobility Training: 
Bed Mobility: 
Rolling: Stand-by assistance Supine to Sit: Stand-by assistance Scooting: Modified independent Level of Assistance: Stand-by assistance Interventions: Verbal cues Transfers: 
 Bed to Chair: Supervision Interventions: Verbal cues Level of Assistance: Supervision Balance: 
Sitting: Intact; Without support Sitting - Static: Good (unsupported) Standing: (N/T) Ambulation/Gait Training: unable at this time Activity Tolerance: Fair After treatment patient left in no apparent distress: Sitting in W/chair and Call bell within reach COMMUNICATION/COLLABORATION:  
The patients plan of care was discussed with: Registered nurse. Boris Sheikh, PTA Time Calculation: 25 mins

## 2020-10-22 NOTE — PROGRESS NOTES
Comprehensive Nutrition Assessment Type and Reason for Visit: Nelda Gema Nutrition Recommendations/Plan:  
Continue CCD Continue Nepro BID Please document % meals and supplements consumed in flowsheet I/O's under intake Nutrition Assessment:     
10/22: Pt remains on CCD + nepro BID. Recorded intakes %. S/p debridement x 2 for persistent necrosis on BKA stump, wound vac now in place. K WNL, no Phos since 10/14. Will continue nepro to promote protein intake for wound healing. BG fluctuating. Will continue following. Patient Vitals for the past 72 hrs: 
 % Diet Eaten 10/22/20 0900 100 % 10/20/20 1256 90 % 10/20/20 0825 90 % 10/16: Pt admitted with CAD + BK complication. Chart reviewed for LOS, pt off the floor at time of attempted visit. For HD tx today. MST negative for previous nutritional risk factors. Noted diet noncompliance per MD notes ([employer] at bedside). BG well controlled. K WNL past few days. Phos slightly high, he is on a binder. Est needs are quite high given body habitus and HD. Will add Nepro BID to provide an additional 840 kcals and 38g Protein daily. Estimated Daily Nutrient Needs: 
Energy (kcal):  MSJ 2500 (2500 x 1.2 -500 for obesity) Protein (g):  120-150g (0.8-1gPro/kg) Fluid (ml/day):  1800mL Nutrition Related Findings:  Labs: 87-239mg/dl, H&H 7.9/26.2. Meds: lantus, humalog, renvela. BM 10/13? Wounds:   
Wound vac, Surgical wound, Partial thickness, Stage II    
 
Current Nutrition Therapies: DIET DIABETIC CONSISTENT CARB Regular DIET NUTRITIONAL SUPPLEMENTS Lunch, Dinner; Nepro DIET ONE TIME MESSAGE Anthropometric Measures: 
· Height:  6' 2\" (188 cm) · Current Body Wt:  150 kg (330 lb 11 oz) · Ideal Body Wt:  190 lbs:  174 % · BMI Category:  Obese class 3 (BMI 40.0 or greater) Nutrition Diagnosis:  
· Inadequate protein-energy intake related to other (specify)(large habitus and HD) as evidenced by other (specify)(est needs 2500 kcals and 120-150g Pro daily.) Previous diagnosis continues, unchanged. Nutrition Interventions:  
Food and/or Nutrient Delivery: Continue current diet, Continue oral nutrition supplement Nutrition Education and Counseling: No recommendations at this time Coordination of Nutrition Care: Continued inpatient monitoring Goals: 
PO intake >75% meals and supplements next 3-5 days Nutrition Monitoring and Evaluation:  
Behavioral-Environmental Outcomes: Knowledge or skill Food/Nutrient Intake Outcomes: Food and nutrient intake, Supplement intake Physical Signs/Symptoms Outcomes: Biochemical data, Fluid status or edema, Weight, Skin, GI status Discharge Planning:   
Continue current diet Electronically signed by Charlie Gross, [address] [place] [address] on 10/22/2020 at 3:33 PM 
 
Contact: [id number] Pager [phone]

## 2020-10-22 NOTE — PROGRESS NOTES
Vascular Surgery Progress Note Amirah Miranda [facility] 
10/22/2020 Subjective:  
 
Mr. Yolande Newman a  79 C. o.  male with a pmhx significant for ESRD on HD, DM, HTN, HLD, and obesity.  Patient is legally blind and has hearing loss. 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 Since his BKA he has undergone an ablation for AFlutter. Plan was for debridement and prior to the procedure a stress test was recommended which he completed on 10/09/2020 that was abnl and he was referred for admission. He underwent a diagnostic cath that was significant for multivessel disease. He was evaluated by CT surgery and currently is not a candidate for CABG due to diffuse disease with poor targets for revascularization and poor LV function. Patient is s/p PCI w/ KRISHAN 10/13/2020. Once stable he underwent debridement of his right BKA on 10/15/2020. Post procedure he underwent bedside debridement x 2 for persistent necrosis. Wound vac intact on right stump with dark bloody drainage. Left arm HD access dressing is dry and intact. Nursing Data:  
 
Patient Vitals for the past 24 hrs: 
 BP Temp Temp src Pulse Resp SpO2  
10/22/20 0805 (!) 149/88 98.4 °F (36.9 °C)  82 17 96 % 10/22/20 0325 137/84 98.3 °F (36.8 °C)  83 17 95 % 10/21/20 2308 118/73 98.9 °F (37.2 °C)  82 18 96 % 10/21/20 1900 132/85 98.2 °F (36.8 °C)  86 18 96 % 10/21/20 1543 105/60 97.8 °F (36.6 °C)  88 18 95 % 10/21/20 1253 (!) 158/85 98 °F (36.7 °C)  84 17 98 % 10/21/20 1215 (!) 151/88 98 °F (36.7 °C) Oral 77 16   
10/21/20 1200 (!) 148/88   77 16   
10/21/20 1145 131/85   77 16   
10/21/20 1130 138/80   75 16   
10/21/20 1115 136/76   74 16   
10/21/20 1100 (!) 142/76   76 16   
10/21/20 1045 139/88   75 16   
10/21/20 1030 139/82   74 16   
10/21/20 1015 (!) 161/92   74 16   
10/21/20 1000 (!) 161/95   77 16  10/21/20 0945 (!) 153/87   74 16   
10/21/20 0930 (!) 150/91   74 16  Intake/Output Summary (Last 24 hours) at 10/22/2020 4961 Last data filed at 10/22/2020 6200 Gross per 24 hour Intake 120 ml Output 3500 ml Net -3380 ml Exam:  
 
Physical Exam 
Constitutional:   
   Appearance: He is obese. He is legally blind and is [place]. HENT:  
   Head: Normocephalic. Nose: Nose normal.  
   Mouth/Throat:  
   Mouth: Mucous membranes are moist.  
Eyes:  
   Pupils: Pupils are equal, round, and reactive to light. Neck: Musculoskeletal: Normal range of motion. Cardiovascular:  
   Rate and Rhythm: Normal rate and regular rhythm. Pulmonary:  
   Effort: Pulmonary effort is normal.  
   Breath sounds: Normal breath sounds. Abdominal:  
   General: Abdomen is flat. Palpations: Abdomen is soft. Musculoskeletal: Normal range of motion. Skin: Wound vac right stump. Left AVF dry and intact Neurological:  
   General: No focal deficit present. Mental Status: He is alert. Psychiatric:     
   Mood and Affect: Mood normal.     
   Thought Content: Thought content normal.  
 
Lab Review:  
 
. Recent Results (from the past 24 hour(s)) GLUCOSE, POC Collection Time: 10/21/20 12:52 PM  
Result Value Ref Range Glucose (POC) 87 65 - 100 mg/dL Performed by Aguila Samuel (SUMAYA RN) GLUCOSE, POC Collection Time: 10/21/20  4:11 PM  
Result Value Ref Range Glucose (POC) 239 (H) 65 - 100 mg/dL Performed by Opal Pereira GLUCOSE, POC Collection Time: 10/21/20  8:22 PM  
Result Value Ref Range Glucose (POC) 119 (H) 65 - 100 mg/dL Performed by Opal Pereira GLUCOSE, POC Collection Time: 10/22/20  7:24 AM  
Result Value Ref Range Glucose (POC) 123 (H) 65 - 100 mg/dL Performed by Jay Jay Mcqueen (PCT) Assessment/Plan: PAD w/ dehiscence of surgical incision w/ necrosis · S/p right BKA 08/24/2020 · S/p revision of right BKA 10/15/2020 · Bedside debridement x 2. 
· Wound vac 10/20/2020 =>   
· H&H stable · Continue ASA and Plavix for KRISHAN  
· Continue to hold DVT prophylaxis for oozing from stump · Continue OOB with PT daily Complication of HD access · Bleeding resolved w/ suture · Outpatient f/u at [facility] · Used this w/o event  
  
ASHD Hyperlipidemia · Diagnostic cath 10/10/2020-diffuse multivessel disease · He is not a candidate for CABG 
· PCI with KRISHAN placement 10/13/2020 · BBlocker, ASA, & statin · Dietary non-compliance Hypertension · Labile Plan per cardiology  
  
Active problems ESRF 
· HD MWF [place]  
Anemia of chronic renal disease · Stable and not at a level to transfuse Plan per nephrology 
  
Uncontrolled Diabetes Mellitus w/ hyperglycemia/hypoglycemia · HA1c 9.4 8/15/2020 · Labile · Controlled after decreased dose of basal insulin · Dietary non-compliance Morbid obesity  
 
Management of comorbid conditions by primary team. 
  
VTE Prophylaxis: 
Held for oozing from stump Encourage OOB with assistance.  
  
Disposition: 
Case management consult for disposition. SNF placement. Stable for discharge today w/ wound vac from a vascular standpoint.

## 2020-10-23 NOTE — PROCEDURES
[facility] Dialysis Team [place]  [phone] Vitals   Pre   Post   Assessment   Pre   Post    
Temp  Temp: 98.1 °F (36.7 °C) (10/23/20 0745)  97.8 LOC  A&Ox4 A&Ox4 HR   Pulse (Heart Rate): 79 (10/23/20 0745) 76 Lungs   clear clear B/P   BP: 139/70 (10/23/20 0745) 135/77 Cardiac   RRR RRR Resp   Resp Rate: 18 (10/23/20 0745) 18 Skin   R BKA w/ wound vac R BKA w/ wound vac Pain level  Pain Intensity 1: 5 (10/23/20 0134) 0 Edema  generalized generalized Orders: Duration:   Start:    0745 End:    1148 Total:   4 hours Dialyzer:   Dialyzer/Set Up Inspection: Claudine Session (10/23/20 0745) K Bath:   Dialysate K (mEq/L): 2 (10/23/20 0745) Ca Bath:   Dialysate CA (mEq/L): 2.5 (10/23/20 0745) Na/Bicarb:   Dialysate NA (mEq/L): 138 (10/23/20 0745) Target Fluid Removal:   Goal/Amount of Fluid to Remove (mL): 3500 mL (10/23/20 0745) Access Type & Location:    PELON AVF: skin CDI. No s/s of infection. + B/T. No issues with cannulation or hemostasis. Running well at . Pt arrived to HD suite A&Ox4. Consent signed & on file. SBAR received from Primary RN. Pt cannulated with 36R needles per policy & without issue. Labs drawn per request/ order. VSS. Dialysis Tx initiated. Labs Obtained/Reviewed Critical Results Called   Date when labs were drawn- 
Hgb-   
HGB Date Value Ref Range Status 10/21/2020 7.9 (L) 12.1 - 17.0 g/dL Final  
 
K-   
Potassium Date Value Ref Range Status 10/21/2020 4.8 3.5 - 5.1 mmol/L Final  
 
Ca-  
Calcium Date Value Ref Range Status 10/21/2020 8.6 8.5 - 10.1 MG/DL Final  
 
Bun-  
BUN Date Value Ref Range Status 10/21/2020 34 (H) 6 - 20 MG/DL Final  
 
Creat-  
Creatinine Date Value Ref Range Status 10/21/2020 11.00 (H) 0.70 - 1.30 MG/DL Final  
 
  
Medications/ Blood Products Given Name   Dose   Route and Time None ordered Blood Volume Processed (BVP):    97.6 Net Fluid Removed:  3500mL Comments All dialysis related medications have been reviewed. Assessment performed by RN. Procedure and documentation observed and reviewed by Oscar Madrigal RN Time Out Done:   4085 Primary Nurse Rpt Pre:  Atiya Rocha RN 
Primary Nurse Rpt Post:  Lisseth Gonzalez RN 
Pt Education:  procedural 
Care Plan:  On going Tx Summary: 
46:  PELON AVF: skin CDI. No s/s of infection. + B/T. No issues with cannulation or hemostasis. Running well at . Pt arrived to HD suite A&Ox4. Consent signed & on file. SBAR received from Primary RN. Pt cannulated with 73H needles per policy & without issue. Labs drawn per request/ order. VSS. Dialysis Tx initiated. 0800:  Pt resting 0815:  Pt resting 
0830:  Pt resting 0845:  Pt resting 
0900:  Pt resting 
0915:  Pt resting 
0930:  Pt resting 
0945:  Pt resting 
1000:  Pt resting 1015:  Pt resting 1030:  Pt resting 1045:  Pt resting 
1100:  Pt resting 1115:  Pt resting 1130:  Pt resting 1148: Tx ended. VSS. All possible blood returned to patient. Hemostasis achieved without issue. Bed locked and in the lowest position, call bell and belongings in reach. SBAR given to Primary, RN. Patient is stable at time of their/ my departure. Admiting Diagnosis:  ESRD Pt's previous clinic- [facility] 
Consent signed - Informed Consent Verified: Yes (10/23/20 0745) [place] Consent - signed and on file Hepatitis Status- neg/susc 9/25/20 Machine #- Machine Number: [id number] (10/23/20 0745) Telemetry status- 
Pre-dialysis wt. -

## 2020-10-23 NOTE — PROGRESS NOTES
TEJA Plan: *IPR 
 
4:11pm 
[facility] has declined referral.  [facility] is reviewing, but is requesting updated therapy notes and will f/u with CM on Monday. CM informed NP & RN 
 
2:30pm 
Per pt's request, CM sent a referral to [facility]. ROXANE phoned Elham Coker w/[facility] at [phone] regarding referral.  Elham Coker informed CM that a bed will not be available until Tuesday or Wed. CM will inform NP and continue to follow. Junior Betancur Ext [phone]

## 2020-10-23 NOTE — PROGRESS NOTES
General Surgery End of Shift Nursing Note Bedside shift change report given to DIONICIO Ramos (oncoming nurse) by Karen Hernandez RN (offgoing nurse). Report included the following information SBAR, Kardex, Procedure Summary, Intake/Output, MAR and Recent Results. Shift worked:   7am-7pm  
Summary of shift:    Pt dangled on the side of the bed. Pt out of bed to the wheelchair with PT. Pt worked with OT. Pt c/o pain Issues for physician to address:   pain Number times ambulated in hallway past shift: 0 Number of times OOB to chair past shift: 1 Pain Management: 
Current medication: Dilaudid Patient states pain is manageable on current pain medication: YES 
 
GI: 
 
Current diet:  DIET DIABETIC CONSISTENT CARB Regular DIET NUTRITIONAL SUPPLEMENTS Lunch, Dinner; Nepro DIET ONE TIME MESSAGE Tolerating current diet: YES Passing flatus: YES Last Bowel Movement: yesterday Appearance: unk Respiratory: 
 
Incentive Spirometer at bedside: YES Patient instructed on use: YES Patient Safety: 
 
Falls Score: 4 Bed Alarm On? Yes Sitter? No 
 
Dayami Quintana

## 2020-10-23 NOTE — PROGRESS NOTES
Occupational Therapy: Defer Patient off the floor for HD, will follow up later as able. Iram Salcedo.  Mark, MS OTR/L

## 2020-10-23 NOTE — WOUND CARE
Wound VAC dressing change: right BKA stump Patient at HD this am, no complaints or concerns about wound VAC from staff or patient. CM trying to find Rehab placement for discharge. Right BKA wound vac removed, wound and staple line cleaned with NS moist 4x4. Wound base of wound is dusky pink, no slough or odor. New dressing applied using x2 pieces of black granufoam used for wound and staple line. Occlusively sealed and NPWT set to -125 mm/hg, continuous. Plan next dressing change Tuesday with new measurements and pictures. Orders I place if discharged to rehab over weekend.  
 
Teresa Archer RN, [facility]

## 2020-10-23 NOTE — PROGRESS NOTES
Physical Therapy Chart reviewed for updates and attempted to see patient for PT treatment and weekly reassessment. He is currently KALIE for HD. Also noted planned discharge today. Will continue to follow.  
Emmett Espino, PT, DPT

## 2020-10-23 NOTE — PROGRESS NOTES
Pharmacist Discharge Medication Reconciliation Significant PMH:  
Past Medical History:  
Diagnosis Date Arrhythmia   
 aflutter Blind Cataracts Cellulitis and abscess of foot Chronic kidney disease MWF- [facility]  
 Diabetes ([facility] DM II  
 GERD (gastroesophageal reflux disease) Hearing loss Hypercholesterolemia Hypertension Neuropathy Obesity Osteomyelitis ([facility] Puerperal sepsis with acute hypoxic respiratory failure ([facility].) Sepsis ([facility].) Chief Complaint for this Admission: No chief complaint on file. Allergies: Patient has no known allergies. Discharge Medications:  
Current Discharge Medication List  
  
 
START taking these medications Details  
aspirin delayed-release 81 mg tablet Take 1 Tab by mouth daily. Qty: 30 Tab, Refills: 11  
  
atorvastatin (LIPITOR) 20 mg tablet Take 1 Tab by mouth nightly. Qty: 30 Tab, Refills: 11  
  
clopidogreL (PLAVIX) 75 mg tab Take 1 Tab by mouth daily. Qty: 30 Tab, Refills: 11  
  
metoprolol tartrate 75 mg tab Take 75 mg by mouth two (2) times a day. Qty: 60 Tab, Refills: 11  
  
polyethylene glycol (MIRALAX) 17 gram packet Take 1 Packet by mouth daily as needed for Constipation. Qty: 1 Packet, Refills: 0 CONTINUE these medications which have CHANGED Details  
insulin glargine (LANTUS) 100 unit/mL injection 10 Units by SubCUTAneous route nightly. Qty: 1 Vial, Refills: 0  
  
oxyCODONE IR (ROXICODONE) 5 mg immediate release tablet Take 1-2 Tabs by mouth every six (6) hours as needed for Pain for up to 7 days. Max Daily Amount: 40 mg. 
Qty: 30 Tab, Refills: 0 Associated Diagnoses: S/P bilateral BKA (below knee amputation) ([facility])  
  
gabapentin (NEURONTIN) 300 mg capsule Take 1 Cap by mouth nightly. Max Daily Amount: 300 mg. Qty: 30 Cap, Refills: 0 Associated Diagnoses: S/P bilateral BKA (below knee amputation) ([facility] CONTINUE these medications which have NOT CHANGED Details  
folic acid/vit B complex and C (RENAL VITAMIN PO) Take  by mouth daily. sevelamer (RENAGEL) 800 mg tablet Take 800 mg by mouth three (3) times daily (with meals). STOP taking these medications PEG 3350-Electrolytes (GO-LYTELY) 236-22.74-6.74 -5.86 gram suspension Comments:  
Reason for Stopping:   
   
 loperamide (IMODIUM) 2 mg capsule Comments:  
Reason for Stopping:   
   
  
 
 
The patient's chart, MAR and AVS were reviewed by Holly Blackman [facility]. Discharging Provider: Natalie Kay / Nehemias Castro (Remove the following comments before filing to note) Recommendations/Clarifications: Other Interventions (patient counseling, changes made to AVS, etc):  
Spoke with patient regarding his medications and he said he went to a rehab facility the last time he was discharged. According to him, the rehab only administered Renvela and none of his orthe meds. I have informed the nurse and the  to make sure report is called to the rehab. Additional Comments:  
 
Time spent: 40 min Thank Jori Oliveira [facility]

## 2020-10-23 NOTE — DISCHARGE SUMMARY
Vascular Surgery Discharge Summary Patient ID: 
Isaias Ringer [id number] 
16 y.o. 
1985 Admitting Provider: Maddison Vázquez MD 
Discharging Provider: Maddison Vázquez MD 
 
Admit Date: 10/9/2020 Discharge Date: 10/23/2020 Discharge Diagnoses: PAD w/ dehiscence of surgical incision w/ necrosis POA · S/p right BKA 08/24/2020 · S/p revision of right BKA 10/15/2020 · Bedside debridement x 2. 
· Wound vac 10/20/2020 => please maintain in rehabilitation · H&H stable · Continue ASA and Plavix for KRISHAN  
· Continue to hold DVT prophylaxis for oozing from stump · Continue OOB with PT daily Complication of HD access nPOA · Bleeding resolved w/ suture · Outpatient f/u · Now using w/o event ASHD POA Hyperlipidemia POA · Diagnostic cath 10/10/2020-diffuse multivessel disease · He is not a candidate for CABG 
· PCI with KRISHAN placement 10/13/2020 · BBlocker, ASA, & statin · Dietary non-compliance Hypertension POA · Labile · [facility]  
Anemia of chronic renal disease POA · Stable and not at a level to transfuse · Appreciate input from nephrology Uncontrolled Diabetes Mellitus w/ hyperglycemia/hypoglycemia POA · HA1c 9.4 8/15/2020 · Labile · Controlled after decreased dose of basal insulin · Dietary non-compliance Morbid obesity POA Procedures during admission: · S/p right BKA 08/24/2020 · S/p revision of right BKA 10/15/2020 · Bedside debridement x 2. 
· Diagnostic cath 10/10/2020-diffuse multivessel disease · He is not a candidate for CABG 
· PCI with KRISHAN placement 10/13/2020 · Routine hemodialysis Hospital Course: Mr. Andi Marks is a  35 y. o.  male with a pmhx significant for ESRD on HD, DM, HTN, HLD, and obesity.  Patient is legally blind and has hearing loss.  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   Since his BKA he has undergone an ablation for Footecha Bustillos was for debridement and prior to the procedure a stress test was recommended which he completed on 10/09/2020 that was abnl and he was referred for admission. [facility] underwent a diagnostic cath that was significant for multivessel disease.  He was evaluated by CT surgery and currently is not a candidate for CABG due to diffuse disease with poor targets for revascularization and poor LV function.  Patient is s/p PCI w/ KRISHAN 10/13/2020. Once stable he underwent debridement of his right BKA on 10/15/2020. Post procedure he underwent bedside debridement x 2 for persistent necrosis. A wound vac was applied during admission. During admission he required suturing of his HD access for oozing. It is now resolved.  
  
Pertinent Results:  
Recent Results (from the past 24 hour(s)) GLUCOSE, POC Collection Time: 10/22/20 12:14 PM  
Result Value Ref Range Glucose (POC) 207 (H) 65 - 100 mg/dL Performed by Custalow Divine PCT   
GLUCOSE, POC Collection Time: 10/22/20  4:43 PM  
Result Value Ref Range Glucose (POC) 130 (H) 65 - 100 mg/dL Performed by Couple PCT   
GLUCOSE, POC Collection Time: 10/22/20  8:58 PM  
Result Value Ref Range Glucose (POC) 90 65 - 100 mg/dL Performed by Derek Soliz PCT   
PHOSPHORUS Collection Time: 10/23/20  7:41 AM  
Result Value Ref Range Phosphorus 5.6 (H) 2.6 - 4.7 MG/DL Vital signs:  
Patient Vitals for the past 24 hrs: 
 BP Temp Temp src Pulse Resp SpO2 Weight 10/23/20 1000 120/67   73 18    
10/23/20 0945 (!) 156/81   75 18    
10/23/20 0930 (!) 160/85   78 18    
10/23/20 0915 (!) 170/90   75 18    
10/23/20 0900 (!) 173/89   75 18    
10/23/20 0845 (!) 165/82   75 18    
10/23/20 0830 (!) 149/85   74 18    
10/23/20 0815 (!) 143/69   73 18   10/23/20 0800 (!) 141/78   75 18    
10/23/20 0745 139/70 98.1 °F (36.7 °C) Oral 79 18    
10/23/20 0734 (!) 170/94 98.2 °F (36.8 °C)  81 18 97 %   
10/23/20 0344 137/82 98.3 °F (36.8 °C)  77 17 98 %   
10/23/20 0134       145.2 kg (320 lb) 10/23/20 0003 (!) 147/83 98.5 °F (36.9 °C)  78 17 96 %   
10/22/20 2037 (!) 151/85 98.2 °F (36.8 °C)  75 18 98 % 143.9 kg (317 lb 3.9 oz) 10/22/20 1626 126/78 98.1 °F (36.7 °C)  73 17 96 %   
10/22/20 1234 109/81 98.2 °F (36.8 °C)  72 17 97 %  Patient Weight:  
Last 3 Recorded Weights in this Encounter 10/18/20 0350 10/22/20 2037 10/23/20 0134 Weight: 149 kg (328 lb 6.4 oz) 143.9 kg (317 lb 3.9 oz) 145.2 kg (320 lb) Consults: Cardiology and Hospitalist 
 
Patient Condition at Discharge: stable Disposition: Inpatient rehabilitation Patient Instructions:  
Current Discharge Medication List  
  
START taking these medications Details  
aspirin delayed-release 81 mg tablet Take 1 Tab by mouth daily. Qty: 30 Tab, Refills: 11  
  
atorvastatin (LIPITOR) 20 mg tablet Take 1 Tab by mouth nightly. Qty: 30 Tab, Refills: 11  
  
clopidogreL (PLAVIX) 75 mg tab Take 1 Tab by mouth daily. Qty: 30 Tab, Refills: 11  
  
metoprolol tartrate 75 mg tab Take 75 mg by mouth two (2) times a day. Qty: 60 Tab, Refills: 11  
  
polyethylene glycol (MIRALAX) 17 gram packet Take 1 Packet by mouth daily as needed for Constipation. Qty: 1 Packet, Refills: 0 CONTINUE these medications which have CHANGED Details  
insulin glargine (LANTUS) 100 unit/mL injection 10 Units by SubCUTAneous route nightly. Qty: 1 Vial, Refills: 0  
  
oxyCODONE IR (ROXICODONE) 5 mg immediate release tablet Take 1-2 Tabs by mouth every six (6) hours as needed for Pain for up to 7 days. Max Daily Amount: 40 mg. 
Qty: 30 Tab, Refills: 0 Associated Diagnoses: S/P bilateral BKA (below knee amputation) ([facility] gabapentin (NEURONTIN) 300 mg capsule Take 1 Cap by mouth nightly. Max Daily Amount: 300 mg. Qty: 30 Cap, Refills: 0 Associated Diagnoses: S/P bilateral BKA (below knee amputation) ([facility] CONTINUE these medications which have NOT CHANGED Details  
folic acid/vit B complex and C (RENAL VITAMIN PO) Take  by mouth daily. sevelamer (RENAGEL) 800 mg tablet Take 800 mg by mouth three (3) times daily (with meals). STOP taking these medications PEG 3350-Electrolytes (GO-LYTELY) 236-22.74-6.74 -5.86 gram suspension Comments:  
Reason for Stopping:   
   
 loperamide (IMODIUM) 2 mg capsule Comments:  
Reason for Stopping:   
   
  
 
 
Diet: Cardiac Diet and Diabetic Diet Activity/Wound Care: ASCULAR WOUND CARE INSTRUCTIONS 
STUMP: wet to moist dressing changes until wound vac can be placed. Patient may shower during wound vac changes. Non-weight bearing on stump. Patient should not attempt to drive a car until they are comfortable and NOT taking pain medication. Mild exercise and light duties are OK. Call the office at [phone] if there are any problems. Patient Education Coronary Angiogram: What to Expect at [facility] Your Recovery A coronary angiogram is a test to examine the large blood vessel of your heart (coronary artery). The doctor inserted a thin, flexible tube (catheter) into a blood vessel in your groin. In some cases, the catheter is placed in a blood vessel in the arm. Your groin or arm may have a bruise and feel sore for a day or two after the procedure. You can do light activities around the house but nothing strenuous for several days. This care sheet gives you a general idea about how long it will take for you to recover. But each person recovers at a different pace. Follow the steps below to feel better as quickly as possible. How can you care for yourself at home? Activity 
  · If the doctor gave you a sedative: ? For 24 hours, don't do anything that requires attention to detail, such as going to work, making important decisions, or signing any legal documents. It takes time for the medicine's effects to completely wear off. 
? For your safety, do not drive or operate any machinery that could be dangerous. Wait until the medicine wears off and you can think clearly and react easily.  
  · Do not do strenuous exercise and do not lift, pull, or push anything heavy until your doctor says it is okay. This may be for a day or two. You can walk around the house and do light activity, such as cooking.  
  · If the catheter was placed in your groin, try not to walk up stairs for the first couple of days.  
  · If the catheter was placed in your arm near your wrist, do not bend your wrist deeply for the first couple of days. Be careful using your hand to get into and out of a chair or bed.  
  · If your doctor recommends it, get more exercise. Walking is a good choice. Bit by bit, increase the amount you walk every day. Try for at least 30 minutes on most days of the week. Diet 
  · Drink plenty of fluids to help your body flush out the dye. If you have kidney, heart, or liver disease and have to limit fluids, talk with your doctor before you increase the amount of fluids you drink.  
  · Keep eating a heart-healthy diet that has lots of fruits, vegetables, and whole grains. If you have not been eating this way, talk to your doctor. You also may want to talk to a dietitian. This expert can help you to learn about healthy foods and plan meals. Medicines 
  · Your doctor will tell you if and when you can restart your medicines. He or she will also give you instructions about taking any new medicines.  
  · If you take aspirin or some other blood thinner, ask your doctor if and when to start taking it again. Make sure that you understand exactly what your doctor wants you to do.   · Your doctor may prescribe a blood-thinning medicine like aspirin or clopidogrel (Plavix). It is very important that you take these medicines exactly as directed in order to keep the coronary artery open and reduce your risk of a heart attack. Be safe with medicines. Call your doctor if you think you are having a problem with your medicine. Care of the catheter site 
  · For 1 or 2 days, keep a bandage over the spot where the catheter was inserted. The bandage probably will fall off in this time.  
  · Put ice or a cold pack on the area for 10 to 20 minutes at a time to help with soreness or swelling. Put a thin cloth between the ice and your skin.  
  · You may shower 24 to 48 hours after the procedure, if your doctor okays it. Pat the incision dry.  
  · Do not soak the catheter site until it is healed. Don't take a bath for 1 week, or until your doctor tells you it is okay.  
  · Watch for bleeding from the site. A small amount of blood (up to the size of a quarter) on the bandage can be normal.  
  · If you are bleeding, lie down and press on the area for 15 minutes to try to make it stop. If the bleeding does not stop, call your doctor or seek immediate medical care. Follow-up care is a key part of your treatment and safety. Be sure to make and go to all appointments, and call your doctor if you are having problems. It's also a good idea to know your test results and keep a list of the medicines you take. When should you call for help? Call 911 anytime you think you may need emergency care. For example, call if: 
  · You passed out (lost consciousness).  
  · You have severe trouble breathing.  
  · You have sudden chest pain and shortness of breath, or you cough up blood.  
  · You have symptoms of a heart attack. These may include: 
? Chest pain or pressure, or a strange feeling in the chest. 
? Sweating. ? Shortness of breath. ? Nausea or vomiting. ? Pain, pressure, or a strange feeling in the back, neck, jaw, or upper belly, or in one or both shoulders or arms. ? Lightheadedness or sudden weakness. ? A fast or irregular heartbeat. After you call 911, the  may tel you to chew 1 adult-strength or 2 to 4 low-dose aspirin. Wait for an ambulance. Do not try to drive yourself.  
  · You have been diagnosed with angina, and you have symptoms that do not go away with rest or are not getting better within 5 minutes after you take a dose of nitroglycerin. Call your doctor now or seek immediate medical care if: 
  · You are bleeding from the area where the catheter was put in your artery.  
  · You have a fast-growing, painful lump at the catheter site.  
  · You have signs of infection, such as: 
? Increased pain, swelling, warmth, or redness. ? Red streaks leading from the catheter site. ? Pus draining from the catheter site. ? A fever.  
  · Your leg, arm, or hand is painful, looks blue, or feels cold, numb, or tingly. Watch closely for changes in your health, and be sure to contact your doctor if you have any problems. Where can you learn more? Go to http://www.[employer]/ Dominik Hylton Follow-up Information Follow up With Specialties Details Why Contact Info Caitie Sidhu MD Cardiology, [address] [facility] Vascular Surgery Schedule an appointment as soon as possible for a visit in 2 weeks  [address] 
[phone] Cristin Yanes DO Internal Medicine   [address] [facility] 
[phone] Jesika Asher MD Vascular Surgery In 2 weeks remote suture from HD access and wound check  [address] 
[phone] Signed: 
Lorrie Suarez NP 
10/23/2020 
10:08 AM

## 2020-10-23 NOTE — PROGRESS NOTES
General Surgery End of Shift Nursing Note Bedside shift change report given to Tristen Trinidad RN (oncoming nurse) by Denise Dorsey RN  (offgoing nurse). Report included the following information SBAR, Kardex, OR Summary, Intake/Output, MAR and Recent Results. Shift worked:   7a-7p Summary of shift:    Uneventful. Patient left to Dialysis before report given from night shift. Patient returned to floor [phone]. Pharmacist in to speak with patient today. Patient tolerating diet fine. Patient had wound care performed by wound care nurse; tolerated well. See previous note for blood pressure issue. Waiting for bed placement for IP Rehab. Asked for pain medicine once this shift. Issues for physician to address: Patient refusing blood pressure medication. Number times ambulated in hallway past shift: 0 Number of times OOB to chair past shift: 1; in wheelchair to use toilet Pain Management: 
Current medication: Dilaudid; JESE Patient states pain is manageable on current pain medication: YES 
 
GI: 
 
Current diet:  DIET DIABETIC CONSISTENT CARB Regular DIET NUTRITIONAL SUPPLEMENTS Lunch, Dinner; Nepro DIET ONE TIME MESSAGE Tolerating current diet: YES Passing flatus: YES Last Bowel Movement: several days ago Respiratory: 
 
 
Patient Safety: 
 
Falls Score: 4 Bed Alarm On? Yes Sitter? Not applicable Divine Moore RN

## 2020-10-23 NOTE — PROGRESS NOTES
[facility] 
10/23/2020 Subjective:  
 
Mr. Jo Liu a  1 R. o.  male with a pmhx significant for ESRD on HD, DM, HTN, HLD, and obesity.  Patient is legally blind and has hearing loss. 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 Since his BKA he has undergone an ablation for AFlutter. Plan was for debridement and prior to the procedure a stress test was recommended which he completed on 10/09/2020 that was abnl and he was referred for admission. He underwent a diagnostic cath that was significant for multivessel disease. He was evaluated by CT surgery and currently is not a candidate for CABG due to diffuse disease with poor targets for revascularization and poor LV function. Patient is s/p PCI w/ KRISHAN 10/13/2020. Once stable he underwent debridement of his right BKA on 10/15/2020. Post procedure he underwent bedside debridement x 2 for persistent necrosis. Wound vac intact on right stump with dark bloody drainage. Seen in HD. Using left arm acces w/o issue. Nursing Data:  
 
Patient Vitals for the past 24 hrs: 
 BP Temp Temp src Pulse Resp SpO2 Weight 10/23/20 0945 (!) 156/81   75 18    
10/23/20 0930 (!) 160/8   78 18    
10/23/20 0915 (!) 170/90   75 18    
10/23/20 0900 (!) 173/89   75 18    
10/23/20 0845 (!) 165/82   75 18    
10/23/20 0830 (!) 149/85   74 18    
10/23/20 0815 (!) 143/69   73 18    
10/23/20 0800 (!) 141/78   75 18    
10/23/20 0745 139/70 98.1 °F (36.7 °C) Oral 79 18    
10/23/20 0734 (!) 170/94 98.2 °F (36.8 °C)  81 18 97 %   
10/23/20 0344 137/82 98.3 °F (36.8 °C)  77 17 98 %   
10/23/20 0134       145.2 kg (320 lb) 10/23/20 0003 (!) 147/83 98.5 °F (36.9 °C)  78 17 96 %   
10/22/20 2037 (!) 151/85 98.2 °F (36.8 °C)  75 18 98 % 143.9 kg (317 lb 3.9 oz) 10/22/20 1626 126/78 98.1 °F (36.7 °C)  73 17 96 %   
10/22/20 1234 109/81 98.2 °F (36.8 °C)  72 17 97 %  Intake/Output Summary (Last 24 hours) at 10/23/2020 6968 Last data filed at 10/23/2020 5508 Gross per 24 hour Intake 480 ml Output 50 ml Net 430 ml Exam:  
 
Physical Exam 
Constitutional:   
   Appearance: He is obese. He is legally blind and is [place]. HENT:  
   Head: Normocephalic. Nose: Nose normal.  
   Mouth/Throat:  
   Mouth: Mucous membranes are moist.  
Eyes:  
   Pupils: Pupils are equal, round, and reactive to light. Neck: Musculoskeletal: Normal range of motion. Cardiovascular:  
   Rate and Rhythm: Normal rate and regular rhythm. Pulmonary:  
   Effort: Pulmonary effort is normal.  
   Breath sounds: Normal breath sounds. Abdominal:  
   General: Abdomen is flat. Palpations: Abdomen is soft. Musculoskeletal: Normal range of motion. Skin: Wound vac right stump. Left AVF dry and intact Neurological:  
   General: No focal deficit present. Mental Status: He is alert. Psychiatric:     
   Mood and Affect: Mood normal.     
   Thought Content: Thought content normal.  
 
Lab Review:  
 
. Recent Results (from the past 24 hour(s)) GLUCOSE, POC Collection Time: 10/22/20 12:14 PM  
Result Value Ref Range Glucose (POC) 207 (H) 65 - 100 mg/dL Performed by Custalow Divine PCT   
GLUCOSE, POC Collection Time: 10/22/20  4:43 PM  
Result Value Ref Range Glucose (POC) 130 (H) 65 - 100 mg/dL Performed by Custalow Divine PCT   
GLUCOSE, POC Collection Time: 10/22/20  8:58 PM  
Result Value Ref Range Glucose (POC) 90 65 - 100 mg/dL Performed by Darlene Potter [facility]   
PHOSPHORUS Collection Time: 10/23/20  7:41 AM  
Result Value Ref Range Phosphorus 5.6 (H) 2.6 - 4.7 MG/DL Assessment/Plan: PAD w/ dehiscence of surgical incision w/ necrosis · S/p right BKA 08/24/2020 · S/p revision of right BKA 10/15/2020 · Bedside debridement x 2. 
· Wound vac 10/20/2020 =>   
· H&H stable · Continue ASA and Plavix for KRISHAN  
· Continue to hold DVT prophylaxis for oozing from stump · Continue OOB with PT daily Complication of HD access · Bleeding resolved w/ suture · Outpatient f/u at [facility] · Used today w/o event  
  
ASHD Hyperlipidemia · Diagnostic cath 10/10/2020-diffuse multivessel disease · He is not a candidate for CABG 
· PCI with KRISHAN placement 10/13/2020 · BBlocker, ASA, & statin · Dietary non-compliance Hypertension · Labile Plan per cardiology  
  
Active problems ESRF 
· HD MWF [place]  
Anemia of chronic renal disease · Stable and not at a level to transfuse Plan per nephrology 
  
Uncontrolled Diabetes Mellitus w/ hyperglycemia/hypoglycemia · HA1c 9.4 8/15/2020 · Labile · Controlled after decreased dose of basal insulin · Dietary non-compliance Morbid obesity  
 
Management of comorbid conditions by primary team. 
  
VTE Prophylaxis: 
Held for oozing from stump Encourage OOB with assistance.  
  
Disposition: 
Case management consult for disposition. Inpatient rehabilitation. Stable for discharge today w/ wound vac from a vascular standpoint.

## 2020-10-23 NOTE — PROGRESS NOTES
1513 
This nurse was notified of a 91/44 blood pressure. This writer went in and assessed the patient. Patient wanted to know why I \"was asking. \" I told him I was notified of his low blood pressure. Patient then stated \"I told yall I did not want my blood pressure medications but your still insistent on giving them to me. \" 
 
I finished assessing patient he is Asymptomatic. I also reviewed patient's MAR he has not received metoprolol since 10/22 at 1713.

## 2020-10-24 NOTE — PROGRESS NOTES
General Surgery End of Shift Nursing Note Bedside shift change report given to [address] (oncoming nurse) by Olegario Alonzo (offgoing nurse). Report included the following information SBAR, Kardex and Intake/Output. Shift worked:   [id number] Summary of shift:    Uneventful shift, continued pain to right BKA, pain medication administered PRN. Issues for physician to address:   None Number times ambulated in hallway past shift: 0 Number of times OOB to chair past shift: 0 Pain Management: 
Current medication: Dilaudid IV, Tylenol, Roxicodone Patient states pain is manageable on current pain medication: YES 
 
GI: 
 
Current diet:  DIET DIABETIC CONSISTENT CARB Regular DIET NUTRITIONAL SUPPLEMENTS Lunch, Dinner; Nepro DIET ONE TIME MESSAGE Tolerating current diet: YES Passing flatus: YES Last Bowel Movement: yesterday Appearance: n/a Respiratory: 
 
Incentive Spirometer at bedside: NO 
Patient instructed on use: NO 
 
Patient Safety: 
 
Falls Score: 4 Bed Alarm On? No 
Sitter? Not applicable Velasquez Morales

## 2020-10-24 NOTE — PROGRESS NOTES
Problem: Falls - Risk of 
Goal: *Absence of Falls Description: Document Edwin Going Fall Risk and appropriate interventions in the flowsheet. Outcome: Progressing Towards Goal 
Note: Fall Risk Interventions: 
Mobility Interventions: Communicate number of staff needed for ambulation/transfer, Patient to call before getting OOB Medication Interventions: Patient to call before getting OOB Elimination Interventions: Call light in reach, Patient to call for help with toileting needs History of Falls Interventions: Bed/chair exit alarm, Door open when patient unattended, Evaluate medications/consider consulting pharmacy, Investigate reason for fall, Room close to nurse's station, Utilize gait belt for transfer/ambulation

## 2020-10-25 NOTE — PROGRESS NOTES
Problem: Falls - Risk of 
Goal: *Absence of Falls Description: Document Dung Click Fall Risk and appropriate interventions in the flowsheet. Outcome: Progressing Towards Goal 
Note: Fall Risk Interventions: 
Mobility Interventions: Communicate number of staff needed for ambulation/transfer Medication Interventions: Patient to call before getting OOB Elimination Interventions: Call light in reach, Patient to call for help with toileting needs History of Falls Interventions: Bed/chair exit alarm, Door open when patient unattended, Evaluate medications/consider consulting pharmacy, Investigate reason for fall, Room close to nurse's station, Utilize gait belt for transfer/ambulation

## 2020-10-25 NOTE — PROGRESS NOTES
Problem: Falls - Risk of 
Goal: *Absence of Falls Description: Document Devan Fajardo Fall Risk and appropriate interventions in the flowsheet. Outcome: Progressing Towards Goal 
Note: Fall Risk Interventions: 
Mobility Interventions: Communicate number of staff needed for ambulation/transfer, Patient to call before getting OOB Medication Interventions: Patient to call before getting OOB Elimination Interventions: Call light in reach, Patient to call for help with toileting needs History of Falls Interventions: Bed/chair exit alarm, Door open when patient unattended, Evaluate medications/consider consulting pharmacy, Investigate reason for fall, Room close to nurse's station, Utilize gait belt for transfer/ambulation Problem: Patient Education: Go to Patient Education Activity Goal: Patient/Family Education Outcome: Progressing Towards Goal 
  
Problem: Pressure Injury - Risk of 
Goal: *Prevention of pressure injury Description: Document Ranjeet Scale and appropriate interventions in the flowsheet. Outcome: Progressing Towards Goal 
Note: Pressure Injury Interventions: 
Sensory Interventions: Keep linens dry and wrinkle-free, Maintain/enhance activity level, Minimize linen layers, Monitor skin under medical devices Moisture Interventions: Absorbent underpads, Assess need for specialty bed Activity Interventions: Increase time out of bed Mobility Interventions: HOB 30 degrees or less Nutrition Interventions: Offer support with meals,snacks and hydration Friction and Shear Interventions: Apply protective barrier, creams and emollients, Feet elevated on foot rest, Lift sheet, Lift team/patient mobility team, Minimize layers, Transfer aides:transfer board/Stephen lift/ceiling lift, Transferring/repositioning devices Problem: Patient Education: Go to Patient Education Activity Goal: Patient/Family Education Outcome: Progressing Towards Goal 
  
Problem: Pain Goal: *Control of Pain Outcome: Progressing Towards Goal 
  
Problem: Patient Education: Go to Patient Education Activity Goal: Patient/Family Education Outcome: Progressing Towards Goal 
  
Problem: Patient Education: Go to Patient Education Activity Goal: Patient/Family Education Outcome: Progressing Towards Goal 
  
Problem: Cath Lab Procedures: Post-Cath Day of Procedure (Initiate SCIP Measures for Post-Op Care) Goal: Off Pathway (Use only if patient is Off Pathway) Outcome: Progressing Towards Goal 
Goal: Activity/Safety Outcome: Progressing Towards Goal 
Goal: Consults, if ordered Outcome: Progressing Towards Goal 
Goal: Diagnostic Test/Procedures Outcome: Progressing Towards Goal 
Goal: Nutrition/Diet Outcome: Progressing Towards Goal 
Goal: Discharge Planning Outcome: Progressing Towards Goal 
Goal: Medications Outcome: Progressing Towards Goal 
Goal: Respiratory Outcome: Progressing Towards Goal 
Goal: Treatments/Interventions/Procedures Outcome: Progressing Towards Goal 
Goal: Psychosocial 
Outcome: Progressing Towards Goal 
Goal: *Procedure site is without bleeding and signs of infection six hours post sheath removal 
Outcome: Progressing Towards Goal 
Goal: *Hemodynamically stable Outcome: Progressing Towards Goal 
Goal: *Optimal pain control at patient's stated goal 
Outcome: Progressing Towards Goal 
  
Problem: Diabetes Maintenance:Admission Goal: Activity/Safety Outcome: Progressing Towards Goal 
Goal: Diagnostic Tests/Procedures Outcome: Progressing Towards Goal 
Goal: Nutrition Outcome: Progressing Towards Goal 
Goal: Medications Outcome: Progressing Towards Goal 
Goal: Treatments/Interventions/Procedures Outcome: Progressing Towards Goal 
  
Problem: Diabetes Maintenance:Ongoing Goal: Activity/Safety Outcome: Progressing Towards Goal 
Goal: Nutrition Outcome: Progressing Towards Goal 
Goal: Medications Outcome: Progressing Towards Goal 
 Goal: Treatments/Interventsions/Procedures Outcome: Progressing Towards Goal 
Goal: *Blood Glucose 80 to 180 md/dl Outcome: Progressing Towards Goal 
  
Problem: Diabetes Maintenance:Discharge Outcomes Goal: *Describes follow-up/return visits to physicians Outcome: Progressing Towards Goal 
Goal: *Blood glucose at patient's target range or approaching Outcome: Progressing Towards Goal 
Goal: *Aware of nutrition guidelines Outcome: Progressing Towards Goal 
Goal: *Verbalizes information about medication Description: Verbalizes name, dosage, time, side effects, and number of days to 
continue medications. Outcome: Progressing Towards Goal 
Goal: *Describes goals, rules, symptoms, and treatments Description: Describes blood glucose goals, monitoring, sick day rules, 
hypo/hyperglycemia prevention, symptoms, and treatment Outcome: Progressing Towards Goal 
Goal: *Describes available outpatient diabetes resources and support systems Outcome: Progressing Towards Goal 
  
Problem: Patient Education: Go to Patient Education Activity Goal: Patient/Family Education Outcome: Progressing Towards Goal 
  
Problem: Patient Education: Go to Patient Education Activity Goal: Patient/Family Education Outcome: Progressing Towards Goal 
  
Problem: Surgical Pathway Day of Surgery Goal: Activity/Safety Outcome: Progressing Towards Goal 
Goal: Nutrition/Diet Outcome: Progressing Towards Goal 
Goal: Medications Outcome: Progressing Towards Goal 
Goal: Respiratory Outcome: Progressing Towards Goal 
Goal: Psychosocial 
Outcome: Progressing Towards Goal 
Goal: *No signs and symptoms of infection or wound complications Outcome: Progressing Towards Goal 
Goal: *Optimal pain control at patient's stated goal 
Outcome: Progressing Towards Goal 
Goal: *Adequate urinary output (equal to or greater than 30 milliliters/hour) Description: Ambulatory Surgery patients voiding without difficulty.  
Outcome: Progressing Towards Goal 
 Goal: *Hemodynamically stable Outcome: Progressing Towards Goal 
Goal: *Tolerating diet Outcome: Progressing Towards Goal 
Goal: *Demonstrates progressive activity Outcome: Progressing Towards Goal

## 2020-10-25 NOTE — PROGRESS NOTES
General Surgery End of Shift Nursing Note Bedside shift change report given to [address]  (oncoming nurse) by Marizol Flores (offgoing nurse). Report included the following information SBAR, Kardex and Intake/Output. Shift worked:   1735-2333 Summary of shift:   Dressing to right BKA intact. Uneventful shift. Issues for physician to address:   None Number times ambulated in hallway past shift: 0 Number of times OOB to chair past shift: 1 Pain Management: 
Current medication: Dilaudid IV, Tylenol, Roxicodone Patient states pain is manageable on current pain medication: YES 
 
GI: 
 
Current diet:  DIET DIABETIC CONSISTENT CARB Regular DIET NUTRITIONAL SUPPLEMENTS Lunch, Dinner; Nepro DIET ONE TIME MESSAGE Tolerating current diet: YES Passing flatus: YES Last Bowel Movement: today Appearance: LIBAN Respiratory: 
 
Incentive Spirometer at bedside: NO 
Patient instructed on use: NO 
 
Patient Safety: 
 
Falls Score: 4 Bed Alarm On? No 
Sitter? Not applicable Farideh Correa

## 2020-10-26 NOTE — PROGRESS NOTES
Pt had an uneventful night, . Pt c/o pain at the beginning of shift, but rested comfortably the remaining shift. Staff were unable to draw labs this AM. Dialysis notified.

## 2020-10-26 NOTE — PROGRESS NOTES
NAME: Rubina Cotter :  1985 MRN:  [id number] Assessment :    Plan: 
--ESRD- Anemia CAD Labile BP, now HTN 
SHPT --MWF-[facility];Seen on HD at 0930. Tolerating well. SBP stable. Continue FARIDA-10 k MWF On renvela, phos is < 5.5 DC planning Subjective: Chief Complaint:  No complaints. Review of Systems: 
 
Symptom Y/N Comments  Symptom Y/N Comments Fever/Chills    Chest Pain Poor Appetite    Edema Cough    Abdominal Pain Sputum    Joint Pain SOB/LITTLE    Pruritis/Rash Nausea/vomit    Tolerating PT/OT Diarrhea    Tolerating Diet Constipation    Other Could not obtain due to:   
 
Objective: VITALS:  
Last 24hrs VS reviewed since prior progress note. Most recent are: 
Visit Vitals /71 Pulse 75 Temp 98.1 °F (36.7 °C) (Oral) Resp 16 Ht 6' 2\" (1.88 m) Wt 143.9 kg (317 lb 3.9 oz) SpO2 100% BMI 40.73 kg/m² Intake/Output Summary (Last 24 hours) at 10/26/2020 1057 Last data filed at 10/25/2020 2021 Gross per 24 hour Intake 960 ml Output 10 ml Net 950 ml Telemetry Reviewed: PHYSICAL EXAM: 
General: NAD 
cta alfredo on room air RRR Bka, (L) dressing oozing Lab Data Reviewed: (see below) Medications Reviewed: (see below) PMH/SH reviewed - no change compared to H&P 
________________________________________________________________________ Care Plan discussed with: 
Patient y Family RN Care Manager Consultant:  y   
 
  Comments >50% of visit spent in counseling and coordination of care    
 
________________________________________________________________________ Zenobia Sofia MD  
 
Procedures: see electronic medical records for all procedures/Xrays and details which 
were not copied into this note but were reviewed prior to creation of Plan.    
 
LABS: 
 Recent Labs 10/26/20 
0830 WBC 9.2 HGB 8.3* HCT 26.8*  
 Recent Labs 10/26/20 
0830   
K 5.1 CL 97  
CO2 29 BUN 54* CREA 12.60* GLU 77  
CA 9.2 No results for input(s): AP, TBIL, TP, ALB, GLOB, GGT, AML, LPSE in the last 72 hours. No lab exists for component: SGOT, GPT, AMYP, HLPSE No results for input(s): INR, PTP, APTT, INREXT, INREXT in the last 72 hours. No results for input(s): FE, TIBC, PSAT, FERR in the last 72 hours. No results found for: FOL, RBCF No results for input(s): PH, PCO2, PO2 in the last 72 hours. No results for input(s): CPK, CKMB in the last 72 hours. No lab exists for component: TROPONINI No components found for: [place] Lab Results Component Value Date/Time Color YELLOW/STRAW 05/01/2017 05:39 PM  
 Appearance CLOUDY (A) 05/01/2017 05:39 PM  
 Specific gravity 1.018 05/01/2017 05:39 PM  
 Specific gravity >1.030 (H) 10/03/2014 04:35 AM  
 pH (UA) 5.5 05/01/2017 05:39 PM  
 Protein 300 (A) 05/01/2017 05:39 PM  
 Glucose 250 (A) 05/01/2017 05:39 PM  
 Ketone NEGATIVE  05/01/2017 05:39 PM  
 Bilirubin NEGATIVE  05/01/2017 05:39 PM  
 Urobilinogen 0.2 05/01/2017 05:39 PM  
 Nitrites NEGATIVE  05/01/2017 05:39 PM  
 Leukocyte Esterase NEGATIVE  05/01/2017 05:39 PM  
 Epithelial cells FEW 05/01/2017 05:39 PM  
 Bacteria NEGATIVE  05/01/2017 05:39 PM  
 WBC 0-4 05/01/2017 05:39 PM  
 RBC 5-10 05/01/2017 05:39 PM  
 
 
MEDICATIONS: 
Current Facility-Administered Medications Medication Dose Route Frequency  epoetin nata-epbx (RETACRIT) injection 10,000 Units  10,000 Units SubCUTAneous Q MON, WED & FRI  insulin glargine (LANTUS) injection 10 Units  10 Units SubCUTAneous QHS  influenza vaccine 2020-21 (6 mos+)(PF) (FLUARIX/FLULAVAL/FLUZONE QUAD) injection 0.5 mL  0.5 mL IntraMUSCular PRIOR TO DISCHARGE  lidocaine (PF) (XYLOCAINE) 10 mg/mL (1 %) injection 0.1 mL  0.1 mL SubCUTAneous PRN  
  oxyCODONE IR (ROXICODONE) tablet 5 mg  5 mg Oral Q4H PRN  
 metoprolol tartrate (LOPRESSOR) tablet 75 mg  75 mg Oral BID  albuterol-ipratropium (DUO-NEB) 2.5 MG-0.5 MG/3 ML  3 mL Nebulization Q6H PRN  
 clopidogreL (PLAVIX) tablet 75 mg  75 mg Oral DAILY  loperamide (IMODIUM) capsule 2 mg  2 mg Oral QID PRN  
 sevelamer carbonate (RENVELA) tab 800 mg  800 mg Oral TID WITH MEALS  sodium chloride (NS) flush 5-40 mL  5-40 mL IntraVENous PRN  
 acetaminophen (TYLENOL) tablet 650 mg  650 mg Oral Q6H PRN Or  
 acetaminophen (TYLENOL) suppository 650 mg  650 mg Rectal Q6H PRN  polyethylene glycol (MIRALAX) packet 17 g  17 g Oral DAILY PRN  
 aspirin delayed-release tablet 81 mg  81 mg Oral DAILY  atorvastatin (LIPITOR) tablet 20 mg  20 mg Oral QHS  hydrALAZINE (APRESOLINE) 20 mg/mL injection 20 mg  20 mg IntraVENous Q6H PRN  
 insulin lispro (HUMALOG) injection   SubCUTAneous AC&HS  
 glucose chewable tablet 16 g  4 Tab Oral PRN  
 dextrose (D50W) injection syrg 12.5-25 g  12.5-25 g IntraVENous PRN  
 glucagon (GLUCAGEN) injection 1 mg  1 mg IntraMUSCular PRN

## 2020-10-26 NOTE — PROGRESS NOTES
Physical Therapy Chart reviewed, patient is currently off the floor for HD. Will defer and continue to follow. Loni Ma

## 2020-10-26 NOTE — PROGRESS NOTES
Vascular Surgery Progress Note Reuel Mention ACNP-BC 
10/26/2020 Subjective:  
 
Mr. Boris Honeycutt a  64 J. o.  male with a pmhx significant for ESRD on HD, DM, HTN, HLD, and obesity.  Patient is legally blind and has hearing loss. 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 Since his BKA he has undergone an ablation for AFlutter. Plan was for debridement and prior to the procedure a stress test was recommended which he completed on 10/09/2020 that was abnl and he was referred for admission. He underwent a diagnostic cath that was significant for multivessel disease. He was evaluated by CT surgery and currently is not a candidate for CABG due to diffuse disease with poor targets for revascularization and poor LV function. Patient is s/p PCI w/ KRISHAN 10/13/2020. Once stable he underwent debridement of his right BKA on 10/15/2020. Post procedure he underwent bedside debridement x 2 for persistent necrosis. Wound vac intact on right stump without surrounding erythema or edema. Seen in HD. Using left arm acces w/o issue. Nursing Data:  
 
Patient Vitals for the past 24 hrs: 
 BP Temp Temp src Pulse Resp SpO2  
10/26/20 0900 (!) 158/88   72 16   
10/26/20 0845 (!) 147/83   72 16   
10/26/20 0830 (!) 175/87   72 16   
10/26/20 0815 (!) 155/92   72 16   
10/26/20 0800 (!) 170/96   73 16   
10/26/20 0754 (!) 159/96 98.1 °F (36.7 °C) Oral 74 16   
10/26/20 0428 (!) 166/94 97.8 °F (36.6 °C)  71 16 100 % 10/25/20 2310 137/86 98.1 °F (36.7 °C)  71 17 96 % 10/25/20 2021 (!) 155/94 97.9 °F (36.6 °C)  73 17 96 % 10/25/20 1515 (!) 146/87 98.3 °F (36.8 °C)  73 16 96 % 10/25/20 1146 123/69 97.7 °F (36.5 °C)  76 16 95 % Intake/Output Summary (Last 24 hours) at 10/26/2020 7638 Last data filed at 10/25/2020 2021 Gross per 24 hour Intake 1440 ml Output 10 ml Net 1430 ml  
 
 
 Exam:  
 
Physical Exam 
Constitutional:   
   Appearance: He is obese. He is legally blind and is [place]. HENT:  
   Head: Normocephalic. Nose: Nose normal.  
   Mouth/Throat:  
   Mouth: Mucous membranes are moist.  
Eyes:  
   Pupils: Pupils are equal, round, and reactive to light. Neck: Musculoskeletal: Normal range of motion. Cardiovascular:  
   Rate and Rhythm: Normal rate and regular rhythm. Pulmonary:  
   Effort: Pulmonary effort is normal.  
   Breath sounds: Normal breath sounds. Abdominal:  
   General: Abdomen is flat. Palpations: Abdomen is soft. Musculoskeletal: Normal range of motion. Skin: Wound vac right stump. Left AVF dry and intact Neurological:  
   General: No focal deficit present. Mental Status: He is alert. Psychiatric:     
   Mood and Affect: Mood normal.     
   Thought Content: Thought content normal.  
 
Lab Review:  
 
. Recent Results (from the past 24 hour(s)) GLUCOSE, POC Collection Time: 10/25/20 11:09 AM  
Result Value Ref Range Glucose (POC) 201 (H) 65 - 100 mg/dL Performed by [employer] PCT GLUCOSE, POC Collection Time: 10/25/20  4:33 PM  
Result Value Ref Range Glucose (POC) 168 (H) 65 - 100 mg/dL Performed by [employer] [facility] PCT GLUCOSE, POC Collection Time: 10/25/20  9:25 PM  
Result Value Ref Range Glucose (POC) 137 (H) 65 - 100 mg/dL Performed by [facility] \"[facility]elso\" PCT   
GLUCOSE, POC Collection Time: 10/26/20  6:33 AM  
Result Value Ref Range Glucose (POC) 94 65 - 100 mg/dL Performed by [facility] \"[facility]\" PCT Assessment/Plan: PAD w/ dehiscence of surgical incision w/ necrosis · S/p right BKA 08/24/2020 · S/p revision of right BKA 10/15/2020 · Bedside debridement x 2. 
· Wound vac 10/20/2020 =>   
· H&H stable · Continue ASA and Plavix for KRISHAN · Resume DVT prophylaxis · Continue OOB with PT daily Complication of HD access · Bleeding resolved w/ suture · Outpatient f/u at [facility] · Used today w/o event  
  
ASHD Hyperlipidemia · Diagnostic cath 10/10/2020-diffuse multivessel disease · He is not a candidate for CABG 
· PCI with KRISHAN placement 10/13/2020 · BBlocker, ASA, & statin · Dietary non-compliance Hypertension · Labile Plan per cardiology  
  
Active problems ESRF 
· HD MWF [employer]  
Hyperphosphatasia Anemia of chronic renal disease · Stable and not at a level to transfuse Plan per nephrology 
  
Uncontrolled Diabetes Mellitus w/ hyperglycemia/hypoglycemia · HA1c 9.4 8/15/2020 · Labile · Controlled on current dose of basal insulin which is a decreased from his home dose. · Dietary non-compliance Morbid obesity  
 
Management of comorbid conditions by primary team. 
  
VTE Prophylaxis: 
Resume LDUH Encourage OOB with assistance.  
  
Disposition: 
Inpatient rehabilitation. Stable for discharge today w/ wound vac from a vascular standpoint. Awaiting bed placement and insurance authorization. Repeat SARs-COV2 testing.

## 2020-10-26 NOTE — PROGRESS NOTES
Problem: Mobility Impaired (Adult and Pediatric) Goal: *Acute Goals and Plan of Care (Insert Text) Description: FUNCTIONAL STATUS PRIOR TO ADMISSION: The patient was functional at the wheelchair level and was supervision for transfers to the chair. HOME SUPPORT PRIOR TO ADMISSION: came from SNF, awaiting group home set up Physical Therapy Goals Re-Assess 10/26/20 Goals not achieved, modified standing and walking goals 1. Patient will propel wheelchair 150ft with B UE technique mod I within 7 days 2. Patient will transfer from bed to chair and chair to bed with modified independence using the least restrictive device within 7 day(s). 3.  Patient will perform sit to stand with mod assist  within 7 day(s). 4. Patient will ambulate 10ft with RW and mod assist using hop to pattern within 7 days Initiated 10/13/2020 1. Patient will propel wheelchair 150ft with B UE technique mod I within 7 days 2. Patient will transfer from bed to chair and chair to bed with modified independence using the least restrictive device within 7 day(s). 3.  Patient will perform sit to stand with supervision/set-up within 7 day(s). 4. Patient will ambulate 10ft with RW and min A using hop to pattern within 7 days 10/26/2020 1547 by Randall Adkins, PT Outcome: Progressing Towards Goal 
 
PHYSICAL THERAPY TREATMENT: WEEKLY REASSESSMENT Patient: Julio Nair (58 y.o. male) Date: 10/26/2020 Primary Diagnosis: Abnormal stress test [R94.39] CAD (coronary artery disease) [I25.10] Procedure(s) (LRB): 
REVISION RIGHT BELOW THE KNEE AMPUTATION (Right) 11 Days Post-Op Precautions:   Fall, Skin(8-24 R BKA/10-15debrid/x2, wd vac; No BP L UE, blind; cardia) ASSESSMENT Patient continues with skilled PT services and is progressing towards goals. Patient received in bed and agreeable to participate, but tired from HD.   Patient able to come to sit on EOB with supervision and sitting balance is intact. Patient agreeable to transfer to w/c but once in sitting position noted serosanguinous drainage started dripping on floor from residual limb, RN notified and patient able to scoot in bed up and down to change out soiled pads and then returned to supine. Patient is motivated to improve and demonstrated good effort, has been able to get into w/c with supervision in prior visits. Recommend rehab to maximize recovery. Patient's progression toward goals since last assessment: Patient is progressing with bed mobility and scooting, still not able to come to stand Current Level of Function Impacting Discharge (mobility/balance): supervision for bed mobility Functional Outcome Measure: The patient scored 60/100 on the  Barthel outcome measure which is indicative of moderate functional impairment Other factors to consider for discharge: below baseline, BKA, decreased vision PLAN : 
Goals have been updated based on progression since last assessment. Patient continues to benefit from skilled intervention to address the above impairments. Recommendations and Planned Interventions: bed mobility training, transfer training, gait training, therapeutic exercises, patient and family training/education, and therapeutic activities Frequency/Duration: Patient will be followed by physical therapy:  4 times a week to address goals. Recommendation for discharge: (in order for the patient to meet his/her long term goals) Therapy 3 hours per day 5-7 days per week This discharge recommendation: 
Has been made in collaboration with the attending provider and/or case management IF patient discharges home will need the following DME: patient owns DME required for discharge SUBJECTIVE:  
Patient stated I'm tired from HD but I will do it.  OBJECTIVE DATA SUMMARY:  
HISTORY:   
Past Medical History:  
Diagnosis Date  Arrhythmia   
 aflutter  Blind  Cataracts  Cellulitis and abscess of foot  Chronic kidney disease F- [facility]  
 Diabetes ([facility] [address] DM II  
 GERD (gastroesophageal reflux disease)  Hearing loss  Hypercholesterolemia  Hypertension  Neuropathy  Obesity  Osteomyelitis ([facility] [address]  Puerperal sepsis with acute hypoxic respiratory failure (HCC)  Sepsis ([facility] [address] Past Surgical History:  
Procedure Laterality Date  CARDIAC SURG PROCEDURE UNLIST  09/2020  
 ablation  COLONOSCOPY N/A 12/8/2017 COLONOSCOPY performed [facility] Carmen Simental [facility] ENDOSCOPY  COLONOSCOPY,DIAGNOSTIC  12/8/2017  HX AFIB ABLATION  09/04/2020  HX AMPUTATION Left great toe and L 5th toe  HX AMPUTATION Right   
 bka  HX AMPUTATION TOE Right  HX VASCULAR ACCESS    
 MT COMPRE ELECTROPHYSIOL XM W/LEFT ATRIAL PACNG/REC N/A 9/4/2020 Lt Atrial Pace & Record During Ep Study performed by Gilda Solis MD at [facility] CARDIAC CATH LAB  MT EPHYS EVAL W/ABLATION SUPRAVENT ARRHYTHMIA N/A 9/4/2020 ABLATION A-FLUTTER performed by Gilda Solis MD at [facility] CARDIAC CATH LAB  MT INTRACARDIAC ELECTROPHYSIOLOGIC 3D MAPPING N/A 9/4/2020 Ep 3d Mapping performed by Gilda Solis MD at [facility] CARDIAC CATH LAB  UPPER GI ENDOSCOPY,BIOPSY  12/8/2017  VASCULAR SURGERY PROCEDURE UNLIST    
 R side chest  
 VASCULAR SURGERY PROCEDURE UNLIST Left 07/25/2017 Creation transposed AV fistula arm Personal factors and/or comorbidities impacting plan of care: decreased vision and hearing Home Situation Home Environment: Skilled nursing facility Care Facility Name: [facility](Pt does not wish to return) Wheelchair Ramp: Yes One/Two Story Residence: One story Living Alone: No 
Support Systems: Skilled nursing facility Patient Expects to be Discharged to[de-identified] Rehabilitation facility Current DME Used/Available at Home: Glucometer Tub or Shower Type: Shower EXAMINATION/PRESENTATION/DECISION MAKING:  
Critical Behavior: 
Neurologic State: Alert Orientation Level: Oriented X4 Cognition: Appropriate decision making, Follows commands Safety/Judgement: Awareness of environment, Fall prevention, Home safety(learning about BKA) Hearing: Auditory Auditory Impairment: Hard of hearing, bilateral 
Hearing Aids/Status: Does not own Functional Mobility: 
Bed Mobility: 
Rolling: Supervision Supine to Sit: Supervision Sit to Supine: Supervision Scooting: Supervision Transfers: 
  
  
     
  
     
  
  
Balance:  
Sitting: Intact Sitting - Static: Good (unsupported) Standing: (not tested) Ambulation/Gait Training: 
  
  
  
  
  
  
  
  
  
  
  
  
  
  
  
  
  
  
 Stairs: 
  
  
   
 
Functional Measure: 
Barthel Index: 
 
Bathin Bladder: 10 Bowels: 10 
Groomin Dressin Feeding: 10 Mobility: 5 Stairs: 0 Toilet Use: 5 Transfer (Bed to Chair and Back): 10 Total: 60/100 The Barthel ADL Index: Guidelines 1. The index should be used as a record of what a patient does, not as a record of what a patient could do. 2. The main aim is to establish degree of independence from any help, physical or verbal, however minor and for whatever reason. 3. The need for supervision renders the patient not independent. 4. A patient's performance should be established using the best available evidence. Asking the patient, friends/relatives and nurses are the usual sources, but direct observation and common sense are also important. However direct testing is not needed. 5. Usually the patient's performance over the preceding 24-48 hours is important, but occasionally longer periods will be relevant. 6. Middle categories imply that the patient supplies over 50 per cent of the effort. 7. Use of aids to be independent is allowed. Mariposa Osman., Barthel, D.W. (4856). Functional evaluation: the Barthel Index. [facility] (14)2. JAY Batres, Chris Vail., Facundo Almonte., [place], [address] (1999). Measuring the change indisability after inpatient rehabilitation; comparison of the responsiveness of the Barthel Index and Functional [place] Measure. Journal of Neurology, Neurosurgery, and Psychiatry, 66(4), 636-972. SUYAPA Vivar, YULISSA Rodriguez, & Deane Saint, M.A. (2004.) Assessment of post-stroke quality of life in cost-effectiveness studies: The usefulness of the Barthel Index and the EuroQoL-5D. [facility], 66, 883-00 Pain Rating: 
 
 
Activity Tolerance:  
Fair Please refer to the flowsheet for vital signs taken during this treatment. After treatment patient left in no apparent distress:  
Supine in bed and Side rails x 3 
 
COMMUNICATION/EDUCATION:  
The patients plan of care was discussed with: Occupational therapist and Registered nurse. Fall prevention education was provided and the patient/caregiver indicated understanding. and Patient/family agree to work toward stated goals and plan of care. Thank you for this referral. 
Nena Mars, PT Time Calculation: 13 mins

## 2020-10-26 NOTE — PROGRESS NOTES
Problem: Falls - Risk of 
Goal: *Absence of Falls Description: Document Eric Howell Fall Risk and appropriate interventions in the flowsheet. Outcome: Progressing Towards Goal 
Note: Fall Risk Interventions: 
Mobility Interventions: Communicate number of staff needed for ambulation/transfer Medication Interventions: Patient to call before getting OOB Elimination Interventions: Call light in reach, Patient to call for help with toileting needs History of Falls Interventions: Bed/chair exit alarm, Door open when patient unattended, Evaluate medications/consider consulting pharmacy, Investigate reason for fall, Room close to nurse's station, Utilize gait belt for transfer/ambulation Problem: Patient Education: Go to Patient Education Activity Goal: Patient/Family Education Outcome: Progressing Towards Goal 
  
Problem: Pressure Injury - Risk of 
Goal: *Prevention of pressure injury Description: Document Ranjeet Scale and appropriate interventions in the flowsheet. Outcome: Progressing Towards Goal 
Note: Pressure Injury Interventions: 
Sensory Interventions: Keep linens dry and wrinkle-free, Maintain/enhance activity level, Minimize linen layers, Monitor skin under medical devices Moisture Interventions: Absorbent underpads, Assess need for specialty bed Activity Interventions: Increase time out of bed Mobility Interventions: HOB 30 degrees or less Nutrition Interventions: Offer support with meals,snacks and hydration Friction and Shear Interventions: HOB 30 degrees or less Problem: Patient Education: Go to Patient Education Activity Goal: Patient/Family Education Outcome: Progressing Towards Goal 
  
Problem: Pain Goal: *Control of Pain Outcome: Progressing Towards Goal 
  
Problem: Patient Education: Go to Patient Education Activity Goal: Patient/Family Education Outcome: Progressing Towards Goal 
  
 Problem: Patient Education: Go to Patient Education Activity Goal: Patient/Family Education Outcome: Progressing Towards Goal 
  
Problem: Cath Lab Procedures: Post-Cath Day of Procedure (Initiate SCIP Measures for Post-Op Care) Goal: Off Pathway (Use only if patient is Off Pathway) Outcome: Progressing Towards Goal 
Goal: Activity/Safety Outcome: Progressing Towards Goal 
Goal: Consults, if ordered Outcome: Progressing Towards Goal 
Goal: Diagnostic Test/Procedures Outcome: Progressing Towards Goal 
Goal: Nutrition/Diet Outcome: Progressing Towards Goal 
Goal: Discharge Planning Outcome: Progressing Towards Goal 
Goal: Medications Outcome: Progressing Towards Goal 
Goal: Respiratory Outcome: Progressing Towards Goal 
Goal: Treatments/Interventions/Procedures Outcome: Progressing Towards Goal 
Goal: Psychosocial 
Outcome: Progressing Towards Goal 
Goal: *Procedure site is without bleeding and signs of infection six hours post sheath removal 
Outcome: Progressing Towards Goal 
Goal: *Hemodynamically stable Outcome: Progressing Towards Goal 
Goal: *Optimal pain control at patient's stated goal 
Outcome: Progressing Towards Goal 
  
Problem: Diabetes Maintenance:Admission Goal: Activity/Safety Outcome: Progressing Towards Goal 
Goal: Diagnostic Tests/Procedures Outcome: Progressing Towards Goal 
Goal: Nutrition Outcome: Progressing Towards Goal 
Goal: Medications Outcome: Progressing Towards Goal 
Goal: Treatments/Interventions/Procedures Outcome: Progressing Towards Goal 
  
Problem: Diabetes Maintenance:Ongoing Goal: Activity/Safety Outcome: Progressing Towards Goal 
Goal: Nutrition Outcome: Progressing Towards Goal 
Goal: Medications Outcome: Progressing Towards Goal 
Goal: Treatments/Interventsions/Procedures Outcome: Progressing Towards Goal 
Goal: *Blood Glucose 80 to 180 md/dl Outcome: Progressing Towards Goal 
  
Problem: Diabetes Maintenance:Discharge Outcomes Goal: *Describes follow-up/return visits to physicians Outcome: Progressing Towards Goal 
Goal: *Blood glucose at patient's target range or approaching Outcome: Progressing Towards Goal 
Goal: *Aware of nutrition guidelines Outcome: Progressing Towards Goal 
Goal: *Verbalizes information about medication Description: Verbalizes name, dosage, time, side effects, and number of days to 
continue medications. Outcome: Progressing Towards Goal 
Goal: *Describes goals, rules, symptoms, and treatments Description: Describes blood glucose goals, monitoring, sick day rules, 
hypo/hyperglycemia prevention, symptoms, and treatment Outcome: Progressing Towards Goal 
Goal: *Describes available outpatient diabetes resources and support systems Outcome: Progressing Towards Goal 
  
Problem: Patient Education: Go to Patient Education Activity Goal: Patient/Family Education Outcome: Progressing Towards Goal 
  
Problem: Patient Education: Go to Patient Education Activity Goal: Patient/Family Education Outcome: Progressing Towards Goal 
  
Problem: Surgical Pathway Day of Surgery Goal: Activity/Safety Outcome: Progressing Towards Goal 
Goal: Nutrition/Diet Outcome: Progressing Towards Goal 
Goal: Medications Outcome: Progressing Towards Goal 
Goal: Respiratory Outcome: Progressing Towards Goal 
Goal: Psychosocial 
Outcome: Progressing Towards Goal 
Goal: *No signs and symptoms of infection or wound complications Outcome: Progressing Towards Goal 
Goal: *Optimal pain control at patient's stated goal 
Outcome: Progressing Towards Goal 
Goal: *Adequate urinary output (equal to or greater than 30 milliliters/hour) Description: Ambulatory Surgery patients voiding without difficulty. Outcome: Progressing Towards Goal 
Goal: *Hemodynamically stable Outcome: Progressing Towards Goal 
Goal: *Tolerating diet Outcome: Progressing Towards Goal 
Goal: *Demonstrates progressive activity Outcome: Progressing Towards Goal

## 2020-10-26 NOTE — PROGRESS NOTES
OT attempted to see pt and he is KALIE for HD and will defer and continue to follow.  
HILARY Yang

## 2020-10-26 NOTE — PROGRESS NOTES
General Surgery End of Shift Nursing Note 35yr old  male admitted 10/09/20. Abnormal stress test, CAD, S/P Cardiac Cath 
10/15/20 revision of (R) BKA Shift worked:   Tiffany Stephenspatricia Tiar[address]enafely [address] Summary of shift:   unable to obtain labs, dialysis advised. A&O x3, hard of hearing, legally blind, pleasant, recovering  R, BKA, wound vac, dehiscence of stump incision site with infection, I & D and IV ABX,  No s/s of adverse reaction to ABX will continue to monitor, current ESRD on HD. Issues for physician to address:   No new issues to report Number times ambulated in hallway past shift: 0 Number of times OOB to chair past shift: 1 Pain Management: 
Current medication: see MAR Patient states pain is manageable on current pain medication: YES 
 
GI: 
 
Current diet:  DIET DIABETIC CONSISTENT CARB Regular DIET NUTRITIONAL SUPPLEMENTS Lunch, Dinner; [place] DIET ONE TIME MESSAGE Tolerating current diet: YES Passing flatus: YES Last Bowel Movement: yesterday Appearance: brown soft medium Respiratory: 
 
Incentive Spirometer at bedside: YES Patient instructed on use: YES Patient Safety: 
 
Falls Score: 4 Bed Alarm On? No 
Sitter?  No 
 
Tyrone Davis LPN

## 2020-10-26 NOTE — PROGRESS NOTES
General Surgery End of Shift Nursing Note Bedside shift change report given to Litzy Merino RN (oncoming nurse) by Sherwin Maldonado RN 
 (offgoing nurse). Report included the following information SBAR, Kardex, Intake/Output, MAR and Recent Results. Shift worked:   7a-3p Summary of shift:    Patient in dialysis most of the shift. Patient treated for pain with PRN medication (see MAR). Issues for physician to address:   none Sherwin Maldonado, RN

## 2020-10-26 NOTE — PROCEDURES
[facility]  [phone] Vitals   Pre   Post   Assessment   Pre   Post    
Temp  Temp: 98.1 °F (36.7 °C) (10/26/20 0754)  98.0 LOC  A&Ox4 A&Ox4 HR   Pulse (Heart Rate): 74 (10/26/20 0754) 75 Lungs   clear clear B/P   BP: (!) 159/96 (10/26/20 0754) 122/66 Cardiac   RRR RRR Resp   Resp Rate: 16 (10/26/20 0754) 16 Skin   R BKA w/ wound vac R BKA w/ wound vac Pain level  Pain Intensity 1: 0 (10/25/20 2310) 0 Edema  generalized generalized Orders: Duration:   Start:    1429 End:    1159 Total:   4 hours Dialyzer:   Dialyzer/Set Up Inspection: Parker Roberts (10/26/20 0754) K Bath:   Dialysate K (mEq/L): 2 (10/26/20 0754) Ca Bath:   Dialysate CA (mEq/L): 2.5 (10/26/20 0754) Na/Bicarb:   Dialysate NA (mEq/L): 140 (10/26/20 0754) Target Fluid Removal:   Goal/Amount of Fluid to Remove (mL): 3500 mL (10/26/20 0754) Access Type & Location:   PELON AVF: skin CDI. No s/s of infection. + B/T. No issues with cannulation or hemostasis. Running well at . Pt arrived to HD suite A&Ox4. Consent signed & on file. SBAR received from Primary RN. Pt cannulated with 17A needles per policy & without issue. Labs drawn per request/ order. VSS. Dialysis Tx initiated. Labs Obtained/Reviewed Critical Results Called   Date when labs were drawn- 
Hgb-   
HGB Date Value Ref Range Status 10/21/2020 7.9 (L) 12.1 - 17.0 g/dL Final  
 
K-   
Potassium Date Value Ref Range Status 10/21/2020 4.8 3.5 - 5.1 mmol/L Final  
 
Ca-  
Calcium Date Value Ref Range Status 10/21/2020 8.6 8.5 - 10.1 MG/DL Final  
 
Bun-  
BUN Date Value Ref Range Status 10/21/2020 34 (H) 6 - 20 MG/DL Final  
 
Creat-  
Creatinine Date Value Ref Range Status 10/21/2020 11.00 (H) 0.70 - 1.30 MG/DL Final  
 
  
Medications/ Blood Products Given Name   Dose   Route and Time None ordered Blood Volume Processed (BVP):    87.8 Net Fluid Removed:  3500mL Comments All dialysis related medications have been reviewed. Assessment performed by RN. Procedure and documentation observed and reviewed by Donaldo Garcia RN Time Out Done:  5736 Primary Nurse Rpt Pre:  Latisha Marroquin RN 
Primary Nurse Rpt Post:  Kodak Spencer RN 
Pt Education:  procedural 
Care Plan:  On going Tx Summary: 
80:  PELON AVF: skin CDI. No s/s of infection. + B/T. No issues with cannulation or hemostasis. Running well at . Pt arrived to HD suite A&Ox4. Consent signed & on file. SBAR received from Primary RN. Pt cannulated with 86A needles per policy & without issue. Labs drawn per request/ order. VSS. Dialysis Tx initiated. 0800:  Pt resting 0815:  Pt resting 
0830:  Pt resting 0845:  Pt resting 
0900:  Pt resting  
0915:  Pt resting 
0930:  Pt resting 
0945:  Pt resting 
1000:  Pt resting 1015:  Pt resting 1030:  Pt resting 1045:  Pt resting 
1100:  Pt resting 1115:  Pt resting 1130:  Pt resting 1145:  Pt resting 
1159: Tx ended. VSS. All possible blood returned to patient. Hemostasis achieved without issue. Bed locked and in the lowest position, call bell and belongings in reach. SBAR given to Primary, RN. Patient is stable at time of their/ my departure. Admiting Diagnosis:  [facility] Pt's previous clinic-  [facility] 
Consent signed - Informed Consent Verified: Yes (10/26/20 0754) Sylvain Consent - signed and on file Hepatitis Status- neg/susc 10/23/20 Machine #- Machine Number: [id number] (10/26/20 2659) Telemetry status- 
Pre-dialysis wt. -

## 2020-10-26 NOTE — PROGRESS NOTES
Problem: Self Care Deficits Care Plan (Adult) Goal: *Acute Goals and Plan of Care (Insert Text) Description:  
FUNCTIONAL STATUS PRIOR TO ADMISSION: Pt admitted from [facility], with reports he plans to transfer to group home s/p rehab, with pt reporting [place] prior to most recent hospitalization. Reports he has W/C at [facility]; however, does not want to return there. HOME SUPPORT: The patient lived with SNF staffing to provide PRN assist. 
 
Occupational Therapy Goals Initiated 10/26/2020 1. Patient will perform grooming sitting in chair or wheelchair at the  sink  with supervision/set-up within 7 day(s). 2.  Patient will perform bathing UB and LB in bed with minimal assistance/contact guard assist within 7 day(s). 3.  Patient will perform lower body dressing with minimal assistance/contact guard assist within 7 day(s). 4.  Patient will perform toilet transfers with minimal assistance/contact guard assist within 7 day(s). 5.  Patient will perform all aspects of toileting with supervision/set-up within 7 day(s). 6.  Patient will participate in upper extremity therapeutic exercise/activities with modified independence for 5 minutes within 7 day(s). 7.  Patient will utilize energy conservation techniques during functional activities with verbal cues within 7 day(s). Occupational Therapy Goals Initiated 10/13/2020, Re-Evaluation post BKA revision on 10/16/2020, continue all goals 1. Patient will perform W/C grooming with modified independence within 7 day(s). 2.  Patient will perform W/C full body dressing with modified independence within 7 day(s). 3.  Patient will perform W/C bathing with modified independence within 7 day(s). 4.  Patient will perform toilet transfers, from W/C, with modified independence within 7 day(s). 5.  Patient will perform all aspects of toileting with modified independence within 7 day(s).  
 
 
 
 
 
 
Outcome: Progressing Towards Goal 
 OCCUPATIONAL THERAPY TREATMENT/WEEKLY RE-EVALUATION Patient: Mary Gould (83 y.o. male) Date: 10/26/2020 Diagnosis: Abnormal stress test [R94.39] CAD (coronary artery disease) [I25.10] <principal problem not specified> Procedure(s) (LRB): 
REVISION RIGHT BELOW THE KNEE AMPUTATION (Right) 11 Days Post-Op Precautions: Fall, Skin(8-24 R BKA/10-15debrid/x2, wd vac; No BP L UE, blind; cardia) Chart, occupational therapy assessment, plan of care, and goals were reviewed. ASSESSMENT Based on the objective data described below, pt was supine in bed and was supervision to mod I for supine to sit and sit to supine. He was able to scoot left and right in bed with superversion. Pt was moving very well and stated that he had been sliding over, modified sit pivot to wheelchair in rehab. Started to attempt transfer to wheelchair and noted that his wound VAC on right LE was leaking. Linens were changed and pt rolled and scooted in bed in order for linens to  be changed. Pt was  left sitting up in bed with nursing in room and wound care coming in door. Pt is motivated and wants to be independent  and had  good endurance even after HD. Pt is [place] and blind legally Current Level of Function Impacting Discharge (ADLs): decreased strength, functional mobility, ADLs PLAN : 
Goals have been updated based on progression since last assessment. Patient continues to benefit from skilled intervention to address the above impairments. Continue to follow patient 4 times a week to address goals. Recommend with staff: OOB for meals Recommend next OT session: work on standing and cleaning buttocks in bed with rolling Recommendation for discharge: (in order for the patient to meet his/her long term goals) Therapy 3 hours per day 5-7 days per week This discharge recommendation: 
Has been made in collaboration with the attending provider and/or case management Equipment recommendations for successful discharge (if) home: tbd SUBJECTIVE:  
Patient stated I can do that by myself. Delroy Bradford OBJECTIVE DATA SUMMARY:  
Cognitive/Behavioral Status: 
Neurologic State: Alert Orientation Level: Oriented X4 Cognition: Follows commands; Appropriate decision making; Appropriate for age attention/concentration; Appropriate safety awareness Functional Mobility and Transfers for ADLs: 
Bed Mobility: 
Rolling: Supervision Supine to Sit: Supervision Sit to Supine: Supervision Scooting: Supervision Transfers: 
 Not tested Balance: 
Sitting: Intact Sitting - Static: Good (unsupported) Standing: (not tested) ADL Intervention: 
Feeding Feeding Assistance: Independent Pt is set up for UB ADLs,  and mod to max for ADLs Toileting Bladder Hygiene: Supervision;Modified independent Bowel Hygiene: Moderate assistance Activity Tolerance:  
Fair Please refer to the flowsheet for vital signs taken during this treatment. After treatment patient left in no apparent distress:  
Supine in bed and Call bell within reach COMMUNICATION/COLLABORATION:  
The patients plan of care was discussed with: Physical Therapist and Registered Nurse HILARY Yang Time Calculation: 13 mins

## 2020-10-26 NOTE — PROGRESS NOTES
General Surgery End of Shift Nursing Note Bedside shift change report given to Kailee Perez RN (oncoming nurse) by Adrian Castaneda RN 
 (offgoing nurse). Report included the following information SBAR, Kardex, Intake/Output, MAR and Recent Results. Shift worked:   3p to 7p. Summary of shift:   IV Dilaudid ordered again per patient request for severe pain. Issues for physician to address:  Needs to be weaned off IV pain medicine he didn't tolerate going cold turkey.    
 
 
 
Adrian Castaneda RN

## 2020-10-27 NOTE — WOUND CARE
Wound VAC dressing change: right BKA incision. Patient up to recliner chair today with PT. CM still trying to place patient at a SNF and Covid testing results pending. Patient has HD on MWF. Right BKA wound-incision today: 
 
 
 
 
Staple/suture line has small amount of drainage and skin feels \"mushy\", not firm, around it. MILD odor and drainage is sangquinous to dark red. WOUND POA CONDITIONS Vacuum Assisted Closure Right Leg (Active) Vac Status Suction, continuous; Patent 10/27/20 9220 Suction (mmHg) 125 10/27/20 0962 Site Assessment Clean, dry, & intact 10/27/20 2742 Dressing Status Clean, dry, & intact 10/27/20 4304 Foam Type black 10/27/20 0907 Number Pieces of Foam 2 10/23/20 1243 Drainage Chamber Level (ml) 100 ml 10/26/20 1533 Cannister Changed yes 10/27/20 7908 Output (ml) 0 ml 10/26/20 1533 Machine Type ultra 10/27/20 3645 Number of days: 7 Wound Leg Right BKA Incision (Active) Dressing Status Removed; Changed per order 10/27/20 1505 Dressing Type Vacuum dressing 10/27/20 1505 Incision Site Well Approximated No 10/26/20 2015 Non-staged Wound Description Full thickness 10/27/20 1505 Wound Length (cm) 4 cm 10/27/20 1505 Wound Width (cm) 5 cm 10/27/20 1505 Wound Depth (cm) 1.5 cm 10/27/20 1505 Wound Surface Area (cm^2) 20 cm^2 10/27/20 1505 Wound Volume (cm^3) 30 cm^3 10/27/20 1505 Change in Wound Size % 19.19 10/27/20 1505 Condition of Base Granulation;Slough;Eschar 10/27/20 1505 Condition of Edges Rolled/curled 10/27/20 1505 Assessment Clean 10/27/20 1505 Tissue Type Percent Black 5 % 10/27/20 1505 Tissue Type Percent Pink 40 10/27/20 1505 Tissue Type Percent Red 45 10/27/20 1505 Tissue Type Percent Yellow 10 10/27/20 1505 Drainage Amount Small 10/27/20 1505 Drainage Color Serosanguinous 10/27/20 1505 Wound Odor Mild 10/27/20 1504 Amanda-wound Assessment Intact 10/27/20 1502 Cleansing and Cleansing Agents  Dermal wound cleanser 10/27/20 1505 Dressing Changed Changed/New 10/27/20 1505 Dressing Type Applied Negative pressure wound therapy 10/27/20 1505 Procedure Bleeding None 10/13/20 0532 Closure Staples 10/13/20 0532 Procedure Tolerated Well 10/27/20 1505 Number of days: 59 WC nurse spoke with Dr Tana Hodge earlier today and he was unable to see wound during dressing change today. Plan: continue to monitor and change wound vac dressing while inpatient.  
 
Dyana Valerio RN, [employer]

## 2020-10-27 NOTE — PROGRESS NOTES
Comprehensive Nutrition Assessment Type and Reason for Visit: Dion Bennett Nutrition Recommendations/Plan:  
Continue diet and supplements Consider adding renal diet restriction if  
 
Nutrition Assessment:   Chart reviewed, pt receiving wound care at time of attempted visit. Awaiting disposition. HD tx planned for tomorrow. K 5.1 and phos 5.6, may require repletion. Appetite remains good per RN flowsheet's. -163. Patient Vitals for the past 72 hrs: 
 % Diet Eaten 10/26/20 1400 100 % 10/25/20 1816 100 % 10/25/20 1317 80 % 10/25/20 0920 60 % Estimated Daily Nutrient Needs: 
Energy (kcal):  MSJ 2500 (2500 x 1.2 -500 for obesity) Protein (g):  120-150g (0.8-1gPro/kg) Fluid (ml/day):  1800mL Nutrition Related Findings:  Meds: lantus, humalog, renvela. BM 10/26 Wounds:   
Wound vac, Surgical wound, Partial thickness, Stage II    
 
Current Nutrition Therapies: DIET DIABETIC CONSISTENT CARB Regular DIET NUTRITIONAL SUPPLEMENTS Lunch, Dinner; [place] DIET ONE TIME MESSAGE Anthropometric Measures: 
· Height:  6' 2\" (188 cm) · Current Body Wt:  143.9 kg (317 lb 3.9 oz) · Ideal Body Wt:  190 lbs:  174 % · BMI Category:  Obese class 3 (BMI 40.0 or greater) Nutrition Diagnosis:  
· Inadequate protein-energy intake related to other (specify)(large habitus and HD) as evidenced by other (specify)(est needs 2500 kcals and 120-150g Pro daily.) Previous dx resolving. Nutrition Interventions:  
Food and/or Nutrient Delivery: Continue current diet, Continue oral nutrition supplement Nutrition Education and Counseling: No recommendations at this time Coordination of Nutrition Care: Continued inpatient monitoring Goals: Pt will consume >70% of meals/supplements in 4-6 days. Nutrition Monitoring and Evaluation:  
Behavioral-Environmental Outcomes: Knowledge or skill Food/Nutrient Intake Outcomes: Food and nutrient intake, Supplement intake Physical Signs/Symptoms Outcomes: Biochemical data, Fluid status or edema, Weight, Skin, GI status Discharge Planning:   
Continue current diet Electronically signed by Maria T Crabtree RD, [address]  on 10/27/2020 at 3:19 PM 
 
Contact: [facility]7190

## 2020-10-27 NOTE — PROGRESS NOTES
Problem: Mobility Impaired (Adult and Pediatric) Goal: *Acute Goals and Plan of Care (Insert Text) Description: FUNCTIONAL STATUS PRIOR TO ADMISSION: The patient was functional at the wheelchair level and was supervision for transfers to the chair. HOME SUPPORT PRIOR TO ADMISSION: came from SNF, awaiting group home set up Physical Therapy Goals Re-Assess 10/26/20 Goals not achieved, modified standing and walking goals 1. Patient will propel wheelchair 150ft with B UE technique mod I within 7 days 2. Patient will transfer from bed to chair and chair to bed with modified independence using the least restrictive device within 7 day(s). 3.  Patient will perform sit to stand with mod assist  within 7 day(s). 4. Patient will ambulate 10ft with RW and mod assist using hop to pattern within 7 days Initiated 10/13/2020 1. Patient will propel wheelchair 150ft with B UE technique mod I within 7 days 2. Patient will transfer from bed to chair and chair to bed with modified independence using the least restrictive device within 7 day(s). 3.  Patient will perform sit to stand with supervision/set-up within 7 day(s). 4. Patient will ambulate 10ft with RW and min A using hop to pattern within 7 days Outcome: Progressing Towards Goal 
 PHYSICAL THERAPY TREATMENT Patient: Flor Patino (89 y.o. male) Date: 10/27/2020 Diagnosis: Abnormal stress test [R94.39] CAD (coronary artery disease) [I25.10] Procedure(s) (LRB): REVISION RIGHT BELOW THE KNEE AMPUTATION (Right) 12 Days Post-Op Precautions: Fall, Skin(8-24 R BKA/10-15debrid/x2, wd vac; No BP L UE, blind; cardia) Chart, physical therapy assessment, plan of care and goals were reviewed.  
 
ASSESSMENT: Patient continues with skilled PT services and is progressing towards goals, pt did very well today, showed him he could do more than he thought, able to stand and transfer to chair, started with heel-toe technique but transferred to hopping on his own, no gross LOB or SOB, does well with bed mob, no knee buckling, vc's for safety and proper RW use. Current Level of Function Impacting Discharge (mobility/balance): CGA x2 PLAN : Patient continues to benefit from skilled intervention to address the above impairments. Continue treatment per established plan of care 
to address goals. Recommendation for discharge: (in order for the patient to meet his/her long term goals) Therapy 3 hours per day 5-7 days per week This discharge recommendation: Has been made in collaboration with the attending provider and/or case management IF patient discharges home will need the following DME: patient owns DME required for discharge OBJECTIVE DATA SUMMARY:  
 
Critical Behavior: 
Neurologic State: Alert Orientation Level: Oriented X4 Cognition: Appropriate decision making, Appropriate for age attention/concentration, Appropriate safety awareness, Follows commands Safety/Judgement: Awareness of environment, Fall prevention, Home safety(learning about BKA) Functional Mobility Training: 
Bed Mobility: 
Rolling: Supervision Supine to Sit: Supervision Scooting: Supervision Level of Assistance: Supervision Interventions: Verbal cues Transfers: 
Sit to Stand: Contact guard assistance;Assist x2(with bed raised) Stand to Sit: Contact guard assistance Bed to Chair: Contact guard assistance;Assist x2; Additional time Interventions: Tactile cues; Verbal cues Level of Assistance: Contact guard assistance;Assist x2; Additional time Balance: 
Sitting: Intact; Without support Sitting - Static: Good (unsupported) Standing: Intact; With support Standing - Static: Fair;Constant support Standing - Dynamic : Fair;Constant support Ambulation/Gait Training: 
Distance (ft): 3 Feet (ft) Assistive Device: Gait belt;Walker, rolling Ambulation - Level of Assistance: Contact guard assistance;Assist x2;Additional time Gait Abnormalities: Decreased step clearance Right Side Weight Bearing: Non-weight bearing Left Side Weight Bearing: Full Base of Support: Center of gravity altered;Narrowed; Shift to left Stance: Left increased Speed/Holly: Slow Step Length: Left shortened Pain Ratin Activity Tolerance: Poor After treatment patient left in no apparent distress: Sitting in chair and Call bell within reach COMMUNICATION/COLLABORATION:  
The patients plan of care was discussed with: Registered nurse. Octavia Vaca PTA Time Calculation: 30 mins

## 2020-10-27 NOTE — PROGRESS NOTES
TEJA: 
 
Inpatient Rehab Medicaid Pending UPDATE: 11:30PM 
 
ROXANE informed that pt was accepted to [facility]. CM attempted to contact admin coordinator to review pt's d/c needs and plans. Attempt was unsuccessful and CM left a voicemail requesting a return call. CM will continue to follow. LEATHA Tellez, [address] [facility] UPDATE: 10:49AM 
 
 
CM spoke with pt's family, Shane Skaggs, via telephone regarding pt's d/c plans and needs. CM informed Shane Skaggs about inpatient rehab and denial and informing her that SNF would be an better option. Shane Skaggs informed that pt has [employer] that was active as of Sept 10th, however, inpatient rehab would like for pt to have safe housing upon d/c. Austin Jalloh reported that she is attempting to have pt placed at group home, however, she will need more time to complete documents. CM offered SNF placement for pt. Austin Jalloh reported that pt has been to [facility] ([facility]) in the past, and pt could return back to snf. ROXANE informed Kala that there was several referrals sent to facilities and the acceptance is currently pending. Kala agreeable to snf referrals sent, but [facility] is her first choice. CM attempted contact with admin coordinator at [facility] but attempt was unsuccessful. CM left voicemail requesting a return call. CM will follow LEATHA Tellez, [address] [facility] UPDATE: 10:15AM 
 
CM attempted contact with pt's family member: Anderson Esparza, via telephone. However, the attempt was unsuccessful. CM left voicemail requesting a return call. CM will send referral to snf, to review clinicals. CM will inform Kala of the following snf referrals that was sent. LEATHA Tellez, [address] CM: [facility] is currently working with the pt in the Gen Surg Unit. ROXANE informed that pt has been declined inpatient rehab at [address] [place] [address], and CM made aware that referral was sent to [facility].    
 
It was reported by [facility] that pt is our of full medicare days and is currently in his part time days. Without a secondary insurance, pt will have an extremely high out of pocket cost for inpatient rehab (50% cost). Pt currently has medicaid pending. Pt will be accepted to group home once medicaid has been approved. Pt is known to have 12 SNF days and known to have 80 part time days. Although therapy is recommended inpatient rehab, but snf would be more appropriate for pt. Palliative consult will benefit this pt due to his multiple diagnosis. CM will contact pt's cousin: Hans Garcia to verify pt's placement options. CM will staff case with CM  and CM Supervisor, to address d/c concerns of pt. CM will continue to follow. CM will continue to follow. LEATHA Guajardo, [address]

## 2020-10-27 NOTE — PROGRESS NOTES
Vascular Surgery Progress Note Nivia Quintanilla ACNP-BC 
10/27/2020 Subjective:  
 
Mr. Sydnie Bell a  89 F. o.  male with a pmhx significant for ESRD on HD, DM, HTN, HLD, and obesity.  Patient is legally blind and has hearing loss. 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 Since his BKA he has undergone an ablation for AFlutter. Plan was for debridement and prior to the procedure a stress test was recommended which he completed on 10/09/2020 that was abnl and he was referred for admission. He underwent a diagnostic cath that was significant for multivessel disease. He was evaluated by CT surgery and currently is not a candidate for CABG due to diffuse disease with poor targets for revascularization and poor LV function. Patient is s/p PCI w/ KRISHAN 10/13/2020. Once stable he underwent debridement of his right BKA on 10/15/2020. Post procedure he underwent bedside debridement x 2 for persistent necrosis. Wound vac intact on right stump without surrounding erythema or edema. Nursing Data:  
 
Patient Vitals for the past 24 hrs: 
 BP Temp Temp src Pulse Resp SpO2  
10/27/20 0355 120/74 98.3 °F (36.8 °C)  82 18 96 % 10/27/20 0038 (!) 113/54 98.1 °F (36.7 °C)  80 18 95 % 10/26/20 2015 127/82 98.1 °F (36.7 °C)  80 18 91 % 10/26/20 1718 120/68   80 18 95 % 10/26/20 1307 133/80 98.3 °F (36.8 °C)  79 20 97 % 10/26/20 1159 122/66 98 °F (36.7 °C) Oral 75 16   
10/26/20 1145 118/61   75 16   
10/26/20 1130 120/64   73 16   
10/26/20 1115 118/67   75 16   
10/26/20 1100 138/77   74 16   
10/26/20 1045 115/71   75 16   
10/26/20 1030 (!) 131/48   75 16   
10/26/20 1015 138/84   75 16   
10/26/20 1000 (!) 160/83   73 16   
10/26/20 0945 (!) 151/84   73 16   
10/26/20 0930 (!) 171/94   74 16   
10/26/20 0915 (!) 166/90   72 16   
10/26/20 0900 (!) 158/88   72 16  10/26/20 0845 (!) 147/83   72 16   
10/26/20 0830 (!) 175/87   72 16   
10/26/20 0815 (!) 155/92   72 16  Intake/Output Summary (Last 24 hours) at 10/27/2020 3088 Last data filed at 10/27/2020 9435 Gross per 24 hour Intake 240 ml Output 3500 ml Net -3260 ml Exam:  
 
Physical Exam 
Constitutional:   
   Appearance: He is obese. He is legally blind and is [place]. HENT:  
   Head: Normocephalic. Nose: Nose normal.  
   Mouth/Throat:  
   Mouth: Mucous membranes are moist.  
Eyes:  
   Pupils: Pupils are equal, round, and reactive to light. Neck: Musculoskeletal: Normal range of motion. Cardiovascular:  
   Rate and Rhythm: Normal rate and regular rhythm. Pulmonary:  
   Effort: Pulmonary effort is normal.  
   Breath sounds: Normal breath sounds. Abdominal:  
   General: Abdomen is flat. Palpations: Abdomen is soft. Musculoskeletal: Normal range of motion. Skin: Wound vac right stump. Left AVF dry and intact Neurological:  
   General: No focal deficit present. Mental Status: He is alert. Psychiatric:     
   Mood and Affect: Mood normal.     
   Thought Content: Thought content normal.  
 
Lab Review:  
 
. Recent Results (from the past 24 hour(s)) CBC W/O DIFF Collection Time: 10/26/20  8:30 AM  
Result Value Ref Range WBC 9.2 4.1 - 11.1 K/uL  
 RBC 3.31 (L) 4.10 - 5.70 M/uL HGB 8.3 (L) 12.1 - 17.0 g/dL HCT 26.8 (L) 36.6 - 50.3 % MCV 81.0 80.0 - 99.0 FL  
 MCH 25.1 (L) 26.0 - 34.0 PG  
 MCHC 31.0 30.0 - 36.5 g/dL  
 RDW 15.9 (H) 11.5 - 14.5 % PLATELET 218 403 - 473 K/uL MPV 8.9 8.9 - 12.9 FL  
 NRBC 0.0 0  WBC ABSOLUTE NRBC 0.00 0.00 - 0.01 K/uL METABOLIC PANEL, BASIC Collection Time: 10/26/20  8:30 AM  
Result Value Ref Range Sodium 136 136 - 145 mmol/L Potassium 5.1 3.5 - 5.1 mmol/L Chloride 97 97 - 108 mmol/L  
 CO2 29 21 - 32 mmol/L Anion gap 10 5 - 15 mmol/L Glucose 77 65 - 100 mg/dL BUN 54 (H) 6 - 20 MG/DL Creatinine 12.60 (H) 0.70 - 1.30 MG/DL  
 BUN/Creatinine ratio 4 (L) 12 - 20 GFR est AA 6 (L) >60 ml/min/1.73m2 GFR est non-AA 5 (L) >60 ml/min/1.73m2 Calcium 9.2 8.5 - 10.1 MG/DL  
GLUCOSE, POC Collection Time: 10/26/20  1:57 PM  
Result Value Ref Range Glucose (POC) 99 65 - 100 mg/dL Performed by Nadja Barney SARS-COV-2 Collection Time: 10/26/20  3:51 PM  
Result Value Ref Range Specimen source Nasopharyngeal    
 SARS-CoV-2 PENDING   
 SARS-CoV-2 PENDING Specimen source Nasopharyngeal    
 COVID-19 rapid test PENDING Specimen type NP Swab Health status PENDING   
 COVID-19 PENDING   
GLUCOSE, POC Collection Time: 10/26/20  4:35 PM  
Result Value Ref Range Glucose (POC) 163 (H) 65 - 100 mg/dL Performed by Cirilo Bermudez PCT GLUCOSE, POC Collection Time: 10/26/20  9:29 PM  
Result Value Ref Range Glucose (POC) 129 (H) 65 - 100 mg/dL Performed by [employer] (PCT) GLUCOSE, POC Collection Time: 10/27/20  7:16 AM  
Result Value Ref Range Glucose (POC) 112 (H) 65 - 100 mg/dL Performed by Neeta Garzon (PCT) Assessment/Plan: PAD w/ dehiscence of surgical incision w/ necrosis · S/p right BKA 08/24/2020 · S/p revision of right BKA 10/15/2020 · Bedside debridement x 2. 
· Wound vac 10/20/2020 =>   
· H&H stable · Continue ASA and Plavix for KRISHAN  
· Continue OOB with PT daily Complication of HD access · Bleeding resolved w/ suture · Outpatient f/u at [facility] · Using w/o event  
  
ASHD Hyperlipidemia · Diagnostic cath 10/10/2020-diffuse multivessel disease · He is not a candidate for CABG 
· PCI with KRISHAN placement 10/13/2020 · BBlocker, ASA, & statin · Dietary non-compliance Hypertension · Labile Plan per cardiology  
  
Active problems ESRF 
· HD MWF [employer]  
Hyperphosphatasia Anemia of chronic renal disease · Stable and not at a level to transfuse Plan per nephrology 
  
Uncontrolled Diabetes Mellitus w/ hyperglycemia/hypoglycemia · HA1c 9.4 8/15/2020 · Labile · Controlled on current dose of basal insulin which is a decreased from his home dose. · Dietary non-compliance Morbid obesity  
 
Management of comorbid conditions by primary team. 
  
VTE Prophylaxis: [facility] Encourage OOB with assistance.  
  
Disposition: SNF placement. Stable for discharge today w/ wound vac from a vascular standpoint. Awaiting bed placement and insurance authorization. Repeat SARs-COV2 testing pending.

## 2020-10-27 NOTE — PROGRESS NOTES
Problem: Falls - Risk of 
Goal: *Absence of Falls Description: Document Greg Pena Fall Risk and appropriate interventions in the flowsheet. Outcome: Progressing Towards Goal 
Note: Fall Risk Interventions: 
Mobility Interventions: Assess mobility with egress test, Communicate number of staff needed for ambulation/transfer, Patient to call before getting OOB Medication Interventions: Patient to call before getting OOB, Teach patient to arise slowly Elimination Interventions: Call light in reach History of Falls Interventions: Investigate reason for fall, Room close to nurse's station, Utilize gait belt for transfer/ambulation Problem: Patient Education: Go to Patient Education Activity Goal: Patient/Family Education Outcome: Progressing Towards Goal 
  
Problem: Pressure Injury - Risk of 
Goal: *Prevention of pressure injury Description: Document Ranjeet Scale and appropriate interventions in the flowsheet. Outcome: Progressing Towards Goal 
Note: Pressure Injury Interventions: 
Sensory Interventions: Assess changes in LOC, Check visual cues for pain, Float heels Moisture Interventions: Absorbent underpads, Contain wound drainage, Maintain skin hydration (lotion/cream), Minimize layers Activity Interventions: Increase time out of bed Mobility Interventions: HOB 30 degrees or less Nutrition Interventions: Document food/fluid/supplement intake Friction and Shear Interventions: HOB 30 degrees or less, Lift sheet, Lift team/patient mobility team, Minimize layers Problem: Patient Education: Go to Patient Education Activity Goal: Patient/Family Education Outcome: Progressing Towards Goal 
  
Problem: Pain Goal: *Control of Pain Outcome: Progressing Towards Goal 
  
Problem: Patient Education: Go to Patient Education Activity Goal: Patient/Family Education Outcome: Progressing Towards Goal 
  
Problem: Patient Education: Go to Patient Education Activity Goal: Patient/Family Education Outcome: Progressing Towards Goal 
  
Problem: Cath Lab Procedures: Post-Cath Day of Procedure (Initiate SCIP Measures for Post-Op Care) Goal: Off Pathway (Use only if patient is Off Pathway) Outcome: Progressing Towards Goal 
Goal: Activity/Safety Outcome: Progressing Towards Goal 
Goal: Consults, if ordered Outcome: Progressing Towards Goal 
Goal: Diagnostic Test/Procedures Outcome: Progressing Towards Goal 
Goal: Nutrition/Diet Outcome: Progressing Towards Goal 
Goal: Discharge Planning Outcome: Progressing Towards Goal 
Goal: Medications Outcome: Progressing Towards Goal 
Goal: Respiratory Outcome: Progressing Towards Goal 
Goal: Treatments/Interventions/Procedures Outcome: Progressing Towards Goal 
Goal: Psychosocial 
Outcome: Progressing Towards Goal 
Goal: *Procedure site is without bleeding and signs of infection six hours post sheath removal 
Outcome: Progressing Towards Goal 
Goal: *Hemodynamically stable Outcome: Progressing Towards Goal 
Goal: *Optimal pain control at patient's stated goal 
Outcome: Progressing Towards Goal 
  
Problem: Diabetes Maintenance:Admission Goal: Activity/Safety Outcome: Progressing Towards Goal 
Goal: Diagnostic Tests/Procedures Outcome: Progressing Towards Goal 
Goal: Nutrition Outcome: Progressing Towards Goal 
Goal: Medications Outcome: Progressing Towards Goal 
Goal: Treatments/Interventions/Procedures Outcome: Progressing Towards Goal 
  
Problem: Diabetes Maintenance:Ongoing Goal: Activity/Safety Outcome: Progressing Towards Goal 
Goal: Nutrition Outcome: Progressing Towards Goal 
Goal: Medications Outcome: Progressing Towards Goal 
Goal: Treatments/Interventsions/Procedures Outcome: Progressing Towards Goal 
Goal: *Blood Glucose 80 to 180 md/dl Outcome: Progressing Towards Goal 
  
Problem: Diabetes Maintenance:Discharge Outcomes Goal: *Describes follow-up/return visits to physicians Outcome: Progressing Towards Goal 
Goal: *Blood glucose at patient's target range or approaching Outcome: Progressing Towards Goal 
Goal: *Aware of nutrition guidelines Outcome: Progressing Towards Goal 
Goal: *Verbalizes information about medication Description: Verbalizes name, dosage, time, side effects, and number of days to 
continue medications. Outcome: Progressing Towards Goal 
Goal: *Describes goals, rules, symptoms, and treatments Description: Describes blood glucose goals, monitoring, sick day rules, 
hypo/hyperglycemia prevention, symptoms, and treatment Outcome: Progressing Towards Goal 
Goal: *Describes available outpatient diabetes resources and support systems Outcome: Progressing Towards Goal 
  
Problem: Patient Education: Go to Patient Education Activity Goal: Patient/Family Education Outcome: Progressing Towards Goal 
  
Problem: Patient Education: Go to Patient Education Activity Goal: Patient/Family Education Outcome: Progressing Towards Goal

## 2020-10-27 NOTE — PROGRESS NOTES
General Surgery End of Shift Nursing Note Bedside shift change report given to Vesna Gongora (oncoming nurse) by Drew Thompson (offgoing nurse). Report included the following information SBAR and Kardex. Shift worked:   7a-7p Summary of shift:    Pt had uneventful shift. Pt had his wound vac dressing changed by [facility]. Pt need pain medicine today. Pt got up to the chair for 4 hours with PT. Issues for physician to address:   none Number times ambulated in hallway past shift: 0 Number of times OOB to chair past shift: 1 Pain Management: 
Current medication: see mar Patient states pain is manageable on current pain medication: YES 
 
GI: 
 
Current diet:  DIET DIABETIC CONSISTENT CARB Regular DIET NUTRITIONAL SUPPLEMENTS Lunch, Dinner; [place] DIET ONE TIME MESSAGE Tolerating current diet: YES Passing flatus: YES Last Bowel Movement: several days ago Appearance: unknown Respiratory: 
 
Incentive Spirometer at bedside: YES Patient instructed on use: YES Patient Safety: 
 
Falls Score: 4 Bed Alarm On? No 
Sitter? No 
 
Windy Yanez

## 2020-10-27 NOTE — PROGRESS NOTES
Bedside and Verbal shift change report given to Katerine Hardy RN (oncoming nurse) by Juan Ledezma RN and Latricia Hernandez RN (offgoing nurse). Report included the following information SBAR, Kardex, Intake/Output and MAR.

## 2020-10-27 NOTE — PROGRESS NOTES
NAME: Soraida Mora :  1985 MRN:  [id number] Assessment :    Plan: 
--[facility] 
Anemia CAD Labile BP, now HTN 
SHPT --No acute need for HD today. Plan HD in AM. Continue FARIDA-10 k MWF On renvela, phos is < 5.5 DC planning Subjective: Chief Complaint:  \"My stump is black and I smell like fish. Why aren't the changing my wound vac? \"  No N/V. No dyspnea. No pain. Review of Systems: 
 
Symptom Y/N Comments  Symptom Y/N Comments Fever/Chills    Chest Pain Poor Appetite    Edema Cough    Abdominal Pain Sputum    Joint Pain SOB/LITTLE    Pruritis/Rash Nausea/vomit    Tolerating PT/OT Diarrhea    Tolerating Diet Constipation    Other Could not obtain due to:   
 
Objective: VITALS:  
Last 24hrs VS reviewed since prior progress note. Most recent are: 
Visit Vitals /82 Pulse 79 Temp 98.3 °F (36.8 °C) Resp 18 Ht 6' 2\" (1.88 m) Wt 143.9 kg (317 lb 3.9 oz) SpO2 96% BMI 40.73 kg/m² Intake/Output Summary (Last 24 hours) at 10/27/2020 7867 Last data filed at 10/27/2020 4946 Gross per 24 hour Intake 240 ml Output 3500 ml Net -3260 ml Telemetry Reviewed: PHYSICAL EXAM: 
General: NAD 
cta alfredo on room air RRR Bka, (L) dressing oozing Lab Data Reviewed: (see below) Medications Reviewed: (see below) PMH/SH reviewed - no change compared to H&P 
________________________________________________________________________ Care Plan discussed with: 
Patient y Family RN Care Manager Consultant:  y   
 
  Comments >50% of visit spent in counseling and coordination of care    
 
________________________________________________________________________ Rishi Richey MD  
 
Procedures: see electronic medical records for all procedures/Xrays and details which 
 were not copied into this note but were reviewed prior to creation of Plan. LABS: 
Recent Labs 10/26/20 
0830 WBC 9.2 HGB 8.3* HCT 26.8*  
 Recent Labs 10/26/20 
0830   
K 5.1 CL 97  
CO2 29 BUN 54* CREA 12.60* GLU 77  
CA 9.2 No results for input(s): AP, TBIL, TP, ALB, GLOB, GGT, AML, LPSE in the last 72 hours. No lab exists for component: SGOT, GPT, AMYP, HLPSE No results for input(s): INR, PTP, APTT, INREXT, INREXT in the last 72 hours. No results for input(s): FE, TIBC, PSAT, FERR in the last 72 hours. No results found for: FOL, RBCF No results for input(s): PH, PCO2, PO2 in the last 72 hours. No results for input(s): CPK, CKMB in the last 72 hours. No lab exists for component: TROPONINI No components found for: [place] Lab Results Component Value Date/Time Color YELLOW/STRAW 05/01/2017 05:39 PM  
 Appearance CLOUDY (A) 05/01/2017 05:39 PM  
 Specific gravity 1.018 05/01/2017 05:39 PM  
 Specific gravity >1.030 (H) 10/03/2014 04:35 AM  
 pH (UA) 5.5 05/01/2017 05:39 PM  
 Protein 300 (A) 05/01/2017 05:39 PM  
 Glucose 250 (A) 05/01/2017 05:39 PM  
 Ketone NEGATIVE  05/01/2017 05:39 PM  
 Bilirubin NEGATIVE  05/01/2017 05:39 PM  
 Urobilinogen 0.2 05/01/2017 05:39 PM  
 Nitrites NEGATIVE  05/01/2017 05:39 PM  
 Leukocyte Esterase NEGATIVE  05/01/2017 05:39 PM  
 Epithelial cells FEW 05/01/2017 05:39 PM  
 Bacteria NEGATIVE  05/01/2017 05:39 PM  
 WBC 0-4 05/01/2017 05:39 PM  
 RBC 5-10 05/01/2017 05:39 PM  
 
 
MEDICATIONS: 
Current Facility-Administered Medications Medication Dose Route Frequency  HYDROmorphone (PF) (DILAUDID) injection 1 mg  1 mg IntraVENous Q6H PRN  
 epoetin nata-epbx (RETACRIT) injection 10,000 Units  10,000 Units SubCUTAneous Q MON, WED & FRI  insulin glargine (LANTUS) injection 10 Units  10 Units SubCUTAneous QHS  influenza vaccine 2020-21 (6 mos+)(PF) (FLUARIX/FLULAVAL/FLUZONE QUAD) injection 0.5 mL  0.5 mL IntraMUSCular PRIOR TO DISCHARGE  lidocaine (PF) (XYLOCAINE) 10 mg/mL (1 %) injection 0.1 mL  0.1 mL SubCUTAneous PRN  
 oxyCODONE IR (ROXICODONE) tablet 5 mg  5 mg Oral Q4H PRN  
 metoprolol tartrate (LOPRESSOR) tablet 75 mg  75 mg Oral BID  albuterol-ipratropium (DUO-NEB) 2.5 MG-0.5 MG/3 ML  3 mL Nebulization Q6H PRN  
 clopidogreL (PLAVIX) tablet 75 mg  75 mg Oral DAILY  loperamide (IMODIUM) capsule 2 mg  2 mg Oral QID PRN  
 sevelamer carbonate (RENVELA) tab 800 mg  800 mg Oral TID WITH MEALS  sodium chloride (NS) flush 5-40 mL  5-40 mL IntraVENous PRN  
 acetaminophen (TYLENOL) tablet 650 mg  650 mg Oral Q6H PRN Or  
 acetaminophen (TYLENOL) suppository 650 mg  650 mg Rectal Q6H PRN  polyethylene glycol (MIRALAX) packet 17 g  17 g Oral DAILY PRN  
 aspirin delayed-release tablet 81 mg  81 mg Oral DAILY  atorvastatin (LIPITOR) tablet 20 mg  20 mg Oral QHS  hydrALAZINE (APRESOLINE) 20 mg/mL injection 20 mg  20 mg IntraVENous Q6H PRN  
 insulin lispro (HUMALOG) injection   SubCUTAneous AC&HS  
 glucose chewable tablet 16 g  4 Tab Oral PRN  
 dextrose (D50W) injection syrg 12.5-25 g  12.5-25 g IntraVENous PRN  
 glucagon (GLUCAGEN) injection 1 mg  1 mg IntraMUSCular PRN

## 2020-10-28 NOTE — PROCEDURES
[facility]  [phone] Vitals   Pre   Post   Assessment   Pre   Post    
Temp  Temp: 98.1 °F (36.7 °C) (10/28/20 0758)  98.0, oral LOC  Drowsy & oriented x4; did not sleep well last night A&Ox4 HR   Pulse (Heart Rate): 72 (10/28/20 0758) 78 Lungs   Diminished Dim bases B/P   BP: (!) 158/87 (10/28/20 0758) 136/62 Cardiac   Reg S1 S2 Reg S1 S2 Resp   Resp Rate: 16 (10/28/20 0758) 18 Skin   R BKA R BKA Pain level  Pain Intensity 1: 0 (10/28/20 0000) 0 Edema  Generalized trace Generalized trace Orders: Duration:   Start:    4560 End:    1200 Total:   4hr  
Dialyzer:   Dialyzer/Set Up Inspection: Chemo Koenig (10/28/20 0758) K Bath:   Dialysate K (mEq/L): 2 (10/28/20 0758) Ca Bath:   Dialysate CA (mEq/L): 2.5 (10/28/20 0758) Na/Bicarb:   Dialysate NA (mEq/L): 138 (10/28/20 0758) Target Fluid Removal:   Goal/Amount of Fluid to Remove (mL): 3500 mL (10/28/20 0758) Access Type & Location:   PELON AVF: skin CDI. No s/s of infection. + B/T. No issues with cannulation or hemostasis. Running well at . Labs Obtained/Reviewed Critical Results Called   Date when labs were drawn- 
Hgb-   
HGB Date Value Ref Range Status 10/26/2020 8.3 (L) 12.1 - 17.0 g/dL Final  
 
K-   
Potassium Date Value Ref Range Status 10/28/2020 5.3 (H) 3.5 - 5.1 mmol/L Final  
 
Ca-  
Calcium Date Value Ref Range Status 10/28/2020 9.0 8.5 - 10.1 MG/DL Final  
 
Bun-  
BUN Date Value Ref Range Status 10/28/2020 47 (H) 6 - 20 MG/DL Final  
 
Creat-  
Creatinine Date Value Ref Range Status 10/28/2020 11.20 (H) 0.70 - 1.30 MG/DL Final  
 
  
Medications/ Blood Products Given Name   Dose   Route and Time None ordered Blood Volume Processed (BVP):    99.5L Net Fluid Removed:  2650ml Comments Time Out Done: 4571 Primary Nurse Rpt Pre: Frederick Toth RN 
Primary Nurse Rpt Post: Frederick Toth RN 
Pt Education: procedure Care Plan: D/C after HD Tx Summary: 
Pt arrived to HD suite Drowsy&Ox4. Consent signed & on file. SBAR received from Primary RN. Patient states he did not sleep well last night. 6536: Pt cannulated with 56G needles per policy & without issue. VSS. Dialysis Tx initiated. 0900: Dr Sher Nearing at bedside. VSS. 1030: /45. 100ml NS bolus given for low diastolic. No patient complaints. 1100: BP 95/41. UF off. Goal decreased. No patient complaints. 1107: Retake /64.  
1115: UF back on at decreased UFR. VSS. 
1200: Tx ended. VSS. All possible blood returned to patient. Hemostasis achieved without issue. Bed locked and in the lowest position, call bell and belongings in reach. SBAR given to Primary, RN. Patient is stable at time of their departure. All Dialysis related medications have been reviewed. Admiting Diagnosis: CAD/ Cardiac Cath/ R BKA Pt's previous clinic- [address] 
Consent signed - Informed Consent Verified: Yes (10/28/20 0758) [place] Consent - on file Hepatitis Status- Neg 10/23/2020 Machine #- Machine Number: [id number] (10/28/20 5885) Telemetry status- none Pre-dialysis wt. -

## 2020-10-28 NOTE — DISCHARGE SUMMARY
Vascular Surgery Discharge Summary Patient ID: 
Fletcher Moore [id number] 
73 y.o. 
1985 Admitting Provider: Rob Vazquez MD 
Discharging Provider: Rob Vazquez MD 
 
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 
· Wound vac 10/20/2020 => removed for discharge. Will need wound vac replaced at SNF. 
· H&H stable · Continue ASA and Plavix for KRISHAN this admission · Encourage OOB. NWB on stump. Complication of HD access nPOA · Bleeding resolved w/ suture · Using w/o event · Outpatient f/u at [place] [address] Hyperlipidemia POA · Diagnostic cath 10/10/2020-diffuse multivessel disease · He is not a candidate for CABG 
· PCI with KRISHAN placement 10/13/2020 · BBlocker, ASA, & statin · Hx of dietary non-compliance Hypertension POA Hx of AFlutter POA · S/p ablation prior to presenting · Outpatient follow up with cardiology ESRF POA · HD MWF [place]  
Hyperphosphatasia POA Anemia of chronic renal disease POA · Stable and not at a level to transfuse · Routine HD during admission · Appreciate input from nephrology Uncontrolled Diabetes Mellitus w/ hyperglycemia/hypoglycemia  POA · HA1c 9.4 8/15/2020 · Controlled on current dose of basal insulin which is a decreased from his home dose. · Hx of dietary non-compliance Morbid obesity POA Procedures during admission: · S/p revision of right BKA 10/15/2020 · Bedside debridement x2  
· Nuclear stress test 10/09/2020 · Diagnostic cardiac cath 10/10/2020-diffuse multivessel disease · Treatment cardiac cath PCI with KRISHAN placement 10/13/2020 · Routine hemodialysis Hospital Course: Mr. Adni Marks is a  35 y. o.  male with a pmhx significant for ESRD on HD, DM, HTN, HLD, and obesity.  Patient is legally blind and has hearing loss.  He is s/p right BKA on 75/95/1516 that was complicated by dehiscence of his surgical incision w/ necrosis.  Since his BKA he has undergone an ablation for AFlutter.  On this occasion he was admitted to the hospital for debridement of his BKA. Prior to the procedure a stress test was recommended which he completed on 10/09/2020 that was abnl. Tanna Olmedo underwent a diagnostic cath that was significant for multivessel disease. Tanna Olmedo was evaluated by CT surgery and currently he is not a candidate for CABG due to diffuse disease with poor targets for revascularization and poor LV function. Patient is s/p PCI w/ KRISHAN on 10/13/2020. Once stable he underwent debridement of his right BKA on 10/15/2020. Post procedure he underwent bedside debridement x 2 for persistent necrosis. A wound vac was placed and his wound has stabilized. He continues to be at risk for a more proximal amputation. During admission he did develop bleeding from his RUE AVF that resolved with a single suture. He has been using the access since w/o event. He was evaluated by PT/OT and rehabilitation was recommended. He was transferred to a SNF at discharge. See above further details regarding his stay. Pertinent Results:  
Recent Results (from the past 24 hour(s)) GLUCOSE, POC Collection Time: 10/27/20 11:22 AM  
Result Value Ref Range Glucose (POC) 129 (H) 65 - 100 mg/dL Performed by Daryle Cake PCT GLUCOSE, POC Collection Time: 10/27/20  4:31 PM  
Result Value Ref Range Glucose (POC) 141 (H) 65 - 100 mg/dL Performed by Daryle Cake PCT GLUCOSE, POC Collection Time: 10/27/20  9:14 PM  
Result Value Ref Range Glucose (POC) 114 (H) 65 - 100 mg/dL Performed by Matteo Ojeda (PCT) METABOLIC PANEL, BASIC Collection Time: 10/28/20  2:58 AM  
Result Value Ref Range Sodium 135 (L) 136 - 145 mmol/L Potassium 5.3 (H) 3.5 - 5.1 mmol/L Chloride 96 (L) 97 - 108 mmol/L  
 CO2 31 21 - 32 mmol/L Anion gap 8 5 - 15 mmol/L Glucose 231 (H) 65 - 100 mg/dL BUN 47 (H) 6 - 20 MG/DL Creatinine 11.20 (H) 0.70 - 1.30 MG/DL  
 BUN/Creatinine ratio 4 (L) 12 - 20 GFR est AA 6 (L) >60 ml/min/1.73m2 GFR est non-AA 5 (L) >60 ml/min/1.73m2 Calcium 9.0 8.5 - 10.1 MG/DL  
GLUCOSE, POC Collection Time: 10/28/20  7:07 AM  
Result Value Ref Range Glucose (POC) 146 (H) 65 - 100 mg/dL Performed by Susan Luke (PCT) Vital signs:  
Patient Vitals for the past 24 hrs: 
 BP Temp Pulse Resp SpO2 Weight 10/28/20 0724 (!) 183/97 (!) 32 °F (0 °C) 71 16 96 %   
10/28/20 0648      146 kg (321 lb 14 oz) 10/28/20 0437 118/67 98.2 °F (36.8 °C) 73 17 100 %   
10/28/20 0019 127/73 98.1 °F (36.7 °C) 77 17 99 %   
10/27/20 2043 (!) 146/78 97.7 °F (36.5 °C) 71 16 96 %   
10/27/20 1542 115/75 97.9 °F (36.6 °C) 74 16 99 %   
10/27/20 1125 (!) 147/94 97.8 °F (36.6 °C) 75 18 99 %  Patient Weight:  
Last 3 Recorded Weights in this Encounter 10/23/20 0134 10/24/20 1505 10/28/20 2107 Weight: 145.2 kg (320 lb) 143.9 kg (317 lb 3.9 oz) 146 kg (321 lb 14 oz) Consults: Cardiology, Cardiothoracic Surgery, Hospitalist and Nephrology Patient Condition at Discharge: stable Disposition: SNF Patient Instructions:  
Current Discharge Medication List  
  
START taking these medications Details  
aspirin delayed-release 81 mg tablet Take 1 Tab by mouth daily. Qty: 30 Tab, Refills: 11  
  
atorvastatin (LIPITOR) 20 mg tablet Take 1 Tab by mouth nightly. Qty: 30 Tab, Refills: 11  
  
clopidogreL (PLAVIX) 75 mg tab Take 1 Tab by mouth daily. Qty: 30 Tab, Refills: 11  
  
metoprolol tartrate 75 mg tab Take 75 mg by mouth two (2) times a day.  
Qty: 60 Tab, Refills: 11  
  
polyethylene glycol (MIRALAX) 17 gram packet Take 1 Packet by mouth daily as needed for Constipation. Qty: 1 Packet, Refills: 0 CONTINUE these medications which have CHANGED Details  
oxyCODONE IR (ROXICODONE) 5 mg immediate release tablet Take 1 Tab by mouth every four (4) hours as needed for Pain for up to 7 days. Max Daily Amount: 30 mg. 
Qty: 30 Tab, Refills: 0 Associated Diagnoses: S/P bilateral BKA (below knee amputation) (HCC)  
  
gabapentin (NEURONTIN) 300 mg capsule Take 1 Cap by mouth nightly. Max Daily Amount: 300 mg. Qty: 30 Cap, Refills: 0 Associated Diagnoses: S/P bilateral BKA (below knee amputation) (HCC)  
  
insulin glargine (LANTUS) 100 unit/mL injection 10 Units by SubCUTAneous route nightly. Qty: 1 Vial, Refills: 0 CONTINUE these medications which have NOT CHANGED Details  
folic acid/vit B complex and C (RENAL VITAMIN PO) Take  by mouth daily. sevelamer (RENAGEL) 800 mg tablet Take 800 mg by mouth three (3) times daily (with meals). STOP taking these medications PEG 3350-Electrolytes (GO-LYTELY) 236-22.74-6.74 -5.86 gram suspension Comments:  
Reason for Stopping:   
   
 loperamide (IMODIUM) 2 mg capsule Comments:  
Reason for Stopping:   
   
  
 
 
Diet: Cardiac Diet and Diabetic Diet Activity/Wound Care:  
VASCULAR WOUND CARE INSTRUCTIONS 
STUMP: wet to moist dressing changes until wound vac can be placed. Patient may shower during wound vac changes. Non-weight bearing on stump. Patient should not attempt to drive a car. Mild exercise and light duties are OK. Call the office at [phone] if there are any problems. Patient Education Coronary Angiogram: What to Expect at [facility] Your Recovery A coronary angiogram is a test to examine the large blood vessel of your heart (coronary artery). The doctor inserted a thin, flexible tube (catheter) into a blood vessel in your groin. In some cases, the catheter is placed in a blood vessel in the arm. Your groin or arm may have a bruise and feel sore for a day or two after the procedure. You can do light activities around the house but nothing strenuous for several days. This care sheet gives you a general idea about how long it will take for you to recover. But each person recovers at a different pace. Follow the steps below to feel better as quickly as possible. How can you care for yourself at home? Activity 
  · If the doctor gave you a sedative: ? For 24 hours, don't do anything that requires attention to detail, such as going to work, making important decisions, or signing any legal documents. It takes time for the medicine's effects to completely wear off. 
? For your safety, do not drive or operate any machinery that could be dangerous. Wait until the medicine wears off and you can think clearly and react easily.  
  · Do not do strenuous exercise and do not lift, pull, or push anything heavy until your doctor says it is okay. This may be for a day or two. You can walk around the house and do light activity, such as cooking.  
  · If the catheter was placed in your groin, try not to walk up stairs for the first couple of days.  
  · If the catheter was placed in your arm near your wrist, do not bend your wrist deeply for the first couple of days. Be careful using your hand to get into and out of a chair or bed.  
  · If your doctor recommends it, get more exercise. Walking is a good choice. Bit by bit, increase the amount you walk every day. Try for at least 30 minutes on most days of the week. Diet 
  · Drink plenty of fluids to help your body flush out the dye. If you have kidney, heart, or liver disease and have to limit fluids, talk with your doctor before you increase the amount of fluids you drink.  
  · Keep eating a heart-healthy diet that has lots of fruits, vegetables, and whole grains.  If you have not been eating this way, talk to your doctor. You also may want to talk to a dietitian. This expert can help you to learn about healthy foods and plan meals. Medicines 
  · Your doctor will tell you if and when you can restart your medicines. He or she will also give you instructions about taking any new medicines.  
  · If you take aspirin or some other blood thinner, ask your doctor if and when to start taking it again. Make sure that you understand exactly what your doctor wants you to do.  
  · Your doctor may prescribe a blood-thinning medicine like aspirin or clopidogrel (Plavix). It is very important that you take these medicines exactly as directed in order to keep the coronary artery open and reduce your risk of a heart attack. Be safe with medicines. Call your doctor if you think you are having a problem with your medicine. Care of the catheter site 
  · For 1 or 2 days, keep a bandage over the spot where the catheter was inserted. The bandage probably will fall off in this time.  
  · Put ice or a cold pack on the area for 10 to 20 minutes at a time to help with soreness or swelling. Put a thin cloth between the ice and your skin.  
  · You may shower 24 to 48 hours after the procedure, if your doctor okays it. Pat the incision dry.  
  · Do not soak the catheter site until it is healed. Don't take a bath for 1 week, or until your doctor tells you it is okay.  
  · Watch for bleeding from the site. A small amount of blood (up to the size of a quarter) on the bandage can be normal.  
  · If you are bleeding, lie down and press on the area for 15 minutes to try to make it stop. If the bleeding does not stop, call your doctor or seek immediate medical care. Follow-up care is a key part of your treatment and safety. Be sure to make and go to all appointments, and call your doctor if you are having problems. It's also a good idea to know your test results and keep a list of the medicines you take. When should you call for help? Call 911 anytime you think you may need emergency care. For example, call if: 
  · You passed out (lost consciousness).  
  · You have severe trouble breathing.  
  · You have sudden chest pain and shortness of breath, or you cough up blood.  
  · You have symptoms of a heart attack. These may include: 
? Chest pain or pressure, or a strange feeling in the chest. 
? Sweating. ? Shortness of breath. ? Nausea or vomiting. ? Pain, pressure, or a strange feeling in the back, neck, jaw, or upper belly, or in one or both shoulders or arms. ? Lightheadedness or sudden weakness. ? A fast or irregular heartbeat. After you call 911, the  may tel you to chew 1 adult-strength or 2 to 4 low-dose aspirin. Wait for an ambulance. Do not try to drive yourself.  
  · You have been diagnosed with angina, and you have symptoms that do not go away with rest or are not getting better within 5 minutes after you take a dose of nitroglycerin. Call your doctor now or seek immediate medical care if: 
  · You are bleeding from the area where the catheter was put in your artery.  
  · You have a fast-growing, painful lump at the catheter site.  
  · You have signs of infection, such as: 
? Increased pain, swelling, warmth, or redness. ? Red streaks leading from the catheter site. ? Pus draining from the catheter site. ? A fever.  
  · Your leg, arm, or hand is painful, looks blue, or feels cold, numb, or tingly. Watch closely for changes in your health, and be sure to contact your doctor if you have any problems. Where can you learn more? Go to http://www.[employer]/ Follow-up Information Follow up With Specialties Details Why Contact Info Paul Blandon MD Cardiology, [address] [facility] Vascular Surgery Schedule an appointment as soon as possible for a visit in 2 weeks  [address] [facility] 
[phone] Grazyna Zamora, DO Internal Medicine   [address] [facility] [address] [phone] Alessandra Son MD Vascular Surgery In 2 weeks remote suture from HD access and wound check  [address] 
[phone] [address] [place] [address] [address] [address] 
[phone] Signed: 
Tracie Das NP 
10/28/2020 
8:06 AM

## 2020-10-28 NOTE — PROGRESS NOTES
HD TRANSFER - OUT REPORT: 
 
Verbal report given to Jaleesa Mccain RN on Michaelle Ponce being transferred to [facility] for routine progression of care Report consisted of patient's Situation, Background, Assessment and  
Recommendations(SBAR). Information from the following report(s) SBAR, Procedure Summary, Intake/Output and Recent Results was reviewed with the receiving nurse. Method:  $$ Method: Hemodialysis (10/28/20 0758) Fluid Removed  NET Fluid Removed (mL): 3650 ml (10/28/20 1200) Patient response to treatment:  Stable End Time  Hemodialysis End Time: 1200 (10/28/20 1200) If not documented, dialysis nurse to update post-dialysis row in HD/Filtration flowsheet Medications /Volume expansion agents or Fluid boluses administered during treatment? yes Post-dialysis medication administration due?  yes Remind nurse to administer post-HD medication upon return to unit. Fistula hemostasis? yes Line heparinization? no 
 
Lines: PELON AVF, surture removed Opportunity for questions and clarification was provided. Patient transported with: [employer]

## 2020-10-28 NOTE — PROGRESS NOTES
TRANSFER - IN REPORT: 
 
Verbal report received from DIONICIO BOLTON on Bharat Alegre  being received from [facility] for ordered procedure Report consisted of patients Situation, Background, Assessment and  
Recommendations(SBAR). Information from the following report(s) SBAR and Kardex was reviewed with the receiving nurse. Opportunity for questions and clarification was provided. Assessment completed upon patients arrival to unit and care assumed.

## 2020-10-28 NOTE — PROGRESS NOTES
Transition of Care Plan to SNF/Rehab Communication to Patient/Family: Met with patient and family and they are agreeable to the transition plan. The Plan for Transition of Care is related to the following treatment goals:  
 
CM completed phone call with pt's cousin: Yuli Lombardi, via telephone regarding pt being accepted to snf: [facility] on today. Yuli Lombardi is agreeable to pt's d/c on today. CM informed Kala of 2nd  Medicare Letter (verbal consent) placed in chart. CM informed Kala that transport was arranged by pt today at 4:30pm. 
  
 
 
The Patient and/or patient representative was provided with a choice of provider and agrees  with the discharge plan. Yes [x] No [] A Freedom of choice list was provided with basic dialogue that supports the patient's individualized plan of care/goals and shares the quality data associated with the providers. Yes [x] No [] SNF/Rehab Transition: 
Patient has been accepted to [facility]  SNF/Rehab and meets criteria for admission. Patient will transported by [facility] and expected to leave at 4:30PM. Communication to SNF/Rehab: 
Bedside RN, Rafiq Montejo, has been notified to update the transition plan to the facility and call report ([phone]). Discharge information has been updated on the AVS. And communicated to facility via [employer], or CC link. Discharge instructions to be fax'd to facility at [facility] #). Patient has been identified as part of the  [employer].  For Care Coordination associated with that Bundle Program, please contact Bundle information has been communication to. Nursing Please include all hard scripts for controlled substances, med rec and dc summary, and AVS in packet. Reviewed and confirmed with facility, [facility], can manage the patient care needs for the following:  
 
Sandy Sitter with (X) only those applicable: 
Medication: 
[x]Medications are available at the facility []IV Antibiotics []Controlled Substance  hard copies available sent. []Weekly Labs Equipment: 
[]CPAP/BiPAP []Wound Vacuum []S[place] or Urinary Device []PICC/Central Line []Nebulizer []Ventilator Treatment: 
[]Isolation (for MRSA, VRE, etc.) []Surgical Drain Management []Tracheostomy Care 
[]Dressing Changes []Dialysis with transportation []PEG Care []Oxygen []Daily Weights for Heart Failure Dietary: 
[]Any diet limitations []Tube Feedings []Total Parenteral Management (TPN) Financial Resources: 
[x]Medicaid Application Completed [x]UAI Completed and copy given to pt/family 
and copy given to pt/family [x]A screening has previously been completed. []Level II Completed 
 
[] Private pay individual who will not become  
financially eligible for Medicaid within 6 months from admission to a [address] [place] [address]. [] Individual refused to have screening conducted. []Medicaid Application Completed []The screening denied because it was determined individual did not need/did not qualify for nursing facility level of care. [] Out of state residents seeking direct admission to a [address] facility. [] Individuals who are inpatients of an out of state hospital, or in state or out of state veterans/ hospital and seek direct admission to a [address] facility [] Individuals who are pateints or residents of a state owned/operated facility that is licensed by Department of [employer] ([employer]) and seek direct admission to [address] facility [] A screening not required for enrollment in [facility] services as set out in 12 VAC 30- [] [facility] [place]) staff shall perform screenings of the [facility] clients. Advanced Care Plan: 
[]Surrogate Decision Maker of Care 
[]POA []Communicated Code Status and copy sent. Other:  
Pt's family is arranging for pt to go to group home upon d/c from snf

## 2020-10-28 NOTE — PROGRESS NOTES
Physical Therapy Chart reviewed, patient currently off the floor for HD, will defer and continue to follow. Marcelino Cotton

## 2020-10-28 NOTE — PROGRESS NOTES
Problem: Falls - Risk of 
Goal: *Absence of Falls Description: Document Christen Sutherland Fall Risk and appropriate interventions in the flowsheet. Outcome: Progressing Towards Goal 
Note: Fall Risk Interventions: 
Mobility Interventions: Patient to call before getting OOB Medication Interventions: Patient to call before getting OOB Elimination Interventions: Call light in reach History of Falls Interventions: Investigate reason for fall Problem: Patient Education: Go to Patient Education Activity Goal: Patient/Family Education Outcome: Progressing Towards Goal 
  
Problem: Pressure Injury - Risk of 
Goal: *Prevention of pressure injury Description: Document Ranjeet Scale and appropriate interventions in the flowsheet. Outcome: Progressing Towards Goal 
Note: Pressure Injury Interventions: 
Sensory Interventions: Assess changes in LOC Moisture Interventions: Absorbent underpads Activity Interventions: Increase time out of bed Mobility Interventions: Assess need for specialty bed Nutrition Interventions: Document food/fluid/supplement intake Friction and Shear Interventions: Apply protective barrier, creams and emollients Problem: Patient Education: Go to Patient Education Activity Goal: Patient/Family Education Outcome: Progressing Towards Goal 
  
Problem: Pain Goal: *Control of Pain Outcome: Progressing Towards Goal 
  
Problem: Patient Education: Go to Patient Education Activity Goal: Patient/Family Education Outcome: Progressing Towards Goal 
  
Problem: Patient Education: Go to Patient Education Activity Goal: Patient/Family Education Outcome: Progressing Towards Goal 
  
Problem: Cath Lab Procedures: Post-Cath Day of Procedure (Initiate SCIP Measures for Post-Op Care) Goal: Off Pathway (Use only if patient is Off Pathway) Outcome: Progressing Towards Goal 
Goal: Activity/Safety Outcome: Progressing Towards Goal 
Goal: Consults, if ordered Outcome: Progressing Towards Goal 
Goal: Diagnostic Test/Procedures Outcome: Progressing Towards Goal 
Goal: Nutrition/Diet Outcome: Progressing Towards Goal 
Goal: Discharge Planning Outcome: Progressing Towards Goal 
Goal: Medications Outcome: Progressing Towards Goal 
Goal: Respiratory Outcome: Progressing Towards Goal 
Goal: Treatments/Interventions/Procedures Outcome: Progressing Towards Goal 
Goal: Psychosocial 
Outcome: Progressing Towards Goal 
Goal: *Procedure site is without bleeding and signs of infection six hours post sheath removal 
Outcome: Progressing Towards Goal 
Goal: *Hemodynamically stable Outcome: Progressing Towards Goal 
Goal: *Optimal pain control at patient's stated goal 
Outcome: Progressing Towards Goal 
  
Problem: Diabetes Maintenance:Admission Goal: Activity/Safety Outcome: Progressing Towards Goal 
Goal: Diagnostic Tests/Procedures Outcome: Progressing Towards Goal 
Goal: Nutrition Outcome: Progressing Towards Goal 
Goal: Medications Outcome: Progressing Towards Goal 
Goal: Treatments/Interventions/Procedures Outcome: Progressing Towards Goal 
  
Problem: Diabetes Maintenance:Ongoing Goal: Activity/Safety Outcome: Progressing Towards Goal 
Goal: Nutrition Outcome: Progressing Towards Goal 
Goal: Medications Outcome: Progressing Towards Goal 
Goal: Treatments/Interventsions/Procedures Outcome: Progressing Towards Goal 
Goal: *Blood Glucose 80 to 180 md/dl Outcome: Progressing Towards Goal 
  
Problem: Diabetes Maintenance:Discharge Outcomes Goal: *Describes follow-up/return visits to physicians Outcome: Progressing Towards Goal 
Goal: *Blood glucose at patient's target range or approaching Outcome: Progressing Towards Goal 
Goal: *Aware of nutrition guidelines Outcome: Progressing Towards Goal 
Goal: *Verbalizes information about medication Description: Verbalizes name, dosage, time, side effects, and number of days to 
continue medications. Outcome: Progressing Towards Goal 
Goal: *Describes goals, rules, symptoms, and treatments Description: Describes blood glucose goals, monitoring, sick day rules, 
hypo/hyperglycemia prevention, symptoms, and treatment Outcome: Progressing Towards Goal 
Goal: *Describes available outpatient diabetes resources and support systems Outcome: Progressing Towards Goal 
  
Problem: Patient Education: Go to Patient Education Activity Goal: Patient/Family Education Outcome: Progressing Towards Goal 
  
Problem: Patient Education: Go to Patient Education Activity Goal: Patient/Family Education Outcome: Progressing Towards Goal 
  
Problem: Surgical Pathway Day of Surgery Goal: Activity/Safety Outcome: Progressing Towards Goal 
Goal: Nutrition/Diet Outcome: Progressing Towards Goal 
Goal: Medications Outcome: Progressing Towards Goal 
Goal: Respiratory Outcome: Progressing Towards Goal 
Goal: Psychosocial 
Outcome: Progressing Towards Goal 
Goal: *No signs and symptoms of infection or wound complications Outcome: Progressing Towards Goal 
Goal: *Optimal pain control at patient's stated goal 
Outcome: Progressing Towards Goal 
Goal: *Adequate urinary output (equal to or greater than 30 milliliters/hour) Description: Ambulatory Surgery patients voiding without difficulty. Outcome: Progressing Towards Goal 
Goal: *Hemodynamically stable Outcome: Progressing Towards Goal 
Goal: *Tolerating diet Outcome: Progressing Towards Goal 
Goal: *Demonstrates progressive activity Outcome: Progressing Towards Goal

## 2020-10-28 NOTE — PROGRESS NOTES
Discharge instructions were discussed with the pt. Pts questions were answered. Pts IV was removed. Pts wound was dressed with a wet to moist dressing. The wound vac was put in the dirty utilities room. Pt stated he left with all his belongings. Pt left with [facility] transport to [facility] and [address] [place] [address]. Report was called to Rocky Sahu at [address] [facility].

## 2020-10-28 NOTE — PROGRESS NOTES
TEJA: 
 
Dialysis Resumption CM informed that pt is in need of resuming dialysis at [address] [place] [address] [phone]) and transport. CM contact TAZ, via telephone to resume dialysis chair for pt. CM informed that pt would need to bring d/c documents from [facility] to dialysis appointment on 10/30/20. CM arrange transport for [id number] ([phone]) to transport pt to and from dialysis. CM was able to arrange transport for pt dialysis for 7 days. CM completed the needs of the pt at this time. Skip Rosas, LEATHA, [address] [facility]

## 2020-10-28 NOTE — DISCHARGE INSTRUCTIONS
VASCULAR WOUND CARE INSTRUCTIONS  STUMP: wet to moist dressing changes until wound vac can be placed. Patient may shower during wound vac changes. Non-weight bearing on stump. Patient should not attempt to drive a car. Mild exercise and light duties are OK. Call the office at [phone] if there are any problems. Patient Education        Coronary Angiogram: What to Expect at Home  Your Recovery    A coronary angiogram is a test to examine the large blood vessel of your heart (coronary artery). The doctor inserted a thin, flexible tube (catheter) into a blood vessel in your groin. In some cases, the catheter is placed in a blood vessel in the arm. Your groin or arm may have a bruise and feel sore for a day or two after the procedure. You can do light activities around the house but nothing strenuous for several days. This care sheet gives you a general idea about how long it will take for you to recover. But each person recovers at a different pace. Follow the steps below to feel better as quickly as possible. How can you care for yourself at home? Activity    · If the doctor gave you a sedative:  ? For 24 hours, don't do anything that requires attention to detail, such as going to work, making important decisions, or signing any legal documents. It takes time for the medicine's effects to completely wear off.  ? For your safety, do not drive or operate any machinery that could be dangerous. Wait until the medicine wears off and you can think clearly and react easily.     · Do not do strenuous exercise and do not lift, pull, or push anything heavy until your doctor says it is okay. This may be for a day or two.  You can walk around the house and do light activity, such as cooking.     · If the catheter was placed in your groin, try not to walk up stairs for the first couple of days.     · If the catheter was placed in your arm near your wrist, do not bend your wrist deeply for the first couple of days. Be careful using your hand to get into and out of a chair or bed.     · If your doctor recommends it, get more exercise. Walking is a good choice. Bit by bit, increase the amount you walk every day. Try for at least 30 minutes on most days of the week. Diet    · Drink plenty of fluids to help your body flush out the dye. If you have kidney, heart, or liver disease and have to limit fluids, talk with your doctor before you increase the amount of fluids you drink.     · Keep eating a heart-healthy diet that has lots of fruits, vegetables, and whole grains. If you have not been eating this way, talk to your doctor. You also may want to talk to a dietitian. This expert can help you to learn about healthy foods and plan meals. Medicines    · Your doctor will tell you if and when you can restart your medicines. He or she will also give you instructions about taking any new medicines.     · If you take aspirin or some other blood thinner, ask your doctor if and when to start taking it again. Make sure that you understand exactly what your doctor wants you to do.     · Your doctor may prescribe a blood-thinning medicine like aspirin or clopidogrel (Plavix). It is very important that you take these medicines exactly as directed in order to keep the coronary artery open and reduce your risk of a heart attack. Be safe with medicines. Call your doctor if you think you are having a problem with your medicine. Care of the catheter site    · For 1 or 2 days, keep a bandage over the spot where the catheter was inserted. The bandage probably will fall off in this time.     · Put ice or a cold pack on the area for 10 to 20 minutes at a time to help with soreness or swelling. Put a thin cloth between the ice and your skin.     · You may shower 24 to 48 hours after the procedure, if your doctor okays it. Pat the incision dry.     · Do not soak the catheter site until it is healed.  Don't take a bath for 1 week, or until your doctor tells you it is okay.     · Watch for bleeding from the site. A small amount of blood (up to the size of a quarter) on the bandage can be normal.     · If you are bleeding, lie down and press on the area for 15 minutes to try to make it stop. If the bleeding does not stop, call your doctor or seek immediate medical care. Follow-up care is a key part of your treatment and safety. Be sure to make and go to all appointments, and call your doctor if you are having problems. It's also a good idea to know your test results and keep a list of the medicines you take. When should you call for help? Call 911 anytime you think you may need emergency care. For example, call if:    · You passed out (lost consciousness).     · You have severe trouble breathing.     · You have sudden chest pain and shortness of breath, or you cough up blood.     · You have symptoms of a heart attack. These may include:  ? Chest pain or pressure, or a strange feeling in the chest.  ? Sweating. ? Shortness of breath. ? Nausea or vomiting. ? Pain, pressure, or a strange feeling in the back, neck, jaw, or upper belly, or in one or both shoulders or arms. ? Lightheadedness or sudden weakness. ? A fast or irregular heartbeat. After you call 911, the  may tel you to chew 1 adult-strength or 2 to 4 low-dose aspirin. Wait for an ambulance. Do not try to drive yourself.     · You have been diagnosed with angina, and you have symptoms that do not go away with rest or are not getting better within 5 minutes after you take a dose of nitroglycerin. Call your doctor now or seek immediate medical care if:    · You are bleeding from the area where the catheter was put in your artery.     · You have a fast-growing, painful lump at the catheter site.     · You have signs of infection, such as:  ? Increased pain, swelling, warmth, or redness. ? Red streaks leading from the catheter site. ? Pus draining from the catheter site. ? A fever.   · Your leg, arm, or hand is painful, looks blue, or feels cold, numb, or tingly. Watch closely for changes in your health, and be sure to contact your doctor if you have any problems. Where can you learn more? Go to http://www.gray.com/  Enter D192 in the search box to learn more about \"Coronary Angiogram: What to Expect at Home. \"  Current as of: December 16, 2019               Content Version: 12.6  © 5366-3875 [employer]. Care instructions adapted under license by [employer] (which disclaims liability or warranty for this information). If you have questions about a medical condition or this instruction, always ask your healthcare professional. [address] any warranty or liability for your use of this information.

## 2020-10-28 NOTE — PROGRESS NOTES
General Surgery End of Shift Nursing Note Bedside shift change report given to RN (oncoming nurse) by Beltran Watson (offgoing nurse). Report included the following information SBAR, Kardex and MAR. Shift worked:   7p-7a Summary of shift:    Pt slept for most of the night. Pt wound vac remained intact overnight. Pt had uneventful shift. Issues for physician to address:   none Number times ambulated in hallway past shift: 0 Number of times OOB to chair past shift: 0 [facility]

## 2020-10-28 NOTE — PROGRESS NOTES
Problem: Falls - Risk of 
Goal: *Absence of Falls Description: Document Estefani Ryder Fall Risk and appropriate interventions in the flowsheet. 10/28/2020 1654 by Jhonny De La Vega Outcome: Resolved/Met Note: Fall Risk Interventions: 
Mobility Interventions: Patient to call before getting OOB Medication Interventions: Patient to call before getting OOB Elimination Interventions: Call light in reach History of Falls Interventions: Investigate reason for fall 10/28/2020 1500 by Jhonny Mcgratha Outcome: Progressing Towards Goal 
Note: Fall Risk Interventions: 
Mobility Interventions: Patient to call before getting OOB Medication Interventions: Patient to call before getting OOB Elimination Interventions: Call light in reach History of Falls Interventions: Investigate reason for fall Problem: Patient Education: Go to Patient Education Activity Goal: Patient/Family Education 10/28/2020 1654 by Jhonny Mcgratha Outcome: Resolved/Met 
10/28/2020 1500 by Jhonny Mcgratha Outcome: Progressing Towards Goal 
  
Problem: Pressure Injury - Risk of 
Goal: *Prevention of pressure injury Description: Document Ranjeet Scale and appropriate interventions in the flowsheet. 10/28/2020 1654 by Jhonny Mcgratha Outcome: Resolved/Met Note: Pressure Injury Interventions: 
Sensory Interventions: Assess changes in LOC Moisture Interventions: Absorbent underpads Activity Interventions: Increase time out of bed Mobility Interventions: Assess need for specialty bed Nutrition Interventions: Document food/fluid/supplement intake Friction and Shear Interventions: Apply protective barrier, creams and emollients 10/28/2020 1500 by Jhonny Mcgratha Outcome: Progressing Towards Goal 
Note: Pressure Injury Interventions: 
Sensory Interventions: Assess changes in LOC Moisture Interventions: Absorbent underpads Activity Interventions: Increase time out of bed Mobility Interventions: Assess need for specialty bed Nutrition Interventions: Document food/fluid/supplement intake Friction and Shear Interventions: Apply protective barrier, creams and emollients Problem: Patient Education: Go to Patient Education Activity Goal: Patient/Family Education 10/28/2020 1654 by Vince Majors Outcome: Resolved/Met 
10/28/2020 1500 by Vince Majors Outcome: Progressing Towards Goal 
  
Problem: Pain Goal: *Control of Pain 10/28/2020 1654 by Vince Majors Outcome: Resolved/Met 
10/28/2020 1500 by Vince Majors Outcome: Progressing Towards Goal 
  
Problem: Patient Education: Go to Patient Education Activity Goal: Patient/Family Education 10/28/2020 1654 by Vince Majors Outcome: Resolved/Met 
10/28/2020 1500 by Vince Majors Outcome: Progressing Towards Goal 
  
Problem: Patient Education: Go to Patient Education Activity Goal: Patient/Family Education 10/28/2020 1654 by Vince Majors Outcome: Resolved/Met 
10/28/2020 1500 by Vince Majors Outcome: Progressing Towards Goal 
  
Problem: Cath Lab Procedures: Post-Cath Day of Procedure (Initiate SCIP Measures for Post-Op Care) Goal: Off Pathway (Use only if patient is Off Pathway) 10/28/2020 1654 by Vince Majors Outcome: Resolved/Met 
10/28/2020 1500 by Vince Majors Outcome: Progressing Towards Goal 
Goal: Activity/Safety 10/28/2020 1654 by Vince Majors Outcome: Resolved/Met 
10/28/2020 1500 by Vince Majors Outcome: Progressing Towards Goal 
Goal: Consults, if ordered 10/28/2020 1654 by Vince Majors Outcome: Resolved/Met 
10/28/2020 1500 by Vince Majors Outcome: Progressing Towards Goal 
Goal: Diagnostic Test/Procedures 10/28/2020 1654 by Vince Majors Outcome: Resolved/Met 
10/28/2020 1500 by Vince Majors Outcome: Progressing Towards Goal 
Goal: Nutrition/Diet 10/28/2020 1654 by Vince Majors Outcome: Resolved/Met 10/28/2020 1500 by Levine Plunk Outcome: Progressing Towards Goal 
Goal: Discharge Planning 10/28/2020 1654 by Levine Plunk Outcome: Resolved/Met 
10/28/2020 1500 by Levine Plunk Outcome: Progressing Towards Goal 
Goal: Medications 10/28/2020 1654 by Levine Plunk Outcome: Resolved/Met 
10/28/2020 1500 by Levine Plunk Outcome: Progressing Towards Goal 
Goal: Respiratory 10/28/2020 1654 by Levine Plunk Outcome: Resolved/Met 
10/28/2020 1500 by Levine Plunk Outcome: Progressing Towards Goal 
Goal: Treatments/Interventions/Procedures 10/28/2020 1654 by Levine Plunk Outcome: Resolved/Met 
10/28/2020 1500 by Levine Plunk Outcome: Progressing Towards Goal 
Goal: Psychosocial 
10/28/2020 1654 by Levine Plunk Outcome: Resolved/Met 
10/28/2020 1500 by Levine Plunk Outcome: Progressing Towards Goal 
Goal: *Procedure site is without bleeding and signs of infection six hours post sheath removal 
10/28/2020 1654 by Levine Plunk Outcome: Resolved/Met 
10/28/2020 1500 by Levine Plunk Outcome: Progressing Towards Goal 
Goal: *Hemodynamically stable 10/28/2020 1654 by Levine Plunk Outcome: Resolved/Met 
10/28/2020 1500 by Levine Plunk Outcome: Progressing Towards Goal 
Goal: *Optimal pain control at patient's stated goal 
10/28/2020 1654 by Levine Plunk Outcome: Resolved/Met 
10/28/2020 1500 by Levine Plunk Outcome: Progressing Towards Goal 
  
Problem: Diabetes Maintenance:Admission Goal: Activity/Safety 10/28/2020 1654 by Levine Plunk Outcome: Resolved/Met 
10/28/2020 1500 by Levine Plunk Outcome: Progressing Towards Goal 
Goal: Diagnostic Tests/Procedures 10/28/2020 1654 by Levine Plunk Outcome: Resolved/Met 
10/28/2020 1500 by Levine Plunk Outcome: Progressing Towards Goal 
Goal: Nutrition 10/28/2020 1654 by Levine Plunk Outcome: Resolved/Met 
10/28/2020 1500 by Elvine Plunk Outcome: Progressing Towards Goal 
Goal: Medications 10/28/2020 1654 by John Paul Huber Outcome: Resolved/Met 
10/28/2020 1500 by John Paul Huber Outcome: Progressing Towards Goal 
Goal: Treatments/Interventions/Procedures 10/28/2020 1654 by John Paul Huber Outcome: Resolved/Met 
10/28/2020 1500 by John Paul Huber Outcome: Progressing Towards Goal 
  
Problem: Diabetes Maintenance:Ongoing Goal: Activity/Safety 10/28/2020 1654 by John Paul Huber Outcome: Resolved/Met 
10/28/2020 1500 by John Paul Huber Outcome: Progressing Towards Goal 
Goal: Nutrition 10/28/2020 1654 by John Paul Huber Outcome: Resolved/Met 
10/28/2020 1500 by John Paul Huber Outcome: Progressing Towards Goal 
Goal: Medications 10/28/2020 1654 by John Paul Huber Outcome: Resolved/Met 
10/28/2020 1500 by John Paul Huber Outcome: Progressing Towards Goal 
Goal: Treatments/Interventsions/Procedures 10/28/2020 1654 by John Paul Huber Outcome: Resolved/Met 
10/28/2020 1500 by John Paul Huber Outcome: Progressing Towards Goal 
Goal: *Blood Glucose 80 to 180 md/dl 10/28/2020 1654 by John Paul Huber Outcome: Resolved/Met 
10/28/2020 1500 by John Paul Huber Outcome: Progressing Towards Goal 
  
Problem: Diabetes Maintenance:Discharge Outcomes Goal: *Describes follow-up/return visits to physicians 10/28/2020 1654 by John Paul Huber Outcome: Resolved/Met 
10/28/2020 1500 by John Paul Huber Outcome: Progressing Towards Goal 
Goal: *Blood glucose at patient's target range or approaching 10/28/2020 1654 by John Paul Huber Outcome: Resolved/Met 
10/28/2020 1500 by John Paul Huber Outcome: Progressing Towards Goal 
Goal: *Aware of nutrition guidelines 10/28/2020 1654 by John Paul Huber Outcome: Resolved/Met 
10/28/2020 1500 by John Paul Huber Outcome: Progressing Towards Goal 
Goal: *Verbalizes information about medication Description: Verbalizes name, dosage, time, side effects, and number of days to 
continue medications. 10/28/2020 1654 by John Paul Huber Outcome: Resolved/Met 10/28/2020 1500 by Angel  Outcome: Progressing Towards Goal 
Goal: *Describes goals, rules, symptoms, and treatments Description: Describes blood glucose goals, monitoring, sick day rules, 
hypo/hyperglycemia prevention, symptoms, and treatment 10/28/2020 1654 by Angel  Outcome: Resolved/Met 
10/28/2020 1500 by Angel  Outcome: Progressing Towards Goal 
Goal: *Describes available outpatient diabetes resources and support systems 10/28/2020 1654 by Angel  Outcome: Resolved/Met 
10/28/2020 1500 by Angel  Outcome: Progressing Towards Goal 
  
Problem: Patient Education: Go to Patient Education Activity Goal: Patient/Family Education 10/28/2020 1654 by Angel  Outcome: Resolved/Met 
10/28/2020 1500 by Angel  Outcome: Progressing Towards Goal 
  
Problem: Patient Education: Go to Patient Education Activity Goal: Patient/Family Education 10/28/2020 1654 by Angel  Outcome: Resolved/Met 
10/28/2020 1500 by Angel  Outcome: Progressing Towards Goal 
  
Problem: Patient Education: Go to Patient Education Activity Goal: Patient/Family Education 10/28/2020 1654 by Angel  Outcome: Resolved/Met 
10/28/2020 1500 by Angel  Outcome: Progressing Towards Goal 
  
Problem: Surgical Pathway Day of Surgery Goal: Activity/Safety 10/28/2020 1654 by Angel  Outcome: Resolved/Met 
10/28/2020 1500 by Angel  Outcome: Progressing Towards Goal 
Goal: Nutrition/Diet 10/28/2020 1654 by Angel  Outcome: Resolved/Met 
10/28/2020 1500 by Angel  Outcome: Progressing Towards Goal 
Goal: Medications 10/28/2020 1654 by Angel  Outcome: Resolved/Met 
10/28/2020 1500 by Angel  Outcome: Progressing Towards Goal 
Goal: Respiratory 10/28/2020 1654 by Angel  Outcome: Resolved/Met 
10/28/2020 1500 by Angel  Outcome: Progressing Towards Goal 
Goal: Psychosocial 
 10/28/2020 1654 by Irene Coffman Outcome: Resolved/Met 
10/28/2020 1500 by Irene Coffman Outcome: Progressing Towards Goal 
Goal: *No signs and symptoms of infection or wound complications 10/28/2020 1654 by Irene Coffman Outcome: Resolved/Met 
10/28/2020 1500 by Irene Coffman Outcome: Progressing Towards Goal 
Goal: *Optimal pain control at patient's stated goal 
10/28/2020 1654 by Irene Coffman Outcome: Resolved/Met 
10/28/2020 1500 by Irene Coffman Outcome: Progressing Towards Goal 
Goal: *Adequate urinary output (equal to or greater than 30 milliliters/hour) Description: Ambulatory Surgery patients voiding without difficulty. 10/28/2020 1654 by Irene Coffman Outcome: Resolved/Met 
10/28/2020 1500 by Irene Coffman Outcome: Progressing Towards Goal 
Goal: *Hemodynamically stable 10/28/2020 1654 by Irene Coffman Outcome: Resolved/Met 
10/28/2020 1500 by Irene Coffman Outcome: Progressing Towards Goal 
Goal: *Tolerating diet 10/28/2020 1654 by Irene Coffman Outcome: Resolved/Met 
10/28/2020 1500 by Irene Coffman Outcome: Progressing Towards Goal 
Goal: *Demonstrates progressive activity 10/28/2020 1654 by Irene Coffman Outcome: Resolved/Met 
10/28/2020 1500 by rIene Coffman Outcome: Progressing Towards Goal

## 2020-12-04 NOTE — PROGRESS NOTES
1. Have you been to the ER, urgent care clinic since your last visit? Hospitalized since your last visit? 10- ED [facility] s/p cath. 2. Have you seen or consulted any other health care providers outside of the [address] [place] [address] since your last visit? Include any pap smears or colon screening. No    Chief Complaint   Patient presents with    Coronary Artery Disease     4 wk appt. Denied cardiac symptoms.

## 2020-12-10 NOTE — PROGRESS NOTES
Bhavin Arroyo MD 
       
NAME:  Richy Rivera :   1985 MRN:   [id number] PCP:  Zeinab Dempsey DO Subjective: The patient is a 38y.o. year old male  who returns for a routine follow-up. Since the last visit, patient reports no change in exercise tolerance, chest pain, edema, medication intolerance, palpitations, shortness of breath, PND/orthopnea wheezing, sputum, syncope, dizziness or light headedness. Doing well. Past Medical History:  
Diagnosis Date  Arrhythmia   
 aflutter  Blind  Cataracts  Cellulitis and abscess of foot  Chronic kidney disease [facility]  
 Diabetes ([facility] [address] DM II  
 GERD (gastroesophageal reflux disease)  Hearing loss  Hypercholesterolemia  Hypertension  Neuropathy  Obesity  Osteomyelitis ([facility] [address]  Puerperal sepsis with acute hypoxic respiratory failure (HCC)  Sepsis ([facility] [address] ICD-10-CM ICD-9-CM 1. ASHD (arteriosclerotic heart disease)  I25.10 414.00 AMB POC EKG ROUTINE W/ 12 LEADS, INTER & REP 2. Atrial flutter, unspecified type ([facility] [address]  I48.92 427.32   
3. Essential hypertension  I10 401.9 Social History Tobacco Use  Smoking status: Former Smoker Packs/day: 0.25 Years: 20.00 Pack years: 5.00 Last attempt to quit: 2017 Years since quitting: 3.5  Smokeless tobacco: Never Used  Tobacco comment: \"quit about a year ago\" Substance Use Topics  Alcohol use: Not Currently Comment: rarely Family History Problem Relation Age of Onset  Diabetes Mother  Liver Disease Father  Kidney Disease Maternal Grandfather  Diabetes Maternal Grandfather  Hypertension Maternal Grandfather  Diabetes Paternal Uncle  Hypertension Paternal Uncle Review of Systems Cardiovascular: Negative except as noted in HPI Objective:  
 
 
Vitals:  
 20 1550 BP: (!) 130/90 Pulse: 75 Resp: 18  
 SpO2: 98% Weight: 321 lb (145.6 kg) Height: 6' 2\" (1.88 m) Body mass index is 41.21 kg/m². General PE Mental Status - Alert. General Appearance - Not in acute distress. Chest and Lung Exam  
Inspection: Accessory muscles - No use of accessory muscles in breathing. Auscultation:  
Breath sounds: - Normal. 
 
Cardiovascular Inspection: Jugular vein - Bilateral - Inspection Normal. 
Palpation/Percussion:  
Apical Impulse: - Normal. 
Auscultation: Rhythm - Regular. Heart Sounds - S1 WNL and S2 WNL. No S3 or S4. Murmurs & Other Heart Sounds: Auscultation of the heart reveals - No Murmurs. Peripheral Vascular Upper Extremity: Inspection - Bilateral - No Cyanotic nailbeds or Digital clubbing. Lower Extremity:  
Palpation: Edema - Bilateral - No edema. Data Review:  
 
EKG -  EKG: normal EKG, normal sinus rhythm, unchanged from previous tracings. LABS- @brieflabs@ Allergies reviewed No Known Allergies Medications reviewed Current Outpatient Medications Medication Sig  
 oxyCODONE (OXYIR) 5 mg capsule Take 5 mg by mouth every four (4) hours as needed for Pain.  vancomycin 125 mg/2.5 mL syrg Take  by mouth.  gabapentin (NEURONTIN) 300 mg capsule Take 1 Cap by mouth nightly. Max Daily Amount: 300 mg.  
 insulin glargine (LANTUS) 100 unit/mL injection 10 Units by SubCUTAneous route nightly.  aspirin delayed-release 81 mg tablet Take 1 Tab by mouth daily.  atorvastatin (LIPITOR) 20 mg tablet Take 1 Tab by mouth nightly.  clopidogreL (PLAVIX) 75 mg tab Take 1 Tab by mouth daily.  metoprolol tartrate 75 mg tab Take 75 mg by mouth two (2) times a day.  polyethylene glycol (MIRALAX) 17 gram packet Take 1 Packet by mouth daily as needed for Constipation.  sevelamer (RENAGEL) 800 mg tablet Take 800 mg by mouth three (3) times daily (with meals).  folic acid/vit B complex and C (RENAL VITAMIN PO) Take  by mouth daily. No current facility-administered medications for this visit. Assessment: ICD-10-CM ICD-9-CM 1. ASHD (arteriosclerotic heart disease)  I25.10 414.00 AMB POC EKG ROUTINE W/ 12 LEADS, INTER & REP 2. Atrial flutter, unspecified type ([facility] [address]  I48.92 427.32   
3. Essential hypertension  I10 401.9 Orders Placed This Encounter  AMB POC EKG ROUTINE W/ 12 LEADS, INTER & REP Order Specific Question:   Reason for Exam: Answer:   routine  oxyCODONE (OXYIR) 5 mg capsule Sig: Take 5 mg by mouth every four (4) hours as needed for Pain.  vancomycin 125 mg/2.5 mL syrg Sig: Take  by mouth. Plan:  
 
Doing well following multi vessel PCI.    
 
Oma Flower MD

## 2020-12-26 PROBLEM — J96.01 ACUTE RESPIRATORY FAILURE WITH HYPOXIA (HCC): Status: ACTIVE | Noted: 2020-01-01

## 2020-12-26 NOTE — PROGRESS NOTES
TRANSFER - IN REPORT: 
 
Verbal report received from [facility] RN(name) on Bernadette Sanon  being received from ER(unit) for routine progression of care Report consisted of patients Situation, Background, Assessment and  
Recommendations(SBAR). Information from the following report(s) SBAR, Kardex, ED Summary, Procedure Summary, Intake/Output and Recent Results was reviewed with the receiving nurse. Opportunity for questions and clarification was provided. Assessment completed upon patients arrival to unit and care assumed.

## 2020-12-26 NOTE — H&P
Hospitalist Admission Note NAME: Mikala Jefferson :  1985 MRN:  [id number] Date/Time:  2020 5:30 PM 
 
Patient PCP: Arlis Opitz, DO 
________________________________________________________________________ My assessment of this patient's clinical condition and my plan of care is as follows. Assessment / Plan: 
Acute hypoxic respiratory failure due to COVID-19 pneumonia Suspected superimposed bacterial pneumonia Acute hypoxic respiratory failure due to above 
-Rapid SARS-CoV-2 positive. Chest x-ray shows evidence of bilateral pneumonia 
-He is requiring 2 L nasal cannula and is also tachypneic 
-Initiate Covid focused order set. Start Decadron. Not a candidate for remdesivir due to ESRD. Start vitamin C and zinc.  Check inflammatory markers including D-dimer and ferritin daily 
-Start empiric ceftriaxone and Zithromax. Check procalcitonin level. Blood culture sent in ED. Procalcitonin level is normal, consider stopping antibiotics 
-Continue oxygen by nasal cannula currently on 2 L. If oxygen requirements are going up, consider pulmonary evaluation and also convalescent plasma End-stage renal disease on hemodialysis Hyperkalemia Hypertensive urgency, blood pressure more than 856 systolic 
-He received reversal treatment for hyperkalemia in the emergency department 
-Renal has been consulted and they are planning on emergent dialysis given hypertensive urgency and also hyperkalemia We will repeat BMP later today 
-Continue Renvela 
-Resume home metoprolol. Start as needed hydralazine and labetalol. Hemodialysis should help with blood pressure control Diabetes mellitus type 2 
-Resume home Lantus 10 units. Start insulin sliding scale with blood sugar checks Dyslipidemia Obesity History of right foot osteomyelitis status post right BKA Diabetic neuropathy GERD 
? History of atrial flutter History of coronary artery disease s/p stents History of PAD 
-Continue home statin, aspirin, Plavix, gabapentin I have provided  50   minutes of critical care time. During this entire length of time I was immediately available to the patient.   
  
The reason for providing this level of medical care was due to a critical illness that impaired one or more vital organ systems, such that there was a high probability of imminent or life threatening deterioration in the patient's condition. This care involved high complexity decision making which includes reviewing the patient's past medical records, current laboratory results, and actual Xray films in order to assess, support vital system function, and to treat this degree of vital organ system failure, and to prevent further life threatening deterioration of the patients condition. Code Status: Full code Surrogate Decision Maker: Patient's sister DVT Prophylaxis: Heparin GI Prophylaxis: not indicated Baseline: From home, has right BKA Subjective: CHIEF COMPLAINT: Fever and cough HISTORY OF PRESENT ILLNESS:    
Daniel Garvey is a 38 y.o.   male who presents with past medical history of end-stage renal disease on hemodialysis, hypertension, diabetes mellitus is coming the hospital chief complaints of fever and cough. Patient went to his dialysis placed today and was noted to have fever along with cough and was advised to go to the emergency department for further evaluation. He reports being in his usual health until 3 days ago when he started having some fever with T-max of 101.5 along with some cough with whitish phlegm. He also reports exertional shortness of breath. He reports generalized myalgias. Does not report any chest pain. He reports being compliant with his dialysis sessions. Does not report any abdominal pain, nausea or vomiting. On arrival to ED, he was noted to have fever of 100.3 and also had systolic blood pressure more than 200 and required 2 L nasal cannula. On labs hemoglobin was 10.2. BMP showed a potassium of 6.6, creatinine 15.60, sodium was 132. Nephrology was consulted for dialysis needs. His rapid SARS-CoV-2 was positive. Chest x-ray shows bilateral pneumonia with cardiomegaly We were asked to admit for work up and evaluation of the above problems. Past Medical History:  
Diagnosis Date  Arrhythmia   
 aflutter  Blind  Cataracts  Cellulitis and abscess of foot  Chronic kidney disease [facility]  
 Diabetes ([facility] [address] DM II  
 GERD (gastroesophageal reflux disease)  Hearing loss  Hypercholesterolemia  Hypertension  Neuropathy  Obesity  Osteomyelitis ([facility] [address]  Puerperal sepsis with acute hypoxic respiratory failure (HCC)  Sepsis ([facility] [address] Past Surgical History:  
Procedure Laterality Date  CARDIAC SURG PROCEDURE UNLIST  09/2020  
 ablation  COLONOSCOPY N/A 12/8/2017 COLONOSCOPY performed by Claudia David MD at [facility] ENDOSCOPY  COLONOSCOPY,DIAGNOSTIC  12/8/2017  HX AFIB ABLATION  09/04/2020  HX AMPUTATION Left great toe and L 5th toe  HX AMPUTATION Right   
 bka  HX AMPUTATION TOE Right  HX VASCULAR ACCESS    
 TX COMPRE ELECTROPHYSIOL XM W/LEFT ATRIAL PACNG/REC N/A 9/4/2020 Lt Atrial Pace & Record During Ep Study performed by John Paul Templeton MD at [facility] C[facility] CATH LAB  TX EPHYS EVAL W/ABLATION SUPRAVENT ARRHYTHMIA N/A 9/4/2020 ABLATION A-FLUTTER performed by John Paul Templeton MD at [facility] C[facility]ATH [facility]  TX INTRACARDIAC ELECTROPHYSIOLOGIC 3D MAPPING N/A 9/4/2020 Ep 3d Mapping performed by John Paul Templeton MD at [facility] CARDIAC CATH LAB  UPPER GI ENDOSCOPY,BIOPSY  12/8/2017  VASCULAR SURGERY PROCEDURE UNLIST    
 R side chest  
 VASCULAR SURGERY PROCEDURE UNLIST Left 07/25/2017 Creation transposed AV fistula arm Social History Tobacco Use  Smoking status: Former Smoker Packs/day: 0.25 Years: 20.00 Pack years: 5.00 Quit date: 6/1/2017 Years since quitting: 3.5  Smokeless tobacco: Never Used  Tobacco comment: \"quit about a year ago\" Substance Use Topics  Alcohol use: Not Currently Comment: rarely Family History Problem Relation Age of Onset  Diabetes Mother  Liver Disease Father  Kidney Disease Maternal Grandfather  Diabetes Maternal Grandfather  Hypertension Maternal Grandfather  Diabetes Paternal Uncle  Hypertension Paternal Uncle No Known Allergies Prior to Admission medications Medication Sig Start Date End Date Taking? Authorizing Provider  
oxyCODONE (OXYIR) 5 mg capsule Take 5 mg by mouth every four (4) hours as needed for Pain. Provider, Historical  
vancomycin 125 mg/2.5 mL syrg Take  by mouth. Provider, Historical  
gabapentin (NEURONTIN) 300 mg capsule Take 1 Cap by mouth nightly. Max Daily Amount: 300 mg. 10/27/20   Eduard Webb P, NP  
insulin glargine (LANTUS) 100 unit/mL injection 10 Units by SubCUTAneous route nightly. 10/23/20   Claudia Cleaning, NP  
aspirin delayed-release 81 mg tablet Take 1 Tab by mouth daily. 10/23/20   Eduard Webb P, NP  
atorvastatin (LIPITOR) 20 mg tablet Take 1 Tab by mouth nightly. 10/23/20   Claudia Cleaning, NP  
clopidogreL (PLAVIX) 75 mg tab Take 1 Tab by mouth daily. 10/23/20   Eduard Webb P, NP  
metoprolol tartrate 75 mg tab Take 75 mg by mouth two (2) times a day. 10/23/20   Eduard Webb P, NP  
polyethylene glycol (MIRALAX) 17 gram packet Take 1 Packet by mouth daily as needed for Constipation. 10/23/20   Claudia Cleaning, NP  
folic acid/vit B complex and C (RENAL VITAMIN PO) Take  by mouth daily.     Provider, Historical  
 sevelamer (RENAGEL) 800 mg tablet Take 800 mg by mouth three (3) times daily (with meals). Provider, Historical  
 
 
REVIEW OF SYSTEMS:    
I am not able to complete the review of systems because: The patient is intubated and sedated The patient has altered mental status due to his acute medical problems The patient has baseline aphasia from prior stroke(s) The patient has baseline dementia and is not reliable historian The patient is in acute medical distress and unable to provide information Total of 12 systems reviewed as follows:   
   POSITIVE= underlined text  Negative = text not underlined General:  fever, chills, sweats, generalized weakness, weight loss/gain,  
   loss of appetite Eyes:    blurred vision, eye pain, loss of vision, double vision ENT:    rhinorrhea, pharyngitis Respiratory:   cough, sputum production, SOB, LITTLE, wheezing, pleuritic pain  
Cardiology:   chest pain, palpitations, orthopnea, PND, edema, syncope Gastrointestinal:  abdominal pain , N/V, diarrhea, dysphagia, constipation, bleeding Genitourinary:  frequency, urgency, dysuria, hematuria, incontinence Muskuloskeletal :  arthralgia, myalgia, back pain Hematology:  easy bruising, nose or gum bleeding, lymphadenopathy Dermatological: rash, ulceration, pruritis, color change / jaundice Endocrine:   hot flashes or polydipsia Neurological:  headache, dizziness, confusion, focal weakness, paresthesia, Speech difficulties, memory loss, gait difficulty Psychological: Feelings of anxiety, depression, agitation Objective: VITALS:   
Visit Vitals BP (!) 167/94 Pulse 76 Temp 99.8 °F (37.7 °C) Resp 20 Ht 6' 2\" (1.88 m) Wt 156.6 kg (345 lb 3.9 oz) SpO2 94% BMI 44.33 kg/m² PHYSICAL EXAM: 
 
General:    Alert, cooperative, no distress, appears stated age. HEENT: Atraumatic, anicteric sclerae, pink conjunctivae    No oral ulcers, mucosa moist 
 Neck:  Supple, symmetrical,  thyroid: non tender Lungs:   Bilateral air entry present, crackles present, no wheezing Chest wall:  No tenderness  No Accessory muscle use. Heart:   Regular  rhythm,  No  murmur   No edema Abdomen:   Soft, non-tender. Not distended. Bowel sounds normal 
Extremities: No cyanosis. No clubbing,   
  Skin turgor normal, Capillary refill normal, Radial dial pulse 2+ Skin:     Not pale. Not Jaundiced  No rashes Psych:  Not anxious or agitated. Neurologic: EOMs intact. No facial asymmetry. No aphasia or slurred speech. Symmetrical strength, Sensation grossly intact. Alert and awake. 
 
_______________________________________________________________________ Care Plan discussed with: 
  Comments Patient y Family RN y   
Care Manager Consultant:     
_______________________________________________________________________ Expected  Disposition:  
Home with Family y HH/PT/OT/RN   
SNF/LTC   
JAJA   
________________________________________________________________________ TOTAL TIME: 60 Minutes Critical Care Provided     Minutes non procedure based Comments  
 y Reviewed previous records  
>50% of visit spent in counseling and coordination of care y Discussion with patient and/or family and questions answered 
  
 
________________________________________________________________________ Signed: Tesha Pak MD 
 
Procedures: see electronic medical records for all procedures/Xrays and details which were not copied into this note but were reviewed prior to creation of Plan. LAB DATA REVIEWED:   
Recent Results (from the past 24 hour(s)) POC LACTIC ACID Collection Time: 12/26/20  1:43 PM  
Result Value Ref Range Lactic Acid (POC) 0.84 0.40 - 2.00 mmol/L  
EKG, 12 LEAD, INITIAL Collection Time: 12/26/20  2:39 PM  
Result Value Ref Range Ventricular Rate 81 BPM  
 Atrial Rate 81 BPM  
 P-R Interval 184 ms QRS Duration 118 ms Q-T Interval 414 ms QTC Calculation (Bezet) 480 ms Calculated P Axis 46 degrees Calculated R Axis -28 degrees Calculated T Axis 102 degrees Diagnosis Normal sinus rhythm Possible Left atrial enlargement Nonspecific intraventricular conduction delay ST & T wave abnormality, consider lateral ischemia When compared with ECG of 13-OCT-2020 14:59, 
ST no longer depressed in Anterior leads SAMPLES BEING HELD Collection Time: 12/26/20  2:44 PM  
Result Value Ref Range SAMPLES BEING HELD PST,LAV   
 COMMENT Add-on orders for these samples will be processed based on acceptable specimen integrity and analyte stability, which may vary by analyte. CBC WITH AUTOMATED DIFF Collection Time: 12/26/20  2:44 PM  
Result Value Ref Range WBC 8.3 4.1 - 11.1 K/uL  
 RBC 4.15 4.10 - 5.70 M/uL  
 HGB 10.2 (L) 12.1 - 17.0 g/dL HCT 32.3 (L) 36.6 - 50.3 % MCV 77.8 (L) 80.0 - 99.0 FL  
 MCH 24.6 (L) 26.0 - 34.0 PG  
 MCHC 31.6 30.0 - 36.5 g/dL  
 RDW 17.1 (H) 11.5 - 14.5 % PLATELET 783 (L) 416 - 400 K/uL MPV 10.3 8.9 - 12.9 FL  
 NRBC 0.0 0  WBC ABSOLUTE NRBC 0.00 0.00 - 0.01 K/uL NEUTROPHILS 68 32 - 75 % LYMPHOCYTES 22 12 - 49 % MONOCYTES 10 5 - 13 % EOSINOPHILS 0 0 - 7 % BASOPHILS 0 0 - 1 % IMMATURE GRANULOCYTES 0 0.0 - 0.5 % ABS. NEUTROPHILS 5.6 1.8 - 8.0 K/UL  
 ABS. LYMPHOCYTES 1.8 0.8 - 3.5 K/UL  
 ABS. MONOCYTES 0.8 0.0 - 1.0 K/UL  
 ABS. EOSINOPHILS 0.0 0.0 - 0.4 K/UL  
 ABS. BASOPHILS 0.0 0.0 - 0.1 K/UL  
 ABS. IMM. GRANS. 0.0 0.00 - 0.04 K/UL  
 DF AUTOMATED METABOLIC PANEL, COMPREHENSIVE Collection Time: 12/26/20  2:44 PM  
Result Value Ref Range Sodium 132 (L) 136 - 145 mmol/L Potassium 6.6 (HH) 3.5 - 5.1 mmol/L Chloride 96 (L) 97 - 108 mmol/L  
 CO2 27 21 - 32 mmol/L Anion gap 9 5 - 15 mmol/L Glucose 80 65 - 100 mg/dL BUN 76 (H) 6 - 20 MG/DL  Creatinine 15.60 (H) 0.70 - 1.30 MG/DL  
 BUN/Creatinine ratio 5 (L) 12 - 20 GFR est AA 4 (L) >60 ml/min/1.73m2 GFR est non-AA 4 (L) >60 ml/min/1.73m2 Calcium 8.3 (L) 8.5 - 10.1 MG/DL Bilirubin, total 0.5 0.2 - 1.0 MG/DL  
 ALT (SGPT) 45 12 - 78 U/L  
 AST (SGOT) 41 (H) 15 - 37 U/L Alk. phosphatase 87 45 - 117 U/L Protein, total 8.2 6.4 - 8.2 g/dL Albumin 3.2 (L) 3.5 - 5.0 g/dL Globulin 5.0 (H) 2.0 - 4.0 g/dL A-G Ratio 0.6 (L) 1.1 - 2.2 SARS-COV-2 Collection Time: 12/26/20  2:44 PM  
Result Value Ref Range Specimen source Nasopharyngeal    
 Specimen source Nasopharyngeal    
 COVID-19 rapid test Detected (AA) NOTD Specimen type NP Swab Health status Symptomatic Testing

## 2020-12-26 NOTE — ED PROVIDER NOTES
EMERGENCY DEPARTMENT HISTORY AND PHYSICAL EXAM 
 
 
Date: 12/26/2020 Patient Name: Dipak Day History of Presenting Illness Chief Complaint Patient presents with  Shortness of Breath Per EMS, Patient is new to dialysis and went to dialysis this morning complaining of shortness of breath, so patient was not dialyzed. Per EMS, Patient lives at [facility] and has had a cough and sore throat since monday and vomited 2 days ago, History Provided By: Patient HPI: Dipak Day, 38 y.o. male presents to the ED with cc of shortness of breath, referred from dialysis. 58-year-old male with a history of end-stage renal disease on dialysis Monday/Wednesday/Friday (last dialysis Wednesday), CAD, diabetes and right AKA presents emergency department with a chief complaint of shortness of breath. Patient was in his normal state of health until 2 days ago. Patient reports he had one episode of nausea with vomiting diarrhea. Mild abdominal pain at that time. Patient reports temperature of 100. Reports cough and shortness of breath and thinks he has a \"cold\". He went to his dialysis today and they did not dialyze him. He is unsure if his weight is changed. Reports some shortness of breath, but denies chest pain. There are no other complaints, changes, or physical findings at this time. PCP: Jorje Tesfaye, DO No current facility-administered medications on file prior to encounter. Current Outpatient Medications on File Prior to Encounter Medication Sig Dispense Refill  oxyCODONE (OXYIR) 5 mg capsule Take 5 mg by mouth every four (4) hours as needed for Pain.  vancomycin 125 mg/2.5 mL syrg Take  by mouth.  gabapentin (NEURONTIN) 300 mg capsule Take 1 Cap by mouth nightly. Max Daily Amount: 300 mg. 30 Cap 0  
 insulin glargine (LANTUS) 100 unit/mL injection 10 Units by SubCUTAneous route nightly.  1 Vial 0  
  aspirin delayed-release 81 mg tablet Take 1 Tab by mouth daily. 30 Tab 11  
 atorvastatin (LIPITOR) 20 mg tablet Take 1 Tab by mouth nightly. 30 Tab 11  
 clopidogreL (PLAVIX) 75 mg tab Take 1 Tab by mouth daily. 30 Tab 11  
 metoprolol tartrate 75 mg tab Take 75 mg by mouth two (2) times a day. 60 Tab 11  
 polyethylene glycol (MIRALAX) 17 gram packet Take 1 Packet by mouth daily as needed for Constipation. 1 Packet 0  
 folic acid/vit B complex and C (RENAL VITAMIN PO) Take  by mouth daily.  sevelamer (RENAGEL) 800 mg tablet Take 800 mg by mouth three (3) times daily (with meals). Past History Past Medical History: 
Past Medical History:  
Diagnosis Date  Arrhythmia   
 aflutter  Blind  Cataracts  Cellulitis and abscess of foot  Chronic kidney disease [facility]  
 Diabetes ([facility] [address] DM II  
 GERD (gastroesophageal reflux disease)  Hearing loss  Hypercholesterolemia  Hypertension  Neuropathy  Obesity  Osteomyelitis ([facility] [address]  Puerperal sepsis with acute hypoxic respiratory failure (HCC)  Sepsis ([facility] [address] Past Surgical History: 
Past Surgical History:  
Procedure Laterality Date  CARDIAC SURG PROCEDURE UNLIST  09/2020  
 ablation  COLONOSCOPY N/A 12/8/2017 COLONOSCOPY performed by Danilo Krishna MD at [facility] ENDOSCOPY  COLONOSCOPY,DIAGNOSTIC  12/8/2017  HX AFIB ABLATION  09/04/2020  HX AMPUTATION Left great toe and L 5th toe  HX AMPUTATION Right   
 bka  HX AMPUTATION TOE Right  HX VASCULAR ACCESS    
 SC COMPRE ELECTROPHYSIOL XM W/LEFT ATRIAL PACNG/REC N/A 9/4/2020 Lt Atrial Pace & Record During Ep Study performed by Valorie Vega MD at [facility]  SC EPHYS EVAL W/ABLATION SUPRAVENT ARRHYTHMIA N/A 9/4/2020 ABLATION A-FLUTTER performed by Valorie Vega MD at [facility]ATH [facility]  SC INTRACARDIAC ELECTROPHYSIOLOGIC 3D MAPPING N/A 9/4/2020 Ep 3d Mapping performed by Freddie Welsh MD at [facility]ATH [facility]  UPPER GI ENDOSCOPY,BIOPSY  12/8/2017  VASCULAR SURGERY PROCEDURE UNLIST    
 R side chest  
 VASCULAR SURGERY PROCEDURE UNLIST Left 07/25/2017 Creation transposed AV fistula arm Family History: 
Family History Problem Relation Age of Onset  Diabetes Mother  Liver Disease Father  Kidney Disease Maternal Grandfather  Diabetes Maternal Grandfather  Hypertension Maternal Grandfather  Diabetes Paternal Uncle  Hypertension Paternal Uncle Social History: 
Social History Tobacco Use  Smoking status: Former Smoker Packs/day: 0.25 Years: 20.00 Pack years: 5.00 Quit date: 6/1/2017 Years since quitting: 3.5  Smokeless tobacco: Never Used  Tobacco comment: \"quit about a year ago\" Substance Use Topics  Alcohol use: Not Currently Comment: rarely  Drug use: No  
 
 
Allergies: 
No Known Allergies Review of Systems Review of Systems Constitutional: Positive for fever. HENT: Negative for voice change. Eyes: Negative for pain and redness. Respiratory: Positive for cough and shortness of breath. Negative for chest tightness. Cardiovascular: Negative for chest pain and leg swelling. Gastrointestinal: Positive for abdominal pain, diarrhea, nausea and vomiting. Genitourinary: Negative for hematuria. Musculoskeletal: Negative for gait problem. Skin: Negative for color change, pallor and rash. Neurological: Negative for facial asymmetry, weakness and headaches. Hematological: Does not bruise/bleed easily. Psychiatric/Behavioral: Negative for behavioral problems. All other systems reviewed and are negative. Physical Exam  
Physical Exam 
Vitals signs and nursing note reviewed. Constitutional:   
   Comments: 58-year-old male, resting on stretcher, no acute distress HENT:  
   Head: Normocephalic. Right Ear: External ear normal.  
   Left Ear: External ear normal.  
   Nose: Nose normal.  
Eyes:  
   Conjunctiva/sclera: Conjunctivae normal.  
Cardiovascular:  
   Rate and Rhythm: Normal rate and regular rhythm. Heart sounds: No murmur. No friction rub. No gallop. Pulmonary:  
   Effort: Pulmonary effort is normal.  
   Breath sounds: Normal breath sounds. No wheezing, rhonchi or rales. Comments: Saturating 88% on room air Abdominal:  
   Palpations: Abdomen is soft. Tenderness: There is no abdominal tenderness. Musculoskeletal: Normal range of motion. Comments: Right AKA noted, stump clean and intact Skin: 
   General: Skin is warm. Capillary Refill: Capillary refill takes less than 2 seconds. Neurological:  
   Mental Status: He is alert. Mental status is at baseline. Psychiatric:     
   Mood and Affect: Mood normal.     
   Behavior: Behavior normal.  
 
 
 
Diagnostic Study Results Labs - Recent Results (from the past 12 hour(s)) POC LACTIC ACID Collection Time: 12/26/20  1:43 PM  
Result Value Ref Range Lactic Acid (POC) 0.84 0.40 - 2.00 mmol/L  
EKG, 12 LEAD, INITIAL Collection Time: 12/26/20  2:39 PM  
Result Value Ref Range Ventricular Rate 81 BPM  
 Atrial Rate 81 BPM  
 P-R Interval 184 ms QRS Duration 118 ms Q-T Interval 414 ms QTC Calculation (Bezet) 480 ms Calculated P Axis 46 degrees Calculated R Axis -28 degrees Calculated T Axis 102 degrees Diagnosis Normal sinus rhythm Possible Left atrial enlargement Nonspecific intraventricular conduction delay ST & T wave abnormality, consider lateral ischemia When compared with ECG of 13-OCT-2020 14:59, 
ST no longer depressed in Anterior leads SAMPLES BEING HELD Collection Time: 12/26/20  2:44 PM  
Result Value Ref Range SAMPLES BEING HELD PST,LAV   
 COMMENT Add-on orders for these samples will be processed based on acceptable specimen integrity and analyte stability, which may vary by analyte. CBC WITH AUTOMATED DIFF Collection Time: 12/26/20  2:44 PM  
Result Value Ref Range WBC 8.3 4.1 - 11.1 K/uL  
 RBC 4.15 4.10 - 5.70 M/uL  
 HGB 10.2 (L) 12.1 - 17.0 g/dL HCT 32.3 (L) 36.6 - 50.3 % MCV 77.8 (L) 80.0 - 99.0 FL  
 MCH 24.6 (L) 26.0 - 34.0 PG  
 MCHC 31.6 30.0 - 36.5 g/dL  
 RDW 17.1 (H) 11.5 - 14.5 % PLATELET 886 (L) 348 - 400 K/uL MPV 10.3 8.9 - 12.9 FL  
 NRBC 0.0 0  WBC ABSOLUTE NRBC 0.00 0.00 - 0.01 K/uL NEUTROPHILS 68 32 - 75 % LYMPHOCYTES 22 12 - 49 % MONOCYTES 10 5 - 13 % EOSINOPHILS 0 0 - 7 % BASOPHILS 0 0 - 1 % IMMATURE GRANULOCYTES 0 0.0 - 0.5 % ABS. NEUTROPHILS 5.6 1.8 - 8.0 K/UL  
 ABS. LYMPHOCYTES 1.8 0.8 - 3.5 K/UL  
 ABS. MONOCYTES 0.8 0.0 - 1.0 K/UL  
 ABS. EOSINOPHILS 0.0 0.0 - 0.4 K/UL  
 ABS. BASOPHILS 0.0 0.0 - 0.1 K/UL  
 ABS. IMM. GRANS. 0.0 0.00 - 0.04 K/UL  
 DF AUTOMATED METABOLIC PANEL, COMPREHENSIVE Collection Time: 12/26/20  2:44 PM  
Result Value Ref Range Sodium 132 (L) 136 - 145 mmol/L Potassium 6.6 (HH) 3.5 - 5.1 mmol/L Chloride 96 (L) 97 - 108 mmol/L  
 CO2 27 21 - 32 mmol/L Anion gap 9 5 - 15 mmol/L Glucose 80 65 - 100 mg/dL BUN 76 (H) 6 - 20 MG/DL Creatinine 15.60 (H) 0.70 - 1.30 MG/DL  
 BUN/Creatinine ratio 5 (L) 12 - 20 GFR est AA 4 (L) >60 ml/min/1.73m2 GFR est non-AA 4 (L) >60 ml/min/1.73m2 Calcium 8.3 (L) 8.5 - 10.1 MG/DL Bilirubin, total 0.5 0.2 - 1.0 MG/DL  
 ALT (SGPT) 45 12 - 78 U/L  
 AST (SGOT) 41 (H) 15 - 37 U/L Alk. phosphatase 87 45 - 117 U/L Protein, total 8.2 6.4 - 8.2 g/dL Albumin 3.2 (L) 3.5 - 5.0 g/dL Globulin 5.0 (H) 2.0 - 4.0 g/dL A-G Ratio 0.6 (L) 1.1 - 2.2 SARS-COV-2 Collection Time: 12/26/20  2:44 PM  
Result Value Ref Range  Specimen source Nasopharyngeal    
 Specimen source Nasopharyngeal    
 COVID-19 rapid test Detected (AA) NOTD Specimen type NP Swab Health status Symptomatic Testing GLUCOSE, POC Collection Time: 12/26/20  7:12 PM  
Result Value Ref Range Glucose (POC) 89 65 - 100 mg/dL Performed by Bernard YOUNG   
GLUCOSE, POC Collection Time: 12/26/20  8:55 PM  
Result Value Ref Range Glucose (POC) 100 65 - 100 mg/dL Performed by Sarah Pierre Radiologic Studies -  
XR CHEST PORT Final Result IMPRESSION:   
  
Bilateral pneumonia Possible cardiomegaly versus pericardial effusion CT Results  (Last 48 hours) None CXR Results  (Last 48 hours) 12/26/20 1357  XR CHEST PORT Final result Impression:  IMPRESSION:   
   
Bilateral pneumonia Possible cardiomegaly versus pericardial effusion Narrative:  EXAM: XR CHEST PORT INDICATION: cough, fever COMPARISON: 8/20/2020 FINDINGS: A portable AP radiograph of the chest was obtained at 1342 hours. The  
patient is on a cardiac monitor. There are patchy infiltrates in the perihilar  
lungs bilaterally, asymmetric to the right. The cardiomediastinal silhouette is  
borderline enlarged with a globular configuration. Medical Decision Making I am the first provider for this patient. I reviewed the vital signs, available nursing notes, past medical history, past surgical history, family history and social history. Vital Signs-Reviewed the patient's vital signs. Patient Vitals for the past 12 hrs: 
 Temp Pulse Resp BP SpO2  
12/26/20 2200  75 17 (!) 199/112   
12/26/20 2145  75 17 (!) 194/114   
12/26/20 2130  75 18 (!) 201/112   
12/26/20 2115  75 18 (!) 198/113   
12/26/20 2100  75 19 (!) 191/107   
12/26/20 2045  73 19 (!) 202/113   
12/26/20 2030  72 18 (!) 177/101   
12/26/20 2015  73 18 (!) 186/100   
12/26/20 1955  74 18 (!) 192/107  12/26/20 1946 98.5 °F (36.9 °C) 75 17 (!) 180/94 94 % 12/26/20 1915 98.9 °F (37.2 °C) 75 17 (!) 168/87 99 % 12/26/20 1856    (!) 171/90   
12/26/20 1800 98.3 °F (36.8 °C) 75  (!) 212/138 99 % 12/26/20 1715  76 20 (!) 167/94 94 % 12/26/20 1630  76 20 (!) 201/83 96 % 12/26/20 1545  79 22 (!) 206/122 97 % 12/26/20 1430 99.8 °F (37.7 °C) 80 19 (!) 228/131 98 % 12/26/20 1415     (!) 88 % 12/26/20 1300  81 18 (!) 167/100 91 % 12/26/20 1249 100.3 °F (37.9 °C) 81 24 (!) 164/105 94 % Records Reviewed: Nursing Notes and Old Medical Records Provider Notes (Medical Decision Making):  
 
79-year-old male with history of end-stage renal disease presents with cough and reported fever. Vitals here are notable for hypoxia and low-grade fever. Differential includes fluid overload, infectious causes such as Covid or bacterial pneumonia. Consider CHF. Less likely PE given fever. We will check basic labs and chest x-ray. Rapid Covid test.  Placed on oxygen, will discuss with patient's nephrologist. 
 
ED Course:  
Initial assessment performed. The patients presenting problems have been discussed, and they are in agreement with the care plan formulated and outlined with them. I have encouraged them to ask questions as they arise throughout their visit. ED Course as of Dec 26 2258 Sat Dec 26, 2020  
1412 Chest x-ray shows bilateral pneumonia, suspect this is Covid, rapid pending.  
 [MB] 1427 We will give antibiotics, blood cultures obtained. Awaiting labs. [MB] 1443 EKG interpreted by me. Shows SR with a HR of 81. No ST elevations or depressions concerning for ischemia. Normal intervals. Unchanged from prior [MB] 1545 Patient's potassium is 6.6, will start chemical dialysis, nephrology paged. Covid is detected. [MB] 1546 Avoid albuterol given COVID + [MB] ED Course User Index [MB] Savannah Diehl MD  
 
 
Critical Care Time: CRITICAL CARE NOTE : 
 
 1:24 PM 
 
IMPENDING DETERIORATION -Respiratory, Metabolic and Renal 
ASSOCIATED RISK FACTORS - Hypoxia and Metabolic changes MANAGEMENT- Bedside Assessment INTERPRETATION -  Xrays and ECG INTERVENTIONS - Metobolic interventions and oxygen application CASE REVIEW - Hospitalist and Nursing TREATMENT RESPONSE -Stable PERFORMED BY - Self NOTES   : 
I have spent 35 minutes of critical care time involved in lab review, consultations with specialist, family decision- making, bedside attention and documentation. This time excludes time spent in any separate billed procedures. During this entire length of time I was immediately available to the patient . Katie Sethi MD 
 
 
Disposition: 
 
Admitted Diagnosis Clinical Impression:  
 
Acute hypoxic respiratory failure COVID-19 pneumonia Hyperkalemia Hypervolemia. Attestations: 
 
Katie Sethi MD 
 
Please note that this dictation was completed with [employer], the computer voice recognition software. Quite often unanticipated grammatical, syntax, homophones, and other interpretive errors are inadvertently transcribed by the computer software. Please disregard these errors. Please excuse any errors that have escaped final proofreading. Thank you.

## 2020-12-26 NOTE — ED NOTES
TRANSFER - OUT REPORT: 
 
Verbal report given to Nadia (name) on Sally Han  being transferred to renal (unit) for routine progression of care Report consisted of patients Situation, Background, Assessment and  
Recommendations(SBAR). Information from the following report(s) SBAR, ED Summary, [facility] and Recent Results was reviewed with the receiving nurse. Lines:  
Peripheral IV 12/26/20 Right Antecubital (Active) Opportunity for questions and clarification was provided. Patient transported with: 
 [employer]

## 2020-12-26 NOTE — CONSULTS
Consultation Note NAME: Heriberto Richards :  1985 MRN:  [id number] Date/Time:  2020 4:28 PM 
 
I have been asked to see this patient by Dr. Hannah Barney  for advice/opinion re: ESKD. Assessment :    Plan: ESKD Hyperkalemia COVID Hypoxia HTN, uncontrolled Anemia of ESKD 
PAD MWF HD at [facility] - last HD  (HD was to have been done today on a holiday schedule, but he presented with a cough and fever and was sent to the ER -- he was found to be covid +) Plan: HD today on low K bath with UF as tolerated and then TTS HD (he will go to [facility] on discharge until covid neg) No FARIDA (secondary to covid and hgb > 10) S/p medical management of high K Subjective: CHIEF COMPLAINT:  eskd HISTORY OF PRESENT ILLNESS:    
Ingrid Clark is a 38 y.o.   male who has a history of the below. Mr. Juan Fountain is alert, not a great historian. Apparently went to HD today, but was sent to the er b/c fever and cough. His rapid covid test is positive. He is sob. + hypoxia. BP quite high and K is quite high. Past Medical History:  
Diagnosis Date  Arrhythmia   
 aflutter  Blind  Cataracts  Cellulitis and abscess of foot  Chronic kidney disease [facility]  
 Diabetes ([facility] [address] DM II  
 GERD (gastroesophageal reflux disease)  Hearing loss  Hypercholesterolemia  Hypertension  Neuropathy  Obesity  Osteomyelitis ([facility] [address]  Puerperal sepsis with acute hypoxic respiratory failure (HCC)  Sepsis ([facility] [address] Past Surgical History:  
Procedure Laterality Date  CARDIAC SURG PROCEDURE UNLIST  2020  
 ablation  COLONOSCOPY N/A 2017 COLONOSCOPY performed by Ashley Hinojosa MD at [facility] ENDOSCOPY  COLONOSCOPY,DIAGNOSTIC  2017  HX AFIB ABLATION  2020  HX AMPUTATION Left great toe and L 5th toe  HX AMPUTATION Right   
 bka  HX AMPUTATION TOE Right  HX VASCULAR ACCESS    
  SC COMPRE ELECTROPHYSIOL XM W/LEFT ATRIAL PACNG/REC N/A 9/4/2020 Lt Atrial Pace & Record During Ep Study performed by Ce Fleming MD at [facility]ATH [facility]  SC EPHYS EVAL W/ABLATION SUPRAVENT ARRHYTHMIA N/A 9/4/2020 ABLATION A-FLUTTER performed by Ce Fleming MD at [facility]ATH [facility]  SC INTRACARDIAC ELECTROPHYSIOLOGIC 3D MAPPING N/A 9/4/2020 Ep 3d Mapping performed by Ce Fleming MD at [facility]ATH [facility]  UPPER GI ENDOSCOPY,BIOPSY  12/8/2017  VASCULAR SURGERY PROCEDURE UNLIST    
 R side chest  
 VASCULAR SURGERY PROCEDURE UNLIST Left 07/25/2017 Creation transposed AV fistula arm Social History Tobacco Use  Smoking status: Former Smoker Packs/day: 0.25 Years: 20.00 Pack years: 5.00 Quit date: 6/1/2017 Years since quitting: 3.5  Smokeless tobacco: Never Used  Tobacco comment: \"quit about a year ago\" Substance Use Topics  Alcohol use: Not Currently Comment: rarely Family History Problem Relation Age of Onset  Diabetes Mother  Liver Disease Father  Kidney Disease Maternal Grandfather  Diabetes Maternal Grandfather  Hypertension Maternal Grandfather  Diabetes Paternal Uncle  Hypertension Paternal Uncle No Known Allergies Prior to Admission medications Medication Sig Start Date End Date Taking? Authorizing Provider  
oxyCODONE (OXYIR) 5 mg capsule Take 5 mg by mouth every four (4) hours as needed for Pain. Provider, Historical  
vancomycin 125 mg/2.5 mL syrg Take  by mouth. Provider, Historical  
gabapentin (NEURONTIN) 300 mg capsule Take 1 Cap by mouth nightly. Max Daily Amount: 300 mg. 10/27/20   Shanita Holloway P, NP  
insulin glargine (LANTUS) 100 unit/mL injection 10 Units by SubCUTAneous route nightly. 10/23/20   Kusum Parker NP  
aspirin delayed-release 81 mg tablet Take 1 Tab by mouth daily.  10/23/20   Kusum Parker NP  
 atorvastatin (LIPITOR) 20 mg tablet Take 1 Tab by mouth nightly. 10/23/20   Desmond Crawford NP  
clopidogreL (PLAVIX) 75 mg tab Take 1 Tab by mouth daily. 10/23/20   Masood BERMAN NP  
metoprolol tartrate 75 mg tab Take 75 mg by mouth two (2) times a day. 10/23/20   Masood BERMAN NP  
polyethylene glycol (MIRALAX) 17 gram packet Take 1 Packet by mouth daily as needed for Constipation. 10/23/20   Desmond Crawford NP  
folic acid/vit B complex and C (RENAL VITAMIN PO) Take  by mouth daily. Provider, Historical  
sevelamer (RENAGEL) 800 mg tablet Take 800 mg by mouth three (3) times daily (with meals). Provider, Historical  
 
REVIEW OF SYSTEMS:   
 []  Unable to obtain reliable ROS due to  [] mental status  [] sedated   [] intubated 
 [x] Total of 12 systems reviewed as follows: 
Constitutional: + fever, negative chills, negative weight loss Eyes:   negative visual changes ENT:   negative sore throat, tongue or lip swelling Respiratory:  + cough, dyspnea Cards:  negative for chest pain, palpitations, lower extremity edema GI:   negative for nausea, vomiting, diarrhea, and abdominal pain :  negative for frequency, dysuria Integument:  negative for rash and pruritus Heme:  negative for easy bruising and gum/nose bleeding Musculoskel: negative for myalgias,  back pain and muscle weakness Neuro:  negative for headaches, dizziness, vertigo Psych:  negative for feelings of anxiety, depression Travel?: none Objective: VITALS:   
Visit Vitals BP (!) 228/131 Pulse 80 Temp 99.8 °F (37.7 °C) Resp 19 Ht 6' 2\" (1.88 m) Wt 156.6 kg (345 lb 3.9 oz) SpO2 98% BMI 44.33 kg/m² PHYSICAL EXAM: 
Gen:  []  WD []  WN  [] cachectic []  thin [x]  obese []  disheveled [x]  ill apearing  []   Critical  [x]   Chronic    []  No acute distress HEENT:   [x] NC/AT/PERRLA/EOMI 
  [] pink conjunctivae      [] pale conjunctivae PERRL  [] yes  [] no      [] moist mucosa    [] dry mucosa 
  hearing intact to voice [x] yes  [] No 
              
NECK:   supple [] yes  [] no        masses [] yes  [] No 
             thyroid  []  non tender  []  tender RESP:   [] CTA bilaterally/no wheezing/rhonchi/rales/crackles [] rhonchi bilaterally - no dullness  [] wheezing   [x] rhonchi   [x] crackles  
  use of accessory muscles [] yes [] no CARD:   [x]  regular rate and rhythm/No murmurs/rubs/gallops 
  murmur  [] yes ()  [] no      Rubs  [] yes  [] no       Gallops [] yes  [] no 
  Rate []  regular  []  irregular        carotid bruits  [] Right  []  Left  
              + edema [x] yes  [] no           JVP  []  yes   []  no 
 
ABD:    [x] soft/non distended/non tender/+bowel sounds/no HSM []  Rigid    tenderness [] yes [] no   Liver enlargement  []  yes []  no  
             Spleen enlargement  []  yes []  no     distended []  yes [] no  
  bowel sound  [] hypoactive   [] hyperactive LYMPH:    Neck []  yes [x]  no       Axillae []  yes []  no SKIN:   Rashes []  yes   [x]  no    Ulcers []  yes   []  no 
             [] tight to palpitation    skin turgor []  good  [] poor  [] decreased Cyanosis/clubbing []  yes []  no 
 
NEUR:   [x] cranial nerves II-XII grossly intact    
  [] Cranial nerves deficit 
               []  facial droop    []  slurred speech   [] aphasic  
  [] Strength normal     []  weakness  []  LUE  []   RUE/ []  LLE  []   RLE 
  follows commands  [x]  yes []  no        
 
PSYCH:   insight [] poor [] good   Alert and Oriented to  [x] person  [x] place  [x]  time  
                 [] depressed [] anxious [] agitated  [] lethargic [] stuporous  [] sedated LAB DATA REVIEWED:   
Recent Labs  
  12/26/20 
1444 WBC 8.3 HGB 10.2* HCT 32.3*  
* Recent Labs  
  12/26/20 
1444 * K 6.6*  
CL 96* CO2 27 BUN 76* CREA 15.60* GLU 80  
CA 8.3* Recent Labs 12/26/20 
1444 ALT 45 AP 87 TBILI 0.5 ALB 3.2*  
GLOB 5.0* No results for input(s): INR, PTP, APTT, INREXT in the last 72 hours. No results for input(s): FE, TIBC, PSAT, FERR in the last 72 hours. No results for input(s): PH, PCO2, PO2 in the last 72 hours. No results for input(s): CPK, CKMB in the last 72 hours. No lab exists for component: TROPONINI Lab Results Component Value Date/Time Glucose (POC) 146 (H) 10/28/2020 07:07 AM  
 Glucose (POC) 114 (H) 10/27/2020 09:14 PM  
 Glucose (POC) 141 (H) 10/27/2020 04:31 PM  
 Glucose (POC) 129 (H) 10/27/2020 11:22 AM  
 Glucose (POC) 112 (H) 10/27/2020 07:16 AM  
 
 
Procedures: see electronic medical records for all procedures/Xrays and details which were not copied into this note but were reviewed prior to creation of Plan.   
________________________________________________________________________ 
  
 
___________________________________________________ Consulting Physician:  Feliz Stinson MD

## 2020-12-27 NOTE — PROGRESS NOTES
1226 
 
Pt currently has no IV access. Had a multiple unsuccessful attempts by different members of the team. ER charge RN been called for IV placement via ultrasound . End of Shift Note 
 
1900 Bedside shift change report given to [address] [place] [address] (oncoming nurse) by Ruslan Gates (offgoing nurse). Report included the following information SBAR, Kardex, Procedure Summary, Intake/Output, MAR and Recent Results Shift worked:  7664 Shift summary and any significant changes:  
  BP still high, adm PRN hydralizine and scheduled meds Concerns for physician to address:  Discussed with MD 
  
Zone phone for oncoming shift:   1303 Activity: 
Activity Level: Chair Number times ambulated in hallways past shift: 0 Number of times OOB to chair past shift: 0 Cardiac:  
Cardiac Monitoring: Yes     
Cardiac Rhythm: Normal sinus rhythm, Sinus tachycardia Access:  
Current line(s): PIV and HD access Genitourinary:  
Urinary status: anuric Respiratory:  
O2 Device: Nasal cannula Chronic home O2 use?: NO Incentive spirometer at bedside: NO 
  
GI: 
  
Current diet:  DIET RENAL Regular; FR 1200ML Passing flatus: YES Tolerating current diet: YES 
% Diet Eaten: 100 % Pain Management:  
Patient states pain is manageable on current regimen: YES Skin: 
Ranjeet Score: 18 Interventions: float heels, increase time out of bed and limit briefs Patient Safety: 
Fall Score: Total Score: 4 Interventions: bed/chair alarm and assistive device (walker, cane, etc) High Fall Risk: Yes Length of Stay: 
Expected LOS: - - - Actual LOS: 1 Ruslan Gates

## 2020-12-27 NOTE — PROCEDURES
[facility] Dialysis Team [place]  [phone] Vitals   Pre   Post   Assessment   Pre   Post    
Temp  98.5  98.8 LOC  A&O x3 A&O x3 HR   74 81 Lungs   NC 2L  NC 2L, no SOB  
B/P  192/107 193/102 Cardiac   regular  regular Resp   18 18 Skin   Dry, warm  dry, warm Pain level  0 0 Edema  mild 
 
 some Orders: Duration:   Start:    1955 End:    2355 Total:   4 hrs Dialyzer:   Dialyzer/Set Up Inspection: Gómez Townsend (12/26/20 1955) K Bath:   Dialysate K (mEq/L): 2 (12/26/20 1955) Ca Bath:   Dialysate CA (mEq/L): 2.5 (12/26/20 1955) Na/Bicarb:   Dialysate NA (mEq/L): 138 (12/26/20 1955) Target Fluid Removal:   Goal/Amount of Fluid to Remove (mL): 4000 mL (12/26/20 1955) Access Type & Location:   PELON AVF: +B&T, no S&S of infection, site cleaned with alcohol, cannulated with 15G 1\" needles x2 Labs Obtained/Reviewed Critical Results Called   Date when labs were drawn- 
Hgb-   
HGB Date Value Ref Range Status 12/26/2020 11.6 (L) 12.1 - 17.0 g/dL Final  
 
K-   
Potassium Date Value Ref Range Status 12/26/2020 6.6 (HH) 3.5 - 5.1 mmol/L Final  
  Comment: RESULTS VERIFIED, PHONED TO AND READ BACK BY 
GONZALES GREENBERG AT 1538 ERIC Ca-  
Calcium Date Value Ref Range Status 12/26/2020 8.3 (L) 8.5 - 10.1 MG/DL Final  
 
Bun-  
BUN Date Value Ref Range Status 12/26/2020 76 (H) 6 - 20 MG/DL Final  
 
Creat-  
Creatinine Date Value Ref Range Status 12/26/2020 15.60 (H) 0.70 - 1.30 MG/DL Final  
 
  
Medications/ Blood Products Given Name   Dose   Route and Time Blood Volume Processed (BVP):    97 L Net Fluid Removed:  4000cc Comments Time Out Done: 1950 Primary Nurse Rpt Pre: Starla Mcdaniel RN 
Primary Nurse Rpt Post: Dia Worthy RN 
Pt Education: access care, procedure Care Plan: continue HD tx as per MD order Tx Summary: Tolerated tx well, at the end remaining blood in circuit returned with 300cc NS, cannulas removed x2, hemostasis achieved, dressing applied. Admiting Diagnosis: SOB, fever Pt's previous clinic- 
Consent signed - Informed Consent Verified: Yes (12/26/20 1955) Hepatitis Status- sample sent to lab Machine #- Machine Number: [id number] (12/26/20 1955) Telemetry status- monitored remotely

## 2020-12-27 NOTE — PROGRESS NOTES
End of Shift Note Bedside shift change report given to 7pm-7am (oncoming nurse) by Felix Stewart RN (offgoing nurse). Report included the following information SBAR, Kardex, Intake/Output, MAR, Accordion, Recent Results and Med Rec Status Shift worked:  7pm-7am 
  
Shift summary and any significant changes:  
  BP remained elevated despite the dialysis. Labetaloll  Prn given twice Concerns for physician to address:  As above Zone phone for oncoming shift:  [phone] Activity: 
  
Number times ambulated in hallways past shift: 0 Number of times OOB to chair past shift: 0 Cardiac:  
Cardiac Monitoring: Yes Access:  
Current line(s): no access Genitourinary:  
Urinary status: anuric Respiratory:  
O2 Device: Nasal cannula Chronic home O2 use?: NO Incentive spirometer at bedside: NO 
  
GI: 
  
Current diet:  DIET RENAL Regular Passing flatus: YES Tolerating current diet: YES Pain Management:  
Patient states pain is manageable on current regimen: YES Skin: Interventions: turn team and increase time out of bed Patient Safety: 
Fall Score: Interventions: gripper socks and pt to call before getting OOB Length of Stay: 
Expected LOS: - - - Actual LOS: 1 Felxi Stewart RN

## 2020-12-27 NOTE — PROGRESS NOTES
Hospitalist Progress Note NAME: Amanda Zepeda :  1985 MRN:  [id number] I reviewed pertinent labs and imaging, and discussed /agreed on the plan of care with Dr. Janie Chauhan. Assessment / Plan: 
Acute Hypoxic Respiratory Failure due to COVID-19 Pneumonia COVID-19 Positive Requiring 2 liters of oxygen with oxygen saturation level 94-98% Continue decadron 6 mg x 10 days 1st dose 2020 Continue Zithromax 500 mg Daily x 4 days Continue Rocephin 1 GM x 4 days Vitamin C Zinc 
Procalcitonin 10.02 Ferritin 480 D-dimer in the am 
Not a candidate for Remdesivir due to ESRD 
WBC's at 9.1 this am 
Currently Afebrile TMAX 100.3 on 2020 Chest x-ray 2020: IMPRESSION:  
Bilateral pneumonia Possible cardiomegaly versus pericardial effusion ESRD on hemodialysis Scheduled for MWF does go to the [facility] [place] 
  He had HD on  with removal of 4 liters in the ED He is scheduled for HD tomorrow Appreciate Nephrology input Continue Renvela Hyperkalemia in the ED (Resolved) Monitor Hypertension POA Continue Metoprolol BID Amlodipine 5 mg daily Hydralazine as needed Labetalol as needed Monitor Type 2 Diabetes Lantus 10 units at bedtime Sliding scale coverage Bedside glucose monitoring Hx of Atrial Flutter Rate controlled ECG on admission shows NSR Continue Plavix daily Hx of CAD s/p stents Hx of PAD Continue statin, aspirin, Plavix, and neurontin S/P right BKA 2020 Diarrhea secondary to antibiotic therapy Oma Q daily 40 or above Morbid obesity / Body mass index is 44.33 kg/m². Code status: Full Prophylaxis: Hep SQ Recommended Disposition: Home w/Family Subjective: Chief Complaint / Reason for Physician Visit Patient resting at this time. He states he went to HD yesterday and felt short of breath. He was sent to ER for further assessment. He is on 2 liters of oxygen. Complains of diarrhea probably related to antibiotic therapy. Discussed with RN events overnight. Review of Systems: 
Symptom Y/N Comments  Symptom Y/N Comments Fever/Chills n   Chest Pain n   
Poor Appetite n   Edema n   
Cough y dry  Abdominal Pain Sputum n   Joint Pain n   
SOB/LITTLE n   Pruritis/Rash Nausea/vomit n   Tolerating PT/OT Diarrhea y   Tolerating Diet y Constipation n   Other Could NOT obtain due to:   
 
Objective: VITALS:  
Last 24hrs VS reviewed since prior progress note. Most recent are: 
Patient Vitals for the past 24 hrs: 
 Temp Pulse Resp BP SpO2  
12/27/20 1031 99.1 °F (37.3 °C) 79 16 (!) 180/105 98 % 12/27/20 0803  80  (!) 186/106   
12/27/20 0635 98.6 °F (37 °C) 76 18 (!) 206/103 97 % 12/27/20 0318 98.9 °F (37.2 °C) 80 18 (!) 172/57 94 % 12/27/20 0040 97.4 °F (36.3 °C) 78 18 (!) 199/95 98 % 12/27/20 0001  81 18 (!) 193/102   
12/26/20 2355  77 17 (!) 194/103   
12/26/20 2345  80 17 (!) 182/116   
12/26/20 2330  76 18 (!) 195/107   
12/26/20 2315  77 18 (!) 188/102   
12/26/20 2300  77 18 (!) 196/110   
12/26/20 2245  77 18 (!) 184/98   
12/26/20 2230  77 18 (!) 217/111   
12/26/20 2215  77 18 (!) 212/116   
12/26/20 2200  75 17 (!) 199/112   
12/26/20 2145  75 17 (!) 194/114   
12/26/20 2130  75 18 (!) 201/112   
12/26/20 2115  75 18 (!) 198/113   
12/26/20 2100  75 19 (!) 191/107   
12/26/20 2045  73 19 (!) 202/113   
12/26/20 2030  72 18 (!) 177/101   
12/26/20 2015  73 18 (!) 186/100   
12/26/20 1955  74 18 (!) 192/107   
12/26/20 1946 98.5 °F (36.9 °C) 75 17 (!) 180/94 94 % 12/26/20 1915 98.9 °F (37.2 °C) 75 17 (!) 168/87 99 % 12/26/20 1856    (!) 171/90   
12/26/20 1800 98.3 °F (36.8 °C) 75  (!) 212/138 99 % 12/26/20 1715  76 20 (!) 167/94 94 % 12/26/20 1630  76 20 (!) 201/83 96 % 12/26/20 1545  79 22 (!) 206/122 97 % 12/26/20 1430 99.8 °F (37.7 °C) 80 19 (!) 228/131 98 % 12/26/20 1415     (!) 88 % Intake/Output Summary (Last 24 hours) at 12/27/2020 1341 Last data filed at 12/26/2020 2355 Gross per 24 hour Intake 50 ml Output 4000 ml Net -3950 ml I had a face to face encounter and independently examined this patient on 12/27/2020, as outlined below: PHYSICAL EXAM: 
General:           Alert, cooperative, no acute distress EENT:   Anicteric sclerae. normocephalic Resp:   Coarse bilat lower lobes, no wheezing or rales. No accessory                           muscle use CV:  Regular  rhythm,  No edema GI:  Soft, Non distended, Non tender. +Bowel sounds Neurologic:  Alert and oriented X 3, normal speech, Psych:   Fair insight. Not anxious nor agitated Skin:  No rashes. No jaundice Reviewed most current lab test results and cultures  YES Reviewed most current radiology test results   YES Review and summation of old records today    NO Reviewed patient's current orders and MAR    YES 
PMH/ reviewed - no change compared to H&P 
________________________________________________________________________ Care Plan discussed with: 
  Comments Patient y Family  y sister RN y   
Care Manager Consultant Multidiciplinary team rounds were held today with , nursing, pharmacist and clinical coordinator. Patient's plan of care was discussed; medications were reviewed and discharge planning was addressed. ________________________________________________________________________ 
 
________________________________________________________________________ Amarilys Syeda, NP  
 
 Procedures: see electronic medical records for all procedures/Xrays and details which were not copied into this note but were reviewed prior to creation of Plan. LABS: 
I reviewed today's most current labs and imaging studies. Pertinent labs include: 
Recent Labs  
  12/26/20 
2351 12/26/20 
1444 WBC 9.1 8.3 HGB 11.6* 10.2* HCT 36.1* 32.3*  
 148* Recent Labs  
  12/26/20 
2351 12/26/20 
1444 * 132* K 3.8 6.6*  
CL 97 96* CO2 27 27 * 80 BUN 32* 76* CREA 7.06* 15.60* CA 9.0 8.3* ALB 3.6 3.2* TBILI 0.7 0.5 ALT 51 45 Signed: Roxana Abarca NP

## 2020-12-27 NOTE — PROGRESS NOTES
NAME: Navid Reina :  1985 MRN:  [id number] Assessment :    Plan: 
--ESKD Hyperkalemia, resolved COVID + Hypoxia HTN, uncontrolled Anemia of ESKD 
PAD --MWF HD at [facility] - HD on  with 4 liters off; TTS HD (he will go to [facility] TTS on discharge until covid neg) Maybe an extra UF tomorrow to try to help with HTN 
 
FR and low salt intake 
  
No FARIDA (secondary to covid and hgb > 10) 
  
  
 
 
Subjective: Chief Complaint:  C/o diarrhea. No other complaint. Review of Systems: 
 
Symptom Y/N Comments  Symptom Y/N Comments Fever/Chills    Chest Pain Poor Appetite    Edema Cough    Abdominal Pain Sputum    Joint Pain SOB/LITTLE    Pruritis/Rash Nausea/vomit    Tolerating PT/OT Diarrhea    Tolerating Diet Constipation    Other Could not obtain due to:   
 
Objective: VITALS:  
Last 24hrs VS reviewed since prior progress note. Most recent are: 
Visit Vitals BP (!) 180/105 (BP 1 Location: Right arm, BP Patient Position: Supine) Pulse 79 Temp 99.1 °F (37.3 °C) Resp 16 Ht 6' 2\" (1.88 m) Wt 156.6 kg (345 lb 3.9 oz) SpO2 98% BMI 44.33 kg/m² Intake/Output Summary (Last 24 hours) at 2020 1228 Last data filed at 2020 2355 Gross per 24 hour Intake 50 ml Output 4000 ml Net -3950 ml Telemetry Reviewed: PHYSICAL EXAM: 
General: NAD Lab Data Reviewed: (see below) Medications Reviewed: (see below) PMH/SH reviewed - no change compared to H&P 
________________________________________________________________________ Care Plan discussed with: 
Patient Family RN Care Manager Consultant:     
 
  Comments >50% of visit spent in counseling and coordination of care    
 
________________________________________________________________________ Lady Julisa MD  
 
 Procedures: see electronic medical records for all procedures/Xrays and details which 
were not copied into this note but were reviewed prior to creation of Plan. LABS: 
Recent Labs  
  12/26/20 
2351 12/26/20 
1444 WBC 9.1 8.3 HGB 11.6* 10.2* HCT 36.1* 32.3*  
 148* Recent Labs  
  12/26/20 
2351 12/26/20 
1444 * 132* K 3.8 6.6*  
CL 97 96* CO2 27 27 BUN 32* 76* CREA 7.06* 15.60* * 80  
CA 9.0 8.3* Recent Labs  
  12/26/20 
2351 12/26/20 
1444  87  
TP 9.1* 8.2 ALB 3.6 3.2*  
GLOB 5.5* 5.0* No results for input(s): INR, PTP, APTT, INREXT in the last 72 hours. Recent Labs  
  12/26/20 
2351 FERR 480* No results found for: FOL, RBCF No results for input(s): PH, PCO2, PO2 in the last 72 hours. No results for input(s): CPK, CKMB in the last 72 hours. No lab exists for component: TROPONINI No components found for: [place] Lab Results Component Value Date/Time Color YELLOW/STRAW 05/01/2017 05:39 PM  
 Appearance CLOUDY (A) 05/01/2017 05:39 PM  
 Specific gravity 1.018 05/01/2017 05:39 PM  
 Specific gravity >1.030 (H) 10/03/2014 04:35 AM  
 pH (UA) 5.5 05/01/2017 05:39 PM  
 Protein 300 (A) 05/01/2017 05:39 PM  
 Glucose 250 (A) 05/01/2017 05:39 PM  
 Ketone NEGATIVE  05/01/2017 05:39 PM  
 Bilirubin NEGATIVE  05/01/2017 05:39 PM  
 Urobilinogen 0.2 05/01/2017 05:39 PM  
 Nitrites NEGATIVE  05/01/2017 05:39 PM  
 Leukocyte Esterase NEGATIVE  05/01/2017 05:39 PM  
 Epithelial cells FEW 05/01/2017 05:39 PM  
 Bacteria NEGATIVE  05/01/2017 05:39 PM  
 WBC 0-4 05/01/2017 05:39 PM  
 RBC 5-10 05/01/2017 05:39 PM  
 
 
MEDICATIONS: 
Current Facility-Administered Medications Medication Dose Route Frequency  sodium chloride (NS) flush 5-40 mL  5-40 mL IntraVENous Q8H  
 sodium chloride (NS) flush 5-40 mL  5-40 mL IntraVENous PRN  
 aspirin delayed-release tablet 81 mg  81 mg Oral DAILY  atorvastatin (LIPITOR) tablet 20 mg  20 mg Oral QHS  clopidogreL (PLAVIX) tablet 75 mg  75 mg Oral DAILY  gabapentin (NEURONTIN) capsule 300 mg  300 mg Oral QHS  metoprolol tartrate (LOPRESSOR) tablet 75 mg  75 mg Oral BID  
 oxyCODONE IR (ROXICODONE) tablet 5 mg  5 mg Oral Q4H PRN  
 sevelamer carbonate (RENVELA) tab 800 mg  800 mg Oral TID WITH MEALS  sodium chloride (NS) flush 5-40 mL  5-40 mL IntraVENous Q8H  
 sodium chloride (NS) flush 5-40 mL  5-40 mL IntraVENous PRN  
 acetaminophen (TYLENOL) tablet 650 mg  650 mg Oral Q6H PRN Or  
 acetaminophen (TYLENOL) suppository 650 mg  650 mg Rectal Q6H PRN  polyethylene glycol (MIRALAX) packet 17 g  17 g Oral DAILY PRN  promethazine (PHENERGAN) tablet 12.5 mg  12.5 mg Oral Q6H PRN Or  
 ondansetron (ZOFRAN) injection 4 mg  4 mg IntraVENous Q6H PRN  
 heparin (porcine) injection 7,500 Units  7,500 Units SubCUTAneous Q8H  
 dexAMETHasone (DECADRON) tablet 6 mg  6 mg Oral DAILY  ascorbic acid (vitamin C) (VITAMIN C) tablet 1,000 mg  1,000 mg Oral DAILY  zinc sulfate (ZINCATE) 220 (50) mg capsule 220 mg  220 mg Oral DAILY  insulin lispro (HUMALOG) injection   SubCUTAneous AC&HS  
 glucose chewable tablet 16 g  4 Tab Oral PRN  
 dextrose (D50W) injection syrg 12.5-25 g  12.5-25 g IntraVENous PRN  
 glucagon (GLUCAGEN) injection 1 mg  1 mg IntraMUSCular PRN  
 cefTRIAXone (ROCEPHIN) 1 g in 0.9% sodium chloride (MBP/ADV) 50 mL MBP  1 g IntraVENous Q24H  
 azithromycin (ZITHROMAX) tablet 500 mg  500 mg Oral DAILY  insulin glargine (LANTUS) injection 10 Units  10 Units SubCUTAneous QHS  labetaloL (NORMODYNE;TRANDATE) injection 10 mg  10 mg IntraVENous Q4H PRN

## 2020-12-28 NOTE — PROGRESS NOTES
CARYN Hodge 12/28/2020 15:40 Pharmacy Medication Reconciliation  
  
The patient was interviewed regarding current PTA medication list, use and drug allergies;  patient present in room and obtained permission from patient to discuss drug regimen with visitor(s) present. The patient was questioned regarding use of any other inhalers, topical products, over the counter medications, herbal medications, vitamin products or ophthalmic/nasal/otic medication use.  
  
Allergy Update: Patient has no known allergies. 
  
Recommendations/Findings: The following amendments were made to the patient's active medication list on file at [facility]:  
  
  
Pertinent Findings: Medications below confirmed with Glejuanaie 
-Clarified PTA med list with patient, fill history. PTA medication list was corrected to the following:  
Prior to Admission Medications Prescriptions Last Dose Informant Patient Reported? Taking?  
aspirin delayed-release 81 mg tablet     No No  
Sig: Take 1 Tab by mouth daily. atorvastatin (LIPITOR) 20 mg tablet     Yes Yes Sig: Take 20 mg by mouth daily. clopidogreL (PLAVIX) 75 mg tab     No No  
Sig: Take 1 Tab by mouth daily. gabapentin (NEURONTIN) 300 mg capsule     No No  
Sig: Take 1 Cap by mouth nightly. Max Daily Amount: 300 mg.  
insulin glargine (LANTUS) 100 unit/mL injection     No No  
Sig: 10 Units by SubCUTAneous route nightly. Patient taking differently: 20 Units by SubCUTAneous route nightly. metoprolol tartrate 75 mg tab     No No  
Sig: Take 75 mg by mouth two (2) times a day. Patient taking differently: Take 75 mg by mouth two (2) times a day. Does not take on dialysis days. sevelamer (RENAGEL) 800 mg tablet     Yes No  
Sig: Take 800 mg by mouth three (3) times daily (with meals). Facility-Administered Medications: None  
  
  
  
  
Thank you, Heaven Fang, PHARMD

## 2020-12-28 NOTE — PROGRESS NOTES
Hospitalist Progress Note NAME: Frankey Sewer :  1985 MRN:  [id number] I reviewed pertinent labs and imaging, and discussed /agreed on the plan of care with Dr. Julianna Ospina. 
 
Assessment / Plan: 
Acute Hypoxic Respiratory Failure due to COVID-19 Pneumonia COVID-19 Positive Requiring 2 liters of oxygen with oxygen saturation level 94-98% Continue decadron 6 mg x 10 days 1st dose 2020 Continue Zithromax 500 mg Daily x 4 days Continue Rocephin 1 GM x 4 days Vitamin C Zinc 
Procalcitonin 10.02 Ferritin 480 D-dimer 1.4 Not a candidate for Remdesivir due to ESRD 
WBC's at 9.1 this am 
Currently Afebrile TMAX 100.3 on 2020 Chest x-ray 2020: IMPRESSION:  
Bilateral pneumonia Possible cardiomegaly versus pericardial effusion ESRD on hemodialysis Scheduled for MWF does go to the [address] 
  HD tomorrow Appreciate Nephrology input Continue Renvela Hyperkalemia in the ED (Resolved) Monitor Hypertension POA Continue Metoprolol BID Amlodipine  daily Hydralazine as needed Labetalol as needed Monitor Type 2 Diabetes Lantus 12 units at bedtime [place] 4 units TID Sliding scale coverage Bedside glucose monitoring Hx of Atrial Flutter Rate controlled ECG on admission shows NSR Continue Plavix daily Hx of CAD s/p stents Hx of PAD Continue statin, aspirin, Plavix, and neurontin S/P right BKA 2020 Diarrhea secondary to antibiotic therapy Oma Q daily Hyperkalemia POA Potassium restricted diet Managed by Nephrology Monitor 40 or above Morbid obesity / Body mass index is 44.33 kg/m². Code status: Full Prophylaxis: Hep SQ Recommended Disposition: Home w/Family Subjective: Chief Complaint / Reason for Physician Visit Resting at this time. Still on 2 liters of oxygen. Voices no complaints at this time. Discussed with RN events overnight. Review of Systems: Symptom Y/N Comments  Symptom Y/N Comments Fever/Chills n   Chest Pain n   
Poor Appetite n   Edema n   
Cough y dry  Abdominal Pain Sputum n   Joint Pain SOB/LITTLE y With exertion  Pruritis/Rash n   
Nausea/vomit n   Tolerating PT/OT Diarrhea n   Tolerating Diet y Constipation n   Other Could NOT obtain due to:   
 
Objective: VITALS:  
Last 24hrs VS reviewed since prior progress note. Most recent are: 
Patient Vitals for the past 24 hrs: 
 Temp Pulse Resp BP SpO2  
12/28/20 1440 98.1 °F (36.7 °C) 76 17 (!) 169/77 96 % 12/28/20 1212    (!) 170/92   
12/28/20 1055 98.1 °F (36.7 °C) 82 17 (!) 183/99 95 % 12/28/20 0644 98.1 °F (36.7 °C) 74 17 (!) 171/90 100 % 12/28/20 0237 98.2 °F (36.8 °C) 76 19 (!) 193/107 94 % 12/27/20 2206  73 18 (!) 171/96 96 % 12/27/20 1950 98.1 °F (36.7 °C) 78 18 (!) 166/89 94 % Intake/Output Summary (Last 24 hours) at 12/28/2020 6148 Last data filed at 12/28/2020 1335 Gross per 24 hour Intake 1140 ml Output  Net 1140 ml I had a face to face encounter and independently examined this patient on 12/28/2020, as outlined below: PHYSICAL EXAM: 
General            Alert, cooperative, no acute distress EENT:  EOMI. Anicteric sclerae. MMM Resp:  Coarse lower bilat bases no wheezing or rales. No accessory                          muscle use CV:  Regular  rhythm,  No edema GI:  Soft, Non distended, Non tender. +Bowel sounds Neurologic:  Alert and oriented X 3, normal speech, Psych:   Fair insight. Not anxious nor agitated Skin:  No rashes. No jaundice Reviewed most current lab test results and cultures  YES Reviewed most current radiology test results   YES Review and summation of old records today    NO Reviewed patient's current orders and MAR    YES 
PMH/SH reviewed - no change compared to H&P 
________________________________________________________________________ Care Plan discussed with: 
  Comments Patient y Family RN y   
Care Manager Consultant  y Nephrology Multidiciplinary team rounds were held today with , nursing, pharmacist and clinical coordinator. Patient's plan of care was discussed; medications were reviewed and discharge planning was addressed. ________________________________________________________________________ 
 
________________________________________________________________________ Alfreda Alcaraz NP Procedures: see electronic medical records for all procedures/Xrays and details which were not copied into this note but were reviewed prior to creation of Plan. LABS: 
I reviewed today's most current labs and imaging studies. Pertinent labs include: 
Recent Labs  
  12/28/20 
0316 12/26/20 
2351 12/26/20 
1444 WBC 6.1 9.1 8.3 HGB 10.8* 11.6* 10.2* HCT 35.0* 36.1* 32.3*  
 184 148* Recent Labs  
  12/28/20 
0316 12/26/20 
2351 12/26/20 
1444 * 133* 132*  
K 5.2* 3.8 6.6*  
CL 96* 97 96* CO2 26 27 27 * 104* 80  
BUN 75* 32* 76* CREA 12.50* 7.06* 15.60* CA 8.3* 9.0 8.3* ALB 3.1* 3.6 3.2* TBILI 0.5 0.7 0.5 ALT 48 51 45 Signed: Alfreda Alcaraz NP

## 2020-12-28 NOTE — PROGRESS NOTES
NAME: Jacob Herron :  1985 MRN:  [id number] Assessment :    Plan: 
--ESKD-MWF [facility].  Now TTS to go to [facility] DC due to [place] Hyperkalemia COVID + Hypoxia HTN, uncontrolled Anemia of ESKD 
 
PAD --No acute need for HD today. Plan HD in AM. Kayexalate for elevated potassium. He is on a potassium restricted diet. FR and low salt intake 
  
No FARIDA (secondary to covid and hgb > 10) 
  
  
 
 
Subjective: Chief Complaint:  C/o diarrhea. No other complaint. Review of Systems: 
 
Symptom Y/N Comments  Symptom Y/N Comments Fever/Chills    Chest Pain Poor Appetite    Edema Cough    Abdominal Pain Sputum    Joint Pain SOB/LITTLE    Pruritis/Rash Nausea/vomit    Tolerating PT/OT Diarrhea    Tolerating Diet Constipation    Other Could not obtain due to:   
 
Objective: VITALS:  
Last 24hrs VS reviewed since prior progress note. Most recent are: 
Visit Vitals BP (!) 183/99 Pulse 82 Temp 98.1 °F (36.7 °C) Resp 17 Ht 6' 2\" (1.88 m) Wt 156.6 kg (345 lb 3.9 oz) SpO2 95% BMI 44.33 kg/m² Intake/Output Summary (Last 24 hours) at 2020 1059 Last data filed at 2020 9434 Gross per 24 hour Intake 1220 ml Output  Net 1220 ml Telemetry Reviewed: PHYSICAL EXAM: 
General: NAD Lab Data Reviewed: (see below) Medications Reviewed: (see below) PMH/SH reviewed - no change compared to H&P 
________________________________________________________________________ Care Plan discussed with: 
Patient Family RN Care Manager Consultant:     
 
  Comments >50% of visit spent in counseling and coordination of care    
 
________________________________________________________________________ Bebeto Santos MD  
 
 Procedures: see electronic medical records for all procedures/Xrays and details which 
were not copied into this note but were reviewed prior to creation of Plan. LABS: 
Recent Labs  
  12/28/20 0316 12/26/20 
2351 WBC 6.1 9.1 HGB 10.8* 11.6* HCT 35.0* 36.1*  
 184 Recent Labs  
  12/28/20 0316 12/26/20 2351 12/26/20 
1444 * 133* 132*  
K 5.2* 3.8 6.6*  
CL 96* 97 96* CO2 26 27 27 BUN 75* 32* 76* CREA 12.50* 7.06* 15.60* * 104* 80  
CA 8.3* 9.0 8.3* Recent Labs  
  12/28/20 0316 12/26/20 2351 12/26/20 
1444 AP 81 104 87  
TP 8.4* 9.1* 8.2 ALB 3.1* 3.6 3.2*  
GLOB 5.3* 5.5* 5.0* No results for input(s): INR, PTP, APTT, INREXT, INREXT in the last 72 hours. Recent Labs  
  12/28/20 0316 12/26/20 2351 FERR 560* 480* No results found for: FOL, RBCF No results for input(s): PH, PCO2, PO2 in the last 72 hours. No results for input(s): CPK, CKMB in the last 72 hours. No lab exists for component: TROPONINI No components found for: [place] Lab Results Component Value Date/Time Color YELLOW/STRAW 05/01/2017 05:39 PM  
 Appearance CLOUDY (A) 05/01/2017 05:39 PM  
 Specific gravity 1.018 05/01/2017 05:39 PM  
 Specific gravity >1.030 (H) 10/03/2014 04:35 AM  
 pH (UA) 5.5 05/01/2017 05:39 PM  
 Protein 300 (A) 05/01/2017 05:39 PM  
 Glucose 250 (A) 05/01/2017 05:39 PM  
 Ketone NEGATIVE  05/01/2017 05:39 PM  
 Bilirubin NEGATIVE  05/01/2017 05:39 PM  
 Urobilinogen 0.2 05/01/2017 05:39 PM  
 Nitrites NEGATIVE  05/01/2017 05:39 PM  
 Leukocyte Esterase NEGATIVE  05/01/2017 05:39 PM  
 Epithelial cells FEW 05/01/2017 05:39 PM  
 Bacteria NEGATIVE  05/01/2017 05:39 PM  
 WBC 0-4 05/01/2017 05:39 PM  
 RBC 5-10 05/01/2017 05:39 PM  
 
 
MEDICATIONS: 
Current Facility-Administered Medications Medication Dose Route Frequency  amLODIPine (NORVASC) tablet 10 mg  10 mg Oral DAILY  insulin glargine (LANTUS) injection 12 Units  12 Units SubCUTAneous QHS  insulin lispro (HUMALOG) injection 4 Units  4 Units SubCUTAneous TIDAC  sodium polystyrene (KAYEXALATE) 15 gram/60 mL oral suspension 30 g  30 g Oral NOW  hydrALAZINE (APRESOLINE) 20 mg/mL injection 20 mg  20 mg IntraVENous Q6H PRN  
 lactobac ac& pc-s.therm-b.anim (USMAN Q/RISAQUAD)  1 Cap Oral DAILY  sodium chloride (NS) flush 5-40 mL  5-40 mL IntraVENous Q8H  
 sodium chloride (NS) flush 5-40 mL  5-40 mL IntraVENous PRN  
 aspirin delayed-release tablet 81 mg  81 mg Oral DAILY  atorvastatin (LIPITOR) tablet 20 mg  20 mg Oral QHS  clopidogreL (PLAVIX) tablet 75 mg  75 mg Oral DAILY  gabapentin (NEURONTIN) capsule 300 mg  300 mg Oral QHS  metoprolol tartrate (LOPRESSOR) tablet 75 mg  75 mg Oral BID  
 oxyCODONE IR (ROXICODONE) tablet 5 mg  5 mg Oral Q4H PRN  
 sevelamer carbonate (RENVELA) tab 800 mg  800 mg Oral TID WITH MEALS  sodium chloride (NS) flush 5-40 mL  5-40 mL IntraVENous Q8H  
 sodium chloride (NS) flush 5-40 mL  5-40 mL IntraVENous PRN  
 acetaminophen (TYLENOL) tablet 650 mg  650 mg Oral Q6H PRN Or  
 acetaminophen (TYLENOL) suppository 650 mg  650 mg Rectal Q6H PRN  polyethylene glycol (MIRALAX) packet 17 g  17 g Oral DAILY PRN  promethazine (PHENERGAN) tablet 12.5 mg  12.5 mg Oral Q6H PRN Or  
 ondansetron (ZOFRAN) injection 4 mg  4 mg IntraVENous Q6H PRN  
 heparin (porcine) injection 7,500 Units  7,500 Units SubCUTAneous Q8H  
 dexAMETHasone (DECADRON) tablet 6 mg  6 mg Oral DAILY  ascorbic acid (vitamin C) (VITAMIN C) tablet 1,000 mg  1,000 mg Oral DAILY  zinc sulfate (ZINCATE) 220 (50) mg capsule 220 mg  220 mg Oral DAILY  insulin lispro (HUMALOG) injection   SubCUTAneous AC&HS  
 glucose chewable tablet 16 g  4 Tab Oral PRN  
 dextrose (D50W) injection syrg 12.5-25 g  12.5-25 g IntraVENous PRN  
 glucagon (GLUCAGEN) injection 1 mg  1 mg IntraMUSCular PRN  
  cefTRIAXone (ROCEPHIN) 1 g in 0.9% sodium chloride (MBP/ADV) 50 mL MBP  1 g IntraVENous Q24H  
 azithromycin (ZITHROMAX) tablet 500 mg  500 mg Oral DAILY  labetaloL (NORMODYNE;TRANDATE) injection 10 mg  10 mg IntraVENous Q4H PRN

## 2020-12-28 NOTE — PROGRESS NOTES
End of Shift Note Bedside shift change report given to Yadira Couch RN (oncoming nurse) by Alka Del Valle RN (offgoing nurse). Report included the following information SBAR, Kardex, Intake/Output, MAR, Accordion and Recent Results Shift worked:  7PM -7AM 
  
Shift summary and any significant changes:  
  Pt received Prn labetalol x2 and hydralazine  for elevated  BP Concerns for physician to address:  As above Zone phone for oncoming shift:   [phone] Activity: 
Activity Level: Chair Number times ambulated in hallways past shift: 0 Number of times OOB to chair past shift: 0 Cardiac:  
Cardiac Monitoring: Yes     
Cardiac Rhythm: Normal sinus rhythm Access:  
Current line(s): PIV Genitourinary:  
Urinary status: anuric Respiratory:  
O2 Device: Nasal cannula Chronic home O2 use?: NO Incentive spirometer at bedside: NO 
  
GI: 
  
Current diet:  DIET RENAL Regular; FR 1200ML Passing flatus: YES Tolerating current diet: YES 
% Diet Eaten: 100 % Pain Management:  
Patient states pain is manageable on current regimen: YES Skin: 
Ranjeet Score: 18 Interventions: float heels and limit briefs Patient Safety: 
Fall Score: Total Score: 4 Interventions: bed/chair alarm High Fall Risk: Yes Length of Stay: 
Expected LOS: - - - Actual LOS: 2 Alka Del Valle RN

## 2020-12-29 NOTE — PROGRESS NOTES
NAME: Jacob Herron :  1985 MRN:  [id number] Assessment :    Plan: 
--ESKD-MWF [facility].  Now TTS to go to [facility] due to [place] Hyperkalemia COVID + Hypoxia HTN, uncontrolled Anemia of ESKD 
 
PAD --Hd today. 2K bath. Pull fluid as tolerated. FR and low salt intake 
  
No FARIDA (secondary to covid and hgb > 10) 
  
  
 
 
Subjective: Chief Complaint:  C/o diarrhea. No other complaint. Review of Systems: 
 
Symptom Y/N Comments  Symptom Y/N Comments Fever/Chills    Chest Pain Poor Appetite    Edema Cough    Abdominal Pain Sputum    Joint Pain SOB/LITTLE    Pruritis/Rash Nausea/vomit    Tolerating PT/OT Diarrhea    Tolerating Diet Constipation    Other Could not obtain due to:   
 
Objective: VITALS:  
Last 24hrs VS reviewed since prior progress note. Most recent are: 
Visit Vitals BP (!) 199/105 (BP 1 Location: Right arm, BP Patient Position: At rest) Pulse 91 Temp (!) 101.4 °F (38.6 °C) Resp 18 Ht 6' 2\" (1.88 m) Wt 156.6 kg (345 lb 3.9 oz) SpO2 94% BMI 44.33 kg/m² Intake/Output Summary (Last 24 hours) at 2020 1054 Last data filed at 2020 9045 Gross per 24 hour Intake 1480 ml Output  Net 1480 ml Telemetry Reviewed: PHYSICAL EXAM: 
General: NAD Lab Data Reviewed: (see below) Medications Reviewed: (see below) PMH/SH reviewed - no change compared to H&P 
________________________________________________________________________ Care Plan discussed with: 
Patient Family RN Care Manager Consultant:     
 
  Comments >50% of visit spent in counseling and coordination of care    
 
________________________________________________________________________ Bebeto Santos MD  
 
 Procedures: see electronic medical records for all procedures/Xrays and details which 
were not copied into this note but were reviewed prior to creation of Plan. LABS: 
Recent Labs  
  12/28/20 0316 12/26/20 
2351 WBC 6.1 9.1 HGB 10.8* 11.6* HCT 35.0* 36.1*  
 184 Recent Labs  
  12/28/20 0316 12/26/20 2351 12/26/20 
1444 * 133* 132*  
K 5.2* 3.8 6.6*  
CL 96* 97 96* CO2 26 27 27 BUN 75* 32* 76* CREA 12.50* 7.06* 15.60* * 104* 80  
CA 8.3* 9.0 8.3* Recent Labs  
  12/28/20 0316 12/26/20 2351 12/26/20 
1444 AP 81 104 87  
TP 8.4* 9.1* 8.2 ALB 3.1* 3.6 3.2*  
GLOB 5.3* 5.5* 5.0* No results for input(s): INR, PTP, APTT, INREXT, INREXT in the last 72 hours. Recent Labs  
  12/28/20 0316 12/26/20 2351 FERR 560* 480* No results found for: FOL, RBCF No results for input(s): PH, PCO2, PO2 in the last 72 hours. No results for input(s): CPK, CKMB in the last 72 hours. No lab exists for component: TROPONINI No components found for: [place] Lab Results Component Value Date/Time Color YELLOW/STRAW 05/01/2017 05:39 PM  
 Appearance CLOUDY (A) 05/01/2017 05:39 PM  
 Specific gravity 1.018 05/01/2017 05:39 PM  
 Specific gravity >1.030 (H) 10/03/2014 04:35 AM  
 pH (UA) 5.5 05/01/2017 05:39 PM  
 Protein 300 (A) 05/01/2017 05:39 PM  
 Glucose 250 (A) 05/01/2017 05:39 PM  
 Ketone NEGATIVE  05/01/2017 05:39 PM  
 Bilirubin NEGATIVE  05/01/2017 05:39 PM  
 Urobilinogen 0.2 05/01/2017 05:39 PM  
 Nitrites NEGATIVE  05/01/2017 05:39 PM  
 Leukocyte Esterase NEGATIVE  05/01/2017 05:39 PM  
 Epithelial cells FEW 05/01/2017 05:39 PM  
 Bacteria NEGATIVE  05/01/2017 05:39 PM  
 WBC 0-4 05/01/2017 05:39 PM  
 RBC 5-10 05/01/2017 05:39 PM  
 
 
MEDICATIONS: 
Current Facility-Administered Medications Medication Dose Route Frequency  amLODIPine (NORVASC) tablet 10 mg  10 mg Oral DAILY  insulin glargine (LANTUS) injection 12 Units  12 Units SubCUTAneous QHS  insulin lispro (HUMALOG) injection 4 Units  4 Units SubCUTAneous TIDAC  metoprolol tartrate (LOPRESSOR) tablet 100 mg  100 mg Oral BID  hydrALAZINE (APRESOLINE) 20 mg/mL injection 20 mg  20 mg IntraVENous Q6H PRN  
 lactobac ac& pc-s.therm-b.anim (USMAN Q/RISAQUAD)  1 Cap Oral DAILY  aspirin delayed-release tablet 81 mg  81 mg Oral DAILY  atorvastatin (LIPITOR) tablet 20 mg  20 mg Oral QHS  clopidogreL (PLAVIX) tablet 75 mg  75 mg Oral DAILY  gabapentin (NEURONTIN) capsule 300 mg  300 mg Oral QHS  oxyCODONE IR (ROXICODONE) tablet 5 mg  5 mg Oral Q4H PRN  
 sevelamer carbonate (RENVELA) tab 800 mg  800 mg Oral TID WITH MEALS  sodium chloride (NS) flush 5-40 mL  5-40 mL IntraVENous Q8H  
 sodium chloride (NS) flush 5-40 mL  5-40 mL IntraVENous PRN  
 acetaminophen (TYLENOL) tablet 650 mg  650 mg Oral Q6H PRN Or  
 acetaminophen (TYLENOL) suppository 650 mg  650 mg Rectal Q6H PRN  polyethylene glycol (MIRALAX) packet 17 g  17 g Oral DAILY PRN  promethazine (PHENERGAN) tablet 12.5 mg  12.5 mg Oral Q6H PRN Or  
 ondansetron (ZOFRAN) injection 4 mg  4 mg IntraVENous Q6H PRN  
 heparin (porcine) injection 7,500 Units  7,500 Units SubCUTAneous Q8H  
 dexAMETHasone (DECADRON) tablet 6 mg  6 mg Oral DAILY  ascorbic acid (vitamin C) (VITAMIN C) tablet 1,000 mg  1,000 mg Oral DAILY  zinc sulfate (ZINCATE) 220 (50) mg capsule 220 mg  220 mg Oral DAILY  insulin lispro (HUMALOG) injection   SubCUTAneous AC&HS  
 glucose chewable tablet 16 g  4 Tab Oral PRN  
 dextrose (D50W) injection syrg 12.5-25 g  12.5-25 g IntraVENous PRN  
 glucagon (GLUCAGEN) injection 1 mg  1 mg IntraMUSCular PRN  
 cefTRIAXone (ROCEPHIN) 1 g in 0.9% sodium chloride (MBP/ADV) 50 mL MBP  1 g IntraVENous Q24H  
 azithromycin (ZITHROMAX) tablet 500 mg  500 mg Oral DAILY  labetaloL (NORMODYNE;TRANDATE) injection 10 mg  10 mg IntraVENous Q4H PRN

## 2020-12-29 NOTE — PROGRESS NOTES
[facility] Dialysis Team [place]  [phone] Vitals   Pre   Post   Assessment   Pre   Post    
Temp  Temp: 100.4 °F (38 °C) (12/29/20 1210)  98.3 LOC  A/O x 3 A/O x 3 HR   Pulse (Heart Rate): 73 (12/29/20 1415) 80 Lungs   Diminished  Diminished B/P   BP: (!) 183/99(resting) (12/29/20 1415) 188/100 Cardiac   s1s2  s1s2 Resp   Resp Rate: 20 (12/29/20 1210) 18 Skin   Intact AV-Fistula  Intact AVF Pain level  Pain Intensity 1: 0 (12/29/20 0750) 0 Edema  Generalized + 1 Left LE Orders: Duration:   Start:    1210 End:   1610 Total:   4 hours Dialyzer:   Dialyzer/Set Up Inspection: Manual Manus (12/29/20 1210) K Bath:   Dialysate K (mEq/L): 2 (12/29/20 1210) Ca Bath:   Dialysate CA (mEq/L): 2.5 (12/29/20 1210) Na/Bicarb:   Dialysate NA (mEq/L): 138 (12/29/20 1210) Target Fluid Removal:   Goal/Amount of Fluid to Remove (mL): 4000 mL (12/29/20 1210) Access Type & Location:   Left upper AV-Fistula. + B/T. Cleaned with Alcohol pads. Cannulated x 2# 15 gauge, 1\" needles without difficulty Labs Obtained/Reviewed Critical Results Called   Date when labs were drawn- 
Hgb-   
HGB Date Value Ref Range Status 12/29/2020 9.7 (L) 12.1 - 17.0 g/dL Final  
 
K-   
Potassium Date Value Ref Range Status 12/29/2020 4.8 3.5 - 5.1 mmol/L Final  
 
Ca-  
Calcium Date Value Ref Range Status 12/29/2020 7.4 (L) 8.5 - 10.1 MG/DL Final  
 
Bun-  
BUN Date Value Ref Range Status 12/29/2020 93 (H) 6 - 20 MG/DL Final  
 
Creat-  
Creatinine Date Value Ref Range Status 12/29/2020 15.40 (H) 0.70 - 1.30 MG/DL Final  
 
  
Medications/ Blood Products Given Name   Dose   Route and Time None given Blood Volume Processed (BVP):    95.5 Net Fluid Removed:  5300-482=4383 ml Comments Time Out Done: 1130 Primary Nurse Rpt Pre: Itz Verduzco RN 
Primary Nurse Rpt Benny Carlos, RN 
Pt [address]  Care Plan:Continue HD as prescribed Tx Summary: Treatment initiated via left upper AVF. Patient wanted more fluid removal because having trouble breathing. Attempting 4.5 liters. Low grade fever; Blood cultures x 2 drawn. Tolerated treatment. All possible blood returned. Fistula needles removed. Hemostasis achieved post hand held pressure. Clean dressing applied. Endorsed to primaryTREVON Admiting Diagnosis:Acute Respiratory Failure, ESRD Pt's previous [facility] 
Consent signed - Informed Consent Verified: Yes (12/29/20 1210) Sylvain Consent - N/A Hepatitis Status- Negative on 12/26/20 Machine #- Machine Number: [id number] (12/29/20 1210) Telemetry status-Yes Pre-dialysis wt. -

## 2020-12-29 NOTE — PROGRESS NOTES
Transition of Care Plan:   
 
Disposition: Back to [facility] Transport: [facility] will need to be arranged. HD: Notify S Lab[facility]um of start date prior to DC. Schedule will be T/Th/S at 7:30 AM 
First non emergent transport will need to be arranged to get to HD Second IM to be delivered. OP Appt to be scheduled. Pt WC was left a HD [facility].  Confirm that family picked up [facility] Pt is COVID positive. 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 Pt was able to hear me well on the phone. CM talked to Pt and he asked me to discuss his care with Kala. POA is [employer]man: [id number]. Plan is to return to SNF at [facility]. He was being skilled for his wounds. Pt does have Medicaid and SNF and family are working to try to help get him into a Group Home for a long term solution. Liaison: Paula Acosta: [phone] Phone: [phone] Fax: [phone] Aliya in [place]. 97.: [phone] CM called HD at [facility] and confirmed that Pt WC is at  Sofi know that she will need to  WC from there. Pt has Medicare A and B and [place] Medicaid  Healthkeepers CCCP. HD d/t Dx of ESRD. Pt has been going to [facility] but will need to transition to [place] [address] until Pt has a negative COVID Dx.   
-CM called [facility] [phone] and they have all of his information from the chart. . they just need to know the anticipated date of start so they can have a chair for him. Pt will need to be transported to HD by stretcher. . not WC van like he was doing before.  
 
 
-Yamila was transporting him prior to [facility]. Phone: [phone] Second IM Letter will need to be delivered to Pt and Kala. prior to DC 
 
CM will need to see if any specialist appt need to be scheduled prior to DC Reason for Admission:   Pt was admitted on 12/26/20 d/t SOB. Cough and Sore Throat since Monday. Vomited 2 days ago. RUR Score:     29 PCP: First and Last name: Asher Rodríguez MD 
 Name of Practice: 
 Are you a current patient: Yes/No: 
 Approximate date of last visit:  
 Can you do a virtual visit with your PCP:  
          
Resources/supports as identified by patient/family: Medicare A and B and [place] Medicaid CCCP Top Challenges facing patient (as identified by patient/family and CM): Finances/Medication cost?      No concerns. Pt has prescription drug coverage and Jael manages his meds. Transportation? Pt staying at [facility] for management of wounds. Pt was being transported via [address] to and from . Support system or lack thereof? Cousins: Ana Limb and Yari Delfin Living arrangements? Pt has been living in SNF at [facility] and they are working on [address] him. Self-care/ADLs/Cognition? Alert and oriented. Legally Blind. Hard of Hearing. Needs assistance with all ADLs and IADLs. Current Advanced Directive/Advance Care Plan:  Full Code. AMD on file. Plan for utilizing home health:    N/a staying at [facility]. Care Management Interventions PCP Verified by CM: Yes 
Palliative Care Criteria Met (RRAT>21 & CHF Dx)?: No 
Mode of Transport at Discharge: BLS Transition of Care Consult (CM Consult): Discharge Planning MyChart Signup: No 
Discharge Durable Medical Equipment: No 
Physical Therapy Consult: No 
Occupational Therapy Consult: No 
Current Support Network: [address] [place] [address] Confirm Follow Up Transport: Other (see comment) The Plan for Transition of Care is Related to the Following Treatment Goals : [facility] Jael Name of the Patient Representative Who was Provided with a Choice of Provider and Agrees with the Discharge Plan: Pt and Josse Goodson Freedom of Choice List was Provided with Basic Dialogue that Supports the Patient's Individualized Plan of Care/Goals, Treatment Preferences and Shares the Quality Data Associated with the Providers?: Yes Discharge Location Discharge Placement: Skilled nursing facility [facility] CM Ext [id number]

## 2020-12-29 NOTE — PROGRESS NOTES
5031 Perfect serve message to Dr. Gretta Flood to inform of temp 101.8 
 
1330 On rounds, new orders received related to temp spike. Paired blood cultures sent. Pt undergoing bedside dialysis at present time. End of Shift Note Bedside shift change report given to Meera Mann RN(oncoming nurse) by Lauren Rodriguez (offgoing nurse). Report included the following information SBAR, Kardex, Intake/Output and MAR Shift worked:  [id number] Shift summary and any significant changes:  
  Temp spike to 101.8 this shift. Paired blood cultures sent. Concerns for physician to address:   
  
Zone phone for oncoming shift:    
  
 
Activity: 
Activity Level: Up with Assistance Number times ambulated in hallways past shift: 0 Number of times OOB to chair past shift: 0 Cardiac:  
Cardiac Monitoring: Yes     
Cardiac Rhythm: Normal sinus rhythm Access:  
Current line(s): PIV Genitourinary:  
Urinary status: voiding and incontinent Respiratory:  
O2 Device: Room air Chronic home O2 use?: NO Incentive spirometer at bedside: NO 
  
GI: 
Last Bowel Movement Date: 12/29/20 Current diet:  DIET RENAL Regular; FR 1200ML 
DIET ONE TIME MESSAGE 
DIET ONE TIME MESSAGE Passing flatus: YES Tolerating current diet: YES 
% Diet Eaten: 100 % Pain Management:  
Patient states pain is manageable on current regimen: N/A Skin: 
Ranjeet Score: 18 Interventions: increase time out of bed and nutritional support Patient Safety: 
Fall Score: Total Score: 4 Interventions: gripper socks and pt to call before getting OOB High Fall Risk: Yes Length of Stay: 
Expected LOS: 5d 12h Actual LOS: 3 Lauren Rodriguez

## 2020-12-29 NOTE — PROGRESS NOTES
End of Shift Note Bedside shift change report given to Abhi Barboza (oncoming nurse) by Kyle Merino (offgoing nurse). Report included the following information SBAR, Kardex, Intake/Output, MAR and Recent Results Shift worked:  7p-7a Shift summary and any significant changes: Pt with three decent sized bowel movements through night. Unable to obtain labs on pt. Will have dialysis draw when he goes for his treatment. Concerns for physician to address:   
  
Zone phone for oncoming shift:   [phone] Activity: 
Activity Level: Up with Assistance Number times ambulated in hallways past shift: 0 Number of times OOB to chair past shift: 0 Cardiac:  
Cardiac Monitoring: Yes     
Cardiac Rhythm: Normal sinus rhythm Access:  
Current line(s): PIV Genitourinary:  
Urinary status: anuric Respiratory:  
O2 Device: Nasal cannula Chronic home O2 use?: NO Incentive spirometer at bedside: NO 
  
GI: 
Last Bowel Movement Date: 12/28/20 Current diet:  DIET RENAL Regular; FR 1200ML 
DIET ONE TIME MESSAGE 
DIET ONE TIME MESSAGE Passing flatus: YES Tolerating current diet: YES 
% Diet Eaten: 100 % Pain Management:  
Patient states pain is manageable on current regimen: YES Skin: 
Ranjeet Score: 18 Interventions: foam dressing and PT/OT consult Patient Safety: 
Fall Score: Total Score: 4 Interventions: pt to call before getting OOB and stay with me (per policy) High Fall Risk: Yes Length of Stay: 
Expected LOS: - - - Actual LOS: 3 Kyle Merino

## 2020-12-29 NOTE — PROGRESS NOTES
Hospitalist Progress Note NAME: Navid Reina :  1985 MRN:  [id number] Assessment / Plan: 
Acute Hypoxic Respiratory Failure due to COVID-19 Pneumonia COVID-19 Positive -Hypoxia with sats 88% on room air admission 
-Currently weaned off O2 maintaining sats on room air 
-Bilateral pneumonia on CXR on admission 
-Rapid Covid test positive 2020 
-Not remdesivir candidate due to ESRD 
-Completing antibiotic therapy with IV azithromycin and ceftriaxone for possible bacterial pneumonia 
-Continue zinc and vitamin C Hyperkalemia POA 
ESRD 
-Received temporizing measures in the ED 
-Hyperkalemia resolved 
-Receiving HD today () -Usually HD MWF. To switch to TTS for Covid HD spot on discharge 
-cont decadron 10 days or until DC 
-cont Renvela -appreciate nephrology asssitance Hypertension 
-Continue amlodipine, metoprolol 
-As needed hydralazine Type 2 Diabetes 
-Increase Lantus to 15 units nightly 
-Continue mealtime lispro 4 units 3 times daily 
-SSI with POC's Hx of Atrial Flutter 
-s/p Afib ablation 10/2020 
-completed 4 weeks OAC 
-NSR on admission EKG 
 
CAD s/p stents PAD 
-Continue statin, aspirin, Plavix, and neurontin 
-S/P right BKA 2020 Diarrhea secondary to antibiotic therapy -Oma Q daily 
  
40 or above Morbid obesity / Body mass index is 44.33 kg/m². 
  
Code status: Full Prophylaxis: Hep SQ Recommended Disposition: Home w/Family Subjective: Chief Complaint / Reason for Physician Visit Undergoing HD session in hospital room. No new complaints Discussed with RN events overnight. Review of Systems: 
Symptom Y/N Comments  Symptom Y/N Comments Fever/Chills n   Chest Pain n   
Poor Appetite n   Edema Cough n   Abdominal Pain n   
Sputum n   Joint Pain SOB/LITTLE n   Pruritis/Rash Nausea/vomit n   Tolerating PT/OT Diarrhea n   Tolerating Diet y Constipation n   Other Could NOT obtain due to:   
 
Objective: VITALS:  
Last 24hrs VS reviewed since prior progress note. Most recent are: 
Patient Vitals for the past 24 hrs: 
 Temp Pulse Resp BP SpO2  
12/29/20 1430  74  (!) 177/98   
12/29/20 1415  73  (!) 183/99   
12/29/20 1400  74  (!) 178/90   
12/29/20 1345  72  (!) 166/94   
12/29/20 1330  72  (!) 168/96   
12/29/20 1315  73  (!) 167/91   
12/29/20 1300  75  (!) 163/95   
12/29/20 1245  78  (!) 150/87   
12/29/20 1230  77  (!) 151/85   
12/29/20 1210 100.4 °F (38 °C) 75 20 (!) 147/81   
12/29/20 0923 (!) 101.4 °F (38.6 °C)      
12/29/20 0751 (!) 101.8 °F (38.8 °C) 91 18 (!) 199/105 94 % 12/29/20 0338 99.3 °F (37.4 °C) 84 18 (!) 199/105 92 % 12/28/20 2336 98.1 °F (36.7 °C) 75 16 (!) 163/85 97 % 12/28/20 2018 98.2 °F (36.8 °C) 82 18 (!) 177/92 97 % Intake/Output Summary (Last 24 hours) at 12/29/2020 1444 Last data filed at 12/29/2020 2055 Gross per 24 hour Intake 1080 ml Output  Net 1080 ml I had a face to face encounter and independently examined this patient on 12/29/2020, as outlined below: PHYSICAL EXAM: 
General: WD, WN. Alert, cooperative, no acute distress EENT:  EOMI. Anicteric sclerae. MMM Resp:  CTA bilaterally, no wheezing or rales. No accessory muscle use CV:  Regular  rhythm,  No edema GI:  Soft, Non distended, Non tender. +Bowel sounds Neurologic:  Alert and oriented X 3, normal speech, Psych:   Not anxious nor agitated Skin:  No rashes. No jaundice Reviewed most current lab test results and cultures  YES Reviewed most current radiology test results   YES Review and summation of old records today    NO Reviewed patient's current orders and MAR    YES 
PMH/SH reviewed - no change compared to H&P 
________________________________________________________________________ Care Plan discussed with: 
  Comments Patient x Family RN x Care Manager x Consultant x Multidiciplinary team rounds were held today with , nursing, pharmacist and clinical coordinator. Patient's plan of care was discussed; medications were reviewed and discharge planning was addressed. ________________________________________________________________________ Total NON critical care TIME: 35   Minutes Total CRITICAL CARE TIME Spent:   Minutes non procedure based Comments >50% of visit spent in counseling and coordination of care    
________________________________________________________________________ Lyudmila Noguera DO  
 
Procedures: see electronic medical records for all procedures/Xrays and details which were not copied into this note but were reviewed prior to creation of Plan. LABS: 
I reviewed today's most current labs and imaging studies. Pertinent labs include: 
Recent Labs  
  12/29/20 
1200 12/28/20 
0316 12/26/20 
2351 WBC 4.5 6.1 9.1 HGB 9.7* 10.8* 11.6* HCT 30.4* 35.0* 36.1*  
 200 184 Recent Labs  
  12/29/20 
1200 12/28/20 
0316 12/26/20 
2351 * 133* 133* K 4.8 5.2* 3.8 CL 94* 96* 97  
CO2 24 26 27 * 227* 104* BUN 93* 75* 32* CREA 15.40* 12.50* 7.06* CA 7.4* 8.3* 9.0 ALB 2.8* 3.1* 3.6 TBILI 0.3 0.5 0.7 ALT 66 48 51 Signed: Lyudmila Noguera DO

## 2020-12-30 NOTE — DISCHARGE SUMMARY
Hospitalist Discharge Summary Patient ID: 
Mikala Jefferson [id number] 
70 y.o. 
1985 12/26/2020 PCP on record: Arlis Opitz, DO Admit date: 12/26/2020 Discharge date and time: 12/30/2020 DISCHARGE DIAGNOSIS: 
 
See below CONSULTATIONS: 
IP CONSULT TO NEPHROLOGY Excerpted HPI from H&P of Geni Parada MD: Kishore Caruso is a 38 y.o.   male who presents with past medical history of end-stage renal disease on hemodialysis, hypertension, diabetes mellitus is coming the hospital chief complaints of fever and cough. Patient went to his dialysis placed today and was noted to have fever along with cough and was advised to go to the emergency department for further evaluation. He reports being in his usual health until 3 days ago when he started having some fever with T-max of 101.5 along with some cough with whitish phlegm. He also reports exertional shortness of breath. He reports generalized myalgias. Does not report any chest pain. He reports being compliant with his dialysis sessions. Does not report any abdominal pain, nausea or vomiting. 
  
On arrival to ED, he was noted to have fever of 100.3 and also had systolic blood pressure more than 200 and required 2 L nasal cannula. On labs hemoglobin was 10.2. BMP showed a potassium of 6.6, creatinine 15.60, sodium was 132. Nephrology was consulted for dialysis needs. His rapid SARS-CoV-2 was positive. Chest x-ray shows bilateral pneumonia with cardiomegaly 
 
______________________________________________________________________ DISCHARGE SUMMARY/HOSPITAL COURSE:  for full details see H&P, daily progress notes, labs, consult notes. Acute Hypoxic Respiratory Failure due to COVID-19 Pneumonia COVID-19 Positive -Hypoxia with sats 88% on room air admission 
-Currently weaned off O2 maintaining sats on room air 
-Bilateral pneumonia on CXR on admission 
-Rapid Covid test positive 12/26/2020 -Not remdesivir candidate due to ESRD 
-Completing antibiotic therapy with IV azithromycin and ceftriaxone for possible bacterial pneumonia 
-Continue zinc and vitamin C 
  
Hyperkalemia POA 
ESRD 
-Received temporizing measures in the ED 
-Hyperkalemia resolved 
-Receiving HD today (12/29) -Usually HD MWF. To switch to TTS for Covid HD spot on discharge 
-cont decadron 10 days or until DC 
-cont Renvela -appreciate nephrology asssitance 
  
Hypertension 
-Continue amlodipine, metoprolol 
-As needed hydralazine 
  
Type 2 Diabetes 
-lantus at increased dose of 15u qhs while on steroids 
-resume home lantus 10 units qhs on DC 
-Continue mealtime lispro 4 units 3 times daily 
-SSI with POC's Hx of Atrial Flutter 
-s/p Afib ablation 10/2020 
-completed 4 weeks OAC 
-NSR on admission EKG 
  
CAD s/p stents PAD 
-Continue statin, aspirin, Plavix, and neurontin 
-S/P right BKA 8/2020 
  
Diarrhea secondary to antibiotic therapy -Oma Q daily 
  
40 or above Morbid obesity / Body mass index is 44.33 kg/m². 
_______________________________________________________________________ Patient seen and examined by me on discharge day. Pertinent Findings: 
Gen:    Not in distress Chest: Clear lungs CVS:   Regular rhythm. No edema Abd:  Soft, not distended, not tender Neuro:  Alert, oriented x 3 
_______________________________________________________________________ DISCHARGE MEDICATIONS:  
Current Discharge Medication List  
  
START taking these medications Details  
amLODIPine (NORVASC) 10 mg tablet Take 1 Tab by mouth daily. Qty: 30 Tab, Refills: 3 CONTINUE these medications which have CHANGED Details  
metoprolol tartrate 75 mg tab Take 100 mg by mouth two (2) times a day. Qty: 60 Tab, Refills: 2 CONTINUE these medications which have NOT CHANGED Details  
atorvastatin (LIPITOR) 20 mg tablet Take 20 mg by mouth daily. oxyCODONE IR (ROXICODONE) 5 mg immediate release tablet Take 5 mg by mouth every six (6) hours as needed for Pain.  
  
gabapentin (NEURONTIN) 300 mg capsule Take 1 Cap by mouth nightly. Max Daily Amount: 300 mg. Qty: 30 Cap, Refills: 0 Associated Diagnoses: S/P bilateral BKA (below knee amputation) (HCC)  
  
insulin glargine (LANTUS) 100 unit/mL injection 10 Units by SubCUTAneous route nightly. Qty: 1 Vial, Refills: 0  
  
aspirin delayed-release 81 mg tablet Take 1 Tab by mouth daily. Qty: 30 Tab, Refills: 11  
  
clopidogreL (PLAVIX) 75 mg tab Take 1 Tab by mouth daily. Qty: 30 Tab, Refills: 11 sevelamer (RENAGEL) 800 mg tablet Take 800 mg by mouth three (3) times daily (with meals). STOP taking these medications  
  
 vancomycin 125 mg/2.5 mL syrg Comments:  
Reason for Stopping:   
   
 oxyCODONE (OXYIR) 5 mg capsule Comments:  
Reason for Stopping:   
   
  
 
 
 
Patient Follow Up Instructions: Activity: Activity as tolerated Diet: Resume previous diet Wound Care: None needed Follow-up as below Follow-up Information Follow up With Specialties Details Why Contact Info [address] [place] [address] Tiffany Arana [address] [place] [id number] [phone] TAZ DURAN  Go to Your temporary dialysis unit until you are COVID negative. Tuesday, Thursday, Saturday. [address] [facility] [place] [facility] [place] [address] [phone] [facility],  Internal Medicine   [address] [place] [address] [address] [address] [phone] 
  
  
 
________________________________________________________________ Risk of deterioration: Moderate Condition at Discharge:  Stable 
__________________________________________________________________ Disposition Home with family, no needs 
 
____________________________________________________________________ Code Status: Full Code ___________________________________________________________________ Total time in minutes spent coordinating this discharge (includes going over instructions, follow-up, prescriptions, and preparing report for sign off to her PCP) :  >30 minutes Signed: 
Lele Reno DO

## 2020-12-30 NOTE — PROGRESS NOTES
End of Shift Note 
 
Bedside shift change report given to Jen GREENBERG (oncoming nurse) by Nadia Terry (offgoing nurse).  Report included the following information SBAR, Kardex, Procedure Summary, Intake/Output, MAR and Recent Results 
 
Shift worked:  7-7pm 
  
Shift summary and any significant changes:  
  Pt BG running high 426 -364. Discussed with Dr Egan and received order to cancel  discharge and monitor Pt.  
 
SNF is updated. 
  
Concerns for physician to address: Pt currently on renal diet.(diabetic ?)  
Zone phone for oncoming shift:   [phone] 
  
 
Activity: 
Activity Level: Up with Assistance, Chair 
Number times ambulated in hallways past shift: 0 
Number of times OOB to chair past shift: 0 
 
Cardiac:  
Cardiac Monitoring: Yes     
Cardiac Rhythm: Normal sinus rhythm 
 
Access:  
Current line(s): PIV  
 
Genitourinary:  
Urinary status: anuric 
 
Respiratory:  
O2 Device: Room air 
Chronic home O2 use?: NO 
Incentive spirometer at bedside: NO 
  
GI: 
Last Bowel Movement Date: 12/30/20 
Current diet:  DIET RENAL Regular; FR 1200ML 
DIET ONE TIME MESSAGE 
DIET ONE TIME MESSAGE 
Passing flatus: YES 
Tolerating current diet: YES 
% Diet Eaten: 100 % 
 
Pain Management:  
Patient states pain is manageable on current regimen: YES 
 
Skin: 
Ranjeet Score: 17 
Interventions: float heels, limit briefs and nutritional support  
 
Patient Safety: 
Fall Score: Total Score: 4 
Interventions: bed/chair alarm and assistive device (walker, cane, etc) 
High Fall Risk: Yes 
 
Length of Stay: 
Expected LOS: 5d 12h 
Actual LOS: 4 
 
 
Nadia Terry

## 2020-12-30 NOTE — PROGRESS NOTES
Pharmacist Discharge Medication Reconciliation Significant PMH:  
Past Medical History:  
Diagnosis Date Arrhythmia   
 aflutter Blind Cataracts Cellulitis and abscess of foot Chronic kidney disease MWF- [facility]  
 Diabetes ([facility] DM II  
 GERD (gastroesophageal reflux disease) Hearing loss Hypercholesterolemia Hypertension Neuropathy Obesity Osteomyelitis ([facility] Puerperal sepsis with acute hypoxic respiratory failure ([facility] Sepsis ([facility] Chief Complaint for this Admission: Chief Complaint Patient presents with Shortness of Breath Per EMS, Patient is new to dialysis and went to dialysis this morning complaining of shortness of breath, so patient was not dialyzed. Per EMS, Patient lives at [facility] and has had a cough and sore throat since monday and vomited 2 days ago, Allergies: Patient has no known allergies. Discharge Medications:  
Current Discharge Medication List  
  
 
START taking these medications Details  
amLODIPine (NORVASC) 10 mg tablet Take 1 Tab by mouth daily. Qty: 30 Tab, Refills: 3 CONTINUE these medications which have CHANGED Details  
metoprolol tartrate 75 mg tab Take 100 mg by mouth two (2) times a day. Qty: 60 Tab, Refills: 2 CONTINUE these medications which have NOT CHANGED Details  
atorvastatin (LIPITOR) 20 mg tablet Take 20 mg by mouth daily. oxyCODONE IR (ROXICODONE) 5 mg immediate release tablet Take 5 mg by mouth every six (6) hours as needed for Pain.  
  
gabapentin (NEURONTIN) 300 mg capsule Take 1 Cap by mouth nightly. Max Daily Amount: 300 mg. Qty: 30 Cap, Refills: 0 Associated Diagnoses: S/P bilateral BKA (below knee amputation) ([facility])  
  
insulin glargine (LANTUS) 100 unit/mL injection 10 Units by SubCUTAneous route nightly. Qty: 1 Vial, Refills: 0  
  
aspirin delayed-release 81 mg tablet Take 1 Tab by mouth daily. Qty: 30 Tab, Refills: 11  
  
clopidogreL (PLAVIX) 75 mg tab Take 1 Tab by mouth daily. Qty: 30 Tab, Refills: 11 sevelamer (RENAGEL) 800 mg tablet Take 800 mg by mouth three (3) times daily (with meals). STOP taking these medications  
  
 vancomycin 125 mg/2.5 mL syrg Comments:  
Reason for Stopping:   
   
 oxyCODONE (OXYIR) 5 mg capsule Comments:  
Reason for Stopping:   
   
  
 
 
The patient's chart, MAR and AVS were reviewed by Mag Burton, PHARMD. Discharging Provider: Fidel Cullen Thank You, Mag Burton, PHARMD

## 2020-12-30 NOTE — PROGRESS NOTES
1630  Update time from [facility] transportation is 1830 . Hospital to [address] [place] [address] Rd 38 y.o.   male [address]   Room: 3252/01    MRM 3 MED TELE  Unit Phone# :  [phone] 
MRM 3 MED TELE 
[address] [address] Dept: [phone] Loc: [id number] SITUATION Admitted:  12/26/2020         Attending Provider:  [place],* Consultations:  IP CONSULT TO NEPHROLOGY 
 
PCP:  [address] [facility], [address] [place] [address] Treatment Team: Attending Provider: [place] DO; Consulting Provider: Guy Mart MD; Nurse Practitioner: Marika Neff NP; Utilization Review: Saul Pierre RN; Care Manager: Zac Boudreaux; Primary Nurse: Nishi Hernandez; Care Manager: Pasquale Waller Admitting Dx:  ESRD (end stage renal disease) on dialysis ([facility] [N18.6, Z99.2] Acute respiratory failure with hypoxia ([facility] [J96.01] Principal Problem: <principal problem not specified> * No surgery found * of  
  
BY: * Surgery not found *             ON: * No surgery found * Code Status: Full Code Advance Directives:  
Advance Care Planning 10/15/2020 Patient's Healthcare Decision Maker is: -  
Primary Decision Maker Name -  
Primary Decision Maker Phone Number -  
Primary Decision Maker Relationship to Patient -  
Confirm Advance Directive Yes, on file Patient Would Like to Complete Advance Directive - (Send w/patient) No Doesnt Have Isolation:  Droplet Plus, Droplet, Droplet Plus       MDRO: COVID-19 Pain Medications given:  no      
  BACKGROUND Allergies: 
No Known Allergies Past Medical History:  
Diagnosis Date  Arrhythmia   
 aflutter  Blind  Cataracts  Cellulitis and abscess of foot  Chronic kidney disease MWF- [facility]  
 Diabetes ([facility] DM II  
 GERD (gastroesophageal reflux disease)  Hearing loss  Hypercholesterolemia  Hypertension  Neuropathy  Obesity  Osteomyelitis ([facility]  Puerperal sepsis with acute hypoxic respiratory failure (HCC)  Sepsis ([facility] Past Surgical History:  
Procedure Laterality Date  CARDIAC SURG PROCEDURE UNLIST  09/2020  
 ablation  COLONOSCOPY N/A 12/8/2017 COLONOSCOPY performed by Linnea Garland MD at [facility] ENDOSCOPY  COLONOSCOPY,DIAGNOSTIC  12/8/2017  HX AFIB ABLATION  09/04/2020  HX AMPUTATION Left great toe and L 5th toe  HX AMPUTATION Right   
 bka  HX AMPUTATION TOE Right  HX VASCULAR ACCESS    
 CT COMPRE ELECTROPHYSIOL XM W/LEFT ATRIAL PACNG/REC N/A 9/4/2020 Lt Atrial Pace & Record During Ep Study performed by Markus Nettles MD at [facility]ATH [facility]  CT EPHYS EVAL W/ABLATION SUPRAVENT ARRHYTHMIA N/A 9/4/2020 ABLATION A-FLUTTER performed by Markus Nettles MD at [facility]ATH [facility]  CT INTRACARDIAC ELECTROPHYSIOLOGIC 3D MAPPING N/A 9/4/2020 Ep 3d Mapping performed by Markus Nettles MD at [facility] CARDI[facility] CATH LAB  UPPER GI ENDOSCOPY,BIOPSY  12/8/2017  VASCULAR SURGERY PROCEDURE UNLIST    
 R side chest  
 VASCULAR SURGERY PROCEDURE UNLIST Left 07/25/2017 Creation transposed AV fistula arm Medications Prior to Admission Medication Sig  
 atorvastatin (LIPITOR) 20 mg tablet Take 20 mg by mouth daily.  vancomycin 125 mg/2.5 mL syrg Take 125 mg by mouth daily.  oxyCODONE IR (ROXICODONE) 5 mg immediate release tablet Take 5 mg by mouth every six (6) hours as needed for Pain.  gabapentin (NEURONTIN) 300 mg capsule Take 1 Cap by mouth nightly. Max Daily Amount: 300 mg.  
 insulin glargine (LANTUS) 100 unit/mL injection 10 Units by SubCUTAneous route nightly.  aspirin delayed-release 81 mg tablet Take 1 Tab by mouth daily.  clopidogreL (PLAVIX) 75 mg tab Take 1 Tab by mouth daily.  [DISCONTINUED] metoprolol tartrate 75 mg tab Take 75 mg by mouth two (2) times a day. (Patient taking differently: Take 75 mg by mouth two (2) times a day. Does not take on dialysis days.)  sevelamer (RENAGEL) 800 mg tablet Take 800 mg by mouth three (3) times daily (with meals). Hard scripts included in transfer packet yes Vaccinations:   
Immunization History Administered Date(s) Administered  Influenza Vaccine Exeros) PF (>6 Mo Flulaval, Fluarix, and >3 Yrs [place], Fluzone [address]) 12/14/2015  Pneumococcal Conjugate (PCV-13) 09/14/2018  Pneumococcal Polysaccharide (PPSV-23) 12/12/2015, 09/14/2018 Readmission Risks:    Known Risks: Fall The Charlson CoMorbitiy Index tool is an evidenced based tool that has more automatic generated information. The tool looks at many different items such as the age of the patient, how many times they were admitted in the last calendar year, current length of stay in the hospital and their diagnosis. All of these items are pulled automatically from information documented in the chart from various places and will generate a score that predicts whether a patient is at low (less than 13), medium (13-20) or high (21 or greater) risk of being readmitted. ASSESSMENT Temp: 98.1 °F (36.7 °C) (12/30/20 1126) Pulse (Heart Rate): 78 (12/30/20 1126) Resp Rate: 16 (12/30/20 1126)           BP: (!) 176/94 (12/30/20 1126) O2 Sat (%): 93 % (12/30/20 1126) Weight: 156.6 kg (345 lb 3.9 oz)    Height: 6' 2\" (188 cm) (12/26/20 1249) If above not within 1 hour of discharge: 
 
BP:_____  P:____  R:____ T:_____ O2 Sat: ___%  O2: ______ Active Orders Diet DIET RENAL Regular; FR 1200ML Orientation: oriented to time, place, person and situation Active Behaviors: None Active Lines/Drains:  (Peg Tube / Soto / CL or S/L?): no 
 
Urinary Status: Anuria     Last BM: Last Bowel Movement Date: 12/30/20 Skin Integrity: Wound (add Wound LDA)(BKA stamp) Mobility: Slightly limited Weight Bearing Status: NWB (Non Weight Bearing) Lab Results Component Value Date/Time Glucose 262 (H) 12/30/2020 03:11 AM  
 Hemoglobin A1c 9.4 (H) 08/15/2020 12:19 AM  
 INR 1.2 (H) 08/12/2020 02:39 PM  
 HGB 10.3 (L) 12/30/2020 03:11 AM  
 HGB 9.7 (L) 12/29/2020 12:00 PM  
  
  RECOMMENDATION See After Visit Summary (AVS) for: · Discharge instructions · After [address] · Special equipment needed (entered pre-discharge by Care Management) · Medication Reconciliation · Follow up Appointment(s) Report given/sent by:  Marcos Krishna Verbal report given to: Rabia Madrigal RN  FAXED to:  SNF Estimated discharge time:  12/30/2020 at 1500

## 2020-12-30 NOTE — PROGRESS NOTES
NAME: Angélica Kirkland :  1985 MRN:  [id number] Assessment :    Plan: 
--ESKD-MWF [facility].  Now TTS to go to [facility] on DC due to [place] Hyperkalemia COVID + Hypoxia HTN, uncontrolled Anemia of ESKD 
 
PAD -No acute need for HD today. Plan HD in AM. FR and low salt intake 
  
No FARIDA (secondary to covid and hgb > 10) 
  
  
 
 
Subjective: Chief Complaint:  No complaint. Review of Systems: 
 
Symptom Y/N Comments  Symptom Y/N Comments Fever/Chills    Chest Pain Poor Appetite    Edema Cough    Abdominal Pain Sputum    Joint Pain SOB/LITTLE    Pruritis/Rash Nausea/vomit    Tolerating PT/OT Diarrhea    Tolerating Diet Constipation    Other Could not obtain due to:   
 
Objective: VITALS:  
Last 24hrs VS reviewed since prior progress note. Most recent are: 
Visit Vitals BP (!) 176/94 (BP 1 Location: Right arm, BP Patient Position: At rest) Pulse 78 Temp 98.1 °F (36.7 °C) Resp 16 Ht 6' 2\" (1.88 m) Wt 156.6 kg (345 lb 3.9 oz) SpO2 93% BMI 44.33 kg/m² Intake/Output Summary (Last 24 hours) at 2020 1142 Last data filed at 2020 7463 Gross per 24 hour Intake 1120 ml Output 4800 ml Net -3680 ml Telemetry Reviewed: PHYSICAL EXAM: 
General: NAD Lab Data Reviewed: (see below) Medications Reviewed: (see below) PMH/SH reviewed - no change compared to H&P 
________________________________________________________________________ Care Plan discussed with: 
Patient Family RN Care Manager Consultant:     
 
  Comments >50% of visit spent in counseling and coordination of care    
 
________________________________________________________________________ Sreekanth Hoffmann MD  
 
 Procedures: see electronic medical records for all procedures/Xrays and details which 
were not copied into this note but were reviewed prior to creation of Plan. LABS: 
Recent Labs 12/30/20 0311 12/29/20 
1200 WBC 3.4* 4.5 HGB 10.3* 9.7* HCT 31.8* 30.4*  173 Recent Labs 12/30/20 
0311 12/29/20 
1200 12/28/20 
0316 * 132* 133* K 4.6 4.8 5.2*  
CL 94* 94* 96* CO2 27 24 26 BUN 64* 93* 75* CREA 10.90* 15.40* 12.50* * 102* 227* CA 7.9* 7.4* 8.3* Recent Labs 12/30/20 
0311 12/29/20 
1200 12/28/20 
0316 AP 82 86 81  
TP 7.9 7.8 8.4* ALB 2.7* 2.8* 3.1*  
GLOB 5.2* 5.0* 5.3* No results for input(s): INR, PTP, APTT, INREXT, INREXT in the last 72 hours. Recent Labs  
  12/29/20 
1200 12/28/20 0316 FERR 796* 560* No results found for: FOL, RBCF No results for input(s): PH, PCO2, PO2 in the last 72 hours. No results for input(s): CPK, CKMB in the last 72 hours. No lab exists for component: TROPONINI No components found for: [place] Lab Results Component Value Date/Time Color YELLOW/STRAW 05/01/2017 05:39 PM  
 Appearance CLOUDY (A) 05/01/2017 05:39 PM  
 Specific gravity 1.018 05/01/2017 05:39 PM  
 Specific gravity >1.030 (H) 10/03/2014 04:35 AM  
 pH (UA) 5.5 05/01/2017 05:39 PM  
 Protein 300 (A) 05/01/2017 05:39 PM  
 Glucose 250 (A) 05/01/2017 05:39 PM  
 Ketone NEGATIVE  05/01/2017 05:39 PM  
 Bilirubin NEGATIVE  05/01/2017 05:39 PM  
 Urobilinogen 0.2 05/01/2017 05:39 PM  
 Nitrites NEGATIVE  05/01/2017 05:39 PM  
 Leukocyte Esterase NEGATIVE  05/01/2017 05:39 PM  
 Epithelial cells FEW 05/01/2017 05:39 PM  
 Bacteria NEGATIVE  05/01/2017 05:39 PM  
 WBC 0-4 05/01/2017 05:39 PM  
 RBC 5-10 05/01/2017 05:39 PM  
 
 
MEDICATIONS: 
Current Facility-Administered Medications Medication Dose Route Frequency  insulin glargine (LANTUS) injection 15 Units  15 Units SubCUTAneous QHS  amLODIPine (NORVASC) tablet 10 mg  10 mg Oral DAILY  insulin lispro (HUMALOG) injection 4 Units  4 Units SubCUTAneous TIDAC  metoprolol tartrate (LOPRESSOR) tablet 100 mg  100 mg Oral BID  hydrALAZINE (APRESOLINE) 20 mg/mL injection 20 mg  20 mg IntraVENous Q6H PRN  
 lactobac ac& pc-s.therm-b.anim (USMAN Q/RISAQUAD)  1 Cap Oral DAILY  aspirin delayed-release tablet 81 mg  81 mg Oral DAILY  atorvastatin (LIPITOR) tablet 20 mg  20 mg Oral QHS  clopidogreL (PLAVIX) tablet 75 mg  75 mg Oral DAILY  gabapentin (NEURONTIN) capsule 300 mg  300 mg Oral QHS  oxyCODONE IR (ROXICODONE) tablet 5 mg  5 mg Oral Q4H PRN  
 sevelamer carbonate (RENVELA) tab 800 mg  800 mg Oral TID WITH MEALS  sodium chloride (NS) flush 5-40 mL  5-40 mL IntraVENous Q8H  
 sodium chloride (NS) flush 5-40 mL  5-40 mL IntraVENous PRN  
 acetaminophen (TYLENOL) tablet 650 mg  650 mg Oral Q6H PRN Or  
 acetaminophen (TYLENOL) suppository 650 mg  650 mg Rectal Q6H PRN  polyethylene glycol (MIRALAX) packet 17 g  17 g Oral DAILY PRN  promethazine (PHENERGAN) tablet 12.5 mg  12.5 mg Oral Q6H PRN Or  
 ondansetron (ZOFRAN) injection 4 mg  4 mg IntraVENous Q6H PRN  
 heparin (porcine) injection 7,500 Units  7,500 Units SubCUTAneous Q8H  
 dexAMETHasone (DECADRON) tablet 6 mg  6 mg Oral DAILY  ascorbic acid (vitamin C) (VITAMIN C) tablet 1,000 mg  1,000 mg Oral DAILY  zinc sulfate (ZINCATE) 220 (50) mg capsule 220 mg  220 mg Oral DAILY  insulin lispro (HUMALOG) injection   SubCUTAneous AC&HS  
 glucose chewable tablet 16 g  4 Tab Oral PRN  
 dextrose (D50W) injection syrg 12.5-25 g  12.5-25 g IntraVENous PRN  
 glucagon (GLUCAGEN) injection 1 mg  1 mg IntraMUSCular PRN  
 cefTRIAXone (ROCEPHIN) 1 g in 0.9% sodium chloride (MBP/ADV) 50 mL MBP  1 g IntraVENous Q24H

## 2020-12-30 NOTE — PROGRESS NOTES
TEJA Plan: * Disposition: [facility] for SNF/LTC. Bed availability for today confirmed with [facility] Admissions [facility] [phone]). Pt is assigned to Room 130. Nursing will need to call report to [phone]. Please send AVS, MAR, and any written prescriptions to facility in an envelope. * Transport at D/C: S transport arranged today for 3:00 pm via [employer] - Confirmation # [id number]. PCS, Facesheet, and H&P placed on chart for transport. * OP HD: Temporary unit at [facility] TTS at 7:30 am until pt is COVID negative (usual unit is [facility] [place]). S roundrtip transport arranged for first treatment tomorrow morning at 6:30 am via [facility] - Confirmation # [id number]. Confirmed tomorrow's start date with HD unit (ph. [phone]). D/C Summary and flow sheets faxed to unit (fx. [phone]). * OP F/U: Jael to coordinate OP appts Discussed plan with cousin/MPOA, Yi Chang ([phone]) who verbalized understanding. No further concerns indicated at this time. AVS updated. Patient is ready for discharge from a Care Management standpoint. RN informed. Medicare pt has received, reviewed, and signed 2nd IM letter informing them of their right to appeal the discharge. Signed copy has been placed on pt bedside chart. - verbal consent obtained from MPOA Care Management Interventions PCP Verified by CM: Yes 
Palliative Care Criteria Met (RRAT>21 & CHF Dx)?: No 
Mode of Transport at Discharge: S(Medicaid - [facility]) Hospital Transport Time of Discharge: 1500 Transition of Care Consult (CM Consult): SNF([facility]) MyChart Signup: No 
Discharge Durable Medical Equipment: No 
Physical Therapy Consult: No 
Occupational Therapy Consult: No 
Speech Therapy Consult: No 
Current Support Network: [facility] [place] [address] Confirm Follow Up Transport: Other (see comment) The Plan for Transition of Care is Related to the Following Treatment Goals : SNF Jael Name of the Patient Representative Who was Provided with a Choice of Provider and Agrees with the Discharge Plan: Pt and Donaldo Holt Freedom of Choice List was Provided with Basic Dialogue that Supports the Patient's Individualized Plan of Care/Goals, Treatment Preferences and Shares the Quality Data Associated with the Providers?: Yes Discharge Location Discharge Placement: Skilled nursing [facility] Care Manager [facility] 
[phone] Transition of Care Plan to SNF/Rehab 
 
SNF/Rehab Transition: 
Patient has been accepted to [address] [place] [address] and meets criteria for admission. Patient will transported by S and expected to leave at 3:00 pm. 
 
Communication to Patient/Family: Met with patient and Ginna Earle (identified care giver) and they are agreeable to the transition plan. Communication to SNF/Rehab: 
Bedside RN, Nadia, has been notified to update the transition plan to the facility and call report (phone number [phone]). Discharge information has been updated on the AVS.    
 
Nursing Please include all hard scripts for controlled substances, med rec and dc summary, and AVS in packet. SNF/Rehab Transition: 
Patient to follow-up with Home Health: N/A 
PCP/Specialist: Neil Mcburney III (PCP) Reviewed and confirmed with facility admissions, Aleksey Gilman ([phone]), [facility] can manage the patient care needs for the following:  
 
Veda Kelly with (X) only those applicable: 
 
Medication: 
[x]  Medications will be available at the facility []  IV Antibiotics []  Controlled Substance - hard copy to be sent with patient  
[]  Weekly Labs Documents: 
[] Hard RX 
[] MAR 
[] Kardex [x] AVS 
[]Transfer Summary 
[x]Discharge Equipment: 
[]  CPAP/BiPAP []  Wound Vacuum 
[]  [place] or Urinary Device 
[]  PICC/Central Line 
[]  Nebulizer 
[]  Ventilator Treatment: 
[x]Isolation (for MRSA, VRE, etc.) - COVID-19 
[]Surgical Drain Management []Tracheostomy Care 
[]Dressing Changes [x]Dialysis with transportation and chair time - [facility] TTS 7:30 AM, BLS arranged through [facility] []PEG Care []Oxygen []Daily Weights for Heart Failure Dietary: 
[x]Any diet limitations - renal diet []Tube Feedings []Total Parenteral Management (TPN) Eligible for Medicaid Long Term Services and Supports Yes: 
[x] Eligible for medical assistance or will become eligible within 180 days and UAI completed. [] Provider/Patient and/or support system has requested screening. [] UAI copy provided to patient or responsible party 
[] UAI unavailable at discharge will send once processed to SNF provider. [] UAI unavailable at discharged mailed to patient No:  
[] Private pay and is not financially eligible for Medicaid within the next 180 days. [] Reside out-of-state. [] A residents of a state owned/operated facility that is licensed 
by [address] and [employer] [facility] or [facility] 
[] Enrollment in [facility] hospice services 
[] [address] [place] [address] [facility] 
[] Patient /Family declines to have screening completed or provide financial information for screening Financial Resources: 
[x] Medicaid   
[] Initiated and application pending  
[] Full coverage Advanced Care Plan: 
[x]Surrogate Decision Maker of Care - lj Thomassin 
[x]POA [x]Communicated Code Status - Full Code Other

## 2020-12-30 NOTE — PROGRESS NOTES
End of Shift Note Bedside shift change report given to Nadia (oncoming nurse) by Ritu Locke (offgoing nurse). Report included the following information SBAR, Kardex, Intake/Output, MAR and Recent Results Shift worked:  7p-7a Shift summary and any significant changes:  
  Pt received PRN B/P medication once Concerns for physician to address:  Pt's blood sugar high, may need to look at adjusting lantus dose. Zone phone for oncoming shift:   [phone] Activity: 
Activity Level: Up with Assistance Number times ambulated in hallways past shift: 0 Number of times OOB to chair past shift: 0 Cardiac:  
Cardiac Monitoring: Yes     
Cardiac Rhythm: Normal sinus rhythm Access:  
Current line(s): PIV Genitourinary:  
Urinary status: anuric Respiratory:  
O2 Device: Room air Chronic home O2 use?: NO Incentive spirometer at bedside: YES 
  
GI: 
Last Bowel Movement Date: 12/29/20 Current diet:  DIET RENAL Regular; FR 1200ML 
DIET ONE TIME MESSAGE 
DIET ONE TIME MESSAGE Passing flatus: YES Tolerating current diet: YES 
% Diet Eaten: 100 % Pain Management:  
Patient states pain is manageable on current regimen: YES Skin: 
Ranjeet Score: 17 Interventions: float heels Patient Safety: 
Fall Score: Total Score: 4 Interventions: pt to call before getting OOB High Fall Risk: Yes Length of Stay: 
Expected LOS: 5d 12h Actual LOS: 4 Ritu Locke

## 2020-12-31 NOTE — PROCEDURES
[facility] Dialysis Team [place]  [phone] Vitals   Pre   Post   Assessment   Pre   Post    
Temp  Temp: 98.2 °F (36.8 °C) (12/31/20 0720)  98.6 LOC  A&Ox4 A&Ox4 HR   Pulse (Heart Rate): 80 (12/31/20 0725) 83 Lungs   O@ sats low and SOB, Rapid called prior to starting tx  on a NRS no SOB  
B/P   BP: (!) 162/88 (12/31/20 0725) 170/92 Cardiac   NSR  NSR Resp   Resp Rate: 20 (12/31/20 0720) 18 Skin   Warm and Dry  Warm and dry Pain level  Pain Intensity 1: 0 (12/30/20 1949) 0 Edema  +1 edema alfredo legs DIONICIO Morris None Arturo. DIONICIO  
Orders: Duration:   Start:    0725 End:    1125 Total:   4hrs Dialyzer:   Dialyzer/Set Up Inspection: Jakub Cowan (12/31/20 0720) K Bath:   Dialysate K (mEq/L): 2 (12/31/20 0720) Ca Bath:   Dialysate CA (mEq/L): 2.5 (12/31/20 0720) Na/Bicarb:   Dialysate NA (mEq/L): 138 (12/29/20 1210) Target Fluid Removal:   Goal/Amount of Fluid to Remove (mL): 4000 mL (12/31/20 0720) Access Type & Location:   PELON AVF: skin CDI. No s/s of infection. + B/T. No issues with cannulation or hemostasis. Running well at [facility] 450 Labs Obtained/Reviewed Critical Results Called   Date when labs were drawn- 
Hgb-   
HGB Date Value Ref Range Status 12/30/2020 10.3 (L) 12.1 - 17.0 g/dL Final  
 
K-   
Potassium Date Value Ref Range Status 12/30/2020 4.6 3.5 - 5.1 mmol/L Final  
 
Ca-  
Calcium Date Value Ref Range Status 12/30/2020 7.9 (L) 8.5 - 10.1 MG/DL Final  
 
Bun-  
BUN Date Value Ref Range Status 12/30/2020 64 (H) 6 - 20 MG/DL Final  
 
Creat-  
Creatinine Date Value Ref Range Status 12/30/2020 10.90 (H) 0.70 - 1.30 MG/DL Final  
  Comment:  
  INVESTIGATED PER DELTA CHECK PROTOCOL Medications/ Blood Products Given Name   Dose   Route and Time Blood Volume Processed (BVP):    84.7L Net Fluid Removed:  5000ml Comments Time Out Done: Yes Primary Nurse Rpt Pre:Rick Benjamin RN 
 Primary Nurse Rpt Post:BARB Aguirre Pt Education:Procedure Care Plan: To continue HD Tx Summary: 
Pt is A&Ox4. Pt was SOB with low O2 sats and a Rapid was called prior to starting tx Consent signed & on file. SBAR received from Primary RN. 
3845: Pt cannulated with 91U needles per policy & without issue. Labs drawn per request/ order. VSS. Dialysis Tx initiated. 0800: Pt resting quietly. 0830: VSS, still on NRB 15L 
0900: Resting with eyes shut 
0930: VSS 
1000: No distress 1030:still on NRB no SOB 
1100:VSS 
 
1125: Tx ended. VSS. All possible blood returned to patient. Hemostasis achieved without issue. Bed locked and in the lowest position, call bell and belongings in reach. SBAR given to Primary, RN. Patient is stable at time of their/ my departure. All Dialysis related medications have been reviewed. Admiting Diagnosis: 
Pt's previous clinic-[facility] 
Consent signed - Informed Consent Verified: Yes (12/31/20 0720) Sylvain Consent - n/a Hepatitis Status- neg and imm 12/26/20 Machine #- Machine Number: [id number] (12/31/20 0720) Telemetry status-monitored Pre-dialysis wt. -  n/a

## 2020-12-31 NOTE — PROGRESS NOTES
RAPID RESPONSE TEAM 
 
Responded to overhead adult rapid response to room 3252 for patient with decreased oxygen saturations, 81% on 6 lpm nasal canula. Patient refusing to wear any oxygen mask. NP Purveyor arrived to bedside, patient became agreeable to 100% NRB. ABG obtained. Chest Xray ordered. HD at bedside initiating HD. Patient agreeable to wearing NRB while in HD. Patient to remain in room at this time. Patient Vitals for the past 12 hrs: 
 Temp Pulse Resp BP SpO2  
12/31/20 0758  78  (!) 171/96   
12/31/20 0743  79  (!) 155/86   
12/31/20 0725  80  (!) 162/88   
12/31/20 0720 98.2 °F (36.8 °C) 82 20 (!) 162/94 97 % 12/31/20 0655  81 20  95 % 12/31/20 0641  84 20 (!) 198/97 (!) 89 % 12/31/20 0605 98.3 °F (36.8 °C) 84 17 (!) 173/89   
12/30/20 2254 98.2 °F (36.8 °C) 77 18 (!) 150/81 90 % ABG:   
Lab Results Component Value Date/Time PHI 7.41 12/31/2020 07:02 AM  
 PCO2I 35.2 12/31/2020 07:02 AM  
 PO2I 57 (L) 12/31/2020 07:02 AM  
 HCO3I 22.5 12/31/2020 07:02 AM  
 FIO2I 50 12/31/2020 07:02 AM  
 
 
Please call with any needs or concerns. Zoran Rios Rapid Response DIONICIO Schwab

## 2020-12-31 NOTE — PROGRESS NOTES
Patient planned for DC to SNF earlier today. BS have come up to 400s. Will adjust insulin doses, cancel DC. Patient planned to switch to TTS HD d/t needing Covid + outpatient HD spot. Will re-assess for DC and possible need for another round of HD 12/31

## 2020-12-31 NOTE — PROGRESS NOTES
Rapid Response Team Note Called by RN at 1094 to see patient for hypoxia stat. Upon entering the room the patient is noted to be in bed with head of bed elevated, flushed, with oxygen saturations into the 80s. Rapid response nurse attempting to apply venti mask but he is refusing. Blood pressure 198/97 Patient complains of shortness of breath and states he just needs dialysis. Patient's nurse reports that patient had been on Oxygen supplement via nasal cannula at 6 L and still not being able to keep oxygen saturations above 90% Fuad Berg is a 38 y.o. BLACK OR  male with medical history including ESRD on dialysis, hypertension, diabetes was admitted with respiratory failure and hypoxia secondary to COVID pneumonia No Known Allergies Current Facility-Administered Medications:  
  insulin glargine (LANTUS) injection 15 Units, 15 Units, SubCUTAneous, QHS, Mendy Kolb DO, 15 Units at 12/30/20 2246   amLODIPine (NORVASC) tablet 10 mg, 10 mg, Oral, DAILY, ArellanoAn F, NP, 10 mg at 12/30/20 0845 
  insulin lispro (HUMALOG) injection 4 Units, 4 Units, SubCUTAneous, TIDAC, ArellanoElvae F, NP, Stopped at 12/30/20 1630 
  metoprolol tartrate (LOPRESSOR) tablet 100 mg, 100 mg, Oral, BID, Kalen DE DIOS, NP, 100 mg at 12/30/20 1733   hydrALAZINE (APRESOLINE) 20 mg/mL injection 20 mg, 20 mg, IntraVENous, Q6H PRN, Kalen DE DIOS, NP, 20 mg at 12/31/20 1150   lactobac ac& pc-s.therm-b.anim (USMAN Q/RISAQUAD), 1 Cap, Oral, DAILY, Lynda Banda NP, 1 Cap at 12/30/20 1051   aspirin delayed-release tablet 81 mg, 81 mg, Oral, DAILY, Paul Manzano MD, 81 mg at 12/30/20 0844 
  atorvastatin (LIPITOR) tablet 20 mg, 20 mg, Oral, QHS, Darius Seals MD, 20 mg at 12/30/20 2246   clopidogreL (PLAVIX) tablet 75 mg, 75 mg, Oral, DAILY, Darius Seals MD, 75 mg at 12/30/20 0845   gabapentin (NEURONTIN) capsule 300 mg, 300 mg, Oral, QHS, Paul Manzano MD, 300 mg at 12/30/20 2245   oxyCODONE IR (ROXICODONE) tablet 5 mg, 5 mg, Oral, Q4H PRN, Nichole Hamilton MD, 5 mg at 12/27/20 4963   sevelamer carbonate (RENVELA) tab 800 mg, 800 mg, Oral, TID WITH MEALS, Brooksie Boxer, Amedeo Contes, MD, 800 mg at 12/30/20 1733   sodium chloride (NS) flush 5-40 mL, 5-40 mL, IntraVENous, Q8H, Paul Manzano MD, 10 mL at 12/31/20 0446   sodium chloride (NS) flush 5-40 mL, 5-40 mL, IntraVENous, PRN, Nichole Hamilton MD, 10 mL at 12/26/20 1811   acetaminophen (TYLENOL) tablet 650 mg, 650 mg, Oral, Q6H PRN, 650 mg at 12/29/20 0940 **OR** acetaminophen (TYLENOL) suppository 650 mg, 650 mg, Rectal, Q6H PRN, Brooksie Boxer, Amedeo Contes, MD 
  polyethylene glycol (MIRALAX) packet 17 g, 17 g, Oral, DAILY PRN, Brooksie Boxer, Amedeo Contes, MD 
  promethazine (PHENERGAN) tablet 12.5 mg, 12.5 mg, Oral, Q6H PRN **OR** ondansetron (ZOFRAN) injection 4 mg, 4 mg, IntraVENous, Q6H PRN, Mateo Manzano MD 
  heparin (porcine) injection 7,500 Units, 7,500 Units, SubCUTAneous, Q8H, Nichole Hamilton MD, 7,500 Units at 12/31/20 8240   ascorbic acid (vitamin C) (VITAMIN C) tablet 1,000 mg, 1,000 mg, Oral, DAILY, Nichole Hamilton MD, 1,000 mg at 12/30/20 8219   zinc sulfate (ZINCATE) 220 (50) mg capsule 220 mg, 220 mg, Oral, DAILY, Nichole Hamilton MD, 220 mg at 12/30/20 9793   insulin lispro (HUMALOG) injection, , SubCUTAneous, AC&HS, Nichole Hamilton MD, 4 Units at 12/30/20 2200 
  glucose chewable tablet 16 g, 4 Tab, Oral, PRN, Brooksie Boxer, Amedeo Contes, MD 
  dextrose (D50W) injection syrg 12.5-25 g, 12.5-25 g, IntraVENous, PRN, Brooksie Boxer, Amedeo Contes, MD 
  glucagon (GLUCAGEN) injection 1 mg, 1 mg, IntraMUSCular, PRN, Nichole Hamilton MD  
 
 
Visit Paul Fuller BP (!) 198/97 Pulse 81 Temp 98.3 °F (36.8 °C) Resp 20 Ht 6' 2\" (1.88 m) Wt 156.6 kg (345 lb 3.9 oz) SpO2 95% BMI 44.33 kg/m² Review of Systems Constitutional: Positive for fatigue. Respiratory: Positive for cough and shortness of breath. Cardiovascular: Negative for chest pain. Gastrointestinal: Negative for abdominal pain. Neurological: Positive for headaches. Negative for dizziness. Physical Exam 
Constitutional:   
   Appearance: He is ill-appearing and diaphoretic. Cardiovascular:  
   Rate and Rhythm: Normal rate and regular rhythm. Pulmonary:  
   Effort: Respiratory distress present. Breath sounds: Decreased breath sounds present. Abdominal:  
   Palpations: Abdomen is soft. Musculoskeletal:  
   Right lower leg: Edema present. Left lower leg: Edema present. Skin: 
   Capillary Refill: Capillary refill takes less than 2 seconds. Neurological:  
   Mental Status: He is alert and oriented to person, place, and time. Pertinent Recent Labs and Xrays: 
 
Recent Labs 12/30/20 
0311 12/29/20 
1200 WBC 3.4* 4.5 HGB 10.3* 9.7* HCT 31.8* 30.4*  173 Recent Labs 12/30/20 
0311 12/29/20 
1200 * 132* K 4.6 4.8  
CL 94* 94* CO2 27 24 BUN 64* 93* CREA 10.90* 15.40* * 102* CA 7.9* 7.4* No results for input(s): INR, INREXT in the last 72 hours. No results for input(s): PH, PCO2, PO2 in the last 72 hours. Recent Labs 12/31/20 
5032 PHI 7.41  
PO2I 57* PCO2I 35.2 No results for input(s): CPK, CKNDX, TROIQ in the last 72 hours. No lab exists for component: CPKMB Lab Results Component Value Date/Time Glucose (POC) 326 (H) 12/30/2020 08:43 PM  
 Glucose (POC) 364 (H) 12/30/2020 06:20 PM  
 Glucose (POC) 425 (H) 12/30/2020 04:40 PM  
 Glucose (POC) 330 (H) 12/30/2020 11:23 AM  
 Glucose (POC) 216 (H) 12/30/2020 07:22 AM  
  
 
 
Recent Imaging studies(If Any) CXR Results  (Last 48 hours) None Echo Results  (Last 48 hours) None CT Results  (Last 48 hours) None EKG RESULTS Procedure Component Value Units Date/Time EKG, 12 LEAD, INITIAL [[id number]] Collected: 12/26/20 1439 Order Status: Completed Updated: 12/27/20 1330 Ventricular Rate 81 BPM   
  Atrial Rate 81 BPM   
  P-R Interval 184 ms QRS Duration 118 ms Q-T Interval 414 ms QTC Calculation (Bezet) 480 ms Calculated P Axis 46 degrees Calculated R Axis -28 degrees Calculated T Axis 102 degrees Diagnosis --  
  Normal sinus rhythm Possible Left atrial enlargement Nonspecific intraventricular conduction delay ST & T wave abnormality, consider lateral ischemia When compared with ECG of 13-OCT-2020 14:59, 
ST no longer depressed in Anterior leads Confirmed by Brielle Gordon ([id number]) on 12/27/2020 1:30:13 PM 
  
 AMB POC EKG ROUTINE W/ 12 LEADS, INTER & REP [[id number]] Resulted: 12/04/20 1551 Order Status: Completed Updated: 12/04/20 1554 08/12/20 ECHO ADULT COMPLETE 08/22/2020 8/22/2020 Narrative · LV: Estimated LVEF is 45 - 50%. Normal cavity size. Moderate concentric  
hypertrophy. · LA: Mildly dilated left atrium. · Image quality for this study was technically difficult. · Echo study was technically difficulty. · MV: Moderate mitral annular calcification. · Pericardium: Trivial pericardial effusion. Signed by: Mark Anthony Shah MD  
  
 
 
 
Recent Microbiology Data(If Any) Trell [place] All Micro Results Procedure Component Value Units Date/Time CULTURE, BLOOD, PAIRED [[id number]] Collected: 12/29/20 1403 Order Status: Completed Specimen: Blood Updated: 12/30/20 0755 Special Requests: NO SPECIAL REQUESTS Culture result: NO GROWTH AFTER 16 HOURS     
 CULTURE, BLOOD, PAIRED [[id number]] Collected: 12/26/20 1440 Order Status: Completed Specimen: Blood Updated: 12/30/20 0755 Special Requests: NO SPECIAL REQUESTS Culture result: NO GROWTH 4 DAYS     
  
  
 
 
LAB DATA REVIEWED:   
Recent Results (from the past 24 hour(s)) GLUCOSE, POC  Collection Time: 12/30/20  7:22 AM  
 Result Value Ref Range Glucose (POC) 216 (H) 65 - 100 mg/dL Performed by Dashawn Kaur (PCT) GLUCOSE, POC Collection Time: 12/30/20 11:23 AM  
Result Value Ref Range Glucose (POC) 330 (H) 65 - 100 mg/dL Performed by Dashawn Kaur (PCT) GLUCOSE, POC Collection Time: 12/30/20  4:40 PM  
Result Value Ref Range Glucose (POC) 425 (H) 65 - 100 mg/dL Performed by Dashawn Kaur (PCT) GLUCOSE, POC Collection Time: 12/30/20  6:20 PM  
Result Value Ref Range Glucose (POC) 364 (H) 65 - 100 mg/dL Performed by Dashawn Kaur (PCT) GLUCOSE, POC Collection Time: 12/30/20  8:43 PM  
Result Value Ref Range Glucose (POC) 326 (H) 65 - 100 mg/dL Performed by Megan Winkler POC EG7 Collection Time: 12/31/20  7:02 AM  
Result Value Ref Range Calcium, ionized (POC) 0.99 (L) 1.12 - 1.32 mmol/L  
 FIO2 (POC) 50 % pH (POC) 7.41 7.35 - 7.45    
 pCO2 (POC) 35.2 35.0 - 45.0 MMHG  
 pO2 (POC) 57 (L) 80 - 100 MMHG  
 HCO3 (POC) 22.5 22 - 26 MMOL/L Base deficit (POC) 2 mmol/L  
 sO2 (POC) 90 (L) 92 - 97 % Site RIGHT RADIAL Device: VENTURI MASK Allens test (POC) YES Specimen type (POC) ARTERIAL Total resp. rate 26 Assessment, action, and plan Hypoxic respiratory failure secondary to COVID-19 infection · After some explanation, patient did agree to wear venti mask or any other mask needed to help with his breathing ans oxygen saturation. · ABG ordered and done with following result 12/31/2020 07:02  
pH (POC) 7.41  
pCO2 (POC) 35.2  
pO2 (POC) 57 (L) HCO3 (POC) 22.5  
sO2 (POC) 90 (L) Base deficit (POC) 2 FIO2 (POC) 50 Specimen type (POC) ARTERIAL Site RIGHT RADIAL Device: VENTURI MASK · Non-rebreather · Contacted dialysis for urgent dialysis- dialysis nurse on her way · Stat chest x-ray · Pulmonology and infectious disease consults · Received PRN hydralazine for elevated blood pressure. Signed by:  Angela Castro DNP, ACNP-BC Critical care time unrelated to prior notes: 40 minutes Please note that this note was dictated using Dragon computer voice recognition software. Quite often unanticipated grammatical, syntax, homophones, and other interpretive errors are inadvertently transcribed by the computer software. Please disregard these errors. Please excuse any errors that have escaped final proofreading.

## 2020-12-31 NOTE — PROGRESS NOTES
NAME: Frankey Sewer :  1985 MRN:  [id number] Assessment :    Plan: 
--ESKD-MWF [facility].  Now TTS to [facility] to [facility] due to [place] Hyperkalemia COVID + Hypoxia HTN, uncontrolled Anemia of ESKD 
 
PAD -Seen on HD at 1000. Will increase fluid removal to 5kg as tolerated. FR and low salt intake 
  
No FARIDA (secondary to covid and hgb > 10) 
  
  
 
 
Subjective: Chief Complaint:  No complaint. Review of Systems: 
 
Symptom Y/N Comments  Symptom Y/N Comments Fever/Chills    Chest Pain Poor Appetite    Edema Cough    Abdominal Pain Sputum    Joint Pain SOB/LITTLE    Pruritis/Rash Nausea/vomit    Tolerating PT/OT Diarrhea    Tolerating Diet Constipation    Other Could not obtain due to:   
 
Objective: VITALS:  
Last 24hrs VS reviewed since prior progress note. Most recent are: 
Visit Vitals /72 Pulse 86 Temp 99.9 °F (37.7 °C) Resp 20 Ht 6' 2\" (1.88 m) Wt 156.6 kg (345 lb 3.9 oz) SpO2 90% BMI 44.33 kg/m² Intake/Output Summary (Last 24 hours) at 2020 1232 Last data filed at 2020 1843 Gross per 24 hour Intake 720 ml Output  Net 720 ml Telemetry Reviewed: PHYSICAL EXAM: 
General: NAD Lab Data Reviewed: (see below) Medications Reviewed: (see below) PMH/SH reviewed - no change compared to H&P 
________________________________________________________________________ Care Plan discussed with: 
Patient Family RN Care Manager Consultant:     
 
  Comments >50% of visit spent in counseling and coordination of care    
 
________________________________________________________________________ Akhil Sow MD  
 
Procedures: see electronic medical records for all procedures/Xrays and details which 
 were not copied into this note but were reviewed prior to creation of Plan. LABS: 
Recent Labs 12/30/20 
0311 12/29/20 
1200 WBC 3.4* 4.5 HGB 10.3* 9.7* HCT 31.8* 30.4*  173 Recent Labs 12/31/20 
0740 12/30/20 
0311 12/29/20 
1200 * 130* 132* K 4.9 4.6 4.8  
CL 92* 94* 94* CO2 23 27 24 BUN 97* 64* 93* CREA 13.90* 10.90* 15.40* * 262* 102* CA 8.0* 7.9* 7.4* Recent Labs 12/30/20 
0311 12/29/20 
1200 AP 82 86  
TP 7.9 7.8 ALB 2.7* 2.8*  
GLOB 5.2* 5.0* No results for input(s): INR, PTP, APTT, INREXT, INREXT in the last 72 hours. Recent Labs  
  12/29/20 
1200 FERR 796* No results found for: FOL, RBCF No results for input(s): PH, PCO2, PO2 in the last 72 hours. No results for input(s): CPK, CKMB in the last 72 hours. No lab exists for component: TROPONINI No components found for: [place] Lab Results Component Value Date/Time Color YELLOW/STRAW 05/01/2017 05:39 PM  
 Appearance CLOUDY (A) 05/01/2017 05:39 PM  
 Specific gravity 1.018 05/01/2017 05:39 PM  
 Specific gravity >1.030 (H) 10/03/2014 04:35 AM  
 pH (UA) 5.5 05/01/2017 05:39 PM  
 Protein 300 (A) 05/01/2017 05:39 PM  
 Glucose 250 (A) 05/01/2017 05:39 PM  
 Ketone NEGATIVE  05/01/2017 05:39 PM  
 Bilirubin NEGATIVE  05/01/2017 05:39 PM  
 Urobilinogen 0.2 05/01/2017 05:39 PM  
 Nitrites NEGATIVE  05/01/2017 05:39 PM  
 Leukocyte Esterase NEGATIVE  05/01/2017 05:39 PM  
 Epithelial cells FEW 05/01/2017 05:39 PM  
 Bacteria NEGATIVE  05/01/2017 05:39 PM  
 WBC 0-4 05/01/2017 05:39 PM  
 RBC 5-10 05/01/2017 05:39 PM  
 
 
MEDICATIONS: 
Current Facility-Administered Medications Medication Dose Route Frequency  insulin glargine (LANTUS) injection 15 Units  15 Units SubCUTAneous QHS  amLODIPine (NORVASC) tablet 10 mg  10 mg Oral DAILY  insulin lispro (HUMALOG) injection 4 Units  4 Units SubCUTAneous TIDAC  
  metoprolol tartrate (LOPRESSOR) tablet 100 mg  100 mg Oral BID  hydrALAZINE (APRESOLINE) 20 mg/mL injection 20 mg  20 mg IntraVENous Q6H PRN  
 lactobac ac& pc-s.therm-b.anim (USMAN Q/RISAQUAD)  1 Cap Oral DAILY  aspirin delayed-release tablet 81 mg  81 mg Oral DAILY  atorvastatin (LIPITOR) tablet 20 mg  20 mg Oral QHS  clopidogreL (PLAVIX) tablet 75 mg  75 mg Oral DAILY  gabapentin (NEURONTIN) capsule 300 mg  300 mg Oral QHS  oxyCODONE IR (ROXICODONE) tablet 5 mg  5 mg Oral Q4H PRN  
 sevelamer carbonate (RENVELA) tab 800 mg  800 mg Oral TID WITH MEALS  sodium chloride (NS) flush 5-40 mL  5-40 mL IntraVENous Q8H  
 sodium chloride (NS) flush 5-40 mL  5-40 mL IntraVENous PRN  
 acetaminophen (TYLENOL) tablet 650 mg  650 mg Oral Q6H PRN Or  
 acetaminophen (TYLENOL) suppository 650 mg  650 mg Rectal Q6H PRN  polyethylene glycol (MIRALAX) packet 17 g  17 g Oral DAILY PRN  promethazine (PHENERGAN) tablet 12.5 mg  12.5 mg Oral Q6H PRN Or  
 ondansetron (ZOFRAN) injection 4 mg  4 mg IntraVENous Q6H PRN  
 heparin (porcine) injection 7,500 Units  7,500 Units SubCUTAneous Q8H  
 ascorbic acid (vitamin C) (VITAMIN C) tablet 1,000 mg  1,000 mg Oral DAILY  zinc sulfate (ZINCATE) 220 (50) mg capsule 220 mg  220 mg Oral DAILY  insulin lispro (HUMALOG) injection   SubCUTAneous AC&HS  
 glucose chewable tablet 16 g  4 Tab Oral PRN  
 dextrose (D50W) injection syrg 12.5-25 g  12.5-25 g IntraVENous PRN  
 glucagon (GLUCAGEN) injection 1 mg  1 mg IntraMUSCular PRN

## 2020-12-31 NOTE — PROGRESS NOTES
UPDATE 2:01 PM - D/C cancelled due to pt's clinical status and increased O2 requirements. [facility] informed. All transportation arrangements cancelled. CM informed by Reynaldoverito Lazar that pt's insurance will be changing 1/1/21 to a managed Medicare plan through [address] [place] [address]. Pt will now require an insurance authorization in order to return to [facility]. Pt awaiting transfer to stepdown unit for higher level of care. TEJA Plan: * Disposition: [facility] for SNF/LTC. Bed availability for today confirmed with Sebastian Lazar Admissions Rodrigo Ceylon [phone]). Pt is assigned to Room 130. Nursing will need to call report to [phone]. Please send AVS, MAR, and any written prescriptions to facility in an envelope. * Transport at D/C: BLS transport arranged today for 2:00 pm via [facility] - Confirmation # [id number]. PCS, Facesheet, and H&P placed on chart for transport. * OP HD: Temporary unit is [facility] on TTS at 7:30 am until pt is COVID negative (usual unit is [facility] on MWF). BLS roundtrip transport arranged for first two weeks of treatment via [employer] starting this Saturday 1/2/21 with a 6:30 am pick-up time - Confirmation # [id number]. Transport information provided to Sbeastian Lazar. Confirmed tomorrow's start date with HD unit (ph. [phone]). D/C Summary and flow sheets faxed to unit (fx. [phone]). * OP F/U: [facility] to coordinate OP appts * 2nd IMM letter: Given 12/30/20 
  
Discussed plan with maribel/GINGER Nishant Number ([phone]) who verbalized understanding. No further concerns indicated at this time. AVS updated. Patient is ready for discharge from a Care Management standpoint. RN informed. Care Management Interventions PCP Verified by CM: Yes 
Palliative Care Criteria Met (RRAT>21 & CHF Dx)?: No 
Mode of Transport at Discharge: BLS([facility] arranged through [facility] ) Hospital Transport Time of Discharge: 1400 Transition of Care Consult (CM Consult): SNF([facility]) Jennifer Signup: No 
Discharge Durable Medical Equipment: No 
Physical Therapy Consult: No 
Occupational Therapy Consult: No 
Speech Therapy Consult: No 
Current Support Network: [address] [place] [address] Confirm Follow Up Transport: Other (see comment) The Plan for Transition of Care is Related to the Following Treatment Goals : SNF [facility] Name of the Patient Representative Who was Provided with a Choice of Provider and Agrees with the Discharge Plan: Pt and Felipe López Freedom of Choice List was Provided with Basic Dialogue that Supports the Patient's Individualized Plan of Care/Goals, Treatment Preferences and Shares the Quality Data Associated with the Providers?: Yes Discharge Location Discharge Placement: Skilled nursing facility Bipin Reid LMSW Care Manager [id number] [address] [facility] 
[phone]

## 2020-12-31 NOTE — PROGRESS NOTES
End of Shift Note Bedside shift change report given to aMllorie Callaway (oncoming nurse) by Gi Contreras (offgoing nurse). Report included the following information SBAR, Kardex, Intake/Output, MAR and Recent Results Shift worked:  [id number] Shift summary and any significant changes:  
 Patient called out around 0600 saying he was feeling short of breath. Applied oxygen and kept increasing up to 6L - oxygen sats did not increase and remained in 80's. Called rapid response RN to further assess patient - see additional notes in file. Concerns for physician to address:  
  
Zone phone for oncoming shift:   [phone] Activity: 
Activity Level: Up with Assistance Number times ambulated in hallways past shift: 0 Number of times OOB to chair past shift: 0 Cardiac:  
Cardiac Monitoring: Yes     
Cardiac Rhythm: Normal sinus rhythm Access:  
Current line(s): PIV and HD access Genitourinary:  
Urinary status: anuric Respiratory:  
O2 Device: Room air Chronic home O2 use?: NO Incentive spirometer at bedside: NO 
  
GI: 
Last Bowel Movement Date: 12/31/20 Current diet:  DIET RENAL Regular; FR 1200ML 
DIET ONE TIME MESSAGE 
DIET ONE TIME MESSAGE Passing flatus: YES Tolerating current diet: YES 
% Diet Eaten: 100 % Pain Management:  
Patient states pain is manageable on current regimen: N/A Skin: 
Ranjeet Score: 17 Interventions: increase time out of bed Patient Safety: 
Fall Score: Total Score: 4 Interventions: bed/chair alarm and pt to call before getting OOB High Fall Risk: Yes Length of Stay: 
Expected LOS: 5d 12h Actual LOS: [place]

## 2020-12-31 NOTE — PROGRESS NOTES
10:30 Discussed pt with Dr. Stu Maher. Pt undergoing dialysis at present time. Unable to wean off of NRB onto NC as sats drop to 88%. 5026-4721 Due to pts continued hypoxia on Hi-Flow (low 80's), pt changed to NRB @ 100%. New orders received and report called to [facility] on PCU. At transfer, HR=88, o2 Sat=90-93 on NRB. Pt alert and oriented with skin wm & dry.

## 2020-12-31 NOTE — PROGRESS NOTES
Problem: Falls - Risk of 
Goal: *Absence of Falls Description: Document Chelly Ordonez Fall Risk and appropriate interventions in the flowsheet. Outcome: Progressing Towards Goal 
Note: Fall Risk Interventions: 
Mobility Interventions: Patient to call before getting OOB, Communicate number of staff needed for ambulation/transfer, PT Consult for mobility concerns, OT consult for ADLs, Strengthening exercises (ROM-active/passive), Utilize walker, cane, or other assistive device, Utilize gait belt for transfers/ambulation Medication Interventions: Patient to call before getting OOB, Teach patient to arise slowly Elimination Interventions: Call light in reach, Patient to call for help with toileting needs History of Falls Interventions: Room close to nurse's station

## 2020-12-31 NOTE — PROGRESS NOTES
1500: TRANSFER - IN REPORT: 
 
Verbal report received from Tanner Downey RN(name) on Luis Dubose  being received from [facility](unit) for change in patient condition(increased O2 requirement) Report consisted of patients Situation, Background, Assessment and  
Recommendations(SBAR). Information from the following report(s) SBAR, Kardex, Intake/Output, MAR and Recent Results was reviewed with the receiving nurse. Opportunity for questions and clarification was provided. Assessment completed upon patients arrival to unit and care assumed. 1510: Patient arrived on unit. Patient on non rebreather at this time, O2 saturation 95-97%. MEWS of 1, A&OX4, call bell in reach, will monitor closely. 1515: Patient placed on 6LNC, but not able to tolerate it. O2 saturation decreased to 81%. Patient placed back on non re breather and respiratory called to bring high flow nasal cannula. 1535: patient placed on 15L high flow nasal cannula. O2 saturation 92%, will monitor closely. 1818: Patient O2 saturation 87-91%. Respiratory called and she will bring heated high flow. 1900: End of Shift Note Bedside shift change report given to oncoming nurse (oncoming nurse) by Maximiliano Sorenson (offgoing nurse). Report included the following information SBAR, Kardex, Intake/Output, MAR and Recent Results Shift worked:  4218-9138 Shift summary and any significant changes:  
  Patient on 15L NC, O2 saturation 87-91%. Will call respiratory about HHF Concerns for physician to address:  Oxygen requirement Zone phone for oncoming shift:   XXX Activity: 
Activity Level: Bed Rest 
Number times ambulated in hallways past shift: 0 Number of times OOB to chair past shift: 0 Cardiac:  
Cardiac Monitoring: Yes     
Cardiac Rhythm: Normal sinus rhythm Access:  
Current line(s): PIV Genitourinary:  
Urinary status: anuric Respiratory:  
O2 Device: Hi flow nasal cannula Chronic home O2 use?: NO Incentive spirometer at bedside: YES 
  
GI: 
Last Bowel Movement Date: 12/31/20 Current diet:  DIET RENAL Regular; FR 1200ML 
DIET ONE TIME MESSAGE 
DIET ONE TIME MESSAGE Passing flatus: YES Tolerating current diet: YES 
% Diet Eaten: 100 % Pain Management:  
Patient states pain is manageable on current regimen: YES Skin: 
Ranjeet Score: 17 Interventions: float heels and PT/OT consult Patient Safety: 
Fall Score: Total Score: 4 Interventions: bed/chair alarm and pt to call before getting OOB High Fall Risk: Yes Length of Stay: 
Expected LOS: 5d 12h Actual LOS: 5 [place]

## 2021-01-01 ENCOUNTER — APPOINTMENT (OUTPATIENT)
Dept: GENERAL RADIOLOGY | Age: 36
DRG: 177 | End: 2021-01-01
Attending: NURSE PRACTITIONER
Payer: MEDICARE

## 2021-01-01 VITALS
OXYGEN SATURATION: 99 % | WEIGHT: 311.3 LBS | RESPIRATION RATE: 20 BRPM | HEART RATE: 94 BPM | SYSTOLIC BLOOD PRESSURE: 171 MMHG | BODY MASS INDEX: 39.95 KG/M2 | DIASTOLIC BLOOD PRESSURE: 59 MMHG | TEMPERATURE: 100 F | HEIGHT: 74 IN

## 2021-01-01 LAB
ALBUMIN SERPL-MCNC: 2.2 G/DL (ref 3.5–5)
ALBUMIN/GLOB SERPL: 0.4 {RATIO} (ref 1.1–2.2)
ALP SERPL-CCNC: 107 U/L (ref 45–117)
ALT SERPL-CCNC: 159 U/L (ref 12–78)
ANION GAP SERPL CALC-SCNC: 12 MMOL/L (ref 5–15)
ANION GAP SERPL CALC-SCNC: 13 MMOL/L (ref 5–15)
ANION GAP SERPL CALC-SCNC: 17 MMOL/L (ref 5–15)
ANION GAP SERPL CALC-SCNC: 17 MMOL/L (ref 5–15)
ANION GAP SERPL CALC-SCNC: 20 MMOL/L (ref 5–15)
ARTERIAL PATENCY WRIST A: ABNORMAL
ARTERIAL PATENCY WRIST A: YES
AST SERPL-CCNC: 206 U/L (ref 15–37)
BACTERIA SPEC CULT: NORMAL
BASE DEFICIT BLDA-SCNC: 0.1 MMOL/L
BASE DEFICIT BLDA-SCNC: 1.6 MMOL/L
BASE DEFICIT BLDA-SCNC: 1.6 MMOL/L
BASE EXCESS BLD CALC-SCNC: 0 MMOL/L
BASOPHILS # BLD: 0 K/UL (ref 0–0.1)
BASOPHILS NFR BLD: 0 % (ref 0–1)
BDY SITE: ABNORMAL
BILIRUB SERPL-MCNC: 0.6 MG/DL (ref 0.2–1)
BUN SERPL-MCNC: 109 MG/DL (ref 6–20)
BUN SERPL-MCNC: 130 MG/DL (ref 6–20)
BUN SERPL-MCNC: 71 MG/DL (ref 6–20)
BUN SERPL-MCNC: 72 MG/DL (ref 6–20)
BUN SERPL-MCNC: 83 MG/DL (ref 6–20)
BUN/CREAT SERPL: 6 (ref 12–20)
BUN/CREAT SERPL: 7 (ref 12–20)
BUN/CREAT SERPL: 8 (ref 12–20)
BUN/CREAT SERPL: 8 (ref 12–20)
BUN/CREAT SERPL: 9 (ref 12–20)
CA-I BLD-SCNC: 1.04 MMOL/L (ref 1.12–1.32)
CALCIUM SERPL-MCNC: 8.7 MG/DL (ref 8.5–10.1)
CALCIUM SERPL-MCNC: 8.8 MG/DL (ref 8.5–10.1)
CALCIUM SERPL-MCNC: 8.8 MG/DL (ref 8.5–10.1)
CALCIUM SERPL-MCNC: 8.9 MG/DL (ref 8.5–10.1)
CALCIUM SERPL-MCNC: 9 MG/DL (ref 8.5–10.1)
CHLORIDE SERPL-SCNC: 90 MMOL/L (ref 97–108)
CHLORIDE SERPL-SCNC: 92 MMOL/L (ref 97–108)
CHLORIDE SERPL-SCNC: 92 MMOL/L (ref 97–108)
CHLORIDE SERPL-SCNC: 93 MMOL/L (ref 97–108)
CHLORIDE SERPL-SCNC: 94 MMOL/L (ref 97–108)
CO2 SERPL-SCNC: 21 MMOL/L (ref 21–32)
CO2 SERPL-SCNC: 23 MMOL/L (ref 21–32)
CO2 SERPL-SCNC: 26 MMOL/L (ref 21–32)
CO2 SERPL-SCNC: 26 MMOL/L (ref 21–32)
CO2 SERPL-SCNC: 27 MMOL/L (ref 21–32)
CREAT SERPL-MCNC: 10.4 MG/DL (ref 0.7–1.3)
CREAT SERPL-MCNC: 10.5 MG/DL (ref 0.7–1.3)
CREAT SERPL-MCNC: 11.3 MG/DL (ref 0.7–1.3)
CREAT SERPL-MCNC: 13.8 MG/DL (ref 0.7–1.3)
CREAT SERPL-MCNC: 14.7 MG/DL (ref 0.7–1.3)
CRP SERPL-MCNC: 17.5 MG/DL (ref 0–0.6)
D DIMER PPP FEU-MCNC: 0.78 MG/L FEU (ref 0–0.65)
D DIMER PPP FEU-MCNC: 1.02 MG/L FEU (ref 0–0.65)
D DIMER PPP FEU-MCNC: 1.04 MG/L FEU (ref 0–0.65)
D DIMER PPP FEU-MCNC: 11.02 MG/L FEU (ref 0–0.65)
D DIMER PPP FEU-MCNC: 19.29 MG/L FEU (ref 0–0.65)
D DIMER PPP FEU-MCNC: 8.06 MG/L FEU (ref 0–0.65)
DIFFERENTIAL METHOD BLD: ABNORMAL
EOSINOPHIL # BLD: 0 K/UL (ref 0–0.4)
EOSINOPHIL # BLD: 0.1 K/UL (ref 0–0.4)
EOSINOPHIL # BLD: 0.1 K/UL (ref 0–0.4)
EOSINOPHIL NFR BLD: 0 % (ref 0–7)
EOSINOPHIL NFR BLD: 1 % (ref 0–7)
EOSINOPHIL NFR BLD: 1 % (ref 0–7)
ERYTHROCYTE [DISTWIDTH] IN BLOOD BY AUTOMATED COUNT: 16.4 % (ref 11.5–14.5)
ERYTHROCYTE [DISTWIDTH] IN BLOOD BY AUTOMATED COUNT: 16.5 % (ref 11.5–14.5)
ERYTHROCYTE [DISTWIDTH] IN BLOOD BY AUTOMATED COUNT: 16.6 % (ref 11.5–14.5)
ERYTHROCYTE [DISTWIDTH] IN BLOOD BY AUTOMATED COUNT: 16.7 % (ref 11.5–14.5)
ERYTHROCYTE [DISTWIDTH] IN BLOOD BY AUTOMATED COUNT: 16.7 % (ref 11.5–14.5)
ERYTHROCYTE [DISTWIDTH] IN BLOOD BY AUTOMATED COUNT: 16.9 % (ref 11.5–14.5)
ERYTHROCYTE [DISTWIDTH] IN BLOOD BY AUTOMATED COUNT: 17 % (ref 11.5–14.5)
FERRITIN SERPL-MCNC: 2011 NG/ML (ref 26–388)
FERRITIN SERPL-MCNC: 3379 NG/ML (ref 26–388)
FERRITIN SERPL-MCNC: 3434 NG/ML (ref 26–388)
FERRITIN SERPL-MCNC: 4178 NG/ML (ref 26–388)
FERRITIN SERPL-MCNC: 4864 NG/ML (ref 26–388)
FERRITIN SERPL-MCNC: 5038 NG/ML (ref 26–388)
FIBRINOGEN PPP-MCNC: 752 MG/DL (ref 200–475)
FIBRINOGEN PPP-MCNC: 784 MG/DL (ref 200–475)
FIBRINOGEN PPP-MCNC: >800 MG/DL (ref 200–475)
GAS FLOW.O2 O2 DELIVERY SYS: ABNORMAL L/MIN
GLOBULIN SER CALC-MCNC: 5.9 G/DL (ref 2–4)
GLUCOSE BLD STRIP.AUTO-MCNC: 104 MG/DL (ref 65–100)
GLUCOSE BLD STRIP.AUTO-MCNC: 107 MG/DL (ref 65–100)
GLUCOSE BLD STRIP.AUTO-MCNC: 131 MG/DL (ref 65–100)
GLUCOSE BLD STRIP.AUTO-MCNC: 133 MG/DL (ref 65–100)
GLUCOSE BLD STRIP.AUTO-MCNC: 140 MG/DL (ref 65–100)
GLUCOSE BLD STRIP.AUTO-MCNC: 158 MG/DL (ref 65–100)
GLUCOSE BLD STRIP.AUTO-MCNC: 179 MG/DL (ref 65–100)
GLUCOSE BLD STRIP.AUTO-MCNC: 192 MG/DL (ref 65–100)
GLUCOSE BLD STRIP.AUTO-MCNC: 199 MG/DL (ref 65–100)
GLUCOSE BLD STRIP.AUTO-MCNC: 200 MG/DL (ref 65–100)
GLUCOSE BLD STRIP.AUTO-MCNC: 218 MG/DL (ref 65–100)
GLUCOSE BLD STRIP.AUTO-MCNC: 223 MG/DL (ref 65–100)
GLUCOSE BLD STRIP.AUTO-MCNC: 223 MG/DL (ref 65–100)
GLUCOSE BLD STRIP.AUTO-MCNC: 239 MG/DL (ref 65–100)
GLUCOSE BLD STRIP.AUTO-MCNC: 244 MG/DL (ref 65–100)
GLUCOSE BLD STRIP.AUTO-MCNC: 247 MG/DL (ref 65–100)
GLUCOSE BLD STRIP.AUTO-MCNC: 249 MG/DL (ref 65–100)
GLUCOSE BLD STRIP.AUTO-MCNC: 253 MG/DL (ref 65–100)
GLUCOSE BLD STRIP.AUTO-MCNC: 253 MG/DL (ref 65–100)
GLUCOSE BLD STRIP.AUTO-MCNC: 284 MG/DL (ref 65–100)
GLUCOSE BLD STRIP.AUTO-MCNC: 284 MG/DL (ref 65–100)
GLUCOSE BLD STRIP.AUTO-MCNC: 287 MG/DL (ref 65–100)
GLUCOSE BLD STRIP.AUTO-MCNC: 302 MG/DL (ref 65–100)
GLUCOSE BLD STRIP.AUTO-MCNC: 307 MG/DL (ref 65–100)
GLUCOSE BLD STRIP.AUTO-MCNC: 316 MG/DL (ref 65–100)
GLUCOSE SERPL-MCNC: 113 MG/DL (ref 65–100)
GLUCOSE SERPL-MCNC: 178 MG/DL (ref 65–100)
GLUCOSE SERPL-MCNC: 208 MG/DL (ref 65–100)
GLUCOSE SERPL-MCNC: 212 MG/DL (ref 65–100)
GLUCOSE SERPL-MCNC: 245 MG/DL (ref 65–100)
HCO3 BLD-SCNC: 24.9 MMOL/L (ref 22–26)
HCO3 BLDA-SCNC: 29 MMOL/L (ref 22–26)
HCT VFR BLD AUTO: 35.7 % (ref 36.6–50.3)
HCT VFR BLD AUTO: 35.9 % (ref 36.6–50.3)
HCT VFR BLD AUTO: 36.3 % (ref 36.6–50.3)
HCT VFR BLD AUTO: 37 % (ref 36.6–50.3)
HCT VFR BLD AUTO: 37.5 % (ref 36.6–50.3)
HCT VFR BLD AUTO: 38.8 % (ref 36.6–50.3)
HCT VFR BLD AUTO: 39.4 % (ref 36.6–50.3)
HGB BLD-MCNC: 11.3 G/DL (ref 12.1–17)
HGB BLD-MCNC: 11.6 G/DL (ref 12.1–17)
HGB BLD-MCNC: 11.6 G/DL (ref 12.1–17)
HGB BLD-MCNC: 11.8 G/DL (ref 12.1–17)
HGB BLD-MCNC: 12.2 G/DL (ref 12.1–17)
HGB BLD-MCNC: 12.5 G/DL (ref 12.1–17)
HGB BLD-MCNC: 12.9 G/DL (ref 12.1–17)
IMM GRANULOCYTES # BLD AUTO: 0 K/UL (ref 0–0.04)
IMM GRANULOCYTES # BLD AUTO: 0 K/UL (ref 0–0.04)
IMM GRANULOCYTES # BLD AUTO: 0.1 K/UL (ref 0–0.04)
IMM GRANULOCYTES # BLD AUTO: 0.2 K/UL (ref 0–0.04)
IMM GRANULOCYTES NFR BLD AUTO: 1 % (ref 0–0.5)
INR PPP: 1.1 (ref 0.9–1.1)
LYMPHOCYTES # BLD: 0.5 K/UL (ref 0.8–3.5)
LYMPHOCYTES # BLD: 0.5 K/UL (ref 0.8–3.5)
LYMPHOCYTES # BLD: 0.7 K/UL (ref 0.8–3.5)
LYMPHOCYTES # BLD: 0.9 K/UL (ref 0.8–3.5)
LYMPHOCYTES NFR BLD: 10 % (ref 12–49)
LYMPHOCYTES NFR BLD: 11 % (ref 12–49)
LYMPHOCYTES NFR BLD: 6 % (ref 12–49)
LYMPHOCYTES NFR BLD: 8 % (ref 12–49)
MAGNESIUM SERPL-MCNC: 4.3 MG/DL (ref 1.6–2.4)
MCH RBC QN AUTO: 23.9 PG (ref 26–34)
MCH RBC QN AUTO: 24.1 PG (ref 26–34)
MCH RBC QN AUTO: 24.3 PG (ref 26–34)
MCH RBC QN AUTO: 24.3 PG (ref 26–34)
MCH RBC QN AUTO: 24.4 PG (ref 26–34)
MCHC RBC AUTO-ENTMCNC: 31.5 G/DL (ref 30–36.5)
MCHC RBC AUTO-ENTMCNC: 31.9 G/DL (ref 30–36.5)
MCHC RBC AUTO-ENTMCNC: 32 G/DL (ref 30–36.5)
MCHC RBC AUTO-ENTMCNC: 32.2 G/DL (ref 30–36.5)
MCHC RBC AUTO-ENTMCNC: 32.5 G/DL (ref 30–36.5)
MCHC RBC AUTO-ENTMCNC: 32.5 G/DL (ref 30–36.5)
MCHC RBC AUTO-ENTMCNC: 32.7 G/DL (ref 30–36.5)
MCV RBC AUTO: 73.4 FL (ref 80–99)
MCV RBC AUTO: 74.2 FL (ref 80–99)
MCV RBC AUTO: 74.2 FL (ref 80–99)
MCV RBC AUTO: 74.8 FL (ref 80–99)
MCV RBC AUTO: 75.7 FL (ref 80–99)
MCV RBC AUTO: 75.9 FL (ref 80–99)
MCV RBC AUTO: 76.4 FL (ref 80–99)
MONOCYTES # BLD: 0.3 K/UL (ref 0–1)
MONOCYTES # BLD: 0.4 K/UL (ref 0–1)
MONOCYTES # BLD: 0.4 K/UL (ref 0–1)
MONOCYTES NFR BLD: 2 % (ref 5–13)
MONOCYTES NFR BLD: 3 % (ref 5–13)
MONOCYTES NFR BLD: 3 % (ref 5–13)
MONOCYTES NFR BLD: 4 % (ref 5–13)
MONOCYTES NFR BLD: 5 % (ref 5–13)
MONOCYTES NFR BLD: 8 % (ref 5–13)
NEUTS SEG # BLD: 10.7 K/UL (ref 1.8–8)
NEUTS SEG # BLD: 11.3 K/UL (ref 1.8–8)
NEUTS SEG # BLD: 13.7 K/UL (ref 1.8–8)
NEUTS SEG # BLD: 4.1 K/UL (ref 1.8–8)
NEUTS SEG # BLD: 5.3 K/UL (ref 1.8–8)
NEUTS SEG # BLD: 6 K/UL (ref 1.8–8)
NEUTS SEG NFR BLD: 82 % (ref 32–75)
NEUTS SEG NFR BLD: 83 % (ref 32–75)
NEUTS SEG NFR BLD: 88 % (ref 32–75)
NEUTS SEG NFR BLD: 90 % (ref 32–75)
NEUTS SEG NFR BLD: 90 % (ref 32–75)
NEUTS SEG NFR BLD: 91 % (ref 32–75)
NRBC # BLD: 0 K/UL (ref 0–0.01)
NRBC # BLD: 0.07 K/UL (ref 0–0.01)
NRBC BLD-RTO: 0 PER 100 WBC
NRBC BLD-RTO: 0.4 PER 100 WBC
O2/TOTAL GAS SETTING VFR VENT: 100 %
PCO2 BLD: 39.8 MMHG (ref 35–45)
PCO2 BLDA: 70 MMHG (ref 35–45)
PCO2 BLDA: 77 MMHG (ref 35–45)
PCO2 BLDA: 77 MMHG (ref 35–45)
PH BLD: 7.4 [PH] (ref 7.35–7.45)
PH BLDA: 7.19 [PH] (ref 7.35–7.45)
PH BLDA: 7.19 [PH] (ref 7.35–7.45)
PH BLDA: 7.24 [PH] (ref 7.35–7.45)
PHOSPHATE SERPL-MCNC: 10.4 MG/DL (ref 2.6–4.7)
PHOSPHATE SERPL-MCNC: 9 MG/DL (ref 2.6–4.7)
PHOSPHATE SERPL-MCNC: 9.1 MG/DL (ref 2.6–4.7)
PLATELET # BLD AUTO: 234 K/UL (ref 150–400)
PLATELET # BLD AUTO: 277 K/UL (ref 150–400)
PLATELET # BLD AUTO: 299 K/UL (ref 150–400)
PLATELET # BLD AUTO: 347 K/UL (ref 150–400)
PLATELET # BLD AUTO: 354 K/UL (ref 150–400)
PLATELET # BLD AUTO: 387 K/UL (ref 150–400)
PLATELET # BLD AUTO: 398 K/UL (ref 150–400)
PMV BLD AUTO: 10.4 FL (ref 8.9–12.9)
PMV BLD AUTO: 10.8 FL (ref 8.9–12.9)
PMV BLD AUTO: 10.8 FL (ref 8.9–12.9)
PMV BLD AUTO: 10.9 FL (ref 8.9–12.9)
PMV BLD AUTO: 11.1 FL (ref 8.9–12.9)
PMV BLD AUTO: 11.1 FL (ref 8.9–12.9)
PMV BLD AUTO: 11.6 FL (ref 8.9–12.9)
PO2 BLD: 57 MMHG (ref 80–100)
PO2 BLDA: 49 MMHG (ref 80–100)
PO2 BLDA: 57 MMHG (ref 80–100)
PO2 BLDA: 57 MMHG (ref 80–100)
POTASSIUM SERPL-SCNC: 4.9 MMOL/L (ref 3.5–5.1)
POTASSIUM SERPL-SCNC: 5.1 MMOL/L (ref 3.5–5.1)
POTASSIUM SERPL-SCNC: 5.2 MMOL/L (ref 3.5–5.1)
POTASSIUM SERPL-SCNC: 5.3 MMOL/L (ref 3.5–5.1)
POTASSIUM SERPL-SCNC: 5.5 MMOL/L (ref 3.5–5.1)
PROT SERPL-MCNC: 8.1 G/DL (ref 6.4–8.2)
PROTHROMBIN TIME: 11.8 SEC (ref 9–11.1)
RBC # BLD AUTO: 4.73 M/UL (ref 4.1–5.7)
RBC # BLD AUTO: 4.75 M/UL (ref 4.1–5.7)
RBC # BLD AUTO: 4.77 M/UL (ref 4.1–5.7)
RBC # BLD AUTO: 4.89 M/UL (ref 4.1–5.7)
RBC # BLD AUTO: 5.11 M/UL (ref 4.1–5.7)
RBC # BLD AUTO: 5.23 M/UL (ref 4.1–5.7)
RBC # BLD AUTO: 5.31 M/UL (ref 4.1–5.7)
RBC MORPH BLD: ABNORMAL
SAO2 % BLD: 77 % (ref 92–97)
SAO2 % BLD: 82 % (ref 92–97)
SAO2 % BLD: 82 % (ref 92–97)
SAO2 % BLD: 89 % (ref 92–97)
SAO2% DEVICE SAO2% SENSOR NAME: ABNORMAL
SERVICE CMNT-IMP: ABNORMAL
SERVICE CMNT-IMP: NORMAL
SODIUM SERPL-SCNC: 131 MMOL/L (ref 136–145)
SODIUM SERPL-SCNC: 131 MMOL/L (ref 136–145)
SODIUM SERPL-SCNC: 132 MMOL/L (ref 136–145)
SODIUM SERPL-SCNC: 132 MMOL/L (ref 136–145)
SODIUM SERPL-SCNC: 137 MMOL/L (ref 136–145)
SPECIMEN SITE: ABNORMAL
SPECIMEN TYPE: ABNORMAL
WBC # BLD AUTO: 12 K/UL (ref 4.1–11.1)
WBC # BLD AUTO: 12.5 K/UL (ref 4.1–11.1)
WBC # BLD AUTO: 15.1 K/UL (ref 4.1–11.1)
WBC # BLD AUTO: 18.3 K/UL (ref 4.1–11.1)
WBC # BLD AUTO: 5 K/UL (ref 4.1–11.1)
WBC # BLD AUTO: 6.4 K/UL (ref 4.1–11.1)
WBC # BLD AUTO: 6.9 K/UL (ref 4.1–11.1)

## 2021-01-01 PROCEDURE — 74011250637 HC RX REV CODE- 250/637: Performed by: INTERNAL MEDICINE

## 2021-01-01 PROCEDURE — 82728 ASSAY OF FERRITIN: CPT

## 2021-01-01 PROCEDURE — 74011250637 HC RX REV CODE- 250/637: Performed by: NURSE PRACTITIONER

## 2021-01-01 PROCEDURE — 82962 GLUCOSE BLOOD TEST: CPT

## 2021-01-01 PROCEDURE — 82803 BLOOD GASES ANY COMBINATION: CPT

## 2021-01-01 PROCEDURE — 84100 ASSAY OF PHOSPHORUS: CPT

## 2021-01-01 PROCEDURE — 74011000250 HC RX REV CODE- 250: Performed by: INTERNAL MEDICINE

## 2021-01-01 PROCEDURE — 85379 FIBRIN DEGRADATION QUANT: CPT

## 2021-01-01 PROCEDURE — 77010033711 HC HIGH FLOW OXYGEN

## 2021-01-01 PROCEDURE — 74011636637 HC RX REV CODE- 636/637: Performed by: INTERNAL MEDICINE

## 2021-01-01 PROCEDURE — 85384 FIBRINOGEN ACTIVITY: CPT

## 2021-01-01 PROCEDURE — 74011250636 HC RX REV CODE- 250/636: Performed by: INTERNAL MEDICINE

## 2021-01-01 PROCEDURE — 36415 COLL VENOUS BLD VENIPUNCTURE: CPT

## 2021-01-01 PROCEDURE — 65660000000 HC RM CCU STEPDOWN

## 2021-01-01 PROCEDURE — 90935 HEMODIALYSIS ONE EVALUATION: CPT

## 2021-01-01 PROCEDURE — 94660 CPAP INITIATION&MGMT: CPT

## 2021-01-01 PROCEDURE — 85025 COMPLETE CBC W/AUTO DIFF WBC: CPT

## 2021-01-01 PROCEDURE — 85610 PROTHROMBIN TIME: CPT

## 2021-01-01 PROCEDURE — 74011636637 HC RX REV CODE- 636/637: Performed by: NURSE PRACTITIONER

## 2021-01-01 PROCEDURE — 80048 BASIC METABOLIC PNL TOTAL CA: CPT

## 2021-01-01 PROCEDURE — 80053 COMPREHEN METABOLIC PANEL: CPT

## 2021-01-01 PROCEDURE — 77030005402 HC CATH RAD ART LN KT TELE -B

## 2021-01-01 PROCEDURE — 74011250636 HC RX REV CODE- 250/636: Performed by: HOSPITALIST

## 2021-01-01 PROCEDURE — 83735 ASSAY OF MAGNESIUM: CPT

## 2021-01-01 PROCEDURE — 2709999900 HC NON-CHARGEABLE SUPPLY

## 2021-01-01 PROCEDURE — 86140 C-REACTIVE PROTEIN: CPT

## 2021-01-01 PROCEDURE — 71045 X-RAY EXAM CHEST 1 VIEW: CPT

## 2021-01-01 PROCEDURE — 77010033678 HC OXYGEN DAILY

## 2021-01-01 PROCEDURE — 0BH17EZ INSERTION OF ENDOTRACHEAL AIRWAY INTO TRACHEA, VIA NATURAL OR ARTIFICIAL OPENING: ICD-10-PCS | Performed by: HOSPITALIST

## 2021-01-01 PROCEDURE — 36600 WITHDRAWAL OF ARTERIAL BLOOD: CPT

## 2021-01-01 PROCEDURE — 92950 HEART/LUNG RESUSCITATION CPR: CPT

## 2021-01-01 PROCEDURE — 77030005401 HC CATH RAD ARRO -A

## 2021-01-01 PROCEDURE — 85027 COMPLETE CBC AUTOMATED: CPT

## 2021-01-01 RX ORDER — MAGNESIUM SULFATE HEPTAHYDRATE 40 MG/ML
INJECTION, SOLUTION INTRAVENOUS
Status: COMPLETED | OUTPATIENT
Start: 2021-01-01 | End: 2021-01-01

## 2021-01-01 RX ORDER — DEXAMETHASONE SODIUM PHOSPHATE 4 MG/ML
4 INJECTION, SOLUTION INTRA-ARTICULAR; INTRALESIONAL; INTRAMUSCULAR; INTRAVENOUS; SOFT TISSUE DAILY
Status: DISCONTINUED | OUTPATIENT
Start: 2021-01-01 | End: 2021-01-01 | Stop reason: HOSPADM

## 2021-01-01 RX ORDER — SODIUM BICARBONATE 1 MEQ/ML
SYRINGE (ML) INTRAVENOUS
Status: COMPLETED | OUTPATIENT
Start: 2021-01-01 | End: 2021-01-01

## 2021-01-01 RX ORDER — AMIODARONE HYDROCHLORIDE 150 MG/3ML
INJECTION, SOLUTION INTRAVENOUS
Status: COMPLETED | OUTPATIENT
Start: 2021-01-01 | End: 2021-01-01

## 2021-01-01 RX ORDER — EPINEPHRINE 0.1 MG/ML
INJECTION INTRACARDIAC; INTRAVENOUS
Status: COMPLETED | OUTPATIENT
Start: 2021-01-01 | End: 2021-01-01

## 2021-01-01 RX ORDER — CALCIUM CHLORIDE INJECTION 100 MG/ML
INJECTION, SOLUTION INTRAVENOUS
Status: COMPLETED | OUTPATIENT
Start: 2021-01-01 | End: 2021-01-01

## 2021-01-01 RX ORDER — INSULIN GLARGINE 100 [IU]/ML
22 INJECTION, SOLUTION SUBCUTANEOUS
Status: DISCONTINUED | OUTPATIENT
Start: 2021-01-01 | End: 2021-01-01 | Stop reason: HOSPADM

## 2021-01-01 RX ORDER — GUAIFENESIN/DEXTROMETHORPHAN 100-10MG/5
10 SYRUP ORAL
Status: DISCONTINUED | OUTPATIENT
Start: 2021-01-01 | End: 2021-01-01 | Stop reason: HOSPADM

## 2021-01-01 RX ADMIN — METOPROLOL TARTRATE 100 MG: 50 TABLET, FILM COATED ORAL at 17:55

## 2021-01-01 RX ADMIN — METOPROLOL TARTRATE 100 MG: 50 TABLET, FILM COATED ORAL at 12:17

## 2021-01-01 RX ADMIN — SEVELAMER CARBONATE 800 MG: 800 TABLET, FILM COATED ORAL at 17:48

## 2021-01-01 RX ADMIN — OXYCODONE HYDROCHLORIDE AND ACETAMINOPHEN 1000 MG: 500 TABLET ORAL at 09:03

## 2021-01-01 RX ADMIN — Medication 10 ML: at 13:32

## 2021-01-01 RX ADMIN — HEPARIN SODIUM 7500 UNITS: 5000 INJECTION INTRAVENOUS; SUBCUTANEOUS at 21:43

## 2021-01-01 RX ADMIN — Medication 1 CAPSULE: at 09:03

## 2021-01-01 RX ADMIN — SODIUM BICARBONATE 50 MEQ: 84 INJECTION, SOLUTION INTRAVENOUS at 10:43

## 2021-01-01 RX ADMIN — HEPARIN SODIUM 7500 UNITS: 5000 INJECTION INTRAVENOUS; SUBCUTANEOUS at 22:16

## 2021-01-01 RX ADMIN — SEVELAMER CARBONATE 800 MG: 800 TABLET, FILM COATED ORAL at 11:57

## 2021-01-01 RX ADMIN — ATORVASTATIN CALCIUM 20 MG: 20 TABLET, FILM COATED ORAL at 22:25

## 2021-01-01 RX ADMIN — EPINEPHRINE 1 MG: 0.1 INJECTION, SOLUTION ENDOTRACHEAL; INTRACARDIAC; INTRAVENOUS at 10:39

## 2021-01-01 RX ADMIN — DEXAMETHASONE SODIUM PHOSPHATE 4 MG: 4 INJECTION, SOLUTION INTRAMUSCULAR; INTRAVENOUS at 09:03

## 2021-01-01 RX ADMIN — INSULIN LISPRO 4 UNITS: 100 INJECTION, SOLUTION INTRAVENOUS; SUBCUTANEOUS at 18:51

## 2021-01-01 RX ADMIN — OXYCODONE HYDROCHLORIDE AND ACETAMINOPHEN 1000 MG: 500 TABLET ORAL at 08:06

## 2021-01-01 RX ADMIN — INSULIN GLARGINE 22 UNITS: 100 INJECTION, SOLUTION SUBCUTANEOUS at 22:44

## 2021-01-01 RX ADMIN — GABAPENTIN 300 MG: 300 CAPSULE ORAL at 21:43

## 2021-01-01 RX ADMIN — Medication 1 CAPSULE: at 08:41

## 2021-01-01 RX ADMIN — METOPROLOL TARTRATE 100 MG: 50 TABLET, FILM COATED ORAL at 08:41

## 2021-01-01 RX ADMIN — DEXAMETHASONE SODIUM PHOSPHATE 4 MG: 4 INJECTION, SOLUTION INTRAMUSCULAR; INTRAVENOUS at 08:33

## 2021-01-01 RX ADMIN — CLOPIDOGREL BISULFATE 75 MG: 75 TABLET ORAL at 08:06

## 2021-01-01 RX ADMIN — Medication 1 CAPSULE: at 13:34

## 2021-01-01 RX ADMIN — DEXAMETHASONE SODIUM PHOSPHATE 4 MG: 4 INJECTION, SOLUTION INTRAMUSCULAR; INTRAVENOUS at 12:18

## 2021-01-01 RX ADMIN — GABAPENTIN 300 MG: 300 CAPSULE ORAL at 21:41

## 2021-01-01 RX ADMIN — ASPIRIN 81 MG: 81 TABLET, COATED ORAL at 09:03

## 2021-01-01 RX ADMIN — OXYCODONE HYDROCHLORIDE AND ACETAMINOPHEN 1000 MG: 500 TABLET ORAL at 12:17

## 2021-01-01 RX ADMIN — INSULIN LISPRO 4 UNITS: 100 INJECTION, SOLUTION INTRAVENOUS; SUBCUTANEOUS at 11:58

## 2021-01-01 RX ADMIN — GABAPENTIN 300 MG: 300 CAPSULE ORAL at 22:15

## 2021-01-01 RX ADMIN — METOPROLOL TARTRATE 100 MG: 50 TABLET, FILM COATED ORAL at 08:33

## 2021-01-01 RX ADMIN — OXYCODONE HYDROCHLORIDE AND ACETAMINOPHEN 1000 MG: 500 TABLET ORAL at 08:33

## 2021-01-01 RX ADMIN — ZINC SULFATE 220 MG (50 MG) CAPSULE 220 MG: CAPSULE at 09:02

## 2021-01-01 RX ADMIN — HEPARIN SODIUM 7500 UNITS: 5000 INJECTION INTRAVENOUS; SUBCUTANEOUS at 06:49

## 2021-01-01 RX ADMIN — EPINEPHRINE 1 MG: 0.1 INJECTION, SOLUTION ENDOTRACHEAL; INTRACARDIAC; INTRAVENOUS at 10:58

## 2021-01-01 RX ADMIN — Medication 10 ML: at 06:45

## 2021-01-01 RX ADMIN — EPINEPHRINE 1 MG: 0.1 INJECTION, SOLUTION ENDOTRACHEAL; INTRACARDIAC; INTRAVENOUS at 11:17

## 2021-01-01 RX ADMIN — Medication 10 ML: at 22:46

## 2021-01-01 RX ADMIN — GABAPENTIN 300 MG: 300 CAPSULE ORAL at 22:25

## 2021-01-01 RX ADMIN — SEVELAMER CARBONATE 800 MG: 800 TABLET, FILM COATED ORAL at 16:24

## 2021-01-01 RX ADMIN — Medication 10 ML: at 21:42

## 2021-01-01 RX ADMIN — INSULIN LISPRO 4 UNITS: 100 INJECTION, SOLUTION INTRAVENOUS; SUBCUTANEOUS at 17:57

## 2021-01-01 RX ADMIN — Medication 10 ML: at 22:17

## 2021-01-01 RX ADMIN — HEPARIN SODIUM 7500 UNITS: 5000 INJECTION INTRAVENOUS; SUBCUTANEOUS at 15:12

## 2021-01-01 RX ADMIN — ATORVASTATIN CALCIUM 20 MG: 20 TABLET, FILM COATED ORAL at 21:43

## 2021-01-01 RX ADMIN — INSULIN GLARGINE 22 UNITS: 100 INJECTION, SOLUTION SUBCUTANEOUS at 21:50

## 2021-01-01 RX ADMIN — Medication 10 ML: at 14:22

## 2021-01-01 RX ADMIN — INSULIN LISPRO 7 UNITS: 100 INJECTION, SOLUTION INTRAVENOUS; SUBCUTANEOUS at 11:58

## 2021-01-01 RX ADMIN — INSULIN LISPRO 2 UNITS: 100 INJECTION, SOLUTION INTRAVENOUS; SUBCUTANEOUS at 22:45

## 2021-01-01 RX ADMIN — Medication 10 ML: at 06:18

## 2021-01-01 RX ADMIN — AMIODARONE HYDROCHLORIDE 300 MG: 50 INJECTION, SOLUTION INTRAVENOUS at 10:38

## 2021-01-01 RX ADMIN — METOPROLOL TARTRATE 100 MG: 50 TABLET, FILM COATED ORAL at 09:03

## 2021-01-01 RX ADMIN — MAGNESIUM SULFATE IN WATER 2 G: 40 INJECTION, SOLUTION INTRAVENOUS at 10:46

## 2021-01-01 RX ADMIN — Medication 10 ML: at 21:53

## 2021-01-01 RX ADMIN — ASPIRIN 81 MG: 81 TABLET, COATED ORAL at 12:17

## 2021-01-01 RX ADMIN — HEPARIN SODIUM 7500 UNITS: 5000 INJECTION INTRAVENOUS; SUBCUTANEOUS at 13:33

## 2021-01-01 RX ADMIN — Medication 10 ML: at 06:57

## 2021-01-01 RX ADMIN — SEVELAMER CARBONATE 800 MG: 800 TABLET, FILM COATED ORAL at 12:59

## 2021-01-01 RX ADMIN — HEPARIN SODIUM 7500 UNITS: 5000 INJECTION INTRAVENOUS; SUBCUTANEOUS at 06:24

## 2021-01-01 RX ADMIN — METOPROLOL TARTRATE 100 MG: 50 TABLET, FILM COATED ORAL at 18:51

## 2021-01-01 RX ADMIN — ATORVASTATIN CALCIUM 20 MG: 20 TABLET, FILM COATED ORAL at 21:41

## 2021-01-01 RX ADMIN — HEPARIN SODIUM 7500 UNITS: 5000 INJECTION INTRAVENOUS; SUBCUTANEOUS at 06:18

## 2021-01-01 RX ADMIN — INSULIN LISPRO 4 UNITS: 100 INJECTION, SOLUTION INTRAVENOUS; SUBCUTANEOUS at 12:17

## 2021-01-01 RX ADMIN — DEXAMETHASONE 6 MG: 4 TABLET ORAL at 08:41

## 2021-01-01 RX ADMIN — HEPARIN SODIUM 7500 UNITS: 5000 INJECTION INTRAVENOUS; SUBCUTANEOUS at 05:40

## 2021-01-01 RX ADMIN — SODIUM BICARBONATE 50 MEQ: 84 INJECTION, SOLUTION INTRAVENOUS at 10:29

## 2021-01-01 RX ADMIN — INSULIN LISPRO 5 UNITS: 100 INJECTION, SOLUTION INTRAVENOUS; SUBCUTANEOUS at 17:55

## 2021-01-01 RX ADMIN — INSULIN LISPRO 4 UNITS: 100 INJECTION, SOLUTION INTRAVENOUS; SUBCUTANEOUS at 17:41

## 2021-01-01 RX ADMIN — Medication 1 CAPSULE: at 12:17

## 2021-01-01 RX ADMIN — CLOPIDOGREL BISULFATE 75 MG: 75 TABLET ORAL at 13:33

## 2021-01-01 RX ADMIN — AMLODIPINE BESYLATE 10 MG: 5 TABLET ORAL at 08:41

## 2021-01-01 RX ADMIN — INSULIN GLARGINE 22 UNITS: 100 INJECTION, SOLUTION SUBCUTANEOUS at 21:43

## 2021-01-01 RX ADMIN — CLOPIDOGREL BISULFATE 75 MG: 75 TABLET ORAL at 12:17

## 2021-01-01 RX ADMIN — HEPARIN SODIUM 7500 UNITS: 5000 INJECTION INTRAVENOUS; SUBCUTANEOUS at 05:45

## 2021-01-01 RX ADMIN — DEXAMETHASONE SODIUM PHOSPHATE 4 MG: 4 INJECTION, SOLUTION INTRAMUSCULAR; INTRAVENOUS at 08:07

## 2021-01-01 RX ADMIN — CALCIUM CHLORIDE 1 G: 100 INJECTION, SOLUTION INTRAVENOUS at 11:09

## 2021-01-01 RX ADMIN — ASPIRIN 81 MG: 81 TABLET, COATED ORAL at 08:41

## 2021-01-01 RX ADMIN — EPINEPHRINE 1 MG: 0.1 INJECTION, SOLUTION ENDOTRACHEAL; INTRACARDIAC; INTRAVENOUS at 10:26

## 2021-01-01 RX ADMIN — METOPROLOL TARTRATE 100 MG: 50 TABLET, FILM COATED ORAL at 17:00

## 2021-01-01 RX ADMIN — ASPIRIN 81 MG: 81 TABLET, COATED ORAL at 08:33

## 2021-01-01 RX ADMIN — HEPARIN SODIUM 7500 UNITS: 5000 INJECTION INTRAVENOUS; SUBCUTANEOUS at 05:48

## 2021-01-01 RX ADMIN — ZINC SULFATE 220 MG (50 MG) CAPSULE 220 MG: CAPSULE at 12:17

## 2021-01-01 RX ADMIN — INSULIN LISPRO 4 UNITS: 100 INJECTION, SOLUTION INTRAVENOUS; SUBCUTANEOUS at 08:43

## 2021-01-01 RX ADMIN — SODIUM BICARBONATE 50 MEQ: 84 INJECTION, SOLUTION INTRAVENOUS at 11:09

## 2021-01-01 RX ADMIN — EPINEPHRINE 1 MG: 0.1 INJECTION, SOLUTION ENDOTRACHEAL; INTRACARDIAC; INTRAVENOUS at 10:45

## 2021-01-01 RX ADMIN — HUMAN INSULIN 20 UNITS: 100 INJECTION, SUSPENSION SUBCUTANEOUS at 09:02

## 2021-01-01 RX ADMIN — ASPIRIN 81 MG: 81 TABLET, COATED ORAL at 08:07

## 2021-01-01 RX ADMIN — HEPARIN SODIUM 7500 UNITS: 5000 INJECTION INTRAVENOUS; SUBCUTANEOUS at 22:44

## 2021-01-01 RX ADMIN — INSULIN LISPRO 4 UNITS: 100 INJECTION, SOLUTION INTRAVENOUS; SUBCUTANEOUS at 12:58

## 2021-01-01 RX ADMIN — SEVELAMER CARBONATE 800 MG: 800 TABLET, FILM COATED ORAL at 13:34

## 2021-01-01 RX ADMIN — EPINEPHRINE 1 MG: 0.1 INJECTION, SOLUTION ENDOTRACHEAL; INTRACARDIAC; INTRAVENOUS at 11:03

## 2021-01-01 RX ADMIN — HUMAN INSULIN 20 UNITS: 100 INJECTION, SUSPENSION SUBCUTANEOUS at 13:34

## 2021-01-01 RX ADMIN — METOPROLOL TARTRATE 100 MG: 50 TABLET, FILM COATED ORAL at 17:48

## 2021-01-01 RX ADMIN — AMLODIPINE BESYLATE 10 MG: 5 TABLET ORAL at 13:34

## 2021-01-01 RX ADMIN — Medication 10 ML: at 05:32

## 2021-01-01 RX ADMIN — INSULIN LISPRO 3 UNITS: 100 INJECTION, SOLUTION INTRAVENOUS; SUBCUTANEOUS at 17:41

## 2021-01-01 RX ADMIN — SEVELAMER CARBONATE 800 MG: 800 TABLET, FILM COATED ORAL at 08:41

## 2021-01-01 RX ADMIN — EPINEPHRINE 1 MG: 0.1 INJECTION, SOLUTION ENDOTRACHEAL; INTRACARDIAC; INTRAVENOUS at 11:11

## 2021-01-01 RX ADMIN — INSULIN LISPRO 2 UNITS: 100 INJECTION, SOLUTION INTRAVENOUS; SUBCUTANEOUS at 21:42

## 2021-01-01 RX ADMIN — INSULIN LISPRO 4 UNITS: 100 INJECTION, SOLUTION INTRAVENOUS; SUBCUTANEOUS at 09:01

## 2021-01-01 RX ADMIN — EPINEPHRINE 1 MG: 0.1 INJECTION, SOLUTION ENDOTRACHEAL; INTRACARDIAC; INTRAVENOUS at 10:30

## 2021-01-01 RX ADMIN — SODIUM BICARBONATE 50 MEQ: 84 INJECTION, SOLUTION INTRAVENOUS at 11:18

## 2021-01-01 RX ADMIN — ZINC SULFATE 220 MG (50 MG) CAPSULE 220 MG: CAPSULE at 08:33

## 2021-01-01 RX ADMIN — GABAPENTIN 300 MG: 300 CAPSULE ORAL at 22:45

## 2021-01-01 RX ADMIN — SEVELAMER CARBONATE 800 MG: 800 TABLET, FILM COATED ORAL at 11:15

## 2021-01-01 RX ADMIN — DEXAMETHASONE SODIUM PHOSPHATE 4 MG: 4 INJECTION, SOLUTION INTRAMUSCULAR; INTRAVENOUS at 13:33

## 2021-01-01 RX ADMIN — EPINEPHRINE 1 MG: 0.1 INJECTION, SOLUTION ENDOTRACHEAL; INTRACARDIAC; INTRAVENOUS at 10:28

## 2021-01-01 RX ADMIN — CLOPIDOGREL BISULFATE 75 MG: 75 TABLET ORAL at 09:03

## 2021-01-01 RX ADMIN — SODIUM BICARBONATE 50 MEQ: 84 INJECTION, SOLUTION INTRAVENOUS at 10:59

## 2021-01-01 RX ADMIN — OXYCODONE HYDROCHLORIDE AND ACETAMINOPHEN 1000 MG: 500 TABLET ORAL at 13:34

## 2021-01-01 RX ADMIN — Medication 10 ML: at 13:36

## 2021-01-01 RX ADMIN — SEVELAMER CARBONATE 800 MG: 800 TABLET, FILM COATED ORAL at 12:51

## 2021-01-01 RX ADMIN — SEVELAMER CARBONATE 800 MG: 800 TABLET, FILM COATED ORAL at 17:55

## 2021-01-01 RX ADMIN — HUMAN INSULIN 20 UNITS: 100 INJECTION, SUSPENSION SUBCUTANEOUS at 12:18

## 2021-01-01 RX ADMIN — Medication 1 CAPSULE: at 08:06

## 2021-01-01 RX ADMIN — INSULIN GLARGINE 15 UNITS: 100 INJECTION, SOLUTION SUBCUTANEOUS at 22:15

## 2021-01-01 RX ADMIN — INSULIN LISPRO 7 UNITS: 100 INJECTION, SOLUTION INTRAVENOUS; SUBCUTANEOUS at 17:17

## 2021-01-01 RX ADMIN — OXYCODONE HYDROCHLORIDE AND ACETAMINOPHEN 1000 MG: 500 TABLET ORAL at 08:41

## 2021-01-01 RX ADMIN — HEPARIN SODIUM 7500 UNITS: 5000 INJECTION INTRAVENOUS; SUBCUTANEOUS at 22:25

## 2021-01-01 RX ADMIN — METOPROLOL TARTRATE 100 MG: 50 TABLET, FILM COATED ORAL at 17:41

## 2021-01-01 RX ADMIN — INSULIN GLARGINE 22 UNITS: 100 INJECTION, SOLUTION SUBCUTANEOUS at 22:25

## 2021-01-01 RX ADMIN — INSULIN LISPRO 3 UNITS: 100 INJECTION, SOLUTION INTRAVENOUS; SUBCUTANEOUS at 08:42

## 2021-01-01 RX ADMIN — INSULIN LISPRO 4 UNITS: 100 INJECTION, SOLUTION INTRAVENOUS; SUBCUTANEOUS at 17:01

## 2021-01-01 RX ADMIN — SEVELAMER CARBONATE 800 MG: 800 TABLET, FILM COATED ORAL at 08:06

## 2021-01-01 RX ADMIN — AMLODIPINE BESYLATE 10 MG: 5 TABLET ORAL at 12:17

## 2021-01-01 RX ADMIN — Medication 10 ML: at 22:25

## 2021-01-01 RX ADMIN — METOPROLOL TARTRATE 100 MG: 50 TABLET, FILM COATED ORAL at 17:15

## 2021-01-01 RX ADMIN — Medication 10 ML: at 06:23

## 2021-01-01 RX ADMIN — SODIUM BICARBONATE 50 MEQ: 84 INJECTION, SOLUTION INTRAVENOUS at 10:31

## 2021-01-01 RX ADMIN — HEPARIN SODIUM 7500 UNITS: 5000 INJECTION INTRAVENOUS; SUBCUTANEOUS at 21:41

## 2021-01-01 RX ADMIN — HUMAN INSULIN 20 UNITS: 100 INJECTION, SUSPENSION SUBCUTANEOUS at 17:55

## 2021-01-01 RX ADMIN — SEVELAMER CARBONATE 800 MG: 800 TABLET, FILM COATED ORAL at 17:00

## 2021-01-01 RX ADMIN — Medication 10 ML: at 21:43

## 2021-01-01 RX ADMIN — ATORVASTATIN CALCIUM 20 MG: 20 TABLET, FILM COATED ORAL at 22:45

## 2021-01-01 RX ADMIN — HEPARIN SODIUM 7500 UNITS: 5000 INJECTION INTRAVENOUS; SUBCUTANEOUS at 14:22

## 2021-01-01 RX ADMIN — INSULIN LISPRO 3 UNITS: 100 INJECTION, SOLUTION INTRAVENOUS; SUBCUTANEOUS at 18:52

## 2021-01-01 RX ADMIN — HEPARIN SODIUM 7500 UNITS: 5000 INJECTION INTRAVENOUS; SUBCUTANEOUS at 21:51

## 2021-01-01 RX ADMIN — ATORVASTATIN CALCIUM 20 MG: 20 TABLET, FILM COATED ORAL at 22:15

## 2021-01-01 RX ADMIN — GABAPENTIN 300 MG: 300 CAPSULE ORAL at 21:52

## 2021-01-01 RX ADMIN — CLOPIDOGREL BISULFATE 75 MG: 75 TABLET ORAL at 08:42

## 2021-01-01 RX ADMIN — SEVELAMER CARBONATE 800 MG: 800 TABLET, FILM COATED ORAL at 08:33

## 2021-01-01 RX ADMIN — ZINC SULFATE 220 MG (50 MG) CAPSULE 220 MG: CAPSULE at 08:41

## 2021-01-01 RX ADMIN — GUAIFENESIN AND DEXTROMETHORPHAN 10 ML: 100; 10 SYRUP ORAL at 23:40

## 2021-01-01 RX ADMIN — INSULIN LISPRO 4 UNITS: 100 INJECTION, SOLUTION INTRAVENOUS; SUBCUTANEOUS at 17:48

## 2021-01-01 RX ADMIN — INSULIN LISPRO 2 UNITS: 100 INJECTION, SOLUTION INTRAVENOUS; SUBCUTANEOUS at 12:58

## 2021-01-01 RX ADMIN — HEPARIN SODIUM 7500 UNITS: 5000 INJECTION INTRAVENOUS; SUBCUTANEOUS at 13:31

## 2021-01-01 RX ADMIN — SEVELAMER CARBONATE 800 MG: 800 TABLET, FILM COATED ORAL at 09:03

## 2021-01-01 RX ADMIN — CLOPIDOGREL BISULFATE 75 MG: 75 TABLET ORAL at 08:33

## 2021-01-01 RX ADMIN — INSULIN LISPRO 4 UNITS: 100 INJECTION, SOLUTION INTRAVENOUS; SUBCUTANEOUS at 13:34

## 2021-01-01 RX ADMIN — Medication 10 ML: at 14:41

## 2021-01-01 RX ADMIN — INSULIN LISPRO 3 UNITS: 100 INJECTION, SOLUTION INTRAVENOUS; SUBCUTANEOUS at 22:14

## 2021-01-01 RX ADMIN — SEVELAMER CARBONATE 800 MG: 800 TABLET, FILM COATED ORAL at 17:15

## 2021-01-01 RX ADMIN — HEPARIN SODIUM 7500 UNITS: 5000 INJECTION INTRAVENOUS; SUBCUTANEOUS at 06:45

## 2021-01-01 RX ADMIN — ZINC SULFATE 220 MG (50 MG) CAPSULE 220 MG: CAPSULE at 08:06

## 2021-01-01 RX ADMIN — SEVELAMER CARBONATE 800 MG: 800 TABLET, FILM COATED ORAL at 18:52

## 2021-01-01 RX ADMIN — ATORVASTATIN CALCIUM 20 MG: 20 TABLET, FILM COATED ORAL at 21:52

## 2021-01-01 RX ADMIN — INSULIN GLARGINE 22 UNITS: 100 INJECTION, SOLUTION SUBCUTANEOUS at 22:28

## 2021-01-01 RX ADMIN — INSULIN LISPRO 3 UNITS: 100 INJECTION, SOLUTION INTRAVENOUS; SUBCUTANEOUS at 22:25

## 2021-01-01 RX ADMIN — AMLODIPINE BESYLATE 10 MG: 5 TABLET ORAL at 08:33

## 2021-01-01 RX ADMIN — INSULIN LISPRO 3 UNITS: 100 INJECTION, SOLUTION INTRAVENOUS; SUBCUTANEOUS at 17:00

## 2021-01-01 RX ADMIN — ZINC SULFATE 220 MG (50 MG) CAPSULE 220 MG: CAPSULE at 13:34

## 2021-01-01 RX ADMIN — HUMAN INSULIN 20 UNITS: 100 INJECTION, SUSPENSION SUBCUTANEOUS at 08:59

## 2021-01-01 RX ADMIN — INSULIN LISPRO 3 UNITS: 100 INJECTION, SOLUTION INTRAVENOUS; SUBCUTANEOUS at 08:57

## 2021-01-01 RX ADMIN — AMLODIPINE BESYLATE 10 MG: 5 TABLET ORAL at 09:02

## 2021-01-01 RX ADMIN — INSULIN LISPRO 4 UNITS: 100 INJECTION, SOLUTION INTRAVENOUS; SUBCUTANEOUS at 17:18

## 2021-01-01 RX ADMIN — INSULIN LISPRO 4 UNITS: 100 INJECTION, SOLUTION INTRAVENOUS; SUBCUTANEOUS at 21:52

## 2021-01-01 RX ADMIN — INSULIN LISPRO 5 UNITS: 100 INJECTION, SOLUTION INTRAVENOUS; SUBCUTANEOUS at 12:17

## 2021-01-01 RX ADMIN — SALINE NASAL SPRAY 2 SPRAY: 1.5 SOLUTION NASAL at 21:52

## 2021-01-01 RX ADMIN — HEPARIN SODIUM 7500 UNITS: 5000 INJECTION INTRAVENOUS; SUBCUTANEOUS at 14:46

## 2021-01-01 RX ADMIN — ASPIRIN 81 MG: 81 TABLET, COATED ORAL at 16:03

## 2021-01-01 RX ADMIN — Medication 10 ML: at 15:12

## 2021-01-01 RX ADMIN — INSULIN LISPRO 2 UNITS: 100 INJECTION, SOLUTION INTRAVENOUS; SUBCUTANEOUS at 09:02

## 2021-01-01 RX ADMIN — INSULIN LISPRO 3 UNITS: 100 INJECTION, SOLUTION INTRAVENOUS; SUBCUTANEOUS at 17:49

## 2021-01-01 RX ADMIN — SEVELAMER CARBONATE 800 MG: 800 TABLET, FILM COATED ORAL at 14:39

## 2021-01-01 RX ADMIN — INSULIN LISPRO 5 UNITS: 100 INJECTION, SOLUTION INTRAVENOUS; SUBCUTANEOUS at 12:19

## 2021-01-01 RX ADMIN — MAGNESIUM SULFATE IN WATER 2 G: 40 INJECTION, SOLUTION INTRAVENOUS at 10:48

## 2021-01-01 RX ADMIN — EPINEPHRINE 1 MG: 0.1 INJECTION, SOLUTION ENDOTRACHEAL; INTRACARDIAC; INTRAVENOUS at 10:49

## 2021-01-01 RX ADMIN — Medication 10 ML: at 14:47

## 2021-01-01 RX ADMIN — HEPARIN SODIUM 7500 UNITS: 5000 INJECTION INTRAVENOUS; SUBCUTANEOUS at 14:40

## 2021-01-01 RX ADMIN — EPINEPHRINE 20 MCG/MIN: 1 INJECTION INTRAMUSCULAR; INTRAVENOUS; SUBCUTANEOUS at 11:14

## 2021-01-01 RX ADMIN — Medication 1 CAPSULE: at 08:33

## 2021-01-01 RX ADMIN — SALINE NASAL SPRAY 2 SPRAY: 1.5 SOLUTION NASAL at 05:32

## 2021-01-01 NOTE — PROGRESS NOTES
Verbal shift change report given to izabella (oncoming nurse) by Kajal Yadav (offgoing nurse). Report included the following information SBAR, Kardex, Intake/Output, MAR, Recent Results and Cardiac Rhythm nsr  
 
  
1957: vs done. Mews 1 
 
2343: vs done. Mews 2 
 
0436: vs done. Mews 2. Pt hard stick, art stick order obtained from NP End of Shift Note Bedside shift change report given to [facility] (oncoming nurse) by Elizabeth Bartholomew (offgoing nurse). Report included the following information SBAR, Kardex, Intake/Output, MAR, Recent Results and Cardiac Rhythm nsr Shift worked:  night Shift summary and any significant changes:  
  remains on 15L [facility] Concerns for physician to address:  o2 requirements Zone phone for oncoming shift:   [phone] Activity: 
Activity Level: Bed Rest 
Number times ambulated in hallways past shift: 0 Number of times OOB to chair past shift: 0 Cardiac:  
Cardiac Monitoring: Yes     
Cardiac Rhythm: Normal sinus rhythm Access:  
Current line(s): PIV Genitourinary:  
Urinary status: anuric Respiratory:  
O2 Device: Hi flow nasal cannula Chronic home O2 use?: NO Incentive spirometer at bedside: YES 
  
GI: 
Last Bowel Movement Date: 12/31/20 Current diet:  DIET RENAL Regular; FR 1200ML 
DIET ONE TIME MESSAGE 
DIET ONE TIME MESSAGE Passing flatus: YES Tolerating current diet: YES 
% Diet Eaten: 75 % Pain Management:  
Patient states pain is manageable on current regimen: YES Skin: 
Ranjeet Score: 17 Interventions: PT/OT consult Patient Safety: 
Fall Score: Total Score: 4 Interventions: bed/chair alarm, assistive device (walker, cane, etc), gripper socks and pt to call before getting OOB High Fall Risk: Yes Length of Stay: 
Expected LOS: 5d 12h Actual LOS: [address]: vs done. Mews 1 
 
2343: vs done. Mews 2 
 
0436: vs done.  Mews 2

## 2021-01-01 NOTE — PROGRESS NOTES
NAME: Jacob Herron :  1985 MRN:  [id number] Assessment :    Plan: 
--ESKD-MWF [facility] [place].  Now TTS to go to [facility]  on DC due to [place] Hyperkalemia COVID + Hypoxia HTN, uncontrolled Anemia of ESKD 
 
PAD -No acute need for HD today. Plan routine HD in AM.  Emergent HD as needed. FR and low salt intake 
  
No FARIDA (secondary to covid and hgb > 10) 
  
  
 
 
Subjective: Chief Complaint:  Worsening dyspnea. Rapid called overnight. Review of Systems: 
 
Symptom Y/N Comments  Symptom Y/N Comments Fever/Chills    Chest Pain Poor Appetite    Edema Cough    Abdominal Pain Sputum    Joint Pain SOB/LITTLE    Pruritis/Rash Nausea/vomit    Tolerating PT/OT Diarrhea    Tolerating Diet Constipation    Other Could not obtain due to:   
 
Objective: VITALS:  
Last 24hrs VS reviewed since prior progress note. Most recent are: 
Visit Vitals BP (!) 155/111 (BP 1 Location: Right arm, BP Patient Position: At rest) Pulse 77 Temp 98.7 °F (37.1 °C) Resp 18 Ht 6' 2\" (1.88 m) Wt 145.2 kg (320 lb) SpO2 92% BMI 41.09 kg/m² Intake/Output Summary (Last 24 hours) at 2021 1015 Last data filed at 2021 9505 Gross per 24 hour Intake 480 ml Output 5000 ml Net -4520 ml Telemetry Reviewed: PHYSICAL EXAM: 
General: NAD Lab Data Reviewed: (see below) Medications Reviewed: (see below) PMH/SH reviewed - no change compared to H&P 
________________________________________________________________________ Care Plan discussed with: 
Patient Family RN Care Manager Consultant:     
 
  Comments >50% of visit spent in counseling and coordination of care    
 
________________________________________________________________________ Bebeto Santos MD  
 
 Procedures: see electronic medical records for all procedures/Xrays and details which 
were not copied into this note but were reviewed prior to creation of Plan. LABS: 
Recent Labs 01/01/21 
8858 12/30/20 
1712 WBC 5.0 3.4* HGB 11.3* 10.3* HCT 35.9* 31.8*  
 186 Recent Labs 01/01/21 
6830 12/31/20 
0740 12/30/20 
6593 * 130* 130*  
K 5.1 4.9 4.6 CL 94* 92* 94* CO2 26 23 27 BUN 71* 97* 64* CREA 11.30* 13.90* 10.90* * 263* 262* CA 8.7 8.0* 7.9* Recent Labs 12/30/20 
0311 12/29/20 
1200 AP 82 86  
TP 7.9 7.8 ALB 2.7* 2.8*  
GLOB 5.2* 5.0* No results for input(s): INR, PTP, APTT, INREXT, INREXT in the last 72 hours. Recent Labs  
  12/29/20 
1200 FERR 796* No results found for: FOL, RBCF No results for input(s): PH, PCO2, PO2 in the last 72 hours. No results for input(s): CPK, CKMB in the last 72 hours. No lab exists for component: TROPONINI No components found for: [place] Lab Results Component Value Date/Time Color YELLOW/STRAW 05/01/2017 05:39 PM  
 Appearance CLOUDY (A) 05/01/2017 05:39 PM  
 Specific gravity 1.018 05/01/2017 05:39 PM  
 Specific gravity >1.030 (H) 10/03/2014 04:35 AM  
 pH (UA) 5.5 05/01/2017 05:39 PM  
 Protein 300 (A) 05/01/2017 05:39 PM  
 Glucose 250 (A) 05/01/2017 05:39 PM  
 Ketone NEGATIVE  05/01/2017 05:39 PM  
 Bilirubin NEGATIVE  05/01/2017 05:39 PM  
 Urobilinogen 0.2 05/01/2017 05:39 PM  
 Nitrites NEGATIVE  05/01/2017 05:39 PM  
 Leukocyte Esterase NEGATIVE  05/01/2017 05:39 PM  
 Epithelial cells FEW 05/01/2017 05:39 PM  
 Bacteria NEGATIVE  05/01/2017 05:39 PM  
 WBC 0-4 05/01/2017 05:39 PM  
 RBC 5-10 05/01/2017 05:39 PM  
 
 
MEDICATIONS: 
Current Facility-Administered Medications Medication Dose Route Frequency  dexAMETHasone (DECADRON) tablet 6 mg  6 mg Oral DAILY  insulin glargine (LANTUS) injection 15 Units  15 Units SubCUTAneous QHS  amLODIPine (NORVASC) tablet 10 mg  10 mg Oral DAILY  insulin lispro (HUMALOG) injection 4 Units  4 Units SubCUTAneous TIDAC  metoprolol tartrate (LOPRESSOR) tablet 100 mg  100 mg Oral BID  hydrALAZINE (APRESOLINE) 20 mg/mL injection 20 mg  20 mg IntraVENous Q6H PRN  
 lactobac ac& pc-s.therm-b.anim (USMAN Q/RISAQUAD)  1 Cap Oral DAILY  aspirin delayed-release tablet 81 mg  81 mg Oral DAILY  atorvastatin (LIPITOR) tablet 20 mg  20 mg Oral QHS  clopidogreL (PLAVIX) tablet 75 mg  75 mg Oral DAILY  gabapentin (NEURONTIN) capsule 300 mg  300 mg Oral QHS  oxyCODONE IR (ROXICODONE) tablet 5 mg  5 mg Oral Q4H PRN  
 sevelamer carbonate (RENVELA) tab 800 mg  800 mg Oral TID WITH MEALS  sodium chloride (NS) flush 5-40 mL  5-40 mL IntraVENous Q8H  
 sodium chloride (NS) flush 5-40 mL  5-40 mL IntraVENous PRN  
 acetaminophen (TYLENOL) tablet 650 mg  650 mg Oral Q6H PRN Or  
 acetaminophen (TYLENOL) suppository 650 mg  650 mg Rectal Q6H PRN  polyethylene glycol (MIRALAX) packet 17 g  17 g Oral DAILY PRN  promethazine (PHENERGAN) tablet 12.5 mg  12.5 mg Oral Q6H PRN Or  
 ondansetron (ZOFRAN) injection 4 mg  4 mg IntraVENous Q6H PRN  
 heparin (porcine) injection 7,500 Units  7,500 Units SubCUTAneous Q8H  
 ascorbic acid (vitamin C) (VITAMIN C) tablet 1,000 mg  1,000 mg Oral DAILY  zinc sulfate (ZINCATE) 220 (50) mg capsule 220 mg  220 mg Oral DAILY  insulin lispro (HUMALOG) injection   SubCUTAneous AC&HS  
 glucose chewable tablet 16 g  4 Tab Oral PRN  
 dextrose (D50W) injection syrg 12.5-25 g  12.5-25 g IntraVENous PRN  
 glucagon (GLUCAGEN) injection 1 mg  1 mg IntraMUSCular PRN

## 2021-01-01 NOTE — PROGRESS NOTES
Hospitalist Progress Note NAME: Carlyle Villalobos :  1985 MRN:  [id number] Assessment / Plan: 
Acute Hypoxic Respiratory Failure due to COVID-19 Pneumonia COVID-19 Positive Rapid response  
-rapid approx 7am  noted 
-worsening hypoxia and patient on ventimask 
-CXR noted, worsening b/l infiltrates -Hypoxia with sats 88% on room air admission 
-hypoxia worsened 
-rapid response earlier noted 
-required venti mask during HD 
-worsening bibasilar infiltrated on CXR noted 
-despite completion of HD, hypoxic and transitioned to high flow 
-transfer to step-down 
-Rapid Covid test positive 2020 
-Not remdesivir candidate due to ESRD 
-Completing antibiotic therapy with IV azithromycin and ceftriaxone for possible bacterial pneumonia 
-Continue zinc and vitamin C 
-cont decadron 
-pulm consullt 
  
Hyperkalemia POA 
ESRD 
-Received temporizing measures in the ED 
-Hyperkalemia resolved 
-Receiving HD today () 
-Usually HD MWF. To switch to TTS for Covid HD spot on discharge 
-cont decadron 10 days or until DC 
-cont Renvela -appreciate nephrology asssitance 
  
Hypertension 
-Continue amlodipine, metoprolol 
-As needed hydralazine 
  
Type 2 Diabetes 
-Increase Lantus to 15 units nightly 
-Continue mealtime lispro 4 units 3 times daily 
-SSI with POC's Hx of Atrial Flutter 
-s/p Afib ablation 10/2020 
-completed 4 weeks OAC 
-NSR on admission EKG 
  
CAD s/p stents PAD 
-Continue statin, aspirin, Plavix, and neurontin 
-S/P right BKA 2020 
  
Diarrhea secondary to antibiotic therapy -Oma Q daily Discussed care with his JUVENTINO over the phone 
  
40 or above Morbid obesity / Body mass index is 44.33 kg/m². 
  
Code status: Full Prophylaxis: Hep SQ Subjective: Chief Complaint / Reason for Physician Visit Worsening hypoxia on high flow overnight. Discussed with RN events overnight. Review of Systems: 
Symptom Y/N Comments  Symptom Y/N Comments Fever/Chills n   Chest Pain n   
Poor Appetite n   Edema Cough y   Abdominal Pain n   
Sputum n   Joint Pain SOB/LITTLE y   Pruritis/Rash Nausea/vomit n   Tolerating PT/OT Diarrhea n   Tolerating Diet Constipation n   Other Could NOT obtain due to:   
 
Objective: VITALS:  
Last 24hrs VS reviewed since prior progress note. Most recent are: 
Patient Vitals for the past 24 hrs: 
 Temp Pulse Resp BP SpO2  
01/01/21 1456     93 % 01/01/21 1452 98.5 °F (36.9 °C) 71 19 (!) 169/104 90 % 01/01/21 1105 98.7 °F (37.1 °C) 69 24 (!) 142/96 91 % 01/01/21 0835 98.7 °F (37.1 °C) 77 18 (!) 155/111 92 % 01/01/21 0436 99 °F (37.2 °C) 76 24 (!) 148/88 90 % 12/31/20 2343 99.2 °F (37.3 °C) 78 22 137/78 92 % 12/31/20 1957 99.5 °F (37.5 °C) 81 20 (!) 156/88 91 % 12/31/20 1554     92 % Intake/Output Summary (Last 24 hours) at 1/1/2021 1542 Last data filed at 1/1/2021 0526 Gross per 24 hour Intake 480 ml Output 0 ml Net 480 ml I had a face to face encounter and independently examined this patient on 1/1/2021, as outlined below: PHYSICAL EXAM: 
General: WD, WN. Alert, cooperative, no acute distress EENT:  EOMI. Anicteric sclerae. MMM Resp:  Shallow inpspirations, bibasilar crackles. No accessory muscle use CV:  Regular  rhythm,  No edema GI:  Soft, Non distended, Non tender. +Bowel sounds Neurologic:  Alert and oriented X 3, normal speech, Psych:   Not anxious nor agitated Skin:  No rashes. No jaundice Reviewed most current lab test results and cultures  YES Reviewed most current radiology test results   YES Review and summation of old records today    NO Reviewed patient's current orders and MAR    YES 
PMH/SH reviewed - no change compared to H&P 
________________________________________________________________________ Care Plan discussed with: 
  Comments Patient x Family RN x Care Manager Consultant x Multidiciplinary team rounds were held today with , nursing, pharmacist and clinical coordinator. Patient's plan of care was discussed; medications were reviewed and discharge planning was addressed. ________________________________________________________________________ Total NON critical care TIME: 35   Minutes Total CRITICAL CARE TIME Spent:   Minutes non procedure based Comments >50% of visit spent in counseling and coordination of care    
________________________________________________________________________ Glenys Gabriel DO  
 
Procedures: see electronic medical records for all procedures/Xrays and details which were not copied into this note but were reviewed prior to creation of Plan. LABS: 
I reviewed today's most current labs and imaging studies. Pertinent labs include: 
Recent Labs 01/01/21 
5027 12/30/20 
1061 WBC 5.0 3.4* HGB 11.3* 10.3* HCT 35.9* 31.8*  
 186 Recent Labs 01/01/21 
9043 12/31/20 
0740 12/30/20 
3113 * 130* 130*  
K 5.1 4.9 4.6 CL 94* 92* 94* CO2 26 23 27 * 263* 262* BUN 71* 97* 64* CREA 11.30* 13.90* 10.90* CA 8.7 8.0* 7.9* ALB  --   --  2.7* TBILI  --   --  0.8 ALT  --   --  66 Signed: Glenys Gabriel DO

## 2021-01-01 NOTE — PROGRESS NOTES
Hospitalist Progress Note NAME: Jennifer Palemr :  1985 MRN:  [id number] Assessment / Plan: 
Acute Hypoxic Respiratory Failure due to COVID-19 Pneumonia COVID-19 Positive Rapid response  
-rapid approx 7am  noted 
-worsening hypoxia and patient on ventimask 
-CXR noted, worsening b/l infiltrates -Hypoxia with sats 88% on room air admission 
-hypoxia worsened 
-rapid response earlier noted 
-required venti mask during HD 
-worsening bibasilar infiltrated on CXR noted 
-despite completion of HD, hypoxic and transitioned to high flow 
-transfer to step-down 
-Rapid Covid test positive 2020 
-Not remdesivir candidate due to ESRD 
-Completing antibiotic therapy with IV azithromycin and ceftriaxone for possible bacterial pneumonia 
-Continue zinc and vitamin C 
-cont decadron 
-pulm consullt 
  
Hyperkalemia POA 
ESRD 
-Received temporizing measures in the ED 
-Hyperkalemia resolved 
-Receiving HD today () -Usually HD MWF. To switch to TTS for Covid HD spot on discharge 
-cont decadron 10 days or until DC 
-cont Renvela -appreciate nephrology asssitance 
  
Hypertension 
-Continue amlodipine, metoprolol 
-As needed hydralazine 
  
Type 2 Diabetes 
-Increase Lantus to 15 units nightly 
-Continue mealtime lispro 4 units 3 times daily 
-SSI with POC's Hx of Atrial Flutter 
-s/p Afib ablation 10/2020 
-completed 4 weeks OAC 
-NSR on admission EKG 
  
CAD s/p stents PAD 
-Continue statin, aspirin, Plavix, and neurontin 
-S/P right BKA 2020 
  
Diarrhea secondary to antibiotic therapy -Oma Q daily 
  
40 or above Morbid obesity / Body mass index is 44.33 kg/m². 
  
Code status: Full Prophylaxis: Hep SQ Recommended Disposition: Home w/Family Subjective: Chief Complaint / Reason for Physician Visit Worsening hypoxia, which persists despite HD Discussed with RN events overnight. Review of Systems: 
Symptom Y/N Comments  Symptom Y/N Comments Fever/Chills n   Chest Pain n   
Poor Appetite n   Edema Cough y   Abdominal Pain n   
Sputum n   Joint Pain SOB/LITTLE y   Pruritis/Rash Nausea/vomit n   Tolerating PT/OT Diarrhea n   Tolerating Diet Constipation n   Other Could NOT obtain due to:   
 
Objective: VITALS:  
Last 24hrs VS reviewed since prior progress note. Most recent are: 
Patient Vitals for the past 24 hrs: 
 Temp Pulse Resp BP SpO2  
12/31/20 1957 99.5 °F (37.5 °C) 81 20 (!) 156/88 91 % 12/31/20 1554     92 % 12/31/20 1531 100.1 °F (37.8 °C) 88 18 (!) 159/84 92 % 12/31/20 1422 98.4 °F (36.9 °C) 89 20 (!) 131/57 93 % 12/31/20 1325     93 % 12/31/20 1313     (!) 80 % 12/31/20 1306     (!) 88 % 12/31/20 1212     90 % 12/31/20 1200 99.9 °F (37.7 °C) 86 20 138/72 (!) 85 % 12/31/20 1125 98.5 °F (36.9 °C) 83 18 (!) 170/92   
12/31/20 1113  81  138/75   
12/31/20 1058  81  (!) 150/88   
12/31/20 1043  81  (!) 154/83   
12/31/20 1028  81  (!) 167/90   
12/31/20 1013  81  (!) 166/90   
12/31/20 0958  81  (!) 149/86   
12/31/20 0943  81  (!) 149/91   
12/31/20 0928  79  (!) 159/91   
12/31/20 0913  80  (!) 167/89   
12/31/20 0858  80  (!) 168/100   
12/31/20 0843  81  (!) 164/71   
12/31/20 0828  79  (!) 175/84   
12/31/20 0813  79  (!) 162/88   
12/31/20 0758  78  (!) 171/96   
12/31/20 0743  79  (!) 155/86   
12/31/20 0725  80  (!) 162/88   
12/31/20 0720 98.2 °F (36.8 °C) 82 20 (!) 162/94 97 % 12/31/20 0655  81 20  95 % 12/31/20 0641  84 20 (!) 198/97 (!) 89 % 12/31/20 0605 98.3 °F (36.8 °C) 84 17 (!) 173/89  Intake/Output Summary (Last 24 hours) at 12/31/2020 2303 Last data filed at 12/31/2020 2644 Gross per 24 hour Intake 240 ml Output 5000 ml Net -4760 ml I had a face to face encounter and independently examined this patient on 12/31/2020, as outlined below: PHYSICAL EXAM: 
 General: WD, WN. Alert, cooperative, no acute distress EENT:  EOMI. Anicteric sclerae. MMM Resp:  Shallow inpspirations, bibasilar crackles. No accessory muscle use CV:  Regular  rhythm,  No edema GI:  Soft, Non distended, Non tender. +Bowel sounds Neurologic:  Alert and oriented X 3, normal speech, Psych:   Not anxious nor agitated Skin:  No rashes. No jaundice Reviewed most current lab test results and cultures  YES Reviewed most current radiology test results   YES Review and summation of old records today    NO Reviewed patient's current orders and MAR    YES 
PMH/SH reviewed - no change compared to H&P 
________________________________________________________________________ Care Plan discussed with: 
  Comments Patient x Family RN x Care Manager Consultant     
                 x Multidiciplinary team rounds were held today with , nursing, pharmacist and clinical coordinator. Patient's plan of care was discussed; medications were reviewed and discharge planning was addressed. ________________________________________________________________________ Total NON critical care TIME: 35   Minutes Total CRITICAL CARE TIME Spent:   Minutes non procedure based Comments >50% of visit spent in counseling and coordination of care    
________________________________________________________________________ Jenna Amaro DO  
 
Procedures: see electronic medical records for all procedures/Xrays and details which were not copied into this note but were reviewed prior to creation of Plan. LABS: 
I reviewed today's most current labs and imaging studies. Pertinent labs include: 
Recent Labs 12/30/20 
0311 12/29/20 
1200 WBC 3.4* 4.5 HGB 10.3* 9.7* HCT 31.8* 30.4*  173 Recent Labs 12/31/20 
0740 12/30/20 0311 12/29/20 
1200 * 130* 132* K 4.9 4.6 4.8  
CL 92* 94* 94* CO2 23 27 24 * 262* 102* BUN 97* 64* 93* CREA 13.90* 10.90* 15.40* CA 8.0* 7.9* 7.4* ALB  --  2.7* 2.8* TBILI  --  0.8 0.3 ALT  --  66 66 Signed: Jenna Amaro DO

## 2021-01-01 NOTE — PROGRESS NOTES
Spoke with hospital pharmacist in regards to patient meeting criteria for remdesivir and was informed that since patient's creatinine clearance is less than 30 even though patient is on hemodialysis, would not meet criteria.

## 2021-01-01 NOTE — PROGRESS NOTES
RAPID RESPONSE TEAM- Follow Up Rounded on patient due to recent rapid response for hypoxia. On 15L HF NC with SpO2 91-92%. Covid +. Received iHD. Active order for pulmonology consult. Spoke with primary RN Boogie Hines. No acute concerns at this time. No RRT interventions indicated at this time. Please call with any questions or concerns. Philomena Curtis RN 
RRT Sharonda Mai

## 2021-01-01 NOTE — CONSULTS
[facility], [facility] Initial Patient Consult Name: Bernadette Sanon MRN: [id number] : 1985 Hospital: [place] [facility] Date: 2021 IMPRESSION:  
· Acute Hypoxic Respiratory Failure · Covid Pneumonia, bilateral pulmonary infiltrates. Was positive on . · Obese · Diarrhea · CAD · PAD · A flutter · T2DM. · ESRD, his oxygen did not improve with HD.   
  
RECOMMENDATIONS:  
· Not candidate for Remdesivir due to ESRD · Agree with current treatments. · ON Zithromax  And Ctx · ON Oxygen to keep pox over 90%. · On vit c and zinc 
· On decadron · Serial cxr and labs Subjective: This patient has been seen and evaluated at the request of Dr. Sandra Mcknight for above. Patient is a 38 y.o. male who presented for above. Has increased dyspnea, dry cough. Did not sleep or rest well last pm.  
NO headaches. No back pain, no leg pain. He was to go to SNF. Had increased blood sugars. Past Medical History:  
Diagnosis Date  Arrhythmia   
 aflutter  Blind  Cataracts  Cellulitis and abscess of foot  Chronic kidney disease [facility]  
 Diabetes ([facility] [address] DM II  
 GERD (gastroesophageal reflux disease)  Hearing loss  Hypercholesterolemia  Hypertension  Neuropathy  Obesity  Osteomyelitis ([facility] [address]  Puerperal sepsis with acute hypoxic respiratory failure (HCC)  Sepsis ([facility] [address] Past Surgical History:  
Procedure Laterality Date  COLONOSCOPY N/A 2017 COLONOSCOPY performed by Linnea Garland MD at [facility] ENDOSCOPY  COLONOSCOPY,DIAGNOSTIC  2017  HX AFIB ABLATION  2020  HX AMPUTATION Left great toe and L 5th toe  HX AMPUTATION Right   
 bka  HX AMPUTATION TOE Right  HX VASCULAR ACCESS    
 SC CARDIAC SURG PROCEDURE UNLIST  2020  
 ablation  NE COMPRE ELECTROPHYSIOL XM W/LEFT ATRIAL PACNG/REC N/A 9/4/2020 Lt Atrial Pace & Record During Ep Study performed by Angela Car MD at [facility]ATH [facility]  NE EPHYS EVAL W/ABLATION SUPRAVENT ARRHYTHMIA N/A 9/4/2020 ABLATION A-FLUTTER performed by Angela Car MD at [facility]ATH [facility]  NE INTRACARDIAC ELECTROPHYSIOLOGIC 3D MAPPING N/A 9/4/2020 Ep 3d Mapping performed by Angela Car MD at [facility]ATH [facility]  UPPER GI ENDOSCOPY,BIOPSY  12/8/2017  VASCULAR SURGERY PROCEDURE UNLIST    
 R side chest  
 VASCULAR SURGERY PROCEDURE UNLIST Left 07/25/2017 Creation transposed AV fistula arm Prior to Admission medications Medication Sig Start Date End Date Taking? Authorizing Provider  
metoprolol tartrate 75 mg tab Take 100 mg by mouth two (2) times a day. 12/30/20  Yes Janell Egan, DO  
amLODIPine (NORVASC) 10 mg tablet Take 1 Tab by mouth daily. 12/31/20  Yes Janell Egan, DO  
atorvastatin (LIPITOR) 20 mg tablet Take 20 mg by mouth daily. Yes Provider, Historical  
vancomycin 125 mg/2.5 mL syrg Take 125 mg by mouth daily. Yes Provider, Historical  
oxyCODONE IR (ROXICODONE) 5 mg immediate release tablet Take 5 mg by mouth every six (6) hours as needed for Pain. Yes Provider, Historical  
gabapentin (NEURONTIN) 300 mg capsule Take 1 Cap by mouth nightly. Max Daily Amount: 300 mg. 10/27/20   Tiffanie Miranda P, NP  
insulin glargine (LANTUS) 100 unit/mL injection 10 Units by SubCUTAneous route nightly. 10/23/20   Perez Mckeon NP  
aspirin delayed-release 81 mg tablet Take 1 Tab by mouth daily. 10/23/20   Perez Mckeon NP  
clopidogreL (PLAVIX) 75 mg tab Take 1 Tab by mouth daily. 10/23/20   Perez Mckeon NP  
sevelamer (RENAGEL) 800 mg tablet Take 800 mg by mouth three (3) times daily (with meals). Provider, Historical  
 
No Known Allergies Social History Tobacco Use  Smoking status: Former Smoker Packs/day: 0.25 Years: 20.00 Pack years: 5.00 Quit date: 6/1/2017 Years since quitting: 3.5  Smokeless tobacco: Never Used  Tobacco comment: \"quit about a year ago\" Substance Use Topics  Alcohol use: Not Currently Comment: rarely Family History Problem Relation Age of Onset  Diabetes Mother  Liver Disease Father  Kidney Disease Maternal Grandfather  Diabetes Maternal Grandfather  Hypertension Maternal Grandfather  Diabetes Paternal Uncle  Hypertension Paternal Uncle Current Facility-Administered Medications Medication Dose Route Frequency  dexAMETHasone (DECADRON) tablet 6 mg  6 mg Oral DAILY  insulin glargine (LANTUS) injection 15 Units  15 Units SubCUTAneous QHS  amLODIPine (NORVASC) tablet 10 mg  10 mg Oral DAILY  insulin lispro (HUMALOG) injection 4 Units  4 Units SubCUTAneous TIDAC  metoprolol tartrate (LOPRESSOR) tablet 100 mg  100 mg Oral BID  lactobac ac& pc-s.therm-b.anim (USMAN Q/RISAQUAD)  1 Cap Oral DAILY  aspirin delayed-release tablet 81 mg  81 mg Oral DAILY  atorvastatin (LIPITOR) tablet 20 mg  20 mg Oral QHS  clopidogreL (PLAVIX) tablet 75 mg  75 mg Oral DAILY  gabapentin (NEURONTIN) capsule 300 mg  300 mg Oral QHS  sevelamer carbonate (RENVELA) tab 800 mg  800 mg Oral TID WITH MEALS  sodium chloride (NS) flush 5-40 mL  5-40 mL IntraVENous Q8H  
 heparin (porcine) injection 7,500 Units  7,500 Units SubCUTAneous Q8H  
 ascorbic acid (vitamin C) (VITAMIN C) tablet 1,000 mg  1,000 mg Oral DAILY  zinc sulfate (ZINCATE) 220 (50) mg capsule 220 mg  220 mg Oral DAILY  insulin lispro (HUMALOG) injection   SubCUTAneous AC&HS Review of Systems: A comprehensive review of systems was negative. Objective:  
Vital Signs:   
Visit Vitals BP (!) 142/96 (BP 1 Location: Right arm, BP Patient Position: At rest) Pulse 69 Temp 98.7 °F (37.1 °C) Resp 24 Ht 6' 2\" (1.88 m) Wt 145.2 kg (320 lb) SpO2 91% BMI 41.09 kg/m² O2 Device: Hi flow nasal cannula O2 Flow Rate (L/min): 15 l/min Temp (24hrs), Av.1 °F (37.3 °C), Min:98.4 °F (36.9 °C), Max:100.1 °F (37.8 °C) Intake/Output:  
Last shift:      No intake/output data recorded. Last 3 shifts: 1901 -  0700 In: 480 [P.O.:480] Out: 5000 Intake/Output Summary (Last 24 hours) at 2021 1356 Last data filed at 2021 2194 Gross per 24 hour Intake 480 ml Output 0 ml Net 480 ml Physical Exam:  
General:  Alert, cooperative, no distress, appears stated age. POx is 91% on high flow oxygen. Head:  Normocephalic, without obvious abnormality, atraumatic. Eyes:  Conjunctivae/corneas clear. PERRL, EOMs intact. Nose: Nares normal. Septum midline. Mucosa normal. No drainage or sinus tenderness. Throat: Lips, mucosa, and tongue normal. Teeth and gums normal.  
Neck: Supple, symmetrical, trachea midline, no adenopathy, thyroid: no enlargment/tenderness/nodules, no carotid bruit and no JVD. Back:   Symmetric, no curvature. ROM normal.  
Lungs:   Clear to auscultation bilaterally. Chest wall:  No tenderness or deformity. Heart:  Regular rate and rhythm, S1, S2 normal, no murmur, click, rub or gallop. Abdomen:   Soft, non-tender. Bowel sounds normal. No masses,  No organomegaly. Extremities: Extremities normal, atraumatic, no cyanosis or edema. Pulses: 2+ and symmetric all extremities. Skin: Skin color, texture, turgor normal. No rashes or lesions Lymph nodes: Cervical, supraclavicular, and axillary nodes normal.  
Neurologic: Grossly nonfocal  
 
Data review:  
 
Recent Results (from the past 24 hour(s)) FIBRINOGEN Collection Time: 20  2:00 PM  
Result Value Ref Range Fibrinogen 630 (H) 200 - 475 mg/dL GLUCOSE, POC Collection Time: 20  3:55 PM  
Result Value Ref Range Glucose (POC) 193 (H) 65 - 100 mg/dL  Performed by Lucio Kaplan RN   
 GLUCOSE, POC Collection Time: 12/31/20  8:51 PM  
Result Value Ref Range Glucose (POC) 204 (H) 65 - 100 mg/dL Performed by Joseph Gould (CON) FIBRINOGEN Collection Time: 01/01/21  3:34 AM  
Result Value Ref Range Fibrinogen 752 (H) 200 - 475 mg/dL D DIMER Collection Time: 01/01/21  3:34 AM  
Result Value Ref Range D-dimer 0.78 (H) 0.00 - 0.65 mg/L FEU  
CBC WITH AUTOMATED DIFF Collection Time: 01/01/21  3:34 AM  
Result Value Ref Range WBC 5.0 4.1 - 11.1 K/uL  
 RBC 4.73 4.10 - 5.70 M/uL  
 HGB 11.3 (L) 12.1 - 17.0 g/dL HCT 35.9 (L) 36.6 - 50.3 % MCV 75.9 (L) 80.0 - 99.0 FL  
 MCH 23.9 (L) 26.0 - 34.0 PG  
 MCHC 31.5 30.0 - 36.5 g/dL  
 RDW 16.6 (H) 11.5 - 14.5 % PLATELET 623 744 - 813 K/uL MPV 11.6 8.9 - 12.9 FL  
 NRBC 0.0 0  WBC ABSOLUTE NRBC 0.00 0.00 - 0.01 K/uL NEUTROPHILS 82 (H) 32 - 75 % LYMPHOCYTES 10 (L) 12 - 49 % MONOCYTES 8 5 - 13 % EOSINOPHILS 0 0 - 7 % BASOPHILS 0 0 - 1 % IMMATURE GRANULOCYTES 1 (H) 0.0 - 0.5 % ABS. NEUTROPHILS 4.1 1.8 - 8.0 K/UL  
 ABS. LYMPHOCYTES 0.5 (L) 0.8 - 3.5 K/UL  
 ABS. MONOCYTES 0.4 0.0 - 1.0 K/UL  
 ABS. EOSINOPHILS 0.0 0.0 - 0.4 K/UL  
 ABS. BASOPHILS 0.0 0.0 - 0.1 K/UL  
 ABS. IMM. GRANS. 0.0 0.00 - 0.04 K/UL  
 DF AUTOMATED METABOLIC PANEL, BASIC Collection Time: 01/01/21  3:34 AM  
Result Value Ref Range Sodium 132 (L) 136 - 145 mmol/L Potassium 5.1 3.5 - 5.1 mmol/L Chloride 94 (L) 97 - 108 mmol/L  
 CO2 26 21 - 32 mmol/L Anion gap 12 5 - 15 mmol/L Glucose 245 (H) 65 - 100 mg/dL BUN 71 (H) 6 - 20 MG/DL Creatinine 11.30 (H) 0.70 - 1.30 MG/DL  
 BUN/Creatinine ratio 6 (L) 12 - 20 GFR est AA 6 (L) >60 ml/min/1.73m2 GFR est non-AA 5 (L) >60 ml/min/1.73m2 Calcium 8.7 8.5 - 10.1 MG/DL FERRITIN Collection Time: 01/01/21  3:34 AM  
Result Value Ref Range Ferritin 3,379 (H) 26 - 388 NG/ML  
C REACTIVE PROTEIN, QT  Collection Time: 01/01/21  3:34 AM  
 Result Value Ref Range C-Reactive protein 17.50 (H) 0.00 - 0.60 mg/dL GLUCOSE, POC Collection Time: 01/01/21  7:42 AM  
Result Value Ref Range Glucose (POC) 244 (H) 65 - 100 mg/dL Performed by Jeremias July GLUCOSE, POC Collection Time: 01/01/21 11:44 AM  
Result Value Ref Range Glucose (POC) 302 (H) 65 - 100 mg/dL Performed by Jeremias July Imaging: 
I have personally reviewed the patients radiographs and have reviewed the reports: 
12-31-20: CXR: IMPRESSION: 
Worsening bilateral infiltrates consistent with worsening pneumonia.   
 
  
Donovan Robertson MD

## 2021-01-01 NOTE — PROGRESS NOTES
Comprehensive Nutrition Assessment Type and Reason for Visit: Initial, RD nutrition re-screen/LOS Nutrition Recommendations/Plan:  
· Continue 2gm Na+ diet with phos and K+ restrictions as needed. RD ordered a Phos check for tomorrow AM. If WNL, then may be able to liberalize diet a bit and remove Phos restriction. Added CCD for BG management. · Please document % meals and supplements consumed in flowsheet I/O's under intake. Nutrition Assessment:    
1/1: Chart reviewed; med noted for ESRD-HD, COVID-19+. Hx of DM with significantly elevated BG levels. Pt placed on a renal diet with 1200 ml fluid restriction, K+ levels now WNL. No Phos for review so ordered a check tomorrow AM. BG >200 mg/dl so added a CCD restriction for improved management. Patient Vitals for the past 72 hrs: 
 % Diet Eaten 12/31/20 1817 75 % 12/30/20 1843 100 % 12/30/20 1433 100 % 12/30/20 0844 100 % 12/29/20 1800 100 % Last Weight Metric Weight Loss Metrics 1/1/2021 12/4/2020 10/28/2020 10/9/2020 10/9/2020 10/6/2020 10/1/2020 Today's Wt 320 lb 321 lb 321 lb 14 oz - - 330 lb 330 lb BMI 41.09 kg/m2 41.21 kg/m2 - 41.33 kg/m2 39.93 kg/m2 42.37 kg/m2 42.37 kg/m2 Estimated Daily Nutrient Needs: 
Energy (kcal): 2550 (BMR 2452 x 1. 2AF) - 500 kcals; Weight Used for Energy Requirements: Current Protein (g): 145 (1.0 g/kg bw); Weight Used for Protein Requirements: Current Fluid (ml/day): per nephrology (1200 ml/day); Method Used for Fluid Requirements: 1 ml/kcal 
 
Nutrition Related Findings:  Meds: Vitamin C/Zn, floraQ; BM: 12/31; Labs: K+ WNL now, previously mildly elevated, no Phos for review,  Wounds:   
None Current Nutrition Therapies: DIET ONE TIME MESSAGE 
DIET ONE TIME MESSAGE 
DIET DIABETIC CONSISTENT CARB Regular; 2 GM NA (House Low NA); FR 1200ML; Low Phosphorus, Low Potassium DIET ONE TIME MESSAGE Anthropometric Measures: 
· Height:  6' 2\" (188 cm) · Current Body Wt:  145.2 kg (320 lb 1.7 oz)  
· Ideal Body Wt:  190 lbs:  168.5 %  
· BMI Category:  Obese class 3 (BMI 40.0 or greater)    
 
Nutrition Diagnosis:  
· Altered nutrition-related lab values related to (current medical conditions) as evidenced by (elevated BG, Creat) 
 
Nutrition Interventions:  
Food and/or Nutrient Delivery: Modify current diet 
Nutrition Education and Counseling: No recommendations at this time 
Coordination of Nutrition Care: No recommendation at this time 
 
Goals: 
Pt will consume >50% of meals while trend BG <200 mg/dl and maintain K+/Phos WNL next 2-4 days    
 
Nutrition Monitoring and Evaluation:  
Behavioral-Environmental Outcomes:   
Food/Nutrient Intake Outcomes: Food and nutrient intake 
Physical Signs/Symptoms Outcomes: Biochemical data, GI status, Weight, Fluid status or edema 
 
Discharge Planning:   
Continue current diet  
 
Electronically signed by Karyn Kruger RD on 1/1/2021 at 12:50 PM

## 2021-01-02 NOTE — CONSULTS
[facility], [facility] Sleep Medicine Initial Patient Consult Name: Rema Hdz MRN: [id number] : 1985 Hospital: [place]  Date: 2021 IMPRESSION:  
· Acute Hypoxic Respiratory Failure · Covid Pneumonia, bilateral pulmonary infiltrates. Was positive on . · Obese · Diarrhea · CAD · PAD · A flutter · T2DM. · ESRD, on dialysis today. RECOMMENDATIONS:  
· Not candidate for Remdesivir due to ESRD · Agree with current treatments. · ON Zithromax  And Ctx · ON Oxygen to keep pox over 90%. · On vit c and zinc 
· On decadron · Serial cxr and labs · covid unfortunately a lethal disease and receiving steroids. remdesivir contraindicated. Maintain sats > 90%. If worsening, call icu. Otherwise, supportive care. Will chart check tomorrow Subjective: This patient has been seen and evaluated at the request of Dr. Ced Cavanaugh for above. Patient is a 38 y.o. male who presented for above. Has increased dyspnea, dry cough. Did not sleep or rest well last pm.  
NO headaches. No back pain, no leg pain. He was to go to SNF. Had increased blood sugars. 21: On high flow, dialysis Past Medical History:  
Diagnosis Date  Arrhythmia   
 aflutter  Blind  Cataracts  Cellulitis and abscess of foot  Chronic kidney disease [facility]  
 Diabetes ([facility] [address] DM II  
 GERD (gastroesophageal reflux disease)  Hearing loss  Hypercholesterolemia  Hypertension  Neuropathy  Obesity  Osteomyelitis ([facility] [address]  Puerperal sepsis with acute hypoxic respiratory failure (HCC)  Sepsis ([facility] [address] Past Surgical History:  
Procedure Laterality Date  COLONOSCOPY N/A 2017 COLONOSCOPY performed by [facility]is [facility]uerre MD at [facility] ENDOSCOPY  COLONOSCOPY,DIAGNOSTIC  2017  HX AFIB ABLATION  2020  HX AMPUTATION    
 Left great toe and L 5th toe  HX AMPUTATION Right   
 bka  HX AMPUTATION TOE Right  HX VASCULAR ACCESS    
 [place] CARDIAC SURG PROCEDURE UNLIST  09/2020  
 ablation  ND COMPRE ELECTROPHYSIOL XM W/LEFT ATRIAL PACNG/REC N/A 9/4/2020 Lt Atrial Pace & Record During Ep Study performed by Markus Nettles MD at [facility]ATH [facility]  ND EPHYS EVAL W/ABLATION SUPRAVENT ARRHYTHMIA N/A 9/4/2020 ABLATION A-FLUTTER performed by Markus Nettlse MD at [facility]ATH [facility]  ND INTRACARDIAC ELECTROPHYSIOLOGIC 3D MAPPING N/A 9/4/2020 Ep 3d Mapping performed by Markus Nettles MD at [facility]ATH [facility]  UPPER GI ENDOSCOPY,BIOPSY  12/8/2017  VASCULAR SURGERY PROCEDURE UNLIST    
 R side chest  
 VASCULAR SURGERY PROCEDURE UNLIST Left 07/25/2017 Creation transposed AV fistula arm Prior to Admission medications Medication Sig Start Date End Date Taking? Authorizing Provider  
metoprolol tartrate 75 mg tab Take 100 mg by mouth two (2) times a day. 12/30/20  Yes Broaddus, Merlin Simmonds, DO  
amLODIPine (NORVASC) 10 mg tablet Take 1 Tab by mouth daily. 12/31/20  Yes Broaddus, Merlin Simmonds, DO  
atorvastatin (LIPITOR) 20 mg tablet Take 20 mg by mouth daily. Yes Provider, Historical  
vancomycin 125 mg/2.5 mL syrg Take 125 mg by mouth daily. Yes Provider, Historical  
oxyCODONE IR (ROXICODONE) 5 mg immediate release tablet Take 5 mg by mouth every six (6) hours as needed for Pain. Yes Provider, Historical  
gabapentin (NEURONTIN) 300 mg capsule Take 1 Cap by mouth nightly. Max Daily Amount: 300 mg. 10/27/20   Symone Resides P, NP  
insulin glargine (LANTUS) 100 unit/mL injection 10 Units by SubCUTAneous route nightly. 10/23/20   Karen Varela NP  
aspirin delayed-release 81 mg tablet Take 1 Tab by mouth daily. 10/23/20   Karen Varela NP  
clopidogreL (PLAVIX) 75 mg tab Take 1 Tab by mouth daily.  10/23/20   Karen Varela NP  
 sevelamer (RENAGEL) 800 mg tablet Take 800 mg by mouth three (3) times daily (with meals). Provider, Historical  
 
No Known Allergies Social History Tobacco Use  Smoking status: Former Smoker Packs/day: 0.25 Years: 20.00 Pack years: 5.00 Quit date: 6/1/2017 Years since quitting: 3.5  Smokeless tobacco: Never Used  Tobacco comment: \"quit about a year ago\" Substance Use Topics  Alcohol use: Not Currently Comment: rarely Family History Problem Relation Age of Onset  Diabetes Mother  Liver Disease Father  Kidney Disease Maternal Grandfather  Diabetes Maternal Grandfather  Hypertension Maternal Grandfather  Diabetes Paternal Uncle  Hypertension Paternal Uncle Current Facility-Administered Medications Medication Dose Route Frequency  dexamethasone (DECADRON) 4 mg/mL injection 4 mg  4 mg IntraVENous DAILY And  
 insulin NPH (NOVOLIN N, HUMULIN N) injection 20 Units  20 Units SubCUTAneous DAILY  insulin glargine (LANTUS) injection 22 Units  22 Units SubCUTAneous QHS  amLODIPine (NORVASC) tablet 10 mg  10 mg Oral DAILY  insulin lispro (HUMALOG) injection 4 Units  4 Units SubCUTAneous TIDAC  metoprolol tartrate (LOPRESSOR) tablet 100 mg  100 mg Oral BID  lactobac ac& pc-s.therm-b.anim (USMAN Q/RISAQUAD)  1 Cap Oral DAILY  aspirin delayed-release tablet 81 mg  81 mg Oral DAILY  atorvastatin (LIPITOR) tablet 20 mg  20 mg Oral QHS  clopidogreL (PLAVIX) tablet 75 mg  75 mg Oral DAILY  gabapentin (NEURONTIN) capsule 300 mg  300 mg Oral QHS  sevelamer carbonate (RENVELA) tab 800 mg  800 mg Oral TID WITH MEALS  sodium chloride (NS) flush 5-40 mL  5-40 mL IntraVENous Q8H  
 heparin (porcine) injection 7,500 Units  7,500 Units SubCUTAneous Q8H  
 ascorbic acid (vitamin C) (VITAMIN C) tablet 1,000 mg  1,000 mg Oral DAILY  zinc sulfate (ZINCATE) 220 (50) mg capsule 220 mg  220 mg Oral DAILY  insulin lispro (HUMALOG) injection   SubCUTAneous AC&HS Review of Systems: A comprehensive review of systems was negative. Objective:  
Vital Signs:   
Visit Vitals /86 Pulse 69 Temp 98.5 °F (36.9 °C) (Oral) Resp 18 Ht 6' 2\" (1.88 m) Wt 145.2 kg (320 lb) SpO2 (!) 89% BMI 41.09 kg/m² O2 Device: Heated, Hi flow nasal cannula O2 Flow Rate (L/min): 60 l/min Temp (24hrs), Av.6 °F (37 °C), Min:98.3 °F (36.8 °C), Max:98.9 °F (37.2 °C) Intake/Output:  
Last shift:      No intake/output data recorded. Last 3 shifts:  1901 -  0700 In: 940 [P.O.:940] Out: - Intake/Output Summary (Last 24 hours) at 2021 8572 Last data filed at 2021 5256 Gross per 24 hour Intake 500 ml Output  Net 500 ml Physical Exam:  
General:  Alert, cooperative, no distress, appears stated age. POx is 91% on high flow oxygen. Head:  Normocephalic, without obvious abnormality, atraumatic. Eyes:  Conjunctivae/corneas clear. PERRL, EOMs intact. Nose: Nares normal. Septum midline. Mucosa normal. No drainage or sinus tenderness. Throat: Lips, mucosa, and tongue normal. Teeth and gums normal.  
Neck: Supple, symmetrical, trachea midline, no adenopathy, thyroid: no enlargment/tenderness/nodules, no carotid bruit and no JVD. Back:   Symmetric, no curvature. ROM normal.  
Lungs:   Clear to auscultation bilaterally. Chest wall:  No tenderness or deformity. Heart:  Regular rate and rhythm, S1, S2 normal, no murmur, click, rub or gallop. Abdomen:   Soft, non-tender. Bowel sounds normal. No masses,  No organomegaly. Extremities: Extremities normal, atraumatic, no cyanosis or edema. Pulses: 2+ and symmetric all extremities. Skin: Skin color, texture, turgor normal. No rashes or lesions Lymph nodes: Cervical, supraclavicular, and axillary nodes normal.  
Neurologic: Grossly nonfocal  
 
Data review:  
 
Recent Results (from the past 24 hour(s)) GLUCOSE, POC Collection Time: 01/01/21 11:44 AM  
Result Value Ref Range Glucose (POC) 302 (H) 65 - 100 mg/dL Performed by Johnnie Seip GLUCOSE, POC Collection Time: 01/01/21  4:47 PM  
Result Value Ref Range Glucose (POC) 284 (H) 65 - 100 mg/dL Performed by Johnnie Seip GLUCOSE, POC Collection Time: 01/01/21  9:34 PM  
Result Value Ref Range Glucose (POC) 253 (H) 65 - 100 mg/dL Performed by Taryn Zabala (CON) GLUCOSE, POC Collection Time: 01/02/21  7:32 AM  
Result Value Ref Range Glucose (POC) 192 (H) 65 - 100 mg/dL Performed by Carol Garner PCT Imaging: 
I have personally reviewed the patients radiographs and have reviewed the reports: 
12-31-20: CXR: IMPRESSION: 
Worsening bilateral infiltrates consistent with worsening pneumonia.   
 
  
Fiona Hannah MD

## 2021-01-02 NOTE — PROGRESS NOTES
0805: Night shift RN reported patient refusal of oxygen support over night despite O2 sats being low. Patients O2 sats fluctuating this morning anywhere from low 80s-mid 90s, depending on patient movement and position. Educated patient on importance of keeping oxygen on, and that he may need additional oxygen support if his hypoxia worsens. 0830: dialysis at bedside, spoke with pulmonary PA about patient's respiratory status and refusal last night despite RN education. PA spoke with patient. 1045: Dialysis still at bedside, patients O2 sats still fluctuating, currently sats are 86%. MD messaged for PRN BiPAP order. 1300: dialysis completed. 1530: Patients oxygen doing better, sats >93% and patient remains on heated high flow 60L. Patient states he feels his breathing is better when he is prone. End of Shift Note Bedside shift change report given to Carloz Haro RN (oncoming nurse) by Markus Amador RN (offgoing nurse). Report included the following information SBAR, Kardex, Intake/Output, MAR and Recent Results Shift worked:  7a-7p Shift summary and any significant changes:  
 Patient more compliant with supplemental oxygen throughout the day. Dialysis completed this morning. O2 sats remain stable. No other significant changes Concerns for physician to address: Oxygen needs Zone phone for oncoming shift:  [phone] Activity: 
Activity Level: Bed Rest 
Number times ambulated in hallways past shift: 0 Number of times OOB to chair past shift: 0 Cardiac:  
Cardiac Monitoring: Yes     
Cardiac Rhythm: Normal sinus rhythm Access:  
Current line(s): PIV Genitourinary:  
Urinary status: anuric Respiratory:  
O2 Device: Heated, Hi flow nasal cannula Chronic home O2 use?: NO Incentive spirometer at bedside: NO 
  
GI: 
Last Bowel Movement Date: 01/01/21 Current diet:  DIET ONE TIME MESSAGE 
DIET ONE TIME MESSAGE 
 DIET DIABETIC CONSISTENT CARB Regular; 2 GM NA (House Low NA); FR 1200ML; Low Phosphorus, Low Potassium DIET ONE TIME MESSAGE Passing flatus: YES Tolerating current diet: YES 
% Diet Eaten: 75 % Pain Management:  
Patient states pain is manageable on current regimen: YES Skin: 
Ranjeet Score: 18 Interventions: speciality bed and PT/OT consult Patient Safety: 
Fall Score: Total Score: 4 Interventions: bed/chair alarm and stay with me (per policy) High Fall Risk: Yes Length of Stay: 
Expected LOS: 5d 12h Actual LOS: 7 Rosy Garcia RN

## 2021-01-02 NOTE — PROGRESS NOTES
NAME: Jacob Herron :  1985 MRN:  [id number] Assessment :    Plan: 
--ESKD-MWF TAZ [facility].  Now TTS to go to [facility] on DC due to [place] Hyperkalemia COVID + Hypoxia HTN, uncontrolled Anemia of ESKD 
 
PAD -Seen on HD. FR and low salt intake 
  
No FARIDA (secondary to covid and hgb > 10) 
  
  
 
 
Subjective: Chief Complaint:  Refusing O2 overnight. Review of Systems: 
 
Symptom Y/N Comments  Symptom Y/N Comments Fever/Chills    Chest Pain Poor Appetite    Edema Cough    Abdominal Pain Sputum    Joint Pain SOB/LITTLE    Pruritis/Rash Nausea/vomit    Tolerating PT/OT Diarrhea    Tolerating Diet Constipation    Other Could not obtain due to:   
 
Objective: VITALS:  
Last 24hrs VS reviewed since prior progress note. Most recent are: 
Visit Vitals /78 Pulse 70 Temp 98.5 °F (36.9 °C) (Oral) Resp 25 Ht 6' 2\" (1.88 m) Wt 145.2 kg (320 lb) SpO2 (!) 88% BMI 41.09 kg/m² Intake/Output Summary (Last 24 hours) at 2021 1049 Last data filed at 2021 7797 Gross per 24 hour Intake 500 ml Output  Net 500 ml Telemetry Reviewed: PHYSICAL EXAM: 
General: NAD Lab Data Reviewed: (see below) Medications Reviewed: (see below) PMH/SH reviewed - no change compared to H&P 
________________________________________________________________________ Care Plan discussed with: 
Patient Family RN Care Manager Consultant:     
 
  Comments >50% of visit spent in counseling and coordination of care    
 
________________________________________________________________________ Bebeto Santos MD  
 
Procedures: see electronic medical records for all procedures/Xrays and details which 
were not copied into this note but were reviewed prior to creation of Plan. LABS: 
Recent Labs 01/01/21 
7198 WBC 5.0 HGB 11.3* HCT 35.9*  
 Recent Labs 01/01/21 
0334 12/31/20 
0740 * 130*  
K 5.1 4.9 CL 94* 92* CO2 26 23 BUN 71* 97* CREA 11.30* 13.90* * 263* CA 8.7 8.0* No results for input(s): AP, TBIL, TP, ALB, GLOB, GGT, AML, LPSE in the last 72 hours. No lab exists for component: SGOT, GPT, AMYP, HLPSE No results for input(s): INR, PTP, APTT, INREXT, INREXT in the last 72 hours. Recent Labs 01/01/21 
4068 FERR 3,379* No results found for: FOL, RBCF No results for input(s): PH, PCO2, PO2 in the last 72 hours. No results for input(s): CPK, CKMB in the last 72 hours. No lab exists for component: TROPONINI No components found for: [place] Lab Results Component Value Date/Time Color YELLOW/STRAW 05/01/2017 05:39 PM  
 Appearance CLOUDY (A) 05/01/2017 05:39 PM  
 Specific gravity 1.018 05/01/2017 05:39 PM  
 Specific gravity >1.030 (H) 10/03/2014 04:35 AM  
 pH (UA) 5.5 05/01/2017 05:39 PM  
 Protein 300 (A) 05/01/2017 05:39 PM  
 Glucose 250 (A) 05/01/2017 05:39 PM  
 Ketone NEGATIVE  05/01/2017 05:39 PM  
 Bilirubin NEGATIVE  05/01/2017 05:39 PM  
 Urobilinogen 0.2 05/01/2017 05:39 PM  
 Nitrites NEGATIVE  05/01/2017 05:39 PM  
 Leukocyte Esterase NEGATIVE  05/01/2017 05:39 PM  
 Epithelial cells FEW 05/01/2017 05:39 PM  
 Bacteria NEGATIVE  05/01/2017 05:39 PM  
 WBC 0-4 05/01/2017 05:39 PM  
 RBC 5-10 05/01/2017 05:39 PM  
 
 
MEDICATIONS: 
Current Facility-Administered Medications Medication Dose Route Frequency  dexamethasone (DECADRON) 4 mg/mL injection 4 mg  4 mg IntraVENous DAILY And  
 insulin NPH (NOVOLIN N, HUMULIN N) injection 20 Units  20 Units SubCUTAneous DAILY  insulin glargine (LANTUS) injection 22 Units  22 Units SubCUTAneous QHS  guaiFENesin-dextromethorphan (ROBITUSSIN DM) 100-10 mg/5 mL syrup 10 mL  10 mL Oral Q6H PRN  
  sodium chloride (OCEAN) 0.65 % nasal squeeze bottle 2 Spray  2 Spray Both Nostrils Q2H PRN  
 amLODIPine (NORVASC) tablet 10 mg  10 mg Oral DAILY  insulin lispro (HUMALOG) injection 4 Units  4 Units SubCUTAneous TIDAC  metoprolol tartrate (LOPRESSOR) tablet 100 mg  100 mg Oral BID  hydrALAZINE (APRESOLINE) 20 mg/mL injection 20 mg  20 mg IntraVENous Q6H PRN  
 lactobac ac& pc-s.therm-b.anim (USMAN Q/RISAQUAD)  1 Cap Oral DAILY  aspirin delayed-release tablet 81 mg  81 mg Oral DAILY  atorvastatin (LIPITOR) tablet 20 mg  20 mg Oral QHS  clopidogreL (PLAVIX) tablet 75 mg  75 mg Oral DAILY  gabapentin (NEURONTIN) capsule 300 mg  300 mg Oral QHS  oxyCODONE IR (ROXICODONE) tablet 5 mg  5 mg Oral Q4H PRN  
 sevelamer carbonate (RENVELA) tab 800 mg  800 mg Oral TID WITH MEALS  sodium chloride (NS) flush 5-40 mL  5-40 mL IntraVENous Q8H  
 sodium chloride (NS) flush 5-40 mL  5-40 mL IntraVENous PRN  
 acetaminophen (TYLENOL) tablet 650 mg  650 mg Oral Q6H PRN Or  
 acetaminophen (TYLENOL) suppository 650 mg  650 mg Rectal Q6H PRN  polyethylene glycol (MIRALAX) packet 17 g  17 g Oral DAILY PRN  promethazine (PHENERGAN) tablet 12.5 mg  12.5 mg Oral Q6H PRN Or  
 ondansetron (ZOFRAN) injection 4 mg  4 mg IntraVENous Q6H PRN  
 heparin (porcine) injection 7,500 Units  7,500 Units SubCUTAneous Q8H  
 ascorbic acid (vitamin C) (VITAMIN C) tablet 1,000 mg  1,000 mg Oral DAILY  zinc sulfate (ZINCATE) 220 (50) mg capsule 220 mg  220 mg Oral DAILY  insulin lispro (HUMALOG) injection   SubCUTAneous AC&HS  
 glucose chewable tablet 16 g  4 Tab Oral PRN  
 dextrose (D50W) injection syrg 12.5-25 g  12.5-25 g IntraVENous PRN  
 glucagon (GLUCAGEN) injection 1 mg  1 mg IntraMUSCular PRN

## 2021-01-02 NOTE — PROGRESS NOTES
Verbal shift change report given to izabella (oncoming nurse) by Juanito Hernandez (offgoing nurse). Report included the following information SBAR, Kardex, Intake/Output, MAR, Accordion, Med Rec Status and Cardiac Rhythm nsr.  
 
2330: pt transitioned to heated high flow, 40L @ 70%. Pt observed with o2 sats at 51%, pt removed heated high flow, refusing to put back on c/o of cough and nasal congested( this RN obtained prn orders for cough and nasal congestion) , rapid response nurse placed pt on NRB at 100%, rapid response nurse an this RN provided education on heated high flow, pt placed back on heated hi flow. See rapid response note for detailed note. End of Shift Note Bedside shift change report given to inocencia (oncoming nurse) by Tegan Cho (offgoing nurse). Report included the following information SBAR, Kardex, Intake/Output, MAR, Recent Results and Cardiac Rhythm nsr Shift worked:  night Shift summary and any significant changes:  
  now on heated hi flow, please see above note and RRT note. Getting hemodialysis today, communicated to oncoming nurse pt is hard stick for blood draw, has limited vascular access. Dialysis is Tues, Thurs, Sat, oncoming nurse will communicate to dialyis nurse to perform blood draw Communicated to oncoming nurse of last night evens, made nurse aware of pt refusing bipap or intubation if pt decompensates, let oncoming nurse know pt needs to speak with pulmonary. Concerns for physician to address:  O2 requirements Zone phone for [facility] shift:   [phone] Activity: 
Activity Level: Bed Rest 
Number times ambulated in hallways past shift: 0 Number of times OOB to chair past shift: 0 Cardiac:  
Cardiac Monitoring: Yes     
Cardiac Rhythm: Normal sinus rhythm Access:  
Current line(s): PIV and HD access Genitourinary:  
Urinary status: anuric Respiratory:  
O2 Device: Heated, Hi flow nasal cannula Chronic home O2 use?: NO 
 Incentive spirometer at bedside: NO 
  
GI: 
Last Bowel Movement Date: 01/01/21 Current diet:  DIET ONE TIME MESSAGE 
DIET ONE TIME MESSAGE 
DIET DIABETIC CONSISTENT CARB Regular; 2 GM NA (House Low NA); FR 1200ML; Low Phosphorus, Low Potassium DIET ONE TIME MESSAGE Passing flatus: YES Tolerating current diet: YES 
% Diet Eaten: 100 % Pain Management:  
Patient states pain is manageable on current regimen: YES Skin: 
Ranjeet Score: 17 Interventions: PT/OT consult and limit briefs Patient Safety: 
Fall Score: Total Score: 4 Interventions: bed/chair alarm, gripper socks and pt to call before getting OOB High Fall Risk: Yes Length of Stay: 
Expected LOS: 5d 12h Actual LOS: 7 St[facility]

## 2021-01-02 NOTE — PROGRESS NOTES
RAPID RESPONSE TEAM- Follow Up Rounded on patient due to recent rapid response for hypoxia. Covid +. Pulmonology following. Currently on 15L non heated HFNC with SpO2 maintaining low 80s. RT placing patient on heated high flow NC 40L 70% with SpO2 93-94%%. No significant increased WOB. BP and HR stable. No RRT interventions currently indicated at this time. Please call with any further questions or concerns. Lesley Rosales RN  
RRT Nelly Wesley ADDENDUM: 
2300: Patient self removed heated high flow with refusal to put back on due to increased coughing and nasal congestion; SpO2 as low as 51%. Placed on 100% NRB. Patient educated on importance of heated HFNC; and put back on 100%. Primary RN notified NP for nasal spray and cough medicine orders. Explained xray read to patient with worsening pneumonia. Patient wants to speak to MD in the morning regarding goals of care; currently refusing BiPAP and/or intubation if decompensates.

## 2021-01-02 NOTE — PROGRESS NOTES
Received message from patient's nurse Tegan Cho stating : 
 
pt has started heated hi flow, c/o cough no prn orders for Tessalon, also wpuld like saline for his nose Discussion / orders: · Ordered Robitussin 10 mL every 6 hours as needed for cough or congestion · Nasal spray every 2 hours as needed Please note that this note was dictated using Dragon computer voice recognition software. Quite often unanticipated grammatical, syntax, homophones, and other interpretive errors are inadvertently transcribed by the computer software. Please disregard these errors. Please excuse any errors that have escaped final proofreading.

## 2021-01-02 NOTE — PROGRESS NOTES
Bedside shift change report given to Emil Marx RN (oncoming nurse) by Charles Laura RN (offgoing nurse). Report included the following information SBAR.

## 2021-01-02 NOTE — PROGRESS NOTES
TRANSFER - IN REPORT: 
 
Verbal report received from [address] [place] [address] on Yonatan Arevalo  being received from [facility] for ordered procedure Report consisted of patients Situation, Background, Assessment and  
Recommendations(SBAR). Information from the following report(s) Kardex was reviewed with the receiving nurse. Opportunity for questions and clarification was provided. Assessment completed upon patients arrival to unit and care assumed.

## 2021-01-02 NOTE — PROGRESS NOTES
HD TRANSFER - OUT REPORT: 
 
Verbal report given to [address] [place] [address] on Angélica Kirkland being transferred to [facility] (Unit) for ordered procedure Report consisted of patient's Situation, Background, Assessment and  
Recommendations(SBAR). Information from the following report(s) Kardex was reviewed with the receiving nurse. Method:  $$ Method: Hemodialysis (01/02/21 1245) Fluid Removed  NET Fluid Removed (mL): 4500 ml (01/02/21 1245) Patient response to treatment:  Stable End Time  Hemodialysis End Time: 3624 (01/02/21 1245) If not documented, dialysis nurse to update post-dialysis row in HD/Filtration flowsheet Medications /Volume expansion agents or Fluid boluses administered during treatment? no 
 
Post-dialysis medication administration due?  no 
Remind nurse to administer post-HD medication upon return to unit. Fistula hemostasis? YES Line heparinization? no 
 
Lines: AVF Opportunity for questions and clarification was provided. Patient transported with: HD at bedside

## 2021-01-02 NOTE — PROGRESS NOTES
Hospitalist Progress Note NAME: Grant Landa :  1985 MRN:  [id number] Assessment / Plan: 
Acute Hypoxic Respiratory Failure due to COVID-19 Pneumonia COVID-19 Positive 
-worsening hypoxia and patient on ventimask 
-CXR noted, worsening b/l infiltrates -Hypoxia with sats 88% on room air admission 
-hypoxia worsened 
-inflammatory markers ferritin > 3k, CRP 17.5, D-dimer 0.78 
-rapid response  AM, worsening hypoxia, placed on venti mask during HD 
-required venti mask during HD 
-worsening bibasilar infiltrated on CXR noted 
-despite completion of HD, hypoxic and transitioned to high flow 
-Rapid Covid test positive 2020 
-Not remdesivir candidate due to ESRD 
-Completing antibiotic therapy with IV azithromycin and ceftriaxone for possible bacterial pneumonia 
-Continue zinc and vitamin C 
-cont decadron 
-pulm consullt appreciated 
-cont heparin sq 7500u tid for PPx Type 2 Diabetes Steroid induced hyperglycemia 
-on steroids for Covid Tx 
-BS still 250-300 despite addition of NPH with steroids 
-increase lantus to 22u nightly 
-Continue mealtime lispro 4 units 3 times daily 
-SSI with POC's Hyperkalemia POA 
ESRD 
-Received temporizing measures in the ED 
-Hyperkalemia resolved 
-Receiving HD today () -Usually HD MWF. To switch to TTS for Covid HD spot on discharge 
-cont decadron 10 days or until DC 
-cont Renvela -appreciate nephrology asssitance 
  
Hypertension 
-Continue amlodipine, metoprolol 
-As needed hydralazine 
  
Hx of Atrial Flutter 
-s/p Afib ablation 10/2020 
-completed 4 weeks OAC 
-NSR on admission EKG 
  
CAD s/p stents PAD 
-Continue statin, aspirin, Plavix, and neurontin 
-S/P right BKA 2020 
  
Diarrhea secondary to antibiotic therapy -Oma Q daily 
-seems resolved 
  
40 or above Morbid obesity / Body mass index is 44.33 kg/m². 
  
Code status: Full Prophylaxis: Hep SQ Recommended Disposition: Home w/Family Subjective: Chief Complaint / Reason for Physician Visit Still on high flow unable to wean so far. Able to carry on a conversation during rounds without too much difficulty Discussed with RN events overnight. Review of Systems: 
Symptom Y/N Comments  Symptom Y/N Comments Fever/Chills n   Chest Pain n   
Poor Appetite n   Edema Cough y   Abdominal Pain n   
Sputum n   Joint Pain SOB/LITTLE y   Pruritis/Rash Nausea/vomit n   Tolerating PT/OT Diarrhea n   Tolerating Diet Constipation n   Other Could NOT obtain due to:   
 
Objective: VITALS:  
Last 24hrs VS reviewed since prior progress note. Most recent are: 
Patient Vitals for the past 24 hrs: 
 Temp Pulse Resp BP SpO2  
01/01/21 1456     93 % 01/01/21 1452 98.5 °F (36.9 °C) 71 19 (!) 169/104 90 % 01/01/21 1105 98.7 °F (37.1 °C) 69 24 (!) 142/96 91 % 01/01/21 0835 98.7 °F (37.1 °C) 77 18 (!) 155/111 92 % 01/01/21 0436 99 °F (37.2 °C) 76 24 (!) 148/88 90 % 12/31/20 2343 99.2 °F (37.3 °C) 78 22 137/78 92 % Intake/Output Summary (Last 24 hours) at 1/1/2021 2241 Last data filed at 1/1/2021 1456 Gross per 24 hour Intake 640 ml Output  Net 640 ml I had a face to face encounter and independently examined this patient on 1/1/2021, as outlined below: PHYSICAL EXAM: 
General: WD, WN. Alert, cooperative, no acute distress EENT:  EOMI. Anicteric sclerae. MMM Resp:  Shallow inpspirations, bibasilar crackles. No accessory muscle use CV:  Regular  rhythm,  No edema GI:  Soft, Non distended, Non tender. +Bowel sounds Neurologic:  Alert and oriented X 3, normal speech, Psych:   Not anxious nor agitated Skin:  No rashes. No jaundice Reviewed most current lab test results and cultures  YES Reviewed most current radiology test results   YES Review and summation of old records today    NO Reviewed patient's current orders and MAR    YES 
PMH/SH reviewed - no change compared to H&P 
 ________________________________________________________________________ Care Plan discussed with: 
  Comments Patient x Family RN x Care Manager Consultant     
                 x Multidiciplinary team rounds were held today with , nursing, pharmacist and clinical coordinator. Patient's plan of care was discussed; medications were reviewed and discharge planning was addressed. ________________________________________________________________________ Total NON critical care TIME: 35   Minutes Total CRITICAL CARE TIME Spent:   Minutes non procedure based Comments >50% of visit spent in counseling and coordination of care    
________________________________________________________________________ Rose Torres DO  
 
Procedures: see electronic medical records for all procedures/Xrays and details which were not copied into this note but were reviewed prior to creation of Plan. LABS: 
I reviewed today's most current labs and imaging studies. Pertinent labs include: 
Recent Labs 01/01/21 
8608 12/30/20 
7145 WBC 5.0 3.4* HGB 11.3* 10.3* HCT 35.9* 31.8*  
 186 Recent Labs 01/01/21 
9104 12/31/20 
0740 12/30/20 
6238 * 130* 130*  
K 5.1 4.9 4.6 CL 94* 92* 94* CO2 26 23 27 * 263* 262* BUN 71* 97* 64* CREA 11.30* 13.90* 10.90* CA 8.7 8.0* 7.9* ALB  --   --  2.7* TBILI  --   --  0.8 ALT  --   --  66 Signed: Rose Torres DO

## 2021-01-03 NOTE — PROGRESS NOTES
NAME: Khalif Shirley :  1985 MRN:  [id number] Assessment :    Plan: 
--ESKD-MWF TAZ [facility].  Now TTS to go to [facility] on DC due to [place] Hyperkalemia COVID + Hypoxia HTN, uncontrolled Anemia of ESKD 
 
PAD -No acute need for HD today. Plan HD Tuesday. Volume OK. FR and low salt intake 
  
No FARIDA (secondary to covid and hgb > 10) 
  
  
 
 
Subjective: Chief Complaint:  RN reports dyspnea. Review of Systems: 
 
Symptom Y/N Comments  Symptom Y/N Comments Fever/Chills    Chest Pain Poor Appetite    Edema Cough    Abdominal Pain Sputum    Joint Pain SOB/LITTLE    Pruritis/Rash Nausea/vomit    Tolerating PT/OT Diarrhea    Tolerating Diet Constipation    Other Could not obtain due to:   
 
Objective: VITALS:  
Last 24hrs VS reviewed since prior progress note. Most recent are: 
Visit Vitals BP (!) 143/91 (BP 1 Location: Right arm, BP Patient Position: At rest) Pulse 73 Temp 98.7 °F (37.1 °C) Resp 22 Ht 6' 2\" (1.88 m) Wt 141.4 kg (311 lb 11.2 oz) SpO2 90% BMI 40.02 kg/m² Intake/Output Summary (Last 24 hours) at 1/3/2021 1038 Last data filed at 2021 1721 Gross per 24 hour Intake 420 ml Output 4500 ml Net -4080 ml Telemetry Reviewed: PHYSICAL EXAM: 
General: NAD Lab Data Reviewed: (see below) Medications Reviewed: (see below) PMH/SH reviewed - no change compared to H&P 
________________________________________________________________________ Care Plan discussed with: 
Patient Family RN Care Manager Consultant:     
 
  Comments >50% of visit spent in counseling and coordination of care    
 
________________________________________________________________________ Erlin Rodriguez MD  
 
 Procedures: see electronic medical records for all procedures/Xrays and details which 
were not copied into this note but were reviewed prior to creation of Plan. LABS: 
Recent Labs 01/03/21 
0313 01/02/21 
1145 WBC 6.9 6.4 HGB 11.8* 11.6* HCT 37.0 35.7*  277 Recent Labs 01/03/21 
4689 01/02/21 
1145 01/01/21 
0334 * 132* 132* K 4.9 5.3* 5.1 CL 92* 92* 94* CO2 26 23 26 BUN 72* 109* 71* CREA 10.40* 13.80* 11.30* * 178* 245* CA 8.9 8.8 8.7 PHOS  --  10.4*  -- No results for input(s): AP, TBIL, TP, ALB, GLOB, GGT, AML, LPSE in the last 72 hours. No lab exists for component: SGOT, GPT, AMYP, HLPSE No results for input(s): INR, PTP, APTT, INREXT, INREXT in the last 72 hours. Recent Labs 01/03/21 0313 01/02/21 
1145 FERR 4,178* 5,038* No results found for: FOL, RBCF No results for input(s): PH, PCO2, PO2 in the last 72 hours. No results for input(s): CPK, CKMB in the last 72 hours. No lab exists for component: TROPONINI No components found for: [place] Lab Results Component Value Date/Time Color YELLOW/STRAW 05/01/2017 05:39 PM  
 Appearance CLOUDY (A) 05/01/2017 05:39 PM  
 Specific gravity 1.018 05/01/2017 05:39 PM  
 Specific gravity >1.030 (H) 10/03/2014 04:35 AM  
 pH (UA) 5.5 05/01/2017 05:39 PM  
 Protein 300 (A) 05/01/2017 05:39 PM  
 Glucose 250 (A) 05/01/2017 05:39 PM  
 Ketone NEGATIVE  05/01/2017 05:39 PM  
 Bilirubin NEGATIVE  05/01/2017 05:39 PM  
 Urobilinogen 0.2 05/01/2017 05:39 PM  
 Nitrites NEGATIVE  05/01/2017 05:39 PM  
 Leukocyte Esterase NEGATIVE  05/01/2017 05:39 PM  
 Epithelial cells FEW 05/01/2017 05:39 PM  
 Bacteria NEGATIVE  05/01/2017 05:39 PM  
 WBC 0-4 05/01/2017 05:39 PM  
 RBC 5-10 05/01/2017 05:39 PM  
 
 
MEDICATIONS: 
Current Facility-Administered Medications Medication Dose Route Frequency  dexamethasone (DECADRON) 4 mg/mL injection 4 mg  4 mg IntraVENous DAILY  And  
  insulin NPH (NOVOLIN N, HUMULIN N) injection 20 Units  20 Units SubCUTAneous DAILY  insulin glargine (LANTUS) injection 22 Units  22 Units SubCUTAneous QHS  guaiFENesin-dextromethorphan (ROBITUSSIN DM) 100-10 mg/5 mL syrup 10 mL  10 mL Oral Q6H PRN  
 sodium chloride (OCEAN) 0.65 % nasal squeeze bottle 2 Spray  2 Spray Both Nostrils Q2H PRN  
 amLODIPine (NORVASC) tablet 10 mg  10 mg Oral DAILY  insulin lispro (HUMALOG) injection 4 Units  4 Units SubCUTAneous TIDAC  metoprolol tartrate (LOPRESSOR) tablet 100 mg  100 mg Oral BID  hydrALAZINE (APRESOLINE) 20 mg/mL injection 20 mg  20 mg IntraVENous Q6H PRN  
 lactobac ac& pc-s.therm-b.anim (USMAN Q/RISAQUAD)  1 Cap Oral DAILY  aspirin delayed-release tablet 81 mg  81 mg Oral DAILY  atorvastatin (LIPITOR) tablet 20 mg  20 mg Oral QHS  clopidogreL (PLAVIX) tablet 75 mg  75 mg Oral DAILY  gabapentin (NEURONTIN) capsule 300 mg  300 mg Oral QHS  oxyCODONE IR (ROXICODONE) tablet 5 mg  5 mg Oral Q4H PRN  
 sevelamer carbonate (RENVELA) tab 800 mg  800 mg Oral TID WITH MEALS  sodium chloride (NS) flush 5-40 mL  5-40 mL IntraVENous Q8H  
 sodium chloride (NS) flush 5-40 mL  5-40 mL IntraVENous PRN  
 acetaminophen (TYLENOL) tablet 650 mg  650 mg Oral Q6H PRN Or  
 acetaminophen (TYLENOL) suppository 650 mg  650 mg Rectal Q6H PRN  polyethylene glycol (MIRALAX) packet 17 g  17 g Oral DAILY PRN  promethazine (PHENERGAN) tablet 12.5 mg  12.5 mg Oral Q6H PRN Or  
 ondansetron (ZOFRAN) injection 4 mg  4 mg IntraVENous Q6H PRN  
 heparin (porcine) injection 7,500 Units  7,500 Units SubCUTAneous Q8H  
 ascorbic acid (vitamin C) (VITAMIN C) tablet 1,000 mg  1,000 mg Oral DAILY  zinc sulfate (ZINCATE) 220 (50) mg capsule 220 mg  220 mg Oral DAILY  insulin lispro (HUMALOG) injection   SubCUTAneous AC&HS  
 glucose chewable tablet 16 g  4 Tab Oral PRN  
 dextrose (D50W) injection syrg 12.5-25 g  12.5-25 g IntraVENous PRN  
  glucagon (GLUCAGEN) injection 1 mg  1 mg IntraMUSCular PRN

## 2021-01-03 NOTE — PROGRESS NOTES
End of Shift Note Bedside shift change report given to Haseeb Tapia RN (oncoming nurse) by Toni Telles RN (offgoing nurse). Report included the following information SBAR, Kardex, Intake/Output and MAR Shift worked:  7a-7p Shift summary and any significant changes:  
 Patient remains on heated hi flow at 60L, 100%. No significant changes, O2 sats fluctuate with positional changes. Concerns for physician to address: Oxygen needs Zone phone for oncoming shift:   [phone] Activity: 
Activity Level: Bed Rest 
Number times ambulated in hallways past shift: 0 Number of times OOB to chair past shift: 0 Cardiac:  
Cardiac Monitoring: Yes     
Cardiac Rhythm: Normal sinus rhythm Access:  
Current line(s): PIV and HD access Genitourinary:  
Urinary status: anuric Respiratory:  
O2 Device: Heated, Hi flow nasal cannula Chronic home O2 use?: NO Incentive spirometer at bedside: YES 
  
GI: 
Last Bowel Movement Date: 01/01/21 Current diet:  DIET ONE TIME MESSAGE 
DIET ONE TIME MESSAGE 
DIET DIABETIC CONSISTENT CARB Regular; 2 GM NA (House Low NA); FR 1200ML; Low Phosphorus, Low Potassium DIET ONE TIME MESSAGE Passing flatus: YES Tolerating current diet: YES 
% Diet Eaten: 90 % Pain Management:  
Patient states pain is manageable on current regimen: N/A Skin: 
Ranjeet Score: 18 Interventions: increase time out of bed Patient Safety: 
Fall Score: Total Score: 4 Interventions: bed/chair alarm and stay with me (per policy) High Fall Risk: Yes Length of Stay: 
Expected LOS: 5d 12h Actual LOS: 8 Toni Telles RN

## 2021-01-03 NOTE — PROGRESS NOTES
1230: Patient asleep on his back, oxygen sats dropped to 80%. Told patient to lay on his side while he's asleep, oxygen back to 92%.

## 2021-01-03 NOTE — PROGRESS NOTES
Verbal shift change report given to izabella (oncoming nurse) by gilbert (offgoing nurse). Report included the following information SBAR, Kardex, Intake/Output, MAR, Recent Results and Cardiac Rhythm nsr. End of Shift Note Bedside shift change report given to gilbert(oncoming nurse) by Alton Yoo (offgoing nurse). Report included the following information SBAR, Kardex, Intake/Output, MAR, Recent Results and Cardiac Rhythm nsr Shift worked:  night Shift summary and any significant changes:  
  had dialysis on dayshift, VSS, pt on heated HFNC 60L @ 100%. Concerns for physician to address:  o2 requirements Zone phone for oncoming shift:   [phone] Activity: 
Activity Level: Bed Rest 
Number times ambulated in hallways past shift: 0 Number of times OOB to chair past shift: 0 Cardiac:  
Cardiac Monitoring: Yes     
Cardiac Rhythm: Normal sinus rhythm Access:  
Current line(s): PIV Genitourinary:  
Urinary status: anuric Respiratory:  
O2 Device: Heated, Hi flow nasal cannula Chronic home O2 use?: NO Incentive spirometer at bedside: YES 
  
GI: 
Last Bowel Movement Date: 01/01/21 Current diet:  DIET ONE TIME MESSAGE 
DIET ONE TIME MESSAGE 
DIET DIABETIC CONSISTENT CARB Regular; 2 GM NA (House Low NA); FR 1200ML; Low Phosphorus, Low Potassium DIET ONE TIME MESSAGE Passing flatus: YES Tolerating current diet: YES 
% Diet Eaten: 75 % Pain Management:  
Patient states pain is manageable on current regimen: YES Skin: 
Ranjeet Score: 18 Interventions: PT/OT consult and limit briefs Patient Safety: 
Fall Score: Total Score: 4 Interventions: bed/chair alarm, gripper socks and pt to call before getting OOB High Fall Risk: Yes Length of Stay: 
Expected LOS: 5d 12h Actual LOS: 8 Alton Yoo

## 2021-01-03 NOTE — PROGRESS NOTES
Hospitalist Progress Note NAME: Luis Dubose :  1985 MRN:  [id number] Assessment / Plan: 
Acute Hypoxic Respiratory Failure due to COVID-19 Pneumonia COVID-19 Positive Rapid response  
-intermittent hypoxia on high flow 
-bipap prn 
-worsening b/l infiltrated on CXR  
-worsening bibasilar infiltrated on CXR noted 
-Rapid Covid test positive 2020 
-Not remdesivir candidate due to ESRD 
-empiric abx with azithro/CTXa 
-Continue zinc and vitamin C 
-cont decadron 
-pulm consullt appreciated 
-high risk for further decompensation 
-cont decadron ESRD 
-Received temporizing measures in the ED 
-Hyperkalemia resolved 
-HD today, will be TTS now d/t COVID + status, usually MWF 
-nephrology consulted 
  
Hypertension 
-Continue amlodipine, metoprolol 
-As needed hydralazine 
  
Type 2 Diabetes 
-Increase Lantus to 15 units nightly 
-Continue mealtime lispro 4 units 3 times daily 
-SSI with POC's Hx of Atrial Flutter 
-s/p Afib ablation 10/2020 
-completed 4 weeks OAC 
-NSR on admission EKG 
  
CAD s/p stents PAD 
-Continue statin, aspirin, Plavix, and neurontin 
-S/P right BKA 2020 
  
Diarrhea secondary to antibiotic therapy -Oma Q daily Discussed care with his JUVENTINO over the phone 
  
40 or above Morbid obesity / Body mass index is 44.33 kg/m². 
  
Code status: Full Prophylaxis: Hep SQ Subjective: Chief Complaint / Reason for Physician Visit Worsening hypoxia on high flow overnight. Discussed with RN events overnight. Review of Systems: 
Symptom Y/N Comments  Symptom Y/N Comments Fever/Chills n   Chest Pain n   
Poor Appetite n   Edema Cough y   Abdominal Pain n   
Sputum n   Joint Pain SOB/LITTLE y   Pruritis/Rash Nausea/vomit n   Tolerating PT/OT Diarrhea n   Tolerating Diet Constipation n   Other Could NOT obtain due to:   
 
Objective: VITALS:  
Last 24hrs VS reviewed since prior progress note. Most recent are: Patient Vitals for the past 24 hrs: 
 Temp Pulse Resp BP SpO2  
01/02/21 1947 99 °F (37.2 °C) 80 24 (!) 141/75 90 % 01/02/21 1536 99.9 °F (37.7 °C) 91 26 132/78 96 % 01/02/21 1318 99.5 °F (37.5 °C) 84 20 (!) 160/93 96 % 01/02/21 1245 99 °F (37.2 °C) 81 19 116/76 92 % 01/02/21 1230  74 19 (!) 95/58 (!) 85 % 01/02/21 1215  77 18 (!) 103/53 95 % 01/02/21 1200  74 27 101/60 90 % 01/02/21 1145  75 22 (!) 100/58 92 % 01/02/21 1130  75 21 130/76 92 % 01/02/21 1115  75 23 (!) 160/112 (!) 88 % 01/02/21 1100  72 22 125/79 (!) 86 % 01/02/21 1052 98.6 °F (37 °C) 74 16 138/79 92 % 01/02/21 1030  70 25 132/78 (!) 88 % 01/02/21 1015  70 24 (!) 142/81 (!) 87 % 01/02/21 1000  70 22 136/82   
01/02/21 0951     93 % 01/02/21 0945  72 24 129/84 (!) 87 % 01/02/21 0930  69 18 136/86 (!) 89 % 01/02/21 0915  69 18 (!) 147/90 (!) 88 % 01/02/21 0900  70 21 (!) 144/85 (!) 88 % 01/02/21 0845 98.5 °F (36.9 °C) 71 19 (!) 153/84 91 % 01/02/21 0805 98.5 °F (36.9 °C) 73 20 (!) 155/91 91 % 01/02/21 0340 98.5 °F (36.9 °C) 70 19 (!) 149/83 94 % 01/02/21 0100  70 17  95 % 01/01/21 2352  70 22  95 % Intake/Output Summary (Last 24 hours) at 1/2/2021 2337 Last data filed at 1/2/2021 1721 Gross per 24 hour Intake 720 ml Output 4500 ml Net -3780 ml I had a face to face encounter and independently examined this patient on 1/2/2021, as outlined below: PHYSICAL EXAM: 
General: WD, WN. Alert, cooperative, no acute distress EENT:  EOMI. Anicteric sclerae. MMM Resp:  Shallow inpspirations, bibasilar crackles. No accessory muscle use CV:  Regular  rhythm,  No edema GI:  Soft, Non distended, Non tender. +Bowel sounds Neurologic:  Alert and oriented X 3, normal speech, Psych:   Not anxious nor agitated Skin:  No rashes. No jaundice Reviewed most current lab test results and cultures  YES Reviewed most current radiology test results   YES 
 Review and summation of old records today    NO Reviewed patient's current orders and MAR    YES 
PMH/SH reviewed - no change compared to H&P 
________________________________________________________________________ Care Plan discussed with: 
  Comments Patient x Family RN x Care Manager Consultant     
                 x Multidiciplinary team rounds were held today with , nursing, pharmacist and clinical coordinator. Patient's plan of care was discussed; medications were reviewed and discharge planning was addressed. ________________________________________________________________________ Total NON critical care TIME: 35   Minutes Total CRITICAL CARE TIME Spent:   Minutes non procedure based Comments >50% of visit spent in counseling and coordination of care    
________________________________________________________________________ Adrien Jimmie, DO  
 
Procedures: see electronic medical records for all procedures/Xrays and details which were not copied into this note but were reviewed prior to creation of Plan. LABS: 
I reviewed today's most current labs and imaging studies. Pertinent labs include: 
Recent Labs 01/02/21 
1145 01/01/21 0334 WBC 6.4 5.0 HGB 11.6* 11.3* HCT 35.7* 35.9*  
 234 Recent Labs 01/02/21 
1145 01/01/21 0334 12/31/20 
0740 * 132* 130*  
K 5.3* 5.1 4.9 CL 92* 94* 92* CO2 23 26 23 * 245* 263* * 71* 97* CREA 13.80* 11.30* 13.90* CA 8.8 8.7 8.0*  
PHOS 10.4*  --   --   
 
 
Signed: Adrien Samuel DO

## 2021-01-04 NOTE — PROGRESS NOTES
End of Shift Note Bedside shift change report given to Melissa Ann (oncoming nurse) by Alessia Khanna (offgoing nurse). Report included the following information SBAR, Kardex, Intake/Output and Recent Results Shift worked:  7p-7a Shift summary and any significant changes:  
  desaturation is positional, sats higher when on his side vs lying on his back Concerns for physician to address:   
  
Zone phone for oncoming shift:   [phone] Activity: 
Activity Level: Bed Rest 
Number times ambulated in hallways past shift: 0 Number of times OOB to chair past shift: 0 Cardiac:  
Cardiac Monitoring: Yes     
Cardiac Rhythm: Normal sinus rhythm Access:  
Current line(s): PIV Genitourinary:  
Urinary status: voiding Respiratory:  
O2 Device: Heated, Hi flow nasal cannula Chronic home O2 use?: NO Incentive spirometer at bedside: NO 
  
GI: 
Last Bowel Movement Date: 01/01/21 Current diet:  DIET ONE TIME MESSAGE 
DIET ONE TIME MESSAGE 
DIET DIABETIC CONSISTENT CARB Regular; 2 GM NA (House Low NA); FR 1200ML; Low Phosphorus, Low Potassium DIET ONE TIME MESSAGE Passing flatus: YES Tolerating current diet: YES 
% Diet Eaten: 90 % Pain Management:  
Patient states pain is manageable on current regimen: N/A Skin: 
Ranjeet Score: 18 Interventions: speciality bed, increase time out of bed and PT/OT consult Patient Safety: 
Fall Score: Total Score: 4 Interventions: bed/chair alarm, assistive device (walker, cane, etc), gripper socks and pt to call before getting OOB High Fall Risk: Yes Length of Stay: 
Expected LOS: 5d 12h Actual LOS: 9 Alessia Khanna

## 2021-01-04 NOTE — PROGRESS NOTES
Bedside and Verbal shift change report given to Raeann Ramires RN (oncoming nurse) by Db Martinez RN (offgoing nurse). Report included the following information SBAR, Kardex, Procedure Summary, Intake/Output, Recent Results, Med Rec Status and Cardiac Rhythm NSR.

## 2021-01-04 NOTE — PROGRESS NOTES
Hospitalist Progress Note NAME: Jolly Malik :  1985 MRN:  [id number] Assessment / Plan: 
Acute Hypoxic Respiratory Failure due to COVID-19 Pneumonia COVID-19 Positive 
-still intermittently hypoxic into mid 80s 
-bipap qhs and prn during the day 
-worsening b/l infiltrated on CXR  
-worsening bibasilar infiltrated on CXR noted 
-Rapid Covid test positive 2020 
-Not remdesivir candidate due to ESRD 
-empiric abx with azithro/CTXa 
-Continue zinc and vitamin C 
-cont decadron 
-pulm consullt appreciated 
-high risk for further decompensation 
-cont decadron ESRD 
-Received temporizing measures in the ED 
-Hyperkalemia resolved 
-HD today, will be TTS now d/t COVID + status, usually MWF 
-nephrology consulted 
  
Hypertension 
-Continue amlodipine, metoprolol 
-As needed hydralazine 
  
Type 2 Diabetes 
-Increase Lantus to 15 units nightly 
-Continue mealtime lispro 4 units 3 times daily 
-SSI with POC's Hx of Atrial Flutter 
-s/p Afib ablation 10/2020 
-completed 4 weeks OAC 
-NSR on admission EKG 
  
CAD s/p stents PAD 
-Continue statin, aspirin, Plavix, and neurontin 
-S/P right BKA 2020 
  
Diarrhea secondary to antibiotic therapy -Oma Q daily Discussed care with his JUVENTINO over the phone 
  
40 or above Morbid obesity / Body mass index is 44.33 kg/m². 
  
Code status: Full Prophylaxis: Hep SQ Subjective: Chief Complaint / Reason for Physician Visit Continues to have intermittent hypoxia. Sleeping initially and arouses easily Discussed with RN events overnight. Review of Systems: 
Symptom Y/N Comments  Symptom Y/N Comments Fever/Chills n   Chest Pain n   
Poor Appetite n   Edema Cough y   Abdominal Pain n   
Sputum n   Joint Pain SOB/LITTLE y   Pruritis/Rash Nausea/vomit n   Tolerating PT/OT Diarrhea n   Tolerating Diet Constipation n   Other Could NOT obtain due to:   
 
Objective: VITALS:  
 Last 24hrs VS reviewed since prior progress note. Most recent are: 
Patient Vitals for the past 24 hrs: 
 Temp Pulse Resp BP SpO2  
01/03/21 1528 98.5 °F (36.9 °C) 72 25 134/88 91 % 01/03/21 1123 98.6 °F (37 °C) 73 17 (!) 137/94 92 % 01/03/21 1122  72 15    
01/03/21 0805 98.7 °F (37.1 °C) 73 22 (!) 143/91 90 % 01/03/21 0048     94 % 01/03/21 0028     93 % 01/02/21 2315     91 % 01/02/21 2309     (!) 88 % 01/02/21 2304 98.3 °F (36.8 °C) 75 24 (!) 153/86 (!) 84 % Intake/Output Summary (Last 24 hours) at 1/3/2021 2203 Last data filed at 1/3/2021 1258 Gross per 24 hour Intake 240 ml Output  Net 240 ml I had a face to face encounter and independently examined this patient on 1/3/2021, as outlined below: PHYSICAL EXAM: 
General: WD, WN. Alert, cooperative, no acute distress EENT:  EOMI. Anicteric sclerae. MMM Resp:  Shallow inpspirations, bibasilar crackles. No accessory muscle use CV:  Regular  rhythm,  No edema GI:  Soft, Non distended, Non tender. +Bowel sounds Neurologic:  Alert and oriented X 3, normal speech, Psych:   Not anxious nor agitated Skin:  No rashes. No jaundice Reviewed most current lab test results and cultures  YES Reviewed most current radiology test results   YES Review and summation of old records today    NO Reviewed patient's current orders and MAR    YES 
PMH/SH reviewed - no change compared to H&P 
________________________________________________________________________ Care Plan discussed with: 
  Comments Patient x Family RN x Care Manager Consultant     
                 x Multidiciplinary team rounds were held today with , nursing, pharmacist and clinical coordinator. Patient's plan of care was discussed; medications were reviewed and discharge planning was addressed. ________________________________________________________________________ Total NON critical care TIME: 35   Minutes Total CRITICAL CARE TIME Spent:   Minutes non procedure based Comments >50% of visit spent in counseling and coordination of care    
________________________________________________________________________ Gary Booker DO  
 
Procedures: see electronic medical records for all procedures/Xrays and details which were not copied into this note but were reviewed prior to creation of Plan. LABS: 
I reviewed today's most current labs and imaging studies. Pertinent labs include: 
Recent Labs 01/03/21 
3124 01/02/21 
1145 01/01/21 
0334 WBC 6.9 6.4 5.0 HGB 11.8* 11.6* 11.3* HCT 37.0 35.7* 35.9*  
 277 234 Recent Labs 01/03/21 
9098 01/02/21 
1145 01/01/21 
0334 * 132* 132* K 4.9 5.3* 5.1 CL 92* 92* 94* CO2 26 23 26 * 178* 245* BUN 72* 109* 71* CREA 10.40* 13.80* 11.30* CA 8.9 8.8 8.7 PHOS  --  10.4*  --   
 
 
Signed: Gary Booker DO

## 2021-01-04 NOTE — PROGRESS NOTES
Hospitalist Progress Note NAME: Rema Hdz :  1985 MRN:  [id number] Assessment / Plan: 
Acute Hypoxic Respiratory Failure due to COVID-19 Pneumonia COVID-19 Positive 
-still intermittently hypoxic into mid 80s 
-bipap qhs and prn during the day 
-has destarated with movement in bed during day but otherwise has maintained sats on high flow. Able to carryon conversation without distress during encounter 
-probably undiagnosed PANTERA -bipap at night if he can tolerate, discussed with patient  
-pulm following, appreciate assistance 
-worsening b/l infiltrated on CXR  
-worsening bibasilar infiltrated on CXR noted 
-Rapid Covid test positive 2020 
-Not remdesivir candidate due to ESRD 
-empiric abx with azithro/CTXa 
-Continue zinc and vitamin C 
-cont decadron 
-high risk for further decompensation ESRD 
-Received temporizing measures in the ED 
-Hyperkalemia resolved 
-HD as per nephro, will be TTS now d/t COVID + status, usually MWF 
-appreciate nephrology consult 
  
Hypertension 
-Continue amlodipine, metoprolol 
-As needed hydralazine 
  
Type 2 Diabetes 
-Increase Lantus to 15 units nightly 
-Continue mealtime lispro 4 units 3 times daily 
-SSI with POC's Hx of Atrial Flutter 
-s/p Afib ablation 10/2020 
-completed 4 weeks OAC 
-NSR on admission EKG 
  
CAD s/p stents PAD 
-Continue statin, aspirin, Plavix, and neurontin 
-S/P right BKA 2020 
-reports some pain RLE stump. No fluctuance, drainage 
-monitor  
-prn tylenol for now I 
  
Diarrhea secondary to antibiotic therapy -Oma Q daily Discussed care with his JUVENTINO over the phone 
  
40 or above Morbid obesity / Body mass index is 44.33 kg/m². 
  
Code status: Full Prophylaxis: Hep SQ Subjective: Chief Complaint / Reason for Physician Visit Some throbbing pain on Rt LE stump Discussed with RN events overnight. Review of Systems: 
Symptom Y/N Comments  Symptom Y/N Comments Fever/Chills n   Chest Pain n   
Poor Appetite n   Edema Cough y   Abdominal Pain n   
Sputum n   Joint Pain SOB/LITTLE y   Pruritis/Rash Nausea/vomit n   Tolerating PT/OT Diarrhea n   Tolerating Diet Constipation n   Other Could NOT obtain due to:   
 
Objective: VITALS:  
Last 24hrs VS reviewed since prior progress note. Most recent are: 
Patient Vitals for the past 24 hrs: 
 Temp Pulse Resp BP SpO2  
01/04/21 0829     92 % 01/04/21 0828     90 % 01/04/21 0823 98.4 °F (36.9 °C) 83 20 137/60 (!) 76 % 01/04/21 0432 97.9 °F (36.6 °C) 78 22 (!) 153/88 (!) 89 % 01/03/21 2324 98.2 °F (36.8 °C) 71 20 (!) 178/103 96 % 01/03/21 1957 97.9 °F (36.6 °C) 70 22 (!) 174/107 94 % 01/03/21 1528 98.5 °F (36.9 °C) 72 25 134/88 91 % 01/03/21 1123 98.6 °F (37 °C) 73 17 (!) 137/94 92 % 01/03/21 1122  72 15   Intake/Output Summary (Last 24 hours) at 1/4/2021 9887 Last data filed at 1/3/2021 0178 Gross per 24 hour Intake 360 ml Output  Net 360 ml I had a face to face encounter and independently examined this patient on 1/4/2021, as outlined below: PHYSICAL EXAM: 
General: WD, WN. Alert, cooperative, no acute distress EENT:  EOMI. Anicteric sclerae. MMM Resp:  Shallow inpspirations, bibasilar crackles. No accessory muscle use CV:  Regular  rhythm,  No edema GI:  Soft, Non distended, Non tender. +Bowel sounds Neurologic:  Alert and oriented X 3, normal speech, Psych:   Not anxious nor agitated Skin:  No rashes. No jaundice Reviewed most current lab test results and cultures  YES Reviewed most current radiology test results   YES Review and summation of old records today    NO Reviewed patient's current orders and MAR    YES 
PMH/SH reviewed - no change compared to H&P 
________________________________________________________________________ Care Plan discussed with: 
  Comments Patient x Family RN x Care Manager Consultant x Multidiciplinary team rounds were held today with , nursing, pharmacist and clinical coordinator. Patient's plan of care was discussed; medications were reviewed and discharge planning was addressed. ________________________________________________________________________ Total NON critical care TIME: 35   Minutes Total CRITICAL CARE TIME Spent:   Minutes non procedure based Comments >50% of visit spent in counseling and coordination of care    
________________________________________________________________________ Carlos Alberto Rosado DO  
 
Procedures: see electronic medical records for all procedures/Xrays and details which were not copied into this note but were reviewed prior to creation of Plan. LABS: 
I reviewed today's most current labs and imaging studies. Pertinent labs include: 
Recent Labs 01/03/21 0313 01/02/21 
1145 WBC 6.9 6.4 HGB 11.8* 11.6* HCT 37.0 35.7*  277 Recent Labs 01/03/21 0313 01/02/21 
1145 * 132* K 4.9 5.3*  
CL 92* 92* CO2 26 23 * 178* BUN 72* 109* CREA 10.40* 13.80* CA 8.9 8.8 PHOS  --  10.4* Signed: Carlos Alberto Rosado DO

## 2021-01-04 NOTE — PROGRESS NOTES
[facility] Name: Amanda Zepeda MRN: [id number] : 1985 Hospital: [facility] Date: 2021 IMPRESSION:  
· Acute Hypoxic Respiratory Failure · Covid Pneumonia, bilateral pulmonary infiltrates. Was positive on . · Obese · Diarrhea · CAD · PAD · A flutter · T2DM. · ESRD, on dialysis today. RECOMMENDATIONS:  
· On heated HF O2 
· Not candidate for Remdesivir due to ESRD · Agree with current treatments. · ON Zithromax  And Ctx · On vit c and zinc 
· On decadron · DVT prophylaxis Subjective: No acute events overnight No acute distress No acute complaints Current Facility-Administered Medications Medication Dose Route Frequency  dexamethasone (DECADRON) 4 mg/mL injection 4 mg  4 mg IntraVENous DAILY And  
 insulin NPH (NOVOLIN N, HUMULIN N) injection 20 Units  20 Units SubCUTAneous DAILY  insulin glargine (LANTUS) injection 22 Units  22 Units SubCUTAneous QHS  amLODIPine (NORVASC) tablet 10 mg  10 mg Oral DAILY  insulin lispro (HUMALOG) injection 4 Units  4 Units SubCUTAneous TIDAC  metoprolol tartrate (LOPRESSOR) tablet 100 mg  100 mg Oral BID  lactobac ac& pc-s.therm-b.anim (USMAN Q/RISAQUAD)  1 Cap Oral DAILY  aspirin delayed-release tablet 81 mg  81 mg Oral DAILY  atorvastatin (LIPITOR) tablet 20 mg  20 mg Oral QHS  clopidogreL (PLAVIX) tablet 75 mg  75 mg Oral DAILY  gabapentin (NEURONTIN) capsule 300 mg  300 mg Oral QHS  sevelamer carbonate (RENVELA) tab 800 mg  800 mg Oral TID WITH MEALS  sodium chloride (NS) flush 5-40 mL  5-40 mL IntraVENous Q8H  
 heparin (porcine) injection 7,500 Units  7,500 Units SubCUTAneous Q8H  
 ascorbic acid (vitamin C) (VITAMIN C) tablet 1,000 mg  1,000 mg Oral DAILY  zinc sulfate (ZINCATE) 220 (50) mg capsule 220 mg  220 mg Oral DAILY  insulin lispro (HUMALOG) injection   SubCUTAneous AC&HS Review of Systems: A comprehensive review of systems was negative. Objective:  
Vital Signs:   
Visit Vitals /60 (BP 1 Location: Right arm, BP Patient Position: At rest) Pulse 83 Temp 98.4 °F (36.9 °C) Resp 20 Ht 6' 2\" (1.88 m) Wt 143.4 kg (316 lb 1.6 oz) SpO2 92% BMI 40.58 kg/m² O2 Device: Heated, Hi flow nasal cannula O2 Flow Rate (L/min): 60 l/min Temp (24hrs), Av.3 °F (36.8 °C), Min:97.9 °F (36.6 °C), Max:98.6 °F (37 °C) Intake/Output:  
Last shift:      No intake/output data recorded. Last 3 shifts:  1901 -  0700 In: 480 [P.O.:480] Out: - Intake/Output Summary (Last 24 hours) at 2021 5708 Last data filed at 1/3/2021 7499 Gross per 24 hour Intake 360 ml Output  Net 360 ml Physical Exam:  
General:  Alert, cooperative, no distress, appears stated age. POx is 91% on high flow oxygen. Head:  Normocephalic, without obvious abnormality, atraumatic. Eyes:  Conjunctivae/corneas clear. PERRL, EOMs intact. Nose: Nares normal. Septum midline. Mucosa normal. No drainage or sinus tenderness. Throat: Lips, mucosa, and tongue normal. Teeth and gums normal.  
Neck: Supple, symmetrical, trachea midline, no adenopathy, thyroid: no enlargment/tenderness/nodules, no carotid bruit and no JVD. Back:   Symmetric, no curvature. ROM normal.  
Lungs:   Clear to auscultation bilaterally. Chest wall:  No tenderness or deformity. Heart:  Regular rate and rhythm, S1, S2 normal, no murmur, click, rub or gallop. Abdomen:   Soft, non-tender. Bowel sounds normal. No masses,  No organomegaly. Extremities: Extremities normal, atraumatic, no cyanosis or edema. Pulses: 2+ and symmetric all extremities. Skin: Skin color, texture, turgor normal. No rashes or lesions Lymph nodes: Cervical, supraclavicular, and axillary nodes normal.  
Neurologic: Grossly nonfocal  
 
Data review: Recent Results (from the past 24 hour(s)) GLUCOSE, POC Collection Time: 01/03/21 11:20 AM  
Result Value Ref Range Glucose (POC) 140 (H) 65 - 100 mg/dL Performed by Nba Sehehan GLUCOSE, POC Collection Time: 01/03/21  4:42 PM  
Result Value Ref Range Glucose (POC) 223 (H) 65 - 100 mg/dL Performed by Zak SCOTT GLUCOSE, POC Collection Time: 01/03/21 10:09 PM  
Result Value Ref Range Glucose (POC) 239 (H) 65 - 100 mg/dL Performed by Martina Wilder RN   
GLUCOSE, POC Collection Time: 01/04/21  8:05 AM  
Result Value Ref Range Glucose (POC) 179 (H) 65 - 100 mg/dL Performed by Art De La Paz PCT Imaging: 
I have personally reviewed the patients radiographs and have reviewed the reports: 
  
 
  
Genoveva Youssef MD

## 2021-01-04 NOTE — PROGRESS NOTES
NAME: Kamilla Schaffer :  1985 MRN:  [id number] Assessment :    Plan: 
--ESKD Hyperkalemia COVID + Hypoxia HTN, uncontrolled Anemia of ESKD 
 
PAD --[facility].  Now TTS to go to [facility] due to [place]. No HD today. Plan HD Tuesday. 2 K bath with HD, low K diet. FR and low salt intake 
  
No FARIDA (secondary to covid and hgb > 10) 
  
  
 
 
Subjective: Chief Complaint:  RN reports dyspnea. Seen remotely to limit spread of covid and to conserve ppe. Review of Systems: 
 
Symptom Y/N Comments  Symptom Y/N Comments Fever/Chills    Chest Pain Poor Appetite    Edema Cough    Abdominal Pain Sputum    Joint Pain SOB/LITTLE    Pruritis/Rash Nausea/vomit    Tolerating PT/OT Diarrhea    Tolerating Diet Constipation    Other Could not obtain due to:   
 
Objective: VITALS:  
Last 24hrs VS reviewed since prior progress note. Most recent are: 
Visit Vitals BP (!) 178/103 (BP Patient Position: At rest) Pulse 71 Temp 98.2 °F (36.8 °C) Resp 20 Ht 6' 2\" (1.88 m) Wt 141.4 kg (311 lb 11.2 oz) SpO2 96% BMI 40.02 kg/m² Intake/Output Summary (Last 24 hours) at 2021 3868 Last data filed at 1/3/2021 4582 Gross per 24 hour Intake 480 ml Output  Net 480 ml Telemetry Reviewed: PHYSICAL EXAM: 
General: NAD Lab Data Reviewed: (see below) Medications Reviewed: (see below) PMH/SH reviewed - no change compared to H&P 
________________________________________________________________________ Care Plan discussed with: 
Patient Family RN Care Manager Consultant:     
 
  Comments >50% of visit spent in counseling and coordination of care    
 
________________________________________________________________________ Sarthak Barry MD  
 
 Procedures: see electronic medical records for all procedures/Xrays and details which 
were not copied into this note but were reviewed prior to creation of Plan. LABS: 
Recent Labs 01/03/21 0313 01/02/21 
1145 WBC 6.9 6.4 HGB 11.8* 11.6* HCT 37.0 35.7*  277 Recent Labs 01/03/21 0313 01/02/21 
1145 * 132* K 4.9 5.3*  
CL 92* 92* CO2 26 23 BUN 72* 109* CREA 10.40* 13.80* * 178* CA 8.9 8.8 PHOS  --  10.4* No results for input(s): AP, TBIL, TP, ALB, GLOB, GGT, AML, LPSE in the last 72 hours. No lab exists for component: SGOT, GPT, AMYP, HLPSE No results for input(s): INR, PTP, APTT, INREXT, INREXT in the last 72 hours. Recent Labs 01/03/21 0313 01/02/21 
1145 FERR 4,178* 5,038* No results found for: FOL, RBCF No results for input(s): PH, PCO2, PO2 in the last 72 hours. No results for input(s): CPK, CKMB in the last 72 hours. No lab exists for component: TROPONINI No components found for: [place] Lab Results Component Value Date/Time Color YELLOW/STRAW 05/01/2017 05:39 PM  
 Appearance CLOUDY (A) 05/01/2017 05:39 PM  
 Specific gravity 1.018 05/01/2017 05:39 PM  
 Specific gravity >1.030 (H) 10/03/2014 04:35 AM  
 pH (UA) 5.5 05/01/2017 05:39 PM  
 Protein 300 (A) 05/01/2017 05:39 PM  
 Glucose 250 (A) 05/01/2017 05:39 PM  
 Ketone NEGATIVE  05/01/2017 05:39 PM  
 Bilirubin NEGATIVE  05/01/2017 05:39 PM  
 Urobilinogen 0.2 05/01/2017 05:39 PM  
 Nitrites NEGATIVE  05/01/2017 05:39 PM  
 Leukocyte Esterase NEGATIVE  05/01/2017 05:39 PM  
 Epithelial cells FEW 05/01/2017 05:39 PM  
 Bacteria NEGATIVE  05/01/2017 05:39 PM  
 WBC 0-4 05/01/2017 05:39 PM  
 RBC 5-10 05/01/2017 05:39 PM  
 
 
MEDICATIONS: 
Current Facility-Administered Medications Medication Dose Route Frequency  dexamethasone (DECADRON) 4 mg/mL injection 4 mg  4 mg IntraVENous DAILY  And  
  insulin NPH (NOVOLIN N, HUMULIN N) injection 20 Units  20 Units SubCUTAneous DAILY  insulin glargine (LANTUS) injection 22 Units  22 Units SubCUTAneous QHS  guaiFENesin-dextromethorphan (ROBITUSSIN DM) 100-10 mg/5 mL syrup 10 mL  10 mL Oral Q6H PRN  
 sodium chloride (OCEAN) 0.65 % nasal squeeze bottle 2 Spray  2 Spray Both Nostrils Q2H PRN  
 amLODIPine (NORVASC) tablet 10 mg  10 mg Oral DAILY  insulin lispro (HUMALOG) injection 4 Units  4 Units SubCUTAneous TIDAC  metoprolol tartrate (LOPRESSOR) tablet 100 mg  100 mg Oral BID  hydrALAZINE (APRESOLINE) 20 mg/mL injection 20 mg  20 mg IntraVENous Q6H PRN  
 lactobac ac& pc-s.therm-b.anim (USMAN Q/RISAQUAD)  1 Cap Oral DAILY  aspirin delayed-release tablet 81 mg  81 mg Oral DAILY  atorvastatin (LIPITOR) tablet 20 mg  20 mg Oral QHS  clopidogreL (PLAVIX) tablet 75 mg  75 mg Oral DAILY  gabapentin (NEURONTIN) capsule 300 mg  300 mg Oral QHS  oxyCODONE IR (ROXICODONE) tablet 5 mg  5 mg Oral Q4H PRN  
 sevelamer carbonate (RENVELA) tab 800 mg  800 mg Oral TID WITH MEALS  sodium chloride (NS) flush 5-40 mL  5-40 mL IntraVENous Q8H  
 sodium chloride (NS) flush 5-40 mL  5-40 mL IntraVENous PRN  
 acetaminophen (TYLENOL) tablet 650 mg  650 mg Oral Q6H PRN Or  
 acetaminophen (TYLENOL) suppository 650 mg  650 mg Rectal Q6H PRN  polyethylene glycol (MIRALAX) packet 17 g  17 g Oral DAILY PRN  promethazine (PHENERGAN) tablet 12.5 mg  12.5 mg Oral Q6H PRN Or  
 ondansetron (ZOFRAN) injection 4 mg  4 mg IntraVENous Q6H PRN  
 heparin (porcine) injection 7,500 Units  7,500 Units SubCUTAneous Q8H  
 ascorbic acid (vitamin C) (VITAMIN C) tablet 1,000 mg  1,000 mg Oral DAILY  zinc sulfate (ZINCATE) 220 (50) mg capsule 220 mg  220 mg Oral DAILY  insulin lispro (HUMALOG) injection   SubCUTAneous AC&HS  
 glucose chewable tablet 16 g  4 Tab Oral PRN  
 dextrose (D50W) injection syrg 12.5-25 g  12.5-25 g IntraVENous PRN  
  glucagon (GLUCAGEN) injection 1 mg  1 mg IntraMUSCular PRN

## 2021-01-05 NOTE — PROGRESS NOTES
0700H- Bedside and Verbal shift change report given to МАРИЯ (oncoming nurse) by Viktor Fernandez (offgoing nurse). Report included the following information SBAR, Kardex, ED Summary, Procedure Summary, Intake/Output, MAR and Recent Results. 0830H- Seen and examined by Pulmonologist with orders noted 0800H- HD started. Hemodynamically stable. 1245H- HD terminated. Hemodynamically stable. End of Shift Note Bedside shift change report given to BEM (oncoming nurse) by Venus Hnies (offgoing nurse). Report included the following information SBAR, Kardex, ED Summary, Intake/Output, MAR, Recent Results and Med Rec Status Shift worked:  0700- 1900 Shift summary and any significant changes:  
  02 SATURATION 85-89% Concerns for physician to address:  02 SATURATION 85-89% Zone phone for oncoming shift:    
  
 
Activity: 
Activity Level: Bed Rest 
Number times ambulated in hallways past shift: 0 Number of times OOB to chair past shift: 0 Cardiac:  
Cardiac Monitoring: Yes     
Cardiac Rhythm: Normal sinus rhythm Access:  
Current line(s): PIV Genitourinary:  
Urinary status: anuric Respiratory:  
O2 Device: Heated, Hi flow nasal cannula Chronic home O2 use?: N/A Incentive spirometer at bedside: YES 
  
GI: 
Last Bowel Movement Date: 01/05/21 Current diet:  DIET DIABETIC CONSISTENT CARB Regular; 2 GM NA (House Low NA); FR 1200ML; Low Phosphorus, Low Potassium DIET NUTRITIONAL SUPPLEMENTS Breakfast, Dinner; Gelatein 20 Passing flatus: YES Tolerating current diet: YES 
% Diet Eaten: 0 % Pain Management:  
Patient states pain is manageable on current regimen: N/A Skin: 
Ranjeet Score: 15 Interventions: increase time out of bed and PT/OT consult Patient Safety: 
Fall Score: Total Score: 3 Interventions: pt to call before getting OOB High Fall Risk: Yes Length of Stay: 
Expected LOS: 5d 12h Actual LOS: Rufino Verduzco.

## 2021-01-05 NOTE — PROCEDURES
[facility] Dialysis Team [place]  [phone] Vitals   Pre   Post   Assessment   Pre   Post    
Temp  Temp: 98.5 °F (36.9 °C) (01/05/21 0830)  98.7 LOC  Alert and oriented x4 . No s/s of confusion or discomfort  Alert and oriented x4. No s/s of distress HR   Pulse (Heart Rate): 79 (01/05/21 0830) 86 Lungs   Diminished   Diminished B/P   BP: (!) 167/88 (01/05/21 0830) 112/71 Cardiac   Sinus rhythm   Sinus Rhythm Resp   Resp Rate: 28 (01/05/21 0830) 23 Skin   Warm, and dry   warm and dry Pain level  Pain Intensity 1: 0 (01/05/21 0804) 0 Edema +1 Pitting edema to the right lower extremity and generalized edema  
 +1 pitting edema to the right lower extremity and generalized edema Orders: Reviewed Duration:   Start:    0830 End:    1235 Total:   4.0hrs Dialyzer:   Dialyzer/Set Up Inspection: Venkatesh Waterford (01/05/21 0830) K Bath:   Dialysate K (mEq/L): 2 (01/05/21 0830) Ca Bath:   Dialysate CA (mEq/L): 2.5 (01/05/21 0830) Na/Bicarb:   Dialysate NA (mEq/L): 138 (01/05/21 0830) Target Fluid Removal:   Goal/Amount of Fluid to Remove (mL): 4000 mL (01/05/21 0830) Access  Left AVF , no s/s of infection around the access site. +thrill and +bruit upon assessment. Cannulated with 15g needles x2 with no difficulty. aspirated and flushed easily Type & Location:    Left AVF Labs  Reviewed Obtained/Reviewed Critical Results Called   Date when labs were drawn- 
Hgb-   
HGB Date Value Ref Range Status 01/05/2021 12.9 12.1 - 17.0 g/dL Final  
 
K-   
Potassium Date Value Ref Range Status 01/05/2021 5.5 (H) 3.5 - 5.1 mmol/L Final  
 
Ca-  
Calcium Date Value Ref Range Status 01/05/2021 8.8 8.5 - 10.1 MG/DL Final  
 
Bun-  
BUN Date Value Ref Range Status 01/05/2021 130 (H) 6 - 20 MG/DL Final  
  Comment:  
  INVESTIGATED PER DELTA CHECK PROTOCOL Creat-  
Creatinine Date Value Ref Range Status 01/05/2021 14.70 (H) 0.70 - 1.30 MG/DL Final  
  Comment: INVESTIGATED PER DELTA CHECK PROTOCOL Medications/ Blood Products Given Name   Dose   Route and Time    
none Blood Volume Processed (BVP):    87.8 Net Fluid Removed:  4200ml Comments Time Out Done: 4102 Primary Nurse Rpt Sherren Roux RN 
Primary Nurse Rpt Post:Jie Frausto RN 
Pt Education:Patient educated on fluid management and low potassium diet Care Plan:Continue with HD care plan Tx Summary: 
0830: Treatment initiated with 200ml nss prime given. No  S/s of distress Patient on high flow nasal cannula with 10l oxygen flow. 0945: Patient resting no s/s of distress will continue to monitor patient. 1200: Patient tolerating treatment no complain at this time 1235: Patient treatment completed with 300ml nss rinse back. All possible blood returned and vitals stable. Patient tolerated treatment well with a keen monitoring of oxygen level. Needles removed and hemostasis achieved at 10mins hold. +Thrill and +bruit. SBAR given to primary nurse Alissa Vallejo RN. Admiting Diagnosis: [id number], ESRD Pt's previous clinic- [address] [facility] Consent signed - Informed Consent Verified: Yes (01/05/21 0830) Hepatitis Status- Negative and Susceptible 12/24/2020 Machine #- Machine Number: [id number] (01/05/21 0830) Telemetry status-Monitored remotely

## 2021-01-05 NOTE — PROGRESS NOTES
[facility] Name: Atif Domingo MRN: [id number] : 1985 Hospital: [facility] Date: 2021 IMPRESSION:  
· Acute Hypoxic Respiratory Failure · Covid Pneumonia, bilateral pulmonary infiltrates. Was positive on . · Obese · Diarrhea · CAD · PAD · A flutter · T2DM. · ESRD, on dialysis today. RECOMMENDATIONS:  
· On heated HF O2 
· On cpap at night · Not candidate for Remdesivir due to ESRD · Agree with current treatments. · ON Zithromax  And Ctx · On vit c and zinc 
· On decadron · DVT prophylaxis Subjective: No acute events overnight Tolerating cpap at night No acute distress No acute complaints Current Facility-Administered Medications Medication Dose Route Frequency  dexamethasone (DECADRON) 4 mg/mL injection 4 mg  4 mg IntraVENous DAILY And  
 insulin NPH (NOVOLIN N, HUMULIN N) injection 20 Units  20 Units SubCUTAneous DAILY  insulin glargine (LANTUS) injection 22 Units  22 Units SubCUTAneous QHS  amLODIPine (NORVASC) tablet 10 mg  10 mg Oral DAILY  insulin lispro (HUMALOG) injection 4 Units  4 Units SubCUTAneous TIDAC  metoprolol tartrate (LOPRESSOR) tablet 100 mg  100 mg Oral BID  lactobac ac& pc-s.therm-b.anim (USMAN Q/RISAQUAD)  1 Cap Oral DAILY  aspirin delayed-release tablet 81 mg  81 mg Oral DAILY  atorvastatin (LIPITOR) tablet 20 mg  20 mg Oral QHS  clopidogreL (PLAVIX) tablet 75 mg  75 mg Oral DAILY  gabapentin (NEURONTIN) capsule 300 mg  300 mg Oral QHS  sevelamer carbonate (RENVELA) tab 800 mg  800 mg Oral TID WITH MEALS  sodium chloride (NS) flush 5-40 mL  5-40 mL IntraVENous Q8H  
 heparin (porcine) injection 7,500 Units  7,500 Units SubCUTAneous Q8H  
 ascorbic acid (vitamin C) (VITAMIN C) tablet 1,000 mg  1,000 mg Oral DAILY  zinc sulfate (ZINCATE) 220 (50) mg capsule 220 mg  220 mg Oral DAILY  insulin lispro (HUMALOG) injection   SubCUTAneous AC&HS Review of Systems: A comprehensive review of systems was negative. Objective:  
Vital Signs:   
Visit Vitals BP (!) 167/88 (BP 1 Location: Right arm) Pulse 82 Temp 98.5 °F (36.9 °C) Resp 30 Ht 6' 2\" (1.88 m) Wt 143.4 kg (316 lb 1.6 oz) SpO2 92% BMI 40.58 kg/m² O2 Device: CPAP mask O2 Flow Rate (L/min): 60 l/min Temp (24hrs), Av.8 °F (37.1 °C), Min:98.4 °F (36.9 °C), Max:99.3 °F (37.4 °C) Intake/Output:  
Last shift:      No intake/output data recorded. Last 3 shifts:  1901 -  0700 In: 1300 [P.O.:1300] Out: 0 Intake/Output Summary (Last 24 hours) at 2021 9998 Last data filed at 2021 6945 Gross per 24 hour Intake 1060 ml Output 0 ml Net 1060 ml Physical Exam:  
General:  Alert, cooperative, no distress, appears stated age. POx is 91% on high flow oxygen. Head:  Normocephalic, without obvious abnormality, atraumatic. Eyes:  Conjunctivae/corneas clear. PERRL, EOMs intact. Nose: Nares normal. Septum midline. Mucosa normal. No drainage or sinus tenderness. Throat: Lips, mucosa, and tongue normal. Teeth and gums normal.  
Neck: Supple, symmetrical, trachea midline, no adenopathy, thyroid: no enlargment/tenderness/nodules, no carotid bruit and no JVD. Back:   Symmetric, no curvature. ROM normal.  
Lungs:   Clear to auscultation bilaterally. Chest wall:  No tenderness or deformity. Heart:  Regular rate and rhythm, S1, S2 normal, no murmur, click, rub or gallop. Abdomen:   Soft, non-tender. Bowel sounds normal. No masses,  No organomegaly. Extremities: Extremities normal, atraumatic, no cyanosis or edema. Pulses: 2+ and symmetric all extremities. Skin: Skin color, texture, turgor normal. No rashes or lesions Lymph nodes: Cervical, supraclavicular, and axillary nodes normal.  
Neurologic: Grossly nonfocal  
 
Data review: Recent Results (from the past 24 hour(s)) GLUCOSE, POC Collection Time: 01/04/21 11:36 AM  
Result Value Ref Range Glucose (POC) 284 (H) 65 - 100 mg/dL Performed by [place] PCT   
GLUCOSE, POC Collection Time: 01/04/21  4:32 PM  
Result Value Ref Range Glucose (POC) 223 (H) 65 - 100 mg/dL Performed by [place]ef PCT   
GLUCOSE, POC Collection Time: 01/04/21 10:22 PM  
Result Value Ref Range Glucose (POC) 158 (H) 65 - 100 mg/dL Performed by Rusty Wadsworth RN   
D DIMER Collection Time: 01/05/21  3:32 AM  
Result Value Ref Range D-dimer 8.06 (H) 0.00 - 0.65 mg/L FEU  
CBC WITH AUTOMATED DIFF Collection Time: 01/05/21  3:32 AM  
Result Value Ref Range WBC 15.1 (H) 4.1 - 11.1 K/uL  
 RBC 5.31 4.10 - 5.70 M/uL  
 HGB 12.9 12.1 - 17.0 g/dL HCT 39.4 36.6 - 50.3 % MCV 74.2 (L) 80.0 - 99.0 FL  
 MCH 24.3 (L) 26.0 - 34.0 PG  
 MCHC 32.7 30.0 - 36.5 g/dL  
 RDW 16.9 (H) 11.5 - 14.5 % PLATELET 754 200 - 034 K/uL MPV 11.1 8.9 - 12.9 FL  
 NRBC 0.0 0  WBC ABSOLUTE NRBC 0.00 0.00 - 0.01 K/uL NEUTROPHILS 91 (H) 32 - 75 % LYMPHOCYTES 6 (L) 12 - 49 % MONOCYTES 2 (L) 5 - 13 % EOSINOPHILS 0 0 - 7 % BASOPHILS 0 0 - 1 % IMMATURE GRANULOCYTES 1 (H) 0.0 - 0.5 % ABS. NEUTROPHILS 13.7 (H) 1.8 - 8.0 K/UL  
 ABS. LYMPHOCYTES 0.9 0.8 - 3.5 K/UL  
 ABS. MONOCYTES 0.3 0.0 - 1.0 K/UL  
 ABS. EOSINOPHILS 0.0 0.0 - 0.4 K/UL  
 ABS. BASOPHILS 0.0 0.0 - 0.1 K/UL  
 ABS. IMM. GRANS. 0.2 (H) 0.00 - 0.04 K/UL  
 DF SMEAR SCANNED    
 RBC COMMENTS MICROCYTOSIS 1+ 
    
 RBC COMMENTS HYPOCHROMIA 1+ 
    
 RBC COMMENTS SCHISTOCYTES 1+ METABOLIC PANEL, BASIC Collection Time: 01/05/21  3:32 AM  
Result Value Ref Range Sodium 131 (L) 136 - 145 mmol/L Potassium 5.5 (H) 3.5 - 5.1 mmol/L Chloride 93 (L) 97 - 108 mmol/L  
 CO2 21 21 - 32 mmol/L Anion gap 17 (H) 5 - 15 mmol/L Glucose 113 (H) 65 - 100 mg/dL   (H) 6 - 20 MG/DL  
 Creatinine 14.70 (H) 0.70 - 1.30 MG/DL  
 BUN/Creatinine ratio 9 (L) 12 - 20 GFR est AA 5 (L) >60 ml/min/1.73m2 GFR est non-AA 4 (L) >60 ml/min/1.73m2 Calcium 8.8 8.5 - 10.1 MG/DL  
PHOSPHORUS Collection Time: 01/05/21  3:32 AM  
Result Value Ref Range Phosphorus 9.1 (H) 2.6 - 4.7 MG/DL FIBRINOGEN Collection Time: 01/05/21  3:32 AM  
Result Value Ref Range Fibrinogen >800 (H) 200 - 475 mg/dL GLUCOSE, POC Collection Time: 01/05/21  7:38 AM  
Result Value Ref Range Glucose (POC) 104 (H) 65 - 100 mg/dL Performed by Marcell Silva (KEY) Imaging: 
I have personally reviewed the patients radiographs and have reviewed the reports: 
  
 
  
Marcio Sanchez MD

## 2021-01-05 NOTE — PROGRESS NOTES
1900: Bedside shift change report given to Venkata Minor (oncoming nurse) by Marcos Potter RN (offgoing nurse). Report included the following information SBAR, Kardex, ED Summary, Intake/Output, MAR, Recent Results and Cardiac Rhythm NSR.  
 
2203: Patient sats staying at 83-85%, laying on the right side, sleeping. When awaken, sats increased to 93% on heated hi-flow 60%. 2328: Patient sats staying at 80%, advised patient to lay on the back to see if oxygen improves. Patient stats at 85-88%. Will continue to monitor. 0032: Patient sats staying at 70-83% with heated hi-flow at 60L, 100% fio2. Patient is laying prone. Put on non-rebreather, sats got up to 90%. Will call respiratory to switch patient to CPAP.  
 
0112: Patient switched to CPAP mask. End of Shift Note Bedside shift change report given to [address] [place] [address] Street (oncoming nurse) by Lay Reina RN (offgoing nurse). Report included the following information SBAR, Kardex, Intake/Output, MAR, Recent Results and Cardiac Rhythm NSR Shift worked:  [id number] Shift summary and any significant changes:  
  Patient sats decreased between 70-83% on heated hi-flow. Switched CPAP overnight. Concerns for physician to address:  Oxygen needs Zone phone for [facility] shift:    
  
 
Activity: 
Activity Level: Bed Rest 
Number times ambulated in hallways past shift: 0 Number of times OOB to chair past shift: 0 Cardiac:  
Cardiac Monitoring: Yes     
Cardiac Rhythm: Normal sinus rhythm Access:  
Current line(s): PIV ,HD port Genitourinary:  
Urinary status: anuric Respiratory:  
O2 Device: CPAP mask Chronic home O2 use?: NO Incentive spirometer at bedside: NO 
  
GI: 
Last Bowel Movement Date: 01/04/21 Current diet:  DIET ONE TIME MESSAGE 
DIET ONE TIME MESSAGE 
DIET DIABETIC CONSISTENT CARB Regular; 2 GM NA (House Low NA); FR 1200ML; Low Phosphorus, Low Potassium DIET ONE TIME MESSAGE Passing flatus: YES Tolerating current diet: YES 
 % Diet Eaten: 100 % Pain Management:  
Patient states pain is manageable on current regimen: YES Skin: 
Ranjeet Score: 18 Interventions: speciality bed, PT/OT consult and nutritional support Patient Safety: 
Fall Score: Total Score: 4 Interventions: bed/chair alarm, assistive device (walker, cane, etc), gripper socks, pt to call before getting OOB and stay with me (per policy) High Fall Risk: Yes Length of Stay: 
Expected LOS: 5d 12h Actual LOS: 10 Jorge Perez RN

## 2021-01-05 NOTE — PROGRESS NOTES
NAME: Marcelo Yang :  1985 MRN:  [id number] Assessment :    Plan: 
--ESKD Hyperkalemia COVID + Hypoxia HTN, uncontrolled Anemia of ESKD 
 
PAD --MWF [facility].  Now TTS to go to [facility] on DC due to [place]. HD today. 2 K bath with HD, low K diet. FR and low salt intake 
  
No FARIDA (secondary to covid and hgb > 10) 
  
  
 
 
Subjective: Chief Complaint:  The patient was seen on dialysis at 10:30 AM .  BP is stable. Access is working well. .  Seen remotely to limit spread of covid and to conserve ppe. Review of Systems: 
 
Symptom Y/N Comments  Symptom Y/N Comments Fever/Chills    Chest Pain Poor Appetite    Edema Cough    Abdominal Pain Sputum    Joint Pain SOB/LITTLE    Pruritis/Rash Nausea/vomit    Tolerating PT/OT Diarrhea    Tolerating Diet Constipation    Other Could not obtain due to:   
 
Objective: VITALS:  
Last 24hrs VS reviewed since prior progress note. Most recent are: 
Visit Vitals /82 (BP 1 Location: Right arm, BP Patient Position: At rest;Post activity) Pulse 78 Temp 98.6 °F (37 °C) Resp 18 Ht 6' 2\" (1.88 m) Wt 143.4 kg (316 lb 1.6 oz) SpO2 97% BMI 40.58 kg/m² Intake/Output Summary (Last 24 hours) at 2021 0467 Last data filed at 2021 4149 Gross per 24 hour Intake 1060 ml Output 0 ml Net 1060 ml Telemetry Reviewed: PHYSICAL EXAM: 
General: NAD Lab Data Reviewed: (see below) Medications Reviewed: (see below) PMH/SH reviewed - no change compared to H&P 
________________________________________________________________________ Care Plan discussed with: 
Patient Family RN Care Manager Consultant:     
 
  Comments >50% of visit spent in counseling and coordination of care ________________________________________________________________________ Belem Vasquez MD  
 
Procedures: see electronic medical records for all procedures/Xrays and details which 
were not copied into this note but were reviewed prior to creation of Plan. LABS: 
Recent Labs 01/05/21 
8372 01/03/21 
4755 WBC 15.1* 6.9 HGB 12.9 11.8* HCT 39.4 37.0  299 Recent Labs 01/05/21 
7072 01/03/21 
0313 01/02/21 
1145 * 131* 132*  
K 5.5* 4.9 5.3*  
CL 93* 92* 92* CO2 21 26 23 * 72* 109* CREA 14.70* 10.40* 13.80* * 208* 178* CA 8.8 8.9 8.8 PHOS 9.1*  --  10.4* No results for input(s): AP, TBIL, TP, ALB, GLOB, GGT, AML, LPSE in the last 72 hours. No lab exists for component: SGOT, GPT, AMYP, HLPSE No results for input(s): INR, PTP, APTT, INREXT, INREXT in the last 72 hours. Recent Labs 01/03/21 
0313 01/02/21 
1145 FERR 4,178* 5,038* No results found for: FOL, RBCF No results for input(s): PH, PCO2, PO2 in the last 72 hours. No results for input(s): CPK, CKMB in the last 72 hours. No lab exists for component: TROPONINI No components found for: [place] Lab Results Component Value Date/Time Color YELLOW/STRAW 05/01/2017 05:39 PM  
 Appearance CLOUDY (A) 05/01/2017 05:39 PM  
 Specific gravity 1.018 05/01/2017 05:39 PM  
 Specific gravity >1.030 (H) 10/03/2014 04:35 AM  
 pH (UA) 5.5 05/01/2017 05:39 PM  
 Protein 300 (A) 05/01/2017 05:39 PM  
 Glucose 250 (A) 05/01/2017 05:39 PM  
 Ketone NEGATIVE  05/01/2017 05:39 PM  
 Bilirubin NEGATIVE  05/01/2017 05:39 PM  
 Urobilinogen 0.2 05/01/2017 05:39 PM  
 Nitrites NEGATIVE  05/01/2017 05:39 PM  
 Leukocyte Esterase NEGATIVE  05/01/2017 05:39 PM  
 Epithelial cells FEW 05/01/2017 05:39 PM  
 Bacteria NEGATIVE  05/01/2017 05:39 PM  
 WBC 0-4 05/01/2017 05:39 PM  
 RBC 5-10 05/01/2017 05:39 PM  
 
 
MEDICATIONS: 
Current Facility-Administered Medications Medication Dose Route Frequency  dexamethasone (DECADRON) 4 mg/mL injection 4 mg  4 mg IntraVENous DAILY And  
 insulin NPH (NOVOLIN N, HUMULIN N) injection 20 Units  20 Units SubCUTAneous DAILY  insulin glargine (LANTUS) injection 22 Units  22 Units SubCUTAneous QHS  guaiFENesin-dextromethorphan (ROBITUSSIN DM) 100-10 mg/5 mL syrup 10 mL  10 mL Oral Q6H PRN  
 sodium chloride (OCEAN) 0.65 % nasal squeeze bottle 2 Spray  2 Spray Both Nostrils Q2H PRN  
 amLODIPine (NORVASC) tablet 10 mg  10 mg Oral DAILY  insulin lispro (HUMALOG) injection 4 Units  4 Units SubCUTAneous TIDAC  metoprolol tartrate (LOPRESSOR) tablet 100 mg  100 mg Oral BID  hydrALAZINE (APRESOLINE) 20 mg/mL injection 20 mg  20 mg IntraVENous Q6H PRN  
 lactobac ac& pc-s.therm-b.anim (USMAN Q/RISAQUAD)  1 Cap Oral DAILY  aspirin delayed-release tablet 81 mg  81 mg Oral DAILY  atorvastatin (LIPITOR) tablet 20 mg  20 mg Oral QHS  clopidogreL (PLAVIX) tablet 75 mg  75 mg Oral DAILY  gabapentin (NEURONTIN) capsule 300 mg  300 mg Oral QHS  oxyCODONE IR (ROXICODONE) tablet 5 mg  5 mg Oral Q4H PRN  
 sevelamer carbonate (RENVELA) tab 800 mg  800 mg Oral TID WITH MEALS  sodium chloride (NS) flush 5-40 mL  5-40 mL IntraVENous Q8H  
 sodium chloride (NS) flush 5-40 mL  5-40 mL IntraVENous PRN  
 acetaminophen (TYLENOL) tablet 650 mg  650 mg Oral Q6H PRN Or  
 acetaminophen (TYLENOL) suppository 650 mg  650 mg Rectal Q6H PRN  polyethylene glycol (MIRALAX) packet 17 g  17 g Oral DAILY PRN  promethazine (PHENERGAN) tablet 12.5 mg  12.5 mg Oral Q6H PRN Or  
 ondansetron (ZOFRAN) injection 4 mg  4 mg IntraVENous Q6H PRN  
 heparin (porcine) injection 7,500 Units  7,500 Units SubCUTAneous Q8H  
 ascorbic acid (vitamin C) (VITAMIN C) tablet 1,000 mg  1,000 mg Oral DAILY  zinc sulfate (ZINCATE) 220 (50) mg capsule 220 mg  220 mg Oral DAILY  insulin lispro (HUMALOG) injection   SubCUTAneous AC&HS  
  glucose chewable tablet 16 g  4 Tab Oral PRN  
 dextrose (D50W) injection syrg 12.5-25 g  12.5-25 g IntraVENous PRN  
 glucagon (GLUCAGEN) injection 1 mg  1 mg IntraMUSCular PRN

## 2021-01-05 NOTE — PROGRESS NOTES
.End of Shift Note Bedside shift change report given to [address] [place] [address] (oncoming nurse) by Dipika Ramsey (offgoing nurse). Report included the following information SBAR, Kardex and [facility] Shift worked:  0700 - 1900 Shift summary and any significant changes:  
  none Concerns for physician to address:  DESAT, 02 saturation, continue to monitor 02. Per MD possible BIPAP Zone phone for oncoming shift:   [phone] Activity: 
Activity Level: Bed Rest 
Number times ambulated in hallways past shift: 0 Number of times OOB to chair past shift: 0 Cardiac:  
Cardiac Monitoring: Yes     
Cardiac Rhythm: Normal sinus rhythm Access:  
Current line(s): PIV Genitourinary:  
Urinary status: anuric Respiratory:  
O2 Device: Heated, Hi flow nasal cannula Chronic home O2 use?: NO Incentive spirometer at bedside: NO 
  
GI: 
Last Bowel Movement Date: 01/04/21 Current diet:  DIET ONE TIME MESSAGE 
DIET ONE TIME MESSAGE 
DIET DIABETIC CONSISTENT CARB Regular; 2 GM NA (House Low NA); FR 1200ML; Low Phosphorus, Low Potassium DIET ONE TIME MESSAGE Passing flatus: NO Tolerating current diet: YES 
% Diet Eaten: 100 % Pain Management:  
Patient states pain is manageable on current regimen: YES Skin: 
Ranjeet Score: 18 Interventions: increase time out of bed and PT/OT consult Patient Safety: 
Fall Score: Total Score: 4 Interventions: assist device High Fall Risk: Yes Length of Stay: 
Expected LOS: 5d 12h Actual LOS: 9 Dipika Ramsey

## 2021-01-05 NOTE — PROGRESS NOTES
Hospitalist Progress Note NAME: Erica Hsieh :  1985 MRN:  [id number] Assessment / Plan: 
Acute Hypoxic Respiratory Failure due to COVID-19 Pneumonia COVID-19 Positive 
-still intermittently hypoxic into mid 80s 
-cpap at night, cont as tolerated  
-probably undiagnosed PANTERA, will need eval after discharge  
-pulm following, appreciate assistance 
-worsening bibasilar infiltrated on CXR noted 
-Rapid Covid test positive 2020 
-Not remdesivir candidate due to ESRD 
-s/p empiric azithro/CTXa 
-Continue zinc and vitamin C 
-cont decadron to complete 10 days 
-high risk for further decompensation, on High flow now 60 L, if cont to desat then try BiPAP and if no improvement then intubarion ESRD Hyperkalemia 
-K 5.5 today 
-HD as per nephro, will be TTS now d/t COVID + status, usually MWF 
-appreciate nephrology consult 
  
Hypertension 
-Continue amlodipine, metoprolol 
-As needed hydralazine 
  
Type 2 Diabetes 
-Increased Lantus to 22 units nightly 
-Continue mealtime lispro 4 units 3 times daily 
-SSI with POC's Hx of Atrial Flutter 
-s/p Afib ablation 10/2020 
-completed 4 weeks OAC 
-NSR on admission EKG 
  
CAD s/p stents PAD 
-Continue statin, aspirin, Plavix, and neurontin 
-S/P right BKA 2020 
-reports some pain RLE stump. No fluctuance, drainage 
-monitor  
-prn tylenol for now I 
  
Diarrhea secondary to antibiotic therapy -Oma Q daily 
 
 
  
40 or above Morbid obesity / Body mass index is 44.33 kg/m². 
  
Code status: Full Prophylaxis: Hep SQ Subjective: Chief Complaint / Reason for Physician Visit Discussed with RN events overnight. Patient was seen and examined. No acute events overnight. \"doing fine\" Review of Systems: 
Symptom Y/N Comments  Symptom Y/N Comments Fever/Chills n   Chest Pain n   
Poor Appetite    Edema Cough n   Abdominal Pain n   
Sputum    Joint Pain SOB/LITTLE y   Pruritis/Rash Nausea/vomit n   Tolerating PT/OT Diarrhea    Tolerating Diet y Constipation    Other Could NOT obtain due to:   
 
 
 
Objective: VITALS:  
Last 24hrs VS reviewed since prior progress note. Most recent are: 
Patient Vitals for the past 24 hrs: 
 Temp Pulse Resp BP SpO2  
01/05/21 0945  78 23 131/89 (!) 87 % 01/05/21 0930  80 19 126/83 (!) 89 % 01/05/21 0915  78 26 128/83 (!) 89 % 01/05/21 0900  78 23 129/83 93 % 01/05/21 0845  78 26 133/79 (!) 89 % 01/05/21 0830 98.5 °F (36.9 °C) 79 28 (!) 167/88 92 % 01/05/21 0816     92 % 01/05/21 0804 98.5 °F (36.9 °C) 82 30 (!) 167/88 92 % 01/05/21 0429     97 % 01/05/21 0319 98.6 °F (37 °C) 78 18 134/82 (!) 87 % 01/05/21 0112     96 % 01/04/21 2226 98.4 °F (36.9 °C) 77 23 (!) 158/93 (!) 89 % 01/04/21 1942 99.2 °F (37.3 °C) 80 21 (!) 133/90 (!) 89 % 01/04/21 1754  79 23  97 % 01/04/21 1750  79 26  97 % 01/04/21 1741  80 30  97 % 01/04/21 1724  82 (!) 32  (!) 88 % 01/04/21 1503     (!) 87 % 01/04/21 1459  82 23  (!) 88 % 01/04/21 1458  82 29  (!) 86 % 01/04/21 1457 99.1 °F (37.3 °C) 82 22 (!) 164/95 (!) 87 % 01/04/21 1113     91 % 01/04/21 1039 99.3 °F (37.4 °C) 87 18 (!) 174/100 (!) 88 % Intake/Output Summary (Last 24 hours) at 1/5/2021 0011 Last data filed at 1/5/2021 0730 Gross per 24 hour Intake 1060 ml Output 0 ml Net 1060 ml I had a face to face encounter and independently examined this patient on 1/5/2021, as outlined below: PHYSICAL EXAM: 
General: WD, WN. Alert, cooperative, no acute distress EENT:  EOMI. Anicteric sclerae. MMM Resp:  Shallow inpspirations,brath sounds diminished bilaterally . No accessory muscle use CV:  Regular  rhythm,  No edema GI:  Soft, Non distended, Non tender. +Bowel sounds Neurologic:  Alert and oriented X 3, normal speech, Psych:   Not anxious nor agitated Skin:  No rashes. No jaundice Reviewed most current lab test results and cultures  YES Reviewed most current radiology test results   YES Review and summation of old records today    NO Reviewed patient's current orders and MAR    YES 
PMH/SH reviewed - no change compared to H&P 
________________________________________________________________________ Care Plan discussed with: 
  Comments Patient x Family RN x Care Manager Consultant     
                 x Multidiciplinary team rounds were held today with , nursing, pharmacist and clinical coordinator. Patient's plan of care was discussed; medications were reviewed and discharge planning was addressed. ________________________________________________________________________ Total NON critical care TIME: 35   Minutes Total CRITICAL CARE TIME Spent:   Minutes non procedure based Comments >50% of visit spent in counseling and coordination of care    
________________________________________________________________________ Haydee Bliss MD  
 
Procedures: see electronic medical records for all procedures/Xrays and details which were not copied into this note but were reviewed prior to creation of Plan. LABS: 
I reviewed today's most current labs and imaging studies. Pertinent labs include: 
Recent Labs 01/05/21 
0830 01/03/21 0313 01/02/21 
1145 WBC 15.1* 6.9 6.4 HGB 12.9 11.8* 11.6* HCT 39.4 37.0 35.7*  
 299 277 Recent Labs 01/05/21 
8904 01/03/21 0313 01/02/21 
1145 * 131* 132*  
K 5.5* 4.9 5.3*  
CL 93* 92* 92* CO2 21 26 23 * 208* 178* * 72* 109* CREA 14.70* 10.40* 13.80* CA 8.8 8.9 8.8 PHOS 9.1*  --  10.4* Signed: Haydee Bliss MD

## 2021-01-05 NOTE — PROGRESS NOTES
Comprehensive Nutrition Assessment Type and Reason for Visit: Sydnee Frey Nutrition Recommendations/Plan: 
Continue current diet Add gelatein BID Nutrition Assessment:    
Patient medically noted for COVID-19, ESRD on HD, acute respiratory failure, HTN, and DM. Attempted to contact patient via phone but there was no answer. Elevated K+ and phos. Noted patient received HD today. Good intake per flowsheets. Patient with high protein needs given weight, HD, and current illness. Will add protein supplementation to help meet estimated needs. Continue to encourage intake of meals. Patient Vitals for the past 72 hrs: 
 % Diet Eaten 01/05/21 0730 0 % 01/04/21 1741 100 % 01/04/21 1218 100 % 01/04/21 1039 240 % 01/03/21 1258 90 % 01/03/21 0901 100 % 01/02/21 1343 75 % Estimated Daily Nutrient Needs: 
Energy (kcal): 2178 kcal (BMR 2440 x 1. 2AF -750kcal); Weight Used for Energy Requirements: Current Protein (g): 115-143g (0.8-1.0 g/kg bw); Weight Used for Protein Requirements: Current Fluid (ml/day): per MD; Method Used for Fluid Requirements: 1 ml/kcal 
 
Nutrition Related Findings:   
K+ 5.5, Phos 9.1, -[phone], Na 131 Norvasc, Vitamin C, Atorvastatin, Plavix, Decadron, NPH, Lantus, Humalog, Oma Q, Lopressor, Renvela, Zinc 
BM 1/5 Wounds:   
None Current Nutrition Therapies: DIET ONE TIME MESSAGE 
DIET ONE TIME MESSAGE 
DIET DIABETIC CONSISTENT CARB Regular; 2 GM NA (House Low NA); FR 1200ML; Low Phosphorus, Low Potassium DIET ONE TIME MESSAGE Anthropometric Measures: 
· Height:  6' 2\" (188 cm) · Current Body Wt:  143.4 kg (316 lb 2.2 oz) · BMI Category:  Obese class 3 (BMI 40.0 or greater) Nutrition Diagnosis: · Altered nutrition-related lab values related to (ESRD) as evidenced by (K+ 5.5, Phos 9.1) Nutrition Interventions:  
Food and/or Nutrient Delivery: Continue current diet, Start oral nutrition supplement Nutrition Education and Counseling: No recommendations at this time Coordination of Nutrition Care: Continue to monitor while inpatient, Interdisciplinary rounds Goals: 
PO intake >70% of meals and lytes WNL next 3-5 days Nutrition Monitoring and Evaluation:  
Behavioral-Environmental Outcomes: None identified Food/Nutrient Intake Outcomes: Food and nutrient intake, Supplement intake Physical Signs/Symptoms Outcomes: Biochemical data, Fluid status or edema, Hemodynamic status, Weight Discharge Planning:   
Continue current diet Electronically signed by Kailee Sims RD on 1/5/2021 at 1:08 PM 
 
Contact: ext [phone]

## 2021-01-06 NOTE — PROGRESS NOTES
TEJA PLAN--To be Determined. Patient received dialysis yesterday. Continues with heated high flow at 60 liters. Pulmonary following. Will continue to follow. Heather Haro RN BSN CRM  [phone]

## 2021-01-06 NOTE — PROGRESS NOTES
0700H- Verbal shift change report given to МАРИЯ(oncoming nurse) by Nona Mercado (offgoing nurse). Report included the following information SBAR, Kardex, ED Summary, Procedure Summary, Intake/Output, MAR and Recent Results. End of Shift Note Bedside shift change report given to DAVID (oncoming nurse) by Shantel Wiseman (offgoing nurse). Report included the following information SBAR Shift worked:  0700- 1900 Shift summary and any significant changes: NONE AT THIS TIME Concerns for physician to address:  DESATURATION Zone phone for oncoming shift:    
  
 
Activity: 
Activity Level: Bed Rest 
Number times ambulated in hallways past shift: 0 Number of times OOB to chair past shift: 0 Cardiac:  
Cardiac Monitoring: Yes     
Cardiac Rhythm: Normal sinus rhythm Access:  
Current line(s): PIV Genitourinary:  
Urinary status: anuric Respiratory:  
O2 Device: CPAP mask Chronic home O2 use?: N/A Incentive spirometer at bedside: YES 
  
GI: 
Last Bowel Movement Date: 01/06/21 Current diet:  DIET DIABETIC CONSISTENT CARB Regular; 2 GM NA (House Low NA); FR 1200ML; Low Phosphorus, Low Potassium DIET NUTRITIONAL SUPPLEMENTS Breakfast, Dinner; Gelatein 20 Passing flatus: YES Tolerating current diet: YES 
% Diet Eaten: 80 % Pain Management:  
Patient states pain is manageable on current regimen: N/A Skin: 
Ranjeet Score: 16 Interventions: increase time out of bed Patient Safety: 
Fall Score: Total Score: 3 Interventions: pt to call before getting OOB High Fall Risk: Yes Length of Stay: 
Expected LOS: 5d 12h Actual LOS: [place] 62

## 2021-01-06 NOTE — PROGRESS NOTES
1900: Bedside shift change report given to Venkata Minor (oncoming nurse) by Eamon Titus (offgoing nurse). Report included the following information SBAR, Kardex, Intake/Output, MAR, Recent Results and Cardiac Rhythm NSR. End of Shift Note Bedside shift change report given to Eamon Titus (oncoming nurse) by Shira Michel RN (offgoing nurse). Report included the following information SBAR, Kardex, Intake/Output, MAR, Recent Results and Cardiac Rhythm NSR Shift worked:  9932-4614 Shift summary and any significant changes:  
  uneventful night. Patient tolerated CPAP at 85-90%. Concerns for physician to address:  borderline oxygen levels with CPAP and heated hi-flow Zone phone for oncoming shift:    
  
 
Activity: 
Activity Level: Bed Rest 
Number times ambulated in hallways past shift: 0 Number of times OOB to chair past shift: 0 Cardiac:  
Cardiac Monitoring: Yes     
Cardiac Rhythm: Normal sinus rhythm Access:  
Current line(s): PIV and HD access Genitourinary:  
Urinary status: anuric Respiratory:  
O2 Device: Heated, Hi flow nasal cannula Chronic home O2 use?: NO Incentive spirometer at bedside: NO 
  
GI: 
Last Bowel Movement Date: 01/05/21 Current diet:  DIET DIABETIC CONSISTENT CARB Regular; 2 GM NA (House Low NA); FR 1200ML; Low Phosphorus, Low Potassium DIET NUTRITIONAL SUPPLEMENTS Breakfast, Dinner; Gelatein 20 Passing flatus: YES Tolerating current diet: YES 
% Diet Eaten: 80 % Pain Management:  
Patient states pain is manageable on current regimen: YES Skin: 
Ranjeet Score: 15 Interventions: speciality bed, PT/OT consult and nutritional support Patient Safety: 
Fall Score: Total Score: 3 Interventions: bed/chair alarm, assistive device (walker, cane, etc), gripper socks, pt to call before getting OOB and stay with me (per policy) High Fall Risk: Yes Length of Stay: 
Expected LOS: 5d 12h Actual LOS: 11 Shira Michel RN

## 2021-01-06 NOTE — PROGRESS NOTES
[facility] Name: Uriah Mcdonald MRN: [id number] : 1985 Hospital: [facility] Date: 2021 IMPRESSION:  
· Acute Hypoxic Respiratory Failure · Covid Pneumonia, bilateral pulmonary infiltrates. Was positive on . · Obese · Diarrhea · CAD · PAD · A flutter · T2DM. · ESRD  
  
RECOMMENDATIONS:  
· On heated HF O2 
· On cpap at night · Not candidate for Remdesivir due to ESRD · Agree with current treatments. · Zithromax and Ctx completed · On vit c and zinc 
· On decadron, last dose  · DVT prophylaxis Subjective: No acute events overnight Tolerating cpap at night No acute distress No acute complaints Current Facility-Administered Medications Medication Dose Route Frequency  dexamethasone (DECADRON) 4 mg/mL injection 4 mg  4 mg IntraVENous DAILY And  
 insulin NPH (NOVOLIN N, HUMULIN N) injection 20 Units  20 Units SubCUTAneous DAILY  insulin glargine (LANTUS) injection 22 Units  22 Units SubCUTAneous QHS  amLODIPine (NORVASC) tablet 10 mg  10 mg Oral DAILY  insulin lispro (HUMALOG) injection 4 Units  4 Units SubCUTAneous TIDAC  metoprolol tartrate (LOPRESSOR) tablet 100 mg  100 mg Oral BID  lactobac ac& pc-s.therm-b.anim (USMAN Q/RISAQUAD)  1 Cap Oral DAILY  aspirin delayed-release tablet 81 mg  81 mg Oral DAILY  atorvastatin (LIPITOR) tablet 20 mg  20 mg Oral QHS  clopidogreL (PLAVIX) tablet 75 mg  75 mg Oral DAILY  gabapentin (NEURONTIN) capsule 300 mg  300 mg Oral QHS  sevelamer carbonate (RENVELA) tab 800 mg  800 mg Oral TID WITH MEALS  sodium chloride (NS) flush 5-40 mL  5-40 mL IntraVENous Q8H  
 heparin (porcine) injection 7,500 Units  7,500 Units SubCUTAneous Q8H  
 ascorbic acid (vitamin C) (VITAMIN C) tablet 1,000 mg  1,000 mg Oral DAILY  zinc sulfate (ZINCATE) 220 (50) mg capsule 220 mg  220 mg Oral DAILY  insulin lispro (HUMALOG) injection   SubCUTAneous AC&HS Review of Systems: A comprehensive review of systems was negative. Objective:  
Vital Signs:   
Visit Vitals BP (!) 152/91 (BP 1 Location: Right arm, BP Patient Position: At rest) Pulse 85 Temp 98 °F (36.7 °C) Resp 24 Ht 6' 2\" (1.88 m) Wt 141.2 kg (311 lb 4.8 oz) SpO2 92% BMI 39.97 kg/m² O2 Device: CPAP mask O2 Flow Rate (L/min): 60 l/min Temp (24hrs), Av.5 °F (36.9 °C), Min:98 °F (36.7 °C), Max:98.9 °F (37.2 °C) Intake/Output:  
Last shift:      No intake/output data recorded. Last 3 shifts:  1901 -  0700 In: 1260 [P.O.:1260] Out: 4200 Intake/Output Summary (Last 24 hours) at 2021 0827 Last data filed at 2021 3892 Gross per 24 hour Intake 1020 ml Output 4200 ml Net -3180 ml Physical Exam:  
General:  Alert, cooperative, no distress, appears stated age. POx is 91% on high flow oxygen. Head:  Normocephalic, without obvious abnormality, atraumatic. Eyes:  Conjunctivae/corneas clear. PERRL, EOMs intact. Nose: Nares normal. Septum midline. Mucosa normal. No drainage or sinus tenderness. Throat: Lips, mucosa, and tongue normal. Teeth and gums normal.  
Neck: Supple, symmetrical, trachea midline, no adenopathy, thyroid: no enlargment/tenderness/nodules, no carotid bruit and no JVD. Back:   Symmetric, no curvature. ROM normal.  
Lungs:   Clear to auscultation bilaterally. Chest wall:  No tenderness or deformity. Heart:  Regular rate and rhythm, S1, S2 normal, no murmur, click, rub or gallop. Abdomen:   Soft, non-tender. Bowel sounds normal. No masses,  No organomegaly. Extremities: Extremities normal, atraumatic, no cyanosis or edema. Pulses: 2+ and symmetric all extremities. Skin: Skin color, texture, turgor normal. No rashes or lesions Lymph nodes: Cervical, supraclavicular, and axillary nodes normal.  
Neurologic: Grossly nonfocal  
 
Data review: Recent Results (from the past 24 hour(s)) GLUCOSE, POC Collection Time: 01/05/21 11:39 AM  
Result Value Ref Range Glucose (POC) 107 (H) 65 - 100 mg/dL Performed by Mena Aviles RN   
GLUCOSE, POC Collection Time: 01/05/21  4:51 PM  
Result Value Ref Range Glucose (POC) 218 (H) 65 - 100 mg/dL Performed by Mena Aviles RN   
GLUCOSE, POC Collection Time: 01/05/21  8:42 PM  
Result Value Ref Range Glucose (POC) 249 (H) 65 - 100 mg/dL Performed by Keith Lou RN   
FIBRINOGEN Collection Time: 01/06/21  2:29 AM  
Result Value Ref Range Fibrinogen >800 (H) 200 - 475 mg/dL FERRITIN Collection Time: 01/06/21  2:29 AM  
Result Value Ref Range Ferritin 3,434 (H) 26 - 388 NG/ML  
CBC WITH AUTOMATED DIFF Collection Time: 01/06/21  2:29 AM  
Result Value Ref Range WBC 12.5 (H) 4.1 - 11.1 K/uL  
 RBC 5.23 4. 10 - 5.70 M/uL  
 HGB 12.5 12.1 - 17.0 g/dL HCT 38.8 36.6 - 50.3 % MCV 74.2 (L) 80.0 - 99.0 FL  
 MCH 23.9 (L) 26.0 - 34.0 PG  
 MCHC 32.2 30.0 - 36.5 g/dL  
 RDW 16.7 (H) 11.5 - 14.5 % PLATELET 568 006 - 537 K/uL MPV 10.4 8.9 - 12.9 FL  
 NRBC 0.0 0  WBC ABSOLUTE NRBC 0.00 0.00 - 0.01 K/uL NEUTROPHILS 90 (H) 32 - 75 % LYMPHOCYTES 6 (L) 12 - 49 % MONOCYTES 3 (L) 5 - 13 % EOSINOPHILS 1 0 - 7 % BASOPHILS 0 0 - 1 % IMMATURE GRANULOCYTES 1 (H) 0.0 - 0.5 % ABS. NEUTROPHILS 11.3 (H) 1.8 - 8.0 K/UL  
 ABS. LYMPHOCYTES 0.7 (L) 0.8 - 3.5 K/UL  
 ABS. MONOCYTES 0.4 0.0 - 1.0 K/UL  
 ABS. EOSINOPHILS 0.1 0.0 - 0.4 K/UL  
 ABS. BASOPHILS 0.0 0.0 - 0.1 K/UL  
 ABS. IMM. GRANS. 0.1 (H) 0.00 - 0.04 K/UL  
 DF AUTOMATED    
D DIMER Collection Time: 01/06/21  2:29 AM  
Result Value Ref Range D-dimer 11.02 (H) 0.00 - 0.65 mg/L FEU Imaging: 
I have personally reviewed the patients radiographs and have reviewed the reports: 
  
 
  
Chris King MD

## 2021-01-06 NOTE — INTERDISCIPLINARY ROUNDS
Interdisciplinary team rounds were held 1/6/2021 with the following team members:Care Management, Nursing, Nutrition, Pharmacy, Physician and Clinical Coordinator. Plan of care discussed. See clinical pathway and/or care plan for interventions and desired outcomes. Patient continues on heated hi flow at 60L with FiO2 at 95%. BiPAP at HS. MWF Dialysis.

## 2021-01-06 NOTE — PROGRESS NOTES
NAME: Kamilla Schaffer :  1985 MRN:  [id number] Assessment :    Plan: 
--ESKD Hyperkalemia COVID + Hypoxia HTN, uncontrolled Anemia of ESKD 
 
PAD --MWF [facility].  Now TTS to go to [facility] on DC due to COVID.  4200 ml off on  No HD today. 2 K bath with HD, low K diet. FR and low salt intake 
  
No FARIDA (secondary to covid and hgb > 10) 
  
  
 
 
Subjective: Chief Complaint:   Seen remotely to limit spread of covid and to conserve ppe. Still with hypoxia Review of Systems: 
 
Symptom Y/N Comments  Symptom Y/N Comments Fever/Chills    Chest Pain Poor Appetite    Edema Cough    Abdominal Pain Sputum    Joint Pain SOB/LITTLE    Pruritis/Rash Nausea/vomit    Tolerating PT/OT Diarrhea    Tolerating Diet Constipation    Other Could not obtain due to:   
 
Objective: VITALS:  
Last 24hrs VS reviewed since prior progress note. Most recent are: 
Visit Vitals /70 (BP 1 Location: Right arm, BP Patient Position: At rest) Pulse 82 Temp 98.6 °F (37 °C) Resp 20 Ht 6' 2\" (1.88 m) Wt 143.4 kg (316 lb 1.6 oz) SpO2 97% BMI 40.58 kg/m² Intake/Output Summary (Last 24 hours) at 2021 1067 Last data filed at 2021 7244 Gross per 24 hour Intake 800 ml Output 4200 ml Net -3400 ml Telemetry Reviewed: PHYSICAL EXAM: 
General: NAD Lab Data Reviewed: (see below) Medications Reviewed: (see below) PMH/SH reviewed - no change compared to H&P 
________________________________________________________________________ Care Plan discussed with: 
Patient Family RN Care Manager Consultant:     
 
  Comments >50% of visit spent in counseling and coordination of care    
 
________________________________________________________________________ Sarthak Barry MD  
 
 Procedures: see electronic medical records for all procedures/Xrays and details which 
were not copied into this note but were reviewed prior to creation of Plan. LABS: 
Recent Labs 01/06/21 
0213 01/05/21 
3226 WBC 12.5* 15.1* HGB 12.5 12.9 HCT 38.8 39.4  347 Recent Labs 01/05/21 
3070 *  
K 5.5* CL 93* CO2 21 * CREA 14.70* * CA 8.8 PHOS 9.1* No results for input(s): AP, TBIL, TP, ALB, GLOB, GGT, AML, LPSE in the last 72 hours. No lab exists for component: SGOT, GPT, AMYP, HLPSE No results for input(s): INR, PTP, APTT, INREXT, INREXT in the last 72 hours. Recent Labs 01/05/21 
1955 FERR 6,447* No results found for: FOL, RBCF No results for input(s): PH, PCO2, PO2 in the last 72 hours. No results for input(s): CPK, CKMB in the last 72 hours. No lab exists for component: TROPONINI No components found for: [place] Lab Results Component Value Date/Time Color YELLOW/STRAW 05/01/2017 05:39 PM  
 Appearance CLOUDY (A) 05/01/2017 05:39 PM  
 Specific gravity 1.018 05/01/2017 05:39 PM  
 Specific gravity >1.030 (H) 10/03/2014 04:35 AM  
 pH (UA) 5.5 05/01/2017 05:39 PM  
 Protein 300 (A) 05/01/2017 05:39 PM  
 Glucose 250 (A) 05/01/2017 05:39 PM  
 Ketone NEGATIVE  05/01/2017 05:39 PM  
 Bilirubin NEGATIVE  05/01/2017 05:39 PM  
 Urobilinogen 0.2 05/01/2017 05:39 PM  
 Nitrites NEGATIVE  05/01/2017 05:39 PM  
 Leukocyte Esterase NEGATIVE  05/01/2017 05:39 PM  
 Epithelial cells FEW 05/01/2017 05:39 PM  
 Bacteria NEGATIVE  05/01/2017 05:39 PM  
 WBC 0-4 05/01/2017 05:39 PM  
 RBC 5-10 05/01/2017 05:39 PM  
 
 
MEDICATIONS: 
Current Facility-Administered Medications Medication Dose Route Frequency  dexamethasone (DECADRON) 4 mg/mL injection 4 mg  4 mg IntraVENous DAILY And  
 insulin NPH (NOVOLIN N, HUMULIN N) injection 20 Units  20 Units SubCUTAneous DAILY  insulin glargine (LANTUS) injection 22 Units  22 Units SubCUTAneous QHS  guaiFENesin-dextromethorphan (ROBITUSSIN DM) 100-10 mg/5 mL syrup 10 mL  10 mL Oral Q6H PRN  
 sodium chloride (OCEAN) 0.65 % nasal squeeze bottle 2 Spray  2 Spray Both Nostrils Q2H PRN  
 amLODIPine (NORVASC) tablet 10 mg  10 mg Oral DAILY  insulin lispro (HUMALOG) injection 4 Units  4 Units SubCUTAneous TIDAC  metoprolol tartrate (LOPRESSOR) tablet 100 mg  100 mg Oral BID  hydrALAZINE (APRESOLINE) 20 mg/mL injection 20 mg  20 mg IntraVENous Q6H PRN  
 lactobac ac& pc-s.therm-b.anim (USMAN Q/RISAQUAD)  1 Cap Oral DAILY  aspirin delayed-release tablet 81 mg  81 mg Oral DAILY  atorvastatin (LIPITOR) tablet 20 mg  20 mg Oral QHS  clopidogreL (PLAVIX) tablet 75 mg  75 mg Oral DAILY  gabapentin (NEURONTIN) capsule 300 mg  300 mg Oral QHS  oxyCODONE IR (ROXICODONE) tablet 5 mg  5 mg Oral Q4H PRN  
 sevelamer carbonate (RENVELA) tab 800 mg  800 mg Oral TID WITH MEALS  sodium chloride (NS) flush 5-40 mL  5-40 mL IntraVENous Q8H  
 sodium chloride (NS) flush 5-40 mL  5-40 mL IntraVENous PRN  
 acetaminophen (TYLENOL) tablet 650 mg  650 mg Oral Q6H PRN Or  
 acetaminophen (TYLENOL) suppository 650 mg  650 mg Rectal Q6H PRN  polyethylene glycol (MIRALAX) packet 17 g  17 g Oral DAILY PRN  promethazine (PHENERGAN) tablet 12.5 mg  12.5 mg Oral Q6H PRN Or  
 ondansetron (ZOFRAN) injection 4 mg  4 mg IntraVENous Q6H PRN  
 heparin (porcine) injection 7,500 Units  7,500 Units SubCUTAneous Q8H  
 ascorbic acid (vitamin C) (VITAMIN C) tablet 1,000 mg  1,000 mg Oral DAILY  zinc sulfate (ZINCATE) 220 (50) mg capsule 220 mg  220 mg Oral DAILY  insulin lispro (HUMALOG) injection   SubCUTAneous AC&HS  
 glucose chewable tablet 16 g  4 Tab Oral PRN  
 dextrose (D50W) injection syrg 12.5-25 g  12.5-25 g IntraVENous PRN  
 glucagon (GLUCAGEN) injection 1 mg  1 mg IntraMUSCular PRN

## 2021-01-06 NOTE — INTERDISCIPLINARY ROUNDS
Interdisciplinary team rounds were held 1/5/2021 with the following team members:Care Management, Nursing, Nutrition, Pharmacy, Physician and Clinical Coordinator. Plan of care discussed. See clinical pathway and/or care plan for interventions and desired outcomes. Patient's SpO2 dropped to 78% overnight requiring CPAP. Patient back on heated hi flow 60L at 90% FiO2 at this time. HD today.

## 2021-01-06 NOTE — PROGRESS NOTES
Hospitalist Progress Note NAME: Jacob Herron :  1985 MRN:  [id number] Assessment / Plan: 
Acute Hypoxic Respiratory Failure due to COVID-19 Pneumonia COVID-19 Positive 
-still intermittently hypoxic into mid 80s, 84 % last nigth   
-cpap at night, cont as tolerated  
-probably undiagnosed PANTERA, will need eval after discharge  
-pulm following, appreciate assistance 
-worsening bibasilar infiltrated on CXR noted 
-Rapid Covid test positive 2020 
-Not remdesivir candidate due to ESRD 
-s/p empiric azithro/CTXa 
-Continue zinc and vitamin C 
-cont decadron to complete 10 days, last day  
-high risk for further decompensation, on BiPAP this AM. 
-Continue to be the same with no real changes in the last 24 hours. ESRD Hyperkalemia 
-K 5.5 yesterday, no labs today 
-HD as per nephro, will be TTS now d/t COVID + status, usually MWF 
-appreciate nephrology consult 
  
Hypertension 
-Continue amlodipine, metoprolol 
-As needed hydralazine 
  
Type 2 Diabetes 
-Increased Lantus to 22 units nightly 
-Continue mealtime lispro 4 units 3 times daily 
-SSI with POC's Hx of Atrial Flutter 
-s/p Afib ablation 10/2020 
-completed 4 weeks OAC 
-NSR on admission EKG 
  
CAD s/p stents PAD 
-Continue statin, aspirin, Plavix, and neurontin 
-S/P right BKA 2020 
-reports some pain RLE stump. No fluctuance, drainage 
-monitor  
-prn tylenol for now I 
  
Diarrhea secondary to antibiotic therapy -Oma Q daily Resolved 
 
  
40 or above Morbid obesity / Body mass index is 44.33 kg/m². 
  
Code status: Full Prophylaxis: Hep SQ Subjective: Chief Complaint / Reason for Physician Visit Discussed with RN events overnight. Patient was seen and examined. No acute events overnight. \"not feeling well Review of Systems: 
Symptom Y/N Comments  Symptom Y/N Comments Fever/Chills n   Chest Pain n   
Poor Appetite    Edema Cough n   Abdominal Pain n   
 Sputum    Joint Pain SOB/LITTLE y On BiPAP now  Pruritis/Rash Nausea/vomit n   Tolerating PT/OT Diarrhea    Tolerating Diet y Constipation    Other Could NOT obtain due to:   
 
 
 
Objective: VITALS:  
Last 24hrs VS reviewed since prior progress note. Most recent are: 
Patient Vitals for the past 24 hrs: 
 Temp Pulse Resp BP SpO2  
01/06/21 0809     92 % 01/06/21 0314     97 % 01/06/21 0243 98 °F (36.7 °C) 85 24 (!) 152/91 (!) 84 % 01/06/21 0015     98 % 01/05/21 2347 98.6 °F (37 °C) 82 20 138/70 95 % 01/05/21 2034     92 % 01/05/21 1929 98.5 °F (36.9 °C) 88 23 115/68 98 % 01/05/21 1505 98.9 °F (37.2 °C) 88 30 113/75 100 % 01/05/21 1235  85 17    
01/05/21 1230  84 25 112/71   
01/05/21 1215  83 22 113/79 (!) 88 % 01/05/21 1200  84 20 122/88 92 % 01/05/21 1159     94 % 01/05/21 1145  85 27  90 % 01/05/21 1130  83 27 121/82 90 % 01/05/21 1115  84 24 138/88 93 % 01/05/21 1100 98.7 °F (37.1 °C) 84 25 (!) 145/97 (!) 88 % 01/05/21 1045  82 21 (!) 160/102 92 % 01/05/21 1030  84 20 (!) 162/103 91 % 01/05/21 1015  82 20 (!) 142/99 91 % 01/05/21 1000  83 23 (!) 130/95 (!) 89 % 01/05/21 0945  78 23 131/89 (!) 87 % 01/05/21 0930  80 19 126/83 (!) 89 % 01/05/21 0915  78 26 128/83 (!) 89 % 01/05/21 0900  78 23 129/83 93 % 01/05/21 0845  78 26 133/79 (!) 89 % 01/05/21 0830 98.5 °F (36.9 °C) 79 28 (!) 167/88 92 % Intake/Output Summary (Last 24 hours) at 1/6/2021 7414 Last data filed at 1/6/2021 2560 Gross per 24 hour Intake 1020 ml Output 4200 ml Net -3180 ml I had a face to face encounter and independently examined this patient on 1/6/2021, as outlined below: PHYSICAL EXAM: 
General: WD, WN. Alert, cooperative, no acute distress, on BiPAP  
EENT:  EOMI. Anicteric sclerae. MMM Resp:  Shallow inpspirations,brath sounds diminished bilaterally . No accessory muscle use CV:  Regular  rhythm,  No edema GI:  Soft, Non distended, Non tender. +Bowel sounds Neurologic:  Alert and oriented X 3, normal speech, Psych:   Not anxious nor agitated Skin:  No rashes. No jaundice Reviewed most current lab test results and cultures  YES Reviewed most current radiology test results   YES Review and summation of old records today    NO Reviewed patient's current orders and MAR    YES 
PMH/SH reviewed - no change compared to H&P 
________________________________________________________________________ Care Plan discussed with: 
  Comments Patient x Family RN x Care Manager Consultant     
                 x Multidiciplinary team rounds were held today with , nursing, pharmacist and clinical coordinator. Patient's plan of care was discussed; medications were reviewed and discharge planning was addressed. ________________________________________________________________________ Total NON critical care TIME: 35   Minutes Total CRITICAL CARE TIME Spent:   Minutes non procedure based Comments >50% of visit spent in counseling and coordination of care    
________________________________________________________________________ Gary Grossman MD  
 
Procedures: see electronic medical records for all procedures/Xrays and details which were not copied into this note but were reviewed prior to creation of Plan. LABS: 
I reviewed today's most current labs and imaging studies. Pertinent labs include: 
Recent Labs 01/06/21 
1794 01/05/21 
0903 WBC 12.5* 15.1* HGB 12.5 12.9 HCT 38.8 39.4  347 Recent Labs 01/05/21 
2095 *  
K 5.5* CL 93* CO2 21 * * CREA 14.70* CA 8.8 PHOS 9.1* Signed: aGry Grossman MD

## 2021-01-07 NOTE — PROGRESS NOTES
Responded to Code Blue in progress. Patient COVID+. CPR in progress upon my arrival. Dr. Coyle Drivers at the bedside to lead Code Blue. See EMR for interventions, medications, and participants. After multiple ROSC episodes patient transported to CCU for continued care. Patient [phone] in the CCU. Attempts made to contact POA, but unsuccessful at TOD.

## 2021-01-07 NOTE — PROGRESS NOTES
NAME: Sally Han :  1985 MRN:  [id number] Assessment :    Plan: 
--ESKD Hyperkalemia COVID + Hypoxia HTN, uncontrolled Anemia of ESKD 
 
PAD --MWF [facility].  Now TTS to go to [facility] on DC due to COVID.  4200 ml off on  HD today. 2 K bath with HD, low K diet. FR and low salt intake 
  
No FARIDA (secondary to covid and hgb > 10) 
  
  
 
 
Subjective: Chief Complaint:   Seen remotely to limit spread of covid and to conserve ppe. Still with hypoxia. I spoke with his nurse. Review of Systems: 
 
Symptom Y/N Comments  Symptom Y/N Comments Fever/Chills    Chest Pain Poor Appetite    Edema Cough    Abdominal Pain Sputum    Joint Pain SOB/LITTLE    Pruritis/Rash Nausea/vomit    Tolerating PT/OT Diarrhea    Tolerating Diet Constipation    Other Could not obtain due to:   
 
Objective: VITALS:  
Last 24hrs VS reviewed since prior progress note. Most recent are: 
Visit Vitals BP (!) 153/83 Pulse 82 Temp 99.4 °F (37.4 °C) Resp 20 Ht 6' 2\" (1.88 m) Wt 141.2 kg (311 lb 4.8 oz) SpO2 91% BMI 39.97 kg/m² Intake/Output Summary (Last 24 hours) at 2021 1671 Last data filed at 2021 1827 Gross per 24 hour Intake 1350 ml Output  Net 1350 ml Telemetry Reviewed: PHYSICAL EXAM: 
General: NAD Lab Data Reviewed: (see below) Medications Reviewed: (see below) PMH/SH reviewed - no change compared to H&P 
________________________________________________________________________ Care Plan discussed with: 
Patient Family RN Care Manager Consultant:     
 
  Comments >50% of visit spent in counseling and coordination of care    
 
________________________________________________________________________ Juliet Daniel MD  
 
 Procedures: see electronic medical records for all procedures/Xrays and details which 
were not copied into this note but were reviewed prior to creation of Plan. LABS: 
Recent Labs 01/06/21 
7621 01/05/21 
7308 WBC 12.5* 15.1* HGB 12.5 12.9 HCT 38.8 39.4  347 Recent Labs 01/05/21 
0422 *  
K 5.5* CL 93* CO2 21 * CREA 14.70* * CA 8.8 PHOS 9.1* No results for input(s): AP, TBIL, TP, ALB, GLOB, GGT, AML, LPSE in the last 72 hours. No lab exists for component: SGOT, GPT, AMYP, HLPSE No results for input(s): INR, PTP, APTT, INREXT, INREXT in the last 72 hours. Recent Labs 01/06/21 
6046 01/05/21 
2398 FERR 2,293* [id number]* No results found for: FOL, RBCF No results for input(s): PH, PCO2, PO2 in the last 72 hours. No results for input(s): CPK, CKMB in the last 72 hours. No lab exists for component: TROPONINI No components found for: [place] Lab Results Component Value Date/Time Color YELLOW/STRAW 05/01/2017 05:39 PM  
 Appearance CLOUDY (A) 05/01/2017 05:39 PM  
 Specific gravity 1.018 05/01/2017 05:39 PM  
 Specific gravity >1.030 (H) 10/03/2014 04:35 AM  
 pH (UA) 5.5 05/01/2017 05:39 PM  
 Protein 300 (A) 05/01/2017 05:39 PM  
 Glucose 250 (A) 05/01/2017 05:39 PM  
 Ketone NEGATIVE  05/01/2017 05:39 PM  
 Bilirubin NEGATIVE  05/01/2017 05:39 PM  
 Urobilinogen 0.2 05/01/2017 05:39 PM  
 Nitrites NEGATIVE  05/01/2017 05:39 PM  
 Leukocyte Esterase NEGATIVE  05/01/2017 05:39 PM  
 Epithelial cells FEW 05/01/2017 05:39 PM  
 Bacteria NEGATIVE  05/01/2017 05:39 PM  
 WBC 0-4 05/01/2017 05:39 PM  
 RBC 5-10 05/01/2017 05:39 PM  
 
 
MEDICATIONS: 
Current Facility-Administered Medications Medication Dose Route Frequency  dexamethasone (DECADRON) 4 mg/mL injection 4 mg  4 mg IntraVENous DAILY And  
 insulin NPH (NOVOLIN N, HUMULIN N) injection 20 Units  20 Units SubCUTAneous DAILY  insulin glargine (LANTUS) injection 22 Units  22 Units SubCUTAneous QHS  guaiFENesin-dextromethorphan (ROBITUSSIN DM) 100-10 mg/5 mL syrup 10 mL  10 mL Oral Q6H PRN  
 sodium chloride (OCEAN) 0.65 % nasal squeeze bottle 2 Spray  2 Spray Both Nostrils Q2H PRN  
 amLODIPine (NORVASC) tablet 10 mg  10 mg Oral DAILY  insulin lispro (HUMALOG) injection 4 Units  4 Units SubCUTAneous TIDAC  metoprolol tartrate (LOPRESSOR) tablet 100 mg  100 mg Oral BID  hydrALAZINE (APRESOLINE) 20 mg/mL injection 20 mg  20 mg IntraVENous Q6H PRN  
 lactobac ac& pc-s.therm-b.anim (USMAN Q/RISAQUAD)  1 Cap Oral DAILY  aspirin delayed-release tablet 81 mg  81 mg Oral DAILY  atorvastatin (LIPITOR) tablet 20 mg  20 mg Oral QHS  clopidogreL (PLAVIX) tablet 75 mg  75 mg Oral DAILY  gabapentin (NEURONTIN) capsule 300 mg  300 mg Oral QHS  oxyCODONE IR (ROXICODONE) tablet 5 mg  5 mg Oral Q4H PRN  
 sevelamer carbonate (RENVELA) tab 800 mg  800 mg Oral TID WITH MEALS  sodium chloride (NS) flush 5-40 mL  5-40 mL IntraVENous Q8H  
 sodium chloride (NS) flush 5-40 mL  5-40 mL IntraVENous PRN  
 acetaminophen (TYLENOL) tablet 650 mg  650 mg Oral Q6H PRN Or  
 acetaminophen (TYLENOL) suppository 650 mg  650 mg Rectal Q6H PRN  polyethylene glycol (MIRALAX) packet 17 g  17 g Oral DAILY PRN  promethazine (PHENERGAN) tablet 12.5 mg  12.5 mg Oral Q6H PRN Or  
 ondansetron (ZOFRAN) injection 4 mg  4 mg IntraVENous Q6H PRN  
 heparin (porcine) injection 7,500 Units  7,500 Units SubCUTAneous Q8H  
 ascorbic acid (vitamin C) (VITAMIN C) tablet 1,000 mg  1,000 mg Oral DAILY  zinc sulfate (ZINCATE) 220 (50) mg capsule 220 mg  220 mg Oral DAILY  insulin lispro (HUMALOG) injection   SubCUTAneous AC&HS  
 glucose chewable tablet 16 g  4 Tab Oral PRN  
 dextrose (D50W) injection syrg 12.5-25 g  12.5-25 g IntraVENous PRN  
 glucagon (GLUCAGEN) injection 1 mg  1 mg IntraMUSCular PRN

## 2021-01-07 NOTE — PROGRESS NOTES
Spiritual Care Assessment/Progress Note [place] [address] 
 
 
NAME: Ancelmo Hernandez      MRN: [id number] AGE: 38 y.o. SEX: male Denominational Affiliation: No preference Language: [place] 1/7/2021     Total Time (in minutes): 76 Spiritual Assessment begun in [facility] 2 CRITICAL CARE 2 through conversation with: 
  
    []Patient        [] Family    [] Friend(s) Reason for Consult: Rapid response team, Code Blue/99, Death, Inpatient Spiritual beliefs: (Please include comment if needed) 
   [] Identifies with a celeste tradition:     
   [] Supported by a celeste community:        
   [] Claims no spiritual orientation:       
   [] Seeking spiritual identity:            
   [] Adheres to an individual form of spirituality:       
   [x] Not able to assess:                   
 
    
Identified resources for coping:  
   [] Prayer                           
   [] Music                  [] Guided Imagery 
   [] Family/friends                 [] Pet visits [] Devotional reading                         [x] Unknown 
   [] Other:                                          
 
 
Interventions offered during this visit: (See comments for more details) Patient Interventions: Crisis Plan of Care: 
 
 [] Support spiritual and/or cultural needs  
 [] Support AMD and/or advance care planning process    
 [] Support grieving process 
 [] Coordinate Rites and/or Rituals  
 [] Coordination with community clergy [] No spiritual needs identified at this time 
 [] Detailed Plan of Care below (See Comments)  [] Make referral to Music Therapy 
[] Make referral to Pet Therapy    
[] Make referral to Addiction services 
[] Make referral to [facility] 
[] Make referral to Spiritual Care Partner 
[] No future visits requested       
[x] Follow up upon further referrals Comments: Responded to page regarding Rapid Response turned Code Blue for patient Marcella Kwong presently on CCU. Provided pastoral presence on unit consult with staff as questions arose. Spiritual care services knew patient and MPOA, ACP information from prior admission. Offered silent prayer and upon request attempted multiple contacts to primary and secondary agents (no answers) as medical team assessed and assisted pt. Consult with RN supervisor, bedside RN, and hospitalist. Advised of  services. [facility] Spiritual Care Provider  Paging Service [phone])

## 2021-01-07 NOTE — PROCEDURES
[facility] Dialysis Team [place]  [phone] Vitals   Pre   Post   Assessment   Pre   Post    
Temp  Temp: 100 °F (37.8 °C) (01/07/21 0830)   LOC  A & O x 4 HR   Pulse (Heart Rate): 90 (01/07/21 0830)  Lungs   Fine rales LL    
B/P   BP: 132/79 (01/07/21 0830)  Cardiac   S1S2 A flutter Resp   Resp Rate: 28 (01/07/21 0830)  Skin   Warm,dry & Right BKA Pain level  Pain Intensity 1: 0 (01/07/21 0741)  Edema  generalized Orders: Duration:   Start:    0830 End:    1230 Total:     
Dialyzer:   Dialyzer/Set Up Inspection: Esetla Ram (01/07/21 0830) K Bath:   Dialysate K (mEq/L): 2 (01/07/21 0830) Ca Bath:   Dialysate CA (mEq/L): 2.5 (01/07/21 0830) Na/Bicarb:   Dialysate NA (mEq/L): 138 (01/07/21 0830) Target Fluid Removal:   Goal/Amount of Fluid to Remove (mL): 4000 mL (01/07/21 0830) Access  AVF Type & Location:   Left upper AVF Labs Obtained/Reviewed Critical Results Called   Date when labs were drawn- 
Hgb-   
HGB Date Value Ref Range Status 01/06/2021 12.5 12.1 - 17.0 g/dL Final  
 
K-   
Potassium Date Value Ref Range Status 01/05/2021 5.5 (H) 3.5 - 5.1 mmol/L Final  
 
Ca-  
Calcium Date Value Ref Range Status 01/05/2021 8.8 8.5 - 10.1 MG/DL Final  
 
Bun-  
BUN Date Value Ref Range Status 01/05/2021 130 (H) 6 - 20 MG/DL Final  
  Comment:  
  INVESTIGATED PER DELTA CHECK PROTOCOL Creat-  
Creatinine Date Value Ref Range Status 01/05/2021 14.70 (H) 0.70 - 1.30 MG/DL Final  
  Comment:  
  INVESTIGATED PER DELTA CHECK PROTOCOL Medications/ Blood Products Given Name   Dose   Route and Time Blood Volume Processed (BVP):     Net Fluid Removed:    
Comments RN reviewed LPN assessment and completed RN assessment. RN completed patient assessment. RN reviewed technicians vital signs and procedure note. Tx completed. Reviewed by DIONICIO Morris,DIONICIO Time Out Done: 0800 Primary Nurse Rpt Pre: Annabella Condon 
 Primary Nurse Rpt Post: 
Pt Education:access care and diet Care Plan:contiue HD Tx Summary:PELON AVF: skin CDI. No s/s of infection. + B/T. No issues with cannulation or hemostasis. Running well at . I arrived to pt's room A&Ox4. Consent signed & on file. SBAR received from Primary RN. 0830: Pt cannulated with 89K needles per policy & without issue. Labs drawn per request/ order. VSS. Dialysis Tx initiated. 0845: Pt. Stable,lines securwe 
0900: Pt resting quietly. 0915: Pt. Stable, no productive cough 0930: Pt. Restless, can't get comfortable. 0945: Pt. Marcello. Hd fairly  
1000: Restless on HD  
1130: Tx ended. VSS. All possible blood returned to patient. Hemostasis achieved without issue. Bed locked and in the lowest position, call bell and belongings in reach. SBAR given to Primary, RN. Patient is stable at time of their/ my departure. All Dialysis related medications have been reviewed. Admiting Diagnosis:Acute Respiratory Failure, ESRD Pt's previous clinic- [facility] 
Consent signed - Informed Consent Verified: Yes (01/07/21 0830) Sylvain Consent - verified Hepatitis Status- Negative on 12/26/20 Machine #- Machine Number: [id number] (01/07/21 0830) Telemetry status- yes Pre-dialysis wt. -

## 2021-01-07 NOTE — PROCEDURES
[facility] Procedure Note - Intubation:  
Performed by Verna Bear MD . Immediately prior to the procedure, the patient was reevaluated and found suitable for the planned procedure and any planned medications. Immediately prior to the procedure a time out was called to verify the correct patient, procedure, equipment, staff, and marking as appropriate. Medications given were . none A number 7.5 cuffed ETT was placed to 25 cm at the teeth. Placement was evaluated by noting bilateral, symmetric breath sounds. Attempts required: 1. Complications: none. The procedure was tolerated well.

## 2021-01-07 NOTE — PROGRESS NOTES
0700- Report given to Zulay Chaudhry RN by off going nurse. 0800- Dialysis RN at bedside to begin treatment. 56- Dilaysis RN called us to room d/t high flow nasal canula becoming disconnected. Pt found by fellow RN to be unresponsive and code blue called. See CODE blue documentation for event breakdown. 1055- Pt taken to CCU.

## 2021-01-07 NOTE — PROGRESS NOTES
[facility] Name: Rema Hdz MRN: [id number] : 1985 Hospital: [place] [address] Date: 2021 IMPRESSION:  
· Acute Hypoxic Respiratory Failure · Covid Pneumonia, bilateral pulmonary infiltrates. Was positive on . · Obese · Diarrhea · CAD · PAD · A flutter · T2DM. · ESRD  
  
RECOMMENDATIONS:  
· On heated HF O2 
· On cpap at night · Not candidate for Remdesivir due to ESRD · Agree with current treatments. · Zithromax and Ctx completed · On vit c and zinc 
· On decadron, last dose  · DVT prophylaxis Subjective: No acute events overnight Tolerating cpap at night No acute distress No acute complaints Current Facility-Administered Medications Medication Dose Route Frequency  dexamethasone (DECADRON) 4 mg/mL injection 4 mg  4 mg IntraVENous DAILY And  
 insulin NPH (NOVOLIN N, HUMULIN N) injection 20 Units  20 Units SubCUTAneous DAILY  insulin glargine (LANTUS) injection 22 Units  22 Units SubCUTAneous QHS  amLODIPine (NORVASC) tablet 10 mg  10 mg Oral DAILY  insulin lispro (HUMALOG) injection 4 Units  4 Units SubCUTAneous TIDAC  metoprolol tartrate (LOPRESSOR) tablet 100 mg  100 mg Oral BID  lactobac ac& pc-s.therm-b.anim (USMAN Q/RISAQUAD)  1 Cap Oral DAILY  aspirin delayed-release tablet 81 mg  81 mg Oral DAILY  atorvastatin (LIPITOR) tablet 20 mg  20 mg Oral QHS  clopidogreL (PLAVIX) tablet 75 mg  75 mg Oral DAILY  gabapentin (NEURONTIN) capsule 300 mg  300 mg Oral QHS  sevelamer carbonate (RENVELA) tab 800 mg  800 mg Oral TID WITH MEALS  sodium chloride (NS) flush 5-40 mL  5-40 mL IntraVENous Q8H  
 heparin (porcine) injection 7,500 Units  7,500 Units SubCUTAneous Q8H  
 ascorbic acid (vitamin C) (VITAMIN C) tablet 1,000 mg  1,000 mg Oral DAILY  zinc sulfate (ZINCATE) 220 (50) mg capsule 220 mg  220 mg Oral DAILY  insulin lispro (HUMALOG) injection   SubCUTAneous AC&HS Review of Systems: A comprehensive review of systems was negative. Objective:  
Vital Signs:   
Visit Vitals /72 (BP 1 Location: Right arm) Pulse 94 Temp 99.8 °F (37.7 °C) Resp 21 Ht 6' 2\" (1.88 m) Wt 141.2 kg (311 lb 4.8 oz) SpO2 (!) 84% BMI 39.97 kg/m² O2 Device: Heated, Hi flow nasal cannula O2 Flow Rate (L/min): 60 l/min Temp (24hrs), Av.3 °F (37.4 °C), Min:98.5 °F (36.9 °C), Max:99.8 °F (37.7 °C) Intake/Output:  
Last shift:      No intake/output data recorded. Last 3 shifts:  1901 -  0700 In: 5374 [P.O.:1570] Out: 0 Intake/Output Summary (Last 24 hours) at 2021 6721 Last data filed at 2021 1827 Gross per 24 hour Intake 1100 ml Output  Net 1100 ml Physical Exam:  
General:  Alert, cooperative, no distress, appears stated age. POx is 91% on high flow oxygen. Head:  Normocephalic, without obvious abnormality, atraumatic. Eyes:  Conjunctivae/corneas clear. PERRL, EOMs intact. Nose: Nares normal. Septum midline. Mucosa normal. No drainage or sinus tenderness. Throat: Lips, mucosa, and tongue normal. Teeth and gums normal.  
Neck: Supple, symmetrical, trachea midline, no adenopathy, thyroid: no enlargment/tenderness/nodules, no carotid bruit and no JVD. Back:   Symmetric, no curvature. ROM normal.  
Lungs:   Clear to auscultation bilaterally. Chest wall:  No tenderness or deformity. Heart:  Regular rate and rhythm, S1, S2 normal, no murmur, click, rub or gallop. Abdomen:   Soft, non-tender. Bowel sounds normal. No masses,  No organomegaly. Extremities: Extremities normal, atraumatic, no cyanosis or edema. Pulses: 2+ and symmetric all extremities. Skin: Skin color, texture, turgor normal. No rashes or lesions Lymph nodes: Cervical, supraclavicular, and axillary nodes normal.  
Neurologic: Grossly nonfocal  
 
Data review: Recent Results (from the past 24 hour(s)) GLUCOSE, POC Collection Time: 01/06/21  8:43 AM  
Result Value Ref Range Glucose (POC) 131 (H) 65 - 100 mg/dL Performed by Basil Rima PCT   
GLUCOSE, POC Collection Time: 01/06/21 11:58 AM  
Result Value Ref Range Glucose (POC) 253 (H) 65 - 100 mg/dL Performed by Isaac Arroyo (CON) GLUCOSE, POC Collection Time: 01/06/21  4:24 PM  
Result Value Ref Range Glucose (POC) 247 (H) 65 - 100 mg/dL Performed by Basil Rima PCT   
GLUCOSE, POC Collection Time: 01/06/21  9:40 PM  
Result Value Ref Range Glucose (POC) 287 (H) 65 - 100 mg/dL Performed by Brijesh Chung RN   
POC EG7 Collection Time: 01/07/21 12:57 AM  
Result Value Ref Range Calcium, ionized (POC) 1.04 (L) 1.12 - 1.32 mmol/L  
 FIO2 (POC) 100 % pH (POC) 7.40 7.35 - 7.45    
 pCO2 (POC) 39.8 35.0 - 45.0 MMHG  
 pO2 (POC) 57 (L) 80 - 100 MMHG  
 HCO3 (POC) 24.9 22 - 26 MMOL/L Base excess (POC) 0 mmol/L  
 sO2 (POC) 89 (L) 92 - 97 % Site RIGHT BRACHIAL Device: CPAP Allens test (POC) YES Specimen type (POC) ARTERIAL    
GLUCOSE, POC Collection Time: 01/07/21  7:35 AM  
Result Value Ref Range Glucose (POC) 200 (H) 65 - 100 mg/dL Performed by Tone Egan [facility] Imaging: 
I have personally reviewed the patients radiographs and have reviewed the reports: 
  
 
  
Chris King MD

## 2021-01-07 NOTE — PROGRESS NOTES
COVID Positive; legally blind and [place]; PEA arrest 1/7/2; transferred to CCU for progression of care. TEJA: 
TBD Was admitted from [facility] and plan is to return to SNF at [facility]; he was receiving wound care Aliya in [facility]. 97.: [phone] OP HD at [place] [address] [phone]  TTS for 7:30AM chair; Pt will need to be transported to HD by stretcher. . not WC Siri Aldridge like he was doing before; 
 
Prior to [facility] patient was OP HD at [facility]; Carol Morales was transporting him prior to [facility]. Phone: [phone] Family is working to try to help get him into a Group Home for a long term solution. Liaison: Shira Armando: [phone] Phone: [phone] Fax: [phone] Transportation will be [facility] back to [facility] SNF Preferred Rx is  unknown 2nd IM letter will be needed prior to discharge FU appts will be needed prior to discharge CM will continue to follow for discharge needs and planning. Paddy Apley, RN Care Manager Ext [phone]

## 2021-01-07 NOTE — PROGRESS NOTES
CODE BLUE was called on the patient. Upon arrival to the patient room, CPR was initiated by nursing staff. It was reported to me that the patient was getting his hemodialysis when he removed his BiPAP and he went pulseless. Patient went into PEA, torsades, Elina Warner. Multiple doses of epinephrine, amiodarone, bicarb were given. Patient was unable to maintain his pulse. Intensivist was at bedside, patient was intubated. Tried to call the family myself multiple times with no answer. NO pupillary, corneal, doll's eye or gag reflex noted. NO heart sounds or pulse noted. NO Chest rise or Lung sounds noted.   
Time of death declared at 11:34 am. 
Guyann Prader, MD  
1/7/2021  12:54 PM

## 2021-01-07 NOTE — CARDIO/PULMONARY
Code blue was called on Mr. Aguila Lau, I immediately went to bedside. CPR was ongoing. Patient was in PEA arrest. I emergently intubated him during CPR. Patient was achieving ROSC briefly every time. He did develop V fib as well few times for which he was defibrillated. Please refer to [facility] for medications administration. I also placed arterial line emergently. We continued resuscitation for one hour and then it was decided to stop CPR. Patient was pronounced dead at 11:34pm. We tried to call family multiple times with no response. Family did call later, I notified them about patient's demise.

## 2021-01-07 NOTE — PROGRESS NOTES
Family of pt Alejandro Cast on CCU called back for medical update. Introduced  services and affirmed desire to be supportive of pt. Facilitated conversation to be connected with MD. Processed shock of pt death and acute grief. Family desired review of events as a way to make sense of uncertainty. Affirmed family desire to know next steps. Family desired to reach out to others in family and would call back to discuss next steps. Advised of  services. Consult with MD and RN. [address] [facility] Spiritual Care Provider  Paging Service [phone])

## 2021-01-07 NOTE — PROGRESS NOTES
Hospitalist Progress Note NAME: Gerri Phalen :  1985 MRN:  [id number] Assessment / Plan: 
Acute Hypoxic Respiratory Failure due to COVID-19 Pneumonia COVID-19 Positive ARDS 
-still hypoxic 88% on 60 L HF 
-cpap at night, cont as tolerated  
-probably undiagnosed PANTERA, will need eval after discharge  
-pulm following, appreciate assistance 
worsening infiltrates notes on CXR today 
-Rapid Covid test positive 2020 
-Not remdesivir candidate due to ESRD 
-s/p empiric azithro/CTXa 
-Continue zinc and vitamin C 
-cont decadron to complete 10 days, last day  
-high risk for further decompensation, BiPAP as needed ESRD Hyperkalemia 
-HD as per nephro, will be TTS now d/t COVID + status, usually MWF 
-appreciate nephrology consult 
-pending BMP today 
  
Hypertension 
-Continue amlodipine, metoprolol 
-As needed hydralazine 
  
Type 2 Diabetes 
-Increased Lantus to 22 units nightly 
-Continue mealtime lispro 4 units 3 times daily 
-SSI with POC's Hx of Atrial Flutter 
-s/p Afib ablation 10/2020 
-completed 4 weeks OAC 
-NSR on admission EKG 
  
CAD s/p stents PAD 
-Continue statin, aspirin, Plavix, and neurontin 
-S/P right BKA 2020 
-reports some pain RLE stump. No fluctuance, drainage 
-monitor  
-prn tylenol for now I 
  
Diarrhea secondary to antibiotic therapy -Oma Q daily Resolved 
 
  
40 or above Morbid obesity / Body mass index is 44.33 kg/m². 
  
Code status: Full Prophylaxis: Hep SQ Subjective: Chief Complaint / Reason for Physician Visit Discussed with RN events overnight. Patient was seen and examined. More hypoxic overnight. \"feeling the same\" Review of Systems: 
Symptom Y/N Comments  Symptom Y/N Comments Fever/Chills n   Chest Pain n   
Poor Appetite    Edema Cough n   Abdominal Pain n   
Sputum    Joint Pain SOB/LITTLE y worse  Pruritis/Rash Nausea/vomit n   Tolerating PT/OT Diarrhea    Tolerating Diet y Constipation    Other Could NOT obtain due to:   
 
 
 
Objective: VITALS:  
Last 24hrs VS reviewed since prior progress note. Most recent are: 
Patient Vitals for the past 24 hrs: 
 Temp Pulse Resp BP SpO2  
01/07/21 0845  91 20 138/79 (!) 86 % 01/07/21 0830 100 °F (37.8 °C) 90 28 132/79 (!) 86 % 01/07/21 0741 99.8 °F (37.7 °C) 94 21 135/72 (!) 84 % 01/07/21 0418  91 24 (!) 169/99   
01/07/21 0309     91 % 01/07/21 0000  82 20 (!) 153/83 (!) 83 % 01/06/21 2300 99.4 °F (37.4 °C) 79 27 (!) 142/82 (!) 88 % 01/06/21 2251     92 % 01/06/21 2200  77 18 (!) 141/74 93 % 01/06/21 2100  79 22 (!) 144/87 (!) 87 % 01/06/21 2031     95 % 01/06/21 2000  81 24 135/74 92 % 01/06/21 1952 99.1 °F (37.3 °C) 83 17 125/73 (!) 87 % 01/06/21 1938     91 % 01/06/21 1648     92 % 01/06/21 1512 99.5 °F (37.5 °C) 85 23 121/64 92 % 01/06/21 1103 98.5 °F (36.9 °C) 91 25 131/79 (!) 89 % 01/06/21 1035     91 % Intake/Output Summary (Last 24 hours) at 1/7/2021 9153 Last data filed at 1/6/2021 1827 Gross per 24 hour Intake 1100 ml Output  Net 1100 ml I had a face to face encounter and independently examined this patient on 1/7/2021, as outlined below: PHYSICAL EXAM: 
General: WD, WN. Alert, cooperative, no acute distress, on BiPAP  
EENT:  EOMI. Anicteric sclerae. MMM Resp:  Shallow inpspirations,brath sounds diminished bilaterally . No accessory muscle use CV:  Regular  rhythm,  No edema GI:  Soft, Non distended, Non tender. +Bowel sounds Neurologic:  Alert and oriented X 3, normal speech, Psych:   Not anxious nor agitated Skin:  No rashes. No jaundice Reviewed most current lab test results and cultures  YES Reviewed most current radiology test results   YES Review and summation of old records today    NO Reviewed patient's current orders and MAR    YES 
PMH/SH reviewed - no change compared to H&P 
 ________________________________________________________________________ Care Plan discussed with: 
  Comments Patient x Family RN x Care Manager Consultant     
                 x Multidiciplinary team rounds were held today with , nursing, pharmacist and clinical coordinator. Patient's plan of care was discussed; medications were reviewed and discharge planning was addressed. ________________________________________________________________________ Total NON critical care TIME: 35   Minutes Total CRITICAL CARE TIME Spent:   Minutes non procedure based Comments >50% of visit spent in counseling and coordination of care    
________________________________________________________________________ Sandy Sandifer, MD  
 
Procedures: see electronic medical records for all procedures/Xrays and details which were not copied into this note but were reviewed prior to creation of Plan. LABS: 
I reviewed today's most current labs and imaging studies. Pertinent labs include: 
Recent Labs 01/06/21 
0272 01/05/21 
1552 WBC 12.5* 15.1* HGB 12.5 12.9 HCT 38.8 39.4  347 Recent Labs 01/05/21 
8478 *  
K 5.5* CL 93* CO2 21 * * CREA 14.70* CA 8.8 PHOS 9.1* Signed: Sandy Sandifer, MD

## 2021-01-07 NOTE — PROGRESS NOTES
Pt. Coded on Hd machine approximately midway through HD treatment. He became restless within 1 hour into tx, v/s WNL. following command and even apologized for not being able to stay positioned during treatment. Later approx 40min. on into HD pt's Bi-bap connector broke off from machine and I tried to reconnect but wouldn't stay in place. I rang the call bell to inform Nurse. She arrived shortly and mentioned that she would get another connection immediately. Shortly after that, Pt. Stated,\"I don't feel well!' eyes rolled back and I began sternal chest rubs and immediately started rinsing back his blood at the same moment another nurse was entering and changed him to a NON- Re - breather. Rapid response team at bedside shortly after and began to run CPR and chest compressions. .Shortly, Rapid Response team on site. Pt. Transferred to ICU with pulse.

## 2021-01-07 NOTE — PROCEDURES
[facility] Procedure Note - Arterial Access:  
Performed by En Lepe MD . Immediately prior to the procedure, the patient was reevaluated and found suitable for the planned procedure and any planned medications. Immediately prior to the procedure a time out was called to verify the correct patient, procedure, equipment, staff, and marking as appropriate. Condition: Emergency. Consent:  NO.    
Method: Seldinger technique. Procedure: Arterial Catheter Insertion in Right, Radial Artery Catheter inserted into a new site. Number of Attempts:  1 Indication: Monitoring and Blood Drawing. Complication None. Performed By:performed the above procedure myself. The procedure was tolerated well.

## 2021-01-07 NOTE — PROGRESS NOTES
0030: Paged in house NP to report SpO2 ranging between 77-85% for the past hour while on CPAP 100%. Pt woken up from sleep many times and  encouraged to take deep breaths sats would increase to 88-89 for a short period of time before dropping back down below 85%. No acute s/s of distress noted. ABG and CXR ordered. 8549: ABG completed  Sats 93% while pt is awake on CPAP. CXR arriving soon. Pt resting quietly and comfortably. Will continue to monitor.

## 2021-01-07 NOTE — PROGRESS NOTES
TRANSFER - IN REPORT: 
 
Verbal report received from [address]  [facility] for ordered procedure Report consisted of patients Situation, Background, Assessment and  
Recommendations(SBAR). Information from the following report(s) Kardex was reviewed with the receiving nurse. Opportunity for questions and clarification was provided. Assessment completed upon patients arrival to unit and care assumed.

## 2021-01-09 NOTE — DISCHARGE SUMMARY
Death summary The patient is a 38years old man who was admitted to the hospital on December 26, 2020 due to acute hypoxic respiratory failure due to COVID-19 pneumonia. He was started on IV antibiotics, IV steroids. He was not a candidate for remdesivir due to his end-stage renal disease. His oxygen requirement increased significantly during his hospital stay and he was on BiPAP on the day of death. Patient was receiving his hemodialysis at bedside. Unfortunately on January 7, 2020 CODE BLUE was called on the patient. Upon arrival to the patient room, he was getting hemodialysis and it was reported to me that his blood pressure was dropping significantly and his oxygen saturation was coming down to. CODE BLUE was started and ROSC was obtained patient was transferred to ICU however it seems he coded again and ROSC was not obtained at that time.

## 2021-12-25 NOTE — PROGRESS NOTES
Reviewed discharge instructions with patient. Medications delivered from pharmacy. Patient discharged via wheelchair without difficulty.  
 
Anderson Coleman RN 
 
 ingestion

## 2022-09-12 NOTE — PROGRESS NOTES
NAME: Bharat Alegre :  1985 MRN:  [id number] Assessment :    Plan: 
--[facility] 
Anemia CAD Labile BP, now HTN 
SHPT --Seen on HD at 0910. SBP stable. Continue FARIDA-10 k MWF On renvela, phos is < 5.5 DC planning Subjective: Chief Complaint:    No N/V. No dyspnea. No pain. Review of Systems: 
 
Symptom Y/N Comments  Symptom Y/N Comments Fever/Chills    Chest Pain Poor Appetite    Edema Cough    Abdominal Pain Sputum    Joint Pain SOB/LITTLE    Pruritis/Rash Nausea/vomit    Tolerating PT/OT Diarrhea    Tolerating Diet Constipation    Other Could not obtain due to:   
 
Objective: VITALS:  
Last 24hrs VS reviewed since prior progress note. Most recent are: 
Visit Vitals /67 Pulse 72 Temp 98.1 °F (36.7 °C) (Oral) Resp 16 Ht 6' 2\" (1.88 m) Wt 146 kg (321 lb 14 oz) SpO2 96% BMI 41.33 kg/m² Intake/Output Summary (Last 24 hours) at 10/28/2020 5067 Last data filed at 10/28/2020 0000 Gross per 24 hour Intake 680 ml Output 100 ml Net 580 ml Telemetry Reviewed: PHYSICAL EXAM: 
General: NAD 
cta alfredo on room air RRR Bka, (L) dressing oozing Lab Data Reviewed: (see below) Medications Reviewed: (see below) PMH/SH reviewed - no change compared to H&P 
________________________________________________________________________ Care Plan discussed with: 
Patient y Family RN Care Manager Consultant:  y   
 
  Comments >50% of visit spent in counseling and coordination of care    
 
________________________________________________________________________ James Bai MD  
 
Procedures: see electronic medical records for all procedures/Xrays and details which 
were not copied into this note but were reviewed prior to creation of Plan.    
 
LABS: 
 Received request via: Patient    Was the patient seen in the last year in this department? Yes  3/2/22  Does the patient have an active prescription (recently filled or refills available) for medication(s) requested? No     Recent Labs 10/26/20 
0830 WBC 9.2 HGB 8.3* HCT 26.8*  
 Recent Labs 10/28/20 
0258 10/26/20 
0830 * 136  
K 5.3* 5.1 CL 96* 97  
CO2 31 29 BUN 47* 54* CREA 11.20* 12.60* * 77  
CA 9.0 9.2 No results for input(s): AP, TBIL, TP, ALB, GLOB, GGT, AML, LPSE in the last 72 hours. No lab exists for component: SGOT, GPT, AMYP, HLPSE No results for input(s): INR, PTP, APTT, INREXT, INREXT in the last 72 hours. No results for input(s): FE, TIBC, PSAT, FERR in the last 72 hours. No results found for: FOL, RBCF No results for input(s): PH, PCO2, PO2 in the last 72 hours. No results for input(s): CPK, CKMB in the last 72 hours. No lab exists for component: TROPONINI No components found for: [place] Lab Results Component Value Date/Time Color YELLOW/STRAW 05/01/2017 05:39 PM  
 Appearance CLOUDY (A) 05/01/2017 05:39 PM  
 Specific gravity 1.018 05/01/2017 05:39 PM  
 Specific gravity >1.030 (H) 10/03/2014 04:35 AM  
 pH (UA) 5.5 05/01/2017 05:39 PM  
 Protein 300 (A) 05/01/2017 05:39 PM  
 Glucose 250 (A) 05/01/2017 05:39 PM  
 Ketone NEGATIVE  05/01/2017 05:39 PM  
 Bilirubin NEGATIVE  05/01/2017 05:39 PM  
 Urobilinogen 0.2 05/01/2017 05:39 PM  
 Nitrites NEGATIVE  05/01/2017 05:39 PM  
 Leukocyte Esterase NEGATIVE  05/01/2017 05:39 PM  
 Epithelial cells FEW 05/01/2017 05:39 PM  
 Bacteria NEGATIVE  05/01/2017 05:39 PM  
 WBC 0-4 05/01/2017 05:39 PM  
 RBC 5-10 05/01/2017 05:39 PM  
 
 
MEDICATIONS: 
Current Facility-Administered Medications Medication Dose Route Frequency  HYDROmorphone (PF) (DILAUDID) injection 1 mg  1 mg IntraVENous Q6H PRN  
 epoetin nata-epbx (RETACRIT) injection 10,000 Units  10,000 Units SubCUTAneous Q MON, WED & FRI  insulin glargine (LANTUS) injection 10 Units  10 Units SubCUTAneous QHS  influenza vaccine 2020-21 (6 mos+)(PF) (FLUARIX/FLULAVAL/FLUZONE QUAD) injection 0.5 mL  0.5 mL IntraMUSCular PRIOR TO DISCHARGE  
  lidocaine (PF) (XYLOCAINE) 10 mg/mL (1 %) injection 0.1 mL  0.1 mL SubCUTAneous PRN  
 oxyCODONE IR (ROXICODONE) tablet 5 mg  5 mg Oral Q4H PRN  
 metoprolol tartrate (LOPRESSOR) tablet 75 mg  75 mg Oral BID  albuterol-ipratropium (DUO-NEB) 2.5 MG-0.5 MG/3 ML  3 mL Nebulization Q6H PRN  
 clopidogreL (PLAVIX) tablet 75 mg  75 mg Oral DAILY  loperamide (IMODIUM) capsule 2 mg  2 mg Oral QID PRN  
 sevelamer carbonate (RENVELA) tab 800 mg  800 mg Oral TID WITH MEALS  sodium chloride (NS) flush 5-40 mL  5-40 mL IntraVENous PRN  
 acetaminophen (TYLENOL) tablet 650 mg  650 mg Oral Q6H PRN Or  
 acetaminophen (TYLENOL) suppository 650 mg  650 mg Rectal Q6H PRN  polyethylene glycol (MIRALAX) packet 17 g  17 g Oral DAILY PRN  
 aspirin delayed-release tablet 81 mg  81 mg Oral DAILY  atorvastatin (LIPITOR) tablet 20 mg  20 mg Oral QHS  hydrALAZINE (APRESOLINE) 20 mg/mL injection 20 mg  20 mg IntraVENous Q6H PRN  
 insulin lispro (HUMALOG) injection   SubCUTAneous AC&HS  
 glucose chewable tablet 16 g  4 Tab Oral PRN  
 dextrose (D50W) injection syrg 12.5-25 g  12.5-25 g IntraVENous PRN  
 glucagon (GLUCAGEN) injection 1 mg  1 mg IntraMUSCular PRN

## 2023-01-01 NOTE — CONSULTS
Vascular Surgery Consult Note  1/10/2020    Subjective:     Romy Julian is a very pleasant 27 y.o.  male with a pmhx significant for ESRD on HD, DM, HTN, HLD, and obesity. He is admitted to the hospital w/ osteomyelitis of the right third toe. He is s/p amputation on 01/09/2020. Prior to the procedure ABIs were obtained and are NC on the right w/ a TBI of 0.41. We have been asked to evaluate. Past Medical History:   Diagnosis Date    Cataracts     ESRD on HD      Diabetes ([address]     Hypercholesterolemia     Hypertension     Obesity       Past Surgical History:   Procedure Laterality Date    Left great toe and L 5th toe amputations    Right chest tunneled HD cathter       Creation of left arm transposed AV fistula 07/2017 Dr. Juliane Jeter. [facility]inta Luis Daniel      Family History   Problem Relation Age of Onset    Diabetes Mother     Liver Disease Father     Kidney Disease Maternal Grandfather     Diabetes Maternal Grandfather     Hypertension Maternal Grandfather     Diabetes Paternal Uncle     Hypertension Paternal Uncle       Social History     Tobacco Use    Smoking status: Former Smoker     Packs/day: 0.25    Smokeless tobacco: Never Used    Tobacco comment: \"quit about a year ago\"   Substance Use Topics    Alcohol use: No     Comment: rarely       Prior to Admission medications    Medication Sig Start Date End Date Taking? Authorizing Provider   lisinopril (PRINIVIL, ZESTRIL) 10 mg tablet Take 10 mg by mouth every Tuesday, Thursday, Saturday & Sunday. Non-HD days   Yes Provider, Historical   insulin glargine (LANTUS U-100 INSULIN) 100 unit/mL injection 40 Units by SubCUTAneous route daily. Indications: type 2 diabetes mellitus 7/10/19  Yes Stanley Norman MD   loperamide (IMODIUM) 2 mg capsule Take 1 Cap by mouth four (4) times daily as needed for Diarrhea.  12/9/17  Yes Toñito Huerta MD     No Known Allergies     Review of Systems   Constitutional: Negative for chills, fatigue and fever.   HENT: Negative for congestion. Eyes: Negative for visual disturbance. Respiratory: Negative for cough, chest tightness and shortness of breath. Cardiovascular: Positive for leg swelling. Negative for chest pain. Gastrointestinal: Negative for nausea and vomiting. Endocrine: Negative for polydipsia and polyuria. Genitourinary: Negative. Musculoskeletal: Negative for myalgias. Skin: Positive for color change and wound. Allergic/Immunologic: Negative for immunocompromised state. Neurological: Negative for weakness. Hematological: Negative. Psychiatric/Behavioral: Negative. Objective:       Patient Vitals for the past 24 hrs:   BP Temp Temp src Pulse Resp SpO2 Weight   01/10/20 1157 (!) 174/96 97.8 °F (36.6 °C) Oral 85 18     01/10/20 1145 177/86   85 18     01/10/20 1130 149/59   89 18     01/10/20 1115 172/83   84 18     01/10/20 1100 167/77   87 18     01/10/20 1045 158/81   86 18     01/10/20 1030 175/85   85 18     01/10/20 1015 150/77   85 18     01/10/20 1000 154/79   85 18     01/10/20 0945 145/68   86 18     01/10/20 0930 155/68   85 18     01/10/20 0915 159/78   88 18     01/10/20 0900 133/67   87 18     01/10/20 0845 144/74   84 18     01/10/20 0830 158/80   83 18     01/10/20 0815 160/81   86 18     01/10/20 0800 153/82   88 18     01/10/20 0745 (!) 181/98   86 18     01/10/20 0734 (!) 196/112 98 °F (36.7 °C) Oral 90 18     01/10/20 0141 113/78 97.9 °F (36.6 °C)  88 16 94 % 154 kg (339 lb 8.1 oz)   01/09/20 2315 149/85 98.1 °F (36.7 °C)  90 18 98 %    01/09/20 1817 (!) 179/109 98.4 °F (36.9 °C)  89 18 98 %    01/09/20 1541 (!) 173/100 98 °F (36.7 °C)  81 15 93 %    01/09/20 1241 150/45 98.1 °F (36.7 °C)  74 16 92 %      Physical Exam  Constitutional:       Appearance: Normal appearance. HENT:      Head: Normocephalic and atraumatic.       Nose: Nose normal.      Mouth/Throat: Mouth: Mucous membranes are moist.   Eyes:      Pupils: Pupils are equal, round, and reactive to light. Cardiovascular:      Rate and Rhythm: Normal rate and regular rhythm. Pulses: Normal pulses. Femoral pulses are 2+ on the right side and 2+ on the left side. Heart sounds: Normal heart sounds. Comments: Feet are warm and perfused. Unable to assess distal pulses of the right foot due to bulky dressing. Left arm AVF w/ + thrill   Pulmonary:      Effort: Pulmonary effort is normal.      Breath sounds: Normal breath sounds. Abdominal:      General: Abdomen is flat. Palpations: Abdomen is soft. Musculoskeletal: Normal range of motion. Right lower le+ Edema present. Left lower le+ Edema present. Comments: Amputation of multiple toes of the left foot. Skin:     General: Skin is warm. Neurological:      General: No focal deficit present. Mental Status: He is alert and oriented to person, place, and time.    Psychiatric:         Mood and Affect: Mood normal.         Behavior: Behavior normal.      Comments: Very pleasant        Pertinent Test Results:   Recent Results (from the past 24 hour(s))   GLUCOSE, POC    Collection Time: 20  1:04 PM   Result Value Ref Range    Glucose (POC) 78 65 - 100 mg/dL    Performed by Rubbie Duane (PCT)    GLUCOSE, POC    Collection Time: 20  1:56 PM   Result Value Ref Range    Glucose (POC) 79 65 - 100 mg/dL    Performed by Rubbie Duane (PCT)    GLUCOSE, POC    Collection Time: 20  4:18 PM   Result Value Ref Range    Glucose (POC) 74 65 - 100 mg/dL    Performed by Stephani Sanchez*    CULTURE, WOUND Lillian Soraidack STAIN    Collection Time: 20  5:25 PM   Result Value Ref Range    Special Requests: NO SPECIAL REQUESTS      GRAM STAIN NO WBC'S SEEN      GRAM STAIN NO ORGANISMS SEEN      Culture result: PENDING    GLUCOSE, POC    Collection Time: 20  6:10 PM   Result Value Ref Range    Glucose (POC) 63 (L) 65 - 100 mg/dL    Performed by Calli Arias    GLUCOSE, POC    Collection Time: 01/09/20  6:54 PM   Result Value Ref Range    Glucose (POC) 90 65 - 100 mg/dL    Performed by Hakan Grace, POC    Collection Time: 01/09/20  9:08 PM   Result Value Ref Range    Glucose (POC) 154 (H) 65 - 100 mg/dL    Performed by Sajan Alfonso PCT    METABOLIC PANEL, COMPREHENSIVE    Collection Time: 01/10/20  5:04 AM   Result Value Ref Range    Sodium 133 (L) 136 - 145 mmol/L    Potassium 3.9 3.5 - 5.1 mmol/L    Chloride 97 97 - 108 mmol/L    CO2 30 21 - 32 mmol/L    Anion gap 6 5 - 15 mmol/L    Glucose 86 65 - 100 mg/dL    BUN 37 (H) 6 - 20 MG/DL    Creatinine 10.60 (H) 0.70 - 1.30 MG/DL    BUN/Creatinine ratio 3 (L) 12 - 20      GFR est AA 7 (L) >60 ml/min/1.73m2    GFR est non-AA 6 (L) >60 ml/min/1.73m2    Calcium 8.7 8.5 - 10.1 MG/DL    Bilirubin, total 0.3 0.2 - 1.0 MG/DL    ALT (SGPT) 13 12 - 78 U/L    AST (SGOT) 15 15 - 37 U/L    Alk. phosphatase 36 (L) 45 - 117 U/L    Protein, total 8.1 6.4 - 8.2 g/dL    Albumin 2.5 (L) 3.5 - 5.0 g/dL    Globulin 5.6 (H) 2.0 - 4.0 g/dL    A-G Ratio 0.4 (L) 1.1 - 2.2     VANCOMYCIN, TROUGH    Collection Time: 01/10/20  5:04 AM   Result Value Ref Range    Vancomycin,trough 31.2 (HH) 5.0 - 10.0 ug/mL    Reported dose date: NOT PROVIDED      Reported dose time: NOT PROVIDED      Reported dose: NOT PROVIDED UNITS   GLUCOSE, POC    Collection Time: 01/10/20  7:21 AM   Result Value Ref Range    Glucose (POC) 75 65 - 100 mg/dL    Performed by Unkown     GLUCOSE, POC    Collection Time: 01/10/20  7:56 AM   Result Value Ref Range    Glucose (POC) 117 (H) 65 - 100 mg/dL    Performed by Unkown         Assessmen/Plan:     Consult problem  Right 3rd toe osteomyelitis   Rule out PAD    We will plan for a RLE arteriogram as an outpatient. Wound care per podiatry.   Antibx per primary team.    Active problems:  ESRD on HD  HTN  -labile   HLD  Management of comorbid conditions by primary team and nephrology. VTE Prophylaxis:  [facility]    Disposition:  Likely home     Vascular surgery signing off. We appreciate the opportunity to participate in the care of Mr. Kelly Hyatt. Patient should f/u w/ Dr. Starr Esposito on 01/28/2020 as scheduled. Information placed on AVS. Please reconsult as needed.         Signed By: Abrli Plata NP     January 10, 2020 0

## 2023-05-25 NOTE — PROGRESS NOTES
1600 
 TRANSFER - IN REPORT: 
 
Verbal report received from [employer] (name) on Kurt Anderson  being received from PACU for routine post - op Report consisted of patients Situation, Background, Assessment and  
Recommendations(SBAR). Information from the following report(s) SBAR, Kardex, Procedure Summary, Intake/Output, MAR, Recent Results and Cardiac Rhythm NSR was reviewed with the receiving nurse. Opportunity for questions and clarification was provided. Assessment completed upon patients arrival to unit and care assumed. GOAL: Patient will perform bed mobility independently in 2 weeks Goal: Pt will be independent in all bed mobility in 2  weeks.

## 2023-09-12 NOTE — PROGRESS NOTES
Hospitalist Progress Note NAME: Audra White :  1985 MRN:  [id number] Assessment / Plan: 
Sepsis, POA  WBC 19K, tachycardia Right foot cellulitis with abscess, POA after amputation of right 1st and second toe for osteomyelitis  Hx osteomyelitis right 3rd and 4th toes 2020 and 10/2019 respectively Acute hypoxic respiratory failure 88% RA IDDM uncontrolled with hyperglycemia  ESRD on HD MWF Obesity 
  
--podiatry consult apreciated, MRI right foot showed fluid collection and abscess with no osteomyelitis -s/p irrigation of wound -- SARS-COV-2 negative,  
--continue lantus 20 units daily, add moderate SSI 
-- appreciated renal consult to continue with dialysis -s/p  RIGHT LEG ARTERIOGRAM ANTERIOR TIBIAL ARTERY AND ANGIOPLASTY  
 s/p  excision and debridement of necrotic tissue right foot   
 s/p BKA  Continue zosyn until  , Vancomycin discontinued. Lantus increased to 30U for  due addition on Prednisone Hypotension  
-Hold  amlodipine and Lisinopril 
-continue midodrine Hyperkalemia  
-kayexalate   
 
  
Diarrhea: 
-Likely Antibiotic induced 
-Resolved -Imodium PRN 
-c/w probiotic 
  
Hyponatremia  
-motoring  
  
Chest pain  
-troponin negative  
- EKG show no acute abnormality 
-Echo show EF 45-50 % Hearing loss possible vanc toxicity vs anesthesia related? 
- remove offending agents which may affect pt's hearing -ENT consult. Spoke with Dr. Darrell Chacko with ENT, recommends Prednisone for now and outpatient follow up. Prednisone 60mg X 5 days, 40mg X 5 days, 20mg X 5 days  And 10mg X 5 days Will have to adjust Lantus to control BG.  
 
-pharmacy consult to discuss possible culprit  
  
Body mass index is 43.36 kg/m². 
 
 
  
Code: full DVT prophylaxis: heparin Surrogate decision maker:  Kala and Anne Robbins (Cousins). Pt verbalized these 2 names when asked about decision maker. D/w ROXANE De La Rosa Patient will need rehab at discharge, Inpatient rehab eval sent Patient continues to require inpatient stay of IV antibiotics and arrangement for rehab.  
  
  
  
   
 
Subjective: Chief Complaint / Reason for Physician Visit Discussed with RN events overnight. Pt doing okay today Still with complete hearing loss Review of Systems: 
Symptom Y/N Comments  Symptom Y/N Comments Fever/Chills    Chest Pain Poor Appetite    Edema Cough    Abdominal Pain Sputum    Joint Pain SOB/LITTLE    Pruritis/Rash Nausea/vomit    Tolerating PT/OT Diarrhea    Tolerating Diet Constipation    Other Could NOT obtain due to: Hearing lost   
 
Objective: VITALS:  
Last 24hrs VS reviewed since prior progress note. Most recent are: 
Patient Vitals for the past 24 hrs: 
 Temp Pulse Resp BP SpO2  
08/28/20 1030  75 18 107/67   
08/28/20 1015  74 18 111/62   
08/28/20 1000  73 18 100/80   
08/28/20 0945  72 18 117/70   
08/28/20 0930  72 18 123/77   
08/28/20 0915  74 18 150/85   
08/28/20 0900  79 18 (!) 139/92   
08/28/20 0845 98.2 °F (36.8 °C) 74 18 159/86   
08/28/20 0734 98.2 °F (36.8 °C) 69 18 126/54 96 % 08/28/20 0430 98.2 °F (36.8 °C) 74 19 132/86 95 % 08/27/20 2300 97.9 °F (36.6 °C) 80 18 127/75 96 % 08/27/20 1940 97.5 °F (36.4 °C) 74 19 113/67 96 % 08/27/20 1633 97.6 °F (36.4 °C)      
08/27/20 1457 98.1 °F (36.7 °C) 81 18 113/70 96 % No intake or output data in the 24 hours ending 08/28/20 1211 PHYSICAL EXAM: 
General: WD, WN. Alert, cooperative, no acute distress   
EENT:  EOMI. Anicteric sclerae. MMM Resp:  Clear CV:  Regular  rhythm,  No edema GI:  Soft, Non distended, Non tender.  +Bowel sounds Neurologic:  Alert and oriented X 3, normal speech, Psych:   Good insight. Not anxious nor agitated Ext:   Rt BKA, dressing in place Reviewed most current lab test results and cultures  YES 
 Reviewed most current radiology test results   YES Review and summation of old records today    NO Reviewed patient's current orders and MAR    YES 
PMH/SH reviewed - no change compared to H&P 
________________________________________________________________________ Care Plan discussed with: 
  Comments Patient y Family RN y   
Care Manager y Consultant     
                 y Multidiciplinary team rounds were held today with , nursing, pharmacist and clinical coordinator. Patient's plan of care was discussed; medications were reviewed and discharge planning was addressed. ________________________________________________________________________ Total NON critical care TIME:  35   Minutes Total CRITICAL CARE TIME Spent:   Minutes non procedure based Comments >50% of visit spent in counseling and coordination of care y   
________________________________________________________________________ Prudence MD Dennys  
 
Procedures: see electronic medical records for all procedures/Xrays and details which were not copied into this note but were reviewed prior to creation of Plan. LABS: 
I reviewed today's most current labs and imaging studies. Pertinent labs include: 
Recent Labs  
  08/28/20 0852 08/27/20 
0542 08/26/20 
0157 WBC 10.8 11.5* 14.3* HGB 8.6* 9.1* 8.8* HCT 28.5* 29.3* 28.7* * 461* 448* Recent Labs  
  08/28/20 0852 08/26/20 
0157 * 132* K 4.4 5.0  
CL 97 95* CO2 24 27 * 157* BUN 42* 46* CREA 10.20* 9.82* CA 9.1 9.3 Signed: Prudence MD Dennys 
 
 
 
 Nasal Turnover Hinge Flap Text: The defect edges were debeveled with a #15 scalpel blade.  Given the size, depth, location of the defect and the defect being full thickness a nasal turnover hinge flap was deemed most appropriate.  Using a sterile surgical marker, an appropriate hinge flap was drawn incorporating the defect. The area thus outlined was incised with a #15 scalpel blade. The flap was designed to recreate the nasal mucosal lining and the alar rim. The skin margins were undermined to an appropriate distance in all directions utilizing iris scissors.

## 2024-01-20 NOTE — ACP (ADVANCE CARE PLANNING)
Advance Care Planning Note      NAME: Noris Blount   :  1985   MRN:  [id number]     Date/Time:  2020 4:42 PM    Active Diagnoses:  Hospital Problems  Date Reviewed: 10/8/2019          Codes Class Noted POA    Foot ulcer ([facility] ICD-10-CM: L97.509  ICD-9-CM: 707.15  2020 Unknown        ESRD (end stage renal disease) ([facility] ICD-10-CM: N18.6  ICD-9-CM: 585.6  2020 Unknown        Wound cellulitis ICD-10-CM: L03.90  ICD-9-CM: 682.9  2020 Unknown              These active diagnoses are of sufficient risk that focused discussion on advance care planning is indicated in order to allow the patient to thoughtfully consider personal goals of care, and if situations arise that prevent the ability to personally give input, to ensure appropriate representation of their personal desires for different levels and aggressiveness of care. Discussion:   Code status addressed and wants to be a Full Code. Patient wants central line and vasopressors if needed. Patient would also want a feeding tube, if needed, for nutritional support. Patient  would like to assign Aunt  as the surrogate decision maker. Persons present and participating in discussion: Yesica Jones MD,       Time Spent:   Total time spent face-to-face in education and discussion:   20  minutes.          Yesica Arenas MD   Hospitalist
Discharged

## (undated) DEVICE — NEEDLE HYPO 25GA L1.5IN BVL ORIENTED ECLIPSE

## (undated) DEVICE — RADIFOCUS OPTITORQUE ANGIOGRAPHIC CATHETER: Brand: OPTITORQUE

## (undated) DEVICE — STERILE POLYISOPRENE POWDER-FREE SURGICAL GLOVES: Brand: PROTEXIS

## (undated) DEVICE — MINI TREK CORONARY DILATATION CATHETER 2.0 MM X 30 MM / RAPID-EXCHANGE: Brand: MINI TREK

## (undated) DEVICE — STOCKINETTE,IMPERVIOUS,12X48,STERILE: Brand: MEDLINE

## (undated) DEVICE — KERLIX BANDAGE ROLL: Brand: KERLIX

## (undated) DEVICE — SUT ETHLN 3-0 18IN PS1 BLK --

## (undated) DEVICE — ROCKER SWITCH PENCIL BLADE ELECTRODE, HOLSTER: Brand: EDGE

## (undated) DEVICE — SPONGE LAP 18X18IN STRL -- 5/PK

## (undated) DEVICE — SET ADMIN 16ML TBNG L100IN 2 Y INJ SITE IV PIGGY BK DISP

## (undated) DEVICE — DESTINATION RENAL GUIDING SHEATH: Brand: DESTINATION

## (undated) DEVICE — GUIDEWIRE VASC L260CM 0.035IN J TIP L3MM PTFE FIX COR NAMIC

## (undated) DEVICE — 1200 GUARD II KIT W/5MM TUBE W/O VAC TUBE: Brand: GUARDIAN

## (undated) DEVICE — STRAP,POSITIONING,KNEE/BODY,FOAM,4X60": Brand: MEDLINE

## (undated) DEVICE — PART NUMBER 108260, VTI 8 MHZ DISPOSABLE DOPPLER PROBE, STRAIGHT, BOX: Brand: VTI 8 MHZ DISPOSABLE DOPPLER PROBE, STRAIGHT, BOX

## (undated) DEVICE — SURGICAL PROCEDURE PACK BASIN MAJ SET CUST NO CAUT

## (undated) DEVICE — HEMADUCT 7MM FLAT, FULL DUCT: Brand: CARDINAL HEALTH

## (undated) DEVICE — CATH URETH INTMIT ROB 12FR FUN -- CONVERT TO ITEM 151713

## (undated) DEVICE — Device

## (undated) DEVICE — INTENDED FOR TISSUE SEPARATION, AND OTHER PROCEDURES THAT REQUIRE A SHARP SURGICAL BLADE TO PUNCTURE OR CUT.: Brand: BARD-PARKER ® CARBON RIB-BACK BLADES

## (undated) DEVICE — DRESSING HEMOSTATIC SFT INTVENT W/O SLT DBL WRP QUIKCLOT LF

## (undated) DEVICE — BOWL UTIL GRAD 32OZ STRL --

## (undated) DEVICE — INFECTION CONTROL KIT SYS

## (undated) DEVICE — SOLIDIFIER MEDC 1200ML -- CONVERT TO 356117

## (undated) DEVICE — TREK CORONARY DILATATION CATHETER 3.0 MM X 30 MM / RAPID-EXCHANGE: Brand: TREK

## (undated) DEVICE — HANDLE LT SNAP ON ULT DURABLE LENS FOR TRUMPF ALC DISPOSABLE

## (undated) DEVICE — DRESSING PETRO W3XL8IN N ADH OIL EMUL GZ CURAD

## (undated) DEVICE — SOLUTION IV 1000ML 0.9% SOD CHL

## (undated) DEVICE — TUBING, SUCTION, 1/4" X 10', STRAIGHT: Brand: MEDLINE

## (undated) DEVICE — SUT ETHLN 4-0 18IN PS2 BLK --

## (undated) DEVICE — BANDAGE,GAUZE,CONFORMING,3"X75",STRL,LF: Brand: MEDLINE

## (undated) DEVICE — CLIP INT SM WIDE RED TI TRNSVRS GRV CHEVRON SHP W PRECIS

## (undated) DEVICE — TUBING PRSS MON L6IN PVC M FEM CONN

## (undated) DEVICE — CONTAINER SPEC 20 ML LID NEUT BUFF FORMALIN 10 % POLYPR STS

## (undated) DEVICE — CULTURETTE SGL EVAC TUBE PALL -- 100/CA

## (undated) DEVICE — SWAB CULT LIQ STUART AGR AERB MOD IN BRK SGL RAYON TIP PLAS 220099] BECTON DICKINSON MICRO]

## (undated) DEVICE — SPONGE GZ W4XL4IN COT 12 PLY TYP VII WVN C FLD DSGN

## (undated) DEVICE — TR BAND RADIAL ARTERY COMPRESSION DEVICE: Brand: TR BAND

## (undated) DEVICE — DRESSING,GAUZE,XEROFORM,CURAD,5"X9",ST: Brand: CURAD

## (undated) DEVICE — COPILOT BLEEDBACK CONTROL VALVE: Brand: COPILOT

## (undated) DEVICE — SPONGE HEMOSTAT CELLULS 4X8IN -- SURGICEL

## (undated) DEVICE — 2108 SERIES SAGITTAL BLADE (24.8 X 0.88 X 73.8MM)

## (undated) DEVICE — GAUZE,PACKING STRIP,IODOFORM,1/2"X5YD,ST: Brand: CURAD

## (undated) DEVICE — SUTURE PROL 2-0 L48IN NONABSORBABLE BLU SH L26MM 1/2 CIR 8533H

## (undated) DEVICE — SUTURE ETHLN SZ 2-0 L18IN NONABSORBABLE BLK L26MM FS 3/8 664G

## (undated) DEVICE — REM POLYHESIVE ADULT PATIENT RETURN ELECTRODE: Brand: VALLEYLAB

## (undated) DEVICE — PACK,BASIC,SIRUS,V: Brand: MEDLINE

## (undated) DEVICE — SUTURE NONABSORBABLE MONOFILAMENT 5-0 PS-2 18 IN BLK ETHILON 1666H

## (undated) DEVICE — CATH EP CRV 7F DUO 2/8 2M LG -- LIVEWIRE STRL

## (undated) DEVICE — SOL IRRIGATION INJ NACL 0.9% 500ML BTL

## (undated) DEVICE — PRECISION THIN (9.0 X 0.38 X 31.0MM)

## (undated) DEVICE — DEVON™ KNEE AND BODY STRAP 60" X 3" (1.5 M X 7.6 CM): Brand: DEVON

## (undated) DEVICE — CATHETER ANGIO 5FR L70CM GWIRE 0.035IN 1CM SPC OMNI FLSH 21

## (undated) DEVICE — BANDAGE COMPR W6INXL10YD ST M E WHITE/BEIGE

## (undated) DEVICE — (D)PREP SKN CHLRAPRP APPL 26ML -- CONVERT TO ITEM 371833

## (undated) DEVICE — MEDI-VAC YANK SUCT HNDL W/TPRD BULBOUS TIP: Brand: CARDINAL HEALTH

## (undated) DEVICE — SUTURE PROL SZ 5-0 L24IN NONABSORBABLE BLU RB-2 L13IN 1/2 8554H

## (undated) DEVICE — COVER LT HNDL PLAS RIG 1 PER PK

## (undated) DEVICE — DRAPE,EXTREMITY,89X128,STERILE: Brand: MEDLINE

## (undated) DEVICE — PTA BALLOON DILATATION CATHETER: Brand: COYOTE™

## (undated) DEVICE — GLIDESHEATH SLENDER ACCESS KIT: Brand: GLIDESHEATH SLENDER

## (undated) DEVICE — SUT ETHLN 3-0 18IN PS2 BLK --

## (undated) DEVICE — SWAB CULT DBL W/O CHAR RAYON TIP AMIES GEL CLMN FOR COLL

## (undated) DEVICE — KENDALL RADIOLUCENT FOAM MONITORING ELECTRODE RECTANGULAR SHAPE: Brand: KENDALL

## (undated) DEVICE — SYR 10ML LUER LOK 1/5ML GRAD --

## (undated) DEVICE — TREK CORONARY DILATATION CATHETER 2.50 MM X 30 MM / RAPID-EXCHANGE: Brand: TREK

## (undated) DEVICE — TOWEL SURG W17XL27IN STD BLU COT NONFENESTRATED PREWASHED

## (undated) DEVICE — BNDG ELAS HK LOOP 4X5YD NS -- MATRIX

## (undated) DEVICE — SUTURE PERMAHAND SZ 2-0 L18IN NONABSORBABLE BLK L26MM SH C012D

## (undated) DEVICE — IMMOBILIZER KNEE 3 PNL 19 IN

## (undated) DEVICE — SLIM BODY SKIN STAPLER: Brand: APPOSE ULC

## (undated) DEVICE — SUTURE VCRL SZ 2-0 L36IN ABSRB VLT L36MM CT-1 1/2 CIR J345H

## (undated) DEVICE — SPLINT WR POS F/ARTERIAL ACC -- BX/10

## (undated) DEVICE — CABLE RMFG EXT CATH --

## (undated) DEVICE — TRAY PREP DRY W/ PREM GLV 2 APPL 6 SPNG 2 UNDPD 1 OVERWRAP

## (undated) DEVICE — EXTREMITY III-LF: Brand: MEDLINE INDUSTRIES, INC.

## (undated) DEVICE — Z DISCONTINUED PER MEDLINE LINE GAS SAMPLING O2/CO2 LNG AD 13 FT NSL W/ TBNG FILTERLINE

## (undated) DEVICE — PERCUTANEOUS ENTRY THINWALL NEEDLE  ONE-PART: Brand: COOK

## (undated) DEVICE — SYRINGE,EAR/ULCER, 2 OZ, STERILE: Brand: MEDLINE

## (undated) DEVICE — Device: Brand: MEDEX

## (undated) DEVICE — SYR 3ML LL TIP 1/10ML GRAD --

## (undated) DEVICE — BASIN EMSIS 16OZ GRAPHITE PLAS KID SHP MOLD GRAD FOR ORAL

## (undated) DEVICE — KIT,1200CC CANISTER,3/16"X6' TUBING: Brand: MEDLINE INDUSTRIES, INC.

## (undated) DEVICE — STOCKINETTE,DOUBLE PLY,6X54,STERILE: Brand: MEDLINE

## (undated) DEVICE — BANDAGE,GAUZE,BULKEE II,4.5"X4.1YD,STRL: Brand: MEDLINE

## (undated) DEVICE — SUTURE PERMAHAND SZ 0 L30IN NONABSORBABLE BLK SILK BRAID A306H

## (undated) DEVICE — ROCKER SWITCH PENCIL HOLSTER: Brand: VALLEYLAB

## (undated) DEVICE — FORCEPS BX L160CM DIA8MM GRSP DISECT CUP TIP NONLOCKING ROT

## (undated) DEVICE — SUTURE ETHLN SZ 2-0 L18IN NONABSORBABLE BLK L19MM PS-2 PRIM 593H

## (undated) DEVICE — TOWEL,OR,DSP,ST,BLUE,STD,2/PK,40PK/CS: Brand: MEDLINE

## (undated) DEVICE — CURITY NON-ADHERENT STRIPS: Brand: CURITY

## (undated) DEVICE — LABEL MED CARD MRMC STRL

## (undated) DEVICE — PERCLOSE PROGLIDE™ SUTURE-MEDIATED CLOSURE SYSTEM: Brand: PERCLOSE PROGLIDE™

## (undated) DEVICE — NEEDLE HYPO 18GA L1.5IN PNK S STL HUB POLYPR SHLD REG BVL

## (undated) DEVICE — CUSTOM KT PTCA INFL DEV K05 00053H

## (undated) DEVICE — BLANKET WRM W25XL64IN NONWOVEN SFT LTWT PLIABLE HYPR

## (undated) DEVICE — BANDAGE,GAUZE,CONFORMING,4"X75",STRL,LF: Brand: MEDLINE

## (undated) DEVICE — PINNACLE PRECISION ACCESS SYSTEM INTRODUCER SHEATH: Brand: PINNACLE PRECISION ACCESS SYSTEM

## (undated) DEVICE — DRAPE,REIN 53X77,STERILE: Brand: MEDLINE

## (undated) DEVICE — INTRODUCER SHTH J 0.038 IN 3 MM 8 FRX60 CM DIL FAST-CATH

## (undated) DEVICE — 3M™ TEGADERM™ TRANSPARENT FILM DRESSING FRAME STYLE, 1626W, 4 IN X 4-3/4 IN (10 CM X 12 CM), 50/CT 4CT/CASE: Brand: 3M™ TEGADERM™

## (undated) DEVICE — BANDAGE COBAN 6 IN WND 6INX5YD FOAM

## (undated) DEVICE — 450 ML BOTTLE OF 0.05% CHLORHEXIDINE GLUCONATE IN 99.95% STERILE WATER FOR IRRIGATION, USP AND APPLICATOR.: Brand: IRRISEPT ANTIMICROBIAL WOUND LAVAGE

## (undated) DEVICE — STAPLER SKIN H3.9MM WIRE DIA0.58MM CRWN 6.9MM 35 STPL ROT

## (undated) DEVICE — HI-TORQUE VERSACORE FLOPPY GUIDE WIRE SYSTEM 145 CM: Brand: HI-TORQUE VERSACORE

## (undated) DEVICE — BAG SPEC BIOHZRD 10 X 10 IN --

## (undated) DEVICE — CATH NAVISTAR BIDIR FJ 8MM --

## (undated) DEVICE — HANDPIECE SET WITH HIGH FLOW TIP AND SUCTION TUBE: Brand: INTERPULSE

## (undated) DEVICE — COVER,TABLE,60X90,STERILE: Brand: MEDLINE

## (undated) DEVICE — SOLUTION IRRIG 3000ML 0.9% SOD CHL FLX CONT 0797208] ICU MEDICAL INC]

## (undated) DEVICE — DRAPE PRB US TRNSDCR 6X96IN --

## (undated) DEVICE — SUT CHRMC 3-0 27IN SH BRN --

## (undated) DEVICE — TOWEL 4 PLY TISS 19X30 SUE WHT

## (undated) DEVICE — NEONATAL-ADULT SPO2 SENSOR: Brand: NELLCOR

## (undated) DEVICE — ANGIOGRAPHY KIT CUST [K0910930B] [MERIT MEDICAL SYSTEMS INC]

## (undated) DEVICE — PACK PROCEDURE SURG HRT CATH

## (undated) DEVICE — PINNACLE INTRODUCER SHEATH: Brand: PINNACLE

## (undated) DEVICE — SUT PROL 6-0 18IN BV1 DA BLU --

## (undated) DEVICE — STOCKINETTE TUBE BLN 2PLY 6X72 -- MEDICHOICE CONVERT TO 363488

## (undated) DEVICE — SUTURE PERMAHAND SZ 3-0 L30IN NONABSORBABLE BLK SILK BRAID A304H

## (undated) DEVICE — CLAMP,EDSLAB HANDLELESS 8MM DBLE SOFTJAW: Brand: FOGARTY SOFTJAW

## (undated) DEVICE — SOLUTION LACTATED RINGERS INJECTION USP

## (undated) DEVICE — RADIFOCUS GLIDEWIRE: Brand: GLIDEWIRE

## (undated) DEVICE — CATHETER ANGIO 5FR L100CM GWIRE 0.038IN BERN NONBRAIDED HI

## (undated) DEVICE — PAD,ABDOMINAL,5"X9",ST,LF,25/BX: Brand: MEDLINE INDUSTRIES, INC.

## (undated) DEVICE — DISH PETRI W/LID STRL -- MIN CASE ORDER IS 2 CA

## (undated) DEVICE — SOLUTION SCRB 2OZ 10% POVIDONE IOD ANTIMIC BTL

## (undated) DEVICE — BONE WAX WHITE: Brand: BONE WAX WHITE

## (undated) DEVICE — LOOP VES MAXI YEL 2/PK

## (undated) DEVICE — CATH IV AUTOGRD BC GRN 18GA 30 -- INSYTE

## (undated) DEVICE — INFLATION DEVICE: Brand: ENCORE™ 26

## (undated) DEVICE — CATH SUPP SNGL 0.035INX150CM -- TRAILBLAZER - ORDER BY PK/5

## (undated) DEVICE — RING BASIN GUIDEWIRE BOWL: Brand: MEDLINE INDUSTRIES, INC.

## (undated) DEVICE — TUBING SUCT 12FR MAL ALUM SHFT FN CAP VENT UNIV CONN W/ OBT

## (undated) DEVICE — BANDAGE,ELASTIC,ESMARK,STERILE,4"X9',LF: Brand: MEDLINE

## (undated) DEVICE — KIT ACCS INTRO 4FR L10CM NDL 21GA L7CM GWIRE L40CM

## (undated) DEVICE — YANKAUER,BULB TIP,W/O VENT,RIGID,STERILE: Brand: MEDLINE

## (undated) DEVICE — GAUZE SPONGES,12 PLY: Brand: CURITY

## (undated) DEVICE — SUTURE PERMAHAND SZ 2-0 L30IN NONABSORBABLE BLK SILK W/O A305H

## (undated) DEVICE — AGENT HEMSTAT W2XL3IN OXIDIZED REGENERATED CELOS ABSRB

## (undated) DEVICE — X-RAY SPONGES,16 PLY: Brand: DERMACEA

## (undated) DEVICE — SOLUTION IV 500ML 0.9% SOD CHL FLX CONT

## (undated) DEVICE — KENDALL SCD EXPRESS SLEEVES, KNEE LENGTH, MEDIUM: Brand: KENDALL SCD

## (undated) DEVICE — BLADE ULTRASONIC L20MM BLNT W/ SIL SL SHT EXTN DISP FOR HRD

## (undated) DEVICE — SCANLAN® VASCU-STATT® II PLUS S-USE BULLDOG CLAMP W/FIRM PRESS GENTLE-JAW™- MINI ANGLED 45°(GREEN), CLAMPING PRESSURE 20-25 G (2/STERILE PKG): Brand: SCANLAN® VASCU-STATT® II PLUS S-USE BULLDOG CLAMP W/FIRM PRESS GENTLE-JAW™

## (undated) DEVICE — STOCKINETTE: Brand: DEROYAL

## (undated) DEVICE — PROBE VASC 8MHZ WTRPRF

## (undated) DEVICE — MICROPUNCTURE INTRODUCER SET SILHOUETTE TRANSITIONLESS WITH STAINLESS STEEL WIRE GUIDE: Brand: MICROPUNCTURE

## (undated) DEVICE — PATCH CARTO 3 EXT REF --

## (undated) DEVICE — AGENT HEMSTAT W2XL14IN OXIDIZED REGENERATED CELOS ABSRB FOR

## (undated) DEVICE — CATH IV AUTOGRD BC PNK 20GA 25 -- INSYTE

## (undated) DEVICE — ZIMMER® STERILE DISPOSABLE TOURNIQUET CUFF WITH PROTECTIVE SLEEVE AND PLC, DUAL PORT, SINGLE BLADDER, 34 IN. (86 CM)

## (undated) DEVICE — SYR BULB 60ML IRRIGATION -- CONVERT TO ITEM 116413

## (undated) DEVICE — SYRINGE ANGIO 10 CC BRL STD PRNT POLYCARB LT BLU MEDALLION

## (undated) DEVICE — FORCEPS BX L240CM JAW DIA2.8MM L CAP W/ NDL MIC MESH TOOTH

## (undated) DEVICE — SUTURE ABSORBABLE BRAIDED 2-0 CT-1 27 IN UD VICRYL J259H

## (undated) DEVICE — BULB SYRINGE, IRRIGATION WITH PROTECTIVE CAP, 60 CC, INDIVIDUALLY WRAPPED: Brand: DOVER

## (undated) DEVICE — CATHETER ETER ANGIO L110CM OD5FR ID046IN L75CM 038IN 145DEG CARD

## (undated) DEVICE — GUIDEWIRE VASC L145CM 0.035IN J TIP L3MM PTFE FIX COR NAMIC

## (undated) DEVICE — COVER,MAYO STAND,STERILE: Brand: MEDLINE

## (undated) DEVICE — BLOCK BITE ENDOSCP AD 21 MM W/ DIL BLU LF DISP

## (undated) DEVICE — BANDAGE COMPR 9 FTX4 IN SMOOTH COMFORTABLE SYNTH ESMRK LF

## (undated) DEVICE — BLADE ASSEMB CLP HAIR FINE --

## (undated) DEVICE — CATH GUID COR JR4.0 6FR 100CM -- LAUNCHER

## (undated) DEVICE — Device: Brand: ASAHI SION BLUE

## (undated) DEVICE — CATH GUID COR EB35 6FR 100CM -- LAUNCHER

## (undated) DEVICE — BAG RED 3PLY 2MIL 30X40 IN

## (undated) DEVICE — GAUZE,SPONGE,4"X4",16PLY,XRAY,STRL,LF: Brand: MEDLINE

## (undated) DEVICE — ANGIO-SEAL VIP VASCULAR CLOSURE DEVICE: Brand: ANGIO-SEAL

## (undated) DEVICE — SYR POWER 150ML 8IN FILL TUBE --

## (undated) DEVICE — ZIMMER® STERILE DISPOSABLE TOURNIQUET CUFF WITH PROTECTIVE SLEEVE AND PLC, DUAL PORT, SINGLE BLADDER, 18 IN. (46 CM)

## (undated) DEVICE — CABLE CATH L10FT RED PIN CONN 25-34 FOR NAVISTAR CARTO 3

## (undated) DEVICE — INTRODUCER SHTH 6FR CANN L11CM DIL TIP 35MM GRN TUNGSTEN

## (undated) DEVICE — SHIELD RAD ANGIO 16X14 IN RADIAL FEN STRL RADPAD

## (undated) DEVICE — MEDI-TRACE CADENCE ADULT, DEFIBRILLATION ELECTRODE -RTS  (10 PR/PK) - PHILIPS: Brand: MEDI-TRACE CADENCE

## (undated) DEVICE — VESSEL LOOPS,MINI, RED: Brand: DEVON

## (undated) DEVICE — PREP SKN CHLRAPRP APL 26ML STR --